# Patient Record
Sex: FEMALE | Race: WHITE | Employment: OTHER | URBAN - METROPOLITAN AREA
[De-identification: names, ages, dates, MRNs, and addresses within clinical notes are randomized per-mention and may not be internally consistent; named-entity substitution may affect disease eponyms.]

---

## 2017-08-04 PROBLEM — Z86.69 HX OF MIGRAINE HEADACHES: Status: ACTIVE | Noted: 2017-08-04

## 2017-10-03 PROBLEM — Z87.19 H/O SMALL BOWEL OBSTRUCTION: Status: ACTIVE | Noted: 2017-10-03

## 2017-10-03 PROBLEM — F51.01 PRIMARY INSOMNIA: Status: ACTIVE | Noted: 2017-10-03

## 2017-10-03 PROBLEM — R10.84 GENERALIZED ABDOMINAL PAIN: Status: ACTIVE | Noted: 2017-10-03

## 2017-10-03 PROBLEM — K76.0 FATTY LIVER: Status: ACTIVE | Noted: 2017-10-03

## 2018-02-28 PROBLEM — E03.9 HYPOTHYROIDISM: Status: ACTIVE | Noted: 2018-02-28

## 2018-03-13 ENCOUNTER — APPOINTMENT (OUTPATIENT)
Dept: CT IMAGING | Age: 33
End: 2018-03-13
Payer: COMMERCIAL

## 2018-03-13 ENCOUNTER — HOSPITAL ENCOUNTER (EMERGENCY)
Age: 33
Discharge: HOME | End: 2018-03-13
Attending: EMERGENCY MEDICINE
Payer: COMMERCIAL

## 2018-03-13 VITALS
OXYGEN SATURATION: 98 % | BODY MASS INDEX: 19.29 KG/M2 | SYSTOLIC BLOOD PRESSURE: 99 MMHG | DIASTOLIC BLOOD PRESSURE: 61 MMHG | HEART RATE: 79 BPM | HEIGHT: 66 IN | RESPIRATION RATE: 16 BRPM | TEMPERATURE: 98.1 F | WEIGHT: 120 LBS

## 2018-03-13 DIAGNOSIS — R10.30 LOWER ABDOMINAL PAIN: Primary | ICD-10-CM

## 2018-03-13 LAB
ALBUMIN SERPL-MCNC: 4.7 G/DL (ref 3.5–5.2)
ALP BLD-CCNC: 86 U/L (ref 35–104)
ALT SERPL-CCNC: 46 U/L (ref 0–32)
AMYLASE: 38 U/L (ref 20–100)
ANION GAP SERPL CALCULATED.3IONS-SCNC: 17 MMOL/L (ref 7–16)
AST SERPL-CCNC: 133 U/L (ref 0–31)
BASOPHILS ABSOLUTE: 0.04 E9/L (ref 0–0.2)
BASOPHILS RELATIVE PERCENT: 0.9 % (ref 0–2)
BILIRUB SERPL-MCNC: <0.2 MG/DL (ref 0–1.2)
BILIRUBIN URINE: NEGATIVE
BLOOD, URINE: NEGATIVE
BUN BLDV-MCNC: 9 MG/DL (ref 6–20)
CALCIUM SERPL-MCNC: 8.5 MG/DL (ref 8.6–10.2)
CHLORIDE BLD-SCNC: 102 MMOL/L (ref 98–107)
CLARITY: CLEAR
CO2: 23 MMOL/L (ref 22–29)
COLOR: YELLOW
CREAT SERPL-MCNC: 0.6 MG/DL (ref 0.5–1)
EOSINOPHILS ABSOLUTE: 0.2 E9/L (ref 0.05–0.5)
EOSINOPHILS RELATIVE PERCENT: 4.3 % (ref 0–6)
GFR AFRICAN AMERICAN: >60
GFR NON-AFRICAN AMERICAN: >60 ML/MIN/1.73
GLUCOSE BLD-MCNC: 86 MG/DL (ref 74–109)
GLUCOSE URINE: NEGATIVE MG/DL
HCG QUALITATIVE: NEGATIVE
HCT VFR BLD CALC: 39.9 % (ref 34–48)
HEMOGLOBIN: 14.1 G/DL (ref 11.5–15.5)
IMMATURE GRANULOCYTES #: 0.01 E9/L
IMMATURE GRANULOCYTES %: 0.2 % (ref 0–5)
KETONES, URINE: NEGATIVE MG/DL
LEUKOCYTE ESTERASE, URINE: NEGATIVE
LIPASE: 35 U/L (ref 13–60)
LYMPHOCYTES ABSOLUTE: 1.12 E9/L (ref 1.5–4)
LYMPHOCYTES RELATIVE PERCENT: 24.1 % (ref 20–42)
MCH RBC QN AUTO: 35 PG (ref 26–35)
MCHC RBC AUTO-ENTMCNC: 35.3 % (ref 32–34.5)
MCV RBC AUTO: 99 FL (ref 80–99.9)
MONOCYTES ABSOLUTE: 0.67 E9/L (ref 0.1–0.95)
MONOCYTES RELATIVE PERCENT: 14.4 % (ref 2–12)
NEUTROPHILS ABSOLUTE: 2.6 E9/L (ref 1.8–7.3)
NEUTROPHILS RELATIVE PERCENT: 56.1 % (ref 43–80)
NITRITE, URINE: NEGATIVE
PDW BLD-RTO: 13.4 FL (ref 11.5–15)
PH UA: 5.5 (ref 5–9)
PLATELET # BLD: 93 E9/L (ref 130–450)
PLATELET CONFIRMATION: NORMAL
PMV BLD AUTO: 9 FL (ref 7–12)
POTASSIUM SERPL-SCNC: 3.8 MMOL/L (ref 3.5–5)
PROTEIN UA: NEGATIVE MG/DL
RBC # BLD: 4.03 E12/L (ref 3.5–5.5)
SODIUM BLD-SCNC: 142 MMOL/L (ref 132–146)
SPECIFIC GRAVITY UA: 1.01 (ref 1–1.03)
TOTAL PROTEIN: 7.3 G/DL (ref 6.4–8.3)
UROBILINOGEN, URINE: 0.2 E.U./DL
WBC # BLD: 4.6 E9/L (ref 4.5–11.5)

## 2018-03-13 PROCEDURE — 85025 COMPLETE CBC W/AUTO DIFF WBC: CPT

## 2018-03-13 PROCEDURE — 84703 CHORIONIC GONADOTROPIN ASSAY: CPT

## 2018-03-13 PROCEDURE — 99284 EMERGENCY DEPT VISIT MOD MDM: CPT

## 2018-03-13 PROCEDURE — 74177 CT ABD & PELVIS W/CONTRAST: CPT

## 2018-03-13 PROCEDURE — 2580000003 HC RX 258: Performed by: EMERGENCY MEDICINE

## 2018-03-13 PROCEDURE — 6360000002 HC RX W HCPCS: Performed by: EMERGENCY MEDICINE

## 2018-03-13 PROCEDURE — 82150 ASSAY OF AMYLASE: CPT

## 2018-03-13 PROCEDURE — 81003 URINALYSIS AUTO W/O SCOPE: CPT

## 2018-03-13 PROCEDURE — 96374 THER/PROPH/DIAG INJ IV PUSH: CPT

## 2018-03-13 PROCEDURE — 80053 COMPREHEN METABOLIC PANEL: CPT

## 2018-03-13 PROCEDURE — 83690 ASSAY OF LIPASE: CPT

## 2018-03-13 PROCEDURE — 6360000004 HC RX CONTRAST MEDICATION: Performed by: RADIOLOGY

## 2018-03-13 PROCEDURE — 96375 TX/PRO/DX INJ NEW DRUG ADDON: CPT

## 2018-03-13 RX ORDER — DIPHENHYDRAMINE HYDROCHLORIDE 50 MG/ML
12.5 INJECTION INTRAMUSCULAR; INTRAVENOUS ONCE
Status: COMPLETED | OUTPATIENT
Start: 2018-03-13 | End: 2018-03-13

## 2018-03-13 RX ORDER — OYSTER SHELL CALCIUM WITH VITAMIN D 500; 200 MG/1; [IU]/1
2 TABLET, FILM COATED ORAL 2 TIMES DAILY
COMMUNITY
End: 2020-08-30

## 2018-03-13 RX ORDER — MORPHINE SULFATE 4 MG/ML
4 INJECTION, SOLUTION INTRAMUSCULAR; INTRAVENOUS ONCE
Status: COMPLETED | OUTPATIENT
Start: 2018-03-13 | End: 2018-03-13

## 2018-03-13 RX ORDER — DOCUSATE SODIUM 100 MG/1
100 CAPSULE, LIQUID FILLED ORAL 2 TIMES DAILY
Qty: 14 CAPSULE | Refills: 0 | Status: SHIPPED | OUTPATIENT
Start: 2018-03-13 | End: 2018-03-20

## 2018-03-13 RX ORDER — KETOROLAC TROMETHAMINE 30 MG/ML
30 INJECTION, SOLUTION INTRAMUSCULAR; INTRAVENOUS ONCE
Status: COMPLETED | OUTPATIENT
Start: 2018-03-13 | End: 2018-03-13

## 2018-03-13 RX ORDER — TRAMADOL HYDROCHLORIDE 50 MG/1
50 TABLET ORAL EVERY 6 HOURS PRN
Qty: 8 TABLET | Refills: 0 | Status: SHIPPED | OUTPATIENT
Start: 2018-03-13 | End: 2018-03-15

## 2018-03-13 RX ORDER — 0.9 % SODIUM CHLORIDE 0.9 %
1000 INTRAVENOUS SOLUTION INTRAVENOUS ONCE
Status: COMPLETED | OUTPATIENT
Start: 2018-03-13 | End: 2018-03-13

## 2018-03-13 RX ADMIN — IOPAMIDOL 110 ML: 755 INJECTION, SOLUTION INTRAVENOUS at 13:13

## 2018-03-13 RX ADMIN — DIPHENHYDRAMINE HYDROCHLORIDE 12.5 MG: 50 INJECTION, SOLUTION INTRAMUSCULAR; INTRAVENOUS at 15:27

## 2018-03-13 RX ADMIN — KETOROLAC TROMETHAMINE 30 MG: 30 INJECTION, SOLUTION INTRAMUSCULAR; INTRAVENOUS at 10:49

## 2018-03-13 RX ADMIN — SODIUM CHLORIDE 1000 ML: 9 INJECTION, SOLUTION INTRAVENOUS at 10:49

## 2018-03-13 RX ADMIN — MORPHINE SULFATE 4 MG: 4 INJECTION, SOLUTION INTRAMUSCULAR; INTRAVENOUS at 14:40

## 2018-03-13 ASSESSMENT — PAIN DESCRIPTION - DESCRIPTORS: DESCRIPTORS: ACHING

## 2018-03-13 ASSESSMENT — PAIN DESCRIPTION - FREQUENCY: FREQUENCY: CONTINUOUS

## 2018-03-13 ASSESSMENT — PAIN SCALES - GENERAL
PAINLEVEL_OUTOF10: 8
PAINLEVEL_OUTOF10: 10
PAINLEVEL_OUTOF10: 10

## 2018-03-13 ASSESSMENT — PAIN DESCRIPTION - LOCATION: LOCATION: ABDOMEN

## 2018-03-13 ASSESSMENT — PAIN DESCRIPTION - ORIENTATION: ORIENTATION: RIGHT

## 2018-03-13 ASSESSMENT — PAIN DESCRIPTION - PAIN TYPE: TYPE: ACUTE PAIN

## 2018-03-13 NOTE — ED NOTES
Bed: 10  Expected date:   Expected time:   Means of arrival:   Comments:  EMS Antony Paulino LPN  42/26/[birth year] 0011

## 2018-03-13 NOTE — ED TRIAGE NOTES
Valentina Schaefer is a 29 y.o. female who presented to the ED on 03/13/18 complaining of   Chief Complaint   Patient presents with    Abdominal Pain     RLQ pain 7/10, started last week after having flu    Altered Mental Status     x3 last time happened this morning, hit head off tub -bloodthinners +head

## 2018-03-13 NOTE — ED PROVIDER NOTES
HPI:   Annemarie Bolanos is a 29 y.o. female presenting to the ED for abdominal pain and possible head injury, beginning last week. The complaint has been intermittent, moderate in severity, and worsened by nothing. The pt's sx began last week after having the flu. Her abdominal pain is accompanied by SOB that led to the pt using at home O2 for one and a half days, which is not something she usually does. She's also concerned about a possible head injury after hitting her on the bathtub when she lost consciousness this morning; this is the third time she's lost consciousness since the sx began. She also has a cough and has experienced vomiting and diarrhea. Patient denies fever/chills, congestion, chest pain, edema, headache, visual disturbances, focal paresthesias, focal weakness, nausea, constipation, dysuria, hematuria, neck or back pain or other complaints at this time. ROS:   Pertinent positives and negatives are stated within HPI, all other systems reviewed and are negative.    --------------------------------------------- PAST HISTORY ---------------------------------------------  Past Medical History:  has a past medical history of Biliary dyskinesia; Hypocalcemia; Hypothyroidism; Irritable bowel syndrome; Migraines; and PONV (postoperative nausea and vomiting). Past Surgical History:  has a past surgical history that includes Parathyroid gland surgery; Cholecystectomy, laparoscopic (07/06/2016); and Thyroidectomy. Social History:  reports that she has never smoked. She has never used smokeless tobacco. She reports that she does not drink alcohol or use drugs. Family History: family history includes Alzheimer's Disease in an other family member; Asthma in her father; Diabetes in an other family member; Heart Disease in her father; High Blood Pressure in her father and mother; High Cholesterol in her mother; Wake Suzanna in an other family member; Other in her mother.      The patients home medications have been reviewed.     Allergies: Codeine    -------------------------------------------------- RESULTS -------------------------------------------------  All laboratory and radiology results have been personally reviewed by myself   LABS:  Results for orders placed or performed during the hospital encounter of 03/13/18   Lipase   Result Value Ref Range    Lipase 35 13 - 60 U/L   Amylase   Result Value Ref Range    Amylase 38 20 - 100 U/L   Comprehensive Metabolic Panel   Result Value Ref Range    Sodium 142 132 - 146 mmol/L    Potassium 3.8 3.5 - 5.0 mmol/L    Chloride 102 98 - 107 mmol/L    CO2 23 22 - 29 mmol/L    Anion Gap 17 (H) 7 - 16 mmol/L    Glucose 86 74 - 109 mg/dL    BUN 9 6 - 20 mg/dL    CREATININE 0.6 0.5 - 1.0 mg/dL    GFR Non-African American >60 >=60 mL/min/1.73    GFR African American >60     Calcium 8.5 (L) 8.6 - 10.2 mg/dL    Total Protein 7.3 6.4 - 8.3 g/dL    Alb 4.7 3.5 - 5.2 g/dL    Total Bilirubin <0.2 0.0 - 1.2 mg/dL    Alkaline Phosphatase 86 35 - 104 U/L    ALT 46 (H) 0 - 32 U/L     (H) 0 - 31 U/L   CBC Auto Differential   Result Value Ref Range    WBC 4.6 4.5 - 11.5 E9/L    RBC 4.03 3.50 - 5.50 E12/L    Hemoglobin 14.1 11.5 - 15.5 g/dL    Hematocrit 39.9 34.0 - 48.0 %    MCV 99.0 80.0 - 99.9 fL    MCH 35.0 26.0 - 35.0 pg    MCHC 35.3 (H) 32.0 - 34.5 %    RDW 13.4 11.5 - 15.0 fL    Platelets 93 (L) 816 - 450 E9/L    MPV 9.0 7.0 - 12.0 fL    Neutrophils % 56.1 43.0 - 80.0 %    Immature Granulocytes % 0.2 0.0 - 5.0 %    Lymphocytes % 24.1 20.0 - 42.0 %    Monocytes % 14.4 (H) 2.0 - 12.0 %    Eosinophils % 4.3 0.0 - 6.0 %    Basophils % 0.9 0.0 - 2.0 %    Neutrophils # 2.60 1.80 - 7.30 E9/L    Immature Granulocytes # 0.01 E9/L    Lymphocytes # 1.12 (L) 1.50 - 4.00 E9/L    Monocytes # 0.67 0.10 - 0.95 E9/L    Eosinophils # 0.20 0.05 - 0.50 E9/L    Basophils # 0.04 0.00 - 0.20 E9/L   Urinalysis   Result Value Ref Range    Color, UA Yellow Straw/Yellow    Clarity, UA Clear

## 2018-03-19 RX ORDER — ZOLPIDEM TARTRATE 10 MG/1
10 TABLET ORAL NIGHTLY PRN
Qty: 30 TABLET | Refills: 1 | Status: SHIPPED | OUTPATIENT
Start: 2018-03-19 | End: 2018-04-18

## 2018-05-15 RX ORDER — ZOLPIDEM TARTRATE 10 MG/1
10 TABLET ORAL NIGHTLY PRN
Qty: 30 TABLET | Refills: 1 | Status: SHIPPED | OUTPATIENT
Start: 2018-05-15 | End: 2018-07-09 | Stop reason: SDUPTHER

## 2018-06-05 ENCOUNTER — HOSPITAL ENCOUNTER (INPATIENT)
Age: 33
LOS: 3 days | Discharge: HOME | DRG: 248 | End: 2018-06-08
Attending: EMERGENCY MEDICINE | Admitting: SURGERY
Payer: COMMERCIAL

## 2018-06-05 ENCOUNTER — APPOINTMENT (OUTPATIENT)
Dept: CT IMAGING | Age: 33
DRG: 248 | End: 2018-06-05
Payer: COMMERCIAL

## 2018-06-05 DIAGNOSIS — R10.31 RIGHT LOWER QUADRANT ABDOMINAL PAIN: Primary | ICD-10-CM

## 2018-06-05 LAB
ALBUMIN SERPL-MCNC: 4.6 G/DL (ref 3.5–5.2)
ALP BLD-CCNC: 92 U/L (ref 35–104)
ALT SERPL-CCNC: 23 U/L (ref 0–32)
ANION GAP SERPL CALCULATED.3IONS-SCNC: 14 MMOL/L (ref 7–16)
AST SERPL-CCNC: 31 U/L (ref 0–31)
BASOPHILS ABSOLUTE: 0.11 E9/L (ref 0–0.2)
BASOPHILS RELATIVE PERCENT: 1 % (ref 0–2)
BILIRUB SERPL-MCNC: <0.2 MG/DL (ref 0–1.2)
BILIRUBIN URINE: NEGATIVE
BLOOD, URINE: NEGATIVE
BUN BLDV-MCNC: 7 MG/DL (ref 6–20)
CALCIUM SERPL-MCNC: 9.3 MG/DL (ref 8.6–10.2)
CHLORIDE BLD-SCNC: 102 MMOL/L (ref 98–107)
CHP ED QC CHECK: YES
CLARITY: CLEAR
CO2: 25 MMOL/L (ref 22–29)
COLOR: YELLOW
CREAT SERPL-MCNC: 0.6 MG/DL (ref 0.5–1)
EOSINOPHILS ABSOLUTE: 0.28 E9/L (ref 0.05–0.5)
EOSINOPHILS RELATIVE PERCENT: 2.6 % (ref 0–6)
GFR AFRICAN AMERICAN: >60
GFR NON-AFRICAN AMERICAN: >60 ML/MIN/1.73
GLUCOSE BLD-MCNC: 104 MG/DL (ref 74–109)
GLUCOSE URINE: NEGATIVE MG/DL
HCT VFR BLD CALC: 44.1 % (ref 34–48)
HEMOGLOBIN: 15.5 G/DL (ref 11.5–15.5)
IMMATURE GRANULOCYTES #: 0.06 E9/L
IMMATURE GRANULOCYTES %: 0.6 % (ref 0–5)
KETONES, URINE: NEGATIVE MG/DL
LACTIC ACID: 1.4 MMOL/L (ref 0.5–2.2)
LEUKOCYTE ESTERASE, URINE: NEGATIVE
LIPASE: 69 U/L (ref 13–60)
LYMPHOCYTES ABSOLUTE: 3.47 E9/L (ref 1.5–4)
LYMPHOCYTES RELATIVE PERCENT: 32 % (ref 20–42)
MCH RBC QN AUTO: 34.6 PG (ref 26–35)
MCHC RBC AUTO-ENTMCNC: 35.1 % (ref 32–34.5)
MCV RBC AUTO: 98.4 FL (ref 80–99.9)
MONOCYTES ABSOLUTE: 0.88 E9/L (ref 0.1–0.95)
MONOCYTES RELATIVE PERCENT: 8.1 % (ref 2–12)
NEUTROPHILS ABSOLUTE: 6.03 E9/L (ref 1.8–7.3)
NEUTROPHILS RELATIVE PERCENT: 55.7 % (ref 43–80)
NITRITE, URINE: NEGATIVE
PDW BLD-RTO: 11.8 FL (ref 11.5–15)
PH UA: 5.5 (ref 5–9)
PLATELET # BLD: 302 E9/L (ref 130–450)
PMV BLD AUTO: 9.5 FL (ref 7–12)
POTASSIUM SERPL-SCNC: 3.9 MMOL/L (ref 3.5–5)
PREGNANCY TEST URINE, POC: NEGATIVE
PROTEIN UA: NEGATIVE MG/DL
RBC # BLD: 4.48 E12/L (ref 3.5–5.5)
SODIUM BLD-SCNC: 141 MMOL/L (ref 132–146)
SPECIFIC GRAVITY UA: 1.01 (ref 1–1.03)
TOTAL PROTEIN: 7.6 G/DL (ref 6.4–8.3)
UROBILINOGEN, URINE: 0.2 E.U./DL
WBC # BLD: 10.8 E9/L (ref 4.5–11.5)

## 2018-06-05 PROCEDURE — 96374 THER/PROPH/DIAG INJ IV PUSH: CPT

## 2018-06-05 PROCEDURE — 83605 ASSAY OF LACTIC ACID: CPT

## 2018-06-05 PROCEDURE — 6360000004 HC RX CONTRAST MEDICATION: Performed by: RADIOLOGY

## 2018-06-05 PROCEDURE — 81003 URINALYSIS AUTO W/O SCOPE: CPT

## 2018-06-05 PROCEDURE — 96375 TX/PRO/DX INJ NEW DRUG ADDON: CPT

## 2018-06-05 PROCEDURE — S0028 INJECTION, FAMOTIDINE, 20 MG: HCPCS | Performed by: EMERGENCY MEDICINE

## 2018-06-05 PROCEDURE — 85025 COMPLETE CBC W/AUTO DIFF WBC: CPT

## 2018-06-05 PROCEDURE — 6360000002 HC RX W HCPCS: Performed by: EMERGENCY MEDICINE

## 2018-06-05 PROCEDURE — 74177 CT ABD & PELVIS W/CONTRAST: CPT

## 2018-06-05 PROCEDURE — C9113 INJ PANTOPRAZOLE SODIUM, VIA: HCPCS | Performed by: EMERGENCY MEDICINE

## 2018-06-05 PROCEDURE — 83690 ASSAY OF LIPASE: CPT

## 2018-06-05 PROCEDURE — 1200000000 HC SEMI PRIVATE

## 2018-06-05 PROCEDURE — 80053 COMPREHEN METABOLIC PANEL: CPT

## 2018-06-05 PROCEDURE — 99285 EMERGENCY DEPT VISIT HI MDM: CPT

## 2018-06-05 PROCEDURE — 2500000003 HC RX 250 WO HCPCS: Performed by: EMERGENCY MEDICINE

## 2018-06-05 RX ORDER — ONDANSETRON 2 MG/ML
4 INJECTION INTRAMUSCULAR; INTRAVENOUS ONCE
Status: COMPLETED | OUTPATIENT
Start: 2018-06-05 | End: 2018-06-05

## 2018-06-05 RX ORDER — SUCRALFATE 1 G/1
1 TABLET ORAL 4 TIMES DAILY
COMMUNITY
End: 2018-06-19 | Stop reason: ALTCHOICE

## 2018-06-05 RX ORDER — PANTOPRAZOLE SODIUM 40 MG/10ML
40 INJECTION, POWDER, LYOPHILIZED, FOR SOLUTION INTRAVENOUS ONCE
Status: COMPLETED | OUTPATIENT
Start: 2018-06-05 | End: 2018-06-05

## 2018-06-05 RX ORDER — FAMOTIDINE 10 MG
10 TABLET ORAL 2 TIMES DAILY
Status: ON HOLD | COMMUNITY
End: 2020-09-02 | Stop reason: HOSPADM

## 2018-06-05 RX ORDER — KETOROLAC TROMETHAMINE 30 MG/ML
15 INJECTION, SOLUTION INTRAMUSCULAR; INTRAVENOUS ONCE
Status: COMPLETED | OUTPATIENT
Start: 2018-06-05 | End: 2018-06-06

## 2018-06-05 RX ADMIN — ONDANSETRON 4 MG: 2 INJECTION INTRAMUSCULAR; INTRAVENOUS at 21:31

## 2018-06-05 RX ADMIN — PANTOPRAZOLE SODIUM 40 MG: 40 INJECTION, POWDER, FOR SOLUTION INTRAVENOUS at 21:31

## 2018-06-05 RX ADMIN — IOPAMIDOL 110 ML: 755 INJECTION, SOLUTION INTRAVENOUS at 21:42

## 2018-06-05 RX ADMIN — FAMOTIDINE 20 MG: 10 INJECTION, SOLUTION INTRAVENOUS at 21:32

## 2018-06-05 ASSESSMENT — PAIN DESCRIPTION - LOCATION: LOCATION: ABDOMEN

## 2018-06-05 ASSESSMENT — PAIN SCALES - GENERAL: PAINLEVEL_OUTOF10: 8

## 2018-06-05 ASSESSMENT — PAIN DESCRIPTION - PAIN TYPE: TYPE: ACUTE PAIN

## 2018-06-06 ENCOUNTER — APPOINTMENT (OUTPATIENT)
Dept: ULTRASOUND IMAGING | Age: 33
DRG: 248 | End: 2018-06-06
Payer: COMMERCIAL

## 2018-06-06 PROCEDURE — 2580000003 HC RX 258: Performed by: SURGERY

## 2018-06-06 PROCEDURE — 6370000000 HC RX 637 (ALT 250 FOR IP): Performed by: EMERGENCY MEDICINE

## 2018-06-06 PROCEDURE — 2580000003 HC RX 258: Performed by: EMERGENCY MEDICINE

## 2018-06-06 PROCEDURE — 36415 COLL VENOUS BLD VENIPUNCTURE: CPT

## 2018-06-06 PROCEDURE — 99223 1ST HOSP IP/OBS HIGH 75: CPT | Performed by: SURGERY

## 2018-06-06 PROCEDURE — 87040 BLOOD CULTURE FOR BACTERIA: CPT

## 2018-06-06 PROCEDURE — 76856 US EXAM PELVIC COMPLETE: CPT

## 2018-06-06 PROCEDURE — 6360000002 HC RX W HCPCS: Performed by: SURGERY

## 2018-06-06 PROCEDURE — 2500000003 HC RX 250 WO HCPCS: Performed by: SURGERY

## 2018-06-06 PROCEDURE — 1200000000 HC SEMI PRIVATE

## 2018-06-06 PROCEDURE — 6360000002 HC RX W HCPCS: Performed by: EMERGENCY MEDICINE

## 2018-06-06 PROCEDURE — 2580000003 HC RX 258: Performed by: STUDENT IN AN ORGANIZED HEALTH CARE EDUCATION/TRAINING PROGRAM

## 2018-06-06 PROCEDURE — C9113 INJ PANTOPRAZOLE SODIUM, VIA: HCPCS | Performed by: STUDENT IN AN ORGANIZED HEALTH CARE EDUCATION/TRAINING PROGRAM

## 2018-06-06 PROCEDURE — 6360000002 HC RX W HCPCS: Performed by: STUDENT IN AN ORGANIZED HEALTH CARE EDUCATION/TRAINING PROGRAM

## 2018-06-06 PROCEDURE — 6370000000 HC RX 637 (ALT 250 FOR IP): Performed by: STUDENT IN AN ORGANIZED HEALTH CARE EDUCATION/TRAINING PROGRAM

## 2018-06-06 PROCEDURE — 76830 TRANSVAGINAL US NON-OB: CPT

## 2018-06-06 RX ORDER — CIPROFLOXACIN 2 MG/ML
400 INJECTION, SOLUTION INTRAVENOUS EVERY 12 HOURS
Status: DISCONTINUED | OUTPATIENT
Start: 2018-06-06 | End: 2018-06-08

## 2018-06-06 RX ORDER — SODIUM CHLORIDE, SODIUM LACTATE, POTASSIUM CHLORIDE, CALCIUM CHLORIDE 600; 310; 30; 20 MG/100ML; MG/100ML; MG/100ML; MG/100ML
INJECTION, SOLUTION INTRAVENOUS CONTINUOUS
Status: DISCONTINUED | OUTPATIENT
Start: 2018-06-06 | End: 2018-06-08 | Stop reason: HOSPADM

## 2018-06-06 RX ORDER — ACETAMINOPHEN 500 MG
1000 TABLET ORAL ONCE
Status: COMPLETED | OUTPATIENT
Start: 2018-06-06 | End: 2018-06-06

## 2018-06-06 RX ORDER — PANTOPRAZOLE SODIUM 40 MG/10ML
40 INJECTION, POWDER, LYOPHILIZED, FOR SOLUTION INTRAVENOUS DAILY
Status: DISCONTINUED | OUTPATIENT
Start: 2018-06-06 | End: 2018-06-08 | Stop reason: HOSPADM

## 2018-06-06 RX ORDER — MORPHINE SULFATE 2 MG/ML
4 INJECTION, SOLUTION INTRAMUSCULAR; INTRAVENOUS
Status: DISCONTINUED | OUTPATIENT
Start: 2018-06-06 | End: 2018-06-08

## 2018-06-06 RX ORDER — MORPHINE SULFATE 2 MG/ML
2 INJECTION, SOLUTION INTRAMUSCULAR; INTRAVENOUS
Status: DISCONTINUED | OUTPATIENT
Start: 2018-06-06 | End: 2018-06-06 | Stop reason: SDUPTHER

## 2018-06-06 RX ORDER — 0.9 % SODIUM CHLORIDE 0.9 %
10 VIAL (ML) INJECTION DAILY
Status: DISCONTINUED | OUTPATIENT
Start: 2018-06-06 | End: 2018-06-08 | Stop reason: HOSPADM

## 2018-06-06 RX ORDER — LEVOTHYROXINE SODIUM 0.1 MG/1
100 TABLET ORAL DAILY
Status: DISCONTINUED | OUTPATIENT
Start: 2018-06-06 | End: 2018-06-08 | Stop reason: HOSPADM

## 2018-06-06 RX ORDER — ACETAMINOPHEN 325 MG/1
650 TABLET ORAL EVERY 4 HOURS PRN
Status: DISCONTINUED | OUTPATIENT
Start: 2018-06-06 | End: 2018-06-08 | Stop reason: HOSPADM

## 2018-06-06 RX ORDER — ONDANSETRON 2 MG/ML
4 INJECTION INTRAMUSCULAR; INTRAVENOUS EVERY 6 HOURS PRN
Status: DISCONTINUED | OUTPATIENT
Start: 2018-06-06 | End: 2018-06-08 | Stop reason: HOSPADM

## 2018-06-06 RX ORDER — MORPHINE SULFATE 2 MG/ML
4 INJECTION, SOLUTION INTRAMUSCULAR; INTRAVENOUS
Status: DISCONTINUED | OUTPATIENT
Start: 2018-06-06 | End: 2018-06-06 | Stop reason: SDUPTHER

## 2018-06-06 RX ORDER — PROMETHAZINE HYDROCHLORIDE 25 MG/ML
12.5 INJECTION, SOLUTION INTRAMUSCULAR; INTRAVENOUS EVERY 6 HOURS PRN
Status: DISCONTINUED | OUTPATIENT
Start: 2018-06-06 | End: 2018-06-08 | Stop reason: HOSPADM

## 2018-06-06 RX ORDER — CEFTRIAXONE 2 G/1
INJECTION, POWDER, FOR SOLUTION INTRAMUSCULAR; INTRAVENOUS
Status: DISPENSED
Start: 2018-06-06 | End: 2018-06-06

## 2018-06-06 RX ORDER — SODIUM CHLORIDE 0.9 % (FLUSH) 0.9 %
10 SYRINGE (ML) INJECTION PRN
Status: DISCONTINUED | OUTPATIENT
Start: 2018-06-06 | End: 2018-06-08 | Stop reason: HOSPADM

## 2018-06-06 RX ORDER — SODIUM CHLORIDE 0.9 % (FLUSH) 0.9 %
10 SYRINGE (ML) INJECTION EVERY 12 HOURS SCHEDULED
Status: DISCONTINUED | OUTPATIENT
Start: 2018-06-06 | End: 2018-06-08 | Stop reason: HOSPADM

## 2018-06-06 RX ORDER — MORPHINE SULFATE 2 MG/ML
2 INJECTION, SOLUTION INTRAMUSCULAR; INTRAVENOUS
Status: DISCONTINUED | OUTPATIENT
Start: 2018-06-06 | End: 2018-06-08

## 2018-06-06 RX ADMIN — SODIUM CHLORIDE, POTASSIUM CHLORIDE, SODIUM LACTATE AND CALCIUM CHLORIDE: 600; 310; 30; 20 INJECTION, SOLUTION INTRAVENOUS at 18:33

## 2018-06-06 RX ADMIN — Medication 10 ML: at 10:53

## 2018-06-06 RX ADMIN — METRONIDAZOLE 500 MG: 500 INJECTION, SOLUTION INTRAVENOUS at 18:58

## 2018-06-06 RX ADMIN — ACETAMINOPHEN 1000 MG: 500 TABLET ORAL at 00:50

## 2018-06-06 RX ADMIN — LEVOTHYROXINE SODIUM 100 MCG: 100 TABLET ORAL at 14:34

## 2018-06-06 RX ADMIN — METRONIDAZOLE 500 MG: 500 INJECTION, SOLUTION INTRAVENOUS at 10:52

## 2018-06-06 RX ADMIN — KETOROLAC TROMETHAMINE 15 MG: 30 INJECTION, SOLUTION INTRAMUSCULAR at 01:32

## 2018-06-06 RX ADMIN — CIPROFLOXACIN 400 MG: 2 INJECTION, SOLUTION INTRAVENOUS at 17:05

## 2018-06-06 RX ADMIN — Medication 10 ML: at 08:15

## 2018-06-06 RX ADMIN — Medication 10 ML: at 14:27

## 2018-06-06 RX ADMIN — MORPHINE SULFATE 4 MG: 2 INJECTION, SOLUTION INTRAMUSCULAR; INTRAVENOUS at 14:27

## 2018-06-06 RX ADMIN — PROMETHAZINE HYDROCHLORIDE 12.5 MG: 25 INJECTION INTRAMUSCULAR; INTRAVENOUS at 18:34

## 2018-06-06 RX ADMIN — METRONIDAZOLE 500 MG: 500 INJECTION, SOLUTION INTRAVENOUS at 02:43

## 2018-06-06 RX ADMIN — PANTOPRAZOLE SODIUM 40 MG: 40 INJECTION, POWDER, FOR SOLUTION INTRAVENOUS at 10:52

## 2018-06-06 RX ADMIN — SODIUM CHLORIDE, POTASSIUM CHLORIDE, SODIUM LACTATE AND CALCIUM CHLORIDE: 600; 310; 30; 20 INJECTION, SOLUTION INTRAVENOUS at 02:41

## 2018-06-06 RX ADMIN — ENOXAPARIN SODIUM 40 MG: 40 INJECTION, SOLUTION INTRAVENOUS; SUBCUTANEOUS at 08:19

## 2018-06-06 RX ADMIN — CEFTRIAXONE SODIUM 2 G: 2 INJECTION, POWDER, FOR SOLUTION INTRAMUSCULAR; INTRAVENOUS at 01:41

## 2018-06-06 RX ADMIN — MORPHINE SULFATE 2 MG: 2 INJECTION, SOLUTION INTRAMUSCULAR; INTRAVENOUS at 04:40

## 2018-06-06 RX ADMIN — MORPHINE SULFATE 2 MG: 2 INJECTION, SOLUTION INTRAMUSCULAR; INTRAVENOUS at 03:16

## 2018-06-06 RX ADMIN — CIPROFLOXACIN 400 MG: 2 INJECTION, SOLUTION INTRAVENOUS at 04:42

## 2018-06-06 RX ADMIN — ONDANSETRON 4 MG: 2 INJECTION INTRAMUSCULAR; INTRAVENOUS at 20:28

## 2018-06-06 RX ADMIN — SODIUM CHLORIDE, POTASSIUM CHLORIDE, SODIUM LACTATE AND CALCIUM CHLORIDE: 600; 310; 30; 20 INJECTION, SOLUTION INTRAVENOUS at 01:32

## 2018-06-06 RX ADMIN — MORPHINE SULFATE 4 MG: 2 INJECTION, SOLUTION INTRAMUSCULAR; INTRAVENOUS at 18:34

## 2018-06-06 RX ADMIN — MORPHINE SULFATE 4 MG: 2 INJECTION, SOLUTION INTRAMUSCULAR; INTRAVENOUS at 10:52

## 2018-06-06 RX ADMIN — MORPHINE SULFATE 4 MG: 2 INJECTION, SOLUTION INTRAMUSCULAR; INTRAVENOUS at 22:13

## 2018-06-06 RX ADMIN — MORPHINE SULFATE 4 MG: 2 INJECTION, SOLUTION INTRAMUSCULAR; INTRAVENOUS at 08:15

## 2018-06-06 RX ADMIN — SODIUM CHLORIDE, POTASSIUM CHLORIDE, SODIUM LACTATE AND CALCIUM CHLORIDE: 600; 310; 30; 20 INJECTION, SOLUTION INTRAVENOUS at 10:22

## 2018-06-06 RX ADMIN — Medication 10 ML: at 20:22

## 2018-06-06 RX ADMIN — ONDANSETRON 4 MG: 2 INJECTION INTRAMUSCULAR; INTRAVENOUS at 14:26

## 2018-06-06 ASSESSMENT — PAIN DESCRIPTION - PAIN TYPE
TYPE: ACUTE PAIN

## 2018-06-06 ASSESSMENT — PAIN DESCRIPTION - LOCATION
LOCATION: ABDOMEN

## 2018-06-06 ASSESSMENT — PAIN SCALES - GENERAL
PAINLEVEL_OUTOF10: 8
PAINLEVEL_OUTOF10: 9
PAINLEVEL_OUTOF10: 6
PAINLEVEL_OUTOF10: 8
PAINLEVEL_OUTOF10: 9
PAINLEVEL_OUTOF10: 8
PAINLEVEL_OUTOF10: 7
PAINLEVEL_OUTOF10: 8
PAINLEVEL_OUTOF10: 8
PAINLEVEL_OUTOF10: 10
PAINLEVEL_OUTOF10: 0
PAINLEVEL_OUTOF10: 8
PAINLEVEL_OUTOF10: 10
PAINLEVEL_OUTOF10: 8
PAINLEVEL_OUTOF10: 6
PAINLEVEL_OUTOF10: 9

## 2018-06-06 ASSESSMENT — PAIN DESCRIPTION - DESCRIPTORS
DESCRIPTORS: ACHING
DESCRIPTORS: BURNING;SHARP
DESCRIPTORS: ACHING
DESCRIPTORS: BURNING;SHARP
DESCRIPTORS: SORE;SHARP;STABBING

## 2018-06-06 ASSESSMENT — PAIN DESCRIPTION - PROGRESSION
CLINICAL_PROGRESSION: NOT CHANGED

## 2018-06-06 ASSESSMENT — PAIN DESCRIPTION - ORIENTATION
ORIENTATION: MID;LOWER
ORIENTATION: MID;RIGHT;LEFT;LOWER
ORIENTATION: MID;LOWER

## 2018-06-06 ASSESSMENT — PAIN DESCRIPTION - FREQUENCY
FREQUENCY: CONTINUOUS
FREQUENCY: INTERMITTENT
FREQUENCY: CONTINUOUS
FREQUENCY: INTERMITTENT
FREQUENCY: CONTINUOUS

## 2018-06-06 ASSESSMENT — PAIN DESCRIPTION - ONSET
ONSET: ON-GOING

## 2018-06-06 ASSESSMENT — PAIN DESCRIPTION - DIRECTION: RADIATING_TOWARDS: BACK

## 2018-06-07 ENCOUNTER — APPOINTMENT (OUTPATIENT)
Dept: CT IMAGING | Age: 33
DRG: 248 | End: 2018-06-07
Payer: COMMERCIAL

## 2018-06-07 LAB
ALBUMIN SERPL-MCNC: 3.3 G/DL (ref 3.5–5.2)
ALP BLD-CCNC: 107 U/L (ref 35–104)
ALT SERPL-CCNC: 109 U/L (ref 0–32)
ANION GAP SERPL CALCULATED.3IONS-SCNC: 11 MMOL/L (ref 7–16)
AST SERPL-CCNC: 154 U/L (ref 0–31)
BASOPHILS ABSOLUTE: 0.07 E9/L (ref 0–0.2)
BASOPHILS RELATIVE PERCENT: 1 % (ref 0–2)
BILIRUB SERPL-MCNC: 0.5 MG/DL (ref 0–1.2)
BUN BLDV-MCNC: 4 MG/DL (ref 6–20)
CALCIUM SERPL-MCNC: 7.9 MG/DL (ref 8.6–10.2)
CHLORIDE BLD-SCNC: 103 MMOL/L (ref 98–107)
CO2: 24 MMOL/L (ref 22–29)
CREAT SERPL-MCNC: 0.7 MG/DL (ref 0.5–1)
EOSINOPHILS ABSOLUTE: 0.17 E9/L (ref 0.05–0.5)
EOSINOPHILS RELATIVE PERCENT: 2.3 % (ref 0–6)
GFR AFRICAN AMERICAN: >60
GFR NON-AFRICAN AMERICAN: >60 ML/MIN/1.73
GLUCOSE BLD-MCNC: 86 MG/DL (ref 74–109)
HCT VFR BLD CALC: 37.7 % (ref 34–48)
HEMOGLOBIN: 12.8 G/DL (ref 11.5–15.5)
IMMATURE GRANULOCYTES #: 0.02 E9/L
IMMATURE GRANULOCYTES %: 0.3 % (ref 0–5)
LYMPHOCYTES ABSOLUTE: 1.92 E9/L (ref 1.5–4)
LYMPHOCYTES RELATIVE PERCENT: 26.3 % (ref 20–42)
MCH RBC QN AUTO: 34.4 PG (ref 26–35)
MCHC RBC AUTO-ENTMCNC: 34 % (ref 32–34.5)
MCV RBC AUTO: 101.3 FL (ref 80–99.9)
MONOCYTES ABSOLUTE: 0.63 E9/L (ref 0.1–0.95)
MONOCYTES RELATIVE PERCENT: 8.6 % (ref 2–12)
NEUTROPHILS ABSOLUTE: 4.5 E9/L (ref 1.8–7.3)
NEUTROPHILS RELATIVE PERCENT: 61.5 % (ref 43–80)
PDW BLD-RTO: 11.8 FL (ref 11.5–15)
PLATELET # BLD: 182 E9/L (ref 130–450)
PMV BLD AUTO: 10 FL (ref 7–12)
POTASSIUM REFLEX MAGNESIUM: 3.9 MMOL/L (ref 3.5–5)
RBC # BLD: 3.72 E12/L (ref 3.5–5.5)
SODIUM BLD-SCNC: 138 MMOL/L (ref 132–146)
TOTAL PROTEIN: 5.8 G/DL (ref 6.4–8.3)
WBC # BLD: 7.3 E9/L (ref 4.5–11.5)

## 2018-06-07 PROCEDURE — 6360000004 HC RX CONTRAST MEDICATION: Performed by: RADIOLOGY

## 2018-06-07 PROCEDURE — 2580000003 HC RX 258: Performed by: SURGERY

## 2018-06-07 PROCEDURE — 74177 CT ABD & PELVIS W/CONTRAST: CPT

## 2018-06-07 PROCEDURE — 36415 COLL VENOUS BLD VENIPUNCTURE: CPT

## 2018-06-07 PROCEDURE — 6370000000 HC RX 637 (ALT 250 FOR IP): Performed by: STUDENT IN AN ORGANIZED HEALTH CARE EDUCATION/TRAINING PROGRAM

## 2018-06-07 PROCEDURE — 2500000003 HC RX 250 WO HCPCS: Performed by: SURGERY

## 2018-06-07 PROCEDURE — 6360000002 HC RX W HCPCS: Performed by: SURGERY

## 2018-06-07 PROCEDURE — 85025 COMPLETE CBC W/AUTO DIFF WBC: CPT

## 2018-06-07 PROCEDURE — 99232 SBSQ HOSP IP/OBS MODERATE 35: CPT | Performed by: SURGERY

## 2018-06-07 PROCEDURE — 1200000000 HC SEMI PRIVATE

## 2018-06-07 PROCEDURE — 80053 COMPREHEN METABOLIC PANEL: CPT

## 2018-06-07 PROCEDURE — 6360000002 HC RX W HCPCS: Performed by: STUDENT IN AN ORGANIZED HEALTH CARE EDUCATION/TRAINING PROGRAM

## 2018-06-07 PROCEDURE — 2580000003 HC RX 258: Performed by: STUDENT IN AN ORGANIZED HEALTH CARE EDUCATION/TRAINING PROGRAM

## 2018-06-07 PROCEDURE — C9113 INJ PANTOPRAZOLE SODIUM, VIA: HCPCS | Performed by: STUDENT IN AN ORGANIZED HEALTH CARE EDUCATION/TRAINING PROGRAM

## 2018-06-07 RX ADMIN — METRONIDAZOLE 500 MG: 500 INJECTION, SOLUTION INTRAVENOUS at 18:50

## 2018-06-07 RX ADMIN — MORPHINE SULFATE 4 MG: 2 INJECTION, SOLUTION INTRAMUSCULAR; INTRAVENOUS at 12:17

## 2018-06-07 RX ADMIN — LEVOTHYROXINE SODIUM 100 MCG: 100 TABLET ORAL at 06:05

## 2018-06-07 RX ADMIN — Medication 10 ML: at 21:16

## 2018-06-07 RX ADMIN — IOHEXOL 50 ML: 240 INJECTION, SOLUTION INTRATHECAL; INTRAVASCULAR; INTRAVENOUS; ORAL at 16:35

## 2018-06-07 RX ADMIN — METRONIDAZOLE 500 MG: 500 INJECTION, SOLUTION INTRAVENOUS at 11:05

## 2018-06-07 RX ADMIN — MORPHINE SULFATE 4 MG: 2 INJECTION, SOLUTION INTRAMUSCULAR; INTRAVENOUS at 21:15

## 2018-06-07 RX ADMIN — SODIUM CHLORIDE, POTASSIUM CHLORIDE, SODIUM LACTATE AND CALCIUM CHLORIDE: 600; 310; 30; 20 INJECTION, SOLUTION INTRAVENOUS at 06:07

## 2018-06-07 RX ADMIN — Medication 10 ML: at 11:05

## 2018-06-07 RX ADMIN — MORPHINE SULFATE 4 MG: 2 INJECTION, SOLUTION INTRAMUSCULAR; INTRAVENOUS at 08:04

## 2018-06-07 RX ADMIN — SODIUM CHLORIDE, POTASSIUM CHLORIDE, SODIUM LACTATE AND CALCIUM CHLORIDE: 600; 310; 30; 20 INJECTION, SOLUTION INTRAVENOUS at 17:23

## 2018-06-07 RX ADMIN — Medication 10 ML: at 12:18

## 2018-06-07 RX ADMIN — METRONIDAZOLE 500 MG: 500 INJECTION, SOLUTION INTRAVENOUS at 02:54

## 2018-06-07 RX ADMIN — Medication 10 ML: at 08:04

## 2018-06-07 RX ADMIN — CIPROFLOXACIN 400 MG: 2 INJECTION, SOLUTION INTRAVENOUS at 04:08

## 2018-06-07 RX ADMIN — MORPHINE SULFATE 4 MG: 2 INJECTION, SOLUTION INTRAMUSCULAR; INTRAVENOUS at 15:26

## 2018-06-07 RX ADMIN — Medication 10 ML: at 15:26

## 2018-06-07 RX ADMIN — PROMETHAZINE HYDROCHLORIDE 12.5 MG: 25 INJECTION INTRAMUSCULAR; INTRAVENOUS at 21:15

## 2018-06-07 RX ADMIN — PROMETHAZINE HYDROCHLORIDE 12.5 MG: 25 INJECTION INTRAMUSCULAR; INTRAVENOUS at 03:06

## 2018-06-07 RX ADMIN — Medication 10 ML: at 03:06

## 2018-06-07 RX ADMIN — IOPAMIDOL 110 ML: 755 INJECTION, SOLUTION INTRAVENOUS at 16:35

## 2018-06-07 RX ADMIN — ENOXAPARIN SODIUM 40 MG: 40 INJECTION, SOLUTION INTRAVENOUS; SUBCUTANEOUS at 08:04

## 2018-06-07 RX ADMIN — Medication 10 ML: at 08:05

## 2018-06-07 RX ADMIN — PANTOPRAZOLE SODIUM 40 MG: 40 INJECTION, POWDER, FOR SOLUTION INTRAVENOUS at 08:04

## 2018-06-07 RX ADMIN — CIPROFLOXACIN 400 MG: 2 INJECTION, SOLUTION INTRAVENOUS at 15:15

## 2018-06-07 RX ADMIN — Medication 10 ML: at 15:15

## 2018-06-07 RX ADMIN — MORPHINE SULFATE 4 MG: 2 INJECTION, SOLUTION INTRAMUSCULAR; INTRAVENOUS at 03:05

## 2018-06-07 ASSESSMENT — PAIN DESCRIPTION - PAIN TYPE
TYPE: ACUTE PAIN

## 2018-06-07 ASSESSMENT — PAIN DESCRIPTION - FREQUENCY
FREQUENCY: INTERMITTENT

## 2018-06-07 ASSESSMENT — PAIN DESCRIPTION - ORIENTATION
ORIENTATION: MID;RIGHT
ORIENTATION: MID;LOWER
ORIENTATION: MID;LOWER

## 2018-06-07 ASSESSMENT — PAIN DESCRIPTION - ONSET
ONSET: ON-GOING

## 2018-06-07 ASSESSMENT — PAIN SCALES - GENERAL
PAINLEVEL_OUTOF10: 8
PAINLEVEL_OUTOF10: 7
PAINLEVEL_OUTOF10: 10
PAINLEVEL_OUTOF10: 5
PAINLEVEL_OUTOF10: 8
PAINLEVEL_OUTOF10: 6
PAINLEVEL_OUTOF10: 8
PAINLEVEL_OUTOF10: 8

## 2018-06-07 ASSESSMENT — PAIN DESCRIPTION - PROGRESSION
CLINICAL_PROGRESSION: NOT CHANGED

## 2018-06-07 ASSESSMENT — PAIN DESCRIPTION - DESCRIPTORS
DESCRIPTORS: SHARP;BURNING
DESCRIPTORS: SHARP;BURNING
DESCRIPTORS: SHARP;STABBING

## 2018-06-07 ASSESSMENT — PAIN DESCRIPTION - LOCATION
LOCATION: ABDOMEN

## 2018-06-07 ASSESSMENT — PAIN DESCRIPTION - DIRECTION: RADIATING_TOWARDS: BACK

## 2018-06-08 VITALS
HEIGHT: 60 IN | TEMPERATURE: 98.6 F | HEART RATE: 62 BPM | WEIGHT: 121.3 LBS | RESPIRATION RATE: 18 BRPM | DIASTOLIC BLOOD PRESSURE: 55 MMHG | BODY MASS INDEX: 23.81 KG/M2 | SYSTOLIC BLOOD PRESSURE: 96 MMHG | OXYGEN SATURATION: 98 %

## 2018-06-08 LAB
BASOPHILS ABSOLUTE: 0.08 E9/L (ref 0–0.2)
BASOPHILS RELATIVE PERCENT: 1.3 % (ref 0–2)
EOSINOPHILS ABSOLUTE: 0.25 E9/L (ref 0.05–0.5)
EOSINOPHILS RELATIVE PERCENT: 4.2 % (ref 0–6)
HCT VFR BLD CALC: 35 % (ref 34–48)
HEMOGLOBIN: 11.9 G/DL (ref 11.5–15.5)
IMMATURE GRANULOCYTES #: 0.02 E9/L
IMMATURE GRANULOCYTES %: 0.3 % (ref 0–5)
LYMPHOCYTES ABSOLUTE: 2.04 E9/L (ref 1.5–4)
LYMPHOCYTES RELATIVE PERCENT: 33.9 % (ref 20–42)
MCH RBC QN AUTO: 34.5 PG (ref 26–35)
MCHC RBC AUTO-ENTMCNC: 34 % (ref 32–34.5)
MCV RBC AUTO: 101.4 FL (ref 80–99.9)
MONOCYTES ABSOLUTE: 0.57 E9/L (ref 0.1–0.95)
MONOCYTES RELATIVE PERCENT: 9.5 % (ref 2–12)
NEUTROPHILS ABSOLUTE: 3.06 E9/L (ref 1.8–7.3)
NEUTROPHILS RELATIVE PERCENT: 50.8 % (ref 43–80)
PDW BLD-RTO: 11.9 FL (ref 11.5–15)
PLATELET # BLD: 206 E9/L (ref 130–450)
PMV BLD AUTO: 10.4 FL (ref 7–12)
RBC # BLD: 3.45 E12/L (ref 3.5–5.5)
WBC # BLD: 6 E9/L (ref 4.5–11.5)

## 2018-06-08 PROCEDURE — 2580000003 HC RX 258: Performed by: STUDENT IN AN ORGANIZED HEALTH CARE EDUCATION/TRAINING PROGRAM

## 2018-06-08 PROCEDURE — 2580000003 HC RX 258: Performed by: SURGERY

## 2018-06-08 PROCEDURE — 6360000002 HC RX W HCPCS: Performed by: STUDENT IN AN ORGANIZED HEALTH CARE EDUCATION/TRAINING PROGRAM

## 2018-06-08 PROCEDURE — 6360000002 HC RX W HCPCS: Performed by: SURGERY

## 2018-06-08 PROCEDURE — 6370000000 HC RX 637 (ALT 250 FOR IP): Performed by: STUDENT IN AN ORGANIZED HEALTH CARE EDUCATION/TRAINING PROGRAM

## 2018-06-08 PROCEDURE — 2500000003 HC RX 250 WO HCPCS: Performed by: SURGERY

## 2018-06-08 PROCEDURE — 36415 COLL VENOUS BLD VENIPUNCTURE: CPT

## 2018-06-08 PROCEDURE — 85025 COMPLETE CBC W/AUTO DIFF WBC: CPT

## 2018-06-08 PROCEDURE — C9113 INJ PANTOPRAZOLE SODIUM, VIA: HCPCS | Performed by: STUDENT IN AN ORGANIZED HEALTH CARE EDUCATION/TRAINING PROGRAM

## 2018-06-08 PROCEDURE — 99232 SBSQ HOSP IP/OBS MODERATE 35: CPT | Performed by: SURGERY

## 2018-06-08 RX ORDER — DICYCLOMINE HYDROCHLORIDE 10 MG/1
20 CAPSULE ORAL
Status: DISCONTINUED | OUTPATIENT
Start: 2018-06-08 | End: 2018-06-08 | Stop reason: HOSPADM

## 2018-06-08 RX ORDER — OXYCODONE HYDROCHLORIDE AND ACETAMINOPHEN 5; 325 MG/1; MG/1
1 TABLET ORAL EVERY 4 HOURS PRN
Status: DISCONTINUED | OUTPATIENT
Start: 2018-06-08 | End: 2018-06-08 | Stop reason: HOSPADM

## 2018-06-08 RX ORDER — OXYCODONE HYDROCHLORIDE AND ACETAMINOPHEN 5; 325 MG/1; MG/1
2 TABLET ORAL EVERY 4 HOURS PRN
Status: DISCONTINUED | OUTPATIENT
Start: 2018-06-08 | End: 2018-06-08 | Stop reason: HOSPADM

## 2018-06-08 RX ORDER — AMOXICILLIN AND CLAVULANATE POTASSIUM 875; 125 MG/1; MG/1
1 TABLET, FILM COATED ORAL EVERY 12 HOURS SCHEDULED
Status: DISCONTINUED | OUTPATIENT
Start: 2018-06-08 | End: 2018-06-08 | Stop reason: HOSPADM

## 2018-06-08 RX ORDER — AMOXICILLIN AND CLAVULANATE POTASSIUM 875; 125 MG/1; MG/1
1 TABLET, FILM COATED ORAL EVERY 12 HOURS SCHEDULED
Qty: 24 TABLET | Refills: 0 | Status: SHIPPED | OUTPATIENT
Start: 2018-06-08 | End: 2018-06-20

## 2018-06-08 RX ADMIN — OXYCODONE HYDROCHLORIDE AND ACETAMINOPHEN 2 TABLET: 5; 325 TABLET ORAL at 17:03

## 2018-06-08 RX ADMIN — MORPHINE SULFATE 4 MG: 2 INJECTION, SOLUTION INTRAMUSCULAR; INTRAVENOUS at 08:27

## 2018-06-08 RX ADMIN — ONDANSETRON 4 MG: 2 INJECTION INTRAMUSCULAR; INTRAVENOUS at 03:10

## 2018-06-08 RX ADMIN — Medication 10 ML: at 08:27

## 2018-06-08 RX ADMIN — OXYCODONE HYDROCHLORIDE AND ACETAMINOPHEN 2 TABLET: 5; 325 TABLET ORAL at 12:13

## 2018-06-08 RX ADMIN — LEVOTHYROXINE SODIUM 100 MCG: 100 TABLET ORAL at 05:53

## 2018-06-08 RX ADMIN — METRONIDAZOLE 500 MG: 500 INJECTION, SOLUTION INTRAVENOUS at 03:09

## 2018-06-08 RX ADMIN — AMOXICILLIN AND CLAVULANATE POTASSIUM 1 TABLET: 875; 125 TABLET, FILM COATED ORAL at 09:48

## 2018-06-08 RX ADMIN — Medication 10 ML: at 08:28

## 2018-06-08 RX ADMIN — DICYCLOMINE HYDROCHLORIDE 20 MG: 10 CAPSULE ORAL at 12:11

## 2018-06-08 RX ADMIN — MORPHINE SULFATE 4 MG: 2 INJECTION, SOLUTION INTRAMUSCULAR; INTRAVENOUS at 05:53

## 2018-06-08 RX ADMIN — ENOXAPARIN SODIUM 40 MG: 40 INJECTION, SOLUTION INTRAVENOUS; SUBCUTANEOUS at 08:27

## 2018-06-08 RX ADMIN — PANTOPRAZOLE SODIUM 40 MG: 40 INJECTION, POWDER, FOR SOLUTION INTRAVENOUS at 08:27

## 2018-06-08 RX ADMIN — MORPHINE SULFATE 4 MG: 2 INJECTION, SOLUTION INTRAMUSCULAR; INTRAVENOUS at 03:10

## 2018-06-08 RX ADMIN — CIPROFLOXACIN 400 MG: 2 INJECTION, SOLUTION INTRAVENOUS at 04:36

## 2018-06-08 ASSESSMENT — PAIN DESCRIPTION - ONSET
ONSET: ON-GOING
ONSET: ON-GOING

## 2018-06-08 ASSESSMENT — PAIN DESCRIPTION - DESCRIPTORS
DESCRIPTORS: BURNING;SHARP
DESCRIPTORS: ACHING;BURNING;SHARP

## 2018-06-08 ASSESSMENT — PAIN DESCRIPTION - LOCATION
LOCATION: ABDOMEN
LOCATION: ABDOMEN

## 2018-06-08 ASSESSMENT — PAIN DESCRIPTION - PROGRESSION
CLINICAL_PROGRESSION: NOT CHANGED
CLINICAL_PROGRESSION: NOT CHANGED

## 2018-06-08 ASSESSMENT — PAIN DESCRIPTION - PAIN TYPE
TYPE: ACUTE PAIN
TYPE: ACUTE PAIN

## 2018-06-08 ASSESSMENT — PAIN SCALES - GENERAL
PAINLEVEL_OUTOF10: 7
PAINLEVEL_OUTOF10: 8
PAINLEVEL_OUTOF10: 7
PAINLEVEL_OUTOF10: 3
PAINLEVEL_OUTOF10: 8
PAINLEVEL_OUTOF10: 7
PAINLEVEL_OUTOF10: 8

## 2018-06-08 ASSESSMENT — PAIN DESCRIPTION - DIRECTION: RADIATING_TOWARDS: BACK

## 2018-06-08 ASSESSMENT — PAIN DESCRIPTION - ORIENTATION
ORIENTATION: MID;RIGHT
ORIENTATION: RIGHT

## 2018-06-08 ASSESSMENT — PAIN DESCRIPTION - FREQUENCY
FREQUENCY: INTERMITTENT
FREQUENCY: INTERMITTENT

## 2018-06-09 ENCOUNTER — CARE COORDINATION (OUTPATIENT)
Dept: CASE MANAGEMENT | Age: 33
End: 2018-06-09

## 2018-06-11 DIAGNOSIS — Z86.69 HX OF MIGRAINE HEADACHES: ICD-10-CM

## 2018-06-11 LAB
BLOOD CULTURE, ROUTINE: NORMAL
CULTURE, BLOOD 2: NORMAL

## 2018-06-12 RX ORDER — BUTALBITAL, ACETAMINOPHEN AND CAFFEINE 50; 325; 40 MG/1; MG/1; MG/1
TABLET ORAL
Qty: 30 TABLET | Refills: 3 | Status: SHIPPED | OUTPATIENT
Start: 2018-06-12 | End: 2018-08-03 | Stop reason: SDUPTHER

## 2018-06-14 ENCOUNTER — PREP FOR PROCEDURE (OUTPATIENT)
Dept: SURGERY | Age: 33
End: 2018-06-14

## 2018-06-14 ENCOUNTER — OFFICE VISIT (OUTPATIENT)
Dept: SURGERY | Age: 33
End: 2018-06-14
Payer: COMMERCIAL

## 2018-06-14 VITALS
HEIGHT: 60 IN | BODY MASS INDEX: 23.56 KG/M2 | WEIGHT: 120 LBS | TEMPERATURE: 98 F | HEART RATE: 98 BPM | OXYGEN SATURATION: 98 % | SYSTOLIC BLOOD PRESSURE: 110 MMHG | DIASTOLIC BLOOD PRESSURE: 74 MMHG | RESPIRATION RATE: 16 BRPM

## 2018-06-14 DIAGNOSIS — R10.30 LOWER ABDOMINAL PAIN: Primary | ICD-10-CM

## 2018-06-14 PROCEDURE — G8428 CUR MEDS NOT DOCUMENT: HCPCS | Performed by: SURGERY

## 2018-06-14 PROCEDURE — 99213 OFFICE O/P EST LOW 20 MIN: CPT | Performed by: SURGERY

## 2018-06-14 PROCEDURE — 4004F PT TOBACCO SCREEN RCVD TLK: CPT | Performed by: SURGERY

## 2018-06-14 PROCEDURE — G8420 CALC BMI NORM PARAMETERS: HCPCS | Performed by: SURGERY

## 2018-06-14 PROCEDURE — 1111F DSCHRG MED/CURRENT MED MERGE: CPT | Performed by: SURGERY

## 2018-06-14 RX ORDER — 0.9 % SODIUM CHLORIDE 0.9 %
10 VIAL (ML) INJECTION PRN
Status: CANCELLED | OUTPATIENT
Start: 2018-06-14

## 2018-06-14 RX ORDER — 0.9 % SODIUM CHLORIDE 0.9 %
10 VIAL (ML) INJECTION EVERY 12 HOURS SCHEDULED
Status: CANCELLED | OUTPATIENT
Start: 2018-06-14

## 2018-06-14 RX ORDER — PROMETHAZINE HYDROCHLORIDE 25 MG/1
25 TABLET ORAL EVERY 6 HOURS PRN
Qty: 30 TABLET | Refills: 1 | Status: SHIPPED | OUTPATIENT
Start: 2018-06-14 | End: 2018-06-24

## 2018-06-22 ENCOUNTER — HOSPITAL ENCOUNTER (OUTPATIENT)
Age: 33
Setting detail: OUTPATIENT SURGERY
Discharge: HOME | End: 2018-06-22
Attending: SURGERY | Admitting: SURGERY
Payer: COMMERCIAL

## 2018-06-22 ENCOUNTER — ANESTHESIA (OUTPATIENT)
Dept: ENDOSCOPY | Age: 33
End: 2018-06-22
Payer: COMMERCIAL

## 2018-06-22 ENCOUNTER — ANESTHESIA EVENT (OUTPATIENT)
Dept: ENDOSCOPY | Age: 33
End: 2018-06-22
Payer: COMMERCIAL

## 2018-06-22 VITALS
SYSTOLIC BLOOD PRESSURE: 114 MMHG | HEIGHT: 60 IN | RESPIRATION RATE: 18 BRPM | DIASTOLIC BLOOD PRESSURE: 77 MMHG | TEMPERATURE: 97.7 F | BODY MASS INDEX: 22.78 KG/M2 | OXYGEN SATURATION: 99 % | HEART RATE: 73 BPM | WEIGHT: 116 LBS

## 2018-06-22 VITALS
RESPIRATION RATE: 15 BRPM | SYSTOLIC BLOOD PRESSURE: 89 MMHG | DIASTOLIC BLOOD PRESSURE: 56 MMHG | OXYGEN SATURATION: 97 %

## 2018-06-22 LAB — HCG(URINE) PREGNANCY TEST: NEGATIVE

## 2018-06-22 PROCEDURE — 6360000002 HC RX W HCPCS: Performed by: NURSE ANESTHETIST, CERTIFIED REGISTERED

## 2018-06-22 PROCEDURE — 3700000000 HC ANESTHESIA ATTENDED CARE: Performed by: SURGERY

## 2018-06-22 PROCEDURE — 7100000011 HC PHASE II RECOVERY - ADDTL 15 MIN: Performed by: SURGERY

## 2018-06-22 PROCEDURE — 7100000010 HC PHASE II RECOVERY - FIRST 15 MIN: Performed by: SURGERY

## 2018-06-22 PROCEDURE — 88305 TISSUE EXAM BY PATHOLOGIST: CPT

## 2018-06-22 PROCEDURE — 45380 COLONOSCOPY AND BIOPSY: CPT | Performed by: SURGERY

## 2018-06-22 PROCEDURE — 3609010300 HC COLONOSCOPY W/BIOPSY SINGLE/MULTIPLE: Performed by: SURGERY

## 2018-06-22 PROCEDURE — 81025 URINE PREGNANCY TEST: CPT

## 2018-06-22 PROCEDURE — 3700000001 HC ADD 15 MINUTES (ANESTHESIA): Performed by: SURGERY

## 2018-06-22 PROCEDURE — C1773 RET DEV, INSERTABLE: HCPCS | Performed by: SURGERY

## 2018-06-22 PROCEDURE — 2580000003 HC RX 258: Performed by: NURSE ANESTHETIST, CERTIFIED REGISTERED

## 2018-06-22 RX ORDER — 0.9 % SODIUM CHLORIDE 0.9 %
10 VIAL (ML) INJECTION EVERY 12 HOURS SCHEDULED
Status: DISCONTINUED | OUTPATIENT
Start: 2018-06-22 | End: 2018-06-22 | Stop reason: HOSPADM

## 2018-06-22 RX ORDER — PROPOFOL 10 MG/ML
INJECTION, EMULSION INTRAVENOUS PRN
Status: DISCONTINUED | OUTPATIENT
Start: 2018-06-22 | End: 2018-06-22 | Stop reason: SDUPTHER

## 2018-06-22 RX ORDER — FENTANYL CITRATE 50 UG/ML
INJECTION, SOLUTION INTRAMUSCULAR; INTRAVENOUS PRN
Status: DISCONTINUED | OUTPATIENT
Start: 2018-06-22 | End: 2018-06-22 | Stop reason: SDUPTHER

## 2018-06-22 RX ORDER — MIDAZOLAM HYDROCHLORIDE 1 MG/ML
INJECTION INTRAMUSCULAR; INTRAVENOUS PRN
Status: DISCONTINUED | OUTPATIENT
Start: 2018-06-22 | End: 2018-06-22 | Stop reason: SDUPTHER

## 2018-06-22 RX ORDER — SODIUM CHLORIDE 9 MG/ML
INJECTION, SOLUTION INTRAVENOUS CONTINUOUS PRN
Status: DISCONTINUED | OUTPATIENT
Start: 2018-06-22 | End: 2018-06-22 | Stop reason: SDUPTHER

## 2018-06-22 RX ORDER — 0.9 % SODIUM CHLORIDE 0.9 %
10 VIAL (ML) INJECTION PRN
Status: DISCONTINUED | OUTPATIENT
Start: 2018-06-22 | End: 2018-06-22 | Stop reason: HOSPADM

## 2018-06-22 RX ADMIN — MIDAZOLAM HYDROCHLORIDE 2 MG: 1 INJECTION, SOLUTION INTRAMUSCULAR; INTRAVENOUS at 11:58

## 2018-06-22 RX ADMIN — PROPOFOL 100 MG: 10 INJECTION, EMULSION INTRAVENOUS at 12:05

## 2018-06-22 RX ADMIN — FENTANYL CITRATE 50 MCG: 50 INJECTION, SOLUTION INTRAMUSCULAR; INTRAVENOUS at 12:05

## 2018-06-22 RX ADMIN — FENTANYL CITRATE 50 MCG: 50 INJECTION, SOLUTION INTRAMUSCULAR; INTRAVENOUS at 11:58

## 2018-06-22 RX ADMIN — SODIUM CHLORIDE: 9 INJECTION, SOLUTION INTRAVENOUS at 11:55

## 2018-06-22 RX ADMIN — PROPOFOL 100 MG: 10 INJECTION, EMULSION INTRAVENOUS at 11:58

## 2018-06-22 ASSESSMENT — LIFESTYLE VARIABLES: SMOKING_STATUS: 1

## 2018-06-22 ASSESSMENT — PAIN SCALES - GENERAL
PAINLEVEL_OUTOF10: 0
PAINLEVEL_OUTOF10: 0

## 2018-06-26 ENCOUNTER — TELEPHONE (OUTPATIENT)
Dept: SURGERY | Age: 33
End: 2018-06-26

## 2018-06-28 ENCOUNTER — OFFICE VISIT (OUTPATIENT)
Dept: SURGERY | Age: 33
End: 2018-06-28
Payer: COMMERCIAL

## 2018-06-28 VITALS
RESPIRATION RATE: 16 BRPM | WEIGHT: 123 LBS | DIASTOLIC BLOOD PRESSURE: 81 MMHG | HEIGHT: 60 IN | TEMPERATURE: 98.5 F | HEART RATE: 96 BPM | BODY MASS INDEX: 24.15 KG/M2 | OXYGEN SATURATION: 99 % | SYSTOLIC BLOOD PRESSURE: 107 MMHG

## 2018-06-28 DIAGNOSIS — K58.0 IRRITABLE BOWEL SYNDROME WITH DIARRHEA: Primary | ICD-10-CM

## 2018-06-28 DIAGNOSIS — R10.30 LOWER ABDOMINAL PAIN: ICD-10-CM

## 2018-06-28 PROCEDURE — 1111F DSCHRG MED/CURRENT MED MERGE: CPT | Performed by: SURGERY

## 2018-06-28 PROCEDURE — 4004F PT TOBACCO SCREEN RCVD TLK: CPT | Performed by: SURGERY

## 2018-06-28 PROCEDURE — 99214 OFFICE O/P EST MOD 30 MIN: CPT | Performed by: SURGERY

## 2018-06-28 PROCEDURE — G8420 CALC BMI NORM PARAMETERS: HCPCS | Performed by: SURGERY

## 2018-06-28 PROCEDURE — G8427 DOCREV CUR MEDS BY ELIG CLIN: HCPCS | Performed by: SURGERY

## 2018-06-28 RX ORDER — CHLORDIAZEPOXIDE HYDROCHLORIDE AND CLIDINIUM BROMIDE 5; 2.5 MG/1; MG/1
1 CAPSULE ORAL
Qty: 120 CAPSULE | Refills: 3 | Status: SHIPPED | OUTPATIENT
Start: 2018-06-28 | End: 2018-11-15 | Stop reason: SDUPTHER

## 2018-07-09 DIAGNOSIS — F51.01 PRIMARY INSOMNIA: Primary | ICD-10-CM

## 2018-07-09 RX ORDER — ZOLPIDEM TARTRATE 10 MG/1
10 TABLET ORAL NIGHTLY PRN
Qty: 30 TABLET | Refills: 1 | Status: SHIPPED | OUTPATIENT
Start: 2018-07-09 | End: 2018-08-08

## 2018-07-18 ENCOUNTER — TELEPHONE (OUTPATIENT)
Dept: SURGERY | Age: 33
End: 2018-07-18

## 2018-07-18 NOTE — TELEPHONE ENCOUNTER
I left 2 messages regarding rescheduling UGI/small bowel. that was indigo. On 7-5-18.   Electronically signed by Abbi Crowley MA on 7/18/2018 at 8:42 AM

## 2018-08-03 DIAGNOSIS — Z86.69 HX OF MIGRAINE HEADACHES: ICD-10-CM

## 2018-08-03 RX ORDER — OYSTER SHELL CALCIUM WITH VITAMIN D 500; 200 MG/1; [IU]/1
2 TABLET, FILM COATED ORAL 2 TIMES DAILY
Qty: 30 TABLET | Status: CANCELLED | OUTPATIENT
Start: 2018-08-03

## 2018-08-03 RX ORDER — BUTALBITAL, ACETAMINOPHEN AND CAFFEINE 50; 325; 40 MG/1; MG/1; MG/1
TABLET ORAL
Qty: 30 TABLET | Refills: 3 | Status: SHIPPED | OUTPATIENT
Start: 2018-08-03 | End: 2018-09-07 | Stop reason: SDUPTHER

## 2018-08-14 ENCOUNTER — OFFICE VISIT (OUTPATIENT)
Dept: FAMILY MEDICINE CLINIC | Age: 33
End: 2018-08-14
Payer: COMMERCIAL

## 2018-08-14 VITALS
HEIGHT: 60 IN | BODY MASS INDEX: 24.15 KG/M2 | OXYGEN SATURATION: 97 % | DIASTOLIC BLOOD PRESSURE: 83 MMHG | RESPIRATION RATE: 18 BRPM | WEIGHT: 123 LBS | SYSTOLIC BLOOD PRESSURE: 110 MMHG | HEART RATE: 90 BPM

## 2018-08-14 DIAGNOSIS — F32.A DEPRESSION, UNSPECIFIED DEPRESSION TYPE: ICD-10-CM

## 2018-08-14 DIAGNOSIS — F43.10 PTSD (POST-TRAUMATIC STRESS DISORDER): Primary | ICD-10-CM

## 2018-08-14 DIAGNOSIS — J30.2 SEASONAL ALLERGIC RHINITIS, UNSPECIFIED TRIGGER: ICD-10-CM

## 2018-08-14 PROCEDURE — 4004F PT TOBACCO SCREEN RCVD TLK: CPT | Performed by: FAMILY MEDICINE

## 2018-08-14 PROCEDURE — 99213 OFFICE O/P EST LOW 20 MIN: CPT | Performed by: FAMILY MEDICINE

## 2018-08-14 PROCEDURE — G8427 DOCREV CUR MEDS BY ELIG CLIN: HCPCS | Performed by: FAMILY MEDICINE

## 2018-08-14 PROCEDURE — G8420 CALC BMI NORM PARAMETERS: HCPCS | Performed by: FAMILY MEDICINE

## 2018-08-14 RX ORDER — FLUTICASONE PROPIONATE 50 MCG
1 SPRAY, SUSPENSION (ML) NASAL DAILY
Qty: 1 BOTTLE | Refills: 3 | Status: SHIPPED
Start: 2018-08-14 | End: 2020-08-30

## 2018-08-14 ASSESSMENT — ENCOUNTER SYMPTOMS
ALLERGIC/IMMUNOLOGIC NEGATIVE: 1
VOMITING: 0
COLOR CHANGE: 0
CHEST TIGHTNESS: 0
BACK PAIN: 0
FACIAL SWELLING: 0
ABDOMINAL PAIN: 0
SHORTNESS OF BREATH: 0
PHOTOPHOBIA: 0
WHEEZING: 0
SORE THROAT: 0
APNEA: 0
DIARRHEA: 0
BLOOD IN STOOL: 0
COUGH: 0
SINUS PRESSURE: 0
SINUS COMPLAINT: 1
RHINORRHEA: 1
NAUSEA: 0

## 2018-08-14 ASSESSMENT — PATIENT HEALTH QUESTIONNAIRE - PHQ9
10. IF YOU CHECKED OFF ANY PROBLEMS, HOW DIFFICULT HAVE THESE PROBLEMS MADE IT FOR YOU TO DO YOUR WORK, TAKE CARE OF THINGS AT HOME, OR GET ALONG WITH OTHER PEOPLE: 0
4. FEELING TIRED OR HAVING LITTLE ENERGY: 2
7. TROUBLE CONCENTRATING ON THINGS, SUCH AS READING THE NEWSPAPER OR WATCHING TELEVISION: 1
5. POOR APPETITE OR OVEREATING: 1
2. FEELING DOWN, DEPRESSED OR HOPELESS: 2
8. MOVING OR SPEAKING SO SLOWLY THAT OTHER PEOPLE COULD HAVE NOTICED. OR THE OPPOSITE, BEING SO FIGETY OR RESTLESS THAT YOU HAVE BEEN MOVING AROUND A LOT MORE THAN USUAL: 0
3. TROUBLE FALLING OR STAYING ASLEEP: 2
6. FEELING BAD ABOUT YOURSELF - OR THAT YOU ARE A FAILURE OR HAVE LET YOURSELF OR YOUR FAMILY DOWN: 1
SUM OF ALL RESPONSES TO PHQ9 QUESTIONS 1 & 2: 3
9. THOUGHTS THAT YOU WOULD BE BETTER OFF DEAD, OR OF HURTING YOURSELF: 0
SUM OF ALL RESPONSES TO PHQ QUESTIONS 1-9: 10
SUM OF ALL RESPONSES TO PHQ QUESTIONS 1-9: 10
1. LITTLE INTEREST OR PLEASURE IN DOING THINGS: 1

## 2018-08-14 NOTE — PROGRESS NOTES
ENDOSCOPY    PARATHYROID GLAND SURGERY      THYROIDECTOMY         Current Outpatient Prescriptions   Medication Sig Dispense Refill    sertraline (ZOLOFT) 50 MG tablet Take 1 tablet by mouth daily 30 tablet 3    fluticasone (FLONASE) 50 MCG/ACT nasal spray 1 spray by Nasal route daily 1 Bottle 3    butalbital-acetaminophen-caffeine (FIORICET, ESGIC) -40 MG per tablet take 1 tablet by mouth once daily if needed for migraines 30 tablet 3    chlordiazePOXIDE-clidinium (LIBRAX) 5-2.5 MG per capsule Take 1 capsule by mouth 4 times daily (before meals and nightly). . 120 capsule 3    famotidine (PEPCID) 10 MG tablet Take 10 mg by mouth 2 times daily Instructed to take with sip water am of procedure      calcium-vitamin D (OSCAL-500) 500-200 MG-UNIT per tablet Take 2 tablets by mouth 2 times daily      levothyroxine (SYNTHROID) 100 MCG tablet Take 1 tablet by mouth Daily 30 tablet 5    hyoscyamine (LEVSIN/SL) 125 MCG sublingual tablet Place 125 mcg under the tongue every 6 hours as needed for Cramping   0    pantoprazole (PROTONIX) 40 MG tablet Take 40 mg by mouth daily as needed Instructed to take with sip water am of procedure, if needed. 1    dicyclomine (BENTYL) 10 MG capsule Take 2 capsules by mouth 4 times daily (before meals and nightly) for 20 doses 40 capsule 0    ondansetron (ZOFRAN) 4 MG tablet Take 1 tablet by mouth every 8 hours as needed for Nausea or Vomiting 20 tablet 0    topiramate (TOPAMAX) 25 MG tablet Take 25 mg by mouth every morning Uses for migraines       No current facility-administered medications for this visit.         Allergies   Allergen Reactions    Codeine Nausea And Vomiting       Social History     Social History    Marital status: Single     Spouse name: N/A    Number of children: N/A    Years of education: N/A     Occupational History    cleaning [employer]     Social History Main Topics    Smoking status: Current Every Day Smoker     Packs/day: 0.50   

## 2018-08-29 ENCOUNTER — TELEPHONE (OUTPATIENT)
Dept: FAMILY MEDICINE CLINIC | Age: 33
End: 2018-08-29

## 2018-09-07 DIAGNOSIS — Z86.69 HX OF MIGRAINE HEADACHES: ICD-10-CM

## 2018-09-07 DIAGNOSIS — F51.01 PRIMARY INSOMNIA: Primary | ICD-10-CM

## 2018-09-07 RX ORDER — BUTALBITAL, ACETAMINOPHEN AND CAFFEINE 50; 325; 40 MG/1; MG/1; MG/1
TABLET ORAL
Qty: 30 TABLET | Refills: 3 | Status: SHIPPED | OUTPATIENT
Start: 2018-09-07 | End: 2019-04-15 | Stop reason: SDUPTHER

## 2018-09-07 RX ORDER — ZOLPIDEM TARTRATE 10 MG/1
10 TABLET ORAL NIGHTLY PRN
Qty: 30 TABLET | Refills: 0 | Status: SHIPPED | OUTPATIENT
Start: 2018-09-07 | End: 2018-10-03 | Stop reason: SDUPTHER

## 2018-10-03 DIAGNOSIS — F51.01 PRIMARY INSOMNIA: ICD-10-CM

## 2018-10-03 RX ORDER — ZOLPIDEM TARTRATE 10 MG/1
10 TABLET ORAL NIGHTLY PRN
Qty: 30 TABLET | Refills: 2 | Status: SHIPPED | OUTPATIENT
Start: 2018-10-03 | End: 2018-12-27 | Stop reason: SDUPTHER

## 2018-11-15 DIAGNOSIS — K58.0 IRRITABLE BOWEL SYNDROME WITH DIARRHEA: ICD-10-CM

## 2018-11-15 RX ORDER — CHLORDIAZEPOXIDE HYDROCHLORIDE AND CLIDINIUM BROMIDE 5; 2.5 MG/1; MG/1
1 CAPSULE ORAL
Qty: 120 CAPSULE | Refills: 3 | OUTPATIENT
Start: 2018-11-15 | End: 2019-09-10 | Stop reason: SDUPTHER

## 2018-12-07 DIAGNOSIS — F43.10 PTSD (POST-TRAUMATIC STRESS DISORDER): ICD-10-CM

## 2018-12-07 DIAGNOSIS — F32.A DEPRESSION, UNSPECIFIED DEPRESSION TYPE: ICD-10-CM

## 2019-04-04 DIAGNOSIS — F32.A DEPRESSION, UNSPECIFIED DEPRESSION TYPE: ICD-10-CM

## 2019-04-04 DIAGNOSIS — F43.10 PTSD (POST-TRAUMATIC STRESS DISORDER): ICD-10-CM

## 2019-04-15 DIAGNOSIS — Z86.69 HX OF MIGRAINE HEADACHES: ICD-10-CM

## 2019-04-15 RX ORDER — BUTALBITAL, ACETAMINOPHEN AND CAFFEINE 50; 325; 40 MG/1; MG/1; MG/1
TABLET ORAL
Qty: 30 TABLET | Refills: 3 | Status: SHIPPED | OUTPATIENT
Start: 2019-04-15 | End: 2019-07-26 | Stop reason: SDUPTHER

## 2019-06-13 DIAGNOSIS — F51.01 PRIMARY INSOMNIA: ICD-10-CM

## 2019-07-19 ENCOUNTER — APPOINTMENT (OUTPATIENT)
Dept: GENERAL RADIOLOGY | Age: 34
End: 2019-07-19
Payer: COMMERCIAL

## 2019-07-19 ENCOUNTER — HOSPITAL ENCOUNTER (EMERGENCY)
Age: 34
Discharge: HOME | End: 2019-07-20
Payer: COMMERCIAL

## 2019-07-19 VITALS
WEIGHT: 140 LBS | RESPIRATION RATE: 18 BRPM | SYSTOLIC BLOOD PRESSURE: 119 MMHG | HEIGHT: 60 IN | HEART RATE: 85 BPM | BODY MASS INDEX: 27.48 KG/M2 | DIASTOLIC BLOOD PRESSURE: 72 MMHG | OXYGEN SATURATION: 99 % | TEMPERATURE: 98.7 F

## 2019-07-19 DIAGNOSIS — S63.501A RIGHT WRIST SPRAIN, INITIAL ENCOUNTER: ICD-10-CM

## 2019-07-19 DIAGNOSIS — S63.501A FOREARM SPRAIN, RIGHT, INITIAL ENCOUNTER: Primary | ICD-10-CM

## 2019-07-19 DIAGNOSIS — S60.221A CONTUSION OF RIGHT HAND, INITIAL ENCOUNTER: ICD-10-CM

## 2019-07-19 PROCEDURE — 99283 EMERGENCY DEPT VISIT LOW MDM: CPT

## 2019-07-19 PROCEDURE — 73130 X-RAY EXAM OF HAND: CPT

## 2019-07-19 PROCEDURE — 73110 X-RAY EXAM OF WRIST: CPT

## 2019-07-19 PROCEDURE — 73080 X-RAY EXAM OF ELBOW: CPT

## 2019-07-19 PROCEDURE — 6370000000 HC RX 637 (ALT 250 FOR IP): Performed by: PHYSICIAN ASSISTANT

## 2019-07-19 PROCEDURE — 73090 X-RAY EXAM OF FOREARM: CPT

## 2019-07-19 RX ORDER — IBUPROFEN 800 MG/1
800 TABLET ORAL EVERY 6 HOURS PRN
Qty: 20 TABLET | Refills: 0 | Status: SHIPPED | OUTPATIENT
Start: 2019-07-19 | End: 2020-05-12

## 2019-07-19 RX ORDER — HYDROCODONE BITARTRATE AND ACETAMINOPHEN 5; 325 MG/1; MG/1
1 TABLET ORAL ONCE
Status: COMPLETED | OUTPATIENT
Start: 2019-07-19 | End: 2019-07-19

## 2019-07-19 RX ADMIN — HYDROCODONE BITARTRATE AND ACETAMINOPHEN 1 TABLET: 5; 325 TABLET ORAL at 22:17

## 2019-07-19 ASSESSMENT — PAIN DESCRIPTION - FREQUENCY: FREQUENCY: CONTINUOUS

## 2019-07-19 ASSESSMENT — PAIN SCALES - GENERAL
PAINLEVEL_OUTOF10: 10
PAINLEVEL_OUTOF10: 10

## 2019-07-19 ASSESSMENT — PAIN DESCRIPTION - PAIN TYPE: TYPE: ACUTE PAIN

## 2019-07-19 ASSESSMENT — PAIN DESCRIPTION - ORIENTATION: ORIENTATION: RIGHT

## 2019-07-19 ASSESSMENT — PAIN DESCRIPTION - DESCRIPTORS: DESCRIPTORS: ACHING

## 2019-07-19 ASSESSMENT — PAIN DESCRIPTION - LOCATION: LOCATION: WRIST;ARM

## 2019-07-20 NOTE — ED PROVIDER NOTES
[facility]         Department of Emergency Medicine   ED  Provider Note  Admit Date/RoomTime: 7/19/2019  9:36 PM  ED Room: [facility]  Chief Complaint:   Wrist Injury    History of Present Illness   Source of history provided by:  patient. History/Exam Limitations: none. Vivienne Schwab is a 36 y.o. old female with a past medical history of:   Past Medical History:   Diagnosis Date    Abdominal pain     Acute Crohn's disease ([address]     Hypocalcemia     Hypothyroidism     Irritable bowel syndrome     Migraines     Status post alcohol detoxification     5/22/2018-5/29/2018    presents to the emergency department by private vehicle, for Right hand, wrist, forearm and elbow pain which occured 1 hour(s) prior to arrival.  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 Previous injury: no.  She is right handed. The patients tetanus status is more than 5 years ago. Since onset the symptoms have been moderate in degree. Her pain is aggraveated by movement and use and relieved by rest.     ROS   Pertinent positives and negatives are stated within HPI, all other systems reviewed and are negative. Past Surgical History:   Procedure Laterality Date    CHOLECYSTECTOMY, LAPAROSCOPIC  07/06/2016    COLONOSCOPY N/A 6/22/2018    COLONOSCOPY WITH BIOPSY performed by Duke Hanson MD at [address] [place] [address]     Social History:  reports that she has been smoking. She has been smoking about 0.50 packs per day. She has never used smokeless tobacco. She reports that she does not drink alcohol or use drugs.   Family History: family history includes Alzheimer's Disease in an other family member; Asthma in her father; Diabetes in an other family member; Heart Disease in her father; High Blood Pressure in her father and mother; High Cholesterol in her mother; Ivania Fee in an other family member; Other in her mother. Allergies: Codeine    Physical Exam           ED Triage Vitals [07/19/19 2140]   BP Temp Temp Source Pulse Resp SpO2 Height Weight   119/72 98.7 °F (37.1 °C) Oral 85 18 99 % 5' (1.524 m) 140 lb (63.5 kg)      Oxygen Saturation Interpretation: Normal.    · Constitutional:  Alert, development consistent with age. · HEENT:  NC/NT. Airway patent. · Neck:  Normal ROM. Supple. · Extremity(s):  Right: hand, wrist, forearm and elbow: Bruising over the dorsum of the right hand, full range of motion of the fingers. Tenderness over the dorsum of the entire wrist radial and ulnar with positive snuffbox tenderness, radial and ulnar tenderness along the length of the shaft with no crepitus, seems mild to palpation, no tenderness to the medial or lateral epicondyles or olecranon process of elbow. Patient exhibits tenderness most with supination and pronation, and flexion and extension of the wrist.  Slight tenderness with flexion of the elbow. Neurovascular: Motor deficit: none. Sensory deficit: none. Pulse deficit: none. Capillary refill: normal.  · Lymphatics: No lymphangitis or adenopathy noted. · Neurological:  Oriented. Motor functions intact. Lab / Imaging Results   (All laboratory and radiology results have been personally reviewed by myself)  Labs:  No results found for this visit on 07/19/19. Imaging: All Radiology results interpreted by Radiologist unless otherwise noted. XR HAND RIGHT (MIN 3 VIEWS)   Final Result      No fracture or dislocation. XR WRIST RIGHT (MIN 3 VIEWS)   Final Result   No acute osseous findings. XR ELBOW RIGHT (MIN 3 VIEWS)   Final Result   No acute osseous findings. XR RADIUS ULNA RIGHT (2 VIEWS)   Final Result   No acute osseous findings.         ED Course / Medical Decision Making     Medications

## 2019-07-26 DIAGNOSIS — Z86.69 HX OF MIGRAINE HEADACHES: ICD-10-CM

## 2019-07-26 RX ORDER — BUTALBITAL, ACETAMINOPHEN AND CAFFEINE 50; 325; 40 MG/1; MG/1; MG/1
TABLET ORAL
Qty: 30 TABLET | Refills: 3 | Status: SHIPPED | OUTPATIENT
Start: 2019-07-26 | End: 2019-07-29 | Stop reason: SDUPTHER

## 2019-07-29 DIAGNOSIS — Z86.69 HX OF MIGRAINE HEADACHES: ICD-10-CM

## 2019-07-29 RX ORDER — BUTALBITAL, ACETAMINOPHEN AND CAFFEINE 50; 325; 40 MG/1; MG/1; MG/1
1 TABLET ORAL DAILY PRN
Qty: 30 TABLET | Refills: 3 | Status: SHIPPED | OUTPATIENT
Start: 2019-07-29 | End: 2019-11-01 | Stop reason: SDUPTHER

## 2019-09-09 RX ORDER — ZOLPIDEM TARTRATE 10 MG/1
10 TABLET ORAL
Refills: 0 | COMMUNITY
Start: 2019-08-10 | End: 2019-09-10 | Stop reason: SDUPTHER

## 2019-09-09 RX ORDER — ZOLPIDEM TARTRATE 10 MG/1
10 TABLET ORAL NIGHTLY PRN
Qty: 30 TABLET | Refills: 2 | OUTPATIENT
Start: 2019-09-09 | End: 2019-12-08

## 2019-09-10 ENCOUNTER — HOSPITAL ENCOUNTER (OUTPATIENT)
Age: 34
Discharge: HOME | End: 2019-09-12
Payer: COMMERCIAL

## 2019-09-10 ENCOUNTER — OFFICE VISIT (OUTPATIENT)
Dept: PRIMARY CARE CLINIC | Age: 34
End: 2019-09-10
Payer: COMMERCIAL

## 2019-09-10 VITALS
WEIGHT: 146 LBS | HEART RATE: 92 BPM | BODY MASS INDEX: 28.66 KG/M2 | OXYGEN SATURATION: 98 % | TEMPERATURE: 98.2 F | DIASTOLIC BLOOD PRESSURE: 80 MMHG | HEIGHT: 60 IN | RESPIRATION RATE: 18 BRPM | SYSTOLIC BLOOD PRESSURE: 100 MMHG

## 2019-09-10 DIAGNOSIS — K58.0 IRRITABLE BOWEL SYNDROME WITH DIARRHEA: ICD-10-CM

## 2019-09-10 DIAGNOSIS — F51.01 PRIMARY INSOMNIA: Primary | ICD-10-CM

## 2019-09-10 DIAGNOSIS — Z86.69 HX OF MIGRAINE HEADACHES: ICD-10-CM

## 2019-09-10 DIAGNOSIS — K50.919 CROHN'S DISEASE WITH COMPLICATION, UNSPECIFIED GASTROINTESTINAL TRACT LOCATION (HCC): ICD-10-CM

## 2019-09-10 DIAGNOSIS — Z23 NEED FOR PROPHYLACTIC VACCINATION AND INOCULATION AGAINST INFLUENZA: ICD-10-CM

## 2019-09-10 DIAGNOSIS — E03.9 HYPOTHYROIDISM, UNSPECIFIED TYPE: ICD-10-CM

## 2019-09-10 LAB
ALBUMIN SERPL-MCNC: 4.2 G/DL (ref 3.5–5.2)
ALP BLD-CCNC: 103 U/L (ref 35–104)
ALT SERPL-CCNC: 10 U/L (ref 0–32)
ANION GAP SERPL CALCULATED.3IONS-SCNC: 14 MMOL/L (ref 7–16)
AST SERPL-CCNC: 14 U/L (ref 0–31)
BASOPHILS ABSOLUTE: 0.09 E9/L (ref 0–0.2)
BASOPHILS RELATIVE PERCENT: 0.9 % (ref 0–2)
BILIRUB SERPL-MCNC: 0.3 MG/DL (ref 0–1.2)
BUN BLDV-MCNC: 12 MG/DL (ref 6–20)
CALCIUM SERPL-MCNC: 8.6 MG/DL (ref 8.6–10.2)
CHLORIDE BLD-SCNC: 99 MMOL/L (ref 98–107)
CO2: 23 MMOL/L (ref 22–29)
CREAT SERPL-MCNC: 0.9 MG/DL (ref 0.5–1)
EOSINOPHILS ABSOLUTE: 0.42 E9/L (ref 0.05–0.5)
EOSINOPHILS RELATIVE PERCENT: 4.3 % (ref 0–6)
GFR AFRICAN AMERICAN: >60
GFR NON-AFRICAN AMERICAN: >60 ML/MIN/1.73
GLUCOSE BLD-MCNC: 95 MG/DL (ref 74–99)
HCT VFR BLD CALC: 43.6 % (ref 34–48)
HEMOGLOBIN: 14.4 G/DL (ref 11.5–15.5)
IMMATURE GRANULOCYTES #: 0.04 E9/L
IMMATURE GRANULOCYTES %: 0.4 % (ref 0–5)
LYMPHOCYTES ABSOLUTE: 3.08 E9/L (ref 1.5–4)
LYMPHOCYTES RELATIVE PERCENT: 31.5 % (ref 20–42)
MCH RBC QN AUTO: 31.1 PG (ref 26–35)
MCHC RBC AUTO-ENTMCNC: 33 % (ref 32–34.5)
MCV RBC AUTO: 94.2 FL (ref 80–99.9)
MONOCYTES ABSOLUTE: 0.83 E9/L (ref 0.1–0.95)
MONOCYTES RELATIVE PERCENT: 8.5 % (ref 2–12)
NEUTROPHILS ABSOLUTE: 5.32 E9/L (ref 1.8–7.3)
NEUTROPHILS RELATIVE PERCENT: 54.4 % (ref 43–80)
PDW BLD-RTO: 12.4 FL (ref 11.5–15)
PLATELET # BLD: 272 E9/L (ref 130–450)
PMV BLD AUTO: 10.1 FL (ref 7–12)
POTASSIUM SERPL-SCNC: 4 MMOL/L (ref 3.5–5)
RBC # BLD: 4.63 E12/L (ref 3.5–5.5)
SODIUM BLD-SCNC: 136 MMOL/L (ref 132–146)
TOTAL PROTEIN: 7.1 G/DL (ref 6.4–8.3)
TSH SERPL DL<=0.05 MIU/L-ACNC: 6.55 UIU/ML (ref 0.27–4.2)
WBC # BLD: 9.8 E9/L (ref 4.5–11.5)

## 2019-09-10 PROCEDURE — 84443 ASSAY THYROID STIM HORMONE: CPT

## 2019-09-10 PROCEDURE — 99214 OFFICE O/P EST MOD 30 MIN: CPT | Performed by: FAMILY MEDICINE

## 2019-09-10 PROCEDURE — G8427 DOCREV CUR MEDS BY ELIG CLIN: HCPCS | Performed by: FAMILY MEDICINE

## 2019-09-10 PROCEDURE — 90471 IMMUNIZATION ADMIN: CPT | Performed by: FAMILY MEDICINE

## 2019-09-10 PROCEDURE — 80053 COMPREHEN METABOLIC PANEL: CPT

## 2019-09-10 PROCEDURE — 4004F PT TOBACCO SCREEN RCVD TLK: CPT | Performed by: FAMILY MEDICINE

## 2019-09-10 PROCEDURE — 85025 COMPLETE CBC W/AUTO DIFF WBC: CPT

## 2019-09-10 PROCEDURE — 90686 IIV4 VACC NO PRSV 0.5 ML IM: CPT | Performed by: FAMILY MEDICINE

## 2019-09-10 PROCEDURE — G8419 CALC BMI OUT NRM PARAM NOF/U: HCPCS | Performed by: FAMILY MEDICINE

## 2019-09-10 PROCEDURE — 36415 COLL VENOUS BLD VENIPUNCTURE: CPT

## 2019-09-10 RX ORDER — CHLORDIAZEPOXIDE HYDROCHLORIDE AND CLIDINIUM BROMIDE 5; 2.5 MG/1; MG/1
1 CAPSULE ORAL
Qty: 120 CAPSULE | Refills: 3 | Status: SHIPPED
Start: 2019-09-10 | End: 2020-05-12

## 2019-09-10 RX ORDER — ZOLPIDEM TARTRATE 10 MG/1
10 TABLET ORAL NIGHTLY PRN
Qty: 30 TABLET | Refills: 2 | Status: SHIPPED | OUTPATIENT
Start: 2019-09-10 | End: 2019-11-22 | Stop reason: SDUPTHER

## 2019-09-10 ASSESSMENT — ENCOUNTER SYMPTOMS
COLOR CHANGE: 0
COUGH: 0
VOMITING: 0
CHEST TIGHTNESS: 0
DIARRHEA: 1
SORE THROAT: 0
APNEA: 0
ABDOMINAL PAIN: 0
BLOOD IN STOOL: 0
FACIAL SWELLING: 0
BACK PAIN: 0
ALLERGIC/IMMUNOLOGIC NEGATIVE: 1
NAUSEA: 0
WHEEZING: 0
PHOTOPHOBIA: 0
SINUS PRESSURE: 0
SHORTNESS OF BREATH: 0

## 2019-09-10 ASSESSMENT — PATIENT HEALTH QUESTIONNAIRE - PHQ9
SUM OF ALL RESPONSES TO PHQ QUESTIONS 1-9: 0
1. LITTLE INTEREST OR PLEASURE IN DOING THINGS: 0
2. FEELING DOWN, DEPRESSED OR HOPELESS: 0
SUM OF ALL RESPONSES TO PHQ QUESTIONS 1-9: 0
SUM OF ALL RESPONSES TO PHQ9 QUESTIONS 1 & 2: 0

## 2019-09-10 NOTE — PROGRESS NOTES
Vaccine Information Sheet, \"Influenza - Inactivated\"  given to [facility], or parent/legal guardian of  [facility] and verbalized understanding. Patient responses:    Have you ever had a reaction to a flu vaccine? No  Are you able to eat eggs without adverse effects? Yes  Do you have any current illness? No  Have you ever had [place] Syndrome? No    Flu vaccine given per order. Please see immunization tab.

## 2019-09-10 NOTE — PATIENT INSTRUCTIONS
season. But even when the vaccine doesn't exactly match these viruses, it may still provide some protection. Flu vaccine cannot prevent:  · Flu that is caused by a virus not covered by the vaccine. · Illnesses that look like flu but are not. Some people should not get this vaccine  Tell the person who is giving you the vaccine:  · If you have any severe (life-threatening) allergies. If you ever had a life-threatening allergic reaction after a dose of flu vaccine, or have a severe allergy to any part of this vaccine, you may be advised not to get vaccinated. Most, but not all, types of flu vaccine contain a small amount of egg protein. · If you ever had Guillain-Barré syndrome (also called GBS) Some people with a history of GBS should not get this vaccine. This should be discussed with your doctor. · If you are not feeling well. It is usually okay to get flu vaccine when you have a mild illness, but you might be asked to come back when you feel better. Risks of a vaccine reaction  With any medicine, including vaccines, there is a chance of reactions. These are usually mild and go away on their own, but serious reactions are also possible. Most people who get a flu shot do not have any problems with it. Minor problems following a flu shot include:  · Soreness, redness, or swelling where the shot was given  · Hoarseness  · Sore, red or itchy eyes  · Cough  · Fever  · Aches  · Headache  · Itching  · Fatigue  If these problems occur, they usually begin soon after the shot and last 1 or 2 days. More serious problems following a flu shot can include the following:  · There may be a small increased risk of Guillain-Barré Syndrome (GBS) after inactivated flu vaccine. This risk has been estimated at 1 or 2 additional cases per million people vaccinated. This is much lower than the risk of severe complications from flu, which can be prevented by flu vaccine.   · [facility] children who get the flu shot along with [phone]. [facility] does not give medical advice. The National Vaccine Injury Compensation Program  The National Vaccine Injury Compensation Program (VICP) is a federal program that was created to compensate people who may have been injured by certain vaccines. Persons who believe they may have been injured by a vaccine can learn about the program and about filing a claim by calling 0-552.516.1170 or visiting the [employer] website at www.[facility].gov/vaccinecompensation. There is a time limit to file a claim for compensation. How can I learn more? · Ask your healthcare provider. He or she can give you the vaccine package insert or suggest other sources of information. · Call your local or state health department. · Contact the Centers for Disease Control and Prevention (CDC):  ? Call 7-337.636.2435 (1-800-CDC-INFO) or  ? Visit CDC's website at www.cdc.gov/flu  Vaccine Information Statement  Inactivated Influenza Vaccine  8/7/2015)  42 ABBY [address]y 004OH-67  Department of Health and Human Services  Centers for Disease Control and Prevention  Many Vaccine Information Statements are available in [place] and other languages. See www.immunize.org/vis. Muchas hojas de información sobre vacunas están disponibles en español y en otros idiomas. Visite www.immunize.org/vis. Care instructions adapted under license by [facility] ([facility]). If you have questions about a medical condition or this instruction, always ask your healthcare professional. [place] [address] any warranty or liability for your use of this information.

## 2019-09-10 NOTE — PROGRESS NOTES
Chief Complaint:   Chief Complaint   Patient presents with   [address] Problem     med refills    Allergies     has been taking flonase but does not seem to help       Mental Health Problem   Primary symptoms comment: insomnia. The degree of incapacity that she is experiencing as a consequence of her illness is mild. Additional symptoms of the illness do not include agitation, headaches or abdominal pain. She does not contemplate harming herself. She has not already injured self. GI Problem   The primary symptoms include diarrhea. Primary symptoms do not include abdominal pain, nausea, vomiting, myalgias, arthralgias or rash. Primary symptoms comment: insomnia. The illness began more than 7 days ago. The problem has not changed since onset. The illness does not include back pain. Patient Active Problem List   Diagnosis    Hx of migraine headaches    Generalized abdominal pain    Primary insomnia    Fatty liver    H/O small bowel obstruction    Hypothyroidism    RLQ abdominal pain    Depression    PTSD (post-traumatic stress disorder)       Past Medical History:   Diagnosis Date    Abdominal pain     Acute Cro[address]'s disease ([address]     Hypocalcemia     Hypothyroidism     Irritable bowel syndrome     Migraines     Status post alcohol detoxification     5/22/2018-5/29/2018       Past Surgical History:   Procedure Laterality Date    CHOLECYSTECTOMY, LAPAROSCOPIC  07/06/2016    COLONOSCOPY N/A 6/22/2018    COLONOSCOPY WITH BIOPSY performed by Sahil Chavez MD at [address] [place] [address]         Current Outpatient Medications   Medication Sig Dispense Refill    zolpidem (AMBIEN) 10 MG tablet Take 1 tablet by mouth nightly as needed for Sleep for up to 30 days. 30 tablet 2    chlordiazePOXIDE-clidinium (LIBRAX) 5-2.5 MG per capsule Take 1 capsule by mouth 4 times daily (before meals and nightly).  120 capsule 3    levothyroxine (SYNTHROID) 100 MCG tablet take 1 tablet by mouth once daily 30 tablet 5    butalbital-acetaminophen-caffeine (FIORICET, ESGIC) -40 MG per tablet Take 1 tablet by mouth daily as needed for Headaches 30 tablet 3    fluticasone (FLONASE) 50 MCG/ACT nasal spray 1 spray by Nasal route daily 1 Bottle 3    famotidine (PEPCID) 10 MG tablet Take 10 mg by mouth 2 times daily Instructed to take with sip water am of procedure      calcium-vitamin D (OSCAL-500) 500-200 MG-UNIT per tablet Take 2 tablets by mouth 2 times daily      hyoscyamine (LEVSIN/SL) 125 MCG sublingual tablet Place 125 mcg under the tongue every 6 hours as needed for Cramping   0    pantoprazole (PROTONIX) 40 MG tablet Take 40 mg by mouth daily as needed Instructed to take with sip water am of procedure, if needed. 1    ondansetron (ZOFRAN) 4 MG tablet Take 1 tablet by mouth every 8 hours as needed for Nausea or Vomiting 20 tablet 0    topiramate (TOPAMAX) 25 MG tablet Take 25 mg by mouth every morning Uses for migraines      ibuprofen (ADVIL;MOTRIN) 800 MG tablet Take 1 tablet by mouth every 6 hours as needed for Pain 20 tablet 0    dicyclomine (BENTYL) 10 MG capsule Take 2 capsules by mouth 4 times daily (before meals and nightly) for 20 doses 40 capsule 0     No current facility-administered medications for this visit.         Allergies   Allergen Reactions    Codeine Nausea And Vomiting       Social History     Socioeconomic History    Marital status: Single     Spouse name: None    Number of children: None    Years of education: None    Highest education level: None   Occupational History    Occupation: cleaning     Employer: [employer] Financial resource strain: None    Food insecurity:     Worry: None     Inability: None    Transportation needs:     Medical: None     Non-medical: None   Tobacco Use    Smoking status: Current Every Day Smoker     Packs/day: 0.50    Smokeless tobacco: Never Used   Substance and

## 2019-09-11 RX ORDER — DICYCLOMINE HCL 20 MG
20 TABLET ORAL 4 TIMES DAILY
Qty: 90 TABLET | Refills: 1 | Status: SHIPPED | OUTPATIENT
Start: 2019-09-11 | End: 2019-10-19 | Stop reason: SDUPTHER

## 2019-09-11 RX ORDER — LEVOTHYROXINE SODIUM 112 UG/1
112 TABLET ORAL DAILY
Qty: 90 TABLET | Refills: 1 | Status: SHIPPED
Start: 2019-09-11 | End: 2020-02-18

## 2019-09-11 NOTE — TELEPHONE ENCOUNTER
Pt called and Librax is no longer covered by her insurance. Wondering if theres another medication you can call in similar to it.

## 2019-10-21 RX ORDER — DICYCLOMINE HCL 20 MG
TABLET ORAL
Qty: 90 TABLET | Refills: 1 | Status: SHIPPED | OUTPATIENT
Start: 2019-10-21 | End: 2019-11-30 | Stop reason: SDUPTHER

## 2019-11-01 DIAGNOSIS — Z86.69 HX OF MIGRAINE HEADACHES: ICD-10-CM

## 2019-11-01 RX ORDER — BUTALBITAL, ACETAMINOPHEN AND CAFFEINE 50; 325; 40 MG/1; MG/1; MG/1
1 TABLET ORAL DAILY PRN
Qty: 30 TABLET | Refills: 3 | Status: SHIPPED
Start: 2019-11-01 | End: 2020-04-10

## 2019-11-22 DIAGNOSIS — F51.01 PRIMARY INSOMNIA: ICD-10-CM

## 2019-11-22 RX ORDER — ZOLPIDEM TARTRATE 10 MG/1
TABLET ORAL
Qty: 30 TABLET | Refills: 2 | Status: SHIPPED
Start: 2019-11-22 | End: 2020-02-19

## 2019-12-02 RX ORDER — DICYCLOMINE HCL 20 MG
TABLET ORAL
Qty: 90 TABLET | Refills: 1 | Status: SHIPPED | OUTPATIENT
Start: 2019-12-02 | End: 2020-01-10

## 2020-01-06 ENCOUNTER — TELEPHONE (OUTPATIENT)
Dept: PRIMARY CARE CLINIC | Age: 35
End: 2020-01-06

## 2020-01-06 NOTE — TELEPHONE ENCOUNTER
Patient calling needing a prior auth on Fioricet due to the first of the year.  No changes with insurance

## 2020-01-10 RX ORDER — DICYCLOMINE HCL 20 MG
TABLET ORAL
Qty: 90 TABLET | Refills: 1 | Status: SHIPPED
Start: 2020-01-10 | End: 2020-03-09

## 2020-02-18 RX ORDER — LEVOTHYROXINE SODIUM 112 UG/1
TABLET ORAL
Qty: 90 TABLET | Refills: 1 | Status: SHIPPED
Start: 2020-02-18 | End: 2020-08-07

## 2020-02-19 RX ORDER — ZOLPIDEM TARTRATE 10 MG/1
TABLET ORAL
Qty: 30 TABLET | Refills: 2 | Status: SHIPPED
Start: 2020-02-19 | End: 2020-05-04

## 2020-03-09 RX ORDER — DICYCLOMINE HCL 20 MG
TABLET ORAL
Qty: 90 TABLET | Refills: 1 | Status: SHIPPED
Start: 2020-03-09 | End: 2020-04-23

## 2020-03-10 ENCOUNTER — OFFICE VISIT (OUTPATIENT)
Dept: PRIMARY CARE CLINIC | Age: 35
End: 2020-03-10
Payer: COMMERCIAL

## 2020-03-10 VITALS
WEIGHT: 156.4 LBS | RESPIRATION RATE: 16 BRPM | HEART RATE: 87 BPM | DIASTOLIC BLOOD PRESSURE: 64 MMHG | BODY MASS INDEX: 30.7 KG/M2 | SYSTOLIC BLOOD PRESSURE: 120 MMHG | OXYGEN SATURATION: 98 % | HEIGHT: 60 IN

## 2020-03-10 PROCEDURE — 4004F PT TOBACCO SCREEN RCVD TLK: CPT | Performed by: FAMILY MEDICINE

## 2020-03-10 PROCEDURE — 99214 OFFICE O/P EST MOD 30 MIN: CPT | Performed by: FAMILY MEDICINE

## 2020-03-10 PROCEDURE — G8427 DOCREV CUR MEDS BY ELIG CLIN: HCPCS | Performed by: FAMILY MEDICINE

## 2020-03-10 PROCEDURE — G8482 FLU IMMUNIZE ORDER/ADMIN: HCPCS | Performed by: FAMILY MEDICINE

## 2020-03-10 PROCEDURE — G8417 CALC BMI ABV UP PARAM F/U: HCPCS | Performed by: FAMILY MEDICINE

## 2020-03-10 RX ORDER — PREDNISONE 20 MG/1
TABLET ORAL
Qty: 15 TABLET | Refills: 0 | Status: SHIPPED
Start: 2020-03-10 | End: 2020-08-30

## 2020-03-10 RX ORDER — PANTOPRAZOLE SODIUM 40 MG/1
40 TABLET, DELAYED RELEASE ORAL
Qty: 30 TABLET | Refills: 5 | Status: SHIPPED
Start: 2020-03-10 | End: 2020-08-24

## 2020-03-10 ASSESSMENT — ENCOUNTER SYMPTOMS
NAUSEA: 0
COLOR CHANGE: 0
BLOOD IN STOOL: 0
VOMITING: 0
PHOTOPHOBIA: 0
SINUS PRESSURE: 0
WHEEZING: 0
ALLERGIC/IMMUNOLOGIC NEGATIVE: 1
FACIAL SWELLING: 0
CHEST TIGHTNESS: 0
ABDOMINAL PAIN: 0
BACK PAIN: 0
APNEA: 0
SORE THROAT: 0
COUGH: 0
SHORTNESS OF BREATH: 0
DIARRHEA: 1

## 2020-03-10 NOTE — PROGRESS NOTES
Chief Complaint:   Chief Complaint   Patient presents with    Crohn's Disease     flare up       GI Problem   The primary symptoms include diarrhea. Primary symptoms do not include abdominal pain, nausea, vomiting, myalgias, arthralgias or rash. The onset was sudden. The illness does not include back pain. Significant associated medical issues include inflammatory bowel disease.        Patient Active Problem List   Diagnosis    Hx of migraine headaches    Generalized abdominal pain    Primary insomnia    Fatty liver    H/O small bowel obstruction    Hypothyroidism    RLQ abdominal pain    Depression    PTSD (post-traumatic stress disorder)       Past Medical History:   Diagnosis Date    Abdominal pain     [facility]'s disease ([facility] [address]     Hypocalcemia     Hypothyroidism     Irritable bowel syndrome     Migraines     Status post alcohol detoxification     5/22/2018-5/29/2018       Past Surgical History:   Procedure Laterality Date    CHOLECYSTECTOMY, LAPAROSCOPIC  07/06/2016    COLONOSCOPY N/A 6/22/2018    COLONOSCOPY WITH BIOPSY performed by Francisco Herman MD at [facility] 62      THYROIDECTOMY         Current Outpatient Medications   Medication Sig Dispense Refill    predniSONE (DELTASONE) 20 MG tablet 2 tabs qd x 5 days  1 tab qd x 5 days 0.5 tab qd x 5 days 15 tablet 0    pantoprazole (PROTONIX) 40 MG tablet Take 1 tablet by mouth every morning (before breakfast) 30 tablet 5    dicyclomine (BENTYL) 20 MG tablet take 1 tablet by mouth four times a day 90 tablet 1    zolpidem (AMBIEN) 10 MG tablet take 1 tablet by mouth at bedtime if needed for sleep 30 tablet 2    levothyroxine (SYNTHROID) 112 MCG tablet take 1 tablet by mouth once daily 90 tablet 1    butalbital-acetaminophen-caffeine (FIORICET, ESGIC) -40 MG per tablet Take 1 tablet by mouth daily as needed for Headaches 30 tablet 3    chlordiazePOXIDE-clidinium (LIBRAX) 5-2.5 MG per capsule Sexual activity: Yes     Partners: Male     Birth control/protection: I.U.D. Lifestyle    Physical activity     Days per week: None     Minutes per session: None    Stress: None   Relationships    Social connections     Talks on phone: None     Gets together: None     Attends Gnosticist service: None     Active member of club or organization: None     Attends meetings of clubs or organizations: None     Relationship status: None    Intimate partner violence     Fear of current or ex partner: None     Emotionally abused: None     Physically abused: None     Forced sexual activity: None   Other Topics Concern    None   Social History Narrative    None       Family History   Problem Relation Age of Onset    High Blood Pressure Mother     High Cholesterol Mother     Other Mother         migraine headaches    Heart Disease Father     Asthma Father     High Blood Pressure Father     Diabetes Other         GRANDMOTHER   Clairesimone Mariees Other         GRANDFATHER    Alzheimer's Disease Other         GRANDMOTHER         Review of Systems   Constitutional: Negative. HENT: Negative for congestion, facial swelling, hearing loss, nosebleeds, sinus pressure and sore throat. Eyes: Negative for photophobia and visual disturbance. Respiratory: Negative for apnea, cough, chest tightness, shortness of breath and wheezing. Cardiovascular: Negative for chest pain, palpitations and leg swelling. Gastrointestinal: Positive for diarrhea. Negative for abdominal pain, blood in stool, nausea and vomiting. Genitourinary: Negative for difficulty urinating, frequency and urgency. Musculoskeletal: Negative for arthralgias, back pain, joint swelling and myalgias. Skin: Negative for color change and rash. Allergic/Immunologic: Negative. Neurological: Negative for syncope, weakness, light-headedness and headaches. Hematological: Negative.     Psychiatric/Behavioral: Negative for agitation, behavioral problems, confusion and self-injury. The patient is not nervous/anxious. All other systems reviewed and are negative. Physical Exam  Vitals signs and nursing note reviewed. Constitutional:       General: She is not in acute distress. Appearance: She is well-developed. HENT:      Head: Normocephalic and atraumatic. Nose: Nose normal.   Eyes:      Conjunctiva/sclera: Conjunctivae normal.      Pupils: Pupils are equal, round, and reactive to light. Neck:      Musculoskeletal: Normal range of motion and neck supple. Thyroid: No thyromegaly. Vascular: No JVD. Cardiovascular:      Rate and Rhythm: Normal rate and regular rhythm. Heart sounds: Normal heart sounds. No murmur. No friction rub. No gallop. Pulmonary:      Effort: Pulmonary effort is normal. No respiratory distress. Breath sounds: Normal breath sounds. No wheezing. Abdominal:      General: Bowel sounds are normal. There is no distension. Palpations: Abdomen is soft. Tenderness: There is abdominal tenderness in the epigastric area. There is no guarding or rebound. Musculoskeletal: Normal range of motion. Lymphadenopathy:      Cervical: No cervical adenopathy. Skin:     General: Skin is warm and dry. Findings: No erythema or rash. Neurological:      Mental Status: She is alert and oriented to person, place, and time. Cranial Nerves: No cranial nerve deficit. Motor: No abnormal muscle tone. Coordination: Coordination normal.      Deep Tendon Reflexes: Reflexes are normal and symmetric. Psychiatric:         Behavior: Behavior normal.         Judgment: Judgment normal.                               ASSESSMENT/PLAN:    Elena Ye was seen today for crohn's disease.     Diagnoses and all orders for this visit:    Crohn's disease of colon without complication (HCC)  -     predniSONE (DELTASONE) 20 MG tablet; 2 tabs qd x 5 days  1 tab qd x 5 days 0.5 tab qd x 5 days  -     pantoprazole

## 2020-03-11 ENCOUNTER — TELEPHONE (OUTPATIENT)
Dept: PRIMARY CARE CLINIC | Age: 35
End: 2020-03-11

## 2020-03-11 NOTE — TELEPHONE ENCOUNTER
Pt burned her upper left arm at work last night on the oven. There is 2 burns and one is blistering, she is wondering if you could send over a script over for silvadene.  The pharmacy says you can only get it by prescription

## 2020-04-10 RX ORDER — BUTALBITAL, ACETAMINOPHEN AND CAFFEINE 50; 325; 40 MG/1; MG/1; MG/1
TABLET ORAL
Qty: 30 TABLET | Refills: 3 | Status: SHIPPED
Start: 2020-04-10 | End: 2020-07-20

## 2020-04-23 RX ORDER — DICYCLOMINE HCL 20 MG
TABLET ORAL
Qty: 90 TABLET | Refills: 1 | Status: SHIPPED
Start: 2020-04-23 | End: 2020-07-02

## 2020-05-04 RX ORDER — ZOLPIDEM TARTRATE 10 MG/1
TABLET ORAL
Qty: 30 TABLET | Refills: 2 | Status: SHIPPED | OUTPATIENT
Start: 2020-05-04 | End: 2020-06-03

## 2020-07-02 RX ORDER — DICYCLOMINE HCL 20 MG
TABLET ORAL
Qty: 90 TABLET | Refills: 1 | Status: ON HOLD
Start: 2020-07-02 | End: 2020-09-03 | Stop reason: HOSPADM

## 2020-07-20 RX ORDER — BUTALBITAL, ACETAMINOPHEN AND CAFFEINE 50; 325; 40 MG/1; MG/1; MG/1
TABLET ORAL
Qty: 30 TABLET | Refills: 3 | Status: SHIPPED
Start: 2020-07-20 | End: 2020-08-30

## 2020-08-07 RX ORDER — LEVOTHYROXINE SODIUM 112 UG/1
TABLET ORAL
Qty: 90 TABLET | Refills: 1 | Status: SHIPPED
Start: 2020-08-07 | End: 2020-08-30

## 2020-08-20 ENCOUNTER — HOSPITAL ENCOUNTER (OUTPATIENT)
Age: 35
Discharge: HOME | End: 2020-08-22
Payer: COMMERCIAL

## 2020-08-20 ENCOUNTER — OFFICE VISIT (OUTPATIENT)
Dept: PRIMARY CARE CLINIC | Age: 35
End: 2020-08-20
Payer: COMMERCIAL

## 2020-08-20 VITALS
OXYGEN SATURATION: 97 % | WEIGHT: 158.4 LBS | SYSTOLIC BLOOD PRESSURE: 124 MMHG | DIASTOLIC BLOOD PRESSURE: 80 MMHG | RESPIRATION RATE: 16 BRPM | BODY MASS INDEX: 31.1 KG/M2 | TEMPERATURE: 98.4 F | HEART RATE: 102 BPM | HEIGHT: 60 IN

## 2020-08-20 LAB
ALBUMIN SERPL-MCNC: 4.2 G/DL (ref 3.5–5.2)
ALP BLD-CCNC: 90 U/L (ref 35–104)
ALT SERPL-CCNC: 7 U/L (ref 0–32)
ANION GAP SERPL CALCULATED.3IONS-SCNC: 19 MMOL/L (ref 7–16)
AST SERPL-CCNC: 16 U/L (ref 0–31)
BASOPHILS ABSOLUTE: 0.08 E9/L (ref 0–0.2)
BASOPHILS RELATIVE PERCENT: 0.7 % (ref 0–2)
BILIRUB SERPL-MCNC: <0.2 MG/DL (ref 0–1.2)
BILIRUBIN, POC: NORMAL
BLOOD URINE, POC: NORMAL
BUN BLDV-MCNC: 7 MG/DL (ref 6–20)
CALCIUM SERPL-MCNC: 8.6 MG/DL (ref 8.6–10.2)
CHLORIDE BLD-SCNC: 102 MMOL/L (ref 98–107)
CLARITY, POC: CLEAR
CO2: 22 MMOL/L (ref 22–29)
COLOR, POC: YELLOW
CONTROL: NORMAL
CREAT SERPL-MCNC: 0.7 MG/DL (ref 0.5–1)
EOSINOPHILS ABSOLUTE: 0.25 E9/L (ref 0.05–0.5)
EOSINOPHILS RELATIVE PERCENT: 2.3 % (ref 0–6)
GFR AFRICAN AMERICAN: >60
GFR NON-AFRICAN AMERICAN: >60 ML/MIN/1.73
GLUCOSE BLD-MCNC: 80 MG/DL (ref 74–99)
GLUCOSE URINE, POC: NORMAL
HCT VFR BLD CALC: 44.6 % (ref 34–48)
HEMOGLOBIN: 15.2 G/DL (ref 11.5–15.5)
IMMATURE GRANULOCYTES #: 0.08 E9/L
IMMATURE GRANULOCYTES %: 0.7 % (ref 0–5)
KETONES, POC: NORMAL
LEUKOCYTE EST, POC: NORMAL
LYMPHOCYTES ABSOLUTE: 2.94 E9/L (ref 1.5–4)
LYMPHOCYTES RELATIVE PERCENT: 26.8 % (ref 20–42)
MCH RBC QN AUTO: 32.6 PG (ref 26–35)
MCHC RBC AUTO-ENTMCNC: 34.1 % (ref 32–34.5)
MCV RBC AUTO: 95.7 FL (ref 80–99.9)
MONOCYTES ABSOLUTE: 1.03 E9/L (ref 0.1–0.95)
MONOCYTES RELATIVE PERCENT: 9.4 % (ref 2–12)
NEUTROPHILS ABSOLUTE: 6.61 E9/L (ref 1.8–7.3)
NEUTROPHILS RELATIVE PERCENT: 60.1 % (ref 43–80)
NITRITE, POC: NORMAL
PDW BLD-RTO: 13.3 FL (ref 11.5–15)
PH, POC: 6.5
PLATELET # BLD: 278 E9/L (ref 130–450)
PMV BLD AUTO: 9.7 FL (ref 7–12)
POTASSIUM SERPL-SCNC: 3.9 MMOL/L (ref 3.5–5)
PREGNANCY TEST URINE, POC: NEGATIVE
PROTEIN, POC: NORMAL
RBC # BLD: 4.66 E12/L (ref 3.5–5.5)
SODIUM BLD-SCNC: 143 MMOL/L (ref 132–146)
SPECIFIC GRAVITY, POC: 1.02
TOTAL PROTEIN: 7.1 G/DL (ref 6.4–8.3)
UROBILINOGEN, POC: 0.2
WBC # BLD: 11 E9/L (ref 4.5–11.5)

## 2020-08-20 PROCEDURE — 80053 COMPREHEN METABOLIC PANEL: CPT

## 2020-08-20 PROCEDURE — 99214 OFFICE O/P EST MOD 30 MIN: CPT | Performed by: FAMILY MEDICINE

## 2020-08-20 PROCEDURE — 81002 URINALYSIS NONAUTO W/O SCOPE: CPT | Performed by: FAMILY MEDICINE

## 2020-08-20 PROCEDURE — G8427 DOCREV CUR MEDS BY ELIG CLIN: HCPCS | Performed by: FAMILY MEDICINE

## 2020-08-20 PROCEDURE — 85025 COMPLETE CBC W/AUTO DIFF WBC: CPT

## 2020-08-20 PROCEDURE — 4004F PT TOBACCO SCREEN RCVD TLK: CPT | Performed by: FAMILY MEDICINE

## 2020-08-20 PROCEDURE — 36415 COLL VENOUS BLD VENIPUNCTURE: CPT

## 2020-08-20 PROCEDURE — 81025 URINE PREGNANCY TEST: CPT | Performed by: FAMILY MEDICINE

## 2020-08-20 PROCEDURE — G8417 CALC BMI ABV UP PARAM F/U: HCPCS | Performed by: FAMILY MEDICINE

## 2020-08-20 RX ORDER — SUCRALFATE ORAL 1 G/10ML
1 SUSPENSION ORAL 4 TIMES DAILY
Qty: 1200 ML | Refills: 3 | Status: ON HOLD
Start: 2020-08-20 | End: 2021-01-29 | Stop reason: HOSPADM

## 2020-08-20 RX ORDER — SUCRALFATE 1 G/1
1 TABLET ORAL 4 TIMES DAILY
Qty: 120 TABLET | Refills: 3 | Status: SHIPPED
Start: 2020-08-20 | End: 2020-08-20

## 2020-08-20 ASSESSMENT — CROHNS DISEASE ACTIVITY INDEX (CDAI): CDAI SCORE: 1

## 2020-08-20 ASSESSMENT — ENCOUNTER SYMPTOMS
BLOOD IN STOOL: 0
VOMITING: 0
SHORTNESS OF BREATH: 0
FACIAL SWELLING: 0
ALLERGIC/IMMUNOLOGIC NEGATIVE: 1
CHEST TIGHTNESS: 0
WHEEZING: 0
DIARRHEA: 0
BACK PAIN: 0
PHOTOPHOBIA: 0
SORE THROAT: 0
ABDOMINAL PAIN: 1
NAUSEA: 0
SINUS PRESSURE: 0
APNEA: 0
COLOR CHANGE: 0
COUGH: 0

## 2020-08-20 NOTE — LETTER
[address]  [place] [address]  Phone: [phone]  Fax: [phone]    Amauri Romeo DO        August 20, 2020     Patient: Meghan Florentino   YOB: 1985   Date of Visit: 8/20/2020       To Whom It May Concern: It is my medical opinion that Meghan Florentino may return to work on 8-. Please excuse from work today. .    If you have any questions or concerns, please don't hesitate to call.     Sincerely,        Amauri Romeo DO

## 2020-08-20 NOTE — PROGRESS NOTES
Chief Complaint:   Chief Complaint   Patient presents with    Crohn's Disease    Abdominal Pain     woke this morning with constant belly/ back pain with bstart pains that come n go        Abdominal Pain   This is a new problem. The current episode started in the past 7 days. The onset quality is sudden. The problem occurs daily. The most recent episode lasted 2 days. The pain is located in the generalized abdominal region. The pain is at a severity of 4/10. The pain is moderate. The quality of the pain is burning and cramping. Pertinent negatives include no arthralgias, diarrhea, frequency, headaches, myalgias, nausea or vomiting. She has tried nothing for the symptoms. Her past medical history is significant for Crohn's disease.        Patient Active Problem List   Diagnosis    Hx of migraine headaches    Generalized abdominal pain    Primary insomnia    Fatty liver    H/O small bowel obstruction    Hypothyroidism    RLQ abdominal pain    Depression    PTSD (post-traumatic stress disorder)       Past Medical History:   Diagnosis Date    Abdominal pain     Acute Crohn's disease ([address]     Hypocalcemia     Hypothyroidism     Irritable bowel syndrome     Migraines     Status post alcohol detoxification     5/22/2018-5/29/2018       Past Surgical History:   Procedure Laterality Date    CHOLECYSTECTOMY, LAPAROSCOPIC  07/06/2016    COLONOSCOPY N/A 6/22/2018    COLON[facility]COPY WITH BIOPSY performed by [facility] at [facility] 62      THYROIDECTOMY         Current Outpatient Medications   Medication Sig Dispense Refill    sucralfate (CARAFATE) 1 GM/10ML suspension Take 10 mLs by mouth 4 times daily 1200 mL 3    levothyroxine (SYNTHROID) 112 MCG tablet take 1 tablet by mouth once daily 90 tablet 1    zolpidem (AMBIEN) 10 MG tablet take 1 tablet by mouth every evening at bedtime if needed for sleep 30 tablet 1    butalbital-acetaminophen-caffeine (FIORICET, ESGIC) -40 MG per tablet take 1 tablet by mouth once daily if needed for HEADACHE 30 tablet 3    dicyclomine (BENTYL) 20 MG tablet take 1 tablet by mouth four times a day 90 tablet 1    silver sulfADIAZINE (SSD) 1 % cream apply to affected area once daily 50 g 1    predniSONE (DELTASONE) 20 MG tablet 2 tabs qd x 5 days  1 tab qd x 5 days 0.5 tab qd x 5 days 15 tablet 0    pantoprazole (PROTONIX) 40 MG tablet Take 1 tablet by mouth every morning (before breakfast) 30 tablet 5    fluticasone (FLONASE) 50 MCG/ACT nasal spray 1 spray by Nasal route daily 1 Bottle 3    famotidine (PEPCID) 10 MG tablet Take 10 mg by mouth 2 times daily Instructed to take with sip water am of procedure      calcium-vitamin D (OSCAL-500) 500-200 MG-UNIT per tablet Take 2 tablets by mouth 2 times daily      ondansetron (ZOFRAN) 4 MG tablet Take 1 tablet by mouth every 8 hours as needed for Nausea or Vomiting 20 tablet 0    topiramate (TOPAMAX) 25 MG tablet Take 25 mg by mouth every morning Uses for migraines      dicyclomine (BENTYL) 10 MG capsule Take 2 capsules by mouth 4 times daily (before meals and nightly) for 20 doses 40 capsule 0     No current facility-administered medications for this visit.         Allergies   Allergen Reactions    Codeine Nausea And Vomiting       Social History     Socioeconomic History    Marital status: Single     Spouse name: None    Number of children: None    Years of education: None    Highest education level: None   Occupational History    Occupation: cleaning     Employer: the [address] [place] [address] [employer] resource strain: None    Food insecurity     Worry: None     Inability: None    Transportation needs     Medical: None     Non-medical: None   Tobacco Use    Smoking status: Current Every Day Smoker     Packs/day: 0.50    Smokeless tobacco: Never Used   Substance and Sexual Activity    Alcohol use: No     Alcohol/week: 0.0 standard drinks     Comment: 4 weeks sober post detox    Drug use: No    Sexual activity: Yes     Partners: Male     Birth control/protection: I.U.D. Lifestyle    Physical activity     Days per week: None     Minutes per session: None    Stress: None   Relationships    Social connections     Talks on phone: None     Gets together: None     Attends Episcopal service: None     Active member of club or organization: None     Attends meetings of clubs or organizations: None     Relationship status: None    Intimate partner violence     Fear of current or ex partner: None     Emotionally abused: None     Physically abused: None     Forced sexual activity: None   Other Topics Concern    None   Social History Narrative    None       Family History   Problem Relation Age of Onset    High Blood Pressure Mother     High Cholesterol Mother     Other Mother         migraine headaches    Heart Disease Father     Asthma Father     High Blood Pressure Father     Diabetes Other         GRANDMOTHER   Latham Justice Other         GRANDFATHER    Alzheimer's Disease Other         GRANDMOTHER         Review of Systems   Constitutional: Negative. HENT: Negative for congestion, facial swelling, hearing loss, nosebleeds, sinus pressure and sore throat. Eyes: Negative for photophobia and visual disturbance. Respiratory: Negative for apnea, cough, chest tightness, shortness of breath and wheezing. Cardiovascular: Negative for chest pain, palpitations and leg swelling. Gastrointestinal: Positive for abdominal pain. Negative for blood in stool, diarrhea, nausea and vomiting. Genitourinary: Negative for difficulty urinating, frequency and urgency. Musculoskeletal: Negative for arthralgias, back pain, joint swelling and myalgias. Skin: Negative for color change and rash. Allergic/Immunologic: Negative. Neurological: Negative for syncope, weakness, light-headedness and headaches. Hematological: Negative.     Psychiatric/Behavioral: Negative for agitation, behavioral problems, confusion and self-injury. The patient is not nervous/anxious. All other systems reviewed and are negative. Physical Exam  Vitals signs and nursing note reviewed. Constitutional:       General: She is not in acute distress. Appearance: She is well-developed. HENT:      Head: Normocephalic and atraumatic. Nose: Nose normal.   Eyes:      Conjunctiva/sclera: Conjunctivae normal.      Pupils: Pupils are equal, round, and reactive to light. Neck:      Musculoskeletal: Normal range of motion and neck supple. Thyroid: No thyromegaly. Vascular: No JVD. Cardiovascular:      Rate and Rhythm: Normal rate and regular rhythm. Heart sounds: Normal heart sounds. No murmur. No friction rub. No gallop. Pulmonary:      Effort: Pulmonary effort is normal. No respiratory distress. Breath sounds: Normal breath sounds. No wheezing. Abdominal:      General: Bowel sounds are normal. There is no distension. Palpations: Abdomen is soft. Tenderness: There is abdominal tenderness in the epigastric area. There is no guarding or rebound. Musculoskeletal: Normal range of motion. Lymphadenopathy:      Cervical: No cervical adenopathy. Skin:     General: Skin is warm and dry. Findings: No erythema or rash. Neurological:      Mental Status: She is alert and oriented to person, place, and time. Cranial Nerves: No cranial nerve deficit. Motor: No abnormal muscle tone. Coordination: Coordination normal.      Deep Tendon Reflexes: Reflexes are normal and symmetric. Psychiatric:         Behavior: Behavior normal.         Judgment: Judgment normal.                               ASSESSMENT/PLAN:    Janet Estes was seen today for crohn's disease and abdominal pain.     Diagnoses and all orders for this visit:    Epigastric pain  -     POCT Urinalysis no Micro  -     POCT urine pregnancy  -     Discontinue: sucralfate (CARAFATE) 1 GM tablet; Take 1 tablet by mouth 4 times daily  -     CT ABDOMEN PELVIS WO CONTRAST; Future  -     CBC Auto Differential; Future  -     Comprehensive Metabolic Panel; Future  -     Lucero Pérez MD, Zuleyka Baltazar (ANGIE)    Crohn's disease of colon without complication ([facility]  -     Discontinue: sucralfate (CARAFATE) 1 GM tablet; Take 1 tablet by mouth 4 times daily  -     CT ABDOMEN PELVIS WO CONTRAST; Future  -     CBC Auto Differential; Future  -     Comprehensive Metabolic Panel; Future  -     Lucero Pérez MD, Zuleyka Baltazar (ANGIE)    Other orders  -     sucralfate (CARAFATE) 1 GM/10ML suspension;  Take 10 mLs by mouth 4 times daily            Hipolito Burgos,     8/20/2020  11:44 AM

## 2020-08-24 RX ORDER — PANTOPRAZOLE SODIUM 40 MG/1
TABLET, DELAYED RELEASE ORAL
Qty: 30 TABLET | Refills: 5 | Status: ON HOLD
Start: 2020-08-24 | End: 2020-09-02 | Stop reason: SDUPTHER

## 2020-08-28 ENCOUNTER — HOSPITAL ENCOUNTER (OUTPATIENT)
Dept: CT IMAGING | Age: 35
Discharge: HOME | End: 2020-08-30
Payer: COMMERCIAL

## 2020-08-28 PROCEDURE — 74176 CT ABD & PELVIS W/O CONTRAST: CPT

## 2020-08-30 ENCOUNTER — HOSPITAL ENCOUNTER (INPATIENT)
Age: 35
LOS: 2 days | Discharge: HOME | DRG: 245 | End: 2020-09-03
Attending: EMERGENCY MEDICINE | Admitting: INTERNAL MEDICINE
Payer: COMMERCIAL

## 2020-08-30 ENCOUNTER — APPOINTMENT (OUTPATIENT)
Dept: CT IMAGING | Age: 35
DRG: 245 | End: 2020-08-30
Payer: COMMERCIAL

## 2020-08-30 PROBLEM — K63.89 MESENTERIC MASS: Status: ACTIVE | Noted: 2020-08-30

## 2020-08-30 PROBLEM — R19.01 ABDOMINAL MASS, RUQ (RIGHT UPPER QUADRANT): Status: ACTIVE | Noted: 2020-08-30

## 2020-08-30 PROBLEM — F51.01 PRIMARY INSOMNIA: Chronic | Status: ACTIVE | Noted: 2017-10-03

## 2020-08-30 PROBLEM — F32.A DEPRESSION: Chronic | Status: ACTIVE | Noted: 2018-08-14

## 2020-08-30 PROBLEM — F43.10 PTSD (POST-TRAUMATIC STRESS DISORDER): Chronic | Status: ACTIVE | Noted: 2018-08-14

## 2020-08-30 PROBLEM — E03.9 HYPOTHYROIDISM: Chronic | Status: ACTIVE | Noted: 2018-02-28

## 2020-08-30 LAB
ALBUMIN SERPL-MCNC: 4.3 G/DL (ref 3.5–5.2)
ALP BLD-CCNC: 113 U/L (ref 35–104)
ALT SERPL-CCNC: 10 U/L (ref 0–32)
ANION GAP SERPL CALCULATED.3IONS-SCNC: 10 MMOL/L (ref 7–16)
AST SERPL-CCNC: 17 U/L (ref 0–31)
BASOPHILS ABSOLUTE: 0.09 E9/L (ref 0–0.2)
BASOPHILS RELATIVE PERCENT: 0.9 % (ref 0–2)
BILIRUB SERPL-MCNC: 0.4 MG/DL (ref 0–1.2)
BILIRUBIN URINE: NEGATIVE
BLOOD, URINE: NEGATIVE
BUN BLDV-MCNC: 5 MG/DL (ref 6–20)
CALCIUM SERPL-MCNC: 8.2 MG/DL (ref 8.6–10.2)
CHLORIDE BLD-SCNC: 101 MMOL/L (ref 98–107)
CLARITY: CLEAR
CO2: 26 MMOL/L (ref 22–29)
COLOR: YELLOW
CREAT SERPL-MCNC: 0.7 MG/DL (ref 0.5–1)
EOSINOPHILS ABSOLUTE: 0.39 E9/L (ref 0.05–0.5)
EOSINOPHILS RELATIVE PERCENT: 4 % (ref 0–6)
GFR AFRICAN AMERICAN: >60
GFR NON-AFRICAN AMERICAN: >60 ML/MIN/1.73
GLUCOSE BLD-MCNC: 103 MG/DL (ref 74–99)
GLUCOSE URINE: NEGATIVE MG/DL
HCG, URINE, POC: NEGATIVE
HCT VFR BLD CALC: 44.9 % (ref 34–48)
HEMOGLOBIN: 15.6 G/DL (ref 11.5–15.5)
IMMATURE GRANULOCYTES #: 0.03 E9/L
IMMATURE GRANULOCYTES %: 0.3 % (ref 0–5)
KETONES, URINE: NEGATIVE MG/DL
LACTIC ACID: 1.6 MMOL/L (ref 0.5–2.2)
LEUKOCYTE ESTERASE, URINE: NEGATIVE
LIPASE: 22 U/L (ref 13–60)
LYMPHOCYTES ABSOLUTE: 3.19 E9/L (ref 1.5–4)
LYMPHOCYTES RELATIVE PERCENT: 33.1 % (ref 20–42)
Lab: NORMAL
MCH RBC QN AUTO: 32.3 PG (ref 26–35)
MCHC RBC AUTO-ENTMCNC: 34.7 % (ref 32–34.5)
MCV RBC AUTO: 93 FL (ref 80–99.9)
MONOCYTES ABSOLUTE: 0.8 E9/L (ref 0.1–0.95)
MONOCYTES RELATIVE PERCENT: 8.3 % (ref 2–12)
NEGATIVE QC PASS/FAIL: NORMAL
NEUTROPHILS ABSOLUTE: 5.15 E9/L (ref 1.8–7.3)
NEUTROPHILS RELATIVE PERCENT: 53.4 % (ref 43–80)
NITRITE, URINE: NEGATIVE
PDW BLD-RTO: 13.3 FL (ref 11.5–15)
PH UA: 6.5 (ref 5–9)
PLATELET # BLD: 295 E9/L (ref 130–450)
PMV BLD AUTO: 9 FL (ref 7–12)
POSITIVE QC PASS/FAIL: NORMAL
POTASSIUM SERPL-SCNC: 3.4 MMOL/L (ref 3.5–5)
PROTEIN UA: NEGATIVE MG/DL
RBC # BLD: 4.83 E12/L (ref 3.5–5.5)
SEDIMENTATION RATE, ERYTHROCYTE: 4 MM/HR (ref 0–20)
SODIUM BLD-SCNC: 137 MMOL/L (ref 132–146)
SPECIFIC GRAVITY UA: <=1.005 (ref 1–1.03)
TOTAL PROTEIN: 7.4 G/DL (ref 6.4–8.3)
UROBILINOGEN, URINE: 0.2 E.U./DL
WBC # BLD: 9.7 E9/L (ref 4.5–11.5)

## 2020-08-30 PROCEDURE — 2580000003 HC RX 258: Performed by: INTERNAL MEDICINE

## 2020-08-30 PROCEDURE — 85025 COMPLETE CBC W/AUTO DIFF WBC: CPT

## 2020-08-30 PROCEDURE — 84145 PROCALCITONIN (PCT): CPT

## 2020-08-30 PROCEDURE — 6370000000 HC RX 637 (ALT 250 FOR IP): Performed by: INTERNAL MEDICINE

## 2020-08-30 PROCEDURE — 2580000003 HC RX 258: Performed by: EMERGENCY MEDICINE

## 2020-08-30 PROCEDURE — 6360000004 HC RX CONTRAST MEDICATION: Performed by: RADIOLOGY

## 2020-08-30 PROCEDURE — 86140 C-REACTIVE PROTEIN: CPT

## 2020-08-30 PROCEDURE — 80053 COMPREHEN METABOLIC PANEL: CPT

## 2020-08-30 PROCEDURE — 99223 1ST HOSP IP/OBS HIGH 75: CPT | Performed by: INTERNAL MEDICINE

## 2020-08-30 PROCEDURE — 83690 ASSAY OF LIPASE: CPT

## 2020-08-30 PROCEDURE — 96372 THER/PROPH/DIAG INJ SC/IM: CPT

## 2020-08-30 PROCEDURE — 96374 THER/PROPH/DIAG INJ IV PUSH: CPT

## 2020-08-30 PROCEDURE — G0378 HOSPITAL OBSERVATION PER HR: HCPCS

## 2020-08-30 PROCEDURE — 85651 RBC SED RATE NONAUTOMATED: CPT

## 2020-08-30 PROCEDURE — 6360000002 HC RX W HCPCS: Performed by: INTERNAL MEDICINE

## 2020-08-30 PROCEDURE — 99283 EMERGENCY DEPT VISIT LOW MDM: CPT

## 2020-08-30 PROCEDURE — 83605 ASSAY OF LACTIC ACID: CPT

## 2020-08-30 PROCEDURE — 81003 URINALYSIS AUTO W/O SCOPE: CPT

## 2020-08-30 PROCEDURE — 36415 COLL VENOUS BLD VENIPUNCTURE: CPT

## 2020-08-30 PROCEDURE — 96375 TX/PRO/DX INJ NEW DRUG ADDON: CPT

## 2020-08-30 PROCEDURE — 6360000002 HC RX W HCPCS: Performed by: EMERGENCY MEDICINE

## 2020-08-30 PROCEDURE — 74177 CT ABD & PELVIS W/CONTRAST: CPT

## 2020-08-30 PROCEDURE — 96365 THER/PROPH/DIAG IV INF INIT: CPT

## 2020-08-30 PROCEDURE — 2500000003 HC RX 250 WO HCPCS: Performed by: INTERNAL MEDICINE

## 2020-08-30 PROCEDURE — 99284 EMERGENCY DEPT VISIT MOD MDM: CPT

## 2020-08-30 PROCEDURE — 96376 TX/PRO/DX INJ SAME DRUG ADON: CPT

## 2020-08-30 RX ORDER — BUTALBITAL, ACETAMINOPHEN AND CAFFEINE 300; 40; 50 MG/1; MG/1; MG/1
1 CAPSULE ORAL DAILY PRN
Status: DISCONTINUED | OUTPATIENT
Start: 2020-08-30 | End: 2020-09-03 | Stop reason: HOSPADM

## 2020-08-30 RX ORDER — POTASSIUM CHLORIDE 20 MEQ/1
40 TABLET, EXTENDED RELEASE ORAL PRN
Status: DISCONTINUED | OUTPATIENT
Start: 2020-08-30 | End: 2020-08-31

## 2020-08-30 RX ORDER — FLUTICASONE PROPIONATE 50 MCG
1 SPRAY, SUSPENSION (ML) NASAL DAILY
Status: ON HOLD | COMMUNITY
End: 2020-08-30

## 2020-08-30 RX ORDER — KETOROLAC TROMETHAMINE 30 MG/ML
30 INJECTION, SOLUTION INTRAMUSCULAR; INTRAVENOUS ONCE
Status: COMPLETED | OUTPATIENT
Start: 2020-08-30 | End: 2020-08-30

## 2020-08-30 RX ORDER — SUCRALFATE 1 G/1
1 TABLET ORAL EVERY 6 HOURS SCHEDULED
Status: DISCONTINUED | OUTPATIENT
Start: 2020-08-30 | End: 2020-09-03 | Stop reason: HOSPADM

## 2020-08-30 RX ORDER — BUTALBITAL, ACETAMINOPHEN AND CAFFEINE 50; 325; 40 MG/1; MG/1; MG/1
1 TABLET ORAL DAILY PRN
COMMUNITY
End: 2020-11-09

## 2020-08-30 RX ORDER — TOPIRAMATE 25 MG/1
25 CAPSULE, COATED PELLETS ORAL DAILY
Status: DISCONTINUED | OUTPATIENT
Start: 2020-08-30 | End: 2020-09-03 | Stop reason: HOSPADM

## 2020-08-30 RX ORDER — ZOLPIDEM TARTRATE 5 MG/1
5 TABLET ORAL NIGHTLY PRN
Status: DISCONTINUED | OUTPATIENT
Start: 2020-08-30 | End: 2020-09-03 | Stop reason: HOSPADM

## 2020-08-30 RX ORDER — ACETAMINOPHEN 325 MG/1
650 TABLET ORAL EVERY 6 HOURS PRN
Status: DISCONTINUED | OUTPATIENT
Start: 2020-08-30 | End: 2020-09-03 | Stop reason: HOSPADM

## 2020-08-30 RX ORDER — LEVOTHYROXINE SODIUM 112 UG/1
112 TABLET ORAL DAILY
Status: ON HOLD | COMMUNITY
End: 2021-01-26

## 2020-08-30 RX ORDER — SODIUM CHLORIDE 0.9 % (FLUSH) 0.9 %
10 SYRINGE (ML) INJECTION PRN
Status: DISCONTINUED | OUTPATIENT
Start: 2020-08-30 | End: 2020-09-03 | Stop reason: HOSPADM

## 2020-08-30 RX ORDER — DICYCLOMINE HYDROCHLORIDE 10 MG/1
20 CAPSULE ORAL
Status: DISCONTINUED | OUTPATIENT
Start: 2020-08-30 | End: 2020-09-03 | Stop reason: HOSPADM

## 2020-08-30 RX ORDER — MAGNESIUM SULFATE 1 G/100ML
1 INJECTION INTRAVENOUS PRN
Status: DISCONTINUED | OUTPATIENT
Start: 2020-08-30 | End: 2020-08-31

## 2020-08-30 RX ORDER — POTASSIUM CHLORIDE 7.45 MG/ML
10 INJECTION INTRAVENOUS PRN
Status: DISCONTINUED | OUTPATIENT
Start: 2020-08-30 | End: 2020-08-31

## 2020-08-30 RX ORDER — ACETAMINOPHEN 650 MG/1
650 SUPPOSITORY RECTAL EVERY 6 HOURS PRN
Status: DISCONTINUED | OUTPATIENT
Start: 2020-08-30 | End: 2020-09-03 | Stop reason: HOSPADM

## 2020-08-30 RX ORDER — TOPIRAMATE 25 MG/1
25 CAPSULE, COATED PELLETS ORAL DAILY
COMMUNITY
End: 2021-02-01 | Stop reason: SDUPTHER

## 2020-08-30 RX ORDER — MORPHINE SULFATE 2 MG/ML
2 INJECTION, SOLUTION INTRAMUSCULAR; INTRAVENOUS EVERY 4 HOURS PRN
Status: COMPLETED | OUTPATIENT
Start: 2020-08-30 | End: 2020-08-31

## 2020-08-30 RX ORDER — METHYLPREDNISOLONE SODIUM SUCCINATE 40 MG/ML
40 INJECTION, POWDER, LYOPHILIZED, FOR SOLUTION INTRAMUSCULAR; INTRAVENOUS DAILY
Status: DISCONTINUED | OUTPATIENT
Start: 2020-08-30 | End: 2020-09-03 | Stop reason: HOSPADM

## 2020-08-30 RX ORDER — LEVOTHYROXINE SODIUM 112 UG/1
112 TABLET ORAL DAILY
Status: DISCONTINUED | OUTPATIENT
Start: 2020-08-31 | End: 2020-09-03 | Stop reason: HOSPADM

## 2020-08-30 RX ORDER — PANTOPRAZOLE SODIUM 40 MG/1
40 TABLET, DELAYED RELEASE ORAL
Status: DISCONTINUED | OUTPATIENT
Start: 2020-08-31 | End: 2020-09-03 | Stop reason: HOSPADM

## 2020-08-30 RX ORDER — ONDANSETRON 4 MG/1
4 TABLET, FILM COATED ORAL EVERY 8 HOURS PRN
COMMUNITY
End: 2021-01-07

## 2020-08-30 RX ORDER — SODIUM CHLORIDE 0.9 % (FLUSH) 0.9 %
10 SYRINGE (ML) INJECTION EVERY 12 HOURS SCHEDULED
Status: DISCONTINUED | OUTPATIENT
Start: 2020-08-30 | End: 2020-09-03 | Stop reason: HOSPADM

## 2020-08-30 RX ORDER — MAGNESIUM HYDROXIDE/ALUMINUM HYDROXICE/SIMETHICONE 120; 1200; 1200 MG/30ML; MG/30ML; MG/30ML
5 SUSPENSION ORAL EVERY 6 HOURS PRN
Status: DISCONTINUED | OUTPATIENT
Start: 2020-08-30 | End: 2020-09-03 | Stop reason: HOSPADM

## 2020-08-30 RX ORDER — PROMETHAZINE HYDROCHLORIDE 25 MG/1
12.5 TABLET ORAL EVERY 6 HOURS PRN
Status: DISCONTINUED | OUTPATIENT
Start: 2020-08-30 | End: 2020-09-03 | Stop reason: HOSPADM

## 2020-08-30 RX ORDER — ONDANSETRON 2 MG/ML
4 INJECTION INTRAMUSCULAR; INTRAVENOUS EVERY 6 HOURS PRN
Status: DISCONTINUED | OUTPATIENT
Start: 2020-08-30 | End: 2020-09-03 | Stop reason: HOSPADM

## 2020-08-30 RX ORDER — SODIUM CHLORIDE 0.9 % (FLUSH) 0.9 %
10 SYRINGE (ML) INJECTION PRN
Status: DISCONTINUED | OUTPATIENT
Start: 2020-08-30 | End: 2020-08-30 | Stop reason: SDUPTHER

## 2020-08-30 RX ORDER — DEXTROSE, SODIUM CHLORIDE, AND POTASSIUM CHLORIDE 5; .45; .15 G/100ML; G/100ML; G/100ML
INJECTION INTRAVENOUS CONTINUOUS
Status: DISPENSED | OUTPATIENT
Start: 2020-08-30 | End: 2020-08-31

## 2020-08-30 RX ORDER — ONDANSETRON 2 MG/ML
4 INJECTION INTRAMUSCULAR; INTRAVENOUS ONCE
Status: COMPLETED | OUTPATIENT
Start: 2020-08-30 | End: 2020-08-30

## 2020-08-30 RX ORDER — MAG HYDROX/ALUMINUM HYD/SIMETH 200-200-20
5 SUSPENSION, ORAL (FINAL DOSE FORM) ORAL EVERY 6 HOURS PRN
Status: ON HOLD | COMMUNITY
End: 2021-01-29 | Stop reason: HOSPADM

## 2020-08-30 RX ORDER — MORPHINE SULFATE 4 MG/ML
4 INJECTION, SOLUTION INTRAMUSCULAR; INTRAVENOUS ONCE
Status: COMPLETED | OUTPATIENT
Start: 2020-08-30 | End: 2020-08-30

## 2020-08-30 RX ORDER — ZOLPIDEM TARTRATE 10 MG/1
TABLET ORAL NIGHTLY PRN
COMMUNITY
End: 2020-10-01

## 2020-08-30 RX ORDER — 0.9 % SODIUM CHLORIDE 0.9 %
1000 INTRAVENOUS SOLUTION INTRAVENOUS ONCE
Status: COMPLETED | OUTPATIENT
Start: 2020-08-30 | End: 2020-08-30

## 2020-08-30 RX ADMIN — POTASSIUM CHLORIDE, DEXTROSE MONOHYDRATE AND SODIUM CHLORIDE: 150; 5; 450 INJECTION, SOLUTION INTRAVENOUS at 20:31

## 2020-08-30 RX ADMIN — ZOLPIDEM TARTRATE 5 MG: 5 TABLET ORAL at 22:15

## 2020-08-30 RX ADMIN — SODIUM CHLORIDE, PRESERVATIVE FREE 10 ML: 5 INJECTION INTRAVENOUS at 20:29

## 2020-08-30 RX ADMIN — MORPHINE SULFATE 2 MG: 2 INJECTION, SOLUTION INTRAMUSCULAR; INTRAVENOUS at 20:28

## 2020-08-30 RX ADMIN — CEFTRIAXONE 1 G: 1 INJECTION, POWDER, FOR SOLUTION INTRAMUSCULAR; INTRAVENOUS at 20:28

## 2020-08-30 RX ADMIN — POTASSIUM CHLORIDE 40 MEQ: 20 TABLET, EXTENDED RELEASE ORAL at 20:29

## 2020-08-30 RX ADMIN — METHYLPREDNISOLONE SODIUM SUCCINATE 40 MG: 40 INJECTION, POWDER, FOR SOLUTION INTRAMUSCULAR; INTRAVENOUS at 20:28

## 2020-08-30 RX ADMIN — ENOXAPARIN SODIUM 40 MG: 40 INJECTION SUBCUTANEOUS at 20:30

## 2020-08-30 RX ADMIN — SUCRALFATE 1 G: 1 TABLET ORAL at 20:30

## 2020-08-30 RX ADMIN — PROMETHAZINE HYDROCHLORIDE 12.5 MG: 25 TABLET ORAL at 21:10

## 2020-08-30 RX ADMIN — METRONIDAZOLE 500 MG: 500 INJECTION, SOLUTION INTRAVENOUS at 20:28

## 2020-08-30 RX ADMIN — ONDANSETRON 4 MG: 2 INJECTION INTRAMUSCULAR; INTRAVENOUS at 16:38

## 2020-08-30 RX ADMIN — SODIUM CHLORIDE 1000 ML: 9 INJECTION, SOLUTION INTRAVENOUS at 16:37

## 2020-08-30 RX ADMIN — IOPAMIDOL 110 ML: 755 INJECTION, SOLUTION INTRAVENOUS at 17:17

## 2020-08-30 RX ADMIN — DICYCLOMINE HYDROCHLORIDE 20 MG: 10 CAPSULE ORAL at 20:29

## 2020-08-30 RX ADMIN — KETOROLAC TROMETHAMINE 30 MG: 30 INJECTION, SOLUTION INTRAMUSCULAR at 18:56

## 2020-08-30 RX ADMIN — SODIUM CHLORIDE, PRESERVATIVE FREE 10 ML: 5 INJECTION INTRAVENOUS at 18:57

## 2020-08-30 RX ADMIN — MORPHINE SULFATE 4 MG: 4 INJECTION, SOLUTION INTRAMUSCULAR; INTRAVENOUS at 16:39

## 2020-08-30 ASSESSMENT — ENCOUNTER SYMPTOMS
SORE THROAT: 0
BELCHING: 0
NAUSEA: 0
ABDOMINAL DISTENTION: 0
ABDOMINAL PAIN: 1
DIARRHEA: 0
COUGH: 0
EYE PAIN: 0
SHORTNESS OF BREATH: 0
SINUS PRESSURE: 0
WHEEZING: 0
CONSTIPATION: 0
VOMITING: 0
BACK PAIN: 0
EYE DISCHARGE: 0
EYE REDNESS: 0

## 2020-08-30 ASSESSMENT — PAIN DESCRIPTION - FREQUENCY
FREQUENCY: CONTINUOUS

## 2020-08-30 ASSESSMENT — PAIN DESCRIPTION - ORIENTATION
ORIENTATION: RIGHT
ORIENTATION: LEFT;LOWER
ORIENTATION: RIGHT

## 2020-08-30 ASSESSMENT — PAIN DESCRIPTION - DESCRIPTORS
DESCRIPTORS: CONSTANT;RADIATING
DESCRIPTORS: CONSTANT;RADIATING
DESCRIPTORS: SHARP;STABBING

## 2020-08-30 ASSESSMENT — PAIN DESCRIPTION - ONSET
ONSET: GRADUAL
ONSET: ON-GOING

## 2020-08-30 ASSESSMENT — PAIN SCALES - GENERAL
PAINLEVEL_OUTOF10: 10
PAINLEVEL_OUTOF10: 9
PAINLEVEL_OUTOF10: 3
PAINLEVEL_OUTOF10: 10
PAINLEVEL_OUTOF10: 10
PAINLEVEL_OUTOF10: 9

## 2020-08-30 ASSESSMENT — PAIN DESCRIPTION - DIRECTION
RADIATING_TOWARDS: BACK
RADIATING_TOWARDS: BACK

## 2020-08-30 ASSESSMENT — PAIN DESCRIPTION - LOCATION
LOCATION: ABDOMEN;BACK
LOCATION: ABDOMEN
LOCATION: ABDOMEN;BACK

## 2020-08-30 ASSESSMENT — PAIN DESCRIPTION - PROGRESSION
CLINICAL_PROGRESSION: GRADUALLY WORSENING
CLINICAL_PROGRESSION: NOT CHANGED

## 2020-08-30 ASSESSMENT — PAIN DESCRIPTION - PAIN TYPE
TYPE: ACUTE PAIN
TYPE: ACUTE PAIN

## 2020-08-30 ASSESSMENT — PAIN - FUNCTIONAL ASSESSMENT: PAIN_FUNCTIONAL_ASSESSMENT: PREVENTS OR INTERFERES SOME ACTIVE ACTIVITIES AND ADLS

## 2020-08-30 NOTE — ED PROVIDER NOTES
30-year-old female with a history of Crohn's disease and a history of bowel blockages presents to the emergency department with right lower quadrant abdominal pain. She had a CT did scan done couple of days ago without IV contrast that showed questionable findings patient continued have abdominal pain today and prompted her to come in for further evaluation. She states no fevers no chills no nausea no vomiting no diarrhea or constipation. She states no other complaints including fevers. The history is provided by the patient. Abdominal Pain   Pain location:  RLQ  Pain quality: aching    Pain radiates to:  Does not radiate  Pain severity:  Moderate  Onset quality:  Gradual  Duration:  2 days  Timing:  Intermittent  Progression:  Waxing and waning  Chronicity:  New  Relieved by:  Nothing  Worsened by:  Nothing  Ineffective treatments:  None tried  Associated symptoms: no anorexia, no belching, no chest pain, no chills, no constipation, no cough, no diarrhea, no dysuria, no fever, no nausea, no shortness of breath, no sore throat and no vomiting         Review of Systems   Constitutional: Negative for chills and fever. HENT: Negative for ear pain, sinus pressure and sore throat. Eyes: Negative for pain, discharge and redness. Respiratory: Negative for cough, shortness of breath and wheezing. Cardiovascular: Negative for chest pain. Gastrointestinal: Positive for abdominal pain. Negative for abdominal distention, anorexia, constipation, diarrhea, nausea and vomiting. Genitourinary: Negative for dysuria and frequency. Musculoskeletal: Negative for arthralgias and back pain. Skin: Negative for rash and wound. Neurological: Negative for weakness and headaches. Hematological: Negative for adenopathy. All other systems reviewed and are negative. Physical Exam  Constitutional:       Appearance: She is well-developed. HENT:      Head: Normocephalic and atraumatic.    Eyes: Extraocular Movements: Extraocular movements intact. Cardiovascular:      Rate and Rhythm: Normal rate and regular rhythm. Pulmonary:      Effort: Pulmonary effort is normal.      Breath sounds: Normal breath sounds. Abdominal:      General: Bowel sounds are increased. Palpations: Abdomen is soft. Tenderness: There is abdominal tenderness in the right lower quadrant. There is no right CVA tenderness or left CVA tenderness. Genitourinary:     Uterus: Normal.       Adnexa: Right adnexa normal.      Rectum: Normal.   Skin:     General: Skin is warm. Capillary Refill: Capillary refill takes less than 2 seconds. Neurological:      General: No focal deficit present. Mental Status: She is alert and oriented to person, place, and time. Procedures     MDM  Number of Diagnoses or Management Options  Abdominal pain, right lower quadrant:   Hx of Crohn's disease:   Liver masses:   Diagnosis management comments: Patient seen and examined. Labs and imaging were ordered. Imaging reveals signs concerning for chronic Crohn's disease but also show evidence of possible carcinoid tumor with metastasis to the liver. I did explain the results to the patient and recommended admission Case was discussed with Dr. Mart Dale who will admit the patient.             --------------------------------------------- PAST HISTORY ---------------------------------------------  Past Medical History:  has a past medical history of Abdominal pain, Acute Crohn's disease ([facility] [address] Hypocalcemia, Hypothyroidism, Irritable bowel syndrome, Migraines, and Status post alcohol detoxification. Past Surgical History:  has a past surgical history that includes Parathyroid gland surgery; Cholecystectomy, laparoscopic (07/06/2016); Thyroidectomy; and Colonoscopy (N/A, 6/22/2018). Social History:  reports that she has been smoking. She has been smoking about 0.50 packs per day.  She has never used smokeless tobacco. She reports that she does not drink alcohol or use drugs. Family History: family history includes Alzheimer's Disease in an other family member; Asthma in her father; Diabetes in an other family member; Heart Disease in her father; High Blood Pressure in her father and mother; High Cholesterol in her mother; Jovana Cresencio in an other family member; Other in her mother. The patients home medications have been reviewed.     Allergies: Codeine    -------------------------------------------------- RESULTS -------------------------------------------------  Labs:  Results for orders placed or performed during the hospital encounter of 08/30/20   CBC Auto Differential   Result Value Ref Range    WBC 9.7 4.5 - 11.5 E9/L    RBC 4.83 3.50 - 5.50 E12/L    Hemoglobin 15.6 (H) 11.5 - 15.5 g/dL    Hematocrit 44.9 34.0 - 48.0 %    MCV 93.0 80.0 - 99.9 fL    MCH 32.3 26.0 - 35.0 pg    MCHC 34.7 (H) 32.0 - 34.5 %    RDW 13.3 11.5 - 15.0 fL    Platelets 776 034 - 995 E9/L    MPV 9.0 7.0 - 12.0 fL    Neutrophils % 53.4 43.0 - 80.0 %    Immature Granulocytes % 0.3 0.0 - 5.0 %    Lymphocytes % 33.1 20.0 - 42.0 %    Monocytes % 8.3 2.0 - 12.0 %    Eosinophils % 4.0 0.0 - 6.0 %    Basophils % 0.9 0.0 - 2.0 %    Neutrophils Absolute 5.15 1.80 - 7.30 E9/L    Immature Granulocytes # 0.03 E9/L    Lymphocytes Absolute 3.19 1.50 - 4.00 E9/L    Monocytes Absolute 0.80 0.10 - 0.95 E9/L    Eosinophils Absolute 0.39 0.05 - 0.50 E9/L    Basophils Absolute 0.09 0.00 - 0.20 E9/L   Comprehensive Metabolic Panel   Result Value Ref Range    Sodium 137 132 - 146 mmol/L    Potassium 3.4 (L) 3.5 - 5.0 mmol/L    Chloride 101 98 - 107 mmol/L    CO2 26 22 - 29 mmol/L    Anion Gap 10 7 - 16 mmol/L    Glucose 103 (H) 74 - 99 mg/dL    BUN 5 (L) 6 - 20 mg/dL    CREATININE 0.7 0.5 - 1.0 mg/dL    GFR Non-African American >60 >=60 mL/min/1.73    GFR African American >60     Calcium 8.2 (L) 8.6 - 10.2 mg/dL    Total Protein 7.4 6.4 - 8.3 g/dL    Alb 4.3 3.5 - 5.2 g/dL Total Bilirubin 0.4 0.0 - 1.2 mg/dL    Alkaline Phosphatase 113 (H) 35 - 104 U/L    ALT 10 0 - 32 U/L    AST 17 0 - 31 U/L   Lactic Acid, Plasma   Result Value Ref Range    Lactic Acid 1.6 0.5 - 2.2 mmol/L   Lipase   Result Value Ref Range    Lipase 22 13 - 60 U/L   Urinalysis   Result Value Ref Range    Color, UA Yellow Straw/Yellow    Clarity, UA Clear Clear    Glucose, Ur Negative Negative mg/dL    Bilirubin Urine Negative Negative    Ketones, Urine Negative Negative mg/dL    Specific Gravity, UA <=1.005 1.005 - 1.030    Blood, Urine Negative Negative    pH, UA 6.5 5.0 - 9.0    Protein, UA Negative Negative mg/dL    Urobilinogen, Urine 0.2 <2.0 E.U./dL    Nitrite, Urine Negative Negative    Leukocyte Esterase, Urine Negative Negative   POC Pregnancy Urine   Result Value Ref Range    HCG, Urine, POC Negative Negative    Lot Number [id number]     Positive QC Pass/Fail Pass     Negative QC Pass/Fail Pass        Radiology:  CT ABDOMEN PELVIS W IV CONTRAST Additional Contrast? None   Final Result   1. Amorphous 2.4 x 1.7 cm scirrhous enhancing spiculated mesenteric   mass causing tethering of mid small bowel segments with thickening of   the corresponding mesenterium, thickening of the bowel wall form a   pattern of closed loop like syndrome. 2. The differential diagnosis of the mesenteric changes includes:   Inflammatory changes, lymphatic engorgement of the mesenteric folds,   or bowel ischemia. 3. The differential diagnosis of the scirrhous mesenteric mass   includes mesenteric inflammatory mass in presence of referred clinical   history of Crohn's disease versus a mesenteric carcinoid neoplasm. 4. Multifocal lesions in the liver in absence of clinical signs of   systemic infection are more suspicious for metastasis, particularly in   presence of a scirrhous mesenteric mass. Preliminary report given to Dr. Vinod Walden, [address]. ALERT:  ABNORMAL REPORT. ------------------------- NURSING NOTES AND VITALS REVIEWED ---------------------------  Date / Time Roomed:  8/30/2020  4:01 PM  ED Bed Assignment:  [id number]    The nursing notes within the ED encounter and vital signs as below have been reviewed. /79   Pulse 85   Temp 98.1 °F (36.7 °C) (Oral)   Resp 16   Ht 5' (1.524 m)   Wt 150 lb (68 kg)   SpO2 97%   BMI 29.29 kg/m²   Oxygen Saturation Interpretation: Normal      ------------------------------------------ PROGRESS NOTES ------------------------------------------  I have spoken with the patient and discussed todays results, in addition to providing specific details for the plan of care and counseling regarding the diagnosis and prognosis. Their questions are answered at this time and they are agreeable with the plan. I discussed at length with them reasons for immediate return here for re evaluation. They will followup with their primary care physician by calling their office tomorrow. --------------------------------- ADDITIONAL PROVIDER NOTES ---------------------------------  At this time the patient is without objective evidence of an acute process requiring hospitalization or inpatient management. They have remained hemodynamically stable throughout their entire ED visit and are stable for discharge with outpatient follow-up. The plan has been discussed in detail and they are aware of the specific conditions for emergent return, as well as the importance of follow-up. Current Discharge Medication List          Diagnosis:  1. Abdominal pain, right lower quadrant    2. Hx of Crohn's disease    3. Liver masses        Disposition:  Patient's disposition: admit to med/surg  Patient's condition is stable.          Mai Carbajal DO  08/30/20 1939

## 2020-08-30 NOTE — H&P
[address]: had concerns including Abdominal Pain (right side, radiates into back, had a CT on friday, hx of chrons an bowel blockage). History of Present Illness:    Informant(s) for H&P:Pt and chart   Julianna Marte is a 30 y.o. female with PMH of (Chron's D) presented to the ER in [facility] with 1 week history of intermittent abdominal pain, in the epigastric area, radiates to the right ribs and back, associated with nausea and vomiting, not related to food. She thought that she jerel be having Chron's flare, her PCP ordered her outpatient CT abdomen w/o contrast    Impression    1. 2 cm masslike density in the mid abdominal small bowel mesentery    with a spiculated appearance, which may be due to edema or    desmoplastic reaction. Given patient's history of Crohn's disease,    findings may be infectious/inflammatory. Differential diagnosis    includes but is not limited to carcinoid tumor. 2. Multiple liver masses, which may represent abscesses or metastatic    disease. Further evaluation with contrast enhanced CT is recommended.  Denied any fever, blood in stool or black stool.  Denied any chest pain or SOB, no recent lightheadedness or syncope     CT abdomen with IV contrast  Impression:      1. Amorphous 2.4 x 1.7 cm scirrhous enhancing spiculated mesenteric mass causing tethering of mid small bowel segments with thickening of the corresponding mesenterium, thickening of the bowel wall form a pattern of closed loop like syndrome.       2. The differential diagnosis of the mesenteric changes includes:   Inflammatory changes, lymphatic engorgement of the mesenteric folds, or bowel ischemia. 3. The differential diagnosis of the scirrhous mesenteric mass includes mesenteric inflammatory mass in presence of referred clinical history of Crohn's disease versus a mesenteric carcinoid neoplasm.      4. Multifocal lesions in the liver in absence of clinical signs of systemic infection are more suspicious for metastasis, particularly in presence of a scirrhous mesenteric mass. In ER:    Vitals /88   Pulse 81   Temp 98.3 °F (36.8 °C) (Oral)   Resp 16   Ht 5' (1.524 m)   Wt 150 lb (68 kg)   SpO2 95%   BMI 29.29 kg/m²   WBC, neutrophil %, neutrophil absolute count   Lab Results   Component Value Date    WBC 9.7 08/30/2020    NEUTOPHILPCT 53.4 08/30/2020    NEUTROABS 5.15 08/30/2020     Lactic acid No results found for: LACTSEPSIS  Cr   Lab Results   Component Value Date    CREATININE 0.7 08/30/2020        REVIEW OF SYSTEMS:  A comprehensive review of systems was negative except for: what is in the HPI    PMH:  Past Medical History:   Diagnosis Date    Abdominal pain     Acute Crohn's disease ([facility] [address]     Hypocalcemia     Hypothyroidism     Irritable bowel syndrome     Migraines     Status post alcohol detoxification     5/22/2018-5/29/2018       Surgical History:  Past Surgical History:   Procedure Laterality Date    CHOLECYSTECTOMY, LAPAROSCOPIC  07/06/2016    COLONOSCOPY N/A 6/22/2018    COLONOSCOPY WITH BIOPSY performed by Regina Tejada MD at [address] [place] [address]         Medications Prior to Admission:    Prior to Admission medications    Medication Sig Start Date End Date Taking? Authorizing Provider   butalbital-acetaminophen-caffeine (FIORICET, ESGIC) -40 MG per tablet Take 1 tablet by mouth daily as needed for Headaches   Yes Historical Provider, MD   topiramate (TOPAMAX SPRINKLE) 25 MG capsule Take 25 mg by mouth daily   Yes Historical Provider, MD   ondansetron (ZOFRAN) 4 MG tablet Take 4 mg by mouth every 8 hours as needed for Nausea or Vomiting   Yes Historical Provider, MD   zolpidem (AMBIEN) 10 MG tablet Take by mouth nightly as needed for Sleep.    Yes Historical Provider, MD   Calcium Carb-Cholecalciferol (CALCIUM-VITAMIN D) 500-200 MG-UNIT per tablet Take 1 tablet by mouth 2 times daily (with meals)   Yes Historical Provider, MD   levothyroxine (SYNTHROID) 112 MCG tablet Take 112 mcg by mouth Daily   Yes Historical Provider, MD   aluminum & magnesium hydroxide-simethicone (MYLANTA) 200-200-20 MG/5ML SUSP suspension Take 5 mLs by mouth every 6 hours as needed for Indigestion   Yes Historical Provider, MD   Geronimo Carb-Mag Hydrox-Simeth 800-270-80 MG/10ML SUSP Take 20 mLs by mouth every 8 hours   Yes Historical Provider, MD   pantoprazole (PROTONIX) 40 MG tablet take 1 tablet by mouth every morning BEFORE BREAKFAST 8/24/20  Yes María Medrano DO   sucralfate (CARAFATE) 1 GM/10ML suspension Take 10 mLs by mouth 4 times daily 8/20/20  Yes María Medrano DO   dicyclomine (BENTYL) 20 MG tablet take 1 tablet by mouth four times a day 7/2/20  Yes María Medrano DO   famotidine (PEPCID) 10 MG tablet Take 10 mg by mouth 2 times daily Instructed to take with sip water am of procedure   Yes Historical Provider, MD   dicyclomine (BENTYL) 10 MG capsule Take 2 capsules by mouth 4 times daily (before meals and nightly) for 20 doses 10/4/17 8/14/18  Dorrine Backbone, DO       Allergies:    Codeine    Social History:    reports that she has been smoking. She has been smoking about 0.50 packs per day. She has never used smokeless tobacco. She reports that she does not drink alcohol or use drugs. Family History:   family history includes Alzheimer's Disease in an other family member; Asthma in her father; Diabetes in an other family member; Heart Disease in her father; High Blood Pressure in her father and mother; High Cholesterol in her mother; Memory [place] in an other family member; Other in her mother.        PHYSICAL EXAM:  Vitals:  /88   Pulse 81   Temp 98.3 °F (36.8 °C) (Oral)   Resp 16   Ht 5' (1.524 m)   Wt 150 lb (68 kg)   SpO2 95%   BMI 29.29 kg/m²   General Appearance: AOx3 and NAD  Skin: Warm and dry  Head: Normocephalic and atraumatic, mucous membranes well hydrated   Eyes: Pupils equal, round, and reactive to light  Neck: Supple and non tender without mass   Pulmonary/Chest: Clear to auscultation bilaterally- no wheezes, no crackle, not in respiratory distress  Cardiovascular: Normal rate, normal S1 and S2 and no carotid bruits  Abdomen: Soft, mid abdominal tenderness, non-distended, no rebound, no rigidity, + bowel sounds  Extremities: No cyanosis, no clubbing and no edema  Neurologic: No neurologic focal deficits, speech is normal, coherent     LABS:  CBC  Recent Labs     08/30/20  1623   WBC 9.7   HGB 15.6*   HCT 44.9   MCV 93.0        BMP  Recent Labs     08/30/20  1623      K 3.4*      CO2 26   BUN 5*   CREATININE 0.7   LABGLOM >60   GLUCOSE 103*   CALCIUM 8.2*     Lab Results   Component Value Date    MG 1.7 02/09/2015     LFT  Recent Labs     08/30/20  1623   ALT 10   AST 17   ALKPHOS 113*   BILITOT 0.4     Albumin  Lab Results   Component Value Date    LABALBU 4.3 08/30/2020    LABALBU 4.2 08/20/2020    LABALBU 4.2 09/10/2019     Lactic acid   No results for input(s): LACTSEPSIS in the last 72 hours. Cardiac  Troponin No results found for: TROPONINI  Pro-BNP No results found for: PROBNP  Iron studies  No results found for: FERRITIN, IRON, TIBC    ASSESSMENT and PLAN:      Problem   Mesenteric Mass   Migraines   Depression   Ptsd (Post-Traumatic Stress Disorder)   Hypothyroidism   Primary Insomnia     Mesenteric mass  Chron's D flare, possible? · Might be related to chron's d, or infectious? · Check CRP, ESR, procalcitonin   · Cover with ceftriaxone and metronidazole  · Methylprednisolone IV  · Pain control  · IV hydration   · Consult general surgery     # Hypothyroidism: synthroid  # GERD: carafate and PPI  # Migraine: topiramate     Code Status: Full   DVT prophylaxis: Lovenox   Diet: NPO      PLEASE NOTE:    This report was transcribed using typing and voice recognition software.  Every effort was made to ensure accuracy; however, inadvertent computerized transcription errors may be present.  More than 50 minutes have been spent in evaluating the patient, reviewing records and results, discussing with staff / family and other treating physicians.     Gonzalez Sotelo MD, MSc   [facility]

## 2020-08-31 LAB
ALBUMIN SERPL-MCNC: 4 G/DL (ref 3.5–5.2)
ALP BLD-CCNC: 155 U/L (ref 35–104)
ALT SERPL-CCNC: 21 U/L (ref 0–32)
ANION GAP SERPL CALCULATED.3IONS-SCNC: 10 MMOL/L (ref 7–16)
ANION GAP SERPL CALCULATED.3IONS-SCNC: 11 MMOL/L (ref 7–16)
AST SERPL-CCNC: 49 U/L (ref 0–31)
BASOPHILS ABSOLUTE: 0.03 E9/L (ref 0–0.2)
BASOPHILS RELATIVE PERCENT: 0.3 % (ref 0–2)
BILIRUB SERPL-MCNC: 0.3 MG/DL (ref 0–1.2)
BILIRUBIN DIRECT: <0.2 MG/DL (ref 0–0.3)
BILIRUBIN, INDIRECT: ABNORMAL MG/DL (ref 0–1)
BUN BLDV-MCNC: 4 MG/DL (ref 6–20)
BUN BLDV-MCNC: 5 MG/DL (ref 6–20)
C-REACTIVE PROTEIN: 0.1 MG/DL (ref 0–0.4)
CALCIUM IONIZED: 1.03 MMOL/L (ref 1.15–1.33)
CALCIUM SERPL-MCNC: 7.6 MG/DL (ref 8.6–10.2)
CALCIUM SERPL-MCNC: 7.7 MG/DL (ref 8.6–10.2)
CHLORIDE BLD-SCNC: 101 MMOL/L (ref 98–107)
CHLORIDE BLD-SCNC: 104 MMOL/L (ref 98–107)
CO2: 20 MMOL/L (ref 22–29)
CO2: 21 MMOL/L (ref 22–29)
CREAT SERPL-MCNC: 0.6 MG/DL (ref 0.5–1)
CREAT SERPL-MCNC: 0.7 MG/DL (ref 0.5–1)
EOSINOPHILS ABSOLUTE: 0 E9/L (ref 0.05–0.5)
EOSINOPHILS RELATIVE PERCENT: 0 % (ref 0–6)
GFR AFRICAN AMERICAN: >60
GFR AFRICAN AMERICAN: >60
GFR NON-AFRICAN AMERICAN: >60 ML/MIN/1.73
GFR NON-AFRICAN AMERICAN: >60 ML/MIN/1.73
GLUCOSE BLD-MCNC: 141 MG/DL (ref 74–99)
GLUCOSE BLD-MCNC: 152 MG/DL (ref 74–99)
HCT VFR BLD CALC: 44.2 % (ref 34–48)
HEMOGLOBIN: 15.5 G/DL (ref 11.5–15.5)
IMMATURE GRANULOCYTES #: 0.05 E9/L
IMMATURE GRANULOCYTES %: 0.5 % (ref 0–5)
LYMPHOCYTES ABSOLUTE: 0.82 E9/L (ref 1.5–4)
LYMPHOCYTES RELATIVE PERCENT: 8 % (ref 20–42)
MAGNESIUM: 1.8 MG/DL (ref 1.6–2.6)
MCH RBC QN AUTO: 33.2 PG (ref 26–35)
MCHC RBC AUTO-ENTMCNC: 35.1 % (ref 32–34.5)
MCV RBC AUTO: 94.6 FL (ref 80–99.9)
MONOCYTES ABSOLUTE: 0.08 E9/L (ref 0.1–0.95)
MONOCYTES RELATIVE PERCENT: 0.8 % (ref 2–12)
NEUTROPHILS ABSOLUTE: 9.21 E9/L (ref 1.8–7.3)
NEUTROPHILS RELATIVE PERCENT: 90.4 % (ref 43–80)
PDW BLD-RTO: 13.3 FL (ref 11.5–15)
PHOSPHORUS: 1.9 MG/DL (ref 2.5–4.5)
PLATELET # BLD: 257 E9/L (ref 130–450)
PMV BLD AUTO: 9.3 FL (ref 7–12)
POTASSIUM REFLEX MAGNESIUM: 4.4 MMOL/L (ref 3.5–5)
POTASSIUM SERPL-SCNC: 4.2 MMOL/L (ref 3.5–5)
PROCALCITONIN: 0.04 NG/ML (ref 0–0.08)
RBC # BLD: 4.67 E12/L (ref 3.5–5.5)
SODIUM BLD-SCNC: 132 MMOL/L (ref 132–146)
SODIUM BLD-SCNC: 135 MMOL/L (ref 132–146)
TOTAL PROTEIN: 7.1 G/DL (ref 6.4–8.3)
WBC # BLD: 10.2 E9/L (ref 4.5–11.5)

## 2020-08-31 PROCEDURE — 2500000003 HC RX 250 WO HCPCS: Performed by: NURSE PRACTITIONER

## 2020-08-31 PROCEDURE — 99244 OFF/OP CNSLTJ NEW/EST MOD 40: CPT | Performed by: SURGERY

## 2020-08-31 PROCEDURE — 36415 COLL VENOUS BLD VENIPUNCTURE: CPT

## 2020-08-31 PROCEDURE — 83735 ASSAY OF MAGNESIUM: CPT

## 2020-08-31 PROCEDURE — 80076 HEPATIC FUNCTION PANEL: CPT

## 2020-08-31 PROCEDURE — 80048 BASIC METABOLIC PNL TOTAL CA: CPT

## 2020-08-31 PROCEDURE — 96367 TX/PROPH/DG ADDL SEQ IV INF: CPT

## 2020-08-31 PROCEDURE — G0378 HOSPITAL OBSERVATION PER HR: HCPCS

## 2020-08-31 PROCEDURE — 6360000002 HC RX W HCPCS: Performed by: INTERNAL MEDICINE

## 2020-08-31 PROCEDURE — 96366 THER/PROPH/DIAG IV INF ADDON: CPT

## 2020-08-31 PROCEDURE — 6360000002 HC RX W HCPCS: Performed by: STUDENT IN AN ORGANIZED HEALTH CARE EDUCATION/TRAINING PROGRAM

## 2020-08-31 PROCEDURE — 85025 COMPLETE CBC W/AUTO DIFF WBC: CPT

## 2020-08-31 PROCEDURE — 99232 SBSQ HOSP IP/OBS MODERATE 35: CPT | Performed by: STUDENT IN AN ORGANIZED HEALTH CARE EDUCATION/TRAINING PROGRAM

## 2020-08-31 PROCEDURE — 2500000003 HC RX 250 WO HCPCS: Performed by: INTERNAL MEDICINE

## 2020-08-31 PROCEDURE — 2580000003 HC RX 258: Performed by: NURSE PRACTITIONER

## 2020-08-31 PROCEDURE — 2580000003 HC RX 258: Performed by: INTERNAL MEDICINE

## 2020-08-31 PROCEDURE — 96372 THER/PROPH/DIAG INJ SC/IM: CPT

## 2020-08-31 PROCEDURE — 96376 TX/PRO/DX INJ SAME DRUG ADON: CPT

## 2020-08-31 PROCEDURE — 82330 ASSAY OF CALCIUM: CPT

## 2020-08-31 PROCEDURE — 6370000000 HC RX 637 (ALT 250 FOR IP): Performed by: INTERNAL MEDICINE

## 2020-08-31 PROCEDURE — 84100 ASSAY OF PHOSPHORUS: CPT

## 2020-08-31 PROCEDURE — APPSS30 APP SPLIT SHARED TIME 16-30 MINUTES: Performed by: NURSE PRACTITIONER

## 2020-08-31 PROCEDURE — 2580000003 HC RX 258: Performed by: STUDENT IN AN ORGANIZED HEALTH CARE EDUCATION/TRAINING PROGRAM

## 2020-08-31 RX ORDER — MORPHINE SULFATE 2 MG/ML
2 INJECTION, SOLUTION INTRAMUSCULAR; INTRAVENOUS EVERY 4 HOURS PRN
Status: DISCONTINUED | OUTPATIENT
Start: 2020-08-31 | End: 2020-08-31

## 2020-08-31 RX ORDER — MORPHINE SULFATE 2 MG/ML
2 INJECTION, SOLUTION INTRAMUSCULAR; INTRAVENOUS EVERY 4 HOURS PRN
Status: DISCONTINUED | OUTPATIENT
Start: 2020-08-31 | End: 2020-09-03 | Stop reason: HOSPADM

## 2020-08-31 RX ORDER — MORPHINE SULFATE 2 MG/ML
2 INJECTION, SOLUTION INTRAMUSCULAR; INTRAVENOUS ONCE
Status: COMPLETED | OUTPATIENT
Start: 2020-08-31 | End: 2020-08-31

## 2020-08-31 RX ADMIN — ZOLPIDEM TARTRATE 5 MG: 5 TABLET ORAL at 20:43

## 2020-08-31 RX ADMIN — DICYCLOMINE HYDROCHLORIDE 20 MG: 10 CAPSULE ORAL at 05:59

## 2020-08-31 RX ADMIN — METRONIDAZOLE 500 MG: 500 INJECTION, SOLUTION INTRAVENOUS at 20:42

## 2020-08-31 RX ADMIN — METRONIDAZOLE 500 MG: 500 INJECTION, SOLUTION INTRAVENOUS at 12:20

## 2020-08-31 RX ADMIN — MORPHINE SULFATE 2 MG: 2 INJECTION, SOLUTION INTRAMUSCULAR; INTRAVENOUS at 08:10

## 2020-08-31 RX ADMIN — PANTOPRAZOLE SODIUM 40 MG: 40 TABLET, DELAYED RELEASE ORAL at 05:59

## 2020-08-31 RX ADMIN — DICYCLOMINE HYDROCHLORIDE 20 MG: 10 CAPSULE ORAL at 11:36

## 2020-08-31 RX ADMIN — DICYCLOMINE HYDROCHLORIDE 20 MG: 10 CAPSULE ORAL at 16:38

## 2020-08-31 RX ADMIN — POTASSIUM CHLORIDE, DEXTROSE MONOHYDRATE AND SODIUM CHLORIDE: 150; 5; 450 INJECTION, SOLUTION INTRAVENOUS at 10:49

## 2020-08-31 RX ADMIN — CEFTRIAXONE 1 G: 1 INJECTION, POWDER, FOR SOLUTION INTRAMUSCULAR; INTRAVENOUS at 20:42

## 2020-08-31 RX ADMIN — SODIUM PHOSPHATE, MONOBASIC, MONOHYDRATE 10 MMOL: 276; 142 INJECTION, SOLUTION INTRAVENOUS at 15:16

## 2020-08-31 RX ADMIN — METHYLPREDNISOLONE SODIUM SUCCINATE 40 MG: 40 INJECTION, POWDER, FOR SOLUTION INTRAMUSCULAR; INTRAVENOUS at 08:10

## 2020-08-31 RX ADMIN — SUCRALFATE 1 G: 1 TABLET ORAL at 11:36

## 2020-08-31 RX ADMIN — DICYCLOMINE HYDROCHLORIDE 20 MG: 10 CAPSULE ORAL at 20:43

## 2020-08-31 RX ADMIN — LEVOTHYROXINE SODIUM 112 MCG: 112 TABLET ORAL at 05:59

## 2020-08-31 RX ADMIN — BUTALBITAL, ACETAMINOPHEN AND CAFFEINE 1 CAPSULE: 300; 40; 50 CAPSULE ORAL at 06:05

## 2020-08-31 RX ADMIN — SODIUM CHLORIDE, PRESERVATIVE FREE 10 ML: 5 INJECTION INTRAVENOUS at 20:43

## 2020-08-31 RX ADMIN — CALCIUM GLUCONATE 1 G: 98 INJECTION, SOLUTION INTRAVENOUS at 19:50

## 2020-08-31 RX ADMIN — METRONIDAZOLE 500 MG: 500 INJECTION, SOLUTION INTRAVENOUS at 05:58

## 2020-08-31 RX ADMIN — MORPHINE SULFATE 2 MG: 2 INJECTION, SOLUTION INTRAMUSCULAR; INTRAVENOUS at 16:38

## 2020-08-31 RX ADMIN — MORPHINE SULFATE 2 MG: 2 INJECTION, SOLUTION INTRAMUSCULAR; INTRAVENOUS at 12:13

## 2020-08-31 RX ADMIN — SUCRALFATE 1 G: 1 TABLET ORAL at 16:38

## 2020-08-31 RX ADMIN — TOPIRAMATE 25 MG: 25 CAPSULE, COATED PELLETS ORAL at 08:10

## 2020-08-31 RX ADMIN — MORPHINE SULFATE 2 MG: 2 INJECTION, SOLUTION INTRAMUSCULAR; INTRAVENOUS at 20:43

## 2020-08-31 RX ADMIN — MORPHINE SULFATE 2 MG: 2 INJECTION, SOLUTION INTRAMUSCULAR; INTRAVENOUS at 00:29

## 2020-08-31 RX ADMIN — MORPHINE SULFATE 2 MG: 2 INJECTION, SOLUTION INTRAMUSCULAR; INTRAVENOUS at 03:38

## 2020-08-31 RX ADMIN — SUCRALFATE 1 G: 1 TABLET ORAL at 05:59

## 2020-08-31 ASSESSMENT — PAIN DESCRIPTION - PROGRESSION
CLINICAL_PROGRESSION: NOT CHANGED
CLINICAL_PROGRESSION: NOT CHANGED

## 2020-08-31 ASSESSMENT — PAIN SCALES - GENERAL
PAINLEVEL_OUTOF10: 9
PAINLEVEL_OUTOF10: 2
PAINLEVEL_OUTOF10: 8
PAINLEVEL_OUTOF10: 9
PAINLEVEL_OUTOF10: 3
PAINLEVEL_OUTOF10: 8
PAINLEVEL_OUTOF10: 10
PAINLEVEL_OUTOF10: 0

## 2020-08-31 ASSESSMENT — PAIN DESCRIPTION - PAIN TYPE
TYPE: ACUTE PAIN

## 2020-08-31 ASSESSMENT — PAIN DESCRIPTION - FREQUENCY
FREQUENCY: CONTINUOUS
FREQUENCY: CONTINUOUS

## 2020-08-31 ASSESSMENT — PAIN DESCRIPTION - ONSET
ONSET: ON-GOING
ONSET: ON-GOING

## 2020-08-31 ASSESSMENT — PAIN DESCRIPTION - LOCATION
LOCATION: ABDOMEN;BACK
LOCATION: ABDOMEN;BACK
LOCATION: ABDOMEN

## 2020-08-31 ASSESSMENT — PAIN DESCRIPTION - DESCRIPTORS
DESCRIPTORS: CONSTANT;RADIATING
DESCRIPTORS: SHARP;DISCOMFORT

## 2020-08-31 ASSESSMENT — PAIN DESCRIPTION - DIRECTION
RADIATING_TOWARDS: BACK
RADIATING_TOWARDS: BACK

## 2020-08-31 ASSESSMENT — PAIN - FUNCTIONAL ASSESSMENT: PAIN_FUNCTIONAL_ASSESSMENT: PREVENTS OR INTERFERES WITH MANY ACTIVE NOT PASSIVE ACTIVITIES

## 2020-08-31 ASSESSMENT — PAIN DESCRIPTION - ORIENTATION
ORIENTATION: RIGHT
ORIENTATION: RIGHT

## 2020-08-31 NOTE — PROGRESS NOTES
[facility] Quality Flow/Interdisciplinary Rounds Progress Note        Quality Flow Rounds held on August 31, 2020    Disciplines Attending:  Bedside Nurse, ,  and Nursing Unit Jamarcus Arnold was admitted on 8/30/2020  4:01 PM    Anticipated Discharge Date:       Disposition:    Prem Score:  Prem Scale Score: 23    Readmission Risk              Risk of Unplanned Readmission:        0           Discussed patient goal for the day, patient clinical progression, and barriers to discharge. The following Goal(s) of the Day/Commitment(s) have been identified:  await surgery plan, IV antibiotics.        Joby Ramsay  August 31, 2020

## 2020-08-31 NOTE — CONSULTS
GENERAL SURGERY  CONSULT NOTE  8/31/2020    Physician Consulted: Dr. Karen Hough  CC: Abdominal pain  Referring Physician: Dr. Taisha ANDRADE  Na Bullock is a 30 y.o. female who presents for evaluation of abdominal pain. The abdominal pain is diffuse and radiates to the back. She states it began a week and a half ago. Family doctor sent home with carafate and CT scan without contrast. Still having pain. Reports nausea and vomiting a few times, bilious, non-bloody. Reports diarrhea, last flatus and BM was yesterday morning, dark brown, no melena. She states she has a hx of Crohn's disease diagnosed at [facility] - she does not remember who it was no records or pathology reports available. She describes past Crohn's flares as crampy pain similar to this feeling she has currently. Has never had bloody or tarry stools. Has been on steroids once recently through her PCP but does not recall ever being on biologics or steroids besides that. Has been on bentyl in the past, Librax helped more but her insurance does not cover it. Past surgeries include cholecystectomy and thyroidectomy. Last EGD (2018, Dr. Gil Wray) showed mild gastritis. Last colonoscopy (2018, Dr. Fransisco Poole). Family history of lung and breast cancer in grandparents, mother has irritable bowel syndrome. Most    Past Medical History:   Diagnosis Date    Abdominal pain     [facility]'s disease ([facility] [address]     Hypocalcemia     Hypothyroidism     Irritable bowel syndrome     Migraines     Status post alcohol detoxification     5/22/2018-5/29/2018       Past Surgical History:   Procedure Laterality Date    CHOLECYSTECTOMY, LAPAROSCOPIC  07/06/2016    COLONOSCOPY N/A 6/22/2018    COLONOSCOPY WITH BIOPSY performed by Zay Liu MD at [address] [place] [address]         Medications Prior to Admission:    Prior to Admission medications    Medication Sig Start Date End Date Taking?  Authorizing Provider butalbital-acetaminophen-caffeine (FIORICET, ESGIC) -40 MG per tablet Take 1 tablet by mouth daily as needed for Headaches   Yes Historical Provider, MD   topiramate (TOPAMAX SPRINKLE) 25 MG capsule Take 25 mg by mouth daily   Yes Historical Provider, MD   ondansetron (ZOFRAN) 4 MG tablet Take 4 mg by mouth every 8 hours as needed for Nausea or Vomiting   Yes Historical Provider, MD   zolpidem (AMBIEN) 10 MG tablet Take by mouth nightly as needed for Sleep.    Yes Historical Provider, MD   Calcium Carb-Cholecalciferol (CALCIUM-VITAMIN D) 500-200 MG-UNIT per tablet Take 1 tablet by mouth 2 times daily (with meals)   Yes Historical Provider, MD   levothyroxine (SYNTHROID) 112 MCG tablet Take 112 mcg by mouth Daily   Yes Historical Provider, MD   aluminum & magnesium hydroxide-simethicone (MYLANTA) 200-200-20 MG/5ML SUSP suspension Take 5 mLs by mouth every 6 hours as needed for Indigestion   Yes Historical Provider, MD   Geronimo Carb-Mag Hydrox-Simeth 800-270-80 MG/10ML SUSP Take 20 mLs by mouth every 8 hours   Yes Historical Provider, MD   pantoprazole (PROTONIX) 40 MG tablet take 1 tablet by mouth every morning BEFORE BREAKFAST 8/24/20  Yes Nicole Green DO   sucralfate (CARAFATE) 1 GM/10ML suspension Take 10 mLs by mouth 4 times daily 8/20/20  Yes Nicole Green DO   dicyclomine (BENTYL) 20 MG tablet take 1 tablet by mouth four times a day 7/2/20  Yes Nicole Green DO   famotidine (PEPCID) 10 MG tablet Take 10 mg by mouth 2 times daily Instructed to take with sip water am of procedure   Yes Historical Provider, MD   dicyclomine (BENTYL) 10 MG capsule Take 2 capsules by mouth 4 times daily (before meals and nightly) for 20 doses 10/4/17 8/14/18  Maisha Vieira, DO       Allergies   Allergen Reactions    Codeine Nausea And Vomiting       Family History   Problem Relation Age of Onset    High Blood Pressure Mother     High Cholesterol Mother     Other Mother         migraine headaches    Heart Disease Father  Asthma Father     High Blood Pressure Father     Diabetes Other         GRANDMOTHER    Lung Cancer Other         GRANDFATHER    Alzheimer's Disease Other         GRANDMOTHER       Social History     Tobacco Use    Smoking status: Current Every Day Smoker     Packs/day: 0.50    Smokeless tobacco: Never Used   Substance Use Topics    Alcohol use: No     Alcohol/week: 0.0 standard drinks     Comment: 4 weeks sober post detox    Drug use: No         Review of Systems   General ROS: negative  Hematological and Lymphatic ROS: negative  Respiratory ROS: negative  Cardiovascular ROS: negative  Gastrointestinal ROS: As above  Genito-Urinary ROS: negative  Musculoskeletal ROS: negative      PHYSICAL EXAM:    Vitals:    20 0330   BP: 133/84   Pulse: 85   Resp: 16   Temp: 98.6 °F (37 °C)   SpO2: 96%       General Appearance:  awake, alert, oriented, in no acute distress  Skin:  Skin color, texture, turgor normal. No rashes or lesions. Head/face:  NCAT  Eyes:  No gross abnormalities. Lungs:  Normal expansion. Clear to auscultation. No rales, rhonchi, or wheezing. Heart:  RR  Abdomen:  Soft, TTP epigastric and RUQ, mildly distended. Extremities: Extremities warm to touch, pink, with no edema. LABS:    CBC  Recent Labs     20  0257   WBC 10.2   HGB 15.5   HCT 44.2        BMP  Recent Labs     20  0257      K 4.4      CO2 21*   BUN 5*   CREATININE 0.7   CALCIUM 7.6*     Liver Function  Recent Labs     20  1623 20  0257   LIPASE 22  --    BILITOT 0.4 0.3   BILIDIR  --  <0.2   AST 17 49*   ALT 10 21   ALKPHOS 113* 155*   PROT 7.4 7.1   LABALBU 4.3 4.0     No results for input(s): LACTATE in the last 72 hours. No results for input(s): INR, PTT in the last 72 hours.     Invalid input(s): PT    RADIOLOGY    Ct Abdomen Pelvis W Iv Contrast Additional Contrast? None    Result Date: 2020  Patient MRN:  [id number] : 1985 Age: 30 years Gender: Female Order Date: There is no obstruction. The ureters and bladder are of unremarkable appearance. There are unremarkable appearance for the uterus and ovaries. There is a follicular size cyst in the left ovary measuring 1.5 cm. There is no free fluid in the cul-de-sac. There is an IUD device centrally located within the uterus. The appendix in this study appear unremarkable. There is no specific pericolonic inflammatory process. There is no free intraperitoneal air. There is no ascites. Lower lung bases appear unremarkable. Bone structures are of unremarkable appearance. The SI joints have unremarkable appearance. 1. Amorphous 2.4 x 1.7 cm scirrhous enhancing spiculated mesenteric mass causing tethering of mid small bowel segments with thickening of the corresponding mesenterium, thickening of the bowel wall form a pattern of closed loop like syndrome. 2. The differential diagnosis of the mesenteric changes includes: Inflammatory changes, lymphatic engorgement of the mesenteric folds, or bowel ischemia. 3. The differential diagnosis of the scirrhous mesenteric mass includes mesenteric inflammatory mass in presence of referred clinical history of Crohn's disease versus a mesenteric carcinoid neoplasm. 4. Multifocal lesions in the liver in absence of clinical signs of systemic infection are more suspicious for metastasis, particularly in presence of a scirrhous mesenteric mass. Preliminary report given to Dr. Gómez Morin, [facility]. ALERT:  ABNORMAL REPORT. ASSESSMENT:  30 y.o. female with acute on chronic abdominal pain.     PLAN:  - NPO  - IVFs  - CT revealed mesenteric mass and segmental enteritis, multiple liver lesions  - Pain control PRN  - Antiemetic PRN  - Obtain records from [address] [place] [address] discuss further plans with Dr. Dc Xavier    Electronically signed by Gwen Jones MD on 8/31/20 at 5:32 AM EDT and Electronically signed by Viral Urrutia MD on 8/31/2020 at 11:02 AM       I saw and examined the patient. I reviewed the above resident's note. I agree with the assessment and plan as outlined. REVIEW OF SYSTEMS:    Constitutional: negative  Eyes: negative  Ears, nose, mouth, throat, and face: negative  Respiratory: negative  Cardiovascular: negative  Gastrointestinal: as in hpi  Genitourinary:negative  Integument/breast: negative  Hematologic/lymphatic: negative  Musculoskeletal:negative  Neurological: negative  Allergic/Immunologic: negative    PHYSICAL EXAM   /73   Pulse 74   Temp 98.6 °F (37 °C) (Oral)   Resp 16   Ht 5' (1.524 m)   Wt 161 lb 9.6 oz (73.3 kg)   SpO2 91%   BMI 31.56 kg/m²     General appearance: alert, cooperative and in no acute distress. Eyes: grossly normal  Lungs: clear to auscultation bilaterally  Heart: regular rate and rhythm  Abdomen: mild mid abdominal tenderness, soft, non distended, no masses,  no organomegaly  Skin: No skin abnormalities  Neurologic: Alert and oriented x 3. Grossly normal  Musculoskeletal: No clubbing cyanosis or edema.     A/P Crohn's treated by Dr. Juanna Alpers, intraabdominal mass with liver lesions    Consult GI  IR for liver biopsy    Joshua Bay MD  General Surgery

## 2020-08-31 NOTE — CONSULTS
Diamond Crowe M.D. [facility]  Dr. Ron Archer M.D.,  Dr. Jackie Crow M.D.,  Dr. Lorraine Lerma D.O.,  Dr. Koby D.O. ,  Dr. Silvia Kc M.D.,  Dr. Josey Sage, [address]  30 y.o.  female      Re: Crohn's disease  Requesting physician: Dr. Roslyn Maloney  Date:3:32 PM 8/31/2020          HPI:  Mrs. [address] is a 67-year-old female patient seen in the hospital for above-described issue of Crohn's disease. Patient reports that she has been diagnosed with Crohn's disease for the past 3 to 4 years and has seen previously Dr. Nai Carl and Dr. Daniel Green in our officeg in the past but none since last year. Patient reports that she was considered for endoscopy however this was not done secondary to COVID-19 pandemic's. Patient reports that she takes Bentyl but denies taking steroids or biologic for her Crohn's disease. On this admission patient presents to the emergency department yesterday evening because of abdominal pain. Patient reports pain to be moderately severe and localized around the umbilicus and in the right lower quadrant. Patient reports that pain is been present for several days and somewhat intermittent. Patient reports pain to be initially associated with nonbloody diarrhea with questionable mucus. Patient reports nausea vomiting but no emesis of coffee-ground material or hematemesis. Patient denies fever or chills. Patient denies recent travel, dietary indiscretions or sick contacts. Patient admits to 2 or 3 days of amoxicillin given to her by her dentist prior to developing diarrhea. Upon presentation patient was noted to have unremarkable CBC without anemia with hemoglobin of 15.5 and hematocrit of 44.2. Also no significant leukocytosis with white blood cell count of 10.2 today. Patient chemistry panel is also fairly unremarkable with preserved renal function with BUN of 14 creatinine of 0.6.   Liver profile reveals mildly elevated AST and MD Marcus   12.5 mg at 08/30/20 2110    Or    ondansetron (ZOFRAN) injection 4 mg  4 mg Intravenous Q6H PRN Iliana Benavides MD        enoxaparin (LOVENOX) injection 40 mg  40 mg Subcutaneous Daily Iliana Benavides MD   40 mg at 08/30/20 2030    dextrose 5 % and 0.45 % NaCl with KCl 20 mEq infusion   Intravenous Continuous Marcus Velazqeuz MD 75 mL/hr at 08/31/20 1049      cefTRIAXone (ROCEPHIN) 1 g in sterile water 10 mL IV syringe  1 g Intravenous Q24H Marcus Velazquez MD   1 g at 08/30/20 2028    metronidazole (FLAGYL) 500 mg in NaCl 100 mL IVPB premix  500 mg Intravenous Cheyenne Hays MD   Stopped at 08/31/20 1313    methylPREDNISolone sodium (SOLU-MEDROL) injection 40 mg  40 mg Intravenous Daily Iliana Benavides MD   40 mg at 08/31/20 0810    aluminum & magnesium hydroxide-simethicone (MAALOX) 200-200-20 MG/5ML suspension 5 mL  5 mL Oral Q6H PRN Iliana Benavides MD        butalbital-APAP-caffeine -40 MG per capsule 1 capsule  1 capsule Oral Daily PRN Iliana Benavides MD   1 capsule at 08/31/20 0605    dicyclomine (BENTYL) capsule 20 mg  20 mg Oral 4x Daily AC & HS Marcus Velazquez MD   20 mg at 08/31/20 1136    levothyroxine (SYNTHROID) tablet 112 mcg  112 mcg Oral Daily Marcus Velazquez MD   112 mcg at 08/31/20 0559    pantoprazole (PROTONIX) tablet 40 mg  40 mg Oral QAM AC Marcus Velazquez MD   40 mg at 08/31/20 0559    sucralfate (CARAFATE) tablet 1 g  1 g Oral 4 times per day Iliana Benavides MD   1 g at 08/31/20 1136    topiramate (TOPAMAX SPRINKLE) capsule 25 mg  25 mg Oral Daily Iliana Benavides MD   25 mg at 08/31/20 0810    zolpidem (AMBIEN) tablet 5 mg  5 mg Oral Nightly PRN Iliana Benavides MD   5 mg at 08/30/20 2215       SocHx:  Social History     Socioeconomic History    Marital status: Single     Spouse name: Not on file    Number of children: Not on file    Years of education: Not on file    Highest education level: Not on file   Occupational History    Occupation: cleaning     Employer: the [address] [place] [address]  Financial resource strain: Not on file   Phuc-Freddy insecurity     Worry: Not on file     Inability: Not on file   [employer] needs     Medical: Not on file     Non-medical: Not on file   Tobacco Use    Smoking status: Current Every Day Smoker     Packs/day: 0.50    Smokeless tobacco: Never Used   Substance and Sexual Activity    Alcohol use: No     Alcohol/week: 0.0 standard drinks     Comment: 4 weeks sober post detox    Drug use: No    Sexual activity: Yes     Partners: Male     Birth control/protection: I.U.D. Lifestyle    Physical activity     Days per week: Not on file     Minutes per session: Not on file    Stress: Not on file   Relationships    Social connections     Talks on phone: Not on file     Gets together: Not on file     Attends Sikhism service: Not on file     Active member of club or organization: Not on file     Attends meetings of clubs or organizations: Not on file     Relationship status: Not on file    Intimate partner violence     Fear of current or ex partner: Not on file     Emotionally abused: Not on file     Physically abused: Not on file     Forced sexual activity: Not on file   Other Topics Concern    Not on file   Social History Narrative    Not on file       FamHx:  Family History   Problem Relation Age of Onset    High Blood Pressure Mother     High Cholesterol Mother     Other Mother         migraine headaches    Heart Disease Father     Asthma Father     High Blood Pressure Father     Diabetes Other         GRANDMOTHER    Lung Cancer Other         GRANDFATHER    Alzheimer's Disease Other         GRANDMOTHER       Allergy:  Allergies   Allergen Reactions    Codeine Nausea And Vomiting         ROS: As described in the HPI and in addition is negative upon detailed review of systems or unobtainable unless otherwise stated in this dictation.     PE:  /73   Pulse 74   Temp 98.6 °F (37 °C) (Oral)   Resp 16   Ht 5' (1.524 m)   Wt 161 lb 9.6 oz (73.3 kg)   SpO2 91%   BMI 31.56 kg/m²     Gen.:NAD/ female  Head: Atraumatic/normocephalic  Eyes: EOMI/anicteric sclera  ENT: Moist oral mucosa/no discharge nose or ears  Neck: Supple/trachea midline  Chest: CTA B/symmetrical excursions  Cor: Regular  Abd.: Soft and obese. Mildly tender around umbilicus/RLQ. No guarding  Extr.:   No peripheral edema  Muscles: Tone and bulk, consistent with age and condition  Skin: Warm and dry  Other: Breast exam not performed      DATA:     Lab Results   Component Value Date    WBC 10.2 08/31/2020    RBC 4.67 08/31/2020    HGB 15.5 08/31/2020    HCT 44.2 08/31/2020    MCV 94.6 08/31/2020    MCH 33.2 08/31/2020    MCHC 35.1 08/31/2020    RDW 13.3 08/31/2020     08/31/2020    MPV 9.3 08/31/2020     Lab Results   Component Value Date     08/31/2020    K 4.2 08/31/2020    K 4.4 08/31/2020     08/31/2020    CO2 20 08/31/2020    BUN 4 08/31/2020    CREATININE 0.6 08/31/2020    CALCIUM 7.7 08/31/2020    PROT 7.1 08/31/2020    LABALBU 4.0 08/31/2020    BILITOT 0.3 08/31/2020    ALKPHOS 155 08/31/2020    AST 49 08/31/2020    ALT 21 08/31/2020     Lab Results   Component Value Date    LIPASE 22 08/30/2020     Lab Results   Component Value Date    AMYLASE 38 03/13/2018         ASSESSMENT/PLAN:  1. Crohn's disease  -Possibility of flare  -Agree with empiric antibiotics  -Obtain stool studies to rule out infectious etiology especially given recent antibiotic  -Plan for nonemergent colonoscopy likely outpatient    2. Mesenteric mass/liver masses  -Possible inflammatory mass however no signs of systemic infection  -Possibility of metastatic disease cannot be ruled out  -Possible biopsy laparoscopic versus IR  -Should consider possible neoplastic origin outside GI tract  -Antibiotics as above    Above discussed with the patient/ and all questions answered to their satisfaction. They are agreeable with the plan as delineated.     Thank you for the opportunity to see this patient in consultation        NOTE:  This report was transcribed using voice recognition software. Every effort was made to ensure accuracy; however, inadvertent computerized transcription errors may be present.     Harding Closs, MD  8/31/2020  3:32 PM

## 2020-08-31 NOTE — PROGRESS NOTES
Notified Dr. Nan Saeed of patient's pain increasing; new order taken to give morphine now instead of 0430 when due.

## 2020-08-31 NOTE — CARE COORDINATION
Met w/ patient. Explained role of , OBS LOC, plan of care. Lives w/ parents. Independent PTA. PCP is Dr. David Raya and pharmacy is [facility]. Hx crohns. Surgery is awaiting records from [facility].On iv abxs, iv steroid. Per pt, plan is to return home on discharge-anticipating no needs.  Will follow Nolan Goodell

## 2020-08-31 NOTE — PROGRESS NOTES
[facility] Progress Note    Admitting Date and Time: 8/30/2020  4:01 PM  Admit Dx: Abdominal mass, RUQ (right upper quadrant) [R19.01]  Abdominal mass, RUQ (right upper quadrant) [R19.01]    Subjective:  Patient is being followed for Abdominal mass, RUQ (right upper quadrant) [R19.01]  Abdominal mass, RUQ (right upper quadrant) [R19.01]     Patient awake, alert, sitting up in bed in no acute distress  Reporting ongoing mid abdominal pain/ radiating up and around to right back  Intermittent nausea  Ongoing diarrhea  Reporting tingling in hands/ face  Boyfriend at bedside          ROS: denies fever, chills, cp, sob, n/v, HA unless stated above.       sodium chloride flush  10 mL Intravenous 2 times per day    enoxaparin  40 mg Subcutaneous Daily    cefTRIAXone (ROCEPHIN) IV  1 g Intravenous Q24H    metroNIDAZOLE  500 mg Intravenous Q8H    methylPREDNISolone  40 mg Intravenous Daily    dicyclomine  20 mg Oral 4x Daily AC & HS    levothyroxine  112 mcg Oral Daily    pantoprazole  40 mg Oral QAM AC    sucralfate  1 g Oral 4 times per day    topiramate  25 mg Oral Daily     morphine, 2 mg, Q4H PRN  sodium chloride flush, 10 mL, PRN  acetaminophen, 650 mg, Q6H PRN    Or  acetaminophen, 650 mg, Q6H PRN  magnesium hydroxide, 30 mL, Daily PRN  promethazine, 12.5 mg, Q6H PRN    Or  ondansetron, 4 mg, Q6H PRN  aluminum & magnesium hydroxide-simethicone, 5 mL, Q6H PRN  butalbital-APAP-caffeine, 1 capsule, Daily PRN  zolpidem, 5 mg, Nightly PRN         Objective:    /79   Pulse 71   Temp 98 °F (36.7 °C) (Oral)   Resp 16   Ht 5' (1.524 m)   Wt 161 lb 9.6 oz (73.3 kg)   SpO2 91%   BMI 31.56 kg/m²   General Appearance: alert and oriented to person, place and time and in no acute distress  Skin: warm and dry  Head: normocephalic and atraumatic  Eyes: pupils equal, round, and reactive to light, extraocular eye movements intact, conjunctivae normal  Neck: neck supple and non tender without mass   Pulmonary/Chest: clear to auscultation bilaterally  Cardiovascular: normal rate, normal S1 and S2 and no carotid bruits  Abdomen: soft, tender mid abdomen- epigastric, slightly distended, normal bowel sounds  Extremities: no cyanosis, no clubbing and no edema  Neurologic: speech normal         Recent Labs     08/30/20  1623 08/31/20  0257 08/31/20  1115    132 135   K 3.4* 4.4 4.2    101 104   CO2 26 21* 20*   BUN 5* 5* 4*   CREATININE 0.7 0.7 0.6   GLUCOSE 103* 152* 141*   CALCIUM 8.2* 7.6* 7.7*       Recent Labs     08/30/20  1623 08/31/20  0257   WBC 9.7 10.2   RBC 4.83 4.67   HGB 15.6* 15.5   HCT 44.9 44.2   MCV 93.0 94.6   MCH 32.3 33.2   MCHC 34.7* 35.1*   RDW 13.3 13.3    257   MPV 9.0 9.3         Assessment:    Principal Problem:    Mesenteric mass  Active Problems:    Primary insomnia    Hypothyroidism    Depression    PTSD (post-traumatic stress disorder)    Migraines  Resolved Problems:    * No resolved hospital problems. *      Plan:  1. Abdominal pain with mesenteric mass: pt reporting abdominal pain for 1.5 weeks. Reporting mid abdomen radiating upward and around her back. Associated nausea and diarrhea. She was started on carafate outpt and diarrhea slowed. Ongoing pain. CT abdomen revealing mesenteric mass and segmental enteritis/ multifocal liver lesions. Pt reporting h/o crohns. EGD at [place] 3 years ago. Currently NPO. General surgery consulted- await input. Continue IV steroids/ antibiotics. Monitor labs/ vitals. 2. Possible crohn's flare: Check inflammatory markers. Continue IV steroids    3. Hypothyroid: synthroid    4. GERD; ppi    5. Tingling in hands/ face: check BMP/mg/phos now    6. Acidosis monitor    NOTE: This report was transcribed using voice recognition software. Every effort was made to ensure accuracy; however, inadvertent computerized transcription errors may be present.   Electronically signed by NA Mendez on 8/31/2020 at 1:59 PM

## 2020-08-31 NOTE — PROGRESS NOTES
Spoke with Dr Remedios Goss re morphine order being completed.  New orders placed to continue the morphine at this time

## 2020-09-01 ENCOUNTER — APPOINTMENT (OUTPATIENT)
Dept: CT IMAGING | Age: 35
DRG: 245 | End: 2020-09-01
Payer: COMMERCIAL

## 2020-09-01 PROBLEM — R16.0 LIVER MASSES: Status: ACTIVE | Noted: 2020-08-30

## 2020-09-01 PROBLEM — R19.01 ABDOMINAL MASS, RIGHT UPPER QUADRANT: Status: ACTIVE | Noted: 2020-09-01

## 2020-09-01 LAB
ALBUMIN SERPL-MCNC: 3.6 G/DL (ref 3.5–5.2)
ALP BLD-CCNC: 109 U/L (ref 35–104)
ALT SERPL-CCNC: 13 U/L (ref 0–32)
ANION GAP SERPL CALCULATED.3IONS-SCNC: 9 MMOL/L (ref 7–16)
AST SERPL-CCNC: 21 U/L (ref 0–31)
BASOPHILS ABSOLUTE: 0.06 E9/L (ref 0–0.2)
BASOPHILS RELATIVE PERCENT: 0.3 % (ref 0–2)
BILIRUB SERPL-MCNC: 0.3 MG/DL (ref 0–1.2)
BILIRUBIN DIRECT: <0.2 MG/DL (ref 0–0.3)
BILIRUBIN, INDIRECT: ABNORMAL MG/DL (ref 0–1)
BUN BLDV-MCNC: 6 MG/DL (ref 6–20)
CALCIUM IONIZED: 0.96 MMOL/L (ref 1.15–1.33)
CALCIUM SERPL-MCNC: 7.2 MG/DL (ref 8.6–10.2)
CHLORIDE BLD-SCNC: 105 MMOL/L (ref 98–107)
CO2: 23 MMOL/L (ref 22–29)
CREAT SERPL-MCNC: 0.7 MG/DL (ref 0.5–1)
EOSINOPHILS ABSOLUTE: 0.01 E9/L (ref 0.05–0.5)
EOSINOPHILS RELATIVE PERCENT: 0.1 % (ref 0–6)
GFR AFRICAN AMERICAN: >60
GFR NON-AFRICAN AMERICAN: >60 ML/MIN/1.73
GLUCOSE BLD-MCNC: 125 MG/DL (ref 74–99)
HCT VFR BLD CALC: 38.2 % (ref 34–48)
HEMOGLOBIN: 13.3 G/DL (ref 11.5–15.5)
IMMATURE GRANULOCYTES #: 0.1 E9/L
IMMATURE GRANULOCYTES %: 0.6 % (ref 0–5)
INR BLD: 1
LYMPHOCYTES ABSOLUTE: 2.63 E9/L (ref 1.5–4)
LYMPHOCYTES RELATIVE PERCENT: 14.9 % (ref 20–42)
MAGNESIUM: 2 MG/DL (ref 1.6–2.6)
MCH RBC QN AUTO: 33.3 PG (ref 26–35)
MCHC RBC AUTO-ENTMCNC: 34.8 % (ref 32–34.5)
MCV RBC AUTO: 95.5 FL (ref 80–99.9)
MONOCYTES ABSOLUTE: 1.07 E9/L (ref 0.1–0.95)
MONOCYTES RELATIVE PERCENT: 6 % (ref 2–12)
NEUTROPHILS ABSOLUTE: 13.82 E9/L (ref 1.8–7.3)
NEUTROPHILS RELATIVE PERCENT: 78.1 % (ref 43–80)
PDW BLD-RTO: 13.4 FL (ref 11.5–15)
PHOSPHORUS: 1.8 MG/DL (ref 2.5–4.5)
PLATELET # BLD: 237 E9/L (ref 130–450)
PMV BLD AUTO: 9.5 FL (ref 7–12)
POTASSIUM REFLEX MAGNESIUM: 4 MMOL/L (ref 3.5–5)
PROTHROMBIN TIME: 11.4 SEC (ref 9.3–12.4)
RBC # BLD: 4 E12/L (ref 3.5–5.5)
REASON FOR REJECTION: NORMAL
REJECTED TEST: NORMAL
SODIUM BLD-SCNC: 137 MMOL/L (ref 132–146)
TOTAL PROTEIN: 6.1 G/DL (ref 6.4–8.3)
WBC # BLD: 17.7 E9/L (ref 4.5–11.5)

## 2020-09-01 PROCEDURE — 88360 TUMOR IMMUNOHISTOCHEM/MANUAL: CPT

## 2020-09-01 PROCEDURE — 80076 HEPATIC FUNCTION PANEL: CPT

## 2020-09-01 PROCEDURE — 6360000002 HC RX W HCPCS: Performed by: INTERNAL MEDICINE

## 2020-09-01 PROCEDURE — 83735 ASSAY OF MAGNESIUM: CPT

## 2020-09-01 PROCEDURE — 2500000003 HC RX 250 WO HCPCS: Performed by: INTERNAL MEDICINE

## 2020-09-01 PROCEDURE — 6370000000 HC RX 637 (ALT 250 FOR IP): Performed by: RADIOLOGY

## 2020-09-01 PROCEDURE — 1200000000 HC SEMI PRIVATE

## 2020-09-01 PROCEDURE — 99232 SBSQ HOSP IP/OBS MODERATE 35: CPT | Performed by: STUDENT IN AN ORGANIZED HEALTH CARE EDUCATION/TRAINING PROGRAM

## 2020-09-01 PROCEDURE — 6360000002 HC RX W HCPCS: Performed by: STUDENT IN AN ORGANIZED HEALTH CARE EDUCATION/TRAINING PROGRAM

## 2020-09-01 PROCEDURE — 6370000000 HC RX 637 (ALT 250 FOR IP): Performed by: NURSE PRACTITIONER

## 2020-09-01 PROCEDURE — 0FB13ZX EXCISION OF RIGHT LOBE LIVER, PERCUTANEOUS APPROACH, DIAGNOSTIC: ICD-10-PCS | Performed by: RADIOLOGY

## 2020-09-01 PROCEDURE — 99225 PR SBSQ OBSERVATION CARE/DAY 25 MINUTES: CPT | Performed by: SURGERY

## 2020-09-01 PROCEDURE — 80048 BASIC METABOLIC PNL TOTAL CA: CPT

## 2020-09-01 PROCEDURE — 84100 ASSAY OF PHOSPHORUS: CPT

## 2020-09-01 PROCEDURE — 96376 TX/PRO/DX INJ SAME DRUG ADON: CPT

## 2020-09-01 PROCEDURE — 96366 THER/PROPH/DIAG IV INF ADDON: CPT

## 2020-09-01 PROCEDURE — 2709999900 CT NEEDLE BIOPSY LIVER PERCUTANEOUS

## 2020-09-01 PROCEDURE — 82330 ASSAY OF CALCIUM: CPT

## 2020-09-01 PROCEDURE — APPSS30 APP SPLIT SHARED TIME 16-30 MINUTES: Performed by: NURSE PRACTITIONER

## 2020-09-01 PROCEDURE — 2580000003 HC RX 258: Performed by: INTERNAL MEDICINE

## 2020-09-01 PROCEDURE — 85025 COMPLETE CBC W/AUTO DIFF WBC: CPT

## 2020-09-01 PROCEDURE — 6370000000 HC RX 637 (ALT 250 FOR IP): Performed by: INTERNAL MEDICINE

## 2020-09-01 PROCEDURE — 36415 COLL VENOUS BLD VENIPUNCTURE: CPT

## 2020-09-01 PROCEDURE — 88342 IMHCHEM/IMCYTCHM 1ST ANTB: CPT

## 2020-09-01 PROCEDURE — 6360000002 HC RX W HCPCS: Performed by: RADIOLOGY

## 2020-09-01 PROCEDURE — 85610 PROTHROMBIN TIME: CPT

## 2020-09-01 PROCEDURE — 77012 CT SCAN FOR NEEDLE BIOPSY: CPT

## 2020-09-01 PROCEDURE — 88341 IMHCHEM/IMCYTCHM EA ADD ANTB: CPT

## 2020-09-01 PROCEDURE — 2500000003 HC RX 250 WO HCPCS: Performed by: RADIOLOGY

## 2020-09-01 PROCEDURE — 88307 TISSUE EXAM BY PATHOLOGIST: CPT

## 2020-09-01 RX ORDER — FENTANYL CITRATE 50 UG/ML
50 INJECTION, SOLUTION INTRAMUSCULAR; INTRAVENOUS ONCE
Status: COMPLETED | OUTPATIENT
Start: 2020-09-01 | End: 2020-09-01

## 2020-09-01 RX ORDER — MIDAZOLAM HYDROCHLORIDE 1 MG/ML
1 INJECTION INTRAMUSCULAR; INTRAVENOUS ONCE
Status: COMPLETED | OUTPATIENT
Start: 2020-09-01 | End: 2020-09-01

## 2020-09-01 RX ORDER — DEXTROSE, SODIUM CHLORIDE, AND POTASSIUM CHLORIDE 5; .45; .15 G/100ML; G/100ML; G/100ML
INJECTION INTRAVENOUS CONTINUOUS
Status: DISPENSED | OUTPATIENT
Start: 2020-09-01 | End: 2020-09-01

## 2020-09-01 RX ORDER — LIDOCAINE HYDROCHLORIDE 20 MG/ML
10 INJECTION, SOLUTION INFILTRATION; PERINEURAL ONCE
Status: COMPLETED | OUTPATIENT
Start: 2020-09-01 | End: 2020-09-01

## 2020-09-01 RX ADMIN — MORPHINE SULFATE 2 MG: 2 INJECTION, SOLUTION INTRAMUSCULAR; INTRAVENOUS at 17:34

## 2020-09-01 RX ADMIN — FENTANYL CITRATE 50 MCG: 50 INJECTION, SOLUTION INTRAMUSCULAR; INTRAVENOUS at 12:37

## 2020-09-01 RX ADMIN — LIDOCAINE HYDROCHLORIDE 10 ML: 20 INJECTION, SOLUTION INFILTRATION; PERINEURAL at 12:30

## 2020-09-01 RX ADMIN — SUCRALFATE 1 G: 1 TABLET ORAL at 23:42

## 2020-09-01 RX ADMIN — CEFTRIAXONE 1 G: 1 INJECTION, POWDER, FOR SOLUTION INTRAMUSCULAR; INTRAVENOUS at 20:51

## 2020-09-01 RX ADMIN — SODIUM CHLORIDE, PRESERVATIVE FREE 10 ML: 5 INJECTION INTRAVENOUS at 22:32

## 2020-09-01 RX ADMIN — MIDAZOLAM HYDROCHLORIDE 1 MG: 1 INJECTION, SOLUTION INTRAMUSCULAR; INTRAVENOUS at 12:11

## 2020-09-01 RX ADMIN — SUCRALFATE 1 G: 1 TABLET ORAL at 06:34

## 2020-09-01 RX ADMIN — ZOLPIDEM TARTRATE 5 MG: 5 TABLET ORAL at 23:42

## 2020-09-01 RX ADMIN — SUCRALFATE 1 G: 1 TABLET ORAL at 17:28

## 2020-09-01 RX ADMIN — OYSTER SHELL CALCIUM WITH VITAMIN D 1 TABLET: 500; 200 TABLET, FILM COATED ORAL at 17:28

## 2020-09-01 RX ADMIN — POTASSIUM CHLORIDE, DEXTROSE MONOHYDRATE AND SODIUM CHLORIDE: 150; 5; 450 INJECTION, SOLUTION INTRAVENOUS at 01:03

## 2020-09-01 RX ADMIN — DICYCLOMINE HYDROCHLORIDE 20 MG: 10 CAPSULE ORAL at 17:27

## 2020-09-01 RX ADMIN — MIDAZOLAM HYDROCHLORIDE 1 MG: 1 INJECTION, SOLUTION INTRAMUSCULAR; INTRAVENOUS at 12:36

## 2020-09-01 RX ADMIN — METHYLPREDNISOLONE SODIUM SUCCINATE 40 MG: 40 INJECTION, POWDER, FOR SOLUTION INTRAMUSCULAR; INTRAVENOUS at 08:59

## 2020-09-01 RX ADMIN — METRONIDAZOLE 500 MG: 500 INJECTION, SOLUTION INTRAVENOUS at 13:54

## 2020-09-01 RX ADMIN — DICYCLOMINE HYDROCHLORIDE 20 MG: 10 CAPSULE ORAL at 06:34

## 2020-09-01 RX ADMIN — DICYCLOMINE HYDROCHLORIDE 20 MG: 10 CAPSULE ORAL at 20:51

## 2020-09-01 RX ADMIN — Medication: at 12:30

## 2020-09-01 RX ADMIN — TOPIRAMATE 25 MG: 25 CAPSULE, COATED PELLETS ORAL at 08:58

## 2020-09-01 RX ADMIN — LEVOTHYROXINE SODIUM 112 MCG: 112 TABLET ORAL at 06:34

## 2020-09-01 RX ADMIN — SODIUM CHLORIDE, PRESERVATIVE FREE 10 ML: 5 INJECTION INTRAVENOUS at 08:58

## 2020-09-01 RX ADMIN — MORPHINE SULFATE 2 MG: 2 INJECTION, SOLUTION INTRAMUSCULAR; INTRAVENOUS at 22:31

## 2020-09-01 RX ADMIN — MORPHINE SULFATE 2 MG: 2 INJECTION, SOLUTION INTRAMUSCULAR; INTRAVENOUS at 00:46

## 2020-09-01 RX ADMIN — PANTOPRAZOLE SODIUM 40 MG: 40 TABLET, DELAYED RELEASE ORAL at 06:34

## 2020-09-01 RX ADMIN — METRONIDAZOLE 500 MG: 500 INJECTION, SOLUTION INTRAVENOUS at 20:52

## 2020-09-01 RX ADMIN — MORPHINE SULFATE 2 MG: 2 INJECTION, SOLUTION INTRAMUSCULAR; INTRAVENOUS at 04:54

## 2020-09-01 RX ADMIN — SODIUM CHLORIDE, PRESERVATIVE FREE 10 ML: 5 INJECTION INTRAVENOUS at 20:52

## 2020-09-01 RX ADMIN — MORPHINE SULFATE 2 MG: 2 INJECTION, SOLUTION INTRAMUSCULAR; INTRAVENOUS at 08:58

## 2020-09-01 RX ADMIN — POTASSIUM CHLORIDE, DEXTROSE MONOHYDRATE AND SODIUM CHLORIDE: 150; 5; 450 INJECTION, SOLUTION INTRAVENOUS at 17:27

## 2020-09-01 RX ADMIN — METRONIDAZOLE 500 MG: 500 INJECTION, SOLUTION INTRAVENOUS at 04:54

## 2020-09-01 RX ADMIN — BUTALBITAL, ACETAMINOPHEN AND CAFFEINE 1 CAPSULE: 300; 40; 50 CAPSULE ORAL at 08:58

## 2020-09-01 RX ADMIN — ENOXAPARIN SODIUM 40 MG: 40 INJECTION SUBCUTANEOUS at 20:51

## 2020-09-01 RX ADMIN — FENTANYL CITRATE 50 MCG: 50 INJECTION, SOLUTION INTRAMUSCULAR; INTRAVENOUS at 12:11

## 2020-09-01 RX ADMIN — FENTANYL CITRATE 50 MCG: 50 INJECTION, SOLUTION INTRAMUSCULAR; INTRAVENOUS at 12:51

## 2020-09-01 ASSESSMENT — PAIN DESCRIPTION - PAIN TYPE
TYPE: ACUTE PAIN
TYPE: ACUTE PAIN

## 2020-09-01 ASSESSMENT — PAIN DESCRIPTION - ONSET: ONSET: ON-GOING

## 2020-09-01 ASSESSMENT — PAIN SCALES - GENERAL
PAINLEVEL_OUTOF10: 5
PAINLEVEL_OUTOF10: 0
PAINLEVEL_OUTOF10: 8
PAINLEVEL_OUTOF10: 7
PAINLEVEL_OUTOF10: 8
PAINLEVEL_OUTOF10: 8
PAINLEVEL_OUTOF10: 9
PAINLEVEL_OUTOF10: 2
PAINLEVEL_OUTOF10: 8
PAINLEVEL_OUTOF10: 0
PAINLEVEL_OUTOF10: 10
PAINLEVEL_OUTOF10: 2

## 2020-09-01 ASSESSMENT — PAIN DESCRIPTION - DESCRIPTORS: DESCRIPTORS: SHARP;DISCOMFORT

## 2020-09-01 ASSESSMENT — PAIN DESCRIPTION - LOCATION
LOCATION: ABDOMEN
LOCATION: ABDOMEN

## 2020-09-01 ASSESSMENT — PAIN DESCRIPTION - PROGRESSION: CLINICAL_PROGRESSION: GRADUALLY IMPROVING

## 2020-09-01 ASSESSMENT — PAIN DESCRIPTION - FREQUENCY: FREQUENCY: INTERMITTENT

## 2020-09-01 ASSESSMENT — PAIN - FUNCTIONAL ASSESSMENT: PAIN_FUNCTIONAL_ASSESSMENT: PREVENTS OR INTERFERES SOME ACTIVE ACTIVITIES AND ADLS

## 2020-09-01 ASSESSMENT — PAIN DESCRIPTION - DIRECTION: RADIATING_TOWARDS: BACK

## 2020-09-01 ASSESSMENT — PAIN DESCRIPTION - ORIENTATION: ORIENTATION: RIGHT;LEFT;MID

## 2020-09-01 NOTE — PROGRESS NOTES
IRFLOWSHEET    Date: 9/1/2020    Time: 11:46 AM     Exam: Ct Guided Liver Biopsy    Radiologist Performing Procedure: Merlin    Nurse Reporting/Phone:      Nurse Receiving: Jose Rodríguez    Permit signed:      Yes    ID Band Checklist: Yes    Allergies: Codeine     TIME OUT: 9052    PROCEDURE START TIME: 1231    Puncture Site: RUQ Abdomen    Puncture Time: 7942    Catheters: 18G x 15cm Corvacet    LABS:   Lab Results   Component Value Date    INR 1.0 09/01/2020    PROTIME 11.4 09/01/2020           Lab Results   Component Value Date    CREATININE 0.7 09/01/2020    BUN 6 09/01/2020          Lab Results   Component Value Date    HGB 13.3 09/01/2020    HCT 38.2 09/01/2020     09/01/2020         PROCEDURE END TIME: 4701    Complications:  None    Comments: Patient brought into CT room and procedure explained by Dr. Carol Roberson. Procedural consent obtained. Patient placed supine head first on CT table with O2 and patient placed on monitor. Patient medicated with 50mcg Fentanyl and 1mg Versed prior to start of procedure. During procedure patient medicated with another 50mcg Fentanyl and 1ml Versed. Using CT, needle inserted in to RUQ abdomen and biopsy obtained from liver by Dr. Carol Roberson. Patient continued to have pain during procedure and another 50mcg was administered per Dr. Carol Roberson request. Patient re-scanned and imaging reviewed by Dr. Carol Roberson. Biopsy site cleaned and dressing applied.  Patient report called to [facility]

## 2020-09-01 NOTE — INTERVAL H&P NOTE
Update History & Physical    H and P reviewed. Updates as follows:    Vitals:    09/01/20 1200 09/01/20 1205 09/01/20 1210 09/01/20 1215   BP: 121/75 121/75 121/75 96/61   Pulse: 79 68 75 72   Resp: 22 22 24 16   Temp:       TempSrc:       SpO2: 98% 100% 98% 97%   Weight:       Height:         Coags:   Lab Results   Component Value Date    INR 1.0 09/01/2020    PROTIME 11.4 09/01/2020     CBC:   Lab Results   Component Value Date    WBC 17.7 09/01/2020    RBC 4.00 09/01/2020    HGB 13.3 09/01/2020    HCT 38.2 09/01/2020     09/01/2020    MCV 95.5 09/01/2020    MCH 33.3 09/01/2020    MCHC 34.8 09/01/2020    RDW 13.4 09/01/2020    LYMPHOPCT 14.9 09/01/2020    MONOPCT 6.0 09/01/2020    BASOPCT 0.3 09/01/2020    MONOSABS 1.07 09/01/2020    LYMPHSABS 2.63 09/01/2020    EOSABS 0.01 09/01/2020    BASOSABS 0.06 09/01/2020     BMP:   Lab Results   Component Value Date     09/01/2020    K 4.0 09/01/2020     09/01/2020    CO2 23 09/01/2020    BUN 6 09/01/2020    CREATININE 0.7 09/01/2020    GLUCOSE 125 09/01/2020    CALCIUM 7.2 09/01/2020      A/P:  Liver mass biopsy.     Electronically signed by Jonny Huynh MD on 9/1/2020 at 12:23 PM

## 2020-09-01 NOTE — PROGRESS NOTES
PROGRESS NOTE  By Chrissy Euceda M.D. [facility]  Dr. Kolby Lynn M.D.,  Dr. Davey Finch M.D.,   Dr. Isela Hanley D.O.,  Dr. Santosh Costa M.D.,  JUANITO GallegoO.,  Fernando Laker, [address]  30 y.o.  female    SUBJECTIVE:  Improved abdominal pain. Denies diarrhea. Complains of pain at the site of liver biopsy    OBJECTIVE:    /74   Pulse 64   Temp 98.4 °F (36.9 °C) (Oral)   Resp 18   Ht 5' (1.524 m)   Wt 163 lb 9.6 oz (74.2 kg)   SpO2 96%   BMI 31.95 kg/m²     General: NAD/ female  HEENT: Anicteric sclera/moist oral mucosa  Neck: Supple/trachea midline  Chest: Symmetrical excursion/nonlabored respirations  Cor: Regular  Abd.: Soft and obese. Dressing over her liver biopsy site. No guarding  Extr.:  No peripheral edema  Skin: Warm and dry/anicteric        DATA:     Lab Results   Component Value Date    WBC 17.7 09/01/2020    RBC 4.00 09/01/2020    HGB 13.3 09/01/2020    HCT 38.2 09/01/2020    MCV 95.5 09/01/2020    MCH 33.3 09/01/2020    MCHC 34.8 09/01/2020    RDW 13.4 09/01/2020     09/01/2020    MPV 9.5 09/01/2020     Lab Results   Component Value Date     09/01/2020    K 4.0 09/01/2020     09/01/2020    CO2 23 09/01/2020    BUN 6 09/01/2020    CREATININE 0.7 09/01/2020    CALCIUM 7.2 09/01/2020    PROT 6.1 09/01/2020    LABALBU 3.6 09/01/2020    BILITOT 0.3 09/01/2020    ALKPHOS 109 09/01/2020    AST 21 09/01/2020    ALT 13 09/01/2020     Lab Results   Component Value Date    LIPASE 22 08/30/2020     Lab Results   Component Value Date    AMYLASE 38 03/13/2018         ASSESSMENT/PLAN:  1. Crohn's disease  -Possibility of flare  -Agree with empiric antibiotics  -Obtain stool studies to rule out infectious etiology especially given recent antibiotic  -Plan for nonemergent colonoscopy likely outpatient  -Okay to advance diet from GI POV -okay for soft diet     2.   Mesenteric mass/liver masses  -Possible inflammatory mass -leukocytosis however possibly secondary to steroids  -Possibility of metastatic disease cannot be ruled out  -S/p IR liver biopsy  -Should consider possible neoplastic origin outside GI tract -no previous mammogram; Pap smears up-to-date  -Antibiotics as above       NOTE:  This report was transcribed using voice recognition software. Every effort was made to ensure accuracy; however, inadvertent computerized transcription errors may be present.     Harding Closs, MD  9/1/2020  3:15 PM

## 2020-09-01 NOTE — CARE COORDINATION
Awaiting liver biopsy per IR. Continues on iv abxs and iv steroid. Plan remains to return home on discharge w/ parents. Independent PTA.   Will follow for any needs Ken kilgore

## 2020-09-01 NOTE — PROGRESS NOTES
Pulmonary/Chest: clear to auscultation bilaterally  Cardiovascular: normal rate, normal S1 and S2 and no carotid bruits  Abdomen: soft, tender mid abdomen- epigastric, slightly distended, normal bowel sounds  Extremities: no cyanosis, no clubbing and no edema  Neurologic: speech normal          Recent Labs     08/31/20  0257 08/31/20  1115 09/01/20  0630    135 137   K 4.4 4.2 4.0    104 105   CO2 21* 20* 23   BUN 5* 4* 6   CREATININE 0.7 0.6 0.7   GLUCOSE 152* 141* 125*   CALCIUM 7.6* 7.7* 7.2*       Recent Labs     08/30/20  1623 08/31/20  0257 09/01/20  0630   WBC 9.7 10.2 17.7*   RBC 4.83 4.67 4.00   HGB 15.6* 15.5 13.3   HCT 44.9 44.2 38.2   MCV 93.0 94.6 95.5   MCH 32.3 33.2 33.3   MCHC 34.7* 35.1* 34.8*   RDW 13.3 13.3 13.4    257 237   MPV 9.0 9.3 9.5       Assessment:    Principal Problem:    Mesenteric mass  Active Problems:    Primary insomnia    Hypothyroidism    Abdominal pain, right lower quadrant    Depression    PTSD (post-traumatic stress disorder)    Migraines    Abdominal mass, right upper quadrant  Resolved Problems:    * No resolved hospital problems. *      Plan:  1. Abdominal pain with mesenteric mass/ liver lesions: pt reporting abdominal pain for 1.5 weeks. Reporting mid abdomen radiating upward and around her back. Associated nausea and diarrhea. She was started on carafate outpt and diarrhea slowed. Ongoing pain. CT abdomen revealing mesenteric mass and segmental enteritis/ multifocal liver lesions. Pt reporting h/o crohns. EGD at [place] 3 years ago. General surgery consulted- await input. Continue IV steroids/ antibiotics. Monitor labs/ vitals. s/p liver biopsy today. Per general surgery she may need SBFT - will reach out to see what surgerys plans are.     2. Possible crohn's flare: Check inflammatory markers. Continue IV steroids. GI consulted- appreciate input.      3. Hypothyroid: synthroid     4. GERD; ppi     5. Tingling in hands/ face:  Noted to be hypocalcemic. On calcium at home for h/o parathyroid surgery. Ionized calcium low. She received calcium gluconate last night. Repeat ionized calcium     6. Acidosis monitor    7. Hypocalcemia/ hypophosphatemia: supplement. Pt does have h/o parathyroid gland surgery. She is on calcium outpt resumed. NOTE: This report was transcribed using voice recognition software. Every effort was made to ensure accuracy; however, inadvertent computerized transcription errors may be present.   Electronically signed by NA Wilkinson on 9/1/2020 at 1:28 PM

## 2020-09-01 NOTE — PROGRESS NOTES
Spoke with Dr. Geo Rice regarding continuous IV fluids and need for possible reorder as current order has . Per Dr. Geo Rice ok to reorder IV fluids.

## 2020-09-01 NOTE — PRE SEDATION
Sedation Pre-Procedure Note    Patient Name: Clive Beyer   YOB: 1985  Room/Bed: 8684/2602-J  Medical Record Number: [id number]  Date: 9/1/2020   Time: 12:24 PM       Indication:  Liver biopsy    Consent: I have discussed with the patient and/or the patient representative the indication, alternatives, and the possible risks and/or complications of the planned procedure and the anesthesia methods. The patient and/or patient representative appear to understand and agree to proceed. Vital Signs:   Vitals:    09/01/20 1215   BP: 96/61   Pulse: 72   Resp: 16   Temp:    SpO2: 97%       Past Medical History:   has a past medical history of Abdominal pain, Acute Crohn's disease ([facility] [address] Hypocalcemia, Hypothyroidism, Irritable bowel syndrome, Migraines, and Status post alcohol detoxification. Past Surgical History:   has a past surgical history that includes Parathyroid gland surgery; Cholecystectomy, laparoscopic (07/06/2016); Thyroidectomy; and Colonoscopy (N/A, 6/22/2018). Medications:   Scheduled Meds:    sodium chloride flush  10 mL Intravenous 2 times per day    enoxaparin  40 mg Subcutaneous Daily    cefTRIAXone (ROCEPHIN) IV  1 g Intravenous Q24H    metroNIDAZOLE  500 mg Intravenous Q8H    methylPREDNISolone  40 mg Intravenous Daily    dicyclomine  20 mg Oral 4x Daily AC & HS    levothyroxine  112 mcg Oral Daily    pantoprazole  40 mg Oral QAM AC    sucralfate  1 g Oral 4 times per day    topiramate  25 mg Oral Daily     Continuous Infusions:    dextrose 5% and 0.45% NaCl with KCl 20 mEq Stopped (09/01/20 1152)     PRN Meds: morphine, sodium chloride flush, acetaminophen **OR** acetaminophen, magnesium hydroxide, promethazine **OR** ondansetron, aluminum & magnesium hydroxide-simethicone, butalbital-APAP-caffeine, zolpidem  Home Meds:   Prior to Admission medications    Medication Sig Start Date End Date Taking?  Authorizing Provider   butalbital-acetaminophen-caffeine ([employer]) -40 MG per tablet Take 1 tablet by mouth daily as needed for Headaches   Yes Historical Provider, MD   topiramate (TOPAMAX SPRINKLE) 25 MG capsule Take 25 mg by mouth daily   Yes Historical Provider, MD   ondansetron (ZOFRAN) 4 MG tablet Take 4 mg by mouth every 8 hours as needed for Nausea or Vomiting   Yes Historical Provider, MD   zolpidem (AMBIEN) 10 MG tablet Take by mouth nightly as needed for Sleep. Yes Historical Provider, MD   Calcium Carb-Cholecalciferol (CALCIUM-VITAMIN D) 500-200 MG-UNIT per tablet Take 1 tablet by mouth 2 times daily (with meals)   Yes Historical Provider, MD   levothyroxine (SYNTHROID) 112 MCG tablet Take 112 mcg by mouth Daily   Yes Historical Provider, MD   aluminum & magnesium hydroxide-simethicone (MYLANTA) 200-200-20 MG/5ML SUSP suspension Take 5 mLs by mouth every 6 hours as needed for Indigestion   Yes Historical Provider, MD   Geronimo Carb-Mag Hydrox-Simeth 800-270-80 MG/10ML SUSP Take 20 mLs by mouth every 8 hours   Yes Historical Provider, MD   pantoprazole (PROTONIX) 40 MG tablet take 1 tablet by mouth every morning BEFORE BREAKFAST 8/24/20  Yes Neyda Xavier DO   sucralfate (CARAFATE) 1 GM/10ML suspension Take 10 mLs by mouth 4 times daily 8/20/20  Yes Neyda Xavier DO   dicyclomine (BENTYL) 20 MG tablet take 1 tablet by mouth four times a day 7/2/20  Yes Neyda Xavier DO   famotidine (PEPCID) 10 MG tablet Take 10 mg by mouth 2 times daily Instructed to take with sip water am of procedure   Yes Historical Provider, MD   dicyclomine (BENTYL) 10 MG capsule Take 2 capsules by mouth 4 times daily (before meals and nightly) for 20 doses 10/4/17 8/14/18  Semaj Chadwick, DO     Coumadin Use Last 7 Days:  no  Antiplatelet drug therapy use last 7 days: no  Other anticoagulant use last 7 days: yes - Lovenox SQ 8/30/2020  Additional Medication Information:  no      Pre-Sedation Documentation and Exam:   Vital signs have been reviewed (see flow sheet for vitals).   I have reviewed the patient's history and review of systems.     Mallampati Airway Assessment:  normal neck range of motion    Prior History of Anesthesia Complications:   none    ASA Classification:  Class 2 - A normal healthy patient with mild systemic disease    Sedation/ Anesthesia Plan:   intravenous sedation    Medications Planned:   midazolam and fentanyl IV    Patient is an appropriate candidate for plan of sedation: yes    Electronically signed by Jonny Huynh MD on 9/1/2020 at 12:24 PM Statement Selected

## 2020-09-01 NOTE — PROGRESS NOTES
[facility] Quality Flow/Interdisciplinary Rounds Progress Note        Quality Flow Rounds held on September 1, 2020    Disciplines Attending:  Bedside Nurse, ,  and Nursing Unit Jamarcus Arnold was admitted on 8/30/2020  4:01 PM    Anticipated Discharge Date:  Expected Discharge Date: 09/02/20    Disposition:    Prem Score:  Prem Scale Score: 23    Readmission Risk              Risk of Unplanned Readmission:        0           Discussed patient goal for the day, patient clinical progression, and barriers to discharge. The following Goal(s) of the Day/Commitment(s) have been identified:  liver bx, IV antibiotics, IV steroids.        Carmen Mckeon  September 1, 2020

## 2020-09-02 ENCOUNTER — APPOINTMENT (OUTPATIENT)
Dept: GENERAL RADIOLOGY | Age: 35
DRG: 245 | End: 2020-09-02
Payer: COMMERCIAL

## 2020-09-02 VITALS
WEIGHT: 165.6 LBS | OXYGEN SATURATION: 97 % | TEMPERATURE: 97.7 F | RESPIRATION RATE: 14 BRPM | DIASTOLIC BLOOD PRESSURE: 58 MMHG | HEART RATE: 71 BPM | SYSTOLIC BLOOD PRESSURE: 117 MMHG | BODY MASS INDEX: 32.51 KG/M2 | HEIGHT: 60 IN

## 2020-09-02 PROBLEM — K63.89 MESENTERIC MASS: Status: ACTIVE | Noted: 2020-09-01

## 2020-09-02 LAB
ALBUMIN SERPL-MCNC: 3.6 G/DL (ref 3.5–5.2)
ALP BLD-CCNC: 116 U/L (ref 35–104)
ALT SERPL-CCNC: 22 U/L (ref 0–32)
ANION GAP SERPL CALCULATED.3IONS-SCNC: 9 MMOL/L (ref 7–16)
AST SERPL-CCNC: 37 U/L (ref 0–31)
BASOPHILS ABSOLUTE: 0.03 E9/L (ref 0–0.2)
BASOPHILS RELATIVE PERCENT: 0.2 % (ref 0–2)
BILIRUB SERPL-MCNC: 0.4 MG/DL (ref 0–1.2)
BILIRUBIN DIRECT: <0.2 MG/DL (ref 0–0.3)
BILIRUBIN, INDIRECT: ABNORMAL MG/DL (ref 0–1)
BUN BLDV-MCNC: 5 MG/DL (ref 6–20)
CALCIUM SERPL-MCNC: 7.7 MG/DL (ref 8.6–10.2)
CHLORIDE BLD-SCNC: 109 MMOL/L (ref 98–107)
CO2: 17 MMOL/L (ref 22–29)
CREAT SERPL-MCNC: 0.7 MG/DL (ref 0.5–1)
EOSINOPHILS ABSOLUTE: 0.01 E9/L (ref 0.05–0.5)
EOSINOPHILS RELATIVE PERCENT: 0.1 % (ref 0–6)
GFR AFRICAN AMERICAN: >60
GFR NON-AFRICAN AMERICAN: >60 ML/MIN/1.73
GLUCOSE BLD-MCNC: 115 MG/DL (ref 74–99)
HCT VFR BLD CALC: 39.9 % (ref 34–48)
HEMOGLOBIN: 13.5 G/DL (ref 11.5–15.5)
IMMATURE GRANULOCYTES #: 0.09 E9/L
IMMATURE GRANULOCYTES %: 0.7 % (ref 0–5)
LYMPHOCYTES ABSOLUTE: 3.12 E9/L (ref 1.5–4)
LYMPHOCYTES RELATIVE PERCENT: 22.9 % (ref 20–42)
MAGNESIUM: 2.3 MG/DL (ref 1.6–2.6)
MCH RBC QN AUTO: 32.8 PG (ref 26–35)
MCHC RBC AUTO-ENTMCNC: 33.8 % (ref 32–34.5)
MCV RBC AUTO: 96.8 FL (ref 80–99.9)
MONOCYTES ABSOLUTE: 0.88 E9/L (ref 0.1–0.95)
MONOCYTES RELATIVE PERCENT: 6.5 % (ref 2–12)
NEUTROPHILS ABSOLUTE: 9.47 E9/L (ref 1.8–7.3)
NEUTROPHILS RELATIVE PERCENT: 69.6 % (ref 43–80)
PDW BLD-RTO: 13.4 FL (ref 11.5–15)
PHOSPHORUS: 2.1 MG/DL (ref 2.5–4.5)
PLATELET # BLD: 217 E9/L (ref 130–450)
PMV BLD AUTO: 9.9 FL (ref 7–12)
POTASSIUM REFLEX MAGNESIUM: 3.8 MMOL/L (ref 3.5–5)
RBC # BLD: 4.12 E12/L (ref 3.5–5.5)
SODIUM BLD-SCNC: 135 MMOL/L (ref 132–146)
TOTAL PROTEIN: 6.5 G/DL (ref 6.4–8.3)
WBC # BLD: 13.6 E9/L (ref 4.5–11.5)

## 2020-09-02 PROCEDURE — 6360000002 HC RX W HCPCS: Performed by: STUDENT IN AN ORGANIZED HEALTH CARE EDUCATION/TRAINING PROGRAM

## 2020-09-02 PROCEDURE — 6370000000 HC RX 637 (ALT 250 FOR IP): Performed by: INTERNAL MEDICINE

## 2020-09-02 PROCEDURE — 2580000003 HC RX 258: Performed by: INTERNAL MEDICINE

## 2020-09-02 PROCEDURE — 83993 ASSAY FOR CALPROTECTIN FECAL: CPT

## 2020-09-02 PROCEDURE — 87324 CLOSTRIDIUM AG IA: CPT

## 2020-09-02 PROCEDURE — 80048 BASIC METABOLIC PNL TOTAL CA: CPT

## 2020-09-02 PROCEDURE — 85025 COMPLETE CBC W/AUTO DIFF WBC: CPT

## 2020-09-02 PROCEDURE — 74250 X-RAY XM SM INT 1CNTRST STD: CPT

## 2020-09-02 PROCEDURE — 82272 OCCULT BLD FECES 1-3 TESTS: CPT

## 2020-09-02 PROCEDURE — 89055 LEUKOCYTE ASSESSMENT FECAL: CPT

## 2020-09-02 PROCEDURE — 84100 ASSAY OF PHOSPHORUS: CPT

## 2020-09-02 PROCEDURE — 87328 CRYPTOSPORIDIUM AG IA: CPT

## 2020-09-02 PROCEDURE — 6360000002 HC RX W HCPCS: Performed by: INTERNAL MEDICINE

## 2020-09-02 PROCEDURE — 2500000003 HC RX 250 WO HCPCS: Performed by: INTERNAL MEDICINE

## 2020-09-02 PROCEDURE — 87449 NOS EACH ORGANISM AG IA: CPT

## 2020-09-02 PROCEDURE — 87045 FECES CULTURE AEROBIC BACT: CPT

## 2020-09-02 PROCEDURE — 1200000000 HC SEMI PRIVATE

## 2020-09-02 PROCEDURE — 6370000000 HC RX 637 (ALT 250 FOR IP): Performed by: NURSE PRACTITIONER

## 2020-09-02 PROCEDURE — 99232 SBSQ HOSP IP/OBS MODERATE 35: CPT | Performed by: STUDENT IN AN ORGANIZED HEALTH CARE EDUCATION/TRAINING PROGRAM

## 2020-09-02 PROCEDURE — 6370000000 HC RX 637 (ALT 250 FOR IP): Performed by: STUDENT IN AN ORGANIZED HEALTH CARE EDUCATION/TRAINING PROGRAM

## 2020-09-02 PROCEDURE — 87329 GIARDIA AG IA: CPT

## 2020-09-02 PROCEDURE — 80076 HEPATIC FUNCTION PANEL: CPT

## 2020-09-02 PROCEDURE — 83735 ASSAY OF MAGNESIUM: CPT

## 2020-09-02 PROCEDURE — 2500000003 HC RX 250 WO HCPCS: Performed by: RADIOLOGY

## 2020-09-02 PROCEDURE — 87425 ROTAVIRUS AG IA: CPT

## 2020-09-02 PROCEDURE — 99225 PR SBSQ OBSERVATION CARE/DAY 25 MINUTES: CPT | Performed by: SURGERY

## 2020-09-02 RX ORDER — PANTOPRAZOLE SODIUM 40 MG/1
40 TABLET, DELAYED RELEASE ORAL
Qty: 30 TABLET | Refills: 2 | Status: SHIPPED | OUTPATIENT
Start: 2020-09-02 | End: 2020-10-08

## 2020-09-02 RX ORDER — PREDNISONE 10 MG/1
TABLET ORAL
Qty: 50 TABLET | Refills: 0 | Status: SHIPPED | OUTPATIENT
Start: 2020-09-02 | End: 2020-09-03 | Stop reason: SDUPTHER

## 2020-09-02 RX ORDER — AMOXICILLIN AND CLAVULANATE POTASSIUM 875; 125 MG/1; MG/1
1 TABLET, FILM COATED ORAL 2 TIMES DAILY
Qty: 20 TABLET | Refills: 0 | Status: SHIPPED | OUTPATIENT
Start: 2020-09-02 | End: 2020-09-03 | Stop reason: SDUPTHER

## 2020-09-02 RX ADMIN — PANTOPRAZOLE SODIUM 40 MG: 40 TABLET, DELAYED RELEASE ORAL at 06:14

## 2020-09-02 RX ADMIN — MORPHINE SULFATE 2 MG: 2 INJECTION, SOLUTION INTRAMUSCULAR; INTRAVENOUS at 12:55

## 2020-09-02 RX ADMIN — OYSTER SHELL CALCIUM WITH VITAMIN D 1 TABLET: 500; 200 TABLET, FILM COATED ORAL at 17:19

## 2020-09-02 RX ADMIN — ZOLPIDEM TARTRATE 5 MG: 5 TABLET ORAL at 20:40

## 2020-09-02 RX ADMIN — MORPHINE SULFATE 2 MG: 2 INJECTION, SOLUTION INTRAMUSCULAR; INTRAVENOUS at 08:35

## 2020-09-02 RX ADMIN — DICYCLOMINE HYDROCHLORIDE 20 MG: 10 CAPSULE ORAL at 20:40

## 2020-09-02 RX ADMIN — SUCRALFATE 1 G: 1 TABLET ORAL at 06:14

## 2020-09-02 RX ADMIN — METRONIDAZOLE 500 MG: 500 INJECTION, SOLUTION INTRAVENOUS at 12:54

## 2020-09-02 RX ADMIN — ENOXAPARIN SODIUM 40 MG: 40 INJECTION SUBCUTANEOUS at 20:42

## 2020-09-02 RX ADMIN — METRONIDAZOLE 500 MG: 500 INJECTION, SOLUTION INTRAVENOUS at 20:42

## 2020-09-02 RX ADMIN — POTASSIUM & SODIUM PHOSPHATES POWDER PACK 280-160-250 MG 500 MG: 280-160-250 PACK at 17:16

## 2020-09-02 RX ADMIN — METHYLPREDNISOLONE SODIUM SUCCINATE 40 MG: 40 INJECTION, POWDER, FOR SOLUTION INTRAMUSCULAR; INTRAVENOUS at 08:35

## 2020-09-02 RX ADMIN — MORPHINE SULFATE 2 MG: 2 INJECTION, SOLUTION INTRAMUSCULAR; INTRAVENOUS at 20:40

## 2020-09-02 RX ADMIN — LEVOTHYROXINE SODIUM 112 MCG: 112 TABLET ORAL at 06:14

## 2020-09-02 RX ADMIN — SUCRALFATE 1 G: 1 TABLET ORAL at 12:44

## 2020-09-02 RX ADMIN — SUCRALFATE 1 G: 1 TABLET ORAL at 17:15

## 2020-09-02 RX ADMIN — PROMETHAZINE HYDROCHLORIDE 12.5 MG: 25 TABLET ORAL at 09:11

## 2020-09-02 RX ADMIN — TOPIRAMATE 25 MG: 25 CAPSULE, COATED PELLETS ORAL at 08:35

## 2020-09-02 RX ADMIN — METRONIDAZOLE 500 MG: 500 INJECTION, SOLUTION INTRAVENOUS at 05:10

## 2020-09-02 RX ADMIN — SODIUM CHLORIDE, PRESERVATIVE FREE 10 ML: 5 INJECTION INTRAVENOUS at 20:45

## 2020-09-02 RX ADMIN — DICYCLOMINE HYDROCHLORIDE 20 MG: 10 CAPSULE ORAL at 17:15

## 2020-09-02 RX ADMIN — CEFTRIAXONE 1 G: 1 INJECTION, POWDER, FOR SOLUTION INTRAMUSCULAR; INTRAVENOUS at 20:41

## 2020-09-02 RX ADMIN — DICYCLOMINE HYDROCHLORIDE 20 MG: 10 CAPSULE ORAL at 12:44

## 2020-09-02 RX ADMIN — POTASSIUM & SODIUM PHOSPHATES POWDER PACK 280-160-250 MG 500 MG: 280-160-250 PACK at 12:44

## 2020-09-02 RX ADMIN — DICYCLOMINE HYDROCHLORIDE 20 MG: 10 CAPSULE ORAL at 06:14

## 2020-09-02 RX ADMIN — BARIUM SULFATE 240 ML: 960 POWDER, FOR SUSPENSION ORAL at 12:01

## 2020-09-02 RX ADMIN — MORPHINE SULFATE 2 MG: 2 INJECTION, SOLUTION INTRAMUSCULAR; INTRAVENOUS at 02:38

## 2020-09-02 RX ADMIN — SODIUM CHLORIDE, PRESERVATIVE FREE 10 ML: 5 INJECTION INTRAVENOUS at 08:36

## 2020-09-02 ASSESSMENT — PAIN SCALES - GENERAL
PAINLEVEL_OUTOF10: 8
PAINLEVEL_OUTOF10: 9
PAINLEVEL_OUTOF10: 8
PAINLEVEL_OUTOF10: 0
PAINLEVEL_OUTOF10: 9

## 2020-09-02 ASSESSMENT — PAIN - FUNCTIONAL ASSESSMENT: PAIN_FUNCTIONAL_ASSESSMENT: PREVENTS OR INTERFERES SOME ACTIVE ACTIVITIES AND ADLS

## 2020-09-02 ASSESSMENT — PAIN DESCRIPTION - LOCATION: LOCATION: ABDOMEN

## 2020-09-02 ASSESSMENT — PAIN DESCRIPTION - DESCRIPTORS: DESCRIPTORS: CRAMPING;DISCOMFORT;SHARP

## 2020-09-02 ASSESSMENT — PAIN DESCRIPTION - PAIN TYPE: TYPE: ACUTE PAIN

## 2020-09-02 ASSESSMENT — PAIN DESCRIPTION - ORIENTATION: ORIENTATION: RIGHT;LEFT;MID

## 2020-09-02 NOTE — PROGRESS NOTES
Stool samples sent per orders. Spoke with dr Mimi Vera re if cdiff should be sent d/t pt eating food that is not compliant with her chrons diet and drinking barium earlier for her small bowel follow through. Per dr Mimi Vera instructed to continue to send the sample at this time for Cdiff. Lab notifed and cdiff paper sent to lab.

## 2020-09-02 NOTE — PROGRESS NOTES
PROGRESS NOTE  By Jerrod Blair M.D. [facility]  Dr. Can Villasenor M.D.,  Dr. Leisa Asher M.D.,   Dr. Gordon Carlson D.O.,  Dr. John Hale M.D.,  Dr. Dru Goodpasture, DCaorlaO.,  Tammie Hu, [address]  30 y.o.  female    SUBJECTIVE:  Complains of periumbilical burning pain. Denies nausea vomiting. Denies any further diarrhea    OBJECTIVE:    /73   Pulse 65   Temp 98.2 °F (36.8 °C) (Oral)   Resp 16   Ht 5' (1.524 m)   Wt 165 lb 9.6 oz (75.1 kg)   SpO2 97%   BMI 32.34 kg/m²     General: NAD/ female  HEENT: Anicteric sclera/moist oral mucosa  Neck: Supple  Chest: Symmetrical excursion/nonlabored respirations  Cor: Regular  Abd.: Soft and obese. Mildly tender in the mid abdomen.   No rebound or guarding  Extr.:  No peripheral edema  Skin: Warm and dry      DATA:     Lab Results   Component Value Date    WBC 13.6 09/02/2020    RBC 4.12 09/02/2020    HGB 13.5 09/02/2020    HCT 39.9 09/02/2020    MCV 96.8 09/02/2020    MCH 32.8 09/02/2020    MCHC 33.8 09/02/2020    RDW 13.4 09/02/2020     09/02/2020    MPV 9.9 09/02/2020     Lab Results   Component Value Date     09/02/2020    K 3.8 09/02/2020     09/02/2020    CO2 17 09/02/2020    BUN 5 09/02/2020    CREATININE 0.7 09/02/2020    CALCIUM 7.7 09/02/2020    PROT 6.5 09/02/2020    LABALBU 3.6 09/02/2020    BILITOT 0.4 09/02/2020    ALKPHOS 116 09/02/2020    AST 37 09/02/2020    ALT 22 09/02/2020     Lab Results   Component Value Date    LIPASE 22 08/30/2020     Lab Results   Component Value Date    AMYLASE 38 03/13/2018         ASSESSMENT/PLAN:  1.  Crohn's disease  -Possibility of flare cannot be excluded  -Continue empiric antibiotics  -No stool studies as diarrhea resolved  -Plan for nonemergent colonoscopy likely outpatient     2.  Mesenteric mass/liver masses  -Possible inflammatory mass -leukocytosis however possibly secondary to steroids  -Possibility of metastatic disease cannot be

## 2020-09-02 NOTE — PROGRESS NOTES
[facility] Progress Note    Admitting Date and Time: 8/30/2020  4:01 PM  Admit Dx: Abdominal mass, RUQ (right upper quadrant) [R19.01]  Abdominal mass, RUQ (right upper quadrant) [R19.01]  Abdominal mass, right upper quadrant [R19.01]    Subjective:  Patient is being followed for Abdominal mass, RUQ (right upper quadrant) [R19.01]  Abdominal mass, RUQ (right upper quadrant) [R19.01]  Abdominal mass, right upper quadrant [R19.01]   Pt feels improvement in abd pain, though has some discomfort at her liver biopsy site. Per RN: no major concerns voiced    ROS: denies fever, chills, cp, sob, n/v, HA unless stated above.       calcium-vitamin D  1 tablet Oral BID WC    sodium chloride flush  10 mL Intravenous 2 times per day    enoxaparin  40 mg Subcutaneous Daily    cefTRIAXone (ROCEPHIN) IV  1 g Intravenous Q24H    metroNIDAZOLE  500 mg Intravenous Q8H    methylPREDNISolone  40 mg Intravenous Daily    dicyclomine  20 mg Oral 4x Daily AC & HS    levothyroxine  112 mcg Oral Daily    pantoprazole  40 mg Oral QAM AC    sucralfate  1 g Oral 4 times per day    topiramate  25 mg Oral Daily     morphine, 2 mg, Q4H PRN  sodium chloride flush, 10 mL, PRN  acetaminophen, 650 mg, Q6H PRN    Or  acetaminophen, 650 mg, Q6H PRN  magnesium hydroxide, 30 mL, Daily PRN  promethazine, 12.5 mg, Q6H PRN    Or  ondansetron, 4 mg, Q6H PRN  aluminum & magnesium hydroxide-simethicone, 5 mL, Q6H PRN  butalbital-APAP-caffeine, 1 capsule, Daily PRN  zolpidem, 5 mg, Nightly PRN         Objective:    /62   Pulse 66   Temp 97.6 °F (36.4 °C) (Oral)   Resp 18   Ht 5' (1.524 m)   Wt 165 lb 9.6 oz (75.1 kg)   SpO2 98%   BMI 32.34 kg/m²     General Appearance: alert and oriented to person, place and time and in no acute distress  Skin: warm and dry  Head: normocephalic and atraumatic  Eyes: pupils equal, round, and reactive to light, extraocular eye movements intact, conjunctivae normal  Neck: neck supple and non tender without mass   Pulmonary/Chest: clear to auscultation bilaterally- no wheezes, rales or rhonchi, normal air movement, no respiratory distress  Cardiovascular: normal rate, normal S1 and S2 and no carotid bruits  Abdomen: soft, non-tender, non-distended, normal bowel sounds, no masses or organomegaly, C/D/I dressing on her liver biopsy site. Extremities: no cyanosis, no clubbing and no edema  Neurologic: no cranial nerve deficit and speech normal        Recent Labs     20  1115 20  0630 20  0530    137 135   K 4.2 4.0 3.8    105 109*   CO2 20* 23 17*   BUN 4* 6 5*   CREATININE 0.6 0.7 0.7   GLUCOSE 141* 125* 115*   CALCIUM 7.7* 7.2* 7.7*       Recent Labs     20  0257 20  0630 20  0530   WBC 10.2 17.7* 13.6*   RBC 4.67 4.00 4.12   HGB 15.5 13.3 13.5   HCT 44.2 38.2 39.9   MCV 94.6 95.5 96.8   MCH 33.2 33.3 32.8   MCHC 35.1* 34.8* 33.8   RDW 13.3 13.4 13.4    237 217   MPV 9.3 9.5 9.9       Micro:  No components found for: [facility])    Radiology:   Ct Guided Needle Placement    Result Date: 2020  Patient MRN:  [id number] : 1985 Age: 30 years Gender: Female Order date:  2020 12:50 PM Exam: CT NEEDLE BIOPSY LIVER PERCUTANEOUS, CT GUIDED NEEDLE PLACEMENT. Procedures: 1. CT-guided needle placement in liver mass 2. CT-guided core biopsy of liver mass Number of images:  114 Indication:  liver biopsy liver biopsy Comparison: None Anesthesia: Moderate sedation. Local anesthesia. Intraprocedural time: 60 minutes Medications: Midazolam 2 mg intravenous, fentanyl 150 mcg intravenous. Lidocaine (2%) subcutaneous. Contrast: None. PROCEDURE DETAILS AND FINDINGS: Informed consent for the procedure was obtained from the patient. The procedure, as well as its risks, benefits, and alternatives, were explained in detail.  These risks include, but are not limited to, bleeding (potentially requiring endovascular or surgical intervention and/or blood transfusion), bowel perforation, and infection. The patient indicated understanding of what was discussed, was given the opportunity to ask questions, and gave permission to proceed. The procedure was performed under moderate sedation. The patient's vital signs were visually displayed, and the patient was continuously monitored throughout the procedure. The patient was extremely sensitive, and additional time was allowed for the administered midazolam and fentanyl to take effect. Additional doses of medication, and additional time, were necessary for the patient's comfort. With the patient positioned supine, axial CT images through the liver were obtained. A hepatic mass was selected for biopsy, and a trajectory was planned based on the CT images. The site was marked on the skin, and the area was sterilely prepped and draped. Following a preprocedure timeout, the selected percutaneous entry site was locally anesthetized with subcutaneously administered lidocaine, and a tiny skin incision was made. Under CT guidance, a 17-gauge coaxial needle guide was advanced into the right hepatic mass. Core biopsy of the mass was performed through the coaxial needle guide with a [employer] 18-gauge core biopsy needle. Core specimens were immediately placed in formalin for submission to the pathology lab. To ensure hemostasis, Gelfoam mixed with sterile normal saline was injected through the coaxial needle guide as the needle was removed. A sterile dressing was applied. A postprocedure CT scan of the abdomen was performed, demonstrating post-biopsy changes. CT imaging-guided core biopsy of a liver mass without complication. Ct Needle Biopsy Liver Percutaneous    Result Date: 2020  Patient MRN:  [id number] : 1985 Age: 30 years Gender: Female Order date:  2020 12:50 PM Exam: CT NEEDLE BIOPSY LIVER PERCUTANEOUS, CT GUIDED NEEDLE PLACEMENT. Procedures: 1. CT-guided needle placement in liver mass 2.  CT-guided core biopsy of liver

## 2020-09-02 NOTE — PROGRESS NOTES
P Quality Flow/Interdisciplinary Rounds Progress Note        Quality Flow Rounds held on September 2, 2020    Disciplines Attending:  Bedside Nurse,  and     Yadira Garrido was admitted on 8/30/2020  4:01 PM    Anticipated Discharge Date:  Expected Discharge Date: 09/02/20    Disposition:    Prem Score:  Prem Scale Score: 20    Readmission Risk              Risk of Unplanned Readmission:        11           Discussed patient goal for the day, patient clinical progression, and barriers to discharge. The following Goal(s) of the Day/Commitment(s) have been identified:  SBFT today.       Eloisa Schmitt  September 2, 2020

## 2020-09-03 LAB
ANION GAP SERPL CALCULATED.3IONS-SCNC: 13 MMOL/L (ref 7–16)
BUN BLDV-MCNC: 10 MG/DL (ref 6–20)
C DIFF TOXIN/ANTIGEN: NORMAL
CALCIUM IONIZED: 1.16 MMOL/L (ref 1.15–1.33)
CALCIUM SERPL-MCNC: 8.3 MG/DL (ref 8.6–10.2)
CHLORIDE BLD-SCNC: 104 MMOL/L (ref 98–107)
CO2: 19 MMOL/L (ref 22–29)
CREAT SERPL-MCNC: 0.9 MG/DL (ref 0.5–1)
CRYPTOSPORIDIUM ANTIGEN STOOL: NORMAL
GFR AFRICAN AMERICAN: >60
GFR NON-AFRICAN AMERICAN: >60 ML/MIN/1.73
GIARDIA ANTIGEN STOOL: NORMAL
GLUCOSE BLD-MCNC: 121 MG/DL (ref 74–99)
HCT VFR BLD CALC: 41.5 % (ref 34–48)
HEMOGLOBIN: 14.5 G/DL (ref 11.5–15.5)
MAGNESIUM: 2.1 MG/DL (ref 1.6–2.6)
MCH RBC QN AUTO: 33.6 PG (ref 26–35)
MCHC RBC AUTO-ENTMCNC: 34.9 % (ref 32–34.5)
MCV RBC AUTO: 96.3 FL (ref 80–99.9)
OCCULT BLOOD DIAGNOSTIC: NORMAL
PDW BLD-RTO: 13.5 FL (ref 11.5–15)
PHOSPHORUS: 2.6 MG/DL (ref 2.5–4.5)
PLATELET # BLD: 262 E9/L (ref 130–450)
PMV BLD AUTO: 9.6 FL (ref 7–12)
POTASSIUM SERPL-SCNC: 3.3 MMOL/L (ref 3.5–5)
RBC # BLD: 4.31 E12/L (ref 3.5–5.5)
ROTAVIRUS ANTIGEN: NORMAL
SODIUM BLD-SCNC: 136 MMOL/L (ref 132–146)
WBC # BLD: 16.2 E9/L (ref 4.5–11.5)
WHITE BLOOD CELLS (WBC), STOOL: NORMAL

## 2020-09-03 PROCEDURE — 36415 COLL VENOUS BLD VENIPUNCTURE: CPT

## 2020-09-03 PROCEDURE — 83735 ASSAY OF MAGNESIUM: CPT

## 2020-09-03 PROCEDURE — 6370000000 HC RX 637 (ALT 250 FOR IP): Performed by: NURSE PRACTITIONER

## 2020-09-03 PROCEDURE — 2500000003 HC RX 250 WO HCPCS: Performed by: INTERNAL MEDICINE

## 2020-09-03 PROCEDURE — 84100 ASSAY OF PHOSPHORUS: CPT

## 2020-09-03 PROCEDURE — 2580000003 HC RX 258: Performed by: INTERNAL MEDICINE

## 2020-09-03 PROCEDURE — 6360000002 HC RX W HCPCS: Performed by: INTERNAL MEDICINE

## 2020-09-03 PROCEDURE — 6370000000 HC RX 637 (ALT 250 FOR IP): Performed by: INTERNAL MEDICINE

## 2020-09-03 PROCEDURE — 80048 BASIC METABOLIC PNL TOTAL CA: CPT

## 2020-09-03 PROCEDURE — 99239 HOSP IP/OBS DSCHRG MGMT >30: CPT | Performed by: STUDENT IN AN ORGANIZED HEALTH CARE EDUCATION/TRAINING PROGRAM

## 2020-09-03 PROCEDURE — 85027 COMPLETE CBC AUTOMATED: CPT

## 2020-09-03 PROCEDURE — 6360000002 HC RX W HCPCS: Performed by: STUDENT IN AN ORGANIZED HEALTH CARE EDUCATION/TRAINING PROGRAM

## 2020-09-03 PROCEDURE — 99232 SBSQ HOSP IP/OBS MODERATE 35: CPT | Performed by: SURGERY

## 2020-09-03 PROCEDURE — 6370000000 HC RX 637 (ALT 250 FOR IP): Performed by: STUDENT IN AN ORGANIZED HEALTH CARE EDUCATION/TRAINING PROGRAM

## 2020-09-03 PROCEDURE — 82330 ASSAY OF CALCIUM: CPT

## 2020-09-03 RX ORDER — POTASSIUM CHLORIDE 20 MEQ/1
40 TABLET, EXTENDED RELEASE ORAL ONCE
Status: COMPLETED | OUTPATIENT
Start: 2020-09-03 | End: 2020-09-03

## 2020-09-03 RX ORDER — AMOXICILLIN AND CLAVULANATE POTASSIUM 875; 125 MG/1; MG/1
1 TABLET, FILM COATED ORAL 2 TIMES DAILY
Qty: 20 TABLET | Refills: 0 | Status: SHIPPED | OUTPATIENT
Start: 2020-09-03 | End: 2020-09-11

## 2020-09-03 RX ORDER — PREDNISONE 10 MG/1
TABLET ORAL
Qty: 50 TABLET | Refills: 0 | Status: SHIPPED | OUTPATIENT
Start: 2020-09-03 | End: 2020-09-11

## 2020-09-03 RX ADMIN — METRONIDAZOLE 500 MG: 500 INJECTION, SOLUTION INTRAVENOUS at 05:10

## 2020-09-03 RX ADMIN — PANTOPRAZOLE SODIUM 40 MG: 40 TABLET, DELAYED RELEASE ORAL at 05:08

## 2020-09-03 RX ADMIN — DICYCLOMINE HYDROCHLORIDE 20 MG: 10 CAPSULE ORAL at 05:08

## 2020-09-03 RX ADMIN — TOPIRAMATE 25 MG: 25 CAPSULE, COATED PELLETS ORAL at 09:19

## 2020-09-03 RX ADMIN — SODIUM CHLORIDE, PRESERVATIVE FREE 10 ML: 5 INJECTION INTRAVENOUS at 09:19

## 2020-09-03 RX ADMIN — POTASSIUM & SODIUM PHOSPHATES POWDER PACK 280-160-250 MG 250 MG: 280-160-250 PACK at 15:02

## 2020-09-03 RX ADMIN — METRONIDAZOLE 500 MG: 500 INJECTION, SOLUTION INTRAVENOUS at 13:01

## 2020-09-03 RX ADMIN — SUCRALFATE 1 G: 1 TABLET ORAL at 05:08

## 2020-09-03 RX ADMIN — MORPHINE SULFATE 2 MG: 2 INJECTION, SOLUTION INTRAMUSCULAR; INTRAVENOUS at 09:36

## 2020-09-03 RX ADMIN — MORPHINE SULFATE 2 MG: 2 INJECTION, SOLUTION INTRAMUSCULAR; INTRAVENOUS at 05:17

## 2020-09-03 RX ADMIN — SUCRALFATE 1 G: 1 TABLET ORAL at 12:51

## 2020-09-03 RX ADMIN — SODIUM CHLORIDE, PRESERVATIVE FREE 10 ML: 5 INJECTION INTRAVENOUS at 05:10

## 2020-09-03 RX ADMIN — POTASSIUM CHLORIDE 40 MEQ: 20 TABLET, EXTENDED RELEASE ORAL at 15:02

## 2020-09-03 RX ADMIN — METHYLPREDNISOLONE SODIUM SUCCINATE 40 MG: 40 INJECTION, POWDER, FOR SOLUTION INTRAMUSCULAR; INTRAVENOUS at 09:18

## 2020-09-03 RX ADMIN — DICYCLOMINE HYDROCHLORIDE 20 MG: 10 CAPSULE ORAL at 12:51

## 2020-09-03 RX ADMIN — OYSTER SHELL CALCIUM WITH VITAMIN D 1 TABLET: 500; 200 TABLET, FILM COATED ORAL at 09:18

## 2020-09-03 RX ADMIN — LEVOTHYROXINE SODIUM 112 MCG: 112 TABLET ORAL at 05:08

## 2020-09-03 ASSESSMENT — PAIN DESCRIPTION - ORIENTATION
ORIENTATION: RIGHT;LEFT;MID
ORIENTATION: RIGHT;LEFT;MID

## 2020-09-03 ASSESSMENT — PAIN DESCRIPTION - PAIN TYPE
TYPE: ACUTE PAIN
TYPE: ACUTE PAIN

## 2020-09-03 ASSESSMENT — PAIN - FUNCTIONAL ASSESSMENT: PAIN_FUNCTIONAL_ASSESSMENT: ACTIVITIES ARE NOT PREVENTED

## 2020-09-03 ASSESSMENT — PAIN SCALES - GENERAL
PAINLEVEL_OUTOF10: 7
PAINLEVEL_OUTOF10: 7

## 2020-09-03 ASSESSMENT — PAIN DESCRIPTION - DESCRIPTORS
DESCRIPTORS: DISCOMFORT;SHARP
DESCRIPTORS: SHARP;DISCOMFORT

## 2020-09-03 ASSESSMENT — PAIN DESCRIPTION - LOCATION
LOCATION: ABDOMEN
LOCATION: ABDOMEN

## 2020-09-03 NOTE — PROGRESS NOTES
P Quality Flow/Interdisciplinary Rounds Progress Note        Quality Flow Rounds held on September 3, 2020    Disciplines Attending:  Bedside Nurse,  and     Dread Peck was admitted on 8/30/2020  4:01 PM    Anticipated Discharge Date:  Expected Discharge Date: 09/02/20    Disposition:    Prem Score:  Prem Scale Score: 20    Readmission Risk              Risk of Unplanned Readmission:        12           Discussed patient goal for the day, patient clinical progression, and barriers to discharge. The following Goal(s) of the Day/Commitment(s) have been identified:  Check attending's plan.       Rob Diaz  September 3, 2020

## 2020-09-03 NOTE — CARE COORDINATION
CM NOTE: Per QFR--- abdominal pain with hx Crohn's disease. Continues on IV flagyl, IV rocephin & IV steroids. Plan is home at discharge. No needs at this time.

## 2020-09-04 ENCOUNTER — TELEPHONE (OUTPATIENT)
Dept: PRIMARY CARE CLINIC | Age: 35
End: 2020-09-04

## 2020-09-04 LAB — CULTURE, STOOL: NORMAL

## 2020-09-04 NOTE — TELEPHONE ENCOUNTER
Patient calling asking for a letter to be off work from now until 17th. She sees you for appt on the 11th. She is hopeful to trun to work on the 14th. She had biopsy Wednesday waiting diagnosis.

## 2020-09-06 LAB — CALPROTECTIN, FECAL: 10 UG/G

## 2020-09-10 ENCOUNTER — OFFICE VISIT (OUTPATIENT)
Dept: SURGERY | Age: 35
End: 2020-09-10
Payer: COMMERCIAL

## 2020-09-10 VITALS
OXYGEN SATURATION: 100 % | BODY MASS INDEX: 29.88 KG/M2 | HEART RATE: 86 BPM | DIASTOLIC BLOOD PRESSURE: 89 MMHG | SYSTOLIC BLOOD PRESSURE: 119 MMHG | HEIGHT: 60 IN | WEIGHT: 152.2 LBS | TEMPERATURE: 97.3 F | RESPIRATION RATE: 16 BRPM

## 2020-09-10 PROCEDURE — 1111F DSCHRG MED/CURRENT MED MERGE: CPT | Performed by: SURGERY

## 2020-09-10 PROCEDURE — G8417 CALC BMI ABV UP PARAM F/U: HCPCS | Performed by: SURGERY

## 2020-09-10 PROCEDURE — G8427 DOCREV CUR MEDS BY ELIG CLIN: HCPCS | Performed by: SURGERY

## 2020-09-10 PROCEDURE — 99214 OFFICE O/P EST MOD 30 MIN: CPT | Performed by: SURGERY

## 2020-09-10 PROCEDURE — 4004F PT TOBACCO SCREEN RCVD TLK: CPT | Performed by: SURGERY

## 2020-09-10 NOTE — PROGRESS NOTES
Progress Note - Follow up    Patient's Name/Date of Birth: Karuna Stokes / 1985    Date: 9/10/2020    PCP: Roger Lopes DO    Referring Physician:   Ja Zamora, [address] [facility]    Chief Complaint   Patient presents with    Other     Pt is here to go over results. STates she has no concerns or complaints currently. HPI:    The patient continues to have the same abdominal pain. She said it is daily but not terrible. She is having BM and tolerating a diet. Patient's medications, allergies, past medical, surgical, social and family histories were reviewed and updated as appropriate. Review of Systems  Constitutional: negative  Respiratory: negative  Cardiovascular: negative  Gastrointestinal: as in hpi  Genitourinary:negative  Integument/breast: negative    Physical Exam:  /89 (Site: Left Upper Arm, Position: Sitting, Cuff Size: Medium Adult)   Pulse 86   Temp 97.3 °F (36.3 °C) (Oral)   Resp 16   Ht 5' (1.524 m)   Wt 152 lb 3.2 oz (69 kg)   SpO2 100%   BMI 29.72 kg/m²   General appearance: alert, cooperative and in no acute distress. Lungs: clear to auscultation bilaterally  Heart: regular rate and rhythm  Abdomen: mid abdominal tenderness soft, ND  Musculoskeletal: symmetrical without clubbing cyanosis or edema. 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      Comment: Sections of the liver tissue cores show the hepatic parenchyma   to be partially replaced by a proliferation of epithelioid cells with   relatively uniform round nuclei and eosinophilic granular cytoplasm.  The   epithelioid cells form anastomosing solid cords/nests that are surrounded   by delicate fibrovascular stroma.  There are no mitotic figures or tumor   cell necrosis present.  The histologic changes seen are suggestive of   well-differentiated neuroendocrine

## 2020-09-10 NOTE — PATIENT INSTRUCTIONS
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 Comment: Sections of the liver tissue cores show the hepatic parenchyma   to be partially replaced by a proliferation of epithelioid cells with relatively uniform round nuclei and eosinophilic granular cytoplasm.  The epithelioid cells form anastomosing solid cords/nests that are surrounded   by delicate fibrovascular stroma.  There are no mitotic figures or tumor   cell necrosis present.  The histologic changes seen are suggestive of   well-differentiated neuroendocrine (carcinoid) tumor. Immunostaining for pankeratin, chromogranin, synaptophysin, TTF-1 and   Ki-67 was performed on sections of one tissue block (A1) and the   neoplastic epithelioid cells show diffuse and strong positivity for   neuroendocrine markers (chromogranin and synaptophysin) and moderate   positivity for pankeratin.  There is no staining reactivity for TTF-1. The Ki-67 proliferation labeling index is essentially negative, (very   low, <1%).  This staining pattern confirms the histologic impression of a   well-differentiated neuroendocrine (carcinoid) tumor.  Intradepartmental   consultation is obtained.

## 2020-09-11 ENCOUNTER — OFFICE VISIT (OUTPATIENT)
Dept: PRIMARY CARE CLINIC | Age: 35
End: 2020-09-11
Payer: COMMERCIAL

## 2020-09-11 VITALS
HEIGHT: 60 IN | BODY MASS INDEX: 29.84 KG/M2 | SYSTOLIC BLOOD PRESSURE: 110 MMHG | DIASTOLIC BLOOD PRESSURE: 80 MMHG | WEIGHT: 152 LBS | TEMPERATURE: 97.1 F | RESPIRATION RATE: 18 BRPM | OXYGEN SATURATION: 98 % | HEART RATE: 80 BPM

## 2020-09-11 PROCEDURE — 99214 OFFICE O/P EST MOD 30 MIN: CPT | Performed by: FAMILY MEDICINE

## 2020-09-11 PROCEDURE — G8417 CALC BMI ABV UP PARAM F/U: HCPCS | Performed by: FAMILY MEDICINE

## 2020-09-11 PROCEDURE — G8427 DOCREV CUR MEDS BY ELIG CLIN: HCPCS | Performed by: FAMILY MEDICINE

## 2020-09-11 PROCEDURE — 1111F DSCHRG MED/CURRENT MED MERGE: CPT | Performed by: FAMILY MEDICINE

## 2020-09-11 PROCEDURE — 4004F PT TOBACCO SCREEN RCVD TLK: CPT | Performed by: FAMILY MEDICINE

## 2020-09-11 RX ORDER — TRAMADOL HYDROCHLORIDE 50 MG/1
50 TABLET ORAL EVERY 6 HOURS PRN
Qty: 28 TABLET | Refills: 0 | Status: SHIPPED | OUTPATIENT
Start: 2020-09-11 | End: 2020-09-18

## 2020-09-11 ASSESSMENT — ENCOUNTER SYMPTOMS
CHEST TIGHTNESS: 0
DIARRHEA: 1
FACIAL SWELLING: 0
SINUS PRESSURE: 0
COUGH: 0
BACK PAIN: 0
ABDOMINAL PAIN: 1
ALLERGIC/IMMUNOLOGIC NEGATIVE: 1
SHORTNESS OF BREATH: 0
VOMITING: 0
SORE THROAT: 0
NAUSEA: 0
WHEEZING: 0
PHOTOPHOBIA: 0
BLOOD IN STOOL: 0
COLOR CHANGE: 0
APNEA: 0

## 2020-09-11 NOTE — PROGRESS NOTES
Chief Complaint:   Chief Complaint   Patient presents with   Hannah Ochoa     was dx with cancer yesterday, tumors on liver and small intestine       GI Problem   The primary symptoms include abdominal pain and diarrhea. Primary symptoms do not include nausea, vomiting, myalgias, arthralgias or rash. The illness began 6 to 7 days ago. The onset was gradual.   The illness does not include back pain. Abdominal Pain   This is a new problem. The current episode started 1 to 4 weeks ago. The problem occurs daily. The pain is located in the generalized abdominal region. The pain is at a severity of 7/10. Associated symptoms include diarrhea. Pertinent negatives include no arthralgias, frequency, headaches, myalgias, nausea or vomiting. Prior diagnostic workup includes CT scan.    dx with neuro endocrine tumor    Patient Active Problem List   Diagnosis    Hx of migraine headaches    Generalized abdominal pain    Primary insomnia    Fatty liver    H/O small bowel obstruction    Hypothyroidism    Abdominal pain, right lower quadrant    Depression    PTSD (post-traumatic stress disorder)    Liver masses    Migraines    Mesenteric mass       Past Medical History:   Diagnosis Date    Abdominal pain     Acute Cro[facility]'s disease ([facility] [address]     Hypocalcemia     Hypothyroidism     Irritable bowel syndrome     Migraines     Status post alcohol detoxification     5/22/2018-5/29/2018       Past Surgical History:   Procedure Laterality Date    CHOLECYSTECTOMY, LAPAROSCOPIC  07/06/2016    COLONOSCOPY N/A 6/22/2018    COLONOSCOPY WITH BIOPSY performed by Oliver Begum MD at [id number] [facility] CT NEEDLE BIOPSY LIVER PERCUTANEOUS  9/1/2020    CT NEEDLE BIOPSY LIVER PERCUTANEOUS 9/1/2020 [facility] CT    PARATHYROID GLAND SURGERY      THYROIDECTOMY         Current Outpatient Medications   Medication Sig Dispense Refill    traMADol (ULTRAM) 50 MG tablet Take 1 tablet by mouth every 6 hours as needed for Pain for up to 7 days. Intended supply: 7 days. Take lowest dose possible to manage pain 28 tablet 0    pantoprazole (PROTONIX) 40 MG tablet Take 1 tablet by mouth every morning (before breakfast) 30 tablet 2    butalbital-acetaminophen-caffeine (FIORICET, ESGIC) -40 MG per tablet Take 1 tablet by mouth daily as needed for Headaches      topiramate (TOPAMAX SPRINKLE) 25 MG capsule Take 25 mg by mouth daily      ondansetron (ZOFRAN) 4 MG tablet Take 4 mg by mouth every 8 hours as needed for Nausea or Vomiting      zolpidem (AMBIEN) 10 MG tablet Take by mouth nightly as needed for Sleep.  Calcium Carb-Cholecalciferol (CALCIUM-VITAMIN D) 500-200 MG-UNIT per tablet Take 1 tablet by mouth 2 times daily (with meals)      levothyroxine (SYNTHROID) 112 MCG tablet Take 112 mcg by mouth Daily      aluminum & magnesium hydroxide-simethicone (MYLANTA) 200-200-20 MG/5ML SUSP suspension Take 5 mLs by mouth every 6 hours as needed for Indigestion      Geronimo Carb-Mag Hydrox-Simeth 800-270-80 MG/10ML SUSP Take 20 mLs by mouth every 8 hours      sucralfate (CARAFATE) 1 GM/10ML suspension Take 10 mLs by mouth 4 times daily 1200 mL 3    dicyclomine (BENTYL) 10 MG capsule Take 2 capsules by mouth 4 times daily (before meals and nightly) for 20 doses 40 capsule 0     No current facility-administered medications for this visit.         Allergies   Allergen Reactions    Codeine Nausea And Vomiting       Social History     Socioeconomic History    Marital status: Single     Spouse name: None    Number of children: None    Years of education: None    Highest education level: None   Occupational History    Occupation: cleaning     Employer: [employer] Financial resource strain: None    Food insecurity     Worry: None     Inability: None    Transportation needs     Medical: None     Non-medical: None   Tobacco Use    Smoking status: Current Every Day Smoker     Packs/day: 0.50    Smokeless tobacco: Never Used Substance and Sexual Activity    Alcohol use: No     Alcohol/week: 0.0 standard drinks     Comment: 4 weeks sober post detox    Drug use: No    Sexual activity: Yes     Partners: Male     Birth control/protection: I.U.D. Lifestyle    Physical activity     Days per week: None     Minutes per session: None    Stress: None   Relationships    Social connections     Talks on phone: None     Gets together: None     Attends [employer] service: None     Active member of club or organization: None     Attends meetings of clubs or organizations: None     Relationship status: None    Intimate partner violence     Fear of current or ex partner: None     Emotionally abused: None     Physically abused: None     Forced sexual activity: None   Other Topics Concern    None   Social History Narrative    None       Family History   Problem Relation Age of Onset    High Blood Pressure Mother     High Cholesterol Mother     Other Mother         migraine headaches    Heart Disease Father     Asthma Father     High Blood Pressure Father     Diabetes Other         GRANDMOTHER   Redia Labella Other         GRANDFATHER    Alzheimer's Disease Other         GRANDMOTHER         Review of Systems   Constitutional: Negative. HENT: Negative for congestion, facial swelling, hearing loss, nosebleeds, sinus pressure and sore throat. Eyes: Negative for photophobia and visual disturbance. Respiratory: Negative for apnea, cough, chest tightness, shortness of breath and wheezing. Cardiovascular: Negative for chest pain, palpitations and leg swelling. Gastrointestinal: Positive for abdominal pain and diarrhea. Negative for blood in stool, nausea and vomiting. Genitourinary: Negative for difficulty urinating, frequency and urgency. Musculoskeletal: Negative for arthralgias, back pain, joint swelling and myalgias. Skin: Negative for color change and rash. Allergic/Immunologic: Negative.     Neurological: Negative for syncope, weakness, light-headedness and headaches. Hematological: Negative. Psychiatric/Behavioral: Negative for agitation, behavioral problems, confusion and self-injury. The patient is not nervous/anxious. All other systems reviewed and are negative. Physical Exam  Vitals signs and nursing note reviewed. Constitutional:       General: She is not in acute distress. Appearance: She is well-developed. HENT:      Head: Normocephalic and atraumatic. Nose: Nose normal.   Eyes:      Conjunctiva/sclera: Conjunctivae normal.      Pupils: Pupils are equal, round, and reactive to light. Neck:      Musculoskeletal: Normal range of motion and neck supple. Thyroid: No thyromegaly. Vascular: No JVD. Cardiovascular:      Rate and Rhythm: Normal rate and regular rhythm. Heart sounds: Normal heart sounds. No murmur. No friction rub. No gallop. Pulmonary:      Effort: Pulmonary effort is normal. No respiratory distress. Breath sounds: Normal breath sounds. No wheezing. Abdominal:      General: Bowel sounds are normal. There is no distension. Palpations: Abdomen is soft. Tenderness: There is generalized abdominal tenderness. There is no guarding or rebound. Musculoskeletal: Normal range of motion. Lymphadenopathy:      Cervical: No cervical adenopathy. Skin:     General: Skin is warm and dry. Findings: No erythema or rash. Neurological:      Mental Status: She is alert and oriented to person, place, and time. Cranial Nerves: No cranial nerve deficit. Motor: No abnormal muscle tone. Coordination: Coordination normal.      Deep Tendon Reflexes: Reflexes are normal and symmetric. Psychiatric:         Behavior: Behavior normal.         Judgment: Judgment normal.                               ASSESSMENT/PLAN:    Sami Atkins was seen today for cancer.     Diagnoses and all orders for this visit:    Malignant carcinoid tumor of small intestine, unspecified location (HCC)  -     traMADol (ULTRAM) 50 MG tablet; Take 1 tablet by mouth every 6 hours as needed for Pain for up to 7 days. Intended supply: 7 days. Take lowest dose possible to manage pain    Generalized abdominal pain  -     traMADol (ULTRAM) 50 MG tablet; Take 1 tablet by mouth every 6 hours as needed for Pain for up to 7 days. Intended supply: 7 days.  Take lowest dose possible to manage pain    f/u GI doctor        Ambreen Foley DO    9/11/2020  11:16 AM

## 2020-09-22 ENCOUNTER — APPOINTMENT (OUTPATIENT)
Dept: CT IMAGING | Age: 35
End: 2020-09-22
Payer: COMMERCIAL

## 2020-09-22 ENCOUNTER — HOSPITAL ENCOUNTER (EMERGENCY)
Age: 35
Discharge: HOME | End: 2020-09-22
Attending: EMERGENCY MEDICINE
Payer: COMMERCIAL

## 2020-09-22 VITALS
HEIGHT: 60 IN | TEMPERATURE: 98.6 F | RESPIRATION RATE: 14 BRPM | SYSTOLIC BLOOD PRESSURE: 127 MMHG | BODY MASS INDEX: 28.47 KG/M2 | WEIGHT: 145 LBS | HEART RATE: 96 BPM | DIASTOLIC BLOOD PRESSURE: 85 MMHG | OXYGEN SATURATION: 97 %

## 2020-09-22 LAB
ALBUMIN SERPL-MCNC: 4.3 G/DL (ref 3.5–5.2)
ALP BLD-CCNC: 167 U/L (ref 35–104)
ALT SERPL-CCNC: 26 U/L (ref 0–32)
ANION GAP SERPL CALCULATED.3IONS-SCNC: 14 MMOL/L (ref 7–16)
AST SERPL-CCNC: 16 U/L (ref 0–31)
BASOPHILS ABSOLUTE: 0.07 E9/L (ref 0–0.2)
BASOPHILS RELATIVE PERCENT: 0.6 % (ref 0–2)
BILIRUB SERPL-MCNC: <0.2 MG/DL (ref 0–1.2)
BILIRUBIN DIRECT: <0.2 MG/DL (ref 0–0.3)
BILIRUBIN URINE: NEGATIVE
BILIRUBIN, INDIRECT: ABNORMAL MG/DL (ref 0–1)
BLOOD, URINE: NEGATIVE
BUN BLDV-MCNC: 4 MG/DL (ref 6–20)
CALCIUM SERPL-MCNC: 9 MG/DL (ref 8.6–10.2)
CHLORIDE BLD-SCNC: 101 MMOL/L (ref 98–107)
CLARITY: CLEAR
CO2: 24 MMOL/L (ref 22–29)
COLOR: YELLOW
CREAT SERPL-MCNC: 0.6 MG/DL (ref 0.5–1)
EOSINOPHILS ABSOLUTE: 0.2 E9/L (ref 0.05–0.5)
EOSINOPHILS RELATIVE PERCENT: 1.7 % (ref 0–6)
GFR AFRICAN AMERICAN: >60
GFR NON-AFRICAN AMERICAN: >60 ML/MIN/1.73
GLUCOSE BLD-MCNC: 109 MG/DL (ref 74–99)
GLUCOSE URINE: NEGATIVE MG/DL
HCG, URINE, POC: NEGATIVE
HCT VFR BLD CALC: 44.8 % (ref 34–48)
HEMOGLOBIN: 15.4 G/DL (ref 11.5–15.5)
IMMATURE GRANULOCYTES #: 0.05 E9/L
IMMATURE GRANULOCYTES %: 0.4 % (ref 0–5)
KETONES, URINE: NEGATIVE MG/DL
LACTIC ACID: 2.6 MMOL/L (ref 0.5–2.2)
LEUKOCYTE ESTERASE, URINE: NEGATIVE
LIPASE: 27 U/L (ref 13–60)
LYMPHOCYTES ABSOLUTE: 2.64 E9/L (ref 1.5–4)
LYMPHOCYTES RELATIVE PERCENT: 22.7 % (ref 20–42)
Lab: NORMAL
MCH RBC QN AUTO: 32.5 PG (ref 26–35)
MCHC RBC AUTO-ENTMCNC: 34.4 % (ref 32–34.5)
MCV RBC AUTO: 94.5 FL (ref 80–99.9)
MONOCYTES ABSOLUTE: 1.05 E9/L (ref 0.1–0.95)
MONOCYTES RELATIVE PERCENT: 9 % (ref 2–12)
NEGATIVE QC PASS/FAIL: NORMAL
NEUTROPHILS ABSOLUTE: 7.63 E9/L (ref 1.8–7.3)
NEUTROPHILS RELATIVE PERCENT: 65.6 % (ref 43–80)
NITRITE, URINE: NEGATIVE
PDW BLD-RTO: 13.1 FL (ref 11.5–15)
PH UA: 7 (ref 5–9)
PLATELET # BLD: 302 E9/L (ref 130–450)
PMV BLD AUTO: 9.7 FL (ref 7–12)
POSITIVE QC PASS/FAIL: NORMAL
POTASSIUM REFLEX MAGNESIUM: 3.6 MMOL/L (ref 3.5–5)
PROTEIN UA: NEGATIVE MG/DL
RBC # BLD: 4.74 E12/L (ref 3.5–5.5)
SODIUM BLD-SCNC: 139 MMOL/L (ref 132–146)
SPECIFIC GRAVITY UA: 1.01 (ref 1–1.03)
TOTAL PROTEIN: 7.8 G/DL (ref 6.4–8.3)
UROBILINOGEN, URINE: 0.2 E.U./DL
WBC # BLD: 11.6 E9/L (ref 4.5–11.5)

## 2020-09-22 PROCEDURE — 6360000004 HC RX CONTRAST MEDICATION: Performed by: RADIOLOGY

## 2020-09-22 PROCEDURE — 99285 EMERGENCY DEPT VISIT HI MDM: CPT

## 2020-09-22 PROCEDURE — 83605 ASSAY OF LACTIC ACID: CPT

## 2020-09-22 PROCEDURE — 80076 HEPATIC FUNCTION PANEL: CPT

## 2020-09-22 PROCEDURE — 96374 THER/PROPH/DIAG INJ IV PUSH: CPT

## 2020-09-22 PROCEDURE — 85025 COMPLETE CBC W/AUTO DIFF WBC: CPT

## 2020-09-22 PROCEDURE — 96375 TX/PRO/DX INJ NEW DRUG ADDON: CPT

## 2020-09-22 PROCEDURE — 96376 TX/PRO/DX INJ SAME DRUG ADON: CPT

## 2020-09-22 PROCEDURE — 83690 ASSAY OF LIPASE: CPT

## 2020-09-22 PROCEDURE — 74177 CT ABD & PELVIS W/CONTRAST: CPT

## 2020-09-22 PROCEDURE — 6360000002 HC RX W HCPCS: Performed by: STUDENT IN AN ORGANIZED HEALTH CARE EDUCATION/TRAINING PROGRAM

## 2020-09-22 PROCEDURE — 99284 EMERGENCY DEPT VISIT MOD MDM: CPT

## 2020-09-22 PROCEDURE — 2580000003 HC RX 258: Performed by: STUDENT IN AN ORGANIZED HEALTH CARE EDUCATION/TRAINING PROGRAM

## 2020-09-22 PROCEDURE — 81003 URINALYSIS AUTO W/O SCOPE: CPT

## 2020-09-22 PROCEDURE — 80048 BASIC METABOLIC PNL TOTAL CA: CPT

## 2020-09-22 RX ORDER — MORPHINE SULFATE 4 MG/ML
4 INJECTION, SOLUTION INTRAMUSCULAR; INTRAVENOUS ONCE
Status: COMPLETED | OUTPATIENT
Start: 2020-09-22 | End: 2020-09-22

## 2020-09-22 RX ORDER — ONDANSETRON 2 MG/ML
4 INJECTION INTRAMUSCULAR; INTRAVENOUS ONCE
Status: COMPLETED | OUTPATIENT
Start: 2020-09-22 | End: 2020-09-22

## 2020-09-22 RX ORDER — 0.9 % SODIUM CHLORIDE 0.9 %
1000 INTRAVENOUS SOLUTION INTRAVENOUS ONCE
Status: COMPLETED | OUTPATIENT
Start: 2020-09-22 | End: 2020-09-22

## 2020-09-22 RX ORDER — HYDROCODONE BITARTRATE AND ACETAMINOPHEN 5; 325 MG/1; MG/1
1 TABLET ORAL EVERY 6 HOURS PRN
Qty: 8 TABLET | Refills: 0 | Status: SHIPPED | OUTPATIENT
Start: 2020-09-22 | End: 2020-09-24

## 2020-09-22 RX ORDER — ONDANSETRON 4 MG/1
4 TABLET, ORALLY DISINTEGRATING ORAL 3 TIMES DAILY PRN
Qty: 21 TABLET | Refills: 0 | Status: SHIPPED | OUTPATIENT
Start: 2020-09-22 | End: 2021-02-07

## 2020-09-22 RX ADMIN — ONDANSETRON 4 MG: 2 INJECTION INTRAMUSCULAR; INTRAVENOUS at 17:43

## 2020-09-22 RX ADMIN — ONDANSETRON 4 MG: 2 INJECTION INTRAMUSCULAR; INTRAVENOUS at 20:05

## 2020-09-22 RX ADMIN — MORPHINE SULFATE 4 MG: 4 INJECTION, SOLUTION INTRAMUSCULAR; INTRAVENOUS at 17:43

## 2020-09-22 RX ADMIN — IOPAMIDOL 110 ML: 755 INJECTION, SOLUTION INTRAVENOUS at 18:30

## 2020-09-22 RX ADMIN — SODIUM CHLORIDE 1000 ML: 9 INJECTION, SOLUTION INTRAVENOUS at 17:43

## 2020-09-22 ASSESSMENT — PAIN SCALES - GENERAL
PAINLEVEL_OUTOF10: 10
PAINLEVEL_OUTOF10: 9
PAINLEVEL_OUTOF10: 10

## 2020-09-22 ASSESSMENT — ENCOUNTER SYMPTOMS
BACK PAIN: 0
COUGH: 0
VOMITING: 1
EYE PAIN: 0
NAUSEA: 1
ABDOMINAL DISTENTION: 0
SINUS PRESSURE: 0
EYE REDNESS: 0
ABDOMINAL PAIN: 1
DIARRHEA: 1
EYE DISCHARGE: 0
WHEEZING: 0
SHORTNESS OF BREATH: 0
SORE THROAT: 0

## 2020-09-22 ASSESSMENT — PAIN DESCRIPTION - LOCATION: LOCATION: ABDOMEN

## 2020-09-22 ASSESSMENT — PAIN DESCRIPTION - PAIN TYPE: TYPE: ACUTE PAIN

## 2020-09-22 NOTE — ED PROVIDER NOTES
Chief Complaint   Patient presents with    Abdominal Pain     recently diagnosed with liver CA 1 1/2 wks ago, appt with oncology this week       Patient is a 67-year-old female with a recent diagnosis of liver cancer who presents today for worsening abdominal pain. Patient states she was having symptoms similar to this in the past, had a CAT scan earlier this month which showed concern of a mass in her liver. Patient states she did follow-up with a specialist had a CT-guided needle biopsy of the liver and small bowel. Patient states she was most to follow-up with her oncologist tomorrow, however states pain has been more severe and she was not able to wait till her appointment tomorrow. Patient states he does have a longstanding history of chronic alcohol abuse, however states she did quit after finding out this information. She describes as a cramping pain throughout her abdomen that is not made better or worse by anything specific, and is not worse in one specific location. She endorses nausea, vomiting and diarrhea. The history is provided by the patient. No  was used. Review of Systems   Constitutional: Negative for chills and fever. HENT: Negative for ear pain, sinus pressure and sore throat. Eyes: Negative for pain, discharge and redness. Respiratory: Negative for cough, shortness of breath and wheezing. Cardiovascular: Negative for chest pain. Gastrointestinal: Positive for abdominal pain, diarrhea, nausea and vomiting. Negative for abdominal distention. Genitourinary: Negative for dysuria and frequency. Musculoskeletal: Negative for arthralgias and back pain. Skin: Negative for rash and wound. Neurological: Negative for dizziness, weakness and headaches. Hematological: Negative for adenopathy. Psychiatric/Behavioral: Negative for behavioral problems and confusion. All other systems reviewed and are negative.        Physical Exam  Vitals signs and nursing note reviewed. Constitutional:       General: She is not in acute distress. Appearance: She is well-developed. She is not ill-appearing. HENT:      Head: Normocephalic and atraumatic. Eyes:      Pupils: Pupils are equal, round, and reactive to light. Neck:      Musculoskeletal: Normal range of motion and neck supple. Cardiovascular:      Rate and Rhythm: Normal rate and regular rhythm. Pulmonary:      Effort: Pulmonary effort is normal. No respiratory distress. Breath sounds: Normal breath sounds. No wheezing or rales. Abdominal:      General: Bowel sounds are normal.      Palpations: Abdomen is soft. Tenderness: There is abdominal tenderness. There is no guarding or rebound. Comments: Generalized tenderness to the abdomen  Abdomen is soft  No guarding or rebound   Musculoskeletal:      Right lower leg: No edema. Left lower leg: No edema. Skin:     General: Skin is warm and dry. Capillary Refill: Capillary refill takes less than 2 seconds. Neurological:      General: No focal deficit present. Mental Status: She is alert and oriented to person, place, and time. Cranial Nerves: No cranial nerve deficit.       Coordination: Coordination normal.   Psychiatric:         Mood and Affect: Mood normal.         Behavior: Behavior normal.          Procedures     Labs Reviewed   CBC WITH AUTO DIFFERENTIAL - Abnormal; Notable for the following components:       Result Value    WBC 11.6 (*)     Neutrophils Absolute 7.63 (*)     Monocytes Absolute 1.05 (*)     All other components within normal limits   BASIC METABOLIC PANEL W/ REFLEX TO MG FOR LOW K - Abnormal; Notable for the following components:    Glucose 109 (*)     BUN 4 (*)     All other components within normal limits   HEPATIC FUNCTION PANEL - Abnormal; Notable for the following components:    Alkaline Phosphatase 167 (*)     All other components within normal limits   LACTIC ACID, PLASMA - Abnormal; Notable for the following components:    Lactic Acid 2.6 (*)     All other components within normal limits   LIPASE   URINALYSIS   POC PREGNANCY UR-QUAL     CT ABDOMEN PELVIS W IV CONTRAST Additional Contrast? None   Final Result   Persistent nearly 2.1 x 1.7 cm spiculated mesenteric mass with the   tethering to the small bowel loops with adjacent edema/enteritis in   the small bowel loops. Inflammatory process like fibrosing   mesenteritis or malignancy like carcinoid tumor or lymphoma are   considered. Persistent slightly progressive multiple hepatic lesions concerning   for hepatic metastasis. MDM  Number of Diagnoses or Management Options  Carcinoid tumor of other sites, unspecified whether malignant:   Generalized abdominal pain:   Diagnosis management comments: Patient is a 81-year-old female presents today for abdominal pain, nausea vomiting. On presentation patient has generalized abdominal pain, however abdomen is soft, there is no guarding or rebound. Patient does have history of new diagnosis carcinoid tumor of the liver with concern for metastasis to the small bowel. Lab work and imaging was obtained. Lab work is fairly unremarkable, hepatic panel shows mildly elevated alkaline phosphatase, however remainder of lab work is fairly normal.  CT does not show any acute interval changes from her previous scan, they do note a 2.1 x 1.7 cm mesenteric mass with tethering to the small bowel with adjacent edema enteritis of the small bowel, the state this is likely carcinoid tumor for which patient is already had biopsy of. With no acute changes in her blood work or CT, and her pain being controlled in department, and patient already having a follow-up with her oncologist scheduled for tomorrow, patient will be discharged home. Patient was given medication for pain as well as nausea. Strict return precautions were given. Patient understands and agrees with this plan.        Amount and/or Complexity of Data Reviewed  Clinical lab tests: reviewed  Tests in the radiology section of CPT®: reviewed           ED Course as of Sep 22 1941   Tue Sep 22, 2020   1923 Patient is resting comfortably bed, and no acute distress. Pain has improved. []      ED Course User Index  [] Frankie Clements DO       --------------------------------------------- PAST HISTORY ---------------------------------------------  Past Medical History:  has a past medical history of Abdominal pain, Acute Crohn's disease ([facility] [address] Hypocalcemia, Hypothyroidism, Irritable bowel syndrome, Migraines, and Status post alcohol detoxification. Past Surgical History:  has a past surgical history that includes Parathyroid gland surgery; Cholecystectomy, laparoscopic (07/06/2016); Thyroidectomy; Colonoscopy (N/A, 6/22/2018); and CT NEEDLE BIOPSY LIVER PERCUTANEOUS (9/1/2020). Social History:  reports that she has been smoking. She has been smoking about 0.50 packs per day. She has never used smokeless tobacco. She reports that she does not drink alcohol or use drugs. Family History: family history includes Alzheimer's Disease in an other family member; Asthma in her father; Diabetes in an other family member; Heart Disease in her father; High Blood Pressure in her father and mother; High Cholesterol in her mother; Julián Nims in an other family member; Other in her mother. The patients home medications have been reviewed.     Allergies: Codeine    -------------------------------------------------- RESULTS -------------------------------------------------  Labs:  Results for orders placed or performed during the hospital encounter of 09/22/20   CBC Auto Differential   Result Value Ref Range    WBC 11.6 (H) 4.5 - 11.5 E9/L    RBC 4.74 3.50 - 5.50 E12/L    Hemoglobin 15.4 11.5 - 15.5 g/dL    Hematocrit 44.8 34.0 - 48.0 %    MCV 94.5 80.0 - 99.9 fL    MCH 32.5 26.0 - 35.0 pg    MCHC 34.4 32.0 - 34.5 %    RDW 13.1 11.5 - 15.0 fL Platelets 046 792 - 190 E9/L    MPV 9.7 7.0 - 12.0 fL    Neutrophils % 65.6 43.0 - 80.0 %    Immature Granulocytes % 0.4 0.0 - 5.0 %    Lymphocytes % 22.7 20.0 - 42.0 %    Monocytes % 9.0 2.0 - 12.0 %    Eosinophils % 1.7 0.0 - 6.0 %    Basophils % 0.6 0.0 - 2.0 %    Neutrophils Absolute 7.63 (H) 1.80 - 7.30 E9/L    Immature Granulocytes # 0.05 E9/L    Lymphocytes Absolute 2.64 1.50 - 4.00 E9/L    Monocytes Absolute 1.05 (H) 0.10 - 0.95 E9/L    Eosinophils Absolute 0.20 0.05 - 0.50 E9/L    Basophils Absolute 0.07 0.00 - 0.20 D0/C   Basic Metabolic Panel w/ Reflex to MG   Result Value Ref Range    Sodium 139 132 - 146 mmol/L    Potassium reflex Magnesium 3.6 3.5 - 5.0 mmol/L    Chloride 101 98 - 107 mmol/L    CO2 24 22 - 29 mmol/L    Anion Gap 14 7 - 16 mmol/L    Glucose 109 (H) 74 - 99 mg/dL    BUN 4 (L) 6 - 20 mg/dL    CREATININE 0.6 0.5 - 1.0 mg/dL    GFR Non-African American >60 >=60 mL/min/1.73    GFR African American >60     Calcium 9.0 8.6 - 10.2 mg/dL   Hepatic Function Panel   Result Value Ref Range    Total Protein 7.8 6.4 - 8.3 g/dL    Alb 4.3 3.5 - 5.2 g/dL    Alkaline Phosphatase 167 (H) 35 - 104 U/L    ALT 26 0 - 32 U/L    AST 16 0 - 31 U/L    Total Bilirubin <0.2 0.0 - 1.2 mg/dL    Bilirubin, Direct <0.2 0.0 - 0.3 mg/dL    Bilirubin, Indirect see below 0.0 - 1.0 mg/dL   Lactic Acid, Plasma   Result Value Ref Range    Lactic Acid 2.6 (H) 0.5 - 2.2 mmol/L   Lipase   Result Value Ref Range    Lipase 27 13 - 60 U/L   Urinalysis, reflex to microscopic   Result Value Ref Range    Color, UA Yellow Straw/Yellow    Clarity, UA Clear Clear    Glucose, Ur Negative Negative mg/dL    Bilirubin Urine Negative Negative    Ketones, Urine Negative Negative mg/dL    Specific Gravity, UA 1.010 1.005 - 1.030    Blood, Urine Negative Negative    pH, UA 7.0 5.0 - 9.0    Protein, UA Negative Negative mg/dL    Urobilinogen, Urine 0.2 <2.0 E.U./dL    Nitrite, Urine Negative Negative    Leukocyte Esterase, Urine Negative Negative   POC Pregnancy Urine   Result Value Ref Range    HCG, Urine, POC Negative Negative    Lot Number [id number]     Positive QC Pass/Fail Pass     Negative QC Pass/Fail Pass        Radiology:  CT ABDOMEN PELVIS W IV CONTRAST Additional Contrast? None   Final Result   Persistent nearly 2.1 x 1.7 cm spiculated mesenteric mass with the   tethering to the small bowel loops with adjacent edema/enteritis in   the small bowel loops. Inflammatory process like fibrosing   mesenteritis or malignancy like carcinoid tumor or lymphoma are   considered. Persistent slightly progressive multiple hepatic lesions concerning   for hepatic metastasis.                ------------------------- NURSING NOTES AND VITALS REVIEWED ---------------------------  Date / Time Roomed:  9/22/2020  4:25 PM  ED Bed Assignment:  14/14    The nursing notes within the ED encounter and vital signs as below have been reviewed. /70   Pulse 98   Temp 98.6 °F (37 °C) (Oral)   Resp 14   Ht 5' (1.524 m)   Wt 145 lb (65.8 kg)   SpO2 94%   BMI 28.32 kg/m²   Oxygen Saturation Interpretation: Normal      ------------------------------------------ PROGRESS NOTES ------------------------------------------  I have spoken with the patient and discussed todays results, in addition to providing specific details for the plan of care and counseling regarding the diagnosis and prognosis. Their questions are answered at this time and they are agreeable with the plan. I discussed at length with them reasons for immediate return here for re evaluation. They will followup with primary care by calling their office tomorrow. --------------------------------- ADDITIONAL PROVIDER NOTES ---------------------------------  At this time the patient is without objective evidence of an acute process requiring hospitalization or inpatient management.   They have remained hemodynamically stable throughout their entire ED visit and are stable for discharge with outpatient follow-up. The plan has been discussed in detail and they are aware of the specific conditions for emergent return, as well as the importance of follow-up. New Prescriptions    HYDROCODONE-ACETAMINOPHEN (NORCO) 5-325 MG PER TABLET    Take 1 tablet by mouth every 6 hours as needed for Pain for up to 2 days. Intended supply: 3 days. Take lowest dose possible to manage pain    ONDANSETRON (ZOFRAN-ODT) 4 MG DISINTEGRATING TABLET    Take 1 tablet by mouth 3 times daily as needed for Nausea or Vomiting       Diagnosis:  1. Generalized abdominal pain    2. Carcinoid tumor of other sites, unspecified whether malignant        Disposition:  Patient's disposition: Discharge to home  Patient's condition is stable.             Mitchel Garcia,   Resident  09/22/20 [id number]

## 2020-09-23 ENCOUNTER — OFFICE VISIT (OUTPATIENT)
Dept: ONCOLOGY | Age: 35
End: 2020-09-23
Payer: COMMERCIAL

## 2020-09-23 ENCOUNTER — HOSPITAL ENCOUNTER (OUTPATIENT)
Dept: INFUSION THERAPY | Age: 35
Discharge: HOME | End: 2020-09-23
Payer: COMMERCIAL

## 2020-09-23 VITALS
BODY MASS INDEX: 28.32 KG/M2 | OXYGEN SATURATION: 98 % | TEMPERATURE: 97.9 F | HEART RATE: 86 BPM | SYSTOLIC BLOOD PRESSURE: 142 MMHG | HEIGHT: 60 IN | DIASTOLIC BLOOD PRESSURE: 82 MMHG

## 2020-09-23 DIAGNOSIS — C7A.8 NEUROENDOCRINE CARCINOMA METASTATIC TO LIVER (HCC): ICD-10-CM

## 2020-09-23 DIAGNOSIS — C7B.8 NEUROENDOCRINE CARCINOMA METASTATIC TO LIVER (HCC): ICD-10-CM

## 2020-09-23 PROCEDURE — 83497 ASSAY OF 5-HIAA: CPT

## 2020-09-23 PROCEDURE — G8427 DOCREV CUR MEDS BY ELIG CLIN: HCPCS | Performed by: INTERNAL MEDICINE

## 2020-09-23 PROCEDURE — G8417 CALC BMI ABV UP PARAM F/U: HCPCS | Performed by: INTERNAL MEDICINE

## 2020-09-23 PROCEDURE — 99214 OFFICE O/P EST MOD 30 MIN: CPT | Performed by: INTERNAL MEDICINE

## 2020-09-23 PROCEDURE — 99245 OFF/OP CONSLTJ NEW/EST HI 55: CPT | Performed by: INTERNAL MEDICINE

## 2020-09-23 PROCEDURE — 36415 COLL VENOUS BLD VENIPUNCTURE: CPT

## 2020-09-23 PROCEDURE — 86316 IMMUNOASSAY TUMOR OTHER: CPT

## 2020-09-23 RX ORDER — LANREOTIDE ACETATE 120 MG/.5ML
120 INJECTION SUBCUTANEOUS ONCE
Status: CANCELLED | OUTPATIENT
Start: 2020-11-05

## 2020-09-23 RX ORDER — M-VIT,TX,IRON,MINS/CALC/FOLIC 27MG-0.4MG
1 TABLET ORAL DAILY
Status: ON HOLD | COMMUNITY
End: 2021-01-29 | Stop reason: HOSPADM

## 2020-09-23 NOTE — PROGRESS NOTES
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 CT abdomen/pelvis 08/28/2020:  2 cm masslike density in the mid abdominal small bowel mesentery with a spiculated appearance. Multiple liver masses suspicious for metastatic disease. 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 Comment: Sections of the liver tissue cores show the hepatic parenchyma to be partially replaced by a proliferation of epithelioid cells with relatively uniform round nuclei and eosinophilic granular cytoplasm.  The   epithelioid cells form anastomosing solid cords/nests that are surrounded by delicate fibrovascular stroma.  There are no mitotic figures or tumor cell necrosis present.  The histologic changes seen are suggestive of well-differentiated neuroendocrine (carcinoid) tumor. Immunostaining for pankeratin, chromogranin, synaptophysin, TTF-1 and Ki-67 was performed on sections of one tissue block (A1) and the neoplastic epithelioid cells show diffuse and strong positivity for neuroendocrine markers (chromogranin and synaptophysin) and moderate positivity for pankeratin.  There is no staining reactivity for TTF-1. The Ki-67 proliferation labeling index is essentially negative, (very low, <1%). This staining pattern confirms the histologic impression of a well-differentiated neuroendocrine (carcinoid) tumor. FL Small bowel 09/02/2020:  Rapid small bowel transit. Referred to our clinic for further evaluation and treatment.     Review of Systems;  CONSTITUTIONAL: No fever. Flushing/sweating. Fair appetite and energy level. ENMT: Eyes: No diplopia; Nose: No epistaxis. Mouth: No sore throat. RESPIRATORY: No hemoptysis, shortness of breath, cough. CARDIOVASCULAR: No chest pain, palpitations. GASTROINTESTINAL: + abdominal pain and diarrhea. GENITOURINARY: No dysuria, urinary frequency, hematuria. NEURO: No syncope, presyncope, headache. Remainder:  ROS NEGATIVE    Past Medical History:      Diagnosis Date    Abdominal pain     Acute Crohn's disease ([address]     Hypocalcemia     Hypothyroidism     Irritable bowel syndrome     Migraines     Status post alcohol detoxification     5/22/2018-5/29/2018     Past Surgical History:      Procedure Laterality Date    CHOLECYSTECTOMY, LAPAROSCOPIC  07/06/2016    COLONOSCOPY N/A 6/22/2018    COLONOSCOPY WITH BIOPSY performed by Toshia Dutta MD at [address] [facility] CT NEEDLE BIOPSY LIVER PERCUTANEOUS  9/1/2020    CT NEEDLE BIOPSY LIVER PERCUTANEOUS 9/1/2020 [facility] CT    PARATHYROID GLAND SURGERY      THYROIDECTOMY       Family History:  Family History   Problem Relation Age of Onset    High Blood Pressure Mother     High Cholesterol Mother     Other Mother         migraine headaches    Heart Disease Father     Asthma Father     High Blood Pressure Father     Diabetes Other         GRANDMOTHER    Lung Cancer Other         GRANDFATHER    Alzheimer's Disease Other         GRANDMOTHER     Medications:  Reviewed and reconciled.     Social History:  Social History     Socioeconomic History    Marital status: Single     Spouse name: Not on file    Number of children: Not on file    Years of education: Not on file    Highest education level: Not on file   Occupational History    Occupation: cleaning     Employer: the [address] [place] [address] Financial resource strain: Not on file    Food insecurity     Worry: Not on file     Inability: Not on file   [employer] needs Medical: Not on file     Non-medical: Not on file   Tobacco Use    Smoking status: Current Every Day Smoker     Packs/day: 0.50    Smokeless tobacco: Never Used   Substance and Sexual Activity    Alcohol use: No     Alcohol/week: 0.0 standard drinks     Comment: 4 weeks sober post detox    Drug use: No    Sexual activity: Yes     Partners: Male     Birth control/protection: I.U.D. Lifestyle    Physical activity     Days per week: Not on file     Minutes per session: Not on file    Stress: Not on file   Relationships    Social connections     Talks on phone: Not on file     Gets together: Not on file     Attends Voodoo service: Not on file     Active member of club or organization: Not on file     Attends meetings of clubs or organizations: Not on file     Relationship status: Not on file    Intimate partner violence     Fear of current or ex partner: Not on file     Emotionally abused: Not on file     Physically abused: Not on file     Forced sexual activity: Not on file   Other Topics Concern    Not on file   Social History Narrative    Not on file     Allergies: Allergies   Allergen Reactions    Codeine Nausea And Vomiting     Physical Exam:  BP (!) 142/82   Pulse 86   Temp 97.9 °F (36.6 °C)   Ht 5' (1.524 m)   SpO2 98%   BMI 28.32 kg/m²   GENERAL: Alert, oriented x 3, not in acute distress. HEENT: PERRLA; EOMI. Oropharynx clear. NECK: Supple. Without lymphadenopathy. LUNGS: Good air entry bilaterally. No wheezing, crackles or ronchi. CARDIOVASCULAR: Regular rate. No murmurs, rubs or gallops. ABDOMEN: Soft. Non-tender, non-distended. Positive bowel sounds. EXTREMITIES: Without clubbing, cyanosis, or edema. NEUROLOGIC: No focal deficits.    ECOG PS 1    Impression/Plan:  31 y/o female with metastatic well differentiated neuroendocrine carcinoma to liver    CT abdomen/pelvis 08/28/2020:  2 cm masslike density in the mid abdominal small bowel mesentery with a spiculated

## 2020-09-23 NOTE — PROGRESS NOTES
Carolinakenneth Carpenter  1985 30 y.o. Referring Physician: Dr Constantin Davidson    PCP: [employer],     Vitals:    20 1502   BP: (!) 142/82   Pulse: 86   Temp: 97.9 °F (36.6 °C)   SpO2: 98%        Wt Readings from Last 3 Encounters:   20 145 lb (65.8 kg)   20 152 lb (68.9 kg)   09/10/20 152 lb 3.2 oz (69 kg)        Body mass index is 28.32 kg/m². Chief Complaint: No chief complaint on file. Cancer Staging  No matching staging information was found for the patient. Prior Radiation Therapy? NO    Concurrent Chemo/radiation? NO    Prior Chemotherapy? NO    Prior Hormonal Therapy? NO    Head and Neck Cancer? No, patient does NOT have HN cancer. LMP: 2 weeks ago-mirena    Age at first Menses: 16    : 2    Para: 2          Current Outpatient Medications:     Multiple Vitamins-Minerals (THERAPEUTIC MULTIVITAMIN-MINERALS) tablet, Take 1 tablet by mouth daily, Disp: , Rfl:     HYDROcodone-acetaminophen (NORCO) 5-325 MG per tablet, Take 1 tablet by mouth every 6 hours as needed for Pain for up to 2 days. Intended supply: 3 days.  Take lowest dose possible to manage pain, Disp: 8 tablet, Rfl: 0    pantoprazole (PROTONIX) 40 MG tablet, Take 1 tablet by mouth every morning (before breakfast), Disp: 30 tablet, Rfl: 2    butalbital-acetaminophen-caffeine (FIORICET, ESGIC) -40 MG per tablet, Take 1 tablet by mouth daily as needed for Headaches, Disp: , Rfl:     topiramate (TOPAMAX SPRINKLE) 25 MG capsule, Take 25 mg by mouth daily, Disp: , Rfl:     ondansetron (ZOFRAN) 4 MG tablet, Take 4 mg by mouth every 8 hours as needed for Nausea or Vomiting, Disp: , Rfl:     zolpidem (AMBIEN) 10 MG tablet, Take by mouth nightly as needed for Sleep., Disp: , Rfl:     Calcium Carb-Cholecalciferol (CALCIUM-VITAMIN D) 500-200 MG-UNIT per tablet, Take 1 tablet by mouth 2 times daily (with meals), Disp: , Rfl:     levothyroxine (SYNTHROID) 112 MCG tablet, Take 112 mcg by mouth Daily, Disp: , Rfl:     aluminum & magnesium hydroxide-simethicone (MYLANTA) 200-200-20 MG/5ML SUSP suspension, Take 5 mLs by mouth every 6 hours as needed for Indigestion, Disp: , Rfl:     Geronimo Carb-Mag Hydrox-Simeth 800-270-80 MG/10ML SUSP, Take 20 mLs by mouth every 8 hours, Disp: , Rfl:     sucralfate (CARAFATE) 1 GM/10ML suspension, Take 10 mLs by mouth 4 times daily, Disp: 1200 mL, Rfl: 3    ondansetron (ZOFRAN-ODT) 4 MG disintegrating tablet, Take 1 tablet by mouth 3 times daily as needed for Nausea or Vomiting, Disp: 21 tablet, Rfl: 0    dicyclomine (BENTYL) 10 MG capsule, Take 2 capsules by mouth 4 times daily (before meals and nightly) for 20 doses, Disp: 40 capsule, Rfl: 0       Past Medical History:   Diagnosis Date    Abdominal pain     Acute [address]'s [facility] ([facility] [address]     Cancer ([facility]     Hypocalcemia     Hypothyroidism     Irritable bowel syndrome     Migraines     Status post alcohol detoxification     5/22/2018-5/29/2018       Past Surgical History:   Procedure Laterality Date    CHOLECYSTECTOMY, LAPAROSCOPIC  07/06/2016    COLONOSCOPY N/A 6/22/2018    COLONOSCOPY WITH BIOPSY performed by Lenard Cahcon MD at [id number] [facility] CT NEEDLE BIOPSY LIVER PERCUTANEOUS  9/1/2020    CT NEEDLE BIOPSY LIVER PERCUTANEOUS 9/1/2020 [facility] CT    PARATHYROID GLAND SURGERY      THYROIDECTOMY         Family History   Problem Relation Age of Onset    High Blood Pressure Mother     High Cholesterol Mother     Other Mother         migraine headaches    Heart Disease Father     Asthma Father     High Blood Pressure Father     Cancer Father         Sarcoidosis    Diabetes Other         GRANDMOTHER    Lung Cancer Other         GRANDFATHER    Alzheimer's Disease Other         GRANDMOTHER    Breast Cancer Maternal Grandmother     Cancer Maternal Grandmother         skin    Cancer Paternal Grandfather         lung       Social History     Socioeconomic History    Marital status: Single     Spouse name: Not on file    Number of children: Not on file    Years of education: Not on file    Highest education level: Not on file   Occupational History    Occupation: cleaning     Employer: the [employer] Financial resource strain: Not on file    Food insecurity     Worry: Not on file     Inability: Not on file   [employer] needs     Medical: Not on file     Non-medical: Not on file   Tobacco Use    Smoking status: Current Every Day Smoker     Packs/day: 0.50    Smokeless tobacco: Never Used   Substance and Sexual Activity    Alcohol use: No     Alcohol/week: 0.0 standard drinks     Comment: 4 weeks sober post detox    Drug use: No    Sexual activity: Yes     Partners: Male     Birth control/protection: I.U.D. Lifestyle    Physical activity     Days per week: Not on file     Minutes per session: Not on file    Stress: Not on file   Relationships    Social connections     Talks on phone: Not on file     Gets together: Not on file     Attends Rastafarian service: Not on file     Active member of club or organization: Not on file     Attends meetings of clubs or organizations: Not on file     Relationship status: Not on file    Intimate partner violence     Fear of current or ex partner: Not on file     Emotionally abused: Not on file     Physically abused: Not on file     Forced sexual activity: Not on file   Other Topics Concern    Not on file   Social History Narrative    Not on file           Occupation: [employer]  Retired:  NO          REVIEW OF SYSTEMS: <<For Level 5, 10 or more systems>>     Pacemaker/Defibulator/ICD:  No    Mediport: No           FALLS RISK SCREENING ASSESSMENT    Instructions:  Assess the patient and [place] the appropriate indicators that are present for fall risk identification. Total the numbers circled and assign a fall risk score from Table 2.  Reassess patient at a minimum every 12 weeks or with status change. Assessment   Date  9/23/2020     1. when performing patient care activities  7. Educate patient/family/caregiver on falls prevention  8. Falls risk precaution (Yellow sticker Level III) placed on patient chart           MALNUTRITION RISK SCREENING ASSESSMENT    Instructions:  Assess the patient and enter the appropriate indicators that are present for nutrition risk identification. Total the numbers entered and assign a risk score. Follow the appropriate action for total score listed below. Assessment   Date  9/23/2020     1. Have you lost weight without trying? 0- No     2. Have you been eating poorly because of a decreased appetite? 1- Yes   3. Do you have a diagnosis of head and neck cancer? 0- No                                                                                    TOTAL 1        Score of 0-1: No action  Score 2 or greater:  · For Non-Diabetic Patient: Recommend adding Ensure Enlive 2 x daily and provide patient with Ensure wellness bag with coupons  · For Diabetic Patient: Recommend adding Glucerna Shake 2 x daily and provide patient with Glucerna Wellness bag with coupons  · Route to the dietitian via [employer]    · Are you having  difficulty performing daily routine tasks  due to fatigue or weakness (ie: bathing/showering, dressing, housework, meal prep, work, child Nanine Lav):  Yes     · Do you have any arm flexibility/ROM restrictions, swelling or pain that limit activity: No     · Any changes in memory, attention/focus that impact daily activities: No     · Do you avoid participation in leisure/social activity due weakness, fatigue or pain: No     ARE ANY OF THE ABOVE ARE ANSWERED YES: Yes - but NO OT referral request sent due to patient refusal.          PT ASSESSMENT FOR REFERRAL    · Have you had any recent falls in past 2 months: No     · Do you have difficulty  going up/down stairs: No     · Are you having difficulty walking: No     · Do you often hold onto furniture/environmental supports or feel off balance when you are walking: No     · Do you need to take rest breaks when you are walking: No     · Any pain on scale of 1-10 that limits your mobility: Yes 7/10-abdominal pain    ARE ANY OF THE ABOVE ARE ANSWERED YES: Yes - but NO PT referral request sent due to patient refusal.               Joseph Jerry    - Is patient planned to receive Cisplatin? No. This patient is not planned to start Cisplatin. - Is patient complaining of new onset hearing loss? No. Patient is not complaining of new onset hearing loss.         Abilio Acuña

## 2020-09-24 ENCOUNTER — TELEPHONE (OUTPATIENT)
Dept: CASE MANAGEMENT | Age: 35
End: 2020-09-24

## 2020-09-24 NOTE — TELEPHONE ENCOUNTER
Met with patient during her initial consultation with Dr. Kalyn Rodriguez for her recent Metastatic Neuroendocrine cancer diagnosis. Introduced myself and explained my role with patients receiving treatment at our center. Patient was friendly and receptive. She states she has a lot of pain issues in her abdomen. She was in the ER on Tuesday due to the pain. Instructed on next steps including referral to gastroenterology, scans, referrals to Dr Torie Benton and Palliative Medicine and Lanreotide injections treatment monthly per Dr. Koby Bean recommendations and follow up care. Provided with Chemo Care handout on Lanreotide. Reviewed resources available including Social Work, Dietician and Financial Navigator. Provided with my contact information and instructed patient to call me with questions or concerns. Verbalizes understanding. Patient appreciative of visit. Will continue to follow.

## 2020-09-25 ENCOUNTER — TELEPHONE (OUTPATIENT)
Dept: INFUSION THERAPY | Age: 35
End: 2020-09-25

## 2020-09-25 NOTE — TELEPHONE ENCOUNTER
Pt called and requesting something different for pain, she is currently taking tramadol and its not helping and keeps throwing it back up. Please advise.

## 2020-09-25 NOTE — TELEPHONE ENCOUNTER
Patient phoned in with c/o pain in \"right lower side and (her) whole back\". Rates 9/10. She states she's been taking tramadol but it hasn't helped and it makes her nauseated with vomiting. She asked if Dr. Randall Diehl could give her something different for pain. Dr. Randall Diehl notified and recommends that patient go to ED. Called patient back and informed her of Doctor's recommendation to go to ED. Patient verbalized understanding.

## 2020-09-25 NOTE — TELEPHONE ENCOUNTER
Continued Stay Note  [facility]     Patient Name: Baldemar Wellington  MRN: [id number]  Today's Date: 10/3/2019    Admit Date: 9/24/2019    Discharge Plan     Row Name 10/03/19 1501       Plan    Plan Comments  [facility] consult pending to eval for scattered bed. CCP to follow. Meghan Knight LCSW        Discharge Codes    No documentation.             Margaret Knight LCSW     Updated Dr. Yazmin Scanlon, that patient called in stating \"The Ultram was ineffective for pain and had caused nausea/vomiting. That her pain is in the right, lower side and whole back, 9 out of 10 and she is allergic to Codeine and is requesting something called into [facility]. \" Per Dr. Porter Mount is to go to the ER. \" Updated [facility], who voiced understanding.

## 2020-09-27 LAB
5 HIAA URINE (PER GM CREAT): 21 MG/GCR (ref 0–14)
5-HIAA 24 HOUR URINE: ABNORMAL MG/D (ref 0–15)
5-HIAA INTERPRETATION: ABNORMAL
5-HIAA URINE: 85.2 MG/L
CREATININE 24 HOUR URINE: ABNORMAL MG/D (ref 700–1600)
CREATININE URINE: 414 MG/DL
HOURS COLLECTED: ABNORMAL
URINE TOTAL VOLUME: ABNORMAL

## 2020-09-28 LAB — CHROMOGRANIN A: 160 NG/ML (ref 0–103)

## 2020-09-28 RX ORDER — HYDROCODONE BITARTRATE AND ACETAMINOPHEN 5; 325 MG/1; MG/1
1 TABLET ORAL EVERY 8 HOURS PRN
Qty: 42 TABLET | Refills: 0 | Status: SHIPPED
Start: 2020-09-28 | End: 2020-10-13 | Stop reason: SDUPTHER

## 2020-09-29 ENCOUNTER — TELEPHONE (OUTPATIENT)
Dept: PRIMARY CARE CLINIC | Age: 35
End: 2020-09-29

## 2020-09-29 NOTE — TELEPHONE ENCOUNTER
The pt received a jury duty summons for October 26, she is calling to see if she can get a letter excusing her from it because she was just diagnosed with cancer and will be starting treatment

## 2020-09-30 ENCOUNTER — TELEPHONE (OUTPATIENT)
Dept: PALLATIVE CARE | Age: 35
End: 2020-09-30

## 2020-09-30 NOTE — TELEPHONE ENCOUNTER
Called and spoke with Morgan Manzano. Explained Palliative Medicine. Offered an appointment tomorrow but patient does not want to come to [facility] [place]. Offered Monday 10/5 but patient has a procedure scheduled. Appointment set Tuesday 10/13.

## 2020-10-01 RX ORDER — ZOLPIDEM TARTRATE 10 MG/1
TABLET ORAL
Qty: 30 TABLET | Refills: 1 | Status: SHIPPED
Start: 2020-10-01 | End: 2020-12-01

## 2020-10-02 ENCOUNTER — OFFICE VISIT (OUTPATIENT)
Dept: HEMATOLOGY | Age: 35
End: 2020-10-02
Payer: COMMERCIAL

## 2020-10-02 VITALS
OXYGEN SATURATION: 97 % | BODY MASS INDEX: 29.8 KG/M2 | TEMPERATURE: 97.8 F | SYSTOLIC BLOOD PRESSURE: 121 MMHG | HEART RATE: 80 BPM | WEIGHT: 151.8 LBS | DIASTOLIC BLOOD PRESSURE: 70 MMHG | HEIGHT: 60 IN | RESPIRATION RATE: 16 BRPM

## 2020-10-02 PROCEDURE — 99204 OFFICE O/P NEW MOD 45 MIN: CPT | Performed by: TRANSPLANT SURGERY

## 2020-10-02 PROCEDURE — G8427 DOCREV CUR MEDS BY ELIG CLIN: HCPCS | Performed by: TRANSPLANT SURGERY

## 2020-10-02 PROCEDURE — G8484 FLU IMMUNIZE NO ADMIN: HCPCS | Performed by: TRANSPLANT SURGERY

## 2020-10-02 PROCEDURE — 99202 OFFICE O/P NEW SF 15 MIN: CPT | Performed by: TRANSPLANT SURGERY

## 2020-10-02 PROCEDURE — 1111F DSCHRG MED/CURRENT MED MERGE: CPT | Performed by: TRANSPLANT SURGERY

## 2020-10-02 PROCEDURE — 4004F PT TOBACCO SCREEN RCVD TLK: CPT | Performed by: TRANSPLANT SURGERY

## 2020-10-02 PROCEDURE — G8417 CALC BMI ABV UP PARAM F/U: HCPCS | Performed by: TRANSPLANT SURGERY

## 2020-10-02 ASSESSMENT — ENCOUNTER SYMPTOMS
VOMITING: 0
NAUSEA: 0
BLOOD IN STOOL: 0
ABDOMINAL PAIN: 1
SHORTNESS OF BREATH: 0
CONSTIPATION: 0
DIARRHEA: 1
PHOTOPHOBIA: 0
EYE PAIN: 0
BACK PAIN: 0
EYE DISCHARGE: 0

## 2020-10-02 NOTE — PROGRESS NOTES
Hepatobiliary and Pancreatic Surgery Attending History and Physical    Patient's Name/Date of Birth: Demetrius Wang /1985 (30 y.o.)    Date: October 2, 2020     CC: Metastatic neuroendocrine tumor    HPI:  Patient is a very pleasant and unfortunate 30year old female whom has been having gastro intestinal issues for years. She has been having bouts of diarrhea about 3-4times a day that is watery. She has a previous history of alcoholism but has stopped drinking. She has had a cholecystectomy in the past along with a parathyroidectomy. She had recent imaging which showed a neuroendocrine tumor in the small bowel mesentery along with metastatic lesions to her liver. The liver pathology showed a well differentiated neuroendocrine tumor of the liver. Past Medical History:   Diagnosis Date    Abdominal pain     Acute Crohn's disease ([address]     Cancer ([facility] [address] [facility]     Hypocalcemia     Hypothyroidism     Irritable bowel syndrome     Migraines     Status post alcohol detoxification     5/22/2018-5/29/2018       Past Surgical History:   Procedure Laterality Date    CHOLECYSTECTOMY, LAPAROSCOPIC  07/06/2016    COLONOSCOPY N/A 6/22/2018    COLONOSCOPY WITH BIOPSY performed by Xiomy Webster MD at [address] NEEDLE BIOPSY LIVER PERCUTANEOUS  9/1/2020    CT NEEDLE BIOPSY LIVER PERCUTANEOUS 9/1/2020 [facility] CT    PARATHYROID GLAND SURGERY      THYROIDECTOMY         Current Outpatient Medications   Medication Sig Dispense Refill    zolpidem (AMBIEN) 10 MG tablet take 1 tablet by mouth at bedtime if needed for sleep 30 tablet 1    HYDROcodone-acetaminophen (NORCO) 5-325 MG per tablet Take 1 tablet by mouth every 8 hours as needed for Pain for up to 14 days.  Intended supply: 30 days 42 tablet 0    Multiple Vitamins-Minerals (THERAPEUTIC MULTIVITAMIN-MINERALS) tablet Take 1 tablet by mouth daily      ondansetron (ZOFRAN-ODT) 4 MG disintegrating tablet Take 1 tablet by mouth 3 times daily as needed for Nausea or Vomiting 21 tablet 0    pantoprazole (PROTONIX) 40 MG tablet Take 1 tablet by mouth every morning (before breakfast) 30 tablet 2    butalbital-acetaminophen-caffeine (FIORICET, ESGIC) -40 MG per tablet Take 1 tablet by mouth daily as needed for Headaches      topiramate (TOPAMAX SPRINKLE) 25 MG capsule Take 25 mg by mouth daily      ondansetron (ZOFRAN) 4 MG tablet Take 4 mg by mouth every 8 hours as needed for Nausea or Vomiting      Calcium Carb-Cholecalciferol (CALCIUM-VITAMIN D) 500-200 MG-UNIT per tablet Take 1 tablet by mouth 2 times daily (with meals)      levothyroxine (SYNTHROID) 112 MCG tablet Take 112 mcg by mouth Daily      aluminum & magnesium hydroxide-simethicone (MYLANTA) 200-200-20 MG/5ML SUSP suspension Take 5 mLs by mouth every 6 hours as needed for Indigestion      Geronimo Carb-Mag Hydrox-Simeth 800-270-80 MG/10ML SUSP Take 20 mLs by mouth every 8 hours      sucralfate (CARAFATE) 1 GM/10ML suspension Take 10 mLs by mouth 4 times daily 1200 mL 3    dicyclomine (BENTYL) 10 MG capsule Take 2 capsules by mouth 4 times daily (before meals and nightly) for 20 doses 40 capsule 0     No current facility-administered medications for this visit.         Allergies   Allergen Reactions    Codeine Nausea And Vomiting       Family History   Problem Relation Age of Onset    High Blood Pressure Mother     High Cholesterol Mother     Other Mother         migraine headaches    Heart Disease Father     Asthma Father     High Blood Pressure Father     Cancer Father         Sarcoidosis    Diabetes Other         GRANDMOTHER    Lung Cancer Other         GRANDFATHER    Alzheimer's Disease Other         GRANDMOTHER    Breast Cancer Maternal Grandmother     Cancer Maternal Grandmother         skin    Cancer Paternal Grandfather         lung       Social History     Socioeconomic History    Marital status: Single     Spouse name: Not on file    Number of children: Not on file  Years of education: Not on file    Highest education level: Not on file   Occupational History    Occupation: cleaning     Employer: [employer] Financial resource strain: Not on file    Food insecurity     Worry: Not on file     Inability: Not on file   [employer] needs     Medical: Not on file     Non-medical: Not on file   Tobacco Use    Smoking status: Current Every Day Smoker     Packs/day: 0.50    Smokeless tobacco: Never Used   Substance and Sexual Activity    Alcohol use: No     Alcohol/week: 0.0 standard drinks     Comment: 4 weeks sober post detox    Drug use: No    Sexual activity: Yes     Partners: Male     Birth control/protection: I.U.D. Lifestyle    Physical activity     Days per week: Not on file     Minutes per session: Not on file    Stress: Not on file   Relationships    Social connections     Talks on phone: Not on file     Gets together: Not on file     Attends Restoration service: Not on file     Active member of club or organization: Not on file     Attends meetings of clubs or organizations: Not on file     Relationship status: Not on file    Intimate partner violence     Fear of current or ex partner: Not on file     Emotionally abused: Not on file     Physically abused: Not on file     Forced sexual activity: Not on file   Other Topics Concern    Not on file   Social History Narrative    Not on file       ROS:   Review of Systems   Constitutional: Negative for chills, diaphoresis and fever. HENT: Negative for congestion, ear discharge, ear pain, hearing loss, nosebleeds and tinnitus. Eyes: Negative for photophobia, pain and discharge. Respiratory: Negative for shortness of breath. Cardiovascular: Negative for palpitations and leg swelling. Gastrointestinal: Positive for abdominal pain and diarrhea. Negative for blood in stool, constipation, nausea and vomiting. Endocrine: Negative for polydipsia.    Genitourinary: Negative for frequency, hematuria and urgency. Musculoskeletal: Negative for back pain and neck pain. Skin: Negative for rash. Allergic/Immunologic: Negative for environmental allergies. Neurological: Negative for tremors and seizures. Psychiatric/Behavioral: Negative for hallucinations and suicidal ideas. The patient is not nervous/anxious. Physical Exam:  /70 (Site: Right Upper Arm, Position: Sitting, Cuff Size: Medium Adult)   Pulse 80   Temp 97.8 °F (36.6 °C) (Temporal)   Resp 16   Ht 5' (1.524 m)   Wt 151 lb 12.8 oz (68.9 kg)   SpO2 97%   BMI 29.65 kg/m²     PSYCH: mood and affect normal, alert and oriented x 3  CONSTITUTIONAL: No apparent distress, comfortable  EYES: Sclera white, pupils equal round and reactive to light  ENMT:  Hearing normal, trachea midline, ears externally intact  LYMPH: no lympadenopathy in neck. Nolympadenopathy in groins  RESP: Breath sounds were clear and equal with no rales, wheezes, or rhonchi. Respiratory effort was normal with no retractions or use of accessory muscles. CV: Heart soundswere normal with a regular rate and rhythm. 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 39 Minutes of which greater than 50% was spent counseling or coordinating her care.     Thank you for the consultation allowing me to take part in Ms. Olmstead's care.      Myra Claude M.D.  10/2/2020  8:16 AM

## 2020-10-08 ENCOUNTER — OFFICE VISIT (OUTPATIENT)
Dept: PAIN MANAGEMENT | Age: 35
End: 2020-10-08
Payer: COMMERCIAL

## 2020-10-08 VITALS
OXYGEN SATURATION: 100 % | BODY MASS INDEX: 27.88 KG/M2 | HEART RATE: 102 BPM | HEIGHT: 60 IN | WEIGHT: 142 LBS | RESPIRATION RATE: 16 BRPM | TEMPERATURE: 96.4 F | SYSTOLIC BLOOD PRESSURE: 118 MMHG | DIASTOLIC BLOOD PRESSURE: 74 MMHG

## 2020-10-08 PROCEDURE — 99204 OFFICE O/P NEW MOD 45 MIN: CPT | Performed by: ANESTHESIOLOGY

## 2020-10-08 PROCEDURE — 99203 OFFICE O/P NEW LOW 30 MIN: CPT | Performed by: ANESTHESIOLOGY

## 2020-10-08 PROCEDURE — G8484 FLU IMMUNIZE NO ADMIN: HCPCS | Performed by: ANESTHESIOLOGY

## 2020-10-08 PROCEDURE — 4004F PT TOBACCO SCREEN RCVD TLK: CPT | Performed by: ANESTHESIOLOGY

## 2020-10-08 PROCEDURE — G8427 DOCREV CUR MEDS BY ELIG CLIN: HCPCS | Performed by: ANESTHESIOLOGY

## 2020-10-08 PROCEDURE — G8417 CALC BMI ABV UP PARAM F/U: HCPCS | Performed by: ANESTHESIOLOGY

## 2020-10-08 RX ORDER — DICYCLOMINE HCL 20 MG
TABLET ORAL
Status: ON HOLD | COMMUNITY
Start: 2020-10-05 | End: 2021-01-29 | Stop reason: HOSPADM

## 2020-10-08 RX ORDER — GABAPENTIN 300 MG/1
CAPSULE ORAL
Qty: 90 CAPSULE | Refills: 2 | Status: SHIPPED
Start: 2020-10-08 | End: 2021-01-11 | Stop reason: SDUPTHER

## 2020-10-08 RX ORDER — FAMOTIDINE 40 MG/1
TABLET, FILM COATED ORAL
COMMUNITY
Start: 2020-10-05 | End: 2021-01-21

## 2020-10-08 NOTE — PROGRESS NOTES
Patient:  Catrachito Reyes,  1985  Date of Service:  10/8/20      Do you currently have any of the following:    Fever: No  Headache:  No  Cough: No  Shortness of breath: No  Exposed to anyone with these symptoms: No       Patient presents with complaints of belly button ,lower back/shoulders  pain that started 3 years ago and has been getting worse. She states the pain began following neuroendocrine cancer     Pain is constant and is described as aching, throbbing, shooting, stabbing and burning. She rates the pain as a 10/10 on her worst day , 4/10 on her best day, and a 6/10 on average on the VAS scale. Pain does radiate to upper/lower  back  She  does not have numbness, tingling, weakness of the     Alleviating factors include: heat. Aggravating factors include:  movement, lifting. She states that the pain does keep her from sleeping at night. She took her last dose of Norco     She is not on NSAIDS and  is not on anticoagulation medications to include none and is managed by     Previous treatments:     Personal Expectations from this treatment: decrease pain     /74   Pulse 102   Temp 96.4 °F (35.8 °C) (Oral)   Resp 16   Ht 5' (1.524 m)   Wt 142 lb (64.4 kg)   SpO2 100%   BMI 27.73 kg/m²     No LMP recorded. Patient has had an implant.

## 2020-10-08 NOTE — PROGRESS NOTES
[facility] Pain Management        [address] [facility], [address]  Dept: [phone]          Consult Note      Patient:  Jose Puente DOB 1985    Date of Service:  10/08/20     Requesting Physician:  Belen Buerger, DO    Reason for Consult:      Patient presents with complaints of chronic abdominal pain     HISTORY OF PRESENT ILLNESS:      Ms. Jose Puente is a 30 y.o. female presented today to [address] for evaluation of abdominal pain and low back pain. She has bene diagnosed with neuroendocrine tumor and is scheduled for small bowel resection in few weeks. Has been followed by oncology and Gen surgery. Has chronic abdominal issues - abdominal pain/ diarrhea/ nausea. H/o Alcoholism in the past- went through rehab- sober for over 4 yrs. Abdominal pain mainly periumbilical and radiates to the right side to flank and back. No LE weakness or numbness of the LE. Pain is constant and is described as aching and throbbing. Alleviating factors include: rest.  Aggravating factors include: movement, bending. Pain causes functional limitations/ limits Adl's : Yes    Nursing notes and details of the pain history reviewed. Please see intake notes for details. Previous treatments:    Physical Therapy : no     Medications: - NSAID's : yes             - Membrane stabilizers : no            - Opioids : yes, short course of hydrocodone            - Adjuvants or Others : yes    TENS Unit: no    Surgeries: no spine surgeries    Interventional Pain procedures/ nerve blocks: no      She has not been on anticoagulation medications       She has not been on herbal supplements. She is not diabetic. H/O Smoking: yes  H/O alcohol abuse : yes: went through rehab and currently sober  H/O Illicit drug use : denies    Imaging:     PET CT: 10/14/2020 ( late entry):   Impression    1.  Numerous lesions throughout the liver compatible with    neuroendocrine tumor 2.  To small regions of tracer uptake along the wall and/or mesentery    of the small bowel at the level of the jejunum    3.  No other convincing evidence for extra-abdominal metastatic    disease        CT abdomen: 9/22/2020: Impression    Persistent nearly 2.1 x 1.7 cm spiculated mesenteric mass with the    tethering to the small bowel loops with adjacent edema/enteritis in    the small bowel loops. Inflammatory process like fibrosing    mesenteritis or malignancy like carcinoid tumor or lymphoma are    considered.         Persistent slightly progressive multiple hepatic lesions concerning    for hepatic metastasis. Past Medical History:   Diagnosis Date    Abdominal pain     Acute [facility]'s disease ([facility]     Cancer ([facility]     Hypocalcemia     Hypothyroidism     Irritable bowel syndrome     Migraines     Status post alcohol detoxification     5/22/2018-5/29/2018       Past Surgical History:   Procedure Laterality Date    CHOLECYSTECTOMY, LAPAROSCOPIC  07/06/2016    COLONOSCOPY N/A 6/22/2018    COLONOSCOPY WITH BIOPSY performed by Rebeca Pires MD at [address] NEEDLE BIOPSY LIVER PERCUTANEOUS  9/1/2020    CT NEEDLE BIOPSY LIVER PERCUTANEOUS 9/1/2020 [facility] CT    PARATHYROID GLAND SURGERY      THYROIDECTOMY         Prior to Admission medications    Medication Sig Start Date End Date Taking? Authorizing Provider   dicyclomine (BENTYL) 20 MG tablet take 1 tablet by mouth twice a day 10/5/20  Yes Historical Provider, MD   famotidine (PEPCID) 40 MG tablet take 1 tablet by mouth once daily 10/5/20  Yes Historical Provider, MD   zolpidem (AMBIEN) 10 MG tablet take 1 tablet by mouth at bedtime if needed for sleep 10/1/20 10/31/20 Yes Shirley Richardson, DO   HYDROcodone-acetaminophen (NORCO) 5-325 MG per tablet Take 1 tablet by mouth every 8 hours as needed for Pain for up to 14 days.  Intended supply: 30 days 9/28/20 10/12/20 Yes Shirley Richardson, DO   Multiple Vitamins-Minerals (THERAPEUTIC MULTIVITAMIN-MINERALS) tablet Take 1 tablet by mouth daily   Yes Historical Provider, MD   ondansetron (ZOFRAN-ODT) 4 MG disintegrating tablet Take 1 tablet by mouth 3 times daily as needed for Nausea or Vomiting 9/22/20  Yes Michele Gonzalez,    butalbital-acetaminophen-caffeine (FIORICET, ESGIC) -40 MG per tablet Take 1 tablet by mouth daily as needed for Headaches   Yes Historical Provider, MD   topiramate (TOPAMAX SPRINKLE) 25 MG capsule Take 25 mg by mouth daily   Yes Historical Provider, MD   ondansetron (ZOFRAN) 4 MG tablet Take 4 mg by mouth every 8 hours as needed for Nausea or Vomiting   Yes Historical Provider, MD   Calcium Carb-Cholecalciferol (CALCIUM-VITAMIN D) 500-200 MG-UNIT per tablet Take 1 tablet by mouth 2 times daily (with meals)   Yes Historical Provider, MD   levothyroxine (SYNTHROID) 112 MCG tablet Take 112 mcg by mouth Daily   Yes Historical Provider, MD   aluminum & magnesium hydroxide-simethicone (MYLANTA) 200-200-20 MG/5ML SUSP suspension Take 5 mLs by mouth every 6 hours as needed for Indigestion   Yes Historical Provider, MD   Geronimo Carb-Mag Hydrox-Simeth 800-270-80 MG/10ML SUSP Take 20 mLs by mouth every 8 hours   Yes Historical Provider, MD   sucralfate (CARAFATE) 1 GM/10ML suspension Take 10 mLs by mouth 4 times daily 8/20/20  Yes Jaime Belle, DO       Allergies   Allergen Reactions    Codeine Nausea And Vomiting       Social History     Socioeconomic History    Marital status: Single     Spouse name: Not on file    Number of children: Not on file    Years of education: Not on file    Highest education level: Not on file   Occupational History    Occupation: cleaning     Employer: [employer] [address] [place] [address] Dr Needs    Financial resource strain: Not on file    Food insecurity     Worry: Not on file     Inability: Not on file    Transportation needs     Medical: Not on file     Non-medical: Not on file   Tobacco Use    Smoking status: Current Every Day Smoker     Packs/day: 0.50    Smokeless tobacco: Never Used   Substance and Sexual Activity    Alcohol use: No     Alcohol/week: 0.0 standard drinks     Comment: 4 weeks sober post detox    Drug use: No    Sexual activity: Yes     Partners: Male     Birth control/protection: I.U.D. Lifestyle    Physical activity     Days per week: Not on file     Minutes per session: Not on file    Stress: Not on file   Relationships    Social connections     Talks on phone: Not on file     Gets together: Not on file     Attends Taoism service: Not on file     Active member of club or organization: Not on file     Attends meetings of clubs or organizations: Not on file     Relationship status: Not on file    Intimate partner violence     Fear of current or ex partner: Not on file     Emotionally abused: Not on file     Physically abused: Not on file     Forced sexual activity: Not on file   Other Topics Concern    Not on file   Social History Narrative    Not on file       Family History   Problem Relation Age of Onset    High Blood Pressure Mother     High Cholesterol Mother     Other Mother         migraine headaches    Heart Disease Father     Asthma Father     High Blood Pressure Father     Cancer Father         Sarcoidosis    Diabetes Other         GRANDMOTHER    Lung Cancer Other         GRANDFATHER    Alzheimer's Disease Other         GRANDMOTHER    Breast Cancer Maternal Grandmother     Cancer Maternal Grandmother         skin    Cancer Paternal Grandfather         lung       REVIEW OF SYSTEMS:     Patient specifically denies fever/chills, chest pain, shortness of breath, new bowel or bladder complaints. All other review of systems was negative. Review of Systems - documented din the intake notes reviewed.     PHYSICAL EXAMINATION:      /74   Pulse 102   Temp 96.4 °F (35.8 °C) (Oral)   Resp 16   Ht 5' (1.524 m)   Wt 142 lb (64.4 kg)   SpO2 100%   BMI 27.73 kg/m²     General:      General appearance:  Pleasant and well-hydrated, in no distress and A & O x 3  Build:Normal Weight  Function: Rises from seated position easily and Moves about room without difficulty    HEENT:    Head:normocephalic, atraumatic  Pupils:regular, round, equal  Sclera: icterus absent    Lungs:    Breathing:normal breathing pattern     CVS:     RRR    Abdomen:    Shape:non-distended and normal  Tenderness:+ diffusely (right side > left)  Guarding:none  Rigidity- none. Cervical spine:    Inspection:normal  Palpation:tenderness paravertebral muscles, tenderness trapezium, left, right - negative. Range of motion:Normal flexion, extension, rotation bilaterally and is not painful. Spurling's: negative bilaterally    Thoracic spine:     Spine inspection:normal   Palpation:No tenderness over the midline and paraspinal area, bilaterally  Range of motion:normal in flexion, extension rotation bilateral and is not painful. Lumbar spine:    Spine inspection: Normal   Palpation: Tenderness paravertebral muscles Yes right  Range of motion: Decreased, flexion Decreased, Lateral bending, extension and rotation bilaterally reduced is not painful.   Sacroiliac joint tenderness No bilaterally  KIAN test: negative bilaterally  Gaenslen's test:negative bilaterally   Piriformis tenderness: negative bilaterally  SLR : negative bilaterally  Trochanteric bursa tenderness: negative bilaterally  CVA tenderness:No     Musculoskeletal:    Trigger points in trapezius: No bilaterally  Trigger points in rhomboids: No bilaterally  Trigger points in Paravertebral: No bilaterally    Extremities:    Tremors:None bilaterally upper and lower  Edema:none x all 4 extremities    Knee:    ROM : no pain   Joint line tenderness : no    Neurological:    Sensory: Normal to light touch     Motor:   Right  5/5              Left  5/5               Right Bicep 5/5           Left Bicep 5/5              Right Triceps 5/5       Left Triceps 5/5          Right

## 2020-10-09 ENCOUNTER — TELEPHONE (OUTPATIENT)
Dept: HEMATOLOGY | Age: 35
End: 2020-10-09

## 2020-10-09 NOTE — TELEPHONE ENCOUNTER
Called [facility] and carmel obtained for cpt code 25654 with Den Galan with approval dates of 10/7/20-12/4/20. After coordinating with radiology department for the gallium PET, Levi Arana was able to get this patient scheduled for her PET on 10/13/20 at 7:00am at [address] [facility]. Kerri Luz spoke to the patient and made her aware of this appt. We did coordinate patients follow up with Dr. Tamara Saenz office for 10/15/20 at 3:00pm at [facility] at [address]. Did call patient and made her aware of the follow up appt.       Electronically signed by Ivan Huizar RN on 10/9/2020 at 11:12 AM

## 2020-10-10 ENCOUNTER — HOSPITAL ENCOUNTER (OUTPATIENT)
Dept: NON INVASIVE DIAGNOSTICS | Age: 35
Discharge: HOME | End: 2020-10-10
Payer: COMMERCIAL

## 2020-10-10 PROCEDURE — 93308 TTE F-UP OR LMTD: CPT

## 2020-10-12 ENCOUNTER — HOSPITAL ENCOUNTER (OUTPATIENT)
Dept: INFUSION THERAPY | Age: 35
Discharge: HOME | End: 2020-10-12

## 2020-10-13 ENCOUNTER — HOSPITAL ENCOUNTER (OUTPATIENT)
Age: 35
Discharge: HOME | End: 2020-10-15
Payer: COMMERCIAL

## 2020-10-13 ENCOUNTER — HOSPITAL ENCOUNTER (OUTPATIENT)
Dept: NUCLEAR MEDICINE | Age: 35
Discharge: HOME | End: 2020-10-15
Payer: COMMERCIAL

## 2020-10-13 ENCOUNTER — OFFICE VISIT (OUTPATIENT)
Dept: PALLATIVE CARE | Age: 35
End: 2020-10-13
Payer: COMMERCIAL

## 2020-10-13 ENCOUNTER — TELEPHONE (OUTPATIENT)
Dept: HEMATOLOGY | Age: 35
End: 2020-10-13

## 2020-10-13 VITALS
OXYGEN SATURATION: 97 % | BODY MASS INDEX: 29.1 KG/M2 | HEART RATE: 95 BPM | DIASTOLIC BLOOD PRESSURE: 91 MMHG | SYSTOLIC BLOOD PRESSURE: 125 MMHG | WEIGHT: 149 LBS

## 2020-10-13 LAB
AMPHETAMINE SCREEN, URINE: NOT DETECTED
BARBITURATE SCREEN URINE: POSITIVE
BENZODIAZEPINE SCREEN, URINE: NOT DETECTED
CANNABINOID SCREEN URINE: NOT DETECTED
COCAINE METABOLITE SCREEN URINE: NOT DETECTED
FENTANYL SCREEN, URINE: NOT DETECTED
Lab: ABNORMAL
METHADONE SCREEN, URINE: NOT DETECTED
OPIATE SCREEN URINE: NOT DETECTED
OXYCODONE URINE: NOT DETECTED
PHENCYCLIDINE SCREEN URINE: NOT DETECTED

## 2020-10-13 PROCEDURE — G0480 DRUG TEST DEF 1-7 CLASSES: HCPCS

## 2020-10-13 PROCEDURE — 78815 PET IMAGE W/CT SKULL-THIGH: CPT

## 2020-10-13 PROCEDURE — 99204 OFFICE O/P NEW MOD 45 MIN: CPT

## 2020-10-13 PROCEDURE — 3430000000 HC RX DIAGNOSTIC RADIOPHARMACEUTICAL: Performed by: RADIOLOGY

## 2020-10-13 PROCEDURE — A9587 GALLIUM GA-68: HCPCS | Performed by: RADIOLOGY

## 2020-10-13 PROCEDURE — 78815 PET IMAGE W/CT SKULL-THIGH: CPT | Performed by: RADIOLOGY

## 2020-10-13 PROCEDURE — 80307 DRUG TEST PRSMV CHEM ANLYZR: CPT

## 2020-10-13 PROCEDURE — 99203 OFFICE O/P NEW LOW 30 MIN: CPT | Performed by: STUDENT IN AN ORGANIZED HEALTH CARE EDUCATION/TRAINING PROGRAM

## 2020-10-13 RX ORDER — 68GA-DOTATATE 40 MCG
4.61 KIT INTRAVENOUS
Status: DISCONTINUED | OUTPATIENT
Start: 2020-10-13 | End: 2020-10-16 | Stop reason: HOSPADM

## 2020-10-13 RX ORDER — HYDROCODONE BITARTRATE AND ACETAMINOPHEN 5; 325 MG/1; MG/1
1 TABLET ORAL EVERY 12 HOURS PRN
Qty: 40 TABLET | Refills: 0 | Status: ON HOLD
Start: 2020-10-13 | End: 2020-10-25 | Stop reason: HOSPADM

## 2020-10-13 RX ADMIN — 68GA-DOTATATE 5 MILLICURIE: KIT INTRAVENOUS at 08:13

## 2020-10-13 NOTE — PROGRESS NOTES
Department of Palliative Medicine  Ambulatory Note  Provider: Madhu Beaulieu MD        Chief Complaint: Hua Muhammad is a 30 y.o. female with chief complaint of pain    HPI  31 y/o female with hx of hypothyroidism, IBS,migraine who was complaining of abdominal pain associated with diarrhea and flushing/sweating. 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 Elvira Olivarez   - Patient for Bowel resection on 10/21/20    Neoplasm related pain  Pain seems to be more Neuropathic in nature   - Cont titration of Gabapentin 300mg BID to TID   - Will have Norco as a back up medication, when pain is  Severe   - Appetite has been affected by pain, it fluctuates through out day    Nausea   - Continue Zofran and Phenergan PRN      - Goals of care: cure, live longer and improve or maintain function/quality of life    - Code Status: full code    Follow Up:  4 weeks. Encouraged to call with any questions, concerns, needs, or changes in symptoms. Subjective:       Hua Muhammad is a 30 y.o. female with significant medical history of neuroendocrine tumor who was referred to [address] [place] [address] by Dr. Javi Banks. She is a patient who was recently diagnosed with neuroendocrine tumor. She complains about having pain on her bellybutton, moving to her right flank upper back. She describes it as shooting electricity type pain, burning sensation. The pain lasts for 20 minutes, it is rated 10 out of 10 in intensity. The things that help it, are if she takes deep breaths and walks around. She has been following with Dr. Javi Banks, and Dr. Prasanna Santana, he will be performing her surgery on October 21, 2020. She has been prescribed gabapentin, by her pain management physician. This has definitely been helping her with her pain. I also added Norco as a last resort option for her.   She complains of the pain affecting her appetite, it often fluctuates throughout the day. She has nausea, she uses Zofran and Phenergan as needed. There is no complaints of constipation, rather she has diarrhea due to her cancer. Pain Assessment   Ratin  Description: burning, sharp and shooting  Duration: minutes  Frequency: daily  Location: Abdomen   Alleviating Factors: relaxation  Exacerbating Factors: unable to associate with any factor  Effect: change in function and sleep    Past Medical History:   Diagnosis Date    Abdominal pain     [facility]     Cancer ([facility] [address] [facility]     Hypocalcemia     Hypothyroidism     Irritable bowel syndrome     Migraines     Status post alcohol detoxification     2018-2018       Past Surgical History:   Procedure Laterality Date    CHOLECYSTECTOMY, LAPAROSCOPIC  2016    COLONOSCOPY N/A 2018    COLONOSCOPY WITH BIOPSY performed by Xiomy Webster MD at [address] [address] [facility] CT NEEDLE BIOPSY LIVER PERCUTANEOUS  2020    CT NEEDLE BIOPSY LIVER PERCUTANEOUS 2020 [facility] CT    PARATHYROID GLAND SURGERY      THYROIDECTOMY         Family History   Problem Relation Age of Onset    High Blood Pressure Mother     High Cholesterol Mother     Other Mother         migraine headaches    Heart Disease Father     Asthma Father     High Blood Pressure Father     Cancer Father         Sarcoidosis    Diabetes Other         GRANDMOTHER    Lung Cancer Other         GRANDFATHER    Alzheimer's Disease Other         GRANDMOTHER    Breast Cancer Maternal Grandmother     Cancer Maternal Grandmother         skin    Cancer Paternal Grandfather         lung       ROS: UNLESS STATED ABOVE PATIENT DENIES:  CONSTITUTIONAL:  fever, chill, rigors, nausea, vomiting, fatigue. HEENT: blurry vision, double vision, hearing problem, tinnitus, hoarseness, dysphagia, odynophagia  RESPIRATORY: cough, shortness of breath, sputum expectoration.   CARDIOVASCULAR:  Chest pain/pressure, palpitation, syncope, irregular beats  GASTROINTESTINAL:  abdominal or rectal pain, diarrhea, constipation, . GENITOURINARY:  Burning, frequency, urgency, incontinence, discharge  INTEGUMENTARY: rash, wound, pruritis  HEMATOLOGIC/LYMPHATIC:  Swelling, sores, gum bleeding, easy bruising, pica. MUSCULOSKELETAL:  pain, edema, joint swelling or redness  NEUROLOGICAL:  light headed, dizziness, loss of consciousness, weakness, change in memory, seizures, tremors    Objective:     Physical Exam  Wt Readings from Last 3 Encounters:   10/08/20 142 lb (64.4 kg)   10/02/20 151 lb 12.8 oz (68.9 kg)   09/22/20 145 lb (65.8 kg)     There were no vitals taken for this visit. Gen:  Alert, appears stated age, well nourished, in no acute distress  HEENT:  Normocephalic, conjunctiva pink, no drainage, mucosa moist  Neck:  Supple  Lungs:  CTA bilaterally, no audible rhonchi or wheezes noted  Heart[de-identified]  RRR, no murmur, rub, or gallop noted during exam  Abd:  Soft, non tender, non distended, BS+  M/S/Ext:  Moving all extremities, no edema, pulses present  Skin:  Warm and dry  Neuro:  PERRL, Alert, oriented x 3; following commands      [place] Symptom Assessment Score   [place] Score 10/13/2020   Pain Score 8   Tiredness Score 5   Nausea Score Not nauseated   Depression Score 2   Anxiety Score 8   Drowsiness Score 2   Appetite Score 5   Wellbeing Score 7   Dyspnea Score No shortness of breath   Other Problem Score Best possible response   Total Assessment Score(calculated) 37     Assessed by: patient. Current Medications:  Medications reviewed: yes    Controlled Substances Monitoring: [place]ARRS reviewed 10/13/20. RX Monitoring 10/1/2020   Attestation -   Periodic Controlled Substance Monitoring Possible medication side effects, risk of tolerance/dependence & alternative treatments discussed. ;No signs of potential drug abuse or diversion identified.          Garrett Navarrete MD  Palliative Care Department Time/Communication  Greater than 50% of time spent, total 25 minutes in face-to-face counseling and coordination of care regarding symptom management. Note: This report was completed using [employer] voiced recognition software. Every effort has been made to ensure accuracy; however, inadvertent computerized transcription errors may be present.

## 2020-10-13 NOTE — PROGRESS NOTES
Patient having covid testing at [facility] on 10/16/20. Patient asked to bring ID and to self quarantine until day of procedure.

## 2020-10-13 NOTE — TELEPHONE ENCOUNTER
Authorization obtained from [facility] for cpt code 50192 with auth #[id number] with approval dates of 10/21/20-10/26/20. I scheduled patient for surgery on 10/21/20 at [facility] at 11:30am.  I did speak to patient and made her aware of surgery date and time and she already has a PAT appt scheduled and I did make her aware of the covid testing on 10/16/20. She verbalized understanding and confirmed this surgery date.     Electronically signed by Ayanna Schmitt RN on 10/13/2020 at 3:22 PM

## 2020-10-15 ENCOUNTER — OFFICE VISIT (OUTPATIENT)
Dept: ONCOLOGY | Age: 35
End: 2020-10-15
Payer: COMMERCIAL

## 2020-10-15 ENCOUNTER — TELEPHONE (OUTPATIENT)
Dept: ONCOLOGY | Age: 35
End: 2020-10-15

## 2020-10-15 ENCOUNTER — HOSPITAL ENCOUNTER (OUTPATIENT)
Dept: INFUSION THERAPY | Age: 35
Discharge: HOME | End: 2020-10-15
Payer: COMMERCIAL

## 2020-10-15 ENCOUNTER — OFFICE VISIT (OUTPATIENT)
Dept: HEMATOLOGY | Age: 35
End: 2020-10-15
Payer: COMMERCIAL

## 2020-10-15 VITALS
SYSTOLIC BLOOD PRESSURE: 119 MMHG | DIASTOLIC BLOOD PRESSURE: 75 MMHG | TEMPERATURE: 97.6 F | HEART RATE: 80 BPM | OXYGEN SATURATION: 99 % | BODY MASS INDEX: 29.1 KG/M2 | HEIGHT: 60 IN

## 2020-10-15 PROCEDURE — G8484 FLU IMMUNIZE NO ADMIN: HCPCS | Performed by: INTERNAL MEDICINE

## 2020-10-15 PROCEDURE — G8417 CALC BMI ABV UP PARAM F/U: HCPCS | Performed by: TRANSPLANT SURGERY

## 2020-10-15 PROCEDURE — 4004F PT TOBACCO SCREEN RCVD TLK: CPT | Performed by: TRANSPLANT SURGERY

## 2020-10-15 PROCEDURE — 4004F PT TOBACCO SCREEN RCVD TLK: CPT | Performed by: INTERNAL MEDICINE

## 2020-10-15 PROCEDURE — 99212 OFFICE O/P EST SF 10 MIN: CPT

## 2020-10-15 PROCEDURE — 99215 OFFICE O/P EST HI 40 MIN: CPT | Performed by: INTERNAL MEDICINE

## 2020-10-15 PROCEDURE — G8484 FLU IMMUNIZE NO ADMIN: HCPCS | Performed by: TRANSPLANT SURGERY

## 2020-10-15 PROCEDURE — G8427 DOCREV CUR MEDS BY ELIG CLIN: HCPCS | Performed by: TRANSPLANT SURGERY

## 2020-10-15 PROCEDURE — 99212 OFFICE O/P EST SF 10 MIN: CPT | Performed by: TRANSPLANT SURGERY

## 2020-10-15 PROCEDURE — G8427 DOCREV CUR MEDS BY ELIG CLIN: HCPCS | Performed by: INTERNAL MEDICINE

## 2020-10-15 PROCEDURE — G8417 CALC BMI ABV UP PARAM F/U: HCPCS | Performed by: INTERNAL MEDICINE

## 2020-10-15 ASSESSMENT — ENCOUNTER SYMPTOMS
SHORTNESS OF BREATH: 0
BACK PAIN: 0
EYE DISCHARGE: 0
CONSTIPATION: 0
DIARRHEA: 1
PHOTOPHOBIA: 0
NAUSEA: 0
BLOOD IN STOOL: 0
VOMITING: 0
EYE PAIN: 0
ABDOMINAL PAIN: 1

## 2020-10-15 NOTE — TELEPHONE ENCOUNTER
Spoke with Dr. Samuel Mayfield office and they wanted to know if patient wants to be seen in their [facility] office with Dr. Liz Martinez, or in [facility] with Dr. Ryann Baxter. Patient in our office today, 10/15/20, will ask her preference.

## 2020-10-15 NOTE — PROGRESS NOTES
function    Dotatate PET/CT scan 10/14/2020 increased tracer uptake seen throughout the liver compatible with neoplasm. This involves both the left and the right lobe of liver. In addition there are 2 foci of increased tracer uptake along the mesentery of the small bowel. This may involve the wall the small bowel. No other convincing evidence for extra-abdominal metastatic disease. EGD/Colonoscopy 10/05/2020 by Dr. Tova Galindo; records requested  Hepatobiliary team (Dr. Fior Garcia) consult appreciated. Small bowel resection scheduled on 10/21/2020. We recommended Lanreotide once monthly for metastatic well differentiated neuroendocrine cancer to liver. Side effects reviewed. She agreed to proceed. Dose # 1 Lanreotide is today 10/15/2020. Review of Systems;  CONSTITUTIONAL: No fever. Flushing/sweating. Fair appetite and energy level. ENMT: Eyes: No diplopia; Nose: No epistaxis. Mouth: No sore throat. RESPIRATORY: No hemoptysis, shortness of breath, cough. CARDIOVASCULAR: No chest pain, palpitations. GASTROINTESTINAL: + abdominal pain and diarrhea. GENITOURINARY: No dysuria, urinary frequency, hematuria. NEURO: No syncope, presyncope, headache. Remainder:  ROS NEGATIVE    Past Medical History:      Diagnosis Date    Abdominal pain     Acute [facility]'s [facility] ([facility] [address]     Cancer ([facility] [address]     Hypocalcemia     Hypothyroidism     Irritable bowel syndrome     Migraines     Status post alcohol detoxification     5/22/2018-5/29/2018     Medications:  Reviewed and reconciled. Allergies: Allergies   Allergen Reactions    Codeine Nausea And Vomiting     Physical Exam:  /75   Pulse 80   Temp 97.6 °F (36.4 °C)   Ht 5' (1.524 m)   SpO2 99%   BMI 29.10 kg/m²   GENERAL: Alert, oriented x 3, not in acute distress. HEENT: PERRLA; EOMI. Oropharynx clear. NECK: Supple. Without lymphadenopathy. LUNGS: Good air entry bilaterally. No wheezing, crackles or ronchi. CARDIOVASCULAR: Regular rate.  No murmurs, rubs or gallops. ABDOMEN: Soft. Non-tender, non-distended. Positive bowel sounds. EXTREMITIES: Without clubbing, cyanosis, or edema. NEUROLOGIC: No focal deficits. ECOG PS 1    Impression/Plan:  29 y/o female with 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 Multiple liver masses suspicious for metastatic disease. 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 Comment: Sections of the liver tissue cores show the hepatic parenchyma to be partially replaced by a proliferation of epithelioid cells with relatively uniform round nuclei and eosinophilic granular cytoplasm.  The   epithelioid cells form anastomosing solid cords/nests that are surrounded by delicate fibrovascular stroma.  There are no mitotic figures or tumor cell necrosis present.  The histologic changes seen are suggestive of well-differentiated neuroendocrine (carcinoid) tumor. Immunostaining for pankeratin, chromogranin, synaptophysin, TTF-1 and Ki-67 was performed on sections of one tissue block (A1) and the neoplastic epithelioid cells show diffuse and strong positivity for neuroendocrine markers (chromogranin and synaptophysin) and moderate positivity for pankeratin.    There is no staining reactivity for TTF-1. The Ki-67 proliferation labeling index is essentially negative, (very low, <1%). This staining pattern confirms the histologic impression of a well-differentiated neuroendocrine (carcinoid) tumor. FL Small bowel 09/02/2020:  Rapid small bowel transit. Serum Chromogranin A 160 on 09/23/2020.   Urine 5-HIAA 21 (0-14)  2d-Echo 10/10/2020: EF 60-65%   Normal right ventricular size and function    Dotatate PET/CT scan 10/14/2020 increased tracer uptake seen throughout the liver compatible with neoplasm. This involves both the left and the right lobe of liver. In addition there are 2 foci of increased tracer uptake along the mesentery of the small bowel. This may involve the wall the small bowel. No other convincing evidence for extra-abdominal metastatic disease. EGD/Colonoscopy 10/05/2020 by Dr. Ede Peralta; records requested  Hepatobiliary team (Dr. Vidya Renee) consult appreciated. Small bowel resection scheduled on 10/21/2020. We recommended Lanreotide once monthly for metastatic well differentiated neuroendocrine cancer to liver. Side effects reviewed. She agreed to proceed. Dose # 1 Lanreotide is today 10/15/2020.     RTC 4 weeks to review pathology and Dose # 2 Lanreotide    Wayne Reyes MD   50/16/7880  Board Certified Medical Oncologist

## 2020-10-15 NOTE — PROGRESS NOTES
Hepatobiliary and Pancreatic Surgery Attending History and Physical    Patient's Name/Date of Birth: Anisa Fowler /1985 (89 y.o.)    Date: October 15, 2020     CC: Metastatic neuroendocrine tumor    HPI:  Patient is a very pleasant and unfortunate 30year old female whom has been having gastro intestinal issues for years. She has been having bouts of diarrhea about 3-4times a day that is watery. She has a previous history of alcoholism but has stopped drinking. She has had a cholecystectomy in the past along with a parathyroidectomy. She had recent imaging which showed a neuroendocrine tumor in the small bowel mesentery along with metastatic lesions to her liver. The liver pathology showed a well differentiated neuroendocrine tumor of the liver. She had a dotetate scan which showed the small bowel lesion and metastastic lymph node followed by multiple hepatic metastasis. She I still having diarrhea. Past Medical History:   Diagnosis Date    Abdominal pain     Acute Crohn's disease ([address]     Cancer ([address]     Hypocalcemia     Hypothyroidism     Irritable bowel syndrome     Migraines     Status post alcohol detoxification     5/22/2018-5/29/2018       Past Surgical History:   Procedure Laterality Date    CHOLECYSTECTOMY, LAPAROSCOPIC  07/06/2016    COLONOSCOPY N/A 6/22/2018    COLONOSCOPY WITH BIOPSY performed by Nadir Sheehan MD at [address] NEEDLE BIOPSY LIVER PERCUTANEOUS  9/1/2020    CT NEEDLE BIOPSY LIVER PERCUTANEOUS 9/1/2020 [facility]    PARATHYROID GLAND SURGERY      THYROIDECTOMY         Current Outpatient Medications   Medication Sig Dispense Refill    HYDROcodone-acetaminophen (NORCO) 5-325 MG per tablet Take 1 tablet by mouth every 12 hours as needed for Pain for up to 30 days.  Intended supply: 30 days 40 tablet 0    dicyclomine (BENTYL) 20 MG tablet take 1 tablet by mouth twice a day      famotidine (PEPCID) 40 MG tablet take 1 tablet by mouth once daily      gabapentin (NEURONTIN) 300 MG capsule qHS x 3 days then BID x 3 days then TID thereafter (increase as tolerated) 90 capsule 2    zolpidem (AMBIEN) 10 MG tablet take 1 tablet by mouth at bedtime if needed for sleep 30 tablet 1    Multiple Vitamins-Minerals (THERAPEUTIC MULTIVITAMIN-MINERALS) tablet Take 1 tablet by mouth daily      ondansetron (ZOFRAN-ODT) 4 MG disintegrating tablet Take 1 tablet by mouth 3 times daily as needed for Nausea or Vomiting (Patient not taking: Reported on 10/13/2020) 21 tablet 0    butalbital-acetaminophen-caffeine (FIORICET, ESGIC) -40 MG per tablet Take 1 tablet by mouth daily as needed for Headaches      topiramate (TOPAMAX SPRINKLE) 25 MG capsule Take 25 mg by mouth daily      ondansetron (ZOFRAN) 4 MG tablet Take 4 mg by mouth every 8 hours as needed for Nausea or Vomiting      Calcium Carb-Cholecalciferol (CALCIUM-VITAMIN D) 500-200 MG-UNIT per tablet Take 1 tablet by mouth 2 times daily (with meals)      levothyroxine (SYNTHROID) 112 MCG tablet Take 112 mcg by mouth Daily      aluminum & magnesium hydroxide-simethicone (MYLANTA) 200-200-20 MG/5ML SUSP suspension Take 5 mLs by mouth every 6 hours as needed for Indigestion      [place] Carb-Mag Hydrox-Simeth 800-270-80 MG/10ML SUSP Take 20 mLs by mouth every 8 hours      sucralfate (CARAFATE) 1 GM/10ML suspension Take 10 mLs by mouth 4 times daily 1200 mL 3     No current facility-administered medications for this visit.       Facility-Administered Medications Ordered in Other Visits   Medication Dose Route Frequency Provider Last Rate Last Dose    Netspot KIT 5 millicurie  5 millicurie Intravenous As Directed RT PRN Daniel Burns II, MD   5 millicurie at 67/09/69 6302       Allergies   Allergen Reactions    Codeine Nausea And Vomiting       Family History   Problem Relation Age of Onset    High Blood Pressure Mother     High Cholesterol Mother     Other Mother         migraine headaches    Heart Disease Father     Asthma Father     High Blood Pressure Father     Cancer Father         Sarcoidosis    Diabetes Other         GRANDMOTHER    Lung Cancer Other         GRANDFATHER    Alzheimer's Disease Other         GRANDMOTHER    Breast Cancer Maternal Grandmother     Cancer Maternal Grandmother         skin    Cancer Paternal Grandfather         lung       Social History     Socioeconomic History    Marital status: Single     Spouse name: Not on file    Number of children: Not on file    Years of education: Not on file    Highest education level: Not on file   Occupational History    Occupation: cleaning     Employer: the [employer] Financial resource strain: Not on file    Food insecurity     Worry: Not on file     Inability: Not on file   [employer] needs     Medical: Not on file     Non-medical: Not on file   Tobacco Use    Smoking status: Current Every Day Smoker     Packs/day: 0.50    Smokeless tobacco: Never Used   Substance and Sexual Activity    Alcohol use: No     Alcohol/week: 0.0 standard drinks     Comment: 4 weeks sober post detox    Drug use: No    Sexual activity: Yes     Partners: Male     Birth control/protection: I.U.D.    Lifestyle    Physical activity     Days per week: Not on file     Minutes per session: Not on file    Stress: Not on file   Relationships    Social connections     Talks on phone: Not on file     Gets together: Not on file     Attends [employer] service: Not on file     Active member of club or organization: Not on file     Attends meetings of clubs or organizations: Not on file     Relationship status: Not on file    Intimate partner violence     Fear of current or ex partner: Not on file     Emotionally abused: Not on file     Physically abused: Not on file     Forced sexual activity: Not on file   Other Topics Concern    Not on file   Social History Narrative    Not on file       ROS:   Review of Systems   Constitutional: Negative for chills, diaphoresis and fever. HENT: Negative for congestion, ear discharge, ear pain, hearing loss, nosebleeds and tinnitus. Eyes: Negative for photophobia, pain and discharge. Respiratory: Negative for shortness of breath. Cardiovascular: Negative for palpitations and leg swelling. Gastrointestinal: Positive for abdominal pain and diarrhea. Negative for blood in stool, constipation, nausea and vomiting. Endocrine: Negative for polydipsia. Genitourinary: Negative for frequency, hematuria and urgency. Musculoskeletal: Negative for back pain and neck pain. Skin: Negative for rash. Allergic/Immunologic: Negative for environmental allergies. Neurological: Negative for tremors and seizures. Psychiatric/Behavioral: Negative for hallucinations and suicidal ideas. The patient is not nervous/anxious. Physical Exam:  There were no vitals taken for this visit. PSYCH: mood and affect normal, alert and oriented x 3  CONSTITUTIONAL: No apparent distress, comfortable  EYES: Sclera white, pupils equal round and reactive to light  ENMT:  Hearing normal, trachea midline, ears externally intact  LYMPH: no lympadenopathy in neck. Nolympadenopathy in groins  RESP: Breath sounds were clear and equal with no rales, wheezes, or rhonchi. Respiratory effort was normal with no retractions or use of accessory muscles. CV: Heart soundswere normal with a regular rate and rhythm.    No pedal edema  GI/ Abdomen: Soft, nondistended, nontender, no guarding, no peritoneal signs  MSK: no clubbing/ no cyanosis/ gaitnormal       Assessment/Plan:  Metastatic malignant neuroendocrine tumor of the small bowel to the liver (bi-lobar)  - will need small bowel primary resection   - will need LAR therapy post op  - discussed that this is a life long disease process  - Patient and family were made aware of the risks, benefits, alternatives, and complications of a laparoscopic robotic small bowel resection with planned transition to open and wish to proceed with surgery. - will need ocreotide on standby during the surgery for infusion. 10 Minutes of which greater than 50% was spent counseling or coordinating her care. Thank you for the consultation allowing me to take part in Ms. Olmstead's care.      Nicole Peña M.D.  10/15/2020  10:30 AM

## 2020-10-15 NOTE — TELEPHONE ENCOUNTER
Spoke with patient today, 10/15/20. Patient stated she is established patient with Dr. Low Deras and had her appointment with him 10/05/20. Patient stated she had her upper and lower scopes.

## 2020-10-16 ENCOUNTER — HOSPITAL ENCOUNTER (OUTPATIENT)
Age: 35
Discharge: HOME | End: 2020-10-18
Payer: COMMERCIAL

## 2020-10-16 ENCOUNTER — HOSPITAL ENCOUNTER (OUTPATIENT)
Dept: CT IMAGING | Age: 35
Discharge: HOME | End: 2020-10-18
Payer: COMMERCIAL

## 2020-10-16 PROCEDURE — 71260 CT THORAX DX C+: CPT

## 2020-10-16 PROCEDURE — 6360000004 HC RX CONTRAST MEDICATION: Performed by: RADIOLOGY

## 2020-10-16 PROCEDURE — U0003 INFECTIOUS AGENT DETECTION BY NUCLEIC ACID (DNA OR RNA); SEVERE ACUTE RESPIRATORY SYNDROME CORONAVIRUS 2 (SARS-COV-2) (CORONAVIRUS DISEASE [COVID-19]), AMPLIFIED PROBE TECHNIQUE, MAKING USE OF HIGH THROUGHPUT TECHNOLOGIES AS DESCRIBED BY CMS-2020-01-R: HCPCS

## 2020-10-16 RX ADMIN — IOPAMIDOL 75 ML: 755 INJECTION, SOLUTION INTRAVENOUS at 14:43

## 2020-10-16 NOTE — PROGRESS NOTES
[facility] PRE-ADMISSION TESTING GENERAL INSTRUCTIONS- [facility]-phone number:[phone]    GENERAL INSTRUCTIONS  [x] Antibacterial Soap shower Night before  Surgery  [x] Axel wipe instruction sheet and wipes given. [x] Nothing by mouth after midnight, including gum, candy, mints, or water. [x] You may brush your teeth, gargle, but do NOT swallow water. .   [x]No smoking, chewing tobacco, illegal drugs, or alcohol within 24 hours of your surgery. [x] Jewelry, valuables or body piercing's should not be brought to the hospital. All body and/or tongue piercing's must be removed prior to arriving to hospital.  ALL hair pins must be removed. [x] Do not wear makeup, lotions, powders, deodorant. Nail polish as directed by the nurse. [x] Arrange transportation with a responsible adult  to and from the hospital.   [x] Transfusion Bracelet: Please bring with you day of surgery. PARKING INSTRUCTIONS:   [x] Arrival Time:__0930 for surgery with dr Jonette Baumgarten 10-21 Wednesday  [address]           [x] To reach the [facility] from [facility], upon entering the hospital, take elevator B to the 3rd floor. EDUCATION INSTRUCTIONS:    .   [x] Pre-admission Testing educational folder given  [x] Incentive Spirometry,coughing & deep breathing exercises reviewed. [x]Fluoroscopy-Xray used in surgery reviewed with patient. Educational pamphlet placed in chart. [x]Pain: Post-op pain is normal and to be expected. You will be asked to rate your pain from 0-10(a zero is not acceptable-education is needed). Your post-op pain goal is:  [x] Ask your nurse for your pain medication. .  [] Other:___________________________    MEDICATION INSTRUCTIONS:   [x]Bring a complete list of your medications, please write the last time you took the medicine, give this list to the nurse. see med sheet   [x] Take the following medications the morning of surgery with 1-2 ounces of water:   [x] Stop herbal supplements and vitamins 5 days before your surgery. [x] Follow physician instructions regarding any blood thinners you may be taking. WHAT TO EXPECT:  [x] The day of surgery you will be greeted and checked in by the [employer].  In addition, you will be registered in the [facility] by a Patient Access Representative. Please bring your photo ID and insurance card. A nurse will greet you in accordance to the time you are needed in the pre-op area to prepare you for surgery. Please do not be discouraged if you are not greeted in the order you arrive as there are many variables that are involved in patient preparation. Your patience is greatly appreciated as you wait for your nurse. Please bring in items such as: books, magazines, newspapers, electronics, or any other items  to occupy your time in the waiting area. [x]  Delays may occur with surgery and staff will make a sincere effort to keep you informed of delays. If any delays occur with your procedure, we apologize ahead of time for your inconvenience as we recognize the value of your time.

## 2020-10-18 LAB
AMOBARBITAL URINE QUANT: <50 NG/ML
BUTALBITAL URINE QUANT: 101 NG/ML
PENTOBARBITAL URINE QUANT: <50 NG/ML
PHENOBARBITAL URINE QUANT: <50 NG/ML
SARS-COV-2: NOT DETECTED
SECOBARBITAL URINE QUANT: <50 NG/ML
SOURCE: NORMAL

## 2020-10-19 ENCOUNTER — TELEPHONE (OUTPATIENT)
Dept: HEMATOLOGY | Age: 35
End: 2020-10-19

## 2020-10-19 ENCOUNTER — HOSPITAL ENCOUNTER (OUTPATIENT)
Dept: PREADMISSION TESTING | Age: 35
Discharge: HOME | DRG: 680 | End: 2020-10-19
Payer: COMMERCIAL

## 2020-10-19 VITALS
DIASTOLIC BLOOD PRESSURE: 70 MMHG | HEART RATE: 72 BPM | BODY MASS INDEX: 29.25 KG/M2 | WEIGHT: 149 LBS | TEMPERATURE: 98.2 F | SYSTOLIC BLOOD PRESSURE: 116 MMHG | RESPIRATION RATE: 16 BRPM | OXYGEN SATURATION: 99 % | HEIGHT: 60 IN

## 2020-10-19 LAB
6AM URINE: <10 NG/ML
ABO/RH: NORMAL
ALBUMIN SERPL-MCNC: 4.5 G/DL (ref 3.5–5.2)
ALP BLD-CCNC: 116 U/L (ref 35–104)
ALT SERPL-CCNC: 26 U/L (ref 0–32)
ANION GAP SERPL CALCULATED.3IONS-SCNC: 12 MMOL/L (ref 7–16)
ANTIBODY SCREEN: NORMAL
AST SERPL-CCNC: 16 U/L (ref 0–31)
BILIRUB SERPL-MCNC: 0.2 MG/DL (ref 0–1.2)
BUN BLDV-MCNC: 8 MG/DL (ref 6–20)
CALCIUM SERPL-MCNC: 9 MG/DL (ref 8.6–10.2)
CHLORIDE BLD-SCNC: 98 MMOL/L (ref 98–107)
CO2: 24 MMOL/L (ref 22–29)
CODEINE, URINE: <20 NG/ML
CREAT SERPL-MCNC: 0.7 MG/DL (ref 0.5–1)
GFR AFRICAN AMERICAN: >60
GFR NON-AFRICAN AMERICAN: >60 ML/MIN/1.73
GLUCOSE BLD-MCNC: 92 MG/DL (ref 74–99)
HCG(URINE) PREGNANCY TEST: NEGATIVE
HCT VFR BLD CALC: 45.7 % (ref 34–48)
HEMOGLOBIN: 15.4 G/DL (ref 11.5–15.5)
HYDROCODONE, URINE: <20 NG/ML
HYDROMORPHONE, URINE: <20 NG/ML
MCH RBC QN AUTO: 31.8 PG (ref 26–35)
MCHC RBC AUTO-ENTMCNC: 33.7 % (ref 32–34.5)
MCV RBC AUTO: 94.4 FL (ref 80–99.9)
MORPHINE URINE: <20 NG/ML
NORHYDROCODONE, URINE: <20 NG/ML
NOROXYCODONE, URINE: <20 NG/ML
NOROXYMORPHONE, URINE: <20 NG/ML
OXYCODONE, URINE CONFIRMATION: <20 NG/ML
OXYMORPHONE, URINE: <20 NG/ML
PDW BLD-RTO: 12.8 FL (ref 11.5–15)
PLATELET # BLD: 282 E9/L (ref 130–450)
PMV BLD AUTO: 10 FL (ref 7–12)
POTASSIUM SERPL-SCNC: 3.9 MMOL/L (ref 3.5–5)
RBC # BLD: 4.84 E12/L (ref 3.5–5.5)
SODIUM BLD-SCNC: 134 MMOL/L (ref 132–146)
TOTAL PROTEIN: 7.3 G/DL (ref 6.4–8.3)
WBC # BLD: 9 E9/L (ref 4.5–11.5)

## 2020-10-19 PROCEDURE — 86900 BLOOD TYPING SEROLOGIC ABO: CPT

## 2020-10-19 PROCEDURE — 87081 CULTURE SCREEN ONLY: CPT

## 2020-10-19 PROCEDURE — 85027 COMPLETE CBC AUTOMATED: CPT

## 2020-10-19 PROCEDURE — 36415 COLL VENOUS BLD VENIPUNCTURE: CPT

## 2020-10-19 PROCEDURE — 86850 RBC ANTIBODY SCREEN: CPT

## 2020-10-19 PROCEDURE — 80053 COMPREHEN METABOLIC PANEL: CPT

## 2020-10-19 PROCEDURE — 86901 BLOOD TYPING SEROLOGIC RH(D): CPT

## 2020-10-19 PROCEDURE — 81025 URINE PREGNANCY TEST: CPT

## 2020-10-20 LAB — MRSA CULTURE ONLY: NORMAL

## 2020-10-21 ENCOUNTER — HOSPITAL ENCOUNTER (INPATIENT)
Age: 35
LOS: 4 days | Discharge: HOME | DRG: 680 | End: 2020-10-25
Attending: TRANSPLANT SURGERY | Admitting: TRANSPLANT SURGERY
Payer: COMMERCIAL

## 2020-10-21 ENCOUNTER — ANESTHESIA (OUTPATIENT)
Dept: OPERATING ROOM | Age: 35
DRG: 680 | End: 2020-10-21
Payer: COMMERCIAL

## 2020-10-21 ENCOUNTER — ANESTHESIA EVENT (OUTPATIENT)
Dept: OPERATING ROOM | Age: 35
DRG: 680 | End: 2020-10-21
Payer: COMMERCIAL

## 2020-10-21 VITALS — DIASTOLIC BLOOD PRESSURE: 100 MMHG | OXYGEN SATURATION: 90 % | SYSTOLIC BLOOD PRESSURE: 147 MMHG

## 2020-10-21 PROBLEM — C7A.8 NEUROENDOCRINE CARCINOMA OF SMALL BOWEL (HCC): Status: ACTIVE | Noted: 2020-10-21

## 2020-10-21 PROCEDURE — 2500000003 HC RX 250 WO HCPCS: Performed by: TRANSPLANT SURGERY

## 2020-10-21 PROCEDURE — 2580000003 HC RX 258: Performed by: TRANSPLANT SURGERY

## 2020-10-21 PROCEDURE — 6360000002 HC RX W HCPCS: Performed by: STUDENT IN AN ORGANIZED HEALTH CARE EDUCATION/TRAINING PROGRAM

## 2020-10-21 PROCEDURE — 2060000000 HC ICU INTERMEDIATE R&B

## 2020-10-21 PROCEDURE — 2720000010 HC SURG SUPPLY STERILE: Performed by: TRANSPLANT SURGERY

## 2020-10-21 PROCEDURE — 2500000003 HC RX 250 WO HCPCS

## 2020-10-21 PROCEDURE — 2580000003 HC RX 258: Performed by: STUDENT IN AN ORGANIZED HEALTH CARE EDUCATION/TRAINING PROGRAM

## 2020-10-21 PROCEDURE — 88342 IMHCHEM/IMCYTCHM 1ST ANTB: CPT

## 2020-10-21 PROCEDURE — 6360000002 HC RX W HCPCS

## 2020-10-21 PROCEDURE — 88307 TISSUE EXAM BY PATHOLOGIST: CPT

## 2020-10-21 PROCEDURE — 7100000000 HC PACU RECOVERY - FIRST 15 MIN: Performed by: TRANSPLANT SURGERY

## 2020-10-21 PROCEDURE — 6370000000 HC RX 637 (ALT 250 FOR IP): Performed by: SURGERY

## 2020-10-21 PROCEDURE — 2709999900 HC NON-CHARGEABLE SUPPLY: Performed by: TRANSPLANT SURGERY

## 2020-10-21 PROCEDURE — 88305 TISSUE EXAM BY PATHOLOGIST: CPT

## 2020-10-21 PROCEDURE — 88341 IMHCHEM/IMCYTCHM EA ADD ANTB: CPT

## 2020-10-21 PROCEDURE — 44202 LAP ENTERECTOMY: CPT | Performed by: TRANSPLANT SURGERY

## 2020-10-21 PROCEDURE — 3600000019 HC SURGERY ROBOT ADDTL 15MIN: Performed by: TRANSPLANT SURGERY

## 2020-10-21 PROCEDURE — 7100000001 HC PACU RECOVERY - ADDTL 15 MIN: Performed by: TRANSPLANT SURGERY

## 2020-10-21 PROCEDURE — 3600000009 HC SURGERY ROBOT BASE: Performed by: TRANSPLANT SURGERY

## 2020-10-21 PROCEDURE — 6360000002 HC RX W HCPCS: Performed by: TRANSPLANT SURGERY

## 2020-10-21 PROCEDURE — 3700000001 HC ADD 15 MINUTES (ANESTHESIA): Performed by: TRANSPLANT SURGERY

## 2020-10-21 PROCEDURE — S2900 ROBOTIC SURGICAL SYSTEM: HCPCS | Performed by: TRANSPLANT SURGERY

## 2020-10-21 PROCEDURE — 88360 TUMOR IMMUNOHISTOCHEM/MANUAL: CPT

## 2020-10-21 PROCEDURE — 0DT84ZZ RESECTION OF SMALL INTESTINE, PERCUTANEOUS ENDOSCOPIC APPROACH: ICD-10-PCS | Performed by: TRANSPLANT SURGERY

## 2020-10-21 PROCEDURE — 3700000000 HC ANESTHESIA ATTENDED CARE: Performed by: TRANSPLANT SURGERY

## 2020-10-21 PROCEDURE — 8E0W4CZ ROBOTIC ASSISTED PROCEDURE OF TRUNK REGION, PERCUTANEOUS ENDOSCOPIC APPROACH: ICD-10-PCS | Performed by: TRANSPLANT SURGERY

## 2020-10-21 PROCEDURE — 2580000003 HC RX 258

## 2020-10-21 PROCEDURE — 07TB4ZZ RESECTION OF MESENTERIC LYMPHATIC, PERCUTANEOUS ENDOSCOPIC APPROACH: ICD-10-PCS | Performed by: TRANSPLANT SURGERY

## 2020-10-21 RX ORDER — DEXAMETHASONE SODIUM PHOSPHATE 10 MG/ML
INJECTION, SOLUTION INTRAMUSCULAR; INTRAVENOUS PRN
Status: DISCONTINUED | OUTPATIENT
Start: 2020-10-21 | End: 2020-10-21 | Stop reason: SDUPTHER

## 2020-10-21 RX ORDER — SODIUM CHLORIDE 0.9 % (FLUSH) 0.9 %
10 SYRINGE (ML) INJECTION EVERY 12 HOURS SCHEDULED
Status: DISCONTINUED | OUTPATIENT
Start: 2020-10-21 | End: 2020-10-25 | Stop reason: HOSPADM

## 2020-10-21 RX ORDER — MIDAZOLAM HYDROCHLORIDE 1 MG/ML
INJECTION INTRAMUSCULAR; INTRAVENOUS PRN
Status: DISCONTINUED | OUTPATIENT
Start: 2020-10-21 | End: 2020-10-21 | Stop reason: SDUPTHER

## 2020-10-21 RX ORDER — SODIUM CHLORIDE 0.9 % (FLUSH) 0.9 %
10 SYRINGE (ML) INJECTION EVERY 12 HOURS SCHEDULED
Status: DISCONTINUED | OUTPATIENT
Start: 2020-10-21 | End: 2020-10-21 | Stop reason: HOSPADM

## 2020-10-21 RX ORDER — LIDOCAINE HYDROCHLORIDE 20 MG/ML
INJECTION, SOLUTION INTRAVENOUS PRN
Status: DISCONTINUED | OUTPATIENT
Start: 2020-10-21 | End: 2020-10-21 | Stop reason: SDUPTHER

## 2020-10-21 RX ORDER — GLYCOPYRROLATE 1 MG/5 ML
SYRINGE (ML) INTRAVENOUS PRN
Status: DISCONTINUED | OUTPATIENT
Start: 2020-10-21 | End: 2020-10-21 | Stop reason: SDUPTHER

## 2020-10-21 RX ORDER — SODIUM CHLORIDE 9 MG/ML
INJECTION, SOLUTION INTRAVENOUS CONTINUOUS PRN
Status: DISCONTINUED | OUTPATIENT
Start: 2020-10-21 | End: 2020-10-21 | Stop reason: SDUPTHER

## 2020-10-21 RX ORDER — ONDANSETRON 2 MG/ML
INJECTION INTRAMUSCULAR; INTRAVENOUS PRN
Status: DISCONTINUED | OUTPATIENT
Start: 2020-10-21 | End: 2020-10-21 | Stop reason: SDUPTHER

## 2020-10-21 RX ORDER — SODIUM CHLORIDE 0.9 % (FLUSH) 0.9 %
10 SYRINGE (ML) INJECTION PRN
Status: DISCONTINUED | OUTPATIENT
Start: 2020-10-21 | End: 2020-10-21 | Stop reason: HOSPADM

## 2020-10-21 RX ORDER — ROCURONIUM BROMIDE 10 MG/ML
INJECTION, SOLUTION INTRAVENOUS PRN
Status: DISCONTINUED | OUTPATIENT
Start: 2020-10-21 | End: 2020-10-21 | Stop reason: SDUPTHER

## 2020-10-21 RX ORDER — HYDROMORPHONE HCL 110MG/55ML
PATIENT CONTROLLED ANALGESIA SYRINGE INTRAVENOUS PRN
Status: DISCONTINUED | OUTPATIENT
Start: 2020-10-21 | End: 2020-10-21 | Stop reason: SDUPTHER

## 2020-10-21 RX ORDER — FENTANYL CITRATE 50 UG/ML
INJECTION, SOLUTION INTRAMUSCULAR; INTRAVENOUS PRN
Status: DISCONTINUED | OUTPATIENT
Start: 2020-10-21 | End: 2020-10-21 | Stop reason: SDUPTHER

## 2020-10-21 RX ORDER — KETOROLAC TROMETHAMINE 30 MG/ML
30 INJECTION, SOLUTION INTRAMUSCULAR; INTRAVENOUS EVERY 6 HOURS
Status: DISCONTINUED | OUTPATIENT
Start: 2020-10-21 | End: 2020-10-25 | Stop reason: HOSPADM

## 2020-10-21 RX ORDER — ONDANSETRON 2 MG/ML
4 INJECTION INTRAMUSCULAR; INTRAVENOUS EVERY 6 HOURS PRN
Status: DISCONTINUED | OUTPATIENT
Start: 2020-10-21 | End: 2020-10-25 | Stop reason: HOSPADM

## 2020-10-21 RX ORDER — SODIUM CHLORIDE 0.9 % (FLUSH) 0.9 %
10 SYRINGE (ML) INJECTION PRN
Status: DISCONTINUED | OUTPATIENT
Start: 2020-10-21 | End: 2020-10-25 | Stop reason: HOSPADM

## 2020-10-21 RX ORDER — NALOXONE HYDROCHLORIDE 0.4 MG/ML
0.4 INJECTION, SOLUTION INTRAMUSCULAR; INTRAVENOUS; SUBCUTANEOUS PRN
Status: DISCONTINUED | OUTPATIENT
Start: 2020-10-21 | End: 2020-10-24

## 2020-10-21 RX ORDER — BUPIVACAINE HYDROCHLORIDE 5 MG/ML
INJECTION, SOLUTION EPIDURAL; INTRACAUDAL PRN
Status: DISCONTINUED | OUTPATIENT
Start: 2020-10-21 | End: 2020-10-21 | Stop reason: HOSPADM

## 2020-10-21 RX ORDER — LANOLIN ALCOHOL/MO/W.PET/CERES
3 CREAM (GRAM) TOPICAL NIGHTLY PRN
Status: DISCONTINUED | OUTPATIENT
Start: 2020-10-21 | End: 2020-10-25 | Stop reason: HOSPADM

## 2020-10-21 RX ORDER — ONDANSETRON 4 MG/1
4 TABLET, ORALLY DISINTEGRATING ORAL EVERY 8 HOURS PRN
Status: DISCONTINUED | OUTPATIENT
Start: 2020-10-21 | End: 2020-10-25 | Stop reason: HOSPADM

## 2020-10-21 RX ORDER — NEOSTIGMINE METHYLSULFATE 1 MG/ML
INJECTION, SOLUTION INTRAVENOUS PRN
Status: DISCONTINUED | OUTPATIENT
Start: 2020-10-21 | End: 2020-10-21 | Stop reason: SDUPTHER

## 2020-10-21 RX ORDER — PROPOFOL 10 MG/ML
INJECTION, EMULSION INTRAVENOUS PRN
Status: DISCONTINUED | OUTPATIENT
Start: 2020-10-21 | End: 2020-10-21 | Stop reason: SDUPTHER

## 2020-10-21 RX ORDER — ESMOLOL HYDROCHLORIDE 10 MG/ML
INJECTION INTRAVENOUS PRN
Status: DISCONTINUED | OUTPATIENT
Start: 2020-10-21 | End: 2020-10-21 | Stop reason: SDUPTHER

## 2020-10-21 RX ORDER — SODIUM CHLORIDE, SODIUM LACTATE, POTASSIUM CHLORIDE, CALCIUM CHLORIDE 600; 310; 30; 20 MG/100ML; MG/100ML; MG/100ML; MG/100ML
INJECTION, SOLUTION INTRAVENOUS CONTINUOUS
Status: DISCONTINUED | OUTPATIENT
Start: 2020-10-21 | End: 2020-10-23

## 2020-10-21 RX ADMIN — HYDROMORPHONE HYDROCHLORIDE 1 MG: 2 INJECTION, SOLUTION INTRAMUSCULAR; INTRAVENOUS; SUBCUTANEOUS at 14:22

## 2020-10-21 RX ADMIN — FENTANYL CITRATE 50 MCG: 50 INJECTION, SOLUTION INTRAMUSCULAR; INTRAVENOUS at 13:18

## 2020-10-21 RX ADMIN — Medication 3 MG: at 22:55

## 2020-10-21 RX ADMIN — DEXAMETHASONE SODIUM PHOSPHATE 10 MG: 10 INJECTION, SOLUTION INTRAMUSCULAR; INTRAVENOUS at 13:13

## 2020-10-21 RX ADMIN — Medication 3 MG: at 14:06

## 2020-10-21 RX ADMIN — PROPOFOL 160 MG: 10 INJECTION, EMULSION INTRAVENOUS at 11:50

## 2020-10-21 RX ADMIN — ONDANSETRON HYDROCHLORIDE 4 MG: 2 INJECTION, SOLUTION INTRAMUSCULAR; INTRAVENOUS at 13:31

## 2020-10-21 RX ADMIN — Medication 0.2 MG: at 15:11

## 2020-10-21 RX ADMIN — WATER 2 G: 1 INJECTION INTRAMUSCULAR; INTRAVENOUS; SUBCUTANEOUS at 11:58

## 2020-10-21 RX ADMIN — FENTANYL CITRATE 100 MCG: 50 INJECTION, SOLUTION INTRAMUSCULAR; INTRAVENOUS at 11:50

## 2020-10-21 RX ADMIN — SODIUM CHLORIDE: 9 INJECTION, SOLUTION INTRAVENOUS at 11:43

## 2020-10-21 RX ADMIN — Medication: at 20:01

## 2020-10-21 RX ADMIN — KETOROLAC TROMETHAMINE 30 MG: 30 INJECTION, SOLUTION INTRAMUSCULAR; INTRAVENOUS at 20:02

## 2020-10-21 RX ADMIN — MIDAZOLAM 2 MG: 1 INJECTION INTRAMUSCULAR; INTRAVENOUS at 11:33

## 2020-10-21 RX ADMIN — MIDAZOLAM 2 MG: 1 INJECTION INTRAMUSCULAR; INTRAVENOUS at 11:48

## 2020-10-21 RX ADMIN — HYDROMORPHONE HYDROCHLORIDE 1 MG: 2 INJECTION, SOLUTION INTRAMUSCULAR; INTRAVENOUS; SUBCUTANEOUS at 14:19

## 2020-10-21 RX ADMIN — ROCURONIUM BROMIDE 20 MG: 10 INJECTION, SOLUTION INTRAVENOUS at 13:08

## 2020-10-21 RX ADMIN — SODIUM CHLORIDE: 9 INJECTION, SOLUTION INTRAVENOUS at 13:16

## 2020-10-21 RX ADMIN — KETOROLAC TROMETHAMINE 30 MG: 30 INJECTION, SOLUTION INTRAMUSCULAR; INTRAVENOUS at 15:21

## 2020-10-21 RX ADMIN — Medication 0.4 MG: at 14:06

## 2020-10-21 RX ADMIN — FENTANYL CITRATE 150 MCG: 50 INJECTION, SOLUTION INTRAMUSCULAR; INTRAVENOUS at 12:14

## 2020-10-21 RX ADMIN — ROCURONIUM BROMIDE 30 MG: 10 INJECTION, SOLUTION INTRAVENOUS at 13:43

## 2020-10-21 RX ADMIN — ROCURONIUM BROMIDE 30 MG: 10 INJECTION, SOLUTION INTRAVENOUS at 12:15

## 2020-10-21 RX ADMIN — LIDOCAINE HYDROCHLORIDE 100 MG: 20 INJECTION, SOLUTION INTRAVENOUS at 11:50

## 2020-10-21 RX ADMIN — OCTREOTIDE ACETATE 100 MCG/HR: 500 INJECTION, SOLUTION INTRAVENOUS; SUBCUTANEOUS at 11:05

## 2020-10-21 RX ADMIN — ESMOLOL HYDROCHLORIDE 20 MG: 10 INJECTION, SOLUTION INTRAVENOUS at 12:55

## 2020-10-21 RX ADMIN — SODIUM CHLORIDE, POTASSIUM CHLORIDE, SODIUM LACTATE AND CALCIUM CHLORIDE: 600; 310; 30; 20 INJECTION, SOLUTION INTRAVENOUS at 18:26

## 2020-10-21 RX ADMIN — FENTANYL CITRATE 50 MCG: 50 INJECTION, SOLUTION INTRAMUSCULAR; INTRAVENOUS at 13:43

## 2020-10-21 RX ADMIN — ROCURONIUM BROMIDE 50 MG: 10 INJECTION, SOLUTION INTRAVENOUS at 11:52

## 2020-10-21 ASSESSMENT — PAIN DESCRIPTION - ONSET
ONSET: ON-GOING
ONSET: ON-GOING
ONSET: SUDDEN
ONSET: ON-GOING

## 2020-10-21 ASSESSMENT — PULMONARY FUNCTION TESTS
PIF_VALUE: 31
PIF_VALUE: 26
PIF_VALUE: 24
PIF_VALUE: 16
PIF_VALUE: 30
PIF_VALUE: 25
PIF_VALUE: 24
PIF_VALUE: 31
PIF_VALUE: 31
PIF_VALUE: 0
PIF_VALUE: 25
PIF_VALUE: 24
PIF_VALUE: 24
PIF_VALUE: 31
PIF_VALUE: 31
PIF_VALUE: 23
PIF_VALUE: 26
PIF_VALUE: 24
PIF_VALUE: 24
PIF_VALUE: 0
PIF_VALUE: 24
PIF_VALUE: 25
PIF_VALUE: 4
PIF_VALUE: 25
PIF_VALUE: 24
PIF_VALUE: 31
PIF_VALUE: 31
PIF_VALUE: 25
PIF_VALUE: 30
PIF_VALUE: 28
PIF_VALUE: 27
PIF_VALUE: 31
PIF_VALUE: 31
PIF_VALUE: 23
PIF_VALUE: 25
PIF_VALUE: 24
PIF_VALUE: 25
PIF_VALUE: 31
PIF_VALUE: 31
PIF_VALUE: 25
PIF_VALUE: 23
PIF_VALUE: 35
PIF_VALUE: 11
PIF_VALUE: 25
PIF_VALUE: 9
PIF_VALUE: 31
PIF_VALUE: 24
PIF_VALUE: 20
PIF_VALUE: 31
PIF_VALUE: 26
PIF_VALUE: 31
PIF_VALUE: 23
PIF_VALUE: 31
PIF_VALUE: 24
PIF_VALUE: 24
PIF_VALUE: 26
PIF_VALUE: 1
PIF_VALUE: 3
PIF_VALUE: 25
PIF_VALUE: 25
PIF_VALUE: 31
PIF_VALUE: 2
PIF_VALUE: 25
PIF_VALUE: 31
PIF_VALUE: 31
PIF_VALUE: 5
PIF_VALUE: 24
PIF_VALUE: 24
PIF_VALUE: 31
PIF_VALUE: 31
PIF_VALUE: 2
PIF_VALUE: 31
PIF_VALUE: 30
PIF_VALUE: 25
PIF_VALUE: 18
PIF_VALUE: 0
PIF_VALUE: 31
PIF_VALUE: 24
PIF_VALUE: 9
PIF_VALUE: 6
PIF_VALUE: 26
PIF_VALUE: 0
PIF_VALUE: 3
PIF_VALUE: 30
PIF_VALUE: 31
PIF_VALUE: 24
PIF_VALUE: 30
PIF_VALUE: 25
PIF_VALUE: 30
PIF_VALUE: 31
PIF_VALUE: 27
PIF_VALUE: 33
PIF_VALUE: 2
PIF_VALUE: 31
PIF_VALUE: 21
PIF_VALUE: 0
PIF_VALUE: 31
PIF_VALUE: 31
PIF_VALUE: 24
PIF_VALUE: 2
PIF_VALUE: 31
PIF_VALUE: 31
PIF_VALUE: 23
PIF_VALUE: 31
PIF_VALUE: 27
PIF_VALUE: 0
PIF_VALUE: 20
PIF_VALUE: 31
PIF_VALUE: 24
PIF_VALUE: 31
PIF_VALUE: 2
PIF_VALUE: 24
PIF_VALUE: 31
PIF_VALUE: 25
PIF_VALUE: 26
PIF_VALUE: 1
PIF_VALUE: 15
PIF_VALUE: 25
PIF_VALUE: 24
PIF_VALUE: 31
PIF_VALUE: 25
PIF_VALUE: 31
PIF_VALUE: 32
PIF_VALUE: 8
PIF_VALUE: 25
PIF_VALUE: 31
PIF_VALUE: 1
PIF_VALUE: 25
PIF_VALUE: 31
PIF_VALUE: 31
PIF_VALUE: 24
PIF_VALUE: 30
PIF_VALUE: 6
PIF_VALUE: 24
PIF_VALUE: 26
PIF_VALUE: 20
PIF_VALUE: 25
PIF_VALUE: 31
PIF_VALUE: 20
PIF_VALUE: 31
PIF_VALUE: 24
PIF_VALUE: 26
PIF_VALUE: 24
PIF_VALUE: 31
PIF_VALUE: 29
PIF_VALUE: 31
PIF_VALUE: 25
PIF_VALUE: 23
PIF_VALUE: 0

## 2020-10-21 ASSESSMENT — PAIN DESCRIPTION - DESCRIPTORS
DESCRIPTORS: ACHING;CONSTANT;DISCOMFORT
DESCRIPTORS: BURNING;DULL
DESCRIPTORS: BURNING;CONSTANT
DESCRIPTORS: BURNING;CONSTANT

## 2020-10-21 ASSESSMENT — PAIN SCALES - GENERAL
PAINLEVEL_OUTOF10: 0
PAINLEVEL_OUTOF10: 7
PAINLEVEL_OUTOF10: 10
PAINLEVEL_OUTOF10: 8
PAINLEVEL_OUTOF10: 0
PAINLEVEL_OUTOF10: 0
PAINLEVEL_OUTOF10: 5
PAINLEVEL_OUTOF10: 0
PAINLEVEL_OUTOF10: 3
PAINLEVEL_OUTOF10: 10
PAINLEVEL_OUTOF10: 0

## 2020-10-21 ASSESSMENT — PAIN - FUNCTIONAL ASSESSMENT: PAIN_FUNCTIONAL_ASSESSMENT: 0-10

## 2020-10-21 ASSESSMENT — PAIN DESCRIPTION - LOCATION
LOCATION: ABDOMEN

## 2020-10-21 ASSESSMENT — PAIN DESCRIPTION - PAIN TYPE
TYPE: SURGICAL PAIN

## 2020-10-21 ASSESSMENT — PAIN DESCRIPTION - FREQUENCY
FREQUENCY: CONTINUOUS

## 2020-10-21 ASSESSMENT — LIFESTYLE VARIABLES: SMOKING_STATUS: 1

## 2020-10-21 NOTE — H&P
Hepatobiliary and Pancreatic Surgery Attending History and Physical     Patient's Name/Date of Birth: Bibiana Rainey /1985 (63 y.o.)     Date: October 15, 2020      CC: Metastatic neuroendocrine tumor     HPI:  Patient is a very pleasant and unfortunate 30year old female whom has been having gastro intestinal issues for years. She has been having bouts of diarrhea about 3-4times a day that is watery. She has a previous history of alcoholism but has stopped drinking. She has had a cholecystectomy in the past along with a parathyroidectomy. She had recent imaging which showed a neuroendocrine tumor in the small bowel mesentery along with metastatic lesions to her liver. The liver pathology showed a well differentiated neuroendocrine tumor of the liver. She had a dotetate scan which showed the small bowel lesion and metastastic lymph node followed by multiple hepatic metastasis. She I still having diarrhea.       Past Medical History        Past Medical History:   Diagnosis Date    Abdominal pain      Acute Crohn's disease ([address]      Cancer ([address]      Hypocalcemia      Hypothyroidism      Irritable bowel syndrome      Migraines      Status post alcohol detoxification       5/22/2018-5/29/2018           Past Surgical History         Past Surgical History:   Procedure Laterality Date    CHOLECYSTECTOMY, LAPAROSCOPIC   07/06/2016    COLONOSCOPY N/A 6/22/2018     COLONOSCOPY WITH BIOPSY performed by Silvia Crane MD at [address] [facility] CT NEEDLE BIOPSY LIVER PERCUTANEOUS   9/1/2020     CT NEEDLE BIOPSY LIVER PERCUTANEOUS 9/1/2020 [facility] CT    PARATHYROID GLAND SURGERY        THYROIDECTOMY               Current Facility-Administered Medications          Current Outpatient Medications   Medication Sig Dispense Refill    HYDROcodone-acetaminophen (NORCO) 5-325 MG per tablet Take 1 tablet by mouth every 12 hours as needed for Pain for up to 30 days.  Intended supply: 30 days 40 tablet 0    dicyclomine (BENTYL) 20 MG tablet take 1 tablet by mouth twice a day        famotidine (PEPCID) 40 MG tablet take 1 tablet by mouth once daily        gabapentin (NEURONTIN) 300 MG capsule qHS x 3 days then BID x 3 days then TID thereafter (increase as tolerated) 90 capsule 2    zolpidem (AMBIEN) 10 MG tablet take 1 tablet by mouth at bedtime if needed for sleep 30 tablet 1    Multiple Vitamins-Minerals (THERAPEUTIC MULTIVITAMIN-MINERALS) tablet Take 1 tablet by mouth daily        ondansetron (ZOFRAN-ODT) 4 MG disintegrating tablet Take 1 tablet by mouth 3 times daily as needed for Nausea or Vomiting (Patient not taking: Reported on 10/13/2020) 21 tablet 0    butalbital-acetaminophen-caffeine (FIORICET, ESGIC) -40 MG per tablet Take 1 tablet by mouth daily as needed for Headaches        topiramate (TOPAMAX SPRINKLE) 25 MG capsule Take 25 mg by mouth daily        ondansetron (ZOFRAN) 4 MG tablet Take 4 mg by mouth every 8 hours as needed for Nausea or Vomiting        Calcium Carb-Cholecalciferol (CALCIUM-VITAMIN D) 500-200 MG-UNIT per tablet Take 1 tablet by mouth 2 times daily (with meals)        levothyroxine (SYNTHROID) 112 MCG tablet Take 112 mcg by mouth Daily        aluminum & magnesium hydroxide-simethicone (MYLANTA) 200-200-20 MG/5ML SUSP suspension Take 5 mLs by mouth every 6 hours as needed for Indigestion        Geronimo Carb-Mag Hydrox-Simeth 800-270-80 MG/10ML SUSP Take 20 mLs by mouth every 8 hours        sucralfate (CARAFATE) 1 GM/10ML suspension Take 10 mLs by mouth 4 times daily 1200 mL 3      No current facility-administered medications for this visit.                 Facility-Administered Medications Ordered in Other Visits   Medication Dose Route Frequency Provider Last Rate Last Dose    Netspot KIT 5 millicurie  5 millicurie Intravenous As Directed RT PRN Luz Benítez II, MD   5 millicurie at 72/64/24 3192                Allergies   Allergen Reactions    Codeine Nausea And current or ex partner: Not on file       Emotionally abused: Not on file       Physically abused: Not on file       Forced sexual activity: Not on file   Other Topics Concern    Not on file   Social History Narrative    Not on file           ROS:   Review of Systems   Constitutional: Negative for chills, diaphoresis and fever. HENT: Negative for congestion, ear discharge, ear pain, hearing loss, nosebleeds and tinnitus. Eyes: Negative for photophobia, pain and discharge. Respiratory: Negative for shortness of breath. Cardiovascular: Negative for palpitations and leg swelling. Gastrointestinal: Positive for abdominal pain and diarrhea. Negative for blood in stool, constipation, nausea and vomiting. Endocrine: Negative for polydipsia. Genitourinary: Negative for frequency, hematuria and urgency. Musculoskeletal: Negative for back pain and neck pain. Skin: Negative for rash. Allergic/Immunologic: Negative for environmental allergies. Neurological: Negative for tremors and seizures. Psychiatric/Behavioral: Negative for hallucinations and suicidal ideas. The patient is not nervous/anxious.          Physical Exam:  There were no vitals taken for this visit.     PSYCH: mood and affect normal, alert and oriented x 3  CONSTITUTIONAL: No apparent distress, comfortable  EYES: Sclera white, pupils equal round and reactive to light  ENMT:  Hearing normal, trachea midline, ears externally intact  LYMPH: no lympadenopathy in neck. Nolympadenopathy in groins  RESP: Breath sounds were clear and equal with no rales, wheezes, or rhonchi. Respiratory effort was normal with no retractions or use of accessory muscles. CV: Heart soundswere normal with a regular rate and rhythm.    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 - will need ocreotide on standby during the surgery for infusion.     10 Minutes of which greater than 50% was spent counseling or coordinating her care.     Thank you for the consultation allowing me to take part in Ms. Olmstead's care.      DORIAN Santizo M.D.  10/15/2020  10:30 AM

## 2020-10-21 NOTE — ANESTHESIA POSTPROCEDURE EVALUATION
Department of Anesthesiology  Postprocedure Note    Patient: Millicent Galeana  MRN: [id number]  YOB: 1985  Date of evaluation: 10/21/2020  Time:  5:49 PM     Procedure Summary     Date:  10/21/20 Room / Location:  [address] [place] [address] / [facility]    Anesthesia Start:  1143 Anesthesia Stop:  1433    Procedure:  LAPAROSCOPIC ROBOTIC SMALL BOWEL RESECTION WITH PLANNED TRANSITION TO OPEN (N/A Abdomen) Diagnosis:  (NEUROENDROCRINE TUMOR OF SMALL BOWEL)    Surgeon:  Can Rodarte MD Responsible Provider:  Roselyn Major MD    Anesthesia Type:  general ASA Status:  3          Anesthesia Type: general    Pili Phase I: Pili Score: 8    Pili Phase II:      Last vitals: Reviewed and per EMR flowsheets.        Anesthesia Post Evaluation    Patient location during evaluation: PACU  Patient participation: complete - patient participated  Level of consciousness: awake and alert  Airway patency: patent  Nausea & Vomiting: no nausea and no vomiting  Complications: no  Cardiovascular status: hemodynamically stable  Respiratory status: acceptable  Hydration status: euvolemic

## 2020-10-21 NOTE — OP NOTE
matted lymph nodes were taken off the SMV removing the lymph nodes with the mesentery. Once it was taken off the vascular supply and with the mesentery disconnected from the blood supply a 6cm incision was made infraumbilically. A wound protector was placed. The small bowel and tumor and mesentery were delivered to the operative field to perform an extracorporal anastomosis. A GI 75 blue load was used to transect the proximal end of ileum and the remaining mesentery was taken down with a harmonic scalpel. The distal end was transected 20cm proximal from the ileocecal valve. The remaining mesentery was taken down with the harmonic scalpel. About 80cm of ileum was removed en bloc. The root of the mesentery was palpated and there was no remaining bulky lymphadenopathy. An enterotomy was made in the proximal and distal ends otilio JONNY 75 blue load stapler was used to create a side to side jeju-jejunostomy. The common channel was closed with 4.0 PDS suture in a running fashion followed by 3.0 silk sutures in a Lembert fashion. The mesenteric defect was closed with interrupted 3.0 silk sutures. The small bowel was placed back into the abdominal cavity and the fascia was closed with #1 PDS suture in a running fashion followed by 3.0 vicryl suture and a running 4.0 monocryl subcuticular stitch. The skin wound port sites were closed with 4-0 monocryl dermal stitches, Dermabond glue was placed on the incisions, no dressing. The needle and sponge count were correct. The patient tolerated the procedure well and went to recovery in stable condition.      Electronically signed by Candy Johnson MD on 10/21/2020 at 2:21 PM

## 2020-10-21 NOTE — ANESTHESIA PRE PROCEDURE
Department of Anesthesiology  Preprocedure Note       Name:  Ning Gautam   Age:  30 y.o.  :  1985                                          MRN:  [id number]         Date:  10/21/2020      Surgeon: Mayra Caceres):  Melony Kline MD    Procedure: Procedure(s):  LAPAROSCOPIC ROBOTIC SMALL BOWEL RESECTION WITH PLANNED TRANSITION TO OPEN    Medications prior to admission:   Prior to Admission medications    Medication Sig Start Date End Date Taking?  Authorizing Provider   dicyclomine (BENTYL) 20 MG tablet take 1 tablet by mouth twice a day 10/5/20  Yes Historical Provider, MD   famotidine (PEPCID) 40 MG tablet take 1 tablet by mouth once daily 10/5/20  Yes Historical Provider, MD   gabapentin (NEURONTIN) 300 MG capsule qHS x 3 days then BID x 3 days then TID thereafter (increase as tolerated) 10/8/20 11/7/20 Yes Juanito Villalba MD   zolpidem (AMBIEN) 10 MG tablet take 1 tablet by mouth at bedtime if needed for sleep 10/1/20 10/31/20 Yes Georgie Sandy, DO   ondansetron (ZOFRAN-ODT) 4 MG disintegrating tablet Take 1 tablet by mouth 3 times daily as needed for Nausea or Vomiting 20  Yes Lloyd Pate, DO   butalbital-acetaminophen-caffeine (FIORICET, ESGIC) -40 MG per tablet Take 1 tablet by mouth daily as needed for Headaches   Yes Historical Provider, MD   topiramate (TOPAMAX SPRINKLE) 25 MG capsule Take 25 mg by mouth daily   Yes Historical Provider, MD   Calcium Carb-Cholecalciferol (CALCIUM-VITAMIN D) 500-200 MG-UNIT per tablet Take 1 tablet by mouth 2 times daily (with meals)   Yes Historical Provider, MD   levothyroxine (SYNTHROID) 112 MCG tablet Take 112 mcg by mouth Daily   Yes Historical Provider, MD   aluminum & magnesium hydroxide-simethicone (MYLANTA) 200-200-20 MG/5ML SUSP suspension Take 5 mLs by mouth every 6 hours as needed for Indigestion    Yes Historical Provider, MD   Geronimo Carb-Mag Hydrox-Simeth 800-270-80 MG/10ML SUSP Take 20 mLs by mouth every 8 hours   Yes Historical Provider, MD   HYDROcodone-acetaminophen (NORCO) 5-325 MG per tablet Take 1 tablet by mouth every 12 hours as needed for Pain for up to 30 days.  Intended supply: 30 days 10/13/20 11/12/20  Benjamin Chapman MD   Multiple Vitamins-Minerals (THERAPEUTIC MULTIVITAMIN-MINERALS) tablet Take 1 tablet by mouth daily    Historical Provider, MD   ondansetron (ZOFRAN) 4 MG tablet Take 4 mg by mouth every 8 hours as needed for Nausea or Vomiting    Historical Provider, MD   sucralfate (CARAFATE) 1 GM/10ML suspension Take 10 mLs by mouth 4 times daily 8/20/20   Janneth Montes DO       Current medications:    Current Facility-Administered Medications   Medication Dose Route Frequency Provider Last Rate Last Dose    sodium chloride flush 0.9 % injection 10 mL  10 mL Intravenous 2 times per day [facility] MD VALERIO        sodium chloride flush 0.9 % injection 10 mL  10 mL Intravenous PRN [employer] MD VALERIO        cefTRIAXone (ROCEPHIN) 2 g in sterile water 20 mL IV syringe  2 g Intravenous On Call to [place] [id number] MD VALERIO        octreotide (SANDOSTATIN) 500 mcg in sodium chloride 0.9 % 100 mL infusion  100 mcg/hr Intravenous Continuous [employer] MD VALERIO 20 mL/hr at 10/21/20 1105 100 mcg/hr at 10/21/20 1105       Allergies:  No Known Allergies    Problem List:    Patient Active Problem List   Diagnosis Code    Hx of migraine headaches Z86.69    Generalized abdominal pain R10.84    Primary insomnia F51.01    Fatty liver K76.0    H/O small bowel obstruction Z87.19    Hypothyroidism E03.9    Abdominal pain, right lower quadrant R10.31    Depression F32.9    PTSD (post-traumatic stress disorder) F43.10    Liver masses R16.0    Migraines G43.909    Mesenteric mass K63.89    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 ([facility] C7B.8       Past Medical History:        Diagnosis Date    Abdominal pain     Acute Crohn's disease ([facility]     Cancer ([facility] 10/19/2020    CO2 24 10/19/2020    BUN 8 10/19/2020    CREATININE 0.7 10/19/2020    GFRAA >60 10/19/2020    LABGLOM >60 10/19/2020    GLUCOSE 92 10/19/2020    PROT 7.3 10/19/2020    CALCIUM 9.0 10/19/2020    BILITOT 0.2 10/19/2020    ALKPHOS 116 10/19/2020    AST 16 10/19/2020    ALT 26 10/19/2020       POC Tests: No results for input(s): POCGLU, POCNA, POCK, POCCL, POCBUN, POCHEMO, POCHCT in the last 72 hours. Coags:   Lab Results   Component Value Date    PROTIME 11.4 09/01/2020    INR 1.0 09/01/2020       HCG (If Applicable):   Lab Results   Component Value Date    PREGTESTUR NEGATIVE 10/19/2020        ABGs: No results found for: PHART, PO2ART, NJN3OTW, SSS1EKR, BEART, J6QMWXHV     Type & Screen (If Applicable):  No results found for: LABABO, LABRH    Drug/Infectious Status (If Applicable):  No results found for: HIV, HEPCAB    COVID-19 Screening (If Applicable):   Lab Results   Component Value Date    COVID19 Not Detected 10/16/2020         Anesthesia Evaluation  Patient summary reviewed and Nursing notes reviewed no history of anesthetic complications:   Airway: Mallampati: II  TM distance: >3 FB   Neck ROM: full  Mouth opening: > = 3 FB Dental:      Comment: Pt has multiple missing, chipped and 1 very loose tooth on the left upper. Pt is aware there is a chance with intubation it would come out.  Pt is still agreeable to GA & states\" its okay if my tooth comes out, I want it out anyway's     Pulmonary: breath sounds clear to auscultation  (+) current smoker                           Cardiovascular:Negative CV ROS            Rhythm: regular  Rate: normal           Beta Blocker:  Not on Beta Blocker         Neuro/Psych:   (+) headaches:, psychiatric history:             ROS comment: Insomnia    GI/Hepatic/Renal:   (+) liver disease:,          ROS comment: Crohn's disease  Irritable bowel syndrome   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   H/o of small bowel obstruction      Hx of alcohol abuse  Fatty liver    NEGATIVE PREGNANCY ON 10/19/2020. .   Endo/Other:    (+) hypothyroidism::., malignancy/cancer (neuroendocrine tumor). Pt had no PAT visit       Abdominal:           Vascular: negative vascular ROS. Anesthesia Plan      general     ASA 3     (IV# )  Induction: intravenous. BIS and arterial line  MIPS: Postoperative opioids intended and Prophylactic antiemetics administered. Anesthetic plan and risks discussed with patient. Use of blood products discussed with patient whom consented to blood products. Plan discussed with CRNA and attending.                 Radha Alvarenga RN, [place]    10/21/2020

## 2020-10-21 NOTE — INTERVAL H&P NOTE
Update History & Physical    The patient's History and Physical of October 15, 2020 was reviewed with the patient and I examined the patient. There was no change. The surgical site was confirmed by the patient and me. Plan: The risks, benefits, expected outcome, and alternative to the recommended procedure have been discussed with the patient. Patient understands and wants to proceed with the procedure.      Electronically signed by Thierry Prieto MD on 10/21/2020 at 11:31 AM

## 2020-10-21 NOTE — PROGRESS NOTES
Covid Test done: 10/17/20    Results:Negative    Self-quarantine guidelines followed since tested? Yes    Any unusual S/S or concerns expressed or observed? No.    Admitted to Same Day Surgery Unit. Preop instructions given to patient & family.

## 2020-10-21 NOTE — PROGRESS NOTES
Patient admitted to PACU and placed on appropriate monitors. Patient on 40% face mask. Airway patent at this time. Report obtained from CRNA. Warm blankets applied. Patient stated she was having pain and anesthesia gave her dilaudid 2 mg IV on admission.

## 2020-10-22 LAB
ALBUMIN SERPL-MCNC: 3.4 G/DL (ref 3.5–5.2)
ALP BLD-CCNC: 83 U/L (ref 35–104)
ALT SERPL-CCNC: 12 U/L (ref 0–32)
ANION GAP SERPL CALCULATED.3IONS-SCNC: 13 MMOL/L (ref 7–16)
AST SERPL-CCNC: 14 U/L (ref 0–31)
BILIRUB SERPL-MCNC: 0.4 MG/DL (ref 0–1.2)
BUN BLDV-MCNC: 8 MG/DL (ref 6–20)
CALCIUM SERPL-MCNC: 7.8 MG/DL (ref 8.6–10.2)
CHLORIDE BLD-SCNC: 103 MMOL/L (ref 98–107)
CO2: 20 MMOL/L (ref 22–29)
CREAT SERPL-MCNC: 0.5 MG/DL (ref 0.5–1)
GFR AFRICAN AMERICAN: >60
GFR NON-AFRICAN AMERICAN: >60 ML/MIN/1.73
GLUCOSE BLD-MCNC: 112 MG/DL (ref 74–99)
HCT VFR BLD CALC: 38.4 % (ref 34–48)
HEMOGLOBIN: 12.3 G/DL (ref 11.5–15.5)
MCH RBC QN AUTO: 31.9 PG (ref 26–35)
MCHC RBC AUTO-ENTMCNC: 32 % (ref 32–34.5)
MCV RBC AUTO: 99.5 FL (ref 80–99.9)
PDW BLD-RTO: 13.2 FL (ref 11.5–15)
PLATELET # BLD: 254 E9/L (ref 130–450)
PMV BLD AUTO: 10.5 FL (ref 7–12)
POTASSIUM REFLEX MAGNESIUM: 4.5 MMOL/L (ref 3.5–5)
RBC # BLD: 3.86 E12/L (ref 3.5–5.5)
SODIUM BLD-SCNC: 136 MMOL/L (ref 132–146)
TOTAL PROTEIN: 5.3 G/DL (ref 6.4–8.3)
WBC # BLD: 14.3 E9/L (ref 4.5–11.5)

## 2020-10-22 PROCEDURE — 6360000002 HC RX W HCPCS: Performed by: STUDENT IN AN ORGANIZED HEALTH CARE EDUCATION/TRAINING PROGRAM

## 2020-10-22 PROCEDURE — 6370000000 HC RX 637 (ALT 250 FOR IP): Performed by: SURGERY

## 2020-10-22 PROCEDURE — 2060000000 HC ICU INTERMEDIATE R&B

## 2020-10-22 PROCEDURE — 80053 COMPREHEN METABOLIC PANEL: CPT

## 2020-10-22 PROCEDURE — 94770 HC ETCO2 MONITOR DAILY: CPT

## 2020-10-22 PROCEDURE — 85027 COMPLETE CBC AUTOMATED: CPT

## 2020-10-22 PROCEDURE — 36415 COLL VENOUS BLD VENIPUNCTURE: CPT

## 2020-10-22 PROCEDURE — 2580000003 HC RX 258: Performed by: STUDENT IN AN ORGANIZED HEALTH CARE EDUCATION/TRAINING PROGRAM

## 2020-10-22 RX ADMIN — Medication 0.2 MG: at 21:36

## 2020-10-22 RX ADMIN — KETOROLAC TROMETHAMINE 30 MG: 30 INJECTION, SOLUTION INTRAMUSCULAR; INTRAVENOUS at 03:49

## 2020-10-22 RX ADMIN — SODIUM CHLORIDE, POTASSIUM CHLORIDE, SODIUM LACTATE AND CALCIUM CHLORIDE: 600; 310; 30; 20 INJECTION, SOLUTION INTRAVENOUS at 03:57

## 2020-10-22 RX ADMIN — ONDANSETRON 4 MG: 2 INJECTION INTRAMUSCULAR; INTRAVENOUS at 02:55

## 2020-10-22 RX ADMIN — Medication: at 05:54

## 2020-10-22 RX ADMIN — ENOXAPARIN SODIUM 40 MG: 40 INJECTION SUBCUTANEOUS at 09:22

## 2020-10-22 RX ADMIN — KETOROLAC TROMETHAMINE 30 MG: 30 INJECTION, SOLUTION INTRAMUSCULAR; INTRAVENOUS at 09:27

## 2020-10-22 RX ADMIN — ONDANSETRON 4 MG: 2 INJECTION INTRAMUSCULAR; INTRAVENOUS at 09:27

## 2020-10-22 RX ADMIN — Medication 3 MG: at 20:38

## 2020-10-22 RX ADMIN — Medication: at 01:07

## 2020-10-22 RX ADMIN — Medication: at 13:48

## 2020-10-22 ASSESSMENT — PAIN DESCRIPTION - LOCATION
LOCATION: ABDOMEN

## 2020-10-22 ASSESSMENT — PAIN DESCRIPTION - DESCRIPTORS
DESCRIPTORS: ACHING;CONSTANT;DISCOMFORT;TENDER
DESCRIPTORS: CONSTANT
DESCRIPTORS: CONSTANT

## 2020-10-22 ASSESSMENT — PAIN - FUNCTIONAL ASSESSMENT: PAIN_FUNCTIONAL_ASSESSMENT: PREVENTS OR INTERFERES SOME ACTIVE ACTIVITIES AND ADLS

## 2020-10-22 ASSESSMENT — PAIN SCALES - GENERAL
PAINLEVEL_OUTOF10: 8
PAINLEVEL_OUTOF10: 7
PAINLEVEL_OUTOF10: 9
PAINLEVEL_OUTOF10: 0
PAINLEVEL_OUTOF10: 8
PAINLEVEL_OUTOF10: 9
PAINLEVEL_OUTOF10: 8

## 2020-10-22 ASSESSMENT — PAIN DESCRIPTION - ONSET
ONSET: ON-GOING

## 2020-10-22 ASSESSMENT — PAIN DESCRIPTION - FREQUENCY
FREQUENCY: CONTINUOUS

## 2020-10-22 ASSESSMENT — PAIN DESCRIPTION - PAIN TYPE
TYPE: SURGICAL PAIN

## 2020-10-22 ASSESSMENT — PAIN DESCRIPTION - PROGRESSION: CLINICAL_PROGRESSION: NOT CHANGED

## 2020-10-22 NOTE — PROGRESS NOTES
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 Reports some nausea and hiccupping, but no vomiting. She denies any flatus or BMs. Objective:  /60   Pulse 81   Temp 97.1 °F (36.2 °C) (Temporal)   Resp 18   Ht 5' (1.524 m)   Wt 149 lb (67.6 kg)   SpO2 98%   BMI 29.10 kg/m²     GENERAL:  Laying in bed, awake, alert, cooperative, no apparent distress  HEAD: Normocephalic, atraumatic  EYES: No sclera icterus  LUNGS:  On 3L NC  CARDIOVASCULAR:  RR and normotensive  ABDOMEN:  Soft, non-tender, non-distended.  Incision c/d/i  EXTREMITIES: No edema or swelling  SKIN: Warm and dry    Assessment/Plan:  30 y.o. 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

## 2020-10-22 NOTE — CARE COORDINATION
Met with patient at bedside to discuss transition of care. She lives independently with her parents. No assistive devices. PCP Mitul Wakefield. Pharmacy [facility]. She is planning to return home at discharge. She does not anticipate any needs. POD #1. Dilaudid PCA continues. Nursing to ambulate pt. CM will continue to follow for any needs that arise  Toy Day, RN  [facility] Case Management  [phone]--Medical records release form signed and faxed to obtain medical records for patients disability paperwork.

## 2020-10-22 NOTE — CARE COORDINATION
Met with patient at bedside to discuss transition of care. She lives independently with her parents. No assistive devices. PCP Angelito Durand. Pharmacy [facility] [place]. She is planning to return home at discharge. She does not anticipate any needs. POD #1. Dilaudid PCA continues. Nursing to ambulate pt.  CM will continue to follow for any needs that arise  Albino Moreno RN  [facility] Case Management  [phone]

## 2020-10-23 LAB
ALBUMIN SERPL-MCNC: 3.5 G/DL (ref 3.5–5.2)
ALP BLD-CCNC: 77 U/L (ref 35–104)
ALT SERPL-CCNC: 11 U/L (ref 0–32)
ANION GAP SERPL CALCULATED.3IONS-SCNC: 11 MMOL/L (ref 7–16)
AST SERPL-CCNC: 15 U/L (ref 0–31)
BILIRUB SERPL-MCNC: 0.3 MG/DL (ref 0–1.2)
BUN BLDV-MCNC: 9 MG/DL (ref 6–20)
CALCIUM SERPL-MCNC: 7.8 MG/DL (ref 8.6–10.2)
CHLORIDE BLD-SCNC: 99 MMOL/L (ref 98–107)
CO2: 26 MMOL/L (ref 22–29)
CREAT SERPL-MCNC: 0.7 MG/DL (ref 0.5–1)
GFR AFRICAN AMERICAN: >60
GFR NON-AFRICAN AMERICAN: >60 ML/MIN/1.73
GLUCOSE BLD-MCNC: 78 MG/DL (ref 74–99)
HCT VFR BLD CALC: 35.3 % (ref 34–48)
HEMOGLOBIN: 11.3 G/DL (ref 11.5–15.5)
MCH RBC QN AUTO: 31.7 PG (ref 26–35)
MCHC RBC AUTO-ENTMCNC: 32 % (ref 32–34.5)
MCV RBC AUTO: 98.9 FL (ref 80–99.9)
PDW BLD-RTO: 13 FL (ref 11.5–15)
PLATELET # BLD: 223 E9/L (ref 130–450)
PMV BLD AUTO: 10.4 FL (ref 7–12)
POTASSIUM REFLEX MAGNESIUM: 3.8 MMOL/L (ref 3.5–5)
RBC # BLD: 3.57 E12/L (ref 3.5–5.5)
SODIUM BLD-SCNC: 136 MMOL/L (ref 132–146)
TOTAL PROTEIN: 5.5 G/DL (ref 6.4–8.3)
WBC # BLD: 9.9 E9/L (ref 4.5–11.5)

## 2020-10-23 PROCEDURE — 6370000000 HC RX 637 (ALT 250 FOR IP): Performed by: SURGERY

## 2020-10-23 PROCEDURE — 6370000000 HC RX 637 (ALT 250 FOR IP): Performed by: STUDENT IN AN ORGANIZED HEALTH CARE EDUCATION/TRAINING PROGRAM

## 2020-10-23 PROCEDURE — 85027 COMPLETE CBC AUTOMATED: CPT

## 2020-10-23 PROCEDURE — 2060000000 HC ICU INTERMEDIATE R&B

## 2020-10-23 PROCEDURE — 94667 MNPJ CHEST WALL 1ST: CPT

## 2020-10-23 PROCEDURE — 94668 MNPJ CHEST WALL SBSQ: CPT

## 2020-10-23 PROCEDURE — 36415 COLL VENOUS BLD VENIPUNCTURE: CPT

## 2020-10-23 PROCEDURE — 2500000003 HC RX 250 WO HCPCS: Performed by: STUDENT IN AN ORGANIZED HEALTH CARE EDUCATION/TRAINING PROGRAM

## 2020-10-23 PROCEDURE — 6360000002 HC RX W HCPCS: Performed by: STUDENT IN AN ORGANIZED HEALTH CARE EDUCATION/TRAINING PROGRAM

## 2020-10-23 PROCEDURE — 80053 COMPREHEN METABOLIC PANEL: CPT

## 2020-10-23 PROCEDURE — 94770 HC ETCO2 MONITOR DAILY: CPT

## 2020-10-23 PROCEDURE — 2580000003 HC RX 258: Performed by: STUDENT IN AN ORGANIZED HEALTH CARE EDUCATION/TRAINING PROGRAM

## 2020-10-23 RX ORDER — DOCUSATE SODIUM 100 MG/1
100 CAPSULE, LIQUID FILLED ORAL 2 TIMES DAILY
Status: DISCONTINUED | OUTPATIENT
Start: 2020-10-23 | End: 2020-10-25 | Stop reason: HOSPADM

## 2020-10-23 RX ORDER — DEXTROSE, SODIUM CHLORIDE, AND POTASSIUM CHLORIDE 5; .45; .15 G/100ML; G/100ML; G/100ML
INJECTION INTRAVENOUS CONTINUOUS
Status: DISCONTINUED | OUTPATIENT
Start: 2020-10-23 | End: 2020-10-25

## 2020-10-23 RX ORDER — SENNA PLUS 8.6 MG/1
1 TABLET ORAL 2 TIMES DAILY
Status: DISCONTINUED | OUTPATIENT
Start: 2020-10-23 | End: 2020-10-25 | Stop reason: HOSPADM

## 2020-10-23 RX ORDER — BISACODYL 10 MG
10 SUPPOSITORY, RECTAL RECTAL DAILY PRN
Status: DISCONTINUED | OUTPATIENT
Start: 2020-10-23 | End: 2020-10-25 | Stop reason: HOSPADM

## 2020-10-23 RX ADMIN — Medication 3 MG: at 21:36

## 2020-10-23 RX ADMIN — Medication: at 05:20

## 2020-10-23 RX ADMIN — SENNOSIDES 8.6 MG: 8.6 TABLET, FILM COATED ORAL at 09:14

## 2020-10-23 RX ADMIN — SENNOSIDES 8.6 MG: 8.6 TABLET, FILM COATED ORAL at 21:36

## 2020-10-23 RX ADMIN — Medication: at 12:01

## 2020-10-23 RX ADMIN — POTASSIUM CHLORIDE, DEXTROSE MONOHYDRATE AND SODIUM CHLORIDE: 150; 5; 450 INJECTION, SOLUTION INTRAVENOUS at 07:38

## 2020-10-23 RX ADMIN — ENOXAPARIN SODIUM 40 MG: 40 INJECTION SUBCUTANEOUS at 09:14

## 2020-10-23 RX ADMIN — Medication: at 19:57

## 2020-10-23 RX ADMIN — DOCUSATE SODIUM 100 MG: 100 CAPSULE, LIQUID FILLED ORAL at 09:14

## 2020-10-23 RX ADMIN — DOCUSATE SODIUM 100 MG: 100 CAPSULE, LIQUID FILLED ORAL at 21:36

## 2020-10-23 ASSESSMENT — PAIN SCALES - GENERAL
PAINLEVEL_OUTOF10: 8
PAINLEVEL_OUTOF10: 10
PAINLEVEL_OUTOF10: 9

## 2020-10-23 ASSESSMENT — PAIN DESCRIPTION - PROGRESSION
CLINICAL_PROGRESSION: NOT CHANGED

## 2020-10-23 ASSESSMENT — PAIN DESCRIPTION - PAIN TYPE
TYPE: ACUTE PAIN;SURGICAL PAIN
TYPE: ACUTE PAIN;SURGICAL PAIN

## 2020-10-23 ASSESSMENT — PAIN DESCRIPTION - LOCATION
LOCATION: ABDOMEN
LOCATION: ABDOMEN

## 2020-10-23 ASSESSMENT — PAIN DESCRIPTION - DESCRIPTORS
DESCRIPTORS: ACHING;CONSTANT;DISCOMFORT
DESCRIPTORS: ACHING;CONSTANT;DISCOMFORT

## 2020-10-23 ASSESSMENT — PAIN - FUNCTIONAL ASSESSMENT
PAIN_FUNCTIONAL_ASSESSMENT: PREVENTS OR INTERFERES SOME ACTIVE ACTIVITIES AND ADLS
PAIN_FUNCTIONAL_ASSESSMENT: PREVENTS OR INTERFERES SOME ACTIVE ACTIVITIES AND ADLS

## 2020-10-23 ASSESSMENT — PAIN DESCRIPTION - FREQUENCY
FREQUENCY: CONTINUOUS
FREQUENCY: CONTINUOUS

## 2020-10-23 ASSESSMENT — PAIN DESCRIPTION - ONSET
ONSET: ON-GOING
ONSET: ON-GOING

## 2020-10-23 NOTE — CARE COORDINATION
POD #2  PCA, NPO  and  IV fluid continued. Discharge plan remains home. CM/SW will continue to follow for any needs for transition of care.

## 2020-10-23 NOTE — PROGRESS NOTES
Comprehensive Nutrition Assessment    Type and Reason for Visit:  Initial, Positive Nutrition Screen    Nutrition Recommendations/Plan: Continue NPO. ADAT. Will add nutritional interventions as medically appropriate. If patient unable to have diet advanced would recommend consideration of PN. Nutrition Assessment:  Pt w/ nutritional risk d.t admit w/ neuroendocrine tumor of small bowel w/ mets to liver. Now s/p lap SBR w/ enteroenterostomy. ADAT. WIll add nutritional interventions as able    Malnutrition Assessment:  Malnutrition Status: At risk for malnutrition (Comment)    Context:  Acute Illness     Findings of the 6 clinical characteristics of malnutrition:  Energy Intake:  1 - 75% or less of estimated energy requirements for 7 or more days  Weight Loss:  (2% wt loss x 3 weeks)     Body Fat Loss:  Unable to assess     Muscle Mass Loss:  Unable to assess    Fluid Accumulation:  No significant fluid accumulation     Strength:  Not Performed    Estimated Daily Nutrient Needs:  Energy (kcal):  25-30= 7694-9456; Weight Used for Energy Requirements:  Current     Protein (g):  1.5-1.8= 75-85; Weight Used for Protein Requirements:  Ideal        Fluid (ml/day):  1700ml; Weight Used for Fluid Requirements:  Current      Nutrition Related Findings:  +I/os, hypoactive bs, no edema noted, lap sites, diarrhea PTA      Wounds:  (lap sites)       Current Nutrition Therapies:    Diet NPO Effective Now Exceptions are: Sips with Meds, Ice Chips    Anthropometric Measures:  · Height: 5' (152.4 cm)  · Current Body Weight: 149 lb (67.6 kg)(10/21)   · Usual Body Weight: 151 lb (68.5 kg)(10/2020; 156# 3/2020)     · Ideal Body Weight: 100 lbs; % Ideal Body Weight 149 %   · BMI: 29.1  · BMI Categories: Overweight (BMI 25.0-29. 9)       Nutrition Diagnosis:   · Inadequate oral intake related to altered GI structure as evidenced by NPO or clear liquid status due to medical condition      Nutrition Interventions:   Food and/or Nutrient Delivery:  Continue NPO  Nutrition Education/Counseling:  No recommendation at this time   Coordination of Nutrition Care:  Continued Inpatient Monitoring    Goals:  ADAT       Nutrition Monitoring and Evaluation:   Food/Nutrient Intake Outcomes:  Diet Advancement/Tolerance  Physical Signs/Symptoms Outcomes:  Biochemical Data, Diarrhea, GI Status, Fluid Status or Edema, Hemodynamic Status, Nutrition Focused Physical Findings, Skin, Weight     Discharge Planning:     Too soon to determine     Electronically signed by Dyan Robertson RD, LD on 10/23/20 at 11:11 AM EDT    Contact: x [phone]

## 2020-10-23 NOTE — PROGRESS NOTES
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 Reports having some gas pain, but denies passing any flatus or BMs. She says that she has the hiccups, but no nausea/vomiting. Objective:  BP (!) 110/53   Pulse 71   Temp 96.9 °F (36.1 °C) (Oral)   Resp 16   Ht 5' (1.524 m)   Wt 149 lb (67.6 kg)   SpO2 98%   BMI 29.10 kg/m²     GENERAL:  Laying in bed, awake, alert, cooperative, no apparent distress  HEAD: Normocephalic, atraumatic  EYES: No sclera icterus  LUNGS:  On 3L NC  CARDIOVASCULAR:  RR and normotensive  ABDOMEN:  Soft, appropriate TTP around incision site, non-distended.  Incision c/d/i  EXTREMITIES: No edema or swelling  SKIN: Warm and dry    Assessment/Plan:  30 y.o. 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 and mesenteric lymphadenopathy s/p laparoscopic robotic SBR with side-to-side enteroenterostomy 10/21    - CBC, CMP  - Continue PCA  - D5 1/2NS + 20mEq KCl  - Await return of bowel function  - Lovenox for DVT PPx  - Wean O2 as tolerated  - Sips/ice chips <250mL Q4H, possibly advance in the afternoon if appropriate progression  - OOB as tolerated, must ambulate more  - IS 10x per hour and IPPB  - Q4H vitals and POCG    Electronically signed by Tiffany Boykin MD on 10/23/2020 at 6:38 AM     Doing very well  Ambulate  Continue sips and ice chips    Electronically signed by Za Nielson MD on 10/23/2020 at 10:43 AM

## 2020-10-24 LAB
ALBUMIN SERPL-MCNC: 3.4 G/DL (ref 3.5–5.2)
ALP BLD-CCNC: 68 U/L (ref 35–104)
ALT SERPL-CCNC: 9 U/L (ref 0–32)
ANION GAP SERPL CALCULATED.3IONS-SCNC: 12 MMOL/L (ref 7–16)
AST SERPL-CCNC: 11 U/L (ref 0–31)
BILIRUB SERPL-MCNC: 0.3 MG/DL (ref 0–1.2)
BUN BLDV-MCNC: 3 MG/DL (ref 6–20)
CALCIUM SERPL-MCNC: 7.7 MG/DL (ref 8.6–10.2)
CHLORIDE BLD-SCNC: 98 MMOL/L (ref 98–107)
CO2: 28 MMOL/L (ref 22–29)
CREAT SERPL-MCNC: 0.6 MG/DL (ref 0.5–1)
GFR AFRICAN AMERICAN: >60
GFR NON-AFRICAN AMERICAN: >60 ML/MIN/1.73
GLUCOSE BLD-MCNC: 96 MG/DL (ref 74–99)
HCT VFR BLD CALC: 35.5 % (ref 34–48)
HEMOGLOBIN: 11.6 G/DL (ref 11.5–15.5)
MCH RBC QN AUTO: 32 PG (ref 26–35)
MCHC RBC AUTO-ENTMCNC: 32.7 % (ref 32–34.5)
MCV RBC AUTO: 97.8 FL (ref 80–99.9)
PDW BLD-RTO: 12.8 FL (ref 11.5–15)
PLATELET # BLD: 230 E9/L (ref 130–450)
PMV BLD AUTO: 10.4 FL (ref 7–12)
POTASSIUM REFLEX MAGNESIUM: 3.6 MMOL/L (ref 3.5–5)
RBC # BLD: 3.63 E12/L (ref 3.5–5.5)
SODIUM BLD-SCNC: 138 MMOL/L (ref 132–146)
TOTAL PROTEIN: 5.5 G/DL (ref 6.4–8.3)
WBC # BLD: 8.3 E9/L (ref 4.5–11.5)

## 2020-10-24 PROCEDURE — 85027 COMPLETE CBC AUTOMATED: CPT

## 2020-10-24 PROCEDURE — 6370000000 HC RX 637 (ALT 250 FOR IP): Performed by: SURGERY

## 2020-10-24 PROCEDURE — 2580000003 HC RX 258: Performed by: STUDENT IN AN ORGANIZED HEALTH CARE EDUCATION/TRAINING PROGRAM

## 2020-10-24 PROCEDURE — 36415 COLL VENOUS BLD VENIPUNCTURE: CPT

## 2020-10-24 PROCEDURE — 6370000000 HC RX 637 (ALT 250 FOR IP): Performed by: STUDENT IN AN ORGANIZED HEALTH CARE EDUCATION/TRAINING PROGRAM

## 2020-10-24 PROCEDURE — 2500000003 HC RX 250 WO HCPCS: Performed by: STUDENT IN AN ORGANIZED HEALTH CARE EDUCATION/TRAINING PROGRAM

## 2020-10-24 PROCEDURE — 6360000002 HC RX W HCPCS: Performed by: STUDENT IN AN ORGANIZED HEALTH CARE EDUCATION/TRAINING PROGRAM

## 2020-10-24 PROCEDURE — 80053 COMPREHEN METABOLIC PANEL: CPT

## 2020-10-24 PROCEDURE — 2700000000 HC OXYGEN THERAPY PER DAY

## 2020-10-24 PROCEDURE — 94770 HC ETCO2 MONITOR DAILY: CPT

## 2020-10-24 PROCEDURE — 2060000000 HC ICU INTERMEDIATE R&B

## 2020-10-24 RX ORDER — OXYCODONE HYDROCHLORIDE 5 MG/1
5 TABLET ORAL EVERY 4 HOURS PRN
Status: DISCONTINUED | OUTPATIENT
Start: 2020-10-24 | End: 2020-10-25 | Stop reason: HOSPADM

## 2020-10-24 RX ORDER — ACETAMINOPHEN 325 MG/1
650 TABLET ORAL EVERY 6 HOURS
Status: DISCONTINUED | OUTPATIENT
Start: 2020-10-24 | End: 2020-10-25 | Stop reason: HOSPADM

## 2020-10-24 RX ORDER — OXYCODONE HYDROCHLORIDE 10 MG/1
10 TABLET ORAL EVERY 4 HOURS PRN
Status: DISCONTINUED | OUTPATIENT
Start: 2020-10-24 | End: 2020-10-25 | Stop reason: HOSPADM

## 2020-10-24 RX ADMIN — SENNOSIDES 8.6 MG: 8.6 TABLET, FILM COATED ORAL at 08:45

## 2020-10-24 RX ADMIN — SENNOSIDES 8.6 MG: 8.6 TABLET, FILM COATED ORAL at 20:05

## 2020-10-24 RX ADMIN — DOCUSATE SODIUM 100 MG: 100 CAPSULE, LIQUID FILLED ORAL at 20:05

## 2020-10-24 RX ADMIN — Medication 3 MG: at 20:43

## 2020-10-24 RX ADMIN — HYDROMORPHONE HYDROCHLORIDE 0.5 MG: 1 INJECTION, SOLUTION INTRAMUSCULAR; INTRAVENOUS; SUBCUTANEOUS at 20:06

## 2020-10-24 RX ADMIN — HYDROMORPHONE HYDROCHLORIDE 0.5 MG: 1 INJECTION, SOLUTION INTRAMUSCULAR; INTRAVENOUS; SUBCUTANEOUS at 14:38

## 2020-10-24 RX ADMIN — OXYCODONE HYDROCHLORIDE 10 MG: 10 TABLET ORAL at 22:00

## 2020-10-24 RX ADMIN — Medication: at 07:28

## 2020-10-24 RX ADMIN — HYDROMORPHONE HYDROCHLORIDE 0.5 MG: 1 INJECTION, SOLUTION INTRAMUSCULAR; INTRAVENOUS; SUBCUTANEOUS at 10:15

## 2020-10-24 RX ADMIN — ENOXAPARIN SODIUM 40 MG: 40 INJECTION SUBCUTANEOUS at 08:45

## 2020-10-24 RX ADMIN — DOCUSATE SODIUM 100 MG: 100 CAPSULE, LIQUID FILLED ORAL at 08:45

## 2020-10-24 RX ADMIN — OXYCODONE HYDROCHLORIDE 10 MG: 10 TABLET ORAL at 16:29

## 2020-10-24 RX ADMIN — SODIUM CHLORIDE, PRESERVATIVE FREE 10 ML: 5 INJECTION INTRAVENOUS at 20:06

## 2020-10-24 RX ADMIN — OXYCODONE HYDROCHLORIDE 10 MG: 10 TABLET ORAL at 11:28

## 2020-10-24 RX ADMIN — POTASSIUM CHLORIDE, DEXTROSE MONOHYDRATE AND SODIUM CHLORIDE: 150; 5; 450 INJECTION, SOLUTION INTRAVENOUS at 09:34

## 2020-10-24 ASSESSMENT — PAIN DESCRIPTION - ONSET
ONSET: ON-GOING
ONSET: ON-GOING

## 2020-10-24 ASSESSMENT — PAIN DESCRIPTION - PAIN TYPE
TYPE: ACUTE PAIN
TYPE: ACUTE PAIN

## 2020-10-24 ASSESSMENT — PAIN DESCRIPTION - PROGRESSION
CLINICAL_PROGRESSION: NOT CHANGED

## 2020-10-24 ASSESSMENT — PAIN DESCRIPTION - LOCATION
LOCATION: ABDOMEN
LOCATION: ABDOMEN

## 2020-10-24 ASSESSMENT — PAIN SCALES - GENERAL
PAINLEVEL_OUTOF10: 9
PAINLEVEL_OUTOF10: 10
PAINLEVEL_OUTOF10: 10
PAINLEVEL_OUTOF10: 9
PAINLEVEL_OUTOF10: 9

## 2020-10-24 ASSESSMENT — PAIN DESCRIPTION - DESCRIPTORS
DESCRIPTORS: ACHING;DISCOMFORT
DESCRIPTORS: ACHING;DISCOMFORT

## 2020-10-24 ASSESSMENT — PAIN DESCRIPTION - FREQUENCY
FREQUENCY: CONTINUOUS
FREQUENCY: CONTINUOUS

## 2020-10-24 ASSESSMENT — PAIN DESCRIPTION - ORIENTATION
ORIENTATION: MID
ORIENTATION: MID

## 2020-10-24 NOTE — PLAN OF CARE
Problem: Pain:  Goal: Control of acute pain  Description: Control of acute pain  Outcome: Met This Shift     Problem: Falls - Risk of:  Goal: Will remain free from falls  Description: Will remain free from falls  Outcome: Met This Shift  Goal: Absence of physical injury  Description: Absence of physical injury  Outcome: Met This Shift

## 2020-10-25 VITALS
RESPIRATION RATE: 16 BRPM | SYSTOLIC BLOOD PRESSURE: 120 MMHG | TEMPERATURE: 97.2 F | WEIGHT: 149 LBS | OXYGEN SATURATION: 96 % | HEIGHT: 60 IN | DIASTOLIC BLOOD PRESSURE: 65 MMHG | HEART RATE: 80 BPM | BODY MASS INDEX: 29.25 KG/M2

## 2020-10-25 LAB
ALBUMIN SERPL-MCNC: 3.3 G/DL (ref 3.5–5.2)
ALP BLD-CCNC: 69 U/L (ref 35–104)
ALT SERPL-CCNC: 8 U/L (ref 0–32)
ANION GAP SERPL CALCULATED.3IONS-SCNC: 13 MMOL/L (ref 7–16)
AST SERPL-CCNC: 12 U/L (ref 0–31)
BILIRUB SERPL-MCNC: 0.3 MG/DL (ref 0–1.2)
BUN BLDV-MCNC: 2 MG/DL (ref 6–20)
CALCIUM SERPL-MCNC: 8.1 MG/DL (ref 8.6–10.2)
CHLORIDE BLD-SCNC: 101 MMOL/L (ref 98–107)
CO2: 24 MMOL/L (ref 22–29)
CREAT SERPL-MCNC: 0.6 MG/DL (ref 0.5–1)
GFR AFRICAN AMERICAN: >60
GFR NON-AFRICAN AMERICAN: >60 ML/MIN/1.73
GLUCOSE BLD-MCNC: 105 MG/DL (ref 74–99)
HCT VFR BLD CALC: 34.2 % (ref 34–48)
HEMOGLOBIN: 11.5 G/DL (ref 11.5–15.5)
MCH RBC QN AUTO: 32.1 PG (ref 26–35)
MCHC RBC AUTO-ENTMCNC: 33.6 % (ref 32–34.5)
MCV RBC AUTO: 95.5 FL (ref 80–99.9)
PDW BLD-RTO: 12.6 FL (ref 11.5–15)
PLATELET # BLD: 224 E9/L (ref 130–450)
PMV BLD AUTO: 10.3 FL (ref 7–12)
POTASSIUM REFLEX MAGNESIUM: 3.8 MMOL/L (ref 3.5–5)
RBC # BLD: 3.58 E12/L (ref 3.5–5.5)
SODIUM BLD-SCNC: 138 MMOL/L (ref 132–146)
TOTAL PROTEIN: 5.6 G/DL (ref 6.4–8.3)
WBC # BLD: 9.3 E9/L (ref 4.5–11.5)

## 2020-10-25 PROCEDURE — 2580000003 HC RX 258: Performed by: STUDENT IN AN ORGANIZED HEALTH CARE EDUCATION/TRAINING PROGRAM

## 2020-10-25 PROCEDURE — 85027 COMPLETE CBC AUTOMATED: CPT

## 2020-10-25 PROCEDURE — 80053 COMPREHEN METABOLIC PANEL: CPT

## 2020-10-25 PROCEDURE — 36415 COLL VENOUS BLD VENIPUNCTURE: CPT

## 2020-10-25 PROCEDURE — 6370000000 HC RX 637 (ALT 250 FOR IP): Performed by: STUDENT IN AN ORGANIZED HEALTH CARE EDUCATION/TRAINING PROGRAM

## 2020-10-25 PROCEDURE — 6360000002 HC RX W HCPCS: Performed by: STUDENT IN AN ORGANIZED HEALTH CARE EDUCATION/TRAINING PROGRAM

## 2020-10-25 RX ORDER — OXYCODONE HYDROCHLORIDE 5 MG/1
5 TABLET ORAL EVERY 6 HOURS PRN
Qty: 28 TABLET | Refills: 0 | Status: SHIPPED | OUTPATIENT
Start: 2020-10-25 | End: 2020-11-01

## 2020-10-25 RX ORDER — ACETAMINOPHEN 325 MG/1
650 TABLET ORAL EVERY 6 HOURS PRN
Qty: 120 TABLET | Refills: 1 | Status: SHIPPED | OUTPATIENT
Start: 2020-10-25 | End: 2021-09-10 | Stop reason: SDUPTHER

## 2020-10-25 RX ADMIN — HYDROMORPHONE HYDROCHLORIDE 0.5 MG: 1 INJECTION, SOLUTION INTRAMUSCULAR; INTRAVENOUS; SUBCUTANEOUS at 04:27

## 2020-10-25 RX ADMIN — OXYCODONE HYDROCHLORIDE 10 MG: 10 TABLET ORAL at 02:00

## 2020-10-25 RX ADMIN — HYDROMORPHONE HYDROCHLORIDE 0.5 MG: 1 INJECTION, SOLUTION INTRAMUSCULAR; INTRAVENOUS; SUBCUTANEOUS at 00:20

## 2020-10-25 RX ADMIN — OXYCODONE HYDROCHLORIDE 10 MG: 10 TABLET ORAL at 10:28

## 2020-10-25 RX ADMIN — ENOXAPARIN SODIUM 40 MG: 40 INJECTION SUBCUTANEOUS at 08:18

## 2020-10-25 RX ADMIN — OXYCODONE HYDROCHLORIDE 10 MG: 10 TABLET ORAL at 06:26

## 2020-10-25 RX ADMIN — SODIUM CHLORIDE, PRESERVATIVE FREE 10 ML: 5 INJECTION INTRAVENOUS at 08:19

## 2020-10-25 ASSESSMENT — PAIN DESCRIPTION - ORIENTATION
ORIENTATION: MID
ORIENTATION: MID

## 2020-10-25 ASSESSMENT — PAIN DESCRIPTION - PROGRESSION
CLINICAL_PROGRESSION: NOT CHANGED
CLINICAL_PROGRESSION: NOT CHANGED

## 2020-10-25 ASSESSMENT — PAIN DESCRIPTION - ONSET
ONSET: ON-GOING
ONSET: ON-GOING

## 2020-10-25 ASSESSMENT — PAIN SCALES - GENERAL
PAINLEVEL_OUTOF10: 10
PAINLEVEL_OUTOF10: 9
PAINLEVEL_OUTOF10: 10

## 2020-10-25 ASSESSMENT — PAIN DESCRIPTION - DESCRIPTORS
DESCRIPTORS: ACHING;DISCOMFORT
DESCRIPTORS: ACHING;DISCOMFORT

## 2020-10-25 ASSESSMENT — PAIN DESCRIPTION - FREQUENCY
FREQUENCY: CONTINUOUS
FREQUENCY: CONTINUOUS

## 2020-10-25 ASSESSMENT — PAIN DESCRIPTION - LOCATION
LOCATION: ABDOMEN
LOCATION: ABDOMEN

## 2020-10-25 ASSESSMENT — PAIN DESCRIPTION - PAIN TYPE
TYPE: ACUTE PAIN
TYPE: ACUTE PAIN

## 2020-10-25 NOTE — DISCHARGE SUMMARY
Physician Discharge Summary     Patient ID:  Riley Cobb  [id number]  46 y.o.  1985    Admit date: 10/21/2020    Discharge date and time: 10/25/2020    Admitting Physician: Jerzy Govea MD     Admission Diagnoses: Neuroendocrine carcinoma of small bowel ([facility] [address] [C7A.8]    Discharge Diagnoses: Active Problems:    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 [facility])    Neuroendocrine carcinoma of small bowel ([facility] [address]    COVID-19  Resolved Problems:    * No resolved hospital problems. *      Admission Condition: fair    Discharged Condition: stable    Indication for Admission: [address] [facility] Course/Procedures/Operation/treatments:   10/21: Laparoscopic robotic small bowel resection with side to side enteroenterotomy for distal ileal neuroendocrine tumor with metastatic disease to the liver and mesenteric lymphadenopathy  10/22: Patient utilizing PCA pump, awaiting return of bowel function  10/23: PCA discontinued, starting to pass flatus  10/24: NO bowel movement yet. Discontinued PCA  10/25: Patient return of bowel function. Tolerating pain. In Suitable condition for discharge home            Consults:   None    Significant Diagnostic Studies:   Ct Chest W Contrast    Result Date: 10/16/2020  EXAMINATION: CT OF THE CHEST WITH CONTRAST 10/16/2020 2:43 pm TECHNIQUE: CT of the chest was performed with the administration of intravenous contrast. Multiplanar reformatted images are provided for review. Dose modulation, iterative reconstruction, and/or weight based adjustment of the mA/kV was utilized to reduce the radiation dose to as low as reasonably achievable. COMPARISON: None. HISTORY: ORDERING SYSTEM PROVIDED HISTORY: Neuroendocrine carcinoma metastatic to liver [facility]) TECHNOLOGIST PROVIDED HISTORY: Reason for exam:->metastatic neuroendocrine cancer to liver FINDINGS: Mediastinum: There are no hilar or mediastinal lymph nodes. Heart size is normal. Lungs/pleura:  There are no infiltrates, incision site, non-distended. Incision c/d/i  EXTREMITIES: No edema or swelling  SKIN: Warm and dry    Disposition: home    In process/preliminary results:  Outstanding Order Results     Date and Time Order Name Status Description    10/21/2020 0000 Surgical Pathology In process     9/23/2020 0342 Chromogranin A In process           Patient Instructions:   Current Discharge Medication List           Details   oxyCODONE (ROXICODONE) 5 MG immediate release tablet Take 1 tablet by mouth every 6 hours as needed for Pain for up to 7 days. Intended supply: 7 days.  Take lowest dose possible to manage pain  Qty: 28 tablet, Refills: 0    Comments: Reduce doses taken as pain becomes manageable  Associated Diagnoses: 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)      acetaminophen (AMINOFEN) 325 MG tablet Take 2 tablets by mouth every 6 hours as needed for Pain  Qty: 120 tablet, Refills: 1              Details   dicyclomine (BENTYL) 20 MG tablet take 1 tablet by mouth twice a day      famotidine (PEPCID) 40 MG tablet take 1 tablet by mouth once daily      gabapentin (NEURONTIN) 300 MG capsule qHS x 3 days then BID x 3 days then TID thereafter (increase as tolerated)  Qty: 90 capsule, Refills: 2      zolpidem (AMBIEN) 10 MG tablet take 1 tablet by mouth at bedtime if needed for sleep  Qty: 30 tablet, Refills: 1    Associated Diagnoses: Primary insomnia      ondansetron (ZOFRAN-ODT) 4 MG disintegrating tablet Take 1 tablet by mouth 3 times daily as needed for Nausea or Vomiting  Qty: 21 tablet, Refills: 0      butalbital-acetaminophen-caffeine (FIORICET, ESGIC) -40 MG per tablet Take 1 tablet by mouth daily as needed for Headaches      topiramate (TOPAMAX SPRINKLE) 25 MG capsule Take 25 mg by mouth daily      Calcium Carb-Cholecalciferol (CALCIUM-VITAMIN D) 500-200 MG-UNIT per tablet Take 1 tablet by mouth 2 times daily (with meals)      levothyroxine (SYNTHROID) 112 MCG tablet Take 112 mcg by mouth Daily aluminum & magnesium hydroxide-simethicone (MYLANTA) 200-200-20 MG/5ML SUSP suspension Take 5 mLs by mouth every 6 hours as needed for Indigestion       Geronimo Carb-Mag Hydrox-Simeth 800-270-80 MG/10ML SUSP Take 20 mLs by mouth every 8 hours      Multiple Vitamins-Minerals (THERAPEUTIC MULTIVITAMIN-MINERALS) tablet Take 1 tablet by mouth daily      ondansetron (ZOFRAN) 4 MG tablet Take 4 mg by mouth every 8 hours as needed for Nausea or Vomiting      sucralfate (CARAFATE) 1 GM/10ML suspension Take 10 mLs by mouth 4 times daily  Qty: 1200 mL, Refills: 3               Follow up:   Ej Garcia MD  [place] [id number] [phone]    In 2 weeks  For [place] Follow-Up, For wound re-check       Signed:  Herbie Farah DO  10/25/2020  7:33 AM

## 2020-10-26 ENCOUNTER — CARE COORDINATION (OUTPATIENT)
Dept: CASE MANAGEMENT | Age: 35
End: 2020-10-26

## 2020-10-26 NOTE — CARE COORDINATION
[facility] Transitions Initial Follow Up Call    Call within 2 business days of discharge: Yes    Patient: Deng Olguin Patient : 1985   MRN: [id number]  Reason for Admission: Metastatic malignant neuroendocrine tumor on liver, neuroendocrine carcinoma of small bowel and s/p small bowel resection. Discharge Date: 10/25/20 RARS: Readmission Risk Score: 8      Last Discharge [facility]       Complaint Diagnosis Description Type Department Provider    10/21/20  COVID-19 . .. Admission (Discharged) [facility] 4S PICU Alvaro Ambrosio III, MD         Challenges to be reviewed by the provider   Additional needs identified to be addressed with provider No  none    Discussed COVID-19 related testing which was available at this time. Test results were negative. Patient informed of results, if available? Yes         Method of communication with provider : none    Advance Care Planning:   Does patient have an Advance Directive:  decision maker updated. Was this a readmission? No  Patient stated reason for admission: Diarrhea  Patients top risk factors for readmission: functional physical ability, medical condition and polypharmacy    Care Transition Nurse (CTN) contacted the patient by telephone to perform post hospital discharge assessment. Verified name and  with patient as identifiers. Provided introduction to self, and explanation of the CTN role. CTN reviewed discharge instructions, medical action plan and red flags with patient who verbalized understanding. Patient given an opportunity to ask questions and does not have any further questions or concerns at this time. Were discharge instructions available to patient? Yes. Reviewed appropriate site of care based on symptoms and resources available to patient including: PCP, Specialist, Urgent care clinics and When to call 911. The patient agrees to contact the PCP office for questions related to their healthcare.      Medication reconciliation was performed with patient, who verbalizes understanding of administration of home medications. Advised obtaining a 90-day supply of all daily and as-needed medications. Covid Risk Education    Patient has following risk factors of: carcinoma of small bowel and neuroendocrine tumor on liver. Education provided regarding infection prevention, and signs and symptoms of COVID-19 and when to seek medical attention with patient who verbalized understanding. Discussed exposure protocols and quarantine From CDC: Are you at higher risk for severe illness?   and given an opportunity for questions and concerns. The patient agrees to contact the COVID-19 hotline [phone] or PCP office for questions related to COVID-19. For more information on steps you can take to protect yourself, see CDC's How to Protect Yourself     Patient/family/caregiver given information for GetWell Loop and agrees to enroll no  Patient's preferred e-mail: declines  Patient's preferred phone number: declines    Discussed follow-up appointments. If no appointment was previously scheduled, appointment scheduling offered: Yes. Is follow up appointment scheduled within 7 days of discharge? No, CTN confirmed with patient she will call and schedule PCP and Dr. Rober Rosa (Gen Surg) follow up. Declines CTN assistance at this time. Non-[facility] follow up appointment(s): N/A    Plan for follow-up call in 5-7 days based on severity of symptoms and risk factors. Plan for next call: symptom management-IS post-op pain improving and follow up appointment-Did patient schedule PCP and Dr. Rober Rosa follow up? Non-face-to-face services provided:  Scheduled appointment with PCP-CTN confirmed with patient she will call PCP today to schedule follow up. Declines CTN assistance at this time.    Scheduled appointment with Specialist-CTN confirmed with patient she is scheduled to follow up with Dr. Anastasia James (Hem/Onc) on 11/5/20 and with [facility] on 11/5/20. Pt will call to schedule follow up with Dr. Kala Correia (Gen Surg) as she is awae to follow up with him in two weeks for wound re-check. Obtained and reviewed discharge summary and/or continuity of care documents    Care Transitions 24 Hour Call    Schedule Follow Up Appointment with PCP:  Declined  Do you have any ongoing symptoms?:  Yes  Patient-reported symptoms:  Pain  Do you have a copy of your discharge instructions?:  Yes  Do you have all of your prescriptions and are they filled?:  Yes  Have you been contacted by a [facility] Pharmacist?:  No  Have you scheduled your follow up appointment?:  Yes  How are you going to get to your appointment?:  Car - family or friend to transport  Were you discharged with any Home Care or Post Acute Services:  Yes  Post Acute Services: Outpatient/Community Services (Comment: OP [facility])  Do you feel like you have everything you need to keep you well at home?:  Yes  Care Transitions Interventions       Spoke with patient today 10/26/20 for hospital discharge/COVID-19 risk follow up. Confirmed patient was admitted to [facility] for metastatic malignant neuroendocrine tumor of liver and neuroendocrine carcinoma of small bowel and s/p small bowel resection with lymph node removal.     Patient complains of abdominal/post-op pain. Rates current pain 8-9/10 in severity on pain scale. States getting pain relief from prescribed Oxycodone. States incisions are dry and open to air. States LAST BM was this morning and feels stools are bulking up. Denies any shortness of breath, chest pain, chest discomfort, nausea, vomiting, chills or fever. Noted patient tested negative for COVID-19 on 10/16/20. Provided a complete review of home medications with patient. Confirmed with patient she will be starting Lanreotide injection and is scheduled to follow up with Dr. Estefany Ames (Hem/Onc) on 11/5/20 and with Abram Villagomez on 11/10/20.  States she is not taking Neurontin which was previously prescribed by pain management. States she will follow with Palliative for pain control and symptom relief. 1111F code entered. Patient states she lives with parents and has boyfriend who is part of support sytem and will provide transportation to appointments. Confirmed with patient she will call PCP and Dr. Kriss Barnes (Gen Surg) to schedule f/u appts. Denies any other complaints or concerns at this time. Declines Loop Program. CTN will continue to follow. Follow Up  Future Appointments   Date Time Provider Raoul Meyer   11/5/2020  2:30 PM Abbi Bustamante MD MED ONC [facility]   11/5/2020  3:00 PM [facility] MED ONC FAST TRACK 2 [facility] Med Onc [facility]   11/10/2020  2:30 PM SCHEDULE, SE PALLIATIVE CARE PROVIDER [facility]     CTN provided contact information for future needs.     TRINA Turcios

## 2020-11-02 ENCOUNTER — CARE COORDINATION (OUTPATIENT)
Dept: CASE MANAGEMENT | Age: 35
End: 2020-11-02

## 2020-11-02 NOTE — CARE COORDINATION
You Patient resolved from the Care Transitions episode on 11/2/20  Discussed COVID-19 related testing which was available at this time. Test results were negative. Patient informed of results, if available? Yes    Patient/family has been provided the following resources and education related to COVID-19:                         Signs, symptoms and red flags related to COVID-19            CDC exposure and quarantine guidelines            [employer] exposure contact - [phone]            Contact for their local Department of Health                 Patient currently reports that the following symptoms have improved:  diarrhea     Spoke with patient today 11/2/20 for final hospital discharge/COVID-19 risk follow up call. Teressa Blantonch reports diarrhea returned since last CTN call as patient is s/p small bowel resection. States she is drinking fluids to keep hydrated. Denies any shortness of breath, chest pain, chest discomfort, nausea, vomiting, chills or fever. States she didn't call doctor and is not taking OTC anti-diarrhea. States she is scheduled to follow up with Dr. Jaqueline De La Rosa (Gen Surg) and Dr. Meli Cline (Hem/OnC) this week on Thursday. States incisions are dry and healing with no issues. Confirmed with patient she will be getting first Lanreotide injection on Thursday. Denies any other complaints or issues. CTN signing off. No further outreach scheduled with this CTN/ACM. Episode of Care resolved. Patient has this CTN/ACM contact information if future needs arise.

## 2020-11-05 ENCOUNTER — OFFICE VISIT (OUTPATIENT)
Dept: ONCOLOGY | Age: 35
End: 2020-11-05
Payer: COMMERCIAL

## 2020-11-05 ENCOUNTER — HOSPITAL ENCOUNTER (OUTPATIENT)
Dept: INFUSION THERAPY | Age: 35
Discharge: HOME | End: 2020-11-05
Payer: COMMERCIAL

## 2020-11-05 ENCOUNTER — OFFICE VISIT (OUTPATIENT)
Dept: HEMATOLOGY | Age: 35
End: 2020-11-05
Payer: COMMERCIAL

## 2020-11-05 VITALS
OXYGEN SATURATION: 98 % | DIASTOLIC BLOOD PRESSURE: 90 MMHG | HEIGHT: 60 IN | TEMPERATURE: 98.2 F | HEART RATE: 98 BPM | BODY MASS INDEX: 27.78 KG/M2 | SYSTOLIC BLOOD PRESSURE: 127 MMHG | WEIGHT: 141.5 LBS

## 2020-11-05 VITALS
HEART RATE: 106 BPM | WEIGHT: 142.6 LBS | DIASTOLIC BLOOD PRESSURE: 87 MMHG | SYSTOLIC BLOOD PRESSURE: 135 MMHG | HEIGHT: 60 IN | BODY MASS INDEX: 28 KG/M2 | OXYGEN SATURATION: 96 % | TEMPERATURE: 98.1 F

## 2020-11-05 DIAGNOSIS — C7B.8 METASTATIC MALIGNANT NEUROENDOCRINE TUMOR TO LIVER (HCC): Primary | ICD-10-CM

## 2020-11-05 PROCEDURE — 96372 THER/PROPH/DIAG INJ SC/IM: CPT

## 2020-11-05 PROCEDURE — 4004F PT TOBACCO SCREEN RCVD TLK: CPT | Performed by: INTERNAL MEDICINE

## 2020-11-05 PROCEDURE — G8484 FLU IMMUNIZE NO ADMIN: HCPCS | Performed by: INTERNAL MEDICINE

## 2020-11-05 PROCEDURE — G8417 CALC BMI ABV UP PARAM F/U: HCPCS | Performed by: INTERNAL MEDICINE

## 2020-11-05 PROCEDURE — G8427 DOCREV CUR MEDS BY ELIG CLIN: HCPCS | Performed by: INTERNAL MEDICINE

## 2020-11-05 PROCEDURE — 99214 OFFICE O/P EST MOD 30 MIN: CPT | Performed by: INTERNAL MEDICINE

## 2020-11-05 PROCEDURE — 99024 POSTOP FOLLOW-UP VISIT: CPT | Performed by: TRANSPLANT SURGERY

## 2020-11-05 PROCEDURE — 6360000002 HC RX W HCPCS: Performed by: INTERNAL MEDICINE

## 2020-11-05 PROCEDURE — 1111F DSCHRG MED/CURRENT MED MERGE: CPT | Performed by: INTERNAL MEDICINE

## 2020-11-05 RX ORDER — OXYCODONE HYDROCHLORIDE AND ACETAMINOPHEN 5; 325 MG/1; MG/1
1 TABLET ORAL EVERY 6 HOURS PRN
Qty: 28 TABLET | Refills: 0 | Status: SHIPPED | OUTPATIENT
Start: 2020-11-05 | End: 2020-11-12

## 2020-11-05 RX ORDER — LANREOTIDE ACETATE 120 MG/.5ML
120 INJECTION SUBCUTANEOUS ONCE
Status: COMPLETED | OUTPATIENT
Start: 2020-11-05 | End: 2020-11-05

## 2020-11-05 RX ORDER — LANREOTIDE ACETATE 120 MG/.5ML
120 INJECTION SUBCUTANEOUS ONCE
Status: CANCELLED | OUTPATIENT
Start: 2020-12-03

## 2020-11-05 RX ORDER — CYCLOBENZAPRINE HCL 10 MG
10 TABLET ORAL 3 TIMES DAILY PRN
Qty: 30 TABLET | Refills: 0 | Status: SHIPPED | OUTPATIENT
Start: 2020-11-05 | End: 2020-11-15

## 2020-11-05 RX ADMIN — LANREOTIDE ACETATE 120 MG: 120 INJECTION SUBCUTANEOUS at 14:59

## 2020-11-05 NOTE — PROGRESS NOTES
Hepatobiliary and Pancreatic Surgery Progress Note    CC: follow up from surgery    Subjective: Patient states that she is feeling better but still has diarrhea. She is starting LAR today    OBJECTIVE      Physical    /87 (Site: Right Upper Arm, Position: Sitting, Cuff Size: Medium Adult)   Pulse 106   Temp 98.1 °F (36.7 °C)   Ht 5' (1.524 m)   Wt 142 lb 9.6 oz (64.7 kg)   SpO2 96%   BMI 27.85 kg/m²     General appearance: appears in no acute distress  Lungs:CTABL  Heart: RRR  Abdomen: soft, nondistended, nontympanic, no guarding, no peritoneal signs, normoactive bowel sounds, incision c/d/i  Extremities:no peripheral edema    ASSESSMENT: Metastatic neuroendocrine tumor to the liver s/p small bowel resection 10/20    PLAN:    - discussed her pathology results  - continue LAR Therapy  - continue flexeril and percocet's    Thank you for the consultation and allowing me to take part in Ms. Olmstead's care.       Candy Johnson 11/5/2020 12:07 PM

## 2020-11-05 NOTE — PROGRESS NOTES
function    Dotatate PET/CT scan 10/14/2020 increased tracer uptake seen throughout the liver compatible with neoplasm. This involves both the left and the right lobe of liver. In addition there are 2 foci of increased tracer uptake along the mesentery of the small bowel. This may involve the wall the small bowel. No other convincing evidence for extra-abdominal metastatic disease. EGD/Colonoscopy 10/05/2020 by Dr. Nargis Montgomery; records reviewed. Hepatobiliary team (Dr. Salvador Young) consult appreciated. Small bowel resection on 10/21/2020. Small bowel resection on 10/21/2020. A.  Ileum, resection:   Multifocal (4 foci) neuroendocrine tumor (NET). Extensive perineural and angiolymphatic invasion. 3 of 10 lymph nodes with metastatic neuroendocrine tumor and extracapsular extension of tumor cells (3/10). Bilateral viable small bowel resection margins with no evidence of tumor. Last Schmidt received as \"Meckel's\":   Nodular scar tissue with patchy chronic inflammation. Negative for neuroendocrine tumor. Intestinal mucosal tissue is not present. CASE SUMMARY:   Procedure: Small bowel resection   Tumor site: Ileum   Tumor size: Greatest dimension of 1.5 cm   Tumor focality: Multifocal: 4 separate tumors   Histologic type and grade: G2: Well-differentiated neuroendocrine tumor   Mitotic rate: between 2-20 mitoses/2 mm2   Ki-67 labeling index: between 3-20%   Tumor extension: Tumor invades through the muscularis propria into subserosal tissue without penetration of overlying serosa   Margins:  All margins are uninvolved by tumor    Margins examined: Proximal and distal   Lymphovascular invasion: Present   Perineural invasion: Present   Large mesenteric masses (greater than 2 cm): Present (one 2.5 cm mass)   Regional lymph nodes:    Number of lymph nodes involved: 3    Number of lymph nodes examined: 10   Pathologic stage classification (pTNM, AJCC eighth edition):    pT3    pN2    pM1a -confirmed liver metastasis, [facility]-     Comment:   A. 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 Side effects reviewed. She agreed to proceed. Dose # 1 Lanreotide is today 11/05/2020. Review of Systems;  CONSTITUTIONAL: No fever. Flushing/sweating. Fair appetite and energy level. ENMT: Eyes: No diplopia; Nose: No epistaxis. Mouth: No sore throat. RESPIRATORY: No hemoptysis, shortness of breath, cough. CARDIOVASCULAR: No chest pain, palpitations. GASTROINTESTINAL: + abdominal pain and diarrhea. GENITOURINARY: No dysuria, urinary frequency, hematuria. NEURO: No syncope, presyncope, headache. Remainder:  ROS NEGATIVE    Past Medical History:      Diagnosis Date    Abdominal pain     Acute Crohn's disease ([facility] [address]     Cancer ([facility] [address]     Hypocalcemia     Hypothyroidism     Irritable bowel syndrome     Migraines     Status post alcohol detoxification     5/22/2018-5/29/2018     Medications:  Reviewed and reconciled. Allergies:  No Known Allergies  Physical Exam:  BP (!) 127/90 (Site: Right Upper Arm, Position: Sitting, Cuff Size: Medium Adult)   Pulse 98   Temp 98.2 °F (36.8 °C) (Temporal)   Ht 5' (1.524 m)   Wt 141 lb 8 oz (64.2 kg)   SpO2 98%   BMI 27.63 kg/m²   GENERAL: Alert, oriented x 3, not in acute distress. HEENT: PERRLA; EOMI. Oropharynx clear. NECK: Supple. Without lymphadenopathy. LUNGS: Good air entry bilaterally. No wheezing, crackles or ronchi. CARDIOVASCULAR: Regular rate. No murmurs, rubs or gallops. ABDOMEN: Soft. Non-tender, non-distended. Positive bowel sounds. EXTREMITIES: Without clubbing, cyanosis, or edema. NEUROLOGIC: No focal deficits.    ECOG PS 1    Impression/Plan:  31 y/o female with 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 Multiple liver masses suspicious for metastatic disease. 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 Comment: Sections of the liver tissue cores show the hepatic parenchyma to be partially replaced by a proliferation of epithelioid cells with relatively uniform round nuclei and eosinophilic granular cytoplasm.  The   epithelioid cells form anastomosing solid cords/nests that are surrounded by delicate fibrovascular stroma.  There are no mitotic figures or tumor cell necrosis present.  The histologic changes seen are suggestive of well-differentiated neuroendocrine (carcinoid) tumor. Immunostaining for pankeratin, chromogranin, synaptophysin, TTF-1 and Ki-67 was performed on sections of one tissue block (A1) and the neoplastic epithelioid cells show diffuse and strong positivity for neuroendocrine markers (chromogranin and synaptophysin) and moderate positivity for pankeratin.    There is no staining reactivity for TTF-1. The Ki-67 proliferation labeling index is essentially negative, (very low, <1%). This staining pattern confirms the histologic impression of a well-differentiated neuroendocrine (carcinoid) tumor. FL Small bowel 09/02/2020:  Rapid small bowel transit. Serum Chromogranin A 160 on 09/23/2020. Urine 5-HIAA 21 (0-14)  2d-Echo 10/10/2020: EF 60-65%   Normal right ventricular size and function    Dotatate PET/CT scan 10/14/2020 increased tracer uptake seen throughout the liver compatible with neoplasm. This involves both the left and the right lobe of liver. In addition there are 2 foci of increased tracer uptake along the mesentery of the small bowel. This may involve the wall the small bowel.   No other convincing evidence for extra-abdominal metastatic Lanreotide.  If no improvement in diarrhea, we can consider Darin Blanchard MD   26/5/9618  Board Certified Medical Oncologist

## 2020-11-09 ENCOUNTER — TELEPHONE (OUTPATIENT)
Dept: PALLATIVE CARE | Age: 35
End: 2020-11-09

## 2020-11-09 RX ORDER — BUTALBITAL, ACETAMINOPHEN AND CAFFEINE 50; 325; 40 MG/1; MG/1; MG/1
TABLET ORAL
Qty: 30 TABLET | Refills: 1 | Status: SHIPPED
Start: 2020-11-09 | End: 2021-01-05

## 2020-11-10 ENCOUNTER — VIRTUAL VISIT (OUTPATIENT)
Dept: PALLATIVE CARE | Age: 35
End: 2020-11-10
Payer: COMMERCIAL

## 2020-11-10 PROCEDURE — 99214 OFFICE O/P EST MOD 30 MIN: CPT | Performed by: STUDENT IN AN ORGANIZED HEALTH CARE EDUCATION/TRAINING PROGRAM

## 2020-11-10 RX ORDER — HYDROXYZINE HYDROCHLORIDE 25 MG/1
25 TABLET, FILM COATED ORAL EVERY 8 HOURS PRN
Qty: 30 TABLET | Refills: 0 | Status: SHIPPED | OUTPATIENT
Start: 2020-11-10 | End: 2020-11-20

## 2020-11-10 NOTE — PROGRESS NOTES
Department of Palliative Medicine  Ambulatory Note  Provider: Mecca Sanon MD        Chief Complaint: Mich Gore is a 30 y.o. female with chief complaint of pain    HPI  31 y/o female with hx of hypothyroidism, IBS,migraine who was complaining of abdominal pain associated with diarrhea and flushing/sweating. 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 Debbie Franco Deter   - Patient for Bowel resection on 10/21/20    Neoplasm related pain  Pain seems to be more Neuropathic in nature   - Cont Gabapentin 300mg TID   - Percocet 5/325mg was given after her surgery, she uses about 1 daily    Nausea   - Continue Zofran and Phenergan PRN    Anxiety   - hydroxyzine 25mg q8hr prn, start with dosage at bedtime, increased throughout the day. Follow Up:  4 weeks. Encouraged to call with any questions, concerns, needs, or changes in symptoms. Subjective:       Mich Gore is a 30 y.o. female with significant medical history of neuroendocrine tumor who was referred to [address] [place] [address] by Dr. Alfie Ramos. Spoke to her over the phone, she told me she is feeling very sore at her incision sites. She was prescribed Percocet by her physicians who performed surgery. She has only needed about 1 pill daily. She is no longer complaining of neuropathic pain which she previously was, she is currently on gabapentin 300 mg 3 times a day. She complains of having anxiety throughout the day, I prescribed her Atarax hydroxyzine 25 mg every 8 hours as needed.   No complaints of nausea vomiting or constipation      Pain Assessment   Rating:  3  Description: soreness  Duration: minutes  Frequency: daily  Location: Abdomen   Alleviating Factors: relaxation  Exacerbating Factors: unable to associate with any factor  Effect: change in function and sleep    Objective:     Physical Exam  Wt Readings from Last 3 Encounters:   11/05/20 141 lb 8 oz (64.2 kg)   11/05/20 142 lb 9.6 oz (64.7 kg)   10/21/20 149 lb (67.6 kg)       [place] Symptom Assessment Score   [place] Score 10/13/2020   Pain Score 8   Tiredness Score 5   Nausea Score Not nauseated   Depression Score 2   Anxiety Score 8   Drowsiness Score 2   Appetite Score 5   Wellbeing Score 7   Dyspnea Score No shortness of breath   Other Problem Score Best possible response   Total Assessment Score(calculated) 37     Assessed by: patient. Current Medications:  Medications reviewed: yes    Controlled Substances Monitoring: [place] reviewed 11/10/20. RX Monitoring 10/1/2020   Attestation -   Periodic Controlled Substance Monitoring Possible medication side effects, risk of tolerance/dependence & alternative treatments discussed. ;No signs of potential drug abuse or diversion identified. Conrad Poole MD  Palliative Care Department     Time/Communication  Greater than 50% of time spent, total 25 minutes in face-to-face counseling and coordination of care regarding symptom management. Note: This report was completed using computerNAVITIME JAPAN voiced recognition software. Every effort has been made to ensure accuracy; however, inadvertent computerized transcription errors may be present.

## 2020-11-13 RX ORDER — HYDROXYZINE HYDROCHLORIDE 25 MG/1
TABLET, FILM COATED ORAL
Qty: 30 TABLET | Refills: 0 | OUTPATIENT
Start: 2020-11-13

## 2020-11-13 RX ORDER — LORAZEPAM 0.5 MG/1
0.25 TABLET ORAL EVERY 8 HOURS PRN
Qty: 21 TABLET | Refills: 0 | Status: SHIPPED
Start: 2020-11-13 | End: 2020-12-01

## 2020-11-13 NOTE — TELEPHONE ENCOUNTER
Patient called, started hydroxyzine took 3 doses. Patient feels medication is making her \"jittery\" at first and then tired. Patient asking if she can have something to help with the anxiety during the daytime that will not make her sleepy.       I prescribed her lorazepam 0.25mg q8hr prn  Enrique White MD

## 2020-11-19 ENCOUNTER — OFFICE VISIT (OUTPATIENT)
Dept: HEMATOLOGY | Age: 35
End: 2020-11-19
Payer: COMMERCIAL

## 2020-11-19 VITALS
DIASTOLIC BLOOD PRESSURE: 95 MMHG | HEIGHT: 60 IN | RESPIRATION RATE: 18 BRPM | HEART RATE: 104 BPM | TEMPERATURE: 98.1 F | BODY MASS INDEX: 28.41 KG/M2 | OXYGEN SATURATION: 97 % | SYSTOLIC BLOOD PRESSURE: 132 MMHG | WEIGHT: 144.7 LBS

## 2020-11-19 PROCEDURE — 99024 POSTOP FOLLOW-UP VISIT: CPT | Performed by: TRANSPLANT SURGERY

## 2020-11-19 PROCEDURE — 99212 OFFICE O/P EST SF 10 MIN: CPT | Performed by: TRANSPLANT SURGERY

## 2020-11-19 NOTE — PROGRESS NOTES
Hepatobiliary and Pancreatic Surgery Progress Note    CC: follow up from surgery    Subjective: Patient start LAR therapy. She states 3-4 bowel movements a day vs. 8, but still feels flushed. OBJECTIVE      Physical    BP (!) 132/95 (Site: Right Upper Arm, Position: Sitting, Cuff Size: Medium Adult)   Pulse 104   Temp 98.1 °F (36.7 °C) (Temporal)   Resp 18   Ht 5' (1.524 m)   Wt 144 lb 11.2 oz (65.6 kg)   SpO2 97%   BMI 28.26 kg/m²        General appearance: appears in no acute distress  Lungs:CTABL  Heart: RRR  Abdomen: soft, nondistended, nontympanic, no guarding, no peritoneal signs, normoactive bowel sounds,seroma with drainage  Extremities:no peripheral edema    ASSESSMENT: Metastatic neuroendocrine tumor to the liver s/p small bowel resection 10/20    PLAN:    - continue LAR Therapy  - follow up with me in 2 months    Thank you for the consultation and allowing me to take part in Ms. Olmstead's care.       Umair Heart Matthew 11/19/2020 10:40 AM

## 2020-11-30 PROBLEM — C7A.8 NEUROENDOCRINE CARCINOMA OF SMALL BOWEL (HCC): Status: RESOLVED | Noted: 2020-10-21 | Resolved: 2020-11-30

## 2020-11-30 PROBLEM — C7A.012 MALIGNANT CARCINOID TUMOR OF ILEUM (HCC): Status: ACTIVE | Noted: 2020-11-30

## 2020-12-01 RX ORDER — ZOLPIDEM TARTRATE 10 MG/1
TABLET ORAL
Qty: 30 TABLET | Refills: 1 | Status: SHIPPED
Start: 2020-12-01 | End: 2021-02-01

## 2020-12-01 RX ORDER — LORAZEPAM 0.5 MG/1
0.5 TABLET ORAL EVERY 12 HOURS PRN
Qty: 42 TABLET | Refills: 0 | Status: SHIPPED | OUTPATIENT
Start: 2020-12-01 | End: 2020-12-31

## 2020-12-03 ENCOUNTER — HOSPITAL ENCOUNTER (OUTPATIENT)
Dept: INFUSION THERAPY | Age: 35
Discharge: HOME | End: 2020-12-03
Payer: COMMERCIAL

## 2020-12-03 ENCOUNTER — OFFICE VISIT (OUTPATIENT)
Dept: ONCOLOGY | Age: 35
End: 2020-12-03
Payer: COMMERCIAL

## 2020-12-03 VITALS
SYSTOLIC BLOOD PRESSURE: 140 MMHG | HEART RATE: 96 BPM | OXYGEN SATURATION: 96 % | WEIGHT: 139.5 LBS | BODY MASS INDEX: 27.39 KG/M2 | DIASTOLIC BLOOD PRESSURE: 83 MMHG | HEIGHT: 60 IN | TEMPERATURE: 97.7 F

## 2020-12-03 DIAGNOSIS — C7B.8 NEUROENDOCRINE CARCINOMA METASTATIC TO LIVER (HCC): Primary | ICD-10-CM

## 2020-12-03 DIAGNOSIS — C7B.8 METASTATIC MALIGNANT NEUROENDOCRINE TUMOR TO LIVER (HCC): ICD-10-CM

## 2020-12-03 DIAGNOSIS — C7A.8 NEUROENDOCRINE CARCINOMA METASTATIC TO LIVER (HCC): Primary | ICD-10-CM

## 2020-12-03 LAB
ALBUMIN SERPL-MCNC: 4.5 G/DL (ref 3.5–5.2)
ALP BLD-CCNC: 138 U/L (ref 35–104)
ALT SERPL-CCNC: 14 U/L (ref 0–32)
ANION GAP SERPL CALCULATED.3IONS-SCNC: 15 MMOL/L (ref 7–16)
AST SERPL-CCNC: 25 U/L (ref 0–31)
BASOPHILS ABSOLUTE: 0.12 E9/L (ref 0–0.2)
BASOPHILS RELATIVE PERCENT: 1.9 % (ref 0–2)
BILIRUB SERPL-MCNC: 0.4 MG/DL (ref 0–1.2)
BUN BLDV-MCNC: 7 MG/DL (ref 6–20)
CALCIUM SERPL-MCNC: 8.3 MG/DL (ref 8.6–10.2)
CHLORIDE BLD-SCNC: 98 MMOL/L (ref 98–107)
CO2: 26 MMOL/L (ref 22–29)
CREAT SERPL-MCNC: 0.7 MG/DL (ref 0.5–1)
EOSINOPHILS ABSOLUTE: 0.21 E9/L (ref 0.05–0.5)
EOSINOPHILS RELATIVE PERCENT: 3.3 % (ref 0–6)
GFR AFRICAN AMERICAN: >60
GFR NON-AFRICAN AMERICAN: >60 ML/MIN/1.73
GLUCOSE BLD-MCNC: 123 MG/DL (ref 74–99)
HCT VFR BLD CALC: 45 % (ref 34–48)
HEMOGLOBIN: 15.6 G/DL (ref 11.5–15.5)
IMMATURE GRANULOCYTES #: 0.03 E9/L
IMMATURE GRANULOCYTES %: 0.5 % (ref 0–5)
LYMPHOCYTES ABSOLUTE: 2.78 E9/L (ref 1.5–4)
LYMPHOCYTES RELATIVE PERCENT: 44.3 % (ref 20–42)
MCH RBC QN AUTO: 31.5 PG (ref 26–35)
MCHC RBC AUTO-ENTMCNC: 34.7 % (ref 32–34.5)
MCV RBC AUTO: 90.7 FL (ref 80–99.9)
MONOCYTES ABSOLUTE: 0.65 E9/L (ref 0.1–0.95)
MONOCYTES RELATIVE PERCENT: 10.4 % (ref 2–12)
NEUTROPHILS ABSOLUTE: 2.48 E9/L (ref 1.8–7.3)
NEUTROPHILS RELATIVE PERCENT: 39.6 % (ref 43–80)
PDW BLD-RTO: 15 FL (ref 11.5–15)
PLATELET # BLD: 349 E9/L (ref 130–450)
PMV BLD AUTO: 9.1 FL (ref 7–12)
POTASSIUM SERPL-SCNC: 3.4 MMOL/L (ref 3.5–5)
RBC # BLD: 4.96 E12/L (ref 3.5–5.5)
SODIUM BLD-SCNC: 139 MMOL/L (ref 132–146)
TOTAL PROTEIN: 7.5 G/DL (ref 6.4–8.3)
WBC # BLD: 6.3 E9/L (ref 4.5–11.5)

## 2020-12-03 PROCEDURE — G8484 FLU IMMUNIZE NO ADMIN: HCPCS | Performed by: INTERNAL MEDICINE

## 2020-12-03 PROCEDURE — G8417 CALC BMI ABV UP PARAM F/U: HCPCS | Performed by: INTERNAL MEDICINE

## 2020-12-03 PROCEDURE — 99212 OFFICE O/P EST SF 10 MIN: CPT

## 2020-12-03 PROCEDURE — 80053 COMPREHEN METABOLIC PANEL: CPT

## 2020-12-03 PROCEDURE — G8428 CUR MEDS NOT DOCUMENT: HCPCS | Performed by: INTERNAL MEDICINE

## 2020-12-03 PROCEDURE — 36415 COLL VENOUS BLD VENIPUNCTURE: CPT

## 2020-12-03 PROCEDURE — 99214 OFFICE O/P EST MOD 30 MIN: CPT | Performed by: INTERNAL MEDICINE

## 2020-12-03 PROCEDURE — 96372 THER/PROPH/DIAG INJ SC/IM: CPT

## 2020-12-03 PROCEDURE — 96402 CHEMO HORMON ANTINEOPL SQ/IM: CPT

## 2020-12-03 PROCEDURE — 85025 COMPLETE CBC W/AUTO DIFF WBC: CPT

## 2020-12-03 PROCEDURE — 4004F PT TOBACCO SCREEN RCVD TLK: CPT | Performed by: INTERNAL MEDICINE

## 2020-12-03 PROCEDURE — 6360000002 HC RX W HCPCS: Performed by: INTERNAL MEDICINE

## 2020-12-03 RX ORDER — TELOTRISTAT ETHYL 250 MG/1
250 TABLET ORAL 3 TIMES DAILY
Qty: 90 TABLET | Refills: 2 | Status: SHIPPED
Start: 2020-12-03 | End: 2021-01-08 | Stop reason: SINTOL

## 2020-12-03 RX ORDER — DIPHENOXYLATE HYDROCHLORIDE AND ATROPINE SULFATE 2.5; .025 MG/1; MG/1
1 TABLET ORAL 4 TIMES DAILY PRN
Qty: 84 TABLET | Refills: 0 | OUTPATIENT
Start: 2020-12-03 | End: 2020-12-24

## 2020-12-03 RX ORDER — LANREOTIDE ACETATE 120 MG/.5ML
120 INJECTION SUBCUTANEOUS ONCE
Status: COMPLETED | OUTPATIENT
Start: 2020-12-03 | End: 2020-12-03

## 2020-12-03 RX ORDER — LANREOTIDE ACETATE 120 MG/.5ML
120 INJECTION SUBCUTANEOUS ONCE
Status: CANCELLED | OUTPATIENT
Start: 2020-12-31

## 2020-12-03 RX ADMIN — LANREOTIDE ACETATE 120 MG: 120 INJECTION SUBCUTANEOUS at 15:24

## 2020-12-03 NOTE — PROGRESS NOTES
Department of [facility] Oncology  Attending Clinic Note    Reason for Visit: Follow-up on a patient with metastatic well differentiated Neuroendocrine cancer to liver    PCP:  Rosemary Jenkins DO    History of Present Illness:  29 y/o female with hx of hypothyroidism, IBS,migraine who was complaining of abdominal pain associated with diarrhea and flushing/sweating. CT abdomen/pelvis 08/28/2020:  2 cm masslike density in the mid abdominal small bowel mesentery with a spiculated appearance. Multiple liver masses suspicious for metastatic disease. 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 Comment: Sections of the liver tissue cores show the hepatic parenchyma to be partially replaced by a proliferation of epithelioid cells with relatively uniform round nuclei and eosinophilic granular cytoplasm.  The   epithelioid cells form anastomosing solid cords/nests that are surrounded by delicate fibrovascular stroma.  There are no mitotic figures or tumor cell necrosis present.  The histologic changes seen are suggestive of well-differentiated neuroendocrine (carcinoid) tumor. Immunostaining for pankeratin, chromogranin, synaptophysin, TTF-1 and Ki-67 was performed on sections of one tissue block (A1) and the neoplastic epithelioid cells show diffuse and strong positivity for neuroendocrine markers (chromogranin and synaptophysin) and moderate positivity for pankeratin.  There is no staining reactivity for TTF-1. The Ki-67 proliferation labeling index is essentially negative, (very low, <1%). This staining pattern confirms the histologic impression of a well-differentiated neuroendocrine (carcinoid) tumor. FL Small bowel 09/02/2020:  Rapid small bowel transit. Serum Chromogranin A 160 on 09/23/2020.   Urine 5-HIAA 21 (0-14)  2d-Echo 10/10/2020: EF 60-65%   Normal right ventricular size and function    Dotatate PET/CT scan 10/14/2020 increased tracer uptake seen throughout the liver compatible with neoplasm. This involves both the left and the right lobe of liver. In addition there are 2 foci of increased tracer uptake along the mesentery of the small bowel. This may involve the wall the small bowel. No other convincing evidence for extra-abdominal metastatic disease. EGD/Colonoscopy 10/05/2020 by Dr. Nargis Montgomery; records reviewed. Hepatobiliary team (Dr. Salvador Young) consult appreciated. Small bowel resection on 10/21/2020. Small bowel resection on 10/21/2020. A.  Ileum, resection:   Multifocal (4 foci) neuroendocrine tumor (NET). Extensive perineural and angiolymphatic invasion. 3 of 10 lymph nodes with metastatic neuroendocrine tumor and extracapsular extension of tumor cells (3/10). Bilateral viable small bowel resection margins with no evidence of tumor. Last Schmidt received as \"Meckel's\":   Nodular scar tissue with patchy chronic inflammation. Negative for neuroendocrine tumor. Intestinal mucosal tissue is not present. CASE SUMMARY:   Procedure: Small bowel resection   Tumor site: Ileum   Tumor size: Greatest dimension of 1.5 cm   Tumor focality: Multifocal: 4 separate tumors   Histologic type and grade: G2: Well-differentiated neuroendocrine tumor   Mitotic rate: between 2-20 mitoses/2 mm2   Ki-67 labeling index: between 3-20%   Tumor extension: Tumor invades through the muscularis propria into subserosal tissue without penetration of overlying serosa   Margins:  All margins are uninvolved by tumor    Margins examined: Proximal and distal   Lymphovascular invasion: Present   Perineural invasion: Present   Large mesenteric masses (greater than 2 cm): Present (one 2.5 cm mass)   Regional lymph nodes:    Number of lymph nodes involved: 3    Number of lymph nodes examined: 10   Pathologic stage classification (pTNM, AJCC eighth edition):    pT3    pN2    pM1a -confirmed liver metastasis, YJ-     Comment:   A. 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 Dose # 1 Lanreotide was on 11/05/2020. Dose # 2 Lanreotide is today 12/03/2020. She complains of diarrhea despite taking Imodium. Fair appetite and energy level. No chest pain. Review of Systems;  CONSTITUTIONAL: No fever. Fair appetite and energy level. ENMT: Eyes: No diplopia; Nose: No epistaxis. Mouth: No sore throat. RESPIRATORY: No hemoptysis, shortness of breath, cough. CARDIOVASCULAR: No chest pain, palpitations. GASTROINTESTINAL: + diarrhea. GENITOURINARY: No dysuria, urinary frequency, hematuria. NEURO: No syncope, presyncope, headache. Remainder:  ROS NEGATIVE    Past Medical History:      Diagnosis Date    Abdominal pain     [facility]'s disease ([facility]     Cancer ([facility]     Hypocalcemia     Hypothyroidism     Irritable bowel syndrome     Migraines     Status post alcohol detoxification     5/22/2018-5/29/2018     Medications:  Reviewed and reconciled. Allergies:  No Known Allergies  Physical Exam:  BP (!) 140/83   Pulse 96   Temp 97.7 °F (36.5 °C)   Ht 5' (1.524 m)   Wt 139 lb 8 oz (63.3 kg)   SpO2 96%   BMI 27.24 kg/m²   GENERAL: Alert, oriented x 3, not in acute distress. HEENT: PERRLA; EOMI. Oropharynx clear. NECK: Supple. Without lymphadenopathy. LUNGS: Good air entry bilaterally. No wheezing, crackles or ronchi. CARDIOVASCULAR: Regular rate. No murmurs, rubs or gallops. ABDOMEN: Soft. Non-tender, non-distended. Positive bowel sounds. EXTREMITIES: Without clubbing, cyanosis, or edema. NEUROLOGIC: No focal deficits.    ECOG PS 1    Impression/Plan:  31 y/o female with 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 Multiple liver masses suspicious for metastatic disease. 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 Comment: Sections of the liver tissue cores show the hepatic parenchyma to be partially replaced by a proliferation of epithelioid cells with relatively uniform round nuclei and eosinophilic granular cytoplasm.  The   epithelioid cells form anastomosing solid cords/nests that are surrounded by delicate fibrovascular stroma.  There are no mitotic figures or tumor cell necrosis present.  The histologic changes seen are suggestive of well-differentiated neuroendocrine (carcinoid) tumor. Immunostaining for pankeratin, chromogranin, synaptophysin, TTF-1 and Ki-67 was performed on sections of one tissue block (A1) and the neoplastic epithelioid cells show diffuse and strong positivity for neuroendocrine markers (chromogranin and synaptophysin) and moderate positivity for pankeratin.    There is no staining reactivity for TTF-1. The Ki-67 proliferation labeling index is essentially negative, (very low, <1%). This staining pattern confirms the histologic impression of a well-differentiated neuroendocrine (carcinoid) tumor. FL Small bowel 09/02/2020:  Rapid small bowel transit. Serum Chromogranin A 160 on 09/23/2020. Urine 5-HIAA 21 (0-14)  2d-Echo 10/10/2020: EF 60-65%   Normal right ventricular size and function    Dotatate PET/CT scan 10/14/2020 increased tracer uptake seen throughout the liver compatible with neoplasm. This involves both the left and the right lobe of liver. In addition there are 2 foci of increased tracer uptake along the mesentery of the small bowel. This may involve the wall the small bowel. No other convincing evidence for extra-abdominal metastatic disease.     EGD/Colonoscopy 10/05/2020 in diarrhea with alternating Imodium and Lomotil, will proceed with Xermelo    RTC 4 weeks for Dose # 3 Lanreotide.     Tessa Madsen MD   [id number]  Board Certified Medical Oncologist

## 2020-12-14 ENCOUNTER — VIRTUAL VISIT (OUTPATIENT)
Dept: PALLATIVE CARE | Age: 35
End: 2020-12-14
Payer: COMMERCIAL

## 2020-12-14 PROCEDURE — 99422 OL DIG E/M SVC 11-20 MIN: CPT | Performed by: FAMILY MEDICINE

## 2020-12-14 RX ORDER — BUSPIRONE HYDROCHLORIDE 7.5 MG/1
7.5 TABLET ORAL 2 TIMES DAILY
Qty: 60 TABLET | Refills: 0 | Status: SHIPPED
Start: 2020-12-14 | End: 2021-01-05

## 2020-12-14 NOTE — PROGRESS NOTES
Department of Palliative Medicine  Ambulatory Note  Provider: Wendy Jasso DO    Patient aware that this is a virtual visit and patient may have insurance charges from this. Patient agreeable to virtual visit. Chief Complaint: Yeni Rivera is a 30 y.o. female with chief complaint of pain    HPI  31 y/o female with hx of hypothyroidism, IBS,migraine who was complaining of abdominal pain associated with diarrhea and flushing/sweating. 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 Xiomy Landa   - Patient for Bowel resection on 10/21/20    Neoplasm related pain  Pain seems to be more Neuropathic in nature   - Cont Gabapentin 300mg TID   - Percocet 5/325mg was given after her surgery, she uses about 1 daily    Nausea   - Continue Zofran and Phenergan PRN- has been using this   - vomiting more in the past couple of days    Fatigue  Insomnia   -takes ambien for sleep at night    Anxiety   - hydroxyzine 25mg q8hr prn, start with dosage at bedtime, increased throughout the day. - was started on ativan 0.25 q8 hrs prn- doesn't feel this helps   - will try buspar 7.5mg BID for anxiety    Follow Up:  4 weeks. Encouraged to call with any questions, concerns, needs, or changes in symptoms. Subjective:       Yeni Rivera is a 30 y.o. female with significant medical history of neuroendocrine tumor who was referred to [address] [place] [address] by Dr. Chana Bustos. Spoke to her over the phone, she told me she is feeling very sore at her incision sites. She was prescribed Percocet by her physicians who performed surgery. She has only needed about 1 pill daily. She is no longer complaining of neuropathic pain which she previously was, she is currently on gabapentin 300 mg 3 times a day.   She complains of having anxiety throughout the day, I prescribed her Atarax hydroxyzine 25 mg every 8 hours as needed. No complaints of nausea vomiting or constipation     12/14  Patient states the past few days she has been coughing a lot and feels weak and miserable and is just staying in bed. Dr Talisha Sanchez started patient on new medication for her diarrhea which causes flushing. Pain has been controlled, though coughing causes pain in her back and shoulder blades. Taking gabapentin TID. Doesn't take percocet. Hydroxyzine makes her too sleepy, doesn't feel that ativan made much of a difference. Has never been on zoloft or buspar for her anxiety. Willing to try buspar. Has vomited twice in the past couple of days, taking prn zofran and phenergan, doesn't need refills on that yet. Pain Assessment   Rating:  3  Description: soreness  Duration: minutes  Frequency: daily  Location: Abdomen   Alleviating Factors: relaxation  Exacerbating Factors: unable to associate with any factor  Effect: change in function and sleep    Objective:     Physical Exam  Wt Readings from Last 3 Encounters:   12/03/20 139 lb 8 oz (63.3 kg)   11/19/20 144 lb 11.2 oz (65.6 kg)   11/05/20 141 lb 8 oz (64.2 kg)       [place] Symptom Assessment Score   [place] Score 10/13/2020   Pain Score 8   Tiredness Score 5   Nausea Score Not nauseated   Depression Score 2   Anxiety Score 8   Drowsiness Score 2   Appetite Score 5   Wellbeing Score 7   Dyspnea Score No shortness of breath   Other Problem Score Best possible response   Total Assessment Score(calculated) 37     Assessed by: patient. Current Medications:  Medications reviewed: yes    Controlled Substances Monitoring: [place] reviewed 12/14/20. RX Monitoring 12/1/2020   Attestation -   Periodic Controlled Substance Monitoring Possible medication side effects, risk of tolerance/dependence & alternative treatments discussed. ;No signs of potential drug abuse or diversion identified.          Herrera Cochran MD  Palliative Care Department     Time/Communication  Greater than 50% of time spent, total 15 minutes in telephone counseling and coordination of care regarding symptom management.

## 2020-12-18 ENCOUNTER — TELEPHONE (OUTPATIENT)
Dept: ONCOLOGY | Age: 35
End: 2020-12-18

## 2020-12-18 ENCOUNTER — TELEPHONE (OUTPATIENT)
Dept: PALLATIVE CARE | Age: 35
End: 2020-12-18

## 2020-12-18 ENCOUNTER — TELEPHONE (OUTPATIENT)
Dept: INFUSION THERAPY | Age: 35
End: 2020-12-18

## 2020-12-18 NOTE — TELEPHONE ENCOUNTER
Notified by Patience Crowe with Med Onc. That Collin Hargrove had some side effects from her new medication started this past week. Reached out and spoke with Collin Hargrove. Collin Hargrove shared that one hour after taking Juanita Furl she had seizure like activity witnessed by her boyfriend that lasted about 1 minute and she felt very tired after. She has stopped this medication. Asked how she was doing on the Buspar. She states she has no bad side effects from this and is feeling better today. Support provided through active listening. Collin Hargrove knows she can call with questions or concerns. Encouraged to seek medical attention if another seizure occurs.

## 2020-12-18 NOTE — TELEPHONE ENCOUNTER
Patient had called in and left message re: having bad side effects from a medication. I returned her call and no answer. Left message to call office back. Thu Goetz RN

## 2020-12-18 NOTE — TELEPHONE ENCOUNTER
Pt called in today with complaints of seizure like activity a couple of days ago with symptoms of: slumping over while talking, shaking and no recollection of symptoms happening. Pt had concerns of these symptoms being related to current medications prescribed by Dr. Talisha Sanchez and Dr. Bart Soliz and Kevin Chris. Suggested that pt have a workup and further assessment done at ER or Urgent Care. Pt stated feeling better today and did not feel that she needed to have any further evaluation at this time. Reached out to Maurilio Infante RN with Palliative to inform of above.

## 2021-01-05 ENCOUNTER — TELEPHONE (OUTPATIENT)
Dept: INFUSION THERAPY | Age: 36
End: 2021-01-05

## 2021-01-05 RX ORDER — BUTALBITAL, ACETAMINOPHEN AND CAFFEINE 50; 325; 40 MG/1; MG/1; MG/1
TABLET ORAL
Qty: 30 TABLET | Refills: 1 | Status: SHIPPED
Start: 2021-01-05 | End: 2021-03-05

## 2021-01-05 RX ORDER — BUSPIRONE HYDROCHLORIDE 7.5 MG/1
TABLET ORAL
Qty: 60 TABLET | Refills: 0 | Status: ON HOLD
Start: 2021-01-05 | End: 2021-01-29 | Stop reason: HOSPADM

## 2021-01-05 NOTE — TELEPHONE ENCOUNTER
Attempted to reach patient after receiving note from front office that patient would like to speak with office nurse, unknown date. Unable to leave message. Medication teaching and assessment Medication teaching and assessment/Other, specify...

## 2021-01-07 ENCOUNTER — OFFICE VISIT (OUTPATIENT)
Dept: ONCOLOGY | Age: 36
End: 2021-01-07
Payer: COMMERCIAL

## 2021-01-07 ENCOUNTER — HOSPITAL ENCOUNTER (OUTPATIENT)
Dept: INFUSION THERAPY | Age: 36
Discharge: HOME | End: 2021-01-07
Payer: COMMERCIAL

## 2021-01-07 VITALS
HEART RATE: 83 BPM | SYSTOLIC BLOOD PRESSURE: 123 MMHG | TEMPERATURE: 98 F | BODY MASS INDEX: 27.61 KG/M2 | OXYGEN SATURATION: 98 % | DIASTOLIC BLOOD PRESSURE: 79 MMHG | WEIGHT: 140.6 LBS | HEIGHT: 60 IN

## 2021-01-07 DIAGNOSIS — C7B.8 METASTATIC MALIGNANT NEUROENDOCRINE TUMOR TO LIVER (HCC): Primary | ICD-10-CM

## 2021-01-07 LAB
ALBUMIN SERPL-MCNC: 3.8 G/DL (ref 3.5–5.2)
ALP BLD-CCNC: 93 U/L (ref 35–104)
ALT SERPL-CCNC: 67 U/L (ref 0–32)
ANION GAP SERPL CALCULATED.3IONS-SCNC: 9 MMOL/L (ref 7–16)
AST SERPL-CCNC: 107 U/L (ref 0–31)
BASOPHILS ABSOLUTE: 0.05 E9/L (ref 0–0.2)
BASOPHILS RELATIVE PERCENT: 0.6 % (ref 0–2)
BILIRUB SERPL-MCNC: 0.4 MG/DL (ref 0–1.2)
BUN BLDV-MCNC: 5 MG/DL (ref 6–20)
CALCIUM SERPL-MCNC: 8.2 MG/DL (ref 8.6–10.2)
CHLORIDE BLD-SCNC: 100 MMOL/L (ref 98–107)
CO2: 33 MMOL/L (ref 22–29)
CREAT SERPL-MCNC: 0.6 MG/DL (ref 0.5–1)
EOSINOPHILS ABSOLUTE: 0.2 E9/L (ref 0.05–0.5)
EOSINOPHILS RELATIVE PERCENT: 2.5 % (ref 0–6)
GFR AFRICAN AMERICAN: >60
GFR NON-AFRICAN AMERICAN: >60 ML/MIN/1.73
GLUCOSE BLD-MCNC: 104 MG/DL (ref 74–99)
HCT VFR BLD CALC: 41.4 % (ref 34–48)
HEMOGLOBIN: 13.9 G/DL (ref 11.5–15.5)
IMMATURE GRANULOCYTES #: 0.07 E9/L
IMMATURE GRANULOCYTES %: 0.9 % (ref 0–5)
LYMPHOCYTES ABSOLUTE: 1.85 E9/L (ref 1.5–4)
LYMPHOCYTES RELATIVE PERCENT: 23.3 % (ref 20–42)
MCH RBC QN AUTO: 32 PG (ref 26–35)
MCHC RBC AUTO-ENTMCNC: 33.6 % (ref 32–34.5)
MCV RBC AUTO: 95.2 FL (ref 80–99.9)
MONOCYTES ABSOLUTE: 1.06 E9/L (ref 0.1–0.95)
MONOCYTES RELATIVE PERCENT: 13.3 % (ref 2–12)
NEUTROPHILS ABSOLUTE: 4.72 E9/L (ref 1.8–7.3)
NEUTROPHILS RELATIVE PERCENT: 59.4 % (ref 43–80)
PDW BLD-RTO: 18 FL (ref 11.5–15)
PLATELET # BLD: 162 E9/L (ref 130–450)
PMV BLD AUTO: 10.2 FL (ref 7–12)
POTASSIUM SERPL-SCNC: 2.9 MMOL/L (ref 3.5–5)
RBC # BLD: 4.35 E12/L (ref 3.5–5.5)
SODIUM BLD-SCNC: 142 MMOL/L (ref 132–146)
TOTAL PROTEIN: 6.6 G/DL (ref 6.4–8.3)
WBC # BLD: 8 E9/L (ref 4.5–11.5)

## 2021-01-07 PROCEDURE — 4004F PT TOBACCO SCREEN RCVD TLK: CPT | Performed by: INTERNAL MEDICINE

## 2021-01-07 PROCEDURE — 80053 COMPREHEN METABOLIC PANEL: CPT

## 2021-01-07 PROCEDURE — 96372 THER/PROPH/DIAG INJ SC/IM: CPT

## 2021-01-07 PROCEDURE — G8484 FLU IMMUNIZE NO ADMIN: HCPCS | Performed by: INTERNAL MEDICINE

## 2021-01-07 PROCEDURE — G8417 CALC BMI ABV UP PARAM F/U: HCPCS | Performed by: INTERNAL MEDICINE

## 2021-01-07 PROCEDURE — G8428 CUR MEDS NOT DOCUMENT: HCPCS | Performed by: INTERNAL MEDICINE

## 2021-01-07 PROCEDURE — 99212 OFFICE O/P EST SF 10 MIN: CPT

## 2021-01-07 PROCEDURE — 86316 IMMUNOASSAY TUMOR OTHER: CPT

## 2021-01-07 PROCEDURE — 6360000002 HC RX W HCPCS: Performed by: INTERNAL MEDICINE

## 2021-01-07 PROCEDURE — 99214 OFFICE O/P EST MOD 30 MIN: CPT | Performed by: INTERNAL MEDICINE

## 2021-01-07 PROCEDURE — 85025 COMPLETE CBC W/AUTO DIFF WBC: CPT

## 2021-01-07 RX ORDER — LANREOTIDE ACETATE 120 MG/.5ML
120 INJECTION SUBCUTANEOUS ONCE
Status: CANCELLED | OUTPATIENT
Start: 2021-01-28 | End: 2021-01-28

## 2021-01-07 RX ORDER — LANREOTIDE ACETATE 120 MG/.5ML
120 INJECTION SUBCUTANEOUS ONCE
Status: COMPLETED | OUTPATIENT
Start: 2021-01-07 | End: 2021-01-07

## 2021-01-07 RX ADMIN — LANREOTIDE ACETATE 120 MG: 120 INJECTION SUBCUTANEOUS at 15:31

## 2021-01-07 NOTE — PROGRESS NOTES
Department of 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 CT abdomen/pelvis 08/28/2020:  2 cm masslike density in the mid abdominal small bowel mesentery with a spiculated appearance. Multiple liver masses suspicious for metastatic disease. 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 Comment: Sections of the liver tissue cores show the hepatic parenchyma to be partially replaced by a proliferation of epithelioid cells with relatively uniform round nuclei and eosinophilic granular cytoplasm.  The   epithelioid cells form anastomosing solid cords/nests that are surrounded by delicate fibrovascular stroma.  There are no mitotic figures or tumor cell necrosis present.  The histologic changes seen are suggestive of well-differentiated neuroendocrine (carcinoid) tumor. Immunostaining for pankeratin, chromogranin, synaptophysin, TTF-1 and Ki-67 was performed on sections of one tissue block (A1) and the neoplastic epithelioid cells show diffuse and strong positivity for neuroendocrine markers (chromogranin and synaptophysin) and moderate positivity for pankeratin.  There is no staining reactivity for TTF-1. The Ki-67 proliferation labeling index is essentially negative, (very low, <1%). This staining pattern confirms the histologic impression of a well-differentiated neuroendocrine (carcinoid) tumor. FL Small bowel 09/02/2020:  Rapid small bowel transit. Serum Chromogranin A 160 on 09/23/2020.   Urine 5-HIAA 21 (0-14)  2d-Echo 10/10/2020: EF 60-65%   Normal right ventricular size and function Dotatate PET/CT scan 10/14/2020 increased tracer uptake seen throughout the liver compatible with neoplasm. This involves both the left and the right lobe of liver. In addition there are 2 foci of increased tracer uptake along the mesentery of the small bowel. This may involve the wall the small bowel. No other convincing evidence for extra-abdominal metastatic disease. EGD/Colonoscopy 10/05/2020 by Dr. Alley Escobedo; records reviewed. Hepatobiliary team (Dr. Karina Franks) consult appreciated. Small bowel resection on 10/21/2020. Small bowel resection on 10/21/2020. A.  Ileum, resection:   Multifocal (4 foci) neuroendocrine tumor (NET). Extensive perineural and angiolymphatic invasion. 3 of 10 lymph nodes with metastatic neuroendocrine tumor and extracapsular extension of tumor cells (3/10). Bilateral viable small bowel resection margins with no evidence of tumor. Berna Mort received as \"Meckel's\":   Nodular scar tissue with patchy chronic inflammation. Negative for neuroendocrine tumor. Intestinal mucosal tissue is not present. CASE SUMMARY:   Procedure: Small bowel resection   Tumor site: Ileum   Tumor size: Greatest dimension of 1.5 cm   Tumor focality: Multifocal: 4 separate tumors   Histologic type and grade: G2: Well-differentiated neuroendocrine tumor   Mitotic rate: between 2-20 mitoses/2 mm2   Ki-67 labeling index: between 3-20%   Tumor extension: Tumor invades through the muscularis propria into subserosal tissue without penetration of overlying serosa   Margins:  All margins are uninvolved by tumor    Margins examined: Proximal and distal   Lymphovascular invasion: Present   Perineural invasion: Present   Large mesenteric masses (greater than 2 cm): Present (one 2.5 cm mass)   Regional lymph nodes:    Number of lymph nodes involved: 3    Number of lymph nodes examined: 10   Pathologic stage classification (pTNM, AJCC eighth edition):    pT3    pN2  pM1a -confirmed liver metastasis, HBS-     Comment:   A. 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 Dose # 1 Lanreotide was on 11/05/2020. Dose # 2 Lanreotide was on 12/03/2020. Dose # 3 Lanreotide is today 01/07/2021. Diarrhea fairly manageable with alternating Imodium and Lomotil. ? Seizure one week after starting Rollo Mattes (held Rollo Mattes since). Fair appetite and energy level. No chest pain. Review of Systems;  CONSTITUTIONAL: No fever. Fair appetite and energy level. ENMT: Eyes: No diplopia; Nose: No epistaxis. Mouth: No sore throat. RESPIRATORY: No hemoptysis, shortness of breath, cough. CARDIOVASCULAR: No chest pain, palpitations. GASTROINTESTINAL: Diarrhea fairly manageable with alternating Imodium and Lomotil. ? Seizure one week after starting Xermelo (held Rollo Mattes)   GENITOURINARY: No dysuria, urinary frequency, hematuria. NEURO: No syncope, presyncope, headache. Remainder:  ROS NEGATIVE    Past Medical History:      Diagnosis Date    Abdominal pain     [facility]'s disease ([facility] [address] [facility]     Cancer ([facility] [address]     Hypocalcemia     Hypothyroidism     Irritable bowel syndrome     Migraines     Status post alcohol detoxification     5/22/2018-5/29/2018     Medications:  Reviewed and reconciled. Allergies:  No Known Allergies  Physical Exam:  /79   Pulse 83   Temp 98 °F (36.7 °C)   Ht 5' (1.524 m)   Wt 140 lb 9.6 oz (63.8 kg)   SpO2 98%   BMI 27.46 kg/m²   GENERAL: Alert, oriented x 3, not in acute distress. HEENT: PERRLA; EOMI. Oropharynx clear. NECK: Supple. Without lymphadenopathy. LUNGS: Good air entry bilaterally. No wheezing, crackles or ronchi. CARDIOVASCULAR: Regular rate. No murmurs, rubs or gallops. ABDOMEN: Soft. Non-tender, non-distended. Positive bowel sounds. EXTREMITIES: Without clubbing, cyanosis, or edema. NEUROLOGIC: No focal deficits. ECOG PS 1    Impression/Plan:  29 y/o female with 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 Multiple liver masses suspicious for metastatic disease. 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 Comment: Sections of the liver tissue cores show the hepatic parenchyma to be partially replaced by a proliferation of epithelioid cells with relatively uniform round nuclei and eosinophilic granular cytoplasm.  The   epithelioid cells form anastomosing solid cords/nests that are surrounded by delicate fibrovascular stroma.  There are no mitotic figures or tumor cell necrosis present.  The histologic changes seen are suggestive of well-differentiated neuroendocrine (carcinoid) tumor. Immunostaining for pankeratin, chromogranin, synaptophysin, TTF-1 and Ki-67 was performed on sections of one tissue block (A1) and the neoplastic epithelioid cells show diffuse and strong positivity for neuroendocrine markers (chromogranin and synaptophysin) and moderate positivity for pankeratin.    There is no staining reactivity for TTF-1. The Ki-67 proliferation labeling index is essentially negative, (very low, <1%). This staining pattern confirms the histologic impression of a well-differentiated neuroendocrine (carcinoid) tumor. FL Small bowel 09/02/2020:  Rapid small bowel transit. Serum Chromogranin A 160 on 09/23/2020.   Urine 5-HIAA 21 (0-14)  2d-Echo 10/10/2020: EF 60-65%   Normal right ventricular size and function Dotatate PET/CT scan 10/14/2020 increased tracer uptake seen throughout the liver compatible with neoplasm. This involves both the left and the right lobe of liver. In addition there are 2 foci of increased tracer uptake along the mesentery of the small bowel. This may involve the wall the small bowel. No other convincing evidence for extra-abdominal metastatic disease. EGD/Colonoscopy 10/05/2020 by Dr. Christine Bergeron; records reviewed. Hepatobiliary team (Dr. Zhanna Sanford) consult appreciated. Small bowel resection on 10/21/2020. A.  Ileum, resection:   Multifocal (4 foci) neuroendocrine tumor (NET). Extensive perineural and angiolymphatic invasion. 3 of 10 lymph nodes with metastatic neuroendocrine tumor and extracapsular extension of tumor cells (3/10). Bilateral viable small bowel resection margins with no evidence of tumor. Thersa Lard received as \"Meckel's\":   Nodular scar tissue with patchy chronic inflammation. Negative for neuroendocrine tumor. Intestinal mucosal tissue is not present. CASE SUMMARY:   Procedure: Small bowel resection   Tumor site: Ileum   Tumor size: Greatest dimension of 1.5 cm   Tumor focality: Multifocal: 4 separate tumors   Histologic type and grade: G2: Well-differentiated neuroendocrine tumor   Mitotic rate: between 2-20 mitoses/2 mm2   Ki-67 labeling index: between 3-20%   Tumor extension: Tumor invades through the muscularis propria into subserosal tissue without penetration of overlying serosa   Margins:  All margins are uninvolved by tumor    Margins examined: Proximal and distal   Lymphovascular invasion: Present   Perineural invasion: Present   Large mesenteric masses (greater than 2 cm): Present (one 2.5 cm mass)   Regional lymph nodes:    Number of lymph nodes involved: 3    Number of lymph nodes examined: 10   Pathologic stage classification (pTNM, AJCC eighth edition):    pT3    pN2    pM1a -confirmed liver metastasis, KUA-     Comment: A. 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 Dose # 1 Lanreotide was on 11/05/2020. Dose # 2 Lanreotide was on 12/03/2020. Dose # 3 Lanreotide is today 01/07/2021. Chromogranin A pending. Diarrhea fairly manageable with alternating Imodium and Lomotil. ? Seizure one week after starting  Xermelo (held [employer])    RTC 4 weeks for Dose # 4 Lanreotide. Scans after next visit.     Nikky Braun MD   9/6/7621  Board Certified Medical Oncologist

## 2021-01-08 ENCOUNTER — TELEPHONE (OUTPATIENT)
Dept: PHARMACY | Age: 36
End: 2021-01-08

## 2021-01-08 DIAGNOSIS — C7B.8 METASTATIC MALIGNANT NEUROENDOCRINE TUMOR TO LIVER (HCC): Primary | ICD-10-CM

## 2021-01-08 RX ORDER — POTASSIUM CHLORIDE 20 MEQ/1
20 TABLET, EXTENDED RELEASE ORAL DAILY
Qty: 30 TABLET | Refills: 0 | Status: SHIPPED | OUTPATIENT
Start: 2021-01-08 | End: 2021-02-15 | Stop reason: SDUPTHER

## 2021-01-08 NOTE — TELEPHONE ENCOUNTER
----- Message from Alfred Griffin MD sent at 1/8/2021  8:44 AM EST -----  Can we replace her Kcl?  Thanks  ----- Message -----  From: Catherine Houston Incoming Results From [employer]  Sent: 1/7/2021   3:35 PM EST  To: Alfred Griffin MD

## 2021-01-08 NOTE — TELEPHONE ENCOUNTER
Tried to contact Giovanny Ferrer to discuss potassium from Dr. Attila Taylor yesterday, no answer and unable to leave message due to full mailbox. Patient has mychart and will message her to  potassium 20 meq and take daily for until seen back by Dr. Hawk Dunham. Sent to [address] in [place].     Electronically signed by Indra Recinos, [address] [place] [address] on 1/8/2021 at 10:24 AM

## 2021-01-11 ENCOUNTER — VIRTUAL VISIT (OUTPATIENT)
Dept: PALLATIVE CARE | Age: 36
End: 2021-01-11
Payer: COMMERCIAL

## 2021-01-11 DIAGNOSIS — C7A.8 NEUROENDOCRINE CARCINOMA METASTATIC TO LIVER (HCC): ICD-10-CM

## 2021-01-11 DIAGNOSIS — G89.3 NEOPLASM RELATED PAIN: Primary | ICD-10-CM

## 2021-01-11 DIAGNOSIS — R19.7 DIARRHEA, UNSPECIFIED TYPE: ICD-10-CM

## 2021-01-11 DIAGNOSIS — C7B.8 NEUROENDOCRINE CARCINOMA METASTATIC TO LIVER (HCC): ICD-10-CM

## 2021-01-11 LAB — CHROMOGRANIN A: 95 NG/ML (ref 0–103)

## 2021-01-11 PROCEDURE — 99422 OL DIG E/M SVC 11-20 MIN: CPT | Performed by: FAMILY MEDICINE

## 2021-01-11 RX ORDER — GABAPENTIN 300 MG/1
300 CAPSULE ORAL 3 TIMES DAILY
Qty: 90 CAPSULE | Refills: 2 | Status: ON HOLD
Start: 2021-01-11 | End: 2021-01-29 | Stop reason: HOSPADM

## 2021-01-11 RX ORDER — TRAMADOL HYDROCHLORIDE 50 MG/1
50 TABLET ORAL EVERY 6 HOURS PRN
Qty: 20 TABLET | Refills: 0 | Status: SHIPPED | OUTPATIENT
Start: 2021-01-11 | End: 2021-01-16

## 2021-01-11 NOTE — PROGRESS NOTES
Department of Palliative Medicine  Ambulatory Note  Provider: Gerg Strange DO        Chief Complaint: Jacqueline Prince is a 30 y.o. female with chief complaint of pain evaluated via telephone on 1/11/2021     Consent:  She and/or health care decision maker is aware that that she may receive a bill for this telephone service, depending on her insurance coverage, and has provided verbal consent to proceed: Yes    HPI  31 y/o female with hx of hypothyroidism, IBS, migraine who was complaining of abdominal pain associated with diarrhea and flushing/sweating. 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 [place] [facility]   - Patient for Bowel resection on 10/21/20    Neoplasm related pain  Pain seems to be more Neuropathic in nature   - Cont Gabapentin 300mg TID- rx sent   - Percocet 5/325mg was given after her surgery, she ran out   - will trial tramadol 50mg q6 hrs prn for the pain    Nausea   - Continue Zofran and Phenergan PRN- has been using this   - denies nausea/vomiting    Fatigue  Insomnia   -takes ambien for sleep at night    Anxiety   - hydroxyzine 25mg q8hr prn, start with dosage at bedtime, increased throughout the day. - was started on ativan 0.25 q8 hrs prn- doesn't feel this helps   - will try buspar 7.5mg BID for anxiety    Follow Up:  4 weeks. Encouraged to call with any questions, concerns, needs, or changes in symptoms. Subjective:       Jacqueline Prince is a 30 y.o. female with significant medical history of neuroendocrine tumor who was referred to [address] [place] [address] by Dr. Remberto Ivey. She is no longer complaining of neuropathic pain which she previously was, she is currently on gabapentin 300 mg 3 times a day. Ran out of Percocet. Takes Tylenol when she has pain, but this doesn't work.   Didn't tolerate naproxen in the past.  Willing to try tramadol for her aching pain.    She complains of having anxiety throughout the day, buspar is helping. No complaints of nausea vomiting. States she is starting to feel better. Bloodwork showed low potassium, now with replacement her muscle cramping has improved. Still having diarrhea, quit one of the medications because she thought it was making her feel sick. Pain Assessment   Ratin  Description: soreness, aching  Duration: 1-2 days  Frequency: 3-4 days/week  Location: shoulders, arms, calves/thighs  Alleviating Factors: relaxation  Exacerbating Factors: activity  Effect: change in function and sleep    Objective:     Physical Exam  Wt Readings from Last 3 Encounters:   21 140 lb 9.6 oz (63.8 kg)   20 139 lb 8 oz (63.3 kg)   20 144 lb 11.2 oz (65.6 kg)       Dryden Symptom Assessment Score   Dryden Score 10/13/2020   Pain Score 8   Tiredness Score 5   Nausea Score Not nauseated   Depression Score 2   Anxiety Score 8   Drowsiness Score 2   Appetite Score 5   Wellbeing Score 7   Dyspnea Score No shortness of breath   Other Problem Score Best possible response   Total Assessment Score(calculated) 37     Assessed by: patient. Current Medications:  Medications reviewed: yes    Controlled Substances Monitoring: [place]ARRS reviewed 21. RX Monitoring 2020   Attestation -   Periodic Controlled Substance Monitoring Possible medication side effects, risk of tolerance/dependence & alternative treatments discussed. ;No signs of potential drug abuse or diversion identified.   [address] Department     Time/Communication  Greater than 50% of time spent, total 13 minutes in telephone counseling and coordination of care regarding symptom management.

## 2021-01-21 ENCOUNTER — APPOINTMENT (OUTPATIENT)
Dept: ULTRASOUND IMAGING | Age: 36
End: 2021-01-21
Payer: COMMERCIAL

## 2021-01-21 ENCOUNTER — APPOINTMENT (OUTPATIENT)
Dept: GENERAL RADIOLOGY | Age: 36
End: 2021-01-21
Payer: COMMERCIAL

## 2021-01-21 ENCOUNTER — TELEPHONE (OUTPATIENT)
Dept: HEMATOLOGY | Age: 36
End: 2021-01-21

## 2021-01-21 ENCOUNTER — HOSPITAL ENCOUNTER (EMERGENCY)
Age: 36
Discharge: ANOTHER HOSPITAL | End: 2021-01-22
Attending: EMERGENCY MEDICINE
Payer: COMMERCIAL

## 2021-01-21 ENCOUNTER — APPOINTMENT (OUTPATIENT)
Dept: CT IMAGING | Age: 36
End: 2021-01-21
Payer: COMMERCIAL

## 2021-01-21 DIAGNOSIS — N30.01 ACUTE CYSTITIS WITH HEMATURIA: ICD-10-CM

## 2021-01-21 DIAGNOSIS — E83.42 HYPOMAGNESEMIA: ICD-10-CM

## 2021-01-21 DIAGNOSIS — R56.9 SEIZURE-LIKE ACTIVITY (HCC): Primary | ICD-10-CM

## 2021-01-21 DIAGNOSIS — R74.01 TRANSAMINITIS: ICD-10-CM

## 2021-01-21 DIAGNOSIS — E83.51 HYPOCALCEMIA: ICD-10-CM

## 2021-01-21 DIAGNOSIS — N17.9 AKI (ACUTE KIDNEY INJURY) (HCC): ICD-10-CM

## 2021-01-21 LAB
ACETAMINOPHEN LEVEL: <5 MCG/ML (ref 10–30)
ALBUMIN SERPL-MCNC: 3.7 G/DL (ref 3.5–5.2)
ALP BLD-CCNC: 163 U/L (ref 35–104)
ALT SERPL-CCNC: 3706 U/L (ref 0–32)
AMORPHOUS: ABNORMAL
AMPHETAMINE SCREEN, URINE: NOT DETECTED
ANION GAP SERPL CALCULATED.3IONS-SCNC: 13 MMOL/L (ref 7–16)
APTT: 26.3 SEC (ref 24.5–35.1)
AST SERPL-CCNC: >7000 U/L (ref 0–31)
BACTERIA: ABNORMAL /HPF
BARBITURATE SCREEN URINE: POSITIVE
BASOPHILS ABSOLUTE: 0.06 E9/L (ref 0–0.2)
BASOPHILS RELATIVE PERCENT: 0.5 % (ref 0–2)
BENZODIAZEPINE SCREEN, URINE: NOT DETECTED
BILIRUB SERPL-MCNC: 2.4 MG/DL (ref 0–1.2)
BILIRUBIN DIRECT: 1.6 MG/DL (ref 0–0.3)
BILIRUBIN URINE: ABNORMAL
BILIRUBIN, INDIRECT: 0.8 MG/DL (ref 0–1)
BLOOD, URINE: ABNORMAL
BUN BLDV-MCNC: 14 MG/DL (ref 6–20)
CALCIUM SERPL-MCNC: 7 MG/DL (ref 8.6–10.2)
CANNABINOID SCREEN URINE: NOT DETECTED
CHLORIDE BLD-SCNC: 99 MMOL/L (ref 98–107)
CHP ED QC CHECK: NORMAL
CLARITY: ABNORMAL
CO2: 23 MMOL/L (ref 22–29)
COARSE CASTS, UA: ABNORMAL /LPF (ref 0–2)
COCAINE METABOLITE SCREEN URINE: NOT DETECTED
COLOR: ABNORMAL
CREAT SERPL-MCNC: 1.8 MG/DL (ref 0.5–1)
EOSINOPHILS ABSOLUTE: 0.04 E9/L (ref 0.05–0.5)
EOSINOPHILS RELATIVE PERCENT: 0.4 % (ref 0–6)
EPITHELIAL CELLS, UA: ABNORMAL /HPF
ETHANOL: <10 MG/DL (ref 0–0.08)
FENTANYL SCREEN, URINE: POSITIVE
GFR AFRICAN AMERICAN: 39
GFR NON-AFRICAN AMERICAN: 32 ML/MIN/1.73
GLUCOSE BLD-MCNC: 83 MG/DL (ref 74–99)
GLUCOSE BLD-MCNC: 90 MG/DL
GLUCOSE URINE: 250 MG/DL
HCG, URINE, POC: NEGATIVE
HCT VFR BLD CALC: 42 % (ref 34–48)
HEMOGLOBIN: 14 G/DL (ref 11.5–15.5)
IMMATURE GRANULOCYTES #: 0.04 E9/L
IMMATURE GRANULOCYTES %: 0.4 % (ref 0–5)
INR BLD: 2.3
KETONES, URINE: 15 MG/DL
LACTIC ACID: 2.4 MMOL/L (ref 0.5–2.2)
LEUKOCYTE ESTERASE, URINE: NEGATIVE
LIPASE: 127 U/L (ref 13–60)
LYMPHOCYTES ABSOLUTE: 1.08 E9/L (ref 1.5–4)
LYMPHOCYTES RELATIVE PERCENT: 9.5 % (ref 20–42)
Lab: ABNORMAL
Lab: NORMAL
MAGNESIUM: 1.3 MG/DL (ref 1.6–2.6)
MCH RBC QN AUTO: 32 PG (ref 26–35)
MCHC RBC AUTO-ENTMCNC: 33.3 % (ref 32–34.5)
MCV RBC AUTO: 96.1 FL (ref 80–99.9)
METER GLUCOSE: 53 MG/DL (ref 74–99)
METER GLUCOSE: 90 MG/DL (ref 74–99)
METHADONE SCREEN, URINE: NOT DETECTED
MONOCYTES ABSOLUTE: 0.41 E9/L (ref 0.1–0.95)
MONOCYTES RELATIVE PERCENT: 3.6 % (ref 2–12)
NEGATIVE QC PASS/FAIL: NORMAL
NEUTROPHILS ABSOLUTE: 9.72 E9/L (ref 1.8–7.3)
NEUTROPHILS RELATIVE PERCENT: 85.6 % (ref 43–80)
NITRITE, URINE: POSITIVE
OPIATE SCREEN URINE: NOT DETECTED
OXYCODONE URINE: NOT DETECTED
PDW BLD-RTO: 17.4 FL (ref 11.5–15)
PH UA: 6.5 (ref 5–9)
PHENCYCLIDINE SCREEN URINE: NOT DETECTED
PLATELET # BLD: 170 E9/L (ref 130–450)
PMV BLD AUTO: 9.9 FL (ref 7–12)
POSITIVE QC PASS/FAIL: NORMAL
POTASSIUM REFLEX MAGNESIUM: 3.9 MMOL/L (ref 3.5–5)
PROTEIN UA: >=300 MG/DL
PROTHROMBIN TIME: 27.2 SEC (ref 9.3–12.4)
RBC # BLD: 4.37 E12/L (ref 3.5–5.5)
RBC UA: ABNORMAL /HPF (ref 0–2)
SALICYLATE, SERUM: 1.1 MG/DL (ref 0–30)
SARS-COV-2, NAAT: NOT DETECTED
SODIUM BLD-SCNC: 135 MMOL/L (ref 132–146)
SPECIFIC GRAVITY UA: >=1.03 (ref 1–1.03)
TOTAL CK: 328 U/L (ref 20–180)
TOTAL PROTEIN: 6.3 G/DL (ref 6.4–8.3)
TRICYCLIC ANTIDEPRESSANTS SCREEN SERUM: NEGATIVE NG/ML
TROPONIN: 0.04 NG/ML (ref 0–0.03)
TSH SERPL DL<=0.05 MIU/L-ACNC: 2.19 UIU/ML (ref 0.27–4.2)
UROBILINOGEN, URINE: 1 E.U./DL
WBC # BLD: 11.4 E9/L (ref 4.5–11.5)
WBC UA: ABNORMAL /HPF (ref 0–5)

## 2021-01-21 PROCEDURE — 80048 BASIC METABOLIC PNL TOTAL CA: CPT

## 2021-01-21 PROCEDURE — 83735 ASSAY OF MAGNESIUM: CPT

## 2021-01-21 PROCEDURE — 93005 ELECTROCARDIOGRAM TRACING: CPT | Performed by: STUDENT IN AN ORGANIZED HEALTH CARE EDUCATION/TRAINING PROGRAM

## 2021-01-21 PROCEDURE — 93970 EXTREMITY STUDY: CPT

## 2021-01-21 PROCEDURE — 84484 ASSAY OF TROPONIN QUANT: CPT

## 2021-01-21 PROCEDURE — 96366 THER/PROPH/DIAG IV INF ADDON: CPT

## 2021-01-21 PROCEDURE — U0002 COVID-19 LAB TEST NON-CDC: HCPCS

## 2021-01-21 PROCEDURE — 85730 THROMBOPLASTIN TIME PARTIAL: CPT

## 2021-01-21 PROCEDURE — 71045 X-RAY EXAM CHEST 1 VIEW: CPT

## 2021-01-21 PROCEDURE — 70450 CT HEAD/BRAIN W/O DYE: CPT

## 2021-01-21 PROCEDURE — 36415 COLL VENOUS BLD VENIPUNCTURE: CPT

## 2021-01-21 PROCEDURE — G0480 DRUG TEST DEF 1-7 CLASSES: HCPCS

## 2021-01-21 PROCEDURE — 87088 URINE BACTERIA CULTURE: CPT

## 2021-01-21 PROCEDURE — 85610 PROTHROMBIN TIME: CPT

## 2021-01-21 PROCEDURE — 81001 URINALYSIS AUTO W/SCOPE: CPT

## 2021-01-21 PROCEDURE — 96375 TX/PRO/DX INJ NEW DRUG ADDON: CPT

## 2021-01-21 PROCEDURE — 96365 THER/PROPH/DIAG IV INF INIT: CPT

## 2021-01-21 PROCEDURE — 2500000003 HC RX 250 WO HCPCS: Performed by: STUDENT IN AN ORGANIZED HEALTH CARE EDUCATION/TRAINING PROGRAM

## 2021-01-21 PROCEDURE — 2580000003 HC RX 258: Performed by: STUDENT IN AN ORGANIZED HEALTH CARE EDUCATION/TRAINING PROGRAM

## 2021-01-21 PROCEDURE — 83690 ASSAY OF LIPASE: CPT

## 2021-01-21 PROCEDURE — 99282 EMERGENCY DEPT VISIT SF MDM: CPT

## 2021-01-21 PROCEDURE — 6360000002 HC RX W HCPCS: Performed by: EMERGENCY MEDICINE

## 2021-01-21 PROCEDURE — 99291 CRITICAL CARE FIRST HOUR: CPT

## 2021-01-21 PROCEDURE — 80076 HEPATIC FUNCTION PANEL: CPT

## 2021-01-21 PROCEDURE — 6360000002 HC RX W HCPCS: Performed by: STUDENT IN AN ORGANIZED HEALTH CARE EDUCATION/TRAINING PROGRAM

## 2021-01-21 PROCEDURE — 85025 COMPLETE CBC W/AUTO DIFF WBC: CPT

## 2021-01-21 PROCEDURE — 84146 ASSAY OF PROLACTIN: CPT

## 2021-01-21 PROCEDURE — 82550 ASSAY OF CK (CPK): CPT

## 2021-01-21 PROCEDURE — 99285 EMERGENCY DEPT VISIT HI MDM: CPT

## 2021-01-21 PROCEDURE — 83605 ASSAY OF LACTIC ACID: CPT

## 2021-01-21 PROCEDURE — 82962 GLUCOSE BLOOD TEST: CPT

## 2021-01-21 PROCEDURE — 84443 ASSAY THYROID STIM HORMONE: CPT

## 2021-01-21 PROCEDURE — 87077 CULTURE AEROBIC IDENTIFY: CPT

## 2021-01-21 PROCEDURE — 87186 SC STD MICRODIL/AGAR DIL: CPT

## 2021-01-21 PROCEDURE — 80307 DRUG TEST PRSMV CHEM ANLYZR: CPT

## 2021-01-21 RX ORDER — MAGNESIUM SULFATE IN WATER 40 MG/ML
2000 INJECTION, SOLUTION INTRAVENOUS ONCE
Status: COMPLETED | OUTPATIENT
Start: 2021-01-21 | End: 2021-01-21

## 2021-01-21 RX ORDER — MORPHINE SULFATE 4 MG/ML
4 INJECTION, SOLUTION INTRAMUSCULAR; INTRAVENOUS ONCE
Status: COMPLETED | OUTPATIENT
Start: 2021-01-21 | End: 2021-01-21

## 2021-01-21 RX ADMIN — MAGNESIUM SULFATE HEPTAHYDRATE 2000 MG: 40 INJECTION, SOLUTION INTRAVENOUS at 22:28

## 2021-01-21 RX ADMIN — CALCIUM GLUCONATE 1000 MG: 98 INJECTION, SOLUTION INTRAVENOUS at 22:47

## 2021-01-21 RX ADMIN — FOLIC ACID: 5 INJECTION, SOLUTION INTRAMUSCULAR; INTRAVENOUS; SUBCUTANEOUS at 22:20

## 2021-01-21 RX ADMIN — MORPHINE SULFATE 4 MG: 4 INJECTION, SOLUTION INTRAMUSCULAR; INTRAVENOUS at 21:08

## 2021-01-21 RX ADMIN — LEVETIRACETAM 1000 MG: 500 INJECTION, SOLUTION INTRAVENOUS at 22:22

## 2021-01-21 ASSESSMENT — PAIN DESCRIPTION - PAIN TYPE: TYPE: ACUTE PAIN

## 2021-01-21 ASSESSMENT — ENCOUNTER SYMPTOMS
SHORTNESS OF BREATH: 0
SINUS PRESSURE: 0
ABDOMINAL DISTENTION: 0
EYE PAIN: 0
VOMITING: 1
DIARRHEA: 0
EYE DISCHARGE: 0
BACK PAIN: 0
ABDOMINAL PAIN: 0
NAUSEA: 1
EYE REDNESS: 0
COUGH: 0
WHEEZING: 0
SORE THROAT: 0

## 2021-01-21 ASSESSMENT — PAIN SCALES - GENERAL
PAINLEVEL_OUTOF10: 8
PAINLEVEL_OUTOF10: 8

## 2021-01-21 ASSESSMENT — PAIN DESCRIPTION - FREQUENCY: FREQUENCY: INTERMITTENT

## 2021-01-21 ASSESSMENT — PAIN DESCRIPTION - DESCRIPTORS: DESCRIPTORS: CRAMPING

## 2021-01-21 ASSESSMENT — PAIN DESCRIPTION - ORIENTATION: ORIENTATION: RIGHT

## 2021-01-21 NOTE — ED PROVIDER NOTES
HPI   72-year-old female patient presented to the emergency department with chief complaint of hand and feet spasms as well as seizure today. Patient has a history of carcinoid tumor and ileum removal as well as neuroendocrine tumor in her liver. Patient follows with specialist Nirav Murry. Patient states that she had a seizure a month ago but never followed up with anybody she does have a neurology appointment in place for January 25. Today after patient seizure she says that she felt groggy for a bit but now improving. Currently patient has headache with associated nausea and vomiting. She has a history of migraine headaches and this headache feels similar to that. Aura like images in her visual fields, she also gets these with her migraines. Review of Systems   Constitutional: Negative for chills and fever. HENT: Negative for ear pain, sinus pressure and sore throat. Eyes: Negative for pain, discharge and redness. Respiratory: Negative for cough, shortness of breath and wheezing. Cardiovascular: Negative for chest pain. Gastrointestinal: Positive for nausea and vomiting. Negative for abdominal distention, abdominal pain and diarrhea. Genitourinary: Negative for dysuria and frequency. Musculoskeletal: Negative for arthralgias and back pain. Spasms of hands and feet   Skin: Negative for rash and wound. Neurological: Positive for seizures and headaches. Negative for weakness. Numbness and tingling sensation in hands and feet   Hematological: Negative for adenopathy. All other systems reviewed and are negative. Physical Exam  Vitals signs and nursing note reviewed. Constitutional:       Appearance: Normal appearance. HENT:      Head: Normocephalic and atraumatic. Nose: Nose normal. No congestion. Mouth/Throat:      Mouth: Mucous membranes are moist.      Pharynx: Oropharynx is clear.    Eyes:      Conjunctiva/sclera: Conjunctivae normal.      Pupils: Pupils are equal, round, and reactive to light. Neck:      Musculoskeletal: Normal range of motion and neck supple. Cardiovascular:      Rate and Rhythm: Normal rate and regular rhythm. Pulses: Normal pulses. Heart sounds: Normal heart sounds. Pulmonary:      Effort: Pulmonary effort is normal. No respiratory distress. Breath sounds: Normal breath sounds. Abdominal:      General: Bowel sounds are normal. There is no distension. Comments: Multiple abdominal scars well-healing without tenderness to palpation   Musculoskeletal: Normal range of motion. Right lower leg: No edema. Left lower leg: No edema. Comments: Bilateral calf tenderness to palpation. Skin:     General: Skin is warm and dry. Capillary Refill: Capillary refill takes less than 2 seconds. Neurological:      General: No focal deficit present. Mental Status: She is alert and oriented to person, place, and time. Cranial Nerves: No cranial nerve deficit. Sensory: No sensory deficit. Motor: No weakness. Comments: Positive Trousseau sign   Psychiatric:         Mood and Affect: Mood normal.         Behavior: Behavior normal.          Procedures     MDM   67-year-old female patient with past medical history of neuroendocrine tumor of the liver presenting to emergency department for carpopedal spasms and seizure-like activity. Patient had initial seizure a month ago second seizure few weeks later and third seizure today. Patient has not yet been worked up for this she says that she has an appointment scheduled with neurology on Monday. Here in the emergency department patient has significantly elevated AST ALT consistent with liver tumor. Patient also has hypocalcemia, hypomagnesemia and positive urinalysis.   While here in the emergency department we gave patient a loading dose of Keppra as well as repleted calcium, magnesium gave patient banana bag and ordered Rocephin for urinary tract infection. Patient has remained stable while here in the emergency department without any seizure activity. Also ordered bilateral venous lower extremity ultrasounds which were negative for DVT. Patient's head CT did not show any acute intracranial abnormality. Patient needs to be admitted for further work-up by neurology. We do not have neurology here at [facility] so transfer center was called to have patient transferred downtown for further work-up by neurology. --------------------------------------------- PAST HISTORY ---------------------------------------------  Past Medical History:  has a past medical history of Abdominal pain, Acute Crohn's disease ([facility] [address] [facility] ([facility] Hypocalcemia, Hypothyroidism, Irritable bowel syndrome, Migraines, and Status post alcohol detoxification. Past Surgical History:  has a past surgical history that includes Parathyroid gland surgery; Cholecystectomy, laparoscopic (07/06/2016); Thyroidectomy; Colonoscopy (N/A, 6/22/2018); CT NEEDLE BIOPSY LIVER PERCUTANEOUS (9/1/2020); and Small intestine surgery (N/A, 10/21/2020). Social History:  reports that she has been smoking. She has been smoking about 0.50 packs per day. She has never used smokeless tobacco. She reports that she does not drink alcohol or use drugs. Family History: family history includes Alzheimer's Disease in an other family member; Asthma in her father; Breast Cancer in her maternal grandmother; Cancer in her father, maternal grandmother, and paternal grandfather; Diabetes in an other family member; Heart Disease in her father; High Blood Pressure in her father and mother; High Cholesterol in her mother; Donny Coral in an other family member; Other in her mother. The patients home medications have been reviewed. Allergies: Patient has no known allergies.     -------------------------------------------------- RESULTS Function Panel   Result Value Ref Range    Total Protein 6.3 (L) 6.4 - 8.3 g/dL    Alb 3.7 3.5 - 5.2 g/dL    Alkaline Phosphatase 163 (H) 35 - 104 U/L    ALT 3,706 (H) 0 - 32 U/L    AST >7,000 (H) 0 - 31 U/L    Total Bilirubin 2.4 (H) 0.0 - 1.2 mg/dL    Bilirubin, Direct 1.6 (H) 0.0 - 0.3 mg/dL    Bilirubin, Indirect 0.8 0.0 - 1.0 mg/dL   Lipase   Result Value Ref Range    Lipase 127 (H) 13 - 60 U/L   Magnesium   Result Value Ref Range    Magnesium 1.3 (L) 1.6 - 2.6 mg/dL   Lactic Acid, Plasma   Result Value Ref Range    Lactic Acid 2.4 (H) 0.5 - 2.2 mmol/L   Troponin   Result Value Ref Range    Troponin 0.04 (H) 0.00 - 0.03 ng/mL   Protime-INR   Result Value Ref Range    Protime 27.2 (H) 9.3 - 12.4 sec    INR 2.3    APTT   Result Value Ref Range    aPTT 26.3 24.5 - 35.1 sec   URINE DRUG SCREEN   Result Value Ref Range    Amphetamine Screen, Urine NOT DETECTED Negative <1000 ng/mL    Barbiturate Screen, Ur POSITIVE (A) Negative < 200 ng/mL    Benzodiazepine Screen, Urine NOT DETECTED Negative < 200 ng/mL    Cannabinoid Scrn, Ur NOT DETECTED Negative < 50ng/mL    Cocaine Metabolite Screen, Urine NOT DETECTED Negative < 300 ng/mL    Opiate Scrn, Ur NOT DETECTED Negative < 300ng/mL    PCP Screen, Urine NOT DETECTED Negative < 25 ng/mL    Methadone Screen, Urine NOT DETECTED Negative <300 ng/mL    Oxycodone Urine NOT DETECTED Negative <100 ng/mL    FENTANYL SCREEN, URINE POSITIVE (A) Negative <1 ng/mL    Drug Screen Comment: see below    Serum Drug Screen   Result Value Ref Range    Ethanol Lvl <10 mg/dL    Acetaminophen Level <5.0 (L) 10.0 - 30.6 mcg/mL    Salicylate, Serum 1.1 0.0 - 30.0 mg/dL    TCA Scrn NEGATIVE Cutoff:300 ng/mL   CK   Result Value Ref Range    Total  (H) 20 - 180 U/L   TSH without Reflex   Result Value Ref Range    TSH 2.190 0.270 - 4.200 uIU/mL   COVID-19   Result Value Ref Range    SARS-CoV-2, NAAT Not Detected Not Detected   Microscopic Urinalysis   Result Value Ref Range    Coarse Casts, UA 0-2 0 - 2 /LPF    WBC, UA NONE 0 - 5 /HPF    RBC, UA 2-5 0 - 2 /HPF    Epithelial Cells, UA FEW /HPF    Bacteria, UA MODERATE (A) None Seen /HPF    Amorphous, UA MODERATE    POC Pregnancy Urine   Result Value Ref Range    HCG, Urine, POC Negative Negative    Lot Number [id number]     Positive QC Pass/Fail Pass     Negative QC Pass/Fail Pass    POCT Glucose   Result Value Ref Range    Glucose 90 mg/dL    QC OK? y    POCT Glucose   Result Value Ref Range    Meter Glucose 53 (L) 74 - 99 mg/dL   POCT Glucose   Result Value Ref Range    Meter Glucose 90 74 - 99 mg/dL       Radiology  CT Head WO Contrast   Final Result   No acute intracranial abnormality. XR CHEST PORTABLE   Final Result   No evidence of active cardiopulmonary pathology. US DUP LOWER EXTREMITIES BILATERAL VENOUS   Final Result   No evidence of DVT in either lower extremity. EKG: This EKG is signed and interpreted by me. Rate: 95  Rhythm: Sinus  Interpretation: no acute changes, no ST elevation, low voltage EKG, no QT prolongation. Comparison: no previous EKG available      ------------------------- NURSING NOTES AND VITALS REVIEWED ---------------------------  Date / Time Roomed:  1/21/2021  3:55 PM  ED Bed Assignment:  08/08    The nursing notes within the ED encounter and vital signs as below have been reviewed. Patient Vitals for the past 24 hrs:   BP Temp Temp src Pulse Resp SpO2 Height Weight   01/21/21 2346 (!) 89/46 -- -- 86 16 100 % -- --   01/21/21 2218 (!) 80/49 -- -- 97 16 98 % -- --   01/21/21 1615 96/66 99.3 °F (37.4 °C) Oral 92 16 98 % 5' (1.524 m) 130 lb (59 kg)       Oxygen Saturation Interpretation: Normal      ------------------------------------------ PROGRESS NOTES ------------------------------------------  Re-evaluation(s):  Time: 8pm.  Patients symptoms are improving  Repeat physical examination is not changed    Time: 12AM.  Patient is stating that spasms have been improving.     I have spoken with the patient and discussed todays results, in addition to providing specific details for the plan of care and counseling regarding the diagnosis and prognosis. Their questions are answered at this time and they are agreeable with the plan. I have discussed the risks and benefits of transfer and they wish to proceed with the transfer. --------------------------------- ADDITIONAL PROVIDER NOTES ---------------------------------  Consultations:  Spoke with Celi Craig NP for Dr. Emre Perez (Medicine). Discussed case. They will admit this patient. Reason for transfer: [place] [address] has neurology on staff    This patient's ED course included: a personal history and physicial examination, re-evaluation prior to disposition, multiple bedside re-evaluations, IV medications, cardiac monitoring, continuous pulse oximetry and complex medical decision making and emergency management patient here received calcium repletion, magnesium repletion, banana bag, Rocephin as well as Keppra. This patient has remained hemodynamically stable, improved and been closely monitored during their ED course. Please note that the withdrawal or failure to initiate urgent interventions for this patient would likely result in a life threatening deterioration or permanent disability. Accordingly this patient received 30 minutes of critical care time, excluding separately billable procedures. Clinical Impression  1. Seizure-like activity ([address]    2. Hypomagnesemia    3. Hypocalcemia    4. Transaminitis    5. BEREKET (acute kidney injury) ([address]    6. Acute cystitis with hematuria          Disposition  Patient's disposition: Transfer to [place] [address]  Transferred by: ALS. Patient's condition is stable.          Shabana Still DO  Resident  01/22/21 0010

## 2021-01-21 NOTE — TELEPHONE ENCOUNTER
Patients father called in to reschedule her appt for today stating she is not feeling well today. I rescheduled her for 1/29/21 at 11:00am at  [facility]. He confirmed this appt.     Electronically signed by Sherley Esparza RN on 1/21/2021 at 11:33 AM

## 2021-01-22 ENCOUNTER — APPOINTMENT (OUTPATIENT)
Dept: NEUROLOGY | Age: 36
DRG: 469 | End: 2021-01-22
Attending: INTERNAL MEDICINE
Payer: COMMERCIAL

## 2021-01-22 ENCOUNTER — HOSPITAL ENCOUNTER (INPATIENT)
Age: 36
LOS: 7 days | Discharge: HOME | DRG: 469 | End: 2021-01-29
Attending: INTERNAL MEDICINE | Admitting: INTERNAL MEDICINE
Payer: COMMERCIAL

## 2021-01-22 ENCOUNTER — APPOINTMENT (OUTPATIENT)
Dept: MRI IMAGING | Age: 36
DRG: 469 | End: 2021-01-22
Attending: INTERNAL MEDICINE
Payer: COMMERCIAL

## 2021-01-22 ENCOUNTER — APPOINTMENT (OUTPATIENT)
Dept: ULTRASOUND IMAGING | Age: 36
DRG: 469 | End: 2021-01-22
Attending: INTERNAL MEDICINE
Payer: COMMERCIAL

## 2021-01-22 VITALS
TEMPERATURE: 99.5 F | BODY MASS INDEX: 25.52 KG/M2 | DIASTOLIC BLOOD PRESSURE: 61 MMHG | OXYGEN SATURATION: 98 % | HEART RATE: 84 BPM | WEIGHT: 130 LBS | RESPIRATION RATE: 14 BRPM | HEIGHT: 60 IN | SYSTOLIC BLOOD PRESSURE: 97 MMHG

## 2021-01-22 DIAGNOSIS — N17.9 ACUTE RENAL INJURY (HCC): ICD-10-CM

## 2021-01-22 DIAGNOSIS — N17.9 AKI (ACUTE KIDNEY INJURY) (HCC): Primary | ICD-10-CM

## 2021-01-22 PROBLEM — D3A.8 NEUROENDOCRINE TUMOR: Chronic | Status: ACTIVE | Noted: 2021-01-22

## 2021-01-22 PROBLEM — E83.42 HYPOMAGNESEMIA: Status: ACTIVE | Noted: 2021-01-22

## 2021-01-22 PROBLEM — Z86.69 HX OF MIGRAINE HEADACHES: Chronic | Status: ACTIVE | Noted: 2017-08-04

## 2021-01-22 PROBLEM — E20.9 HYPOPARATHYROIDISM (HCC): Chronic | Status: ACTIVE | Noted: 2021-01-22

## 2021-01-22 PROBLEM — Z72.0 TOBACCO ABUSE: Chronic | Status: ACTIVE | Noted: 2021-01-22

## 2021-01-22 PROBLEM — R10.84 GENERALIZED ABDOMINAL PAIN: Status: RESOLVED | Noted: 2017-10-03 | Resolved: 2021-01-22

## 2021-01-22 PROBLEM — C7B.8 METASTATIC MALIGNANT NEUROENDOCRINE TUMOR TO LIVER (HCC): Chronic | Status: ACTIVE | Noted: 2020-09-23

## 2021-01-22 PROBLEM — E83.51 HYPOCALCEMIA: Status: ACTIVE | Noted: 2021-01-22

## 2021-01-22 PROBLEM — R10.31 ABDOMINAL PAIN, RIGHT LOWER QUADRANT: Status: RESOLVED | Noted: 2018-06-05 | Resolved: 2021-01-22

## 2021-01-22 LAB
ALBUMIN SERPL-MCNC: 3.2 G/DL (ref 3.5–5.2)
ALP BLD-CCNC: 137 U/L (ref 35–104)
ALT SERPL-CCNC: 2537 U/L (ref 0–32)
AMMONIA: 40 UMOL/L (ref 11–51)
ANION GAP SERPL CALCULATED.3IONS-SCNC: 15 MMOL/L (ref 7–16)
AST SERPL-CCNC: >7000 U/L (ref 0–31)
BILIRUB SERPL-MCNC: 2.5 MG/DL (ref 0–1.2)
BUN BLDV-MCNC: 14 MG/DL (ref 6–20)
CALCIUM IONIZED: 0.9 MMOL/L (ref 1.15–1.33)
CALCIUM SERPL-MCNC: 6.4 MG/DL (ref 8.6–10.2)
CHLORIDE BLD-SCNC: 97 MMOL/L (ref 98–107)
CO2: 19 MMOL/L (ref 22–29)
CREAT SERPL-MCNC: 2.6 MG/DL (ref 0.5–1)
GFR AFRICAN AMERICAN: 25
GFR NON-AFRICAN AMERICAN: 21 ML/MIN/1.73
GLUCOSE BLD-MCNC: 120 MG/DL (ref 74–99)
MAGNESIUM: 2 MG/DL (ref 1.6–2.6)
POTASSIUM SERPL-SCNC: 3.6 MMOL/L (ref 3.5–5)
PROLACTIN: 19.21 NG/ML
SODIUM BLD-SCNC: 131 MMOL/L (ref 132–146)
TOTAL PROTEIN: 5.4 G/DL (ref 6.4–8.3)

## 2021-01-22 PROCEDURE — 70551 MRI BRAIN STEM W/O DYE: CPT

## 2021-01-22 PROCEDURE — 83735 ASSAY OF MAGNESIUM: CPT

## 2021-01-22 PROCEDURE — 2580000003 HC RX 258: Performed by: STUDENT IN AN ORGANIZED HEALTH CARE EDUCATION/TRAINING PROGRAM

## 2021-01-22 PROCEDURE — 6370000000 HC RX 637 (ALT 250 FOR IP): Performed by: INTERNAL MEDICINE

## 2021-01-22 PROCEDURE — 2580000003 HC RX 258: Performed by: CLINICAL NURSE SPECIALIST

## 2021-01-22 PROCEDURE — 2580000003 HC RX 258: Performed by: INTERNAL MEDICINE

## 2021-01-22 PROCEDURE — 2060000000 HC ICU INTERMEDIATE R&B

## 2021-01-22 PROCEDURE — 36415 COLL VENOUS BLD VENIPUNCTURE: CPT

## 2021-01-22 PROCEDURE — 76705 ECHO EXAM OF ABDOMEN: CPT

## 2021-01-22 PROCEDURE — 82140 ASSAY OF AMMONIA: CPT

## 2021-01-22 PROCEDURE — 93975 VASCULAR STUDY: CPT

## 2021-01-22 PROCEDURE — 82330 ASSAY OF CALCIUM: CPT

## 2021-01-22 PROCEDURE — 6360000002 HC RX W HCPCS: Performed by: STUDENT IN AN ORGANIZED HEALTH CARE EDUCATION/TRAINING PROGRAM

## 2021-01-22 PROCEDURE — 80053 COMPREHEN METABOLIC PANEL: CPT

## 2021-01-22 PROCEDURE — 99223 1ST HOSP IP/OBS HIGH 75: CPT | Performed by: PSYCHIATRY & NEUROLOGY

## 2021-01-22 PROCEDURE — 6360000002 HC RX W HCPCS: Performed by: CLINICAL NURSE SPECIALIST

## 2021-01-22 PROCEDURE — 6360000002 HC RX W HCPCS: Performed by: INTERNAL MEDICINE

## 2021-01-22 PROCEDURE — 95816 EEG AWAKE AND DROWSY: CPT

## 2021-01-22 RX ORDER — HEPARIN SODIUM (PORCINE) LOCK FLUSH IV SOLN 100 UNIT/ML 100 UNIT/ML
3 SOLUTION INTRAVENOUS PRN
Status: DISCONTINUED | OUTPATIENT
Start: 2021-01-22 | End: 2021-01-29 | Stop reason: HOSPADM

## 2021-01-22 RX ORDER — GABAPENTIN 300 MG/1
300 CAPSULE ORAL 3 TIMES DAILY
Status: DISCONTINUED | OUTPATIENT
Start: 2021-01-22 | End: 2021-01-29 | Stop reason: HOSPADM

## 2021-01-22 RX ORDER — SODIUM CHLORIDE 0.9 % (FLUSH) 0.9 %
10 SYRINGE (ML) INJECTION PRN
Status: DISCONTINUED | OUTPATIENT
Start: 2021-01-22 | End: 2021-01-29 | Stop reason: HOSPADM

## 2021-01-22 RX ORDER — LEVOTHYROXINE SODIUM 112 UG/1
112 TABLET ORAL DAILY
Status: DISCONTINUED | OUTPATIENT
Start: 2021-01-23 | End: 2021-01-29 | Stop reason: HOSPADM

## 2021-01-22 RX ORDER — ONDANSETRON 2 MG/ML
4 INJECTION INTRAMUSCULAR; INTRAVENOUS EVERY 6 HOURS PRN
Status: DISCONTINUED | OUTPATIENT
Start: 2021-01-22 | End: 2021-01-29 | Stop reason: HOSPADM

## 2021-01-22 RX ORDER — POTASSIUM CHLORIDE 20 MEQ/1
20 TABLET, EXTENDED RELEASE ORAL DAILY
Status: DISCONTINUED | OUTPATIENT
Start: 2021-01-23 | End: 2021-01-29 | Stop reason: HOSPADM

## 2021-01-22 RX ORDER — SODIUM CHLORIDE, SODIUM LACTATE, POTASSIUM CHLORIDE, AND CALCIUM CHLORIDE .6; .31; .03; .02 G/100ML; G/100ML; G/100ML; G/100ML
1000 INJECTION, SOLUTION INTRAVENOUS ONCE
Status: COMPLETED | OUTPATIENT
Start: 2021-01-22 | End: 2021-01-22

## 2021-01-22 RX ORDER — MAGNESIUM HYDROXIDE/ALUMINUM HYDROXICE/SIMETHICONE 120; 1200; 1200 MG/30ML; MG/30ML; MG/30ML
5 SUSPENSION ORAL EVERY 6 HOURS PRN
Status: DISCONTINUED | OUTPATIENT
Start: 2021-01-22 | End: 2021-01-29 | Stop reason: HOSPADM

## 2021-01-22 RX ORDER — POLYETHYLENE GLYCOL 3350 17 G/17G
17 POWDER, FOR SOLUTION ORAL DAILY PRN
Status: DISCONTINUED | OUTPATIENT
Start: 2021-01-22 | End: 2021-01-29 | Stop reason: HOSPADM

## 2021-01-22 RX ORDER — KETOROLAC TROMETHAMINE 30 MG/ML
15 INJECTION, SOLUTION INTRAMUSCULAR; INTRAVENOUS ONCE
Status: DISCONTINUED | OUTPATIENT
Start: 2021-01-22 | End: 2021-01-22

## 2021-01-22 RX ORDER — BUTALBITAL, ACETAMINOPHEN AND CAFFEINE 50; 325; 40 MG/1; MG/1; MG/1
1 TABLET ORAL DAILY PRN
Status: DISCONTINUED | OUTPATIENT
Start: 2021-01-22 | End: 2021-01-29 | Stop reason: HOSPADM

## 2021-01-22 RX ORDER — BUTALBITAL, ACETAMINOPHEN AND CAFFEINE 50; 325; 40 MG/1; MG/1; MG/1
1 TABLET ORAL ONCE
Status: DISCONTINUED | OUTPATIENT
Start: 2021-01-22 | End: 2021-01-22 | Stop reason: CLARIF

## 2021-01-22 RX ORDER — PROMETHAZINE HYDROCHLORIDE 25 MG/1
12.5 TABLET ORAL EVERY 6 HOURS PRN
Status: DISCONTINUED | OUTPATIENT
Start: 2021-01-22 | End: 2021-01-29 | Stop reason: HOSPADM

## 2021-01-22 RX ORDER — ACETAMINOPHEN 650 MG/1
650 SUPPOSITORY RECTAL EVERY 6 HOURS PRN
Status: DISCONTINUED | OUTPATIENT
Start: 2021-01-22 | End: 2021-01-22

## 2021-01-22 RX ORDER — TOPIRAMATE 25 MG/1
25 TABLET ORAL DAILY
Status: DISCONTINUED | OUTPATIENT
Start: 2021-01-23 | End: 2021-01-29 | Stop reason: HOSPADM

## 2021-01-22 RX ORDER — ACETAMINOPHEN 325 MG/1
650 TABLET ORAL EVERY 6 HOURS PRN
Status: DISCONTINUED | OUTPATIENT
Start: 2021-01-22 | End: 2021-01-22

## 2021-01-22 RX ORDER — SODIUM CHLORIDE 9 MG/ML
INJECTION, SOLUTION INTRAVENOUS CONTINUOUS
Status: DISCONTINUED | OUTPATIENT
Start: 2021-01-22 | End: 2021-01-22

## 2021-01-22 RX ORDER — SUMATRIPTAN 6 MG/.5ML
6 INJECTION, SOLUTION SUBCUTANEOUS ONCE
Status: DISCONTINUED | OUTPATIENT
Start: 2021-01-22 | End: 2021-01-22 | Stop reason: HOSPADM

## 2021-01-22 RX ORDER — ZOLPIDEM TARTRATE 5 MG/1
5 TABLET ORAL NIGHTLY PRN
Status: DISCONTINUED | OUTPATIENT
Start: 2021-01-22 | End: 2021-01-29 | Stop reason: HOSPADM

## 2021-01-22 RX ORDER — HEPARIN SODIUM (PORCINE) LOCK FLUSH IV SOLN 100 UNIT/ML 100 UNIT/ML
3 SOLUTION INTRAVENOUS EVERY 12 HOURS SCHEDULED
Status: DISCONTINUED | OUTPATIENT
Start: 2021-01-22 | End: 2021-01-29 | Stop reason: HOSPADM

## 2021-01-22 RX ORDER — SODIUM CHLORIDE 0.9 % (FLUSH) 0.9 %
10 SYRINGE (ML) INJECTION EVERY 12 HOURS SCHEDULED
Status: DISCONTINUED | OUTPATIENT
Start: 2021-01-22 | End: 2021-01-29 | Stop reason: HOSPADM

## 2021-01-22 RX ORDER — ZOLPIDEM TARTRATE 5 MG/1
5 TABLET ORAL NIGHTLY PRN
COMMUNITY
Start: 2021-01-22 | End: 2021-02-07

## 2021-01-22 RX ORDER — BUSPIRONE HYDROCHLORIDE 5 MG/1
7.5 TABLET ORAL 2 TIMES DAILY
Status: DISCONTINUED | OUTPATIENT
Start: 2021-01-22 | End: 2021-01-23

## 2021-01-22 RX ORDER — SODIUM CHLORIDE, SODIUM LACTATE, POTASSIUM CHLORIDE, CALCIUM CHLORIDE 600; 310; 30; 20 MG/100ML; MG/100ML; MG/100ML; MG/100ML
INJECTION, SOLUTION INTRAVENOUS CONTINUOUS
Status: DISCONTINUED | OUTPATIENT
Start: 2021-01-22 | End: 2021-01-26

## 2021-01-22 RX ORDER — BUTALBITAL, ACETAMINOPHEN AND CAFFEINE 300; 40; 50 MG/1; MG/1; MG/1
1 CAPSULE ORAL ONCE
Status: DISCONTINUED | OUTPATIENT
Start: 2021-01-22 | End: 2021-01-22

## 2021-01-22 RX ORDER — TIZANIDINE 4 MG/1
4 TABLET ORAL NIGHTLY
Status: DISCONTINUED | OUTPATIENT
Start: 2021-01-22 | End: 2021-01-23

## 2021-01-22 RX ORDER — LIDOCAINE HYDROCHLORIDE 10 MG/ML
5 INJECTION, SOLUTION EPIDURAL; INFILTRATION; INTRACAUDAL; PERINEURAL ONCE
Status: DISCONTINUED | OUTPATIENT
Start: 2021-01-22 | End: 2021-01-29 | Stop reason: HOSPADM

## 2021-01-22 RX ORDER — OYSTER SHELL CALCIUM WITH VITAMIN D 500; 200 MG/1; [IU]/1
1 TABLET, FILM COATED ORAL 2 TIMES DAILY WITH MEALS
Status: DISCONTINUED | OUTPATIENT
Start: 2021-01-22 | End: 2021-01-29 | Stop reason: HOSPADM

## 2021-01-22 RX ADMIN — BUSPIRONE HYDROCHLORIDE 7.5 MG: 5 TABLET ORAL at 10:42

## 2021-01-22 RX ADMIN — ENOXAPARIN SODIUM 40 MG: 40 INJECTION SUBCUTANEOUS at 10:41

## 2021-01-22 RX ADMIN — SODIUM CHLORIDE, POTASSIUM CHLORIDE, SODIUM LACTATE AND CALCIUM CHLORIDE 1000 ML: 600; 310; 30; 20 INJECTION, SOLUTION INTRAVENOUS at 10:43

## 2021-01-22 RX ADMIN — SODIUM CHLORIDE, POTASSIUM CHLORIDE, SODIUM LACTATE AND CALCIUM CHLORIDE: 600; 310; 30; 20 INJECTION, SOLUTION INTRAVENOUS at 14:08

## 2021-01-22 RX ADMIN — Medication 1 TABLET: at 16:49

## 2021-01-22 RX ADMIN — TIZANIDINE 4 MG: 4 TABLET ORAL at 21:04

## 2021-01-22 RX ADMIN — Medication 1 TABLET: at 10:43

## 2021-01-22 RX ADMIN — CEFTRIAXONE 1000 MG: 1 INJECTION, POWDER, FOR SOLUTION INTRAMUSCULAR; INTRAVENOUS at 00:50

## 2021-01-22 RX ADMIN — BUTALBITAL, ACETAMINOPHEN, AND CAFFEINE 1 TABLET: 50; 325; 40 TABLET ORAL at 14:19

## 2021-01-22 RX ADMIN — BUSPIRONE HYDROCHLORIDE 7.5 MG: 5 TABLET ORAL at 21:04

## 2021-01-22 RX ADMIN — GABAPENTIN 300 MG: 300 CAPSULE ORAL at 10:42

## 2021-01-22 RX ADMIN — Medication 10 ML: at 21:05

## 2021-01-22 RX ADMIN — CALCIUM GLUCONATE 2000 MG: 98 INJECTION, SOLUTION INTRAVENOUS at 14:11

## 2021-01-22 ASSESSMENT — PAIN SCALES - GENERAL
PAINLEVEL_OUTOF10: 10
PAINLEVEL_OUTOF10: 0

## 2021-01-22 NOTE — H&P
[address]                  [address] [id number],  [facility]                              HISTORY AND PHYSICAL    PATIENT NAME: Donal Rosas                    :        1985  MED REC NO:   [id number]                            ROOM:       [id number]  ACCOUNT NO:   [de-identified]                           ADMIT DATE: 2021  PROVIDER:     Hector Obrien DO    CHIEF COMPLAINT:  Seizure. HISTORY OF PRESENT ILLNESS:  The patient is a 44-year-old   female who presented to the emergency room after a seizure. She states  she was at home. She was walking in the hallway about 03:00 p.m. on  2021. She passed out and fell to the ground and had a shaking  activity. She states she has had three episodes similar to this in the  past.  She was scheduled to see [address] [place] [address] Neurology as an outpatient in  three days. The patient's tox screen was positive for butalbital which  is in her Fioricet and fentanyl which she states she took a dose of a  few days ago. She does see Palliative Care. The patient states she  also has noticed intermittent contractions of her hands and feet. The  patient is followed by Dr. Genny Bowers. PAST MEDICAL HISTORY:  Neuroendocrine tumor with mets to the liver,  hypothyroid, hypocalcemia, hypoparathyroidism, history of alcohol abuse,  migraine headache. PAST SURGICAL HISTORY:  Small intestine surgery, wisdom teeth  extraction, cholecystectomy, CT-guided needle biopsy of the liver,  parathyroid gland surgery, thyroidectomy. MEDICATIONS PRIOR TO ADMISSION:  Fioricet, Tylenol, Zofran, Mylanta,  Ambien, potassium chloride, BuSpar, multivitamin, Topamax, calcium with  vitamin D, Synthroid, gabapentin. PRIMARY CARE PROVIDER:  Nazanin Lira DO    SOCIAL HISTORY:  Quit alcohol. Positive smoker, half pack of cigarettes  a day. REVIEW OF SYSTEMS:  Remarkable for above-stated chief complaint plus  allergies none known. IMPRESSION:  Seizure-like activity, hypocalcemia, hypomagnesemia,  hypoparathyroidism, hypothyroidism, metastatic malignant neuroendocrine  tumor to the liver, tobacco abuse history of migraine headache, status  post thyroidectomy, parathyroid gland surgery. PLAN:  Admit. Serial calcium, magnesium. Neurology to see. Serial  lab. Renal to see regarding chronic hypocalcemia, hypoparathyroidism. Discharge plan home when stable.         Bethel Mendez DO    D: 01/22/2021 7:30:52       T: 01/22/2021 7:40:21     [email or web address]  Job#: [id number]     Doc#: [id number]    CC:

## 2021-01-22 NOTE — ED NOTES
Went to medicate patient for C/O migraine. Patient asleep at this time. Will reassess complaint when patient wakes up.      Michelle Velasco RN  01/22/21 6909

## 2021-01-22 NOTE — CARE COORDINATION
Transition of Care-Met with patient bedside, she lives with her parents in a two story home, her living area is on second floor, however family is in process of converting the first floor into a living space d/t disability. Patient has no DME, no history of HHC. She confirmed discharge plan is home with her family, mother Yennifer Anaya ([phone]) will transport her home or boyfriend Lavonia Kawasaki ([phone]). She does not think she will need HHC, however will reassess closer to discharge. PCP is Dr. Salma Belal, uses [facility] in [place] as pharmacy. She has a history of carcinoid tumor and ileum removal as well as neuroendocrine tumor in her liver, follows with Dr. Damon Service and Dr. Armando Espana outpatient. CM/SW following.   Marlyn GALVANN, RN  EDIE   [phone]

## 2021-01-22 NOTE — PROCEDURES
EEG Report  Louise Mnea is a 30 y.o. female      Appointment Date 1/22/2021   Appointment Time 3:30pm   Facility Location [facility] EEG Number [id number]   Type of Study Routine Floor 8502-A     Technical Specifications  Technician [facility] of consciousness awake   Sleep deprived? no   Hyperventilation tested? no   Photic stim tested? no   EEG recording Standard 10-20 electrode placement    Duration of recording 23mins   EEG complete? yes       Clinical History  Patient Active Problem List   Diagnosis    Primary insomnia    Fatty liver    H/O small bowel obstruction    Hypothyroidism    Depression    PTSD (post-traumatic stress disorder)    Liver masses    Migraines    Mesenteric mass    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 ([facility]    Malignant carcinoid tumor of ileum ([facility] [address] [facility]    New onset seizure ([facility] [facility]    Neuroendocrine tumor    Hypomagnesemia    Hypocalcemia    Hypoparathyroidism (HCC)    Tobacco abuse    BEREKET (acute kidney injury) ([facility] [address] [facility]    Elevated liver enzymes    Moderate protein-calorie malnutrition (HCC)         Medications    Current Facility-Administered Medications:     aluminum & magnesium hydroxide-simethicone (MAALOX) 200-200-20 MG/5ML suspension 5 mL, 5 mL, Oral, Q6H PRN, Walteralene Sealcharline Zuñigamer, DO    busPIRone (BUSPAR) tablet 7.5 mg, 7.5 mg, Oral, BID, Ermalene Seals Malmer, DO, 7.5 mg at 01/22/21 1042    [Held by provider] butalbital-acetaminophen-caffeine (FIORICET, ESGIC) per tablet 1 tablet, 1 tablet, Oral, Daily PRN, Walteralene Deanna Castellanos, DO, 1 tablet at 01/22/21 1419    calcium-vitamin D (OSCAL-500) 500-200 MG-UNIT per tablet 1 tablet, 1 tablet, Oral, BID WC, Ermalene Deanna Malmer, DO, 1 tablet at 01/22/21 1043    [Held by provider] gabapentin (NEURONTIN) capsule 300 mg, 300 mg, Oral, TID, Ermalene Deanna Castellanos, DO, 300 mg at 01/22/21 1042    [START ON 1/23/2021] levothyroxine (SYNTHROID) tablet 112 mcg, 112 mcg, Oral, Daily, Walteralene Deanna Castellanos, DO   [START ON 1/23/2021] potassium chloride (KLOR-CON M) extended release tablet 20 mEq, 20 mEq, Oral, Daily, Vassie Forts Malmer, DO    [START ON 1/23/2021] topiramate (TOPAMAX) tablet 25 mg, 25 mg, Oral, Daily, Vassie Forts Malmer, DO    zolpidem (AMBIEN) tablet 5 mg, 5 mg, Oral, Nightly PRN, Jennifersie Forts Malmer, DO    sodium chloride flush 0.9 % injection 10 mL, 10 mL, Intravenous, 2 times per day, Nicky Woods,     sodium chloride flush 0.9 % injection 10 mL, 10 mL, Intravenous, PRN, Vassie Forts Malmer, DO    enoxaparin (LOVENOX) injection 40 mg, 40 mg, Subcutaneous, Daily, Vassie Forts Malmer, DO, 40 mg at 01/22/21 1041    promethazine (PHENERGAN) tablet 12.5 mg, 12.5 mg, Oral, Q6H PRN **OR** ondansetron (ZOFRAN) injection 4 mg, 4 mg, Intravenous, Q6H PRN, Vassie Forts Malmer, DO    polyethylene glycol (GLYCOLAX) packet 17 g, 17 g, Oral, Daily PRN, Vassie Forts Malmer, DO    lactated ringers infusion, , Intravenous, Continuous, Tammy Zurita MD, Last Rate: 100 mL/hr at 01/22/21 1408, New Bag at 01/22/21 1408    lidocaine PF 1 % injection 5 mL, 5 mL, Intradermal, Once, Tammy Zurita MD    sodium chloride flush 0.9 % injection 10 mL, 10 mL, Intravenous, PRN, Tammy Zurita MD    heparin flush 100 UNIT/ML injection 300 Units, 3 mL, Intravenous, 2 times per day, Tammy Zurita MD    heparin flush 100 UNIT/ML injection 300 Units, 3 mL, Intracatheter, PRN, Tammy Zurita MD    tiZANidine (ZANAFLEX) tablet 4 mg, 4 mg, Oral, Nightly, Sanju Ulloa MD        Physician Interpretation    General EEG Report  There is a normal 8-9 Hz PDR    Type of EEG >>  Routine, normal              General Impression  This is a normal routine EEG. No epileptiform activity or lateralizing signs are seen.   Laird Peabody, MD Joeann Smack

## 2021-01-22 NOTE — CONSULTS
PLANNED TRANSITION TO OPEN performed by Sigrid Santamaria MD at [facility]          Social History:      Social History     Socioeconomic History    Marital status: Single     Spouse name: Not on file    Number of children: Not on file    Years of education: Not on file    Highest education level: Not on file   Occupational History    Occupation: cleaning     Employer: [employer] resource strain: Not on file    Food insecurity     Worry: Not on file     Inability: Not on file   [employer] needs     Medical: Not on file     Non-medical: Not on file   Tobacco Use    Smoking status: Current Every Day Smoker     Packs/day: 0.50    Smokeless tobacco: Never Used   Substance and Sexual Activity    Alcohol use: No     Alcohol/week: 0.0 standard drinks     Comment: 4 weeks sober post detox    Drug use: No    Sexual activity: Yes     Partners: Male     Birth control/protection: I.U.D.    Lifestyle    Physical activity     Days per week: Not on file     Minutes per session: Not on file    Stress: Not on file   Relationships    Social connections     Talks on phone: Not on file     Gets together: Not on file     Attends Restorationism service: Not on file     Active member of club or organization: Not on file     Attends meetings of clubs or organizations: Not on file     Relationship status: Not on file    Intimate partner violence     Fear of current or ex partner: Not on file     Emotionally abused: Not on file     Physically abused: Not on file     Forced sexual activity: Not on file   Other Topics Concern    Not on file   Social History Narrative    Not on file       Family History:     Family History   Problem Relation Age of Onset    High Blood Pressure Mother     High Cholesterol Mother     Other Mother         migraine headaches    Heart Disease Father     Asthma Father     High Blood Pressure Father     Cancer Father Sarcoidosis    Diabetes Other         GRANDMOTHER    Lung Cancer Other         GRANDFATHER    Alzheimer's Disease Other         GRANDMOTHER    Breast Cancer Maternal Grandmother     Cancer Maternal Grandmother         skin    Cancer Paternal Grandfather         lung       Current Medications:        Current Facility-Administered Medications:     aluminum & magnesium hydroxide-simethicone (MAALOX) 200-200-20 MG/5ML suspension 5 mL, 5 mL, Oral, Q6H PRN, Can Zuñigamer, DO    busPIRone (BUSPAR) tablet 7.5 mg, 7.5 mg, Oral, BID, Nicky Woods DO, 7.5 mg at 01/22/21 1042    butalbital-acetaminophen-caffeine (FIORICET, ESGIC) per tablet 1 tablet, 1 tablet, Oral, Daily PRN, Can Castellanos, DO    calcium-vitamin D (Jerrye Mao) 500-200 MG-UNIT per tablet 1 tablet, 1 tablet, Oral, BID WC, Can Castellanos DO, 1 tablet at 01/22/21 1043    gabapentin (NEURONTIN) capsule 300 mg, 300 mg, Oral, TID, Can Castellanos, DO, 300 mg at 01/22/21 1042    [START ON 1/23/2021] levothyroxine (SYNTHROID) tablet 112 mcg, 112 mcg, Oral, Daily, Can Castellanos, DO    [START ON 1/23/2021] potassium chloride (KLOR-CON M) extended release tablet 20 mEq, 20 mEq, Oral, Daily, Can Castellanos, DO    [START ON 1/23/2021] topiramate (TOPAMAX) tablet 25 mg, 25 mg, Oral, Daily, Can Morton Malmer, DO    zolpidem (AMBIEN) tablet 5 mg, 5 mg, Oral, Nightly PRN, Can Castellanos, DO    sodium chloride flush 0.9 % injection 10 mL, 10 mL, Intravenous, 2 times per day, Nicky Woods DO    sodium chloride flush 0.9 % injection 10 mL, 10 mL, Intravenous, PRN, Can Castellanos, DO    enoxaparin (LOVENOX) injection 40 mg, 40 mg, Subcutaneous, Daily, Can Castellanos, DO, 40 mg at 01/22/21 1041    promethazine (PHENERGAN) tablet 12.5 mg, 12.5 mg, Oral, Q6H PRN **OR** ondansetron (ZOFRAN) injection 4 mg, 4 mg, Intravenous, Q6H PRN, Vassie Forts DO Jasmin    polyethylene glycol (GLYCOLAX) packet 17 g, 17 g, Oral, Daily PRN, Vassie Forts DO Jasmin    lactated ringers bolus, 1,000 mL, Intravenous, Once, Beatriz Altamirano MD, Last Rate: 1,000 mL/hr at 01/22/21 1043, 1,000 mL at 01/22/21 1043    lactated ringers infusion, , Intravenous, Continuous, Beatriz Altamirano MD    lidocaine PF 1 % injection 5 mL, 5 mL, Intradermal, Once, Beatriz Altamirano MD    sodium chloride flush 0.9 % injection 10 mL, 10 mL, Intravenous, PRN, Beatriz Altamirano MD    heparin flush 100 UNIT/ML injection 300 Units, 3 mL, Intravenous, 2 times per day, Beatriz Altamirano MD    heparin flush 100 UNIT/ML injection 300 Units, 3 mL, Intracatheter, PRN, Beatriz Altamirano MD    calcium gluconate 2,000 mg in dextrose 5 % 100 mL IVPB, 2,000 mg, Intravenous, Once, Chares Given, APRN - CNS    Allergies:  Patient has no known allergies. Review of Systems:   Pertinent positives stated above in HPI. All other systems were reviewed and were negative. Physical exam:   No intake/output data recorded.      Constitutional:  /64   Pulse 88   Temp 97.5 °F (36.4 °C) (Temporal)   Resp 16   Ht 5' (1.524 m)   Wt 130 lb (59 kg)   SpO2 96%   BMI 25.39 kg/m²   Gen: alert, awake, nad  Skin: no rash on exposed skin, turgor wnl  Neck: no jvd noted  Cardiovascular:  RRR  Respiratory: Lung sounds clear   Abdomen:  +bs, soft, nondistended, RLQ tenderness  Ext: neg lower extremity edema  Psychiatric: mood and affect appropriate    Data:   CBC with Differential:    Lab Results   Component Value Date    WBC 11.4 01/21/2021    RBC 4.37 01/21/2021    HGB 14.0 01/21/2021    HCT 42.0 01/21/2021     01/21/2021    MCV 96.1 01/21/2021    MCH 32.0 01/21/2021    MCHC 33.3 01/21/2021    RDW 17.4 01/21/2021    LYMPHOPCT 9.5 01/21/2021    MONOPCT 3.6 01/21/2021    BASOPCT 0.5 01/21/2021    MONOSABS 0.41 01/21/2021    LYMPHSABS 1.08 01/21/2021    EOSABS 0.04 01/21/2021    BASOSABS 0.06 01/21/2021     CMP:    Lab Results   Component Value Date     01/22/2021    K 3.6 01/22/2021    K 3.9 01/21/2021    CL 97 01/22/2021    CO2 19 01/22/2021    BUN 14 01/22/2021    CREATININE 2.6 01/22/2021    GFRAA 25 01/22/2021    LABGLOM 21 01/22/2021    GLUCOSE 120 01/22/2021    PROT 5.4 01/22/2021    LABALBU 3.2 01/22/2021    CALCIUM 6.4 01/22/2021    BILITOT 2.5 01/22/2021    ALKPHOS 137 01/22/2021    AST >7,000 01/22/2021    ALT 2,537 01/22/2021     Calcium:    Lab Results   Component Value Date    CALCIUM 6.4 01/22/2021     Ionized Calcium:  No results found for: IONCA  Magnesium:    Lab Results   Component Value Date    MG 2.0 01/22/2021     Phosphorus:    Lab Results   Component Value Date    PHOS 2.6 09/03/2020     U/A:    Lab Results   Component Value Date    NITRITE neg 08/20/2020    COLORU DARK YELLOW 01/21/2021    PROTEINU >=300 01/21/2021    PHUR 6.5 01/21/2021    WBCUA NONE 01/21/2021    RBCUA 2-5 01/21/2021    MUCUS Present 09/26/2017    BACTERIA MODERATE 01/21/2021    CLARITYU SL CLOUDY 01/21/2021    SPECGRAV >=1.030 01/21/2021    LEUKOCYTESUR Negative 01/21/2021    UROBILINOGEN 1.0 01/21/2021    BILIRUBINUR LARGE 01/21/2021    BILIRUBINUR neg 08/20/2020    BLOODU LARGE 01/21/2021    GLUCOSEU 250 01/21/2021    AMORPHOUS MODERATE 01/21/2021     VITAMIN B12: No components found for: B12  FOLATE:  No results found for: FOLATE  IRON:  No results found for: IRON  Iron Saturation:  No components found for: PERCENTFE  TIBC:  No results found for: TIBC  FERRITIN:  No results found for: FERRITIN  AMYLASE:    Lab Results   Component Value Date    AMYLASE 38 03/13/2018     LIPASE:    Lab Results   Component Value Date    LIPASE 127 01/21/2021       Assessment/Plan    1. BEREKET in the setting of nausea, vomiting decrease intake with oliguria  Baseline cr <1.0  Cr 1.8->2.6  U/A shows mod bacteria, large amount of blood  Check Urine C&S, electrolytes, creatinine  For US abdomen today  Continue IVF  Strict I&O  Monitor daily BMP      2.  Hypocalcemia d/t hypoparathyroidism  Hx parathyroidectomy with chronic

## 2021-01-22 NOTE — CONSULTS
Hepatobiliary and pancreatic surgery  CONSULT NOTE  1/22/2021    Physician Consulted: Dr. Corey Griffin  Reason for Consult: Altered mental status, elevated liver enzymes  Referring Physician: Dr. Dariana ANDRADE  Maryuri Plaza is a 30 y.o. female who presents for evaluation of altered mental status. Patient had a generalized tonic-clonic seizure 1/21/2021 and was brought to the hospital by her father. This is her third seizure over the past year. In the week preceding this epileptic event, patient was having nausea vomiting and decreased appetite over the course of the week. Patient also reports some lower abdominal pain for the past week. Patient states that she has not urinated in the past 2 days -it does look like a UA has been obtained -patient states that they were able to get some urine out at [place] while attempting to straight cath but it was a small amount. 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 Patient also noted to have multiple metastases in her liver. Patient has been following Dr. Corey Griffin -she had an appointment yesterday in the office. Patient currently follows with Dr. Halie Alvarado and is receiving injections. Plans for radiation eventually but nothing scheduled. Liver enzymes acutely elevated with an AST over 7000 and an ALT around 3700. Bilirubin also elevated with a total bili of 2.4 and a direct component of 1.6. She also has an BEREKET with a creatinine of 1.8. No white blood cell count.       Past Medical History:   Diagnosis Date    Abdominal pain     [facility]'s disease ([facility] [address]     Cancer ([facility] [address]     Hypocalcemia     Hypothyroidism     Irritable bowel syndrome     Migraines     Status post alcohol detoxification     5/22/2018-5/29/2018       Past Surgical History:   Procedure Laterality Date    CHOLECYSTECTOMY, LAPAROSCOPIC  07/06/2016    COLONOSCOPY N/A 6/22/2018 COLONOSCOPY WITH BIOPSY performed by Shanta Velásquez MD at [address] NEEDLE BIOPSY LIVER PERCUTANEOUS  9/1/2020    CT NEEDLE BIOPSY LIVER PERCUTANEOUS 9/1/2020 [facility] CT    PARATHYROID GLAND SURGERY      SMALL INTESTINE SURGERY N/A 10/21/2020    LAPAROSCOPIC ROBOTIC SMALL BOWEL RESECTION WITH PLANNED TRANSITION TO OPEN performed by Oleg Blackmon MD at [facility]         Medications Prior to Admission:    Prior to Admission medications    Medication Sig Start Date End Date Taking? Authorizing Provider   zolpidem (AMBIEN) 5 MG tablet Take 5 mg by mouth nightly as needed for Sleep (Patient States she takes Ambien but unsure of dosage). 1/22/21  Yes Historical Provider, MD   gabapentin (NEURONTIN) 300 MG capsule Take 1 capsule by mouth 3 times daily for 30 days.  qHS x 3 days then BID x 3 days then TID thereafter (increase as tolerated) 1/11/21 2/10/21 Yes Yunior Nelson,    potassium chloride (KLOR-CON M) 20 MEQ extended release tablet Take 1 tablet by mouth daily 1/8/21  Yes Moraima Cruz MD   busPIRone (BUSPAR) 7.5 MG tablet take 1 tablet by mouth twice a day 1/5/21  Yes Yunior Nelson,    dicyclomine (BENTYL) 20 MG tablet take 1 tablet by mouth twice a day 10/5/20  Yes Historical Provider, MD   Multiple Vitamins-Minerals (THERAPEUTIC MULTIVITAMIN-MINERALS) tablet Take 1 tablet by mouth daily   Yes Historical Provider, MD   topiramate (TOPAMAX SPRINKLE) 25 MG capsule Take 25 mg by mouth daily   Yes Historical Provider, MD   Calcium Carb-Cholecalciferol (CALCIUM-VITAMIN D) 500-200 MG-UNIT per tablet Take 1 tablet by mouth 2 times daily (with meals)   Yes Historical Provider, MD   levothyroxine (SYNTHROID) 112 MCG tablet Take 112 mcg by mouth Daily   Yes Historical Provider, MD   butalbital-acetaminophen-caffeine (FIORICET, ESGIC) -40 MG per tablet take 1 tablet by mouth once daily if needed for headache 1/5/21   Vida Ibrahim DO acetaminophen (AMINOFEN) 325 MG tablet Take 2 tablets by mouth every 6 hours as needed for Pain 10/25/20   Demarco Collins, DO   ondansetron (ZOFRAN-ODT) 4 MG disintegrating tablet Take 1 tablet by mouth 3 times daily as needed for Nausea or Vomiting 9/22/20   Julien Yanez, DO   aluminum & magnesium hydroxide-simethicone (MYLANTA) 200-200-20 MG/5ML SUSP suspension Take 5 mLs by mouth every 6 hours as needed for Indigestion     Historical Provider, MD Mel[place] Carb-Mag Hydrox-Simeth 800-270-80 MG/10ML SUSP Take 20 mLs by mouth every 8 hours    Historical Provider, MD   sucralfate (CARAFATE) 1 GM/10ML suspension Take 10 mLs by mouth 4 times daily 8/20/20   Bambi Shearer, DO       No Known Allergies    Family History   Problem Relation Age of Onset    High Blood Pressure Mother     High Cholesterol Mother     Other Mother         migraine headaches    Heart Disease Father     Asthma Father     High Blood Pressure Father     Cancer Father         Sarcoidosis    Diabetes Other         GRANDMOTHER    Lung Cancer Other         GRANDFATHER    Alzheimer's Disease Other         GRANDMOTHER    Breast Cancer Maternal Grandmother     Cancer Maternal Grandmother         skin    Cancer Paternal Grandfather         lung       Social History     Tobacco Use    Smoking status: Current Every Day Smoker     Packs/day: 0.50    Smokeless tobacco: Never Used   Substance Use Topics    Alcohol use: No     Alcohol/week: 0.0 standard drinks     Comment: 4 weeks sober post detox    Drug use: No         Review of Systems   General ROS: positive for  - malaise  Hematological and Lymphatic ROS: negative  Respiratory ROS: no cough, shortness of breath, or wheezing  Cardiovascular ROS: no chest pain or dyspnea on exertion  Gastrointestinal ROS: Abdominal pain, nausea, vomiting  Neuro: Generalized seizures  Genito-Urinary ROS: Urinary retention  Musculoskeletal ROS: Muscle soreness and contraction      PHYSICAL EXAM:    Vitals: 01/22/21 0430   BP: 116/71   Pulse: 96   Resp: 16   Temp: 97.4 °F (36.3 °C)   SpO2: 98%       General Appearance:  awake, alert, oriented, in no acute distress  Skin:  Skin color, texture, turgor normal. No rashes or lesions. Head/face:  NCAT  Eyes:  No gross abnormalities. Lungs:  Breathing Pattern: regular, no distress  Heart:  Heart regular rate and rhythm  Abdomen: Soft, mild tenderness to palpation in the lower right quadrant, mild distention, no rigidity rebound or guarding, incisions clean   Extremities: Soreness in her muscles throughout her body most likely from seizures, hand contractures    LABS:    CBC  Recent Labs     01/21/21  2105   WBC 11.4   HGB 14.0   HCT 42.0        BMP  Recent Labs     01/21/21  2105      K 3.9   CL 99   CO2 23   BUN 14   CREATININE 1.8*   CALCIUM 7.0*     Liver Function  Recent Labs     01/21/21  2105   LIPASE 127*   BILITOT 2.4*   BILIDIR 1.6*   AST >7,000*   ALT 3,706*   ALKPHOS 163*   PROT 6.3*   LABALBU 3.7     No results for input(s): LACTATE in the last 72 hours. Recent Labs     01/21/21  1830   INR 2.3       RADIOLOGY    No results found. ASSESSMENT:  30 y.o. female with elevated liver enzymes in the setting of altered mental status and seizures. 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     PLAN:    Stat duplex ultrasound of the liver  IV fluids  GI consult to Dr. Carolyn Cano to monitor hepatic function panel  Checking an ammonia level this morning  Patient reports trouble urinating -insert Ma catheter  Dr. Janette Bertrand of nephrology consulted for acute kidney injury    Electronically signed by Raisa Sosa MD on 1/22/21 at 8:28 AM EST

## 2021-01-22 NOTE — CONSULTS
I have seen and examined the patient with the  Resident Physician on 1/22/2021 and the clinical findings and decision making for this patient were discussed in detail. I have reviewed the documentation included and I agree with plan of care including the workup, evaluation, management, and diagnosis as detailed in the Consultation note. Abisai Vivas is a 30 y.o. 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 Patient with family h/o seizures and presented with seizure activity at home with report of generalized tonic-clonic seizure. Patient reporting 3 seizures in the last year with similar pattern of activity. No report of any specific provocative event or factors. Urine tox treatment presentation was positive for fentanyl and barbiturates with patient reporting taking fentanyl for headaches. No further seizure activity since admission. Her work-up so far has been notable for elevated liver enzymes with AST greater than 1000 and ALT greater than 3700. First seizure was in December with 2nd seizure a few hours later. Now with 3rd seizure that prior to admission. Currently on low dose Topamax at 25mg qhs only for migraines. Now with new spasms in hands that started on day of admission wit continued spasms today making it difficult to use her hands.         Impression:  1. Seizure Disorder  2. Metastatic Neuroendocrine tumor with liver mets. 3.  Diffuse Myalgia  4. Migraine headaches: no headaches currently.         Recommendations:                                            1. Keppra for seizure control with loading dose given in the ED and maintenance dosing of .500 mg BID. 2. MRI w and w/o contrast in patient with h/o metastatic neuroendocrine tumor. 3. EEG for evaluation of seizure foci. 4. Topamax titration with increase by dosing by 25 mg per dose/wk for target of 100 mg BID as tolerated.    5. Zanaflex 4 mg qhs for spasms in hands.

## 2021-01-22 NOTE — CONSULTS
Brian Pederson is a 30 y.o. right handed female    Neurology was consulted for seizure    History of Present Illness:     Ms. 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 She stated that she was having migraine headache for the past two days and yesterday morning she noticed contraction of her thumb bilaterally along with pain. Later that day she felt nauseous, through up multiple times and while she was going down the stairs, she had seizure, and she fell down the stairs. The episode was witnessed by her mother. She was confused on waking up. She does not remember how long the episode lasted. She endorses feeling nauseous in the post ictal state. She had first episode of seizure on 12/16/2020. She had tonic clonic seizure, witnessed by her mother and boyfriend and she had two episodes in the same night. She did not seek any medical advice at that time due to covid and her being immunocompromised secondary to metastatic neuroendocrine tumor. She denies any light headedness, urinary/fecal incontinence. She reports biting her lips along with soreness and pain all over her body, mostly in the hands and bilateral feet and ankle, low back. In the ED, CT head was unremarkable. She had total of three episodes of seizure in the last one month. Urine toxicology was positive for barbiturates and fentanyl. She reports taking fentanyl few days ago. She has family history of absence seizure of her father and late son. Neurology was consulted for further evaluation of seizure.     Past Medical History:     Past Medical History:   Diagnosis Date    Abdominal pain     [facility] ([facility] [address]     Cancer ([facility] [address]     Hypocalcemia     Hypothyroidism     Irritable bowel syndrome     Migraines     Status post alcohol detoxification     5/22/2018-5/29/2018       Past Surgical History:     Past Surgical History:   Procedure Laterality Date    CHOLECYSTECTOMY, LAPAROSCOPIC  07/06/2016    COLONOSCOPY N/A 6/22/2018    COLONOSCOPY WITH BIOPSY performed by Олег Oliveira MD at [facility] [address] NEEDLE BIOPSY LIVER PERCUTANEOUS  9/1/2020    CT NEEDLE BIOPSY LIVER PERCUTANEOUS 9/1/2020 [facility] CT    PARATHYROID GLAND SURGERY      SMALL INTESTINE SURGERY N/A 10/21/2020    LAPAROSCOPIC ROBOTIC SMALL BOWEL RESECTION WITH PLANNED TRANSITION TO OPEN performed by Mihir Monroy MD at [facility]         Allergies:     Patient has no known allergies. Medications:     Prior to Admission medications    Medication Sig Start Date End Date Taking? Authorizing Provider   zolpidem (AMBIEN) 5 MG tablet Take 5 mg by mouth nightly as needed for Sleep (Patient States she takes Ambien but unsure of dosage). 1/22/21  Yes Historical Provider, MD   gabapentin (NEURONTIN) 300 MG capsule Take 1 capsule by mouth 3 times daily for 30 days.  qHS x 3 days then BID x 3 days then TID thereafter (increase as tolerated) 1/11/21 2/10/21 Yes David Mask, DO   potassium chloride (KLOR-CON M) 20 MEQ extended release tablet Take 1 tablet by mouth daily 1/8/21  Yes Alfie Valentin MD   busPIRone (BUSPAR) 7.5 MG tablet take 1 tablet by mouth twice a day 1/5/21  Yes David Mask, DO   dicyclomine (BENTYL) 20 MG tablet take 1 tablet by mouth twice a day 10/5/20  Yes Historical Provider, MD   Multiple Vitamins-Minerals (THERAPEUTIC MULTIVITAMIN-MINERALS) tablet Take 1 tablet by mouth daily   Yes Historical Provider, MD   topiramate (TOPAMAX SPRINKLE) 25 MG capsule Take 25 mg by mouth daily   Yes Historical Provider, MD   Calcium Carb-Cholecalciferol (CALCIUM-VITAMIN D) 500-200 MG-UNIT per tablet Take 1 tablet by mouth 2 times daily (with meals)   Yes Historical Provider, MD 16   Ht 5' (1.524 m)   Wt 130 lb (59 kg)   SpO2 98%   BMI 25.39 kg/m²     General appearance: alert, appears stated age, cooperative and no distress  Head: normocephalic, without obvious abnormality, atraumatic  Eyes: conjunctivae/corneas clear.  .  Neck: no adenopathy, no carotid bruit, supple, symmetrical, trachea midline and thyroid not enlarged, symmetric, no tenderness/mass/nodules  Lungs: clear to auscultation bilaterally  Heart: regular rate and rhythm, S1, S2 normal, no murmur, click, rub or gallop  Extremities: normal, atraumatic, no cyanosis or edema  Pulses: 2+ and symmetric  Skin: color, texture, turgor normal---no rashes or lesions    Mental Status: alert, oriented, thought content appropriate    Appropriate attention/concentration  Intact fundus of knowledge  Repetition intact  Memories intact    Speech: no dysarthria  Language: no aphasias    Cranial Nerves:  I: smell    II: visual acuity     II: visual fields Full    II: pupils VITOR   III,VII: ptosis None   III,IV,VI: extraocular muscles  EOMI without nystagmus    V: mastication Normal   V: facial light touch sensation  Normal   V,VII: corneal reflex  Present   VII: facial muscle function - upper  Normal   VII: facial muscle function - lower Normal   VIII: hearing Normal   IX: soft palate elevation  Normal   IX,X: gag reflex Present   XI: trapezius strength  5/5   XI: sternocleidomastoid strength 5/5   XI: neck extension strength  5/5   XII: tongue strength  Normal     Motor:  Right   5/5              Left   5/5               Right Bicep  5/5           Left Bicep  5/5              Right Triceps   5/5       Left Triceps  5/5          Right Deltoid  5/5     Left Deltoid  5/5         Right IPS  5/5            Left IPS  5/5               Right Quadriceps  5/5          Left Quadriceps    5/5           Right Gastrocnemius    5/5    Left Gastrocnemius   5/5  Right Ant Tibialis   5/5  Left Ant Tibialis   5/5   5/5 throughout  Normal bulk and tone No drift  No abnormal movements    Sensory:  LT and PP normal  Vibration normal     Coordination:   FN, FFM and ERNST normal  HS normal    Gait:  normal    DTR:   Right Brachioradialis reflex 2+  Left Brachioradialis reflex 2+  Right Biceps reflex 2+  Left Biceps reflex 2+  Right Triceps reflex 2+  Left Triceps reflex 2+  Right Quadriceps reflex 2+  Left Quadriceps reflex 2+  Right Achilles reflex 2+  Left Achilles reflex 2+    No Babinskis  No Ferguson's     No other pathological reflexes    Laboratory/Radiology:     CBC:   Lab Results   Component Value Date    WBC 11.4 01/21/2021    RBC 4.37 01/21/2021    HGB 14.0 01/21/2021    HCT 42.0 01/21/2021    MCV 96.1 01/21/2021    MCH 32.0 01/21/2021    MCHC 33.3 01/21/2021    RDW 17.4 01/21/2021     01/21/2021    MPV 9.9 01/21/2021     CMP:    Lab Results   Component Value Date     01/22/2021    K 3.6 01/22/2021    K 3.9 01/21/2021    CL 97 01/22/2021    CO2 19 01/22/2021    BUN 14 01/22/2021    CREATININE 2.6 01/22/2021    GFRAA 25 01/22/2021    LABGLOM 21 01/22/2021    GLUCOSE 120 01/22/2021    PROT 5.4 01/22/2021    LABALBU 3.2 01/22/2021    CALCIUM 6.4 01/22/2021    BILITOT 2.5 01/22/2021    ALKPHOS 137 01/22/2021    AST >7,000 01/22/2021    ALT 2,537 01/22/2021       CT head:  No acute intracranial abnormality. I independently reviewed the labs and imaging studies today    Assessment:     1. Tonic clonic seizure likely due to electrolyte imbalance (hypomagnesemia, hypocalcemia) v new onset seizure/migraine headache/ brain metastasis in the setting of metastatic neuroendocrine tumor/ acute hepatic failure; has family history of absent seizure  2.  Migraine headache; on topiramate      Plan:     · Will check EEG  · Will check MRI brain to rule out brain metastasis  · Continue keppra 500mg twice daily for seizure prophylaxis  · Zanaflex 4 mg nightly for muscle spasm  · Continue topiramate 25 mg daily for migraine headache; target increase of 25 mg per week upto 100mg twice daily as tolerated   · Replace electrolytes as needed  · Fall/aspiration precautions  · Seizure precautions    Nicky Carvajal MD  8:54 AM  1/22/2021

## 2021-01-22 NOTE — PROGRESS NOTES
Perfect Serve call sent to Dr. Toby Beth [facility] [place] service requesting admission orders

## 2021-01-23 PROBLEM — R74.8 ELEVATED LIVER ENZYMES: Status: ACTIVE | Noted: 2021-01-23

## 2021-01-23 PROBLEM — N17.9 AKI (ACUTE KIDNEY INJURY) (HCC): Status: ACTIVE | Noted: 2021-01-23

## 2021-01-23 LAB
ALBUMIN SERPL-MCNC: 2.9 G/DL (ref 3.5–5.2)
ALP BLD-CCNC: 128 U/L (ref 35–104)
ALT SERPL-CCNC: 1498 U/L (ref 0–32)
ANION GAP SERPL CALCULATED.3IONS-SCNC: 17 MMOL/L (ref 7–16)
AST SERPL-CCNC: 3661 U/L (ref 0–31)
BILIRUB SERPL-MCNC: 3.2 MG/DL (ref 0–1.2)
BILIRUBIN DIRECT: 2.5 MG/DL (ref 0–0.3)
BILIRUBIN, INDIRECT: 0.7 MG/DL (ref 0–1)
BUN BLDV-MCNC: 24 MG/DL (ref 6–20)
CALCIUM IONIZED: 1.06 MMOL/L (ref 1.15–1.33)
CALCIUM SERPL-MCNC: 7.3 MG/DL (ref 8.6–10.2)
CHLORIDE BLD-SCNC: 96 MMOL/L (ref 98–107)
CHLORIDE URINE RANDOM: 44 MMOL/L
CO2: 19 MMOL/L (ref 22–29)
CREAT SERPL-MCNC: 4.3 MG/DL (ref 0.5–1)
CREATININE URINE: 20 MG/DL (ref 29–226)
EKG ATRIAL RATE: 95 BPM
EKG P AXIS: 51 DEGREES
EKG P-R INTERVAL: 124 MS
EKG Q-T INTERVAL: 386 MS
EKG QRS DURATION: 64 MS
EKG QTC CALCULATION (BAZETT): 485 MS
EKG R AXIS: 54 DEGREES
EKG T AXIS: 58 DEGREES
EKG VENTRICULAR RATE: 95 BPM
GFR AFRICAN AMERICAN: 14
GFR NON-AFRICAN AMERICAN: 12 ML/MIN/1.73
GLUCOSE BLD-MCNC: 80 MG/DL (ref 74–99)
INR BLD: 1.8
PARATHYROID HORMONE INTACT: 37 PG/ML (ref 15–65)
POTASSIUM SERPL-SCNC: 3.4 MMOL/L (ref 3.5–5)
POTASSIUM, UR: 7.6 MMOL/L
PROTHROMBIN TIME: 20 SEC (ref 9.3–12.4)
SODIUM BLD-SCNC: 132 MMOL/L (ref 132–146)
SODIUM URINE: 53 MMOL/L
SODIUM URINE: 54 MMOL/L
TOTAL CK: 196 U/L (ref 20–180)
TOTAL PROTEIN: 4.6 G/DL (ref 6.4–8.3)
VITAMIN D 25-HYDROXY: 8 NG/ML (ref 30–100)

## 2021-01-23 PROCEDURE — 82550 ASSAY OF CK (CPK): CPT

## 2021-01-23 PROCEDURE — 80074 ACUTE HEPATITIS PANEL: CPT

## 2021-01-23 PROCEDURE — 6360000002 HC RX W HCPCS: Performed by: INTERNAL MEDICINE

## 2021-01-23 PROCEDURE — 2580000003 HC RX 258: Performed by: STUDENT IN AN ORGANIZED HEALTH CARE EDUCATION/TRAINING PROGRAM

## 2021-01-23 PROCEDURE — 6370000000 HC RX 637 (ALT 250 FOR IP): Performed by: INTERNAL MEDICINE

## 2021-01-23 PROCEDURE — 99232 SBSQ HOSP IP/OBS MODERATE 35: CPT | Performed by: SURGERY

## 2021-01-23 PROCEDURE — 2060000000 HC ICU INTERMEDIATE R&B

## 2021-01-23 PROCEDURE — 82570 ASSAY OF URINE CREATININE: CPT

## 2021-01-23 PROCEDURE — 6370000000 HC RX 637 (ALT 250 FOR IP): Performed by: NURSE PRACTITIONER

## 2021-01-23 PROCEDURE — 99233 SBSQ HOSP IP/OBS HIGH 50: CPT | Performed by: NURSE PRACTITIONER

## 2021-01-23 PROCEDURE — 2580000003 HC RX 258: Performed by: NURSE PRACTITIONER

## 2021-01-23 PROCEDURE — 80048 BASIC METABOLIC PNL TOTAL CA: CPT

## 2021-01-23 PROCEDURE — 82306 VITAMIN D 25 HYDROXY: CPT

## 2021-01-23 PROCEDURE — 82330 ASSAY OF CALCIUM: CPT

## 2021-01-23 PROCEDURE — 36415 COLL VENOUS BLD VENIPUNCTURE: CPT

## 2021-01-23 PROCEDURE — 6370000000 HC RX 637 (ALT 250 FOR IP): Performed by: STUDENT IN AN ORGANIZED HEALTH CARE EDUCATION/TRAINING PROGRAM

## 2021-01-23 PROCEDURE — 2580000003 HC RX 258: Performed by: INTERNAL MEDICINE

## 2021-01-23 PROCEDURE — 6360000002 HC RX W HCPCS: Performed by: NURSE PRACTITIONER

## 2021-01-23 PROCEDURE — 84133 ASSAY OF URINE POTASSIUM: CPT

## 2021-01-23 PROCEDURE — 80076 HEPATIC FUNCTION PANEL: CPT

## 2021-01-23 PROCEDURE — 93010 ELECTROCARDIOGRAM REPORT: CPT | Performed by: INTERNAL MEDICINE

## 2021-01-23 PROCEDURE — 85610 PROTHROMBIN TIME: CPT

## 2021-01-23 PROCEDURE — 84300 ASSAY OF URINE SODIUM: CPT

## 2021-01-23 PROCEDURE — 82436 ASSAY OF URINE CHLORIDE: CPT

## 2021-01-23 PROCEDURE — 83970 ASSAY OF PARATHORMONE: CPT

## 2021-01-23 RX ORDER — METHOCARBAMOL 500 MG/1
1000 TABLET, FILM COATED ORAL 4 TIMES DAILY
Status: DISCONTINUED | OUTPATIENT
Start: 2021-01-23 | End: 2021-01-29 | Stop reason: HOSPADM

## 2021-01-23 RX ORDER — POTASSIUM CHLORIDE 20 MEQ/1
20 TABLET, EXTENDED RELEASE ORAL ONCE
Status: COMPLETED | OUTPATIENT
Start: 2021-01-23 | End: 2021-01-23

## 2021-01-23 RX ORDER — ERGOCALCIFEROL 1.25 MG/1
50000 CAPSULE ORAL WEEKLY
Status: DISCONTINUED | OUTPATIENT
Start: 2021-01-23 | End: 2021-01-29 | Stop reason: HOSPADM

## 2021-01-23 RX ADMIN — ZOLPIDEM TARTRATE 5 MG: 5 TABLET ORAL at 05:27

## 2021-01-23 RX ADMIN — SODIUM CHLORIDE, POTASSIUM CHLORIDE, SODIUM LACTATE AND CALCIUM CHLORIDE: 600; 310; 30; 20 INJECTION, SOLUTION INTRAVENOUS at 11:59

## 2021-01-23 RX ADMIN — ZOLPIDEM TARTRATE 5 MG: 5 TABLET ORAL at 21:36

## 2021-01-23 RX ADMIN — ERGOCALCIFEROL 50000 UNITS: 1.25 CAPSULE, LIQUID FILLED ORAL at 17:54

## 2021-01-23 RX ADMIN — LEVOTHYROXINE SODIUM 112 MCG: 0.11 TABLET ORAL at 10:08

## 2021-01-23 RX ADMIN — CALCIUM GLUCONATE 2000 MG: 98 INJECTION, SOLUTION INTRAVENOUS at 17:55

## 2021-01-23 RX ADMIN — BUSPIRONE HYDROCHLORIDE 7.5 MG: 5 TABLET ORAL at 10:07

## 2021-01-23 RX ADMIN — POTASSIUM CHLORIDE 20 MEQ: 1500 TABLET, EXTENDED RELEASE ORAL at 17:55

## 2021-01-23 RX ADMIN — POTASSIUM CHLORIDE 20 MEQ: 1500 TABLET, EXTENDED RELEASE ORAL at 10:08

## 2021-01-23 RX ADMIN — METHOCARBAMOL TABLETS 1000 MG: 500 TABLET, COATED ORAL at 22:57

## 2021-01-23 RX ADMIN — Medication 1 TABLET: at 17:54

## 2021-01-23 RX ADMIN — Medication 10 ML: at 21:37

## 2021-01-23 RX ADMIN — ENOXAPARIN SODIUM 30 MG: 30 INJECTION SUBCUTANEOUS at 10:07

## 2021-01-23 RX ADMIN — Medication 1 TABLET: at 10:08

## 2021-01-23 RX ADMIN — TOPIRAMATE 25 MG: 25 TABLET, FILM COATED ORAL at 10:07

## 2021-01-23 ASSESSMENT — PAIN SCALES - GENERAL
PAINLEVEL_OUTOF10: 0
PAINLEVEL_OUTOF10: 6

## 2021-01-23 ASSESSMENT — PAIN DESCRIPTION - PAIN TYPE: TYPE: ACUTE PAIN

## 2021-01-23 NOTE — PROGRESS NOTES
Delilah Restrepo is a 30 y.o. right handed female     Neurology is following for seizures    PMH: Metastatic neuroendocrine tumor with liver mets, migraines, Crohn's disease, history of alcohol abuse in remission, current smoking    She presented on 1/22 with BEREKET and hypocalcemia and seizures. New-onset seizures a week prior. UDS positive for fentanyl and barbiturates (apparently took fentanyl for headache). LFTs were markedly elevated. Was on low-dose topiramate 25 mg at night only for migraines. Seizures described as generalized tonic-clonic with loss of consciousness and postictal confusion. She did put her tooth through her lip with one seizure. EEG done and pending interpretation. MRI of the brain showed a few areas of T2 flair hyperintensity but no acute events. Unable to have contrast at this time due to BEREKET. Nephrology is following and creatinine 4.3. She has been afebrile and sedating medications have been held. She takes Fioricet at home for her breakthrough migraines. She tells me her father does have a history of absence seizures.     No chest pain or palpitations  No SOB  No vertigo, lightheadedness or loss of consciousness  No falls, tripping or stumbling  No incontinence of bowels or bladder  No itching or bruising appreciated  No numbness, tingling or focal arm/leg weakness  No speech or swallowing troubles    ROS otherwise negative     Current Facility-Administered Medications   Medication Dose Route Frequency Provider Last Rate Last Admin    enoxaparin (LOVENOX) injection 30 mg  30 mg Subcutaneous Daily Mega Castellanos DO   30 mg at 01/23/21 1007    potassium chloride (KLOR-CON M) extended release tablet 20 mEq  20 mEq Oral Once Kandy Holm DO        aluminum & magnesium hydroxide-simethicone (MAALOX) [phone] MG/5ML suspension 5 mL  5 mL Oral Q6H PRN Kandy Holm DO  [Held by provider] butalbital-acetaminophen-caffeine (FIORICET, ESGIC) per tablet 1 tablet  1 tablet Oral Daily PRN Adam Castellanos, DO   1 tablet at 01/22/21 1419    calcium-vitamin D (OSCAL-500) 500-200 MG-UNIT per tablet 1 tablet  1 tablet Oral BID WC Adam Castellanos, DO   1 tablet at 01/23/21 1008    [Held by provider] gabapentin (NEURONTIN) capsule 300 mg  300 mg Oral TID Adam Castellanos, DO   300 mg at 01/22/21 1042    levothyroxine (SYNTHROID) tablet 112 mcg  112 mcg Oral Daily Adam Castellanos, DO   112 mcg at 01/23/21 1008    potassium chloride (KLOR-CON M) extended release tablet 20 mEq  20 mEq Oral Daily Margmaicolrirandell Castellanos, DO   20 mEq at 01/23/21 1008    topiramate (TOPAMAX) tablet 25 mg  25 mg Oral Daily Adam Castellanos, DO   25 mg at 01/23/21 1007    zolpidem (AMBIEN) tablet 5 mg  5 mg Oral Nightly PRN Adam Castellanos, DO   5 mg at 01/23/21 7115    sodium chloride flush 0.9 % injection 10 mL  10 mL Intravenous 2 times per day Adam Castellanos, DO   10 mL at 01/22/21 2105    sodium chloride flush 0.9 % injection 10 mL  10 mL Intravenous PRN Adam Castellanos, DO        promethazine (PHENERGAN) tablet 12.5 mg  12.5 mg Oral Q6H PRN Adam Castellanos DO        Or    [facility] injection 4 mg  4 mg Intravenous Q6H PRN Adam Castellanos, DO        polyethylene glycol (GLYCOLAX) packet 17 g  17 g Oral Daily PRN Adam Castellanos,         lactated ringers infusion   Intravenous Continuous Vicente Colmenares  mL/hr at 01/23/21 1159 New Bag at 01/23/21 1159    lidocaine PF 1 % injection 5 mL  5 mL Intradermal Once Vicente Colmenares MD        sodium chloride flush 0.9 % injection 10 mL  10 mL Intravenous PRN Vicente Colmenares MD        heparin flush 100 UNIT/ML injection 300 Units  3 mL Intravenous 2 times per day Vicente Colmenares MD        heparin flush 100 UNIT/ML injection 300 Units  3 mL Intracatheter PRN Vicente Colmenares MD         Objective: /64   Pulse 72   Temp 98.1 °F (36.7 °C) (Temporal)   Resp 16   Ht 5' (1.524 m)   Wt 130 lb (59 kg)   SpO2 96%   BMI 25.39 kg/m²     General appearance: Drowsy, appears stated age, cooperative and no distress  Head: normocephalic, without obvious abnormality, atraumatic  Eyes: conjunctivae/corneas clear.  .  Neck: no carotid bruit,  Lungs: clear to auscultation bilaterally  Heart: regular rate and rhythm  Extremities: normal, atraumatic, no cyanosis or edema  Pulses: 2+ and symmetric  Skin: color, texture, turgor normal---no rashes or lesions      Mental Status: Drowsy, arouses to noxious stim and is alert and oriented x4    Appropriate attention/concentration  Intact fundus of knowledge  Intact memories    Speech: no dysarthria  Language: no aphasias    Cranial Nerves:  I: smell NA   II: visual acuity  NA   II: visual fields Full to confrontation   II: pupils VITOR   III,VII: ptosis None   III,IV,VI: extraocular muscles  Full ROM   V: mastication Normal   V: facial light touch sensation  Normal   V,VII: corneal reflex     VII: facial muscle function - upper  Normal   VII: facial muscle function - lower Normal   VIII: hearing Normal   IX: soft palate elevation  Normal   IX,X: gag reflex    XI: trapezius strength  5/5   XI: sternocleidomastoid strength 5/5   XI: neck extension strength  5/5   XII: tongue strength  Normal     Motor:  5/5 throughout  Normal bulk and tone  No drift or abnormal movements    Sensory:  LT symmetric in all limbs    Coordination:   FN, FFM normal    DTR:   1+ throughout    No Babinskis  No Ferguson's    No pathological reflexes    Laboratory/Radiology:     CBC with Differential:    Lab Results   Component Value Date    WBC 11.4 01/21/2021    RBC 4.37 01/21/2021    HGB 14.0 01/21/2021    HCT 42.0 01/21/2021     01/21/2021    MCV 96.1 01/21/2021    MCH 32.0 01/21/2021    MCHC 33.3 01/21/2021    RDW 17.4 01/21/2021    LYMPHOPCT 9.5 01/21/2021    MONOPCT 3.6 01/21/2021 BASOPCT 0.5 01/21/2021    MONOSABS 0.41 01/21/2021    LYMPHSABS 1.08 01/21/2021    EOSABS 0.04 01/21/2021    BASOSABS 0.06 01/21/2021     CMP:    Lab Results   Component Value Date     01/23/2021    K 3.4 01/23/2021    K 3.9 01/21/2021    CL 96 01/23/2021    CO2 19 01/23/2021    BUN 24 01/23/2021    CREATININE 4.3 01/23/2021    GFRAA 14 01/23/2021    LABGLOM 12 01/23/2021    GLUCOSE 80 01/23/2021    PROT 4.6 01/23/2021    LABALBU 2.9 01/23/2021    CALCIUM 7.3 01/23/2021    BILITOT 3.2 01/23/2021    ALKPHOS 128 01/23/2021    AST 3,661 01/23/2021    ALT 1,498 01/23/2021     Hepatic Function Panel:    Lab Results   Component Value Date    ALKPHOS 128 01/23/2021    ALT 1,498 01/23/2021    AST 3,661 01/23/2021    PROT 4.6 01/23/2021    BILITOT 3.2 01/23/2021    BILIDIR 2.5 01/23/2021    IBILI 0.7 01/23/2021    LABALBU 2.9 01/23/2021     MRI brain: No acute events; a few small T2 flair hyperintensities    EEG pending    All labs and images personally reviewed today    Assessment:     New onset GTC seizures: in a pt with metastatic neuroendocrine tumorin the setting of BEREKET, hypocalcemia, and transaminitis. EEG pending, and the few small T2 flair hyperintensities on noncontrasted MRI could be secondary to her underlying migraines, however needs contrasted study to rule out brain mets. Acute metabolic encephalopathy: Secondary to metabolic disarray. She is drowsy but oriented, with no focal deficits. Acute renal failure     Transaminitis    Metastatic malignant neuroendocrine tumor     Chronic migraines:  On topiramate    Plan:     Needs contrasted MRI--spoke with TRINA Guthrie with renal and they will review case and determine if this can be coordinated somehow    F/U EEG    Avoid sedating agents    Continue topiramate at current dose for now    Sz precautions    Discussed with Dr. Isatu Zamorano and primary    Will follow closely    Ethan Denny, TRINA-CNP  12:09 PM  1/23/2021

## 2021-01-23 NOTE — PROGRESS NOTES
Tired. Some complaints of dyspnea. Ma catheter. Urine dark but not oliguric  Diarrhea still, in part related to resection  LFTs and INR down  Bilirubin up slightly  Cr up substantially  Raul ordered, not submitted  MRI brain noted, doubt mets    The etiology of this is not clear . Picture most fits with hypoxic liver injury (shock) but no shock or reason for same. No drugs and pattern not viral.Liver injury resolving and will no doubt continue. I would have expected ATN as well, Haroon Tay may help. Will ask to collect. Ultimately Porfirio may help with the diarrhea.

## 2021-01-23 NOTE — PROGRESS NOTES
[facility] SURGERY  DAILY PROGRESS NOTE  1/23/2021    Subjective:  Pt seen and examined this morning. Feeling better, just with some diarrhea overnight. Objective:  /77   Pulse 73   Temp 98.8 °F (37.1 °C) (Temporal)   Resp 16   Ht 5' (1.524 m)   Wt 130 lb (59 kg)   SpO2 92%   BMI 25.39 kg/m²     GENERAL:  Laying in bed, awake, alert, cooperative, no apparent distress  HEAD: Normocephalic, atraumatic  EYES: No sclera icterus today, pupils equal  LUNGS:  No increased work of breathing  CARDIOVASCULAR:  R  ABDOMEN:  Soft, non-tender, non-distended  EXTREMITIES: No edema or swelling  SKIN: Warm and dry    Assessment/Plan:  30 y.o. female with elevated liver enzymes in the setting of altered mental status and seizures. 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 - continue to trend hepatic function panel  - Duplex US revealed patent vessels  - continue diet as tolerated    Electronically signed by Maddison Matthew MD on 1/23/2021 at 6:52 AM     I saw and examined the patient. I reviewed the above resident's note. I agree with the assessment and plan as outlined.     Shelli Hodgkin, MD  General Surgery

## 2021-01-23 NOTE — PROGRESS NOTES
Hospital Medicine    Subjective:  Pt alert conversive      Current Facility-Administered Medications:     enoxaparin (LOVENOX) injection 30 mg, 30 mg, Subcutaneous, Daily, Ignacio Castellanos DO, 30 mg at 01/23/21 1007    potassium chloride (KLOR-CON M) extended release tablet 20 mEq, 20 mEq, Oral, Once, Ena Bhatti DO    aluminum & magnesium hydroxide-simethicone (MAALOX) 200-200-20 MG/5ML suspension 5 mL, 5 mL, Oral, Q6H PRN, Ignacio Castellanos DO    busPIRone (BUSPAR) tablet 7.5 mg, 7.5 mg, Oral, BID, Ena Bhatti DO, 7.5 mg at 01/23/21 1007    [Held by provider] butalbital-acetaminophen-caffeine (FIORICET, ESGIC) per tablet 1 tablet, 1 tablet, Oral, Daily PRN, Ignacio Castellanos DO, 1 tablet at 01/22/21 1419    calcium-vitamin D (Pam Fang) 500-200 MG-UNIT per tablet 1 tablet, 1 tablet, Oral, BID WC, Ignacio Castellanos DO, 1 tablet at 01/23/21 1008    [Held by provider] gabapentin (NEURONTIN) capsule 300 mg, 300 mg, Oral, TID, Ignacio Castellanos DO, 300 mg at 01/22/21 1042    levothyroxine (SYNTHROID) tablet 112 mcg, 112 mcg, Oral, Daily, Ignacio Castellanos DO, 112 mcg at 01/23/21 1008    potassium chloride (KLOR-CON M) extended release tablet 20 mEq, 20 mEq, Oral, Daily, Ignacio Castellanos DO, 20 mEq at 01/23/21 1008    topiramate (TOPAMAX) tablet 25 mg, 25 mg, Oral, Daily, Ignacio Castellanos DO, 25 mg at 01/23/21 1007    zolpidem (AMBIEN) tablet 5 mg, 5 mg, Oral, Nightly PRN, Ignacio Castellanos DO, 5 mg at 01/23/21 4912    sodium chloride flush 0.9 % injection 10 mL, 10 mL, Intravenous, 2 times per day, Ena Bhatti DO, 10 mL at 01/22/21 2105    sodium chloride flush 0.9 % injection 10 mL, 10 mL, Intravenous, PRN, Ignacio Castellanos,     promethazine (PHENERGAN) tablet 12.5 mg, 12.5 mg, Oral, Q6H PRN **OR** ondansetron (ZOFRAN) injection 4 mg, 4 mg, Intravenous, Q6H PRN, Ignacio Castellanos DO    polyethylene glycol (GLYCOLAX) packet 17 g, 17 g, Oral, Daily PRN, Ena Bhatti DO   lactated ringers infusion, , Intravenous, Continuous, Jose Belcher MD, Last Rate: 100 mL/hr at 01/23/21 1159, New Bag at 01/23/21 1159    lidocaine PF 1 % injection 5 mL, 5 mL, Intradermal, Once, Jose Belcher MD    sodium chloride flush 0.9 % injection 10 mL, 10 mL, Intravenous, PRN, Jose Belcher MD    heparin flush 100 UNIT/ML injection 300 Units, 3 mL, Intravenous, 2 times per day, Jose Belcher MD    heparin flush 100 UNIT/ML injection 300 Units, 3 mL, Intracatheter, PRN, Jose Belcher MD    tiZANidine (ZANAFLEX) tablet 4 mg, 4 mg, Oral, Nightly, Han Lagos MD, 4 mg at 01/22/21 2104    Objective:    /64   Pulse 72   Temp 98.1 °F (36.7 °C) (Temporal)   Resp 16   Ht 5' (1.524 m)   Wt 130 lb (59 kg)   SpO2 96%   BMI 25.39 kg/m²     Heart:  reg  Lungs:  ctab  Abd: + bs soft nontender  Extrem:  W/o edema    CBC with Differential:    Lab Results   Component Value Date    WBC 11.4 01/21/2021    RBC 4.37 01/21/2021    HGB 14.0 01/21/2021    HCT 42.0 01/21/2021     01/21/2021    MCV 96.1 01/21/2021    MCH 32.0 01/21/2021    MCHC 33.3 01/21/2021    RDW 17.4 01/21/2021    LYMPHOPCT 9.5 01/21/2021    MONOPCT 3.6 01/21/2021    BASOPCT 0.5 01/21/2021    MONOSABS 0.41 01/21/2021    LYMPHSABS 1.08 01/21/2021    EOSABS 0.04 01/21/2021    BASOSABS 0.06 01/21/2021     CMP:    Lab Results   Component Value Date     01/23/2021    K 3.4 01/23/2021    K 3.9 01/21/2021    CL 96 01/23/2021    CO2 19 01/23/2021    BUN 24 01/23/2021    CREATININE 4.3 01/23/2021    GFRAA 14 01/23/2021    LABGLOM 12 01/23/2021    GLUCOSE 80 01/23/2021    PROT 4.6 01/23/2021    LABALBU 2.9 01/23/2021    CALCIUM 7.3 01/23/2021    BILITOT 3.2 01/23/2021    ALKPHOS 128 01/23/2021    AST 3,661 01/23/2021    ALT 1,498 01/23/2021     Warfarin PT/INR:    Lab Results   Component Value Date    INR 1.8 01/23/2021    INR 2.3 01/21/2021    INR 1.0 09/01/2020    PROTIME 20.0 (H) 01/23/2021

## 2021-01-23 NOTE — PROGRESS NOTES
Nephrology Progress Note    Reason for Consult:  BEREKET, hypocalcemia    History of Present Ilness: The patient is a 30year old female with a PMH significant for  Thyroidectomy, parathyroidectomy, Stage 4 neuroendocrine cancer with liver mets. 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 She presented to the ED yesterday after a generalized tonic-clonic seizure. The patient reports that this is her third seizure in the past year. She reports nausea, vomiting and loss of appetite for 1 week. She noticed that her urine output stopped 2 days ago. She also had numbness and tingling in hands and feet. She takes a calcium for chronic hypocalcemia since parathyroidectomy, however, she was not able to keep and medication down over the last week. Her calcium was  yesterday and down to 6.4 today. The patient's liver enzymes elevated with AST over 7000 and ALT of 3700. Mg 1.3 , received repletion and is at 2.0 today. Her cr was elevated at 1.8 which increased to 2.6 this am. She denies taking any NSAIDs. Today she is feeling better, denies sob, continues to have numbness and tingling in hands and feet, hand grasps are decreased. Interval History:  1/23/21: pt states she feels alright, no new complaints. No seizure activity. Ma draining tea colored urine. Appetite is poor. Neurology would like to order MRI of brain with contrast to r/o mets, but due to worsening kidney function, would likely need dialysis, which may not only be temporary depending on whether or not the kidneys recover. Discussed at length with the patient and she is going to think it over.       Past Medical History:      Past Medical History:   Diagnosis Date    Abdominal pain     [facility] ([facility] [address]     Cancer ([facility] [address]     Hypocalcemia     Hypothyroidism     Irritable bowel syndrome     Migraines     Status post alcohol detoxification     5/22/2018-5/29/2018       Past Surgical History: Past Surgical History:   Procedure Laterality Date    CHOLECYSTECTOMY, LAPAROSCOPIC  07/06/2016    COLONOSCOPY N/A 6/22/2018    COLONOSCOPY WITH BIOPSY performed by Tasneem Bahena MD at [id number] [facility] CT NEEDLE BIOPSY LIVER PERCUTANEOUS  9/1/2020    CT NEEDLE BIOPSY LIVER PERCUTANEOUS 9/1/2020 [facility] CT    PARATHYROID GLAND SURGERY      SMALL INTESTINE SURGERY N/A 10/21/2020    LAPAROSCOPIC ROBOTIC SMALL BOWEL RESECTION WITH PLANNED TRANSITION TO OPEN performed by Adelina Moser MD at [facility]          Social History:      Social History     Socioeconomic History    Marital status: Single     Spouse name: Not on file    Number of children: Not on file    Years of education: Not on file    Highest education level: Not on file   Occupational History    Occupation: cleaning     Employer: the [employer] Financial resource strain: Not on file    Food insecurity     Worry: Not on file     Inability: Not on file   [employer] needs     Medical: Not on file     Non-medical: Not on file   Tobacco Use    Smoking status: Current Every Day Smoker     Packs/day: 0.50    Smokeless tobacco: Never Used   Substance and Sexual Activity    Alcohol use: No     Alcohol/week: 0.0 standard drinks     Comment: 4 weeks sober post detox    Drug use: No    Sexual activity: Yes     Partners: Male     Birth control/protection: I.U.D.    Lifestyle    Physical activity     Days per week: Not on file     Minutes per session: Not on file    Stress: Not on file   Relationships    Social connections     Talks on phone: Not on file     Gets together: Not on file     Attends Nondenominational service: Not on file     Active member of club or organization: Not on file     Attends meetings of clubs or organizations: Not on file     Relationship status: Not on file    Intimate partner violence     Fear of current or ex partner: Not on file     Emotionally abused: Not on file Physically abused: Not on file     Forced sexual activity: Not on file   Other Topics Concern    Not on file   Social History Narrative    Not on file       Family History:     Family History   Problem Relation Age of Onset    High Blood Pressure Mother     High Cholesterol Mother     Other Mother         migraine headaches    Heart Disease Father     Asthma Father     High Blood Pressure Father     Cancer Father         Sarcoidosis    Diabetes Other         GRANDMOTHER    Lung Cancer Other         GRANDFATHER    Alzheimer's Disease Other         GRANDMOTHER    Breast Cancer Maternal Grandmother     Cancer Maternal Grandmother         skin    Cancer Paternal Grandfather         lung       Current Medications:        Current Facility-Administered Medications:     enoxaparin (LOVENOX) injection 30 mg, 30 mg, Subcutaneous, Daily, [place] Small Malmer, DO, 30 mg at 01/23/21 1007    potassium chloride (KLOR-CON M) extended release tablet 20 mEq, 20 mEq, Oral, Once, Kandy Holm DO    aluminum & magnesium hydroxide-simethicone (MAALOX) 200-200-20 MG/5ML suspension 5 mL, 5 mL, Oral, Q6H PRN, Mega Small Malmer, DO    [Held by provider] butalbital-acetaminophen-caffeine (FIORICET, ESGIC) per tablet 1 tablet, 1 tablet, Oral, Daily PRN, [place] Small Malmer, DO, 1 tablet at 01/22/21 1419    calcium-vitamin D (OSCAL-500) 500-200 MG-UNIT per tablet 1 tablet, 1 tablet, Oral, BID WC, [place] Small Malmer, DO, 1 tablet at 01/23/21 1008    [Held by provider] gabapentin (NEURONTIN) capsule 300 mg, 300 mg, Oral, TID, [place] Small Malmer, DO, 300 mg at 01/22/21 1042    levothyroxine (SYNTHROID) tablet 112 mcg, 112 mcg, Oral, Daily, Mega Small Malmer, DO, 112 mcg at 01/23/21 1008    potassium chloride (KLOR-CON M) extended release tablet 20 mEq, 20 mEq, Oral, Daily, Mega Small Malmer, DO, 20 mEq at 01/23/21 1008    topiramate (TOPAMAX) tablet 25 mg, 25 mg, Oral, Daily, [place] Small Malmer, DO, 25 mg at 01/23/21 1007   zolpidem (AMBIEN) tablet 5 mg, 5 mg, Oral, Nightly PRN, Arthur Castlelanos DO, 5 mg at 01/23/21 4068    sodium chloride flush 0.9 % injection 10 mL, 10 mL, Intravenous, 2 times per day, Vermoraima Al, DO, 10 mL at 01/22/21 2105    sodium chloride flush 0.9 % injection 10 mL, 10 mL, Intravenous, PRN, Arthur Castellanos DO    promethazine (PHENERGAN) tablet 12.5 mg, 12.5 mg, Oral, Q6H PRN **OR** ondansetron (ZOFRAN) injection 4 mg, 4 mg, Intravenous, Q6H PRN, Arthur Castellanos DO    polyethylene glycol (GLYCOLAX) packet 17 g, 17 g, Oral, Daily PRN, Arthur Castellanos DO    lactated ringers infusion, , Intravenous, Continuous, Gabby Delgado MD, Last Rate: 100 mL/hr at 01/23/21 1159, New Bag at 01/23/21 1159    lidocaine PF 1 % injection 5 mL, 5 mL, Intradermal, Once, Gabby Delgado MD    sodium chloride flush 0.9 % injection 10 mL, 10 mL, Intravenous, PRN, Gabby Delgado MD    heparin flush 100 UNIT/ML injection 300 Units, 3 mL, Intravenous, 2 times per day, Gabby Delgado MD    heparin flush 100 UNIT/ML injection 300 Units, 3 mL, Intracatheter, PRN, Gabby Delgado MD    Allergies:  Patient has no known allergies. Review of Systems:   Pertinent positives stated above in HPI. All other systems were reviewed and were negative.     Physical exam:   In: 240 [P.O.:240]  Out: -      Constitutional:  /64   Pulse 72   Temp 98.1 °F (36.7 °C) (Temporal)   Resp 16   Ht 5' (1.524 m)   Wt 130 lb (59 kg)   SpO2 96%   BMI 25.39 kg/m²   Gen: alert, awake, nad  Skin: no rash on exposed skin, turgor wnl  Neck: no jvd noted  Cardiovascular:  RRR  Respiratory: Lung sounds clear   Abdomen:  +bs, soft, nondistended, RLQ tenderness  Ext: neg lower extremity edema  Psychiatric: mood and affect appropriate    Data:   CBC with Differential:    Lab Results   Component Value Date    WBC 11.4 01/21/2021    RBC 4.37 01/21/2021    HGB 14.0 01/21/2021    HCT 42.0 01/21/2021     01/21/2021    MCV 96.1 01/21/2021    MCH 32.0 01/21/2021    MCHC 33.3 01/21/2021    RDW 17.4 01/21/2021    LYMPHOPCT 9.5 01/21/2021    MONOPCT 3.6 01/21/2021    BASOPCT 0.5 01/21/2021    MONOSABS 0.41 01/21/2021    LYMPHSABS 1.08 01/21/2021    EOSABS 0.04 01/21/2021    BASOSABS 0.06 01/21/2021     CMP:    Lab Results   Component Value Date     01/23/2021    K 3.4 01/23/2021    K 3.9 01/21/2021    CL 96 01/23/2021    CO2 19 01/23/2021    BUN 24 01/23/2021    CREATININE 4.3 01/23/2021    GFRAA 14 01/23/2021    LABGLOM 12 01/23/2021    GLUCOSE 80 01/23/2021    PROT 4.6 01/23/2021    LABALBU 2.9 01/23/2021    CALCIUM 7.3 01/23/2021    BILITOT 3.2 01/23/2021    ALKPHOS 128 01/23/2021    AST 3,661 01/23/2021    ALT 1,498 01/23/2021     Calcium:    Lab Results   Component Value Date    CALCIUM 7.3 01/23/2021     Ionized Calcium:  No results found for: IONCA  Magnesium:    Lab Results   Component Value Date    MG 2.0 01/22/2021     Phosphorus:    Lab Results   Component Value Date    PHOS 2.6 09/03/2020     U/A:    Lab Results   Component Value Date    NITRITE neg 08/20/2020    COLORU DARK YELLOW 01/21/2021    PROTEINU >=300 01/21/2021    PHUR 6.5 01/21/2021    WBCUA NONE 01/21/2021    RBCUA 2-5 01/21/2021    MUCUS Present 09/26/2017    BACTERIA MODERATE 01/21/2021    CLARITYU SL CLOUDY 01/21/2021    SPECGRAV >=1.030 01/21/2021    LEUKOCYTESUR Negative 01/21/2021    UROBILINOGEN 1.0 01/21/2021    BILIRUBINUR LARGE 01/21/2021    BILIRUBINUR neg 08/20/2020    BLOODU LARGE 01/21/2021    GLUCOSEU 250 01/21/2021    AMORPHOUS MODERATE 01/21/2021     VITAMIN B12: No components found for: B12  FOLATE:  No results found for: FOLATE  IRON:  No results found for: IRON  Iron Saturation:  No components found for: PERCENTFE  TIBC:  No results found for: TIBC  FERRITIN:  No results found for: FERRITIN  AMYLASE:    Lab Results   Component Value Date    AMYLASE 38 03/13/2018     LIPASE:    Lab Results   Component Value Date    LIPASE 127 01/21/2021 Assessment/Plan    1. BEREKET in the setting of nausea, vomiting decrease intake with oliguria  Baseline cr <1.0  Cr 1.8->2.6-->4.3  U/A + nitrites, Spec grav >1.030, lrg blood, > 300 protein  Check Urine C&S, electrolytes, creatinine  US abdomen: neg hydro, multiple isoechoic/hypoechoic hepatic lesions consistent with metastatic disease. Continue IVF  Strict I&O  Monitor daily BMP      2. Hypocalcemia due to parathyroidectomy; exacerbated by inadequate oral supplement: check PTH,  Vit D  , and magnesium levels  Hx parathyroidectomy with chronic hypocalcemia   Not taking calcium supplement d/t nausea and vomiting  Repleted with Calcium gluconate 2 gm IV yesterday and today  Continue po calcium supplement  Ca+ 7.0-->6.4-->7. 3   Ionized calcium low at 0.9-->1.06  PTH 37, vitamin D 8: will start Vit D 50,000 units weekly today. Monitor daily BMP and ionized calcium    3. Seizure in the setting of hypocalcemia, hypomagnesemia    4. Neuroendocrine tumor with liver mets. 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 Elevated liver enzymes  Oncology following    Thank you for the opportunity to participate in the care of [facility]. TRINA Dooley - CNP   1/23/2021      Patient seen and examined all key components of the physical performed independently , case discussed with NP, all pertinent labs and radiologic tests personally reviewed agree with above.     -Worsening serum creatinine , concerned for possible hepatorenal syndrome; no signs of hemolysis; rhabdo was a consideration given seizures, but CK not elevated; will increase IVF for now; may need a kidney biopsy; will send GN serologies  -Need for MRI with della  per Neurology request will mandate dialysis  At current level of renal function explained to patient     Sampson Verdugo MD

## 2021-01-24 ENCOUNTER — APPOINTMENT (OUTPATIENT)
Dept: GENERAL RADIOLOGY | Age: 36
DRG: 469 | End: 2021-01-24
Attending: INTERNAL MEDICINE
Payer: COMMERCIAL

## 2021-01-24 PROBLEM — E44.0 MODERATE PROTEIN-CALORIE MALNUTRITION (HCC): Chronic | Status: ACTIVE | Noted: 2021-01-24

## 2021-01-24 PROBLEM — Z86.69 HX OF MIGRAINE HEADACHES: Chronic | Status: RESOLVED | Noted: 2017-08-04 | Resolved: 2021-01-24

## 2021-01-24 LAB
ALBUMIN SERPL-MCNC: 2.7 G/DL (ref 3.5–5.2)
ALP BLD-CCNC: 141 U/L (ref 35–104)
ALT SERPL-CCNC: 915 U/L (ref 0–32)
ANION GAP SERPL CALCULATED.3IONS-SCNC: 16 MMOL/L (ref 7–16)
AST SERPL-CCNC: 666 U/L (ref 0–31)
BACTERIA: ABNORMAL /HPF
BILIRUB SERPL-MCNC: 2 MG/DL (ref 0–1.2)
BILIRUBIN DIRECT: 1.5 MG/DL (ref 0–0.3)
BILIRUBIN URINE: NEGATIVE
BILIRUBIN, INDIRECT: 0.5 MG/DL (ref 0–1)
BLOOD, URINE: ABNORMAL
BUN BLDV-MCNC: 31 MG/DL (ref 6–20)
C3 COMPLEMENT: 52 MG/DL (ref 90–180)
C4 COMPLEMENT: 5 MG/DL (ref 10–40)
CALCIUM IONIZED: 1.13 MMOL/L (ref 1.15–1.33)
CALCIUM SERPL-MCNC: 7.7 MG/DL (ref 8.6–10.2)
CHLORIDE BLD-SCNC: 94 MMOL/L (ref 98–107)
CLARITY: CLEAR
CO2: 18 MMOL/L (ref 22–29)
COLOR: YELLOW
CREAT SERPL-MCNC: 5.3 MG/DL (ref 0.5–1)
GFR AFRICAN AMERICAN: 11
GFR NON-AFRICAN AMERICAN: 9 ML/MIN/1.73
GLUCOSE BLD-MCNC: 88 MG/DL (ref 74–99)
GLUCOSE URINE: NEGATIVE MG/DL
INR BLD: 1.3
KETONES, URINE: NEGATIVE MG/DL
LEUKOCYTE ESTERASE, URINE: ABNORMAL
NITRITE, URINE: NEGATIVE
PH UA: 7 (ref 5–9)
POTASSIUM SERPL-SCNC: 3.5 MMOL/L (ref 3.5–5)
PROTEIN UA: NEGATIVE MG/DL
PROTHROMBIN TIME: 14.3 SEC (ref 9.3–12.4)
RBC UA: ABNORMAL /HPF (ref 0–2)
SODIUM BLD-SCNC: 128 MMOL/L (ref 132–146)
SPECIFIC GRAVITY UA: 1.01 (ref 1–1.03)
TOTAL PROTEIN: 4.9 G/DL (ref 6.4–8.3)
UROBILINOGEN, URINE: 0.2 E.U./DL
WBC UA: ABNORMAL /HPF (ref 0–5)

## 2021-01-24 PROCEDURE — 99233 SBSQ HOSP IP/OBS HIGH 50: CPT | Performed by: NURSE PRACTITIONER

## 2021-01-24 PROCEDURE — 85610 PROTHROMBIN TIME: CPT

## 2021-01-24 PROCEDURE — 86038 ANTINUCLEAR ANTIBODIES: CPT

## 2021-01-24 PROCEDURE — 2580000003 HC RX 258: Performed by: INTERNAL MEDICINE

## 2021-01-24 PROCEDURE — 99232 SBSQ HOSP IP/OBS MODERATE 35: CPT | Performed by: SURGERY

## 2021-01-24 PROCEDURE — 86255 FLUORESCENT ANTIBODY SCREEN: CPT

## 2021-01-24 PROCEDURE — 2060000000 HC ICU INTERMEDIATE R&B

## 2021-01-24 PROCEDURE — 6370000000 HC RX 637 (ALT 250 FOR IP): Performed by: STUDENT IN AN ORGANIZED HEALTH CARE EDUCATION/TRAINING PROGRAM

## 2021-01-24 PROCEDURE — 71045 X-RAY EXAM CHEST 1 VIEW: CPT

## 2021-01-24 PROCEDURE — 80048 BASIC METABOLIC PNL TOTAL CA: CPT

## 2021-01-24 PROCEDURE — 81001 URINALYSIS AUTO W/SCOPE: CPT

## 2021-01-24 PROCEDURE — 99222 1ST HOSP IP/OBS MODERATE 55: CPT | Performed by: SURGERY

## 2021-01-24 PROCEDURE — 6360000002 HC RX W HCPCS: Performed by: INTERNAL MEDICINE

## 2021-01-24 PROCEDURE — 80076 HEPATIC FUNCTION PANEL: CPT

## 2021-01-24 PROCEDURE — 36415 COLL VENOUS BLD VENIPUNCTURE: CPT

## 2021-01-24 PROCEDURE — 82330 ASSAY OF CALCIUM: CPT

## 2021-01-24 PROCEDURE — 2500000003 HC RX 250 WO HCPCS: Performed by: NURSE PRACTITIONER

## 2021-01-24 PROCEDURE — 02HV33Z INSERTION OF INFUSION DEVICE INTO SUPERIOR VENA CAVA, PERCUTANEOUS APPROACH: ICD-10-PCS | Performed by: STUDENT IN AN ORGANIZED HEALTH CARE EDUCATION/TRAINING PROGRAM

## 2021-01-24 PROCEDURE — 86160 COMPLEMENT ANTIGEN: CPT

## 2021-01-24 PROCEDURE — 6370000000 HC RX 637 (ALT 250 FOR IP): Performed by: INTERNAL MEDICINE

## 2021-01-24 RX ORDER — HYDROCODONE BITARTRATE AND ACETAMINOPHEN 5; 325 MG/1; MG/1
1 TABLET ORAL EVERY 6 HOURS PRN
Status: DISCONTINUED | OUTPATIENT
Start: 2021-01-24 | End: 2021-01-29 | Stop reason: HOSPADM

## 2021-01-24 RX ORDER — BUMETANIDE 0.25 MG/ML
2 INJECTION, SOLUTION INTRAMUSCULAR; INTRAVENOUS ONCE
Status: COMPLETED | OUTPATIENT
Start: 2021-01-24 | End: 2021-01-24

## 2021-01-24 RX ADMIN — Medication 10 ML: at 19:44

## 2021-01-24 RX ADMIN — ENOXAPARIN SODIUM 30 MG: 30 INJECTION SUBCUTANEOUS at 09:45

## 2021-01-24 RX ADMIN — METHOCARBAMOL TABLETS 1000 MG: 500 TABLET, COATED ORAL at 09:44

## 2021-01-24 RX ADMIN — HYDROCODONE BITARTRATE AND ACETAMINOPHEN 1 TABLET: 5; 325 TABLET ORAL at 18:36

## 2021-01-24 RX ADMIN — METHOCARBAMOL TABLETS 1000 MG: 500 TABLET, COATED ORAL at 19:40

## 2021-01-24 RX ADMIN — Medication 1 TABLET: at 09:44

## 2021-01-24 RX ADMIN — LEVOTHYROXINE SODIUM 112 MCG: 0.11 TABLET ORAL at 06:54

## 2021-01-24 RX ADMIN — BUMETANIDE 2 MG: 0.25 INJECTION INTRAMUSCULAR; INTRAVENOUS at 15:49

## 2021-01-24 RX ADMIN — Medication 1 TABLET: at 17:46

## 2021-01-24 RX ADMIN — METHOCARBAMOL TABLETS 1000 MG: 500 TABLET, COATED ORAL at 15:48

## 2021-01-24 RX ADMIN — POTASSIUM CHLORIDE 20 MEQ: 1500 TABLET, EXTENDED RELEASE ORAL at 09:45

## 2021-01-24 RX ADMIN — ZOLPIDEM TARTRATE 5 MG: 5 TABLET ORAL at 22:05

## 2021-01-24 RX ADMIN — TOPIRAMATE 25 MG: 25 TABLET, FILM COATED ORAL at 09:45

## 2021-01-24 ASSESSMENT — PAIN SCALES - GENERAL
PAINLEVEL_OUTOF10: 0
PAINLEVEL_OUTOF10: 8

## 2021-01-24 NOTE — PROGRESS NOTES
Comprehensive Nutrition Assessment    Type and Reason for Visit:  Initial, Positive Nutrition Screen    Nutrition Recommendations/Plan: Recommend and start Ensure Enlive supplement BID and Boost Pudding supplement daily to help meet increased nutritional needs and d/t decreased po intake of meals. Nutrition Assessment:  Patient adm w/ poor po intake/appetite x 1 week PTA 2/2 to nausea ; pt meets criteria for moderate malnutrition AEB poor po intake x 1 month and weight loss ; hx of stage 4 neuroendocrine cancer with liver mets ; noted seizures ; hx of Crohns ; will start ONS    Malnutrition Assessment:  Malnutrition Status: Moderate malnutrition    Context:  Chronic Illness     Findings of the 6 clinical characteristics of malnutrition:  Energy Intake:  7 - 75% or less estimated energy requirements for 1 month or longer  Weight Loss:  7 - 10% over 6 months(18% in 6 months)     Body Fat Loss:  Unable to assess(data not available to assess at this time)     Muscle Mass Loss:  Unable to assess(data not available to assess at this time)    Fluid Accumulation:  No significant fluid accumulation     Strength:  Not Performed    Estimated Daily Nutrient Needs:  Energy (kcal):  9701-1164 (REE 1212  x 1.2 SF); Weight Used for Energy Requirements:  Current     Protein (g):  70-85 (1.5-1.8g/kg IBW); Weight Used for Protein Requirements:  Ideal        Fluid (ml/day):  4093-1561; Method Used for Fluid Requirements:  1 ml/kcal      Nutrition Related Findings:  I&Os WNL, no edema, active BS, rounded/distended abd, nausea PTA, A&O x 4, loose stools PTA, abd pain      Wounds:  None       Current Nutrition Therapies:    DIET GENERAL;     Anthropometric Measures:  · Height: 5' (152.4 cm)  · Current Body Weight: 130 lb (59 kg)(1/22, actual)    · Usual Body Weight: 158 lb (71.7 kg)(8/20/20, actual ; EMR shows weight loss of 28# in the past 6 months (158# to 130#) (18%)) · Ideal Body Weight: 100 lbs; % Ideal Body Weight 130 %   · BMI: 25.4  · BMI Categories: Overweight (BMI 25.0-29. 9)       Nutrition Diagnosis:   · Moderate malnutrition, In context of chronic illness related to catabolic illness(hx of stage 4 neuroendocrine cancer with liver mets) as evidenced by poor intake prior to admission, weight loss greater than or equal to 10% in 6 months      Nutrition Interventions:   Food and/or Nutrient Delivery:  Continue Current Diet, Start Oral Nutrition Supplement  Nutrition Education/Counseling:  Education not indicated   Coordination of Nutrition Care:  Continue to monitor while inpatient    Goals:  Patient will consume ~75% of meals served       Nutrition Monitoring and Evaluation:   Behavioral-Environmental Outcomes:  Beliefs and Attitutes   Food/Nutrient Intake Outcomes:  Food and Nutrient Intake, Supplement Intake  Physical Signs/Symptoms Outcomes:  Biochemical Data, Chewing or Swallowing, GI Status, Diarrhea, Nausea or Vomiting, Fluid Status or Edema, Hemodynamic Status, Meal Time Behavior, Nutrition Focused Physical Findings, Skin, Weight     Discharge Planning:     Too soon to determine     Electronically signed by Eva Hector RD, LD on 1/24/21 at 9:53 AM EST    Contact: 2018

## 2021-01-24 NOTE — PROGRESS NOTES
Nephrology Progress Note    Reason for Consult:  BEREKET, hypocalcemia    History of Present Ilness: The patient is a 30year old female with a PMH significant for  Thyroidectomy, parathyroidectomy, Stage 4 neuroendocrine cancer with liver mets. 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 She presented to the ED yesterday after a generalized tonic-clonic seizure. The patient reports that this is her third seizure in the past year. She reports nausea, vomiting and loss of appetite for 1 week. She noticed that her urine output stopped 2 days ago. She also had numbness and tingling in hands and feet. She takes a calcium for chronic hypocalcemia since parathyroidectomy, however, she was not able to keep and medication down over the last week. Her calcium was  yesterday and down to 6.4 today. The patient's liver enzymes elevated with AST over 7000 and ALT of 3700. Mg 1.3 , received repletion and is at 2.0 today. Her cr was elevated at 1.8 which increased to 2.6 this am. She denies taking any NSAIDs. Today she is feeling better, denies sob, continues to have numbness and tingling in hands and feet, hand grasps are decreased. Interval History:  1/23/21: pt states she feels alright, no new complaints. No seizure activity. Ma draining tea colored urine. Appetite is poor. Neurology would like to order MRI of brain with contrast to r/o mets, but due to worsening kidney function, would likely need dialysis, which may not only be temporary depending on whether or not the kidneys recover. Discussed at length with the patient and she is going to think it over. 1/24/21: pt resting in bed, reports she feels sob with talking today. Reviewed labs and discussed having a temp dialysis cath placed and starting dialysis, which she states she has thought about and is willing to begin dialysis.   She will also need to have a kidney biopsy-she states she already spoke to a resident today about this. Past Medical History:      Past Medical History:   Diagnosis Date    Abdominal pain     Acute [facility]'s disease ([facility] [address]     Cancer ([facility] [address]     Hypocalcemia     Hypothyroidism     Irritable bowel syndrome     Migraines     Status post alcohol detoxification     5/22/2018-5/29/2018       Past Surgical History:     Past Surgical History:   Procedure Laterality Date    CHOLECYSTECTOMY, LAPAROSCOPIC  07/06/2016    COLONOSCOPY N/A 6/22/2018    COLONOSCOPY WITH BIOPSY performed by Sherrie Kruse MD at [address] [facility] CT NEEDLE BIOPSY LIVER PERCUTANEOUS  9/1/2020    CT NEEDLE BIOPSY LIVER PERCUTANEOUS 9/1/2020 [facility] CT    PARATHYROID GLAND SURGERY      SMALL INTESTINE SURGERY N/A 10/21/2020    LAPAROSCOPIC ROBOTIC SMALL BOWEL RESECTION WITH PLANNED TRANSITION TO OPEN performed by Sigrid Santamaria MD at [facility]          Social History:      Social History     Socioeconomic History    Marital status: Single     Spouse name: Not on file    Number of children: Not on file    Years of education: Not on file    Highest education level: Not on file   Occupational History    Occupation: cleaning     Employer: [employer] [address] [place] [address] Financial resource strain: Not on file    Food insecurity     Worry: Not on file     Inability: Not on file   [employer] needs     Medical: Not on file     Non-medical: Not on file   Tobacco Use    Smoking status: Current Every Day Smoker     Packs/day: 0.50    Smokeless tobacco: Never Used   Substance and Sexual Activity    Alcohol use: No     Alcohol/week: 0.0 standard drinks     Comment: 4 weeks sober post detox    Drug use: No    Sexual activity: Yes     Partners: Male     Birth control/protection: I.U.D.    Lifestyle    Physical activity     Days per week: Not on file     Minutes per session: Not on file    Stress: Not on file   Relationships    Social connections     Talks on phone: Not on file     Gets together: Not on file     Attends Orthodoxy service: Not on file     Active member of club or organization: Not on file     Attends meetings of clubs or organizations: Not on file     Relationship status: Not on file    Intimate partner violence     Fear of current or ex partner: Not on file     Emotionally abused: Not on file     Physically abused: Not on file     Forced sexual activity: Not on file   Other Topics Concern    Not on file   Social History Narrative    Not on file       Family History:     Family History   Problem Relation Age of Onset    High Blood Pressure Mother     High Cholesterol Mother     Other Mother         migraine headaches    Heart Disease Father     Asthma Father     High Blood Pressure Father     Cancer Father         Sarcoidosis    Diabetes Other         GRANDMOTHER    Lung Cancer Other         GRANDFATHER    Alzheimer's Disease Other         GRANDMOTHER    Breast Cancer Maternal Grandmother     Cancer Maternal Grandmother         skin    Cancer Paternal Grandfather         lung       Current Medications:        Current Facility-Administered Medications:     enoxaparin (LOVENOX) injection 30 mg, 30 mg, Subcutaneous, Daily, Scooter Castellanos DO, 30 mg at 01/24/21 0945    vitamin D (ERGOCALCIFEROL) capsule 50,000 Units, 50,000 Units, Oral, Weekly, TRINA Llamas - CNP, 50,000 Units at 01/23/21 1754    methocarbamol (ROBAXIN) tablet 1,000 mg, 1,000 mg, Oral, 4x Daily, August Person MD, 1,000 mg at 01/24/21 0944    aluminum & magnesium hydroxide-simethicone (MAALOX) 200-200-20 MG/5ML suspension 5 mL, 5 mL, Oral, Q6H PRN, Scooter Castellanos DO    [Held by provider] butalbital-acetaminophen-caffeine (FIORICET, ESGIC) per tablet 1 tablet, 1 tablet, Oral, Daily PRN, Scooter Castellanos DO, 1 tablet at 01/22/21 1419    calcium-vitamin D (OSCAL-500) 500-200 MG-UNIT per tablet 1 tablet, 1 tablet, Oral, BID WC, Scooter Castellanos DO, 1 tablet at 01/24/21 0944    [Held by provider] gabapentin (NEURONTIN) capsule 300 mg, 300 mg, Oral, TID, Nicky Castellanos DO, 300 mg at 01/22/21 1042    levothyroxine (SYNTHROID) tablet 112 mcg, 112 mcg, Oral, Daily, Nicky Castellanos DO, 112 mcg at 01/24/21 0654    potassium chloride (KLOR-CON M) extended release tablet 20 mEq, 20 mEq, Oral, Daily, Nicky Castellanos DO, 20 mEq at 01/24/21 0945    topiramate (TOPAMAX) tablet 25 mg, 25 mg, Oral, Daily, Nicky Castellanos DO, 25 mg at 01/24/21 0945    zolpidem (AMBIEN) tablet 5 mg, 5 mg, Oral, Nightly PRN, Nicky Castellanos DO, 5 mg at 01/23/21 2136    sodium chloride flush 0.9 % injection 10 mL, 10 mL, Intravenous, 2 times per day, Cynthia Patel DO, 10 mL at 01/23/21 2137    sodium chloride flush 0.9 % injection 10 mL, 10 mL, Intravenous, PRN, Nicky Castellanos DO    promethazine (PHENERGAN) tablet 12.5 mg, 12.5 mg, Oral, Q6H PRN **OR** ondansetron (ZOFRAN) injection 4 mg, 4 mg, Intravenous, Q6H PRN, Nicky Castellanos DO    polyethylene glycol (GLYCOLAX) packet 17 g, 17 g, Oral, Daily PRN, Nicky Castellanos DO    lactated ringers infusion, , Intravenous, Continuous, Antonio Bonds MD, Last Rate: 125 mL/hr at 01/23/21 2150, Rate Change at 01/23/21 2150    lidocaine PF 1 % injection 5 mL, 5 mL, Intradermal, Once, Alicia Aguillon MD    sodium chloride flush 0.9 % injection 10 mL, 10 mL, Intravenous, PRN, Alicia Aguillon MD    heparin flush 100 UNIT/ML injection 300 Units, 3 mL, Intravenous, 2 times per day, Alicia Aguillon MD    heparin flush 100 UNIT/ML injection 300 Units, 3 mL, Intracatheter, PRN, Alicia Aguillon MD    Allergies:  Patient has no known allergies. Review of Systems:   Pertinent positives stated above in HPI. All other systems were reviewed and were negative.     Physical exam:   In: 240 [P.O.:240]  Out: 850 [Urine:850]     Constitutional:  /75   Pulse 66   Temp 98.6 °F (37 °C) (Temporal)   Resp 18   Ht 5' (1.524 m)   Wt 130 lb (59 kg)   SpO2 99%   BMI 25.39 kg/m²   Gen: alert, awake, nad  Skin: no rash on exposed skin, turgor wnl  Neck: no jvd noted  Cardiovascular:  RRR  Respiratory: Lung sounds diminished in bases R>L   Abdomen:  +bs, soft, nondistended, RLQ tenderness  Ext: +1 mela  lower extremity edema  Psychiatric: mood and affect appropriate    Data:   CBC with Differential:    Lab Results   Component Value Date    WBC 11.4 01/21/2021    RBC 4.37 01/21/2021    HGB 14.0 01/21/2021    HCT 42.0 01/21/2021     01/21/2021    MCV 96.1 01/21/2021    MCH 32.0 01/21/2021    MCHC 33.3 01/21/2021    RDW 17.4 01/21/2021    LYMPHOPCT 9.5 01/21/2021    MONOPCT 3.6 01/21/2021    BASOPCT 0.5 01/21/2021    MONOSABS 0.41 01/21/2021    LYMPHSABS 1.08 01/21/2021    EOSABS 0.04 01/21/2021    BASOSABS 0.06 01/21/2021     CMP:    Lab Results   Component Value Date     01/24/2021    K 3.5 01/24/2021    K 3.9 01/21/2021    CL 94 01/24/2021    CO2 18 01/24/2021    BUN 31 01/24/2021    CREATININE 5.3 01/24/2021    GFRAA 11 01/24/2021    LABGLOM 9 01/24/2021    GLUCOSE 88 01/24/2021    PROT 4.9 01/24/2021    LABALBU 2.7 01/24/2021    CALCIUM 7.7 01/24/2021    BILITOT 2.0 01/24/2021    ALKPHOS 141 01/24/2021     01/24/2021     01/24/2021     Calcium:    Lab Results   Component Value Date    CALCIUM 7.7 01/24/2021     Ionized Calcium:  No results found for: IONCA  Magnesium:    Lab Results   Component Value Date    MG 2.0 01/22/2021     Phosphorus:    Lab Results   Component Value Date    PHOS 2.6 09/03/2020     U/A:    Lab Results   Component Value Date    NITRITE neg 08/20/2020    COLORU DARK YELLOW 01/21/2021    PROTEINU >=300 01/21/2021    PHUR 6.5 01/21/2021    WBCUA NONE 01/21/2021    RBCUA 2-5 01/21/2021    MUCUS Present 09/26/2017    BACTERIA MODERATE 01/21/2021    CLARITYU SL CLOUDY 01/21/2021    SPECGRAV >=1.030 01/21/2021    LEUKOCYTESUR Negative 01/21/2021    UROBILINOGEN 1.0 01/21/2021    BILIRUBINUR LARGE 01/21/2021    BILIRUBINUR neg 08/20/2020    BLOODU LARGE 01/21/2021    GLUCOSEU 250 01/21/2021    AMORPHOUS MODERATE 01/21/2021     VITAMIN B12: No components found for: B12  FOLATE:  No results found for: FOLATE  IRON:  No results found for: IRON  Iron Saturation:  No components found for: PERCENTFE  TIBC:  No results found for: TIBC  FERRITIN:  No results found for: FERRITIN  AMYLASE:    Lab Results   Component Value Date    AMYLASE 38 03/13/2018     LIPASE:    Lab Results   Component Value Date    LIPASE 127 01/21/2021       Assessment/Plan    1. BEREKET in the setting of nausea, vomiting decrease intake with oliguria  Baseline cr <1.0  Cr 1.8->2.6-->4.3-->5.3  U/A + nitrites, Spec grav >1.030, lrg blood, > 300 protein  Worsening serum creatinine , concerned for possible hepatorenal syndrome; no signs of hemolysis; rhabdo was a consideration given seizures, but CK not elevated; will increase IVF for now; may need a kidney biopsy; will send GN serologie  US abdomen: neg hydro, multiple isoechoic/hypoechoic hepatic lesions consistent with metastatic disease. 1/24-pt sob with min exertion-decrease rate of IVF LR @ 50 cc/r  Will need a kidney biopsy. Vascular consulted for temp dialysis cath. Monitor daily BMP      2. Hypocalcemia due to parathyroidectomy; exacerbated by inadequate oral supplement: check PTH,  Vit D  , and magnesium levels  Hx parathyroidectomy with chronic hypocalcemia   Not taking calcium supplement d/t nausea and vomiting  Repleted with Calcium gluconate 2 gm IV daily x 2 doses. Continue po calcium supplement  Ca+ 7.0-->6.4-->7.3-->7.7   Ionized calcium low at 0.9-->1.06-->1.13  PTH 37, vitamin D 8: will start Vit D 50,000 units weekly today. Monitor daily BMP and ionized calcium    3. Seizure in the setting of hypocalcemia, hypomagnesemia  Need for MRI with della  per Neurology request will mandate dialysis  At current level of renal function explained to patient     4.  Neuroendocrine tumor with liver mets. 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 Elevated liver enzymes  Oncology following    Thank you for the opportunity to participate in the care of [facility]. TRINA Crum - CNP   1/24/2021  1:35 PM    Patient seen and examined all key components of the physical performed independently , case discussed with NP, all pertinent labs and radiologic tests personally reviewed agree with above.     -BEREKET  Stage 3 with progressive worsening of renal function; plan renal biopsy and dialysis tomorrow; IVF not made difference; rate decreased  -serologies ordered, results pending; no microscopic hematuria on repeat UA,; does not completely r/o GN, makes it less likely      Updated patients father by phone    Dariana Rodriguez MD

## 2021-01-24 NOTE — PROGRESS NOTES
William Valderrama is a 30 y.o. right handed female     Neurology is following for seizures    PMH: Metastatic neuroendocrine tumor with liver mets, migraines, Crohn's disease, history of alcohol abuse in remission, current smoking    She presented on 1/22 with BEREKET and hypocalcemia and GTC seizures. New-onset seizures a week prior. UDS positive for fentanyl and barbiturates (apparently took fentanyl for headache). LFTs were markedly elevated. Was on low-dose topiramate 25 mg at night only for migraines. She is ambulating about the room and denies any complaints. No further seizures. Renal function is worsening today-- creatinine 5.3--LFTs are trending down. Nephrology is following--plan to possibly coordinate hemodialysis to obtain contrasted MRI to look for mets. She may also need kidney biopsy. On migraine dosing topiramate only. EEG done and pending interpretation. Vitals are stable.     No chest pain or palpitations  No SOB  No vertigo, lightheadedness or loss of consciousness  No falls, tripping or stumbling  No incontinence of bowels or bladder  No itching or bruising appreciated  No numbness, tingling or focal arm/leg weakness  Mildly slurred speech; no dysphagia    ROS otherwise negative     Current Facility-Administered Medications   Medication Dose Route Frequency Provider Last Rate Last Admin    enoxaparin (LOVENOX) injection 30 mg  30 mg Subcutaneous Daily Denny Castellanos DO   30 mg at 01/24/21 0945    vitamin D (ERGOCALCIFEROL) capsule 50,000 Units  50,000 Units Oral Weekly TRINA Beard - CNP   50,000 Units at 01/23/21 1754    methocarbamol (ROBAXIN) tablet 1,000 mg  1,000 mg Oral 4x Daily Enio Curtis MD   1,000 mg at 01/24/21 0944    aluminum & magnesium hydroxide-simethicone (MAALOX) 710-904-55 MG/5ML suspension 5 mL  5 mL Oral Q6H PRN Denny Castellanos DO  [Held by provider] butalbital-acetaminophen-caffeine (FIORICET, ESGIC) per tablet 1 tablet  1 tablet Oral Daily PRN Arthur Geoffrey Castellanos DO   1 tablet at 01/22/21 1419    calcium-vitamin D (OSCAL-500) 500-200 MG-UNIT per tablet 1 tablet  1 tablet Oral BID WC Arthur Castellanos DO   1 tablet at 01/24/21 0944    [Held by provider] gabapentin (NEURONTIN) capsule 300 mg  300 mg Oral TID Arthur Geoffrey Castellanos DO   300 mg at 01/22/21 1042    levothyroxine (SYNTHROID) tablet 112 mcg  112 mcg Oral Daily Arthur Geoffrey Castellanos DO   112 mcg at 01/24/21 0654    potassium chloride (KLOR-CON M) extended release tablet 20 mEq  20 mEq Oral Daily Arthur Geoffrey Castellanos DO   20 mEq at 01/24/21 0945    topiramate (TOPAMAX) tablet 25 mg  25 mg Oral Daily Arthur Geoffrey Castellanos DO   25 mg at 01/24/21 0945    zolpidem (AMBIEN) tablet 5 mg  5 mg Oral Nightly PRN Arthur Geoffrey Castellanos DO   5 mg at 01/23/21 2136    sodium chloride flush 0.9 % injection 10 mL  10 mL Intravenous 2 times per day Arthur Geoffrey Castellanos DO   10 mL at 01/23/21 2137    sodium chloride flush 0.9 % injection 10 mL  10 mL Intravenous PRN Arthur Geoffrey Castellanos DO        promethazine (PHENERGAN) tablet 12.5 mg  12.5 mg Oral Q6H PRN Arthur Geoffrey Castellanos DO        Or    ondansetron [facility]) injection 4 mg  4 mg Intravenous Q6H PRN Arthur Geoffrey Castellanos DO        polyethylene glycol (GLYCOLAX) packet 17 g  17 g Oral Daily PRN Arthur Geoffrey Castellanos DO        lactated ringers infusion   Intravenous Continuous Rodrigo Hudson  mL/hr at 01/23/21 2150 Rate Change at 01/23/21 2150    lidocaine PF 1 % injection 5 mL  5 mL Intradermal Once Gabby Delgado MD        sodium chloride flush 0.9 % injection 10 mL  10 mL Intravenous PRN Gabby Delgado MD        heparin flush 100 UNIT/ML injection 300 Units  3 mL Intravenous 2 times per day Gabby Delgado MD        heparin flush 100 UNIT/ML injection 300 Units  3 mL Intracatheter PRN Gabby Delgado MD         Objective: /75   Pulse 66   Temp 98.6 °F (37 °C) (Temporal)   Resp 18   Ht 5' (1.524 m)   Wt 130 lb (59 kg)   SpO2 99%   BMI 25.39 kg/m²     General appearance: Alert appears stated age, cooperative and no distress--walking around in room  Head: normocephalic, without obvious abnormality, atraumatic  Eyes: conjunctivae/corneas clear.  .  Neck: no carotid bruit,  Lungs: clear to auscultation bilaterally  Heart: regular rate and rhythm  Extremities: normal, atraumatic, no cyanosis or edema  Pulses: 2+ and symmetric  Skin: color, texture, turgor normal---no rashes or lesions      Mental Status: alert and oriented x4    Appropriate attention/concentration  Intact fundus of knowledge  Intact memories    Speech: Mild dysarthria  Language: no aphasias    Cranial Nerves:  I: smell NA   II: visual acuity  NA   II: visual fields Full to confrontation   II: pupils VITOR   III,VII: ptosis None   III,IV,VI: extraocular muscles  Full ROM   V: mastication Normal   V: facial light touch sensation  Normal   V,VII: corneal reflex     VII: facial muscle function - upper  Normal   VII: facial muscle function - lower Normal   VIII: hearing Normal   IX: soft palate elevation  Normal   IX,X: gag reflex    XI: trapezius strength  5/5   XI: sternocleidomastoid strength 5/5   XI: neck extension strength  5/5   XII: tongue strength  Normal     Motor:  5/5 throughout  Normal bulk and tone  No drift or abnormal movements    Sensory:  LT symmetric in all limbs    Coordination:   FN, FFM normal    Gait:  Normal    DTR:  No Ferguson's    No pathological reflexes    Laboratory/Radiology:     CMP:    Lab Results   Component Value Date     01/24/2021    K 3.5 01/24/2021    K 3.9 01/21/2021    CL 94 01/24/2021    CO2 18 01/24/2021    BUN 31 01/24/2021    CREATININE 5.3 01/24/2021    GFRAA 11 01/24/2021    LABGLOM 9 01/24/2021    GLUCOSE 88 01/24/2021    PROT 4.9 01/24/2021    LABALBU 2.7 01/24/2021    CALCIUM 7.7 01/24/2021 BILITOT 2.0 01/24/2021    ALKPHOS 141 01/24/2021     01/24/2021     01/24/2021     Hepatic Function Panel:    Lab Results   Component Value Date    ALKPHOS 141 01/24/2021     01/24/2021     01/24/2021    PROT 4.9 01/24/2021    BILITOT 2.0 01/24/2021    BILIDIR 1.5 01/24/2021    IBILI 0.5 01/24/2021    LABALBU 2.7 01/24/2021     MRI brain WO: No acute events; a few small T2 flair hyperintensities    EEG pending    All labs and images personally reviewed today    Assessment:     New onset GTC seizures: Must rule out brain mets in light of her metastatic neuroendocrine tumor, but must also consider provoked seizures in the setting of acute renal failure, hypocalcemia, and transaminitis. Her exam is normal today    Acute renal failure: For HD; possible kidney biopsy     Transaminitis    Metastatic malignant neuroendocrine tumor     Chronic migraines:  On topiramate    Plan:     Hopefully can get r/p MRI w contrast--await coordination and ok from renal     F/U EEG    Continue treatment of underlying medical conditions    Sz precautions    Will follow closely    BLAIR David  1:08 PM  1/24/2021

## 2021-01-24 NOTE — PROGRESS NOTES
[facility] Medicine    Subjective:  Pt alert conversive chet diet      Current Facility-Administered Medications:     enoxaparin (LOVENOX) injection 30 mg, 30 mg, Subcutaneous, Daily, Maricel Castellanos DO, 30 mg at 01/23/21 1007    vitamin D (ERGOCALCIFEROL) capsule 50,000 Units, 50,000 Units, Oral, Weekly, TRINA Mcnally - CNP, 50,000 Units at 01/23/21 1754    methocarbamol (ROBAXIN) tablet 1,000 mg, 1,000 mg, Oral, 4x Daily, Ana Nance MD, 1,000 mg at 01/23/21 2257    aluminum & magnesium hydroxide-simethicone (MAALOX) 200-200-20 MG/5ML suspension 5 mL, 5 mL, Oral, Q6H PRN, Mariecl Castellanos DO    [Held by provider] butalbital-acetaminophen-caffeine (FIORICET, ESGIC) per tablet 1 tablet, 1 tablet, Oral, Daily PRN, Maricel Castellanos DO, 1 tablet at 01/22/21 1419    calcium-vitamin D (Sanchez Noah) 500-200 MG-UNIT per tablet 1 tablet, 1 tablet, Oral, BID WC, Maricel Castellanos DO, 1 tablet at 01/23/21 1754    [Held by provider] gabapentin (NEURONTIN) capsule 300 mg, 300 mg, Oral, TID, Maricel Castellanos DO, 300 mg at 01/22/21 1042    levothyroxine (SYNTHROID) tablet 112 mcg, 112 mcg, Oral, Daily, Maricel Castellanos DO, 112 mcg at 01/24/21 1243    potassium chloride (KLOR-CON M) extended release tablet 20 mEq, 20 mEq, Oral, Daily, Maricel Castellanos DO, 20 mEq at 01/23/21 1008    topiramate (TOPAMAX) tablet 25 mg, 25 mg, Oral, Daily, Maricel Castellanos DO, 25 mg at 01/23/21 1007    zolpidem (AMBIEN) tablet 5 mg, 5 mg, Oral, Nightly PRN, Maricel Castellanos DO, 5 mg at 01/23/21 2136    sodium chloride flush 0.9 % injection 10 mL, 10 mL, Intravenous, 2 times per day, Millie Sanders DO, 10 mL at 01/23/21 2137    sodium chloride flush 0.9 % injection 10 mL, 10 mL, Intravenous, PRN, Maricel Castellanos DO    promethazine (PHENERGAN) tablet 12.5 mg, 12.5 mg, Oral, Q6H PRN **OR** ondansetron (ZOFRAN) injection 4 mg, 4 mg, Intravenous, Q6H PRN, Maricel Castellanos DO   polyethylene glycol (GLYCOLAX) packet 17 g, 17 g, Oral, Daily PRN, Luiz Castellanos DO    lactated ringers infusion, , Intravenous, Continuous, Alf Bonds MD, Last Rate: 125 mL/hr at 01/23/21 2150, Rate Change at 01/23/21 2150    lidocaine PF 1 % injection 5 mL, 5 mL, Intradermal, Once, Maria Del Rosario Arias MD    sodium chloride flush 0.9 % injection 10 mL, 10 mL, Intravenous, PRN, Maria Del Rosario Arias MD    heparin flush 100 UNIT/ML injection 300 Units, 3 mL, Intravenous, 2 times per day, Maria Del Rosario Arias MD    heparin flush 100 UNIT/ML injection 300 Units, 3 mL, Intracatheter, PRN, Maria Del Rosario Arias MD    Objective:    /78   Pulse 72   Temp 98 °F (36.7 °C) (Temporal)   Resp 16   Ht 5' (1.524 m)   Wt 130 lb (59 kg)   SpO2 96%   BMI 25.39 kg/m²     Heart:  reg  Lungs:  ctab  Abd: + bs soft nontender  Extrem:  W/o edema    CBC with Differential:    Lab Results   Component Value Date    WBC 11.4 01/21/2021    RBC 4.37 01/21/2021    HGB 14.0 01/21/2021    HCT 42.0 01/21/2021     01/21/2021    MCV 96.1 01/21/2021    MCH 32.0 01/21/2021    MCHC 33.3 01/21/2021    RDW 17.4 01/21/2021    LYMPHOPCT 9.5 01/21/2021    MONOPCT 3.6 01/21/2021    BASOPCT 0.5 01/21/2021    MONOSABS 0.41 01/21/2021    LYMPHSABS 1.08 01/21/2021    EOSABS 0.04 01/21/2021    BASOSABS 0.06 01/21/2021     CMP:    Lab Results   Component Value Date     01/23/2021    K 3.4 01/23/2021    K 3.9 01/21/2021    CL 96 01/23/2021    CO2 19 01/23/2021    BUN 24 01/23/2021    CREATININE 4.3 01/23/2021    GFRAA 14 01/23/2021    LABGLOM 12 01/23/2021    GLUCOSE 80 01/23/2021    PROT 4.6 01/23/2021    LABALBU 2.9 01/23/2021    CALCIUM 7.3 01/23/2021    BILITOT 3.2 01/23/2021    ALKPHOS 128 01/23/2021    AST 3,661 01/23/2021    ALT 1,498 01/23/2021     Warfarin PT/INR:    Lab Results   Component Value Date    INR 1.3 01/24/2021    INR 1.8 01/23/2021    INR 2.3 01/21/2021    PROTIME 14.3 (H) 01/24/2021 PROTIME 20.0 (H) 01/23/2021    PROTIME 27.2 (H) 01/21/2021       Assessment:    Active Problems:    Hx of migraine headaches    Hypothyroidism    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)    Seizure-like activity (HCC)    Hypomagnesemia    Hypocalcemia    Hypoparathyroidism (HCC)    Tobacco abuse    BEREKET (acute kidney injury) ([facility] [address]    Elevated liver enzymes  Resolved Problems:    * No resolved hospital problems.  *      Plan:  Await am lab discussed case with dr abad schaeffer bx kidney        Bertie Lundborg  8:55 AM  1/24/2021

## 2021-01-24 NOTE — CONSULTS
Vascular Surgery Consultation Note    Reason for Consult: Temporary hemodialysis catheter    HPI :    Brian Pederson is a 30 y.o. female who has acute renal failure from hepatorenal syndrome. Vascular surgery consulted for temporary hemodialysis line placement. No history of vascular procedures to the groin. No history of catheters. No dialysis history. No blood thinners.       ROS : Negative if blank [], Positive if [x]  General Vascular   [] Fevers [] Claudication (Blocks)   [] Chills [] Rest Pain   [] Weight Loss [] Tissue Loss   [] Chest Pain [] Clotting Disorder    [] SOB at rest [] Leg Swelling   [] SOB with exertion [] DVT/PE      [] Nausea    [] Vomitting [] Stroke/TIA   [] Abdominal Pain [] Focal weakness   [] Melena [] Slurred Speech   [] Hematochezia [] Vision Changes   [] Hematuria    [] Dysuria [] Hx of Central Catheters   [] Wears Glasses/Contacts  [x] Dialysis and If so date initiated   [] Blindness     []  Hand Dominant   [] Difficulty swallowing        Past Medical History:   Diagnosis Date    Abdominal pain     [facility]     Cancer ([facility] [address] [facility]     Hypocalcemia     Hypothyroidism     Irritable bowel syndrome     Migraines     Status post alcohol detoxification     5/22/2018-5/29/2018        Past Surgical History:   Procedure Laterality Date    CHOLECYSTECTOMY, LAPAROSCOPIC  07/06/2016    COLONOSCOPY N/A 6/22/2018    COLONOSCOPY WITH BIOPSY performed by Kelly Dupont MD at [address] NEEDLE BIOPSY LIVER PERCUTANEOUS  9/1/2020    CT NEEDLE BIOPSY LIVER PERCUTANEOUS 9/1/2020 [facility] CT    PARATHYROID GLAND SURGERY      SMALL INTESTINE SURGERY N/A 10/21/2020    LAPAROSCOPIC ROBOTIC SMALL BOWEL RESECTION WITH PLANNED TRANSITION TO OPEN performed by Amos Mcmahon MD at [facility]       Current Medications:    lactated ringers 50 mL/hr at 01/24/21 1344      aluminum & magnesium hydroxide-simethicone, [Held by provider] butalbital-acetaminophen-caffeine, zolpidem, sodium chloride flush, promethazine **OR** ondansetron, polyethylene glycol, sodium chloride flush, heparin flush    [Held by provider] enoxaparin  30 mg Subcutaneous Daily    vitamin D  50,000 Units Oral Weekly    methocarbamol  1,000 mg Oral 4x Daily    calcium-vitamin D  1 tablet Oral BID WC    [Held by provider] gabapentin  300 mg Oral TID    levothyroxine  112 mcg Oral Daily    potassium chloride  20 mEq Oral Daily    topiramate  25 mg Oral Daily    sodium chloride flush  10 mL Intravenous 2 times per day    lidocaine PF  5 mL Intradermal Once    heparin flush  3 mL Intravenous 2 times per day        Allergies:  Patient has no known allergies. Social History     Socioeconomic History    Marital status: Single     Spouse name: Not on file    Number of children: Not on file    Years of education: Not on file    Highest education level: Not on file   Occupational History    Occupation: cleaning     Employer: the [address] [place] [address] [employer] resource strain: Not on file    Food insecurity     Worry: Not on file     Inability: Not on file   [employer] needs     Medical: Not on file     Non-medical: Not on file   Tobacco Use    Smoking status: Current Every Day Smoker     Packs/day: 0.50    Smokeless tobacco: Never Used   Substance and Sexual Activity    Alcohol use: No     Alcohol/week: 0.0 standard drinks     Comment: 4 weeks sober post detox    Drug use: No    Sexual activity: Yes     Partners: Male     Birth control/protection: I.U.D.    Lifestyle    Physical activity     Days per week: Not on file     Minutes per session: Not on file    Stress: Not on file   Relationships    Social connections     Talks on phone: Not on file     Gets together: Not on file     Attends Hoahaoism service: Not on file     Active member of club or organization: Not on file     Attends meetings of clubs or organizations: Not on file Relationship status: Not on file    Intimate partner violence     Fear of current or ex partner: Not on file     Emotionally abused: Not on file     Physically abused: Not on file     Forced sexual activity: Not on file   Other Topics Concern    Not on file   Social History Narrative    Not on file        Family History   Problem Relation Age of Onset    High Blood Pressure Mother     High Cholesterol Mother     Other Mother         migraine headaches    Heart Disease Father     Asthma Father     High Blood Pressure Father     Cancer Father         Sarcoidosis    Diabetes Other         GRANDMOTHER    Lung Cancer Other         GRANDFATHER    Alzheimer's Disease Other         GRANDMOTHER    Breast Cancer Maternal Grandmother     Cancer Maternal Grandmother         skin    Cancer Paternal Grandfather         lung       PHYSICAL EXAM:    BP (!) 117/55   Pulse 79   Temp 97.7 °F (36.5 °C) (Temporal)   Resp 16   Ht 5' (1.524 m)   Wt 130 lb (59 kg)   SpO2 98%   BMI 25.39 kg/m²   CONSTITUTIONAL:  awake, alert, cooperative, no apparent distress, and appears stated age  Normal  EYES:  lids and lashes normal, sclera clear and conjunctiva normal  ENT:  normocepalic, without obvious abnormality, external ears without lesions  NECK:  supple, symmetrical, trachea midline,no jugular venous distension, no carotid bruits  HEMATOLOGIC/LYMPHATICS:  no cervical lymphadenopathy  LUNGS:  no increased work of breathing, good air exchange  CARDIOVASCULAR:  regular rate and rhythm no murmur noted  ABDOMEN:  soft, non-distended, non-tender, Aorta is not palpable  SKIN:  no bruising or bleeding  EXTREMITIES: No swelling, no ecchymosis, no rashes, no scars                R femoral 2+ L femoral 2+                       LABS:    Lab Results   Component Value Date    WBC 11.4 01/21/2021    HGB 14.0 01/21/2021    HCT 42.0 01/21/2021     01/21/2021    PROTIME 14.3 (H) 01/24/2021    INR 1.3 01/24/2021    K 3.5 01/24/2021 BUN 31 (H) 01/24/2021    CREATININE 5.3 (H) 01/24/2021       RADIOLOGY:    Assesment/Plan    Right femoral vein temporary hemodialysis catheter placed at bedside  Okay for dialysis through catheter      Kana Oneil   Pt seen and examined  Temp hd cath working well  Feeling better    She has had previous thyroid and parathyroid surgery - neck incision in the past    I did discuss with her if her renal function does not improve that she may need a tunneled line    I reviewed the procedure with the patient and family as available. I discussed the procedure, risks, benefits, complications, and alternatives of the procedure. They understand and consent. All questions were answered    Pt states she needs a mediport. She is receiving lanreotide once a month per Dr. Delores Elaine    Will discuss with other services to determine need    Jesús Siddiqi      I spoke with Dr. Aroldo Nickerson who doesn't feel pt needs a mediport. The lanreotide is a once a month depot injection not an iv infusion.     Jesús Siddiqi

## 2021-01-24 NOTE — PROGRESS NOTES
LFTs continue rapid downward trend, Cr continues up  Clinically the same  MRI with contrast on hold  Dialysis contemplated as well as renal biopsy    Scenario not explained but this is not hepatorenal syndrome

## 2021-01-24 NOTE — PROCEDURES
Nettie Quintanilla is a 30 y.o. female patient. No diagnosis found. Past Medical History:   Diagnosis Date    Abdominal pain     Acute [facility]'s [facility] ([facility] [address]     Cancer ([facility] [address]     Hypocalcemia     Hypothyroidism     Irritable bowel syndrome     Migraines     Status post alcohol detoxification     5/22/2018-5/29/2018     Blood pressure (!) 117/55, pulse 79, temperature 97.7 °F (36.5 °C), temperature source Temporal, resp. rate 16, height 5' (1.524 m), weight 130 lb (59 kg), SpO2 98 %, not currently breastfeeding. Central Line    Date/Time: 1/24/2021 5:48 PM  Performed by: Tammy Zurita MD  Authorized by: Tammy Zurita MD   Consent: Verbal consent obtained. Written consent obtained. Risks and benefits: risks, benefits and alternatives were discussed  Consent given by: patient  Patient understanding: patient states understanding of the procedure being performed  Required items: required blood products, implants, devices, and special equipment available  Patient identity confirmed: verbally with patient, arm band and provided demographic data  Time out: Immediately prior to procedure a \"time out\" was called to verify the correct patient, procedure, equipment, support staff and site/side marked as required.   Indications: vascular access  Anesthesia: local infiltration    Anesthesia:  Local Anesthetic: lidocaine 1% with epinephrine  Anesthetic total: 5 mL    Sedation:  Patient sedated: no    Preparation: skin prepped with 2% chlorhexidine  Skin prep agent dried: skin prep agent completely dried prior to procedure  Sterile barriers: all five maximum sterile barriers used - cap, mask, sterile gown, sterile gloves, and large sterile sheet  Hand hygiene: hand hygiene performed prior to central venous catheter insertion  Location details: right femoral  Patient position: flat  Catheter type: double lumen  Catheter size: 14 Fr  Pre-procedure: landmarks identified  Ultrasound guidance: yes Sterile ultrasound techniques: sterile gel and sterile probe covers were used  Number of attempts: 1  Successful placement: yes  Post-procedure: line sutured and dressing applied  Assessment: blood return through all ports and free fluid flow  Patient tolerance: patient tolerated the procedure well with no immediate complications          Dana Montes MD  1/24/2021    Darian Sanchez

## 2021-01-24 NOTE — PROGRESS NOTES
[facility] SURGERY  DAILY PROGRESS NOTE  1/24/2021    Subjective:  Still improving daily, UOP has improved, awaiting AM labs,  Still having diarrhea    Objective:  /78   Pulse 72   Temp 98 °F (36.7 °C) (Temporal)   Resp 16   Ht 5' (1.524 m)   Wt 130 lb (59 kg)   SpO2 96%   BMI 25.39 kg/m²     GENERAL:  Laying in bed, awake, alert, cooperative, no apparent distress  HEAD: Normocephalic, atraumatic  EYES: No sclera icterus today, pupils equal  LUNGS:  No increased work of breathing  CARDIOVASCULAR:  R  ABDOMEN:  Soft, non-tender, non-distended  EXTREMITIES: No edema or swelling  SKIN: Warm and dry    Assessment/Plan:  30 y.o. female with elevated liver enzymes in the setting of altered mental status and seizures. 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 - continue to monitor LFTs  - UOP has improved- awaiting AM labs- appreciate npehro recs  - GI- believes hypoxic liver injury- continues tor esolve  - continue general diet  - supportive care    Electronically signed by Scott Quinones DO on 1/24/2021 at 6:09 AM     I saw and examined the patient. I reviewed the above resident's note. I agree with the assessment and plan as outlined.     Simona Wong MD  General Surgery

## 2021-01-25 ENCOUNTER — APPOINTMENT (OUTPATIENT)
Dept: CT IMAGING | Age: 36
DRG: 469 | End: 2021-01-25
Attending: INTERNAL MEDICINE
Payer: COMMERCIAL

## 2021-01-25 LAB
ALBUMIN SERPL-MCNC: 2.7 G/DL (ref 3.5–5.2)
ALP BLD-CCNC: 186 U/L (ref 35–104)
ALT SERPL-CCNC: 597 U/L (ref 0–32)
ANION GAP SERPL CALCULATED.3IONS-SCNC: 18 MMOL/L (ref 7–16)
ANTI-NUCLEAR ANTIBODY (ANA): NEGATIVE
AST SERPL-CCNC: 213 U/L (ref 0–31)
BILIRUB SERPL-MCNC: 0.8 MG/DL (ref 0–1.2)
BILIRUBIN DIRECT: 0.5 MG/DL (ref 0–0.3)
BILIRUBIN, INDIRECT: 0.3 MG/DL (ref 0–1)
BUN BLDV-MCNC: 32 MG/DL (ref 6–20)
CALCIUM IONIZED: 1.11 MMOL/L (ref 1.15–1.33)
CALCIUM SERPL-MCNC: 7.7 MG/DL (ref 8.6–10.2)
CHLORIDE BLD-SCNC: 94 MMOL/L (ref 98–107)
CO2: 17 MMOL/L (ref 22–29)
CREAT SERPL-MCNC: 6.2 MG/DL (ref 0.5–1)
CREATININE URINE: 23 MG/DL (ref 29–226)
GFR AFRICAN AMERICAN: 9
GFR NON-AFRICAN AMERICAN: 8 ML/MIN/1.73
GLUCOSE BLD-MCNC: 69 MG/DL (ref 74–99)
HAV IGM SER IA-ACNC: NORMAL
HEPATITIS B CORE IGM ANTIBODY: NORMAL
HEPATITIS B SURFACE ANTIGEN INTERPRETATION: NORMAL
HEPATITIS C ANTIBODY INTERPRETATION: NORMAL
INR BLD: 1.1
POTASSIUM SERPL-SCNC: 3.5 MMOL/L (ref 3.5–5)
PROTEIN PROTEIN: 14 MG/DL (ref 0–12)
PROTHROMBIN TIME: 12.2 SEC (ref 9.3–12.4)
SEDIMENTATION RATE, ERYTHROCYTE: 22 MM/HR (ref 0–20)
SODIUM BLD-SCNC: 129 MMOL/L (ref 132–146)
TOTAL PROTEIN: 4.9 G/DL (ref 6.4–8.3)

## 2021-01-25 PROCEDURE — 77012 CT SCAN FOR NEEDLE BIOPSY: CPT | Performed by: RADIOLOGY

## 2021-01-25 PROCEDURE — 6360000002 HC RX W HCPCS: Performed by: RADIOLOGY

## 2021-01-25 PROCEDURE — 2500000003 HC RX 250 WO HCPCS: Performed by: RADIOLOGY

## 2021-01-25 PROCEDURE — 77002 NEEDLE LOCALIZATION BY XRAY: CPT | Performed by: RADIOLOGY

## 2021-01-25 PROCEDURE — 87205 SMEAR GRAM STAIN: CPT

## 2021-01-25 PROCEDURE — 86706 HEP B SURFACE ANTIBODY: CPT

## 2021-01-25 PROCEDURE — 5A1D70Z PERFORMANCE OF URINARY FILTRATION, INTERMITTENT, LESS THAN 6 HOURS PER DAY: ICD-10-PCS | Performed by: INTERNAL MEDICINE

## 2021-01-25 PROCEDURE — 80076 HEPATIC FUNCTION PANEL: CPT

## 2021-01-25 PROCEDURE — 85651 RBC SED RATE NONAUTOMATED: CPT

## 2021-01-25 PROCEDURE — 6370000000 HC RX 637 (ALT 250 FOR IP): Performed by: INTERNAL MEDICINE

## 2021-01-25 PROCEDURE — 6370000000 HC RX 637 (ALT 250 FOR IP): Performed by: STUDENT IN AN ORGANIZED HEALTH CARE EDUCATION/TRAINING PROGRAM

## 2021-01-25 PROCEDURE — 85610 PROTHROMBIN TIME: CPT

## 2021-01-25 PROCEDURE — 6360000004 HC RX CONTRAST MEDICATION: Performed by: RADIOLOGY

## 2021-01-25 PROCEDURE — 36415 COLL VENOUS BLD VENIPUNCTURE: CPT

## 2021-01-25 PROCEDURE — 2060000000 HC ICU INTERMEDIATE R&B

## 2021-01-25 PROCEDURE — 77012 CT SCAN FOR NEEDLE BIOPSY: CPT

## 2021-01-25 PROCEDURE — 50200 RENAL BIOPSY PERQ: CPT | Performed by: RADIOLOGY

## 2021-01-25 PROCEDURE — 2709999900 CT BIOPSY RENAL

## 2021-01-25 PROCEDURE — 80048 BASIC METABOLIC PNL TOTAL CA: CPT

## 2021-01-25 PROCEDURE — 82570 ASSAY OF URINE CREATININE: CPT

## 2021-01-25 PROCEDURE — 82330 ASSAY OF CALCIUM: CPT

## 2021-01-25 PROCEDURE — 90935 HEMODIALYSIS ONE EVALUATION: CPT | Performed by: INTERNAL MEDICINE

## 2021-01-25 PROCEDURE — 84156 ASSAY OF PROTEIN URINE: CPT

## 2021-01-25 PROCEDURE — 0TB13ZX EXCISION OF LEFT KIDNEY, PERCUTANEOUS APPROACH, DIAGNOSTIC: ICD-10-PCS | Performed by: RADIOLOGY

## 2021-01-25 PROCEDURE — 2580000003 HC RX 258: Performed by: NURSE PRACTITIONER

## 2021-01-25 RX ORDER — FENTANYL CITRATE 50 UG/ML
INJECTION, SOLUTION INTRAMUSCULAR; INTRAVENOUS
Status: COMPLETED | OUTPATIENT
Start: 2021-01-25 | End: 2021-01-25

## 2021-01-25 RX ORDER — MIDAZOLAM HYDROCHLORIDE 1 MG/ML
INJECTION INTRAMUSCULAR; INTRAVENOUS
Status: COMPLETED | OUTPATIENT
Start: 2021-01-25 | End: 2021-01-25

## 2021-01-25 RX ADMIN — HYDROCODONE BITARTRATE AND ACETAMINOPHEN 1 TABLET: 5; 325 TABLET ORAL at 01:07

## 2021-01-25 RX ADMIN — HYDROCODONE BITARTRATE AND ACETAMINOPHEN 1 TABLET: 5; 325 TABLET ORAL at 22:15

## 2021-01-25 RX ADMIN — ZOLPIDEM TARTRATE 5 MG: 5 TABLET ORAL at 22:00

## 2021-01-25 RX ADMIN — Medication 1 TABLET: at 16:47

## 2021-01-25 RX ADMIN — METHOCARBAMOL TABLETS 1000 MG: 500 TABLET, COATED ORAL at 16:47

## 2021-01-25 RX ADMIN — IOPAMIDOL 1 ML: 612 INJECTION, SOLUTION INTRAVENOUS at 10:18

## 2021-01-25 RX ADMIN — Medication 1 KIT: at 10:26

## 2021-01-25 RX ADMIN — HYDROCODONE BITARTRATE AND ACETAMINOPHEN 1 TABLET: 5; 325 TABLET ORAL at 15:39

## 2021-01-25 RX ADMIN — LEVOTHYROXINE SODIUM 112 MCG: 0.11 TABLET ORAL at 06:52

## 2021-01-25 RX ADMIN — METHOCARBAMOL TABLETS 1000 MG: 500 TABLET, COATED ORAL at 20:24

## 2021-01-25 RX ADMIN — SODIUM CHLORIDE, POTASSIUM CHLORIDE, SODIUM LACTATE AND CALCIUM CHLORIDE: 600; 310; 30; 20 INJECTION, SOLUTION INTRAVENOUS at 13:56

## 2021-01-25 RX ADMIN — MIDAZOLAM 1 MG: 1 INJECTION INTRAMUSCULAR; INTRAVENOUS at 10:18

## 2021-01-25 RX ADMIN — HYDROCODONE BITARTRATE AND ACETAMINOPHEN 1 TABLET: 5; 325 TABLET ORAL at 06:51

## 2021-01-25 RX ADMIN — FENTANYL CITRATE 50 MCG: 50 INJECTION, SOLUTION INTRAMUSCULAR; INTRAVENOUS at 10:20

## 2021-01-25 ASSESSMENT — PAIN SCALES - GENERAL
PAINLEVEL_OUTOF10: 6
PAINLEVEL_OUTOF10: 8
PAINLEVEL_OUTOF10: 0
PAINLEVEL_OUTOF10: 0
PAINLEVEL_OUTOF10: 8
PAINLEVEL_OUTOF10: 8

## 2021-01-25 ASSESSMENT — PAIN DESCRIPTION - LOCATION
LOCATION: GENERALIZED
LOCATION: GENERALIZED

## 2021-01-25 ASSESSMENT — PAIN DESCRIPTION - PAIN TYPE: TYPE: ACUTE PAIN

## 2021-01-25 ASSESSMENT — PAIN DESCRIPTION - DESCRIPTORS: DESCRIPTORS: ACHING;CONSTANT

## 2021-01-25 NOTE — PRE SEDATION
King Saldana II, MD  1/25/2021  10:02 AM        PRE-SEDATION PHYSICIAN ASSESSMENT:      1. HISTORY & PHYSICAL EXAMINATION:  Comments: none    Vitals:    01/24/21 1930   BP: 122/87   Pulse: 62   Resp: 16   Temp: 98 °F (36.7 °C)   SpO2: 98%       Allergies: Patient has no known allergies. 2. Heart and Lungs immediately prior to procedure demonstrate no contraindications to proceed      Chief Complaint: BEREKET (acute kidney injury) ([facility] [address]    Drug: unknown  Tobacco: unknown    3. PAST ANESTHESIA EXPERIENCE:  unknown. 4. AIRWAY/TEETH/HEAD & NECK(Mallampati Classification):  II (soft palate, uvula, fauces visible)    5: NORMAL RANGE OF MOTION OF NECK: No    6. PATIENT WILL LIKELY TOLERATE PLAN OF MODERATE SEDATION    7. ASA 2.     Digna Bran MD

## 2021-01-25 NOTE — PROGRESS NOTES
Patient arrived to Radiology recovery room for observation until sent back to floor. Vital signs, dressing and pain assessment will be done every 15 minutes or as needed. Presently patient is A&Ox4. Pain is 5/10 from R groin where dialysis catheter was placed yesterday. 11:08 a.m. bandage clean & dry.

## 2021-01-25 NOTE — PROGRESS NOTES
  promethazine (PHENERGAN) tablet 12.5 mg, 12.5 mg, Oral, Q6H PRN **OR** ondansetron (ZOFRAN) injection 4 mg, 4 mg, Intravenous, Q6H PRN, Scooter Castellanos,     polyethylene glycol (GLYCOLAX) packet 17 g, 17 g, Oral, Daily PRN, Scooter Castellanos DO    lactated ringers infusion, , Intravenous, Continuous, Catrachito Hand, APRN - CNP, Last Rate: 50 mL/hr at 01/24/21 1344, Rate Change at 01/24/21 1344    lidocaine PF 1 % injection 5 mL, 5 mL, Intradermal, Once, Lana Bradshaw MD    sodium chloride flush 0.9 % injection 10 mL, 10 mL, Intravenous, PRN, Lana Bradshaw MD    heparin flush 100 UNIT/ML injection 300 Units, 3 mL, Intravenous, 2 times per day, Lana Bradshaw MD    heparin flush 100 UNIT/ML injection 300 Units, 3 mL, Intracatheter, PRN, Lana Bradshaw MD    Objective:    /87   Pulse 62   Temp 98 °F (36.7 °C) (Temporal)   Resp 16   Ht 5' (1.524 m)   Wt 130 lb (59 kg)   SpO2 98%   BMI 25.39 kg/m²     Heart:  reg  Lungs:  ctab  Abd: + bs nondistended  Extrem:  Min edema feet    CBC with Differential:    Lab Results   Component Value Date    WBC 11.4 01/21/2021    RBC 4.37 01/21/2021    HGB 14.0 01/21/2021    HCT 42.0 01/21/2021     01/21/2021    MCV 96.1 01/21/2021    MCH 32.0 01/21/2021    MCHC 33.3 01/21/2021    RDW 17.4 01/21/2021    LYMPHOPCT 9.5 01/21/2021    MONOPCT 3.6 01/21/2021    BASOPCT 0.5 01/21/2021    MONOSABS 0.41 01/21/2021    LYMPHSABS 1.08 01/21/2021    EOSABS 0.04 01/21/2021    BASOSABS 0.06 01/21/2021     CMP:    Lab Results   Component Value Date     01/24/2021    K 3.5 01/24/2021    K 3.9 01/21/2021    CL 94 01/24/2021    CO2 18 01/24/2021    BUN 31 01/24/2021    CREATININE 5.3 01/24/2021    GFRAA 11 01/24/2021    LABGLOM 9 01/24/2021    GLUCOSE 88 01/24/2021    PROT 4.9 01/24/2021    LABALBU 2.7 01/24/2021    CALCIUM 7.7 01/24/2021    BILITOT 2.0 01/24/2021    ALKPHOS 141 01/24/2021     01/24/2021     01/24/2021 Warfarin PT/INR:    Lab Results   Component Value Date    INR 1.3 01/24/2021    INR 1.8 01/23/2021    INR 2.3 01/21/2021    PROTIME 14.3 (H) 01/24/2021    PROTIME 20.0 (H) 01/23/2021    PROTIME 27.2 (H) 01/21/2021       Assessment:    Principal Problem:    BEREKET (acute kidney injury) ([facility] [address]  Active Problems:    Hypothyroidism    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 [facility])    New onset seizure (HCC)    Hypomagnesemia    Hypocalcemia    Hypoparathyroidism (HCC)    Tobacco abuse    Elevated liver enzymes    Moderate protein-calorie malnutrition (HCC)  Resolved Problems:    Hx of migraine headaches      Plan:   For renal bx and dialysis today        Sim Class  7:51 AM  1/25/2021

## 2021-01-25 NOTE — BRIEF OP NOTE
Brief Postoperative Note    Kaykay rTan  YOB: 1985  [id number]    Pre-operative Diagnosis and Procedure: renal bx    Post-operative Diagnosis: Same    Anesthesia: Local    Estimated Blood Loss: < 10 cc    Surgeon: Lawrence SOLOMON     Complications: none    Specimen obtained: yes    Findings: none     Stephanie Tejada II   1/25/2021 10:14 AM

## 2021-01-25 NOTE — PLAN OF CARE
Attempted to see patient but she is having testing done at this time. Will try back later if time permits.

## 2021-01-25 NOTE — PROGRESS NOTES
[facility]  Nephrology Attending Progress Note    SUBJECTIVE: Patient is being followed for BEREKET    1/25: Temp HD cath placed yesterday. Having renal biopsy this AM and then first HD treatment.      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 ([address]    Malignant carcinoid tumor of ileum ([address]    New onset seizure ([address]    Neuroendocrine tumor    Hypomagnesemia    Hypocalcemia    Hypoparathyroidism (HCC)    Tobacco abuse    BEREKET (acute kidney injury) ([address]    Elevated liver enzymes    Moderate protein-calorie malnutrition (HCC)        PAST MEDICAL HISTORY:    Past Medical History:   Diagnosis Date    Abdominal pain     Acute Crohn's disease ([address]     Cancer ([address]     Hypocalcemia     Hypothyroidism     Irritable bowel syndrome     Migraines     Status post alcohol detoxification     5/22/2018-5/29/2018       DIET:    Diet NPO Effective Now     PHYSICAL EXAM:     Patient Vitals for the past 24 hrs:   BP Temp Temp src Pulse Resp SpO2   01/24/21 1930 122/87 98 °F (36.7 °C) Temporal 62 16 98 %   01/24/21 1545 (!) 117/55 97.7 °F (36.5 °C) Temporal 79 16 98 %   @      Intake/Output Summary (Last 24 hours) at 1/25/2021 0947  Last data filed at 1/25/2021 0802  Gross per 24 hour   Intake 240 ml   Output 2100 ml   Net -1860 ml         Wt Readings from Last 3 Encounters:   01/22/21 130 lb (59 kg)   01/21/21 130 lb (59 kg)   01/07/21 140 lb 9.6 oz (63.8 kg)       Constitutional:  Pt is in no acute distress  Head: normocephalic, atraumatic  Neck: no JVD  Cardiovascular: regular rate and rhythm, no murmurs, gallops, or rubs  Respiratory:  No rales, rhochi, or wheezes, diminished R>L  Gastrointestinal:  Soft, RLQ tender, nondistended, bowel sounds x 4 components found for: URIC    Lab Results   Component Value Date    COLORU Yellow 01/24/2021    NITRU Negative 01/24/2021    GLUCOSEU Negative 01/24/2021    KETUA Negative 01/24/2021    UROBILINOGEN 0.2 01/24/2021    BILIRUBINUR Negative 01/24/2021    BILIRUBINUR neg 08/20/2020       No results found for: Rahel Noah      IMPRESSION/RECOMMENDATIONS:      1. BEREKET   in the setting of nausea, vomiting decrease intake with oliguria  Baseline cr <1.0  Cr 1.8->2.6-->4.3-->5.3-->6.2  U/A + nitrites, Spec grav >1.030, lrg blood, > 300 protein  Worsening serum creatinine  possible hepatorenal syndrome  no signs of hemolysis  rhabdo was a consideration given seizures, but CK not elevated  GN serologies pending  US abdomen: neg hydro, multiple isoechoic/hypoechoic hepatic lesions consistent with metastatic disease. 1/24-pt sob with min exertion-decrease rate of IVF LR @ 50 cc/r  For kidney biopsy today 1/25  Temp dialysis cath placed 1/24  For first HD today     2. Hypocalcemia  due to parathyroidectomy; exacerbated by inadequate oral supplement  Not taking calcium supplement d/t nausea and vomiting  Repleted with Calcium gluconate 2 gm IV daily x 2 doses. Continue po calcium supplement  Ca+ 7.0-->6.4-->7.3-->7.7   Ionized calcium low at 0.9-->1.06-->1.13-->1.11  PTH 37, vitamin D 8-->Continue Vit D 50,000 units weekly   Correct Ca with HD and supplements  Monitor daily BMP and ionized calcium     3. Seizure  in the setting of hypocalcemia, hypomagnesemia  Need for MRI with della  per Neurology request will mandate dialysis  At current level of renal function  explained to patient      4. Neuroendocrine tumor with liver mets. 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 Elevated liver enzymes  Oncology following    5. Metabolic acidosis  with the BEREKET  Follow with initiation of HD    6.  Hyponatremia  Correct with HD     Thank you for the opportunity to participate in the care of [facility] Camelia Berger NP  9:50 AM  01/25/21     Pt seen and examined agree with above  For first hd today  Sp kidney bx today  Await full serologies  Urine eos  Urine pro/cr ratio  c3 and c4 low  Cullen Davenport MD

## 2021-01-25 NOTE — INTERVAL H&P NOTE
H&P Update    Patient's History and Physical  was reviewed. The patient appears likely to able to tolerate the procedure. Risk and benefits discussed including ultimate complications, possibly death and consent obtained.     Letha Cade, II

## 2021-01-25 NOTE — FLOWSHEET NOTE
01/25/21 1506   Vital Signs   BP (!) 151/88   Temp 97.5 °F (36.4 °C)   Pulse 78   Resp 18   Weight 163 lb 2.3 oz (74 kg)   Weight Method Bed scale   Percent Weight Change -1.46   Pain Assessment   Pain Assessment 0-10   Pain Level 0   Post-Hemodialysis Assessment   Duration of Treatment (minutes) 2.5 minutes   Hemodialysis Intake (ml) 300 ml   Hemodialysis Output (ml) 1300 ml   NET Removed (ml) 1000 ml   Tolerated Treatment Good   Patient Response to Treatment tolerated well, profile B, refill noted, 1L fluid removal   Bilateral Breath Sounds Diminished   Edema Right lower extremity; Left lower extremity   Physician Notified?  No

## 2021-01-25 NOTE — PROGRESS NOTES
VASCULAR SURGERY NOTE  S/P Temporary Hemodialysis Line Placement. Sites soft, without ecchymosis or hematoma, incision c/d/i  May replace groin dressing with gauze tomorrow and change daily  OK to use line for dialysis    Dressing Change Order:  Remove groin dressing, replace with sterile gauze. Change daily.     Nate Dietrich MD  Resident, PGY-1  1/25/2021  5:47 AM

## 2021-01-25 NOTE — POST SEDATION
POST SEDATION NOTE:  Time: 10:02 AM    Cardiopulmonary: Vitals Signs Stable: yes    Level of Consciousness: alert    Reversal Agent Used: No    Complications: none    Follow-up/Observations: none    Pain Score: 1    Oxana Matias MD

## 2021-01-26 PROBLEM — N18.6 ENCOUNTER REGARDING VASCULAR ACCESS FOR DIALYSIS FOR ESRD (HCC): Status: ACTIVE | Noted: 2021-01-26

## 2021-01-26 PROBLEM — Z99.2 ENCOUNTER REGARDING VASCULAR ACCESS FOR DIALYSIS FOR ESRD (HCC): Status: ACTIVE | Noted: 2021-01-26

## 2021-01-26 LAB
ALBUMIN SERPL-MCNC: 3 G/DL (ref 3.5–5.2)
ALP BLD-CCNC: 170 U/L (ref 35–104)
ALT SERPL-CCNC: 407 U/L (ref 0–32)
ANION GAP SERPL CALCULATED.3IONS-SCNC: 12 MMOL/L (ref 7–16)
AST SERPL-CCNC: 99 U/L (ref 0–31)
BILIRUB SERPL-MCNC: 0.7 MG/DL (ref 0–1.2)
BILIRUBIN DIRECT: 0.4 MG/DL (ref 0–0.3)
BILIRUBIN, INDIRECT: 0.3 MG/DL (ref 0–1)
BUN BLDV-MCNC: 19 MG/DL (ref 6–20)
CALCIUM IONIZED: 1.15 MMOL/L (ref 1.15–1.33)
CALCIUM SERPL-MCNC: 7.9 MG/DL (ref 8.6–10.2)
CHLORIDE BLD-SCNC: 97 MMOL/L (ref 98–107)
CO2: 23 MMOL/L (ref 22–29)
CREAT SERPL-MCNC: 5.3 MG/DL (ref 0.5–1)
EOSINOPHIL, URINE: 0 % (ref 0–1)
GFR AFRICAN AMERICAN: 11
GFR NON-AFRICAN AMERICAN: 9 ML/MIN/1.73
GLUCOSE BLD-MCNC: 151 MG/DL (ref 74–99)
HBV SURFACE AB TITR SER: REACTIVE {TITER}
INR BLD: 1.1
POTASSIUM SERPL-SCNC: 3.6 MMOL/L (ref 3.5–5)
PROTHROMBIN TIME: 11.9 SEC (ref 9.3–12.4)
SODIUM BLD-SCNC: 132 MMOL/L (ref 132–146)
TOTAL PROTEIN: 5.2 G/DL (ref 6.4–8.3)
URIC ACID, SERUM: 7.4 MG/DL (ref 2.4–5.7)
URINE CULTURE, ROUTINE: NORMAL

## 2021-01-26 PROCEDURE — 84166 PROTEIN E-PHORESIS/URINE/CSF: CPT

## 2021-01-26 PROCEDURE — 6370000000 HC RX 637 (ALT 250 FOR IP): Performed by: STUDENT IN AN ORGANIZED HEALTH CARE EDUCATION/TRAINING PROGRAM

## 2021-01-26 PROCEDURE — 85610 PROTHROMBIN TIME: CPT

## 2021-01-26 PROCEDURE — 86215 DEOXYRIBONUCLEASE ANTIBODY: CPT

## 2021-01-26 PROCEDURE — 82330 ASSAY OF CALCIUM: CPT

## 2021-01-26 PROCEDURE — 6370000000 HC RX 637 (ALT 250 FOR IP): Performed by: INTERNAL MEDICINE

## 2021-01-26 PROCEDURE — 99232 SBSQ HOSP IP/OBS MODERATE 35: CPT | Performed by: NURSE PRACTITIONER

## 2021-01-26 PROCEDURE — 84165 PROTEIN E-PHORESIS SERUM: CPT

## 2021-01-26 PROCEDURE — 2580000003 HC RX 258: Performed by: INTERNAL MEDICINE

## 2021-01-26 PROCEDURE — 80048 BASIC METABOLIC PNL TOTAL CA: CPT

## 2021-01-26 PROCEDURE — 84550 ASSAY OF BLOOD/URIC ACID: CPT

## 2021-01-26 PROCEDURE — 83516 IMMUNOASSAY NONANTIBODY: CPT

## 2021-01-26 PROCEDURE — 80076 HEPATIC FUNCTION PANEL: CPT

## 2021-01-26 PROCEDURE — 2060000000 HC ICU INTERMEDIATE R&B

## 2021-01-26 PROCEDURE — 90935 HEMODIALYSIS ONE EVALUATION: CPT

## 2021-01-26 PROCEDURE — 36415 COLL VENOUS BLD VENIPUNCTURE: CPT

## 2021-01-26 RX ORDER — LEVOTHYROXINE SODIUM 112 UG/1
TABLET ORAL
Qty: 90 TABLET | Refills: 1 | Status: SHIPPED
Start: 2021-01-26 | End: 2021-07-01

## 2021-01-26 RX ADMIN — HYDROCODONE BITARTRATE AND ACETAMINOPHEN 1 TABLET: 5; 325 TABLET ORAL at 11:56

## 2021-01-26 RX ADMIN — Medication 1 TABLET: at 17:22

## 2021-01-26 RX ADMIN — HYDROCODONE BITARTRATE AND ACETAMINOPHEN 1 TABLET: 5; 325 TABLET ORAL at 18:29

## 2021-01-26 RX ADMIN — ZOLPIDEM TARTRATE 5 MG: 5 TABLET ORAL at 22:17

## 2021-01-26 RX ADMIN — HYDROCODONE BITARTRATE AND ACETAMINOPHEN 1 TABLET: 5; 325 TABLET ORAL at 05:51

## 2021-01-26 RX ADMIN — METHOCARBAMOL TABLETS 1000 MG: 500 TABLET, COATED ORAL at 13:16

## 2021-01-26 RX ADMIN — LEVOTHYROXINE SODIUM 112 MCG: 0.11 TABLET ORAL at 05:51

## 2021-01-26 RX ADMIN — TOPIRAMATE 25 MG: 25 TABLET, FILM COATED ORAL at 14:11

## 2021-01-26 RX ADMIN — METHOCARBAMOL TABLETS 1000 MG: 500 TABLET, COATED ORAL at 20:27

## 2021-01-26 RX ADMIN — Medication 10 ML: at 20:27

## 2021-01-26 RX ADMIN — METHOCARBAMOL TABLETS 1000 MG: 500 TABLET, COATED ORAL at 17:22

## 2021-01-26 ASSESSMENT — PAIN DESCRIPTION - ORIENTATION: ORIENTATION: RIGHT

## 2021-01-26 ASSESSMENT — PAIN SCALES - GENERAL
PAINLEVEL_OUTOF10: 8
PAINLEVEL_OUTOF10: 8

## 2021-01-26 ASSESSMENT — PAIN DESCRIPTION - PAIN TYPE: TYPE: ACUTE PAIN

## 2021-01-26 ASSESSMENT — PAIN DESCRIPTION - LOCATION: LOCATION: GROIN

## 2021-01-26 NOTE — PROGRESS NOTES
[facility]  Nephrology Attending Progress Note    SUBJECTIVE: Patient is being followed for BEREKET    1/25: Temp HD cath placed yesterday. Having renal biopsy this AM and then first HD treatment. 1/26: Seen on HD this AM, UF goal 1.5L. States she is feeling OK, some SOB with exertion.      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 ([address]    Malignant carcinoid tumor of ileum ([address]    New onset seizure ([address]    Neuroendocrine tumor    Hypomagnesemia    Hypocalcemia    Hypoparathyroidism (HCC)    Tobacco abuse    BEREKET (acute kidney injury) ([address]    Elevated liver enzymes    Moderate protein-calorie malnutrition (HCC)        PAST MEDICAL HISTORY:    Past Medical History:   Diagnosis Date    Abdominal pain     Acute Crohn's disease ([address]     Cancer ([address]     Hypocalcemia     Hypothyroidism     Irritable bowel syndrome     Migraines     Status post alcohol detoxification     5/22/2018-5/29/2018       DIET:    DIET GENERAL;     PHYSICAL EXAM:     Patient Vitals for the past 24 hrs:   BP Temp Temp src Pulse Resp SpO2 Weight   01/26/21 0934 128/79   80      01/26/21 0904 136/84   75      01/26/21 0837 130/88   73      01/26/21 0804 (!) 106/58   79      01/26/21 0800 118/66 97.1 °F (36.2 °C)  73 16  163 lb 5.8 oz (74.1 kg)   01/26/21 0740 119/73 97.4 °F (36.3 °C)  79 16 97 % 160 lb 3.2 oz (72.7 kg)   01/25/21 2015 (!) 130/90 98 °F (36.7 °C) Temporal 79 18 98 %    01/25/21 1530 120/81 98 °F (36.7 °C) Temporal 78 18 98 %    01/25/21 1506 (!) 151/88 97.5 °F (36.4 °C)  78 18  163 lb 2.3 oz (74 kg)   01/25/21 1432 132/85   71      01/25/21 1402 138/86   73      01/25/21 1333 133/62   73      01/25/21 6799 120/75   81      01/25/21 1235 136/88   70      01/25/21 1230 133/72 97.5 °F (36.4 °C)  76 18  165 lb 9.1 oz (75.1 kg)   01/25/21 1107 (!) 146/86   83 18 99 %    01/25/21 1053 137/84   69 18 98 %    01/25/21 1043 130/75 97.4 °F (36.3 °C) Temporal 72 18 98 %    01/25/21 1027 (!) 117/92   78 19 94 %    01/25/21 1026 (!) 117/92   77 12 91 %    01/25/21 1022 (!) 137/91   70 14 95 %    01/25/21 1015 (!) 136/93   72 18 98 %    01/25/21 1010 (!) 131/94   69 18 96 %    01/25/21 1007 131/81   68 19 96 %    @      Intake/Output Summary (Last 24 hours) at 1/26/2021 0950  Last data filed at 1/26/2021 0800  Gross per 24 hour   Intake 660 ml   Output 1900 ml   Net -1240 ml         Wt Readings from Last 3 Encounters:   01/26/21 163 lb 5.8 oz (74.1 kg)   01/21/21 130 lb (59 kg)   01/07/21 140 lb 9.6 oz (63.8 kg)       Constitutional:  Pt is in no acute distress  Head: normocephalic, atraumatic  Neck: no JVD  Cardiovascular: regular rate and rhythm, no murmurs, gallops, or rubs  Respiratory:  No rales, rhochi, or wheezes, diminished R>L  Gastrointestinal:  Soft, RLQ tender, nondistended, bowel sounds x 4  Ext: +1 BLE edema  Skin: dry, no rash  Neuro: Alert and oriented    MEDS (scheduled):    [Held by provider] enoxaparin  30 mg Subcutaneous Daily    vitamin D  50,000 Units Oral Weekly    methocarbamol  1,000 mg Oral 4x Daily    calcium-vitamin D  1 tablet Oral BID WC    [Held by provider] gabapentin  300 mg Oral TID    levothyroxine  112 mcg Oral Daily    potassium chloride  20 mEq Oral Daily    topiramate  25 mg Oral Daily    sodium chloride flush  10 mL Intravenous 2 times per day    lidocaine PF  5 mL Intradermal Once    heparin flush  3 mL Intravenous 2 times per day       MEDS (infusions):      MEDS (prn):   HYDROcodone 5 mg - acetaminophen, aluminum & magnesium hydroxide-simethicone, [Held by provider] butalbital-acetaminophen-caffeine, zolpidem, sodium chloride flush, promethazine **OR** ondansetron, polyethylene glycol, sodium chloride flush, heparin flush    DATA:    No results for input(s): WBC, HGB, HCT, MCV, PLT in the last 72 hours. Recent Labs     01/24/21  0643 01/25/21  0650 01/26/21  0800   * 129* 132   K 3.5 3.5 3.6   CL 94* 94* 97*   CO2 18* 17* 23   BUN 31* 32* 19   CREATININE 5.3* 6.2* 5.3*   LABGLOM 9 8 9   GLUCOSE 88 69* 151*   CALCIUM 7.7* 7.7* 7.9*   * 597* 407*   * 213* 99*   BILIDIR 1.5* 0.5* 0.4*   BILITOT 2.0* 0.8 0.7   ALKPHOS 141* 186* 170*       Lab Results   Component Value Date    LABALBU 3.0 (L) 01/26/2021    LABALBU 2.7 (L) 01/25/2021    LABALBU 2.7 (L) 01/24/2021     Lab Results   Component Value Date    TSH 2.190 01/21/2021       Iron Studies  No results found for: IRON, TIBC, FERRITIN  No results found for: OVLPVPCT07  No results found for: FOLATE    Vit D, 25-Hydroxy   Date Value Ref Range Status   01/23/2021 8 (L) 30 - 100 ng/mL Final     Comment:     <20 ng/mL. ........... Darletta Fabian Deficient  20-30 ng/mL. ......... Darletta Fabian Insufficient   ng/mL. ........ Darletta Fabian Sufficient  >100 ng/mL. .......... Darletta Fabian Toxic       PTH   Date Value Ref Range Status   01/23/2021 37 15 - 65 pg/mL Final       No components found for: URIC    Lab Results   Component Value Date    COLORU Yellow 01/24/2021    NITRU Negative 01/24/2021    GLUCOSEU Negative 01/24/2021    KETUA Negative 01/24/2021    UROBILINOGEN 0.2 01/24/2021    BILIRUBINUR Negative 01/24/2021    BILIRUBINUR neg 08/20/2020       No results found for: Conrado Alyson      IMPRESSION/RECOMMENDATIONS:      1.  BEREKET   in the setting of nausea, vomiting decrease intake with oliguria  Baseline cr <1.0  Cr 1.8->2.6->4.3->5.3->6.2->5.3  U/A + nitrites, Spec grav >1.030, lrg blood, > 300 protein  possible hepatorenal syndrome  no signs of hemolysis  rhabdo was a consideration given seizures, but CK not elevated  GN serologies pending  US abdomen: neg hydro, multiple isoechoic/hypoechoic hepatic lesions consistent with metastatic disease. UO 1350mL in last 24 hours   consulted for OP HD arrangements  S/P kidney biopsy 1/25  Temp dialysis cath placed 1/24, First HD 1/25  Second HD today     2. Hypocalcemia  due to parathyroidectomy; exacerbated by inadequate oral supplement  Not taking calcium supplement d/t nausea and vomiting  Repleted with Calcium gluconate 2 gm IV daily x 2 doses. Continue po calcium supplement  Ca+ 7.0-->6.4-->7.3-->7.7->7.9  Ionized calcium low at 0.9-->1.06-->1.13-->1.11->1.15  PTH 37, vitamin D 8-->Continue Vit D 50,000 units weekly   Correct Ca with HD and supplements  Monitor daily BMP and ionized calcium     3. Seizure  in the setting of hypocalcemia, hypomagnesemia  Need for MRI with della  per Neurology request will mandate dialysis  At current level of renal function explained to patient      4. Neuroendocrine tumor with liver mets. 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 Elevated liver enzymes  Oncology following    5. Metabolic acidosis  with the BEREKET  Follow with initiation of HD    6.  Hyponatremia  Corrected with HD     Thank you for the opportunity to participate in the care of [address], APRN - NP  9:50 AM  01/26/21     Pt seen and examined agree with above  For second hd today  Await kidney bx result  Radha Huston

## 2021-01-26 NOTE — PROGRESS NOTES
[facility] SURGERY  DAILY PROGRESS NOTE  1/26/2021    Chief complaint: Altered mental status    Subjective:  Hemodialysis went well yesterday. Kidney biopsy site no issues. Pain well controlled. Hemodialysis again today. Tolerating diet. Continues to make urine. Pain well controlled. No nausea no vomiting. Objective:  BP (!) 130/90   Pulse 79   Temp 98 °F (36.7 °C) (Temporal)   Resp 18   Ht 5' (1.524 m)   Wt 163 lb 2.3 oz (74 kg)   SpO2 98%   BMI 31.86 kg/m²     GENERAL:  Laying in bed, awake, alert, cooperative, no apparent distress  HEAD: Normocephalic, atraumatic  EYES: No sclera icterus today, pupils equal  LUNGS:  No increased work of breathing  CARDIOVASCULAR:  R  ABDOMEN:  Soft, non-tender, non-distended  EXTREMITIES: No edema or swelling  SKIN: Warm and dry    Assessment/Plan:  30 y.o. female with elevated liver enzymes in the setting of altered mental status and seizures. 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     - continue to monitor LFTs  - Cr still rising - defer to nephro  - GI- believes hypoxic liver injury- continues tor resolve  - continue general diet  - supportive care  - Has metastatic disease to her liver from neuroendocrine tumor but this is not the cause of her hepatic dysfunction    Electronically signed by Edy Hair MD on 1/26/2021 at 6:45 AM     Agree with above  Patient does not need mediport with the cancer she has - she gets a depot injection in her buttock once a month    Electronically signed by Nader Alonzo MD on 1/26/2021 at 10:56 AM

## 2021-01-26 NOTE — PROGRESS NOTES
Yuridia Slade is a 30 y.o. right handed female     Neurology is following for seizures    PMH: Metastatic neuroendocrine tumor with liver mets, migraines, Crohn's disease, history of alcohol abuse in remission, current smoking    She presented on 1/22 with BEREKET and hypocalcemia and GTC seizures. New-onset seizures a week prior. UDS positive for fentanyl and barbiturates (apparently took fentanyl for headache). LFTs were markedly elevated. Was on low-dose topiramate 25 mg at night only for migraines. Patient lying in bed just returned from dialysis. She feels better and notes no seizures. She had kidney bx yesterday. She is awaiting those results. She also notes that she will have a port for dialysis as well as a Mediport for her chemo. Awaiting MRI brain with contrast once cleared with Nephrology to rule out mets. On migraine dosing topiramate only.   EEG was normal.      No chest pain or palpitations  No SOB  No vertigo, lightheadedness or loss of consciousness  No falls, tripping or stumbling  No incontinence of bowels or bladder  No itching or bruising appreciated  No numbness, tingling or focal arm/leg weakness  Mildly slurred speech; no dysphagia    ROS otherwise negative     Current Facility-Administered Medications   Medication Dose Route Frequency Provider Last Rate Last Admin    HYDROcodone-acetaminophen (NORCO) 5-325 MG per tablet 1 tablet  1 tablet Oral Q6H PRN Jaden Castellanos DO   1 tablet at 01/26/21 1156    [Held by provider] enoxaparin (LOVENOX) injection 30 mg  30 mg Subcutaneous Daily Jaden Castellanos DO   30 mg at 01/24/21 0939    vitamin D (ERGOCALCIFEROL) capsule 50,000 Units  50,000 Units Oral Weekly TRINA Moreno - CNP   50,000 Units at 01/23/21 1755    methocarbamol (ROBAXIN) tablet 1,000 mg  1,000 mg Oral 4x Daily Josiah Manzanares MD   1,000 mg at 01/26/21 1316 /85   Pulse 80   Temp 97.5 °F (36.4 °C)   Resp 16   Ht 5' (1.524 m)   Wt 160 lb 15 oz (73 kg)   SpO2 97%   BMI 31.43 kg/m²     General appearance: Alert appears stated age, cooperative and no distress  Head: normocephalic, without obvious abnormality, atraumatic  Eyes: conjunctivae/corneas clear.  .  Neck: no carotid bruit,  Lungs: respirations non labored  Heart: NSR on monitor  Extremities: normal, atraumatic, no cyanosis or edema  Skin: color, texture, turgor normal---no rashes or lesions      Mental Status: alert and oriented x4    Appropriate attention/concentration  Intact fundus of knowledge  Intact memories    Speech: no dysarthria  Language: no aphasias    Cranial Nerves:  I: smell NA   II: visual acuity  NA   II: visual fields Full to confrontation   II: pupils VITOR   III,VII: ptosis None   III,IV,VI: extraocular muscles  Full ROM   V: mastication Normal   V: facial light touch sensation  Normal   V,VII: corneal reflex     VII: facial muscle function - upper  Normal   VII: facial muscle function - lower Normal   VIII: hearing Normal   IX: soft palate elevation  Normal   IX,X: gag reflex    XI: trapezius strength  5/5   XI: sternocleidomastoid strength 5/5   XI: neck extension strength  5/5   XII: tongue strength  Normal     Motor:  5/5 throughout  Normal bulk and tone  No drift or abnormal movements    Sensory:  LT symmetric in all limbs    Coordination:   FN, FFM normal    DTR:  No Ferguson's    No pathological reflexes    Laboratory/Radiology:     CMP:    Lab Results   Component Value Date     01/26/2021    K 3.6 01/26/2021    K 3.9 01/21/2021    CL 97 01/26/2021    CO2 23 01/26/2021    BUN 19 01/26/2021    CREATININE 5.3 01/26/2021    GFRAA 11 01/26/2021    LABGLOM 9 01/26/2021    GLUCOSE 151 01/26/2021    PROT 5.2 01/26/2021    LABALBU 3.0 01/26/2021    CALCIUM 7.9 01/26/2021    BILITOT 0.7 01/26/2021    ALKPHOS 170 01/26/2021    AST 99 01/26/2021     01/26/2021 Hepatic Function Panel:    Lab Results   Component Value Date    ALKPHOS 170 01/26/2021     01/26/2021    AST 99 01/26/2021    PROT 5.2 01/26/2021    BILITOT 0.7 01/26/2021    BILIDIR 0.4 01/26/2021    IBILI 0.3 01/26/2021    LABALBU 3.0 01/26/2021     MRI brain WO: No acute events; a few small T2 flair hyperintensities    EEG normal    All labs and images personally reviewed today    Assessment:     New onset GTC seizures: Must rule out brain mets in light of her metastatic neuroendocrine tumor, but must also consider provoked seizures in the setting of acute renal failure, hypocalcemia, and transaminitis. Her exam is normal today    Acute renal failure: For HD; possible kidney biopsy     Transaminitis    Metastatic malignant neuroendocrine tumor     Chronic migraines:  On topiramate    Plan:     Hopefully can get r/p MRI w contrast--await coordination and ok from renal     Continue treatment of underlying medical conditions    Sz precautions    Will follow closely      BLAIR Us  1:32 PM  1/26/2021

## 2021-01-26 NOTE — CARE COORDINATION
Transition of Care-Attempted to meet patient for reassessment, off unit for dialysis. Met with patient 1/22 lives at home with family, plans on returning home at discharge, her mother Antoinette([phone]) to transport home. Patient is day two of dialysis, temp access catheter placed in groin (1/25), Kidney biopsy 1/25, awaiting Nephrology's recommendation and if need for dialysis at discharge or resolution to assist with discharge plan. Cm/SW following.   Melony PARKER, RN  SE   [phone]

## 2021-01-26 NOTE — CARE COORDINATION
REID spoke with Griselda Hazard at [facility] regarding possible need for Outpatient dialysis on discharge. He is going to see where availability is either [address] [place] [address] or [facility]. He will let REID know.

## 2021-01-26 NOTE — FLOWSHEET NOTE
01/26/21 1104   Vital Signs   /85   Temp 97.5 °F (36.4 °C)   Pulse 80   Resp 16   Weight 160 lb 15 oz (73 kg)   Weight Method Bed scale   Percent Weight Change -1.48   Post-Hemodialysis Assessment   Post-Treatment Procedures Blood returned;Catheter capped, clamped and heparinized x 2 ports   Machine Disinfection Process Exterior Machine Disinfection   Rinseback Volume (ml) 300 ml   Total Liters Processed (l/min) 42.8 l/min   Dialyzer Clearance Lightly streaked   Duration of Treatment (minutes) 180 minutes   Heparin amount administered during treatment (units) 0 units   Hemodialysis Intake (ml) 300 ml   Hemodialysis Output (ml) 1500 ml   NET Removed (ml) 1200 ml   Tolerated Treatment Good   Patient Response to Treatment tolerated well, blood returned, cath care per policy/procedure, lines flushed, heparin instilled, ports capped

## 2021-01-26 NOTE — PROGRESS NOTES
Hospital Medicine    Subjective:  Pt alert conversive tolerated dialysis yesterday s/p renal bx yesterday      Current Facility-Administered Medications:     HYDROcodone-acetaminophen (NORCO) 5-325 MG per tablet 1 tablet, 1 tablet, Oral, Q6H PRN, Asberry Homans Malmer, DO, 1 tablet at 01/26/21 0551    [Held by provider] enoxaparin (LOVENOX) injection 30 mg, 30 mg, Subcutaneous, Daily, Nenaerry Homans Malmer, DO, 30 mg at 01/24/21 0945    vitamin D (ERGOCALCIFEROL) capsule 50,000 Units, 50,000 Units, Oral, Weekly, TRINA Castro - CNP, 50,000 Units at 01/23/21 1754    methocarbamol (ROBAXIN) tablet 1,000 mg, 1,000 mg, Oral, 4x Daily, Neda Pederson MD, 1,000 mg at 01/25/21 2024    aluminum & magnesium hydroxide-simethicone (MAALOX) 200-200-20 MG/5ML suspension 5 mL, 5 mL, Oral, Q6H PRN, Asberry Homans Malmer, DO    [Held by provider] butalbital-acetaminophen-caffeine (FIORICET, ESGIC) per tablet 1 tablet, 1 tablet, Oral, Daily PRN, Asberry Homans Malmer, DO, 1 tablet at 01/22/21 1419    calcium-vitamin D (OSCAL-500) 500-200 MG-UNIT per tablet 1 tablet, 1 tablet, Oral, BID WC, Asberry Homans Malmer, DO, 1 tablet at 01/25/21 1647    [Held by provider] gabapentin (NEURONTIN) capsule 300 mg, 300 mg, Oral, TID, Nena Homans Malmer, DO, 300 mg at 01/22/21 1042    levothyroxine (SYNTHROID) tablet 112 mcg, 112 mcg, Oral, Daily, Asberry Homans Malmer, DO, 112 mcg at 01/26/21 0551    potassium chloride (KLOR-CON M) extended release tablet 20 mEq, 20 mEq, Oral, Daily, Asberry Homans Malmer, DO, 20 mEq at 01/24/21 0945    topiramate (TOPAMAX) tablet 25 mg, 25 mg, Oral, Daily, Nena Homans Malmer, DO, 25 mg at 01/24/21 0945    zolpidem (AMBIEN) tablet 5 mg, 5 mg, Oral, Nightly PRN, Asberry Homans Malmer, DO, 5 mg at 01/25/21 2200    sodium chloride flush 0.9 % injection 10 mL, 10 mL, Intravenous, 2 times per day, Asberry Homans Malmer, DO, 10 mL at 01/24/21 1944    sodium chloride flush 0.9 % injection 10 mL, 10 mL, Intravenous, PRN, Salvador Arroyo, DO   promethazine (PHENERGAN) tablet 12.5 mg, 12.5 mg, Oral, Q6H PRN **OR** ondansetron (ZOFRAN) injection 4 mg, 4 mg, Intravenous, Q6H PRN, Can Castellanos, DO    polyethylene glycol (GLYCOLAX) packet 17 g, 17 g, Oral, Daily PRN, Can Castellanos, DO    lactated ringers infusion, , Intravenous, Continuous, TRINA James - CNP, Last Rate: 50 mL/hr at 01/25/21 1356, New Bag at 01/25/21 1356    lidocaine PF 1 % injection 5 mL, 5 mL, Intradermal, Once, Tammy Zurita MD    sodium chloride flush 0.9 % injection 10 mL, 10 mL, Intravenous, PRN, Tammy Zurita MD    heparin flush 100 UNIT/ML injection 300 Units, 3 mL, Intravenous, 2 times per day, Tammy Zurita MD    heparin flush 100 UNIT/ML injection 300 Units, 3 mL, Intracatheter, PRN, Tammy Zurita MD    Objective:    BP (!) 130/90   Pulse 79   Temp 98 °F (36.7 °C) (Temporal)   Resp 18   Ht 5' (1.524 m)   Wt 163 lb 2.3 oz (74 kg)   SpO2 98%   BMI 31.86 kg/m²     Heart:  reg  Lungs:  ctab  Abd: + bs nondistended  Extrem:  Edema legs    CBC with Differential:    Lab Results   Component Value Date    WBC 11.4 01/21/2021    RBC 4.37 01/21/2021    HGB 14.0 01/21/2021    HCT 42.0 01/21/2021     01/21/2021    MCV 96.1 01/21/2021    MCH 32.0 01/21/2021    MCHC 33.3 01/21/2021    RDW 17.4 01/21/2021    LYMPHOPCT 9.5 01/21/2021    MONOPCT 3.6 01/21/2021    BASOPCT 0.5 01/21/2021    MONOSABS 0.41 01/21/2021    LYMPHSABS 1.08 01/21/2021    EOSABS 0.04 01/21/2021    BASOSABS 0.06 01/21/2021     CMP:    Lab Results   Component Value Date     01/25/2021    K 3.5 01/25/2021    K 3.9 01/21/2021    CL 94 01/25/2021    CO2 17 01/25/2021    BUN 32 01/25/2021    CREATININE 6.2 01/25/2021    GFRAA 9 01/25/2021    LABGLOM 8 01/25/2021    GLUCOSE 69 01/25/2021    PROT 4.9 01/25/2021    LABALBU 2.7 01/25/2021    CALCIUM 7.7 01/25/2021    BILITOT 0.8 01/25/2021    ALKPHOS 186 01/25/2021     01/25/2021     01/25/2021     Warfarin PT/INR:    Lab Results   Component Value Date    INR 1.1 01/25/2021    INR 1.3 01/24/2021    INR 1.8 01/23/2021    PROTIME 12.2 01/25/2021    PROTIME 14.3 (H) 01/24/2021    PROTIME 20.0 (H) 01/23/2021       Assessment:    Principal Problem:    BEREKET (acute kidney injury) ([facility]  Active Problems:    Hypothyroidism    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

## 2021-01-27 LAB
ABO/RH: NORMAL
ALBUMIN SERPL-MCNC: 2.5 G/DL (ref 3.5–4.7)
ALBUMIN SERPL-MCNC: 3.2 G/DL (ref 3.5–5.2)
ALP BLD-CCNC: 154 U/L (ref 35–104)
ALPHA-1-GLOBULIN: 0.3 G/DL (ref 0.2–0.4)
ALPHA-2-GLOBULIN: 0.6 G/FL (ref 0.5–1)
ALT SERPL-CCNC: 318 U/L (ref 0–32)
AMMONIA: 18 UMOL/L (ref 11–51)
ANCA IFA: NORMAL
ANION GAP SERPL CALCULATED.3IONS-SCNC: 12 MMOL/L (ref 7–16)
ANTIBODY SCREEN: NORMAL
AST SERPL-CCNC: 63 U/L (ref 0–31)
BETA GLOBULIN: 0.8 G/DL (ref 0.8–1.3)
BILIRUB SERPL-MCNC: 0.7 MG/DL (ref 0–1.2)
BILIRUBIN DIRECT: 0.4 MG/DL (ref 0–0.3)
BILIRUBIN, INDIRECT: 0.3 MG/DL (ref 0–1)
BUN BLDV-MCNC: 10 MG/DL (ref 6–20)
CALCIUM IONIZED: 1.1 MMOL/L (ref 1.15–1.33)
CALCIUM SERPL-MCNC: 7.7 MG/DL (ref 8.6–10.2)
CHLORIDE BLD-SCNC: 96 MMOL/L (ref 98–107)
CO2: 28 MMOL/L (ref 22–29)
CREAT SERPL-MCNC: 4 MG/DL (ref 0.5–1)
ELECTROPHORESIS: ABNORMAL
GAMMA GLOBULIN: 0.8 G/DL (ref 0.7–1.6)
GFR AFRICAN AMERICAN: 15
GFR NON-AFRICAN AMERICAN: 13 ML/MIN/1.73
GLUCOSE BLD-MCNC: 107 MG/DL (ref 74–99)
INR BLD: 1
POTASSIUM SERPL-SCNC: 4 MMOL/L (ref 3.5–5)
PROTHROMBIN TIME: 11.3 SEC (ref 9.3–12.4)
SODIUM BLD-SCNC: 136 MMOL/L (ref 132–146)
TOTAL PROTEIN: 5.7 G/DL (ref 6.4–8.3)

## 2021-01-27 PROCEDURE — 82140 ASSAY OF AMMONIA: CPT

## 2021-01-27 PROCEDURE — 90935 HEMODIALYSIS ONE EVALUATION: CPT | Performed by: INTERNAL MEDICINE

## 2021-01-27 PROCEDURE — 6360000002 HC RX W HCPCS: Performed by: INTERNAL MEDICINE

## 2021-01-27 PROCEDURE — 2580000003 HC RX 258: Performed by: INTERNAL MEDICINE

## 2021-01-27 PROCEDURE — 80048 BASIC METABOLIC PNL TOTAL CA: CPT

## 2021-01-27 PROCEDURE — 86850 RBC ANTIBODY SCREEN: CPT

## 2021-01-27 PROCEDURE — 86900 BLOOD TYPING SEROLOGIC ABO: CPT

## 2021-01-27 PROCEDURE — 2060000000 HC ICU INTERMEDIATE R&B

## 2021-01-27 PROCEDURE — 82330 ASSAY OF CALCIUM: CPT

## 2021-01-27 PROCEDURE — 80076 HEPATIC FUNCTION PANEL: CPT

## 2021-01-27 PROCEDURE — 86901 BLOOD TYPING SEROLOGIC RH(D): CPT

## 2021-01-27 PROCEDURE — 6370000000 HC RX 637 (ALT 250 FOR IP): Performed by: STUDENT IN AN ORGANIZED HEALTH CARE EDUCATION/TRAINING PROGRAM

## 2021-01-27 PROCEDURE — 36415 COLL VENOUS BLD VENIPUNCTURE: CPT

## 2021-01-27 PROCEDURE — 85610 PROTHROMBIN TIME: CPT

## 2021-01-27 PROCEDURE — 99231 SBSQ HOSP IP/OBS SF/LOW 25: CPT | Performed by: SURGERY

## 2021-01-27 PROCEDURE — 6370000000 HC RX 637 (ALT 250 FOR IP): Performed by: INTERNAL MEDICINE

## 2021-01-27 RX ORDER — HEPARIN SODIUM 10000 [USP'U]/ML
5000 INJECTION, SOLUTION INTRAVENOUS; SUBCUTANEOUS EVERY 8 HOURS
Status: DISCONTINUED | OUTPATIENT
Start: 2021-01-27 | End: 2021-01-29 | Stop reason: HOSPADM

## 2021-01-27 RX ADMIN — HYDROCODONE BITARTRATE AND ACETAMINOPHEN 1 TABLET: 5; 325 TABLET ORAL at 01:40

## 2021-01-27 RX ADMIN — Medication 10 ML: at 20:45

## 2021-01-27 RX ADMIN — Medication 1 TABLET: at 08:13

## 2021-01-27 RX ADMIN — ONDANSETRON HYDROCHLORIDE 4 MG: 2 INJECTION, SOLUTION INTRAMUSCULAR; INTRAVENOUS at 08:32

## 2021-01-27 RX ADMIN — HEPARIN SODIUM 5000 UNITS: 10000 INJECTION INTRAVENOUS; SUBCUTANEOUS at 17:09

## 2021-01-27 RX ADMIN — HYDROCODONE BITARTRATE AND ACETAMINOPHEN 1 TABLET: 5; 325 TABLET ORAL at 14:15

## 2021-01-27 RX ADMIN — HEPARIN SODIUM 5000 UNITS: 10000 INJECTION INTRAVENOUS; SUBCUTANEOUS at 22:20

## 2021-01-27 RX ADMIN — METHOCARBAMOL TABLETS 1000 MG: 500 TABLET, COATED ORAL at 17:09

## 2021-01-27 RX ADMIN — HYDROCODONE BITARTRATE AND ACETAMINOPHEN 1 TABLET: 5; 325 TABLET ORAL at 08:12

## 2021-01-27 RX ADMIN — HEPARIN SODIUM 5000 UNITS: 10000 INJECTION INTRAVENOUS; SUBCUTANEOUS at 08:14

## 2021-01-27 RX ADMIN — Medication 1 TABLET: at 17:09

## 2021-01-27 RX ADMIN — TOPIRAMATE 25 MG: 25 TABLET, FILM COATED ORAL at 09:01

## 2021-01-27 RX ADMIN — METHOCARBAMOL TABLETS 1000 MG: 500 TABLET, COATED ORAL at 14:15

## 2021-01-27 RX ADMIN — ZOLPIDEM TARTRATE 5 MG: 5 TABLET ORAL at 22:20

## 2021-01-27 RX ADMIN — Medication 10 ML: at 08:32

## 2021-01-27 RX ADMIN — METHOCARBAMOL TABLETS 1000 MG: 500 TABLET, COATED ORAL at 20:44

## 2021-01-27 RX ADMIN — LEVOTHYROXINE SODIUM 112 MCG: 0.11 TABLET ORAL at 05:51

## 2021-01-27 RX ADMIN — POTASSIUM CHLORIDE 20 MEQ: 1500 TABLET, EXTENDED RELEASE ORAL at 08:12

## 2021-01-27 RX ADMIN — ONDANSETRON HYDROCHLORIDE 4 MG: 2 INJECTION, SOLUTION INTRAMUSCULAR; INTRAVENOUS at 18:25

## 2021-01-27 RX ADMIN — HYDROCODONE BITARTRATE AND ACETAMINOPHEN 1 TABLET: 5; 325 TABLET ORAL at 20:44

## 2021-01-27 RX ADMIN — METHOCARBAMOL TABLETS 1000 MG: 500 TABLET, COATED ORAL at 08:13

## 2021-01-27 ASSESSMENT — PAIN SCALES - GENERAL
PAINLEVEL_OUTOF10: 0
PAINLEVEL_OUTOF10: 8
PAINLEVEL_OUTOF10: 3
PAINLEVEL_OUTOF10: 8
PAINLEVEL_OUTOF10: 6
PAINLEVEL_OUTOF10: 7
PAINLEVEL_OUTOF10: 0
PAINLEVEL_OUTOF10: 6
PAINLEVEL_OUTOF10: 8

## 2021-01-27 ASSESSMENT — PAIN DESCRIPTION - DESCRIPTORS
DESCRIPTORS: ACHING;DISCOMFORT;DULL
DESCRIPTORS: ACHING;DISCOMFORT;DULL

## 2021-01-27 ASSESSMENT — PAIN DESCRIPTION - LOCATION
LOCATION: GROIN
LOCATION: GROIN

## 2021-01-27 ASSESSMENT — PAIN DESCRIPTION - PROGRESSION: CLINICAL_PROGRESSION: GRADUALLY WORSENING

## 2021-01-27 ASSESSMENT — PAIN - FUNCTIONAL ASSESSMENT
PAIN_FUNCTIONAL_ASSESSMENT: ACTIVITIES ARE NOT PREVENTED
PAIN_FUNCTIONAL_ASSESSMENT: ACTIVITIES ARE NOT PREVENTED

## 2021-01-27 ASSESSMENT — PAIN DESCRIPTION - ORIENTATION
ORIENTATION: RIGHT
ORIENTATION: RIGHT

## 2021-01-27 ASSESSMENT — PAIN DESCRIPTION - FREQUENCY: FREQUENCY: CONTINUOUS

## 2021-01-27 ASSESSMENT — PAIN DESCRIPTION - ONSET
ONSET: ON-GOING
ONSET: ON-GOING

## 2021-01-27 NOTE — CARE COORDINATION
How Severe Is This Condition?: moderate Received Outpatient dialysis schedule from [facility] M-W-F 5:45am chair time with a current start date of January 29, 2021. Attempted to give patient schedule and she is currently out of room. Placed schedule in soft chart. Will go over with her when she returns to room.

## 2021-01-27 NOTE — PLAN OF CARE
Problem: Falls - Risk of:  Goal: Will remain free from falls  Description: Will remain free from falls  1/27/2021 0126 by Tamia Lopez RN  Outcome: Met This Shift  1/26/2021 1637 by Celena Rapp RN  Outcome: Met This Shift  Goal: Absence of physical injury  Description: Absence of physical injury  1/27/2021 0126 by Tamia Lopez RN  Outcome: Met This Shift  1/26/2021 1637 by Celena Rapp RN  Outcome: Met This Shift     Problem: Pain:  Goal: Pain level will decrease  Description: Pain level will decrease  1/27/2021 0126 by Tamia Lopez RN  Outcome: Ongoing  1/26/2021 1637 by Celena Rapp RN  Outcome: Met This Shift  Goal: Control of acute pain  Description: Control of acute pain  1/27/2021 0126 by Tamia Lopez RN  Outcome: Ongoing  1/26/2021 1637 by Celena Rapp RN  Outcome: Met This Shift  Goal: Control of chronic pain  Description: Control of chronic pain  1/27/2021 0126 by Tamia Lopez RN  Outcome: Ongoing  1/26/2021 1637 by Celena Rapp RN  Outcome: Met This Shift

## 2021-01-27 NOTE — PROGRESS NOTES
Patient vomited this morning after taking calcium carbonate, she states this happens often as it is large and gets stuck. Zofran given, patient resting comfortably.

## 2021-01-27 NOTE — PROGRESS NOTES
HD catheter  · I did discuss with patient she does not need a port per discussion with Dr. Yuliya Ruvalcaba

## 2021-01-27 NOTE — PROGRESS NOTES
[facility]  Nephrology Attending Progress Note    SUBJECTIVE: Patient is being followed for BEREKET    1/25: Temp HD cath placed yesterday. Having renal biopsy this AM and then first HD treatment. 1/26: Seen on HD this AM, UF goal 1.5L. States she is feeling OK, some SOB with exertion. 1/27: For 3rd HD today. Pt states she feels alright, had some nausea and vomiting this am.  Her father is at bedside. Pt reports some bleeding from temp site in right groin last night, pressure dressing was applied with resolution. Labs to be drawn in dialysis.      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 ([address]    Malignant carcinoid tumor of ileum ([address]    [facility]    Neuroendocrine tumor    Hypomagnesemia    Hypocalcemia    Hypoparathyroidism (HCC)    Tobacco abuse    BEREKET (acute kidney injury) ([address]    Elevated liver enzymes    Moderate protein-calorie malnutrition (HCC)    Encounter regarding vascular access for dialysis for ESRD [facility])        PAST MEDICAL HISTORY:    Past Medical History:   Diagnosis Date    Abdominal pain     Acute Crohn's disease ([address]     Cancer ([address]     Encounter regarding vascular access for dialysis for ESRD ([facility] 1/26/2021    Hypocalcemia     Hypothyroidism     Irritable bowel syndrome     Migraines     Status post alcohol detoxification     5/22/2018-5/29/2018       DIET:    DIET GENERAL;     PHYSICAL EXAM:     Patient Vitals for the past 24 hrs:   BP Temp Temp src Pulse Resp SpO2 Weight   01/27/21 0800 (!) 139/90 96.6 °F (35.9 °C) Temporal 77 18 98 %    01/27/21 0045 117/84 98 °F (36.7 °C) Temporal 78 16 96 %    01/26/21 1930 124/80 98 °F (36.7 °C) Temporal 85 16 95 %    01/26/21 1550 117/76 98.1 °F (36.7 °C) Temporal 77 16 94 %    01/26/21 1104 135/85 97.5 °F (36.4 °C)  80 16  160 lb 15 oz (73 kg)   01/26/21 1032 134/88   58      01/26/21 1002 135/82   80      01/26/21 0934 128/79   80      @      Intake/Output Summary (Last 24 hours) at 1/27/2021 2296  Last data filed at 1/27/2021 5902  Gross per 24 hour   Intake 420 ml   Output 2750 ml   Net -2330 ml         Wt Readings from Last 3 Encounters:   01/26/21 160 lb 15 oz (73 kg)   01/21/21 130 lb (59 kg)   01/07/21 140 lb 9.6 oz (63.8 kg)       Constitutional:  Pt is in no acute distress  Head: normocephalic, atraumatic  Neck: no JVD  Cardiovascular: regular rate and rhythm, no murmurs, gallops, or rubs  Respiratory:  No rales, rhonchi, or wheezes, diminished in bases  Gastrointestinal:  Soft, RLQ tender, nondistended, bowel sounds x 4  Ext: +1 BLE edema  Skin: dry, no rash  Neuro: Alert and oriented    MEDS (scheduled):    heparin (porcine)  5,000 Units Subcutaneous Q8H    vitamin D  50,000 Units Oral Weekly    methocarbamol  1,000 mg Oral 4x Daily    calcium-vitamin D  1 tablet Oral BID WC    [Held by provider] gabapentin  300 mg Oral TID    levothyroxine  112 mcg Oral Daily    potassium chloride  20 mEq Oral Daily    topiramate  25 mg Oral Daily    sodium chloride flush  10 mL Intravenous 2 times per day    lidocaine PF  5 mL Intradermal Once    heparin flush  3 mL Intravenous 2 times per day       MEDS (infusions):      MEDS (prn): HYDROcodone 5 mg - acetaminophen, aluminum & magnesium hydroxide-simethicone, [Held by provider] butalbital-acetaminophen-caffeine, zolpidem, sodium chloride flush, promethazine **OR** ondansetron, polyethylene glycol, sodium chloride flush, heparin flush    DATA:    No results for input(s): WBC, HGB, HCT, MCV, PLT in the last 72 hours.   Recent Labs     01/25/21  0650 01/26/21  0800   * 132   K 3.5 3.6   CL 94* 97*   CO2 17* 23   BUN 32* 19   CREATININE 6.2* 5.3*   LABGLOM 8 9   GLUCOSE 69* Calcium gluconate 2 gm IV daily x 2 doses. Continue po calcium supplement  Ca+ 7.0-->6.4-->7.3-->7.7->7.9  Ionized calcium low at 0.9-->1.06-->1.13-->1.11->1.15  PTH 37, vitamin D 8-->Continue Vit D 50,000 units weekly   Correct Ca with HD and supplements  Monitor daily BMP and ionized calcium     3. Seizure  in the setting of hypocalcemia, hypomagnesemia  Need for MRI with della  per Neurology request will mandate dialysis  At current level of renal function explained to patient      4. Neuroendocrine tumor with liver mets. 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 Elevated liver enzymes  Oncology following    5. Metabolic acidosis  with the BEREKET  Follow with initiation of HD    6.  Hyponatremia  Corrected with HD     Thank you for the opportunity to participate in the care of Gracieettadeyanira TRINA Us CNP  9:18 AM  01/27/21   Pt seen and examined agree with above  For 3rd hd today  Set up outpt hd  Since no renal recovery get tdc  Nick Ruiz MD

## 2021-01-27 NOTE — PROGRESS NOTES
[facility] Medicine    Subjective:  Pt alert conversive chet dialysis      Current Facility-Administered Medications:     heparin (porcine) injection 5,000 Units, 5,000 Units, Subcutaneous, Q8H, Chon Castellanos, DO    HYDROcodone-acetaminophen (NORCO) 5-325 MG per tablet 1 tablet, 1 tablet, Oral, Q6H PRN, Chon Castellanos, DO, 1 tablet at 01/27/21 0140    vitamin D (ERGOCALCIFEROL) capsule 50,000 Units, 50,000 Units, Oral, Weekly, Remington Saliva, APRN - CNP, 50,000 Units at 01/23/21 1754    methocarbamol (ROBAXIN) tablet 1,000 mg, 1,000 mg, Oral, 4x Daily, Frankey Kirsch, MD, 1,000 mg at 01/26/21 2027    aluminum & magnesium hydroxide-simethicone (MAALOX) 200-200-20 MG/5ML suspension 5 mL, 5 mL, Oral, Q6H PRN, Chon Castellanos DO    [Held by provider] butalbital-acetaminophen-caffeine (FIORICET, ESGIC) per tablet 1 tablet, 1 tablet, Oral, Daily PRN, Chon Castellanos, DO, 1 tablet at 01/22/21 1419    calcium-vitamin D (Joslyn Helms) 500-200 MG-UNIT per tablet 1 tablet, 1 tablet, Oral, BID WC, Chon Castellanos, DO, 1 tablet at 01/26/21 1722    [Held by provider] gabapentin (NEURONTIN) capsule 300 mg, 300 mg, Oral, TID, Areatha Severo, DO, Stopped at 01/26/21 2100    levothyroxine (SYNTHROID) tablet 112 mcg, 112 mcg, Oral, Daily, Chon Zuñigamer, DO, 112 mcg at 01/27/21 0551    potassium chloride (KLOR-CON M) extended release tablet 20 mEq, 20 mEq, Oral, Daily, Chon Castellanos, DO, 20 mEq at 01/24/21 0945    topiramate (TOPAMAX) tablet 25 mg, 25 mg, Oral, Daily, Chon Castellanos, DO, 25 mg at 01/26/21 1411    zolpidem (AMBIEN) tablet 5 mg, 5 mg, Oral, Nightly PRN, Chon Castellanos, , 5 mg at 01/26/21 2217    sodium chloride flush 0.9 % injection 10 mL, 10 mL, Intravenous, 2 times per day, Chon Castellanos DO, 10 mL at 01/26/21 2027    sodium chloride flush 0.9 % injection 10 mL, 10 mL, Intravenous, PRN, Chon Castellanos,     promethazine (PHENERGAN) tablet 12.5 mg, 12.5 mg, Oral, Q6H PRN **OR** ondansetron (ZOFRAN) injection 4 mg, 4 mg, Intravenous, Q6H PRN, Jessica Castellanos DO    polyethylene glycol (GLYCOLAX) packet 17 g, 17 g, Oral, Daily PRN, Jessica Castellanos DO    lidocaine PF 1 % injection 5 mL, 5 mL, Intradermal, Once, Aiden Lee MD    sodium chloride flush 0.9 % injection 10 mL, 10 mL, Intravenous, PRN, Aiden Lee MD    heparin flush 100 UNIT/ML injection 300 Units, 3 mL, Intravenous, 2 times per day, Aiden Lee MD    heparin flush 100 UNIT/ML injection 300 Units, 3 mL, Intracatheter, PRN, Aiden Lee MD    Objective:    /84   Pulse 78   Temp 98 °F (36.7 °C) (Temporal)   Resp 16   Ht 5' (1.524 m)   Wt 160 lb 15 oz (73 kg)   SpO2 96%   BMI 31.43 kg/m²     Heart:  reg  Lungs:  ctab  Abd: + bs soft nontender  Extrem:  Edema legs    CBC with Differential:    Lab Results   Component Value Date    WBC 11.4 01/21/2021    RBC 4.37 01/21/2021    HGB 14.0 01/21/2021    HCT 42.0 01/21/2021     01/21/2021    MCV 96.1 01/21/2021    MCH 32.0 01/21/2021    MCHC 33.3 01/21/2021    RDW 17.4 01/21/2021    LYMPHOPCT 9.5 01/21/2021    MONOPCT 3.6 01/21/2021    BASOPCT 0.5 01/21/2021    MONOSABS 0.41 01/21/2021    LYMPHSABS 1.08 01/21/2021    EOSABS 0.04 01/21/2021    BASOSABS 0.06 01/21/2021     CMP:    Lab Results   Component Value Date     01/26/2021    K 3.6 01/26/2021    K 3.9 01/21/2021    CL 97 01/26/2021    CO2 23 01/26/2021    BUN 19 01/26/2021    CREATININE 5.3 01/26/2021    GFRAA 11 01/26/2021    LABGLOM 9 01/26/2021    GLUCOSE 151 01/26/2021    PROT 5.2 01/26/2021    LABALBU 3.0 01/26/2021    CALCIUM 7.9 01/26/2021    BILITOT 0.7 01/26/2021    ALKPHOS 170 01/26/2021    AST 99 01/26/2021     01/26/2021     Warfarin PT/INR:    Lab Results   Component Value Date    INR 1.1 01/26/2021    INR 1.1 01/25/2021    INR 1.3 01/24/2021    PROTIME 11.9 01/26/2021    PROTIME 12.2 01/25/2021    PROTIME 14.3 (H) 01/24/2021       Assessment:    Principal

## 2021-01-27 NOTE — PROGRESS NOTES
Patient called nurse into room because R femoral dialysis access dressing was saturated in blood. Patient cleaned and new dressing to the site was applied. There was no active bleeding when dressing was removed. Will continue to monitor and assess patient and R femoral dialysis access site.

## 2021-01-28 ENCOUNTER — APPOINTMENT (OUTPATIENT)
Dept: GENERAL RADIOLOGY | Age: 36
DRG: 469 | End: 2021-01-28
Attending: INTERNAL MEDICINE
Payer: COMMERCIAL

## 2021-01-28 ENCOUNTER — ANESTHESIA EVENT (OUTPATIENT)
Dept: OPERATING ROOM | Age: 36
DRG: 469 | End: 2021-01-28
Payer: COMMERCIAL

## 2021-01-28 ENCOUNTER — ANESTHESIA (OUTPATIENT)
Dept: OPERATING ROOM | Age: 36
DRG: 469 | End: 2021-01-28
Payer: COMMERCIAL

## 2021-01-28 VITALS — SYSTOLIC BLOOD PRESSURE: 131 MMHG | OXYGEN SATURATION: 91 % | DIASTOLIC BLOOD PRESSURE: 73 MMHG

## 2021-01-28 LAB
ALBUMIN SERPL-MCNC: 3 G/DL (ref 3.5–5.2)
ALP BLD-CCNC: 138 U/L (ref 35–104)
ALT SERPL-CCNC: 253 U/L (ref 0–32)
ANION GAP SERPL CALCULATED.3IONS-SCNC: 12 MMOL/L (ref 7–16)
AST SERPL-CCNC: 48 U/L (ref 0–31)
BILIRUB SERPL-MCNC: 0.6 MG/DL (ref 0–1.2)
BILIRUBIN DIRECT: 0.3 MG/DL (ref 0–0.3)
BILIRUBIN, INDIRECT: 0.3 MG/DL (ref 0–1)
BUN BLDV-MCNC: 8 MG/DL (ref 6–20)
CALCIUM IONIZED: 1.11 MMOL/L (ref 1.15–1.33)
CALCIUM SERPL-MCNC: 7.8 MG/DL (ref 8.6–10.2)
CHLORIDE BLD-SCNC: 99 MMOL/L (ref 98–107)
CO2: 28 MMOL/L (ref 22–29)
CREAT SERPL-MCNC: 3.1 MG/DL (ref 0.5–1)
DNASE B ANTIBODY: <86 U/ML (ref 0–260)
GFR AFRICAN AMERICAN: 21
GFR NON-AFRICAN AMERICAN: 17 ML/MIN/1.73
GLUCOSE BLD-MCNC: 85 MG/DL (ref 74–99)
INR BLD: 1.1
POTASSIUM SERPL-SCNC: 3.7 MMOL/L (ref 3.5–5)
PROTHROMBIN TIME: 11.9 SEC (ref 9.3–12.4)
SODIUM BLD-SCNC: 139 MMOL/L (ref 132–146)
TOTAL PROTEIN: 5.3 G/DL (ref 6.4–8.3)

## 2021-01-28 PROCEDURE — 3600000013 HC SURGERY LEVEL 3 ADDTL 15MIN: Performed by: SURGERY

## 2021-01-28 PROCEDURE — 6360000002 HC RX W HCPCS: Performed by: NURSE ANESTHETIST, CERTIFIED REGISTERED

## 2021-01-28 PROCEDURE — 6370000000 HC RX 637 (ALT 250 FOR IP): Performed by: SURGERY

## 2021-01-28 PROCEDURE — 99232 SBSQ HOSP IP/OBS MODERATE 35: CPT | Performed by: NURSE PRACTITIONER

## 2021-01-28 PROCEDURE — 6360000002 HC RX W HCPCS: Performed by: ANESTHESIOLOGY

## 2021-01-28 PROCEDURE — 6370000000 HC RX 637 (ALT 250 FOR IP): Performed by: INTERNAL MEDICINE

## 2021-01-28 PROCEDURE — 76937 US GUIDE VASCULAR ACCESS: CPT | Performed by: SURGERY

## 2021-01-28 PROCEDURE — 6370000000 HC RX 637 (ALT 250 FOR IP): Performed by: STUDENT IN AN ORGANIZED HEALTH CARE EDUCATION/TRAINING PROGRAM

## 2021-01-28 PROCEDURE — 6360000002 HC RX W HCPCS

## 2021-01-28 PROCEDURE — 2500000003 HC RX 250 WO HCPCS: Performed by: SURGERY

## 2021-01-28 PROCEDURE — 02PY33Z REMOVAL OF INFUSION DEVICE FROM GREAT VESSEL, PERCUTANEOUS APPROACH: ICD-10-PCS | Performed by: SURGERY

## 2021-01-28 PROCEDURE — 2060000000 HC ICU INTERMEDIATE R&B

## 2021-01-28 PROCEDURE — 7100000001 HC PACU RECOVERY - ADDTL 15 MIN: Performed by: SURGERY

## 2021-01-28 PROCEDURE — 85610 PROTHROMBIN TIME: CPT

## 2021-01-28 PROCEDURE — 80076 HEPATIC FUNCTION PANEL: CPT

## 2021-01-28 PROCEDURE — 7100000000 HC PACU RECOVERY - FIRST 15 MIN: Performed by: SURGERY

## 2021-01-28 PROCEDURE — 2580000003 HC RX 258: Performed by: INTERNAL MEDICINE

## 2021-01-28 PROCEDURE — 82330 ASSAY OF CALCIUM: CPT

## 2021-01-28 PROCEDURE — B5181ZA FLUOROSCOPY OF SUPERIOR VENA CAVA USING LOW OSMOLAR CONTRAST, GUIDANCE: ICD-10-PCS | Performed by: SURGERY

## 2021-01-28 PROCEDURE — 2580000003 HC RX 258: Performed by: NURSE ANESTHETIST, CERTIFIED REGISTERED

## 2021-01-28 PROCEDURE — 36415 COLL VENOUS BLD VENIPUNCTURE: CPT

## 2021-01-28 PROCEDURE — 36558 INSERT TUNNELED CV CATH: CPT | Performed by: SURGERY

## 2021-01-28 PROCEDURE — 6360000002 HC RX W HCPCS: Performed by: INTERNAL MEDICINE

## 2021-01-28 PROCEDURE — 6360000002 HC RX W HCPCS: Performed by: SURGERY

## 2021-01-28 PROCEDURE — 2580000003 HC RX 258: Performed by: SURGERY

## 2021-01-28 PROCEDURE — 3700000001 HC ADD 15 MINUTES (ANESTHESIA): Performed by: SURGERY

## 2021-01-28 PROCEDURE — 3209999900 FLUORO FOR SURGICAL PROCEDURES

## 2021-01-28 PROCEDURE — 77001 FLUOROGUIDE FOR VEIN DEVICE: CPT | Performed by: SURGERY

## 2021-01-28 PROCEDURE — 2709999900 HC NON-CHARGEABLE SUPPLY: Performed by: SURGERY

## 2021-01-28 PROCEDURE — 71045 X-RAY EXAM CHEST 1 VIEW: CPT

## 2021-01-28 PROCEDURE — 3700000000 HC ANESTHESIA ATTENDED CARE: Performed by: SURGERY

## 2021-01-28 PROCEDURE — 80048 BASIC METABOLIC PNL TOTAL CA: CPT

## 2021-01-28 PROCEDURE — 3600000003 HC SURGERY LEVEL 3 BASE: Performed by: SURGERY

## 2021-01-28 PROCEDURE — 0JH63XZ INSERTION OF TUNNELED VASCULAR ACCESS DEVICE INTO CHEST SUBCUTANEOUS TISSUE AND FASCIA, PERCUTANEOUS APPROACH: ICD-10-PCS | Performed by: SURGERY

## 2021-01-28 PROCEDURE — 02H633Z INSERTION OF INFUSION DEVICE INTO RIGHT ATRIUM, PERCUTANEOUS APPROACH: ICD-10-PCS | Performed by: SURGERY

## 2021-01-28 RX ORDER — MIDAZOLAM HYDROCHLORIDE 1 MG/ML
INJECTION INTRAMUSCULAR; INTRAVENOUS PRN
Status: DISCONTINUED | OUTPATIENT
Start: 2021-01-28 | End: 2021-01-28 | Stop reason: SDUPTHER

## 2021-01-28 RX ORDER — PROMETHAZINE HYDROCHLORIDE 25 MG/ML
6.25 INJECTION, SOLUTION INTRAMUSCULAR; INTRAVENOUS
Status: DISCONTINUED | OUTPATIENT
Start: 2021-01-28 | End: 2021-01-28 | Stop reason: HOSPADM

## 2021-01-28 RX ORDER — PROPOFOL 10 MG/ML
INJECTION, EMULSION INTRAVENOUS CONTINUOUS PRN
Status: DISCONTINUED | OUTPATIENT
Start: 2021-01-28 | End: 2021-01-28 | Stop reason: SDUPTHER

## 2021-01-28 RX ORDER — HEPARIN SODIUM (PORCINE) LOCK FLUSH IV SOLN 100 UNIT/ML 100 UNIT/ML
SOLUTION INTRAVENOUS PRN
Status: DISCONTINUED | OUTPATIENT
Start: 2021-01-28 | End: 2021-01-28 | Stop reason: ALTCHOICE

## 2021-01-28 RX ORDER — SODIUM CHLORIDE 9 MG/ML
INJECTION, SOLUTION INTRAVENOUS CONTINUOUS PRN
Status: DISCONTINUED | OUTPATIENT
Start: 2021-01-28 | End: 2021-01-28 | Stop reason: SDUPTHER

## 2021-01-28 RX ORDER — FENTANYL CITRATE 50 UG/ML
INJECTION, SOLUTION INTRAMUSCULAR; INTRAVENOUS PRN
Status: DISCONTINUED | OUTPATIENT
Start: 2021-01-28 | End: 2021-01-28 | Stop reason: SDUPTHER

## 2021-01-28 RX ORDER — ONDANSETRON 2 MG/ML
4 INJECTION INTRAMUSCULAR; INTRAVENOUS
Status: DISCONTINUED | OUTPATIENT
Start: 2021-01-28 | End: 2021-01-28 | Stop reason: HOSPADM

## 2021-01-28 RX ORDER — LIDOCAINE HYDROCHLORIDE 20 MG/ML
INJECTION, SOLUTION INTRAVENOUS PRN
Status: DISCONTINUED | OUTPATIENT
Start: 2021-01-28 | End: 2021-01-28 | Stop reason: SDUPTHER

## 2021-01-28 RX ADMIN — TOPIRAMATE 25 MG: 25 TABLET, FILM COATED ORAL at 09:58

## 2021-01-28 RX ADMIN — ONDANSETRON HYDROCHLORIDE 4 MG: 2 INJECTION, SOLUTION INTRAMUSCULAR; INTRAVENOUS at 00:23

## 2021-01-28 RX ADMIN — LEVOTHYROXINE SODIUM 112 MCG: 0.11 TABLET ORAL at 06:38

## 2021-01-28 RX ADMIN — Medication 10 ML: at 09:59

## 2021-01-28 RX ADMIN — POTASSIUM CHLORIDE 20 MEQ: 1500 TABLET, EXTENDED RELEASE ORAL at 09:58

## 2021-01-28 RX ADMIN — METHOCARBAMOL TABLETS 1000 MG: 500 TABLET, COATED ORAL at 09:58

## 2021-01-28 RX ADMIN — FENTANYL CITRATE 50 MCG: 50 INJECTION, SOLUTION INTRAMUSCULAR; INTRAVENOUS at 13:32

## 2021-01-28 RX ADMIN — MIDAZOLAM 2 MG: 1 INJECTION INTRAMUSCULAR; INTRAVENOUS at 13:17

## 2021-01-28 RX ADMIN — SODIUM CHLORIDE: 9 INJECTION, SOLUTION INTRAVENOUS at 13:24

## 2021-01-28 RX ADMIN — CEFAZOLIN 1000 MG: 1 INJECTION, POWDER, FOR SOLUTION INTRAMUSCULAR; INTRAVENOUS at 13:31

## 2021-01-28 RX ADMIN — HYDROMORPHONE HYDROCHLORIDE 0.25 MG: 1 INJECTION, SOLUTION INTRAMUSCULAR; INTRAVENOUS; SUBCUTANEOUS at 15:04

## 2021-01-28 RX ADMIN — LIDOCAINE HYDROCHLORIDE 20 MG: 20 INJECTION, SOLUTION INTRAVENOUS at 13:27

## 2021-01-28 RX ADMIN — PROPOFOL 100 MCG/KG/MIN: 10 INJECTION, EMULSION INTRAVENOUS at 13:27

## 2021-01-28 RX ADMIN — HEPARIN SODIUM 5000 UNITS: 10000 INJECTION INTRAVENOUS; SUBCUTANEOUS at 23:20

## 2021-01-28 RX ADMIN — ONDANSETRON HYDROCHLORIDE 4 MG: 2 INJECTION, SOLUTION INTRAMUSCULAR; INTRAVENOUS at 13:42

## 2021-01-28 RX ADMIN — Medication 1 TABLET: at 09:58

## 2021-01-28 RX ADMIN — ZOLPIDEM TARTRATE 5 MG: 5 TABLET ORAL at 21:02

## 2021-01-28 RX ADMIN — ONDANSETRON HYDROCHLORIDE 4 MG: 2 INJECTION, SOLUTION INTRAMUSCULAR; INTRAVENOUS at 23:16

## 2021-01-28 RX ADMIN — METHOCARBAMOL TABLETS 1000 MG: 500 TABLET, COATED ORAL at 21:02

## 2021-01-28 RX ADMIN — HYDROCODONE BITARTRATE AND ACETAMINOPHEN 1 TABLET: 5; 325 TABLET ORAL at 04:45

## 2021-01-28 RX ADMIN — HYDROMORPHONE HYDROCHLORIDE 0.25 MG: 1 INJECTION, SOLUTION INTRAMUSCULAR; INTRAVENOUS; SUBCUTANEOUS at 15:09

## 2021-01-28 RX ADMIN — HYDROCODONE BITARTRATE AND ACETAMINOPHEN 1 TABLET: 5; 325 TABLET ORAL at 23:16

## 2021-01-28 RX ADMIN — FENTANYL CITRATE 50 MCG: 50 INJECTION, SOLUTION INTRAMUSCULAR; INTRAVENOUS at 13:27

## 2021-01-28 RX ADMIN — HYDROCODONE BITARTRATE AND ACETAMINOPHEN 1 TABLET: 5; 325 TABLET ORAL at 16:54

## 2021-01-28 RX ADMIN — HYDROCODONE BITARTRATE AND ACETAMINOPHEN 1 TABLET: 5; 325 TABLET ORAL at 11:11

## 2021-01-28 RX ADMIN — Medication 10 ML: at 21:03

## 2021-01-28 ASSESSMENT — PAIN SCALES - GENERAL
PAINLEVEL_OUTOF10: 4
PAINLEVEL_OUTOF10: 3
PAINLEVEL_OUTOF10: 7
PAINLEVEL_OUTOF10: 4
PAINLEVEL_OUTOF10: 0
PAINLEVEL_OUTOF10: 5
PAINLEVEL_OUTOF10: 0
PAINLEVEL_OUTOF10: 8

## 2021-01-28 ASSESSMENT — PAIN DESCRIPTION - LOCATION: LOCATION: CHEST;GROIN

## 2021-01-28 ASSESSMENT — PAIN DESCRIPTION - ORIENTATION
ORIENTATION: RIGHT

## 2021-01-28 ASSESSMENT — PULMONARY FUNCTION TESTS
PIF_VALUE: 0

## 2021-01-28 ASSESSMENT — PAIN DESCRIPTION - FREQUENCY
FREQUENCY: INTERMITTENT
FREQUENCY: INTERMITTENT

## 2021-01-28 ASSESSMENT — PAIN DESCRIPTION - PAIN TYPE
TYPE: SURGICAL PAIN
TYPE: SURGICAL PAIN

## 2021-01-28 ASSESSMENT — PAIN DESCRIPTION - DESCRIPTORS: DESCRIPTORS: DULL

## 2021-01-28 NOTE — PROGRESS NOTES
Vascular Surgery Progress Note    Pt is being seen in f/u today regarding dialysis access    Subjective  Pt s/e. States she is doing well. Reports some pain around her temporary HD line. Ready for the procedure today.     Current Medications:      HYDROcodone 5 mg - acetaminophen, aluminum & magnesium hydroxide-simethicone, [Held by provider] butalbital-acetaminophen-caffeine, zolpidem, sodium chloride flush, promethazine **OR** ondansetron, polyethylene glycol, sodium chloride flush, heparin flush    heparin (porcine)  5,000 Units Subcutaneous Q8H    ceFAZolin  1,000 mg Intravenous See Admin Instructions    vitamin D  50,000 Units Oral Weekly    methocarbamol  1,000 mg Oral 4x Daily    calcium-vitamin D  1 tablet Oral BID WC    [Held by provider] gabapentin  300 mg Oral TID    levothyroxine  112 mcg Oral Daily    potassium chloride  20 mEq Oral Daily    topiramate  25 mg Oral Daily    sodium chloride flush  10 mL Intravenous 2 times per day    lidocaine PF  5 mL Intradermal Once    heparin flush  3 mL Intravenous 2 times per day      PHYSICAL EXAM:    /82   Pulse 70   Temp 97 °F (36.1 °C) (Temporal)   Resp 18   Ht 5' (1.524 m)   Wt 163 lb 5.8 oz (74.1 kg)   SpO2 98%   BMI 31.90 kg/m²     Intake/Output Summary (Last 24 hours) at 1/28/2021 0606  Last data filed at 1/28/2021 0556  Gross per 24 hour   Intake 300 ml   Output 3800 ml   Net -3500 ml        Gen awake and alert  CVS RR and normotensive  Resp No increased work of breathing on room air  Abd soft,nontender, nondistended  Temp line in place without hematoma    LABS:    Lab Results   Component Value Date    WBC 11.4 01/21/2021    HGB 14.0 01/21/2021    HCT 42.0 01/21/2021     01/21/2021    PROTIME 11.3 01/27/2021    INR 1.0 01/27/2021    APTT 26.3 01/21/2021    K 4.0 01/27/2021    BUN 10 01/27/2021    CREATININE 4.0 (H) 01/27/2021     A/P ESRD, Dialysis Access    · NPO for procedure today  · Plan for Tesio placement and temp line

## 2021-01-28 NOTE — ANESTHESIA POSTPROCEDURE EVALUATION
Department of Anesthesiology  Postprocedure Note    Patient: William Valderrama  MRN: [id number]  YOB: 1985  Date of evaluation: 1/28/2021  Time:  5:43 PM     Procedure Summary     Date: 01/28/21 Room / Location: [facility] OR 04 / [facility]    Anesthesia Start: 1319 Anesthesia Stop: 1446    Procedure: TESIO CATHETER INSERTION AND REMOVAL OF TEMPORARY (N/A Chest) Diagnosis: (.)    Surgeons: Cat Ramires MD Responsible Provider: Tamia Lopez MD    Anesthesia Type: MAC ASA Status: 4          Anesthesia Type: MAC    Pili Phase I: Pili Score: 10    Pili Phase II:      Last vitals: Reviewed and per EMR flowsheets.        Anesthesia Post Evaluation    Patient location during evaluation: PACU  Patient participation: complete - patient participated  Level of consciousness: awake  Pain score: 0  Airway patency: patent  Nausea & Vomiting: no nausea  Complications: no  Cardiovascular status: hemodynamically stable  Respiratory status: acceptable  Hydration status: stable

## 2021-01-28 NOTE — OP NOTE
Sterile dressings were applied to the incisions in the operating room. Needle, sponge, and instrument counts were reported as correct times two. The patient tolerated the procedure and was transferred back to the floor in satisfactory condition. CC : DO Dr. Nadine Camejo    I called her dad per her request and update him.     [facility]

## 2021-01-28 NOTE — PROGRESS NOTES
Jose Luis Moreau is a 30 y.o. right handed female     Neurology is following for seizures    PMH: Metastatic neuroendocrine tumor with liver mets, migraines, Crohn's disease, history of alcohol abuse in remission, current smoking    She presented on 1/22 with BEREKET and hypocalcemia and GTC seizures. New-onset seizures a week prior. UDS positive for fentanyl and barbiturates (apparently took fentanyl for headache). LFTs were markedly elevated. Was on low-dose topiramate 25 mg at night only for migraines. Patient lying in bed resting quietly without complaints. She feels better and notes no seizures. She had kidney bx and we are awaiting results. She also notes that she had a port placed for dialysis as well as a Mediport for her chemo. Awaiting MRI brain with contrast once cleared with Nephrology to rule out mets.       EEG was normal.      No chest pain or palpitations  No SOB  No vertigo, lightheadedness or loss of consciousness  No falls, tripping or stumbling  No incontinence of bowels or bladder  No itching or bruising appreciated  No numbness, tingling or focal arm/leg weakness  Mildly slurred speech; no dysphagia    ROS otherwise negative    Vital signs are stable at present    Current Facility-Administered Medications   Medication Dose Route Frequency Provider Last Rate Last Admin    heparin (porcine) injection 5,000 Units  5,000 Units Subcutaneous Q8H Nicky Castellanos DO   5,000 Units at 01/27/21 2220    ceFAZolin (ANCEF) 1,000 mg in sterile water 10 mL IV syringe  1,000 mg Intravenous See Admin Instructions Wing Latrice MD        HYDROcodone-acetaminophen [facility]) 5-325 MG per tablet 1 tablet  1 tablet Oral Q6H PRN Nicky Castellanos DO   1 tablet at 01/28/21 0445    vitamin D (ERGOCALCIFEROL) capsule 50,000 Units  50,000 Units Oral Weekly Cloretta TRINA Santos - CNP   50,000 Units at 01/23/21 1754    methocarbamol (ROBAXIN) tablet 1,000 mg  1,000 mg Oral 4x Daily Ayan Chatterjee MD   1,000 mg at 01/27/21 2044    aluminum & magnesium hydroxide-simethicone (MAALOX) 200-200-20 MG/5ML suspension 5 mL  5 mL Oral Q6H PRN Adam Castellanos, DO        [Held by provider] butalbital-acetaminophen-caffeine (FIORICET, ESGIC) per tablet 1 tablet  1 tablet Oral Daily PRN Adam Castellanos, DO   1 tablet at 01/22/21 1419    calcium-vitamin D (OSCAL-500) 500-200 MG-UNIT per tablet 1 tablet  1 tablet Oral BID WC Adam Castellanos, DO   1 tablet at 01/27/21 1709    [Held by provider] gabapentin (NEURONTIN) capsule 300 mg  300 mg Oral TID Tobin Gamboa DO   Stopped at 01/26/21 2100    levothyroxine (SYNTHROID) tablet 112 mcg  112 mcg Oral Daily Adam Castellanos, DO   112 mcg at 01/28/21 1694    potassium chloride (KLOR-CON M) extended release tablet 20 mEq  20 mEq Oral Daily Adam Castellanos, DO   20 mEq at 01/27/21 9832    topiramate (TOPAMAX) tablet 25 mg  25 mg Oral Daily Adam Castellanos, DO   25 mg at 01/27/21 0901    zolpidem (AMBIEN) tablet 5 mg  5 mg Oral Nightly PRN Adam Castellanos, DO   5 mg at 01/27/21 2220    sodium chloride flush 0.9 % injection 10 mL  10 mL Intravenous 2 times per day Adam Castellanos, DO   10 mL at 01/27/21 2045    sodium chloride flush 0.9 % injection 10 mL  10 mL Intravenous PRN Adam Castellanos, DO        promethazine (PHENERGAN) tablet 12.5 mg  12.5 mg Oral Q6H PRN Adam Castellanos, DO        Or    ondansetron [facility]) injection 4 mg  4 mg Intravenous Q6H PRN Adam Castellanos, DO   4 mg at 01/28/21 0023    polyethylene glycol (GLYCOLAX) packet 17 g  17 g Oral Daily PRN Adam Castellanos, DO        lidocaine PF 1 % injection 5 mL  5 mL Intradermal Once Vicente Colmenares MD        sodium chloride flush 0.9 % injection 10 mL  10 mL Intravenous PRN Vicente Colmenares MD        heparin flush 100 UNIT/ML injection 300 Units  3 mL Intravenous 2 times per day Vicente Colmenares MD        heparin flush 100 UNIT/ML injection 300 Units  3 mL Intracatheter PRN Irma Kilgore Briana Haney MD         Objective:     BP (!) 131/94   Pulse 69   Temp 96.3 °F (35.7 °C) (Temporal)   Resp 17   Ht 5' (1.524 m)   Wt 163 lb 5.8 oz (74.1 kg)   SpO2 98%   BMI 31.90 kg/m²     General appearance: Alert, appears stated age, cooperative and no distress  Head: normocephalic, without obvious abnormality, atraumatic  Eyes: conjunctivae/corneas clear.  .  Neck: Supple and symmetric  Lungs: respirations non labored  Heart: NSR on monitor  Extremities: normal, atraumatic, no cyanosis or edema  Skin: color, texture, turgor normal---no rashes or lesions      Mental Status: alert and oriented x4, follows commands well, pleasant    Appropriate attention/concentration  Intact fundus of knowledge  Intact memories    Speech: no dysarthria  Language: no aphasias    Cranial Nerves:  I: smell NA   II: visual acuity  NA   II: visual fields Full to confrontation   II: pupils PERRL   III,VII: ptosis None   III,IV,VI: extraocular muscles  EOMI without nystagmus   V: mastication Normal   V: facial light touch sensation  Normal   V,VII: corneal reflex     VII: facial muscle function - upper  Normal   VII: facial muscle function - lower Normal   VIII: hearing Normal   IX: soft palate elevation  Normal   IX,X: gag reflex    XI: trapezius strength  5/5   XI: sternocleidomastoid strength 5/5   XI: neck extension strength  5/5   XII: tongue strength  Normal     Motor:  5/5 throughout  Normal bulk and tone  No drift or abnormal movements    Sensory:  LT throughout    Coordination:   FN, ERNST and FFM normal  HSK intact     No Ferguson's    No pathological reflexes    Laboratory/Radiology:     CMP:    Lab Results   Component Value Date     01/28/2021    K 3.7 01/28/2021    K 3.9 01/21/2021    CL 99 01/28/2021    CO2 28 01/28/2021    BUN 8 01/28/2021    CREATININE 3.1 01/28/2021    GFRAA 21 01/28/2021    LABGLOM 17 01/28/2021    GLUCOSE 85 01/28/2021    PROT 5.3 01/28/2021    LABALBU 3.0 01/28/2021    CALCIUM 7.8 01/28/2021 BILITOT 0.6 01/28/2021    ALKPHOS 138 01/28/2021    AST 48 01/28/2021     01/28/2021     Hepatic Function Panel:    Lab Results   Component Value Date    ALKPHOS 138 01/28/2021     01/28/2021    AST 48 01/28/2021    PROT 5.3 01/28/2021    BILITOT 0.6 01/28/2021    BILIDIR 0.3 01/28/2021    IBILI 0.3 01/28/2021    LABALBU 3.0 01/28/2021     MRI brain WO: No acute events; a few small T2 flair hyperintensities    EEG normal    All labs and images personally reviewed today    Assessment:     New onset GTC seizures: Must rule out brain mets in light of her metastatic neuroendocrine tumor, but must also consider provoked seizures in the setting of acute renal failure, hypocalcemia, and transaminitis. Her exam is normal again today    Acute renal failure: Continue HD per nephrology; await kidney biopsy results     Transaminitis    Metastatic malignant neuroendocrine tumor     Chronic migraines:  On topiramate    Plan:     Continue supportive care  Hopefully can get r/p MRI w contrast--await coordination and ok from renal   Continue treatment of underlying medical conditions  Await biopsy results  Seizure precautions          TRINA CobbC  9:58 AM  1/28/2021

## 2021-01-28 NOTE — CARE COORDINATION
Transition of Care-Met with patient bedside, gave her dialysis schedule, no questions, first treatment is 1/29-however will be receiving dialysis treatment here so will update [facility] when she will start her first treatment. Outpatient dialysis schedule from [facility] for [facility] M-W-F 5:45am . Patient is scheduled for tunneled catheter today with removal of temp dialysis cath. SW/CM following.     Melony GALVANN, RN  [facility] [place]   [phone]

## 2021-01-28 NOTE — PROGRESS NOTES
[facility]  Nephrology Attending Progress Note    SUBJECTIVE: Patient is being followed for BEREKET    1/25: Temp HD cath placed yesterday. Having renal biopsy this AM and then first HD treatment. 1/26: Seen on HD this AM, UF goal 1.5L. States she is feeling OK, some SOB with exertion. 1/27: For 3rd HD today. Pt states she feels alright, had some nausea and vomiting this am.  Her father is at bedside. Pt reports some bleeding from temp site in right groin last night, pressure dressing was applied with resolution. Labs to be drawn in dialysis. 1/28: for tunneled dialysis cath placement this afternoon.      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 ([address]    Malignant carcinoid tumor of ileum ([address]    New onset seizure ([address]    Neuroendocrine tumor    Hypomagnesemia    Hypocalcemia    Hypoparathyroidism (HCC)    Tobacco abuse    BEREKET (acute kidney injury) ([address]    Elevated liver enzymes    Moderate protein-calorie malnutrition ([address]    Encounter regarding vascular access for dialysis for ESRD [facility])        PAST MEDICAL HISTORY:    Past Medical History:   Diagnosis Date    Abdominal pain     Acute Crohn's disease ([address]     Cancer ([address]     neuroendocrime tumor    Encounter regarding vascular access for dialysis for ESRD ([address] 01/26/2021    Hemodialysis patient ([address] 01/25/2021    Dr. Gely Donald Hypocalcemia     Hypothyroidism     Irritable bowel syndrome     Migraines     Status post alcohol detoxification     5/22/2018-5/29/2018       DIET:    Diet NPO, After Midnight Exceptions are: Ice Chips, Sips with Meds     PHYSICAL EXAM:     Patient Vitals for the past 24 hrs:   BP Temp Temp src Pulse Resp SpO2 Weight   01/28/21 0948 (!) 131/94 96.3 °F (35.7 °C) Temporal 69 17 98 %    01/27/21 2355 139/82 97 °F (36.1 °C) Temporal 70 18 98 %    01/27/21 1545 110/72 96.3 °F (35.7 °C) Temporal 76 17     01/27/21 1524 (!) 140/95 97.3 °F (36.3 °C)  65 18  163 lb 5.8 oz (74.1 kg)   01/27/21 1500 132/77   68      01/27/21 1430 (!) 156/71   68      01/27/21 1400 137/66   65      01/27/21 1330 (!) 140/64   68      01/27/21 1300 (!) 162/62   80      01/27/21 1230 (!) 127/57   81      01/27/21 1223 120/66   72      01/27/21 1218 138/67 97.1 °F (36.2 °C)  83 18  166 lb 10.7 oz (75.6 kg)   @      Intake/Output Summary (Last 24 hours) at 1/28/2021 1007  Last data filed at 1/28/2021 0556  Gross per 24 hour   Intake 300 ml   Output 3650 ml   Net -3350 ml         Wt Readings from Last 3 Encounters:   01/27/21 163 lb 5.8 oz (74.1 kg)   01/21/21 130 lb (59 kg)   01/07/21 140 lb 9.6 oz (63.8 kg)       Constitutional:  Pt is in no acute distress  Head: normocephalic, atraumatic  Neck: no JVD  Cardiovascular: regular rate and rhythm, no murmurs, gallops, or rubs  Respiratory:  No rales, rhonchi, or wheezes, diminished in bases  Gastrointestinal:  Soft, RLQ tender, nondistended, bowel sounds x 4  Ext: +1 BLE edema, Right groin with temp dialysis access  Skin: dry, no rash  Neuro: Alert and oriented    MEDS (scheduled):    heparin (porcine)  5,000 Units Subcutaneous Q8H    ceFAZolin  1,000 mg Intravenous See Admin Instructions    vitamin D  50,000 Units Oral Weekly    methocarbamol  1,000 mg Oral 4x Daily    calcium-vitamin D  1 tablet Oral BID WC    [Held by provider] gabapentin  300 mg Oral TID    levothyroxine  112 mcg Oral Daily    potassium chloride  20 mEq Oral Daily    topiramate  25 mg Oral Daily    sodium chloride flush  10 mL Intravenous 2 times per day    lidocaine PF  5 mL Intradermal Once    heparin flush  3 mL Intravenous 2 times per day       MEDS (infusions):      MEDS (prn):   HYDROcodone 5 mg - acetaminophen, aluminum & magnesium hydroxide-simethicone, [Held by provider] butalbital-acetaminophen-caffeine, zolpidem, sodium chloride flush, promethazine **OR** ondansetron, polyethylene glycol, sodium chloride flush, heparin flush    DATA:    No results for input(s): WBC, HGB, HCT, MCV, PLT in the last 72 hours. Recent Labs     01/26/21  0800 01/27/21  1220 01/28/21  0545    136 139   K 3.6 4.0 3.7   CL 97* 96* 99   CO2 23 28 28   BUN 19 10 8   CREATININE 5.3* 4.0* 3.1*   LABGLOM 9 13 17   GLUCOSE 151* 107* 85   CALCIUM 7.9* 7.7* 7.8*   * 318* 253*   AST 99* 63* 48*   BILIDIR 0.4* 0.4* 0.3   BILITOT 0.7 0.7 0.6   ALKPHOS 170* 154* 138*       Lab Results   Component Value Date    LABALBU 3.0 (L) 01/28/2021    LABALBU 3.2 (L) 01/27/2021    LABALBU 3.0 (L) 01/26/2021    LABALBU 2.5 (L) 01/26/2021     Lab Results   Component Value Date    TSH 2.190 01/21/2021       Iron Studies  No results found for: IRON, TIBC, FERRITIN  No results found for: [id number]  No results found for: FOLATE    Vit D, 25-Hydroxy   Date Value Ref Range Status   01/23/2021 8 (L) 30 - 100 ng/mL Final     Comment:     <20 ng/mL. ........... [place] Fallow Deficient  20-30 ng/mL. ......... [place] Fallow Insufficient   ng/mL. ........ [place] Fallow Sufficient  >100 ng/mL. .......... [place] Fallow Toxic       PTH   Date Value Ref Range Status   01/23/2021 37 15 - 65 pg/mL Final       No components found for: URIC    Lab Results   Component Value Date    COLORU Yellow 01/24/2021    NITRU Negative 01/24/2021    GLUCOSEU Negative 01/24/2021    KETUA Negative 01/24/2021    UROBILINOGEN 0.2 01/24/2021    BILIRUBINUR Negative 01/24/2021    BILIRUBINUR neg 08/20/2020       No results found for: Divina Azul      IMPRESSION/RECOMMENDATIONS:      1.  BEREKET   in the setting of nausea, vomiting decrease intake with oliguria  Baseline cr <1.0  Cr 1.8->2.6->4.3->5.3->6.2->5.3->4.0->3.1  U/A + nitrites, Spec grav >1.030, lrg blood, > 300 protein  possible hepatorenal syndrome  no signs of hemolysis  rhabdo was a consideration given seizures, but CK not elevated  GN serologies pending  US abdomen: neg hydro, multiple isoechoic/hypoechoic hepatic lesions consistent with metastatic disease. UO 1850mL in last 24 hours   consulted for OP HD arrangements  Await kidney bx result (1/25)  Temp dialysis cath placed 1/24, First HD 1/25, Second 1/26, third 1/27  For tunneled dialysis cath today. For hd in am     2. Hypocalcemia  due to parathyroidectomy; exacerbated by inadequate oral supplement  Wasn't taking calcium supplement d/t nausea and vomiting  Repleted with Calcium gluconate 2 gm IV daily x 2 doses. Continue po calcium supplement  Ca+ 7.0-->6.4-->7.3-->7.7->7.9->7.7->7. 8  Ionized calcium low at 0.9-->1.06-->1.13-->1.11->1.15->1.11  PTH 37, vitamin D 8-->Continue Vit D 50,000 units weekly   Correct Ca with HD and supplements  Monitor daily BMP and ionized calcium     3. Seizure  in the setting of hypocalcemia, hypomagnesemia  Need for MRI with della  per Neurology request will mandate dialysis  At current level of renal function explained to patient      4. Neuroendocrine tumor with liver mets. 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 Elevated liver enzymes  Oncology following    5. Metabolic acidosis  with the BEREKET  Follow with initiation of HD    6.  Hyponatremia  Corrected with HD     Thank you for the opportunity to participate in the care of Latonia Rizo, APRN - CNP  10:07 AM  1/29/2021     Pt seen and examined agree with above  For tdc today  Await biopsy results  outpt hd set up  Eusebio Cornell MD

## 2021-01-28 NOTE — ANESTHESIA PRE PROCEDURE
Department of Anesthesiology  Preprocedure Note       Name:  Nettie Quintanilla   Age:  30 y.o.  :  1985                                          MRN:  [id number]         Date:  2021      Surgeon: Birdie Nelson):  Alfred Lu MD    Procedure: Procedure(s):  TESIO CATHETER INSERTION    Medications prior to admission:   Prior to Admission medications    Medication Sig Start Date End Date Taking? Authorizing Provider   zolpidem (AMBIEN) 5 MG tablet Take 5 mg by mouth nightly as needed for Sleep (Patient States she takes Ambien but unsure of dosage). 21  Yes Historical Provider, MD   gabapentin (NEURONTIN) 300 MG capsule Take 1 capsule by mouth 3 times daily for 30 days.  qHS x 3 days then BID x 3 days then TID thereafter (increase as tolerated) 1/11/21 2/10/21 Yes Sandi Dan DO   potassium chloride (KLOR-CON M) 20 MEQ extended release tablet Take 1 tablet by mouth daily 21  Yes Junaid Rainey MD   busPIRone (BUSPAR) 7.5 MG tablet take 1 tablet by mouth twice a day 21  Yes Sandi Dan DO   dicyclomine (BENTYL) 20 MG tablet take 1 tablet by mouth twice a day 10/5/20  Yes Historical Provider, MD   Multiple Vitamins-Minerals (THERAPEUTIC MULTIVITAMIN-MINERALS) tablet Take 1 tablet by mouth daily   Yes Historical Provider, MD   topiramate (TOPAMAX SPRINKLE) 25 MG capsule Take 25 mg by mouth daily   Yes Historical Provider, MD   Calcium Carb-Cholecalciferol (CALCIUM-VITAMIN D) 500-200 MG-UNIT per tablet Take 1 tablet by mouth 2 times daily (with meals)   Yes Historical Provider, MD   levothyroxine (SYNTHROID) 112 MCG tablet take 1 tablet by mouth once daily 21   Amber Seaman DO   butalbital-acetaminophen-caffeine (FIORICET, ESGIC) -52 MG per tablet take 1 tablet by mouth once daily if needed for headache 21   Amber Seaman DO   acetaminophen (AMINOFEN) 325 MG tablet Take 2 tablets by mouth every 6 hours as needed for Pain 10/25/20   Demarco Collins DO ondansetron (ZOFRAN-ODT) 4 MG disintegrating tablet Take 1 tablet by mouth 3 times daily as needed for Nausea or Vomiting 9/22/20   Henrik Torrez DO   aluminum & magnesium hydroxide-simethicone (MYLANTA) 652-713-50 MG/5ML SUSP suspension Take 5 mLs by mouth every 6 hours as needed for Indigestion     Historical Provider, MD [place] Carb-Mag Hydrox-Simeth 800-270-80 MG/10ML SUSP Take 20 mLs by mouth every 8 hours    Historical Provider, MD   sucralfate (CARAFATE) 1 GM/10ML suspension Take 10 mLs by mouth 4 times daily 8/20/20   Reed Montejo DO       Current medications:    Current Facility-Administered Medications   Medication Dose Route Frequency Provider Last Rate Last Admin    heparin (porcine) injection 5,000 Units  5,000 Units Subcutaneous Q8H Jessica Castellanos DO   5,000 Units at 01/27/21 2220    ceFAZolin (ANCEF) 1,000 mg in sterile water 10 mL IV syringe  1,000 mg Intravenous See Admin Instructions Michelle Lyn MD        HYDROcodone-acetaminophen [facility]) 5-325 MG per tablet 1 tablet  1 tablet Oral Q6H PRN Jessica Castellanos DO   1 tablet at 01/28/21 1111    vitamin D (ERGOCALCIFEROL) capsule 50,000 Units  50,000 Units Oral Weekly TRINA Tolentino - CNP   50,000 Units at 01/23/21 1754    methocarbamol (ROBAXIN) tablet 1,000 mg  1,000 mg Oral 4x Daily Esequiel Stewart MD   1,000 mg at 01/28/21 0958    aluminum & magnesium hydroxide-simethicone (MAALOX) 200-200-20 MG/5ML suspension 5 mL  5 mL Oral Q6H PRN Jessica Castellanos DO        [Held by provider] butalbital-acetaminophen-caffeine (FIORICET, ESGIC) per tablet 1 tablet  1 tablet Oral Daily PRN Jessica Castellanos DO   1 tablet at 01/22/21 1419    calcium-vitamin D (OSCAL-500) 500-200 MG-UNIT per tablet 1 tablet  1 tablet Oral BID WC Jessica Castellanos DO   1 tablet at 01/28/21 9716    [Held by provider] gabapentin (NEURONTIN) capsule 300 mg  300 mg Oral TID Jessica Castellanos DO   Stopped at 01/26/21 2100    levothyroxine (SYNTHROID) tablet 112 mcg  112 mcg Oral Daily Jelly Quirk Malmer, DO   112 mcg at 01/28/21 2625    potassium chloride (KLOR-CON M) extended release tablet 20 mEq  20 mEq Oral Daily Stuart Quirk Malmer, DO   20 mEq at 01/28/21 3562    topiramate (TOPAMAX) tablet 25 mg  25 mg Oral Daily Jelly Quirk Malmer, DO   25 mg at 01/28/21 2906    zolpidem (AMBIEN) tablet 5 mg  5 mg Oral Nightly PRN Stuart Quirk Malmer, DO   5 mg at 01/27/21 2220    sodium chloride flush 0.9 % injection 10 mL  10 mL Intravenous 2 times per day Stuart Quirk Malmer, DO   10 mL at 01/28/21 6534    sodium chloride flush 0.9 % injection 10 mL  10 mL Intravenous PRN Stuart Quirk Malmer, DO        promethazine (PHENERGAN) tablet 12.5 mg  12.5 mg Oral Q6H PRN Jelly Quirk Malmer, DO        Or    ondansetron [facility] injection 4 mg  4 mg Intravenous Q6H PRN Stuart Quirk Malmer, DO   4 mg at 01/28/21 0023    polyethylene glycol (GLYCOLAX) packet 17 g  17 g Oral Daily PRN Stuart Quirk Malmer, DO        lidocaine PF 1 % injection 5 mL  5 mL Intradermal Once Xuan Bolanos MD        sodium chloride flush 0.9 % injection 10 mL  10 mL Intravenous PRN [place] MD Luis Miguel        heparin flush 100 UNIT/ML injection 300 Units  3 mL Intravenous 2 times per day [place] MD Luis Miguel        heparin flush 100 UNIT/ML injection 300 Units  3 mL Intracatheter PRN [place] MD Luis Miguel           Allergies:  No Known Allergies    Problem List:    Patient Active Problem List   Diagnosis Code    Primary insomnia F51.01    Fatty liver K76.0    H/O small bowel obstruction Z87.19    Hypothyroidism E03.9    Depression F32.9    PTSD (post-traumatic stress disorder) F43.10    Liver masses R16.0    Migraines G43.909    Mesenteric mass K63.89    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 ([facility] C7B.8    Malignant carcinoid tumor of ileum ([facility] C7A.012    New onset seizure ([facility] R56.9    Neuroendocrine tumor D3A.8    Hypomagnesemia E83.42    Hypocalcemia E83.51    Hypoparathyroidism ([facility] E20.9    Tobacco abuse Z72.0    BEREKET (acute kidney injury) ([facility] N17.9    Elevated liver enzymes R74.8    Moderate protein-calorie malnutrition ([facility] E44.0    Encounter regarding vascular access for dialysis for ESRD ([facility] N18.6, Z99.2       Past Medical History:        Diagnosis Date    Abdominal pain     Acute Crohn's disease ([facility]     Cancer ([facility]     neuroendocrime tumor    Encounter regarding vascular access for dialysis for ESRD ([facility] 01/26/2021    Hemodialysis patient ([facility] 01/25/2021    Dr. Norma Styles Hypocalcemia     Hypothyroidism     Irritable bowel syndrome     Migraines     Status post alcohol detoxification     5/22/2018-5/29/2018       Past Surgical History:        Procedure Laterality Date    CHOLECYSTECTOMY, LAPAROSCOPIC  07/06/2016    COLONOSCOPY N/A 6/22/2018    COLONOSCOPY WITH BIOPSY performed by Shelli Hodgkin, MD at [address] [place] [address] CT BIOPSY RENAL  1/25/2021    CT BIOPSY RENAL 1/25/2021 Roxy Chen II, MD [facility] CT    CT NEEDLE BIOPSY LIVER PERCUTANEOUS  9/1/2020    CT NEEDLE BIOPSY LIVER PERCUTANEOUS 9/1/2020 [facility] CT    PARATHYROID GLAND SURGERY      SMALL INTESTINE SURGERY N/A 10/21/2020    LAPAROSCOPIC ROBOTIC SMALL BOWEL RESECTION WITH PLANNED TRANSITION TO OPEN performed by Oletta Frankel, MD at [facility] History:    Social History     Tobacco Use    Smoking status: Current Every Day Smoker     Packs/day: 0.50    Smokeless tobacco: Never Used   Substance Use Topics    Alcohol use: No     Alcohol/week: 0.0 standard drinks     Comment: 4 weeks sober post detox                                Ready to quit: Not Answered  Counseling given: Not Answered      Vital Signs (Current):   Vitals:    01/27/21 1545 01/27/21 2355 01/28/21 0948 01/28/21 1000   BP: 110/72 139/82 (!) 131/94    Pulse: 76 70 69 72   Resp: 17 18 17    Temp: 96.3 °F (35.7 °C) 97 °F (36.1 °C) 96.3 °F (35.7 °C)    TempSrc: Temporal Temporal Temporal SpO2:  98% 98%    Weight:       Height:                                                  BP Readings from Last 3 Encounters:   01/28/21 (!) 131/94   01/22/21 97/61   01/07/21 123/79       NPO Status:                                                                                 BMI:   Wt Readings from Last 3 Encounters:   01/27/21 163 lb 5.8 oz (74.1 kg)   01/21/21 130 lb (59 kg)   01/07/21 140 lb 9.6 oz (63.8 kg)     Body mass index is 31.9 kg/m². CBC:   Lab Results   Component Value Date    WBC 11.4 01/21/2021    RBC 4.37 01/21/2021    HGB 14.0 01/21/2021    HCT 42.0 01/21/2021    MCV 96.1 01/21/2021    RDW 17.4 01/21/2021     01/21/2021       CMP:   Lab Results   Component Value Date     01/28/2021    K 3.7 01/28/2021    K 3.9 01/21/2021    CL 99 01/28/2021    CO2 28 01/28/2021    BUN 8 01/28/2021    CREATININE 3.1 01/28/2021    GFRAA 21 01/28/2021    LABGLOM 17 01/28/2021    GLUCOSE 85 01/28/2021    PROT 5.3 01/28/2021    CALCIUM 7.8 01/28/2021    BILITOT 0.6 01/28/2021    ALKPHOS 138 01/28/2021    AST 48 01/28/2021     01/28/2021       POC Tests: No results for input(s): POCGLU, POCNA, POCK, POCCL, POCBUN, POCHEMO, POCHCT in the last 72 hours.     Coags:   Lab Results   Component Value Date    PROTIME 11.9 01/28/2021    INR 1.1 01/28/2021    APTT 26.3 01/21/2021       HCG (If Applicable):   Lab Results   Component Value Date    PREGTESTUR NEGATIVE 10/19/2020        ABGs: No results found for: PHART, PO2ART, PEH7DJT, OKG4BDX, BEART, K4TYAXDF     Type & Screen (If Applicable):  No results found for: LABABO, LABRH    Drug/Infectious Status (If Applicable):  No results found for: HIV, HEPCAB    COVID-19 Screening (If Applicable):   Lab Results   Component Value Date    COVID19 Not Detected 01/21/2021    COVID19 Not Detected 10/16/2020         Anesthesia Evaluation    Airway: Mallampati: II  TM distance: >3 FB   Neck ROM: full  Mouth opening: > = 3 FB Dental:          Pulmonary: Cardiovascular:                      Neuro/Psych:   (+) headaches:, psychiatric history:            GI/Hepatic/Renal:   (+) liver disease:, renal disease: dialysis and ESRD,          ROS comment: Crohn's disease, Liver CA. Endo/Other:                     Abdominal:           Vascular:                                        Anesthesia Plan      MAC     ASA 4       Induction: intravenous.                           Vinicius Meraz MD   1/28/2021

## 2021-01-28 NOTE — PROGRESS NOTES
Patient came to surgical suite with telemonitor on and rubber bracelet. Placed in bag with patient sticker on it and put in patient's chart. Will return to floor with patient.

## 2021-01-28 NOTE — PROGRESS NOTES
Hepatobiliary and pancreatic surgery   DAILY PROGRESS NOTE  1/28/2021    Chief complaint: Altered mental status    Subjective:  No acute events overnight. Hemodialysis again today. Still waiting on the results of kidney biopsy. Objective:  /82   Pulse 70   Temp 97 °F (36.1 °C) (Temporal)   Resp 18   Ht 5' (1.524 m)   Wt 163 lb 5.8 oz (74.1 kg)   SpO2 98%   BMI 31.90 kg/m²     GENERAL:  Laying in bed, awake, alert, cooperative, no apparent distress  HEAD: Normocephalic, atraumatic  EYES: No sclera icterus today, pupils equal  LUNGS:  No increased work of breathing  CARDIOVASCULAR:  R  ABDOMEN:  Soft, non-tender, non-distended  EXTREMITIES: No edema or swelling  SKIN: Warm and dry    Assessment/Plan:  30 y.o. female with elevated liver enzymes in the setting of altered mental status and seizures. 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 - continue to monitor LFTs  -Nephrology consulted appreciate recommendations  - GI- believes hypoxic liver injury- continues tor resolve  - continue general diet  - supportive care  - Has metastatic disease to her liver from neuroendocrine tumor but this is not the cause of her hepatic dysfunction  -No plans for Mediport placement. Patient received Depot injection monthly  - surgery will be signing off this patient. Please call with any questions or concerns.     Electronically signed by Bren Thomas MD on 1/28/2021 at 6:49 AM

## 2021-01-28 NOTE — PLAN OF CARE
Problem: Falls - Risk of:  Goal: Will remain free from falls  Description: Will remain free from falls  1/28/2021 0009 by Melany Goodson RN  Outcome: Met This Shift  1/27/2021 1454 by Asif Galindo RN  Outcome: Met This Shift  Goal: Absence of physical injury  Description: Absence of physical injury  1/28/2021 0009 by Melany Goodson RN  Outcome: Met This Shift  1/27/2021 1454 by Asif Galindo RN  Outcome: Met This Shift     Problem: Pain:  Goal: Pain level will decrease  Description: Pain level will decrease  1/28/2021 0009 by Melany Goodson RN  Outcome: Met This Shift  1/27/2021 1454 by Asif Galindo RN  Outcome: Met This Shift  Goal: Control of acute pain  Description: Control of acute pain  Outcome: Met This Shift  Goal: Control of chronic pain  Description: Control of chronic pain  Outcome: Met This Shift

## 2021-01-28 NOTE — PROGRESS NOTES
[facility] Medicine    Subjective:  Pt alert conversive sched for dialysis cath placement today      Current Facility-Administered Medications:     heparin (porcine) injection 5,000 Units, 5,000 Units, Subcutaneous, Q8H, Heather Castellanos DO, 5,000 Units at 01/27/21 2220    ceFAZolin (ANCEF) 1,000 mg in sterile water 10 mL IV syringe, 1,000 mg, Intravenous, See Admin Instructions, Khadra Orosco MD    HYDROcodone-acetaminophen (NORCO) 5-325 MG per tablet 1 tablet, 1 tablet, Oral, Q6H PRN, Heather Castellanos DO, 1 tablet at 01/28/21 0445    vitamin D (ERGOCALCIFEROL) capsule 50,000 Units, 50,000 Units, Oral, Weekly, TRINA Henry - CNP, 50,000 Units at 01/23/21 1754    methocarbamol (ROBAXIN) tablet 1,000 mg, 1,000 mg, Oral, 4x Daily, Khadijah Matthews MD, 1,000 mg at 01/27/21 2044    aluminum & magnesium hydroxide-simethicone (MAALOX) 200-200-20 MG/5ML suspension 5 mL, 5 mL, Oral, Q6H PRN, Heather Castellanos DO    [Held by provider] butalbital-acetaminophen-caffeine (FIORICET, ESGIC) per tablet 1 tablet, 1 tablet, Oral, Daily PRN, Heather Castellanos DO, 1 tablet at 01/22/21 1419    calcium-vitamin D (OSCAL-500) 500-200 MG-UNIT per tablet 1 tablet, 1 tablet, Oral, BID WC, Heather Castellanos DO, 1 tablet at 01/27/21 1709    [Held by provider] gabapentin (NEURONTIN) capsule 300 mg, 300 mg, Oral, TID, Heather Castellanos DO, Stopped at 01/26/21 2100    levothyroxine (SYNTHROID) tablet 112 mcg, 112 mcg, Oral, Daily, Heather Castellanos DO, 112 mcg at 01/28/21 2173    potassium chloride (KLOR-CON M) extended release tablet 20 mEq, 20 mEq, Oral, Daily, Heather Castellanos DO, 20 mEq at 01/27/21 3567    topiramate (TOPAMAX) tablet 25 mg, 25 mg, Oral, Daily, Heather Castellanos DO, 25 mg at 01/27/21 0901    zolpidem (AMBIEN) tablet 5 mg, 5 mg, Oral, Nightly PRN, Heather Castellanos DO, 5 mg at 01/27/21 2220    sodium chloride flush 0.9 % injection 10 mL, 10 mL, Intravenous, 2 times per day, Lilia De León, DO, 10 mL at 01/27/21 2045    sodium chloride flush 0.9 % injection 10 mL, 10 mL, Intravenous, PRN, Danae Castellanos, DO    promethazine (PHENERGAN) tablet 12.5 mg, 12.5 mg, Oral, Q6H PRN **OR** ondansetron (ZOFRAN) injection 4 mg, 4 mg, Intravenous, Q6H PRN, Danae Castellanos, , 4 mg at 01/28/21 0023    polyethylene glycol (GLYCOLAX) packet 17 g, 17 g, Oral, Daily PRN, Danae Castellanos DO    lidocaine PF 1 % injection 5 mL, 5 mL, Intradermal, Once, Nigel Carney MD    sodium chloride flush 0.9 % injection 10 mL, 10 mL, Intravenous, PRN, Nigel Carney MD    heparin flush 100 UNIT/ML injection 300 Units, 3 mL, Intravenous, 2 times per day, Nigel Carney MD    heparin flush 100 UNIT/ML injection 300 Units, 3 mL, Intracatheter, PRN, Nigel Carney MD    Objective:    /82   Pulse 70   Temp 97 °F (36.1 °C) (Temporal)   Resp 18   Ht 5' (1.524 m)   Wt 163 lb 5.8 oz (74.1 kg)   SpO2 98%   BMI 31.90 kg/m²     Heart:  reg  Lungs:  ctab  Abd: + bs soft nontender  Extrem:  Edema legs    CBC with Differential:    Lab Results   Component Value Date    WBC 11.4 01/21/2021    RBC 4.37 01/21/2021    HGB 14.0 01/21/2021    HCT 42.0 01/21/2021     01/21/2021    MCV 96.1 01/21/2021    MCH 32.0 01/21/2021    MCHC 33.3 01/21/2021    RDW 17.4 01/21/2021    LYMPHOPCT 9.5 01/21/2021    MONOPCT 3.6 01/21/2021    BASOPCT 0.5 01/21/2021    MONOSABS 0.41 01/21/2021    LYMPHSABS 1.08 01/21/2021    EOSABS 0.04 01/21/2021    BASOSABS 0.06 01/21/2021     CMP:    Lab Results   Component Value Date     01/28/2021    K 3.7 01/28/2021    K 3.9 01/21/2021    CL 99 01/28/2021    CO2 28 01/28/2021    BUN 8 01/28/2021    CREATININE 3.1 01/28/2021    GFRAA 21 01/28/2021    LABGLOM 17 01/28/2021    GLUCOSE 85 01/28/2021    PROT 5.3 01/28/2021    LABALBU 3.0 01/28/2021    CALCIUM 7.8 01/28/2021    BILITOT 0.6 01/28/2021    ALKPHOS 138 01/28/2021    AST 48 01/28/2021     01/28/2021     Warfarin PT/INR: Lab Results   Component Value Date    INR 1.1 01/28/2021    INR 1.0 01/27/2021    INR 1.1 01/26/2021    PROTIME 11.9 01/28/2021    PROTIME 11.3 01/27/2021    PROTIME 11.9 01/26/2021       Assessment:    Principal Problem:    BEREKET (acute kidney injury) ([facility]  Active Problems:    Hypothyroidism    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 [facility])    New onset seizure ([facility]    Hypomagnesemia    Hypocalcemia    Hypoparathyroidism (HCC)    Tobacco abuse    Elevated liver enzymes    Moderate protein-calorie malnutrition ([facility]    Encounter regarding vascular access for dialysis for ESRD ([facility]  Resolved Problems:    Hx of migraine headaches      Plan:   For dialysis cath placement today cont hd per renal dc planning        Isabelle Olsen  7:56 AM  1/28/2021

## 2021-01-29 VITALS
WEIGHT: 163.36 LBS | TEMPERATURE: 97 F | BODY MASS INDEX: 32.07 KG/M2 | HEART RATE: 68 BPM | HEIGHT: 60 IN | SYSTOLIC BLOOD PRESSURE: 130 MMHG | DIASTOLIC BLOOD PRESSURE: 88 MMHG | RESPIRATION RATE: 12 BRPM | OXYGEN SATURATION: 96 %

## 2021-01-29 DIAGNOSIS — F51.01 PRIMARY INSOMNIA: ICD-10-CM

## 2021-01-29 LAB
ADDENDUM ELECTROPHORESIS URINE RANDOM: NORMAL
ALBUMIN SERPL-MCNC: 3.3 G/DL (ref 3.5–5.2)
ALP BLD-CCNC: 138 U/L (ref 35–104)
ALT SERPL-CCNC: 175 U/L (ref 0–32)
ANION GAP SERPL CALCULATED.3IONS-SCNC: 13 MMOL/L (ref 7–16)
AST SERPL-CCNC: 40 U/L (ref 0–31)
BETA GLOBULIN: 0 AU/ML (ref 0–19)
BILIRUB SERPL-MCNC: 0.5 MG/DL (ref 0–1.2)
BILIRUBIN DIRECT: 0.3 MG/DL (ref 0–0.3)
BILIRUBIN, INDIRECT: 0.2 MG/DL (ref 0–1)
BUN BLDV-MCNC: 13 MG/DL (ref 6–20)
CALCIUM IONIZED: 1.06 MMOL/L (ref 1.15–1.33)
CALCIUM SERPL-MCNC: 7.5 MG/DL (ref 8.6–10.2)
CHLORIDE BLD-SCNC: 99 MMOL/L (ref 98–107)
CO2: 27 MMOL/L (ref 22–29)
CREAT SERPL-MCNC: 3.3 MG/DL (ref 0.5–1)
GFR AFRICAN AMERICAN: 19
GFR NON-AFRICAN AMERICAN: 16 ML/MIN/1.73
GLUCOSE BLD-MCNC: 105 MG/DL (ref 74–99)
INR BLD: 1.1
POTASSIUM SERPL-SCNC: 3.7 MMOL/L (ref 3.5–5)
PROTHROMBIN TIME: 12 SEC (ref 9.3–12.4)
SODIUM BLD-SCNC: 139 MMOL/L (ref 132–146)
TOTAL PROTEIN: 5.8 G/DL (ref 6.4–8.3)

## 2021-01-29 PROCEDURE — 99231 SBSQ HOSP IP/OBS SF/LOW 25: CPT | Performed by: NURSE PRACTITIONER

## 2021-01-29 PROCEDURE — 2580000003 HC RX 258: Performed by: SURGERY

## 2021-01-29 PROCEDURE — 80076 HEPATIC FUNCTION PANEL: CPT

## 2021-01-29 PROCEDURE — 6360000002 HC RX W HCPCS: Performed by: SURGERY

## 2021-01-29 PROCEDURE — 6370000000 HC RX 637 (ALT 250 FOR IP): Performed by: SURGERY

## 2021-01-29 PROCEDURE — 82330 ASSAY OF CALCIUM: CPT

## 2021-01-29 PROCEDURE — 85610 PROTHROMBIN TIME: CPT

## 2021-01-29 PROCEDURE — 80048 BASIC METABOLIC PNL TOTAL CA: CPT

## 2021-01-29 PROCEDURE — 36415 COLL VENOUS BLD VENIPUNCTURE: CPT

## 2021-01-29 RX ORDER — ERGOCALCIFEROL 1.25 MG/1
50000 CAPSULE ORAL WEEKLY
Qty: 4 CAPSULE | Refills: 0 | Status: SHIPPED | OUTPATIENT
Start: 2021-01-30 | End: 2021-03-11

## 2021-01-29 RX ADMIN — METHOCARBAMOL TABLETS 1000 MG: 500 TABLET, COATED ORAL at 08:45

## 2021-01-29 RX ADMIN — HEPARIN SODIUM 5000 UNITS: 10000 INJECTION INTRAVENOUS; SUBCUTANEOUS at 08:47

## 2021-01-29 RX ADMIN — HYDROCODONE BITARTRATE AND ACETAMINOPHEN 1 TABLET: 5; 325 TABLET ORAL at 05:38

## 2021-01-29 RX ADMIN — METHOCARBAMOL TABLETS 1000 MG: 500 TABLET, COATED ORAL at 12:50

## 2021-01-29 RX ADMIN — TOPIRAMATE 25 MG: 25 TABLET, FILM COATED ORAL at 08:46

## 2021-01-29 RX ADMIN — LEVOTHYROXINE SODIUM 112 MCG: 0.11 TABLET ORAL at 05:38

## 2021-01-29 RX ADMIN — Medication 1 TABLET: at 08:46

## 2021-01-29 RX ADMIN — POTASSIUM CHLORIDE 20 MEQ: 1500 TABLET, EXTENDED RELEASE ORAL at 08:46

## 2021-01-29 RX ADMIN — Medication 10 ML: at 08:45

## 2021-01-29 RX ADMIN — HYDROCODONE BITARTRATE AND ACETAMINOPHEN 1 TABLET: 5; 325 TABLET ORAL at 11:32

## 2021-01-29 ASSESSMENT — PAIN DESCRIPTION - DESCRIPTORS: DESCRIPTORS: ACHING

## 2021-01-29 ASSESSMENT — PAIN SCALES - GENERAL
PAINLEVEL_OUTOF10: 9
PAINLEVEL_OUTOF10: 8

## 2021-01-29 ASSESSMENT — PAIN DESCRIPTION - ONSET: ONSET: ON-GOING

## 2021-01-29 ASSESSMENT — PAIN DESCRIPTION - ORIENTATION: ORIENTATION: RIGHT;ANTERIOR

## 2021-01-29 ASSESSMENT — PAIN DESCRIPTION - LOCATION: LOCATION: NECK

## 2021-01-29 ASSESSMENT — PAIN DESCRIPTION - FREQUENCY: FREQUENCY: CONTINUOUS

## 2021-01-29 NOTE — PROGRESS NOTES
William Valderrama is a 30 y.o. right handed female     Neurology is following for seizures    PMH: Metastatic neuroendocrine tumor with liver mets, migraines, Crohn's disease, history of alcohol abuse in remission, current smoking    She presented on 1/22 with BEREKET and hypocalcemia and GTC seizures. New-onset seizures a week prior. UDS positive for fentanyl and barbiturates (apparently took fentanyl for headache). LFTs were markedly elevated. Was on low-dose topiramate 25 mg at night only for migraines. Patient lying in bed resting quietly without complaints. She feels better and notes no seizures. She had kidney bx and we are awaiting results. She also notes that she had a port placed for dialysis as well as a Mediport for her chemo. Awaiting MRI brain with contrast once cleared with Nephrology to rule out mets. EEG was normal.      Patient is stable and having no complaints today. Awaiting hemodialysis later today. She reports feeling optimistic about being discharged.     No chest pain or palpitations  No SOB  No vertigo, lightheadedness or loss of consciousness  No falls, tripping or stumbling  No incontinence of bowels or bladder  No itching or bruising appreciated  No numbness, tingling or focal arm/leg weakness  Mildly slurred speech; no dysphagia    ROS otherwise negative    Vital signs are stable at present    Current Facility-Administered Medications   Medication Dose Route Frequency Provider Last Rate Last Admin    heparin (porcine) injection 5,000 Units  5,000 Units Subcutaneous Q8H Cat Ramires MD   5,000 Units at 01/29/21 0847    HYDROcodone-acetaminophen (NORCO) 5-325 MG per tablet 1 tablet  1 tablet Oral Q6H PRN Cat Ramires MD   1 tablet at 01/29/21 0538    vitamin D (ERGOCALCIFEROL) capsule 50,000 Units  50,000 Units Oral Weekly Cat Ramires MD   50,000 Units at 01/23/21 1754    methocarbamol (ROBAXIN) tablet 1,000 mg  1,000 mg Oral 4x Daily Kiran Goldberg MD   1,000 mg at 01/29/21 0845    aluminum & magnesium hydroxide-simethicone (MAALOX) 200-200-20 MG/5ML suspension 5 mL  5 mL Oral Q6H PRN Kiran Goldberg MD        [Held by provider] butalbital-acetaminophen-caffeine (FIORICET, ESGIC) per tablet 1 tablet  1 tablet Oral Daily PRN Yomi Flatness Jasmin DO   1 tablet at 01/22/21 1419    calcium-vitamin D (OSCAL-500) 500-200 MG-UNIT per tablet 1 tablet  1 tablet Oral BID WC Kiran Goldberg MD   1 tablet at 01/29/21 0846    [Held by provider] gabapentin (NEURONTIN) capsule 300 mg  300 mg Oral TID Dilip Burkitt, DO   Stopped at 01/26/21 2100    levothyroxine (SYNTHROID) tablet 112 mcg  112 mcg Oral Daily Kiran Goldberg MD   112 mcg at 01/29/21 0538    potassium chloride (KLOR-CON M) extended release tablet 20 mEq  20 mEq Oral Daily Kiran Goldberg MD   20 mEq at 01/29/21 0846    topiramate (TOPAMAX) tablet 25 mg  25 mg Oral Daily Kiran Goldberg MD   25 mg at 01/29/21 0846    zolpidem (AMBIEN) tablet 5 mg  5 mg Oral Nightly PRN Kiran Goldberg MD   5 mg at 01/28/21 2102    sodium chloride flush 0.9 % injection 10 mL  10 mL Intravenous 2 times per day Kiran Goldberg MD   10 mL at 01/29/21 0845    sodium chloride flush 0.9 % injection 10 mL  10 mL Intravenous PRN Kiran Goldberg MD        promethazine (PHENERGAN) tablet 12.5 mg  12.5 mg Oral Q6H PRN Kiran Goldberg MD        Or    ondansetron [facility]) injection 4 mg  4 mg Intravenous Q6H PRN Kiran Goldberg MD   4 mg at 01/28/21 2316    polyethylene glycol (GLYCOLAX) packet 17 g  17 g Oral Daily PRN Kiran Goldberg MD        lidocaine PF 1 % injection 5 mL  5 mL Intradermal Once Kiran Goldberg MD        sodium chloride flush 0.9 % injection 10 mL  10 mL Intravenous PRN Kiran Goldberg MD        heparin flush 100 UNIT/ML injection 300 Units  3 mL Intravenous 2 times per day Khadra Orosco MD        heparin flush 100 UNIT/ML injection 300 Units  3 mL Intracatheter PRN Khadra Orosco MD         Objective:     /88   Pulse 68   Temp 97 °F (36.1 °C) (Temporal)   Resp 12   Ht 5' (1.524 m)   Wt 163 lb 5.8 oz (74.1 kg)   SpO2 96%   BMI 31.90 kg/m²     General appearance: Alert, appears stated age, cooperative and no distress  Head: normocephalic, without obvious abnormality, atraumatic  Eyes: conjunctivae/corneas clear.  .  Neck: Supple and symmetric  Lungs: respirations non labored  Heart: NSR on monitor  Extremities: normal, atraumatic, no cyanosis or edema  Skin: color, texture, turgor normal---no rashes or lesions      Mental Status: alert and oriented x4, follows commands well, pleasant    Appropriate attention/concentration  Intact fundus of knowledge  Intact memories    Speech: no dysarthria  Language: no aphasias    Cranial Nerves:  I: smell NA   II: visual acuity  NA   II: visual fields Full to confrontation   II: pupils PERRL   III,VII: ptosis None   III,IV,VI: extraocular muscles  EOMI without nystagmus   V: mastication Normal   V: facial light touch sensation  Normal   V,VII: corneal reflex     VII: facial muscle function - upper  Normal   VII: facial muscle function - lower Normal   VIII: hearing Normal   IX: soft palate elevation  Normal   IX,X: gag reflex    XI: trapezius strength  5/5   XI: sternocleidomastoid strength 5/5   XI: neck extension strength  5/5   XII: tongue strength  Normal     Motor:  5/5 throughout  Normal bulk and tone  No drift or abnormal movements    Sensory:  LT throughout    Coordination:   FN, ERNST and FFM normal  HSK intact     No Ferguson's    No pathological reflexes    Laboratory/Radiology:     CMP:    Lab Results   Component Value Date     01/28/2021    K 3.7 01/28/2021    K 3.9 01/21/2021    CL 99 01/28/2021    CO2 28 01/28/2021    BUN 8 01/28/2021    CREATININE 3.1 01/28/2021    GFRAA 21 01/28/2021    LABGLOM 17 01/28/2021    GLUCOSE 85 01/28/2021    PROT 5.3 01/28/2021    LABALBU 3.0 01/28/2021    CALCIUM 7.8 01/28/2021    BILITOT 0.6 01/28/2021    ALKPHOS 138 01/28/2021    AST 48 01/28/2021     01/28/2021     Hepatic Function Panel:    Lab Results   Component Value Date    ALKPHOS 138 01/28/2021     01/28/2021    AST 48 01/28/2021    PROT 5.3 01/28/2021    BILITOT 0.6 01/28/2021    BILIDIR 0.3 01/28/2021    IBILI 0.3 01/28/2021    LABALBU 3.0 01/28/2021     MRI brain WO: No acute events; a few small T2 flair hyperintensities    EEG normal    All labs and images personally reviewed today    Assessment:     New onset GTC seizures: Must rule out brain mets in light of her metastatic neuroendocrine tumor, but must also consider provoked seizures in the setting of acute renal failure, hypocalcemia, and transaminitis. Her exam is normal again today    Acute renal failure: Continue HD per nephrology; await kidney biopsy results     Transaminitis    Metastatic malignant neuroendocrine tumor     Chronic migraines: On topiramate    Plan:     Continue supportive care  Hopefully can get repeat MRI with contrast outpatient once okay with renal  Continue treatment of underlying medical conditions  Await biopsy results  Seizure precautions    Okay to discharge from neuro standpoint once hemodialysis is completed and patient is medically cleared. Patient can follow-up with Savana MENA NP-C outpatient for continued management. Neuro will sign off for pending discharge.       TRINA StoneC  10:13 AM  1/29/2021

## 2021-01-29 NOTE — PROGRESS NOTES
[facility]  Nephrology Attending Progress Note    SUBJECTIVE: Patient is being followed for BEREKET    1/25: Temp HD cath placed yesterday. Having renal biopsy this AM and then first HD treatment. 1/26: Seen on HD this AM, UF goal 1.5L. States she is feeling OK, some SOB with exertion. 1/27: For 3rd HD today. Pt states she feels alright, had some nausea and vomiting this am.  Her father is at bedside. Pt reports some bleeding from temp site in right groin last night, pressure dressing was applied with resolution. Labs to be drawn in dialysis. 1/28: for tunneled dialysis cath placement this afternoon. 1/29: She is schedule for IHD on MWF at [place].      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 ([address]    Malignant carcinoid tumor of ileum ([address]    New onset seizure ([address]    Neuroendocrine tumor    Hypomagnesemia    Hypocalcemia    Hypoparathyroidism (HCC)    Tobacco abuse    BEREKET (acute kidney injury) ([address]    Elevated liver enzymes    Moderate protein-calorie malnutrition ([address]    Encounter regarding vascular access for dialysis for ESRD [facility])        PAST MEDICAL HISTORY:    Past Medical History:   Diagnosis Date    Abdominal pain     Acute Crohn's disease ([address]     Cancer ([address]     neuroendocrime tumor    Encounter regarding vascular access for dialysis for ESRD ([address] 01/26/2021    Hemodialysis patient ([address] 01/25/2021    Dr. Gely Donald Hypocalcemia     Hypothyroidism     Irritable bowel syndrome     Migraines     Status post alcohol detoxification     5/22/2018-5/29/2018       DIET:    DIET RENAL;     PHYSICAL EXAM:     Patient Vitals for the past 24 hrs:   BP Temp Temp src Pulse Resp SpO2   01/29/21 0740 130/88 97 °F (36.1 °C) Temporal 68 12 96 %   01/28/21 2345 130/74 97.5 °F (36.4 °C) Temporal 64 20 97 %   01/28/21 1600 (!) 145/85 98 °F (36.7 °C) Temporal 60 15 97 %   01/28/21 1525    58 13 95 %   01/28/21 1515 (!) 155/101   60 15 96 %   01/28/21 1500 (!) 152/103   58 12 98 %   01/28/21 1445 (!) 165/108 97.8 °F (36.6 °C) Temporal 62 16 97 %   01/28/21 1442 (!) 148/105 97.4 °F (36.3 °C) Temporal 63 15 97 %   01/28/21 1000    72     01/28/21 0948 (!) 131/94 96.3 °F (35.7 °C) Temporal 69 17 98 %   @      Intake/Output Summary (Last 24 hours) at 1/29/2021 3060  Last data filed at 1/29/2021 0539  Gross per 24 hour   Intake 350 ml   Output 1750 ml   Net -1400 ml         Wt Readings from Last 3 Encounters:   01/27/21 163 lb 5.8 oz (74.1 kg)   01/21/21 130 lb (59 kg)   01/07/21 140 lb 9.6 oz (63.8 kg)       Constitutional:  Pt is in no acute distress  Head: normocephalic, atraumatic  Neck: no JVD  Cardiovascular: regular rate and rhythm, no murmurs, gallops, or rubs  Respiratory:  No rales, rhonchi, or wheezes, diminished in bases  Gastrointestinal:  Soft, RLQ tender, nondistended, bowel sounds x 4  Ext: trace pedal edema-improved  Skin: dry, no rash  Neuro: Alert and oriented x 4    MEDS (scheduled):    heparin (porcine)  5,000 Units Subcutaneous Q8H    vitamin D  50,000 Units Oral Weekly    methocarbamol  1,000 mg Oral 4x Daily    calcium-vitamin D  1 tablet Oral BID WC    [Held by provider] gabapentin  300 mg Oral TID    levothyroxine  112 mcg Oral Daily    potassium chloride  20 mEq Oral Daily    topiramate  25 mg Oral Daily    sodium chloride flush  10 mL Intravenous 2 times per day    lidocaine PF  5 mL Intradermal Once    heparin flush  3 mL Intravenous 2 times per day       MEDS (infusions):      MEDS (prn):   HYDROcodone 5 mg - acetaminophen, aluminum & magnesium hydroxide-simethicone, [Held by provider] butalbital-acetaminophen-caffeine, zolpidem, sodium chloride flush, promethazine **OR** ondansetron, polyethylene glycol, sodium chloride flush, heparin flush    DATA:    No results for input(s): WBC, HGB, HCT, MCV, PLT in the last 72 hours. Recent Labs     01/27/21  1220 01/28/21  0545    139   K 4.0 3.7   CL 96* 99   CO2 28 28   BUN 10 8   CREATININE 4.0* 3.1*   LABGLOM 13 17   GLUCOSE 107* 85   CALCIUM 7.7* 7.8*   * 253*   AST 63* 48*   BILIDIR 0.4* 0.3   BILITOT 0.7 0.6   ALKPHOS 154* 138*       Lab Results   Component Value Date    LABALBU 3.0 (L) 01/28/2021    LABALBU 3.2 (L) 01/27/2021    LABALBU 3.0 (L) 01/26/2021    LABALBU 2.5 (L) 01/26/2021     Lab Results   Component Value Date    TSH 2.190 01/21/2021       Iron Studies  No results found for: IRON, TIBC, FERRITIN  No results found for: [id number]  No results found for: FOLATE    Vit D, 25-Hydroxy   Date Value Ref Range Status   01/23/2021 8 (L) 30 - 100 ng/mL Final     Comment:     <20 ng/mL. ........... Krista Rad Deficient  20-30 ng/mL. ......... Krista Rad Insufficient   ng/mL. ........ Krista Rad Sufficient  >100 ng/mL. .......... Krista Rad Toxic       PTH   Date Value Ref Range Status   01/23/2021 37 15 - 65 pg/mL Final       No components found for: URIC    Lab Results   Component Value Date    COLORU Yellow 01/24/2021    NITRU Negative 01/24/2021    GLUCOSEU Negative 01/24/2021    KETUA Negative 01/24/2021    UROBILINOGEN 0.2 01/24/2021    BILIRUBINUR Negative 01/24/2021    BILIRUBINUR neg 08/20/2020       No results found for: Remi Taylor      IMPRESSION/RECOMMENDATIONS:      1. BEREKET   in the setting of nausea, vomiting decrease intake with oliguria  Baseline cr <1.0  Cr 1.8->2.6->4.3->5.3->6.2->5.3->4.0->3.1  U/A + nitrites, Spec grav >1.030, lrg blood, > 300 protein  possible hepatorenal syndrome  no signs of hemolysis  rhabdo was a consideration given seizures, but CK not elevated  GN serologies pending  US abdomen: neg hydro, multiple isoechoic/hypoechoic hepatic lesions consistent with metastatic disease.    UO 1850mL in last 24 hours   consulted for OP HD arrangements  Temp dialysis cath placed 1/24, First HD 1/25, Second 1/26, third 1/27  Tunneled dialysis cath placed 1/28/21 by PK   Kidney bx result: ATN-will monitor serum Cr for possible recovery  Hold hd today     2. Hypocalcemia  due to parathyroidectomy; exacerbated by inadequate oral supplement  Wasn't taking calcium supplement d/t nausea and vomiting  Repleted with Calcium gluconate 2 gm IV daily x 2 doses. Continue po calcium supplement  Ca+ 7.0-->6.4-->7.3-->7.7->7.9->7.7->7. 8  Ionized calcium low at 0.9-->1.06-->1.13-->1.11->1.15->1.11  PTH 37, vitamin D 8-->Continue Vit D 50,000 units weekly   Correct Ca with HD and supplements  Monitor daily BMP and ionized calcium     3. Seizure  in the setting of hypocalcemia, hypomagnesemia  Need for MRI with della  per Neurology request will mandate dialysis  At current level of renal function explained to patient      4. Neuroendocrine tumor with liver mets. 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 Elevated liver enzymes  Oncology following    5. Metabolic acidosis  with the BEREKET  Follow with initiation of HD    6.  Hyponatremia  Corrected with HD     Thank you for the opportunity to participate in the care of Eve TRINA Abebe CNP  8:52 AM  1/29/2021   Pt seen and examined agree with above  Biopsy showing atn  Good uo  Will hold hd today and see what her cr is tomorrow  Andris Brittle MD

## 2021-01-29 NOTE — PROGRESS NOTES
Intravenous, PRN, Trey Triana MD    promethazine (PHENERGAN) tablet 12.5 mg, 12.5 mg, Oral, Q6H PRN **OR** ondansetron (ZOFRAN) injection 4 mg, 4 mg, Intravenous, Q6H PRN, Trey Triana MD, 4 mg at 01/28/21 3933    polyethylene glycol (GLYCOLAX) packet 17 g, 17 g, Oral, Daily PRN, Trey Triana MD    lidocaine PF 1 % injection 5 mL, 5 mL, Intradermal, Once, Trey Triana MD    sodium chloride flush 0.9 % injection 10 mL, 10 mL, Intravenous, PRN, Trey Triana MD    heparin flush 100 UNIT/ML injection 300 Units, 3 mL, Intravenous, 2 times per day, Trey Triana MD    heparin flush 100 UNIT/ML injection 300 Units, 3 mL, Intracatheter, PRN, Trey Triana MD    Objective:    /74   Pulse 64   Temp 97.5 °F (36.4 °C) (Temporal)   Resp 20   Ht 5' (1.524 m)   Wt 163 lb 5.8 oz (74.1 kg)   SpO2 97%   BMI 31.90 kg/m²     Heart:  reg  Lungs:  ctab  Abd: + bs soft  Extrem:  Edema legs    CBC with Differential:    Lab Results   Component Value Date    WBC 11.4 01/21/2021    RBC 4.37 01/21/2021    HGB 14.0 01/21/2021    HCT 42.0 01/21/2021     01/21/2021    MCV 96.1 01/21/2021    MCH 32.0 01/21/2021    MCHC 33.3 01/21/2021    RDW 17.4 01/21/2021    LYMPHOPCT 9.5 01/21/2021    MONOPCT 3.6 01/21/2021    BASOPCT 0.5 01/21/2021    MONOSABS 0.41 01/21/2021    LYMPHSABS 1.08 01/21/2021    EOSABS 0.04 01/21/2021    BASOSABS 0.06 01/21/2021     CMP:    Lab Results   Component Value Date     01/28/2021    K 3.7 01/28/2021    K 3.9 01/21/2021    CL 99 01/28/2021    CO2 28 01/28/2021    BUN 8 01/28/2021    CREATININE 3.1 01/28/2021    GFRAA 21 01/28/2021    LABGLOM 17 01/28/2021    GLUCOSE 85 01/28/2021    PROT 5.3 01/28/2021    LABALBU 3.0 01/28/2021    CALCIUM 7.8 01/28/2021    BILITOT 0.6 01/28/2021    ALKPHOS 138 01/28/2021    AST 48 01/28/2021     01/28/2021     Warfarin PT/INR:    Lab Results   Component Value Date    INR 1.1 01/28/2021    INR 1.0 01/27/2021    INR 1.1 01/26/2021    PROTIME 11.9 01/28/2021    PROTIME 11.3 01/27/2021    PROTIME 11.9 01/26/2021       Assessment:    Principal Problem:    BEREKET (acute kidney injury) ([facility]  Active Problems:    Hypothyroidism    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 [facility])    New onset seizure ([facility]    Hypomagnesemia    Hypocalcemia    Hypoparathyroidism (HCC)    Tobacco abuse    Elevated liver enzymes    Moderate protein-calorie malnutrition ([facility]    Encounter regarding vascular access for dialysis for ESRD [facility])  Resolved Problems:    Hx of migraine headaches      Plan:  Dc home when ok with renal        Nicky Woods  7:52 AM  1/29/2021

## 2021-01-29 NOTE — PROGRESS NOTES
VASCULAR SURGERY NOTE  S/P Tesio Placement & Removal Temporary Hemodialysis Line  Sites soft, minor ecchymosis around neck incision, no hematoma in groin, incision c/d/i  May replace groin dressing with gauze tomorrow and change daily  OK to use line for dialysis  OK for discharge from Vascular Surgery POV    Dressing Change Order  Remove groin dressing, replace with sterile gauze. Change daily.     Elyse Walton MD  Resident, PGY-1  1/29/2021  6:55 AM

## 2021-01-29 NOTE — CARE COORDINATION
Out patient dialysis is set for [facility], M-W-F at 5:45am.  Patient has schedule and another copy was also placed in soft chart. Have notified Ezra Rubio  At [facility] that her first treatment at [facility] will be Monday, February 1, 2021. Plan remains home at discharge, no other needs noted at this time.

## 2021-02-01 ENCOUNTER — VIRTUAL VISIT (OUTPATIENT)
Dept: PALLATIVE CARE | Age: 36
End: 2021-02-01
Payer: COMMERCIAL

## 2021-02-01 DIAGNOSIS — C7B.8 METASTATIC MALIGNANT NEUROENDOCRINE TUMOR TO LIVER (HCC): Primary | ICD-10-CM

## 2021-02-01 DIAGNOSIS — R11.0 NAUSEA: ICD-10-CM

## 2021-02-01 DIAGNOSIS — G89.3 NEOPLASM RELATED PAIN: Primary | ICD-10-CM

## 2021-02-01 PROCEDURE — 99422 OL DIG E/M SVC 11-20 MIN: CPT | Performed by: FAMILY MEDICINE

## 2021-02-01 RX ORDER — ZOLPIDEM TARTRATE 10 MG/1
TABLET ORAL
Qty: 30 TABLET | Refills: 1 | Status: SHIPPED
Start: 2021-02-01 | End: 2021-03-19

## 2021-02-01 RX ORDER — BUSPIRONE HYDROCHLORIDE 7.5 MG/1
TABLET ORAL
Qty: 60 TABLET | Refills: 0 | Status: SHIPPED
Start: 2021-02-01 | End: 2021-02-08

## 2021-02-01 RX ORDER — TOPIRAMATE 25 MG/1
25 CAPSULE, COATED PELLETS ORAL DAILY
Qty: 30 CAPSULE | Refills: 2 | Status: ON HOLD
Start: 2021-02-01 | End: 2021-02-09 | Stop reason: HOSPADM

## 2021-02-01 RX ORDER — TRAMADOL HYDROCHLORIDE 50 MG/1
50 TABLET ORAL EVERY 6 HOURS PRN
Qty: 56 TABLET | Refills: 0 | Status: SHIPPED
Start: 2021-02-01 | End: 2021-04-15

## 2021-02-01 NOTE — PROGRESS NOTES
Department of Palliative Medicine  Ambulatory Note  Provider: Cheryle Kid DO        Chief Complaint: Benjamin Ward is a 30 y.o. female with chief complaint of pain evaluated via telephone on 1/11/2021     Consent:  She and/or health care decision maker is aware that that she may receive a bill for this telephone service, depending on her insurance coverage, and has provided verbal consent to proceed: Yes    HPI  31 y/o female with hx of hypothyroidism, IBS, migraine who was complaining of abdominal pain associated with diarrhea and flushing/sweating. 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 Kathi Haines   - Patient for Bowel resection on 10/21/20    Neoplasm related pain  Pain seems to be more Neuropathic in nature   - Cont Gabapentin 300mg TID- rx sent   - will trial tramadol 50mg q6 hrs prn for the pain- ran out. She thinks the tramadol helped but made her a little nauseated. Nausea   - Continue Zofran and Phenergan PRN- has been using this   - denies nausea/vomiting    Fatigue  Insomnia   -takes ambien for sleep at night    Anxiety   - hydroxyzine 25mg q8hr prn, start with dosage at bedtime, increased throughout the day. - was started on ativan 0.25 q8 hrs prn- doesn't feel this helps   - will try buspar 7.5mg BID for anxiety    Follow Up: 4 to 6 weeks. Encouraged to call with any questions, concerns, needs, or changes in symptoms. Subjective:       Benjamin Ward is a 30 y.o. female with significant medical history of neuroendocrine tumor who was referred to [address] [place] [address] by Dr. Ethan Sales. Reports dialysis line placement in chest, thinks she accidentally pulled a stitch out. Felt good yesterday, and was doing more activity than usual, then suddenly noticed blood leaking from the line. Has dressing over it placed by nurse when drawing bloodwork.   Doesn't

## 2021-02-04 ENCOUNTER — HOSPITAL ENCOUNTER (OUTPATIENT)
Dept: INFUSION THERAPY | Age: 36
End: 2021-02-04
Payer: COMMERCIAL

## 2021-02-07 ENCOUNTER — APPOINTMENT (OUTPATIENT)
Dept: GENERAL RADIOLOGY | Age: 36
DRG: 053 | End: 2021-02-07
Payer: COMMERCIAL

## 2021-02-07 ENCOUNTER — APPOINTMENT (OUTPATIENT)
Dept: CT IMAGING | Age: 36
DRG: 053 | End: 2021-02-07
Payer: COMMERCIAL

## 2021-02-07 ENCOUNTER — HOSPITAL ENCOUNTER (INPATIENT)
Age: 36
LOS: 3 days | Discharge: HOME | DRG: 053 | End: 2021-02-10
Attending: EMERGENCY MEDICINE | Admitting: INTERNAL MEDICINE
Payer: COMMERCIAL

## 2021-02-07 DIAGNOSIS — R47.9 DIFFICULTY WITH SPEECH: Primary | ICD-10-CM

## 2021-02-07 DIAGNOSIS — R56.9 INCREASING FREQUENCY OF SEIZURE ACTIVITY (HCC): ICD-10-CM

## 2021-02-07 DIAGNOSIS — C79.9 METASTATIC MALIGNANT NEOPLASM, UNSPECIFIED SITE (HCC): ICD-10-CM

## 2021-02-07 PROBLEM — N17.9 AKI (ACUTE KIDNEY INJURY) (HCC): Status: RESOLVED | Noted: 2021-01-23 | Resolved: 2021-02-07

## 2021-02-07 LAB
ADENOVIRUS BY PCR: NOT DETECTED
ALBUMIN SERPL-MCNC: 4.4 G/DL (ref 3.5–5.2)
ALP BLD-CCNC: 135 U/L (ref 35–104)
ALT SERPL-CCNC: 21 U/L (ref 0–32)
AMPHETAMINE SCREEN, URINE: NOT DETECTED
ANION GAP SERPL CALCULATED.3IONS-SCNC: 19 MMOL/L (ref 7–16)
AST SERPL-CCNC: 26 U/L (ref 0–31)
BACTERIA: ABNORMAL /HPF
BARBITURATE SCREEN URINE: NOT DETECTED
BASOPHILS ABSOLUTE: 0.22 E9/L (ref 0–0.2)
BASOPHILS RELATIVE PERCENT: 1.5 % (ref 0–2)
BENZODIAZEPINE SCREEN, URINE: NOT DETECTED
BILIRUB SERPL-MCNC: 0.5 MG/DL (ref 0–1.2)
BILIRUBIN URINE: NEGATIVE
BLOOD, URINE: NEGATIVE
BORDETELLA PARAPERTUSSIS BY PCR: NOT DETECTED
BORDETELLA PERTUSSIS BY PCR: NOT DETECTED
BUN BLDV-MCNC: 4 MG/DL (ref 6–20)
CALCIUM SERPL-MCNC: 7.8 MG/DL (ref 8.6–10.2)
CANNABINOID SCREEN URINE: NOT DETECTED
CHLAMYDOPHILIA PNEUMONIAE BY PCR: NOT DETECTED
CHLORIDE BLD-SCNC: 101 MMOL/L (ref 98–107)
CLARITY: CLEAR
CO2: 23 MMOL/L (ref 22–29)
COCAINE METABOLITE SCREEN URINE: NOT DETECTED
COLOR: YELLOW
CORONAVIRUS 229E BY PCR: NOT DETECTED
CORONAVIRUS HKU1 BY PCR: NOT DETECTED
CORONAVIRUS NL63 BY PCR: NOT DETECTED
CORONAVIRUS OC43 BY PCR: NOT DETECTED
CREAT SERPL-MCNC: 1 MG/DL (ref 0.5–1)
EKG ATRIAL RATE: 99 BPM
EKG P AXIS: 78 DEGREES
EKG P-R INTERVAL: 136 MS
EKG Q-T INTERVAL: 352 MS
EKG QRS DURATION: 68 MS
EKG QTC CALCULATION (BAZETT): 451 MS
EKG R AXIS: 84 DEGREES
EKG T AXIS: -75 DEGREES
EKG VENTRICULAR RATE: 99 BPM
EOSINOPHILS ABSOLUTE: 0.4 E9/L (ref 0.05–0.5)
EOSINOPHILS RELATIVE PERCENT: 2.8 % (ref 0–6)
EPITHELIAL CELLS, UA: ABNORMAL /HPF
FENTANYL SCREEN, URINE: NOT DETECTED
GFR AFRICAN AMERICAN: >60
GFR NON-AFRICAN AMERICAN: >60 ML/MIN/1.73
GLUCOSE BLD-MCNC: 126 MG/DL (ref 74–99)
GLUCOSE URINE: NEGATIVE MG/DL
HCG, URINE, POC: NEGATIVE
HCT VFR BLD CALC: 38.7 % (ref 34–48)
HEMOGLOBIN: 13.2 G/DL (ref 11.5–15.5)
HUMAN METAPNEUMOVIRUS BY PCR: NOT DETECTED
HUMAN RHINOVIRUS/ENTEROVIRUS BY PCR: NOT DETECTED
IMMATURE GRANULOCYTES #: 0.17 E9/L
IMMATURE GRANULOCYTES %: 1.2 % (ref 0–5)
INFLUENZA A BY PCR: NOT DETECTED
INFLUENZA B BY PCR: NOT DETECTED
KETONES, URINE: NEGATIVE MG/DL
LACTIC ACID: 2.6 MMOL/L (ref 0.5–2.2)
LACTIC ACID: 3.8 MMOL/L (ref 0.5–2.2)
LEUKOCYTE ESTERASE, URINE: ABNORMAL
LYMPHOCYTES ABSOLUTE: 3.3 E9/L (ref 1.5–4)
LYMPHOCYTES RELATIVE PERCENT: 22.8 % (ref 20–42)
Lab: NORMAL
Lab: NORMAL
MAGNESIUM: 1.9 MG/DL (ref 1.6–2.6)
MCH RBC QN AUTO: 32.2 PG (ref 26–35)
MCHC RBC AUTO-ENTMCNC: 34.1 % (ref 32–34.5)
MCV RBC AUTO: 94.4 FL (ref 80–99.9)
METHADONE SCREEN, URINE: NOT DETECTED
MONOCYTES ABSOLUTE: 1.07 E9/L (ref 0.1–0.95)
MONOCYTES RELATIVE PERCENT: 7.4 % (ref 2–12)
MYCOPLASMA PNEUMONIAE BY PCR: NOT DETECTED
NEGATIVE QC PASS/FAIL: NORMAL
NEUTROPHILS ABSOLUTE: 9.29 E9/L (ref 1.8–7.3)
NEUTROPHILS RELATIVE PERCENT: 64.3 % (ref 43–80)
NITRITE, URINE: NEGATIVE
OPIATE SCREEN URINE: NOT DETECTED
OXYCODONE URINE: NOT DETECTED
PARAINFLUENZA VIRUS 1 BY PCR: NOT DETECTED
PARAINFLUENZA VIRUS 2 BY PCR: NOT DETECTED
PARAINFLUENZA VIRUS 3 BY PCR: NOT DETECTED
PARAINFLUENZA VIRUS 4 BY PCR: NOT DETECTED
PDW BLD-RTO: 17.5 FL (ref 11.5–15)
PH UA: 6 (ref 5–9)
PHENCYCLIDINE SCREEN URINE: NOT DETECTED
PLATELET # BLD: 254 E9/L (ref 130–450)
PMV BLD AUTO: 10.2 FL (ref 7–12)
POSITIVE QC PASS/FAIL: NORMAL
POTASSIUM REFLEX MAGNESIUM: 3.8 MMOL/L (ref 3.5–5)
PRO-BNP: 118 PG/ML (ref 0–125)
PROTEIN UA: NEGATIVE MG/DL
RBC # BLD: 4.1 E12/L (ref 3.5–5.5)
RBC UA: ABNORMAL /HPF (ref 0–2)
RESPIRATORY SYNCYTIAL VIRUS BY PCR: NOT DETECTED
SARS-COV-2, PCR: NOT DETECTED
SODIUM BLD-SCNC: 143 MMOL/L (ref 132–146)
SPECIFIC GRAVITY UA: <=1.005 (ref 1–1.03)
TOTAL PROTEIN: 7.7 G/DL (ref 6.4–8.3)
TROPONIN: <0.01 NG/ML (ref 0–0.03)
UROBILINOGEN, URINE: 0.2 E.U./DL
WBC # BLD: 14.5 E9/L (ref 4.5–11.5)
WBC UA: ABNORMAL /HPF (ref 0–5)

## 2021-02-07 PROCEDURE — 2580000003 HC RX 258: Performed by: PHYSICIAN ASSISTANT

## 2021-02-07 PROCEDURE — 1200000000 HC SEMI PRIVATE

## 2021-02-07 PROCEDURE — 85025 COMPLETE CBC W/AUTO DIFF WBC: CPT

## 2021-02-07 PROCEDURE — 81001 URINALYSIS AUTO W/SCOPE: CPT

## 2021-02-07 PROCEDURE — 93010 ELECTROCARDIOGRAM REPORT: CPT | Performed by: INTERNAL MEDICINE

## 2021-02-07 PROCEDURE — 84484 ASSAY OF TROPONIN QUANT: CPT

## 2021-02-07 PROCEDURE — 6370000000 HC RX 637 (ALT 250 FOR IP): Performed by: INTERNAL MEDICINE

## 2021-02-07 PROCEDURE — 6360000002 HC RX W HCPCS: Performed by: EMERGENCY MEDICINE

## 2021-02-07 PROCEDURE — 87088 URINE BACTERIA CULTURE: CPT

## 2021-02-07 PROCEDURE — 6360000002 HC RX W HCPCS: Performed by: PHYSICIAN ASSISTANT

## 2021-02-07 PROCEDURE — 80307 DRUG TEST PRSMV CHEM ANLYZR: CPT

## 2021-02-07 PROCEDURE — 80053 COMPREHEN METABOLIC PANEL: CPT

## 2021-02-07 PROCEDURE — 93005 ELECTROCARDIOGRAM TRACING: CPT | Performed by: PHYSICIAN ASSISTANT

## 2021-02-07 PROCEDURE — 83735 ASSAY OF MAGNESIUM: CPT

## 2021-02-07 PROCEDURE — 71045 X-RAY EXAM CHEST 1 VIEW: CPT

## 2021-02-07 PROCEDURE — 83605 ASSAY OF LACTIC ACID: CPT

## 2021-02-07 PROCEDURE — 83880 ASSAY OF NATRIURETIC PEPTIDE: CPT

## 2021-02-07 PROCEDURE — 74177 CT ABD & PELVIS W/CONTRAST: CPT

## 2021-02-07 PROCEDURE — 99285 EMERGENCY DEPT VISIT HI MDM: CPT

## 2021-02-07 PROCEDURE — 6360000004 HC RX CONTRAST MEDICATION: Performed by: RADIOLOGY

## 2021-02-07 PROCEDURE — 0202U NFCT DS 22 TRGT SARS-COV-2: CPT

## 2021-02-07 PROCEDURE — 71260 CT THORAX DX C+: CPT

## 2021-02-07 PROCEDURE — 36415 COLL VENOUS BLD VENIPUNCTURE: CPT

## 2021-02-07 PROCEDURE — 96366 THER/PROPH/DIAG IV INF ADDON: CPT

## 2021-02-07 PROCEDURE — 96365 THER/PROPH/DIAG IV INF INIT: CPT

## 2021-02-07 PROCEDURE — 70450 CT HEAD/BRAIN W/O DYE: CPT

## 2021-02-07 PROCEDURE — 2580000003 HC RX 258: Performed by: RADIOLOGY

## 2021-02-07 PROCEDURE — 96375 TX/PRO/DX INJ NEW DRUG ADDON: CPT

## 2021-02-07 PROCEDURE — 2580000003 HC RX 258: Performed by: INTERNAL MEDICINE

## 2021-02-07 PROCEDURE — 6360000002 HC RX W HCPCS: Performed by: INTERNAL MEDICINE

## 2021-02-07 RX ORDER — PROMETHAZINE HYDROCHLORIDE 25 MG/1
12.5 TABLET ORAL EVERY 6 HOURS PRN
Status: DISCONTINUED | OUTPATIENT
Start: 2021-02-07 | End: 2021-02-10 | Stop reason: HOSPADM

## 2021-02-07 RX ORDER — GABAPENTIN 300 MG/1
300 CAPSULE ORAL PRN
Status: ON HOLD | COMMUNITY
End: 2021-02-09 | Stop reason: HOSPADM

## 2021-02-07 RX ORDER — PANTOPRAZOLE SODIUM 40 MG/1
1 TABLET, DELAYED RELEASE ORAL
COMMUNITY
Start: 2021-01-27 | End: 2021-02-23

## 2021-02-07 RX ORDER — LEVOTHYROXINE SODIUM 112 UG/1
112 TABLET ORAL DAILY
Status: DISCONTINUED | OUTPATIENT
Start: 2021-02-08 | End: 2021-02-10 | Stop reason: HOSPADM

## 2021-02-07 RX ORDER — PANTOPRAZOLE SODIUM 40 MG/1
40 TABLET, DELAYED RELEASE ORAL
Status: DISCONTINUED | OUTPATIENT
Start: 2021-02-08 | End: 2021-02-10 | Stop reason: HOSPADM

## 2021-02-07 RX ORDER — POTASSIUM CHLORIDE 20 MEQ/1
20 TABLET, EXTENDED RELEASE ORAL DAILY
Status: DISCONTINUED | OUTPATIENT
Start: 2021-02-07 | End: 2021-02-10 | Stop reason: HOSPADM

## 2021-02-07 RX ORDER — SODIUM CHLORIDE 0.9 % (FLUSH) 0.9 %
10 SYRINGE (ML) INJECTION PRN
Status: DISCONTINUED | OUTPATIENT
Start: 2021-02-07 | End: 2021-02-10 | Stop reason: HOSPADM

## 2021-02-07 RX ORDER — 0.9 % SODIUM CHLORIDE 0.9 %
1000 INTRAVENOUS SOLUTION INTRAVENOUS ONCE
Status: COMPLETED | OUTPATIENT
Start: 2021-02-07 | End: 2021-02-07

## 2021-02-07 RX ORDER — MORPHINE SULFATE 4 MG/ML
4 INJECTION, SOLUTION INTRAMUSCULAR; INTRAVENOUS ONCE
Status: COMPLETED | OUTPATIENT
Start: 2021-02-07 | End: 2021-02-07

## 2021-02-07 RX ORDER — FAMOTIDINE 40 MG/1
40 TABLET, FILM COATED ORAL DAILY
COMMUNITY
Start: 2021-01-30 | End: 2021-02-08

## 2021-02-07 RX ORDER — SODIUM CHLORIDE 0.9 % (FLUSH) 0.9 %
10 SYRINGE (ML) INJECTION ONCE
Status: COMPLETED | OUTPATIENT
Start: 2021-02-07 | End: 2021-02-07

## 2021-02-07 RX ORDER — DICYCLOMINE HCL 20 MG
20 TABLET ORAL 2 TIMES DAILY
COMMUNITY
End: 2021-02-08

## 2021-02-07 RX ORDER — ONDANSETRON 2 MG/ML
4 INJECTION INTRAMUSCULAR; INTRAVENOUS EVERY 6 HOURS PRN
Status: DISCONTINUED | OUTPATIENT
Start: 2021-02-07 | End: 2021-02-10 | Stop reason: HOSPADM

## 2021-02-07 RX ORDER — ACETAMINOPHEN 650 MG/1
650 SUPPOSITORY RECTAL EVERY 6 HOURS PRN
Status: DISCONTINUED | OUTPATIENT
Start: 2021-02-07 | End: 2021-02-10 | Stop reason: HOSPADM

## 2021-02-07 RX ORDER — DIPHENHYDRAMINE HYDROCHLORIDE 50 MG/ML
25 INJECTION INTRAMUSCULAR; INTRAVENOUS ONCE
Status: COMPLETED | OUTPATIENT
Start: 2021-02-07 | End: 2021-02-07

## 2021-02-07 RX ORDER — SODIUM CHLORIDE 0.9 % (FLUSH) 0.9 %
10 SYRINGE (ML) INJECTION EVERY 12 HOURS SCHEDULED
Status: DISCONTINUED | OUTPATIENT
Start: 2021-02-07 | End: 2021-02-10 | Stop reason: HOSPADM

## 2021-02-07 RX ORDER — PROMETHAZINE HYDROCHLORIDE 25 MG/1
1 TABLET ORAL PRN
Status: ON HOLD | COMMUNITY
Start: 2021-01-11 | End: 2022-06-21 | Stop reason: HOSPADM

## 2021-02-07 RX ORDER — MAGNESIUM OXIDE 400 MG/1
400 TABLET ORAL 2 TIMES DAILY
COMMUNITY

## 2021-02-07 RX ORDER — BUTALBITAL, ACETAMINOPHEN AND CAFFEINE 50; 325; 40 MG/1; MG/1; MG/1
1 TABLET ORAL DAILY PRN
Status: DISCONTINUED | OUTPATIENT
Start: 2021-02-07 | End: 2021-02-10 | Stop reason: HOSPADM

## 2021-02-07 RX ORDER — ACETAMINOPHEN 325 MG/1
650 TABLET ORAL EVERY 6 HOURS PRN
Status: DISCONTINUED | OUTPATIENT
Start: 2021-02-07 | End: 2021-02-10 | Stop reason: HOSPADM

## 2021-02-07 RX ORDER — MAGNESIUM SULFATE IN WATER 40 MG/ML
2000 INJECTION, SOLUTION INTRAVENOUS ONCE
Status: COMPLETED | OUTPATIENT
Start: 2021-02-07 | End: 2021-02-07

## 2021-02-07 RX ORDER — METOCLOPRAMIDE HYDROCHLORIDE 5 MG/ML
10 INJECTION INTRAMUSCULAR; INTRAVENOUS ONCE
Status: COMPLETED | OUTPATIENT
Start: 2021-02-07 | End: 2021-02-07

## 2021-02-07 RX ORDER — GABAPENTIN 300 MG/1
300 CAPSULE ORAL 3 TIMES DAILY
Status: DISCONTINUED | OUTPATIENT
Start: 2021-02-07 | End: 2021-02-10 | Stop reason: HOSPADM

## 2021-02-07 RX ORDER — TRAMADOL HYDROCHLORIDE 50 MG/1
50 TABLET ORAL EVERY 6 HOURS PRN
Status: DISCONTINUED | OUTPATIENT
Start: 2021-02-07 | End: 2021-02-10 | Stop reason: HOSPADM

## 2021-02-07 RX ORDER — POLYETHYLENE GLYCOL 3350 17 G/17G
17 POWDER, FOR SOLUTION ORAL DAILY PRN
Status: DISCONTINUED | OUTPATIENT
Start: 2021-02-07 | End: 2021-02-10 | Stop reason: HOSPADM

## 2021-02-07 RX ORDER — TOPIRAMATE 25 MG/1
25 TABLET ORAL DAILY
Status: DISCONTINUED | OUTPATIENT
Start: 2021-02-07 | End: 2021-02-09 | Stop reason: SDUPTHER

## 2021-02-07 RX ORDER — FENTANYL CITRATE 50 UG/ML
25 INJECTION, SOLUTION INTRAMUSCULAR; INTRAVENOUS ONCE
Status: COMPLETED | OUTPATIENT
Start: 2021-02-07 | End: 2021-02-07

## 2021-02-07 RX ORDER — ONDANSETRON 4 MG/1
1 TABLET, FILM COATED ORAL 3 TIMES DAILY PRN
Status: ON HOLD | COMMUNITY
Start: 2021-01-11 | End: 2021-02-09 | Stop reason: HOSPADM

## 2021-02-07 RX ORDER — SODIUM CHLORIDE 9 MG/ML
INJECTION, SOLUTION INTRAVENOUS CONTINUOUS
Status: DISCONTINUED | OUTPATIENT
Start: 2021-02-07 | End: 2021-02-09

## 2021-02-07 RX ADMIN — SODIUM CHLORIDE 1000 ML: 9 INJECTION, SOLUTION INTRAVENOUS at 15:18

## 2021-02-07 RX ADMIN — SODIUM CHLORIDE 1000 ML: 9 INJECTION, SOLUTION INTRAVENOUS at 18:54

## 2021-02-07 RX ADMIN — TRAMADOL HYDROCHLORIDE 50 MG: 50 TABLET, FILM COATED ORAL at 21:23

## 2021-02-07 RX ADMIN — IOPAMIDOL 90 ML: 755 INJECTION, SOLUTION INTRAVENOUS at 17:43

## 2021-02-07 RX ADMIN — GABAPENTIN 300 MG: 300 CAPSULE ORAL at 21:23

## 2021-02-07 RX ADMIN — DIPHENHYDRAMINE HYDROCHLORIDE 25 MG: 50 INJECTION, SOLUTION INTRAMUSCULAR; INTRAVENOUS at 16:25

## 2021-02-07 RX ADMIN — POTASSIUM CHLORIDE 20 MEQ: 1500 TABLET, EXTENDED RELEASE ORAL at 21:23

## 2021-02-07 RX ADMIN — Medication 10 ML: at 17:43

## 2021-02-07 RX ADMIN — ENOXAPARIN SODIUM 40 MG: 40 INJECTION SUBCUTANEOUS at 21:23

## 2021-02-07 RX ADMIN — MAGNESIUM SULFATE HEPTAHYDRATE 2000 MG: 40 INJECTION, SOLUTION INTRAVENOUS at 16:25

## 2021-02-07 RX ADMIN — SODIUM CHLORIDE: 9 INJECTION, SOLUTION INTRAVENOUS at 20:12

## 2021-02-07 RX ADMIN — FENTANYL CITRATE 25 MCG: 50 INJECTION, SOLUTION INTRAMUSCULAR; INTRAVENOUS at 17:03

## 2021-02-07 RX ADMIN — CEFTRIAXONE SODIUM 1000 MG: 1 INJECTION, POWDER, FOR SOLUTION INTRAMUSCULAR; INTRAVENOUS at 20:13

## 2021-02-07 RX ADMIN — MORPHINE SULFATE 4 MG: 4 INJECTION, SOLUTION INTRAMUSCULAR; INTRAVENOUS at 18:54

## 2021-02-07 RX ADMIN — METOCLOPRAMIDE HYDROCHLORIDE 10 MG: 5 INJECTION INTRAMUSCULAR; INTRAVENOUS at 16:25

## 2021-02-07 ASSESSMENT — ENCOUNTER SYMPTOMS
BACK PAIN: 0
VOMITING: 0
COUGH: 0
SORE THROAT: 0
NAUSEA: 0
DIARRHEA: 0
FACIAL SWELLING: 0
SINUS PAIN: 0
COLOR CHANGE: 0
CONSTIPATION: 0
CHEST TIGHTNESS: 0
TROUBLE SWALLOWING: 0
SINUS PRESSURE: 0
ABDOMINAL PAIN: 1
SHORTNESS OF BREATH: 0

## 2021-02-07 ASSESSMENT — PAIN SCALES - GENERAL
PAINLEVEL_OUTOF10: 8
PAINLEVEL_OUTOF10: 8

## 2021-02-07 ASSESSMENT — PAIN DESCRIPTION - FREQUENCY: FREQUENCY: CONTINUOUS

## 2021-02-07 ASSESSMENT — PAIN DESCRIPTION - PAIN TYPE: TYPE: ACUTE PAIN

## 2021-02-07 NOTE — ED NOTES
Radiology Procedure Waiver   Name: Maurilio Armstrong  : 1985  MRN: [id number]    Date:  21    Time: 3:04 PM EST    Benefits of immediately proceeding with Radiology exam(s) without pre-testing outweigh the risks or are not indicated as specified below and therefore the following is/are being waived:    [x] Pregnancy test   [x] Patients LMP on-time and regular.   [] Patient had Tubal Ligation or has other Contraception Device. [] Patient  is Menopausal or Premenarcheal.    [] Patient had Full or Partial Hysterectomy. [] Protocol for Iodine allergy    [] MRI Questionnaire     [] BUN/Creatinine   [] Patient age w/no hx of renal dysfunction. [] Patient on Dialysis. [] Recent Normal Labs.   Electronically signed by Zoe Paris PA-C on 21 at 3:04 PM EST               Zoe Paris PA-C  [phone]

## 2021-02-07 NOTE — ED PROVIDER NOTES
ED Attending  CC: Michaelle        Department of Emergency Medicine   ED  Provider Note  Admit Date/RoomTime: 2/7/2021  2:03 PM  ED Room: 09/09  HPI:  2/7/21, Time: 4:54 PM EST      The patient is a 71-year-old female with a history of metastatic neuroendocrine cancer, renal failure, and Crohn's disease presenting to the emergency department by EMS due to speech difficulty and increased seizure activity. According to the patient's family members who I spoke with on the phone, the patient was home alone while they were at [employer] and text the family for help. When they got home they found her unconscious on the ground. They were able to arouse her by shaking her but she was having a difficult time speaking to them and forming her words. She was not able to tell them what happened. They also report that she had 2 or 3 absence seizures before EMS came which she has had in the past.  They are concerned that her speech and seizures are becoming worse. They describe these episodes as her staring off into space and not responding to them. Patient was admitted about 2 weeks ago for similar symptoms and seen by neurology but has not followed up with them as an outpatient. She has been having severe migraines which she has a history of atypical migraines. Patient also had her dialysis port removed yesterday as she no longer needs dialysis and has been having some shortness of breath since. Patient also complains of abdominal pain which she reports has been worse over the last several days. She states she just had her catheter removed about a week ago and has felt like she has had urinary frequency since. She denies any fevers or chills, headache, vision changes, weakness, dizziness, chest pain, nausea/vomiting, constipation/diarrhea. The history is provided by the patient and a parent. No  was used.            REVIEW OF SYSTEMS:  Review of Systems   Constitutional: Negative for activity change, includes Alzheimer's Disease in an other family member; Asthma in her father; Breast Cancer in her maternal grandmother; Cancer in her father, maternal grandmother, and paternal grandfather; Diabetes in an other family member; Heart Disease in her father; High Blood Pressure in her father and mother; High Cholesterol in her mother; Teola Hush in an other family member; Other in her mother. The patients home medications have been reviewed. Allergies: Patient has no known allergies.     -------------------------------------------------- RESULTS -------------------------------------------------  All laboratory and radiology results have been personally reviewed by myself   LABS:  Results for orders placed or performed during the hospital encounter of 02/07/21   CBC Auto Differential   Result Value Ref Range    WBC 14.5 (H) 4.5 - 11.5 E9/L    RBC 4.10 3.50 - 5.50 E12/L    Hemoglobin 13.2 11.5 - 15.5 g/dL    Hematocrit 38.7 34.0 - 48.0 %    MCV 94.4 80.0 - 99.9 fL    MCH 32.2 26.0 - 35.0 pg    MCHC 34.1 32.0 - 34.5 %    RDW 17.5 (H) 11.5 - 15.0 fL    Platelets 446 953 - 098 E9/L    MPV 10.2 7.0 - 12.0 fL    Neutrophils % 64.3 43.0 - 80.0 %    Immature Granulocytes % 1.2 0.0 - 5.0 %    Lymphocytes % 22.8 20.0 - 42.0 %    Monocytes % 7.4 2.0 - 12.0 %    Eosinophils % 2.8 0.0 - 6.0 %    Basophils % 1.5 0.0 - 2.0 %    Neutrophils Absolute 9.29 (H) 1.80 - 7.30 E9/L    Immature Granulocytes # 0.17 E9/L    Lymphocytes Absolute 3.30 1.50 - 4.00 E9/L    Monocytes Absolute 1.07 (H) 0.10 - 0.95 E9/L    Eosinophils Absolute 0.40 0.05 - 0.50 E9/L    Basophils Absolute 0.22 (H) 0.00 - 0.20 E9/L   Comprehensive Metabolic Panel w/ Reflex to MG   Result Value Ref Range    Sodium 143 132 - 146 mmol/L    Potassium reflex Magnesium 3.8 3.5 - 5.0 mmol/L    Chloride 101 98 - 107 mmol/L    CO2 23 22 - 29 mmol/L    Anion Gap 19 (H) 7 - 16 mmol/L    Glucose 126 (H) 74 - 99 mg/dL    BUN 4 (L) 6 - 20 mg/dL    CREATININE 1.0 0.5 - 1.0 mg/dL GFR Non-African American >60 >=60 mL/min/1.73    GFR African American >60     Calcium 7.8 (L) 8.6 - 10.2 mg/dL    Total Protein 7.7 6.4 - 8.3 g/dL    Albumin 4.4 3.5 - 5.2 g/dL    Total Bilirubin 0.5 0.0 - 1.2 mg/dL    Alkaline Phosphatase 135 (H) 35 - 104 U/L    ALT 21 0 - 32 U/L    AST 26 0 - 31 U/L   Troponin   Result Value Ref Range    Troponin <0.01 0.00 - 0.03 ng/mL   Brain Natriuretic Peptide   Result Value Ref Range    Pro- 0 - 125 pg/mL   Magnesium   Result Value Ref Range    Magnesium 1.9 1.6 - 2.6 mg/dL   Lactic Acid, Plasma   Result Value Ref Range    Lactic Acid 3.8 (H) 0.5 - 2.2 mmol/L   Urinalysis with Microscopic   Result Value Ref Range    Color, UA Yellow Straw/Yellow    Clarity, UA Clear Clear    Glucose, Ur Negative Negative mg/dL    Bilirubin Urine Negative Negative    Ketones, Urine Negative Negative mg/dL    Specific Gravity, UA <=1.005 1.005 - 1.030    Blood, Urine Negative Negative    pH, UA 6.0 5.0 - 9.0    Protein, UA Negative Negative mg/dL    Urobilinogen, Urine 0.2 <2.0 E.U./dL    Nitrite, Urine Negative Negative    Leukocyte Esterase, Urine TRACE (A) Negative    WBC, UA 2-5 0 - 5 /HPF    RBC, UA 1-3 0 - 2 /HPF    Epithelial Cells, UA MANY /HPF    Bacteria, UA MANY (A) None Seen /HPF   POC Pregnancy Urine   Result Value Ref Range    HCG, Urine, POC Negative Negative    Lot Number [id number]     Positive QC Pass/Fail Pass     Negative QC Pass/Fail Pass    EKG 12 Lead   Result Value Ref Range    Ventricular Rate 99 BPM    Atrial Rate 99 BPM    P-R Interval 136 ms    QRS Duration 68 ms    Q-T Interval 352 ms    QTc Calculation (Bazett) 451 ms    P Axis 78 degrees    R Axis 84 degrees    T Axis -75 degrees       RADIOLOGY:  Interpreted by Radiologist.  CT ABDOMEN PELVIS W IV CONTRAST   Final Result   Normal chest with no worrisome findings to suggest metastatic malignancy.    Persistent bilobar hepatic lesions which are grossly stable consistent with   stable widespread hepatic metastasis. Dilated fluid-filled small bowel loops likely ileus/enteritis. There is no   indication for bowel obstruction. Hydronephrosis bilaterally without obstructing uropathy. There is   significantly distended urinary bladder. CT CHEST W CONTRAST   Final Result   Normal chest with no worrisome findings to suggest metastatic malignancy. Persistent bilobar hepatic lesions which are grossly stable consistent with   stable widespread hepatic metastasis. Dilated fluid-filled small bowel loops likely ileus/enteritis. There is no   indication for bowel obstruction. Hydronephrosis bilaterally without obstructing uropathy. There is   significantly distended urinary bladder. CT HEAD WO CONTRAST   Final Result   No acute intracranial abnormality. XR CHEST PORTABLE   Final Result   No acute process. ------------------------- NURSING NOTES AND VITALS REVIEWED ---------------------------   The nursing notes within the ED encounter and vital signs as below have been reviewed. BP (!) 146/104   Pulse 86   Temp 96.9 °F (36.1 °C)   Resp 17   Ht 5' (1.524 m)   Wt 160 lb (72.6 kg)   SpO2 99%   BMI 31.25 kg/m²   Oxygen Saturation Interpretation: Normal      ---------------------------------------------------PHYSICAL EXAM--------------------------------------    Physical Exam  Vitals signs and nursing note reviewed. Constitutional:       General: She is not in acute distress. Appearance: Normal appearance. She is well-developed. HENT:      Head: Normocephalic and atraumatic. Mouth/Throat:      Mouth: Mucous membranes are moist.      Pharynx: Oropharynx is clear. Eyes:      Extraocular Movements: Extraocular movements intact. Conjunctiva/sclera: Conjunctivae normal.      Pupils: Pupils are equal, round, and reactive to light. Comments: Pupils dilated but equally reactive. Neck:      Musculoskeletal: Normal range of motion and neck supple. Vascular: No JVD. Trachea: No tracheal deviation. Cardiovascular:      Rate and Rhythm: Normal rate and regular rhythm. Heart sounds: Normal heart sounds. No murmur. Pulmonary:      Effort: Pulmonary effort is normal. No respiratory distress. Breath sounds: Normal breath sounds. Abdominal:      General: Bowel sounds are normal. There is no distension. Palpations: Abdomen is soft. Comments: Diffuse lower abdominal ttp. Musculoskeletal: Normal range of motion. General: No deformity. Comments: +5/5 BLE and BLE strength. Skin:     General: Skin is warm and dry. Capillary Refill: Capillary refill takes less than 2 seconds. Coloration: Skin is not pale. Findings: No erythema. Neurological:      Mental Status: She is alert and oriented to person, place, and time. Mental status is at baseline. Comments: Spasming of hands/clenching of fists. CN III-XII intact. Difficulty completing sentences and expressing thoughts but no slurred speech. Psychiatric:         Mood and Affect: Mood normal.         Behavior: Behavior normal.         Thought Content:  Thought content normal.            ------------------------------ ED COURSE/MEDICAL DECISION MAKING----------------------  Medications   0.9 % sodium chloride bolus (1,000 mLs Intravenous New Bag 2/7/21 1854)   0.9 % sodium chloride infusion (has no administration in time range)   cefTRIAXone (ROCEPHIN) 1,000 mg in sterile water 10 mL IV syringe (has no administration in time range)   0.9 % sodium chloride bolus (0 mLs Intravenous Stopped 2/7/21 1522)   metoclopramide (REGLAN) injection 10 mg (10 mg Intravenous Given 2/7/21 1625)   diphenhydrAMINE (BENADRYL) injection 25 mg (25 mg Intravenous Given 2/7/21 1625)   magnesium sulfate 2000 mg in 50 mL IVPB premix (0 mg Intravenous Stopped 2/7/21 1833)   fentaNYL (SUBLIMAZE) injection 25 mcg (25 mcg Intravenous Given 2/7/21 1703)   sodium chloride flush 0.9 % injection 10 mL (10 mLs Intravenous Given 2/7/21 1743)   iopamidol (ISOVUE-370) 76 % injection 90 mL (90 mLs Intravenous Given 2/7/21 1743)   morphine sulfate (PF) injection 4 mg (4 mg Intravenous Given 2/7/21 1854)         ED COURSE:  ED Course as of Feb 07 1912   Sun Feb 07, 2021   1620 Calcium(!): 7.8 [BE]   1909 Total Protein: 7.7 [BE]      ED Course User Index  [BE] Ira Estes PA-C       Procedures:  Procedures     Medical Decision Making:   MDM   71-year-old female presenting to the emergency department with increased seizure activity and speech difficulty. She has known metastatic neuroendocrine cancer with hyperintense foci noted in the brain on MRI 2 weeks ago. Patient was admitted about 2 weeks ago for similar neurological symptoms and kidney failure. She was seen by neurology but has not followed up as an outpatient. Patient is afebrile and hemodynamically stable here. She does have intermittent episodes where she has difficulty speaking and forming sentences and expressing her thoughts but her speech is clear and nonslurred. She will then have bouts of completely normal speech. She has no other neurological deficits and CT of the head shows no acute intracranial abnormalities to suggest stroke. Per the family, this has been going on for the last several weeks. Her last known well was at 8 AM and patient did not present to the emergency department until 2 PM.  Labs show a leukocytosis of 14.5 and a lactic acid of 3.8. Her alk phos is also elevated. Creatinine is normal at 1.0 which is improved for the patient. Imaging of the chest and abdomen show mild enteritis and distention of the bladder with bilateral hydro-nephrosis. Patient does report frequency and lower abdominal pain. She does have bacteria and trace leukocytes in her urine but this is in the presence of contamination. Urine culture sent and she will be treated with Rocephin. She is given a migraine cocktail, morphine, rocephin and fluids. Given the patient's history, work-up and her worsening neurological symptoms, she will be admitted for neurology consult and further treatment and management. Patient admitted under the care of Dr. Carmita Anaya. Counseling: The emergency provider has spoken with the patient and discussed todays results, in addition to providing specific details for the plan of care and counseling regarding the diagnosis and prognosis. Questions are answered at this time and they are agreeable with the plan.      --------------------------------- IMPRESSION AND DISPOSITION ---------------------------------    IMPRESSION  1. Difficulty with speech    2. Increasing frequency of seizure activity ([address]    3. Metastatic malignant neoplasm, unspecified site [facility])        DISPOSITION  Disposition: Admit to telemetry  Patient condition is fair      Electronically signed by Merritt Christian PA-C   DD: 2/7/21  **This report was transcribed using voice recognition software. Every effort was made to ensure accuracy; however, inadvertent computerized transcription errors may be present.   END OF ED PROVIDER NOTE         Merritt Christian PA-C  02/07/21 4100

## 2021-02-08 ENCOUNTER — APPOINTMENT (OUTPATIENT)
Dept: MRI IMAGING | Age: 36
DRG: 053 | End: 2021-02-08
Payer: COMMERCIAL

## 2021-02-08 LAB
ALBUMIN SERPL-MCNC: 3.9 G/DL (ref 3.5–5.2)
ALP BLD-CCNC: 141 U/L (ref 35–104)
ALT SERPL-CCNC: 25 U/L (ref 0–32)
ANION GAP SERPL CALCULATED.3IONS-SCNC: 12 MMOL/L (ref 7–16)
AST SERPL-CCNC: 51 U/L (ref 0–31)
BILIRUB SERPL-MCNC: 0.5 MG/DL (ref 0–1.2)
BUN BLDV-MCNC: 3 MG/DL (ref 6–20)
CALCIUM SERPL-MCNC: 6.9 MG/DL (ref 8.6–10.2)
CHLORIDE BLD-SCNC: 103 MMOL/L (ref 98–107)
CO2: 24 MMOL/L (ref 22–29)
CREAT SERPL-MCNC: 0.9 MG/DL (ref 0.5–1)
GFR AFRICAN AMERICAN: >60
GFR NON-AFRICAN AMERICAN: >60 ML/MIN/1.73
GLUCOSE BLD-MCNC: 99 MG/DL (ref 74–99)
HCT VFR BLD CALC: 33.8 % (ref 34–48)
HEMOGLOBIN: 11.1 G/DL (ref 11.5–15.5)
LACTIC ACID: 0.7 MMOL/L (ref 0.5–2.2)
MAGNESIUM: 1.6 MG/DL (ref 1.6–2.6)
MCH RBC QN AUTO: 32.1 PG (ref 26–35)
MCHC RBC AUTO-ENTMCNC: 32.8 % (ref 32–34.5)
MCV RBC AUTO: 97.7 FL (ref 80–99.9)
PDW BLD-RTO: 17.9 FL (ref 11.5–15)
PLATELET # BLD: 197 E9/L (ref 130–450)
PMV BLD AUTO: 9.9 FL (ref 7–12)
POTASSIUM SERPL-SCNC: 3.7 MMOL/L (ref 3.5–5)
RBC # BLD: 3.46 E12/L (ref 3.5–5.5)
SODIUM BLD-SCNC: 139 MMOL/L (ref 132–146)
TOTAL PROTEIN: 6.9 G/DL (ref 6.4–8.3)
WBC # BLD: 13.5 E9/L (ref 4.5–11.5)

## 2021-02-08 PROCEDURE — 83735 ASSAY OF MAGNESIUM: CPT

## 2021-02-08 PROCEDURE — 83605 ASSAY OF LACTIC ACID: CPT

## 2021-02-08 PROCEDURE — 6370000000 HC RX 637 (ALT 250 FOR IP): Performed by: INTERNAL MEDICINE

## 2021-02-08 PROCEDURE — 6360000002 HC RX W HCPCS: Performed by: INTERNAL MEDICINE

## 2021-02-08 PROCEDURE — A9577 INJ MULTIHANCE: HCPCS | Performed by: RADIOLOGY

## 2021-02-08 PROCEDURE — 2580000003 HC RX 258: Performed by: INTERNAL MEDICINE

## 2021-02-08 PROCEDURE — 6370000000 HC RX 637 (ALT 250 FOR IP): Performed by: STUDENT IN AN ORGANIZED HEALTH CARE EDUCATION/TRAINING PROGRAM

## 2021-02-08 PROCEDURE — 70553 MRI BRAIN STEM W/O & W/DYE: CPT

## 2021-02-08 PROCEDURE — 85027 COMPLETE CBC AUTOMATED: CPT

## 2021-02-08 PROCEDURE — 1200000000 HC SEMI PRIVATE

## 2021-02-08 PROCEDURE — 80053 COMPREHEN METABOLIC PANEL: CPT

## 2021-02-08 PROCEDURE — 36415 COLL VENOUS BLD VENIPUNCTURE: CPT

## 2021-02-08 PROCEDURE — 87040 BLOOD CULTURE FOR BACTERIA: CPT

## 2021-02-08 PROCEDURE — 6360000004 HC RX CONTRAST MEDICATION: Performed by: RADIOLOGY

## 2021-02-08 PROCEDURE — 99232 SBSQ HOSP IP/OBS MODERATE 35: CPT | Performed by: PSYCHIATRY & NEUROLOGY

## 2021-02-08 RX ORDER — LEVETIRACETAM 500 MG/1
500 TABLET ORAL 2 TIMES DAILY
Status: DISCONTINUED | OUTPATIENT
Start: 2021-02-08 | End: 2021-02-10 | Stop reason: HOSPADM

## 2021-02-08 RX ADMIN — TOPIRAMATE 25 MG: 25 TABLET, FILM COATED ORAL at 17:32

## 2021-02-08 RX ADMIN — SODIUM CHLORIDE: 9 INJECTION, SOLUTION INTRAVENOUS at 15:20

## 2021-02-08 RX ADMIN — GADOBENATE DIMEGLUMINE 15 ML: 529 INJECTION, SOLUTION INTRAVENOUS at 17:15

## 2021-02-08 RX ADMIN — ENOXAPARIN SODIUM 40 MG: 40 INJECTION SUBCUTANEOUS at 08:03

## 2021-02-08 RX ADMIN — LEVETIRACETAM 500 MG: 500 TABLET ORAL at 20:54

## 2021-02-08 RX ADMIN — GABAPENTIN 300 MG: 300 CAPSULE ORAL at 20:54

## 2021-02-08 RX ADMIN — SODIUM CHLORIDE, PRESERVATIVE FREE 10 ML: 5 INJECTION INTRAVENOUS at 15:21

## 2021-02-08 RX ADMIN — TRAMADOL HYDROCHLORIDE 50 MG: 50 TABLET, FILM COATED ORAL at 15:18

## 2021-02-08 RX ADMIN — CALCIUM GLUCONATE 2000 MG: 98 INJECTION, SOLUTION INTRAVENOUS at 21:11

## 2021-02-08 RX ADMIN — CEFTRIAXONE SODIUM 1000 MG: 1 INJECTION, POWDER, FOR SOLUTION INTRAMUSCULAR; INTRAVENOUS at 18:28

## 2021-02-08 RX ADMIN — PANTOPRAZOLE SODIUM 40 MG: 40 TABLET, DELAYED RELEASE ORAL at 08:02

## 2021-02-08 RX ADMIN — TRAMADOL HYDROCHLORIDE 50 MG: 50 TABLET, FILM COATED ORAL at 08:02

## 2021-02-08 RX ADMIN — GABAPENTIN 300 MG: 300 CAPSULE ORAL at 08:01

## 2021-02-08 RX ADMIN — MAGNESIUM GLUCONATE 500 MG ORAL TABLET 400 MG: 500 TABLET ORAL at 08:22

## 2021-02-08 RX ADMIN — LEVOTHYROXINE SODIUM 112 MCG: 0.11 TABLET ORAL at 08:22

## 2021-02-08 RX ADMIN — MAGNESIUM GLUCONATE 500 MG ORAL TABLET 400 MG: 500 TABLET ORAL at 23:37

## 2021-02-08 RX ADMIN — POTASSIUM CHLORIDE 20 MEQ: 1500 TABLET, EXTENDED RELEASE ORAL at 08:02

## 2021-02-08 ASSESSMENT — PAIN SCALES - GENERAL: PAINLEVEL_OUTOF10: 3

## 2021-02-08 NOTE — CONSULTS
2/8/2021 8:30 AM  Service: Urology  Group: [facility] (Sergey/Marilee/Breana)    Byron  [id number]     Chief Complaint:    Urinary Retention, Hydronephrosis     History of Present Illness: The patient is a 30 y.o. female patient who presented to the hospital one day ago for evaluation of seizures. She has a history of metastatic neuroendocrine cancer. She does have history of ESRD on HD, but this has been stopped recently. She had a CT abdomen pelvis performed in the ED that showed bilateral hydronephrosis with bladder distension. Attempts to place a brock catheter in the ED were unsuccessful. She states at the time of the CT she was extremely uncomfortable with the urge to void. After the CT she was able to use the restroom and states that she is currently voiding comfortably without a brock catheter. She would like to avoid a brock unless necessary. Her creatinine is currently stable. She denies abdominal pain or flank pain.  She has never seen a Urologist in the past.    Past Medical History:   Diagnosis Date    Abdominal pain     Acute Crohn's disease ([address]     Cancer ([address]     neuroendocrime tumor    Encounter regarding vascular access for dialysis for ESRD ([address] 01/26/2021    Hemodialysis patient ([address] 01/25/2021    Dr. Block Peaks Hypocalcemia     Hypothyroidism     Irritable bowel syndrome     Migraines     Status post alcohol detoxification     5/22/2018-5/29/2018         Past Surgical History:   Procedure Laterality Date    CHOLECYSTECTOMY, LAPAROSCOPIC  07/06/2016    COLONOSCOPY N/A 6/22/2018    COLONOSCOPY WITH BIOPSY performed by Lea Lawson MD at [address] [facility] CT BIOPSY RENAL  1/25/2021    CT BIOPSY RENAL 1/25/2021 Teryl Denver, MD [facility] CT    CT NEEDLE BIOPSY LIVER PERCUTANEOUS  9/1/2020    CT NEEDLE BIOPSY LIVER PERCUTANEOUS 9/1/2020 [facility] CT    HC DIALYSIS CATHETER N/A 1/28/2021    TESIO CATHETER INSERTION AND REMOVAL OF TEMPORARY performed by Marj Greene medical record in detail. I agree with the plan as outlined by TRINA Montez-JESS.     Olesya Gunderson MD  5:58 PM  2/9/2021

## 2021-02-08 NOTE — ED NOTES
Patient ambulates without difficulty to the bathroom.  Gait steady      Lorene Cancer, RN  02/08/21 [id number]

## 2021-02-08 NOTE — ED NOTES
Waiting for daily medications from pharmacy. Not yet arrived in ED.      Nilda Lunsford RN  02/07/21 7217

## 2021-02-08 NOTE — ED NOTES
Gait steady to restroom. Denying any pains or fullness sensation following use of restroom.       Casey Damian RN  02/08/21 8548

## 2021-02-08 NOTE — CARE COORDINATION
Social Work Discharge/Planning:    SW met with patient to explain role and discuss transition of care. Patient reports being independent and able to drive prior to admission. Patient reports she has not been driving as much lately. Patient's boyfriend and parents have provided patient with transportation lately. Patient is from home. Patient lives with parents in a 2 story home. Patient resides on the 2nd floor. Patient is independent with all ADL's. Patient has no DME. Patient does not wear oxygen at home. Patient denies JELLY/SNF/HHC hx. Patient has hx with [facility]. Patient reports she no longer receives dialysis and her port was recently removed. Patient's PCP is Dr. Harshal Santiago. Patient's Surgeon is Dr. Philomena Manzanares. Patient's Oncologist is Dr. Katherin Riggins. Patient's Pharmacy is [facility] located in [place]. Patient reports plan is to return home with no anticipated needs once medically clear for discharge. Patient reports family or boyfriend is able to transport patient home upon discharge. REID/ANNIKA to follow for any needs that may arise upon discharge.     JONH Feng  [phone]

## 2021-02-08 NOTE — ED NOTES
Pt informed of room number.  Gait steady to and from restroom   Total amount of trips to restroom from 7am to 1425 was [address] DAIGLE RN  02/08/21 [facility]  02/08/21 1423

## 2021-02-08 NOTE — CONSULTS
Brett Cisse is a 30 y.o. female with a history of neuroendocrine tumor with hepatic metastasis, renal failure now off dialysis, atypical migraines on Topamax 25 mg daily and Fioricet as needed, Crohn's Disease, hypothyroidism, and family history of absence seizures who was admitted for seizures and speech difficulty. Neurology was consulted for seizures. The patient was just recently admitted 01/22-1/29 for seizure like activity. She had a CT scan and EEG 01/22, which was negative. She also had an MRI on 1/22 which did not show acute infarction or hemorrhage or mass lesion; however, there were mild nonspecific foci of periventricular and subcortical cerebral white matter T2/FLAIR hyperintensity which may be migraine related angiopathy and trauma related to white matter change vs metastatic disease. The patient's Topamax was increased and she was started on Keppra. She had not yet followed up with neurology as an outpatient. She was seen by Dr. Melyssa Tate during her admission. The patient presented to the ED on 02/07/20 for seizure like activity, hand spasms, and difficulty with speech intermittently. Apparently, the patient states that she went \"unresponsive\" yesterday. She was staring off into space and didn't know what was going on. This lasted for several minutes and then resolved on its own before EMS arrived. She did have another similar episode in the ED that also resolved on its own. Prior to yesterday, the patient states that she did have one episode of grand mal seizure in December 2020. She was admitted in January for possible absence seizures, renal failure, and hypomagnesemia and hypocalcemia. She was started on Keppra during that recent admission and her Topamax was increased.  However, prior to discharge the patient's medications were not adjusted and she has not followed up with neuro since her recent stay in the hospital. She did call her PCP regarding the Topamax but didn't get the new dose filled yet. She was diagnosed with neuroendocrine tumor in September and started chemotherapy injections, which she receives monthly. She states that the only family history of seizures was in her father who also developed absence seizures on chemotherapy for sarcoidosis. Today, the patient states she is feeling much better. The day before this happened she did states she had a headache and felt tired. She was also having an aura in her right eye of blurry vision. She also felt like her hands were \"cramping up. \" She gets spasms in her hands occasionally due to her magnesium and calcium abnormalities she states. She currently denies any headache, lightheadedness, dizziness, syncope, numbness, tingling, blurry vision, double vision, or other acute symptoms or concerns.        Past Medical History:     Past Medical History:   Diagnosis Date    Abdominal pain     Acute Cro[facility]'s disease ([facility]     Cancer ([facility]     neuroendocrime tumor    Encounter regarding vascular access for dialysis for ESRD ([facility] 01/26/2021    Hemodialysis patient ([facility] 01/25/2021    Dr. Cheyenne Walters Hypocalcemia     Hypothyroidism     Irritable bowel syndrome     Migraines     Status post alcohol detoxification     5/22/2018-5/29/2018       Past Surgical History:     Past Surgical History:   Procedure Laterality Date    CHOLECYSTECTOMY, LAPAROSCOPIC  07/06/2016    COLONOSCOPY N/A 6/22/2018    COLONOSCOPY WITH BIOPSY performed by Tasneem Bahena MD at [address] [facility] CT BIOPSY RENAL  1/25/2021    CT BIOPSY RENAL 1/25/2021 Mary Louise MD [facility]    CT NEEDLE BIOPSY LIVER PERCUTANEOUS  9/1/2020    CT NEEDLE BIOPSY LIVER PERCUTANEOUS 9/1/2020 [facility] CT    HC DIALYSIS CATHETER N/A 1/28/2021    TESIO CATHETER INSERTION AND REMOVAL OF TEMPORARY performed by Bebeto Bynum MD at [address] SMALL INTESTINE SURGERY N/A 10/21/2020    LAPAROSCOPIC ROBOTIC SMALL BOWEL RESECTION WITH PLANNED TRANSITION TO OPEN performed by Sigrid Santamaria MD at [facility]         Allergies:     Patient has no known allergies. Medications:     Prior to Admission medications    Medication Sig Start Date End Date Taking? Authorizing Provider   ondansetron (ZOFRAN) 4 MG tablet Take 1 tablet by mouth 3 times daily as needed for Nausea 1/11/21  Yes Historical Provider, MD   pantoprazole (PROTONIX) 40 MG tablet Take 1 tablet by mouth every morning (before breakfast) 1/27/21  Yes Historical Provider, MD   promethazine (PHENERGAN) 25 MG tablet Take 1 tablet by mouth as needed for Nausea or Vomiting 1/11/21  Yes Historical Provider, MD   magnesium oxide (MAG-OX) 400 MG tablet Take 400 mg by mouth 2 times daily   Yes Historical Provider, MD   gabapentin (NEURONTIN) 300 MG capsule Take 300 mg by mouth as needed. Yes Historical Provider, MD   zolpidem (AMBIEN) 10 MG tablet take 1 tablet by mouth at bedtime if needed for sleep 2/1/21 3/3/21 Yes Shamika Driver, DO   traMADol (ULTRAM) 50 MG tablet Take 1 tablet by mouth every 6 hours as needed for Pain for up to 14 days. Intended supply: 5 days.  Take lowest dose possible to manage pain 2/1/21 2/15/21 Yes Isabel Darling, DO   vitamin D (ERGOCALCIFEROL) 1.25 MG (45986 UT) CAPS capsule Take 1 capsule by mouth once a week 1/30/21  Yes Millie Sanders, DO   levothyroxine (SYNTHROID) 112 MCG tablet take 1 tablet by mouth once daily 1/26/21  Yes Shamika Driver, DO   butalbital-acetaminophen-caffeine (FIORICET, ESGIC) -62 MG per tablet take 1 tablet by mouth once daily if needed for headache  Patient taking differently: Take 1 tablet by mouth daily as needed for Headaches  1/5/21  Yes Shamika Driver, DO   acetaminophen (AMINOFEN) 325 MG tablet Take 2 tablets by mouth every 6 hours as needed for Pain 10/25/20  Yes Demarco Collins, DO   topiramate (TOPAMAX SPRINKLE) 25 MG capsule Take 1 capsule by mouth daily 2/1/21   Shamika Driver, DO potassium chloride (KLOR-CON M) 20 MEQ extended release tablet Take 1 tablet by mouth daily 1/8/21   Robbi Phillips MD   Calcium Carb-Cholecalciferol (CALCIUM-VITAMIN D) 500-200 MG-UNIT per tablet Take 1 tablet by mouth 2 times daily (with meals)    Historical Provider, MD       Social History:     Denies ETOH, tobacco, or illicit drugs    Review of Systems:     No chest pain or palpitations  No SOB  No vertigo, lightheadedness or loss of consciousness  No falls, tripping or stumbling  No incontinence of bowels or bladder  No itching or bruising appreciated  No numbness, tingling or focal arm/leg weakness    ROS is otherwise negative     Family History:     Family History   Problem Relation Age of Onset    High Blood Pressure Mother     High Cholesterol Mother     Other Mother         migraine headaches    Heart Disease Father     Asthma Father     High Blood Pressure Father     Cancer Father         Sarcoidosis    Diabetes Other         GRANDMOTHER    Lung Cancer Other         GRANDFATHER    Alzheimer's Disease Other         GRANDMOTHER    Breast Cancer Maternal Grandmother     Cancer Maternal Grandmother         skin    Cancer Paternal Grandfather         lung          Objective:     BP (!) 142/95   Pulse 85   Temp 97.3 °F (36.3 °C) (Temporal)   Resp 17   Ht 5' (1.524 m)   Wt 160 lb (72.6 kg)   SpO2 98%   BMI 31.25 kg/m²     General appearance: alert, appears stated age, cooperative and no distress  Head: normocephalic, without obvious abnormality, atraumatic  Eyes: conjunctivae/corneas clear.  .  Neck: no adenopathy, no carotid bruit, supple, symmetrical, trachea midline and thyroid not enlarged, symmetric, no tenderness/mass/nodules  Lungs: clear to auscultation bilaterally  Heart: regular rate and rhythm, S1, S2 normal, no murmur, click, rub or gallop  Extremities: normal, atraumatic, no cyanosis or edema  Pulses: 2+ and symmetric  Skin: color, texture, turgor normal---no rashes or

## 2021-02-08 NOTE — ED NOTES
Multiple attempts via multiple RNs to obtain indwelling urinary cathter access. Unable to obtain access. Patient produces approx 100-200mL per on bedpan.  Does not desire further attempt     Cali Lorenzo RN  02/07/21 2051

## 2021-02-09 ENCOUNTER — APPOINTMENT (OUTPATIENT)
Dept: NEUROLOGY | Age: 36
DRG: 053 | End: 2021-02-09
Payer: COMMERCIAL

## 2021-02-09 LAB
ANION GAP SERPL CALCULATED.3IONS-SCNC: 10 MMOL/L (ref 7–16)
ANION GAP SERPL CALCULATED.3IONS-SCNC: 11 MMOL/L (ref 7–16)
BUN BLDV-MCNC: 3 MG/DL (ref 6–20)
BUN BLDV-MCNC: 6 MG/DL (ref 6–20)
CALCIUM IONIZED: 1.01 MMOL/L (ref 1.15–1.33)
CALCIUM SERPL-MCNC: 6.8 MG/DL (ref 8.6–10.2)
CALCIUM SERPL-MCNC: 8 MG/DL (ref 8.6–10.2)
CHLORIDE BLD-SCNC: 105 MMOL/L (ref 98–107)
CHLORIDE BLD-SCNC: 108 MMOL/L (ref 98–107)
CO2: 22 MMOL/L (ref 22–29)
CO2: 23 MMOL/L (ref 22–29)
CREAT SERPL-MCNC: 0.8 MG/DL (ref 0.5–1)
CREAT SERPL-MCNC: 0.8 MG/DL (ref 0.5–1)
GFR AFRICAN AMERICAN: >60
GFR AFRICAN AMERICAN: >60
GFR NON-AFRICAN AMERICAN: >60 ML/MIN/1.73
GFR NON-AFRICAN AMERICAN: >60 ML/MIN/1.73
GLUCOSE BLD-MCNC: 106 MG/DL (ref 74–99)
GLUCOSE BLD-MCNC: 113 MG/DL (ref 74–99)
POTASSIUM SERPL-SCNC: 3.8 MMOL/L (ref 3.5–5)
POTASSIUM SERPL-SCNC: 3.9 MMOL/L (ref 3.5–5)
SODIUM BLD-SCNC: 137 MMOL/L (ref 132–146)
SODIUM BLD-SCNC: 142 MMOL/L (ref 132–146)

## 2021-02-09 PROCEDURE — 82330 ASSAY OF CALCIUM: CPT

## 2021-02-09 PROCEDURE — 95816 EEG AWAKE AND DROWSY: CPT | Performed by: PSYCHIATRY & NEUROLOGY

## 2021-02-09 PROCEDURE — 80048 BASIC METABOLIC PNL TOTAL CA: CPT

## 2021-02-09 PROCEDURE — 1200000000 HC SEMI PRIVATE

## 2021-02-09 PROCEDURE — 95816 EEG AWAKE AND DROWSY: CPT

## 2021-02-09 PROCEDURE — 6360000002 HC RX W HCPCS: Performed by: NURSE PRACTITIONER

## 2021-02-09 PROCEDURE — 6370000000 HC RX 637 (ALT 250 FOR IP): Performed by: STUDENT IN AN ORGANIZED HEALTH CARE EDUCATION/TRAINING PROGRAM

## 2021-02-09 PROCEDURE — 2580000003 HC RX 258: Performed by: INTERNAL MEDICINE

## 2021-02-09 PROCEDURE — 36415 COLL VENOUS BLD VENIPUNCTURE: CPT

## 2021-02-09 PROCEDURE — 99233 SBSQ HOSP IP/OBS HIGH 50: CPT | Performed by: NURSE PRACTITIONER

## 2021-02-09 PROCEDURE — 6360000002 HC RX W HCPCS: Performed by: INTERNAL MEDICINE

## 2021-02-09 PROCEDURE — 2580000003 HC RX 258: Performed by: NURSE PRACTITIONER

## 2021-02-09 PROCEDURE — 6370000000 HC RX 637 (ALT 250 FOR IP): Performed by: INTERNAL MEDICINE

## 2021-02-09 RX ORDER — ERGOCALCIFEROL 1.25 MG/1
50000 CAPSULE ORAL WEEKLY
Qty: 5 CAPSULE | Refills: 0 | Status: SHIPPED | OUTPATIENT
Start: 2021-02-09 | End: 2022-02-15

## 2021-02-09 RX ORDER — LEVETIRACETAM 500 MG/1
500 TABLET ORAL 2 TIMES DAILY
Qty: 60 TABLET | Refills: 0 | Status: SHIPPED | OUTPATIENT
Start: 2021-02-09 | End: 2021-02-09

## 2021-02-09 RX ORDER — TOPIRAMATE 50 MG/1
TABLET, FILM COATED ORAL
Qty: 180 TABLET | Refills: 1 | Status: SHIPPED | OUTPATIENT
Start: 2021-02-09 | End: 2021-03-02

## 2021-02-09 RX ORDER — ERGOCALCIFEROL 1.25 MG/1
50000 CAPSULE ORAL WEEKLY
Status: DISCONTINUED | OUTPATIENT
Start: 2021-02-09 | End: 2021-02-10 | Stop reason: HOSPADM

## 2021-02-09 RX ORDER — GABAPENTIN 300 MG/1
300 CAPSULE ORAL 3 TIMES DAILY
Qty: 90 CAPSULE | Refills: 0 | Status: SHIPPED | OUTPATIENT
Start: 2021-02-09 | End: 2021-04-16 | Stop reason: SDUPTHER

## 2021-02-09 RX ORDER — TOPIRAMATE 25 MG/1
25 TABLET ORAL DAILY
Status: DISCONTINUED | OUTPATIENT
Start: 2021-02-09 | End: 2021-02-10 | Stop reason: HOSPADM

## 2021-02-09 RX ORDER — OYSTER SHELL CALCIUM WITH VITAMIN D 500; 200 MG/1; [IU]/1
2 TABLET, FILM COATED ORAL 2 TIMES DAILY
Qty: 30 TABLET | Refills: 3 | Status: SHIPPED | OUTPATIENT
Start: 2021-02-09 | End: 2021-04-08

## 2021-02-09 RX ORDER — OYSTER SHELL CALCIUM WITH VITAMIN D 500; 200 MG/1; [IU]/1
1 TABLET, FILM COATED ORAL 2 TIMES DAILY
Status: DISCONTINUED | OUTPATIENT
Start: 2021-02-09 | End: 2021-02-10 | Stop reason: HOSPADM

## 2021-02-09 RX ORDER — LEVETIRACETAM 500 MG/1
500 TABLET ORAL 2 TIMES DAILY
Qty: 60 TABLET | Refills: 3 | Status: SHIPPED | OUTPATIENT
Start: 2021-02-09 | End: 2021-03-02 | Stop reason: SDUPTHER

## 2021-02-09 RX ORDER — MAGNESIUM SULFATE 1 G/100ML
1000 INJECTION INTRAVENOUS ONCE
Status: COMPLETED | OUTPATIENT
Start: 2021-02-09 | End: 2021-02-09

## 2021-02-09 RX ADMIN — TOPIRAMATE 25 MG: 25 TABLET, FILM COATED ORAL at 12:40

## 2021-02-09 RX ADMIN — MAGNESIUM GLUCONATE 500 MG ORAL TABLET 400 MG: 500 TABLET ORAL at 08:27

## 2021-02-09 RX ADMIN — CALCIUM CARBONATE-VITAMIN D TAB 500 MG-200 UNIT 1 TABLET: 500-200 TAB at 21:49

## 2021-02-09 RX ADMIN — SODIUM CHLORIDE: 9 INJECTION, SOLUTION INTRAVENOUS at 02:24

## 2021-02-09 RX ADMIN — CEFTRIAXONE SODIUM 1000 MG: 1 INJECTION, POWDER, FOR SOLUTION INTRAMUSCULAR; INTRAVENOUS at 18:42

## 2021-02-09 RX ADMIN — TRAMADOL HYDROCHLORIDE 50 MG: 50 TABLET, FILM COATED ORAL at 22:03

## 2021-02-09 RX ADMIN — TRAMADOL HYDROCHLORIDE 50 MG: 50 TABLET, FILM COATED ORAL at 08:32

## 2021-02-09 RX ADMIN — LEVETIRACETAM 500 MG: 500 TABLET ORAL at 21:49

## 2021-02-09 RX ADMIN — CALCIUM CARBONATE-VITAMIN D TAB 500 MG-200 UNIT 1 TABLET: 500-200 TAB at 11:59

## 2021-02-09 RX ADMIN — CALCIUM GLUCONATE 2000 MG: 98 INJECTION, SOLUTION INTRAVENOUS at 11:16

## 2021-02-09 RX ADMIN — POTASSIUM CHLORIDE 20 MEQ: 1500 TABLET, EXTENDED RELEASE ORAL at 08:27

## 2021-02-09 RX ADMIN — SODIUM CHLORIDE, PRESERVATIVE FREE 10 ML: 5 INJECTION INTRAVENOUS at 21:52

## 2021-02-09 RX ADMIN — ENOXAPARIN SODIUM 40 MG: 40 INJECTION SUBCUTANEOUS at 08:27

## 2021-02-09 RX ADMIN — GABAPENTIN 300 MG: 300 CAPSULE ORAL at 08:27

## 2021-02-09 RX ADMIN — SODIUM CHLORIDE, PRESERVATIVE FREE 10 ML: 5 INJECTION INTRAVENOUS at 08:28

## 2021-02-09 RX ADMIN — LEVETIRACETAM 500 MG: 500 TABLET ORAL at 08:27

## 2021-02-09 RX ADMIN — GABAPENTIN 300 MG: 300 CAPSULE ORAL at 21:49

## 2021-02-09 RX ADMIN — MAGNESIUM GLUCONATE 500 MG ORAL TABLET 400 MG: 500 TABLET ORAL at 21:49

## 2021-02-09 RX ADMIN — PANTOPRAZOLE SODIUM 40 MG: 40 TABLET, DELAYED RELEASE ORAL at 06:01

## 2021-02-09 RX ADMIN — MAGNESIUM SULFATE HEPTAHYDRATE 1000 MG: 1 INJECTION, SOLUTION INTRAVENOUS at 12:41

## 2021-02-09 RX ADMIN — GABAPENTIN 300 MG: 300 CAPSULE ORAL at 14:03

## 2021-02-09 ASSESSMENT — PAIN SCALES - GENERAL: PAINLEVEL_OUTOF10: 6

## 2021-02-09 ASSESSMENT — PAIN DESCRIPTION - PROGRESSION: CLINICAL_PROGRESSION: NOT CHANGED

## 2021-02-09 NOTE — PROCEDURES
EEG Report  Brian Pederson is a 30 y.o. female      Appointment Date 2/9/21 Appointment Time 0900   Facility Location [facility] EEG Number [id number]   Type of Study Routine Floor 8414-A     Technical Specifications  Technician BESSY/Josafat Connors of consciousness Awake, Alert, Coop. Sleep deprived? No   Hyperventilation tested? No   Photic stim tested? Yes   EEG recording Standard 10-20 electrode placement    Duration of recording 25:07   EEG complete?  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[facility]    New onset seizure ([facility]    Neuroendocrine tumor    Hypomagnesemia    Hypocalcemia    Hypoparathyroidism (HCC)    Tobacco abuse    Elevated liver enzymes    Moderate protein-calorie malnutrition ([facility]    Encounter regarding vascular access for dialysis for ESRD ([facility]    Difficulty with speech         Medications    Current Facility-Administered Medications:     magnesium sulfate 1000 mg in dextrose 5% 100 mL IVPB, 1,000 mg, Intravenous, Once, Jean DO Tashi    calcium-vitamin D (OSCAL-500) 500-200 MG-UNIT per tablet 1 tablet, 1 tablet, Oral, BID, Ana Castellanos DO    calcium gluconate 2,000 mg in dextrose 5 % 100 mL IVPB, 2,000 mg, Intravenous, Daily, TRINA Valentino - CNP    levETIRAcetam (KEPPRA) tablet 500 mg, 500 mg, Oral, BID, Linda Paulino DO, 500 mg at 02/09/21 0827    cefTRIAXone (ROCEPHIN) 1,000 mg in sterile water 10 mL IV syringe, 1,000 mg, Intravenous, Q24H, Ana Castellanos DO, Last Rate: 0 mL/hr at 02/07/21 2029, 1,000 mg at 02/08/21 1828    butalbital-acetaminophen-caffeine (FIORICET, ESGIC) per tablet 1 tablet, 1 tablet, Oral, Daily PRN, Ana Vance Yogeshmer, DO    gabapentin (NEURONTIN) capsule 300 mg, 300 mg, Oral, TID, Jennifersie Forts Malmer, DO, 300 mg at 02/09/21 0827    levothyroxine (SYNTHROID) tablet 112 mcg, 112 mcg, Oral, Daily, Jennifersie Forts Malmer, DO, 112 mcg at 02/08/21 8751    magnesium oxide (MAG-OX) tablet 400 mg, 400 mg, Oral, BID, Jonye Haydees Malmer, DO, 400 mg at 02/09/21 0827    pantoprazole (PROTONIX) tablet 40 mg, 40 mg, Oral, QAM AC, Jennifersie Haydees Malmer, DO, 40 mg at 02/09/21 0601    potassium chloride (KLOR-CON M) extended release tablet 20 mEq, 20 mEq, Oral, Daily, Jonye Forts Malmer, DO, 20 mEq at 02/09/21 0827    topiramate (TOPAMAX) tablet 25 mg, 25 mg, Oral, Daily, aCn Morton Malmer, DO, 25 mg at 02/08/21 1732    traMADol (ULTRAM) tablet 50 mg, 50 mg, Oral, Q6H PRN, Can Morton Malmer, DO, 50 mg at 02/09/21 7195    sodium chloride flush 0.9 % injection 10 mL, 10 mL, Intravenous, 2 times per day, Nicky Moors, DO, 10 mL at 02/09/21 6522    sodium chloride flush 0.9 % injection 10 mL, 10 mL, Intravenous, PRN, Can Morton Malmer, DO    enoxaparin (LOVENOX) injection 40 mg, 40 mg, Subcutaneous, Daily, Jonye Selene Malmer, DO, 40 mg at 02/09/21 0827    promethazine (PHENERGAN) tablet 12.5 mg, 12.5 mg, Oral, Q6H PRN **OR** ondansetron (ZOFRAN) injection 4 mg, 4 mg, Intravenous, Q6H PRN, Jonye Forts Malmer, DO    polyethylene glycol (GLYCOLAX) packet 17 g, 17 g, Oral, Daily PRN, Can Hubbards Malmer, DO    acetaminophen (TYLENOL) tablet 650 mg, 650 mg, Oral, Q6H PRN **OR** acetaminophen (TYLENOL) suppository 650 mg, 650 mg, Rectal, Q6H PRN, Can Castellanos DO        Physician Interpretation    General EEG Report  There is a normal 8-9 Hz PDR with normal reactivity. Type of EEG >>  Routine, normal            General Impression  This is a normal routine EEG. No epileptiform activity or lateralizing signs are seen.   Laird Peabody, MD Feliciano Mako

## 2021-02-09 NOTE — CONSULTS
Nephrology Consult Note  Patient's Name: Delilah Restrepo  8:33 AM  2/9/2021    Nephrologist: Dr. Brandyn Marquez     Reason for Consult:  Hypocalcemia and history of renal failure  Requesting Physician:  Veronique Diamond DO    Chief Complaint:  SOB    History Obtained From:  patient and past medical records    History of Present Ilness:    Delilah Restrepo is a 30 y.o. female with a PMH significant for  Thyroidectomy, parathyroidectomy, Stage 4 neuroendocrine cancer with liver mets. 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 She had a recent admission on 1/22-1-29/2021 after a generalized tonic-clonic seizure. She had been having nausea and vomiting for the week prior to that admission. She also reported that her urin output had stopped 2 days prior. She had serum calcium of 6.4, and had not been taking her daily calcium for her chronic hypocalcemia since her parathyroidectomy. Liver enzymes were elevated as well. During this admission, she required an MRI with contrast to rule out brain mets, which negatively affected her renal function. Her serum Creatinine climbed as high as 6.2 from 1.8 on admission and she was started on IHD on 1/25 following placement of temp dialysis cath. A kidney biopsy demonstrated ATN and she required dialysis on 1/26, 1/27, & 1/28 after having tunneled dialysis cath placed by PK on 1/28. Since then, her serum Cr has been 2.1 on 2/1, 1.4 on 2/3, 1.0 on 2/7 and 0.9 on 2/8 and has not required any further dialysis. Josefina Guy is known to our practice from this previous admission so a formal consult is deferred. The patient presented to the ED on 02/07/20 for seizure like activity, hand spasms, and difficulty with speech intermittently. Apparently, the patient states that she found by her parents \"unresponsive\" on 2/7. She was staring off into space and didn't know what was going on.  This lasted for several minutes and then resolved on its own before EMS arrived. She did have another similar episode in the ED that also resolved on its own. Pertinent labs upon admission include Cr 1.0, BUN 4, eGFR >60, Ca+ 7.8, alb 4.4, gluc 126, alk phos 135, ALT 21 AST 26, and wbc 14.5, Hgb 13.2  . Upon assessment, pt is awake, alert and oriented with no neuro deficits noted. She reports that prior to her \"unresponsive episode, she had experienced some numbness and tingling in her cheeks and then states following the \"unresponsive episode\" she noticed she knew what she wanted to say, but the words wouldn't come out. Prior to this, she states she has been eating well, following a renal diet and reading all the labels. She said once she was discharged from here, she continued having blood work done and did not require dialysis again, so her dialysis access was removed last week.        Past Medical History:   Diagnosis Date    Abdominal pain     [facility]     Cancer ([facility]     neuroendocrime tumor    Encounter regarding vascular access for dialysis for ESRD ([facility] 01/26/2021    Hemodialysis patient ([facility] 01/25/2021    Dr. Gloria Richter Hypocalcemia     Hypothyroidism     Irritable bowel syndrome     Migraines     Status post alcohol detoxification     5/22/2018-5/29/2018       Past Surgical History:   Procedure Laterality Date    CHOLECYSTECTOMY, LAPAROSCOPIC  07/06/2016    COLONOSCOPY N/A 6/22/2018    COLONOSCOPY WITH BIOPSY performed by Maine Borja MD at [address] BIOPSY RENAL  1/25/2021    CT BIOPSY RENAL 1/25/2021 Rachana Leal MD [facility]    CT NEEDLE BIOPSY LIVER PERCUTANEOUS  9/1/2020    CT NEEDLE BIOPSY LIVER PERCUTANEOUS 9/1/2020 [facility]    HC DIALYSIS CATHETER N/A 1/28/2021    TESIO CATHETER INSERTION AND REMOVAL OF TEMPORARY performed by Ezra Pennington MD at [address] INTESTINE SURGERY N/A 10/21/2020    LAPAROSCOPIC ROBOTIC SMALL BOWEL RESECTION WITH PLANNED TRANSITION TO OPEN performed by Ruthann Doss MD at [address] THYROIDECTOMY         Family History   Problem Relation Age of Onset    High Blood Pressure Mother     High Cholesterol Mother     Other Mother         migraine headaches    Heart Disease Father     Asthma Father     High Blood Pressure Father     Cancer Father         Sarcoidosis    Diabetes Other         GRANDMOTHER    Lung Cancer Other         GRANDFATHER    Alzheimer's Disease Other         GRANDMOTHER    Breast Cancer Maternal Grandmother     Cancer Maternal Grandmother         skin    Cancer Paternal Grandfather         lung        reports that she has been smoking. She has been smoking about 0.50 packs per day. She has never used smokeless tobacco. She reports that she does not drink alcohol or use drugs. Allergies:  Patient has no known allergies.     Current Medications:        levETIRAcetam (KEPPRA) tablet 500 mg, BID      0.9 % sodium chloride infusion, Continuous      cefTRIAXone (ROCEPHIN) 1,000 mg in sterile water 10 mL IV syringe, Q24H      butalbital-acetaminophen-caffeine (FIORICET, ESGIC) per tablet 1 tablet, Daily PRN      gabapentin (NEURONTIN) capsule 300 mg, TID      levothyroxine (SYNTHROID) tablet 112 mcg, Daily      magnesium oxide (MAG-OX) tablet 400 mg, BID      pantoprazole (PROTONIX) tablet 40 mg, QAM AC      potassium chloride (KLOR-CON M) extended release tablet 20 mEq, Daily      topiramate (TOPAMAX) tablet 25 mg, Daily      traMADol (ULTRAM) tablet 50 mg, Q6H PRN      sodium chloride flush 0.9 % injection 10 mL, 2 times per day      sodium chloride flush 0.9 % injection 10 mL, PRN      enoxaparin (LOVENOX) injection 40 mg, Daily      promethazine (PHENERGAN) tablet 12.5 mg, Q6H PRN    Or      ondansetron (ZOFRAN) injection 4 mg, Q6H PRN      polyethylene glycol (GLYCOLAX) packet 17 g, Daily PRN      acetaminophen (TYLENOL) tablet 650 mg, Q6H PRN    Or      acetaminophen (TYLENOL) suppository 650 mg, Q6H PRN        Review of Systems:   Pertinent items are noted in HPI.     Physical exam:   Constitutional:    Vitals: VITALS:  /89   Pulse 77   Temp 98 °F (36.7 °C) (Temporal)   Resp 16   Ht 5' (1.524 m)   Wt 160 lb (72.6 kg)   SpO2 97%   BMI 31.25 kg/m²   24HR INTAKE/OUTPUT:  No intake or output data in the 24 hours ending 02/09/21 0839  URINARY CATHETER OUTPUT (Ma):     Skin: no rash, turgor wnl  Heent:  eomi, mmm  Neck: no bruits or jvd noted  Cardiovascular:  S1, S2 without m/r/g  Respiratory: CTA B without w/r/r  Abdomen:  +bs, soft, nt, nd  Ext: neg lower extremity edema  Psychiatric: mood and affect appropriate  Musculoskeletal:  Rom, muscular strength intact    Data:   Labs:  CBC:   Lab Results   Component Value Date    WBC 13.5 02/08/2021    RBC 3.46 02/08/2021    HGB 11.1 02/08/2021    HCT 33.8 02/08/2021    MCV 97.7 02/08/2021    MCH 32.1 02/08/2021    MCHC 32.8 02/08/2021    RDW 17.9 02/08/2021     02/08/2021    MPV 9.9 02/08/2021     CBC with Differential:    Lab Results   Component Value Date    WBC 13.5 02/08/2021    RBC 3.46 02/08/2021    HGB 11.1 02/08/2021    HCT 33.8 02/08/2021     02/08/2021    MCV 97.7 02/08/2021    MCH 32.1 02/08/2021    MCHC 32.8 02/08/2021    RDW 17.9 02/08/2021    LYMPHOPCT 22.8 02/07/2021    MONOPCT 7.4 02/07/2021    BASOPCT 1.5 02/07/2021    MONOSABS 1.07 02/07/2021    LYMPHSABS 3.30 02/07/2021    EOSABS 0.40 02/07/2021    BASOSABS 0.22 02/07/2021     Ionized Calcium:  No results found for: IONCA  Magnesium:    Lab Results   Component Value Date    MG 1.6 02/08/2021     Phosphorus:    Lab Results   Component Value Date    PHOS 2.6 09/03/2020     LDH:  No results found for: LDH  Uric Acid:    Lab Results   Component Value Date    LABURIC 7.4 01/26/2021     PT/INR:    Lab Results   Component Value Date    PROTIME 12.0 01/29/2021    INR 1.1 01/29/2021     Warfarin PT/INR:  No components found for: PTPATWAR, PTINRWAR  PTT: Lab Results   Component Value Date    APTT 26.3 01/21/2021   [APTT}  Troponin:    Lab Results   Component Value Date    TROPONINI <0.01 02/07/2021     Last 3 Troponin:    Lab Results   Component Value Date    TROPONINI <0.01 02/07/2021    TROPONINI 0.04 01/21/2021     U/A:    Lab Results   Component Value Date    NITRITE neg 08/20/2020    COLORU Yellow 02/07/2021    PROTEINU Negative 02/07/2021    PHUR 6.0 02/07/2021    WBCUA 2-5 02/07/2021    RBCUA 1-3 02/07/2021    MUCUS Present 09/26/2017    BACTERIA MANY 02/07/2021    CLARITYU Clear 02/07/2021    SPECGRAV <=1.005 02/07/2021    LEUKOCYTESUR TRACE 02/07/2021    UROBILINOGEN 0.2 02/07/2021    BILIRUBINUR Negative 02/07/2021    BILIRUBINUR neg 08/20/2020    BLOODU Negative 02/07/2021    GLUCOSEU Negative 02/07/2021    AMORPHOUS MODERATE 01/21/2021     ABG:  No results found for: PH, PCO2, PO2, HCO3, BE, THGB, TCO2, O2SAT  HgBA1c:  No results found for: LABA1C  Microalbumen/Creatinine ratio:  No components found for: RUCREAT  FLP:  No results found for: TRIG, HDL, LDLCALC, LDLDIRECT, LABVLDL  TSH:    Lab Results   Component Value Date    TSH 2.190 01/21/2021     VITAMIN B12: No components found for: B12  FOLATE:  No results found for: FOLATE  IRON:  No results found for: IRON  Iron Saturation:  No components found for: PERCENTFE  TIBC:  No results found for: TIBC  FERRITIN:  No results found for: FERRITIN  AMYLASE:    Lab Results   Component Value Date    AMYLASE 38 03/13/2018     LIPASE:    Lab Results   Component Value Date    LIPASE 127 01/21/2021     Fibrinogen Level:  No components found for: FIB  Urine Toxicology:  No components found for: IAMMENTA, IBARBIT, IBENZO, ICOCAINE, IMARTHC, IOPIATES, IPHENCYC     Imaging:    EXAMINATION:   ONE XRAY VIEW OF THE CHEST   2/7/2021 2:56 pm   COMPARISON:   None.    HISTORY:   ORDERING SYSTEM PROVIDED HISTORY: SOB   TECHNOLOGIST PROVIDED HISTORY:   Reason for exam:->SOB   What reading provider will be dictating this exam?->CRC   FINDINGS:   The lungs are without acute focal process.  There is no effusion or   pneumothorax. The cardiomediastinal silhouette is without acute process. The   osseous structures are without acute process. There are post cholecystectomy surgical clips below the diaphragm.       Impression   No acute process. EXAMINATION:   CT OF THE HEAD WITHOUT CONTRAST  2/7/2021 3:10 pm   TECHNIQUE:   CT of the head was performed without the administration of intravenous   contrast. Dose modulation, iterative reconstruction, and/or weight based   adjustment of the mA/kV was utilized to reduce the radiation dose to as low   as reasonably achievable. COMPARISON:   None. HISTORY:   ORDERING SYSTEM PROVIDED HISTORY: speech issues, possible absent seizures   TECHNOLOGIST PROVIDED HISTORY:   Has a \"code stroke\" or \"stroke alert\" been called? ->No   Reason for exam:->speech issues, possible absent seizures   What reading provider will be dictating this exam?->CRC   FINDINGS:   BRAIN/VENTRICLES: There is no acute intracranial hemorrhage, mass effect or   midline shift.  No abnormal extra-axial fluid collection.  The gray-white   differentiation is maintained without evidence of an acute infarct.  There is   no evidence of hydrocephalus. ORBITS: The visualized portion of the orbits demonstrate no acute abnormality. SINUSES: The visualized paranasal sinuses and mastoid air cells demonstrate   no acute abnormality. SOFT TISSUES/SKULL:  No acute abnormality of the visualized skull or soft   tissues.       Impression   No acute intracranial abnormality. CT OF THE CHEST WITH CONTRAST; CT OF THE ABDOMEN AND PELVIS WITH CONTRAST   2/7/2021 5:43 pm   TECHNIQUE:   CT of the chest was performed with the administration of intravenous   contrast. Multiplanar reformatted images are provided for review.  Dose   modulation, iterative reconstruction, and/or weight based adjustment of the   mA/kV was utilized to reduce the radiation dose to as low as reasonably   achievable.; CT of the abdomen and pelvis was performed with the   administration of intravenous contrast. Multiplanar reformatted images are   provided for review. Dose modulation, iterative reconstruction, and/or weight   based adjustment of the mA/kV was utilized to reduce the radiation dose to as   low as reasonably achievable. COMPARISON:   October 16, 2020. HISTORY:   ORDERING SYSTEM PROVIDED HISTORY: CP , METASTATIC CA   TECHNOLOGIST PROVIDED HISTORY:   Reason for exam:->CP , METASTATIC CA   Decision Support Exception->Emergency Medical Condition (MA)   What reading provider will be dictating this exam?->CRC   FINDINGS:   CT chest.   The heart and the great vessels are normal.  Trachea and major bronchi are   patent.  Nonenlarged mediastinal and moderate axillary lymph nodes are noted. These are not changed.  Lungs are free of acute infiltrate or pleural   effusion. CT abdomen pelvis. Comparison August 30, 2020. Findings   Persistent multiple hypodense hepatic lesions are identified in the left and   right hepatic lobes ranging from 1.2 cm with the largest 1 in the posterior   right hepatic lobe measuring up to 2.3 x 2.8 cm.  These are grossly unchanged   from the previous examination.  The gallbladder is absent.  Spleen, pancreas,   and adrenals are normal.  There is bilateral hydronephrosis.  Dilated   fluid-filled small bowel loops are identified measuring 1.4 cm. Pelvis.  Bladder is significantly distended.  Colon is collapsed. Postsurgical changes are identified in the right lower quadrant with   visualization of the previously noted spiculated mass in the right lower   quadrant.  Bilateral ovarian follicles and cysts are identified, the largest   1 in the right ovary measuring 1.3 x 2.2 cm.   IUD is noted.  Soft tissue   nodules are identified in the gluteus region likely injection granulomas.       Impression   Normal chest with no worrisome findings to suggest metastatic malignancy. Persistent bilobar hepatic lesions which are grossly stable consistent with   stable widespread hepatic metastasis. Dilated fluid-filled small bowel loops likely ileus/enteritis. Herchel Paul is no   indication for bowel obstruction. Hydronephrosis bilaterally without obstructing uropathy.  There is   significantly distended urinary bladder. EXAMINATION:   CT OF THE CHEST WITH CONTRAST; CT OF THE ABDOMEN AND PELVIS WITH CONTRAST   2/7/2021 5:43 pm   TECHNIQUE:   CT of the chest was performed with the administration of intravenous   contrast. Multiplanar reformatted images are provided for review. Dose   modulation, iterative reconstruction, and/or weight based adjustment of the   mA/kV was utilized to reduce the radiation dose to as low as reasonably   achievable.; CT of the abdomen and pelvis was performed with the   administration of intravenous contrast. Multiplanar reformatted images are   provided for review. Dose modulation, iterative reconstruction, and/or weight   based adjustment of the mA/kV was utilized to reduce the radiation dose to as   low as reasonably achievable. COMPARISON:   October 16, 2020. HISTORY:   ORDERING SYSTEM PROVIDED HISTORY: CP , METASTATIC CA   TECHNOLOGIST PROVIDED HISTORY:   Reason for exam:->CP , METASTATIC CA   Decision Support Exception->Emergency Medical Condition (MA)   What reading provider will be dictating this exam?->CRC   FINDINGS:   CT chest.   The heart and the great vessels are normal.  Trachea and major bronchi are   patent.  Nonenlarged mediastinal and moderate axillary lymph nodes are noted. These are not changed.  Lungs are free of acute infiltrate or pleural   effusion. CT abdomen pelvis. Comparison August 30, 2020.    Findings   Persistent multiple hypodense hepatic lesions are identified in the left and   right hepatic lobes ranging from 1.2 cm with the largest 1 in the posterior   right hepatic lobe measuring up to 2.3 x 2.8 cm.  These are grossly unchanged   from the previous examination.  The gallbladder is absent.  Spleen, pancreas,   and adrenals are normal.  There is bilateral hydronephrosis.  Dilated   fluid-filled small bowel loops are identified measuring 1.4 cm. Pelvis.  Bladder is significantly distended.  Colon is collapsed. Postsurgical changes are identified in the right lower quadrant with   visualization of the previously noted spiculated mass in the right lower   quadrant.  Bilateral ovarian follicles and cysts are identified, the largest   1 in the right ovary measuring 1.3 x 2.2 cm. Paticia Demian is noted.  Soft tissue   nodules are identified in the gluteus region likely injection granulomas.       Impression   Normal chest with no worrisome findings to suggest metastatic malignancy. Persistent bilobar hepatic lesions which are grossly stable consistent with   stable widespread hepatic metastasis. Dilated fluid-filled small bowel loops likely ileus/enteritis. Paul [place] is no   indication for bowel obstruction. Hydronephrosis bilaterally without obstructing uropathy.  There is   significantly distended urinary bladder.      MRI OF THE BRAIN WITHOUT AND WITH CONTRAST  2/8/2021 4:42 pm   TECHNIQUE:   Multiplanar multisequence MRI of the head/brain was performed without and   with the administration of intravenous contrast.   COMPARISON:   Comparison made with prior MRI from January 22, 2021   HISTORY:   [address] [facility] [address]: hyperdensities on prior mri secondary to   migraines vs metastasis   TECHNOLOGIST PROVIDED HISTORY:   Reason for exam:->hyperdensities on prior mri secondary to migraines vs   metastasis   FINDINGS:   No abnormal areas of brain parenchymal enhancement or leptomeningeal   enhancement.  There are no areas of restricted diffusion to suggest acute   intracranial ischemia.  Normal appearance of the sulci, cisterns, and   ventricles.  No abnormal extra-axial fluid collections.  Redemonstration of   mild burden of nonspecific subcentimeter foci of increased T2 and FLAIR   signal located in periventricular and subcortical white matter of both   hemispheres.  Paranasal sinuses and mastoid air cells are clear.       Impression   1.  No intracranial mass, acute ischemia, or edema. 2.  Stable nonspecific subcentimeter foci of abnormal signal in   periventricular and subcortical white matter yet likely related to areas of   remote/old trauma or migraine. Assessment & Plan:   1. Hypocalcemia, chronic  Due to parathyroidectomy; exacerbated by inadequate oral supplement. Repleted with calcium gluconate 2 gm IV on 2/8. Ca+ 7.8 on 2/7-->6.8 today. Will repeat calcium gluconate 2 mg IV daily X 2 more days. Monitor daily ionized calcium levels. 2. Hx of renal failure that required hemodialysis with prev admission in the setting of nausea, vomiting, decreased oral intake with oliguria, complicated by contrast medium with MRI. Serum Cr 1.0 OA-->0.9-->0.8  Continue to monitor daily bmp as she had CT chest with contrast on 2/7/2021.    3. Neuroendocrine tumor with liver mets. 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 Elevated liver enzymes  Oncology following      Thank you Dr. Veronika Delatorre MD for allowing us to participate in care of [address] [place] [address]  8:33 AM  2/9/2021     Patient seen and examined all key components of the physical performed independently , case discussed with NP, all pertinent labs and radiologic tests personally reviewed agree with above.     Symptomatic hypocalcemia, chronic with acute worsening due to low vitamin D and inadequate oral Ca supplement; will increase supplement; she just filled script for Vit D  Possible discharge tomorrow    Edmund Crowe MD

## 2021-02-09 NOTE — PROGRESS NOTES
Aminta Beaver is a 30 y.o. Neurology is following for seizures    PMH: Neuroendocrine tumor with hepatic mets, renal failure, atypical migraines, Crohn's disease, hypothyroidism    Seen by our service in January with seizure-like activity in the setting of acute renal failure requiring dialysis. Topiramate was increased and she was started on Keppra but had not yet followed up with neurology. Presented back on 2/7 with seizure-like activity, hand spasms, and speech difficulties. Spell of unresponsiveness, staring into space, and confusion. EEG was normal and no significant findings on MRI. Currently on Keppra 500 mg twice daily and topiramate 25 mg daily with plan to titrate up as outpatient. Calcium was 6.8-6.9 and she felt cramping on her right side and in both legs which has since resolved. No further seizure-like activity. No other neuro complaints.   Her father is at the bedside renal function is normal and she is otherwise medically stable    No chest pain or palpitations  No SOB  No vertigo, lightheadedness or loss of consciousness  No falls, tripping or stumbling  No incontinence of bowels or bladder  No itching or bruising appreciated  No numbness, tingling or focal arm/leg weakness  No speech or swallowing troubles    ROS otherwise negative     Current Facility-Administered Medications   Medication Dose Route Frequency Provider Last Rate Last Admin    magnesium sulfate 1000 mg in dextrose 5% 100 mL IVPB  1,000 mg Intravenous Once Gamal Colmenares DO        calcium-vitamin D (OSCAL-500) 500-200 MG-UNIT per tablet 1 tablet  1 tablet Oral BID Joe Castellanos DO        calcium gluconate 2,000 mg in dextrose 5 % 100 mL IVPB  2,000 mg Intravenous Daily Dennis Zheng, APRN - CNP        levETIRAcetam (KEPPRA) tablet 500 mg  500 mg Oral BID Reema James DO   500 mg at 02/09/21 0827    cefTRIAXone (ROCEPHIN) 1,000 mg in sterile water 10 mL IV syringe  1,000 mg Intravenous Q24H Esaw Cradle, DO 0 mL/hr at 02/07/21 2029 1,000 mg at 02/08/21 1828    butalbital-acetaminophen-caffeine (FIORICET, ESGIC) per tablet 1 tablet  1 tablet Oral Daily PRN Phan Castellanos,         gabapentin (NEURONTIN) capsule 300 mg  300 mg Oral TID Phan Castellanos, DO   300 mg at 02/09/21 0827    levothyroxine (SYNTHROID) tablet 112 mcg  112 mcg Oral Daily Phan Castellanos, DO   112 mcg at 02/08/21 5452    magnesium oxide (MAG-OX) tablet 400 mg  400 mg Oral BID Phan Castellanos DO   400 mg at 02/09/21 0827    pantoprazole (PROTONIX) tablet 40 mg  40 mg Oral QAM AC Phan Castellanos, DO   40 mg at 02/09/21 0601    potassium chloride (KLOR-CON M) extended release tablet 20 mEq  20 mEq Oral Daily Phan Castellanos, DO   20 mEq at 02/09/21 0827    topiramate (TOPAMAX) tablet 25 mg  25 mg Oral Daily Phan Castellanos, DO   25 mg at 02/08/21 1732    traMADol (ULTRAM) tablet 50 mg  50 mg Oral Q6H PRN Phan Castellanos DO   50 mg at 02/09/21 3302    sodium chloride flush 0.9 % injection 10 mL  10 mL Intravenous 2 times per day Phan Castellanos DO   10 mL at 02/09/21 5065    sodium chloride flush 0.9 % injection 10 mL  10 mL Intravenous PRN Phan Castellanos DO        enoxaparin (LOVENOX) injection 40 mg  40 mg Subcutaneous Daily Phan Castellanos, DO   40 mg at 02/09/21 0827    promethazine (PHENERGAN) tablet 12.5 mg  12.5 mg Oral Q6H PRN Phan Castellanos DO        Or    ondansetron [facility]) injection 4 mg  4 mg Intravenous Q6H PRN Phan Castellanos,         polyethylene glycol (GLYCOLAX) packet 17 g  17 g Oral Daily PRN Phan Castellanos DO        acetaminophen (TYLENOL) tablet 650 mg  650 mg Oral Q6H PRN Phan Castellanos DO        Or    acetaminophen (TYLENOL) suppository 650 mg  650 mg Rectal Q6H PRN Phan Castellanos DO           Objective:     /89   Pulse 77   Temp 98 °F (36.7 °C) (Temporal)   Resp 16   Ht 5' (1.524 m)   Wt 160 lb (72.6 kg)   SpO2 97%   BMI 31.25 kg/m²     General appearance: alert, appears stated age, cooperative and no distress  Head: normocephalic, without obvious abnormality, atraumatic  Eyes: conjunctivae/corneas clear. .  Neck: no carotid bruit,   Lungs: clear to auscultation bilaterally  Heart: regular rate and rhythm  Extremities: normal, atraumatic, no cyanosis or edema  Pulses: 2+ and symmetric  Skin: color, texture, turgor normal---no rashes or lesions    Mental Status: alert, oriented, thought content appropriate    Appropriate attention/concentration  Intact fundus of knowledge  Repetition intact  Memories intact    Speech: no dysarthria  Language: no aphasias    Cranial Nerves:  I: smell    II: visual acuity     II: visual fields Full    II: pupils VITOR   III,VII: ptosis None   III,IV,VI: extraocular muscles  EOMI without nystagmus    V: mastication Normal   V: facial light touch sensation  Normal   V,VII: corneal reflex     VII: facial muscle function - upper  Normal   VII: facial muscle function - lower Normal   VIII: hearing Normal   IX: soft palate elevation  Normal   IX,X: gag reflex    XI: trapezius strength  5/5   XI: sternocleidomastoid strength 5/5   XI: neck extension strength  5/5   XII: tongue strength  Normal     Motor:  5/5 throughout  Normal bulk and tone   No drift  No abnormal movements    Sensory:  LT normal    Coordination:   FN, FFM normal    Laboratory/Radiology:     BMP:    Lab Results   Component Value Date     02/09/2021    K 3.8 02/09/2021    K 3.8 02/07/2021     02/09/2021    CO2 23 02/09/2021    BUN 3 02/09/2021    LABALBU 3.9 02/08/2021    CREATININE 0.8 02/09/2021    CALCIUM 6.8 02/09/2021    GFRAA >60 02/09/2021    LABGLOM >60 02/09/2021    GLUCOSE 113 02/09/2021     EEG normal    MRI brain: No acute events    I independently reviewed the labs and imaging studies today    Assessment:     Seizure disorder vs nonepileptic events: history of neuroendocrine tumor with hepatic mets. EEG and MRI were unremarkable.

## 2021-02-09 NOTE — PROGRESS NOTES
2/9/2021 2:38 PM  Service: Urology  Group: [facility] ([facility]gey/[facility])    Diane Worrell  [id number]    Subjective:    Voiding comfortably  Denies any pain or discomfort  PVRs have not been performed  Is hoping to go home soon    Review of Systems  Constitutional: No fever or chills   Respiratory: negative for cough and hemoptysis  Cardiovascular: negative for chest pain and dyspnea  Gastrointestinal: negative for abdominal pain, diarrhea, nausea and vomiting   : See above  Derm: negative for rash and skin lesion(s)  Neurological: negative for seizures and tremors  Musculoskeletal: Negative    Psychiatric: Negative   All other reviews are negative      Scheduled Meds:   calcium-vitamin D  1 tablet Oral BID    calcium gluconate IVPB  2,000 mg Intravenous Daily    topiramate  25 mg Oral Daily    levETIRAcetam  500 mg Oral BID    cefTRIAXone (ROCEPHIN) IV  1,000 mg Intravenous Q24H    gabapentin  300 mg Oral TID    levothyroxine  112 mcg Oral Daily    magnesium oxide  400 mg Oral BID    pantoprazole  40 mg Oral QAM AC    potassium chloride  20 mEq Oral Daily    sodium chloride flush  10 mL Intravenous 2 times per day    enoxaparin  40 mg Subcutaneous Daily       Objective:  Vitals:    02/09/21 0800   BP: 134/89   Pulse: 77   Resp: 16   Temp: 98 °F (36.7 °C)   SpO2: 97%         Allergies: Patient has no known allergies.     General Appearance: alert and oriented to person, place and time and in no acute distress  Skin: no rash or erythema  Head: normocephalic and atraumatic  Pulmonary/Chest: normal air movement, no respiratory distress  Abdomen: soft, non-tender, non-distended  Genitourinary: No Ma  Extremities: no cyanosis, clubbing or edema         Labs:     Recent Labs     02/09/21  0718      K 3.8   *   CO2 23   BUN 3*   CREATININE 0.8   GLUCOSE 113*   CALCIUM 6.8*       Lab Results   Component Value Date    HGB 11.1 02/08/2021    HCT 33.8 02/08/2021 Assessment/Plan:  Bilateral hydronephrosis  Urinary retention  Metastatic neuroendocrine carcinoma      Creatinine remained stable  Urine culture no growth  She continues to void comfortably  Bladder PVRs have again been ordered  As long as her PVRs are low she is okay for discharge from urology standpoint  We will follow    Roxy Hamilton APRN - [address]  Urology

## 2021-02-09 NOTE — CARE COORDINATION
Social Work Discharge/Planning:    SW attempted to meet with patient, patient was off the floor for EEG. Per prior neurology note, okay to discharge once okay with others. Patient is to follow up with neuro in the office in two weeks. Patient's plan is to return home with no anticipated needs once medically clear for discharge. Patient's family or boyfriend are able to transport her home upon discharge. REID/CM to follow for any needs that may arise upon discharge.     Deric Tobar, [facility]  [phone]

## 2021-02-10 ENCOUNTER — TELEPHONE (OUTPATIENT)
Dept: ADMINISTRATIVE | Age: 36
End: 2021-02-10

## 2021-02-10 VITALS
TEMPERATURE: 97.7 F | BODY MASS INDEX: 31.41 KG/M2 | HEIGHT: 60 IN | OXYGEN SATURATION: 96 % | WEIGHT: 160 LBS | HEART RATE: 73 BPM | SYSTOLIC BLOOD PRESSURE: 134 MMHG | RESPIRATION RATE: 16 BRPM | DIASTOLIC BLOOD PRESSURE: 88 MMHG

## 2021-02-10 LAB
ANION GAP SERPL CALCULATED.3IONS-SCNC: 11 MMOL/L (ref 7–16)
BUN BLDV-MCNC: 5 MG/DL (ref 6–20)
CALCIUM IONIZED: 1.14 MMOL/L (ref 1.15–1.33)
CALCIUM SERPL-MCNC: 8.3 MG/DL (ref 8.6–10.2)
CHLORIDE BLD-SCNC: 107 MMOL/L (ref 98–107)
CO2: 23 MMOL/L (ref 22–29)
CREAT SERPL-MCNC: 0.8 MG/DL (ref 0.5–1)
GFR AFRICAN AMERICAN: >60
GFR NON-AFRICAN AMERICAN: >60 ML/MIN/1.73
GLUCOSE BLD-MCNC: 107 MG/DL (ref 74–99)
POTASSIUM SERPL-SCNC: 3.9 MMOL/L (ref 3.5–5)
SODIUM BLD-SCNC: 141 MMOL/L (ref 132–146)
URINE CULTURE, ROUTINE: NORMAL

## 2021-02-10 PROCEDURE — 82330 ASSAY OF CALCIUM: CPT

## 2021-02-10 PROCEDURE — 6370000000 HC RX 637 (ALT 250 FOR IP): Performed by: INTERNAL MEDICINE

## 2021-02-10 PROCEDURE — 36415 COLL VENOUS BLD VENIPUNCTURE: CPT

## 2021-02-10 PROCEDURE — 2580000003 HC RX 258: Performed by: INTERNAL MEDICINE

## 2021-02-10 PROCEDURE — 2580000003 HC RX 258: Performed by: NURSE PRACTITIONER

## 2021-02-10 PROCEDURE — 80048 BASIC METABOLIC PNL TOTAL CA: CPT

## 2021-02-10 PROCEDURE — 6360000002 HC RX W HCPCS: Performed by: NURSE PRACTITIONER

## 2021-02-10 PROCEDURE — 51798 US URINE CAPACITY MEASURE: CPT

## 2021-02-10 PROCEDURE — 6370000000 HC RX 637 (ALT 250 FOR IP): Performed by: STUDENT IN AN ORGANIZED HEALTH CARE EDUCATION/TRAINING PROGRAM

## 2021-02-10 RX ADMIN — TRAMADOL HYDROCHLORIDE 50 MG: 50 TABLET, FILM COATED ORAL at 09:56

## 2021-02-10 RX ADMIN — MAGNESIUM GLUCONATE 500 MG ORAL TABLET 400 MG: 500 TABLET ORAL at 09:28

## 2021-02-10 RX ADMIN — TOPIRAMATE 25 MG: 25 TABLET, FILM COATED ORAL at 09:28

## 2021-02-10 RX ADMIN — LEVOTHYROXINE SODIUM 112 MCG: 0.11 TABLET ORAL at 06:21

## 2021-02-10 RX ADMIN — CALCIUM CARBONATE-VITAMIN D TAB 500 MG-200 UNIT 1 TABLET: 500-200 TAB at 09:28

## 2021-02-10 RX ADMIN — GABAPENTIN 300 MG: 300 CAPSULE ORAL at 09:28

## 2021-02-10 RX ADMIN — CALCIUM GLUCONATE 2000 MG: 98 INJECTION, SOLUTION INTRAVENOUS at 09:29

## 2021-02-10 RX ADMIN — PANTOPRAZOLE SODIUM 40 MG: 40 TABLET, DELAYED RELEASE ORAL at 06:19

## 2021-02-10 RX ADMIN — POTASSIUM CHLORIDE 20 MEQ: 1500 TABLET, EXTENDED RELEASE ORAL at 09:29

## 2021-02-10 RX ADMIN — SODIUM CHLORIDE, PRESERVATIVE FREE 10 ML: 5 INJECTION INTRAVENOUS at 09:29

## 2021-02-10 RX ADMIN — LEVETIRACETAM 500 MG: 500 TABLET ORAL at 09:28

## 2021-02-10 ASSESSMENT — PAIN SCALES - GENERAL
PAINLEVEL_OUTOF10: 6
PAINLEVEL_OUTOF10: 2
PAINLEVEL_OUTOF10: 0

## 2021-02-10 NOTE — CARE COORDINATION
Social Work Discharge/Planning:    Discharge order noted. SW met with patient and patient's boyfriend at the bedside to discuss follow up transition of care. Patient reports plan remains to return home with no needs at this time. Boyfriend is able to transport patient home today.     JONH Velasquez  [phone]

## 2021-02-10 NOTE — PROGRESS NOTES
[facility] Medicine    Subjective:  Pt alert conversive chet diet      Current Facility-Administered Medications:     calcium-vitamin D (OSCAL-500) 500-200 MG-UNIT per tablet 1 tablet, 1 tablet, Oral, BID, Austin Greer, DO, 1 tablet at 02/09/21 2149    calcium gluconate 2,000 mg in dextrose 5 % 100 mL IVPB, 2,000 mg, Intravenous, Daily, James Yoon, APRN - CNP, Stopped at 02/09/21 1308    topiramate (TOPAMAX) tablet 25 mg, 25 mg, Oral, Daily, Phan Castellanos, DO, 25 mg at 02/09/21 1240    vitamin D (ERGOCALCIFEROL) capsule 50,000 Units, 50,000 Units, Oral, Weekly, Sami Bassett MD    levETIRAcetam (KEPPRA) tablet 500 mg, 500 mg, Oral, BID, Ayesha Shine, DO, 500 mg at 02/09/21 2149    butalbital-acetaminophen-caffeine (FIORICET, ESGIC) per tablet 1 tablet, 1 tablet, Oral, Daily PRN, Phan Castellanos, DO    gabapentin (NEURONTIN) capsule 300 mg, 300 mg, Oral, TID, Phan Castellanos, DO, 300 mg at 02/09/21 2149    levothyroxine (SYNTHROID) tablet 112 mcg, 112 mcg, Oral, Daily, Phan Castellanos, DO, 112 mcg at 02/10/21 5771    magnesium oxide (MAG-OX) tablet 400 mg, 400 mg, Oral, BID, Phan Castellanos, DO, 400 mg at 02/09/21 2149    pantoprazole (PROTONIX) tablet 40 mg, 40 mg, Oral, QAM AC, Phan Castellanos, DO, 40 mg at 02/10/21 8429    potassium chloride (KLOR-CON M) extended release tablet 20 mEq, 20 mEq, Oral, Daily, Phan Castellanos, DO, 20 mEq at 02/09/21 0827    traMADol (ULTRAM) tablet 50 mg, 50 mg, Oral, Q6H PRN, Phan Castellanos, DO, 50 mg at 02/09/21 2203    sodium chloride flush 0.9 % injection 10 mL, 10 mL, Intravenous, 2 times per day, Austin Greer DO, 10 mL at 02/09/21 2152    sodium chloride flush 0.9 % injection 10 mL, 10 mL, Intravenous, PRN, Phan Castellanos DO    enoxaparin (LOVENOX) injection 40 mg, 40 mg, Subcutaneous, Daily, Phan Castellanos DO, 40 mg at 02/09/21 0803    promethazine (PHENERGAN) tablet 12.5 mg, 12.5 mg, Oral, Q6H PRN **OR** ondansetron [facility]) injection 4 mg, 4 mg, Intravenous, Q6H PRN, Marlyn Lizbeth Malmer, DO    polyethylene glycol (GLYCOLAX) packet 17 g, 17 g, Oral, Daily PRN, Marlyn Lizbeth Malmer, DO    acetaminophen (TYLENOL) tablet 650 mg, 650 mg, Oral, Q6H PRN **OR** acetaminophen (TYLENOL) suppository 650 mg, 650 mg, Rectal, Q6H PRN, Marlyn Lizbeth Malmer, DO    Objective:    /88   Pulse 86   Temp 98.1 °F (36.7 °C) (Temporal)   Resp 14   Ht 5' (1.524 m)   Wt 160 lb (72.6 kg)   SpO2 98%   BMI 31.25 kg/m²     Heart:  reg  Lungs:  ctab  Abd: + bs soft nontender  Extrem:  W/o edema    CBC with Differential:    Lab Results   Component Value Date    WBC 13.5 02/08/2021    RBC 3.46 02/08/2021    HGB 11.1 02/08/2021    HCT 33.8 02/08/2021     02/08/2021    MCV 97.7 02/08/2021    MCH 32.1 02/08/2021    MCHC 32.8 02/08/2021    RDW 17.9 02/08/2021    LYMPHOPCT 22.8 02/07/2021    MONOPCT 7.4 02/07/2021    BASOPCT 1.5 02/07/2021    MONOSABS 1.07 02/07/2021    LYMPHSABS 3.30 02/07/2021    EOSABS 0.40 02/07/2021    BASOSABS 0.22 02/07/2021     CMP:    Lab Results   Component Value Date     02/09/2021    K 3.9 02/09/2021    K 3.8 02/07/2021     02/09/2021    CO2 22 02/09/2021    BUN 6 02/09/2021    CREATININE 0.8 02/09/2021    GFRAA >60 02/09/2021    LABGLOM >60 02/09/2021    GLUCOSE 106 02/09/2021    PROT 6.9 02/08/2021    LABALBU 3.9 02/08/2021    CALCIUM 8.0 02/09/2021    BILITOT 0.5 02/08/2021    ALKPHOS 141 02/08/2021    AST 51 02/08/2021    ALT 25 02/08/2021     Warfarin PT/INR:    Lab Results   Component Value Date    INR 1.1 01/29/2021    INR 1.1 01/28/2021    INR 1.0 01/27/2021    PROTIME 12.0 01/29/2021    PROTIME 11.9 01/28/2021    PROTIME 11.3 01/27/2021       Assessment:    Active Problems:    Difficulty with speech  Resolved Problems:    * No resolved hospital problems.  *  hypocalcemia    Plan:  Dc home outpt f/u        Logan Ogden  7:51 AM  2/10/2021

## 2021-02-10 NOTE — PROGRESS NOTES
2/10/2021 11:32 AM  Service: Urology  Group: [facility] ([facility])    Tyra Krishna  [id number]    Subjective:    Awake and alert  No new complaints  Discharging home today  Voiding comfortably  PVR 47ml    Review of Systems  Constitutional: No fever or chills   Respiratory: negative for cough and hemoptysis  Cardiovascular: negative for chest pain and dyspnea  Gastrointestinal: negative for abdominal pain, diarrhea, nausea and vomiting   : See above  Derm: negative for rash and skin lesion(s)  Neurological: negative for seizures and tremors  Musculoskeletal: Negative    Psychiatric: Negative   All other reviews are negative      Scheduled Meds:   calcium-vitamin D  1 tablet Oral BID    topiramate  25 mg Oral Daily    vitamin D  50,000 Units Oral Weekly    levETIRAcetam  500 mg Oral BID    gabapentin  300 mg Oral TID    levothyroxine  112 mcg Oral Daily    magnesium oxide  400 mg Oral BID    pantoprazole  40 mg Oral QAM AC    potassium chloride  20 mEq Oral Daily    sodium chloride flush  10 mL Intravenous 2 times per day    enoxaparin  40 mg Subcutaneous Daily       Objective:  Vitals:    02/10/21 0915   BP: 134/88   Pulse: 73   Resp: 16   Temp: 97.7 °F (36.5 °C)   SpO2: 96%         Allergies: Patient has no known allergies.     General Appearance: alert and oriented to person, place and time and in no acute distress  Skin: no rash or erythema  Head: normocephalic and atraumatic  Pulmonary/Chest: normal air movement, no respiratory distress  Abdomen: soft, non-tender, non-distended  Genitourinary: No Ma  Extremities: no cyanosis, clubbing or edema         Labs:     Recent Labs     02/10/21  0733      K 3.9      CO2 23   BUN 5*   CREATININE 0.8   GLUCOSE 107*   CALCIUM 8.3*       Lab Results   Component Value Date    HGB 11.1 02/08/2021    HCT 33.8 02/08/2021         Assessment/Plan:  Bilateral hydronephrosis  Urinary retention  Metastatic neuroendocrine carcinoma      Creatinine remained stable  Urine culture no growth  She continues to void comfortably  Bladder PVRs low  No further  interventions planned at this time  Please call with further questions or concerns       Franny Palmer, APRN - CNP   [facility]

## 2021-02-10 NOTE — PROGRESS NOTES
Nephrology Progress Note    We are following Terell Saeed for hypocalcemia and history of renal failure that required dialysis. History of Present Ilness:    Jacqueline Prince is a 30 y.o. female with a PMH significant for  Thyroidectomy, parathyroidectomy, Stage 4 neuroendocrine cancer with liver mets. 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 She had a recent admission on 1/22-1-29/2021 after a generalized tonic-clonic seizure. She had been having nausea and vomiting for the week prior to that admission. She also reported that her urin output had stopped 2 days prior. She had serum calcium of 6.4, and had not been taking her daily calcium for her chronic hypocalcemia since her parathyroidectomy. Liver enzymes were elevated as well. During this admission, she required an MRI with contrast to rule out brain mets, which negatively affected her renal function. Her serum Creatinine climbed as high as 6.2 from 1.8 on admission and she was started on IHD on 1/25 following placement of temp dialysis cath. A kidney biopsy demonstrated ATN and she required dialysis on 1/26, 1/27, & 1/28 after having tunneled dialysis cath placed by PK on 1/28. Since then, her serum Cr has been 2.1 on 2/1, 1.4 on 2/3, 1.0 on 2/7 and 0.9 on 2/8 and has not required any further dialysis. Terell Saeed is known to our practice from this previous admission so a formal consult is deferred. The patient presented to the ED on 02/07/20 for seizure like activity, hand spasms, and difficulty with speech intermittently. Apparently, the patient states that she found by her parents \"unresponsive\" on 2/7. She was staring off into space and didn't know what was going on. This lasted for several minutes and then resolved on its own before EMS arrived. She did have another similar episode in the ED that also resolved on its own.  Pertinent labs upon admission include Cr 1.0, BUN 4, eGFR >60, Ca+ 7.8, alb 4.4, gluc 126, alk phos 135, ALT 21 AST 26, and wbc 14.5, Hgb 13.2  . Upon assessment, pt is awake, alert and oriented with no neuro deficits noted. She reports that prior to her \"unresponsive episode, she had experienced some numbness and tingling in her cheeks and then states following the \"unresponsive episode\" she noticed she knew what she wanted to say, but the words wouldn't come out. Prior to this, she states she has been eating well, following a renal diet and reading all the labels. She said once she was discharged from here, she continued having blood work done and did not require dialysis again, so her dialysis access was removed last week. Interval History:  2/10/21:  Pt doing well today. Ionized Ca+ today is 1.14. OK to discharge.        Past Medical History:   Diagnosis Date    Abdominal pain     [facility] Cro[facility]'s disease [facility]     Cancer ([facility]     neuroendocrime tumor    Encounter regarding vascular access for dialysis for ESRD ([facility] 01/26/2021    Hemodialysis patient ([facility] 01/25/2021    Dr. Kenny Ramos Hypocalcemia     Hypothyroidism     Irritable bowel syndrome     Migraines     Status post alcohol detoxification     5/22/2018-5/29/2018       Past Surgical History:   Procedure Laterality Date    CHOLECYSTECTOMY, LAPAROSCOPIC  07/06/2016    COLONOSCOPY N/A 6/22/2018    COLONOSCOPY WITH BIOPSY performed by Tasha Lucas MD at [address] BIOPSY RENAL  1/25/2021    CT BIOPSY RENAL 1/25/2021 Ian Page MD [facility]    CT NEEDLE BIOPSY LIVER PERCUTANEOUS  9/1/2020    CT NEEDLE BIOPSY LIVER PERCUTANEOUS 9/1/2020 [facility]    HC DIALYSIS CATHETER N/A 1/28/2021    TESIO CATHETER INSERTION AND REMOVAL OF TEMPORARY performed by Ghada Sprague MD at [address] SMALL INTESTINE SURGERY N/A 10/21/2020    LAPAROSCOPIC ROBOTIC SMALL BOWEL RESECTION WITH PLANNED TRANSITION TO OPEN performed by Cadence Boland MD at [facility] THYROIDECTOMY         Family History   Problem Relation Age of Onset    High Blood Pressure Mother     High Cholesterol Mother     Other Mother         migraine headaches    Heart Disease Father     Asthma Father     High Blood Pressure Father     Cancer Father         Sarcoidosis    Diabetes Other         GRANDMOTHER    Lung Cancer Other         GRANDFATHER    Alzheimer's Disease Other         GRANDMOTHER    Breast Cancer Maternal Grandmother     Cancer Maternal Grandmother         skin    Cancer Paternal Grandfather         lung        reports that she has been smoking. She has been smoking about 0.50 packs per day. She has never used smokeless tobacco. She reports that she does not drink alcohol or use drugs. Allergies:  Patient has no known allergies.     Current Medications:        calcium-vitamin D (OSCAL-500) 500-200 MG-UNIT per tablet 1 tablet, BID      calcium gluconate 2,000 mg in dextrose 5 % 100 mL IVPB, Daily      topiramate (TOPAMAX) tablet 25 mg, Daily      vitamin D (ERGOCALCIFEROL) capsule 50,000 Units, Weekly      levETIRAcetam (KEPPRA) tablet 500 mg, BID      butalbital-acetaminophen-caffeine (FIORICET, ESGIC) per tablet 1 tablet, Daily PRN      gabapentin (NEURONTIN) capsule 300 mg, TID      levothyroxine (SYNTHROID) tablet 112 mcg, Daily      magnesium oxide (MAG-OX) tablet 400 mg, BID      pantoprazole (PROTONIX) tablet 40 mg, QAM AC      potassium chloride (KLOR-CON M) extended release tablet 20 mEq, Daily      traMADol (ULTRAM) tablet 50 mg, Q6H PRN      sodium chloride flush 0.9 % injection 10 mL, 2 times per day      sodium chloride flush 0.9 % injection 10 mL, PRN      enoxaparin (LOVENOX) injection 40 mg, Daily      promethazine (PHENERGAN) tablet 12.5 mg, Q6H PRN    Or      ondansetron (ZOFRAN) injection 4 mg, Q6H PRN      polyethylene glycol (GLYCOLAX) packet 17 g, Daily PRN      acetaminophen (TYLENOL) tablet 650 mg, Q6H PRN    Or      acetaminophen (TYLENOL) suppository 650 mg, Q6H PRN        Review of Systems:   Pertinent items are noted in HPI.     Physical exam:   Constitutional:    Vitals: VITALS:  /88   Pulse 73   Temp 97.7 °F (36.5 °C) (Oral)   Resp 16   Ht 5' (1.524 m)   Wt 160 lb (72.6 kg)   SpO2 96%   BMI 31.25 kg/m²   24HR INTAKE/OUTPUT:      Intake/Output Summary (Last 24 hours) at 2/10/2021 1019  Last data filed at 2/10/2021 9686  Gross per 24 hour   Intake 480 ml   Output --   Net 480 ml     URINARY CATHETER OUTPUT (Ma):     Skin: no rash, turgor wnl  Heent:  eomi, mmm  Neck: no bruits or jvd noted  Cardiovascular:  S1, S2 without m/r/g  Respiratory: CTA B without w/r/r  Abdomen:  +bs, soft, nt, nd  Ext: neg lower extremity edema  Psychiatric: mood and affect appropriate  Musculoskeletal:  Rom, muscular strength intact    Data:   Labs:  CBC:   Lab Results   Component Value Date    WBC 13.5 02/08/2021    RBC 3.46 02/08/2021    HGB 11.1 02/08/2021    HCT 33.8 02/08/2021    MCV 97.7 02/08/2021    MCH 32.1 02/08/2021    MCHC 32.8 02/08/2021    RDW 17.9 02/08/2021     02/08/2021    MPV 9.9 02/08/2021     CBC with Differential:    Lab Results   Component Value Date    WBC 13.5 02/08/2021    RBC 3.46 02/08/2021    HGB 11.1 02/08/2021    HCT 33.8 02/08/2021     02/08/2021    MCV 97.7 02/08/2021    MCH 32.1 02/08/2021    MCHC 32.8 02/08/2021    RDW 17.9 02/08/2021    LYMPHOPCT 22.8 02/07/2021    MONOPCT 7.4 02/07/2021    BASOPCT 1.5 02/07/2021    MONOSABS 1.07 02/07/2021    LYMPHSABS 3.30 02/07/2021    EOSABS 0.40 02/07/2021    BASOSABS 0.22 02/07/2021     Ionized Calcium:  No results found for: IONCA  Magnesium:    Lab Results   Component Value Date    MG 1.6 02/08/2021     Phosphorus:    Lab Results   Component Value Date    PHOS 2.6 09/03/2020     LDH:  No results found for: LDH  Uric Acid:    Lab Results   Component Value Date    LABURIC 7.4 01/26/2021     PT/INR:    Lab Results   Component Value Date    PROTIME 12.0 01/29/2021    INR 1.1 01/29/2021     Warfarin PT/INR:  No components found for: Jina Marks  PTT:    Lab Results   Component Value Date    APTT 26.3 01/21/2021   [APTT}  Troponin:    Lab Results   Component Value Date    TROPONINI <0.01 02/07/2021     Last 3 Troponin:    Lab Results   Component Value Date    TROPONINI <0.01 02/07/2021    TROPONINI 0.04 01/21/2021     U/A:    Lab Results   Component Value Date    NITRITE neg 08/20/2020    COLORU Yellow 02/07/2021    PROTEINU Negative 02/07/2021    PHUR 6.0 02/07/2021    WBCUA 2-5 02/07/2021    RBCUA 1-3 02/07/2021    MUCUS Present 09/26/2017    BACTERIA MANY 02/07/2021    CLARITYU Clear 02/07/2021    SPECGRAV <=1.005 02/07/2021    LEUKOCYTESUR TRACE 02/07/2021    UROBILINOGEN 0.2 02/07/2021    BILIRUBINUR Negative 02/07/2021    BILIRUBINUR neg 08/20/2020    BLOODU Negative 02/07/2021    GLUCOSEU Negative 02/07/2021    AMORPHOUS MODERATE 01/21/2021     ABG:  No results found for: PH, PCO2, PO2, HCO3, BE, THGB, TCO2, O2SAT  HgBA1c:  No results found for: LABA1C  Microalbumen/Creatinine ratio:  No components found for: RUCREAT  FLP:  No results found for: TRIG, HDL, LDLCALC, LDLDIRECT, LABVLDL  TSH:    Lab Results   Component Value Date    TSH 2.190 01/21/2021     VITAMIN B12: No components found for: B12  FOLATE:  No results found for: FOLATE  IRON:  No results found for: IRON  Iron Saturation:  No components found for: PERCENTFE  TIBC:  No results found for: TIBC  FERRITIN:  No results found for: FERRITIN  AMYLASE:    Lab Results   Component Value Date    AMYLASE 38 03/13/2018     LIPASE:    Lab Results   Component Value Date    LIPASE 127 01/21/2021     Fibrinogen Level:  No components found for: FIB  Urine Toxicology:  No components found for: IAMMENTA, IBARBIT, IBENZO, ICOCAINE, IMARTHC, IOPIATES, IPHENCYC     Imaging:    EXAMINATION:   ONE XRAY VIEW OF THE CHEST   2/7/2021 2:56 pm   COMPARISON:   None.    HISTORY:   ORDERING SYSTEM PROVIDED HISTORY: SOB modulation, iterative reconstruction, and/or weight based adjustment of the   mA/kV was utilized to reduce the radiation dose to as low as reasonably   achievable.; CT of the abdomen and pelvis was performed with the   administration of intravenous contrast. Multiplanar reformatted images are   provided for review. Dose modulation, iterative reconstruction, and/or weight   based adjustment of the mA/kV was utilized to reduce the radiation dose to as   low as reasonably achievable. COMPARISON:   October 16, 2020. HISTORY:   ORDERING SYSTEM PROVIDED HISTORY: CP , METASTATIC CA   TECHNOLOGIST PROVIDED HISTORY:   Reason for exam:->CP , METASTATIC CA   Decision Support Exception->Emergency Medical Condition (MA)   What reading provider will be dictating this exam?->CRC   FINDINGS:   CT chest.   The heart and the great vessels are normal.  Trachea and major bronchi are   patent.  Nonenlarged mediastinal and moderate axillary lymph nodes are noted. These are not changed.  Lungs are free of acute infiltrate or pleural   effusion. CT abdomen pelvis. Comparison August 30, 2020. Findings   Persistent multiple hypodense hepatic lesions are identified in the left and   right hepatic lobes ranging from 1.2 cm with the largest 1 in the posterior   right hepatic lobe measuring up to 2.3 x 2.8 cm.  These are grossly unchanged   from the previous examination.  The gallbladder is absent.  Spleen, pancreas,   and adrenals are normal.  There is bilateral hydronephrosis.  Dilated   fluid-filled small bowel loops are identified measuring 1.4 cm. Pelvis.  Bladder is significantly distended.  Colon is collapsed. Postsurgical changes are identified in the right lower quadrant with   visualization of the previously noted spiculated mass in the right lower   quadrant.  Bilateral ovarian follicles and cysts are identified, the largest   1 in the right ovary measuring 1.3 x 2.2 cm.   IUD is noted.  Soft tissue   nodules are identified in the gluteus region likely injection granulomas.       Impression   Normal chest with no worrisome findings to suggest metastatic malignancy. Persistent bilobar hepatic lesions which are grossly stable consistent with   stable widespread hepatic metastasis. Dilated fluid-filled small bowel loops likely ileus/enteritis. Beatrice Abed is no   indication for bowel obstruction. Hydronephrosis bilaterally without obstructing uropathy.  There is   significantly distended urinary bladder. EXAMINATION:   CT OF THE CHEST WITH CONTRAST; CT OF THE ABDOMEN AND PELVIS WITH CONTRAST   2/7/2021 5:43 pm   TECHNIQUE:   CT of the chest was performed with the administration of intravenous   contrast. Multiplanar reformatted images are provided for review. Dose   modulation, iterative reconstruction, and/or weight based adjustment of the   mA/kV was utilized to reduce the radiation dose to as low as reasonably   achievable.; CT of the abdomen and pelvis was performed with the   administration of intravenous contrast. Multiplanar reformatted images are   provided for review. Dose modulation, iterative reconstruction, and/or weight   based adjustment of the mA/kV was utilized to reduce the radiation dose to as   low as reasonably achievable. COMPARISON:   October 16, 2020. HISTORY:   ORDERING SYSTEM PROVIDED HISTORY: CP , METASTATIC CA   TECHNOLOGIST PROVIDED HISTORY:   Reason for exam:->CP , METASTATIC CA   Decision Support Exception->Emergency Medical Condition (MA)   What reading provider will be dictating this exam?->CRC   FINDINGS:   CT chest.   The heart and the great vessels are normal.  Trachea and major bronchi are   patent.  Nonenlarged mediastinal and moderate axillary lymph nodes are noted. These are not changed.  Lungs are free of acute infiltrate or pleural   effusion. CT abdomen pelvis. Comparison August 30, 2020.    Findings   Persistent multiple hypodense hepatic lesions are identified in the left and   right hepatic lobes ranging from 1.2 cm with the largest 1 in the posterior   right hepatic lobe measuring up to 2.3 x 2.8 cm.  These are grossly unchanged   from the previous examination.  The gallbladder is absent.  Spleen, pancreas,   and adrenals are normal.  There is bilateral hydronephrosis.  Dilated   fluid-filled small bowel loops are identified measuring 1.4 cm. Pelvis.  Bladder is significantly distended.  Colon is collapsed. Postsurgical changes are identified in the right lower quadrant with   visualization of the previously noted spiculated mass in the right lower   quadrant.  Bilateral ovarian follicles and cysts are identified, the largest   1 in the right ovary measuring 1.3 x 2.2 cm. Dorrine Oskar is noted.  Soft tissue   nodules are identified in the gluteus region likely injection granulomas.       Impression   Normal chest with no worrisome findings to suggest metastatic malignancy. Persistent bilobar hepatic lesions which are grossly stable consistent with   stable widespread hepatic metastasis. Dilated fluid-filled small bowel loops likely ileus/enteritis. Neyda Pompa is no   indication for bowel obstruction. Hydronephrosis bilaterally without obstructing uropathy.  There is   significantly distended urinary bladder.      MRI OF THE BRAIN WITHOUT AND WITH CONTRAST  2/8/2021 4:42 pm   TECHNIQUE:   Multiplanar multisequence MRI of the head/brain was performed without and   with the administration of intravenous contrast.   COMPARISON:   Comparison made with prior MRI from January 22, 2021   HISTORY:   [facility]: hyperdensities on prior mri secondary to   migraines vs metastasis   TECHNOLOGIST PROVIDED HISTORY:   Reason for exam:->hyperdensities on prior mri secondary to migraines vs   metastasis   FINDINGS:   No abnormal areas of brain parenchymal enhancement or leptomeningeal   enhancement.  There are no areas of restricted diffusion to suggest acute   intracranial ischemia.  Normal appearance of the sulci, cisterns, and   ventricles.  No abnormal extra-axial fluid collections.  Redemonstration of   mild burden of nonspecific subcentimeter foci of increased T2 and FLAIR   signal located in periventricular and subcortical white matter of both   hemispheres.  Paranasal sinuses and mastoid air cells are clear.       Impression   1.  No intracranial mass, acute ischemia, or edema. 2.  Stable nonspecific subcentimeter foci of abnormal signal in   periventricular and subcortical white matter yet likely related to areas of   remote/old trauma or migraine. Assessment & Plan:   1. Hypocalcemia, chronic  Due to parathyroidectomy; exacerbated by inadequate oral supplement. Repleted with calcium gluconate 2 gm IV on 2/8. Ca+ 7.8 on 2/7-->6.8->8.0->8.3. Ionized Ca+ 1.14 today     2. Hx of renal failure that required hemodialysis with prev admission in the setting of nausea, vomiting, decreased oral intake with oliguria, complicated by contrast medium with MRI. Serum Cr 1.0 OA-->0.9-->0.8  Continue to monitor daily bmp as she had CT chest with contrast on 2/7/2021.    3. Neuroendocrine tumor with liver mets. 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 Elevated liver enzymes  Oncology following      Thank you Dr. Alverto Awan MD for allowing us to participate in care of Jose Luis Moreau. Kim Dickens is OK to discharge from renal point of view. She will require close monitoring of her calcium levels, so we will obtain a BMP and ionized calcium Q Monday X 3 weeks with a follow up appt via TeleHealth visit to be scheduled for 3 weeks. TRINA Sterling-CNP  10:19 AM  2/10/2021     Patient seen and examined all key components of the physical performed independently , case discussed with NP, all pertinent labs and radiologic tests personally reviewed agree with above.       Robert Conway MD

## 2021-02-10 NOTE — PROGRESS NOTES
CLINICAL PHARMACY NOTE: MEDS TO [address] Select Patient?: Yes  Total # of Prescriptions Filled: 3   The following medications were delivered to the patient:  · Oyster Calcium D  · Topamax 50  · Levetiracetam 500  Total # of Interventions Completed: 2  Time Spent (min): 60    Additional Documentation:

## 2021-02-11 ENCOUNTER — HOSPITAL ENCOUNTER (OUTPATIENT)
Dept: INFUSION THERAPY | Age: 36
Discharge: HOME | End: 2021-02-11
Payer: COMMERCIAL

## 2021-02-11 ENCOUNTER — OFFICE VISIT (OUTPATIENT)
Dept: ONCOLOGY | Age: 36
End: 2021-02-11
Payer: COMMERCIAL

## 2021-02-11 VITALS
BODY MASS INDEX: 26.27 KG/M2 | HEART RATE: 94 BPM | HEIGHT: 60 IN | OXYGEN SATURATION: 100 % | WEIGHT: 133.8 LBS | SYSTOLIC BLOOD PRESSURE: 122 MMHG | TEMPERATURE: 98.1 F | DIASTOLIC BLOOD PRESSURE: 86 MMHG

## 2021-02-11 DIAGNOSIS — C7B.8 METASTATIC MALIGNANT NEUROENDOCRINE TUMOR TO LIVER (HCC): Primary | ICD-10-CM

## 2021-02-11 LAB
ALBUMIN SERPL-MCNC: 4.3 G/DL (ref 3.5–5.2)
ALP BLD-CCNC: 107 U/L (ref 35–104)
ALT SERPL-CCNC: 16 U/L (ref 0–32)
ANION GAP SERPL CALCULATED.3IONS-SCNC: 13 MMOL/L (ref 7–16)
AST SERPL-CCNC: 21 U/L (ref 0–31)
BASOPHILS ABSOLUTE: 0.11 E9/L (ref 0–0.2)
BASOPHILS RELATIVE PERCENT: 1.1 % (ref 0–2)
BILIRUB SERPL-MCNC: 0.5 MG/DL (ref 0–1.2)
BUN BLDV-MCNC: 9 MG/DL (ref 6–20)
CALCIUM SERPL-MCNC: 8.9 MG/DL (ref 8.6–10.2)
CHLORIDE BLD-SCNC: 104 MMOL/L (ref 98–107)
CO2: 22 MMOL/L (ref 22–29)
CREAT SERPL-MCNC: 0.9 MG/DL (ref 0.5–1)
EOSINOPHILS ABSOLUTE: 0.39 E9/L (ref 0.05–0.5)
EOSINOPHILS RELATIVE PERCENT: 3.8 % (ref 0–6)
GFR AFRICAN AMERICAN: >60
GFR NON-AFRICAN AMERICAN: >60 ML/MIN/1.73
GLUCOSE BLD-MCNC: 114 MG/DL (ref 74–99)
HCT VFR BLD CALC: 37.5 % (ref 34–48)
HEMOGLOBIN: 12.2 G/DL (ref 11.5–15.5)
IMMATURE GRANULOCYTES #: 0.06 E9/L
IMMATURE GRANULOCYTES %: 0.6 % (ref 0–5)
LYMPHOCYTES ABSOLUTE: 2.22 E9/L (ref 1.5–4)
LYMPHOCYTES RELATIVE PERCENT: 21.6 % (ref 20–42)
MCH RBC QN AUTO: 31.7 PG (ref 26–35)
MCHC RBC AUTO-ENTMCNC: 32.5 % (ref 32–34.5)
MCV RBC AUTO: 97.4 FL (ref 80–99.9)
MONOCYTES ABSOLUTE: 0.85 E9/L (ref 0.1–0.95)
MONOCYTES RELATIVE PERCENT: 8.3 % (ref 2–12)
NEUTROPHILS ABSOLUTE: 6.63 E9/L (ref 1.8–7.3)
NEUTROPHILS RELATIVE PERCENT: 64.6 % (ref 43–80)
PDW BLD-RTO: 16.3 FL (ref 11.5–15)
PLATELET # BLD: 170 E9/L (ref 130–450)
PMV BLD AUTO: 10.5 FL (ref 7–12)
POTASSIUM SERPL-SCNC: 4.1 MMOL/L (ref 3.5–5)
RBC # BLD: 3.85 E12/L (ref 3.5–5.5)
SODIUM BLD-SCNC: 139 MMOL/L (ref 132–146)
TOTAL PROTEIN: 7.2 G/DL (ref 6.4–8.3)
WBC # BLD: 10.3 E9/L (ref 4.5–11.5)

## 2021-02-11 PROCEDURE — 36415 COLL VENOUS BLD VENIPUNCTURE: CPT

## 2021-02-11 PROCEDURE — 96372 THER/PROPH/DIAG INJ SC/IM: CPT

## 2021-02-11 PROCEDURE — G8417 CALC BMI ABV UP PARAM F/U: HCPCS | Performed by: INTERNAL MEDICINE

## 2021-02-11 PROCEDURE — G8427 DOCREV CUR MEDS BY ELIG CLIN: HCPCS | Performed by: INTERNAL MEDICINE

## 2021-02-11 PROCEDURE — 99213 OFFICE O/P EST LOW 20 MIN: CPT

## 2021-02-11 PROCEDURE — G8484 FLU IMMUNIZE NO ADMIN: HCPCS | Performed by: INTERNAL MEDICINE

## 2021-02-11 PROCEDURE — 85025 COMPLETE CBC W/AUTO DIFF WBC: CPT

## 2021-02-11 PROCEDURE — 99214 OFFICE O/P EST MOD 30 MIN: CPT | Performed by: INTERNAL MEDICINE

## 2021-02-11 PROCEDURE — 80053 COMPREHEN METABOLIC PANEL: CPT

## 2021-02-11 PROCEDURE — 1111F DSCHRG MED/CURRENT MED MERGE: CPT | Performed by: INTERNAL MEDICINE

## 2021-02-11 PROCEDURE — 6360000002 HC RX W HCPCS: Performed by: NURSE PRACTITIONER

## 2021-02-11 PROCEDURE — 4004F PT TOBACCO SCREEN RCVD TLK: CPT | Performed by: INTERNAL MEDICINE

## 2021-02-11 RX ORDER — LANREOTIDE ACETATE 120 MG/.5ML
120 INJECTION SUBCUTANEOUS ONCE
Status: CANCELLED | OUTPATIENT
Start: 2021-03-04 | End: 2021-03-04

## 2021-02-11 RX ORDER — LANREOTIDE ACETATE 120 MG/.5ML
120 INJECTION SUBCUTANEOUS ONCE
Status: COMPLETED | OUTPATIENT
Start: 2021-02-11 | End: 2021-02-11

## 2021-02-11 RX ORDER — LANREOTIDE ACETATE 120 MG/.5ML
120 INJECTION SUBCUTANEOUS ONCE
Status: DISCONTINUED | OUTPATIENT
Start: 2021-02-11 | End: 2021-02-11 | Stop reason: HOSPADM

## 2021-02-11 RX ORDER — LANREOTIDE ACETATE 120 MG/.5ML
120 INJECTION SUBCUTANEOUS ONCE
Status: CANCELLED | OUTPATIENT
Start: 2021-03-11 | End: 2021-03-11

## 2021-02-11 RX ADMIN — LANREOTIDE ACETATE 120 MG: 120 INJECTION SUBCUTANEOUS at 15:27

## 2021-02-11 NOTE — PROGRESS NOTES
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 CT abdomen/pelvis 08/28/2020:  2 cm masslike density in the mid abdominal small bowel mesentery with a spiculated appearance. Multiple liver masses suspicious for metastatic disease. 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 Comment: Sections of the liver tissue cores show the hepatic parenchyma to be partially replaced by a proliferation of epithelioid cells with relatively uniform round nuclei and eosinophilic granular cytoplasm.  The   epithelioid cells form anastomosing solid cords/nests that are surrounded by delicate fibrovascular stroma.  There are no mitotic figures or tumor cell necrosis present.  The histologic changes seen are suggestive of well-differentiated neuroendocrine (carcinoid) tumor. Immunostaining for pankeratin, chromogranin, synaptophysin, TTF-1 and Ki-67 was performed on sections of one tissue block (A1) and the neoplastic epithelioid cells show diffuse and strong positivity for neuroendocrine markers (chromogranin and synaptophysin) and moderate positivity for pankeratin.  There is no staining reactivity for TTF-1. The Ki-67 proliferation labeling index is essentially negative, (very low, <1%). This staining pattern confirms the histologic impression of a well-differentiated neuroendocrine (carcinoid) tumor. FL Small bowel 09/02/2020:  Rapid small bowel transit. Serum Chromogranin A 160 on 09/23/2020.   Urine 5-HIAA 21 (0-14)  2d-Echo 10/10/2020: EF 60-65%   Normal right ventricular size and function Dotatate PET/CT scan 10/14/2020 increased tracer uptake seen throughout the liver compatible with neoplasm. This involves both the left and the right lobe of liver. In addition there are 2 foci of increased tracer uptake along the mesentery of the small bowel. This may involve the wall the small bowel. No other convincing evidence for extra-abdominal metastatic disease. EGD/Colonoscopy 10/05/2020 by Dr. Hoa Chen; records reviewed. Hepatobiliary team (Dr. Jade Bartlett) consult appreciated. Small bowel resection on 10/21/2020. Small bowel resection on 10/21/2020. A.  Ileum, resection:   Multifocal (4 foci) neuroendocrine tumor (NET). Extensive perineural and angiolymphatic invasion. 3 of 10 lymph nodes with metastatic neuroendocrine tumor and extracapsular extension of tumor cells (3/10). Bilateral viable small bowel resection margins with no evidence of tumor. Conrad Sincereleif received as \"Meckel's\":   Nodular scar tissue with patchy chronic inflammation. Negative for neuroendocrine tumor. Intestinal mucosal tissue is not present. CASE SUMMARY:   Procedure: Small bowel resection   Tumor site: Ileum   Tumor size: Greatest dimension of 1.5 cm   Tumor focality: Multifocal: 4 separate tumors   Histologic type and grade: G2: Well-differentiated neuroendocrine tumor   Mitotic rate: between 2-20 mitoses/2 mm2   Ki-67 labeling index: between 3-20%   Tumor extension: Tumor invades through the muscularis propria into subserosal tissue without penetration of overlying serosa   Margins:  All margins are uninvolved by tumor    Margins examined: Proximal and distal   Lymphovascular invasion: Present   Perineural invasion: Present   Large mesenteric masses (greater than 2 cm): Present (one 2.5 cm mass)   Regional lymph nodes:    Number of lymph nodes involved: 3    Number of lymph nodes examined: 10   Pathologic stage classification (pTNM, AJCC eighth edition):    pT3    pN2    pM1a -confirmed liver metastasis, Counts include 234 beds at the [facility]-     Comment:   A. 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 Dose # 1 Lanreotide was on 11/05/2020. Dose # 2 Lanreotide was on 12/03/2020. Dose # 3 Lanreotide was on 01/07/2021. Serum Chromogranin A 95 on 01/07/2021. CT chest 02/07/2021 negative for metastatic disease. CT abdomen/pelvis 02/07/2021 Persistent bilobar hepatic lesions which are grossly stable consistent with stable widespread hepatic metastasis. Today 02/11/2021: No fever. Fair appetite and energy level. No chest pain or SOB. No nausea/vomiting. + Diarrhea. She is on Ca replacement per nephrology. No more seizures. Review of Systems;  CONSTITUTIONAL: No fever. Fair appetite and energy level. ENMT: Eyes: No diplopia; Nose: No epistaxis. Mouth: No sore throat. RESPIRATORY: No hemoptysis, shortness of breath, cough. CARDIOVASCULAR: No chest pain, palpitations. GASTROINTESTINAL: + Diarrhea  GENITOURINARY: No dysuria, urinary frequency, hematuria. NEURO: No syncope, presyncope, headache. Remainder:  ROS NEGATIVE    Past Medical History:      Diagnosis Date    Abdominal pain     [facility]     Cancer ([facility] [facility]     neuroendocrime tumor    Encounter regarding vascular access for dialysis for ESRD ([facility] [address] 01/26/2021    Hemodialysis patient ([facility] [address] 01/25/2021    Dr. Kay Arguello Hypocalcemia     Hypothyroidism     Irritable bowel syndrome     Migraines     Status post alcohol detoxification     5/22/2018-5/29/2018     Medications:  Reviewed and reconciled.     Allergies:  No Known Allergies    Physical Exam:  /86 (Site: Right Upper Arm, Position: Sitting, Cuff Size: Medium Adult)   Pulse 94   Temp 98.1 °F (36.7 °C)   Ht 5' (1.524 m)   Wt 133 lb 12.8 oz (60.7 kg)   SpO2 100%   BMI 26.13 kg/m²   GENERAL: Alert, oriented x 3, not in acute distress. HEENT: PERRLA; EOMI. Oropharynx clear. NECK: Supple. Without lymphadenopathy. LUNGS: Good air entry bilaterally. No wheezing, crackles or ronchi. CARDIOVASCULAR: Regular rate. No murmurs, rubs or gallops. ABDOMEN: Soft. Non-tender, non-distended. Positive bowel sounds. EXTREMITIES: Without clubbing, cyanosis, or edema. NEUROLOGIC: No focal deficits. ECOG PS 1    Impression/Plan:  31 y/o female with 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 Multiple liver masses suspicious for metastatic disease. 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 Comment: Sections of the liver tissue cores show the hepatic parenchyma to be partially replaced by a proliferation of epithelioid cells with relatively uniform round nuclei and eosinophilic granular cytoplasm.  The   epithelioid cells form anastomosing solid cords/nests that are surrounded by delicate fibrovascular stroma.  There are no mitotic figures or tumor cell necrosis present.  The histologic changes seen are suggestive of well-differentiated neuroendocrine (carcinoid) tumor. Immunostaining for pankeratin, chromogranin, synaptophysin, TTF-1 and Ki-67 was performed on sections of one tissue block (A1) and the neoplastic epithelioid cells show diffuse and strong positivity for neuroendocrine markers (chromogranin and synaptophysin) and moderate positivity for pankeratin.    There is no staining reactivity for TTF-1. The Ki-67 proliferation labeling index is essentially negative, (very low, <1%). This staining pattern confirms the histologic impression of a well-differentiated neuroendocrine (carcinoid) tumor.     FL Small bowel 09/02/2020:  Rapid small bowel transit. Serum Chromogranin A 160 on 09/23/2020. Urine 5-HIAA 21 (0-14)  2d-Echo 10/10/2020: EF 60-65%   Normal right ventricular size and function    Dotatate PET/CT scan 10/14/2020 increased tracer uptake seen throughout the liver compatible with neoplasm. This involves both the left and the right lobe of liver. In addition there are 2 foci of increased tracer uptake along the mesentery of the small bowel. This may involve the wall the small bowel. No other convincing evidence for extra-abdominal metastatic disease. EGD/Colonoscopy 10/05/2020 by Dr. Rylee Galvan; records reviewed. Hepatobiliary team (Dr. Matt Iverson) consult appreciated. Small bowel resection on 10/21/2020. A.  Ileum, resection:   Multifocal (4 foci) neuroendocrine tumor (NET). Extensive perineural and angiolymphatic invasion. 3 of 10 lymph nodes with metastatic neuroendocrine tumor and extracapsular extension of tumor cells (3/10). Bilateral viable small bowel resection margins with no evidence of tumor. Sanju Goodspring received as \"Meckel's\":   Nodular scar tissue with patchy chronic inflammation. Negative for neuroendocrine tumor. Intestinal mucosal tissue is not present. CASE SUMMARY:   Procedure: Small bowel resection   Tumor site: Ileum   Tumor size: Greatest dimension of 1.5 cm   Tumor focality: Multifocal: 4 separate tumors   Histologic type and grade: G2: Well-differentiated neuroendocrine tumor   Mitotic rate: between 2-20 mitoses/2 mm2   Ki-67 labeling index: between 3-20%   Tumor extension: Tumor invades through the muscularis propria into subserosal tissue without penetration of overlying serosa   Margins:  All margins are uninvolved by tumor    Margins examined: Proximal and distal   Lymphovascular invasion: Present   Perineural invasion: Present   Large mesenteric masses (greater than 2 cm): Present (one 2.5 cm mass)   Regional lymph nodes:    Number of lymph nodes involved: 3    Number of lymph nodes examined: 10   Pathologic stage classification (pTNM, AJCC eighth edition):    pT3    pN2    pM1a -confirmed liver metastasis, HBS-     Comment:   A. 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 Dose # 1 Lanreotide was on 11/05/2020. Dose # 2 Lanreotide was on 12/03/2020. Dose # 3 Lanreotide was on 01/07/2021. Serum Chromogranin A 95 on 01/07/2021. CT chest 02/07/2021 negative for metastatic disease. CT abdomen/pelvis 02/07/2021 Persistent bilobar hepatic lesions which are grossly stable consistent with stable widespread hepatic metastasis. Imaging reviewed. Continue Lanreotide and repeat scans in 3 months. Dose # 4 Lanreotide is today 02/11/2021. Labs reviewed, ok to proceed. RTC 4 weeks for Dose # 5 Lanreotide. PET BA47 ordered in the interim.     Junaid Rainey MD   5/96/4014  Board Certified Medical Oncologist

## 2021-02-13 LAB
BLOOD CULTURE, ROUTINE: NORMAL
CULTURE, BLOOD 2: NORMAL

## 2021-02-15 ENCOUNTER — HOSPITAL ENCOUNTER (OUTPATIENT)
Age: 36
Discharge: HOME | End: 2021-02-15
Payer: COMMERCIAL

## 2021-02-15 ENCOUNTER — OFFICE VISIT (OUTPATIENT)
Dept: PRIMARY CARE CLINIC | Age: 36
End: 2021-02-15
Payer: COMMERCIAL

## 2021-02-15 VITALS
HEIGHT: 60 IN | SYSTOLIC BLOOD PRESSURE: 118 MMHG | OXYGEN SATURATION: 98 % | BODY MASS INDEX: 26.11 KG/M2 | DIASTOLIC BLOOD PRESSURE: 70 MMHG | RESPIRATION RATE: 16 BRPM | TEMPERATURE: 97.5 F | HEART RATE: 86 BPM | WEIGHT: 133 LBS

## 2021-02-15 DIAGNOSIS — C7A.8 NEUROENDOCRINE CARCINOMA METASTATIC TO LIVER (HCC): Primary | ICD-10-CM

## 2021-02-15 DIAGNOSIS — C7B.8 NEUROENDOCRINE CARCINOMA METASTATIC TO LIVER (HCC): Primary | ICD-10-CM

## 2021-02-15 DIAGNOSIS — E83.51 HYPOCALCEMIA: ICD-10-CM

## 2021-02-15 DIAGNOSIS — C7B.8 METASTATIC MALIGNANT NEUROENDOCRINE TUMOR TO LIVER (HCC): ICD-10-CM

## 2021-02-15 DIAGNOSIS — R56.9 SEIZURE (HCC): ICD-10-CM

## 2021-02-15 LAB
ANION GAP SERPL CALCULATED.3IONS-SCNC: 13 MMOL/L (ref 7–16)
BUN BLDV-MCNC: 8 MG/DL (ref 6–20)
CALCIUM IONIZED: 1.24 MMOL/L (ref 1.15–1.33)
CALCIUM SERPL-MCNC: 9.1 MG/DL (ref 8.6–10.2)
CHLORIDE BLD-SCNC: 104 MMOL/L (ref 98–107)
CO2: 20 MMOL/L (ref 22–29)
CREAT SERPL-MCNC: 0.7 MG/DL (ref 0.5–1)
GFR AFRICAN AMERICAN: >60
GFR NON-AFRICAN AMERICAN: >60 ML/MIN/1.73
GLUCOSE BLD-MCNC: 103 MG/DL (ref 74–99)
POTASSIUM SERPL-SCNC: 3.5 MMOL/L (ref 3.5–5)
SODIUM BLD-SCNC: 137 MMOL/L (ref 132–146)

## 2021-02-15 PROCEDURE — 99215 OFFICE O/P EST HI 40 MIN: CPT | Performed by: FAMILY MEDICINE

## 2021-02-15 PROCEDURE — 82330 ASSAY OF CALCIUM: CPT

## 2021-02-15 PROCEDURE — 36415 COLL VENOUS BLD VENIPUNCTURE: CPT

## 2021-02-15 PROCEDURE — 80048 BASIC METABOLIC PNL TOTAL CA: CPT

## 2021-02-15 PROCEDURE — 1111F DSCHRG MED/CURRENT MED MERGE: CPT | Performed by: FAMILY MEDICINE

## 2021-02-15 RX ORDER — POTASSIUM CHLORIDE 20 MEQ/1
20 TABLET, EXTENDED RELEASE ORAL DAILY
Qty: 30 TABLET | Refills: 0 | Status: SHIPPED
Start: 2021-02-15 | End: 2021-03-10

## 2021-02-15 NOTE — DISCHARGE SUMMARY
[address]                  [address] [facility]  [facility]                               DISCHARGE SUMMARY    PATIENT NAME: Mook Su                    :        1985  MED REC NO:   [id number]                            ROOM:       8414  ACCOUNT NO:   [de-identified]                           ADMIT DATE: 2021  PROVIDER:     Jeffry Koyanagi, DO                  DISCHARGE DATE:  02/10/2021    DISCHARGE DIAGNOSES:  1. Acute seizure. 2.  Hypocalcemia. 3.  Hypoparathyroidism. 4.  Neuroendocrine tumor with mets to the liver. 5.  Hypothyroidism. 6.  History of migraine headache. 7.  Hydronephrosis. HOSPITAL COURSE:  The patient is a 23-year-old  female who  presented to the emergency room after she was found at home by her  parents unresponsive. She then presented to the emergency room, had  another episode of decreased level of consciousness. She was admitted  for further evaluation and treatment. She had recent hospitalization  for acute kidney injury requiring hemodialysis. Her kidney function  improved and her dialysis was discontinued prior to this admission. She  has history of neuroendocrine tumor. Urine tox screen was negative. CAT scan of the chest revealed previously noted hepatic lesions. Further imaging revealed persistent hepatic lesions consistent with  widespread hepatic mets, hydronephrosis bilaterally with significantly  distended urinary bladder. The patient was admitted to the hospital.   She was seen by Neurology, Urology, and Nephrology. She was started on  antiseizure medication. Her hypocalcemia was treated. No urinary  catheter was recommended by Urology. She was discharged to home in  stable condition on 02/10/2021. DISCHARGE MEDICATION:  As per discharge med rec which include:  1. Calcium with vitamin D two tablets b.i.d.  2.  Keppra 500 mg b.i.d.  3.  Topamax as directed per Neurology. 4.  Neurontin 300 mg t.i.d.  5.  Vitamin D 50,000 units weekly. 6.  Tylenol p.r.n  7. Fioricet p.r.n.  8.  Levothyroxine 112 mcg daily. 9.  Mag-Ox 400 mg b.i.d. 10.  Protonix 40 mg daily. 11.  Potassium chloride 20 mEq daily. 12.  Phenergan p.r.n.  13.  Tramadol p.r.n. 14.  Zolpidem p.r.n. DISCHARGE INSTRUCTIONS:  The patient was instructed to follow up with  Dr. Misael Montilla, call office for appointment. Follow up with  Neurology, call office for appointment. Follow up with Oncology as  scheduled.         Roxanne Palacios DO    D: 02/14/2021 9:41:45       T: 02/14/2021 9:43:39     [email or web address]  Job#: [id number]     Doc#: [id number]    CC:  Misael Montilla DO

## 2021-02-15 NOTE — PROGRESS NOTES
Post-Discharge Transitional Care Management Services or Hospital Follow Up      Byron   YOB: 1985    Date of Office Visit:  2/15/2021  Date of Hospital Admission: 2/7/21  Date of Hospital Discharge: 2/10/21  Risk of hospital readmission (high >=14%.  Medium >=10%) :Readmission Risk Score: 24      Care management risk score Rising risk (score 2-5) and Complex Care (Scores >=6): 9     Non face to face  following discharge, date last encounter closed (first attempt may have been earlier): *No documented post hospital discharge outreach found in the last 14 days    Call initiated 2 business days of discharge: *No response recorded in the last 14 days    Patient Active Problem List   Diagnosis    Primary insomnia    Fatty liver    H/O small bowel obstruction    Hypothyroidism    Depression    PTSD (post-traumatic stress disorder)    Liver masses    Migraines    Mesenteric mass    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 ([address]    Malignant carcinoid tumor of ileum ([address]    New onset seizure ([address]    Neuroendocrine tumor    Hypomagnesemia    Hypocalcemia    Hypoparathyroidism (HCC)    Tobacco abuse    Elevated liver enzymes    Moderate protein-calorie malnutrition ([address]    Encounter regarding vascular access for dialysis for ESRD ([address]    Difficulty with speech       No Known Allergies    Medications listed as ordered at the time of discharge from [address], 2020 Sarah Thakkar Medication Instructions LUIS M:    Printed on:02/15/21 0269   Medication Information                      acetaminophen (AMINOFEN) 325 MG tablet  Take 2 tablets by mouth every 6 hours as needed for Pain             butalbital-acetaminophen-caffeine (FIORICET, ESGIC) -40 MG per tablet  take 1 tablet by mouth once daily if needed for headache             calcium-vitamin D (OSCAL-500) 500-200 MG-UNIT per tablet  Take 2 tablets by mouth 2 times daily gabapentin (NEURONTIN) 300 MG capsule  Take 1 capsule by mouth 3 times daily for 30 days. levETIRAcetam (KEPPRA) 500 MG tablet  Take 1 tablet by mouth 2 times daily             levothyroxine (SYNTHROID) 112 MCG tablet  take 1 tablet by mouth once daily             magnesium oxide (MAG-OX) 400 MG tablet  Take 400 mg by mouth 2 times daily             pantoprazole (PROTONIX) 40 MG tablet  Take 1 tablet by mouth every morning (before breakfast)             potassium chloride (KLOR-CON M) 20 MEQ extended release tablet  Take 1 tablet by mouth daily             promethazine (PHENERGAN) 25 MG tablet  Take 1 tablet by mouth as needed for Nausea or Vomiting             topiramate (TOPAMAX) 50 MG tablet  Take 0.5 tablets by mouth daily for 7 days, THEN 0.5 tablets 2 times daily for 7 days, THEN 1 tablet 2 times daily for 7 days. traMADol (ULTRAM) 50 MG tablet  Take 1 tablet by mouth every 6 hours as needed for Pain for up to 14 days. Intended supply: 5 days. Take lowest dose possible to manage pain             vitamin D (ERGOCALCIFEROL) 1.25 MG (42529 UT) CAPS capsule  Take 1 capsule by mouth once a week             vitamin D (ERGOCALCIFEROL) 1.25 MG (43392 UT) CAPS capsule  Take 1 capsule by mouth once a week             zolpidem (AMBIEN) 10 MG tablet  take 1 tablet by mouth at bedtime if needed for sleep                   Medications marked \"taking\" at this time  Outpatient Medications Marked as Taking for the 2/15/21 encounter (Office Visit) with Jaime Kumar, DO   Medication Sig Dispense Refill    gabapentin (NEURONTIN) 300 MG capsule Take 1 capsule by mouth 3 times daily for 30 days. 90 capsule 0    topiramate (TOPAMAX) 50 MG tablet Take 0.5 tablets by mouth daily for 7 days, THEN 0.5 tablets 2 times daily for 7 days, THEN 1 tablet 2 times daily for 7 days.  180 tablet 1    levETIRAcetam (KEPPRA) 500 MG tablet Take 1 tablet by mouth 2 times daily 60 tablet 3 Temp: 97.5 °F (36.4 °C)   SpO2: 98%   Weight: 133 lb (60.3 kg)   Height: 5' (1.524 m)     Body mass index is 25.97 kg/m². Wt Readings from Last 3 Encounters:   02/15/21 133 lb (60.3 kg)   02/11/21 133 lb 12.8 oz (60.7 kg)   02/07/21 160 lb (72.6 kg)     BP Readings from Last 3 Encounters:   02/15/21 118/70   02/11/21 122/86   02/10/21 134/88        Physical Exam:  General Appearance: alert and oriented to person, place and time, well developed and well- nourished, in no acute distress  Skin: warm and dry, no rash or erythema  Head: normocephalic and atraumatic  Eyes: pupils equal, round, and reactive to light, extraocular eye movements intact, conjunctivae normal  ENT: tympanic membrane, external ear and ear canal normal bilaterally, nose without deformity, nasal mucosa and turbinates normal without polyps  Neck: supple and non-tender without mass, no thyromegaly or thyroid nodules, no cervical lymphadenopathy  Pulmonary/Chest: clear to auscultation bilaterally- no wheezes, rales or rhonchi, normal air movement, no respiratory distress  Cardiovascular: normal rate, regular rhythm, normal S1 and S2, no murmurs, rubs, clicks, or gallops, distal pulses intact, no carotid bruits  Abdomen: soft, non-tender, non-distended, normal bowel sounds, no masses or organomegaly  Extremities: no cyanosis, clubbing or edema  Musculoskeletal: normal range of motion, no joint swelling, deformity or tenderness  Neurologic: reflexes normal and symmetric, no cranial nerve deficit, gait, coordination and speech normal    Assessment/Plan:  1. Neuroendocrine carcinoma metastatic to liver ([address]    2. Hypocalcemia    3.  Seizure [facility])  F/u neurology  F/u maurisio  F/u nephrology        Medical Decision Making: moderate complexity

## 2021-02-16 ENCOUNTER — TELEPHONE (OUTPATIENT)
Dept: ONCOLOGY | Age: 36
End: 2021-02-16

## 2021-02-16 NOTE — TELEPHONE ENCOUNTER
Patient was ordered a PET Gallium Scan at last follow up appointment with Dr. Young Mcbride. Approval was received from [employer]. Approval was scanned into Media of patient's Epic Chart. Reached out to Autumn Smith, in IR to schedule scan for patient. Was provided available times and dates for patient to have scan performed. Reached out to patient to confirm a date and time to have PET Scan performed, but received patient's voicemail. Awaiting call back at this time for patient to confirm date and time for PET Scan.

## 2021-02-17 ENCOUNTER — TELEPHONE (OUTPATIENT)
Dept: ONCOLOGY | Age: 36
End: 2021-02-17

## 2021-02-17 NOTE — TELEPHONE ENCOUNTER
Received authorization for PET Gallium Scan ordered by Dr. Tala Farrell through patient's insurance company, [address] [place] [address]. Confirmation has been scanned into Media in Patient's Epic Chart. Contacted Josr Yoon., to reserve dose for patient's scan. Patient is scheduled to receive lanreotide on 3/11/21 and this is okay to have scheduled per PET Gallium Guidelines. Patient confirmed with a date for scan to be performed on 3/9/21 at 1500 at [facility]. Patient is aware of above information and informed that department will be in touch with patient closer to scan date. Patient had no further questions or concerns at this time.

## 2021-02-22 ENCOUNTER — HOSPITAL ENCOUNTER (OUTPATIENT)
Age: 36
Discharge: HOME | End: 2021-02-22
Payer: COMMERCIAL

## 2021-02-22 LAB
ANION GAP SERPL CALCULATED.3IONS-SCNC: 12 MMOL/L (ref 7–16)
BUN BLDV-MCNC: 7 MG/DL (ref 6–20)
CALCIUM IONIZED: 1.22 MMOL/L (ref 1.15–1.33)
CALCIUM SERPL-MCNC: 9.1 MG/DL (ref 8.6–10.2)
CHLORIDE BLD-SCNC: 103 MMOL/L (ref 98–107)
CO2: 24 MMOL/L (ref 22–29)
CREAT SERPL-MCNC: 0.9 MG/DL (ref 0.5–1)
GFR AFRICAN AMERICAN: >60
GFR NON-AFRICAN AMERICAN: >60 ML/MIN/1.73
GLUCOSE BLD-MCNC: 115 MG/DL (ref 74–99)
POTASSIUM SERPL-SCNC: 4 MMOL/L (ref 3.5–5)
SODIUM BLD-SCNC: 139 MMOL/L (ref 132–146)

## 2021-02-22 PROCEDURE — 80048 BASIC METABOLIC PNL TOTAL CA: CPT

## 2021-02-22 PROCEDURE — 36415 COLL VENOUS BLD VENIPUNCTURE: CPT

## 2021-02-22 PROCEDURE — 82330 ASSAY OF CALCIUM: CPT

## 2021-02-23 RX ORDER — PANTOPRAZOLE SODIUM 40 MG/1
TABLET, DELAYED RELEASE ORAL
Qty: 30 TABLET | Refills: 3 | Status: SHIPPED
Start: 2021-02-23 | End: 2021-04-19 | Stop reason: ALTCHOICE

## 2021-03-01 ENCOUNTER — HOSPITAL ENCOUNTER (OUTPATIENT)
Age: 36
Discharge: HOME | End: 2021-03-01
Payer: COMMERCIAL

## 2021-03-01 DIAGNOSIS — C7B.8 METASTATIC MALIGNANT NEUROENDOCRINE TUMOR TO LIVER (HCC): ICD-10-CM

## 2021-03-01 LAB
ALBUMIN SERPL-MCNC: 4.3 G/DL (ref 3.5–5.2)
ALP BLD-CCNC: 163 U/L (ref 35–104)
ALT SERPL-CCNC: 18 U/L (ref 0–32)
ANION GAP SERPL CALCULATED.3IONS-SCNC: 10 MMOL/L (ref 7–16)
AST SERPL-CCNC: 21 U/L (ref 0–31)
BASOPHILS ABSOLUTE: 0.04 E9/L (ref 0–0.2)
BASOPHILS RELATIVE PERCENT: 0.5 % (ref 0–2)
BILIRUB SERPL-MCNC: 0.9 MG/DL (ref 0–1.2)
BUN BLDV-MCNC: 9 MG/DL (ref 6–20)
CALCIUM SERPL-MCNC: 9.1 MG/DL (ref 8.6–10.2)
CHLORIDE BLD-SCNC: 98 MMOL/L (ref 98–107)
CO2: 27 MMOL/L (ref 22–29)
CREAT SERPL-MCNC: 0.7 MG/DL (ref 0.5–1)
EOSINOPHILS ABSOLUTE: 0.04 E9/L (ref 0.05–0.5)
EOSINOPHILS RELATIVE PERCENT: 0.5 % (ref 0–6)
GFR AFRICAN AMERICAN: >60
GFR NON-AFRICAN AMERICAN: >60 ML/MIN/1.73
GLUCOSE BLD-MCNC: 116 MG/DL (ref 74–99)
HCT VFR BLD CALC: 38.9 % (ref 34–48)
HEMOGLOBIN: 13.1 G/DL (ref 11.5–15.5)
IMMATURE GRANULOCYTES #: 0.04 E9/L
IMMATURE GRANULOCYTES %: 0.5 % (ref 0–5)
LYMPHOCYTES ABSOLUTE: 1.46 E9/L (ref 1.5–4)
LYMPHOCYTES RELATIVE PERCENT: 16.5 % (ref 20–42)
MCH RBC QN AUTO: 32.7 PG (ref 26–35)
MCHC RBC AUTO-ENTMCNC: 33.7 % (ref 32–34.5)
MCV RBC AUTO: 97 FL (ref 80–99.9)
MONOCYTES ABSOLUTE: 0.58 E9/L (ref 0.1–0.95)
MONOCYTES RELATIVE PERCENT: 6.6 % (ref 2–12)
NEUTROPHILS ABSOLUTE: 6.69 E9/L (ref 1.8–7.3)
NEUTROPHILS RELATIVE PERCENT: 75.4 % (ref 43–80)
PDW BLD-RTO: 13.4 FL (ref 11.5–15)
PLATELET # BLD: 195 E9/L (ref 130–450)
PMV BLD AUTO: 10.9 FL (ref 7–12)
POTASSIUM SERPL-SCNC: 3.7 MMOL/L (ref 3.5–5)
RBC # BLD: 4.01 E12/L (ref 3.5–5.5)
SODIUM BLD-SCNC: 135 MMOL/L (ref 132–146)
TOTAL PROTEIN: 7.4 G/DL (ref 6.4–8.3)
WBC # BLD: 8.9 E9/L (ref 4.5–11.5)

## 2021-03-01 PROCEDURE — 36415 COLL VENOUS BLD VENIPUNCTURE: CPT

## 2021-03-01 PROCEDURE — 85025 COMPLETE CBC W/AUTO DIFF WBC: CPT

## 2021-03-01 PROCEDURE — 80053 COMPREHEN METABOLIC PANEL: CPT

## 2021-03-02 ENCOUNTER — OFFICE VISIT (OUTPATIENT)
Dept: NEUROLOGY | Age: 36
End: 2021-03-02
Payer: COMMERCIAL

## 2021-03-02 VITALS
SYSTOLIC BLOOD PRESSURE: 104 MMHG | TEMPERATURE: 97.7 F | DIASTOLIC BLOOD PRESSURE: 60 MMHG | HEIGHT: 60 IN | BODY MASS INDEX: 25.52 KG/M2 | WEIGHT: 130 LBS

## 2021-03-02 DIAGNOSIS — R56.9 SEIZURE-LIKE ACTIVITY (HCC): Primary | ICD-10-CM

## 2021-03-02 DIAGNOSIS — Z86.69 HX OF MIGRAINE HEADACHES: ICD-10-CM

## 2021-03-02 PROCEDURE — G8417 CALC BMI ABV UP PARAM F/U: HCPCS | Performed by: NURSE PRACTITIONER

## 2021-03-02 PROCEDURE — 99214 OFFICE O/P EST MOD 30 MIN: CPT | Performed by: NURSE PRACTITIONER

## 2021-03-02 PROCEDURE — 1111F DSCHRG MED/CURRENT MED MERGE: CPT | Performed by: NURSE PRACTITIONER

## 2021-03-02 PROCEDURE — 4004F PT TOBACCO SCREEN RCVD TLK: CPT | Performed by: NURSE PRACTITIONER

## 2021-03-02 PROCEDURE — G8484 FLU IMMUNIZE NO ADMIN: HCPCS | Performed by: NURSE PRACTITIONER

## 2021-03-02 PROCEDURE — G8427 DOCREV CUR MEDS BY ELIG CLIN: HCPCS | Performed by: NURSE PRACTITIONER

## 2021-03-02 RX ORDER — TOPIRAMATE 50 MG/1
75 TABLET, FILM COATED ORAL 2 TIMES DAILY
Qty: 90 TABLET | Refills: 11 | Status: SHIPPED
Start: 2021-03-02 | End: 2022-06-16

## 2021-03-02 RX ORDER — LEVETIRACETAM 500 MG/1
500 TABLET ORAL 2 TIMES DAILY
Qty: 60 TABLET | Refills: 11 | Status: SHIPPED
Start: 2021-03-02 | End: 2021-05-11

## 2021-03-02 RX ORDER — TOPIRAMATE 50 MG/1
50 TABLET, FILM COATED ORAL 2 TIMES DAILY
Qty: 60 TABLET | Refills: 11 | Status: SHIPPED
Start: 2021-03-02 | End: 2021-03-02

## 2021-03-02 RX ORDER — LEVETIRACETAM 750 MG/1
750 TABLET ORAL 2 TIMES DAILY
Qty: 60 TABLET | Refills: 11 | Status: SHIPPED
Start: 2021-03-02 | End: 2021-03-02

## 2021-03-02 NOTE — PROGRESS NOTES
[address] [facility]. Jayson Ingram M.D., F.A.C.P. Kobe Avery, DNP, APRN, ACNS-BC  Roxy Mcintyre. Luana Trejo, MSN, APRN-FNP-C  Mario Alberto Oakley, MSN, APRN-FNP-C  DANIELLE Kelley, PAAbC  Dorian Page, MSN, APRN-FNP-C  [address], [address]  [place], [address]  Phone: [phone]  Fax: [phone]       Abisai Vivas is a 29 y.o. right handed female     Patient is a hospital follow up for seizures    Past Medical History:     Past Medical History:   Diagnosis Date    Abdominal pain     Acute Cro[facility]'s disease ([facility]     Cancer [facility])     neuroendocrime tumor    Encounter regarding vascular access for dialysis for ESRD ([facility] 01/26/2021    Hemodialysis patient ([facility] 01/25/2021    Dr. Betzaida Plaza Hypocalcemia     Hypothyroidism     Irritable bowel syndrome     Migraines     Status post alcohol detoxification     5/22/2018-5/29/2018     No history of cardiac, liver, lung or kidney disease. No history of strokes. No history of connective tissue disorders. Past Surgical History:       Past Surgical History:   Procedure Laterality Date    CHOLECYSTECTOMY, LAPAROSCOPIC  07/06/2016    COLONOSCOPY N/A 6/22/2018    COLONOSCOPY WITH BIOPSY performed by Salvador Person MD at [address] [facility] CT BIOPSY RENAL  1/25/2021    CT BIOPSY RENAL 1/25/2021 Tess Sam MD [facility]    CT NEEDLE BIOPSY LIVER PERCUTANEOUS  9/1/2020    CT NEEDLE BIOPSY LIVER PERCUTANEOUS 9/1/2020 [facility] CT    HC DIALYSIS CATHETER N/A 1/28/2021    TESIO CATHETER INSERTION AND REMOVAL OF TEMPORARY performed by Michelle Lyn MD at [address]  SMALL INTESTINE SURGERY N/A 10/21/2020    LAPAROSCOPIC ROBOTIC SMALL BOWEL RESECTION WITH PLANNED TRANSITION TO OPEN performed by Juan C Hale MD at [facility]       Allergies:       Patient has no known allergies.     Medications:     Prior to Admission medications Medication Sig Start Date End Date Taking? Authorizing Provider   pantoprazole (PROTONIX) 40 MG tablet take 1 tablet by mouth every morning before breakfast 2/23/21  Yes Chelly Puente DO   potassium chloride (KLOR-CON M) 20 MEQ extended release tablet Take 1 tablet by mouth daily 2/15/21  Yes Chelly Puente DO   gabapentin (NEURONTIN) 300 MG capsule Take 1 capsule by mouth 3 times daily for 30 days. 2/9/21 3/11/21 Yes Sim Class, DO   topiramate (TOPAMAX) 50 MG tablet Take 0.5 tablets by mouth daily for 7 days, THEN 0.5 tablets 2 times daily for 7 days, THEN 1 tablet 2 times daily for 7 days.  2/9/21 3/2/21 Yes TRINA Ramirez CNP   levETIRAcetam (KEPPRA) 500 MG tablet Take 1 tablet by mouth 2 times daily 2/9/21  Yes TRINA Ramirez CNP   vitamin D (ERGOCALCIFEROL) 1.25 MG (23717 UT) CAPS capsule Take 1 capsule by mouth once a week 2/9/21  Yes Dariana Rodriguez MD   calcium-vitamin D (Bonny Repress) 500-200 MG-UNIT per tablet Take 2 tablets by mouth 2 times daily 2/9/21  Yes Crystal Bonds MD   promethazine (PHENERGAN) 25 MG tablet Take 1 tablet by mouth as needed for Nausea or Vomiting 1/11/21  Yes Historical Provider, MD   magnesium oxide (MAG-OX) 400 MG tablet Take 400 mg by mouth 2 times daily   Yes Historical Provider, MD   zolpidem (AMBIEN) 10 MG tablet take 1 tablet by mouth at bedtime if needed for sleep 2/1/21 3/3/21 Yes Chelyl Puente DO   vitamin D (ERGOCALCIFEROL) 1.25 MG (25086 UT) CAPS capsule Take 1 capsule by mouth once a week 1/30/21  Yes Sim Class, DO   levothyroxine (SYNTHROID) 112 MCG tablet take 1 tablet by mouth once daily 1/26/21  Yes Chelly Puente DO   butalbital-acetaminophen-caffeine (FIORICET, ESGIC) -61 MG per tablet take 1 tablet by mouth once daily if needed for headache 1/5/21  Yes Chelly Puente DO   acetaminophen (AMINOFEN) 325 MG tablet Take 2 tablets by mouth every 6 hours as needed for Pain 10/25/20  Yes Jasmin Collins DO     Social History: She reports that she has been smoking. She has been smoking about 0.50 packs per day. She has never used smokeless tobacco. She reports that she does not drink alcohol or use drugs. Review of Systems:      No issues with chewing or swallowing  No chest pain or palpitations  No SOB  No vertigo, lightheadedness or loss of consciousness  No falls, tripping or stumbling  No incontinence of bowels or bladder  No itching or bruising appreciated  No numbness, tingling or focal arm/leg weakness    ROS is otherwise negative    Family History:     Family History   Problem Relation Age of Onset    High Blood Pressure Mother     High Cholesterol Mother     Other Mother         migraine headaches    Heart Disease Father     Asthma Father     High Blood Pressure Father     Cancer Father         Sarcoidosis    Diabetes Other         GRANDMOTHER    Lung Cancer Other         GRANDFATHER    Alzheimer's Disease Other         GRANDMOTHER    Breast Cancer Maternal Grandmother     Cancer Maternal Grandmother         skin    Cancer Paternal Grandfather         lung      History of Present Illness:     Patient is a hospital follow up for seizures in which she was admitted for last month. She was seen by a our Neurology service in January 22-29, 2021 for seizure like activity in the setting of acute renal failure requiring dialysis. She was also found to be hypocalcemic and hypomagnesia. CTH and EEG were both negative. MRI bran was negative but did show mild nonspecific foci of periventricular and subcortical cerebral white matter T2/FLAIR hyperintensity which may have been migraine related angiopathy and trauma related white matter change vs metastic disease. Her Topamax was increased and she was started on keppra. She had not yet followed up with OP Neurology. She then presented to ED on 2/7/2021 for seizure like activity, hand spasms and difficulty with speech.   She had moments of staring off and being unaware knowledge  Repetition intact  Memories intact    Speech: no dysarthria  Language: no aphasias---reading, writing, repetition, and object identification intact    Cranial Nerves:  I: smell    II: visual acuity     II: visual fields Full    II: pupils VITOR   III,VII: ptosis None   III,IV,VI: extraocular muscles  EOMI without nystagmus   V: mastication Normal   V: facial light touch sensation  Normal   V,VII: corneal reflex     VII: facial muscle function - upper  Normal   VII: facial muscle function - lower Normal   VIII: hearing Normal   IX: soft palate elevation  Normal   IX,X: gag reflex    XI: trapezius strength  5/5   XI: sternocleidomastoid strength 5/5   XI: neck extension strength  5/5   XII: tongue strength  Normal     Motor:  5/5 throughout  Normal bulk and tone  No drift   No abnormal movements    Sensory:  LT normal    Coordination:   FN, FFM and ERNST normal  HS normal    Gait:  Normal  Romberg's negative    DTR:   2 + throughout     No Ferguson's    No other pathological reflexes    Laboratory/Radiology:  ry/Radiology:     CBC with Differential:    Lab Results   Component Value Date    WBC 8.9 03/01/2021    RBC 4.01 03/01/2021    HGB 13.1 03/01/2021    HCT 38.9 03/01/2021     03/01/2021    MCV 97.0 03/01/2021    MCH 32.7 03/01/2021    MCHC 33.7 03/01/2021    RDW 13.4 03/01/2021    LYMPHOPCT 16.5 03/01/2021    MONOPCT 6.6 03/01/2021    BASOPCT 0.5 03/01/2021    MONOSABS 0.58 03/01/2021    LYMPHSABS 1.46 03/01/2021    EOSABS 0.04 03/01/2021    BASOSABS 0.04 03/01/2021     CMP:    Lab Results   Component Value Date     03/01/2021    K 3.7 03/01/2021    K 3.8 02/07/2021    CL 98 03/01/2021    CO2 27 03/01/2021    BUN 9 03/01/2021    CREATININE 0.7 03/01/2021    GFRAA >60 03/01/2021    LABGLOM >60 03/01/2021    GLUCOSE 116 03/01/2021    PROT 7.4 03/01/2021    LABALBU 4.3 03/01/2021    CALCIUM 9.1 03/01/2021    BILITOT 0.9 03/01/2021    ALKPHOS 163 03/01/2021    AST 21 03/01/2021    ALT 18 03/01/2021 Hepatic Function Panel:    Lab Results   Component Value Date    ALKPHOS 163 03/01/2021    ALT 18 03/01/2021    AST 21 03/01/2021    PROT 7.4 03/01/2021    BILITOT 0.9 03/01/2021    BILIDIR 0.3 01/29/2021    IBILI 0.2 01/29/2021    LABALBU 4.3 03/01/2021     EEG: normal    MRI brain: no acute findings    All labs and images were personally reviewed at the time of this visit    Assessment:     Seizure disorder vs nonepileptic events  --- history of neuroendocrine tumor with hepatic mets  --- EEG and MRI were unremarkable  --- Hypocalcemia can cause similar cramping and spasms  --- history of atypical migraines  --- possible recurrence of seizure-like activity while on Keppra and topiramate will increase topiramate   --- neuro exam unremarkable     History of migraines  --- topiramate will also help as a migraine preventative   --- has been Fioricet, phenergan in the past     Plan:     Increased Topamax to 75 mg BID    Continue Keppra 500 mg BID    Consider possible EMU if seizure like episodes continue    No driving until seizure free for 6 months    Follow up in 4 weeks    Call if any seizure like episodes occur or with any questions or concerns      TRINA Ndiaye, YANETH  9:54 AM  3/2/2021    I spent 45 minutes with this patient obtaining the HPI and discussing the exam with greater than 50% of the time providing counseling and education on medications and other treatment plans. All questions were answered prior to leaving my office.

## 2021-03-05 RX ORDER — BUTALBITAL, ACETAMINOPHEN AND CAFFEINE 50; 325; 40 MG/1; MG/1; MG/1
TABLET ORAL
Qty: 30 TABLET | Refills: 1 | Status: SHIPPED
Start: 2021-03-05 | End: 2021-05-10

## 2021-03-09 ENCOUNTER — HOSPITAL ENCOUNTER (OUTPATIENT)
Dept: NUCLEAR MEDICINE | Age: 36
Discharge: HOME | End: 2021-03-11
Payer: COMMERCIAL

## 2021-03-09 ENCOUNTER — TELEPHONE (OUTPATIENT)
Dept: PRIMARY CARE CLINIC | Age: 36
End: 2021-03-09

## 2021-03-09 DIAGNOSIS — C7B.8 METASTATIC MALIGNANT NEUROENDOCRINE TUMOR TO LIVER (HCC): ICD-10-CM

## 2021-03-09 PROCEDURE — A9587 GALLIUM GA-68: HCPCS | Performed by: RADIOLOGY

## 2021-03-09 PROCEDURE — 78815 PET IMAGE W/CT SKULL-THIGH: CPT | Performed by: RADIOLOGY

## 2021-03-09 PROCEDURE — 3430000000 HC RX DIAGNOSTIC RADIOPHARMACEUTICAL: Performed by: RADIOLOGY

## 2021-03-09 PROCEDURE — 78815 PET IMAGE W/CT SKULL-THIGH: CPT

## 2021-03-09 RX ORDER — 68GA-DOTATATE 40 MCG
4.04 KIT INTRAVENOUS ONCE
Status: COMPLETED | OUTPATIENT
Start: 2021-03-09 | End: 2021-03-09

## 2021-03-09 RX ADMIN — 68GA-DOTATATE 4.04 MILLICURIE: KIT INTRAVENOUS at 15:04

## 2021-03-09 NOTE — LETTER
[facility] [address] [place] [address]  Phone: [phone]  Fax: [facility], DO        March 9, 2021     Patient: Haylie Cedillo   YOB: 1985   Date of Visit: 3/9/2021       To Whom It May Concern: It is my medical opinion that Roselyn Bhatt requires a disability parking placard for the following reasons:  She has limited walking ability due to gastrointestinal / liver condition. Duration of need: permanent    If you have any questions or concerns, please don't hesitate to call.     Sincerely,        Sharmila Humphrey, DO

## 2021-03-10 DIAGNOSIS — C7B.8 METASTATIC MALIGNANT NEUROENDOCRINE TUMOR TO LIVER (HCC): ICD-10-CM

## 2021-03-10 RX ORDER — POTASSIUM CHLORIDE 20 MEQ/1
TABLET, EXTENDED RELEASE ORAL
Qty: 30 TABLET | Refills: 0 | Status: SHIPPED
Start: 2021-03-10 | End: 2021-04-06

## 2021-03-11 ENCOUNTER — OFFICE VISIT (OUTPATIENT)
Dept: HEMATOLOGY | Age: 36
End: 2021-03-11
Payer: COMMERCIAL

## 2021-03-11 ENCOUNTER — HOSPITAL ENCOUNTER (OUTPATIENT)
Age: 36
Discharge: HOME | End: 2021-03-11
Payer: COMMERCIAL

## 2021-03-11 ENCOUNTER — HOSPITAL ENCOUNTER (OUTPATIENT)
Dept: INFUSION THERAPY | Age: 36
Discharge: HOME | End: 2021-03-11
Payer: COMMERCIAL

## 2021-03-11 ENCOUNTER — OFFICE VISIT (OUTPATIENT)
Dept: ONCOLOGY | Age: 36
End: 2021-03-11
Payer: COMMERCIAL

## 2021-03-11 VITALS
SYSTOLIC BLOOD PRESSURE: 132 MMHG | WEIGHT: 129.1 LBS | OXYGEN SATURATION: 100 % | HEART RATE: 96 BPM | DIASTOLIC BLOOD PRESSURE: 91 MMHG | BODY MASS INDEX: 25.21 KG/M2 | TEMPERATURE: 97.3 F

## 2021-03-11 VITALS
TEMPERATURE: 97.8 F | HEART RATE: 99 BPM | DIASTOLIC BLOOD PRESSURE: 73 MMHG | WEIGHT: 129.2 LBS | SYSTOLIC BLOOD PRESSURE: 122 MMHG | BODY MASS INDEX: 25.36 KG/M2 | HEIGHT: 60 IN | OXYGEN SATURATION: 99 %

## 2021-03-11 DIAGNOSIS — K72.00 ACUTE LIVER FAILURE WITHOUT HEPATIC COMA: ICD-10-CM

## 2021-03-11 DIAGNOSIS — C7B.8 NEUROENDOCRINE CARCINOMA METASTATIC TO LIVER (HCC): ICD-10-CM

## 2021-03-11 DIAGNOSIS — R19.7 DIARRHEA OF PRESUMED INFECTIOUS ORIGIN: ICD-10-CM

## 2021-03-11 DIAGNOSIS — C7A.8 NEUROENDOCRINE CARCINOMA METASTATIC TO LIVER (HCC): ICD-10-CM

## 2021-03-11 DIAGNOSIS — K72.00 ACUTE LIVER FAILURE WITHOUT HEPATIC COMA: Primary | ICD-10-CM

## 2021-03-11 DIAGNOSIS — K72.01 ACUTE LIVER FAILURE WITH HEPATIC COMA (HCC): Primary | ICD-10-CM

## 2021-03-11 DIAGNOSIS — C7B.8 METASTATIC MALIGNANT NEUROENDOCRINE TUMOR TO LIVER (HCC): Primary | ICD-10-CM

## 2021-03-11 DIAGNOSIS — C7A.8 NEUROENDOCRINE CANCER (HCC): ICD-10-CM

## 2021-03-11 DIAGNOSIS — K52.9 CHRONIC DIARRHEA: Primary | ICD-10-CM

## 2021-03-11 LAB
ALBUMIN SERPL-MCNC: 4.4 G/DL (ref 3.5–5.2)
ALP BLD-CCNC: 213 U/L (ref 35–104)
ALT SERPL-CCNC: 35 U/L (ref 0–32)
ANION GAP SERPL CALCULATED.3IONS-SCNC: 15 MMOL/L (ref 7–16)
AST SERPL-CCNC: 55 U/L (ref 0–31)
BASOPHILS ABSOLUTE: 0.03 E9/L (ref 0–0.2)
BASOPHILS RELATIVE PERCENT: 0.3 % (ref 0–2)
BILIRUB SERPL-MCNC: 0.9 MG/DL (ref 0–1.2)
BUN BLDV-MCNC: 8 MG/DL (ref 6–20)
CALCIUM IONIZED: 1.25 MMOL/L (ref 1.15–1.33)
CALCIUM SERPL-MCNC: 9.4 MG/DL (ref 8.6–10.2)
CHLORIDE BLD-SCNC: 95 MMOL/L (ref 98–107)
CO2: 23 MMOL/L (ref 22–29)
CREAT SERPL-MCNC: 0.7 MG/DL (ref 0.5–1)
EOSINOPHILS ABSOLUTE: 0.06 E9/L (ref 0.05–0.5)
EOSINOPHILS RELATIVE PERCENT: 0.6 % (ref 0–6)
GFR AFRICAN AMERICAN: >60
GFR NON-AFRICAN AMERICAN: >60 ML/MIN/1.73
GLUCOSE BLD-MCNC: 107 MG/DL (ref 74–99)
HBA1C MFR BLD: 5.3 % (ref 4–5.6)
HCT VFR BLD CALC: 39.9 % (ref 34–48)
HEMOGLOBIN: 13.9 G/DL (ref 11.5–15.5)
IMMATURE GRANULOCYTES #: 0.04 E9/L
IMMATURE GRANULOCYTES %: 0.4 % (ref 0–5)
INR BLD: 1.2
LYMPHOCYTES ABSOLUTE: 2.08 E9/L (ref 1.5–4)
LYMPHOCYTES RELATIVE PERCENT: 20.1 % (ref 20–42)
MAGNESIUM: 1.8 MG/DL (ref 1.6–2.6)
MCH RBC QN AUTO: 32.8 PG (ref 26–35)
MCHC RBC AUTO-ENTMCNC: 34.8 % (ref 32–34.5)
MCV RBC AUTO: 94.1 FL (ref 80–99.9)
MONOCYTES ABSOLUTE: 0.45 E9/L (ref 0.1–0.95)
MONOCYTES RELATIVE PERCENT: 4.3 % (ref 2–12)
NEUTROPHILS ABSOLUTE: 7.69 E9/L (ref 1.8–7.3)
NEUTROPHILS RELATIVE PERCENT: 74.3 % (ref 43–80)
PDW BLD-RTO: 13.6 FL (ref 11.5–15)
PHOSPHORUS: 4.4 MG/DL (ref 2.5–4.5)
PLATELET # BLD: 273 E9/L (ref 130–450)
PMV BLD AUTO: 9.5 FL (ref 7–12)
POTASSIUM SERPL-SCNC: 3.4 MMOL/L (ref 3.5–5)
PROTHROMBIN TIME: 13.1 SEC (ref 9.3–12.4)
RBC # BLD: 4.24 E12/L (ref 3.5–5.5)
SODIUM BLD-SCNC: 133 MMOL/L (ref 132–146)
TOTAL PROTEIN: 7.7 G/DL (ref 6.4–8.3)
WBC # BLD: 10.4 E9/L (ref 4.5–11.5)

## 2021-03-11 PROCEDURE — 85025 COMPLETE CBC W/AUTO DIFF WBC: CPT

## 2021-03-11 PROCEDURE — 83735 ASSAY OF MAGNESIUM: CPT

## 2021-03-11 PROCEDURE — 96372 THER/PROPH/DIAG INJ SC/IM: CPT

## 2021-03-11 PROCEDURE — G8417 CALC BMI ABV UP PARAM F/U: HCPCS | Performed by: TRANSPLANT SURGERY

## 2021-03-11 PROCEDURE — 85610 PROTHROMBIN TIME: CPT

## 2021-03-11 PROCEDURE — 1111F DSCHRG MED/CURRENT MED MERGE: CPT | Performed by: INTERNAL MEDICINE

## 2021-03-11 PROCEDURE — 6360000002 HC RX W HCPCS: Performed by: NURSE PRACTITIONER

## 2021-03-11 PROCEDURE — 80053 COMPREHEN METABOLIC PANEL: CPT

## 2021-03-11 PROCEDURE — G8484 FLU IMMUNIZE NO ADMIN: HCPCS | Performed by: INTERNAL MEDICINE

## 2021-03-11 PROCEDURE — 87324 CLOSTRIDIUM AG IA: CPT

## 2021-03-11 PROCEDURE — 82330 ASSAY OF CALCIUM: CPT

## 2021-03-11 PROCEDURE — G8428 CUR MEDS NOT DOCUMENT: HCPCS | Performed by: INTERNAL MEDICINE

## 2021-03-11 PROCEDURE — 84100 ASSAY OF PHOSPHORUS: CPT

## 2021-03-11 PROCEDURE — 83036 HEMOGLOBIN GLYCOSYLATED A1C: CPT

## 2021-03-11 PROCEDURE — 1111F DSCHRG MED/CURRENT MED MERGE: CPT | Performed by: TRANSPLANT SURGERY

## 2021-03-11 PROCEDURE — 4004F PT TOBACCO SCREEN RCVD TLK: CPT | Performed by: TRANSPLANT SURGERY

## 2021-03-11 PROCEDURE — 99214 OFFICE O/P EST MOD 30 MIN: CPT | Performed by: INTERNAL MEDICINE

## 2021-03-11 PROCEDURE — G8484 FLU IMMUNIZE NO ADMIN: HCPCS | Performed by: TRANSPLANT SURGERY

## 2021-03-11 PROCEDURE — 87449 NOS EACH ORGANISM AG IA: CPT

## 2021-03-11 PROCEDURE — 99212 OFFICE O/P EST SF 10 MIN: CPT | Performed by: TRANSPLANT SURGERY

## 2021-03-11 PROCEDURE — G8427 DOCREV CUR MEDS BY ELIG CLIN: HCPCS | Performed by: TRANSPLANT SURGERY

## 2021-03-11 PROCEDURE — 4004F PT TOBACCO SCREEN RCVD TLK: CPT | Performed by: INTERNAL MEDICINE

## 2021-03-11 PROCEDURE — 99213 OFFICE O/P EST LOW 20 MIN: CPT | Performed by: TRANSPLANT SURGERY

## 2021-03-11 PROCEDURE — G8417 CALC BMI ABV UP PARAM F/U: HCPCS | Performed by: INTERNAL MEDICINE

## 2021-03-11 RX ORDER — DIPHENOXYLATE HYDROCHLORIDE AND ATROPINE SULFATE 2.5; .025 MG/1; MG/1
1 TABLET ORAL 4 TIMES DAILY PRN
Qty: 120 TABLET | Refills: 0 | OUTPATIENT
Start: 2021-03-11 | End: 2021-04-10

## 2021-03-11 RX ORDER — HYDROCORTISONE 25 MG/G
CREAM TOPICAL
Qty: 1 TUBE | Refills: 3 | Status: SHIPPED
Start: 2021-03-11 | End: 2021-04-29

## 2021-03-11 RX ORDER — LANREOTIDE ACETATE 120 MG/.5ML
120 INJECTION SUBCUTANEOUS ONCE
Status: CANCELLED | OUTPATIENT
Start: 2021-04-08 | End: 2021-04-08

## 2021-03-11 RX ORDER — TELOTRISTAT ETHYL 250 MG/1
250 TABLET ORAL DAILY
Qty: 90 TABLET | Refills: 0 | Status: SHIPPED | OUTPATIENT
Start: 2021-03-11 | End: 2021-05-07 | Stop reason: SDUPTHER

## 2021-03-11 RX ORDER — LANREOTIDE ACETATE 120 MG/.5ML
120 INJECTION SUBCUTANEOUS ONCE
Status: COMPLETED | OUTPATIENT
Start: 2021-03-11 | End: 2021-03-11

## 2021-03-11 RX ADMIN — LANREOTIDE ACETATE 120 MG: 120 INJECTION SUBCUTANEOUS at 14:53

## 2021-03-11 NOTE — PROGRESS NOTES
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 CT abdomen/pelvis 08/28/2020:  2 cm masslike density in the mid abdominal small bowel mesentery with a spiculated appearance. Multiple liver masses suspicious for metastatic disease. 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 Comment: Sections of the liver tissue cores show the hepatic parenchyma to be partially replaced by a proliferation of epithelioid cells with relatively uniform round nuclei and eosinophilic granular cytoplasm.  The   epithelioid cells form anastomosing solid cords/nests that are surrounded by delicate fibrovascular stroma.  There are no mitotic figures or tumor cell necrosis present.  The histologic changes seen are suggestive of well-differentiated neuroendocrine (carcinoid) tumor. Immunostaining for pankeratin, chromogranin, synaptophysin, TTF-1 and Ki-67 was performed on sections of one tissue block (A1) and the neoplastic epithelioid cells show diffuse and strong positivity for neuroendocrine markers (chromogranin and synaptophysin) and moderate positivity for pankeratin.  There is no staining reactivity for TTF-1. The Ki-67 proliferation labeling index is essentially negative, (very low, <1%). This staining pattern confirms the histologic impression of a well-differentiated neuroendocrine (carcinoid) tumor. FL Small bowel 09/02/2020:  Rapid small bowel transit. Serum Chromogranin A 160 on 09/23/2020.   Urine 5-HIAA 21 (0-14)  2d-Echo 10/10/2020: EF 60-65%   Normal right ventricular size and function Dotatate PET/CT scan 10/14/2020 increased tracer uptake seen throughout the liver compatible with neoplasm. This involves both the left and the right lobe of liver. In addition there are 2 foci of increased tracer uptake along the mesentery of the small bowel. This may involve the wall the small bowel. No other convincing evidence for extra-abdominal metastatic disease. EGD/Colonoscopy 10/05/2020 by Dr. Giovanni Velez; records reviewed. Hepatobiliary team (Dr. Bebeto Espinoza) consult appreciated. Small bowel resection on 10/21/2020. Small bowel resection on 10/21/2020. A.  Ileum, resection:   Multifocal (4 foci) neuroendocrine tumor (NET). Extensive perineural and angiolymphatic invasion. 3 of 10 lymph nodes with metastatic neuroendocrine tumor and extracapsular extension of tumor cells (3/10). Bilateral viable small bowel resection margins with no evidence of tumor. Darell Vegas received as \"Meckel's\":   Nodular scar tissue with patchy chronic inflammation. Negative for neuroendocrine tumor. Intestinal mucosal tissue is not present. CASE SUMMARY:   Procedure: Small bowel resection   Tumor site: Ileum   Tumor size: Greatest dimension of 1.5 cm   Tumor focality: Multifocal: 4 separate tumors   Histologic type and grade: G2: Well-differentiated neuroendocrine tumor   Mitotic rate: between 2-20 mitoses/2 mm2   Ki-67 labeling index: between 3-20%   Tumor extension: Tumor invades through the muscularis propria into subserosal tissue without penetration of overlying serosa   Margins:  All margins are uninvolved by tumor    Margins examined: Proximal and distal   Lymphovascular invasion: Present   Perineural invasion: Present   Large mesenteric masses (greater than 2 cm): Present (one 2.5 cm mass)   Regional lymph nodes:    Number of lymph nodes involved: 3    Number of lymph nodes examined: 10   Pathologic stage classification (pTNM, AJCC eighth edition):    pT3    pN2    pM1a -confirmed liver metastasis, NND-     Comment:   A. 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 Dose # 1 Lanreotide was on 11/05/2020. Dose # 2 Lanreotide was on 12/03/2020. Dose # 3 Lanreotide was on 01/07/2021. Serum Chromogranin A 95 on 01/07/2021. CT chest 02/07/2021 negative for metastatic disease. CT abdomen/pelvis 02/07/2021 Persistent bilobar hepatic lesions which are grossly stable consistent with stable widespread hepatic metastasis. Imaging reviewed. Continue Lanreotide and repeat scans in 3 months. Dose # 4 Lanreotide was on 02/11/2021. Ga 76 Dotatate PET 03/09/2021 Gallium 68 dotatate avid uptake throughout multiple regions of the liver compatible with multiple foci of neuroendocrine tumor in both the right and the left lobe of the liver, when compared to previous not significantly changed in the interval.  Today 03/11/2021: No fever. Fair appetite and energy level. No chest pain or SOB. No nausea/vomiting. + Diarrhea. She is on Ca replacement per nephrology. No more seizures. Review of Systems;  CONSTITUTIONAL: No fever. Fair appetite and energy level. ENMT: Eyes: No diplopia; Nose: No epistaxis. Mouth: No sore throat. RESPIRATORY: No hemoptysis, shortness of breath, cough. CARDIOVASCULAR: No chest pain, palpitations. GASTROINTESTINAL: + Diarrhea  GENITOURINARY: No dysuria, urinary frequency, hematuria. NEURO: No syncope, presyncope, headache.   Remainder:  ROS NEGATIVE    Past Medical History:      Diagnosis Date    Abdominal pain     [facility]'s [facility] ([facility]     Cancer ([facility]     neuroendocrime tumor    Encounter regarding vascular access for dialysis for ESRD ([address] 01/26/2021    Hemodialysis patient ([facility] 01/25/2021    Dr. Kenny Ramos Hypocalcemia     Hypothyroidism     Irritable bowel syndrome     Migraines     Status post alcohol detoxification     5/22/2018-5/29/2018     Medications:  Reviewed and reconciled. Allergies:  No Known Allergies    Physical Exam:  /73   Pulse 99   Temp 97.8 °F (36.6 °C)   Ht 5' (1.524 m)   Wt 129 lb 3.2 oz (58.6 kg)   SpO2 99%   BMI 25.23 kg/m²   GENERAL: Alert, oriented x 3, not in acute distress. HEENT: PERRLA; EOMI. Oropharynx clear. NECK: Supple. Without lymphadenopathy. LUNGS: Good air entry bilaterally. No wheezing, crackles or ronchi. CARDIOVASCULAR: Regular rate. No murmurs, rubs or gallops. ABDOMEN: Soft. Non-tender, non-distended. Positive bowel sounds. EXTREMITIES: Without clubbing, cyanosis, or edema. NEUROLOGIC: No focal deficits. ECOG PS 1    Impression/Plan:  [address] y/o female with 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 Multiple liver masses suspicious for metastatic disease. 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 Comment: Sections of the liver tissue cores show the hepatic parenchyma to be partially replaced by a proliferation of epithelioid cells with relatively uniform round nuclei and eosinophilic granular cytoplasm.  The   epithelioid cells form anastomosing solid cords/nests that are surrounded by delicate fibrovascular stroma.  There are no mitotic figures or tumor cell necrosis present.  The histologic changes seen are suggestive of well-differentiated neuroendocrine (carcinoid) tumor.      Immunostaining for pankeratin, chromogranin, synaptophysin, TTF-1 and Ki-67 was performed on sections of one tissue block (A1) and the neoplastic epithelioid cells show diffuse and strong positivity for neuroendocrine markers (chromogranin and synaptophysin) and moderate positivity for pankeratin.    There is no staining reactivity for TTF-1. The Ki-67 proliferation labeling index is essentially negative, (very low, <1%). This staining pattern confirms the histologic impression of a well-differentiated neuroendocrine (carcinoid) tumor. FL Small bowel 09/02/2020:  Rapid small bowel transit. Serum Chromogranin A 160 on 09/23/2020. Urine 5-HIAA 21 (0-14)  2d-Echo 10/10/2020: EF 60-65%   Normal right ventricular size and function    Dotatate PET/CT scan 10/14/2020 increased tracer uptake seen throughout the liver compatible with neoplasm. This involves both the left and the right lobe of liver. In addition there are 2 foci of increased tracer uptake along the mesentery of the small bowel. This may involve the wall the small bowel. No other convincing evidence for extra-abdominal metastatic disease. EGD/Colonoscopy 10/05/2020 by Dr. Marta Pederson; records reviewed. Hepatobiliary team (Dr. Nicole Lieberman) consult appreciated. Small bowel resection on 10/21/2020. A.  Ileum, resection:   Multifocal (4 foci) neuroendocrine tumor (NET). Extensive perineural and angiolymphatic invasion. 3 of 10 lymph nodes with metastatic neuroendocrine tumor and extracapsular extension of tumor cells (3/10). Bilateral viable small bowel resection margins with no evidence of tumor. Binh Orourke received as \"Meckel's\":   Nodular scar tissue with patchy chronic inflammation. Negative for neuroendocrine tumor. Intestinal mucosal tissue is not present.      CASE SUMMARY:   Procedure: Small bowel resection   Tumor site: Ileum   Tumor size: Greatest dimension of 1.5 cm   Tumor focality: Multifocal: 4 separate tumors   Histologic type and grade: G2: Well-differentiated neuroendocrine tumor   Mitotic rate: between 2-20 mitoses/2 mm2   Ki-67 labeling index: between 3-20%   Tumor extension: Tumor invades through the muscularis propria into subserosal tissue without penetration of overlying serosa   Margins: All margins are uninvolved by tumor    Margins examined: Proximal and distal   Lymphovascular invasion: Present   Perineural invasion: Present   Large mesenteric masses (greater than 2 cm): Present (one 2.5 cm mass)   Regional lymph nodes:    Number of lymph nodes involved: 3    Number of lymph nodes examined: 10   Pathologic stage classification (pTNM, AJCC eighth edition):    pT3    pN2    pM1a -confirmed liver metastasis, HBS-     Comment:   A. 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 Dose # 1 Lanreotide was on 11/05/2020. Dose # 2 Lanreotide was on 12/03/2020. Dose # 3 Lanreotide was on 01/07/2021. Serum Chromogranin A 95 on 01/07/2021. CT chest 02/07/2021 negative for metastatic disease. CT abdomen/pelvis 02/07/2021 Persistent bilobar hepatic lesions which are grossly stable consistent with stable widespread hepatic metastasis. Imaging reviewed. Continue Lanreotide and repeat scans in 3 months. Dose # 4 Lanreotide was on 02/11/2021. Ga 76 Dotatate PET 03/09/2021 Gallium 68 dotatate avid uptake throughout multiple regions of the liver compatible with multiple foci of neuroendocrine tumor in both the right and the left lobe of the liver, when compared to previous not significantly changed in the interval.  Dose # 5 Lanreotide is today 03/11/2021. RTC in 4 weeks for Dose # 6 Lanreotide. Recommended Imodium, Lomotil for diarrhea. Xermelo once daily and then escalate slowly to TID.     Torie Worley MD   9/37/3761  Board Certified Medical Oncologist

## 2021-03-11 NOTE — PROGRESS NOTES
Hepatobiliary and Pancreatic Surgery Progress Note    CC: follow up from hospital    Subjective: Patient start LAR therapy. She states she was having 3-4 bowel movements a day and now 30 a day. She is having nausea and emesis. She recently was admitted with liver failure and acute kidney injury. She required dialysis but has not needed since her hospitalization. She denies any abdominal pain. She has not been on antibiotics recently. She is not eating because of a lack of appetite and broth. She was discharge on 2/10 and then started having the above symptoms with copious diarrhea and nausea 2/26. This is her 5th round of LAR therapy. OBJECTIVE      Physical    BP (!) 132/91 (Site: Right Upper Arm, Position: Sitting, Cuff Size: Large Adult)   Pulse 96   Temp 97.3 °F (36.3 °C)   Wt 129 lb 1.6 oz (58.6 kg)   SpO2 100%   BMI 25.21 kg/m²        General appearance: appears in no acute distress  Lungs:CTABL  Heart: RRR  Abdomen: soft, nondistended, nontympanic, no guarding, no peritoneal signs, normoactive bowel sounds,seroma with drainage  Extremities:no peripheral edema    ASSESSMENT: Metastatic neuroendocrine tumor to the liver s/p small bowel resection 10/20    PLAN:    - continue LAR Therapy  - she is scheduled for see Dr. Marilee Hopper next  - will check labs along with C. Dif along with laboratory studies - magnesium/phosporus and ionized calcium, HbA1c.    - anusol    20 Minutes of which greater than 50% was spent counseling or coordinating her care. Thank you for the consultation and allowing me to take part in Ms. Olmstead's care.       Edson Castro 3/11/2021 1:01 PM

## 2021-03-12 LAB — C DIFF TOXIN/ANTIGEN: NORMAL

## 2021-03-19 DIAGNOSIS — F51.01 PRIMARY INSOMNIA: ICD-10-CM

## 2021-03-19 RX ORDER — ZOLPIDEM TARTRATE 10 MG/1
TABLET ORAL
Qty: 30 TABLET | Refills: 0 | Status: SHIPPED
Start: 2021-03-19 | End: 2021-04-16

## 2021-04-06 DIAGNOSIS — C7B.8 METASTATIC MALIGNANT NEUROENDOCRINE TUMOR TO LIVER (HCC): ICD-10-CM

## 2021-04-06 RX ORDER — POTASSIUM CHLORIDE 20 MEQ/1
TABLET, EXTENDED RELEASE ORAL
Qty: 30 TABLET | Refills: 0 | Status: SHIPPED
Start: 2021-04-06 | End: 2021-04-08

## 2021-04-08 ENCOUNTER — OFFICE VISIT (OUTPATIENT)
Dept: ONCOLOGY | Age: 36
End: 2021-04-08
Payer: COMMERCIAL

## 2021-04-08 ENCOUNTER — HOSPITAL ENCOUNTER (OUTPATIENT)
Dept: INFUSION THERAPY | Age: 36
Discharge: HOME | End: 2021-04-08
Payer: COMMERCIAL

## 2021-04-08 VITALS
OXYGEN SATURATION: 98 % | HEART RATE: 100 BPM | HEIGHT: 60 IN | BODY MASS INDEX: 24.66 KG/M2 | SYSTOLIC BLOOD PRESSURE: 117 MMHG | DIASTOLIC BLOOD PRESSURE: 73 MMHG | TEMPERATURE: 97.2 F | WEIGHT: 125.6 LBS

## 2021-04-08 DIAGNOSIS — C7A.8 NEUROENDOCRINE CANCER (HCC): ICD-10-CM

## 2021-04-08 DIAGNOSIS — C7A.8 NEUROENDOCRINE CANCER (HCC): Primary | ICD-10-CM

## 2021-04-08 DIAGNOSIS — C7B.8 METASTATIC MALIGNANT NEUROENDOCRINE TUMOR TO LIVER (HCC): Primary | ICD-10-CM

## 2021-04-08 DIAGNOSIS — C7B.8 METASTATIC MALIGNANT NEUROENDOCRINE TUMOR TO LIVER (HCC): ICD-10-CM

## 2021-04-08 LAB
ALBUMIN SERPL-MCNC: 4.2 G/DL (ref 3.5–5.2)
ALP BLD-CCNC: 227 U/L (ref 35–104)
ALT SERPL-CCNC: 35 U/L (ref 0–32)
ANION GAP SERPL CALCULATED.3IONS-SCNC: 18 MMOL/L (ref 7–16)
AST SERPL-CCNC: 49 U/L (ref 0–31)
BASOPHILS ABSOLUTE: 0.04 E9/L (ref 0–0.2)
BASOPHILS RELATIVE PERCENT: 0.4 % (ref 0–2)
BILIRUB SERPL-MCNC: 0.8 MG/DL (ref 0–1.2)
BUN BLDV-MCNC: 9 MG/DL (ref 6–20)
CALCIUM SERPL-MCNC: 8.2 MG/DL (ref 8.6–10.2)
CHLORIDE BLD-SCNC: 96 MMOL/L (ref 98–107)
CO2: 23 MMOL/L (ref 22–29)
CREAT SERPL-MCNC: 0.6 MG/DL (ref 0.5–1)
EOSINOPHILS ABSOLUTE: 0.08 E9/L (ref 0.05–0.5)
EOSINOPHILS RELATIVE PERCENT: 0.8 % (ref 0–6)
GFR AFRICAN AMERICAN: >60
GFR NON-AFRICAN AMERICAN: >60 ML/MIN/1.73
GLUCOSE BLD-MCNC: 117 MG/DL (ref 74–99)
HCT VFR BLD CALC: 37.4 % (ref 34–48)
HEMOGLOBIN: 13.1 G/DL (ref 11.5–15.5)
IMMATURE GRANULOCYTES #: 0.04 E9/L
IMMATURE GRANULOCYTES %: 0.4 % (ref 0–5)
LYMPHOCYTES ABSOLUTE: 1.89 E9/L (ref 1.5–4)
LYMPHOCYTES RELATIVE PERCENT: 19 % (ref 20–42)
MCH RBC QN AUTO: 31.8 PG (ref 26–35)
MCHC RBC AUTO-ENTMCNC: 35 % (ref 32–34.5)
MCV RBC AUTO: 90.8 FL (ref 80–99.9)
MONOCYTES ABSOLUTE: 0.38 E9/L (ref 0.1–0.95)
MONOCYTES RELATIVE PERCENT: 3.8 % (ref 2–12)
NEUTROPHILS ABSOLUTE: 7.51 E9/L (ref 1.8–7.3)
NEUTROPHILS RELATIVE PERCENT: 75.6 % (ref 43–80)
PDW BLD-RTO: 13.2 FL (ref 11.5–15)
PLATELET # BLD: 91 E9/L (ref 130–450)
PLATELET CONFIRMATION: NORMAL
PMV BLD AUTO: 10.4 FL (ref 7–12)
POTASSIUM SERPL-SCNC: 2.9 MMOL/L (ref 3.5–5)
RBC # BLD: 4.12 E12/L (ref 3.5–5.5)
SODIUM BLD-SCNC: 137 MMOL/L (ref 132–146)
TOTAL PROTEIN: 7 G/DL (ref 6.4–8.3)
WBC # BLD: 9.9 E9/L (ref 4.5–11.5)

## 2021-04-08 PROCEDURE — G8420 CALC BMI NORM PARAMETERS: HCPCS | Performed by: INTERNAL MEDICINE

## 2021-04-08 PROCEDURE — 4004F PT TOBACCO SCREEN RCVD TLK: CPT | Performed by: INTERNAL MEDICINE

## 2021-04-08 PROCEDURE — 85025 COMPLETE CBC W/AUTO DIFF WBC: CPT

## 2021-04-08 PROCEDURE — 86316 IMMUNOASSAY TUMOR OTHER: CPT

## 2021-04-08 PROCEDURE — 80053 COMPREHEN METABOLIC PANEL: CPT

## 2021-04-08 PROCEDURE — 99214 OFFICE O/P EST MOD 30 MIN: CPT | Performed by: INTERNAL MEDICINE

## 2021-04-08 PROCEDURE — 96402 CHEMO HORMON ANTINEOPL SQ/IM: CPT

## 2021-04-08 PROCEDURE — G8428 CUR MEDS NOT DOCUMENT: HCPCS | Performed by: INTERNAL MEDICINE

## 2021-04-08 PROCEDURE — 6360000002 HC RX W HCPCS: Performed by: NURSE PRACTITIONER

## 2021-04-08 PROCEDURE — 99212 OFFICE O/P EST SF 10 MIN: CPT

## 2021-04-08 RX ORDER — POTASSIUM CHLORIDE 20 MEQ/1
40 TABLET, EXTENDED RELEASE ORAL 2 TIMES DAILY
Qty: 30 TABLET | Refills: 0 | Status: SHIPPED | OUTPATIENT
Start: 2021-04-08 | End: 2021-05-04

## 2021-04-08 RX ORDER — PHENOL 1.4 %
1 AEROSOL, SPRAY (ML) MUCOUS MEMBRANE DAILY
Qty: 30 TABLET | Refills: 3 | Status: SHIPPED | OUTPATIENT
Start: 2021-04-08 | End: 2022-02-07

## 2021-04-08 RX ORDER — LANREOTIDE ACETATE 120 MG/.5ML
120 INJECTION SUBCUTANEOUS ONCE
Status: COMPLETED | OUTPATIENT
Start: 2021-04-08 | End: 2021-04-08

## 2021-04-08 RX ORDER — LANREOTIDE ACETATE 120 MG/.5ML
120 INJECTION SUBCUTANEOUS ONCE
Status: CANCELLED | OUTPATIENT
Start: 2021-05-06 | End: 2021-05-06

## 2021-04-08 RX ADMIN — LANREOTIDE ACETATE 120 MG: 120 INJECTION SUBCUTANEOUS at 15:01

## 2021-04-08 NOTE — PROGRESS NOTES
function    Dotatate PET/CT scan 10/14/2020 increased tracer uptake seen throughout the liver compatible with neoplasm. This involves both the left and the right lobe of liver. In addition there are 2 foci of increased tracer uptake along the mesentery of the small bowel. This may involve the wall the small bowel. No other convincing evidence for extra-abdominal metastatic disease. EGD/Colonoscopy 10/05/2020 by Dr. Zackery Mclean; records reviewed. Hepatobiliary team (Dr. Srinivas Beauchamp) consult appreciated. Small bowel resection on 10/21/2020. Small bowel resection on 10/21/2020. A.  Ileum, resection:   Multifocal (4 foci) neuroendocrine tumor (NET). Extensive perineural and angiolymphatic invasion. 3 of 10 lymph nodes with metastatic neuroendocrine tumor and extracapsular extension of tumor cells (3/10). Bilateral viable small bowel resection margins with no evidence of tumor. Sushila Mosley received as \"Meckel's\":   Nodular scar tissue with patchy chronic inflammation. Negative for neuroendocrine tumor. Intestinal mucosal tissue is not present. CASE SUMMARY:   Procedure: Small bowel resection   Tumor site: Ileum   Tumor size: Greatest dimension of 1.5 cm   Tumor focality: Multifocal: 4 separate tumors   Histologic type and grade: G2: Well-differentiated neuroendocrine tumor   Mitotic rate: between 2-20 mitoses/2 mm2   Ki-67 labeling index: between 3-20%   Tumor extension: Tumor invades through the muscularis propria into subserosal tissue without penetration of overlying serosa   Margins:  All margins are uninvolved by tumor    Margins examined: Proximal and distal   Lymphovascular invasion: Present   Perineural invasion: Present   Large mesenteric masses (greater than 2 cm): Present (one 2.5 cm mass)   Regional lymph nodes:    Number of lymph nodes involved: 3    Number of lymph nodes examined: 10   Pathologic stage classification (pTNM, AJCC eighth edition):    pT3    pN2    pM1a -confirmed liver Hypothyroidism     Irritable bowel syndrome     Migraines     Status post alcohol detoxification     5/22/2018-5/29/2018     Medications:  Reviewed and reconciled. Allergies:  No Known Allergies    Physical Exam:  /73   Pulse 100   SpO2 98%   GENERAL: Alert, oriented x 3, tired  HEENT: PERRLA; EOMI. Oropharynx clear. NECK: Supple. Without lymphadenopathy. LUNGS: Good air entry bilaterally. No wheezing, crackles or ronchi. CARDIOVASCULAR: Regular rate. No murmurs, rubs or gallops. ABDOMEN: Soft. Non-tender, non-distended. Positive bowel sounds. EXTREMITIES: Without clubbing, cyanosis, or edema. NEUROLOGIC: No focal deficits. 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 Multiple liver masses suspicious for metastatic disease. 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 Comment: Sections of the liver tissue cores show the hepatic parenchyma to be partially replaced by a proliferation of epithelioid cells with relatively uniform round nuclei and eosinophilic granular cytoplasm.  The   epithelioid cells form anastomosing solid cords/nests that are surrounded by delicate fibrovascular stroma.  There are no mitotic figures or tumor cell necrosis present.  The histologic changes seen are suggestive of well-differentiated neuroendocrine (carcinoid) tumor.      Immunostaining for pankeratin, chromogranin, synaptophysin, TTF-1 and Ki-67 was performed on sections of one tissue block (A1) and the neoplastic epithelioid cells show diffuse and strong positivity for neuroendocrine markers (chromogranin and synaptophysin) and moderate positivity for pankeratin.    There is no staining reactivity for TTF-1.   The Ki-67 proliferation labeling index is essentially negative, (very low, <1%). This staining pattern confirms the histologic impression of a well-differentiated neuroendocrine (carcinoid) tumor. FL Small bowel 09/02/2020:  Rapid small bowel transit. Serum Chromogranin A 160 on 09/23/2020. Urine 5-HIAA 21 (0-14)  2d-Echo 10/10/2020: EF 60-65%   Normal right ventricular size and function    Dotatate PET/CT scan 10/14/2020 increased tracer uptake seen throughout the liver compatible with neoplasm. This involves both the left and the right lobe of liver. In addition there are 2 foci of increased tracer uptake along the mesentery of the small bowel. This may involve the wall the small bowel. No other convincing evidence for extra-abdominal metastatic disease. EGD/Colonoscopy 10/05/2020 by Dr. Divina Parikh; records reviewed. Hepatobiliary team (Dr. Manda Moya) consult appreciated. Small bowel resection on 10/21/2020. A.  Ileum, resection:   Multifocal (4 foci) neuroendocrine tumor (NET). Extensive perineural and angiolymphatic invasion. 3 of 10 lymph nodes with metastatic neuroendocrine tumor and extracapsular extension of tumor cells (3/10). Bilateral viable small bowel resection margins with no evidence of tumor. Janneth Short received as \"Meckel's\":   Nodular scar tissue with patchy chronic inflammation. Negative for neuroendocrine tumor. Intestinal mucosal tissue is not present. CASE SUMMARY:   Procedure: Small bowel resection   Tumor site: Ileum   Tumor size: Greatest dimension of 1.5 cm   Tumor focality: Multifocal: 4 separate tumors   Histologic type and grade: G2: Well-differentiated neuroendocrine tumor   Mitotic rate: between 2-20 mitoses/2 mm2   Ki-67 labeling index: between 3-20%   Tumor extension: Tumor invades through the muscularis propria into subserosal tissue without penetration of overlying serosa   Margins:  All margins are uninvolved by tumor    Margins

## 2021-04-11 LAB — CHROMOGRANIN A: 115 NG/ML (ref 0–103)

## 2021-04-14 ENCOUNTER — OFFICE VISIT (OUTPATIENT)
Dept: NEUROLOGY | Age: 36
End: 2021-04-14
Payer: COMMERCIAL

## 2021-04-14 VITALS
TEMPERATURE: 97.2 F | SYSTOLIC BLOOD PRESSURE: 104 MMHG | DIASTOLIC BLOOD PRESSURE: 60 MMHG | HEIGHT: 60 IN | BODY MASS INDEX: 23.56 KG/M2 | WEIGHT: 120 LBS

## 2021-04-14 DIAGNOSIS — Z86.69 HX OF MIGRAINE HEADACHES: Primary | ICD-10-CM

## 2021-04-14 DIAGNOSIS — R56.9 SEIZURES (HCC): ICD-10-CM

## 2021-04-14 PROCEDURE — 99213 OFFICE O/P EST LOW 20 MIN: CPT | Performed by: NURSE PRACTITIONER

## 2021-04-14 PROCEDURE — G8427 DOCREV CUR MEDS BY ELIG CLIN: HCPCS | Performed by: NURSE PRACTITIONER

## 2021-04-14 PROCEDURE — G8420 CALC BMI NORM PARAMETERS: HCPCS | Performed by: NURSE PRACTITIONER

## 2021-04-14 PROCEDURE — 4004F PT TOBACCO SCREEN RCVD TLK: CPT | Performed by: NURSE PRACTITIONER

## 2021-04-14 NOTE — PROGRESS NOTES
[facility]. Monae Hughes M.D., F.A.C.P. Tremayne Gresham, DNP, APRN, ACNS-BC  Yanick Vinson. Danie Stark, MSN, APRN-FNP-C  Venkatesh Terry, MSN, APRN-FNP-C  Chip DANIELLE Dubois, PA-C  Rona Daley, MSN, APRN-FNP-C  [address]  [place], [address]  Phone: [phone]  Fax: [phone]       Rudy Kline is a 29 y.o. right handed female     Patient follows for seizures    Was admitted and seen by a our Neurology service in January 22-29, 2021 for seizure like activity in the setting of acute renal failure requiring dialysis. CTH and EEG were both negative. MRI bran was negative but did show mild nonspecific foci of periventricular and subcortical cerebral white matter T2/FLAIR hyperintensity which may have been migraine related angiopathy and trauma related white matter change vs metastic disease. She was started onTopamax and keppra. She had not yet followed up with OP Neurology. She then presented to ED on 2/7/2021 for seizure like activity, hand spasms and difficulty with speech. She had moments of staring off and being unaware of her surroundings. She noted to having a grand mal seizure back in December 2020. She was diagnosed with neuroendocrine tumor with hepatic mets in September and started chemotherapy injections, which she receives monthly. She had a follow PET scan which did not show any further mets. She is currently on Xermelo. 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 She has no complaints and just received her chemo injection last week. She receives these injections once a month and by the third week she will be sick to her stomach and to lie in bed until the next injection.      No issues with chewing or swallowing  No chest pain or palpitations  No falls, tripping or stumbling  No itching or bruising appreciated  No numbness, tingling or focal arm/leg weakness    ROS is otherwise negative    Objective:     /60 (Site: Right Upper Arm)   Temp 97.2 °F (36.2 °C)   Ht 5' (1.524 m)   Wt 120 lb (54.4 kg)   BMI 23.44 kg/m²     General appearance: alert, appears stated age, cooperative and in no distress  Head: normocephalic, without obvious abnormality, atraumatic  Eyes: conjunctivae/corneas clear; no drainage  Neck:  supple, symmetrical, trachea midline, scar where thyroid was removed   Lungs: diminished to auscultation bilaterally  Heart: regular rate and rhythm, S1, S2 normal  Abdomen: soft, non-tender; bowel sounds normal  Extremities: normal, atraumatic, no cyanosis or edema  Skin:  color, texture, turgor normal--no rashes or lesions      Mental Status: alert and oriented x 4    Appropriate attention/concentration  Intact fundus of knowledge  Repetition intact  Memories intact    Speech: no dysarthria  Language: no aphasias---reading, writing, repetition, and object identification intact    Cranial Nerves:  I: smell    II: visual acuity     II: visual fields Full    II: pupils VITOR   III,VII: ptosis None   III,IV,VI: extraocular muscles  EOMI without nystagmus   V: mastication Normal   V: facial light touch sensation  Normal   V,VII: corneal reflex     VII: facial muscle function - upper  Normal   VII: facial muscle function - lower Normal   VIII: hearing Normal   IX: soft palate elevation  Normal   IX,X: gag reflex    XI: trapezius strength  5/5   XI: sternocleidomastoid strength 5/5   XI: neck extension strength  5/5   XII: tongue strength  Normal     Motor:  5/5 throughout  Normal bulk and tone  No drift   No abnormal movements    Sensory:  LT normal    Coordination:   FN, FFM and ERNST normal  HS normal    Gait:  Normal    DTR:   2 + throughout     Laboratory/Radiology:  ry/Radiology:     CBC with Differential:    Lab Results   Component Value Date    WBC 9.9 04/08/2021    RBC 4.12 04/08/2021    HGB 13.1 04/08/2021    HCT 37.4 04/08/2021    PLT 91 04/08/2021    MCV 90.8 04/08/2021 MCH 31.8 04/08/2021    MCHC 35.0 04/08/2021    RDW 13.2 04/08/2021    LYMPHOPCT 19.0 04/08/2021    MONOPCT 3.8 04/08/2021    BASOPCT 0.4 04/08/2021    MONOSABS 0.38 04/08/2021    LYMPHSABS 1.89 04/08/2021    EOSABS 0.08 04/08/2021    BASOSABS 0.04 04/08/2021     CMP:    Lab Results   Component Value Date     04/08/2021    K 2.9 04/08/2021    K 3.8 02/07/2021    CL 96 04/08/2021    CO2 23 04/08/2021    BUN 9 04/08/2021    CREATININE 0.6 04/08/2021    GFRAA >60 04/08/2021    LABGLOM >60 04/08/2021    GLUCOSE 117 04/08/2021    PROT 7.0 04/08/2021    LABALBU 4.2 04/08/2021    CALCIUM 8.2 04/08/2021    BILITOT 0.8 04/08/2021    ALKPHOS 227 04/08/2021    AST 49 04/08/2021    ALT 35 04/08/2021     Hepatic Function Panel:    Lab Results   Component Value Date    ALKPHOS 227 04/08/2021    ALT 35 04/08/2021    AST 49 04/08/2021    PROT 7.0 04/08/2021    BILITOT 0.8 04/08/2021    BILIDIR 0.3 01/29/2021    IBILI 0.2 01/29/2021    LABALBU 4.2 04/08/2021     EEG: normal    MRI brain: no acute findings    All labs and images were personally reviewed at the time of this visit    Assessment:     Seizure disorder vs nonepileptic events  --- history of neuroendocrine tumor with hepatic mets  --- EEG and MRI were unremarkable  --- Hypocalcemia can cause similar cramping and spasms  --- history of atypical migraines  --- no reported seizures   --- neuro exam unremarkable     History of migraines  --- no reports of headaches or severe migraines   --- has been Fioricet, phenergan in the past   --- taking topamax     Plan:     Continue Topamax to 75 mg BID    Continue Keppra 500 mg BI    Follow up in 6 months    Call if any seizure like episodes occur or with any questions or concerns      TRINA Arana, FNP-C  9:03 AM  4/14/2021

## 2021-04-15 DIAGNOSIS — G89.3 NEOPLASM RELATED PAIN: ICD-10-CM

## 2021-04-15 RX ORDER — TRAMADOL HYDROCHLORIDE 50 MG/1
50 TABLET ORAL EVERY 6 HOURS PRN
Qty: 56 TABLET | Refills: 0 | Status: SHIPPED
Start: 2021-04-15 | End: 2021-05-10 | Stop reason: SDUPTHER

## 2021-04-16 DIAGNOSIS — F51.01 PRIMARY INSOMNIA: ICD-10-CM

## 2021-04-16 RX ORDER — ZOLPIDEM TARTRATE 10 MG/1
TABLET ORAL
Qty: 30 TABLET | Refills: 3 | Status: SHIPPED | OUTPATIENT
Start: 2021-04-16 | End: 2021-05-16

## 2021-04-16 RX ORDER — GABAPENTIN 300 MG/1
300 CAPSULE ORAL 3 TIMES DAILY
Qty: 90 CAPSULE | Refills: 0 | Status: SHIPPED
Start: 2021-04-16 | End: 2021-05-10

## 2021-04-16 NOTE — TELEPHONE ENCOUNTER
Call from Amber Ngo , who hasn't seen Palliative care since 10/13/20. There were a few virtual visits she completed and she was seen by Palliative care while in hospital. Amber Ngo rescheduled with Palliative care for 4/26/21. Erikakevan Jeni requests a refill for gabapentin 300 mg that she takes three times a day. Pharmacy is [facility] in [place].

## 2021-04-19 ENCOUNTER — OFFICE VISIT (OUTPATIENT)
Dept: SURGERY | Age: 36
End: 2021-04-19
Payer: COMMERCIAL

## 2021-04-19 VITALS
BODY MASS INDEX: 24.94 KG/M2 | HEIGHT: 60 IN | RESPIRATION RATE: 16 BRPM | WEIGHT: 127 LBS | TEMPERATURE: 97.7 F | DIASTOLIC BLOOD PRESSURE: 59 MMHG | SYSTOLIC BLOOD PRESSURE: 91 MMHG | HEART RATE: 63 BPM

## 2021-04-19 DIAGNOSIS — K90.9 DIARRHEA DUE TO MALABSORPTION: ICD-10-CM

## 2021-04-19 DIAGNOSIS — K60.0 ACUTE POSTERIOR ANAL FISSURE: Primary | ICD-10-CM

## 2021-04-19 DIAGNOSIS — R19.7 DIARRHEA DUE TO MALABSORPTION: ICD-10-CM

## 2021-04-19 PROCEDURE — 99213 OFFICE O/P EST LOW 20 MIN: CPT | Performed by: SURGERY

## 2021-04-19 PROCEDURE — 4004F PT TOBACCO SCREEN RCVD TLK: CPT | Performed by: SURGERY

## 2021-04-19 PROCEDURE — G8427 DOCREV CUR MEDS BY ELIG CLIN: HCPCS | Performed by: SURGERY

## 2021-04-19 PROCEDURE — G8420 CALC BMI NORM PARAMETERS: HCPCS | Performed by: SURGERY

## 2021-04-19 RX ORDER — SODIUM CHLORIDE 9 MG/ML
INJECTION, SOLUTION INTRAVENOUS CONTINUOUS
Status: CANCELLED | OUTPATIENT
Start: 2021-04-19

## 2021-04-19 NOTE — PROGRESS NOTES
Progress Note - Follow up    Patient's Name/Date of Birth: Tessa Cerna / 1985    Date: 4/19/2021    PCP: Mamie Danielson DO    Referring Physician:   Sidney Purcell, [address] [facility]    Chief Complaint   Patient presents with    Rectal Pain     from diarrhea        HPI:    The patient is having a lot of diarrhea. She is on octreotide which she has been on for a while and has noticed some improvement. She is on another medication that Dr. Lucinda Foley has put her on. She said she is having a lot of perianal issues. She said she has tried preparation H and other steroid creams. She has done sitz baths. She has tried Anusol as well. She is using preparation H wipes as well. She has had rare bleeding. She said she mostly gets pain with BM and it is worse when they are formed. Patient's medications, allergies, past medical, surgical, social and family histories were reviewed and updated as appropriate. Review of Systems  Constitutional: negative  Respiratory: negative  Cardiovascular: negative  Gastrointestinal: as in hpi  Genitourinary:negative  Integument/breast: negative     Physical Exam:  BP (!) 91/59   Pulse 63   Temp 97.7 °F (36.5 °C) (Temporal)   Resp 16   Ht 5' (1.524 m)   Wt 127 lb (57.6 kg)   BMI 24.80 kg/m²   General appearance: alert, cooperative and in no acute distress. Lungs: normal work of breathing  Heart: regular rate  Abdomen: soft, nontender, nondistended  Musculoskeletal: symmetrical without edema. Skin: posterior anal fissure    Impression/Plan:  29y.o. year old female with a posterior anal fissure    Sigmoidoscopy and Perianal Botox injection  I explained the risks, benefits, alternatives, and potential complications associated with the above procedure to be performed and transfusions when applicable with the patient/responsible person prior to the procedure. All of the patient's questions were answered. The patient understands and agrees to surgery.          Electronically by Gaurav Herrera MD, General Surgery  on 4/19/2021 at 2:13 PM      Send copy of H&P to PCP, Fabienne Person DO and referring physician, Ej Alvarez DO      4/19/2021

## 2021-04-20 ENCOUNTER — TELEPHONE (OUTPATIENT)
Dept: SURGERY | Age: 36
End: 2021-04-20

## 2021-04-20 NOTE — TELEPHONE ENCOUNTER
Prior Authorization Form:      DEMOGRAPHICS:                     Patient Name:  Chriss Stewart  Patient :  1985            Insurance:  Payor: Marisol Ruiz / Plan: [employer] / Product Type: *No Product type* /   Insurance ID Number:    Payor/Plan Subscr  Sex Relation Sub.  Ins. ID Effective Group Num   1. ANGELA - * DENTON SALAS 1985 Female Self [id number] [address] [facility] [address]         DIAGNOSIS & PROCEDURE:                       Procedure/Operation: SIGMOIDOSCOPY PERIANAL BOTOX INJECTION           CPT Code: 75035/58428    Diagnosis:  POSTERIOR ANAL FISSURE    ICD10 Code: K60.0    [facility]  [facility]     Surgeon:  Rogers Riley     SCHEDULING INFORMATION:                          Date: 21  Time: 11:00              Anesthesia:  MAC/TIVA                                                       Status:  Outpatient        Special Comments:         Electronically signed by Mike Mckeon MA on 2021 at 1:39 PM

## 2021-04-26 ENCOUNTER — OFFICE VISIT (OUTPATIENT)
Dept: PALLATIVE CARE | Age: 36
End: 2021-04-26
Payer: COMMERCIAL

## 2021-04-26 VITALS
HEART RATE: 90 BPM | DIASTOLIC BLOOD PRESSURE: 78 MMHG | SYSTOLIC BLOOD PRESSURE: 111 MMHG | WEIGHT: 125.9 LBS | BODY MASS INDEX: 24.59 KG/M2 | OXYGEN SATURATION: 99 %

## 2021-04-26 DIAGNOSIS — R19.7 DIARRHEA, UNSPECIFIED TYPE: ICD-10-CM

## 2021-04-26 DIAGNOSIS — C7A.8 NEUROENDOCRINE CARCINOMA METASTATIC TO LIVER (HCC): ICD-10-CM

## 2021-04-26 DIAGNOSIS — C7B.8 NEUROENDOCRINE CARCINOMA METASTATIC TO LIVER (HCC): ICD-10-CM

## 2021-04-26 DIAGNOSIS — Z51.5 PALLIATIVE CARE BY SPECIALIST: Primary | ICD-10-CM

## 2021-04-26 PROCEDURE — G8420 CALC BMI NORM PARAMETERS: HCPCS | Performed by: NURSE PRACTITIONER

## 2021-04-26 PROCEDURE — 99213 OFFICE O/P EST LOW 20 MIN: CPT | Performed by: NURSE PRACTITIONER

## 2021-04-26 PROCEDURE — G8428 CUR MEDS NOT DOCUMENT: HCPCS | Performed by: NURSE PRACTITIONER

## 2021-04-26 PROCEDURE — 4004F PT TOBACCO SCREEN RCVD TLK: CPT | Performed by: NURSE PRACTITIONER

## 2021-04-26 RX ORDER — DEXAMETHASONE 4 MG/1
TABLET ORAL
Qty: 18 TABLET | Refills: 0 | Status: SHIPPED
Start: 2021-04-26 | End: 2021-05-10 | Stop reason: ALTCHOICE

## 2021-04-26 NOTE — PROGRESS NOTES
she states she typically has on average 8 episodes of diarrhea per day, watery in consistency, does not appear to be related to oral intake. She has noted some improvement as she is continued with plan Tria tied as well as the addition of Rhona Ohms. She also typically utilizes Imodium once per day. States that this is somewhat improved, however her diarrhea continues to be moderately distressing, and she feels that this worsens about 1 week prior to her next lanreotide injection. Discussed options, will use a trial of dexamethasone to see if this improves her diarrhea at all. We will see her in 2 weeks to reassess.     Pain Assessment   Ratin  Description: burning, sharp and shooting  Duration: minutes  Frequency: daily  Location: Abdomen   Alleviating Factors: relaxation  Exacerbating Factors: unable to associate with any factor  Effect: change in function and sleep    Past Medical History:   Diagnosis Date    Abdominal pain     [facility]'s [facility]     Cancer ([facility] [facility]     neuroendocrime tumor    Encounter regarding vascular access for dialysis for ESRD ([facility] 2021    Hemodialysis patient ([facility] [facility] 2021    Dr. Stacie Cornejo Hypocalcemia     Hypothyroidism     Irritable bowel syndrome     Migraines     Status post alcohol detoxification     2018-2018       Past Surgical History:   Procedure Laterality Date    CHOLECYSTECTOMY, LAPAROSCOPIC  2016    COLONOSCOPY N/A 2018    COLONOSCOPY WITH BIOPSY performed by Pino Kraft MD at [address] [facility] [address] BIOPSY RENAL  2021    CT BIOPSY RENAL 2021 Edy Restrepo MD [facility]    CT NEEDLE BIOPSY LIVER PERCUTANEOUS  2020    CT NEEDLE BIOPSY LIVER PERCUTANEOUS 2020 [facility]    HC DIALYSIS CATHETER N/A 2021    TESIO CATHETER INSERTION AND REMOVAL OF TEMPORARY performed by Sadi Marti MD at [address] INTESTINE SURGERY N/A 10/21/2020    LAPAROSCOPIC ROBOTIC SMALL BOWEL RESECTION WITH PLANNED TRANSITION TO OPEN performed by Darrian Rice MD at [address] THYROIDECTOMY         Family History   Problem Relation Age of Onset    High Blood Pressure Mother     High Cholesterol Mother     Other Mother         migraine headaches    Heart Disease Father     Asthma Father     High Blood Pressure Father     Cancer Father         Sarcoidosis    Diabetes Other         GRANDMOTHER    Lung Cancer Other         GRANDFATHER    Alzheimer's Disease Other         GRANDMOTHER    Breast Cancer Maternal Grandmother     Cancer Maternal Grandmother         skin    Cancer Paternal Grandfather         lung       ROS: UNLESS STATED ABOVE PATIENT DENIES:  CONSTITUTIONAL:  fever, chill, rigors, nausea, vomiting, fatigue. HEENT: blurry vision, double vision, hearing problem, tinnitus, hoarseness, dysphagia, odynophagia  RESPIRATORY: cough, shortness of breath, sputum expectoration. CARDIOVASCULAR:  Chest pain/pressure, palpitation, syncope, irregular beats  GASTROINTESTINAL:  abdominal or rectal pain, diarrhea, constipation, . GENITOURINARY:  Burning, frequency, urgency, incontinence, discharge  INTEGUMENTARY: rash, wound, pruritis  HEMATOLOGIC/LYMPHATIC:  Swelling, sores, gum bleeding, easy bruising, pica.   MUSCULOSKELETAL:  pain, edema, joint swelling or redness  NEUROLOGICAL:  light headed, dizziness, loss of consciousness, weakness, change in memory, seizures, tremors    Objective:     Physical Exam  Wt Readings from Last 3 Encounters:   04/26/21 125 lb 14.4 oz (57.1 kg)   04/19/21 127 lb (57.6 kg)   04/14/21 120 lb (54.4 kg)     /78   Pulse 90   Wt 125 lb 14.4 oz (57.1 kg)   SpO2 99% Comment: RA  BMI 24.59 kg/m²     Gen:  Alert, appears stated age, well nourished, in no acute distress  HEENT:  Normocephalic, conjunctiva pink, no drainage, mucosa moist  Neck:  Supple  Lungs:  CTA bilaterally, no audible rhonchi or wheezes noted  Heart[de-identified]  RRR, no murmur, rub, or gallop noted during exam  Abd:  Soft, non tender, non distended, BS+  M/S/Ext:  Moving all extremities, no edema, pulses present  Skin:  Warm and dry  Neuro:  PERRL, Alert, oriented x 3; following commands      Garland Symptom Assessment Score   Garland Score 4/26/2021 10/13/2020   Pain Score 5 8   Tiredness Score 7 5   Nausea Score Not nauseated Not nauseated   Depression Score Not depressed 2   Anxiety Score Not anxious 8   Drowsiness Score 1 2   Appetite Score 1 5   Wellbeing Score 3 7   Dyspnea Score 5 No shortness of breath   Other Problem Score Best possible response Best possible response   Total Assessment Score(calculated) 22 37     Assessed by: patient. Current Medications:  Medications reviewed: yes    Controlled Substances Monitoring: [place] reviewed 4/26/21. Jerrod PINTO  Palliative Care Department     Time/Communication  Greater than 50% of time spent, total 25 minutes in face-to-face counseling and coordination of care regarding symptom management. Note: This report was completed using computerize voiced recognition software. Every effort has been made to ensure accuracy; however, inadvertent computerized transcription errors may be present.

## 2021-04-29 RX ORDER — HYDROCORTISONE 25 MG/G
CREAM TOPICAL
Qty: 28 G | Refills: 1 | Status: SHIPPED
Start: 2021-04-29 | End: 2021-06-01

## 2021-05-04 DIAGNOSIS — C7B.8 METASTATIC MALIGNANT NEUROENDOCRINE TUMOR TO LIVER (HCC): ICD-10-CM

## 2021-05-04 RX ORDER — POTASSIUM CHLORIDE 20 MEQ/1
TABLET, EXTENDED RELEASE ORAL
Qty: 30 TABLET | Refills: 0 | Status: SHIPPED
Start: 2021-05-04 | End: 2021-06-02

## 2021-05-06 ENCOUNTER — HOSPITAL ENCOUNTER (OUTPATIENT)
Dept: INFUSION THERAPY | Age: 36
Discharge: HOME | End: 2021-05-06
Payer: COMMERCIAL

## 2021-05-06 ENCOUNTER — OFFICE VISIT (OUTPATIENT)
Dept: ONCOLOGY | Age: 36
End: 2021-05-06
Payer: COMMERCIAL

## 2021-05-06 VITALS
HEART RATE: 77 BPM | DIASTOLIC BLOOD PRESSURE: 76 MMHG | WEIGHT: 131 LBS | BODY MASS INDEX: 25.72 KG/M2 | HEIGHT: 60 IN | RESPIRATION RATE: 14 BRPM | TEMPERATURE: 97.4 F | OXYGEN SATURATION: 98 % | SYSTOLIC BLOOD PRESSURE: 111 MMHG

## 2021-05-06 DIAGNOSIS — C7B.8 METASTATIC MALIGNANT NEUROENDOCRINE TUMOR TO LIVER (HCC): Primary | ICD-10-CM

## 2021-05-06 LAB
ALBUMIN SERPL-MCNC: 4.1 G/DL (ref 3.5–5.2)
ALP BLD-CCNC: 100 U/L (ref 35–104)
ALT SERPL-CCNC: 40 U/L (ref 0–32)
ANION GAP SERPL CALCULATED.3IONS-SCNC: 12 MMOL/L (ref 7–16)
ANISOCYTOSIS: ABNORMAL
AST SERPL-CCNC: 38 U/L (ref 0–31)
BASOPHILS ABSOLUTE: 0 E9/L (ref 0–0.2)
BASOPHILS RELATIVE PERCENT: 0.2 % (ref 0–2)
BILIRUB SERPL-MCNC: <0.2 MG/DL (ref 0–1.2)
BUN BLDV-MCNC: 18 MG/DL (ref 6–20)
CALCIUM SERPL-MCNC: 8.6 MG/DL (ref 8.6–10.2)
CHLORIDE BLD-SCNC: 101 MMOL/L (ref 98–107)
CO2: 20 MMOL/L (ref 22–29)
CREAT SERPL-MCNC: 0.4 MG/DL (ref 0.5–1)
EOSINOPHILS ABSOLUTE: 0.15 E9/L (ref 0.05–0.5)
EOSINOPHILS RELATIVE PERCENT: 0.9 % (ref 0–6)
GFR AFRICAN AMERICAN: >60
GFR NON-AFRICAN AMERICAN: >60 ML/MIN/1.73
GLUCOSE BLD-MCNC: 144 MG/DL (ref 74–99)
HCT VFR BLD CALC: 36.4 % (ref 34–48)
HEMOGLOBIN: 12.2 G/DL (ref 11.5–15.5)
LYMPHOCYTES ABSOLUTE: 1.89 E9/L (ref 1.5–4)
LYMPHOCYTES RELATIVE PERCENT: 10.5 % (ref 20–42)
MCH RBC QN AUTO: 32.2 PG (ref 26–35)
MCHC RBC AUTO-ENTMCNC: 33.5 % (ref 32–34.5)
MCV RBC AUTO: 96 FL (ref 80–99.9)
METAMYELOCYTES RELATIVE PERCENT: 1.8 % (ref 0–1)
MONOCYTES ABSOLUTE: 1.03 E9/L (ref 0.1–0.95)
MONOCYTES RELATIVE PERCENT: 6.1 % (ref 2–12)
MYELOCYTE PERCENT: 3.5 % (ref 0–0)
NEUTROPHILS ABSOLUTE: 14.28 E9/L (ref 1.8–7.3)
NEUTROPHILS RELATIVE PERCENT: 77.2 % (ref 43–80)
PDW BLD-RTO: 16.7 FL (ref 11.5–15)
PLATELET # BLD: 273 E9/L (ref 130–450)
PMV BLD AUTO: 8.9 FL (ref 7–12)
POLYCHROMASIA: ABNORMAL
POTASSIUM SERPL-SCNC: 4.3 MMOL/L (ref 3.5–5)
RBC # BLD: 3.79 E12/L (ref 3.5–5.5)
SODIUM BLD-SCNC: 133 MMOL/L (ref 132–146)
TOTAL PROTEIN: 6.8 G/DL (ref 6.4–8.3)
WBC # BLD: 17.2 E9/L (ref 4.5–11.5)

## 2021-05-06 PROCEDURE — 99214 OFFICE O/P EST MOD 30 MIN: CPT | Performed by: INTERNAL MEDICINE

## 2021-05-06 PROCEDURE — G8427 DOCREV CUR MEDS BY ELIG CLIN: HCPCS | Performed by: INTERNAL MEDICINE

## 2021-05-06 PROCEDURE — 80053 COMPREHEN METABOLIC PANEL: CPT

## 2021-05-06 PROCEDURE — G8417 CALC BMI ABV UP PARAM F/U: HCPCS | Performed by: INTERNAL MEDICINE

## 2021-05-06 PROCEDURE — 96372 THER/PROPH/DIAG INJ SC/IM: CPT

## 2021-05-06 PROCEDURE — 99212 OFFICE O/P EST SF 10 MIN: CPT

## 2021-05-06 PROCEDURE — 36415 COLL VENOUS BLD VENIPUNCTURE: CPT

## 2021-05-06 PROCEDURE — 6360000002 HC RX W HCPCS: Performed by: NURSE PRACTITIONER

## 2021-05-06 PROCEDURE — 4004F PT TOBACCO SCREEN RCVD TLK: CPT | Performed by: INTERNAL MEDICINE

## 2021-05-06 PROCEDURE — 85025 COMPLETE CBC W/AUTO DIFF WBC: CPT

## 2021-05-06 PROCEDURE — 86316 IMMUNOASSAY TUMOR OTHER: CPT

## 2021-05-06 RX ORDER — LANOLIN ALCOHOL/MO/W.PET/CERES
CREAM (GRAM) TOPICAL
COMMUNITY
Start: 2021-04-30 | End: 2021-05-11

## 2021-05-06 RX ORDER — LEVETIRACETAM 750 MG/1
TABLET ORAL
COMMUNITY
Start: 2021-04-26 | End: 2022-02-15

## 2021-05-06 RX ORDER — LANREOTIDE ACETATE 120 MG/.5ML
120 INJECTION SUBCUTANEOUS ONCE
Status: COMPLETED | OUTPATIENT
Start: 2021-05-06 | End: 2021-05-06

## 2021-05-06 RX ORDER — LANREOTIDE ACETATE 120 MG/.5ML
120 INJECTION SUBCUTANEOUS ONCE
Status: CANCELLED | OUTPATIENT
Start: 2021-06-03 | End: 2021-06-03

## 2021-05-06 RX ADMIN — LANREOTIDE ACETATE 120 MG: 120 INJECTION SUBCUTANEOUS at 15:28

## 2021-05-06 NOTE — PROGRESS NOTES
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 CT abdomen/pelvis 08/28/2020:  2 cm masslike density in the mid abdominal small bowel mesentery with a spiculated appearance. Multiple liver masses suspicious for metastatic disease. 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 Comment: Sections of the liver tissue cores show the hepatic parenchyma to be partially replaced by a proliferation of epithelioid cells with relatively uniform round nuclei and eosinophilic granular cytoplasm.  The   epithelioid cells form anastomosing solid cords/nests that are surrounded by delicate fibrovascular stroma.  There are no mitotic figures or tumor cell necrosis present.  The histologic changes seen are suggestive of well-differentiated neuroendocrine (carcinoid) tumor. Immunostaining for pankeratin, chromogranin, synaptophysin, TTF-1 and Ki-67 was performed on sections of one tissue block (A1) and the neoplastic epithelioid cells show diffuse and strong positivity for neuroendocrine markers (chromogranin and synaptophysin) and moderate positivity for pankeratin.  There is no staining reactivity for TTF-1. The Ki-67 proliferation labeling index is essentially negative, (very low, <1%). This staining pattern confirms the histologic impression of a well-differentiated neuroendocrine (carcinoid) tumor. FL Small bowel 09/02/2020:  Rapid small bowel transit. Serum Chromogranin A 160 on 09/23/2020.   Urine 5-HIAA 21 (0-14)  2d-Echo 10/10/2020: EF 60-65%   Normal right ventricular size and function Dotatate PET/CT scan 10/14/2020 increased tracer uptake seen throughout the liver compatible with neoplasm. This involves both the left and the right lobe of liver. In addition there are 2 foci of increased tracer uptake along the mesentery of the small bowel. This may involve the wall the small bowel. No other convincing evidence for extra-abdominal metastatic disease. EGD/Colonoscopy 10/05/2020 by Dr. Rajesh Harris; records reviewed. Hepatobiliary team (Dr. Toby Ceja) consult appreciated. Small bowel resection on 10/21/2020. Small bowel resection on 10/21/2020. A.  Ileum, resection:   Multifocal (4 foci) neuroendocrine tumor (NET). Extensive perineural and angiolymphatic invasion. 3 of 10 lymph nodes with metastatic neuroendocrine tumor and extracapsular extension of tumor cells (3/10). Bilateral viable small bowel resection margins with no evidence of tumor. James Miesha received as \"Meckel's\":   Nodular scar tissue with patchy chronic inflammation. Negative for neuroendocrine tumor. Intestinal mucosal tissue is not present. CASE SUMMARY:   Procedure: Small bowel resection   Tumor site: Ileum   Tumor size: Greatest dimension of 1.5 cm   Tumor focality: Multifocal: 4 separate tumors   Histologic type and grade: G2: Well-differentiated neuroendocrine tumor   Mitotic rate: between 2-20 mitoses/2 mm2   Ki-67 labeling index: between 3-20%   Tumor extension: Tumor invades through the muscularis propria into subserosal tissue without penetration of overlying serosa   Margins:  All margins are uninvolved by tumor    Margins examined: Proximal and distal   Lymphovascular invasion: Present   Perineural invasion: Present   Large mesenteric masses (greater than 2 cm): Present (one 2.5 cm mass)   Regional lymph nodes:    Number of lymph nodes involved: 3    Number of lymph nodes examined: 10   Pathologic stage classification (pTNM, AJCC eighth edition):    pT3    pN2    pM1a -confirmed liver metastasis, TYO-     Comment:   A. 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 Dose # 1 Lanreotide was on 11/05/2020. Dose # 2 Lanreotide was on 12/03/2020. Dose # 3 Lanreotide was on 01/07/2021. Serum Chromogranin A 95 on 01/07/2021. CT chest 02/07/2021 negative for metastatic disease. CT abdomen/pelvis 02/07/2021 Persistent bilobar hepatic lesions which are grossly stable consistent with stable widespread hepatic metastasis. Imaging reviewed. Continue Lanreotide and repeat scans in 3 months. Dose # 4 Lanreotide was on 02/11/2021. Ga 76 Dotatate PET 03/09/2021 Gallium 68 dotatate avid uptake throughout multiple regions of the liver compatible with multiple foci of neuroendocrine tumor in both the right and the left lobe of the liver, when compared to previous not significantly changed in the interval.  Dose # 5 Lanreotide was on 03/11/2021. Dose # 6 Lanreotide was on 04/08/2021. Today 05/06/2021: No fever, chills. Diarrhea slowly improving on Imodium, Lomotil and Xermelo. Fair appetite and energy level. Mild hot flashes/flushing. Review of Systems;  CONSTITUTIONAL: No fever. Mild hot flashes/flushing. ENMT: Eyes: No diplopia; Nose: No epistaxis. Mouth: No sore throat. RESPIRATORY: No hemoptysis, shortness of breath, cough. CARDIOVASCULAR: No chest pain, palpitations. GASTROINTESTINAL: Diarrhea slowly improving on Imodium, Lomotil and Xermelo. GENITOURINARY: No dysuria, urinary frequency, hematuria. NEURO: No syncope, presyncope, headache.   Remainder:  ROS NEGATIVE    Past Medical History:      Diagnosis Date    Abdominal pain     [facility] ([facility]     Cancer ([facility]     neuroendocrime tumor    Encounter regarding vascular access for dialysis for ESRD ([facility] 01/26/2021    Hemodialysis patient ([facility] 01/25/2021    Dr. Rehana Caruso Hypocalcemia     Hypothyroidism     Irritable bowel syndrome     Migraines     Status post alcohol detoxification     5/22/2018-5/29/2018     Medications:  Reviewed and reconciled. Allergies:  No Known Allergies    Physical Exam:  /76   Pulse 77   Temp 97.4 °F (36.3 °C)   Resp 14   Ht 5' (1.524 m)   Wt 131 lb (59.4 kg)   SpO2 98%   BMI 25.58 kg/m²   GENERAL: Alert, oriented x 3, not in distress  HEENT: PERRLA; EOMI. Oropharynx clear. NECK: Supple. Without lymphadenopathy. LUNGS: Good air entry bilaterally. No wheezing, crackles or ronchi. CARDIOVASCULAR: Regular rate. No murmurs, rubs or gallops. ABDOMEN: Soft. Non-tender, non-distended. Positive bowel sounds. EXTREMITIES: Without clubbing, cyanosis, or edema. NEUROLOGIC: No focal deficits. 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 Multiple liver masses suspicious for metastatic disease. 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 Comment: Sections of the liver tissue cores show the hepatic parenchyma to be partially replaced by a proliferation of epithelioid cells with relatively uniform round nuclei and eosinophilic granular cytoplasm.  The   epithelioid cells form anastomosing solid cords/nests that are surrounded by delicate fibrovascular stroma.  There are no mitotic figures or tumor cell necrosis present.  The histologic changes seen are suggestive of well-differentiated neuroendocrine (carcinoid) tumor.      Immunostaining for pankeratin, chromogranin, synaptophysin, TTF-1 and Ki-67 was performed on sections of one tissue block (A1) and the neoplastic epithelioid cells show diffuse and strong positivity for neuroendocrine markers (chromogranin and synaptophysin) and moderate positivity for pankeratin.    There is no staining reactivity for TTF-1. The Ki-67 proliferation labeling index is essentially negative, (very low, <1%). This staining pattern confirms the histologic impression of a well-differentiated neuroendocrine (carcinoid) tumor. FL Small bowel 09/02/2020:  Rapid small bowel transit. Serum Chromogranin A 160 on 09/23/2020. Urine 5-HIAA 21 (0-14)  2d-Echo 10/10/2020: EF 60-65%   Normal right ventricular size and function    Dotatate PET/CT scan 10/14/2020 increased tracer uptake seen throughout the liver compatible with neoplasm. This involves both the left and the right lobe of liver. In addition there are 2 foci of increased tracer uptake along the mesentery of the small bowel. This may involve the wall the small bowel. No other convincing evidence for extra-abdominal metastatic disease. EGD/Colonoscopy 10/05/2020 by Dr. Lynette Garcia; records reviewed. Hepatobiliary team (Dr. Titi Pittman) consult appreciated. Small bowel resection on 10/21/2020. A.  Ileum, resection:   Multifocal (4 foci) neuroendocrine tumor (NET). Extensive perineural and angiolymphatic invasion. 3 of 10 lymph nodes with metastatic neuroendocrine tumor and extracapsular extension of tumor cells (3/10). Bilateral viable small bowel resection margins with no evidence of tumor. Yadiel Ford received as \"Meckel's\":   Nodular scar tissue with patchy chronic inflammation. Negative for neuroendocrine tumor. Intestinal mucosal tissue is not present.      CASE SUMMARY:   Procedure: Small bowel resection   Tumor site: Ileum   Tumor size: Greatest dimension of 1.5 cm   Tumor focality: Multifocal: 4 separate tumors   Histologic type and grade: G2: Well-differentiated neuroendocrine tumor   Mitotic rate: between 2-20 mitoses/2 mm2   Ki-67 labeling index: between 3-20%   Tumor extension: Tumor invades through the muscularis propria into subserosal tissue without penetration of overlying serosa   Margins: All margins are uninvolved by tumor    Margins examined: Proximal and distal   Lymphovascular invasion: Present   Perineural invasion: Present   Large mesenteric masses (greater than 2 cm): Present (one 2.5 cm mass)   Regional lymph nodes:    Number of lymph nodes involved: 3    Number of lymph nodes examined: 10   Pathologic stage classification (pTNM, AJCC eighth edition):    pT3    pN2    pM1a -confirmed liver metastasis, HBS-     Comment:   A. 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 Dose # 1 Lanreotide was on 11/05/2020. Dose # 2 Lanreotide was on 12/03/2020. Dose # 3 Lanreotide was on 01/07/2021. Serum Chromogranin A 95 on 01/07/2021. CT chest 02/07/2021 negative for metastatic disease. CT abdomen/pelvis 02/07/2021 Persistent bilobar hepatic lesions which are grossly stable consistent with stable widespread hepatic metastasis. Imaging reviewed. Continue Lanreotide and repeat scans in 3 months. Dose # 4 Lanreotide was on 02/11/2021. Ga 76 Dotatate PET 03/09/2021 Gallium 68 dotatate avid uptake throughout multiple regions of the liver compatible with multiple foci of neuroendocrine tumor in both the right and the left lobe of the liver, when compared to previous not significantly changed in the interval.  Dose # 5 Lanreotide was on 03/11/2021. Dose # 6 Lanreotide was on 04/08/2021. Serum chromogranin A 115 (0-113)  Dose # 7 Lanreotide is today 05/06/2021. Serum chromogranin A pending  Labs reviewed; ok to proceed. Continue Imodium Lomotil and Xermelo for diarrhea. RTC in 4 weeks for Dose # 8 Lanreotide. Scans after next visit.      Veronica Peters MD

## 2021-05-07 DIAGNOSIS — K52.9 CHRONIC DIARRHEA: ICD-10-CM

## 2021-05-07 DIAGNOSIS — C7A.8 NEUROENDOCRINE CARCINOMA METASTATIC TO LIVER (HCC): ICD-10-CM

## 2021-05-07 DIAGNOSIS — C7B.8 NEUROENDOCRINE CARCINOMA METASTATIC TO LIVER (HCC): ICD-10-CM

## 2021-05-07 RX ORDER — FLUTICASONE PROPIONATE 50 MCG
1 SPRAY, SUSPENSION (ML) NASAL DAILY
COMMUNITY
End: 2021-05-07 | Stop reason: SDUPTHER

## 2021-05-07 RX ORDER — FLUTICASONE PROPIONATE 50 MCG
1 SPRAY, SUSPENSION (ML) NASAL DAILY
Qty: 1 BOTTLE | Refills: 5 | Status: SHIPPED
Start: 2021-05-07 | End: 2021-10-28

## 2021-05-07 RX ORDER — TELOTRISTAT ETHYL 250 MG/1
250 TABLET ORAL DAILY
Qty: 90 TABLET | Refills: 0 | Status: SHIPPED
Start: 2021-05-07 | End: 2021-05-18

## 2021-05-09 LAB — CHROMOGRANIN A: 114 NG/ML (ref 0–103)

## 2021-05-10 ENCOUNTER — HOSPITAL ENCOUNTER (OUTPATIENT)
Age: 36
Discharge: HOME | End: 2021-05-12
Payer: COMMERCIAL

## 2021-05-10 ENCOUNTER — OFFICE VISIT (OUTPATIENT)
Dept: PALLATIVE CARE | Age: 36
End: 2021-05-10
Payer: COMMERCIAL

## 2021-05-10 VITALS
OXYGEN SATURATION: 98 % | BODY MASS INDEX: 25.96 KG/M2 | WEIGHT: 132.9 LBS | HEART RATE: 64 BPM | SYSTOLIC BLOOD PRESSURE: 114 MMHG | DIASTOLIC BLOOD PRESSURE: 71 MMHG

## 2021-05-10 DIAGNOSIS — C7B.8 NEUROENDOCRINE CARCINOMA METASTATIC TO LIVER (HCC): ICD-10-CM

## 2021-05-10 DIAGNOSIS — Z51.5 PALLIATIVE CARE BY SPECIALIST: Primary | ICD-10-CM

## 2021-05-10 DIAGNOSIS — R19.7 DIARRHEA, UNSPECIFIED TYPE: ICD-10-CM

## 2021-05-10 DIAGNOSIS — G89.3 NEOPLASM RELATED PAIN: ICD-10-CM

## 2021-05-10 DIAGNOSIS — G89.3 CANCER ASSOCIATED PAIN: ICD-10-CM

## 2021-05-10 DIAGNOSIS — C7A.8 NEUROENDOCRINE CARCINOMA METASTATIC TO LIVER (HCC): ICD-10-CM

## 2021-05-10 DIAGNOSIS — Z01.818 PREOP TESTING: ICD-10-CM

## 2021-05-10 PROCEDURE — 4004F PT TOBACCO SCREEN RCVD TLK: CPT | Performed by: NURSE PRACTITIONER

## 2021-05-10 PROCEDURE — G8428 CUR MEDS NOT DOCUMENT: HCPCS | Performed by: NURSE PRACTITIONER

## 2021-05-10 PROCEDURE — U0003 INFECTIOUS AGENT DETECTION BY NUCLEIC ACID (DNA OR RNA); SEVERE ACUTE RESPIRATORY SYNDROME CORONAVIRUS 2 (SARS-COV-2) (CORONAVIRUS DISEASE [COVID-19]), AMPLIFIED PROBE TECHNIQUE, MAKING USE OF HIGH THROUGHPUT TECHNOLOGIES AS DESCRIBED BY CMS-2020-01-R: HCPCS

## 2021-05-10 PROCEDURE — 99212 OFFICE O/P EST SF 10 MIN: CPT | Performed by: NURSE PRACTITIONER

## 2021-05-10 PROCEDURE — U0005 INFEC AGEN DETEC AMPLI PROBE: HCPCS

## 2021-05-10 PROCEDURE — G8417 CALC BMI ABV UP PARAM F/U: HCPCS | Performed by: NURSE PRACTITIONER

## 2021-05-10 RX ORDER — TRAMADOL HYDROCHLORIDE 50 MG/1
50 TABLET ORAL EVERY 6 HOURS PRN
Qty: 56 TABLET | Refills: 0 | Status: SHIPPED
Start: 2021-05-10 | End: 2021-06-09

## 2021-05-10 RX ORDER — BUTALBITAL, ACETAMINOPHEN AND CAFFEINE 50; 325; 40 MG/1; MG/1; MG/1
TABLET ORAL
Qty: 30 TABLET | Refills: 1 | Status: SHIPPED
Start: 2021-05-10 | End: 2021-06-22

## 2021-05-10 RX ORDER — DEXAMETHASONE 1 MG
1 TABLET ORAL
Qty: 10 TABLET | Refills: 0 | Status: SHIPPED | OUTPATIENT
Start: 2021-05-10 | End: 2021-05-20

## 2021-05-10 RX ORDER — GABAPENTIN 300 MG/1
CAPSULE ORAL
Qty: 90 CAPSULE | Refills: 0 | Status: SHIPPED
Start: 2021-05-10 | End: 2021-06-09

## 2021-05-10 NOTE — PROGRESS NOTES
Department of Palliative Medicine  Ambulatory Note  Provider: Christine MENA-CNP    Chief Complaint: Mireya Soni is a 29 y.o. female with chief complaint of pain    HPI  29 y/o female with hx of hypothyroidism, IBS,migraine who was complaining of abdominal pain associated with diarrhea and flushing/sweating. 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 Jean Gutierrez   - s.p Bowel resection on 10/21/20   - On Lanreotide     Neoplasm related pain              - Gabapentin 300mg TID              - Tramadol 50mg q 6 hrs prn for the pain, using 1 day      Nausea   - Continue Zofran and Phenergan PRN    Diarrhea:    - Currently on Lanreotide and Xermelo   -  Imodium she uses this daily   -  Continue Dexamethasone taper    - Goals of care: cure, live longer and improve or maintain function/quality of life    - Code Status: full code    Follow Up:  2 weeks. Encouraged to call with any questions, concerns, needs, or changes in symptoms. Subjective:   HPI:  Mireya Soni is a 29 y.o. female with significant medical history of hypothyroidism, IBS, migraine who was complaining of abdominal pain associated with diarrhea and flushing/sweating. She was diagnosed with metastatic well differentiated Neuroendocrine cancer to liver who was referred to [address] [place] [address] by Dr. Dorita Eisenmenger. 5/10/21:  Aissatou presents today, unaccompanied, overall appears to be doing very well. Over the past 2 weeks she states that her symptoms are overall unchanged. There are no significant changes in how she is taking her medications. Continues to take some tramadol time time finds is helpful for abdominal pain, as well as gabapentin 300 mg 3 times daily.   She states that she has had some significant improvement in her diarrhea as well, stating that she believes that the steroid was helpful, she was much less sick prior to her most recent injection as she had experienced in the past.  She continues to take Xarelto, as well as Imodium and Lomotil on rare occasions for her diarrhea, but overall feels her diarrhea is significantly improved. She does states she requires a refill of her tramadol today which will be provided, and will continue to taper her dexamethasone. We otherwise would not make any further changes to her plan of care, will see her again approximate 4 weeks to reassess her needs.     Pain Assessment   Ratin  Description: burning, sharp and shooting  Duration: minutes  Frequency: daily  Location: Abdomen   Alleviating Factors: relaxation  Exacerbating Factors: unable to associate with any factor  Effect: change in function and sleep    Past Medical History:   Diagnosis Date    Abdominal pain     [facility]'s disease ([facility]     Cancer ([facility]     neuroendocrime tumor    Encounter regarding vascular access [facility] for ESRD ([facility] 2021    Hemodialysis patient ([facility] 2021    Dr. Monroe Pastrana Hypocalcemia     Hypothyroidism     Irritable bowel syndrome     Migraines     Status post alcohol detoxification     2018-2018       Past Surgical History:   Procedure Laterality Date    CHOLECYSTECTOMY, LAPAROSCOPIC  2016    COLONOSCOPY N/A 2018    COLONOSCOPY WITH BIOPSY performed by Maryellen Love MD at [address] [facility] CT BIOPSY RENAL  2021    CT BIOPSY RENAL 2021 Jag Weems MD [facility] CT    CT NEEDLE BIOPSY LIVER PERCUTANEOUS  2020    CT NEEDLE BIOPSY LIVER PERCUTANEOUS 2020 [facility] CT    HC DIALYSIS CATHETER N/A 2021    TESIO CATHETER INSERTION AND REMOVAL OF TEMPORARY performed by Jag Hector MD at [address]  SMALL INTESTINE SURGERY N/A 10/21/2020    LAPAROSCOPIC ROBOTIC SMALL BOWEL RESECTION WITH PLANNED TRANSITION TO OPEN performed by Franci Campa MD at [place] [address] THYROIDECTOMY         Family History   Problem Relation Age of Onset    High Blood Pressure Mother     High Cholesterol Mother     Other Mother         migraine headaches    Heart Disease Father     Asthma Father     High Blood Pressure Father     Cancer Father         Sarcoidosis    Diabetes Other         GRANDMOTHER    Lung Cancer Other         GRANDFATHER    Alzheimer's Disease Other         GRANDMOTHER    Breast Cancer Maternal Grandmother     Cancer Maternal Grandmother         skin    Cancer Paternal Grandfather         lung       ROS: UNLESS STATED ABOVE PATIENT DENIES:  CONSTITUTIONAL:  fever, chill, rigors, nausea, vomiting, fatigue. HEENT: blurry vision, double vision, hearing problem, tinnitus, hoarseness, dysphagia, odynophagia  RESPIRATORY: cough, shortness of breath, sputum expectoration. CARDIOVASCULAR:  Chest pain/pressure, palpitation, syncope, irregular beats  GASTROINTESTINAL:  abdominal or rectal pain, diarrhea, constipation, . GENITOURINARY:  Burning, frequency, urgency, incontinence, discharge  INTEGUMENTARY: rash, wound, pruritis  HEMATOLOGIC/LYMPHATIC:  Swelling, sores, gum bleeding, easy bruising, pica.   MUSCULOSKELETAL:  pain, edema, joint swelling or redness  NEUROLOGICAL:  light headed, dizziness, loss of consciousness, weakness, change in memory, seizures, tremors    Objective:     Physical Exam  Wt Readings from Last 3 Encounters:   05/10/21 132 lb 14.4 oz (60.3 kg)   05/06/21 131 lb (59.4 kg)   04/30/21 130 lb 9.6 oz (59.2 kg)     /71   Pulse 64   Wt 132 lb 14.4 oz (60.3 kg)   SpO2 98% Comment: RA  BMI 25.96 kg/m²     Gen:  Alert, appears stated age, well nourished, in no acute distress  HEENT:  Normocephalic, conjunctiva pink, no drainage, mucosa moist  Neck:  Supple  Lungs:  CTA bilaterally, no audible rhonchi or wheezes noted  Heart[de-identified]  RRR, no murmur, rub, or gallop noted during exam  Abd:  Soft, non tender, non distended, BS+  M/S/Ext:  Moving all extremities, no edema, pulses present  Skin:  Warm and dry  Neuro:  PERRL, Alert, oriented x 3; following commands      [place] Symptom Assessment Score   [place] Score 5/10/2021 4/26/2021 10/13/2020   Pain Score 4 5 8   Tiredness Score 6 7 5   Nausea Score 2 Not nauseated Not nauseated   Depression Score 1 Not depressed 2   Anxiety Score 3 Not anxious 8   Drowsiness Score 2 1 2   Appetite Score 5 1 5   Wellbeing Score 5 3 7   Dyspnea Score 6 5 No shortness of breath   Other Problem Score Best possible response Best possible response Best possible response   Total Assessment Score(calculated) 34 22 37     Assessed by: patient. Current Medications:  Medications reviewed: yes    Controlled Substances Monitoring: [place] reviewed 5/10/21. Nahomi PINTO  Palliative Care Department     Time/Communication  Greater than 50% of time spent, total 15 minutes in face-to-face counseling and coordination of care regarding symptom management. Note: This report was completed using computerize voiced recognition software. Every effort has been made to ensure accuracy; however, inadvertent computerized transcription errors may be present.

## 2021-05-11 LAB
SARS-COV-2: NOT DETECTED
SOURCE: NORMAL

## 2021-05-11 NOTE — PROGRESS NOTES
Patient follows closely with oncology and instructed to take magnesium, calcium, and potassium everyday prior to surgery.

## 2021-05-11 NOTE — PROGRESS NOTES
Have you been tested for COVID  Yes           Have you been told you were positive for COVID No  Have you had any known exposure to someone that is positive for COVID No  Do you have a cough                   No              Do you have shortness of breath No                 Do you have a sore throat     No                Are you having chills                    No                Are you having muscle aches. No                    Please come to the hospital wearing a mask and have your significant other wear a mask as well. Both of you should check your temperature before leaving to come here,  if it is 100 or higher please call [phone] for instruction. Dar PRE-ADMISSION TESTING INSTRUCTIONS      ARRIVAL INSTRUCTIONS:  [x] Parking the day of Surgery is located in the Main Entrance lot. Upon entering the main door make an immediate right to the surgery reception desk. [x] Bring photo ID and insurance card    [x] Please be sure to arrange for responsible adult to provide transportation to and from the hospital    [x] Please arrange for responsible adult to be with you for the 24 hour period post procedure due to having anesthesia      GENERAL INSTRUCTIONS:    [x] Nothing by mouth after midnight, including gum, candy, mints or water    [x] You may brush your teeth, but do not swallow any water    [x] Take medications as instructed with 1-2 oz of water    [x] Stop herbal supplements and vitamins 5 days prior to procedure    [x] Bring urine specimen day of surgery    [x] Follow bowel prep as instructed per surgeon    [x] No tobacco products within 24 hours of surgery     [x] No alcohol or illegal drug use within 24 hours of surgery.     [x] Jewelry, body piercing's, eyeglasses, contact lenses and dentures are not permitted into surgery (bring cases)      [x] Please do not wear any nail polish, make up or hair products on the day of surgery    [x] You can expect a call

## 2021-05-12 ENCOUNTER — TELEPHONE (OUTPATIENT)
Dept: NEUROLOGY | Age: 36
End: 2021-05-12

## 2021-05-12 NOTE — TELEPHONE ENCOUNTER
Patient called in today. She would like to know if she could go to gun range.   Electronically signed by Balbir Gonzalez MA on 5/12/21 at 3:30 PM EDT

## 2021-05-14 ENCOUNTER — ANESTHESIA EVENT (OUTPATIENT)
Dept: ENDOSCOPY | Age: 36
End: 2021-05-14
Payer: COMMERCIAL

## 2021-05-14 ENCOUNTER — HOSPITAL ENCOUNTER (OUTPATIENT)
Age: 36
Setting detail: OUTPATIENT SURGERY
Discharge: HOME | End: 2021-05-14
Attending: SURGERY | Admitting: SURGERY
Payer: COMMERCIAL

## 2021-05-14 ENCOUNTER — ANESTHESIA (OUTPATIENT)
Dept: ENDOSCOPY | Age: 36
End: 2021-05-14
Payer: COMMERCIAL

## 2021-05-14 VITALS
DIASTOLIC BLOOD PRESSURE: 41 MMHG | SYSTOLIC BLOOD PRESSURE: 80 MMHG | OXYGEN SATURATION: 100 % | RESPIRATION RATE: 12 BRPM

## 2021-05-14 VITALS
OXYGEN SATURATION: 100 % | DIASTOLIC BLOOD PRESSURE: 59 MMHG | WEIGHT: 132 LBS | SYSTOLIC BLOOD PRESSURE: 102 MMHG | BODY MASS INDEX: 25.91 KG/M2 | HEART RATE: 82 BPM | TEMPERATURE: 97.4 F | HEIGHT: 60 IN | RESPIRATION RATE: 16 BRPM

## 2021-05-14 DIAGNOSIS — Z01.818 PREOP TESTING: Primary | ICD-10-CM

## 2021-05-14 DIAGNOSIS — G89.18 POSTOPERATIVE PAIN: ICD-10-CM

## 2021-05-14 LAB
HCG, URINE, POC: NEGATIVE
Lab: NORMAL
METER GLUCOSE: 88 MG/DL (ref 74–99)
NEGATIVE QC PASS/FAIL: NORMAL
POSITIVE QC PASS/FAIL: NORMAL

## 2021-05-14 PROCEDURE — 45330 DIAGNOSTIC SIGMOIDOSCOPY: CPT | Performed by: SURGERY

## 2021-05-14 PROCEDURE — 7100000011 HC PHASE II RECOVERY - ADDTL 15 MIN: Performed by: SURGERY

## 2021-05-14 PROCEDURE — 3700000000 HC ANESTHESIA ATTENDED CARE: Performed by: SURGERY

## 2021-05-14 PROCEDURE — 3609009100 HC SIGMOIDOSCOPY SUBMUCOSAL INJECTION: Performed by: SURGERY

## 2021-05-14 PROCEDURE — 82962 GLUCOSE BLOOD TEST: CPT

## 2021-05-14 PROCEDURE — 2580000003 HC RX 258: Performed by: SURGERY

## 2021-05-14 PROCEDURE — 2709999900 HC NON-CHARGEABLE SUPPLY: Performed by: SURGERY

## 2021-05-14 PROCEDURE — 6360000002 HC RX W HCPCS: Performed by: NURSE ANESTHETIST, CERTIFIED REGISTERED

## 2021-05-14 PROCEDURE — 46505 CHEMODENERVATION ANAL MUSC: CPT | Performed by: SURGERY

## 2021-05-14 PROCEDURE — 6360000002 HC RX W HCPCS: Performed by: SURGERY

## 2021-05-14 PROCEDURE — 7100000010 HC PHASE II RECOVERY - FIRST 15 MIN: Performed by: SURGERY

## 2021-05-14 RX ORDER — HYDROCORTISONE ACETATE, PRAMOXINE HCL 2.5; 1 G/100G; G/100G
CREAM TOPICAL 2 TIMES DAILY
Qty: 28.4 G | Refills: 2 | Status: SHIPPED | OUTPATIENT
Start: 2021-05-14 | End: 2021-07-12

## 2021-05-14 RX ORDER — HYDROCODONE BITARTRATE AND ACETAMINOPHEN 5; 325 MG/1; MG/1
1 TABLET ORAL EVERY 4 HOURS PRN
Qty: 18 TABLET | Refills: 0 | Status: SHIPPED | OUTPATIENT
Start: 2021-05-14 | End: 2021-05-17

## 2021-05-14 RX ORDER — SODIUM CHLORIDE 9 MG/ML
INJECTION, SOLUTION INTRAVENOUS CONTINUOUS
Status: DISCONTINUED | OUTPATIENT
Start: 2021-05-14 | End: 2021-05-14 | Stop reason: HOSPADM

## 2021-05-14 RX ORDER — PROPOFOL 10 MG/ML
INJECTION, EMULSION INTRAVENOUS PRN
Status: DISCONTINUED | OUTPATIENT
Start: 2021-05-14 | End: 2021-05-14 | Stop reason: SDUPTHER

## 2021-05-14 RX ADMIN — SODIUM CHLORIDE: 9 INJECTION, SOLUTION INTRAVENOUS at 09:39

## 2021-05-14 RX ADMIN — PROPOFOL 210 MG: 10 INJECTION, EMULSION INTRAVENOUS at 09:52

## 2021-05-14 NOTE — ANESTHESIA PRE PROCEDURE
Department of Anesthesiology  Preprocedure Note       Name:  Kendrick Braden   Age:  29 y.o.  :  1985                                          MRN:  [id number]         Date:  2021      Surgeon: Aaron Heath):  Adeel Goodson MD    Procedure: Procedure(s):  SIGMOIDOSCOPY PERIANAL BOTOX INJECTION   (50 UNITS)    Medications prior to admission:   Prior to Admission medications    Medication Sig Start Date End Date Taking? Authorizing Provider   gabapentin (NEURONTIN) 300 MG capsule take 1 capsule by mouth three times a day 5/10/21 6/9/21  Dora Flowers MD   butalbital-acetaminophen-caffeine (FIORICET, [facility]) -11 MG per tablet take 1 tablet by mouth once daily if needed for headache 5/10/21   Sp Luong DO   traMADol (ULTRAM) 50 MG tablet Take 1 tablet by mouth every 6 hours as needed for Pain for up to 14 days.  5/10/21 5/24/21  TRINA Harris CNP   dexamethasone (DECADRON) 1 MG tablet Take 1 tablet by mouth daily (with breakfast) for 10 days 5/10/21 5/20/21  TRINA Harris CNP   Telotristat Ethyl,as Etiprate, (XERMELO) 250 MG TABS Take 250 mg by mouth daily 21   TRINA Sherwood CNP   fluticasone (FLONASE) 50 MCG/ACT nasal spray 1 spray by Each Nostril route daily 21   Sp Luong DO   levETIRAcetam (KEPPRA) 750 MG tablet take 1 tablet by mouth twice a day 21   Historical Provider, MD   potassium chloride (KLOR-CON M) 20 MEQ extended release tablet take 1 tablet by mouth once daily 21   Sp Luong DO   hydrocortisone (PROCTO-MED HC) 2.5 % CREA rectal cream apply to ANAL AREA [facility] 21   Lambert Arana III, MD   zolpidem (AMBIEN) 10 MG tablet take 1 tablet by mouth at bedtime 21  Sp Luong DO   calcium carbonate (CALCIUM 600) 600 MG TABS tablet Take 1 tablet by mouth daily 21   TRINA Sherwood CNP   topiramate (TOPAMAX) 50 MG tablet Take 1.5 tablets by mouth 2 times daily 3/2/21   Dominga Fung OWEN CoffeyN - CNP   vitamin D (ERGOCALCIFEROL) 1.25 MG (93730 UT) CAPS capsule Take 1 capsule by mouth once a week 2/9/21   Teja Ruiz MD   promethazine (PHENERGAN) 25 MG tablet Take 1 tablet by mouth as needed for Nausea or Vomiting 1/11/21   Historical Provider, MD   magnesium oxide (MAG-OX) 400 MG tablet Take 400 mg by mouth 2 times daily    Historical Provider, MD   levothyroxine (SYNTHROID) 112 MCG tablet take 1 tablet by mouth once daily 1/26/21   Eryn Prince DO   acetaminophen (AMINOFEN) 325 MG tablet Take 2 tablets by mouth every 6 hours as needed for Pain 10/25/20   William Collins DO       Current medications:    No current facility-administered medications for this visit. No current outpatient medications on file.      Facility-Administered Medications Ordered in Other Visits   Medication Dose Route Frequency Provider Last Rate Last Admin    0.9 % sodium chloride infusion   Intravenous Continuous Agatha Osler, MD           Allergies:  No Known Allergies    Problem List:    Patient Active Problem List   Diagnosis Code    Primary insomnia F51.01    Fatty liver K76.0    H/O small bowel obstruction Z87.19    Hypothyroidism E03.9    Depression F32.9    PTSD (post-traumatic stress disorder) F43.10    Liver masses R16.0    Migraines G43.909    Mesenteric mass K63.89    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 ([address] C7B.8    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    New onset seizure ([address] R56.9    Neuroendocrine tumor D3A.8    Hypomagnesemia E83.42    Hypocalcemia E83.51    Hypoparathyroidism ([address] E20.9    Tobacco abuse Z72.0    Elevated liver enzymes R74.8    Moderate protein-calorie malnutrition ([address] E44.0    Encounter regarding vascular access for dialysis for ESRD ([address] N18.6, Z99.2    Difficulty with speech R47.9       Past Medical History:        Diagnosis Date    Abdominal pain     Cancer ([address]     neuroendocrime tumor    Diarrhea  Hypocalcemia     Hypothyroidism     Irritable bowel syndrome     Migraines     Se[facility]     last episode January 2021    Status post alcohol detoxification     5/22/2018-5/29/2018       Past Surgical History:        Procedure Laterality Date    CHOLECYSTECTOMY, LAPAROSCOPIC  07/06/2016    COLONOSCOPY N/A 6/22/2018    COLONOSCOPY WITH BIOPSY performed by Mickey Loo MD at [address] [facility] [address] BIOPSY RENAL  1/25/2021    CT BIOPSY RENAL 1/25/2021 Kelly Madrigal MD [facility] CT    CT NEEDLE BIOPSY LIVER PERCUTANEOUS  9/1/2020    CT NEEDLE BIOPSY LIVER PERCUTANEOUS 9/1/2020 [facility] CT    HC DIALYSIS CATHETER N/A 1/28/2021    TESIO CATHETER INSERTION AND REMOVAL OF TEMPORARY performed by Elmer Vila MD at [address] SMALL INTESTINE SURGERY N/A 10/21/2020    LAPAROSCOPIC ROBOTIC SMALL BOWEL RESECTION WITH PLANNED TRANSITION TO OPEN performed by Cristopher Armijo MD at [place] [address]         Social History:    Social History     Tobacco Use    Smoking status: Current Every Day Smoker     Packs/day: 0.25     Types: Cigarettes    Smokeless tobacco: Never Used   Substance Use Topics    Alcohol use: No     Alcohol/week: 0.0 standard drinks     Comment: 5 years sober                                Ready to quit: Not Answered  Counseling given: Not Answered      Vital Signs (Current): There were no vitals filed for this visit.                                            BP Readings from Last 3 Encounters:   05/10/21 114/71   05/06/21 111/76   04/30/21 122/74       NPO Status:                                                                                 BMI:   Wt Readings from Last 3 Encounters:   05/11/21 132 lb (59.9 kg)   05/10/21 132 lb 14.4 oz (60.3 kg)   05/06/21 131 lb (59.4 kg)     There is no height or weight on file to calculate BMI.    CBC:   Lab Results   Component Value Date    WBC 17.2 05/06/2021    RBC 3.79 05/06/2021 HGB 12.2 05/06/2021    HCT 36.4 05/06/2021    MCV 96.0 05/06/2021    RDW 16.7 05/06/2021     05/06/2021       CMP:   Lab Results   Component Value Date     05/06/2021    K 4.3 05/06/2021    K 3.8 02/07/2021     05/06/2021    CO2 20 05/06/2021    BUN 18 05/06/2021    CREATININE 0.4 05/06/2021    GFRAA >60 05/06/2021    LABGLOM >60 05/06/2021    GLUCOSE 144 05/06/2021    PROT 6.8 05/06/2021    CALCIUM 8.6 05/06/2021    BILITOT <0.2 05/06/2021    ALKPHOS 100 05/06/2021    AST 38 05/06/2021    ALT 40 05/06/2021       POC Tests: No results for input(s): POCGLU, POCNA, POCK, POCCL, POCBUN, POCHEMO, POCHCT in the last 72 hours. Coags:   Lab Results   Component Value Date    PROTIME 13.1 03/11/2021    INR 1.2 03/11/2021    APTT 26.3 01/21/2021       HCG (If Applicable):   Lab Results   Component Value Date    PREGTESTUR NEGATIVE 10/19/2020        ABGs: No results found for: PHART, PO2ART, UQY7VJF, GFE6DWC, BEART, E3XYGNEP     Type & Screen (If Applicable):  No results found for: LABABO, LABRH    Drug/Infectious Status (If Applicable):  No results found for: HIV, HEPCAB    COVID-19 Screening (If Applicable):   Lab Results   Component Value Date    COVID19 Not Detected 05/10/2021    COVID19 Not Detected 02/07/2021         Anesthesia Evaluation  Patient summary reviewed  Airway: Mallampati: II  TM distance: >3 FB   Neck ROM: full  Mouth opening: > = 3 FB Dental:          Pulmonary: breath sounds clear to auscultation                             Cardiovascular:        (-) past MI and CAD    ECG reviewed  Rhythm: regular  Rate: normal  Echocardiogram reviewed                  Neuro/Psych:   (+) headaches:, psychiatric history:            GI/Hepatic/Renal:   (+) liver disease:, renal disease: dialysis and ESRD,          ROS comment: Crohn's disease, Liver CA.    Endo/Other:    (+) hypothyroidism::., .                 Abdominal:   (+) obese,         Vascular: Anesthesia Plan      MAC     ASA 3       Induction: intravenous. MIPS: Prophylactic antiemetics administered. Anesthetic plan and risks discussed with patient and mother. Plan discussed with CRNA.                   Bernarda Melton MD   5/14/2021

## 2021-05-14 NOTE — ANESTHESIA POSTPROCEDURE EVALUATION
Department of Anesthesiology  Postprocedure Note    Patient: Geo Webber  MRN: [id number]  YOB: 1985  Date of evaluation: 5/14/2021  Time:  10:46 AM     Procedure Summary     Date: 05/14/21 Room / Location: [facility] / [facility]    Anesthesia Start: 3903 Anesthesia Stop: 9397    Procedure: SIGMOIDOSCOPY PERIANAL BOTOX INJECTION   (50 UNITS) (N/A ) Diagnosis: (POSTERIOR ANAL FISSURE)    Surgeons: Ed Closs, MD Responsible Provider: Patricia Monson MD    Anesthesia Type: MAC ASA Status: 3          Anesthesia Type: MAC    Pili Phase I: Pili Score: 10    Pili Phase II: Pili Score: 10    Last vitals: Reviewed and per EMR flowsheets.        Anesthesia Post Evaluation    Patient location during evaluation: PACU  Patient participation: complete - patient participated  Level of consciousness: awake and alert  Airway patency: patent  Nausea & Vomiting: no vomiting and no nausea  Complications: no  Cardiovascular status: hemodynamically stable  Respiratory status: acceptable  Hydration status: stable

## 2021-05-14 NOTE — H&P
Patient's office history and physical was reviewed. Patient examined. There has been no change in the patient's history and physical.      Physician Signature: Electronically signed by Dr. Julio Graham    Patient's Name/Date of Birth: Joyce Santos / 1985    Date: 4/19/2021    PCP: Fabienne Person DO    Referring Physician:   No ref. provider found  N/A    No chief complaint on file. HPI:    The patient is having a lot of diarrhea. She is on octreotide which she has been on for a while and has noticed some improvement. She is on another medication that Dr. Chelsea Godinez has put her on. She said she is having a lot of perianal issues. She said she has tried preparation H and other steroid creams. She has done sitz baths. She has tried Anusol as well. She is using preparation H wipes as well. She has had rare bleeding. She said she mostly gets pain with BM and it is worse when they are formed. Patient's medications, allergies, past medical, surgical, social and family histories were reviewed and updated as appropriate. Review of Systems  Constitutional: negative  Respiratory: negative  Cardiovascular: negative  Gastrointestinal: as in hpi  Genitourinary:negative  Integument/breast: negative     Physical Exam:  Ht 5' (1.524 m)   Wt 132 lb (59.9 kg)   BMI 25.78 kg/m²   General appearance: alert, cooperative and in no acute distress. Lungs: normal work of breathing  Heart: regular rate  Abdomen: soft, nontender, nondistended  Musculoskeletal: symmetrical without edema. Skin: posterior anal fissure    Impression/Plan:  29y.o. year old female with a posterior anal fissure    Sigmoidoscopy and Perianal Botox injection  I explained the risks, benefits, alternatives, and potential complications associated with the above procedure to be performed and transfusions when applicable with the patient/responsible person prior to the procedure. All of the patient's questions were answered. The patient understands and agrees to surgery. Electronically by Tien Hubbard MD, General Surgery  on 5/14/2021 at 8:58 AM      Send copy of H&P to PCP, Joanna Marroquin DO and referring physician, No ref.  provider found

## 2021-05-17 DIAGNOSIS — C7A.8 NEUROENDOCRINE CARCINOMA METASTATIC TO LIVER (HCC): ICD-10-CM

## 2021-05-17 DIAGNOSIS — K52.9 CHRONIC DIARRHEA: ICD-10-CM

## 2021-05-17 DIAGNOSIS — C7B.8 NEUROENDOCRINE CARCINOMA METASTATIC TO LIVER (HCC): ICD-10-CM

## 2021-05-18 RX ORDER — TELOTRISTAT ETHYL 250 MG/1
TABLET ORAL
Qty: 90 TABLET | Refills: 3 | Status: SHIPPED | OUTPATIENT
Start: 2021-05-18 | End: 2021-06-17

## 2021-05-18 NOTE — TELEPHONE ENCOUNTER
Last Appointment:  5/6/2021  Future Appointments   Date Time Provider Raoul Meyer   6/3/2021  2:30 PM Barb Mitchell MD MED ONC [facility]   6/3/2021  3:15 PM AMADA MED ONC FAST TRACK 2 [facility] Med Onc [facility]   6/7/2021  1:30 PM DO ELISABETH Dill [facility]   6/7/2021  1:30 PM SCHEDULE, [facility] PALLIATIVE CARE PROVIDER [facility] PALLIAT [facility]   10/19/2021  9:00 AM Tyson Rice, APRN - CNP BD Neuro [facility]

## 2021-05-28 ENCOUNTER — TELEPHONE (OUTPATIENT)
Dept: HEMATOLOGY | Age: 36
End: 2021-05-28

## 2021-06-01 RX ORDER — HYDROCORTISONE 25 MG/G
CREAM TOPICAL
Qty: 28 G | Refills: 1 | Status: SHIPPED | OUTPATIENT
Start: 2021-06-01 | End: 2022-06-16

## 2021-06-02 DIAGNOSIS — C7A.8 NEUROENDOCRINE CARCINOMA METASTATIC TO LIVER (HCC): Primary | ICD-10-CM

## 2021-06-02 DIAGNOSIS — C7B.8 NEUROENDOCRINE CARCINOMA METASTATIC TO LIVER (HCC): Primary | ICD-10-CM

## 2021-06-02 DIAGNOSIS — C7B.8 METASTATIC MALIGNANT NEUROENDOCRINE TUMOR TO LIVER (HCC): ICD-10-CM

## 2021-06-02 RX ORDER — POTASSIUM CHLORIDE 20 MEQ/1
TABLET, EXTENDED RELEASE ORAL
Qty: 30 TABLET | Refills: 0 | Status: SHIPPED
Start: 2021-06-02 | End: 2021-06-03 | Stop reason: SDUPTHER

## 2021-06-03 ENCOUNTER — OFFICE VISIT (OUTPATIENT)
Dept: ONCOLOGY | Age: 36
End: 2021-06-03
Payer: COMMERCIAL

## 2021-06-03 ENCOUNTER — HOSPITAL ENCOUNTER (OUTPATIENT)
Dept: INFUSION THERAPY | Age: 36
Discharge: HOME | End: 2021-06-03
Payer: COMMERCIAL

## 2021-06-03 VITALS
TEMPERATURE: 97.9 F | WEIGHT: 130.6 LBS | DIASTOLIC BLOOD PRESSURE: 78 MMHG | HEART RATE: 99 BPM | SYSTOLIC BLOOD PRESSURE: 120 MMHG | BODY MASS INDEX: 25.64 KG/M2 | OXYGEN SATURATION: 97 % | HEIGHT: 60 IN

## 2021-06-03 DIAGNOSIS — C7B.8 METASTATIC MALIGNANT NEUROENDOCRINE TUMOR TO LIVER (HCC): ICD-10-CM

## 2021-06-03 DIAGNOSIS — C7B.8 METASTATIC MALIGNANT NEUROENDOCRINE TUMOR TO LIVER (HCC): Primary | ICD-10-CM

## 2021-06-03 DIAGNOSIS — C7B.8 NEUROENDOCRINE CARCINOMA METASTATIC TO LIVER (HCC): Primary | ICD-10-CM

## 2021-06-03 DIAGNOSIS — C7A.8 NEUROENDOCRINE CARCINOMA METASTATIC TO LIVER (HCC): Primary | ICD-10-CM

## 2021-06-03 LAB
ALBUMIN SERPL-MCNC: 4.7 G/DL (ref 3.5–5.2)
ALP BLD-CCNC: 100 U/L (ref 35–104)
ALT SERPL-CCNC: 11 U/L (ref 0–32)
ANION GAP SERPL CALCULATED.3IONS-SCNC: 12 MMOL/L (ref 7–16)
AST SERPL-CCNC: 15 U/L (ref 0–31)
BASOPHILS ABSOLUTE: 0.07 E9/L (ref 0–0.2)
BASOPHILS RELATIVE PERCENT: 0.9 % (ref 0–2)
BILIRUB SERPL-MCNC: 0.2 MG/DL (ref 0–1.2)
BUN BLDV-MCNC: 8 MG/DL (ref 6–20)
CALCIUM SERPL-MCNC: 8.7 MG/DL (ref 8.6–10.2)
CHLORIDE BLD-SCNC: 106 MMOL/L (ref 98–107)
CO2: 20 MMOL/L (ref 22–29)
CREAT SERPL-MCNC: 0.5 MG/DL (ref 0.5–1)
EOSINOPHILS ABSOLUTE: 0.1 E9/L (ref 0.05–0.5)
EOSINOPHILS RELATIVE PERCENT: 1.3 % (ref 0–6)
GFR AFRICAN AMERICAN: >60
GFR NON-AFRICAN AMERICAN: >60 ML/MIN/1.73
GLUCOSE BLD-MCNC: 105 MG/DL (ref 74–99)
HCT VFR BLD CALC: 36.6 % (ref 34–48)
HEMOGLOBIN: 11.9 G/DL (ref 11.5–15.5)
IMMATURE GRANULOCYTES #: 0.13 E9/L
IMMATURE GRANULOCYTES %: 1.8 % (ref 0–5)
LYMPHOCYTES ABSOLUTE: 1.66 E9/L (ref 1.5–4)
LYMPHOCYTES RELATIVE PERCENT: 22.4 % (ref 20–42)
MCH RBC QN AUTO: 31.4 PG (ref 26–35)
MCHC RBC AUTO-ENTMCNC: 32.5 % (ref 32–34.5)
MCV RBC AUTO: 96.6 FL (ref 80–99.9)
MONOCYTES ABSOLUTE: 0.79 E9/L (ref 0.1–0.95)
MONOCYTES RELATIVE PERCENT: 10.7 % (ref 2–12)
NEUTROPHILS ABSOLUTE: 4.66 E9/L (ref 1.8–7.3)
NEUTROPHILS RELATIVE PERCENT: 62.9 % (ref 43–80)
PDW BLD-RTO: 17.6 FL (ref 11.5–15)
PLATELET # BLD: 207 E9/L (ref 130–450)
PMV BLD AUTO: 9.1 FL (ref 7–12)
POTASSIUM SERPL-SCNC: 3.7 MMOL/L (ref 3.5–5)
RBC # BLD: 3.79 E12/L (ref 3.5–5.5)
SODIUM BLD-SCNC: 138 MMOL/L (ref 132–146)
TOTAL PROTEIN: 7.6 G/DL (ref 6.4–8.3)
WBC # BLD: 7.4 E9/L (ref 4.5–11.5)

## 2021-06-03 PROCEDURE — 99214 OFFICE O/P EST MOD 30 MIN: CPT | Performed by: INTERNAL MEDICINE

## 2021-06-03 PROCEDURE — 99213 OFFICE O/P EST LOW 20 MIN: CPT

## 2021-06-03 PROCEDURE — 96372 THER/PROPH/DIAG INJ SC/IM: CPT

## 2021-06-03 PROCEDURE — 4004F PT TOBACCO SCREEN RCVD TLK: CPT | Performed by: INTERNAL MEDICINE

## 2021-06-03 PROCEDURE — 6360000002 HC RX W HCPCS: Performed by: INTERNAL MEDICINE

## 2021-06-03 PROCEDURE — 85025 COMPLETE CBC W/AUTO DIFF WBC: CPT

## 2021-06-03 PROCEDURE — G8427 DOCREV CUR MEDS BY ELIG CLIN: HCPCS | Performed by: INTERNAL MEDICINE

## 2021-06-03 PROCEDURE — 80053 COMPREHEN METABOLIC PANEL: CPT

## 2021-06-03 PROCEDURE — G8417 CALC BMI ABV UP PARAM F/U: HCPCS | Performed by: INTERNAL MEDICINE

## 2021-06-03 PROCEDURE — 36415 COLL VENOUS BLD VENIPUNCTURE: CPT

## 2021-06-03 PROCEDURE — 99212 OFFICE O/P EST SF 10 MIN: CPT

## 2021-06-03 PROCEDURE — 86316 IMMUNOASSAY TUMOR OTHER: CPT

## 2021-06-03 RX ORDER — POTASSIUM CHLORIDE 20 MEQ/1
TABLET, EXTENDED RELEASE ORAL
Qty: 30 TABLET | Refills: 0 | Status: SHIPPED
Start: 2021-06-03 | End: 2021-06-08 | Stop reason: SDUPTHER

## 2021-06-03 RX ORDER — LANREOTIDE ACETATE 120 MG/.5ML
120 INJECTION SUBCUTANEOUS ONCE
Status: CANCELLED | OUTPATIENT
Start: 2021-07-01 | End: 2021-07-01

## 2021-06-03 RX ORDER — LANREOTIDE ACETATE 120 MG/.5ML
120 INJECTION SUBCUTANEOUS ONCE
Status: COMPLETED | OUTPATIENT
Start: 2021-06-03 | End: 2021-06-03

## 2021-06-03 RX ADMIN — LANREOTIDE ACETATE 120 MG: 120 INJECTION SUBCUTANEOUS at 14:52

## 2021-06-03 NOTE — PROGRESS NOTES
Department of [facility] Oncology  Attending Clinic Note    Reason for Visit: Follow-up on a patient with metastatic well differentiated Neuroendocrine cancer to liver    PCP:  Sp Luong DO    History of Present Illness:  30 y/o female with hx of hypothyroidism, IBS,migraine who was complaining of abdominal pain associated with diarrhea and flushing/sweating. CT abdomen/pelvis 08/28/2020:  2 cm masslike density in the mid abdominal small bowel mesentery with a spiculated appearance. Multiple liver masses suspicious for metastatic disease. 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 Comment: Sections of the liver tissue cores show the hepatic parenchyma to be partially replaced by a proliferation of epithelioid cells with relatively uniform round nuclei and eosinophilic granular cytoplasm.  The   epithelioid cells form anastomosing solid cords/nests that are surrounded by delicate fibrovascular stroma.  There are no mitotic figures or tumor cell necrosis present.  The histologic changes seen are suggestive of well-differentiated neuroendocrine (carcinoid) tumor. Immunostaining for pankeratin, chromogranin, synaptophysin, TTF-1 and Ki-67 was performed on sections of one tissue block (A1) and the neoplastic epithelioid cells show diffuse and strong positivity for neuroendocrine markers (chromogranin and synaptophysin) and moderate positivity for pankeratin.  There is no staining reactivity for TTF-1. The Ki-67 proliferation labeling index is essentially negative, (very low, <1%). This staining pattern confirms the histologic impression of a well-differentiated neuroendocrine (carcinoid) tumor. FL Small bowel 09/02/2020:  Rapid small bowel transit. Serum Chromogranin A 160 on 09/23/2020.   Urine 5-HIAA 21 (0-14)  2d-Echo 10/10/2020: EF 60-65%   Normal right ventricular size and function    Dotatate PET/CT scan 10/14/2020 increased tracer uptake seen throughout the liver compatible with neoplasm. This involves both the left and the right lobe of liver. In addition there are 2 foci of increased tracer uptake along the mesentery of the small bowel. This may involve the wall the small bowel. No other convincing evidence for extra-abdominal metastatic disease. EGD/Colonoscopy 10/05/2020 by Dr. Aki Weinstein; records reviewed. Hepatobiliary team (Dr. Yasmine Foss) consult appreciated. Small bowel resection on 10/21/2020. Small bowel resection on 10/21/2020. A.  Ileum, resection:   Multifocal (4 foci) neuroendocrine tumor (NET). Extensive perineural and angiolymphatic invasion. 3 of 10 lymph nodes with metastatic neuroendocrine tumor and extracapsular extension of tumor cells (3/10). Bilateral viable small bowel resection margins with no evidence of tumor. Libia Lima received as \"Meckel's\":   Nodular scar tissue with patchy chronic inflammation. Negative for neuroendocrine tumor. Intestinal mucosal tissue is not present. CASE SUMMARY:   Procedure: Small bowel resection   Tumor site: Ileum   Tumor size: Greatest dimension of 1.5 cm   Tumor focality: Multifocal: 4 separate tumors   Histologic type and grade: G2: Well-differentiated neuroendocrine tumor   Mitotic rate: between 2-20 mitoses/2 mm2   Ki-67 labeling index: between 3-20%   Tumor extension: Tumor invades through the muscularis propria into subserosal tissue without penetration of overlying serosa   Margins:  All margins are uninvolved by tumor    Margins examined: Proximal and distal   Lymphovascular invasion: Present   Perineural invasion: Present   Large mesenteric masses (greater than 2 cm): Present (one 2.5 cm mass)   Regional lymph nodes:    Number of lymph nodes involved: 3    Number of lymph nodes examined: 10   Pathologic stage classification (pTNM, AJCC eighth edition):    pT3    pN2    pM1a -confirmed liver metastasis, [id number]     Comment:   A. 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 Dose # 1 Lanreotide was on 11/05/2020. Dose # 2 Lanreotide was on 12/03/2020. Dose # 3 Lanreotide was on 01/07/2021. Serum Chromogranin A 95 on 01/07/2021. CT chest 02/07/2021 negative for metastatic disease. CT abdomen/pelvis 02/07/2021 Persistent bilobar hepatic lesions which are grossly stable consistent with stable widespread hepatic metastasis. Imaging reviewed. Continue Lanreotide and repeat scans in 3 months. Dose # 4 Lanreotide was on 02/11/2021. Ga 76 Dotatate PET 03/09/2021 Gallium 68 dotatate avid uptake throughout multiple regions of the liver compatible with multiple foci of neuroendocrine tumor in both the right and the left lobe of the liver, when compared to previous not significantly changed in the interval.  Dose # 5 Lanreotide was on 03/11/2021. Dose # 6 Lanreotide was on 04/08/2021. Serum chromogranin A 115 (0-103)  Dose # 7 Lanreotide was on 05/06/2021. Serum chromogranin A 114 (0-103)  Dose # 8 Lanreotide is today 06/03/2021. Serum chromogranin A pending  Today 06/03/2021: No fever, chills. Diarrhea slowly improving on Imodium, Lomotil and Xermelo. Fair appetite and energy level. Mild hot flashes/flushing. Had Perianal Botox sphincterotomy on 05/14/2021 by Dr. Rg Jones    Review of Systems;  CONSTITUTIONAL: No fever. Mild hot flashes/flushing. ENMT: Eyes: No diplopia; Nose: No epistaxis. Mouth: No sore throat. RESPIRATORY: No hemoptysis, shortness of breath, cough. CARDIOVASCULAR: No chest pain, palpitations. GASTROINTESTINAL: Diarrhea slowly improving on Imodium, Lomotil and Xermelo. GENITOURINARY: No dysuria, urinary frequency, hematuria.   NEURO: No syncope, presyncope, headache. Remainder:  ROS NEGATIVE    Past Medical History:      Diagnosis Date    Abdominal pain     Cancer ([facility] [address]     neuroendocrime tumor    Diarrhea     Hypocalcemia     Hypothyroidism     Irritable bowel syndrome     Migraines     Seizures ([facility] [address]     last episode January 2021    Status post alcohol detoxification     5/22/2018-5/29/2018     Medications:  Reviewed and reconciled. Allergies:  No Known Allergies    Physical Exam:  /78   Pulse 99   Temp 97.9 °F (36.6 °C)   Ht 5' (1.524 m)   Wt 130 lb 9.6 oz (59.2 kg)   SpO2 97%   BMI 25.51 kg/m²   GENERAL: Alert, oriented x 3, not in distress  HEENT: PERRLA; EOMI. Oropharynx clear. NECK: Supple. Without lymphadenopathy. LUNGS: Good air entry bilaterally. No wheezing, crackles or ronchi. CARDIOVASCULAR: Regular rate. No murmurs, rubs or gallops. ABDOMEN: Soft. Non-tender, non-distended. Positive bowel sounds. EXTREMITIES: Without clubbing, cyanosis, or edema. NEUROLOGIC: No focal deficits. ECOG PS 1    Impression/Plan:  28 y/o female with 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 Multiple liver masses suspicious for metastatic disease. 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 Comment: Sections of the liver tissue cores show the hepatic parenchyma to be partially replaced by a proliferation of epithelioid cells with relatively uniform round nuclei and eosinophilic granular cytoplasm.  The   epithelioid cells form anastomosing solid cords/nests that are surrounded by delicate fibrovascular stroma.  There are no mitotic figures or tumor cell necrosis present.  The histologic changes seen are suggestive of well-differentiated neuroendocrine (carcinoid) tumor. Histologic type and grade: G2: Well-differentiated neuroendocrine tumor   Mitotic rate: between 2-20 mitoses/2 mm2   Ki-67 labeling index: between 3-20%   Tumor extension: Tumor invades through the muscularis propria into subserosal tissue without penetration of overlying serosa   Margins: All margins are uninvolved by tumor    Margins examined: Proximal and distal   Lymphovascular invasion: Present   Perineural invasion: Present   Large mesenteric masses (greater than 2 cm): Present (one 2.5 cm mass)   Regional lymph nodes:    Number of lymph nodes involved: 3    Number of lymph nodes examined: 10   Pathologic stage classification (pTNM, AJCC eighth edition):    pT3    pN2    pM1a -confirmed liver metastasis, [facility]-     Comment:   A. 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 Dose # 1 Lanreotide was on 11/05/2020. Dose # 2 Lanreotide was on 12/03/2020. Dose # 3 Lanreotide was on 01/07/2021. Serum Chromogranin A 95 on 01/07/2021. CT chest 02/07/2021 negative for metastatic disease. CT abdomen/pelvis 02/07/2021 Persistent bilobar hepatic lesions which are grossly stable consistent with stable widespread hepatic metastasis. Imaging reviewed. Continue Lanreotide and repeat scans in 3 months. Dose # 4 Lanreotide was on 02/11/2021. Ga 76 Dotatate PET 03/09/2021 Gallium 68 dotatate avid uptake throughout multiple regions of the liver compatible with multiple foci of neuroendocrine tumor in both the right and the left lobe of the liver, when compared to previous not significantly changed in the interval.  Dose # 5 Lanreotide was on 03/11/2021. Dose # 6 Lanreotide was on 04/08/2021. Serum chromogranin A 115 (0-103)  Dose # 7 Lanreotide was on 05/06/2021.  Serum chromogranin A 114 (0-103)  Dose # 8 Lanreotide is today 06/03/2021. Serum chromogranin A pending  Labs reviewed; ok to proceed. Continue Imodium Lomotil and Xermelo for diarrhea. RTC in 4 weeks for Dose # 9 Lanreotide.  Scans to be done in the interim    Filipe Marin MD   5/8/1952  Board Certified Medical Oncologist

## 2021-06-07 ENCOUNTER — APPOINTMENT (OUTPATIENT)
Dept: GENERAL RADIOLOGY | Age: 36
End: 2021-06-07
Payer: COMMERCIAL

## 2021-06-07 ENCOUNTER — HOSPITAL ENCOUNTER (EMERGENCY)
Age: 36
Discharge: HOME | End: 2021-06-07
Attending: EMERGENCY MEDICINE
Payer: COMMERCIAL

## 2021-06-07 VITALS
OXYGEN SATURATION: 98 % | BODY MASS INDEX: 25.52 KG/M2 | HEART RATE: 82 BPM | WEIGHT: 130 LBS | RESPIRATION RATE: 16 BRPM | DIASTOLIC BLOOD PRESSURE: 68 MMHG | SYSTOLIC BLOOD PRESSURE: 104 MMHG | HEIGHT: 60 IN | TEMPERATURE: 98.6 F

## 2021-06-07 DIAGNOSIS — E87.8 ELECTROLYTE ABNORMALITY: Primary | ICD-10-CM

## 2021-06-07 LAB
ALBUMIN SERPL-MCNC: 4.6 G/DL (ref 3.5–5.2)
ALP BLD-CCNC: 107 U/L (ref 35–104)
ALT SERPL-CCNC: 16 U/L (ref 0–32)
ANION GAP SERPL CALCULATED.3IONS-SCNC: 13 MMOL/L (ref 7–16)
AST SERPL-CCNC: 25 U/L (ref 0–31)
BILIRUB SERPL-MCNC: 0.3 MG/DL (ref 0–1.2)
BUN BLDV-MCNC: 11 MG/DL (ref 6–20)
CALCIUM SERPL-MCNC: 9 MG/DL (ref 8.6–10.2)
CHLORIDE BLD-SCNC: 109 MMOL/L (ref 98–107)
CHROMOGRANIN A: 84 NG/ML (ref 0–103)
CK MB: <1 NG/ML (ref 0–4.3)
CO2: 20 MMOL/L (ref 22–29)
CREAT SERPL-MCNC: 0.6 MG/DL (ref 0.5–1)
GFR AFRICAN AMERICAN: >60
GFR NON-AFRICAN AMERICAN: >60 ML/MIN/1.73
GLUCOSE BLD-MCNC: 141 MG/DL (ref 74–99)
HCT VFR BLD CALC: 36.5 % (ref 34–48)
HEMOGLOBIN: 12.3 G/DL (ref 11.5–15.5)
LACTIC ACID: 1.9 MMOL/L (ref 0.5–2.2)
MAGNESIUM: 2.2 MG/DL (ref 1.6–2.6)
MCH RBC QN AUTO: 31.7 PG (ref 26–35)
MCHC RBC AUTO-ENTMCNC: 33.7 % (ref 32–34.5)
MCV RBC AUTO: 94.1 FL (ref 80–99.9)
PDW BLD-RTO: 17 FL (ref 11.5–15)
PLATELET # BLD: 205 E9/L (ref 130–450)
PMV BLD AUTO: 9.2 FL (ref 7–12)
POTASSIUM SERPL-SCNC: 3.7 MMOL/L (ref 3.5–5)
RBC # BLD: 3.88 E12/L (ref 3.5–5.5)
SODIUM BLD-SCNC: 142 MMOL/L (ref 132–146)
TOTAL PROTEIN: 7.5 G/DL (ref 6.4–8.3)
WBC # BLD: 5.3 E9/L (ref 4.5–11.5)

## 2021-06-07 PROCEDURE — 71045 X-RAY EXAM CHEST 1 VIEW: CPT

## 2021-06-07 PROCEDURE — 83605 ASSAY OF LACTIC ACID: CPT

## 2021-06-07 PROCEDURE — 96361 HYDRATE IV INFUSION ADD-ON: CPT

## 2021-06-07 PROCEDURE — 93005 ELECTROCARDIOGRAM TRACING: CPT | Performed by: EMERGENCY MEDICINE

## 2021-06-07 PROCEDURE — 80053 COMPREHEN METABOLIC PANEL: CPT

## 2021-06-07 PROCEDURE — 83735 ASSAY OF MAGNESIUM: CPT

## 2021-06-07 PROCEDURE — 2580000003 HC RX 258: Performed by: EMERGENCY MEDICINE

## 2021-06-07 PROCEDURE — 85027 COMPLETE CBC AUTOMATED: CPT

## 2021-06-07 PROCEDURE — 96374 THER/PROPH/DIAG INJ IV PUSH: CPT

## 2021-06-07 PROCEDURE — 6360000002 HC RX W HCPCS: Performed by: EMERGENCY MEDICINE

## 2021-06-07 PROCEDURE — 99283 EMERGENCY DEPT VISIT LOW MDM: CPT

## 2021-06-07 PROCEDURE — 96375 TX/PRO/DX INJ NEW DRUG ADDON: CPT

## 2021-06-07 PROCEDURE — 82553 CREATINE MB FRACTION: CPT

## 2021-06-07 RX ORDER — LORAZEPAM 2 MG/ML
1 INJECTION INTRAMUSCULAR ONCE
Status: COMPLETED | OUTPATIENT
Start: 2021-06-07 | End: 2021-06-07

## 2021-06-07 RX ORDER — POTASSIUM CHLORIDE 7.45 MG/ML
10 INJECTION INTRAVENOUS ONCE
Status: COMPLETED | OUTPATIENT
Start: 2021-06-07 | End: 2021-06-07

## 2021-06-07 RX ORDER — 0.9 % SODIUM CHLORIDE 0.9 %
1000 INTRAVENOUS SOLUTION INTRAVENOUS ONCE
Status: COMPLETED | OUTPATIENT
Start: 2021-06-07 | End: 2021-06-07

## 2021-06-07 RX ORDER — MORPHINE SULFATE 2 MG/ML
2 INJECTION, SOLUTION INTRAMUSCULAR; INTRAVENOUS ONCE
Status: COMPLETED | OUTPATIENT
Start: 2021-06-07 | End: 2021-06-07

## 2021-06-07 RX ADMIN — SODIUM CHLORIDE 1000 ML: 9 INJECTION, SOLUTION INTRAVENOUS at 12:30

## 2021-06-07 RX ADMIN — LORAZEPAM 1 MG: 2 INJECTION INTRAMUSCULAR; INTRAVENOUS at 12:27

## 2021-06-07 RX ADMIN — HYDROMORPHONE HYDROCHLORIDE 1 MG: 1 INJECTION, SOLUTION INTRAMUSCULAR; INTRAVENOUS; SUBCUTANEOUS at 14:57

## 2021-06-07 RX ADMIN — POTASSIUM CHLORIDE 10 MEQ: 7.46 INJECTION, SOLUTION INTRAVENOUS at 15:00

## 2021-06-07 RX ADMIN — MORPHINE SULFATE 2 MG: 2 INJECTION, SOLUTION INTRAMUSCULAR; INTRAVENOUS at 12:28

## 2021-06-07 ASSESSMENT — PAIN SCALES - GENERAL
PAINLEVEL_OUTOF10: 8
PAINLEVEL_OUTOF10: 0

## 2021-06-07 NOTE — ED NOTES
Bed: 01  Expected date:   Expected time:   Means of arrival:   [facility] [place]     Gilford Schilling, RN  06/07/21 2782

## 2021-06-07 NOTE — ED PROVIDER NOTES
HPI:  6/7/21,   Time: 11:59 AM EDT         Nabila Rome is a 29 y.o. female presenting to the ED for Muscle cramps Low K ? , beginning several days ago. The complaint has been persistent, moderate in severity, and worsened by nothing. Patient states that she suffers from stage IV neuroendocrine tumor she has been doubling up on her potassium pills and ran out on Thursday. States she has been having muscle cramps some shortness of breath tingling around her lips and face. She did contact her oncologist today advised her to come in to be evaluated for potentially low potassium. She denies any fevers chills or cough. She has no abdominal pain. No diarrhea melena reported. She has no urinary complaints. ROS:   Pertinent positives and negatives are stated within HPI, all other systems reviewed and are negative.  --------------------------------------------- PAST HISTORY ---------------------------------------------  Past Medical History:  has a past medical history of Abdominal pain, Cancer ([facility] [address] Diarrhea, Hypocalcemia, Hypothyroidism, Irritable bowel syndrome, Migraines, Seizures ([facility] [address] and Status post alcohol detoxification. Past Surgical History:  has a past surgical history that includes Parathyroid gland surgery; Cholecystectomy, laparoscopic (07/06/2016); Thyroidectomy; Colonoscopy (N/A, 6/22/2018); CT NEEDLE BIOPSY LIVER PERCUTANEOUS (9/1/2020); Small intestine surgery (N/A, 10/21/2020); CT BIOPSY RENAL (1/25/2021); hc dialysis catheter (N/A, 1/28/2021); and sigmoidoscopy (N/A, 5/14/2021). Social History:  reports that she has been smoking cigarettes. She has been smoking about 0.25 packs per day. She has never used smokeless tobacco. She reports that she does not drink alcohol and does not use drugs.     Family History: family history includes Alzheimer's Disease in an other family member; Asthma in her father; Breast Cancer in her maternal grandmother; Cancer in her father, maternal grandmother, and paternal grandfather; Diabetes in an other family member; Heart Disease in her father; High Blood Pressure in her father and mother; High Cholesterol in her mother; Sathish Matthewights in an other family member; Other in her mother. The patients home medications have been reviewed. Allergies: Patient has no known allergies.     -------------------------------------------------- RESULTS -------------------------------------------------  All laboratory and radiology results have been personally reviewed by myself   LABS:  Results for orders placed or performed during the hospital encounter of 06/07/21   CBC   Result Value Ref Range    WBC 5.3 4.5 - 11.5 E9/L    RBC 3.88 3.50 - 5.50 E12/L    Hemoglobin 12.3 11.5 - 15.5 g/dL    Hematocrit 36.5 34.0 - 48.0 %    MCV 94.1 80.0 - 99.9 fL    MCH 31.7 26.0 - 35.0 pg    MCHC 33.7 32.0 - 34.5 %    RDW 17.0 (H) 11.5 - 15.0 fL    Platelets 787 430 - 900 E9/L    MPV 9.2 7.0 - 12.0 fL   Comprehensive Metabolic Panel   Result Value Ref Range    Sodium 142 132 - 146 mmol/L    Potassium 3.7 3.5 - 5.0 mmol/L    Chloride 109 (H) 98 - 107 mmol/L    CO2 20 (L) 22 - 29 mmol/L    Anion Gap 13 7 - 16 mmol/L    Glucose 141 (H) 74 - 99 mg/dL    BUN 11 6 - 20 mg/dL    CREATININE 0.6 0.5 - 1.0 mg/dL    GFR Non-African American >60 >=60 mL/min/1.73    GFR African American >60     Calcium 9.0 8.6 - 10.2 mg/dL    Total Protein 7.5 6.4 - 8.3 g/dL    Albumin 4.6 3.5 - 5.2 g/dL    Total Bilirubin 0.3 0.0 - 1.2 mg/dL    Alkaline Phosphatase 107 (H) 35 - 104 U/L    ALT 16 0 - 32 U/L    AST 25 0 - 31 U/L   Lactic Acid, Plasma   Result Value Ref Range    Lactic Acid 1.9 0.5 - 2.2 mmol/L   Magnesium   Result Value Ref Range    Magnesium 2.2 1.6 - 2.6 mg/dL   CK MB   Result Value Ref Range    CK-MB <1.0 0.0 - 4.3 ng/mL   EKG 12 Lead   Result Value Ref Range    Ventricular Rate 87 BPM    Atrial Rate 87 BPM    P-R Interval 160 ms    QRS Duration 72 ms    Q-T Interval 396 ms    QTc Calculation (Konstantin) 476 ms    P Axis 61 degrees    R Axis 55 degrees    T Axis 55 degrees       RADIOLOGY:  Interpreted by Radiologist.  XR CHEST PORTABLE   Final Result   No acute cardiopulmonary process. ------------------------- NURSING NOTES AND VITALS REVIEWED ---------------------------   The nursing notes within the ED encounter and vital signs as below have been reviewed. /68   Pulse 82   Temp 98.6 °F (37 °C) (Oral)   Resp 16   Ht 5' (1.524 m)   Wt 130 lb (59 kg)   SpO2 98%   BMI 25.39 kg/m²   Oxygen Saturation Interpretation: Normal      ---------------------------------------------------PHYSICAL EXAM--------------------------------------      Constitutional/General: Alert and oriented x3, well appearing, non toxic in NAD  Head: NC/AT  Eyes: PERRL, EOMI  Mouth: Oropharynx clear, handling secretions, no trismus  Neck: Supple, full ROM, no meningeal signs  Pulmonary: Lungs clear to auscultation bilaterally, no wheezes, rales, or rhonchi. Not in respiratory distress  Cardiovascular:  Regular rate and rhythm, no murmurs, gallops, or rubs. 2+ distal pulses  Abdomen: Soft, non tender, non distended,   Extremities: Moves all extremities x 4. Warm and well perfused  Skin: warm and dry without rash  Neurologic: GCS 15,  Psych: Normal Affect    EKG #1:  Interpreted by emergency department physician unless otherwise noted. Time:  1151    Rate: 87 bpm  Rhythm: Sinus rhythm. Interpretation: Normal EKG, normal sinus rhythm. Comparison: There are no significant changes when compared to prior tracings.   ------------------------------ ED COURSE/MEDICAL DECISION MAKING----------------------  Medications   0.9 % sodium chloride bolus (0 mLs Intravenous Stopped 6/7/21 1353)   LORazepam (ATIVAN) injection 1 mg (1 mg Intravenous Given 6/7/21 1227)   morphine (PF) injection 2 mg (2 mg Intravenous Given 6/7/21 1228)   potassium chloride 10 mEq/100 mL IVPB (Peripheral Line) (10 mEq Intravenous New Bag 6/7/21 1500)   HYDROmorphone (DILAUDID) injection 1 mg (1 mg Intravenous Given 6/7/21 1612)         Medical Decision Making:    Patient placed on cardiac monitor EKG. electrolytes for evaluation. IV fluids. Counseling: The emergency provider has spoken with the patient and discussed todays results, in addition to providing specific details for the plan of care and counseling regarding the diagnosis and prognosis. Questions are answered at this time and they are agreeable with the plan.      --------------------------------- IMPRESSION AND DISPOSITION ---------------------------------    IMPRESSION  1.  Electrolyte abnormality        DISPOSITION  Disposition: Discharge to home  Patient condition is stable                Pako Singh DO  06/07/21 8699

## 2021-06-07 NOTE — ED NOTES
Pt complaining of eye pain and swelling. Pt reports she does not feel SOB or itchy. Dr. Dennis Kinsey aware of the eye swelling. No orders given at this time.      Cherelle Crowe RN  06/07/21 1955

## 2021-06-08 ENCOUNTER — CARE COORDINATION (OUTPATIENT)
Dept: CARE COORDINATION | Age: 36
End: 2021-06-08

## 2021-06-08 DIAGNOSIS — G89.3 NEOPLASM RELATED PAIN: ICD-10-CM

## 2021-06-08 DIAGNOSIS — C7B.8 METASTATIC MALIGNANT NEUROENDOCRINE TUMOR TO LIVER (HCC): ICD-10-CM

## 2021-06-08 LAB
EKG ATRIAL RATE: 87 BPM
EKG P AXIS: 61 DEGREES
EKG P-R INTERVAL: 160 MS
EKG Q-T INTERVAL: 396 MS
EKG QRS DURATION: 72 MS
EKG QTC CALCULATION (BAZETT): 476 MS
EKG R AXIS: 55 DEGREES
EKG T AXIS: 55 DEGREES
EKG VENTRICULAR RATE: 87 BPM

## 2021-06-08 PROCEDURE — 93010 ELECTROCARDIOGRAM REPORT: CPT | Performed by: INTERNAL MEDICINE

## 2021-06-08 RX ORDER — POTASSIUM CHLORIDE 20 MEQ/1
TABLET, EXTENDED RELEASE ORAL
Qty: 30 TABLET | Refills: 0 | Status: SHIPPED
Start: 2021-06-08 | End: 2021-08-31

## 2021-06-09 RX ORDER — GABAPENTIN 300 MG/1
CAPSULE ORAL
Qty: 90 CAPSULE | Refills: 0 | Status: SHIPPED
Start: 2021-06-09 | End: 2021-07-12

## 2021-06-09 RX ORDER — TRAMADOL HYDROCHLORIDE 50 MG/1
TABLET ORAL
Qty: 56 TABLET | Refills: 0 | Status: ON HOLD
Start: 2021-06-09 | End: 2021-06-24 | Stop reason: HOSPADM

## 2021-06-15 ENCOUNTER — OFFICE VISIT (OUTPATIENT)
Dept: PALLATIVE CARE | Age: 36
End: 2021-06-15
Payer: COMMERCIAL

## 2021-06-15 VITALS
SYSTOLIC BLOOD PRESSURE: 102 MMHG | BODY MASS INDEX: 25.62 KG/M2 | OXYGEN SATURATION: 97 % | HEART RATE: 67 BPM | DIASTOLIC BLOOD PRESSURE: 51 MMHG | WEIGHT: 131.2 LBS

## 2021-06-15 DIAGNOSIS — Z51.5 PALLIATIVE CARE BY SPECIALIST: ICD-10-CM

## 2021-06-15 PROCEDURE — 4004F PT TOBACCO SCREEN RCVD TLK: CPT | Performed by: STUDENT IN AN ORGANIZED HEALTH CARE EDUCATION/TRAINING PROGRAM

## 2021-06-15 PROCEDURE — 99213 OFFICE O/P EST LOW 20 MIN: CPT | Performed by: STUDENT IN AN ORGANIZED HEALTH CARE EDUCATION/TRAINING PROGRAM

## 2021-06-15 PROCEDURE — G8428 CUR MEDS NOT DOCUMENT: HCPCS | Performed by: STUDENT IN AN ORGANIZED HEALTH CARE EDUCATION/TRAINING PROGRAM

## 2021-06-15 PROCEDURE — G8417 CALC BMI ABV UP PARAM F/U: HCPCS | Performed by: STUDENT IN AN ORGANIZED HEALTH CARE EDUCATION/TRAINING PROGRAM

## 2021-06-15 PROCEDURE — 99212 OFFICE O/P EST SF 10 MIN: CPT | Performed by: STUDENT IN AN ORGANIZED HEALTH CARE EDUCATION/TRAINING PROGRAM

## 2021-06-15 RX ORDER — CHOLESTYRAMINE 4 G/9G
1 POWDER, FOR SUSPENSION ORAL 3 TIMES DAILY
Qty: 60 PACKET | Refills: 0 | Status: SHIPPED
Start: 2021-06-15 | End: 2021-10-25 | Stop reason: ALTCHOICE

## 2021-06-15 NOTE — PROGRESS NOTES
06/07/21 130 lb (59 kg)   06/03/21 130 lb 9.6 oz (59.2 kg)   05/14/21 132 lb (59.9 kg)     There were no vitals taken for this visit. Gen:  Alert, appears stated age, well nourished, in no acute distress  HEENT:  Normocephalic, conjunctiva pink, no drainage, mucosa moist  Neck:  Supple  Lungs:  CTA bilaterally, no audible rhonchi or wheezes noted  Heart[de-identified]  RRR, no murmur, rub, or gallop noted during exam  Abd:  Soft, non tender, non distended, BS+  M/S/Ext:  Moving all extremities, no edema, pulses present  Skin:  Warm and dry  Neuro:  PERRL, Alert, oriented x 3; following commands      Garfield Symptom Assessment Score   Garfield Score 6/15/2021 5/10/2021 4/26/2021 10/13/2020   Pain Score 4 4 5 8   Tiredness Score 4 6 7 5   Nausea Score Not nauseated 2 Not nauseated Not nauseated   Depression Score Not depressed 1 Not depressed 2   Anxiety Score 1 3 Not anxious 8   Drowsiness Score 1 2 1 2   Appetite Score 5 5 1 5   Wellbeing Score 5 5 3 7   Dyspnea Score 5 6 5 No shortness of breath   Other Problem Score Best possible response Best possible response Best possible response Best possible response   Total Assessment Score(calculated) 25 34 22 37     Assessed by: patient. Current Medications:  Medications reviewed: yes    Controlled Substances Monitoring: [place]AR[place] reviewed 6/15/21. Kwadwo Garcia MD   Palliative Care Department     Time/Communication  Greater than 50% of time spent, total 15 minutes in face-to-face counseling and coordination of care regarding symptom management. Note: This report was completed using computerTinyTap voiced recognition software. Every effort has been made to ensure accuracy; however, inadvertent computerized transcription errors may be present.

## 2021-06-18 RX ORDER — PANTOPRAZOLE SODIUM 40 MG/1
TABLET, DELAYED RELEASE ORAL
Qty: 30 TABLET | Refills: 3 | Status: SHIPPED
Start: 2021-06-18 | End: 2021-10-19

## 2021-06-21 ENCOUNTER — CARE COORDINATION (OUTPATIENT)
Dept: CARE COORDINATION | Age: 36
End: 2021-06-21

## 2021-06-22 ENCOUNTER — APPOINTMENT (OUTPATIENT)
Dept: GENERAL RADIOLOGY | Age: 36
DRG: 053 | End: 2021-06-22
Payer: COMMERCIAL

## 2021-06-22 ENCOUNTER — APPOINTMENT (OUTPATIENT)
Dept: CT IMAGING | Age: 36
DRG: 053 | End: 2021-06-22
Payer: COMMERCIAL

## 2021-06-22 ENCOUNTER — HOSPITAL ENCOUNTER (INPATIENT)
Age: 36
LOS: 1 days | Discharge: HOME | DRG: 053 | End: 2021-06-24
Attending: EMERGENCY MEDICINE | Admitting: FAMILY MEDICINE
Payer: COMMERCIAL

## 2021-06-22 DIAGNOSIS — G40.919 BREAKTHROUGH SEIZURE (HCC): Primary | ICD-10-CM

## 2021-06-22 DIAGNOSIS — E72.20 HYPERAMMONEMIA (HCC): ICD-10-CM

## 2021-06-22 LAB
ALBUMIN SERPL-MCNC: 4.4 G/DL (ref 3.5–5.2)
ALP BLD-CCNC: 160 U/L (ref 35–104)
ALT SERPL-CCNC: 43 U/L (ref 0–32)
AMMONIA: 110 UMOL/L (ref 11–51)
ANION GAP SERPL CALCULATED.3IONS-SCNC: 12 MMOL/L (ref 7–16)
AST SERPL-CCNC: 23 U/L (ref 0–31)
BASOPHILS ABSOLUTE: 0.1 E9/L (ref 0–0.2)
BASOPHILS RELATIVE PERCENT: 1.4 % (ref 0–2)
BILIRUB SERPL-MCNC: <0.2 MG/DL (ref 0–1.2)
BILIRUBIN DIRECT: <0.2 MG/DL (ref 0–0.3)
BILIRUBIN URINE: NEGATIVE
BILIRUBIN, INDIRECT: ABNORMAL MG/DL (ref 0–1)
BLOOD, URINE: NEGATIVE
BUN BLDV-MCNC: 11 MG/DL (ref 6–20)
CALCIUM SERPL-MCNC: 8.8 MG/DL (ref 8.6–10.2)
CHLORIDE BLD-SCNC: 107 MMOL/L (ref 98–107)
CLARITY: CLEAR
CO2: 22 MMOL/L (ref 22–29)
COLOR: YELLOW
CREAT SERPL-MCNC: 0.6 MG/DL (ref 0.5–1)
EOSINOPHILS ABSOLUTE: 0.1 E9/L (ref 0.05–0.5)
EOSINOPHILS RELATIVE PERCENT: 1.4 % (ref 0–6)
GFR AFRICAN AMERICAN: >60
GFR NON-AFRICAN AMERICAN: >60 ML/MIN/1.73
GLUCOSE BLD-MCNC: 96 MG/DL (ref 74–99)
GLUCOSE URINE: NEGATIVE MG/DL
HCG, URINE, POC: NEGATIVE
HCT VFR BLD CALC: 38 % (ref 34–48)
HEMOGLOBIN: 12.1 G/DL (ref 11.5–15.5)
IMMATURE GRANULOCYTES #: 0.03 E9/L
IMMATURE GRANULOCYTES %: 0.4 % (ref 0–5)
KETONES, URINE: NEGATIVE MG/DL
LACTIC ACID: 1.9 MMOL/L (ref 0.5–2.2)
LEUKOCYTE ESTERASE, URINE: NEGATIVE
LYMPHOCYTES ABSOLUTE: 3.28 E9/L (ref 1.5–4)
LYMPHOCYTES RELATIVE PERCENT: 45.6 % (ref 20–42)
Lab: NORMAL
MAGNESIUM: 2.1 MG/DL (ref 1.6–2.6)
MCH RBC QN AUTO: 30.2 PG (ref 26–35)
MCHC RBC AUTO-ENTMCNC: 31.8 % (ref 32–34.5)
MCV RBC AUTO: 94.8 FL (ref 80–99.9)
MONOCYTES ABSOLUTE: 0.74 E9/L (ref 0.1–0.95)
MONOCYTES RELATIVE PERCENT: 10.3 % (ref 2–12)
NEGATIVE QC PASS/FAIL: NORMAL
NEUTROPHILS ABSOLUTE: 2.94 E9/L (ref 1.8–7.3)
NEUTROPHILS RELATIVE PERCENT: 40.9 % (ref 43–80)
NITRITE, URINE: NEGATIVE
PDW BLD-RTO: 17.1 FL (ref 11.5–15)
PH UA: 6.5 (ref 5–9)
PHOSPHORUS: 3.7 MG/DL (ref 2.5–4.5)
PLATELET # BLD: 306 E9/L (ref 130–450)
PMV BLD AUTO: 9.8 FL (ref 7–12)
POSITIVE QC PASS/FAIL: NORMAL
POTASSIUM REFLEX MAGNESIUM: 3.6 MMOL/L (ref 3.5–5)
PROTEIN UA: NEGATIVE MG/DL
RBC # BLD: 4.01 E12/L (ref 3.5–5.5)
SODIUM BLD-SCNC: 141 MMOL/L (ref 132–146)
SPECIFIC GRAVITY UA: 1.01 (ref 1–1.03)
TOTAL CK: 44 U/L (ref 20–180)
TOTAL PROTEIN: 7 G/DL (ref 6.4–8.3)
TSH SERPL DL<=0.05 MIU/L-ACNC: 0.07 UIU/ML (ref 0.27–4.2)
UROBILINOGEN, URINE: 0.2 E.U./DL
WBC # BLD: 7.2 E9/L (ref 4.5–11.5)

## 2021-06-22 PROCEDURE — 83605 ASSAY OF LACTIC ACID: CPT

## 2021-06-22 PROCEDURE — 99284 EMERGENCY DEPT VISIT MOD MDM: CPT

## 2021-06-22 PROCEDURE — 2580000003 HC RX 258: Performed by: EMERGENCY MEDICINE

## 2021-06-22 PROCEDURE — 84100 ASSAY OF PHOSPHORUS: CPT

## 2021-06-22 PROCEDURE — 93005 ELECTROCARDIOGRAM TRACING: CPT | Performed by: EMERGENCY MEDICINE

## 2021-06-22 PROCEDURE — 71045 X-RAY EXAM CHEST 1 VIEW: CPT

## 2021-06-22 PROCEDURE — 80076 HEPATIC FUNCTION PANEL: CPT

## 2021-06-22 PROCEDURE — 96365 THER/PROPH/DIAG IV INF INIT: CPT

## 2021-06-22 PROCEDURE — 6360000002 HC RX W HCPCS: Performed by: EMERGENCY MEDICINE

## 2021-06-22 PROCEDURE — 84443 ASSAY THYROID STIM HORMONE: CPT

## 2021-06-22 PROCEDURE — 80048 BASIC METABOLIC PNL TOTAL CA: CPT

## 2021-06-22 PROCEDURE — 96372 THER/PROPH/DIAG INJ SC/IM: CPT

## 2021-06-22 PROCEDURE — 82550 ASSAY OF CK (CPK): CPT

## 2021-06-22 PROCEDURE — 85025 COMPLETE CBC W/AUTO DIFF WBC: CPT

## 2021-06-22 PROCEDURE — 81003 URINALYSIS AUTO W/O SCOPE: CPT

## 2021-06-22 PROCEDURE — 82140 ASSAY OF AMMONIA: CPT

## 2021-06-22 PROCEDURE — 70450 CT HEAD/BRAIN W/O DYE: CPT

## 2021-06-22 PROCEDURE — 83735 ASSAY OF MAGNESIUM: CPT

## 2021-06-22 RX ORDER — ORPHENADRINE CITRATE 30 MG/ML
60 INJECTION INTRAMUSCULAR; INTRAVENOUS ONCE
Status: COMPLETED | OUTPATIENT
Start: 2021-06-22 | End: 2021-06-22

## 2021-06-22 RX ORDER — LACTULOSE 10 G/15ML
20 SOLUTION ORAL ONCE
Status: COMPLETED | OUTPATIENT
Start: 2021-06-22 | End: 2021-06-23

## 2021-06-22 RX ORDER — BUTALBITAL, ACETAMINOPHEN AND CAFFEINE 50; 325; 40 MG/1; MG/1; MG/1
TABLET ORAL
Qty: 30 TABLET | Refills: 1 | Status: SHIPPED
Start: 2021-06-22 | End: 2021-10-04

## 2021-06-22 RX ORDER — FENTANYL CITRATE 50 UG/ML
25 INJECTION, SOLUTION INTRAMUSCULAR; INTRAVENOUS ONCE
Status: DISCONTINUED | OUTPATIENT
Start: 2021-06-22 | End: 2021-06-22

## 2021-06-22 RX ORDER — 0.9 % SODIUM CHLORIDE 0.9 %
1000 INTRAVENOUS SOLUTION INTRAVENOUS ONCE
Status: COMPLETED | OUTPATIENT
Start: 2021-06-22 | End: 2021-06-22

## 2021-06-22 RX ORDER — LORAZEPAM 2 MG/ML
INJECTION INTRAMUSCULAR
Status: DISCONTINUED
Start: 2021-06-22 | End: 2021-06-22 | Stop reason: WASHOUT

## 2021-06-22 RX ADMIN — ORPHENADRINE CITRATE 60 MG: 60 INJECTION INTRAMUSCULAR; INTRAVENOUS at 23:59

## 2021-06-22 RX ADMIN — LEVETIRACETAM 2000 MG: 500 INJECTION, SOLUTION, CONCENTRATE INTRAVENOUS at 22:36

## 2021-06-22 RX ADMIN — SODIUM CHLORIDE 1000 ML: 9 INJECTION, SOLUTION INTRAVENOUS at 22:37

## 2021-06-23 ENCOUNTER — APPOINTMENT (OUTPATIENT)
Dept: ULTRASOUND IMAGING | Age: 36
DRG: 053 | End: 2021-06-23
Payer: COMMERCIAL

## 2021-06-23 PROBLEM — G40.919 BREAKTHROUGH SEIZURE (HCC): Status: ACTIVE | Noted: 2021-06-23

## 2021-06-23 LAB
ALBUMIN SERPL-MCNC: 4.2 G/DL (ref 3.5–5.2)
ALP BLD-CCNC: 155 U/L (ref 35–104)
ALT SERPL-CCNC: 36 U/L (ref 0–32)
AMMONIA: 41.5 UMOL/L (ref 11–51)
AMPHETAMINE SCREEN, URINE: NOT DETECTED
ANION GAP SERPL CALCULATED.3IONS-SCNC: 10 MMOL/L (ref 7–16)
AST SERPL-CCNC: 23 U/L (ref 0–31)
BARBITURATE SCREEN URINE: NOT DETECTED
BASOPHILS ABSOLUTE: 0.06 E9/L (ref 0–0.2)
BASOPHILS RELATIVE PERCENT: 0.7 % (ref 0–2)
BENZODIAZEPINE SCREEN, URINE: NOT DETECTED
BILIRUB SERPL-MCNC: 0.4 MG/DL (ref 0–1.2)
BUN BLDV-MCNC: 12 MG/DL (ref 6–20)
CALCIUM SERPL-MCNC: 8.2 MG/DL (ref 8.6–10.2)
CANNABINOID SCREEN URINE: NOT DETECTED
CHLORIDE BLD-SCNC: 105 MMOL/L (ref 98–107)
CO2: 22 MMOL/L (ref 22–29)
COCAINE METABOLITE SCREEN URINE: NOT DETECTED
CREAT SERPL-MCNC: 0.6 MG/DL (ref 0.5–1)
EKG ATRIAL RATE: 82 BPM
EKG P AXIS: 58 DEGREES
EKG P-R INTERVAL: 156 MS
EKG Q-T INTERVAL: 402 MS
EKG QRS DURATION: 66 MS
EKG QTC CALCULATION (BAZETT): 469 MS
EKG R AXIS: 65 DEGREES
EKG T AXIS: 46 DEGREES
EKG VENTRICULAR RATE: 82 BPM
EOSINOPHILS ABSOLUTE: 0.07 E9/L (ref 0.05–0.5)
EOSINOPHILS RELATIVE PERCENT: 0.8 % (ref 0–6)
FENTANYL SCREEN, URINE: NOT DETECTED
GFR AFRICAN AMERICAN: >60
GFR NON-AFRICAN AMERICAN: >60 ML/MIN/1.73
GLUCOSE BLD-MCNC: 110 MG/DL (ref 74–99)
HCT VFR BLD CALC: 37.1 % (ref 34–48)
HEMOGLOBIN: 11.8 G/DL (ref 11.5–15.5)
IMMATURE GRANULOCYTES #: 0.04 E9/L
IMMATURE GRANULOCYTES %: 0.5 % (ref 0–5)
LYMPHOCYTES ABSOLUTE: 1.83 E9/L (ref 1.5–4)
LYMPHOCYTES RELATIVE PERCENT: 21.8 % (ref 20–42)
Lab: ABNORMAL
MCH RBC QN AUTO: 30.3 PG (ref 26–35)
MCHC RBC AUTO-ENTMCNC: 31.8 % (ref 32–34.5)
MCV RBC AUTO: 95.4 FL (ref 80–99.9)
METHADONE SCREEN, URINE: NOT DETECTED
MONOCYTES ABSOLUTE: 0.61 E9/L (ref 0.1–0.95)
MONOCYTES RELATIVE PERCENT: 7.3 % (ref 2–12)
NEUTROPHILS ABSOLUTE: 5.79 E9/L (ref 1.8–7.3)
NEUTROPHILS RELATIVE PERCENT: 68.9 % (ref 43–80)
OPIATE SCREEN URINE: POSITIVE
OXYCODONE URINE: NOT DETECTED
PDW BLD-RTO: 16.9 FL (ref 11.5–15)
PHENCYCLIDINE SCREEN URINE: NOT DETECTED
PLATELET # BLD: 279 E9/L (ref 130–450)
PMV BLD AUTO: 9.7 FL (ref 7–12)
POTASSIUM REFLEX MAGNESIUM: 4.1 MMOL/L (ref 3.5–5)
RBC # BLD: 3.89 E12/L (ref 3.5–5.5)
SARS-COV-2, NAAT: NOT DETECTED
SODIUM BLD-SCNC: 137 MMOL/L (ref 132–146)
T4 TOTAL: 7.1 MCG/DL (ref 4.5–11.7)
TOTAL PROTEIN: 6.7 G/DL (ref 6.4–8.3)
WBC # BLD: 8.4 E9/L (ref 4.5–11.5)

## 2021-06-23 PROCEDURE — 85025 COMPLETE CBC W/AUTO DIFF WBC: CPT

## 2021-06-23 PROCEDURE — 80201 ASSAY OF TOPIRAMATE: CPT

## 2021-06-23 PROCEDURE — 80177 DRUG SCRN QUAN LEVETIRACETAM: CPT

## 2021-06-23 PROCEDURE — 76705 ECHO EXAM OF ABDOMEN: CPT

## 2021-06-23 PROCEDURE — 99222 1ST HOSP IP/OBS MODERATE 55: CPT | Performed by: PSYCHIATRY & NEUROLOGY

## 2021-06-23 PROCEDURE — 80074 ACUTE HEPATITIS PANEL: CPT

## 2021-06-23 PROCEDURE — 6370000000 HC RX 637 (ALT 250 FOR IP): Performed by: EMERGENCY MEDICINE

## 2021-06-23 PROCEDURE — 2060000000 HC ICU INTERMEDIATE R&B

## 2021-06-23 PROCEDURE — 80307 DRUG TEST PRSMV CHEM ANLYZR: CPT

## 2021-06-23 PROCEDURE — 82140 ASSAY OF AMMONIA: CPT

## 2021-06-23 PROCEDURE — 6360000002 HC RX W HCPCS: Performed by: FAMILY MEDICINE

## 2021-06-23 PROCEDURE — 6370000000 HC RX 637 (ALT 250 FOR IP): Performed by: FAMILY MEDICINE

## 2021-06-23 PROCEDURE — 2580000003 HC RX 258: Performed by: FAMILY MEDICINE

## 2021-06-23 PROCEDURE — 36415 COLL VENOUS BLD VENIPUNCTURE: CPT

## 2021-06-23 PROCEDURE — 99222 1ST HOSP IP/OBS MODERATE 55: CPT | Performed by: FAMILY MEDICINE

## 2021-06-23 PROCEDURE — 84436 ASSAY OF TOTAL THYROXINE: CPT

## 2021-06-23 PROCEDURE — 99255 IP/OBS CONSLTJ NEW/EST HI 80: CPT | Performed by: INTERNAL MEDICINE

## 2021-06-23 PROCEDURE — 87635 SARS-COV-2 COVID-19 AMP PRB: CPT

## 2021-06-23 PROCEDURE — 80053 COMPREHEN METABOLIC PANEL: CPT

## 2021-06-23 PROCEDURE — 93010 ELECTROCARDIOGRAM REPORT: CPT | Performed by: INTERNAL MEDICINE

## 2021-06-23 RX ORDER — HYDROCODONE BITARTRATE AND ACETAMINOPHEN 7.5; 325 MG/1; MG/1
1 TABLET ORAL EVERY 6 HOURS PRN
Status: DISCONTINUED | OUTPATIENT
Start: 2021-06-23 | End: 2021-06-24 | Stop reason: HOSPADM

## 2021-06-23 RX ORDER — SODIUM CHLORIDE 0.9 % (FLUSH) 0.9 %
5-40 SYRINGE (ML) INJECTION EVERY 12 HOURS SCHEDULED
Status: DISCONTINUED | OUTPATIENT
Start: 2021-06-23 | End: 2021-06-24 | Stop reason: HOSPADM

## 2021-06-23 RX ORDER — POLYETHYLENE GLYCOL 3350 17 G/17G
17 POWDER, FOR SOLUTION ORAL DAILY PRN
Status: DISCONTINUED | OUTPATIENT
Start: 2021-06-23 | End: 2021-06-24 | Stop reason: HOSPADM

## 2021-06-23 RX ORDER — HYDROCORTISONE 25 MG/G
CREAM TOPICAL 2 TIMES DAILY PRN
Status: DISCONTINUED | OUTPATIENT
Start: 2021-06-23 | End: 2021-06-24 | Stop reason: HOSPADM

## 2021-06-23 RX ORDER — POTASSIUM CHLORIDE 20 MEQ/1
40 TABLET, EXTENDED RELEASE ORAL PRN
Status: DISCONTINUED | OUTPATIENT
Start: 2021-06-23 | End: 2021-06-24 | Stop reason: HOSPADM

## 2021-06-23 RX ORDER — ACETAMINOPHEN 325 MG/1
650 TABLET ORAL EVERY 6 HOURS PRN
Status: DISCONTINUED | OUTPATIENT
Start: 2021-06-23 | End: 2021-06-24 | Stop reason: HOSPADM

## 2021-06-23 RX ORDER — PANTOPRAZOLE SODIUM 40 MG/1
40 TABLET, DELAYED RELEASE ORAL
Status: DISCONTINUED | OUTPATIENT
Start: 2021-06-23 | End: 2021-06-24 | Stop reason: HOSPADM

## 2021-06-23 RX ORDER — SODIUM CHLORIDE 9 MG/ML
25 INJECTION, SOLUTION INTRAVENOUS PRN
Status: DISCONTINUED | OUTPATIENT
Start: 2021-06-23 | End: 2021-06-24 | Stop reason: HOSPADM

## 2021-06-23 RX ORDER — CHOLESTYRAMINE 4 G/9G
1 POWDER, FOR SUSPENSION ORAL 3 TIMES DAILY
Status: DISCONTINUED | OUTPATIENT
Start: 2021-06-23 | End: 2021-06-24 | Stop reason: HOSPADM

## 2021-06-23 RX ORDER — LEVOTHYROXINE SODIUM 112 UG/1
112 TABLET ORAL DAILY
Status: DISCONTINUED | OUTPATIENT
Start: 2021-06-23 | End: 2021-06-24 | Stop reason: HOSPADM

## 2021-06-23 RX ORDER — HYDROCORTISONE ACETATE, PRAMOXINE HCL 2.5; 1 G/100G; G/100G
CREAM TOPICAL 2 TIMES DAILY
Status: DISCONTINUED | OUTPATIENT
Start: 2021-06-23 | End: 2021-06-24 | Stop reason: HOSPADM

## 2021-06-23 RX ORDER — TOPIRAMATE 25 MG/1
75 TABLET ORAL 2 TIMES DAILY
Status: DISCONTINUED | OUTPATIENT
Start: 2021-06-23 | End: 2021-06-24 | Stop reason: HOSPADM

## 2021-06-23 RX ORDER — ONDANSETRON 4 MG/1
4 TABLET, ORALLY DISINTEGRATING ORAL EVERY 8 HOURS PRN
Status: DISCONTINUED | OUTPATIENT
Start: 2021-06-23 | End: 2021-06-24 | Stop reason: HOSPADM

## 2021-06-23 RX ORDER — TIZANIDINE 4 MG/1
4 TABLET ORAL EVERY 6 HOURS PRN
Status: DISCONTINUED | OUTPATIENT
Start: 2021-06-23 | End: 2021-06-24 | Stop reason: HOSPADM

## 2021-06-23 RX ORDER — POTASSIUM CHLORIDE 7.45 MG/ML
10 INJECTION INTRAVENOUS PRN
Status: DISCONTINUED | OUTPATIENT
Start: 2021-06-23 | End: 2021-06-24 | Stop reason: HOSPADM

## 2021-06-23 RX ORDER — POTASSIUM CHLORIDE 20 MEQ/1
20 TABLET, EXTENDED RELEASE ORAL
Status: DISCONTINUED | OUTPATIENT
Start: 2021-06-23 | End: 2021-06-24 | Stop reason: HOSPADM

## 2021-06-23 RX ORDER — GABAPENTIN 300 MG/1
300 CAPSULE ORAL 3 TIMES DAILY
Status: DISCONTINUED | OUTPATIENT
Start: 2021-06-23 | End: 2021-06-24 | Stop reason: HOSPADM

## 2021-06-23 RX ORDER — SODIUM CHLORIDE 0.9 % (FLUSH) 0.9 %
5-40 SYRINGE (ML) INJECTION PRN
Status: DISCONTINUED | OUTPATIENT
Start: 2021-06-23 | End: 2021-06-24 | Stop reason: HOSPADM

## 2021-06-23 RX ORDER — LACTULOSE 10 G/15ML
20 SOLUTION ORAL 3 TIMES DAILY
Status: DISCONTINUED | OUTPATIENT
Start: 2021-06-23 | End: 2021-06-24

## 2021-06-23 RX ORDER — ACETAMINOPHEN 650 MG/1
650 SUPPOSITORY RECTAL EVERY 6 HOURS PRN
Status: DISCONTINUED | OUTPATIENT
Start: 2021-06-23 | End: 2021-06-24 | Stop reason: HOSPADM

## 2021-06-23 RX ORDER — FLUTICASONE PROPIONATE 50 MCG
1 SPRAY, SUSPENSION (ML) NASAL DAILY
Status: DISCONTINUED | OUTPATIENT
Start: 2021-06-23 | End: 2021-06-24 | Stop reason: HOSPADM

## 2021-06-23 RX ORDER — PROMETHAZINE HYDROCHLORIDE 25 MG/1
25 TABLET ORAL PRN
Status: DISCONTINUED | OUTPATIENT
Start: 2021-06-23 | End: 2021-06-24 | Stop reason: HOSPADM

## 2021-06-23 RX ORDER — ONDANSETRON 2 MG/ML
4 INJECTION INTRAMUSCULAR; INTRAVENOUS EVERY 6 HOURS PRN
Status: DISCONTINUED | OUTPATIENT
Start: 2021-06-23 | End: 2021-06-24 | Stop reason: HOSPADM

## 2021-06-23 RX ORDER — BUTALBITAL, ACETAMINOPHEN AND CAFFEINE 50; 325; 40 MG/1; MG/1; MG/1
1 TABLET ORAL EVERY 4 HOURS PRN
Status: DISCONTINUED | OUTPATIENT
Start: 2021-06-23 | End: 2021-06-23 | Stop reason: ALTCHOICE

## 2021-06-23 RX ADMIN — HYDROCODONE BITARTRATE AND ACETAMINOPHEN 1 TABLET: 7.5; 325 TABLET ORAL at 15:43

## 2021-06-23 RX ADMIN — LEVETIRACETAM 750 MG: 500 TABLET ORAL at 20:39

## 2021-06-23 RX ADMIN — SODIUM CHLORIDE, PRESERVATIVE FREE 10 ML: 5 INJECTION INTRAVENOUS at 08:32

## 2021-06-23 RX ADMIN — GABAPENTIN 300 MG: 300 CAPSULE ORAL at 20:40

## 2021-06-23 RX ADMIN — TIZANIDINE 4 MG: 4 TABLET ORAL at 10:46

## 2021-06-23 RX ADMIN — GABAPENTIN 300 MG: 300 CAPSULE ORAL at 08:31

## 2021-06-23 RX ADMIN — POTASSIUM CHLORIDE 20 MEQ: 1500 TABLET, EXTENDED RELEASE ORAL at 08:31

## 2021-06-23 RX ADMIN — HYDROCODONE BITARTRATE AND ACETAMINOPHEN 1 TABLET: 7.5; 325 TABLET ORAL at 02:56

## 2021-06-23 RX ADMIN — Medication 400 MG: at 20:40

## 2021-06-23 RX ADMIN — HYDROCODONE BITARTRATE AND ACETAMINOPHEN 1 TABLET: 7.5; 325 TABLET ORAL at 09:15

## 2021-06-23 RX ADMIN — FLUTICASONE PROPIONATE 1 SPRAY: 50 SPRAY, METERED NASAL at 08:32

## 2021-06-23 RX ADMIN — TOPIRAMATE 75 MG: 25 TABLET, FILM COATED ORAL at 20:39

## 2021-06-23 RX ADMIN — HYDROCODONE BITARTRATE AND ACETAMINOPHEN 1 TABLET: 7.5; 325 TABLET ORAL at 22:13

## 2021-06-23 RX ADMIN — SODIUM CHLORIDE, PRESERVATIVE FREE 10 ML: 5 INJECTION INTRAVENOUS at 20:43

## 2021-06-23 RX ADMIN — LACTULOSE 20 G: 20 SOLUTION ORAL at 14:36

## 2021-06-23 RX ADMIN — LEVOTHYROXINE SODIUM 112 MCG: 112 TABLET ORAL at 07:05

## 2021-06-23 RX ADMIN — PANTOPRAZOLE SODIUM 40 MG: 40 TABLET, DELAYED RELEASE ORAL at 07:05

## 2021-06-23 RX ADMIN — LACTULOSE 20 G: 20 SOLUTION ORAL at 08:31

## 2021-06-23 RX ADMIN — HYDROCORTISONE ACETATE, PRAMOXINE HCL: 2.5; 1 CREAM TOPICAL at 20:42

## 2021-06-23 RX ADMIN — TIZANIDINE 4 MG: 4 TABLET ORAL at 04:21

## 2021-06-23 RX ADMIN — ONDANSETRON 4 MG: 4 TABLET, ORALLY DISINTEGRATING ORAL at 03:04

## 2021-06-23 RX ADMIN — TIZANIDINE 4 MG: 4 TABLET ORAL at 20:40

## 2021-06-23 RX ADMIN — LACTULOSE 20 G: 20 SOLUTION ORAL at 00:00

## 2021-06-23 RX ADMIN — LACTULOSE 20 G: 20 SOLUTION ORAL at 20:39

## 2021-06-23 RX ADMIN — LEVETIRACETAM 750 MG: 500 TABLET ORAL at 08:31

## 2021-06-23 RX ADMIN — TOPIRAMATE 75 MG: 25 TABLET, FILM COATED ORAL at 08:31

## 2021-06-23 RX ADMIN — Medication 400 MG: at 08:31

## 2021-06-23 RX ADMIN — GABAPENTIN 300 MG: 300 CAPSULE ORAL at 14:37

## 2021-06-23 RX ADMIN — ENOXAPARIN SODIUM 40 MG: 40 INJECTION SUBCUTANEOUS at 08:30

## 2021-06-23 ASSESSMENT — PAIN DESCRIPTION - ONSET
ONSET: ON-GOING
ONSET: ON-GOING

## 2021-06-23 ASSESSMENT — ENCOUNTER SYMPTOMS
COUGH: 0
BACK PAIN: 0
SORE THROAT: 0
BLOOD IN STOOL: 0
WHEEZING: 0
ABDOMINAL PAIN: 0
EYE PAIN: 0
VOMITING: 0
NAUSEA: 0
CONSTIPATION: 0
EYE DISCHARGE: 0
EYE REDNESS: 0
RHINORRHEA: 0
SHORTNESS OF BREATH: 0
ABDOMINAL DISTENTION: 0
DIARRHEA: 1

## 2021-06-23 ASSESSMENT — PAIN DESCRIPTION - PROGRESSION: CLINICAL_PROGRESSION: GRADUALLY WORSENING

## 2021-06-23 ASSESSMENT — PAIN SCALES - GENERAL
PAINLEVEL_OUTOF10: 7
PAINLEVEL_OUTOF10: 9
PAINLEVEL_OUTOF10: 5
PAINLEVEL_OUTOF10: 7
PAINLEVEL_OUTOF10: 8
PAINLEVEL_OUTOF10: 7
PAINLEVEL_OUTOF10: 7
PAINLEVEL_OUTOF10: 8

## 2021-06-23 ASSESSMENT — PAIN - FUNCTIONAL ASSESSMENT: PAIN_FUNCTIONAL_ASSESSMENT: PREVENTS OR INTERFERES SOME ACTIVE ACTIVITIES AND ADLS

## 2021-06-23 ASSESSMENT — PAIN DESCRIPTION - DESCRIPTORS
DESCRIPTORS: ACHING;CONSTANT;NUMBNESS
DESCRIPTORS: ACHING;TINGLING

## 2021-06-23 ASSESSMENT — PAIN DESCRIPTION - PAIN TYPE
TYPE: CHRONIC PAIN
TYPE: ACUTE PAIN

## 2021-06-23 ASSESSMENT — PAIN DESCRIPTION - LOCATION
LOCATION: GENERALIZED
LOCATION: GENERALIZED
LOCATION: HAND;FOOT

## 2021-06-23 ASSESSMENT — PAIN DESCRIPTION - ORIENTATION: ORIENTATION: LEFT;RIGHT

## 2021-06-23 ASSESSMENT — PAIN DESCRIPTION - FREQUENCY
FREQUENCY: CONTINUOUS
FREQUENCY: CONTINUOUS

## 2021-06-23 NOTE — CARE COORDINATION
6/23/2021  Social Work Discharge Planning: This worker met with Pt to discuss  role and transition of care/discharge planning. Pt is from home with parents . Pt is on room and independent with no DME. Ultrasound performed today. Pt has a history of stage 4 neuroendocrine tumor. Pharmacy is [facility] in [place] and PCP is Dr. Helder Rebolledo.  Electronically signed by JONH Segura on 6/23/2021 at 10:05 AM

## 2021-06-23 NOTE — PROGRESS NOTES
Patient admitted to 417. Patient CHEN & answering questions appropriately. Patient hooked up to cardiac monitor and vitals complete.

## 2021-06-23 NOTE — ED NOTES
Pt SBAR faxed; Floor contacted to verify receipt of faxed transmission.      Tracy Galvez RN  06/23/21 2225

## 2021-06-23 NOTE — PROGRESS NOTES
Admitted today morning by the night physician, patient came with complaint of muscle cramps, known stage IV neuroendocrine tumor, known diarrhea, patient did have a seizure in ED. Did have high ammonia level of 110. Seen by oncology, metastatic neuroendocrine carcinoma, not significantly changed from the previous imaging studies. Toxin positive for opiates on arrival.  As per pharmacy Fioricet not available, was changed to [address] [facility].  As per nursing, patient's pain not controlled with Norco.  Patient is awake, alert, comfortable, sitting in bed, does communicate well. Neurology input is pending  Patient does understand that she is ordered equivalent of Fioricet and may not to do exactly the same job as done by Moustapha Alvarado. For now we will continue current care. No bleeding then for todays services as patient was seen after midnight.

## 2021-06-23 NOTE — CONSULTS
Susan Beltran M.D. [facility]  Dr. Shireen Villareal M.D.,  Dr. Lieutenant Spurling, M.D.,  Dr. Selina Recio D.O.,  Dr. Ashley Velez D.O. ,  Dr. Lucretia Carson M.D.,  Dr. Taty Pathak, [address] [address] y.o.  female      Re: Hyperammonemia  Requesting physician: Dr. Jacey Sepulveda  Date:3:51 PM 6/23/2021          HPI: 78-year-old female patient presenting to the hospital yesterday because of muscle cramps. 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 She follows with Dr. Lucinda Foley from oncology and with Dr. Quincy Lozano from hepatobiliary surgery. Patient reports previous bowel resection. Patient has chronic nonbloody diarrhea and she receives monthly Sandostatin injections. As above described reports no bleeding. Patient complains of mild abdominal discomfort which appears to be localized mostly in the right abdomen without significant radiation. Patient denies currently nausea vomiting reports tolerating diet. Patient denies fever or chills. Liver profile reveals nonelevated bilirubin and mildly elevated/chronically elevated alkaline phosphatase. Ammonia level on presentation is 110 and 41.5 today.       Information sources:   -Patient  -medical record  -health care team    PMHx:  Past Medical History:   Diagnosis Date    Abdominal pain     Cancer ([facility] [address]     neuroendocrime tumor    Diarrhea     Hypocalcemia     Hypothyroidism     Irritable bowel syndrome     Migraines     Seizures ([facility] [address]     last episode January 2021    Status post alcohol detoxification     5/22/2018-5/29/2018       PSHx:  Past Surgical History:   Procedure Laterality Date    CHOLECYSTECTOMY, LAPAROSCOPIC  07/06/2016    COLONOSCOPY N/A 6/22/2018    COLONOSCOPY WITH BIOPSY performed by Lisa Landrum MD at [address] BIOPSY RENAL  1/25/2021    CT BIOPSY RENAL 1/25/2021 MD AMADA Ledesma CT    CT NEEDLE BIOPSY LIVER PERCUTANEOUS  9/1/2020    CT NEEDLE BIOPSY LIVER PERCUTANEOUS 9/1/2020 [facility] CT    HC DIALYSIS CATHETER N/A 1/28/2021    TESIO CATHETER INSERTION AND REMOVAL OF TEMPORARY performed by Any Mitchell MD at [address] N/A 5/14/2021    SIGMOIDOSCOPY PERIANAL BOTOX INJECTION   (50 UNITS) performed by Miranda Darnell MD at [place] [address] N/A 10/21/2020    LAPAROSCOPIC ROBOTIC SMALL BOWEL RESECTION WITH PLANNED TRANSITION TO OPEN performed by Shadi Franks MD at [address] [facility]:  Current Facility-Administered Medications   Medication Dose Route Frequency Provider Last Rate Last Admin    cholestyramine (QUESTRAN) packet 4 g  1 packet Oral TID Bridgette Erazo MD        fluticasone Gala Bimler) 50 MCG/ACT nasal spray 1 spray  1 spray Each Nostril Daily Bridgette Erazo MD   1 spray at 06/23/21 5183    gabapentin (NEURONTIN) capsule 300 mg  300 mg Oral TID Bridgette Erazo MD   300 mg at 06/23/21 1437    hydrocortisone (ANUSOL-HC) 2.5 % rectal cream   Rectal BID PRN Bridgette Erazo MD        levETIRAcetam (KEPPRA) tablet 750 mg  750 mg Oral BID Bridgette Erazo MD   750 mg at 06/23/21 0831    levothyroxine (SYNTHROID) tablet 112 mcg  112 mcg Oral Daily Bridgette Erazo MD   112 mcg at 06/23/21 0705    magnesium oxide (MAG-OX) tablet 400 mg  400 mg Oral BID Bridgette Erazo MD   400 mg at 06/23/21 0831    pantoprazole (PROTONIX) tablet 40 mg  40 mg Oral QAM AC Bridgette Erazo MD   40 mg at 06/23/21 4862    potassium chloride (KLOR-CON M) extended release tablet 20 mEq  20 mEq Oral Daily with breakfast Bridgette Erazo MD   20 mEq at 06/23/21 0831    hydrocortisone-pramoxine 2.5-1 % cream   Topical BID Bridgette Erazo MD        [facility]) tablet 25 mg  25 mg Oral PRN Bridgette Erazo MD        topiramate (TOPAMAX) tablet 75 mg  75 mg Oral BID Bridgette Erazo MD   75 mg

## 2021-06-23 NOTE — CONSULTS
NEUROLOGY CONSULT NOTE       Requesting Physician: Catherine Montgomery*     Reason for Consult:  Evaluate for breakthrough seizures    History of Present Illness:  Rivera Lr is a 29 y.o. female admitted to [facility] on 6/22/2021. The patient presented due to muscle cramps and fatigue which she has been having but have been worse the last couple of days. She had been in the ER for cramps and dyspnea on June 7. Also had bilateral facial paresthesias at that time. She had had a grand mal seizure in December. In January had seizure-like activity when she was in renal failure and on dialysis. In February she had an episode of hand spasms, trouble speech, staring off. She was last seen by the neurology service in April. Saw the nurse practitioner in Dr. Holly Rainey' office. She has been prescribed Topamax 75 mg twice a day and Keppra 500 mg twice a day. In the emergency department yesterday she was observed to have a \"absence seizure while I was evaluating her\". Loaded with 2 g of Keppra. Feels more tired than usual today and she typically feels a little tired after each she takes her morning Keppra. She is not sure what happened in the emergency department which was considered a seizure but she states she remembers coming out of it. No grand mal seizures since the episode in December.     Past Medical History:        Diagnosis Date    Abdominal pain     Cancer ([address]     neuroendocrime tumor    Diarrhea     Hypocalcemia     Hypothyroidism     Irritable bowel syndrome     Migraines     Seizures ([address]     last episode January 2021    Status post alcohol detoxification     5/22/2018-5/29/2018   Carcinoid tumor metastatic to liver      Procedure Laterality Date    CHOLECYSTECTOMY, LAPAROSCOPIC  07/06/2016    COLONOSCOPY N/A 6/22/2018    COLONOSCOPY WITH BIOPSY performed by Calixto Wallace MD at [address] [facility] CT BIOPSY RENAL  1/25/2021    CT BIOPSY RENAL 1/25/2021 MD Maryam [facility] CT    CT NEEDLE BIOPSY LIVER PERCUTANEOUS  9/1/2020    CT NEEDLE BIOPSY LIVER PERCUTANEOUS 9/1/2020 [facility] CT    HC DIALYSIS CATHETER N/A 1/28/2021    TESIO CATHETER INSERTION AND REMOVAL OF TEMPORARY performed by Winnie Crews MD at [address] [place] [address] N/A 5/14/2021    SIGMOIDOSCOPY PERIANAL BOTOX INJECTION   (48 UNITS) performed by Mamadou Lewis MD at [place] [address] N/A 10/21/2020    LAPAROSCOPIC ROBOTIC SMALL BOWEL RESECTION WITH PLANNED TRANSITION TO OPEN performed by Reyes Underwood MD at [facility]         Social History:  Social History     Tobacco Use   Smoking Status Current Every Day Smoker    Packs/day: 0.25    Types: Cigarettes   Smokeless Tobacco Never Used     Social History     Substance and Sexual Activity   Alcohol Use No    Alcohol/week: 0.0 standard drinks    Comment: 5 years sober     Social History     Substance and Sexual Activity   Drug Use No     Single    Family History:       Problem Relation Age of Onset    High Blood Pressure Mother     High Cholesterol Mother     Other Mother         migraine headaches    Heart Disease Father     Asthma Father     High Blood Pressure Father     Cancer Father         Sarcoidosis    Diabetes Other         GRANDMOTHER    Lung Cancer Other         GRANDFATHER    Alzheimer's Disease Other         GRANDMOTHER    Breast Cancer Maternal Grandmother     Cancer Maternal Grandmother         skin    Cancer Paternal Grandfather         lung       Review of Systems:  CONSTITUTIONAL: Episodes of flushing and sweating  EYE:  No recent visual changes  ENT:  negative for dysphagia  RESPIRATORY:  negative for dyspnea  CARDIOVASCULAR:  negative for chest pain  GASTROINTESTINAL: Chronic diarrhea  HEMATOLOGIC/LYMPHATIC:  negative for unusual bleeding  MUSCULOSKELETAL: Frequent muscle cramps  SKIN: negative for rash  GENITOURINARY: negative for dysuria  NEUROLOGIC: Long history of migraine; no focal weakness or numbness    Allergies:    No Known Allergies     Current Medications:   cholestyramine (QUESTRAN) packet 4 g, TID  fluticasone (FLONASE) 50 MCG/ACT nasal spray 1 spray, Daily  gabapentin (NEURONTIN) capsule 300 mg, TID  hydrocortisone (ANUSOL-HC) 2.5 % rectal cream, BID PRN  levETIRAcetam (KEPPRA) tablet 750 mg, BID  levothyroxine (SYNTHROID) tablet 112 mcg, Daily  magnesium oxide (MAG-OX) tablet 400 mg, BID  pantoprazole (PROTONIX) tablet 40 mg, QAM AC  potassium chloride (KLOR-CON M) extended release tablet 20 mEq, Daily with breakfast  hydrocortisone-pramoxine 2.5-1 % cream, BID  promethazine (PHENERGAN) tablet 25 mg, PRN  topiramate (TOPAMAX) tablet 75 mg, BID  lactulose (CHRONULAC) 10 GM/15ML solution 20 g, TID  sodium chloride flush 0.9 % injection 5-40 mL, 2 times per day  sodium chloride flush 0.9 % injection 5-40 mL, PRN  0.9 % sodium chloride infusion, PRN  potassium chloride (KLOR-CON M) extended release tablet 40 mEq, PRN   Or  potassium bicarb-citric acid (EFFER-K) effervescent tablet 40 mEq, PRN   Or  potassium chloride 10 mEq/100 mL IVPB (Peripheral Line), PRN  enoxaparin (LOVENOX) injection 40 mg, Daily  ondansetron (ZOFRAN-ODT) disintegrating tablet 4 mg, Q8H PRN   Or  ondansetron (ZOFRAN) injection 4 mg, Q6H PRN  polyethylene glycol (GLYCOLAX) packet 17 g, Daily PRN  acetaminophen (TYLENOL) tablet 650 mg, Q6H PRN   Or  acetaminophen (TYLENOL) suppository 650 mg, Q6H PRN  HYDROcodone-acetaminophen (NORCO) 7.5-325 MG per tablet 1 tablet, Q6H PRN  tiZANidine (ZANAFLEX) tablet 4 mg, Q6H PRN       Medications Prior to Admission: butalbital-acetaminophen-caffeine (FIORICET, ESGIC) -40 MG per tablet, take 1 tablet by mouth once daily if needed for headache  pantoprazole (PROTONIX) 40 MG tablet, take 1 tablet by mouth every morning before breakfast  cholestyramine (QUESTRAN) 4 g packet, Take 1 packet extraocular muscles intact; facial strength and sensation are intact; hearing is intact to soft voice; the palate raises midline, and the tongue protrudes midline; shoulder shrug is symmetric  Motor Exam: Normal tone in all extremities. Some giveaway weakness due to achiness but this is symmetric. Sensory: Sensory symmetric to light touch  Coordination: Cerebellar function is intact for the nose-finger-nose maneuver, and for rapid alternating movements  Deep Tendon Reflexes: Not clearly evoked  Plantar Responses:  Downgoing  Abnormal movements: none  Station and gait: Unremarkable    Diagnostics:  CBC:   Lab Results   Component Value Date    WBC 8.4 06/23/2021    RBC 3.89 06/23/2021    HGB 11.8 06/23/2021    HCT 37.1 06/23/2021    MCV 95.4 06/23/2021    MCH 30.3 06/23/2021    MCHC 31.8 06/23/2021    RDW 16.9 06/23/2021     06/23/2021    MPV 9.7 06/23/2021     CMP:    Lab Results   Component Value Date     06/23/2021    K 4.1 06/23/2021     06/23/2021    CO2 22 06/23/2021    BUN 12 06/23/2021    CREATININE 0.6 06/23/2021    GFRAA >60 06/23/2021    LABGLOM >60 06/23/2021    GLUCOSE 110 06/23/2021    PROT 6.7 06/23/2021    LABALBU 4.2 06/23/2021    CALCIUM 8.2 06/23/2021    BILITOT 0.4 06/23/2021    ALKPHOS 155 06/23/2021    AST 23 06/23/2021    ALT 36 06/23/2021     PT/INR:    Lab Results   Component Value Date    PROTIME 13.1 03/11/2021    INR 1.2 03/11/2021     Ammonia level yesterday was 110    CT brain images reviewed: Normal      Impression:  Unfortunately, the ER provider did not write any details about this \"absence seizure\". I am not sure this was even a seizure. The patient did feel somewhat out of it yesterday. She vaguely remembers \"coming out of\" the seizure. This may have, alternatively, been some encephalopathy related to the hyperammonemia. Recommendations :  As she feels a little sedated from her present dosage of Keppra, I am hesitant to increase that dosage.   The Topamax

## 2021-06-23 NOTE — PLAN OF CARE
Problem: Pain:  Goal: Pain level will decrease  Description: Pain level will decrease  6/23/2021 1032 by Melanie Tubbs RN  Outcome: Ongoing  6/23/2021 0432 by Eleazar Bueno RN  Outcome: Met This Shift

## 2021-06-23 NOTE — PROGRESS NOTES
Karolina Saldanaanahi was ordered butalbital-acetaminophen-caffeine [facility] [place]) which is a nonformulary medication. The patient has indicated that the home supply of this medication will be brought in to the hospital for inpatient use. If the medication has not been administered by 1400 on the following day from the time the order was placed, a pharmacist will follow-up with the nurse of the patient to assess the capability of the patient to bring in the medication. If it is determined that the patient cannot supply the medication and it is not available to be dispensed from the pharmacy, a call will be placed to the ordering provider to discuss alternative options.     Gilda Pan [facility]  6/23/2021  1:52 AM

## 2021-06-23 NOTE — H&P
[facility] History and Physical      CHIEF COMPLAINT:  Muscle cramps     History of Present Illness:  29year old female with past medical history of stage IV neuroendocrine tumor . She presents to the ED due to muscle cramps in in upper and lower extremities . She states that this occurs when her electrolytes are low . She  reports diarrhea which is a feature of her neuroendocrine tumor . Patient had an absence in Ed and required Keppra . She reports that she did not have any odd feeling prior to the seizure . Patient is alert and oriented to time place and person in ED . She reports no chest pain  shortness of breath fever or chills  . She reporst nausea no vomiting. She denies abdominal pain   In the ED patient was hemodynamically stable. Laboratory data revealed potassium 3.6 , lactic acid 1.9 magnesium 2.1 calcium 8.8 CK 44 Alk phos 160 ALT 43 Ammonia 110 TSH 0.071 . CT head no acute intracranial abnormality.  Patient received Norflex Keppra and Lactulose in ED          REVIEW OF SYSTEMS:  A comprehensive review of systems was negative except for: what is in the HPI      PMH:  Past Medical History:   Diagnosis Date    Abdominal pain     Cancer ([address]     neuroendocrime tumor    Diarrhea     Hypocalcemia     Hypothyroidism     Irritable bowel syndrome     Migraines     Seizures ([address]     last episode January 2021    Status post alcohol detoxification     5/22/2018-5/29/2018       Surgical History:  Past Surgical History:   Procedure Laterality Date    CHOLECYSTECTOMY, LAPAROSCOPIC  07/06/2016    COLONOSCOPY N/A 6/22/2018    COLONOSCOPY WITH BIOPSY performed by Silva Oakes MD at [address] [facility] CT BIOPSY RENAL  1/25/2021    CT BIOPSY RENAL 1/25/2021 Lindsay Paget, MD [facility]    CT NEEDLE BIOPSY LIVER PERCUTANEOUS  9/1/2020    CT NEEDLE BIOPSY LIVER PERCUTANEOUS 9/1/2020 [facility] CT    HC DIALYSIS CATHETER N/A 1/28/2021    TESIO CATHETER INSERTION AND REMOVAL OF TEMPORARY performed by Melanie Valente MD at [address] N/A 5/14/2021    SIGMOIDOSCOPY PERIANAL BOTOX INJECTION   (50 UNITS) performed by Amee Aldana MD at [place] [address] N/A 10/21/2020    LAPAROSCOPIC ROBOTIC SMALL BOWEL RESECTION WITH PLANNED TRANSITION TO OPEN performed by Mary Ellen Summers MD at [address]         Medications Prior to Admission:    Prior to Admission medications    Medication Sig Start Date End Date Taking?  Authorizing Provider   butalbital-acetaminophen-caffeine (FIORICET, ESGIC) -60 MG per tablet take 1 tablet by mouth once daily if needed for headache 6/22/21   John Mitchell DO   pantoprazole (PROTONIX) 40 MG tablet take 1 tablet by mouth every morning before breakfast 6/18/21   John Mitchell DO   cholestyramine Lucendia Gammons) 4 g packet Take 1 packet by mouth 3 times daily 6/15/21   Yvette Goss MD   gabapentin (NEURONTIN) 300 MG capsule take 1 capsule by mouth three times a day 6/9/21 7/9/21  TRINA Matos CNP   traMADol [place]) 50 MG tablet take 1 tablet by mouth every 6 hours if needed for pain 6/9/21 6/23/21  TRINA Matos CNP   potassium chloride (KLOR-CON M) 20 MEQ extended release tablet take 1 tablet by mouth once daily 6/8/21   TRINA Carrera CNP   hydrocortisone (PROCTO-MED HC) 2.5 % CREA rectal cream apply to ANAL AREA after [address] 6/1/21   Kalli Paul III, MD   Pramoxine-HC (HYDROCORTISONE-PRAMOXINE) 2.5-1 % CREA cream Apply topically 2 times daily 5/14/21   Amee Aldana MD   fluticasone [facility]) 50 MCG/ACT nasal spray 1 spray by Each Nostril route daily 5/7/21   John Mitchell DO   levETIRAcetam (KEPPRA) 750 MG tablet take 1 tablet by mouth twice a day 4/26/21   Historical Provider, MD   calcium carbonate (CALCIUM 600) 600 MG TABS tablet Take 1 tablet by mouth daily 4/8/21   TRINA Carrera - CNP   topiramate (TOPAMAX) 50 MG tablet Take 1.5 tablets by mouth 2 times daily 3/2/21   TRINA Leo - CNP   vitamin D (ERGOCALCIFEROL) 1.25 MG (01762 UT) CAPS capsule Take 1 capsule by mouth once a week 2/9/21   Holger Maharaj MD   promethazine (PHENERGAN) 25 MG tablet Take 1 tablet by mouth as needed for Nausea or Vomiting 1/11/21   Historical Provider, MD   magnesium oxide (MAG-OX) 400 MG tablet Take 400 mg by mouth 2 times daily    Historical Provider, MD   levothyroxine (SYNTHROID) 112 MCG tablet take 1 tablet by mouth once daily 1/26/21   Kaia Melton DO   acetaminophen (AMINOFEN) 325 MG tablet Take 2 tablets by mouth every 6 hours as needed for Pain 10/25/20   Demarco Collins DO       Allergies:    Patient has no known allergies. Social History:    reports that she has been smoking cigarettes. She has been smoking about 0.25 packs per day. She has never used smokeless tobacco. She reports that she does not drink alcohol and does not use drugs. Family History:   family history includes Alzheimer's Disease in an other family member; Asthma in her father; Breast Cancer in her maternal grandmother; Cancer in her father, maternal grandmother, and paternal grandfather; Diabetes in an other family member; Heart Disease in her father; High Blood Pressure in her father and mother; High Cholesterol in her mother; Sharran Gather in an other family member; Other in her mother.        PHYSICAL EXAM:  Vitals:  BP (P) 113/72   Pulse (P) 75   Temp (P) 98 °F (36.7 °C) (Oral)   Resp (P) 18   Ht 5' (1.524 m)   Wt 130 lb (59 kg)   SpO2 (P) 99%   BMI 25.39 kg/m²     General Appearance: alert and oriented to person, place and time and in no acute distress  Skin: warm and dry  Head: normocephalic and atraumatic  Eyes: pupils equal, round, and reactive to light, extraocular eye movements intact, conjunctivae normal  Neck: neck supple and non tender without mass   Pulmonary/Chest: clear to auscultation decreased on admission ordered T4  C/w Synthroid     Chronic Diarrhea ; C/w Questran     Migraines : C/w Fiorcet Topamax               Code Status: Full   DVT prophylaxis: Lovenox       NOTE: This report was transcribed using voice recognition software. Every effort was made to ensure accuracy; however, inadvertent computerized transcription errors may be present.   Electronically signed by Tarah Bach MD on 6/23/2021 at 1:52 AM

## 2021-06-23 NOTE — CARE COORDINATION
CM NOTE: Per QFR-- admitted with breakthrough seizures. Hx seizures, IBS, neuroendocrine CA, liver CA. GI, neuro & hem/onc consulted. CM met with pt to discuss role, anticipated LOS & current plan of care. Reports living with parents in 2 story home. Independent & does not use ADs or O2 at home. Is not diabetic. Did not receive covid vaccine. No hx JELLY or HHC. Discussed dx, indications for consults & current TX plan. SR on monitor, monitor ammonia level. Muscle cramping intermittently. Comfortable at this time. Plan is to return home with her parents. Declines need for HHC. Will continue to follow pt.

## 2021-06-23 NOTE — ED PROVIDER NOTES
RAYMOND Frey is a 29 y.o. female with a PMHx significant for malignant neuroendocrine cancer, hypothyroidism, IBS and seizure disorder who presents with extremity cramping and fatigue. The patient's complaints are constant, moderate in severity and worsened by activity. She states that she has been dealing with the cramping and fatigue for quite a while, but notes that it seems to have worsened over the last 1-2 days. She states that the discomfort in her extremities, which she refers to as contractures, is inhibiting her ability to do her daily activities. She rates her pain as a 7/10. The patient states she takes all of her medications as prescribed and has not missed any doses. She gets her cancer treatments on the first of every month. The patient denies recent trauma, fever, chills, HA, dizziness, vision changes, congestion, rhinorrhea, neck pain, chest pain, palpitations, hx of MI, SOB, cough, wheezing, abdominal pain, N/V/C, hematochezia, melena, dysuria, hematuria and paresthesias. The patient is currently taking no blood thinners. Tobacco Hx:   reports that she has been smoking cigarettes. She has been smoking about 0.25 packs per day. She has never used smokeless tobacco.    Alcohol Hx:   reports no history of alcohol use. Illicit Drug Hx:   reports no hx of illicit drug use. The history is provided by the patient. Last Tetanus (if applicable): n/a    Review of Systems   Constitutional: Positive for activity change (2/2 fatigue and extremity cramping) and fatigue. Negative for chills, diaphoresis and fever. HENT: Negative for congestion, rhinorrhea and sore throat. Eyes: Negative for pain, discharge and redness. Respiratory: Negative for cough, shortness of breath and wheezing. Cardiovascular: Negative for chest pain, palpitations and leg swelling. Gastrointestinal: Positive for diarrhea (chronic).  Negative for abdominal distention, abdominal pain, blood in stool, constipation, nausea and vomiting. Genitourinary: Negative for difficulty urinating, dysuria, flank pain and frequency. Musculoskeletal: Positive for myalgias. Negative for arthralgias, back pain, neck pain and neck stiffness. Skin: Negative for rash and wound. Neurological: Negative for dizziness, syncope, weakness, light-headedness, numbness and headaches. Hematological: Negative for adenopathy. All other systems reviewed and are negative. Physical Exam  Vitals and nursing note reviewed. Constitutional:       General: She is awake. She is not in acute distress. Appearance: She is not diaphoretic. HENT:      Head: Normocephalic and atraumatic. Right Ear: External ear normal.      Left Ear: External ear normal.      Nose: Nose normal.      Mouth/Throat:      Mouth: Mucous membranes are moist.      Pharynx: Oropharynx is clear. Eyes:      General: No scleral icterus. Right eye: No discharge. Left eye: No discharge. Extraocular Movements: Extraocular movements intact. Conjunctiva/sclera: Conjunctivae normal.      Pupils: Pupils are equal, round, and reactive to light. Cardiovascular:      Rate and Rhythm: Normal rate and regular rhythm. Heart sounds: Normal heart sounds. No murmur heard. No friction rub. No gallop. Comments: Upper extremity and lower extremity distal pulses intact bilaterally +2/4  Pulmonary:      Effort: Pulmonary effort is normal. No respiratory distress. Breath sounds: Normal breath sounds. No wheezing, rhonchi or rales. Comments: Good air movement noted throughout. Chest:      Chest wall: No tenderness. Abdominal:      General: Bowel sounds are normal. There is no distension. Palpations: Abdomen is soft. There is no mass. Tenderness: There is no abdominal tenderness. There is no guarding or rebound. Musculoskeletal:         General: No tenderness or deformity. Normal range of motion.       Cervical back: Normal range of motion and neck supple. No rigidity. No muscular tenderness. Right lower leg: No edema. Left lower leg: No edema. Lymphadenopathy:      Cervical: No cervical adenopathy. Skin:     General: Skin is warm and dry. Capillary Refill: Capillary refill takes less than 2 seconds. Findings: No erythema or rash. Neurological:      General: No focal deficit present. Mental Status: She is alert and oriented to person, place, and time. Sensory: No sensory deficit. Motor: No weakness. Coordination: Coordination normal.        --------------------------------------------- PAST HISTORY ---------------------------------------------  Past Medical History:  has a past medical history of Abdominal pain, Cancer ([facility] Diarrhea, Hypocalcemia, Hypothyroidism, Irritable bowel syndrome, Migraines, Seizures ([facility] and Status post alcohol detoxification. Past Surgical History:  has a past surgical history that includes Parathyroid gland surgery; Cholecystectomy, laparoscopic (07/06/2016); Thyroidectomy; Colonoscopy (N/A, 6/22/2018); CT NEEDLE BIOPSY LIVER PERCUTANEOUS (9/1/2020); Small intestine surgery (N/A, 10/21/2020); CT BIOPSY RENAL (1/25/2021); hc dialysis catheter (N/A, 1/28/2021); and sigmoidoscopy (N/A, 5/14/2021). Social History:  reports that she has been smoking cigarettes. She has been smoking about 0.25 packs per day. She has never used smokeless tobacco. She reports that she does not drink alcohol and does not use drugs. Family History: family history includes Alzheimer's Disease in an other family member; Asthma in her father; Breast Cancer in her maternal grandmother; Cancer in her father, maternal grandmother, and paternal grandfather; Diabetes in an other family member; Heart Disease in her father; High Blood Pressure in her father and mother; High Cholesterol in her mother; Creed Bruch in an other family member; Other in her mother.      Home Meds: Medications Prior to Admission: butalbital-acetaminophen-caffeine (FIORICET, ESGIC) -40 MG per tablet, take 1 tablet by mouth once daily if needed for headache  pantoprazole (PROTONIX) 40 MG tablet, take 1 tablet by mouth every morning before breakfast  cholestyramine (QUESTRAN) 4 g packet, Take 1 packet by mouth 3 times daily  gabapentin (NEURONTIN) 300 MG capsule, take 1 capsule by mouth three times a day  traMADol (ULTRAM) 50 MG tablet, take 1 tablet by mouth every 6 hours if needed for pain  potassium chloride (KLOR-CON M) 20 MEQ extended release tablet, take 1 tablet by mouth once daily  hydrocortisone (PROCTO-MED HC) 2.5 % CREA rectal cream, apply to ANAL AREA after EACH BOWEL MOVEMENT  Pramoxine-HC (HYDROCORTISONE-PRAMOXINE) 2.5-1 % CREA cream, Apply topically 2 times daily  fluticasone (FLONASE) 50 MCG/ACT nasal spray, 1 spray by Each Nostril route daily  levETIRAcetam (KEPPRA) 750 MG tablet, take 1 tablet by mouth twice a day  calcium carbonate (CALCIUM 600) 600 MG TABS tablet, Take 1 tablet by mouth daily  topiramate (TOPAMAX) 50 MG tablet, Take 1.5 tablets by mouth 2 times daily  vitamin D (ERGOCALCIFEROL) 1.25 MG (75370 UT) CAPS capsule, Take 1 capsule by mouth once a week  promethazine (PHENERGAN) 25 MG tablet, Take 1 tablet by mouth as needed for Nausea or Vomiting  magnesium oxide (MAG-OX) 400 MG tablet, Take 400 mg by mouth 2 times daily  levothyroxine (SYNTHROID) 112 MCG tablet, take 1 tablet by mouth once daily  acetaminophen (AMINOFEN) 325 MG tablet, Take 2 tablets by mouth every 6 hours as needed for Pain  The patients home medications have been reviewed. Allergies: Patient has no known allergies. ------------------------- NURSING NOTES AND VITALS REVIEWED ---------------------------  Date / Time Roomed:  6/22/2021  7:42 PM  ED Bed Assignment:  [id number]    The nursing notes within the ED encounter and vital signs as below have been reviewed.    /72   Pulse 75   Temp 98 °F range)     Or   potassium chloride 10 mEq/100 mL IVPB (Peripheral Line) (has no administration in time range)   enoxaparin (LOVENOX) injection 40 mg (has no administration in time range)   ondansetron (ZOFRAN-ODT) disintegrating tablet 4 mg (4 mg Oral Given 6/23/21 0304)     Or   ondansetron (ZOFRAN) injection 4 mg ( Intravenous See Alternative 6/23/21 0304)   polyethylene glycol (GLYCOLAX) packet 17 g (has no administration in time range)   acetaminophen (TYLENOL) tablet 650 mg (has no administration in time range)     Or   acetaminophen (TYLENOL) suppository 650 mg (has no administration in time range)   HYDROcodone-acetaminophen (NORCO) 7.5-325 MG per tablet 1 tablet (1 tablet Oral Given 6/23/21 0256)   tiZANidine (ZANAFLEX) tablet 4 mg (4 mg Oral Given 6/23/21 0421)   0.9 % sodium chloride bolus (1,000 mLs Intravenous New Bag 6/22/21 2237)   levETIRAcetam (KEPPRA) 2,000 mg in sodium chloride 0.9 % 250 mL IVPB (0 mg Intravenous Stopped 6/22/21 2300)   lactulose (CHRONULAC) 10 GM/15ML solution 20 g (20 g Oral Given 6/23/21 0000)   orphenadrine (NORFLEX) injection 60 mg (60 mg Intramuscular Given 6/22/21 2359)       Procedures:  No procedures performed. --------------------------------- PROGRESS NOTES / ADDITIONAL PROVIDER NOTES ---------------------------------  Consultations:  As outlined below. ED Course:    ED Course as of Jun 23 0458   Tue Jun 22, 2021   [id number] I discussed the case with Dr. Twila Elliott of internal medicine who agrees to admit the patient for further evaluation and management. [ML]      ED Course User Index  [ML] Sami PerrinDO cassie       0011: All results were discussed with the patient and I have provided specific details regarding the plan to admit the patient. The patient was stable at the time of admission and was without objective evidence of hemodynamic instability.  The patient was seen in the emergency department by myself and the assigned attending physician, Dr. Shlomo Weslye, who agreed with the assessment, plan and decision to admit as laid out herein. The patient verbalized an understanding and agreement with the plan for admission. All questions were answered and the patient was deemed to be in stable condition at the time of transport. MDM:  Patient presented from home complaining of worsening extremity cramping and fatigue. On arrival, all VS were wnl. EKG and physical exam were as documented above. Of note, the patient had an absence seizure while I was evaluating her. Once she returned to baseline she states she had not had a breakthrough seizure in several years. She reported taking all of her meds including her BID Keppra as directed. She was given two grams of Keppra following the seizure. A work up was initiated to further evaluate her presenting complaints. Labs were remarkable for a significantly elevated ammonia and low TSH. CT head did not reveal any acute intracranial pathology. CXR was unremarkable. Given the patient's history, breakthrough seizure and findings on work up, the decision was made to admit her to the hospital for further evaluation and management. The case was discussed with IM who agreed to admit. The patient was stable at the time of her disposition. This patient's ED course included: a personal history and physicial examination, re-evaluation prior to disposition, multiple bedside re-evaluations, IV medications, cardiac monitoring and continuous pulse oximetry    Diagnosis:  1. Breakthrough seizure ([address]    2. Hyperammonemia (HCC)        Disposition:  Patient's disposition: Admit to telemetry  Patient's condition is stable. This patient was seen, examined and treated with Dr. Mary Mi. All pertinent aspects of the patient's care were discussed with the attending physician.        Billye Kanner, DO  Resident  06/23/21 2310

## 2021-06-23 NOTE — PROGRESS NOTES
Dr Marissa Hernandez notified of consult via [employer] ([facility]). (s) Alysha Farrell and Diana notified of consult via secure text.  Surekha oliveira

## 2021-06-23 NOTE — CONSULTS
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 Multiple liver masses suspicious for metastatic disease.     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 Comment: Sections of the liver tissue cores show the hepatic parenchyma to be partially replaced by a proliferation of epithelioid cells with relatively uniform round nuclei and eosinophilic granular cytoplasm.  The   epithelioid cells form anastomosing solid cords/nests that are surrounded by delicate fibrovascular stroma.  There are no mitotic figures or tumor cell necrosis present.  The histologic changes seen are suggestive of well-differentiated neuroendocrine (carcinoid) tumor. Immunostaining for pankeratin, chromogranin, synaptophysin, TTF-1 and Ki-67 was performed on sections of one tissue block (A1) and the neoplastic epithelioid cells show diffuse and strong positivity for neuroendocrine markers (chromogranin and synaptophysin) and moderate positivity for pankeratin.    There is no staining reactivity for TTF-1. The Ki-67 proliferation labeling index is essentially negative, (very low, <1%). This staining pattern confirms the histologic impression of a well-differentiated neuroendocrine (carcinoid) tumor.     FL Small bowel 09/02/2020:  Rapid small bowel transit.     Serum Chromogranin A 160 on 09/23/2020.   Urine 5-HIAA 21 (0-14)  2d-Echo 10/10/2020: EF 60-65%   Normal right ventricular size and function     Dotatate PET/CT scan 10/14/2020 increased tracer uptake seen throughout the liver compatible with neoplasm. This involves both the left and the right lobe of liver. In addition there are 2 foci of increased tracer uptake along the mesentery of the small bowel. This may involve the wall the small bowel. No other convincing evidence for extra-abdominal metastatic disease.     EGD/Colonoscopy 10/05/2020 by Dr. Maya Rivera; records reviewed.     Hepatobiliary team (Dr. Bijan Javier) consult appreciated.      Small bowel resection on 10/21/2020. A.  Ileum, resection:   Multifocal (4 foci) neuroendocrine tumor (NET). Extensive perineural and angiolymphatic invasion. 3 of 10 lymph nodes with metastatic neuroendocrine tumor and extracapsular extension of tumor cells (3/10). Bilateral viable small bowel resection margins with no evidence of tumor. Munoz Axon received as \"Meckel's\":   Nodular scar tissue with patchy chronic inflammation. Negative for neuroendocrine tumor. Intestinal mucosal tissue is not present. CASE SUMMARY:   Procedure: Small bowel resection   Tumor site: Ileum   Tumor size: Greatest dimension of 1.5 cm   Tumor focality: Multifocal: 4 separate tumors   Histologic type and grade: G2: Well-differentiated neuroendocrine tumor   Mitotic rate: between 2-20 mitoses/2 mm2   Ki-67 labeling index: between 3-20%   Tumor extension: Tumor invades through the muscularis propria into subserosal tissue without penetration of overlying serosa   Margins: All margins are uninvolved by tumor    Margins examined: Proximal and distal   Lymphovascular invasion: Present   Perineural invasion: Present   Large mesenteric masses (greater than 2 cm): Present (one 2.5 cm mass)   Regional lymph nodes:    Number of lymph nodes involved: 3    Number of lymph nodes examined: 10   Pathologic stage classification (pTNM, AJCC eighth edition):    pT3    pN2    pM1a -confirmed liver metastasis, [facility]-     Comment:   A.  Immunostains stain the tumor cells as follows in block A6: Synaptophysin and chromogranin: Positive   Ki-67: Positive in 5% of tumor cells      We recommended Lanreotide once monthly for metastatic well differentiated neuroendocrine cancer to liver. Dose # 1 Lanreotide was on 11/05/2020. Dose # 2 Lanreotide was on 12/03/2020. Dose # 3 Lanreotide was on 01/07/2021. Serum Chromogranin A 95 on 01/07/2021. CT chest 02/07/2021 negative for metastatic disease. CT abdomen/pelvis 02/07/2021 Persistent bilobar hepatic lesions which are grossly stable consistent with stable widespread hepatic metastasis. Imaging reviewed. Continue Lanreotide and repeat scans in 3 months. Dose # 4 Lanreotide was on 02/11/2021. Ga 76 Dotatate PET 03/09/2021 Gallium 68 dotatate avid uptake throughout multiple regions of the liver compatible with multiple foci of neuroendocrine tumor in both the right and the left lobe of the liver, when compared to previous not significantly changed in the interval.  Dose # 5 Lanreotide was on 03/11/2021. Dose # 6 Lanreotide was on 04/08/2021. Serum chromogranin A 115 (0-103)  Dose # 7 Lanreotide was on 05/06/2021. Serum chromogranin A 114 (0-103)  Dose # 8 Lanreotide was on 06/03/2021. Serum chromogranin A 84   (0-103)    Admitted for acute on chronic seizures; CT head revealed no intracranial abnormally. Electrolytes were WNL. Ammonia was 110 on admission which could likely be a causal factor Patient takes Keppra and Topamax. Neurology consulted and Keppra and Topamax levels   Hyperammonemia : 110 on admission , likely due to history of neuroendocrine tumor with liver mets . C/w lactulose. GI consulted   Hypothyroidism : TSH was decreased on admission ordered T4  C/w Synthroid   Chronic Diarrhea ; C/w Questran     Review of Systems;  CONSTITUTIONAL: No fever, chills. Fair appetite and energy level. ENMT: Eyes: No diplopia; Nose: No epistaxis. Mouth: No sore throat. RESPIRATORY: No hemoptysis, shortness of breath, cough.    CARDIOVASCULAR: No chest pain, palpitations. GASTROINTESTINAL: +diarrhea  GENITOURINARY: No dysuria, urinary frequency, hematuria.   NEURO: see HPI  Remainder:  ROS NEGATIVE    Past Medical History:      Diagnosis Date    Abdominal pain     Cancer ([facility] [address]     neuroendocrime tumor    Diarrhea     Hypocalcemia     Hypothyroidism     Irritable bowel syndrome     Migraines     Seizures ([facility] [address]     last episode January 2021    Status post alcohol detoxification     5/22/2018-5/29/2018     Past Surgical History:      Procedure Laterality Date    CHOLECYSTECTOMY, LAPAROSCOPIC  07/06/2016    COLONOSCOPY N/A 6/22/2018    COLONOSCOPY WITH BIOPSY performed by Gianna Aguilar MD at [address] BIOPSY RENAL  1/25/2021    CT BIOPSY RENAL 1/25/2021 Klarissa Gallegos MD [facility] CT    CT NEEDLE BIOPSY LIVER PERCUTANEOUS  9/1/2020    CT NEEDLE BIOPSY LIVER PERCUTANEOUS 9/1/2020 [facility] CT    HC DIALYSIS CATHETER N/A 1/28/2021    TESIO CATHETER INSERTION AND REMOVAL OF TEMPORARY performed by Umesh García MD at [facility] N/A 5/14/2021    SIGMOIDOSCOPY PERIANAL BOTOX INJECTION   (48 UNITS) performed by Gianna Aguilar MD at [facility] N/A 10/21/2020    LAPAROSCOPIC ROBOTIC SMALL BOWEL RESECTION WITH PLANNED TRANSITION TO OPEN performed by Trang Gore MD at [address] THYROIDECTOMY       Family History:  Family History   Problem Relation Age of Onset    High Blood Pressure Mother     High Cholesterol Mother     Other Mother         migraine headaches    Heart Disease Father     Asthma Father     High Blood Pressure Father     Cancer Father         Sarcoidosis    Diabetes Other         GRANDMOTHER    Lung Cancer Other         GRANDFATHER    Alzheimer's Disease Other         GRANDMOTHER    Breast Cancer Maternal Grandmother     Cancer Maternal Grandmother         skin    Cancer Paternal Grandfather         lung Medications:  Reviewed and reconciled. Social History:  Social History     Socioeconomic History    Marital status: Single     Spouse name: Not on file    Number of children: Not on file    Years of education: Not on file    Highest education level: Not on file   Occupational History    Occupation: cleaning     Employer: the [address] [place] [address] Drive Use    Smoking status: Current Every Day Smoker     Packs/day: 0.25     Types: Cigarettes    Smokeless tobacco: Never Used   Vaping Use    Vaping Use: Never used   Substance and Sexual Activity    Alcohol use: No     Alcohol/week: 0.0 standard drinks     Comment: 5 years sober    Drug use: No    Sexual activity: Not on file   Other Topics Concern    Not on file   Social History Narrative    Not on file     Social Determinants of Health     Financial Resource Strain:     Difficulty of Paying Living Expenses:    Food Insecurity:     Worried About [employer] [employer] in the Last Year:     Ran Out of Food in the Last Year:    Transportation Needs:     Lack of Transportation (Medical):  Lack of Transportation (Non-Medical):    Physical Activity:     Days of Exercise per Week:     Minutes of Exercise per Session:    Stress:     Feeling of Stress :    Social Connections:     Frequency of Communication with Friends and Family:     Frequency of Social Gatherings with Friends and Family:     Attends Church Services:     Active Member of Clubs or Organizations:     Attends Club or Organization Meetings:     Marital Status:    Intimate Partner Violence:     Fear of Current or Ex-Partner:     Emotionally Abused:     Physically Abused:     Sexually Abused: Allergies:  No Known Allergies    Physical Exam:  /72   Pulse 75   Temp 98 °F (36.7 °C) (Oral)   Resp 18   Ht 5' (1.524 m)   Wt 132 lb 9.6 oz (60.1 kg)   SpO2 99%   BMI 25.90 kg/m²   GENERAL: Alert, oriented x 3, not in acute distress. HEENT: PERRLA; EOMI. Oropharynx clear. NECK: Supple. Without lymphadenopathy. LUNGS: Good air entry bilaterally. No wheezing, crackles or ronchi. CARDIOVASCULAR: Regular rate. No murmurs, rubs or gallops. ABDOMEN: Soft. Non-tender, non-distended. Positive bowel sounds. EXTREMITIES: Without clubbing, cyanosis, or edema. NEUROLOGIC: No focal deficits. ECOG PS 1    Lab Results   Component Value Date    WBC 7.2 06/22/2021    HGB 12.1 06/22/2021    HCT 38.0 06/22/2021    MCV 94.8 06/22/2021     06/22/2021     Lab Results   Component Value Date     06/22/2021    K 3.6 06/22/2021     06/22/2021    CO2 22 06/22/2021    BUN 11 06/22/2021    CREATININE 0.6 06/22/2021    GLUCOSE 96 06/22/2021    CALCIUM 8.8 06/22/2021    PROT 7.0 06/22/2021    LABALBU 4.4 06/22/2021    BILITOT <0.2 06/22/2021    ALKPHOS 160 (H) 06/22/2021    AST 23 06/22/2021    ALT 43 (H) 06/22/2021    LABGLOM >60 06/22/2021    GFRAA >60 06/22/2021     Impression/Plan:  28 y/o female with 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  Dose # 8 Lanreotide was on 06/03/2021. Serum chromogranin A 84   (0-103)    Admitted for acute on chronic seizures; CT head revealed no intracranial abnormally. Electrolytes were WNL. Ammonia was 110 which could likely be a causal factor   Patient takes Keppra and Topamax. Neurology consulted and Keppra and Topamax levels   Hyperammonemia : 110 on admission , likely due to history of neuroendocrine tumor with liver mets . C/w lactulose. GI consulted for further evaluation   Hypothyroidism :TSH was decreased on admission ordered T4  C/w Synthroid   Chronic diarrhea; Imodium Lomotil Xermelo.  GI team consulted  Abdominal U/S as inpatient  Ga 68 Dotatate PET as outpatient  Will continue to follow    Thank you for allowing us to participate in the care of Mrs. Pavan Abarca MD   6/23/2021

## 2021-06-24 VITALS
WEIGHT: 132.5 LBS | RESPIRATION RATE: 18 BRPM | BODY MASS INDEX: 26.01 KG/M2 | HEART RATE: 56 BPM | TEMPERATURE: 98 F | OXYGEN SATURATION: 100 % | SYSTOLIC BLOOD PRESSURE: 100 MMHG | HEIGHT: 60 IN | DIASTOLIC BLOOD PRESSURE: 50 MMHG

## 2021-06-24 LAB
ALBUMIN SERPL-MCNC: 3.7 G/DL (ref 3.5–5.2)
ALP BLD-CCNC: 134 U/L (ref 35–104)
ALT SERPL-CCNC: 27 U/L (ref 0–32)
ANION GAP SERPL CALCULATED.3IONS-SCNC: 8 MMOL/L (ref 7–16)
AST SERPL-CCNC: 21 U/L (ref 0–31)
BASOPHILS ABSOLUTE: 0.05 E9/L (ref 0–0.2)
BASOPHILS RELATIVE PERCENT: 0.6 % (ref 0–2)
BILIRUB SERPL-MCNC: 0.7 MG/DL (ref 0–1.2)
BUN BLDV-MCNC: 9 MG/DL (ref 6–20)
CALCIUM SERPL-MCNC: 7.9 MG/DL (ref 8.6–10.2)
CHLORIDE BLD-SCNC: 107 MMOL/L (ref 98–107)
CO2: 20 MMOL/L (ref 22–29)
CREAT SERPL-MCNC: 0.6 MG/DL (ref 0.5–1)
EOSINOPHILS ABSOLUTE: 0.07 E9/L (ref 0.05–0.5)
EOSINOPHILS RELATIVE PERCENT: 0.8 % (ref 0–6)
GFR AFRICAN AMERICAN: >60
GFR NON-AFRICAN AMERICAN: >60 ML/MIN/1.73
GLUCOSE BLD-MCNC: 122 MG/DL (ref 74–99)
HAV IGM SER IA-ACNC: NORMAL
HCT VFR BLD CALC: 31.9 % (ref 34–48)
HEMOGLOBIN: 10.4 G/DL (ref 11.5–15.5)
HEPATITIS B CORE IGM ANTIBODY: NORMAL
HEPATITIS B SURFACE ANTIGEN INTERPRETATION: NORMAL
HEPATITIS C ANTIBODY INTERPRETATION: NORMAL
IMMATURE GRANULOCYTES #: 0.04 E9/L
IMMATURE GRANULOCYTES %: 0.5 % (ref 0–5)
LYMPHOCYTES ABSOLUTE: 1.71 E9/L (ref 1.5–4)
LYMPHOCYTES RELATIVE PERCENT: 19.4 % (ref 20–42)
MCH RBC QN AUTO: 31.2 PG (ref 26–35)
MCHC RBC AUTO-ENTMCNC: 32.6 % (ref 32–34.5)
MCV RBC AUTO: 95.8 FL (ref 80–99.9)
MONOCYTES ABSOLUTE: 0.67 E9/L (ref 0.1–0.95)
MONOCYTES RELATIVE PERCENT: 7.6 % (ref 2–12)
NEUTROPHILS ABSOLUTE: 6.28 E9/L (ref 1.8–7.3)
NEUTROPHILS RELATIVE PERCENT: 71.1 % (ref 43–80)
PDW BLD-RTO: 16.6 FL (ref 11.5–15)
PLATELET # BLD: 228 E9/L (ref 130–450)
PMV BLD AUTO: 10.1 FL (ref 7–12)
POTASSIUM REFLEX MAGNESIUM: 4 MMOL/L (ref 3.5–5)
RBC # BLD: 3.33 E12/L (ref 3.5–5.5)
SODIUM BLD-SCNC: 135 MMOL/L (ref 132–146)
TOTAL PROTEIN: 5.8 G/DL (ref 6.4–8.3)
WBC # BLD: 8.8 E9/L (ref 4.5–11.5)

## 2021-06-24 PROCEDURE — 2580000003 HC RX 258: Performed by: INTERNAL MEDICINE

## 2021-06-24 PROCEDURE — 80053 COMPREHEN METABOLIC PANEL: CPT

## 2021-06-24 PROCEDURE — 2580000003 HC RX 258: Performed by: FAMILY MEDICINE

## 2021-06-24 PROCEDURE — 99239 HOSP IP/OBS DSCHRG MGMT >30: CPT | Performed by: INTERNAL MEDICINE

## 2021-06-24 PROCEDURE — 6360000002 HC RX W HCPCS: Performed by: FAMILY MEDICINE

## 2021-06-24 PROCEDURE — 85025 COMPLETE CBC W/AUTO DIFF WBC: CPT

## 2021-06-24 PROCEDURE — 6370000000 HC RX 637 (ALT 250 FOR IP): Performed by: FAMILY MEDICINE

## 2021-06-24 PROCEDURE — 36415 COLL VENOUS BLD VENIPUNCTURE: CPT

## 2021-06-24 RX ORDER — TIZANIDINE 4 MG/1
4 TABLET ORAL EVERY 8 HOURS PRN
Qty: 30 TABLET | Refills: 0 | Status: SHIPPED | OUTPATIENT
Start: 2021-06-24 | End: 2021-09-28 | Stop reason: SDUPTHER

## 2021-06-24 RX ORDER — 0.9 % SODIUM CHLORIDE 0.9 %
1000 INTRAVENOUS SOLUTION INTRAVENOUS ONCE
Status: COMPLETED | OUTPATIENT
Start: 2021-06-24 | End: 2021-06-24

## 2021-06-24 RX ADMIN — TIZANIDINE 4 MG: 4 TABLET ORAL at 13:02

## 2021-06-24 RX ADMIN — SODIUM CHLORIDE, PRESERVATIVE FREE 10 ML: 5 INJECTION INTRAVENOUS at 09:04

## 2021-06-24 RX ADMIN — PANTOPRAZOLE SODIUM 40 MG: 40 TABLET, DELAYED RELEASE ORAL at 05:51

## 2021-06-24 RX ADMIN — SODIUM CHLORIDE 1000 ML: 9 INJECTION, SOLUTION INTRAVENOUS at 09:02

## 2021-06-24 RX ADMIN — POTASSIUM CHLORIDE 20 MEQ: 1500 TABLET, EXTENDED RELEASE ORAL at 09:05

## 2021-06-24 RX ADMIN — Medication 400 MG: at 09:05

## 2021-06-24 RX ADMIN — ONDANSETRON 4 MG: 2 INJECTION INTRAMUSCULAR; INTRAVENOUS at 04:01

## 2021-06-24 RX ADMIN — TOPIRAMATE 75 MG: 25 TABLET, FILM COATED ORAL at 09:06

## 2021-06-24 RX ADMIN — LEVETIRACETAM 750 MG: 500 TABLET ORAL at 09:06

## 2021-06-24 RX ADMIN — HYDROCODONE BITARTRATE AND ACETAMINOPHEN 1 TABLET: 7.5; 325 TABLET ORAL at 09:05

## 2021-06-24 RX ADMIN — TIZANIDINE 4 MG: 4 TABLET ORAL at 02:50

## 2021-06-24 RX ADMIN — GABAPENTIN 300 MG: 300 CAPSULE ORAL at 09:05

## 2021-06-24 RX ADMIN — SODIUM CHLORIDE, PRESERVATIVE FREE 10 ML: 5 INJECTION INTRAVENOUS at 08:52

## 2021-06-24 RX ADMIN — LEVOTHYROXINE SODIUM 112 MCG: 112 TABLET ORAL at 05:52

## 2021-06-24 ASSESSMENT — PAIN DESCRIPTION - PAIN TYPE: TYPE: CHRONIC PAIN

## 2021-06-24 ASSESSMENT — PAIN SCALES - GENERAL
PAINLEVEL_OUTOF10: 5
PAINLEVEL_OUTOF10: 7

## 2021-06-24 ASSESSMENT — PAIN DESCRIPTION - LOCATION: LOCATION: GENERALIZED

## 2021-06-24 NOTE — CARE COORDINATION
Plan remains for pt to return home on discharge with her parents. Receives monthly Sandostatin injections at [facility] oncology office.  Declining [facility]- no needs Mick Rivers, RN case manager

## 2021-06-24 NOTE — PROGRESS NOTES
CLINICAL PHARMACY NOTE: MEDS TO BEDS    Total # of Prescriptions Filled: 1   The following medications were delivered to the patient:  · Tizanidine 4 mg    Additional Documentation:

## 2021-06-24 NOTE — DISCHARGE SUMMARY
[facility] Physician Discharge Summary       Ousmane Wasserman MD  [address],[place] [id number] [phone]    Schedule an appointment as soon as possible for a visit  Follow-up with Nurse Practioner       Activity level: As tolerated     Dispo: Home      Condition on discharge: Stable     Patient ID:  Geo Webber  [id number]  29 y.o.  1985    Admit date: 6/22/2021    Discharge date and time:  6/24/2021  5:08 PM    Admission Diagnoses: Active Problems:    Breakthrough seizure ([address]  Resolved Problems:    * No resolved hospital problems. *      Discharge Diagnoses: Active Problems:    Breakthrough seizure ([address]  Resolved Problems:    * No resolved hospital problems. *      Consults:  IP CONSULT TO ONCOLOGY  IP CONSULT TO NEUROLOGY  IP CONSULT TO GI  IP CONSULT TO IV TEAM      Hospital Course:   Patient Geo Webber is a 29 y.o. presented with Breakthrough seizure ([address] Raffaele Jimenes  1. Seizure activity, on keppra, appreciate neuro input, patient is to have an EEG as an outpatient then make a decision about changing meds. 2.  Hyperammonemia, with mild transaminasis, probably due to liver mets, improved  3. Neuroendocrine carcinoma with liver mets. , monthly lanreotide. serum chromogranin A at 84, continue, appreciate onc input.    4.  Chronic diarrhea, due to neuroendocrine carcinoma, continue imodium    Discharge Exam:    General Appearance: alert and oriented to person, place and time and in no acute distress  Skin: warm and dry  Head: normocephalic and atraumatic  Eyes: pupils equal, round, and reactive to light, extraocular eye movements intact, conjunctivae normal  Neck: neck supple and non tender without mass   Pulmonary/Chest: clear to auscultation bilaterally- no wheezes, rales or rhonchi, normal air movement, no respiratory distress  Cardiovascular: normal rate, normal S1 and S2 and no carotid bruits  Abdomen: soft, non-tender, non-distended, normal bowel sounds, no masses or organomegaly  Extremities: no cyanosis, no clubbing and no edema  Neurologic: no cranial nerve deficit and speech normal    I/O last 3 completed shifts: In: 360 [P.O.:360]  Out: -   No intake/output data recorded. LABS:  Recent Labs     06/22/21 2206 06/23/21  1130 06/24/21  0900    137 135   K 3.6 4.1 4.0    105 107   CO2 22 22 20*   BUN 11 12 9   CREATININE 0.6 0.6 0.6   GLUCOSE 96 110* 122*   CALCIUM 8.8 8.2* 7.9*       Recent Labs     06/22/21 2206 06/23/21  1130 06/24/21  0900   WBC 7.2 8.4 8.8   RBC 4.01 3.89 3.33*   HGB 12.1 11.8 10.4*   HCT 38.0 37.1 31.9*   MCV 94.8 95.4 95.8   MCH 30.2 30.3 31.2   MCHC 31.8* 31.8* 32.6   RDW 17.1* 16.9* 16.6*    279 228   MPV 9.8 9.7 10.1       No results for input(s): POCGLU in the last 72 hours. Imaging:  CT Head WO Contrast    Result Date: 6/22/2021  EXAMINATION: CT OF THE HEAD WITHOUT CONTRAST  6/22/2021 10:21 pm TECHNIQUE: CT of the head was performed without the administration of intravenous contrast. Dose modulation, iterative reconstruction, and/or weight based adjustment of the mA/kV was utilized to reduce the radiation dose to as low as reasonably achievable. COMPARISON: CT head from February 7, 2021 HISTORY: ORDERING SYSTEM PROVIDED HISTORY: Breakthrough seizure, hx of cancer TECHNOLOGIST PROVIDED HISTORY: Reason for exam:->Breakthrough seizure, hx of cancer Has a \"code stroke\" or \"stroke alert\" been called? ->No Decision Support Exception - unselect if not a suspected or confirmed emergency medical condition->Emergency Medical Condition (MA) FINDINGS: BRAIN/VENTRICLES: There is no acute intracranial hemorrhage, mass effect or midline shift. No abnormal extra-axial fluid collection. The gray-white differentiation is maintained without evidence of an acute infarct. There is no evidence of hydrocephalus. ORBITS: The visualized portion of the orbits demonstrate no acute abnormality.  SINUSES: Mild mucosal thickening in the maxillary sinuses. Remaining paranasal sinuses and mastoid air cell complexes are clear. SOFT TISSUES/SKULL:  No acute abnormality of the visualized skull or soft tissues. No acute intracranial abnormality. Mild paranasal sinus disease. XR CHEST PORTABLE    Result Date: 6/22/2021  EXAMINATION: ONE XRAY VIEW OF THE CHEST 6/22/2021 10:26 pm COMPARISON: Chest series from June 7, 2021 HISTORY: ORDERING SYSTEM PROVIDED HISTORY: Fatigue, hx cancer TECHNOLOGIST PROVIDED HISTORY: Reason for exam:->Fatigue, hx cancer FINDINGS: Adequate and symmetric aeration of the lungs. There appears to be a nipple shadow in the right lower lung. There are no formed consolidations, pleural effusions, or pneumothoraces. Trachea and central mainstem bronchi appear clear. The cardiomediastinal silhouette and pulmonary vascularity appear within normal limits. Osseous and thoracic soft tissue structures demonstrate no acute findings. Cholecystectomy clips in the right upper quadrant     No definite evidence of active cardiopulmonary pathology. Presumed nipple shadow in the right lower lung. RECOMMENDATION: Follow-up chest radiographs in 10-12 weeks suggested with nipple markers. US ABDOMEN LIMITED Specify organ? LIVER, SPLEEN, GALLBLADDER, PANCREAS    Result Date: 6/23/2021  EXAMINATION: RIGHT UPPER QUADRANT ULTRASOUND 6/23/2021 7:37 am COMPARISON: CT abdomen pelvis February 7, 2021 HISTORY: ORDERING SYSTEM PROVIDED HISTORY: elevated LFTS TECHNOLOGIST PROVIDED HISTORY: Reason for exam:->elevated LFTS Specify organ?->LIVER Specify organ?->SPLEEN Specify organ?->GALLBLADDER Specify organ?->PANCREAS What reading provider will be dictating this exam?->CRC FINDINGS: LIVER:  Multiple hepatic lesions with hypoechoic rim, consistent with findings on prior CT examination. The large lesion measures approximately 2.3 cm. The liver demonstrates normal echogenicity.  BILIARY SYSTEM:  Gallbladder is unremarkable without evidence of pericholecystic fluid, wall thickening or stones. Negative sonographic Farley's sign. Common bile duct is within normal limits measuring 3.8 mm. RIGHT KIDNEY: The right kidney is grossly unremarkable without evidence of hydronephrosis. PANCREAS:  Visualized portions of the pancreas are unremarkable. OTHER: No evidence of right upper quadrant ascites. Multiple hepatic lesions concerning for neoplastic disease. Large lesion measures 2.3 cm.        Patient Instructions:      Medication List      START taking these medications    tiZANidine 4 MG tablet  Commonly known as: ZANAFLEX  Take 1 tablet by mouth every 8 hours as needed (spasm)        CONTINUE taking these medications    acetaminophen 325 MG tablet  Commonly known as: Aminofen  Take 2 tablets by mouth every 6 hours as needed for Pain     butalbital-acetaminophen-caffeine -40 MG per tablet  Commonly known as: FIORICET, ESGIC  take 1 tablet by mouth once daily if needed for headache     calcium carbonate 600 MG Tabs tablet  Commonly known as: Calcium 600  Take 1 tablet by mouth daily     cholestyramine 4 g packet  Commonly known as: Questran  Take 1 packet by mouth 3 times daily     fluticasone 50 MCG/ACT nasal spray  Commonly known as: FLONASE  1 spray by Each Nostril route daily     gabapentin 300 MG capsule  Commonly known as: NEURONTIN  take 1 capsule by mouth three times a day     hydrocortisone 2.5 % Crea rectal cream  Commonly known as: Procto-Med HC  apply to ANAL AREA after EACH BOWEL MOVEMENT     hydrocortisone-pramoxine 2.5-1 % Crea cream  Apply topically 2 times daily     levETIRAcetam 750 MG tablet  Commonly known as: KEPPRA     levothyroxine 112 MCG tablet  Commonly known as: SYNTHROID  take 1 tablet by mouth once daily     magnesium oxide 400 MG tablet  Commonly known as: MAG-OX     pantoprazole 40 MG tablet  Commonly known as: PROTONIX  take 1 tablet by mouth every morning before breakfast     potassium chloride 20 MEQ extended release tablet  Commonly known as: KLOR-CON M  take 1 tablet by mouth once daily     promethazine 25 MG tablet  Commonly known as: PHENERGAN     topiramate 50 MG tablet  Commonly known as:  Topamax  Take 1.5 tablets by mouth 2 times daily     vitamin D 1.25 MG (49629 UT) Caps capsule  Commonly known as: ERGOCALCIFEROL  Take 1 capsule by mouth once a week        STOP taking these medications    traMADol 50 MG tablet  Commonly known as: ULTRAM           Where to Get Your Medications      These medications were sent to [address] [address]    Phone: [phone]   · tiZANidine 4 MG tablet           Note that more than 30 minutes was spent in preparing discharge papers, discussing discharge with patient, medication review, etc.    Signed:  Electronically signed by Khadijah Velasco MD on 6/24/2021 at 5:08 PM

## 2021-06-25 ENCOUNTER — TELEPHONE (OUTPATIENT)
Dept: PRIMARY CARE CLINIC | Age: 36
End: 2021-06-25

## 2021-06-25 NOTE — TELEPHONE ENCOUNTER
[facility] Transitions Initial Follow Up Call    Outreach made within 2 business days of discharge: Yes    Patient: Felisa James Patient : 1985   MRN: [id number]  Reason for Admission: There are no discharge diagnoses documented for the most recent discharge. Discharge Date: 21       Left message for patient to return call in regards to following up with Dr. Esperanza Valderrama for a transitional care visit.       Follow Up  Future Appointments   Date Time Provider Raoul Meyer   2021 11:00 AM [facility] PET ROOM [facility] NM [facility]   2021  2:45 PM Christine Lawrence MD MED ONC [facility]   2021  3:30 PM [facility]ADA MED ONC FAST TRACK 2 [facility] Med Onc [facility]   2021  1:00 PM DO ELISABETH Abraham [facility]   2021 11:00 AM SCHEDULE, [facility] PALLIATIVE CARE PROVIDER [facility] PALLIAT [facility]   10/19/2021  9:00 AM TRINA Mayers - CNP BD Neuro [facility]       [place]

## 2021-06-26 LAB
KEPPRA: 38 UG/ML (ref 12–46)
TOPIRAMATE LEVEL: 4.3 UG/ML (ref 5–20)

## 2021-06-27 DIAGNOSIS — R56.9 SEIZURE (HCC): Primary | ICD-10-CM

## 2021-06-28 ENCOUNTER — TELEPHONE (OUTPATIENT)
Dept: PRIMARY CARE CLINIC | Age: 36
End: 2021-06-28

## 2021-06-28 NOTE — TELEPHONE ENCOUNTER
[facility] Transitions Initial Follow Up Call    Outreach made within 2 business days of discharge: Yes    Patient: Tiffanie Gentile Patient : 1985   MRN: [id number]  Reason for Admission: There are no discharge diagnoses documented for the most recent discharge. Discharge Date: 21       Spoke with: left message with Aury Arguello to call for TCM. 2nd attempt.      Discharge department/facility: home         Scheduled appointment with PCP within 7-14 days    Follow Up  Future Appointments   Date Time Provider Raoul Meyer   2021 11:00 AM [facility] PET ROOM [facility]   2021  2:45 PM Hugo Renee MD MED ONC [facility]   2021  3:30 PM [facility] MED ONC FAST TRACK 2 [facility] Med Onc [facility]   2021  1:00 PM DO ELISABETH Wick [facility]   2021 11:00 AM SCHEDULE, [facility] PALLIATIVE CARE PROVIDER [facility] PALLIAT [facility]   10/19/2021  9:00 AM Margaret Raza APRN - CNP BDM Neuro [facility]       Rohan Ramirez

## 2021-06-29 ENCOUNTER — HOSPITAL ENCOUNTER (OUTPATIENT)
Dept: NUCLEAR MEDICINE | Age: 36
Discharge: HOME | End: 2021-07-01
Payer: COMMERCIAL

## 2021-06-29 DIAGNOSIS — C7B.8 NEUROENDOCRINE CARCINOMA METASTATIC TO LIVER (HCC): ICD-10-CM

## 2021-06-29 DIAGNOSIS — C7A.8 NEUROENDOCRINE CARCINOMA METASTATIC TO LIVER (HCC): ICD-10-CM

## 2021-06-29 PROCEDURE — 78815 PET IMAGE W/CT SKULL-THIGH: CPT

## 2021-06-29 PROCEDURE — 78815 PET IMAGE W/CT SKULL-THIGH: CPT | Performed by: RADIOLOGY

## 2021-06-29 PROCEDURE — 3430000000 HC RX DIAGNOSTIC RADIOPHARMACEUTICAL: Performed by: RADIOLOGY

## 2021-06-29 PROCEDURE — A9587 GALLIUM GA-68: HCPCS | Performed by: RADIOLOGY

## 2021-06-29 RX ORDER — 68GA-DOTATATE 40 MCG
3.13 KIT INTRAVENOUS ONCE
Status: COMPLETED | OUTPATIENT
Start: 2021-06-29 | End: 2021-06-29

## 2021-06-29 RX ADMIN — 68GA-DOTATATE 3 MILLICURIE: KIT INTRAVENOUS at 12:31

## 2021-07-01 ENCOUNTER — HOSPITAL ENCOUNTER (OUTPATIENT)
Dept: INFUSION THERAPY | Age: 36
Discharge: HOME | End: 2021-07-01
Payer: COMMERCIAL

## 2021-07-01 ENCOUNTER — OFFICE VISIT (OUTPATIENT)
Dept: ONCOLOGY | Age: 36
End: 2021-07-01
Payer: COMMERCIAL

## 2021-07-01 VITALS
HEIGHT: 60 IN | OXYGEN SATURATION: 98 % | BODY MASS INDEX: 25.13 KG/M2 | HEART RATE: 79 BPM | SYSTOLIC BLOOD PRESSURE: 130 MMHG | DIASTOLIC BLOOD PRESSURE: 68 MMHG | TEMPERATURE: 97.8 F | WEIGHT: 128 LBS

## 2021-07-01 DIAGNOSIS — C7A.8 NEUROENDOCRINE CARCINOMA METASTATIC TO LIVER (HCC): Primary | ICD-10-CM

## 2021-07-01 DIAGNOSIS — C7B.8 NEUROENDOCRINE CARCINOMA METASTATIC TO LIVER (HCC): Primary | ICD-10-CM

## 2021-07-01 DIAGNOSIS — C7B.8 METASTATIC MALIGNANT NEUROENDOCRINE TUMOR TO LIVER (HCC): Primary | ICD-10-CM

## 2021-07-01 LAB
ALBUMIN SERPL-MCNC: 4.4 G/DL (ref 3.5–5.2)
ALP BLD-CCNC: 113 U/L (ref 35–104)
ALT SERPL-CCNC: 17 U/L (ref 0–32)
ANION GAP SERPL CALCULATED.3IONS-SCNC: 15 MMOL/L (ref 7–16)
AST SERPL-CCNC: 20 U/L (ref 0–31)
BASOPHILS ABSOLUTE: 0.1 E9/L (ref 0–0.2)
BASOPHILS RELATIVE PERCENT: 1.2 % (ref 0–2)
BILIRUB SERPL-MCNC: 0.3 MG/DL (ref 0–1.2)
BUN BLDV-MCNC: 10 MG/DL (ref 6–20)
CALCIUM SERPL-MCNC: 8.9 MG/DL (ref 8.6–10.2)
CHLORIDE BLD-SCNC: 105 MMOL/L (ref 98–107)
CO2: 18 MMOL/L (ref 22–29)
CREAT SERPL-MCNC: 0.5 MG/DL (ref 0.5–1)
EOSINOPHILS ABSOLUTE: 0.12 E9/L (ref 0.05–0.5)
EOSINOPHILS RELATIVE PERCENT: 1.4 % (ref 0–6)
GFR AFRICAN AMERICAN: >60
GFR NON-AFRICAN AMERICAN: >60 ML/MIN/1.73
GLUCOSE BLD-MCNC: 104 MG/DL (ref 74–99)
HCT VFR BLD CALC: 37.6 % (ref 34–48)
HEMOGLOBIN: 11.8 G/DL (ref 11.5–15.5)
IMMATURE GRANULOCYTES #: 0.09 E9/L
IMMATURE GRANULOCYTES %: 1.1 % (ref 0–5)
LYMPHOCYTES ABSOLUTE: 1.93 E9/L (ref 1.5–4)
LYMPHOCYTES RELATIVE PERCENT: 22.5 % (ref 20–42)
MCH RBC QN AUTO: 29.7 PG (ref 26–35)
MCHC RBC AUTO-ENTMCNC: 31.4 % (ref 32–34.5)
MCV RBC AUTO: 94.7 FL (ref 80–99.9)
MONOCYTES ABSOLUTE: 1.18 E9/L (ref 0.1–0.95)
MONOCYTES RELATIVE PERCENT: 13.8 % (ref 2–12)
NEUTROPHILS ABSOLUTE: 5.14 E9/L (ref 1.8–7.3)
NEUTROPHILS RELATIVE PERCENT: 60 % (ref 43–80)
PDW BLD-RTO: 16.7 FL (ref 11.5–15)
PLATELET # BLD: 296 E9/L (ref 130–450)
PMV BLD AUTO: 9.2 FL (ref 7–12)
POTASSIUM SERPL-SCNC: 3.8 MMOL/L (ref 3.5–5)
RBC # BLD: 3.97 E12/L (ref 3.5–5.5)
SODIUM BLD-SCNC: 138 MMOL/L (ref 132–146)
TOTAL PROTEIN: 7.2 G/DL (ref 6.4–8.3)
WBC # BLD: 8.6 E9/L (ref 4.5–11.5)

## 2021-07-01 PROCEDURE — 80053 COMPREHEN METABOLIC PANEL: CPT

## 2021-07-01 PROCEDURE — 85025 COMPLETE CBC W/AUTO DIFF WBC: CPT

## 2021-07-01 PROCEDURE — 1111F DSCHRG MED/CURRENT MED MERGE: CPT | Performed by: INTERNAL MEDICINE

## 2021-07-01 PROCEDURE — 99214 OFFICE O/P EST MOD 30 MIN: CPT | Performed by: INTERNAL MEDICINE

## 2021-07-01 PROCEDURE — G8417 CALC BMI ABV UP PARAM F/U: HCPCS | Performed by: INTERNAL MEDICINE

## 2021-07-01 PROCEDURE — 99212 OFFICE O/P EST SF 10 MIN: CPT

## 2021-07-01 PROCEDURE — G8427 DOCREV CUR MEDS BY ELIG CLIN: HCPCS | Performed by: INTERNAL MEDICINE

## 2021-07-01 PROCEDURE — 36415 COLL VENOUS BLD VENIPUNCTURE: CPT

## 2021-07-01 PROCEDURE — 86316 IMMUNOASSAY TUMOR OTHER: CPT

## 2021-07-01 PROCEDURE — 6360000002 HC RX W HCPCS: Performed by: INTERNAL MEDICINE

## 2021-07-01 PROCEDURE — 4004F PT TOBACCO SCREEN RCVD TLK: CPT | Performed by: INTERNAL MEDICINE

## 2021-07-01 PROCEDURE — 96372 THER/PROPH/DIAG INJ SC/IM: CPT

## 2021-07-01 RX ORDER — TOPIRAMATE 25 MG/1
CAPSULE, COATED PELLETS ORAL
COMMUNITY
Start: 2021-05-14 | End: 2021-07-01

## 2021-07-01 RX ORDER — LANREOTIDE ACETATE 120 MG/.5ML
120 INJECTION SUBCUTANEOUS ONCE
Status: CANCELLED | OUTPATIENT
Start: 2021-07-29 | End: 2021-07-29

## 2021-07-01 RX ORDER — LANREOTIDE ACETATE 120 MG/.5ML
120 INJECTION SUBCUTANEOUS ONCE
Status: COMPLETED | OUTPATIENT
Start: 2021-07-01 | End: 2021-07-01

## 2021-07-01 RX ORDER — DICYCLOMINE HCL 20 MG
TABLET ORAL
COMMUNITY
Start: 2021-06-13 | End: 2022-06-16

## 2021-07-01 RX ORDER — LEVETIRACETAM 500 MG/1
TABLET ORAL
COMMUNITY
Start: 2021-05-30 | End: 2021-07-01

## 2021-07-01 RX ORDER — FAMOTIDINE 40 MG/1
40 TABLET, FILM COATED ORAL DAILY
Status: ON HOLD | COMMUNITY
Start: 2021-06-20 | End: 2022-06-21 | Stop reason: HOSPADM

## 2021-07-01 RX ORDER — ZOLPIDEM TARTRATE 10 MG/1
TABLET ORAL
COMMUNITY
Start: 2021-06-24 | End: 2021-08-17

## 2021-07-01 RX ORDER — LEVOTHYROXINE SODIUM 112 UG/1
TABLET ORAL
Qty: 90 TABLET | Refills: 1 | Status: SHIPPED
Start: 2021-07-01 | End: 2021-12-28

## 2021-07-01 RX ADMIN — LANREOTIDE ACETATE 120 MG: 120 INJECTION SUBCUTANEOUS at 15:03

## 2021-07-01 NOTE — PROGRESS NOTES
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 CT abdomen/pelvis 08/28/2020:  2 cm masslike density in the mid abdominal small bowel mesentery with a spiculated appearance. Multiple liver masses suspicious for metastatic disease. 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 Comment: Sections of the liver tissue cores show the hepatic parenchyma to be partially replaced by a proliferation of epithelioid cells with relatively uniform round nuclei and eosinophilic granular cytoplasm.  The   epithelioid cells form anastomosing solid cords/nests that are surrounded by delicate fibrovascular stroma.  There are no mitotic figures or tumor cell necrosis present.  The histologic changes seen are suggestive of well-differentiated neuroendocrine (carcinoid) tumor. Immunostaining for pankeratin, chromogranin, synaptophysin, TTF-1 and Ki-67 was performed on sections of one tissue block (A1) and the neoplastic epithelioid cells show diffuse and strong positivity for neuroendocrine markers (chromogranin and synaptophysin) and moderate positivity for pankeratin.  There is no staining reactivity for TTF-1. The Ki-67 proliferation labeling index is essentially negative, (very low, <1%). This staining pattern confirms the histologic impression of a well-differentiated neuroendocrine (carcinoid) tumor. FL Small bowel 09/02/2020:  Rapid small bowel transit. Serum Chromogranin A 160 on 09/23/2020.   Urine 5-HIAA 21 (0-14)  2d-Echo 10/10/2020: EF 60-65%   Normal right ventricular size and function    Dotatate PET/CT scan 10/14/2020 increased tracer uptake seen throughout the liver compatible with neoplasm. This involves both the left and the right lobe of liver. In addition there are 2 foci of increased tracer uptake along the mesentery of the small bowel. This may involve the wall the small bowel. No other convincing evidence for extra-abdominal metastatic disease. EGD/Colonoscopy 10/05/2020 by Dr. Edmund Baeza; records reviewed. Hepatobiliary team (Dr. Regina Stringer) consult appreciated. Small bowel resection on 10/21/2020. Small bowel resection on 10/21/2020. A.  Ileum, resection:   Multifocal (4 foci) neuroendocrine tumor (NET). Extensive perineural and angiolymphatic invasion. 3 of 10 lymph nodes with metastatic neuroendocrine tumor and extracapsular extension of tumor cells (3/10). Bilateral viable small bowel resection margins with no evidence of tumor. Beto Paulino received as \"Meckel's\":   Nodular scar tissue with patchy chronic inflammation. Negative for neuroendocrine tumor. Intestinal mucosal tissue is not present. CASE SUMMARY:   Procedure: Small bowel resection   Tumor site: Ileum   Tumor size: Greatest dimension of 1.5 cm   Tumor focality: Multifocal: 4 separate tumors   Histologic type and grade: G2: Well-differentiated neuroendocrine tumor   Mitotic rate: between 2-20 mitoses/2 mm2   Ki-67 labeling index: between 3-20%   Tumor extension: Tumor invades through the muscularis propria into subserosal tissue without penetration of overlying serosa   Margins:  All margins are uninvolved by tumor    Margins examined: Proximal and distal   Lymphovascular invasion: Present   Perineural invasion: Present   Large mesenteric masses (greater than 2 cm): Present (one 2.5 cm mass)   Regional lymph nodes:    Number of lymph nodes involved: 3    Number of lymph nodes examined: 10   Pathologic stage classification (pTNM, AJCC eighth edition):    pT3    pN2    pM1a -confirmed liver metastasis[place] [facility] [id number]     Comment:   A. 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 Dose # 1 Lanreotide was on 11/05/2020. Dose # 2 Lanreotide was on 12/03/2020. Dose # 3 Lanreotide was on 01/07/2021. Serum Chromogranin A 95 on 01/07/2021. CT chest 02/07/2021 negative for metastatic disease. CT abdomen/pelvis 02/07/2021 Persistent bilobar hepatic lesions which are grossly stable consistent with stable widespread hepatic metastasis. Imaging reviewed. Continue Lanreotide and repeat scans in 3 months. Dose # 4 Lanreotide was on 02/11/2021. Ga 76 Dotatate PET 03/09/2021 Gallium 68 dotatate avid uptake throughout multiple regions of the liver compatible with multiple foci of neuroendocrine tumor in both the right and the left lobe of the liver, when compared to previous not significantly changed in the interval.  Dose # 5 Lanreotide was on 03/11/2021. Dose # 6 Lanreotide was on 04/08/2021. Serum chromogranin A 115 (0-103)  Dose # 7 Lanreotide was on 05/06/2021. Serum chromogranin A 114 (0-103)  Dose # 8 Lanreotide was on 06/03/2021. Serum chromogranin A  84  (0-103)    Today 07/01/2021: No fever, chills. Diarrhea controlled on Imodium, Lomotil and Xermelo. Fair appetite and energy level. Mild hot flashes/flushing    Review of Systems;  CONSTITUTIONAL: No fever. Mild hot flashes/flushing. ENMT: Eyes: No diplopia; Nose: No epistaxis. Mouth: No sore throat. RESPIRATORY: No hemoptysis, shortness of breath, cough. CARDIOVASCULAR: No chest pain, palpitations. GASTROINTESTINAL: Diarrhea controlled on Imodium, Lomotil and Xermelo. GENITOURINARY: No dysuria, urinary frequency, hematuria. NEURO: No syncope, presyncope, headache.   Remainder:  ROS NEGATIVE    Past Medical History:      Diagnosis Date    Abdominal pain     Cancer (HCC)     neuroendocrime tumor    Diarrhea     Hypocalcemia     Hypothyroidism     Irritable bowel syndrome     Migraines     Seizures ([facility] [address]     last episode January 2021    Status post alcohol detoxification     5/22/2018-5/29/2018     Medications:  Reviewed and reconciled. Allergies:  No Known Allergies    Physical Exam:  /68   Pulse 79   Temp 97.8 °F (36.6 °C) (Temporal)   Ht 5' (1.524 m)   Wt 128 lb (58.1 kg)   SpO2 98%   BMI 25.00 kg/m²   GENERAL: Alert, oriented x 3, not in distress  HEENT: PERRLA; EOMI. Oropharynx clear. NECK: Supple. Without lymphadenopathy. LUNGS: Good air entry bilaterally. No wheezing, crackles or ronchi. CARDIOVASCULAR: Regular rate. No murmurs, rubs or gallops. ABDOMEN: Soft. Non-tender, non-distended. Positive bowel sounds. EXTREMITIES: Without clubbing, cyanosis, or edema. NEUROLOGIC: No focal deficits. ECOG PS 1    Lab Results   Component Value Date    WBC 8.6 07/01/2021    HGB 11.8 07/01/2021    HCT 37.6 07/01/2021    MCV 94.7 07/01/2021     07/01/2021     Lab Results   Component Value Date     07/01/2021    K 3.8 07/01/2021     07/01/2021    CO2 18 (L) 07/01/2021    BUN 10 07/01/2021    CREATININE 0.5 07/01/2021    GLUCOSE 104 (H) 07/01/2021    CALCIUM 8.9 07/01/2021    PROT 7.2 07/01/2021    LABALBU 4.4 07/01/2021    BILITOT 0.3 07/01/2021    ALKPHOS 113 (H) 07/01/2021    AST 20 07/01/2021    ALT 17 07/01/2021    LABGLOM >60 07/01/2021    GFRAA >60 07/01/2021     Impression/Plan:  28 y/o female with 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 Multiple liver masses suspicious for metastatic disease. 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 Comment: Sections of the liver tissue cores show the hepatic parenchyma to be partially replaced by a proliferation of epithelioid cells with relatively uniform round nuclei and eosinophilic granular cytoplasm.  The   epithelioid cells form anastomosing solid cords/nests that are surrounded by delicate fibrovascular stroma.  There are no mitotic figures or tumor cell necrosis present.  The histologic changes seen are suggestive of well-differentiated neuroendocrine (carcinoid) tumor. Immunostaining for pankeratin, chromogranin, synaptophysin, TTF-1 and Ki-67 was performed on sections of one tissue block (A1) and the neoplastic epithelioid cells show diffuse and strong positivity for neuroendocrine markers (chromogranin and synaptophysin) and moderate positivity for pankeratin.    There is no staining reactivity for TTF-1. The Ki-67 proliferation labeling index is essentially negative, (very low, <1%). This staining pattern confirms the histologic impression of a well-differentiated neuroendocrine (carcinoid) tumor. FL Small bowel 09/02/2020:  Rapid small bowel transit. Serum Chromogranin A 160 on 09/23/2020. Urine 5-HIAA 21 (0-14)  2d-Echo 10/10/2020: EF 60-65%   Normal right ventricular size and function    Dotatate PET/CT scan 10/14/2020 increased tracer uptake seen throughout the liver compatible with neoplasm. This involves both the left and the right lobe of liver. In addition there are 2 foci of increased tracer uptake along the mesentery of the small bowel. This may involve the wall the small bowel. No other convincing evidence for extra-abdominal metastatic disease. EGD/Colonoscopy 10/05/2020 by Dr. Britni Colon; records reviewed. Hepatobiliary team (Dr. Cheli Clifford) consult appreciated. Small bowel resection on 10/21/2020. A.  Ileum, resection:   Multifocal (4 foci) neuroendocrine tumor (NET). Extensive perineural and angiolymphatic invasion.    3 of 10 lymph nodes with metastatic neuroendocrine tumor and extracapsular extension of tumor cells (3/10). Bilateral viable small bowel resection margins with no evidence of tumor. Eva Nelson received as \"Meckel's\":   Nodular scar tissue with patchy chronic inflammation. Negative for neuroendocrine tumor. Intestinal mucosal tissue is not present. CASE SUMMARY:   Procedure: Small bowel resection   Tumor site: Ileum   Tumor size: Greatest dimension of 1.5 cm   Tumor focality: Multifocal: 4 separate tumors   Histologic type and grade: G2: Well-differentiated neuroendocrine tumor   Mitotic rate: between 2-20 mitoses/2 mm2   Ki-67 labeling index: between 3-20%   Tumor extension: Tumor invades through the muscularis propria into subserosal tissue without penetration of overlying serosa   Margins: All margins are uninvolved by tumor    Margins examined: Proximal and distal   Lymphovascular invasion: Present   Perineural invasion: Present   Large mesenteric masses (greater than 2 cm): Present (one 2.5 cm mass)   Regional lymph nodes:    Number of lymph nodes involved: 3    Number of lymph nodes examined: 10   Pathologic stage classification (pTNM, AJCC eighth edition):    pT3    pN2    pM1a -confirmed liver metastasis, [facility]-     Comment:   A. 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 Dose # 1 Lanreotide was on 11/05/2020. Dose # 2 Lanreotide was on 12/03/2020. Dose # 3 Lanreotide was on 01/07/2021. Serum Chromogranin A 95 on 01/07/2021. CT chest 02/07/2021 negative for metastatic disease. CT abdomen/pelvis 02/07/2021 Persistent bilobar hepatic lesions which are grossly stable consistent with stable widespread hepatic metastasis. Imaging reviewed. Continue Lanreotide and repeat scans in 3 months.   Dose # 4 Lanreotide was on

## 2021-07-04 LAB — CHROMOGRANIN A: 97 NG/ML (ref 0–103)

## 2021-07-10 DIAGNOSIS — G89.3 NEOPLASM RELATED PAIN: ICD-10-CM

## 2021-07-12 DIAGNOSIS — G89.3 NEOPLASM RELATED PAIN: ICD-10-CM

## 2021-07-12 RX ORDER — HYDROCORTISONE ACETATE, PRAMOXINE HCL 2.5; 1 G/100G; G/100G
CREAM TOPICAL
Qty: 28.4 G | Refills: 2 | Status: SHIPPED
Start: 2021-07-12 | End: 2021-09-02

## 2021-07-12 RX ORDER — TRAMADOL HYDROCHLORIDE 50 MG/1
TABLET ORAL
Qty: 56 TABLET | Refills: 0 | Status: SHIPPED
Start: 2021-07-12 | End: 2021-07-12

## 2021-07-12 RX ORDER — TRAMADOL HYDROCHLORIDE 50 MG/1
50 TABLET ORAL EVERY 6 HOURS PRN
Qty: 56 TABLET | Refills: 0 | Status: SHIPPED
Start: 2021-07-12 | End: 2021-08-02 | Stop reason: SDUPTHER

## 2021-07-12 RX ORDER — GABAPENTIN 300 MG/1
CAPSULE ORAL
Qty: 90 CAPSULE | Refills: 0 | Status: SHIPPED
Start: 2021-07-12 | End: 2021-08-02 | Stop reason: SDUPTHER

## 2021-07-29 ENCOUNTER — OFFICE VISIT (OUTPATIENT)
Dept: ONCOLOGY | Age: 36
End: 2021-07-29
Payer: COMMERCIAL

## 2021-07-29 ENCOUNTER — HOSPITAL ENCOUNTER (OUTPATIENT)
Dept: INFUSION THERAPY | Age: 36
Discharge: HOME | End: 2021-07-29
Payer: COMMERCIAL

## 2021-07-29 VITALS
TEMPERATURE: 97.7 F | HEART RATE: 72 BPM | DIASTOLIC BLOOD PRESSURE: 71 MMHG | WEIGHT: 121 LBS | BODY MASS INDEX: 23.75 KG/M2 | SYSTOLIC BLOOD PRESSURE: 109 MMHG | OXYGEN SATURATION: 100 % | HEIGHT: 60 IN

## 2021-07-29 DIAGNOSIS — C7B.8 NEUROENDOCRINE CARCINOMA METASTATIC TO LIVER (HCC): Primary | ICD-10-CM

## 2021-07-29 DIAGNOSIS — C7B.8 METASTATIC MALIGNANT NEUROENDOCRINE TUMOR TO LIVER (HCC): Primary | ICD-10-CM

## 2021-07-29 DIAGNOSIS — C7A.8 NEUROENDOCRINE CARCINOMA METASTATIC TO LIVER (HCC): Primary | ICD-10-CM

## 2021-07-29 LAB
ALBUMIN SERPL-MCNC: 4.5 G/DL (ref 3.5–5.2)
ALP BLD-CCNC: 139 U/L (ref 35–104)
ALT SERPL-CCNC: 10 U/L (ref 0–32)
ANION GAP SERPL CALCULATED.3IONS-SCNC: 11 MMOL/L (ref 7–16)
AST SERPL-CCNC: 14 U/L (ref 0–31)
BASOPHILS ABSOLUTE: 0.08 E9/L (ref 0–0.2)
BASOPHILS RELATIVE PERCENT: 0.7 % (ref 0–2)
BILIRUB SERPL-MCNC: 0.4 MG/DL (ref 0–1.2)
BUN BLDV-MCNC: 13 MG/DL (ref 6–20)
CALCIUM SERPL-MCNC: 8.8 MG/DL (ref 8.6–10.2)
CHLORIDE BLD-SCNC: 104 MMOL/L (ref 98–107)
CO2: 20 MMOL/L (ref 22–29)
CREAT SERPL-MCNC: 0.6 MG/DL (ref 0.5–1)
EOSINOPHILS ABSOLUTE: 0.11 E9/L (ref 0.05–0.5)
EOSINOPHILS RELATIVE PERCENT: 0.9 % (ref 0–6)
GFR AFRICAN AMERICAN: >60
GFR NON-AFRICAN AMERICAN: >60 ML/MIN/1.73
GLUCOSE BLD-MCNC: 106 MG/DL (ref 74–99)
HCT VFR BLD CALC: 38.7 % (ref 34–48)
HEMOGLOBIN: 12.4 G/DL (ref 11.5–15.5)
IMMATURE GRANULOCYTES #: 0.09 E9/L
IMMATURE GRANULOCYTES %: 0.8 % (ref 0–5)
LYMPHOCYTES ABSOLUTE: 2.07 E9/L (ref 1.5–4)
LYMPHOCYTES RELATIVE PERCENT: 17.4 % (ref 20–42)
MCH RBC QN AUTO: 28.9 PG (ref 26–35)
MCHC RBC AUTO-ENTMCNC: 32 % (ref 32–34.5)
MCV RBC AUTO: 90.2 FL (ref 80–99.9)
MONOCYTES ABSOLUTE: 0.63 E9/L (ref 0.1–0.95)
MONOCYTES RELATIVE PERCENT: 5.3 % (ref 2–12)
NEUTROPHILS ABSOLUTE: 8.9 E9/L (ref 1.8–7.3)
NEUTROPHILS RELATIVE PERCENT: 74.9 % (ref 43–80)
PDW BLD-RTO: 17 FL (ref 11.5–15)
PLATELET # BLD: 284 E9/L (ref 130–450)
PMV BLD AUTO: 9.6 FL (ref 7–12)
POTASSIUM SERPL-SCNC: 4.1 MMOL/L (ref 3.5–5)
RBC # BLD: 4.29 E12/L (ref 3.5–5.5)
SODIUM BLD-SCNC: 135 MMOL/L (ref 132–146)
TOTAL PROTEIN: 7.6 G/DL (ref 6.4–8.3)
WBC # BLD: 11.9 E9/L (ref 4.5–11.5)

## 2021-07-29 PROCEDURE — 85025 COMPLETE CBC W/AUTO DIFF WBC: CPT

## 2021-07-29 PROCEDURE — 99214 OFFICE O/P EST MOD 30 MIN: CPT | Performed by: INTERNAL MEDICINE

## 2021-07-29 PROCEDURE — 4004F PT TOBACCO SCREEN RCVD TLK: CPT | Performed by: INTERNAL MEDICINE

## 2021-07-29 PROCEDURE — G8420 CALC BMI NORM PARAMETERS: HCPCS | Performed by: INTERNAL MEDICINE

## 2021-07-29 PROCEDURE — 99212 OFFICE O/P EST SF 10 MIN: CPT

## 2021-07-29 PROCEDURE — 80053 COMPREHEN METABOLIC PANEL: CPT

## 2021-07-29 PROCEDURE — 6360000002 HC RX W HCPCS: Performed by: INTERNAL MEDICINE

## 2021-07-29 PROCEDURE — 96372 THER/PROPH/DIAG INJ SC/IM: CPT

## 2021-07-29 PROCEDURE — G8427 DOCREV CUR MEDS BY ELIG CLIN: HCPCS | Performed by: INTERNAL MEDICINE

## 2021-07-29 PROCEDURE — 86316 IMMUNOASSAY TUMOR OTHER: CPT

## 2021-07-29 RX ORDER — LANREOTIDE ACETATE 120 MG/.5ML
120 INJECTION SUBCUTANEOUS ONCE
Status: CANCELLED | OUTPATIENT
Start: 2021-08-26 | End: 2021-08-26

## 2021-07-29 RX ORDER — LANREOTIDE ACETATE 120 MG/.5ML
120 INJECTION SUBCUTANEOUS ONCE
Status: COMPLETED | OUTPATIENT
Start: 2021-07-29 | End: 2021-07-29

## 2021-07-29 RX ORDER — LEVETIRACETAM 500 MG/1
TABLET ORAL
COMMUNITY
Start: 2021-07-01 | End: 2021-07-29

## 2021-07-29 RX ORDER — TELOTRISTAT ETHYL 250 MG/1
TABLET ORAL
COMMUNITY
Start: 2021-05-25 | End: 2022-06-16

## 2021-07-29 RX ORDER — TOPIRAMATE 25 MG/1
CAPSULE, COATED PELLETS ORAL
COMMUNITY
Start: 2021-07-12 | End: 2021-07-29

## 2021-07-29 RX ORDER — CALCIUM CARBONATE/VITAMIN D3 600 MG-10
TABLET ORAL
COMMUNITY
Start: 2021-07-07 | End: 2021-08-02

## 2021-07-29 RX ADMIN — LANREOTIDE ACETATE 120 MG: 120 INJECTION SUBCUTANEOUS at 15:10

## 2021-07-29 NOTE — PROGRESS NOTES
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 CT abdomen/pelvis 08/28/2020:  2 cm masslike density in the mid abdominal small bowel mesentery with a spiculated appearance. Multiple liver masses suspicious for metastatic disease. 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 Comment: Sections of the liver tissue cores show the hepatic parenchyma to be partially replaced by a proliferation of epithelioid cells with relatively uniform round nuclei and eosinophilic granular cytoplasm.  The   epithelioid cells form anastomosing solid cords/nests that are surrounded by delicate fibrovascular stroma.  There are no mitotic figures or tumor cell necrosis present.  The histologic changes seen are suggestive of well-differentiated neuroendocrine (carcinoid) tumor. Immunostaining for pankeratin, chromogranin, synaptophysin, TTF-1 and Ki-67 was performed on sections of one tissue block (A1) and the neoplastic epithelioid cells show diffuse and strong positivity for neuroendocrine markers (chromogranin and synaptophysin) and moderate positivity for pankeratin.  There is no staining reactivity for TTF-1. The Ki-67 proliferation labeling index is essentially negative, (very low, <1%). This staining pattern confirms the histologic impression of a well-differentiated neuroendocrine (carcinoid) tumor. FL Small bowel 09/02/2020:  Rapid small bowel transit. Serum Chromogranin A 160 on 09/23/2020.   Urine 5-HIAA 21 (0-14)  2d-Echo 10/10/2020: EF 60-65%   Normal right ventricular size and function    Dotatate PET/CT scan 10/14/2020 increased tracer uptake seen throughout the liver compatible with neoplasm. This involves both the left and the right lobe of liver. In addition there are 2 foci of increased tracer uptake along the mesentery of the small bowel. This may involve the wall the small bowel. No other convincing evidence for extra-abdominal metastatic disease. EGD/Colonoscopy 10/05/2020 by Dr. Сергей Madsen; records reviewed. Hepatobiliary team (Dr. Karrie Villasenor) consult appreciated. Small bowel resection on 10/21/2020. Small bowel resection on 10/21/2020. A.  Ileum, resection:   Multifocal (4 foci) neuroendocrine tumor (NET). Extensive perineural and angiolymphatic invasion. 3 of 10 lymph nodes with metastatic neuroendocrine tumor and extracapsular extension of tumor cells (3/10). Bilateral viable small bowel resection margins with no evidence of tumor. Siena Gr received as \"Meckel's\":   Nodular scar tissue with patchy chronic inflammation. Negative for neuroendocrine tumor. Intestinal mucosal tissue is not present. CASE SUMMARY:   Procedure: Small bowel resection   Tumor site: Ileum   Tumor size: Greatest dimension of 1.5 cm   Tumor focality: Multifocal: 4 separate tumors   Histologic type and grade: G2: Well-differentiated neuroendocrine tumor   Mitotic rate: between 2-20 mitoses/2 mm2   Ki-67 labeling index: between 3-20%   Tumor extension: Tumor invades through the muscularis propria into subserosal tissue without penetration of overlying serosa   Margins:  All margins are uninvolved by tumor    Margins examined: Proximal and distal   Lymphovascular invasion: Present   Perineural invasion: Present   Large mesenteric masses (greater than 2 cm): Present (one 2.5 cm mass)   Regional lymph nodes:    Number of lymph nodes involved: 3    Number of lymph nodes examined: 10   Pathologic stage classification (pTNM, AJCC eighth edition):    pT3    pN2    pM1a -confirmed liver metastasis, [facility]-     Comment:   A. 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 Dose # 1 Lanreotide was on 11/05/2020. Dose # 2 Lanreotide was on 12/03/2020. Dose # 3 Lanreotide was on 01/07/2021. Serum Chromogranin A 95 on 01/07/2021. CT chest 02/07/2021 negative for metastatic disease. CT abdomen/pelvis 02/07/2021 Persistent bilobar hepatic lesions which are grossly stable consistent with stable widespread hepatic metastasis. Imaging reviewed. Continue Lanreotide and repeat scans in 3 months. Dose # 4 Lanreotide was on 02/11/2021. Ga 76 Dotatate PET 03/09/2021 Gallium 68 dotatate avid uptake throughout multiple regions of the liver compatible with multiple foci of neuroendocrine tumor in both the right and the left lobe of the liver, when compared to previous not significantly changed in the interval.  Dose # 5 Lanreotide was on 03/11/2021. Dose # 6 Lanreotide was on 04/08/2021. Serum chromogranin A 115 (0-103)  Dose # 7 Lanreotide was on 05/06/2021. Serum chromogranin A 114 (0-103)  Dose # 8 Lanreotide was on 06/03/2021. Serum chromogranin A  84  (0-103)    Ga 76 Dotatate PET 06/29/2021 Numerous foci of increased Gallium 68 dotatate avid uptake throughout both lobes of the liver, not significantly changed from previous. No significant progression of disease. No definite metastatic disease identified  Dose # 9 Lanreotide was on 07/01/2021. Serum Chromogranin A 97 (0-103)  Dose # 10 Lanreotide is today 07/29/2021. Serum Chromogranin A pending. Today 07/29/2021: No fever, chills. Diarrhea controlled on Imodium, Lomotil and Xermelo. Fair appetite and energy level. Mild hot flashes/flushing    Review of Systems;  CONSTITUTIONAL: No fever. Mild hot flashes/flushing.   ENMT: Eyes: No diplopia; Nose: No epistaxis. Mouth: No sore throat. RESPIRATORY: No hemoptysis, shortness of breath, cough. CARDIOVASCULAR: No chest pain, palpitations. GASTROINTESTINAL: Diarrhea controlled on Imodium, Lomotil and Xermelo. GENITOURINARY: No dysuria, urinary frequency, hematuria. NEURO: No syncope, presyncope, headache. Remainder:  ROS NEGATIVE    Past Medical History:      Diagnosis Date    Abdominal pain     Cancer ([facility] [address]     neuroendocrime tumor    Diarrhea     Hypocalcemia     Hypothyroidism     Irritable bowel syndrome     Migraines     Seizures ([facility] [address]     last episode January 2021    Status post alcohol detoxification     5/22/2018-5/29/2018     Medications:  Reviewed and reconciled. Allergies:  No Known Allergies    Physical Exam:  /71   Pulse 72   Temp 97.7 °F (36.5 °C) (Temporal)   Ht 5' (1.524 m)   Wt 121 lb (54.9 kg)   SpO2 100%   BMI 23.63 kg/m²   GENERAL: Alert, oriented x 3, not in distress  HEENT: PERRLA; EOMI. Oropharynx clear. NECK: Supple. Without lymphadenopathy. LUNGS: Good air entry bilaterally. No wheezing, crackles or ronchi. CARDIOVASCULAR: Regular rate. No murmurs, rubs or gallops. ABDOMEN: Soft. Non-tender, non-distended. Positive bowel sounds. EXTREMITIES: Without clubbing, cyanosis, or edema. NEUROLOGIC: No focal deficits.    ECOG PS 1    Lab Results   Component Value Date    WBC 11.9 (H) 07/29/2021    HGB 12.4 07/29/2021    HCT 38.7 07/29/2021    MCV 90.2 07/29/2021     07/29/2021     Lab Results   Component Value Date     07/29/2021    K 4.1 07/29/2021     07/29/2021    CO2 20 (L) 07/29/2021    BUN 13 07/29/2021    CREATININE 0.6 07/29/2021    GLUCOSE 106 (H) 07/29/2021    CALCIUM 8.8 07/29/2021    PROT 7.6 07/29/2021    LABALBU 4.5 07/29/2021    BILITOT 0.4 07/29/2021    ALKPHOS 139 (H) 07/29/2021    AST 14 07/29/2021    ALT 10 07/29/2021    LABGLOM >60 07/29/2021    GFRAA >60 07/29/2021     Impression/Plan:  28 y/o female with 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 Multiple liver masses suspicious for metastatic disease. 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 Comment: Sections of the liver tissue cores show the hepatic parenchyma to be partially replaced by a proliferation of epithelioid cells with relatively uniform round nuclei and eosinophilic granular cytoplasm.  The   epithelioid cells form anastomosing solid cords/nests that are surrounded by delicate fibrovascular stroma.  There are no mitotic figures or tumor cell necrosis present.  The histologic changes seen are suggestive of well-differentiated neuroendocrine (carcinoid) tumor. Immunostaining for pankeratin, chromogranin, synaptophysin, TTF-1 and Ki-67 was performed on sections of one tissue block (A1) and the neoplastic epithelioid cells show diffuse and strong positivity for neuroendocrine markers (chromogranin and synaptophysin) and moderate positivity for pankeratin.    There is no staining reactivity for TTF-1. The Ki-67 proliferation labeling index is essentially negative, (very low, <1%). This staining pattern confirms the histologic impression of a well-differentiated neuroendocrine (carcinoid) tumor. FL Small bowel 09/02/2020:  Rapid small bowel transit. Serum Chromogranin A 160 on 09/23/2020. Urine 5-HIAA 21 (0-14)  2d-Echo 10/10/2020: EF 60-65%   Normal right ventricular size and function    Dotatate PET/CT scan 10/14/2020 increased tracer uptake seen throughout the liver compatible with neoplasm. This involves both the left and the right lobe of liver. In addition there are 2 foci of increased tracer uptake along the mesentery of the small bowel.  This may involve the wall the small bowel.  No other convincing evidence for extra-abdominal metastatic disease. EGD/Colonoscopy 10/05/2020 by Dr. Felicia Valentine; records reviewed. Hepatobiliary team (Dr. Anai Valdez) consult appreciated. Small bowel resection on 10/21/2020. A.  Ileum, resection:   Multifocal (4 foci) neuroendocrine tumor (NET). Extensive perineural and angiolymphatic invasion. 3 of 10 lymph nodes with metastatic neuroendocrine tumor and extracapsular extension of tumor cells (3/10). Bilateral viable small bowel resection margins with no evidence of tumor. Shannan Peters received as \"Meckel's\":   Nodular scar tissue with patchy chronic inflammation. Negative for neuroendocrine tumor. Intestinal mucosal tissue is not present. CASE SUMMARY:   Procedure: Small bowel resection   Tumor site: Ileum   Tumor size: Greatest dimension of 1.5 cm   Tumor focality: Multifocal: 4 separate tumors   Histologic type and grade: G2: Well-differentiated neuroendocrine tumor   Mitotic rate: between 2-20 mitoses/2 mm2   Ki-67 labeling index: between 3-20%   Tumor extension: Tumor invades through the muscularis propria into subserosal tissue without penetration of overlying serosa   Margins: All margins are uninvolved by tumor    Margins examined: Proximal and distal   Lymphovascular invasion: Present   Perineural invasion: Present   Large mesenteric masses (greater than 2 cm): Present (one 2.5 cm mass)   Regional lymph nodes:    Number of lymph nodes involved: 3    Number of lymph nodes examined: 10   Pathologic stage classification (pTNM, AJCC eighth edition):    pT3    pN2    pM1a -confirmed liver metastasis, [facility]-     Comment:   A. 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 Dose # 1 Lanreotide was on 11/05/2020.    Dose # 2

## 2021-08-02 ENCOUNTER — OFFICE VISIT (OUTPATIENT)
Dept: PALLATIVE CARE | Age: 36
End: 2021-08-02
Payer: COMMERCIAL

## 2021-08-02 VITALS
WEIGHT: 126 LBS | DIASTOLIC BLOOD PRESSURE: 79 MMHG | SYSTOLIC BLOOD PRESSURE: 114 MMHG | OXYGEN SATURATION: 100 % | BODY MASS INDEX: 24.61 KG/M2 | HEART RATE: 78 BPM

## 2021-08-02 DIAGNOSIS — G89.3 NEOPLASM RELATED PAIN: ICD-10-CM

## 2021-08-02 LAB — CHROMOGRANIN A: 89 NG/ML (ref 0–103)

## 2021-08-02 PROCEDURE — G8428 CUR MEDS NOT DOCUMENT: HCPCS | Performed by: NURSE PRACTITIONER

## 2021-08-02 PROCEDURE — 4004F PT TOBACCO SCREEN RCVD TLK: CPT | Performed by: NURSE PRACTITIONER

## 2021-08-02 PROCEDURE — 99212 OFFICE O/P EST SF 10 MIN: CPT | Performed by: NURSE PRACTITIONER

## 2021-08-02 PROCEDURE — G8420 CALC BMI NORM PARAMETERS: HCPCS | Performed by: NURSE PRACTITIONER

## 2021-08-02 PROCEDURE — 99213 OFFICE O/P EST LOW 20 MIN: CPT | Performed by: NURSE PRACTITIONER

## 2021-08-02 RX ORDER — GABAPENTIN 300 MG/1
300 CAPSULE ORAL 3 TIMES DAILY
Qty: 270 CAPSULE | Refills: 0 | Status: SHIPPED
Start: 2021-08-02 | End: 2021-10-25 | Stop reason: SDUPTHER

## 2021-08-02 RX ORDER — TRAMADOL HYDROCHLORIDE 50 MG/1
50 TABLET ORAL EVERY 6 HOURS PRN
Qty: 56 TABLET | Refills: 0 | Status: SHIPPED | OUTPATIENT
Start: 2021-08-02 | End: 2021-09-10 | Stop reason: SDUPTHER

## 2021-08-02 RX ORDER — CALCIUM CARBONATE/VITAMIN D3 600 MG-10
TABLET ORAL
Qty: 31 TABLET | Refills: 2 | Status: SHIPPED
Start: 2021-08-02 | End: 2021-11-08

## 2021-08-02 NOTE — TELEPHONE ENCOUNTER
Last Appointment:  Visit date not found  Future Appointments   Date Time Provider Raoul Meyer   8/26/2021  2:45 PM Hilary Gray MD MED ONC [facility]   8/26/2021  3:15 PM [facility] MED ONC FAST TRACK 1 [facility] Med Onc  [place]   8/30/2021  1:00 PM SCHEDULE, [facility] PALLIATIVE CARE PROVIDER [facility] PALLIAT [facility]   10/19/2021  9:00 AM TRINA Obregon CNP BDJENARO Neuro [facility]

## 2021-08-02 NOTE — PROGRESS NOTES
trying 1-3 jumbo marshmallows daily. She also has a history of following with Dr. Yuridia Dickinson for GI, and we will ReachOut to his practice to discuss whether they would like to see her or if she might be an appropriate candidate for medication like Viberzi. Otherwise would not make any further changes to her plan of care, provided refills as appropriate, and have her return in 4 weeks.      Pain Assessment   Ratin  Description: burning, sharp and shooting  Duration: minutes  Frequency: daily  Location: Abdomen   Alleviating Factors: relaxation  Exacerbating Factors: unable to associate with any factor  Effect: change in function and sleep    Objective:     Physical Exam  Wt Readings from Last 3 Encounters:   21 126 lb (57.2 kg)   21 121 lb (54.9 kg)   21 128 lb (58.1 kg)     /79   Pulse 78   Wt 126 lb (57.2 kg)   SpO2 100%   BMI 24.61 kg/m²     Gen:  Alert, appears stated age, well nourished, in no acute distress  HEENT:  Normocephalic, conjunctiva pink, no drainage, mucosa moist  Neck:  Supple  Lungs:  CTA bilaterally, no audible rhonchi or wheezes noted  Heart[de-identified]  RRR, no murmur, rub, or gallop noted during exam  Abd:  Soft, non tender, non distended, BS+  M/S/Ext:  Moving all extremities, no edema, pulses present  Skin:  Warm and dry  Neuro:  PERRL, Alert, oriented x 3; following commands      [place] Symptom Assessment Score   [place] Score 2021 6/15/2021 5/10/2021 2021 10/13/2020   Pain Score 4 4 4 5 8   Tiredness Score 5 4 6 7 5   Nausea Score 3 Not nauseated 2 Not nauseated Not nauseated   Depression Score Not depressed Not depressed 1 Not depressed 2   Anxiety Score 2 1 3 Not anxious 8   Drowsiness Score 3 1 2 1 2   Appetite Score 5 5 5 1 5   Wellbeing Score 5 5 5 3 7   Dyspnea Score 5 5 6 5 No shortness of breath   Other Problem Score Best possible response Best possible response Best possible response Best possible response Best possible response   Total Assessment Score(calculated) 32 25 34 22 37     Assessed by: patient. Current Medications:  Medications reviewed: yes    Controlled Substances Monitoring: [place] reviewed 8/2/21. TRINA Melendrez CNP   Palliative Care Department     Time/Communication  Greater than 50% of time spent, total 25 minutes in face-to-face counseling and coordination of care regarding symptom management. Note: This report was completed using computerize voiced recognition software. Every effort has been made to ensure accuracy; however, inadvertent computerized transcription errors may be present.

## 2021-08-09 RX ORDER — TOPIRAMATE 25 MG/1
CAPSULE, COATED PELLETS ORAL
Qty: 30 CAPSULE | Refills: 2 | Status: SHIPPED
Start: 2021-08-09 | End: 2021-10-19

## 2021-08-17 ENCOUNTER — APPOINTMENT (OUTPATIENT)
Dept: CT IMAGING | Age: 36
End: 2021-08-17
Payer: COMMERCIAL

## 2021-08-17 ENCOUNTER — HOSPITAL ENCOUNTER (EMERGENCY)
Age: 36
Discharge: HOME | End: 2021-08-17
Attending: EMERGENCY MEDICINE
Payer: COMMERCIAL

## 2021-08-17 ENCOUNTER — APPOINTMENT (OUTPATIENT)
Dept: GENERAL RADIOLOGY | Age: 36
End: 2021-08-17
Payer: COMMERCIAL

## 2021-08-17 VITALS
SYSTOLIC BLOOD PRESSURE: 114 MMHG | WEIGHT: 126 LBS | DIASTOLIC BLOOD PRESSURE: 70 MMHG | HEIGHT: 60 IN | OXYGEN SATURATION: 98 % | HEART RATE: 79 BPM | RESPIRATION RATE: 16 BRPM | BODY MASS INDEX: 24.74 KG/M2 | TEMPERATURE: 98 F

## 2021-08-17 DIAGNOSIS — F51.01 PRIMARY INSOMNIA: Primary | ICD-10-CM

## 2021-08-17 DIAGNOSIS — R56.9 SEIZURE-LIKE ACTIVITY (HCC): Primary | ICD-10-CM

## 2021-08-17 DIAGNOSIS — E72.20 HYPERAMMONEMIA (HCC): ICD-10-CM

## 2021-08-17 LAB
ALBUMIN SERPL-MCNC: 4.4 G/DL (ref 3.5–5.2)
ALP BLD-CCNC: 166 U/L (ref 35–104)
ALT SERPL-CCNC: 19 U/L (ref 0–32)
AMMONIA: 146 UMOL/L (ref 11–51)
ANION GAP SERPL CALCULATED.3IONS-SCNC: 13 MMOL/L (ref 7–16)
AST SERPL-CCNC: 23 U/L (ref 0–31)
BASOPHILS ABSOLUTE: 0.09 E9/L (ref 0–0.2)
BASOPHILS RELATIVE PERCENT: 1.5 % (ref 0–2)
BILIRUB SERPL-MCNC: <0.2 MG/DL (ref 0–1.2)
BILIRUBIN URINE: NEGATIVE
BLOOD, URINE: NEGATIVE
BUN BLDV-MCNC: 8 MG/DL (ref 6–20)
CALCIUM SERPL-MCNC: 8.6 MG/DL (ref 8.6–10.2)
CHLORIDE BLD-SCNC: 107 MMOL/L (ref 98–107)
CLARITY: CLEAR
CO2: 23 MMOL/L (ref 22–29)
COLOR: NORMAL
CREAT SERPL-MCNC: 0.6 MG/DL (ref 0.5–1)
EOSINOPHILS ABSOLUTE: 0.11 E9/L (ref 0.05–0.5)
EOSINOPHILS RELATIVE PERCENT: 1.9 % (ref 0–6)
GFR AFRICAN AMERICAN: >60
GFR NON-AFRICAN AMERICAN: >60 ML/MIN/1.73
GLUCOSE BLD-MCNC: 115 MG/DL (ref 74–99)
GLUCOSE URINE: NEGATIVE MG/DL
HCG, URINE, POC: NEGATIVE
HCT VFR BLD CALC: 39.9 % (ref 34–48)
HEMOGLOBIN: 12.6 G/DL (ref 11.5–15.5)
IMMATURE GRANULOCYTES #: 0.02 E9/L
IMMATURE GRANULOCYTES %: 0.3 % (ref 0–5)
KETONES, URINE: NEGATIVE MG/DL
LEUKOCYTE ESTERASE, URINE: NEGATIVE
LIPASE: 86 U/L (ref 13–60)
LYMPHOCYTES ABSOLUTE: 2.6 E9/L (ref 1.5–4)
LYMPHOCYTES RELATIVE PERCENT: 44.7 % (ref 20–42)
Lab: NORMAL
MAGNESIUM: 2 MG/DL (ref 1.6–2.6)
MCH RBC QN AUTO: 28.6 PG (ref 26–35)
MCHC RBC AUTO-ENTMCNC: 31.6 % (ref 32–34.5)
MCV RBC AUTO: 90.5 FL (ref 80–99.9)
MONOCYTES ABSOLUTE: 0.77 E9/L (ref 0.1–0.95)
MONOCYTES RELATIVE PERCENT: 13.2 % (ref 2–12)
NEGATIVE QC PASS/FAIL: NORMAL
NEUTROPHILS ABSOLUTE: 2.23 E9/L (ref 1.8–7.3)
NEUTROPHILS RELATIVE PERCENT: 38.4 % (ref 43–80)
NITRITE, URINE: NEGATIVE
PDW BLD-RTO: 17.2 FL (ref 11.5–15)
PH UA: 7 (ref 5–9)
PLATELET # BLD: 286 E9/L (ref 130–450)
PMV BLD AUTO: 9.5 FL (ref 7–12)
POSITIVE QC PASS/FAIL: NORMAL
POTASSIUM SERPL-SCNC: 4 MMOL/L (ref 3.5–5)
PROTEIN UA: NEGATIVE MG/DL
RBC # BLD: 4.41 E12/L (ref 3.5–5.5)
SODIUM BLD-SCNC: 143 MMOL/L (ref 132–146)
SPECIFIC GRAVITY UA: 1.01 (ref 1–1.03)
TOTAL PROTEIN: 7.4 G/DL (ref 6.4–8.3)
UROBILINOGEN, URINE: 0.2 E.U./DL
WBC # BLD: 5.8 E9/L (ref 4.5–11.5)

## 2021-08-17 PROCEDURE — 93005 ELECTROCARDIOGRAM TRACING: CPT | Performed by: STUDENT IN AN ORGANIZED HEALTH CARE EDUCATION/TRAINING PROGRAM

## 2021-08-17 PROCEDURE — 70450 CT HEAD/BRAIN W/O DYE: CPT

## 2021-08-17 PROCEDURE — 6360000002 HC RX W HCPCS: Performed by: STUDENT IN AN ORGANIZED HEALTH CARE EDUCATION/TRAINING PROGRAM

## 2021-08-17 PROCEDURE — 83690 ASSAY OF LIPASE: CPT

## 2021-08-17 PROCEDURE — 6370000000 HC RX 637 (ALT 250 FOR IP): Performed by: STUDENT IN AN ORGANIZED HEALTH CARE EDUCATION/TRAINING PROGRAM

## 2021-08-17 PROCEDURE — 96374 THER/PROPH/DIAG INJ IV PUSH: CPT

## 2021-08-17 PROCEDURE — 99284 EMERGENCY DEPT VISIT MOD MDM: CPT

## 2021-08-17 PROCEDURE — 82140 ASSAY OF AMMONIA: CPT

## 2021-08-17 PROCEDURE — 83735 ASSAY OF MAGNESIUM: CPT

## 2021-08-17 PROCEDURE — 71045 X-RAY EXAM CHEST 1 VIEW: CPT

## 2021-08-17 PROCEDURE — 81003 URINALYSIS AUTO W/O SCOPE: CPT

## 2021-08-17 PROCEDURE — 80053 COMPREHEN METABOLIC PANEL: CPT

## 2021-08-17 PROCEDURE — 2580000003 HC RX 258: Performed by: STUDENT IN AN ORGANIZED HEALTH CARE EDUCATION/TRAINING PROGRAM

## 2021-08-17 PROCEDURE — 85025 COMPLETE CBC W/AUTO DIFF WBC: CPT

## 2021-08-17 RX ORDER — HYDROCODONE BITARTRATE AND ACETAMINOPHEN 5; 325 MG/1; MG/1
1 TABLET ORAL ONCE
Status: COMPLETED | OUTPATIENT
Start: 2021-08-17 | End: 2021-08-17

## 2021-08-17 RX ORDER — 0.9 % SODIUM CHLORIDE 0.9 %
1000 INTRAVENOUS SOLUTION INTRAVENOUS ONCE
Status: COMPLETED | OUTPATIENT
Start: 2021-08-17 | End: 2021-08-17

## 2021-08-17 RX ORDER — ZOLPIDEM TARTRATE 10 MG/1
TABLET ORAL
Qty: 30 TABLET | Refills: 2 | Status: SHIPPED
Start: 2021-08-17 | End: 2021-11-05

## 2021-08-17 RX ORDER — FENTANYL CITRATE 50 UG/ML
50 INJECTION, SOLUTION INTRAMUSCULAR; INTRAVENOUS ONCE
Status: COMPLETED | OUTPATIENT
Start: 2021-08-17 | End: 2021-08-17

## 2021-08-17 RX ADMIN — HYDROCODONE BITARTRATE AND ACETAMINOPHEN 1 TABLET: 5; 325 TABLET ORAL at 19:08

## 2021-08-17 RX ADMIN — SODIUM CHLORIDE 1000 ML: 9 INJECTION, SOLUTION INTRAVENOUS at 17:39

## 2021-08-17 RX ADMIN — FENTANYL CITRATE 50 MCG: 50 INJECTION, SOLUTION INTRAMUSCULAR; INTRAVENOUS at 17:39

## 2021-08-17 ASSESSMENT — PAIN SCALES - GENERAL
PAINLEVEL_OUTOF10: 8
PAINLEVEL_OUTOF10: 8

## 2021-08-17 ASSESSMENT — ENCOUNTER SYMPTOMS
COUGH: 0
BACK PAIN: 0
VOMITING: 0
ABDOMINAL PAIN: 0
NAUSEA: 0
SORE THROAT: 0
SHORTNESS OF BREATH: 0

## 2021-08-17 ASSESSMENT — PAIN DESCRIPTION - PAIN TYPE: TYPE: CHRONIC PAIN

## 2021-08-17 NOTE — ED NOTES
Bed: 16  Expected date:   Expected time:   Means of arrival:   Comments:  701 Bentley Lundberg RN  08/17/21 6784

## 2021-08-17 NOTE — ED PROVIDER NOTES
This is a 61-year-old female with complex past medical history including thyroid cancer, neuroendocrine cancer with extensive metastasis, stage IV. Patient has had this cancer for about the past year, had a liver and kidney failure from this, has recovered from these. She was on temporary dialysis and is now off, follows with Dr. Parveen Doan for nephrology. Patient is presenting to the emergency department for concerns of absence seizures. Patient states she had an episode while in her car at her boyfriend's house where she is staring off into space for an unknown amount of time. She had another episode about 2 hours ago. She had hospital admission on 6/22/2021 for the same issue, was seen by neurology Dr. Nikky Gamble at that time, she was not convinced that patient had absence seizure, ordered outpatient EEG, patient has not had this done yet. Patient is on Keppra, Topamax, took her normal morning doses of both, took an additional dose of 750 mg Keppra after possible absence seizure. She has not had any head trauma, she does have a headache and feels very fatigued. She has not had any fevers or chills or dental pain or diarrhea or cough. The history is provided by the patient and medical records. Review of Systems   Constitutional: Positive for fatigue. Negative for chills and fever. HENT: Negative for congestion and sore throat. Eyes: Negative for visual disturbance. Respiratory: Negative for cough and shortness of breath. Cardiovascular: Negative for chest pain. Gastrointestinal: Negative for abdominal pain, nausea and vomiting. Genitourinary: Negative for dysuria and flank pain. Musculoskeletal: Negative for back pain and neck pain. Skin: Negative for rash and wound. Neurological: Positive for seizures and headaches. Negative for syncope. Physical Exam  Vitals and nursing note reviewed. Constitutional:       Appearance: She is not ill-appearing or diaphoretic.    HENT: Head: Normocephalic and atraumatic. Right Ear: External ear normal.      Left Ear: External ear normal.      Nose: Nose normal.   Eyes:      Extraocular Movements: Extraocular movements intact. Conjunctiva/sclera: Conjunctivae normal.   Cardiovascular:      Rate and Rhythm: Normal rate and regular rhythm. Pulses: Normal pulses. Heart sounds: Normal heart sounds. Pulmonary:      Effort: Pulmonary effort is normal.      Breath sounds: Normal breath sounds. Abdominal:      General: Abdomen is flat. There is no distension. Palpations: Abdomen is soft. Tenderness: There is no abdominal tenderness. There is no right CVA tenderness, left CVA tenderness or guarding. Musculoskeletal:         General: Normal range of motion. Cervical back: Normal range of motion and neck supple. Right lower leg: No edema. Skin:     General: Skin is warm and dry. Neurological:      General: No focal deficit present. Mental Status: She is alert and oriented to person, place, and time. Sensory: No sensory deficit. Motor: No weakness. Psychiatric:         Mood and Affect: Mood normal.         Behavior: Behavior normal.         Thought Content: Thought content normal.          Procedures     MDM  Number of Diagnoses or Management Options  Hyperammonemia ([address]  Seizure-like activity ([facility] [address]  Diagnosis management comments: This is a 28 yo female with stage IV metastatic cancer, history of previous liver and kidney failure that is since resolved, history of seizure disorder, presented to emergency department for 2 episodes of staring spells/possible absence seizures. Patient with no head trauma, no loss of bowel or bladder. She has had no recent illnesses, no fevers, no other symptoms. She has been occasionally using tramadol as prescribed. Palliative care doctor. Given concern for lowering seizure threshold, patient was advised to avoid tramadol use.   Patient was recently admitted for possible absence seizure 2 months ago, was evaluated by neurology, they did not wish to increase her Topamax or Keppra due to patient's sedation with current doses, possibility that she did not actually have any seizure activity. Neurology had ordered outpatient EEG to further clarify, this is yet to be done. Patient with negative head CT, normal electrolytes. Patient with slightly elevated bili level, no confusion or disorientation or tremor. Patient vies to follow-up with her physicians, especially her neurologist for outpatient EEG. Patient and mother comfortable with plan for discharge home and outpatient follow-up and all questions were answered. Patient was advised not to operate a motor vehicle pending further work-up and to her possible seizure disorder. ED Course as of Aug 18 0009   Tue Aug 17, 2021   1644 Taking 750 mg Keppra BID, took both doses today already. Takes 75 mg topamax BID, has just had morning dose. [RH]   1646 On Ostreotide injections. Followed by Dr. Esthela Brooke, oncologist at [facility]. She has not had outpatient EEG yet. [RH]   7906 She sees Den Nickerson NP for neurology, Dr. Mando Weeks office. [RH]   3456 Patient still taking tramadol for pain. [RH]   0501 Discussion was had with patient, mother about results of labs, imaging. Patient comfortable with plan for discharge, was given strict return precautions. Patient was advised to not use tramadol, to follow-up with palliative care for possible alternatives. Patient also advised to follow-up with her neurologist for EEG.    [RH]   1852 EKG: This EKG is signed and interpreted by me. Rate: 71  Rhythm: Sinus  Interpretation: no acute changes  Comparison: stable as compared to patient's most recent EKG      [RH]      ED Course User Index  [RH] [address], DO       ED Course as of Aug 18 0009   Tue Aug 17, 2021   1644 Taking 750 mg Keppra BID, took both doses today already.  Takes 75 mg topamax BID, has just had morning dose. [RH]   1646 On Ostreotide injections. Followed by Dr. Mike Dacosta, oncologist at [facility]. She has not had outpatient EEG yet. [RH]   1023 She sees Al Jimenez NP for neurology, Dr. Sarahi Miranda office. [RH]   8899 Patient still taking tramadol for pain. [RH]   5879 Discussion was had with patient, mother about results of labs, imaging. Patient comfortable with plan for discharge, was given strict return precautions. Patient was advised to not use tramadol, to follow-up with palliative care for possible alternatives. Patient also advised to follow-up with her neurologist for EEG.    [RH]   1632 EKG: This EKG is signed and interpreted by me. Rate: 71  Rhythm: Sinus  Interpretation: no acute changes  Comparison: stable as compared to patient's most recent EKG      [RH]      ED Course User Index  [RH] [address], DO       --------------------------------------------- PAST HISTORY ---------------------------------------------  Past Medical History:  has a past medical history of Abdominal pain, Cancer ([facility] [address] Diarrhea, Hypocalcemia, Hypothyroidism, Irritable bowel syndrome, Migraines, Seizures ([facility] [address] and Status post alcohol detoxification. Past Surgical History:  has a past surgical history that includes Parathyroid gland surgery; Cholecystectomy, laparoscopic (07/06/2016); Thyroidectomy; Colonoscopy (N/A, 6/22/2018); CT NEEDLE BIOPSY LIVER PERCUTANEOUS (9/1/2020); Small intestine surgery (N/A, 10/21/2020); CT BIOPSY RENAL (1/25/2021); hc dialysis catheter (N/A, 1/28/2021); and sigmoidoscopy (N/A, 5/14/2021). Social History:  reports that she has been smoking cigarettes. She has been smoking about 0.25 packs per day. She has never used smokeless tobacco. She reports that she does not drink alcohol and does not use drugs.     Family History: family history includes Alzheimer's Disease in an other family member; Asthma in her father; Breast Cancer in her maternal grandmother; Cancer in her father, maternal grandmother, and paternal grandfather; Diabetes in an other family member; Heart Disease in her father; High Blood Pressure in her father and mother; High Cholesterol in her mother; Annel Khan in an other family member; Other in her mother. The patients home medications have been reviewed. Allergies: Patient has no known allergies.     -------------------------------------------------- RESULTS -------------------------------------------------  Labs:  Results for orders placed or performed during the hospital encounter of 08/17/21   CBC Auto Differential   Result Value Ref Range    WBC 5.8 4.5 - 11.5 E9/L    RBC 4.41 3.50 - 5.50 E12/L    Hemoglobin 12.6 11.5 - 15.5 g/dL    Hematocrit 39.9 34.0 - 48.0 %    MCV 90.5 80.0 - 99.9 fL    MCH 28.6 26.0 - 35.0 pg    MCHC 31.6 (L) 32.0 - 34.5 %    RDW 17.2 (H) 11.5 - 15.0 fL    Platelets 259 064 - 253 E9/L    MPV 9.5 7.0 - 12.0 fL    Neutrophils % 38.4 (L) 43.0 - 80.0 %    Immature Granulocytes % 0.3 0.0 - 5.0 %    Lymphocytes % 44.7 (H) 20.0 - 42.0 %    Monocytes % 13.2 (H) 2.0 - 12.0 %    Eosinophils % 1.9 0.0 - 6.0 %    Basophils % 1.5 0.0 - 2.0 %    Neutrophils Absolute 2.23 1.80 - 7.30 E9/L    Immature Granulocytes # 0.02 E9/L    Lymphocytes Absolute 2.60 1.50 - 4.00 E9/L    Monocytes Absolute 0.77 0.10 - 0.95 E9/L    Eosinophils Absolute 0.11 0.05 - 0.50 E9/L    Basophils Absolute 0.09 0.00 - 0.20 E9/L   Lipase   Result Value Ref Range    Lipase 86 (H) 13 - 60 U/L   Ammonia   Result Value Ref Range    Ammonia 146.0 (H) 11.0 - 51.0 umol/L   Comprehensive metabolic panel   Result Value Ref Range    Sodium 143 132 - 146 mmol/L    Potassium 4.0 3.5 - 5.0 mmol/L    Chloride 107 98 - 107 mmol/L    CO2 23 22 - 29 mmol/L    Anion Gap 13 7 - 16 mmol/L    Glucose 115 (H) 74 - 99 mg/dL    BUN 8 6 - 20 mg/dL    CREATININE 0.6 0.5 - 1.0 mg/dL    GFR Non-African American >60 >=60 mL/min/1.73    GFR African American >60 Calcium 8.6 8.6 - 10.2 mg/dL    Total Protein 7.4 6.4 - 8.3 g/dL    Albumin 4.4 3.5 - 5.2 g/dL    Total Bilirubin <0.2 0.0 - 1.2 mg/dL    Alkaline Phosphatase 166 (H) 35 - 104 U/L    ALT 19 0 - 32 U/L    AST 23 0 - 31 U/L   Magnesium   Result Value Ref Range    Magnesium 2.0 1.6 - 2.6 mg/dL   Urinalysis, reflex to microscopic   Result Value Ref Range    Color, UA Straw Straw/Yellow    Clarity, UA Clear Clear    Glucose, Ur Negative Negative mg/dL    Bilirubin Urine Negative Negative    Ketones, Urine Negative Negative mg/dL    Specific Gravity, UA 1.010 1.005 - 1.030    Blood, Urine Negative Negative    pH, UA 7.0 5.0 - 9.0    Protein, UA Negative Negative mg/dL    Urobilinogen, Urine 0.2 <2.0 E.U./dL    Nitrite, Urine Negative Negative    Leukocyte Esterase, Urine Negative Negative   POC Pregnancy Urine Qual   Result Value Ref Range    HCG, Urine, POC Negative Negative    Lot Number [id number]     Positive QC Pass/Fail Pass     Negative QC Pass/Fail Pass    EKG 12 Lead   Result Value Ref Range    Ventricular Rate 71 BPM    Atrial Rate 71 BPM    P-R Interval 190 ms    QRS Duration 66 ms    Q-T Interval 414 ms    QTc Calculation (Bazett) 449 ms    R Axis 69 degrees    T Axis 48 degrees       Radiology:  CT Head WO Contrast   Final Result   Unremarkable CT of the head. XR CHEST PORTABLE   Final Result   No evidence of active cardiopulmonary pathology. ------------------------- NURSING NOTES AND VITALS REVIEWED ---------------------------  Date / Time Roomed:  8/17/2021  4:31 PM  ED Bed Assignment:  16/16    The nursing notes within the ED encounter and vital signs as below have been reviewed.    /70   Pulse 79   Temp 98 °F (36.7 °C) (Oral)   Resp 16   Ht 5' (1.524 m)   Wt 126 lb (57.2 kg)   SpO2 98%   BMI 24.61 kg/m²   Oxygen Saturation Interpretation: Normal      ------------------------------------------ PROGRESS NOTES ------------------------------------------  12:02 AM LYNNE rico spoken with the patient and mother and discussed todays results, in addition to providing specific details for the plan of care and counseling regarding the diagnosis and prognosis. Their questions are answered at this time and they are agreeable with the plan. I discussed at length with them reasons for immediate return here for re evaluation. They will followup with their neurologist, palliative care and primary care physician by calling their office tomorrow. --------------------------------- ADDITIONAL PROVIDER NOTES ---------------------------------  At this time the patient is without objective evidence of an acute process requiring hospitalization or inpatient management. They have remained hemodynamically stable throughout their entire ED visit and are stable for discharge with outpatient follow-up. The plan has been discussed in detail and they are aware of the specific conditions for emergent return, as well as the importance of follow-up. Discharge Medication List as of 8/17/2021  7:03 PM          Diagnosis:  1. Seizure-like activity (HCC) Inadequately Controlled   2. Hyperammonemia (HCC) Stable but not controlled       Disposition:  Patient's disposition: Discharge to home  Patient's condition is stable.        [address] DO  Resident  08/18/21 0901

## 2021-08-18 LAB
EKG ATRIAL RATE: 71 BPM
EKG P-R INTERVAL: 190 MS
EKG Q-T INTERVAL: 414 MS
EKG QRS DURATION: 66 MS
EKG QTC CALCULATION (BAZETT): 449 MS
EKG R AXIS: 69 DEGREES
EKG T AXIS: 48 DEGREES
EKG VENTRICULAR RATE: 71 BPM

## 2021-08-18 PROCEDURE — 93010 ELECTROCARDIOGRAM REPORT: CPT | Performed by: INTERNAL MEDICINE

## 2021-08-26 ENCOUNTER — HOSPITAL ENCOUNTER (EMERGENCY)
Age: 36
Discharge: LEFT BEFORE DISCHARGE | End: 2021-08-26
Payer: COMMERCIAL

## 2021-08-26 ENCOUNTER — HOSPITAL ENCOUNTER (OUTPATIENT)
Dept: INFUSION THERAPY | Age: 36
Discharge: HOME | End: 2021-08-26
Payer: COMMERCIAL

## 2021-08-26 VITALS
WEIGHT: 111 LBS | SYSTOLIC BLOOD PRESSURE: 110 MMHG | RESPIRATION RATE: 16 BRPM | DIASTOLIC BLOOD PRESSURE: 86 MMHG | OXYGEN SATURATION: 96 % | HEART RATE: 115 BPM | BODY MASS INDEX: 21.68 KG/M2 | TEMPERATURE: 97.8 F

## 2021-08-26 DIAGNOSIS — C7B.8 METASTATIC MALIGNANT NEUROENDOCRINE TUMOR TO LIVER (HCC): ICD-10-CM

## 2021-08-26 LAB
ALBUMIN SERPL-MCNC: 4.2 G/DL (ref 3.5–5.2)
ALBUMIN SERPL-MCNC: 4.3 G/DL (ref 3.5–5.2)
ALP BLD-CCNC: 151 U/L (ref 35–104)
ALP BLD-CCNC: 156 U/L (ref 35–104)
ALT SERPL-CCNC: 70 U/L (ref 0–32)
ALT SERPL-CCNC: 72 U/L (ref 0–32)
AMMONIA: 121 UMOL/L (ref 11–51)
ANION GAP SERPL CALCULATED.3IONS-SCNC: 14 MMOL/L (ref 7–16)
ANION GAP SERPL CALCULATED.3IONS-SCNC: 16 MMOL/L (ref 7–16)
ANISOCYTOSIS: ABNORMAL
ANISOCYTOSIS: ABNORMAL
AST SERPL-CCNC: 101 U/L (ref 0–31)
AST SERPL-CCNC: 113 U/L (ref 0–31)
BACTERIA: ABNORMAL /HPF
BASOPHILS ABSOLUTE: 0 E9/L (ref 0–0.2)
BASOPHILS ABSOLUTE: 0.07 E9/L (ref 0–0.2)
BASOPHILS RELATIVE PERCENT: 0.9 % (ref 0–2)
BASOPHILS RELATIVE PERCENT: 1.1 % (ref 0–2)
BILIRUB SERPL-MCNC: 0.4 MG/DL (ref 0–1.2)
BILIRUB SERPL-MCNC: 0.4 MG/DL (ref 0–1.2)
BILIRUBIN DIRECT: <0.2 MG/DL (ref 0–0.3)
BILIRUBIN URINE: NEGATIVE
BILIRUBIN, INDIRECT: ABNORMAL MG/DL (ref 0–1)
BLOOD, URINE: ABNORMAL
BUN BLDV-MCNC: 11 MG/DL (ref 6–20)
BUN BLDV-MCNC: 12 MG/DL (ref 6–20)
CALCIUM SERPL-MCNC: 8.2 MG/DL (ref 8.6–10.2)
CALCIUM SERPL-MCNC: 8.3 MG/DL (ref 8.6–10.2)
CHLORIDE BLD-SCNC: 102 MMOL/L (ref 98–107)
CHLORIDE BLD-SCNC: 102 MMOL/L (ref 98–107)
CLARITY: CLEAR
CO2: 18 MMOL/L (ref 22–29)
CO2: 20 MMOL/L (ref 22–29)
COLOR: YELLOW
CREAT SERPL-MCNC: 0.5 MG/DL (ref 0.5–1)
CREAT SERPL-MCNC: 0.5 MG/DL (ref 0.5–1)
EOSINOPHILS ABSOLUTE: 0.06 E9/L (ref 0.05–0.5)
EOSINOPHILS ABSOLUTE: 0.13 E9/L (ref 0.05–0.5)
EOSINOPHILS RELATIVE PERCENT: 0.9 % (ref 0–6)
EOSINOPHILS RELATIVE PERCENT: 1.7 % (ref 0–6)
EPITHELIAL CELLS, UA: ABNORMAL /HPF
GFR AFRICAN AMERICAN: >60
GFR AFRICAN AMERICAN: >60
GFR NON-AFRICAN AMERICAN: >60 ML/MIN/1.73
GFR NON-AFRICAN AMERICAN: >60 ML/MIN/1.73
GLUCOSE BLD-MCNC: 119 MG/DL (ref 74–99)
GLUCOSE BLD-MCNC: 128 MG/DL (ref 74–99)
GLUCOSE URINE: NEGATIVE MG/DL
HCG(URINE) PREGNANCY TEST: NEGATIVE
HCT VFR BLD CALC: 36.1 % (ref 34–48)
HCT VFR BLD CALC: 36.4 % (ref 34–48)
HEMOGLOBIN: 12.2 G/DL (ref 11.5–15.5)
HEMOGLOBIN: 12.2 G/DL (ref 11.5–15.5)
KETONES, URINE: NEGATIVE MG/DL
LACTIC ACID: 2.7 MMOL/L (ref 0.5–2.2)
LEUKOCYTE ESTERASE, URINE: NEGATIVE
LIPASE: 56 U/L (ref 13–60)
LYMPHOCYTES ABSOLUTE: 1.66 E9/L (ref 1.5–4)
LYMPHOCYTES ABSOLUTE: 1.75 E9/L (ref 1.5–4)
LYMPHOCYTES RELATIVE PERCENT: 22.6 % (ref 20–42)
LYMPHOCYTES RELATIVE PERCENT: 22.6 % (ref 20–42)
MAGNESIUM: 2 MG/DL (ref 1.6–2.6)
MCH RBC QN AUTO: 28.4 PG (ref 26–35)
MCH RBC QN AUTO: 28.4 PG (ref 26–35)
MCHC RBC AUTO-ENTMCNC: 33.5 % (ref 32–34.5)
MCHC RBC AUTO-ENTMCNC: 33.8 % (ref 32–34.5)
MCV RBC AUTO: 84 FL (ref 80–99.9)
MCV RBC AUTO: 84.7 FL (ref 80–99.9)
MONOCYTES ABSOLUTE: 0 E9/L (ref 0.1–0.95)
MONOCYTES ABSOLUTE: 0.08 E9/L (ref 0.1–0.95)
MONOCYTES RELATIVE PERCENT: 0.9 % (ref 2–12)
MONOCYTES RELATIVE PERCENT: 2.1 % (ref 2–12)
NEUTROPHILS ABSOLUTE: 5.54 E9/L (ref 1.8–7.3)
NEUTROPHILS ABSOLUTE: 5.62 E9/L (ref 1.8–7.3)
NEUTROPHILS RELATIVE PERCENT: 73.9 % (ref 43–80)
NEUTROPHILS RELATIVE PERCENT: 76.5 % (ref 43–80)
NITRITE, URINE: NEGATIVE
PDW BLD-RTO: 16.4 FL (ref 11.5–15)
PDW BLD-RTO: 16.4 FL (ref 11.5–15)
PH UA: 7 (ref 5–9)
PLATELET # BLD: 177 E9/L (ref 130–450)
PLATELET # BLD: 182 E9/L (ref 130–450)
PMV BLD AUTO: 9.1 FL (ref 7–12)
PMV BLD AUTO: 9.3 FL (ref 7–12)
POLYCHROMASIA: ABNORMAL
POTASSIUM REFLEX MAGNESIUM: 3.4 MMOL/L (ref 3.5–5)
POTASSIUM SERPL-SCNC: 3.3 MMOL/L (ref 3.5–5)
PRO-BNP: 21 PG/ML (ref 0–125)
PROTEIN UA: NEGATIVE MG/DL
RBC # BLD: 4.3 E12/L (ref 3.5–5.5)
RBC # BLD: 4.3 E12/L (ref 3.5–5.5)
RBC UA: ABNORMAL /HPF (ref 0–2)
SARS-COV-2, NAAT: NOT DETECTED
SODIUM BLD-SCNC: 136 MMOL/L (ref 132–146)
SODIUM BLD-SCNC: 136 MMOL/L (ref 132–146)
SPECIFIC GRAVITY UA: 1.01 (ref 1–1.03)
TEAR DROP CELLS: ABNORMAL
TOTAL PROTEIN: 6.9 G/DL (ref 6.4–8.3)
TOTAL PROTEIN: 7 G/DL (ref 6.4–8.3)
TROPONIN, HIGH SENSITIVITY: <6 NG/L (ref 0–9)
UROBILINOGEN, URINE: 0.2 E.U./DL
WBC # BLD: 7.2 E9/L (ref 4.5–11.5)
WBC # BLD: 7.6 E9/L (ref 4.5–11.5)
WBC UA: ABNORMAL /HPF (ref 0–5)

## 2021-08-26 PROCEDURE — 36415 COLL VENOUS BLD VENIPUNCTURE: CPT

## 2021-08-26 PROCEDURE — 87635 SARS-COV-2 COVID-19 AMP PRB: CPT

## 2021-08-26 PROCEDURE — 80048 BASIC METABOLIC PNL TOTAL CA: CPT

## 2021-08-26 PROCEDURE — 80053 COMPREHEN METABOLIC PANEL: CPT

## 2021-08-26 PROCEDURE — 84484 ASSAY OF TROPONIN QUANT: CPT

## 2021-08-26 PROCEDURE — 81001 URINALYSIS AUTO W/SCOPE: CPT

## 2021-08-26 PROCEDURE — 83690 ASSAY OF LIPASE: CPT

## 2021-08-26 PROCEDURE — 85025 COMPLETE CBC W/AUTO DIFF WBC: CPT

## 2021-08-26 PROCEDURE — 83880 ASSAY OF NATRIURETIC PEPTIDE: CPT

## 2021-08-26 PROCEDURE — 80076 HEPATIC FUNCTION PANEL: CPT

## 2021-08-26 PROCEDURE — 82140 ASSAY OF AMMONIA: CPT

## 2021-08-26 PROCEDURE — 93005 ELECTROCARDIOGRAM TRACING: CPT | Performed by: PHYSICIAN ASSISTANT

## 2021-08-26 PROCEDURE — 81025 URINE PREGNANCY TEST: CPT

## 2021-08-26 PROCEDURE — 86316 IMMUNOASSAY TUMOR OTHER: CPT

## 2021-08-26 PROCEDURE — 83735 ASSAY OF MAGNESIUM: CPT

## 2021-08-26 PROCEDURE — 83605 ASSAY OF LACTIC ACID: CPT

## 2021-08-26 ASSESSMENT — PAIN DESCRIPTION - LOCATION: LOCATION: HEAD

## 2021-08-26 ASSESSMENT — PAIN SCALES - GENERAL: PAINLEVEL_OUTOF10: 10

## 2021-08-27 ENCOUNTER — APPOINTMENT (OUTPATIENT)
Dept: CT IMAGING | Age: 36
End: 2021-08-27
Payer: COMMERCIAL

## 2021-08-27 ENCOUNTER — HOSPITAL ENCOUNTER (EMERGENCY)
Age: 36
Discharge: HOME | End: 2021-08-27
Attending: STUDENT IN AN ORGANIZED HEALTH CARE EDUCATION/TRAINING PROGRAM
Payer: COMMERCIAL

## 2021-08-27 ENCOUNTER — APPOINTMENT (OUTPATIENT)
Dept: MRI IMAGING | Age: 36
End: 2021-08-27
Payer: COMMERCIAL

## 2021-08-27 VITALS
BODY MASS INDEX: 24.54 KG/M2 | DIASTOLIC BLOOD PRESSURE: 69 MMHG | TEMPERATURE: 98.7 F | SYSTOLIC BLOOD PRESSURE: 129 MMHG | OXYGEN SATURATION: 99 % | HEART RATE: 80 BPM | WEIGHT: 125 LBS | HEIGHT: 60 IN | RESPIRATION RATE: 16 BRPM

## 2021-08-27 DIAGNOSIS — M54.50 ACUTE BILATERAL LOW BACK PAIN WITHOUT SCIATICA: Primary | ICD-10-CM

## 2021-08-27 LAB
ALBUMIN SERPL-MCNC: 4.3 G/DL (ref 3.5–5.2)
ALP BLD-CCNC: 159 U/L (ref 35–104)
ALT SERPL-CCNC: 41 U/L (ref 0–32)
ANION GAP SERPL CALCULATED.3IONS-SCNC: 15 MMOL/L (ref 7–16)
ANISOCYTOSIS: ABNORMAL
AST SERPL-CCNC: 31 U/L (ref 0–31)
BACTERIA: ABNORMAL /HPF
BASOPHILS ABSOLUTE: 0 E9/L (ref 0–0.2)
BASOPHILS RELATIVE PERCENT: 0 % (ref 0–2)
BILIRUB SERPL-MCNC: 0.4 MG/DL (ref 0–1.2)
BILIRUBIN URINE: NEGATIVE
BLOOD, URINE: ABNORMAL
BUN BLDV-MCNC: 8 MG/DL (ref 6–20)
CALCIUM SERPL-MCNC: 8.3 MG/DL (ref 8.6–10.2)
CHLORIDE BLD-SCNC: 105 MMOL/L (ref 98–107)
CHP ED QC CHECK: NORMAL
CLARITY: ABNORMAL
CO2: 20 MMOL/L (ref 22–29)
COLOR: YELLOW
CREAT SERPL-MCNC: 0.5 MG/DL (ref 0.5–1)
EKG ATRIAL RATE: 93 BPM
EKG P AXIS: 55 DEGREES
EKG P-R INTERVAL: 146 MS
EKG Q-T INTERVAL: 366 MS
EKG QRS DURATION: 70 MS
EKG QTC CALCULATION (BAZETT): 455 MS
EKG R AXIS: 66 DEGREES
EKG T AXIS: 62 DEGREES
EKG VENTRICULAR RATE: 93 BPM
EOSINOPHILS ABSOLUTE: 0.15 E9/L (ref 0.05–0.5)
EOSINOPHILS RELATIVE PERCENT: 1.8 % (ref 0–6)
EPITHELIAL CELLS, UA: ABNORMAL /HPF
GFR AFRICAN AMERICAN: >60
GFR NON-AFRICAN AMERICAN: >60 ML/MIN/1.73
GLUCOSE BLD-MCNC: 107 MG/DL (ref 74–99)
GLUCOSE BLD-MCNC: 115 MG/DL
GLUCOSE URINE: NEGATIVE MG/DL
HCG QUALITATIVE: NEGATIVE
HCG, URINE, POC: NEGATIVE
HCT VFR BLD CALC: 36.2 % (ref 34–48)
HEMOGLOBIN: 12.2 G/DL (ref 11.5–15.5)
KETONES, URINE: NEGATIVE MG/DL
LEUKOCYTE ESTERASE, URINE: NEGATIVE
LYMPHOCYTES ABSOLUTE: 2.27 E9/L (ref 1.5–4)
LYMPHOCYTES RELATIVE PERCENT: 27.8 % (ref 20–42)
Lab: NORMAL
MAGNESIUM: 2.2 MG/DL (ref 1.6–2.6)
MCH RBC QN AUTO: 28.7 PG (ref 26–35)
MCHC RBC AUTO-ENTMCNC: 33.7 % (ref 32–34.5)
MCV RBC AUTO: 85.2 FL (ref 80–99.9)
METER GLUCOSE: 115 MG/DL (ref 74–99)
MONOCYTES ABSOLUTE: 0 E9/L (ref 0.1–0.95)
MONOCYTES RELATIVE PERCENT: 0 % (ref 2–12)
NEGATIVE QC PASS/FAIL: NORMAL
NEUTROPHILS ABSOLUTE: 5.67 E9/L (ref 1.8–7.3)
NEUTROPHILS RELATIVE PERCENT: 70.4 % (ref 43–80)
NITRITE, URINE: NEGATIVE
NUCLEATED RED BLOOD CELLS: 0 /100 WBC
OVALOCYTES: ABNORMAL
PDW BLD-RTO: 16.3 FL (ref 11.5–15)
PH UA: 7.5 (ref 5–9)
PLATELET # BLD: 143 E9/L (ref 130–450)
PMV BLD AUTO: 9.3 FL (ref 7–12)
POIKILOCYTES: ABNORMAL
POSITIVE QC PASS/FAIL: NORMAL
POTASSIUM REFLEX MAGNESIUM: 3.3 MMOL/L (ref 3.5–5)
PROTEIN UA: NEGATIVE MG/DL
RBC # BLD: 4.25 E12/L (ref 3.5–5.5)
RBC UA: ABNORMAL /HPF (ref 0–2)
SODIUM BLD-SCNC: 140 MMOL/L (ref 132–146)
SPECIFIC GRAVITY UA: 1.01 (ref 1–1.03)
TOTAL PROTEIN: 7 G/DL (ref 6.4–8.3)
UROBILINOGEN, URINE: 0.2 E.U./DL
WBC # BLD: 8.1 E9/L (ref 4.5–11.5)
WBC UA: ABNORMAL /HPF (ref 0–5)

## 2021-08-27 PROCEDURE — 81001 URINALYSIS AUTO W/SCOPE: CPT

## 2021-08-27 PROCEDURE — 6360000004 HC RX CONTRAST MEDICATION: Performed by: RADIOLOGY

## 2021-08-27 PROCEDURE — 96374 THER/PROPH/DIAG INJ IV PUSH: CPT

## 2021-08-27 PROCEDURE — 70460 CT HEAD/BRAIN W/DYE: CPT

## 2021-08-27 PROCEDURE — 6370000000 HC RX 637 (ALT 250 FOR IP): Performed by: STUDENT IN AN ORGANIZED HEALTH CARE EDUCATION/TRAINING PROGRAM

## 2021-08-27 PROCEDURE — 93010 ELECTROCARDIOGRAM REPORT: CPT | Performed by: INTERNAL MEDICINE

## 2021-08-27 PROCEDURE — 71260 CT THORAX DX C+: CPT

## 2021-08-27 PROCEDURE — 82962 GLUCOSE BLOOD TEST: CPT

## 2021-08-27 PROCEDURE — 99285 EMERGENCY DEPT VISIT HI MDM: CPT

## 2021-08-27 PROCEDURE — 72146 MRI CHEST SPINE W/O DYE: CPT

## 2021-08-27 PROCEDURE — 96375 TX/PRO/DX INJ NEW DRUG ADDON: CPT

## 2021-08-27 PROCEDURE — 72148 MRI LUMBAR SPINE W/O DYE: CPT

## 2021-08-27 PROCEDURE — 85025 COMPLETE CBC W/AUTO DIFF WBC: CPT

## 2021-08-27 PROCEDURE — 84703 CHORIONIC GONADOTROPIN ASSAY: CPT

## 2021-08-27 PROCEDURE — 6370000000 HC RX 637 (ALT 250 FOR IP): Performed by: EMERGENCY MEDICINE

## 2021-08-27 PROCEDURE — 6360000002 HC RX W HCPCS: Performed by: EMERGENCY MEDICINE

## 2021-08-27 PROCEDURE — 80053 COMPREHEN METABOLIC PANEL: CPT

## 2021-08-27 PROCEDURE — 83735 ASSAY OF MAGNESIUM: CPT

## 2021-08-27 PROCEDURE — 6360000002 HC RX W HCPCS: Performed by: STUDENT IN AN ORGANIZED HEALTH CARE EDUCATION/TRAINING PROGRAM

## 2021-08-27 RX ORDER — DIAZEPAM 5 MG/1
5 TABLET ORAL EVERY 6 HOURS PRN
Qty: 12 TABLET | Refills: 0 | Status: SHIPPED | OUTPATIENT
Start: 2021-08-27 | End: 2021-08-30

## 2021-08-27 RX ORDER — POTASSIUM CHLORIDE 20 MEQ/1
20 TABLET, EXTENDED RELEASE ORAL ONCE
Status: COMPLETED | OUTPATIENT
Start: 2021-08-27 | End: 2021-08-27

## 2021-08-27 RX ORDER — MORPHINE SULFATE 4 MG/ML
4 INJECTION, SOLUTION INTRAMUSCULAR; INTRAVENOUS ONCE
Status: COMPLETED | OUTPATIENT
Start: 2021-08-27 | End: 2021-08-27

## 2021-08-27 RX ORDER — ONDANSETRON 2 MG/ML
4 INJECTION INTRAMUSCULAR; INTRAVENOUS ONCE
Status: COMPLETED | OUTPATIENT
Start: 2021-08-27 | End: 2021-08-27

## 2021-08-27 RX ORDER — DIAZEPAM 5 MG/1
5 TABLET ORAL ONCE
Status: COMPLETED | OUTPATIENT
Start: 2021-08-27 | End: 2021-08-27

## 2021-08-27 RX ADMIN — DIAZEPAM 5 MG: 5 TABLET ORAL at 21:56

## 2021-08-27 RX ADMIN — MORPHINE SULFATE 4 MG: 4 INJECTION, SOLUTION INTRAMUSCULAR; INTRAVENOUS at 21:04

## 2021-08-27 RX ADMIN — IOPAMIDOL 75 ML: 755 INJECTION, SOLUTION INTRAVENOUS at 20:35

## 2021-08-27 RX ADMIN — ONDANSETRON 4 MG: 2 INJECTION INTRAMUSCULAR; INTRAVENOUS at 21:56

## 2021-08-27 RX ADMIN — POTASSIUM CHLORIDE 20 MEQ: 20 TABLET, EXTENDED RELEASE ORAL at 21:04

## 2021-08-27 ASSESSMENT — PAIN SCALES - GENERAL
PAINLEVEL_OUTOF10: 8
PAINLEVEL_OUTOF10: 8

## 2021-08-27 ASSESSMENT — PAIN DESCRIPTION - LOCATION: LOCATION: BACK

## 2021-08-27 NOTE — ED NOTES
Bed: 04  Expected date:   Expected time:   Means of arrival:   Comments:  EMS     Kathrine Zheng RN  08/27/21 2115

## 2021-08-27 NOTE — ED PROVIDER NOTES
Demetrius Wang is a 29year old female who presented to ED with concerns for lower back pain and difficultly urinating. Patient states she has a history of neuroendocrine tumor. Patient states that she is having new lower back pain. That radiates around to both of her ribs. Patient feels diffusely weak in her lower extremities. Patient denies any saddle anesthesia paresthesia. Patient has not had any fever, chills, nausea, vomiting. Patient states that she intermittently feels numbness radiating from her back. Patient also has a headache and some numbness and tingling to her scalp. Patient symptoms of headache and scalp tingling are intermittent and waxing and waning. Patient states that she was having symptoms yesterday when she was at her oncologist office and was brought to emergency department but could not stay due to length of stay at [facility] ([facility]). Patient has not tried anything for symptoms and nothing makes symptoms better or worse. The history is provided by the patient and medical records. Review of Systems   Constitutional: Positive for fatigue. Negative for chills, diaphoresis and fever. Eyes: Negative for photophobia and visual disturbance. Respiratory: Negative for cough, chest tightness and shortness of breath. Cardiovascular: Negative for chest pain, palpitations and leg swelling. Gastrointestinal: Negative for abdominal distention, abdominal pain, diarrhea, nausea and vomiting. Genitourinary: Negative for dysuria. Musculoskeletal: Positive for back pain. Negative for neck pain and neck stiffness. Skin: Negative for pallor and rash. Neurological: Positive for weakness. Negative for headaches. Psychiatric/Behavioral: Negative for confusion. Physical Exam  Vitals and nursing note reviewed. Constitutional:       General: She is in acute distress. Appearance: She is not ill-appearing.       Comments: Patient appears mild distress due to being uncomfortable HENT:      Head: Normocephalic and atraumatic. Eyes:      General: No scleral icterus. Conjunctiva/sclera: Conjunctivae normal.      Pupils: Pupils are equal, round, and reactive to light. Cardiovascular:      Rate and Rhythm: Normal rate and regular rhythm. Pulmonary:      Effort: Pulmonary effort is normal.      Breath sounds: Normal breath sounds. Chest:      Chest wall: Tenderness present. Abdominal:      General: Bowel sounds are normal. There is no distension. Palpations: Abdomen is soft. Tenderness: There is no abdominal tenderness. There is no guarding or rebound. Musculoskeletal:      Cervical back: Normal range of motion and neck supple. No rigidity. No muscular tenderness. Right lower leg: No edema. Left lower leg: No edema. Comments: Midline and paraspinal tenderness t and L spine  Patient has normal plantar flexion and dorsiflexion but 4/5 strength on left and 5/5 on right     Skin:     General: Skin is warm and dry. Capillary Refill: Capillary refill takes less than 2 seconds. Coloration: Skin is not pale. Findings: No erythema or rash. Neurological:      Mental Status: She is alert and oriented to person, place, and time. Psychiatric:         Mood and Affect: Mood normal.          Procedures     MDM  Number of Diagnoses or Management Options  Acute bilateral low back pain without sciatica  Diagnosis management comments: Melchor Denise is a 29year old female who presented to ED with concerns for back pain and chest wall pain. Patient does report a hx of head and neck cancer that patient states is a neuroendocrine tumor. Due to new back pain and difficulty urinating MRI of T and L spine were ordered, head CT unremarkable. Patient initially was felt to have some weakness on there LLE but after medication improved to normal equal strength all extremities.    Patient was signed out to oncoming physician pending MRI and CT chest  If MRI and CT are negative plan will be to discharge patient home. EKG: This EKG is signed and interpreted by me. Rate: 83  Rhythm: Sinus  Interpretation: non-specific EKG  Comparison: was normal           --------------------------------------------- PAST HISTORY ---------------------------------------------  Past Medical History:  has a past medical history of Abdominal pain, [facility] ([facility] Diarrhea, Hypocalcemia, Hypothyroidism, Irritable bowel syndrome, Migraines, Seizures ([facility] and Status post alcohol detoxification. Past Surgical History:  has a past surgical history that includes Parathyroid gland surgery; Cholecystectomy, laparoscopic (07/06/2016); Thyroidectomy; Colonoscopy (N/A, 6/22/2018); CT NEEDLE BIOPSY LIVER PERCUTANEOUS (9/1/2020); Small intestine surgery (N/A, 10/21/2020); CT BIOPSY RENAL (1/25/2021); hc dialysis catheter (N/A, 1/28/2021); and sigmoidoscopy (N/A, 5/14/2021). Social History:  reports that she has been smoking cigarettes. She has been smoking about 0.25 packs per day. She has never used smokeless tobacco. She reports that she does not drink alcohol and does not use drugs. Family History: family history includes Alzheimer's Disease in an other family member; Asthma in her father; Breast Cancer in her maternal grandmother; Cancer in her father, maternal grandmother, and paternal grandfather; Diabetes in an other family member; Heart Disease in her father; High Blood Pressure in her father and mother; High Cholesterol in her mother; [employer] in an other family member; Other in her mother. The patients home medications have been reviewed. Allergies: Patient has no known allergies.     -------------------------------------------------- RESULTS -------------------------------------------------  Labs:  Results for orders placed or performed during the hospital encounter of 08/27/21   Urinalysis with Microscopic   Result Value Ref Range    Color, UA Yellow Straw/Yellow Clarity, UA SL CLOUDY Clear    Glucose, Ur Negative Negative mg/dL    Bilirubin Urine Negative Negative    Ketones, Urine Negative Negative mg/dL    Specific Gravity, UA 1.010 1.005 - 1.030    Blood, Urine MODERATE (A) Negative    pH, UA 7.5 5.0 - 9.0    Protein, UA Negative Negative mg/dL    Urobilinogen, Urine 0.2 <2.0 E.U./dL    Nitrite, Urine Negative Negative    Leukocyte Esterase, Urine Negative Negative    WBC, UA 0-1 0 - 5 /HPF    RBC, UA 1-3 0 - 2 /HPF    Epithelial Cells, UA FEW /HPF    Bacteria, UA NONE SEEN None Seen /HPF   HCG, SERUM, QUALITATIVE   Result Value Ref Range    hCG Qual NEGATIVE NEGATIVE   CBC auto differential   Result Value Ref Range    WBC 8.1 4.5 - 11.5 E9/L    RBC 4.25 3.50 - 5.50 E12/L    Hemoglobin 12.2 11.5 - 15.5 g/dL    Hematocrit 36.2 34.0 - 48.0 %    MCV 85.2 80.0 - 99.9 fL    MCH 28.7 26.0 - 35.0 pg    MCHC 33.7 32.0 - 34.5 %    RDW 16.3 (H) 11.5 - 15.0 fL    Platelets 272 040 - 320 E9/L    MPV 9.3 7.0 - 12.0 fL    Neutrophils % 70.4 43.0 - 80.0 %    Lymphocytes % 27.8 20.0 - 42.0 %    Monocytes % 0.0 (L) 2.0 - 12.0 %    Eosinophils % 1.8 0.0 - 6.0 %    Basophils % 0.0 0.0 - 2.0 %    Neutrophils Absolute 5.67 1.80 - 7.30 E9/L    Lymphocytes Absolute 2.27 1.50 - 4.00 E9/L    Monocytes Absolute 0.00 (L) 0.10 - 0.95 E9/L    Eosinophils Absolute 0.15 0.05 - 0.50 E9/L    Basophils Absolute 0.00 0.00 - 0.20 E9/L    nRBC 0.0 /100 WBC    Anisocytosis 1+     Poikilocytes 1+     Ovalocytes 1+    Comprehensive Metabolic Panel w/ Reflex to MG   Result Value Ref Range    Sodium 140 132 - 146 mmol/L    Potassium reflex Magnesium 3.3 (L) 3.5 - 5.0 mmol/L    Chloride 105 98 - 107 mmol/L    CO2 20 (L) 22 - 29 mmol/L    Anion Gap 15 7 - 16 mmol/L    Glucose 107 (H) 74 - 99 mg/dL    BUN 8 6 - 20 mg/dL    CREATININE 0.5 0.5 - 1.0 mg/dL    GFR Non-African American >60 >=60 mL/min/1.73    GFR African American >60     Calcium 8.3 (L) 8.6 - 10.2 mg/dL    Total Protein 7.0 6.4 - 8.3 g/dL    Albumin 4.3 3.5 - 5.2 g/dL    Total Bilirubin 0.4 0.0 - 1.2 mg/dL    Alkaline Phosphatase 159 (H) 35 - 104 U/L    ALT 41 (H) 0 - 32 U/L    AST 31 0 - 31 U/L   Magnesium   Result Value Ref Range    Magnesium 2.2 1.6 - 2.6 mg/dL   POCT Glucose   Result Value Ref Range    Meter Glucose 115 (H) 74 - 99 mg/dL   POCT glucose   Result Value Ref Range    Glucose 115 mg/dL    QC OK? ok    POC Pregnancy Urine Qual   Result Value Ref Range    HCG, Urine, POC Negative Negative    Lot Number [id number]     Positive QC Pass/Fail Pass     Negative QC Pass/Fail Pass        Radiology:  MRI THORACIC SPINE WO CONTRAST   Final Result   Unremarkable unenhanced MRI of the thoracic spine. MRI LUMBAR SPINE WO CONTRAST   Final Result   Unremarkable MRI of the lumbar spine. CT CHEST W CONTRAST   Final Result   1. Unremarkable CT of the chest.   2. Stable hypodense lesions in the liver, likely metastases. CT HEAD W CONTRAST   Final Result   No acute intracranial abnormality.             ------------------------- NURSING NOTES AND VITALS REVIEWED ---------------------------  Date / Time Roomed:  8/27/2021  5:35 PM  ED Bed Assignment:  04/04    The nursing notes within the ED encounter and vital signs as below have been reviewed. /69   Pulse 80   Temp 98.7 °F (37.1 °C) (Oral)   Resp 16   Ht 5' (1.524 m)   Wt 125 lb (56.7 kg)   SpO2 99%   BMI 24.41 kg/m²   Oxygen Saturation Interpretation: Normal      ------------------------------------------ PROGRESS NOTES ------------------------------------------  3:37 AM EDT  I have spoken with the patient and discussed todays results, in addition to providing specific details for the plan of care and counseling regarding the diagnosis and prognosis. Their questions are answered at this time and they are agreeable with the plan. I discussed at length with them reasons for immediate return here for re evaluation.  They will followup with their primary care physician by calling their office tomorrow. --------------------------------- ADDITIONAL PROVIDER NOTES ---------------------------------  At this time the patient is without objective evidence of an acute process requiring hospitalization or inpatient management. They have remained hemodynamically stable throughout their entire ED visit and are stable for discharge with outpatient follow-up. The plan has been discussed in detail and they are aware of the specific conditions for emergent return, as well as the importance of follow-up. Discharge Medication List as of 8/27/2021 10:00 PM      START taking these medications    Details   diazePAM (VALIUM) 5 MG tablet Take 1 tablet by mouth every 6 hours as needed (Muscle spasms) for up to 3 days. , Disp-12 tablet, R-[id number]             Diagnosis:  1. Acute bilateral low back pain without sciatica        Disposition:  Patient's disposition: Discharge to home  Patient's condition is stable.           Sreedhar Dominguez MD  Resident  08/30/21 [id number]       Sreedhar Dominguez MD  Resident  08/30/21 [id number]

## 2021-08-28 NOTE — ED NOTES
Discharge instructions, follow up, prescriptions reviewed. Dr Pineda bolton to d/c home.      Jodee Prieto RN  08/27/21 [id number]

## 2021-08-28 NOTE — ED PROVIDER NOTES
9:57 PM EDT  Received patient in signout, patient presented with bilateral low back pain. Imaging unremarkable for significant acute process. Patient was seen and examined, resting comfortably, states back pain is still present but much improved after treatment. On my examination after patient treated for pain muscle strength is +5/5 and equal bilateral lower extremities, sensation intact, no deficits. Patient with bilateral lumbar paraspinal muscular tenderness, suspect muscular in nature. Patient was given dose of muscle relaxer emergency department and discharged home with prescription for muscle relaxers. Recommended follow-up with primary care physician and her neurologist which she follows with as soon as possible without fail for reevaluation and ongoing management. Patient states understanding and agrees to plan. Appropriate recommendations provided and return precautions discussed. Family at bedside.     Talat Yee DO  9:58 PM EDT   8/27/21         Melissa Smith DO  08/27/21 [id number]

## 2021-08-29 LAB — CHROMOGRANIN A: 120 NG/ML (ref 0–103)

## 2021-08-30 ENCOUNTER — OFFICE VISIT (OUTPATIENT)
Dept: ONCOLOGY | Age: 36
End: 2021-08-30
Payer: COMMERCIAL

## 2021-08-30 ENCOUNTER — HOSPITAL ENCOUNTER (OUTPATIENT)
Dept: INFUSION THERAPY | Age: 36
Discharge: HOME | End: 2021-08-30
Payer: COMMERCIAL

## 2021-08-30 ENCOUNTER — OFFICE VISIT (OUTPATIENT)
Dept: PALLATIVE CARE | Age: 36
End: 2021-08-30
Payer: COMMERCIAL

## 2021-08-30 VITALS
TEMPERATURE: 97.7 F | SYSTOLIC BLOOD PRESSURE: 109 MMHG | HEIGHT: 60 IN | BODY MASS INDEX: 24.15 KG/M2 | DIASTOLIC BLOOD PRESSURE: 75 MMHG | HEART RATE: 86 BPM | WEIGHT: 123 LBS | OXYGEN SATURATION: 100 %

## 2021-08-30 VITALS
SYSTOLIC BLOOD PRESSURE: 116 MMHG | DIASTOLIC BLOOD PRESSURE: 83 MMHG | OXYGEN SATURATION: 98 % | HEART RATE: 89 BPM | BODY MASS INDEX: 24 KG/M2 | WEIGHT: 122.9 LBS

## 2021-08-30 DIAGNOSIS — Z51.5 PALLIATIVE CARE BY SPECIALIST: ICD-10-CM

## 2021-08-30 DIAGNOSIS — C7B.8 METASTATIC MALIGNANT NEUROENDOCRINE TUMOR TO LIVER (HCC): Primary | ICD-10-CM

## 2021-08-30 DIAGNOSIS — C7B.8 NEUROENDOCRINE CARCINOMA METASTATIC TO LIVER (HCC): Primary | ICD-10-CM

## 2021-08-30 DIAGNOSIS — C7A.8 NEUROENDOCRINE CARCINOMA METASTATIC TO LIVER (HCC): Primary | ICD-10-CM

## 2021-08-30 PROCEDURE — G8420 CALC BMI NORM PARAMETERS: HCPCS | Performed by: NURSE PRACTITIONER

## 2021-08-30 PROCEDURE — 96402 CHEMO HORMON ANTINEOPL SQ/IM: CPT

## 2021-08-30 PROCEDURE — 96372 THER/PROPH/DIAG INJ SC/IM: CPT

## 2021-08-30 PROCEDURE — 4004F PT TOBACCO SCREEN RCVD TLK: CPT | Performed by: NURSE PRACTITIONER

## 2021-08-30 PROCEDURE — 6360000002 HC RX W HCPCS: Performed by: INTERNAL MEDICINE

## 2021-08-30 PROCEDURE — 99214 OFFICE O/P EST MOD 30 MIN: CPT | Performed by: INTERNAL MEDICINE

## 2021-08-30 PROCEDURE — 99212 OFFICE O/P EST SF 10 MIN: CPT

## 2021-08-30 PROCEDURE — 99213 OFFICE O/P EST LOW 20 MIN: CPT | Performed by: NURSE PRACTITIONER

## 2021-08-30 PROCEDURE — 99212 OFFICE O/P EST SF 10 MIN: CPT | Performed by: NURSE PRACTITIONER

## 2021-08-30 PROCEDURE — G8427 DOCREV CUR MEDS BY ELIG CLIN: HCPCS | Performed by: INTERNAL MEDICINE

## 2021-08-30 PROCEDURE — G8428 CUR MEDS NOT DOCUMENT: HCPCS | Performed by: NURSE PRACTITIONER

## 2021-08-30 PROCEDURE — G8420 CALC BMI NORM PARAMETERS: HCPCS | Performed by: INTERNAL MEDICINE

## 2021-08-30 PROCEDURE — 4004F PT TOBACCO SCREEN RCVD TLK: CPT | Performed by: INTERNAL MEDICINE

## 2021-08-30 RX ORDER — MONTELUKAST SODIUM 4 MG/1
1 TABLET, CHEWABLE ORAL 2 TIMES DAILY
Qty: 60 TABLET | Refills: 2 | Status: SHIPPED
Start: 2021-08-30 | End: 2022-02-07 | Stop reason: ALTCHOICE

## 2021-08-30 RX ORDER — LANREOTIDE ACETATE 120 MG/.5ML
120 INJECTION SUBCUTANEOUS ONCE
Status: COMPLETED | OUTPATIENT
Start: 2021-08-30 | End: 2021-08-30

## 2021-08-30 RX ORDER — LANREOTIDE ACETATE 120 MG/.5ML
120 INJECTION SUBCUTANEOUS ONCE
Status: CANCELLED | OUTPATIENT
Start: 2021-09-23 | End: 2021-09-23

## 2021-08-30 RX ORDER — TIZANIDINE 4 MG/1
4 TABLET ORAL 3 TIMES DAILY
Qty: 45 TABLET | Refills: 0 | Status: SHIPPED | OUTPATIENT
Start: 2021-08-30 | End: 2021-09-14

## 2021-08-30 RX ORDER — LEVETIRACETAM 500 MG/1
TABLET ORAL
COMMUNITY
Start: 2021-08-04 | End: 2021-10-19

## 2021-08-30 RX ADMIN — LANREOTIDE ACETATE 120 MG: 120 INJECTION SUBCUTANEOUS at 14:40

## 2021-08-30 ASSESSMENT — ENCOUNTER SYMPTOMS
PHOTOPHOBIA: 0
COUGH: 0
NAUSEA: 0
VOMITING: 0
SHORTNESS OF BREATH: 0
DIARRHEA: 0
ABDOMINAL DISTENTION: 0
CHEST TIGHTNESS: 0
ABDOMINAL PAIN: 0
BACK PAIN: 1

## 2021-08-30 NOTE — PROGRESS NOTES
Department of Palliative Medicine  Ambulatory Note  Provider: Neal Nieves, TRINA - CNP     Chief Complaint: Milind Villasenor is a 29 y.o. female with chief complaint of pain    HPI:  Milind Villasenor is a 29 y.o. female with significant medical history of hypothyroidism, IBS, migraine who was complaining of abdominal pain associated with diarrhea and flushing/sweating. 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 Tracee Zaman. Assessment/Plan      Neuroendocrine cancer  - Follows with Dr. Jose Guadalupe Arboleda  - s.p Bowel resection on 10/21/20  - On Lanreotide     Neoplasm related pain  - Gabapentin 300mg TID  - Tramadol 50mg q 6 hrs prn for the pain, using 1 day      Nausea  - Continue Zofran and Phenergan PRN    Diarrhea:   -Currently on Lanreotide and Xermelo  -Imodium she uses this daily  -Cholestyramine not tolerated due to nausea vomiting  -Trial colestipol 1 gm BID  -Add 1-3 jumbo marshmallows daily    - Goals of care: cure, live longer and improve or maintain function/quality of life    - Code Status: full code    Follow Up:  4 weeks. Encouraged to call with any questions, concerns, needs, or changes in symptoms. Subjective:     Elisabet Abel presents today, is alert and oriented, and appears to be doing well. She continues to have abdominal pain, rated as a 5 on a scale 10, as well as complains of some back pain which restarted recently, it is crampy in nature, primarily muscular. She was seen in the emergency department for this, with imaging showing no acute issues with her spine or back, and is likely more related to muscle spasm or tightness. Prescribed Valium, which she does not tolerate well, discussed further options, she is agreeable to Zanaflex. She does continue with tramadol which she uses 1 time per day, as well as gabapentin 300 mg 3 times daily.   She continues have diarrhea, but states that Monitoring: [place] reviewed 8/30/21. TRINA Wilson CNP   Palliative Care Department     Time/Communication  Greater than 50% of time spent, total 25 minutes in face-to-face counseling and coordination of care regarding symptom management. Note: This report was completed using computerShopparity voiced recognition software. Every effort has been made to ensure accuracy; however, inadvertent computerized transcription errors may be present.

## 2021-08-30 NOTE — PROGRESS NOTES
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 CT abdomen/pelvis 08/28/2020:  2 cm masslike density in the mid abdominal small bowel mesentery with a spiculated appearance. Multiple liver masses suspicious for metastatic disease. 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 Comment: Sections of the liver tissue cores show the hepatic parenchyma to be partially replaced by a proliferation of epithelioid cells with relatively uniform round nuclei and eosinophilic granular cytoplasm.  The   epithelioid cells form anastomosing solid cords/nests that are surrounded by delicate fibrovascular stroma.  There are no mitotic figures or tumor cell necrosis present.  The histologic changes seen are suggestive of well-differentiated neuroendocrine (carcinoid) tumor. Immunostaining for pankeratin, chromogranin, synaptophysin, TTF-1 and Ki-67 was performed on sections of one tissue block (A1) and the neoplastic epithelioid cells show diffuse and strong positivity for neuroendocrine markers (chromogranin and synaptophysin) and moderate positivity for pankeratin.  There is no staining reactivity for TTF-1. The Ki-67 proliferation labeling index is essentially negative, (very low, <1%). This staining pattern confirms the histologic impression of a well-differentiated neuroendocrine (carcinoid) tumor. FL Small bowel 09/02/2020:  Rapid small bowel transit. Serum Chromogranin A 160 on 09/23/2020.   Urine 5-HIAA 21 (0-14)  2d-Echo 10/10/2020: EF 60-65%   Normal right ventricular size and function    Dotatate PET/CT scan 10/14/2020 increased tracer uptake seen throughout the liver compatible with neoplasm. This involves both the left and the right lobe of liver. In addition there are 2 foci of increased tracer uptake along the mesentery of the small bowel. This may involve the wall the small bowel. No other convincing evidence for extra-abdominal metastatic disease. EGD/Colonoscopy 10/05/2020 by Dr. Peter Hargrove; records reviewed. Hepatobiliary team (Dr. Prasanna Santana) consult appreciated. Small bowel resection on 10/21/2020. Small bowel resection on 10/21/2020. A.  Ileum, resection:   Multifocal (4 foci) neuroendocrine tumor (NET). Extensive perineural and angiolymphatic invasion. 3 of 10 lymph nodes with metastatic neuroendocrine tumor and extracapsular extension of tumor cells (3/10). Bilateral viable small bowel resection margins with no evidence of tumor. Kenny Ny received as \"Meckel's\":   Nodular scar tissue with patchy chronic inflammation. Negative for neuroendocrine tumor. Intestinal mucosal tissue is not present. CASE SUMMARY:   Procedure: Small bowel resection   Tumor site: Ileum   Tumor size: Greatest dimension of 1.5 cm   Tumor focality: Multifocal: 4 separate tumors   Histologic type and grade: G2: Well-differentiated neuroendocrine tumor   Mitotic rate: between 2-20 mitoses/2 mm2   Ki-67 labeling index: between 3-20%   Tumor extension: Tumor invades through the muscularis propria into subserosal tissue without penetration of overlying serosa   Margins:  All margins are uninvolved by tumor    Margins examined: Proximal and distal   Lymphovascular invasion: Present   Perineural invasion: Present   Large mesenteric masses (greater than 2 cm): Present (one 2.5 cm mass)   Regional lymph nodes:    Number of lymph nodes involved: 3    Number of lymph nodes examined: 10   Pathologic stage classification (pTNM, AJCC eighth edition):    pT3    pN2    pM1a -confirmed liver metastasis, [facility] [place]-     Comment:   A. 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 Dose # 1 Lanreotide was on 11/05/2020. Dose # 2 Lanreotide was on 12/03/2020. Dose # 3 Lanreotide was on 01/07/2021. Serum Chromogranin A 95 on 01/07/2021. CT chest 02/07/2021 negative for metastatic disease. CT abdomen/pelvis 02/07/2021 Persistent bilobar hepatic lesions which are grossly stable consistent with stable widespread hepatic metastasis. Imaging reviewed. Continue Lanreotide and repeat scans in 3 months. Dose # 4 Lanreotide was on 02/11/2021. Ga 76 Dotatate PET 03/09/2021 Gallium 68 dotatate avid uptake throughout multiple regions of the liver compatible with multiple foci of neuroendocrine tumor in both the right and the left lobe of the liver, when compared to previous not significantly changed in the interval.  Dose # 5 Lanreotide was on 03/11/2021. Dose # 6 Lanreotide was on 04/08/2021. Serum chromogranin A 115 (0-103)  Dose # 7 Lanreotide was on 05/06/2021. Serum chromogranin A 114 (0-103)  Dose # 8 Lanreotide was on 06/03/2021. Serum chromogranin A  84  (0-103)    Ga 76 Dotatate PET 06/29/2021 Numerous foci of increased Gallium 68 dotatate avid uptake throughout both lobes of the liver, not significantly changed from previous. No significant progression of disease. No definite metastatic disease identified  Dose # 9 Lanreotide was on 07/01/2021. Serum Chromogranin A 97 (0-103)  Dose # 10 Lanreotide was on 07/29/2021. Serum Chromogranin A 89 (0-103)  Dose # 11 Lanreotide is today 08/30/2021. Serum Chromogranin A 120 (0-103)    Today 08/30/2021: No fever, chills. Diarrhea on Imodium, Lomotil and Xermelo. Questran added by palliative care team; Fair appetite and energy level.  Mild hot flashes/flushing    Review of Systems;  CONSTITUTIONAL: No fever. Mild hot flashes/flushing. ENMT: Eyes: No diplopia; Nose: No epistaxis. Mouth: No sore throat. RESPIRATORY: No hemoptysis, shortness of breath, cough. CARDIOVASCULAR: No chest pain, palpitations. GASTROINTESTINAL: Diarrhea on Imodium, Lomotil and Xermelo. Questran added by palliative medicine  GENITOURINARY: No dysuria, urinary frequency, hematuria. NEURO: No syncope, presyncope, headache. Remainder:  ROS NEGATIVE    Past Medical History:      Diagnosis Date    Abdominal pain     Cancer ([facility] [address]     neuroendocrime tumor    Diarrhea     Hypocalcemia     Hypothyroidism     Irritable bowel syndrome     Migraines     Seizures ([facility] [address]     last episode January 2021    Status post alcohol detoxification     5/22/2018-5/29/2018     Medications:  Reviewed and reconciled. Allergies:  No Known Allergies    Physical Exam:  /75   Pulse 86   Temp 97.7 °F (36.5 °C)   Ht 5' (1.524 m)   Wt 123 lb (55.8 kg)   SpO2 100%   BMI 24.02 kg/m²   GENERAL: Alert, oriented x 3, not in distress  HEENT: PERRLA; EOMI. Oropharynx clear. LUNGS: Good air entry bilaterally. No wheezing, crackles or ronchi. CARDIOVASCULAR: Regular rate. No murmurs, rubs or gallops. EXTREMITIES: Without clubbing, cyanosis, or edema.    ECOG PS 1    Lab Results   Component Value Date    WBC 8.1 08/27/2021    HGB 12.2 08/27/2021    HCT 36.2 08/27/2021    MCV 85.2 08/27/2021     08/27/2021     Lab Results   Component Value Date     08/27/2021    K 3.3 (L) 08/27/2021     08/27/2021    CO2 20 (L) 08/27/2021    BUN 8 08/27/2021    CREATININE 0.5 08/27/2021    GLUCOSE 107 (H) 08/27/2021    CALCIUM 8.3 (L) 08/27/2021    PROT 7.0 08/27/2021    LABALBU 4.3 08/27/2021    BILITOT 0.4 08/27/2021    ALKPHOS 159 (H) 08/27/2021    AST 31 08/27/2021    ALT 41 (H) 08/27/2021    LABGLOM >60 08/27/2021    GFRAA >60 08/27/2021     Impression/Plan:  28 y/o female with 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 Multiple liver masses suspicious for metastatic disease. 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 Comment: Sections of the liver tissue cores show the hepatic parenchyma to be partially replaced by a proliferation of epithelioid cells with relatively uniform round nuclei and eosinophilic granular cytoplasm.  The   epithelioid cells form anastomosing solid cords/nests that are surrounded by delicate fibrovascular stroma.  There are no mitotic figures or tumor cell necrosis present.  The histologic changes seen are suggestive of well-differentiated neuroendocrine (carcinoid) tumor. Immunostaining for pankeratin, chromogranin, synaptophysin, TTF-1 and Ki-67 was performed on sections of one tissue block (A1) and the neoplastic epithelioid cells show diffuse and strong positivity for neuroendocrine markers (chromogranin and synaptophysin) and moderate positivity for pankeratin.    There is no staining reactivity for TTF-1. The Ki-67 proliferation labeling index is essentially negative, (very low, <1%). This staining pattern confirms the histologic impression of a well-differentiated neuroendocrine (carcinoid) tumor. FL Small bowel 09/02/2020:  Rapid small bowel transit. Serum Chromogranin A 160 on 09/23/2020. Urine 5-HIAA 21 (0-14)  2d-Echo 10/10/2020: EF 60-65%   Normal right ventricular size and function    Dotatate PET/CT scan 10/14/2020 increased tracer uptake seen throughout the liver compatible with neoplasm. This involves both the left and the right lobe of liver. In addition there are 2 foci of increased tracer uptake along the mesentery of the small bowel.  This may involve the wall the small bowel.  No other convincing evidence for extra-abdominal metastatic disease. EGD/Colonoscopy 10/05/2020 by Dr. Heather Shetty; records reviewed. Hepatobiliary team (Dr. Sujit Bruno) consult appreciated. Small bowel resection on 10/21/2020. A.  Ileum, resection:   Multifocal (4 foci) neuroendocrine tumor (NET). Extensive perineural and angiolymphatic invasion. 3 of 10 lymph nodes with metastatic neuroendocrine tumor and extracapsular extension of tumor cells (3/10). Bilateral viable small bowel resection margins with no evidence of tumor. Lovely Dubin received as \"Meckel's\":   Nodular scar tissue with patchy chronic inflammation. Negative for neuroendocrine tumor. Intestinal mucosal tissue is not present. CASE SUMMARY:   Procedure: Small bowel resection   Tumor site: Ileum   Tumor size: Greatest dimension of 1.5 cm   Tumor focality: Multifocal: 4 separate tumors   Histologic type and grade: G2: Well-differentiated neuroendocrine tumor   Mitotic rate: between 2-20 mitoses/2 mm2   Ki-67 labeling index: between 3-20%   Tumor extension: Tumor invades through the muscularis propria into subserosal tissue without penetration of overlying serosa   Margins: All margins are uninvolved by tumor    Margins examined: Proximal and distal   Lymphovascular invasion: Present   Perineural invasion: Present   Large mesenteric masses (greater than 2 cm): Present (one 2.5 cm mass)   Regional lymph nodes:    Number of lymph nodes involved: 3    Number of lymph nodes examined: 10   Pathologic stage classification (pTNM, AJCC eighth edition):    pT3    pN2    pM1a -confirmed liver metastasis, \[facility]\""-     Comment:   A. 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 Dose # 1 Lanreotide was on 11/05/2020.    Dose # 2

## 2021-08-31 DIAGNOSIS — C7B.8 METASTATIC MALIGNANT NEUROENDOCRINE TUMOR TO LIVER (HCC): ICD-10-CM

## 2021-08-31 RX ORDER — POTASSIUM CHLORIDE 20 MEQ/1
TABLET, EXTENDED RELEASE ORAL
Qty: 30 TABLET | Refills: 0 | Status: SHIPPED
Start: 2021-08-31 | End: 2021-09-27

## 2021-08-31 NOTE — TELEPHONE ENCOUNTER
Last Appointment:  Visit date not found  Future Appointments   Date Time Provider Raoul Meyer   9/30/2021  2:45 PM Herminio Salcedo MD MED ONC [facility]   9/30/2021  3:30 PM AMADA MED ONC FAST TRACK 2 SEYZ Med Onc  [place]   10/19/2021  9:00 AM TRINA Zamarripa - CNP [facility] Neuro [facility]   10/25/2021  1:00 PM SCHEDULE, [facility] PALLIATIVE CARE PROVIDER [address]

## 2021-09-02 RX ORDER — HYDROCORTISONE ACETATE, PRAMOXINE HCL 2.5; 1 G/100G; G/100G
CREAM TOPICAL
Qty: 28.4 G | Refills: 5 | Status: SHIPPED
Start: 2021-09-02 | End: 2022-06-16

## 2021-09-10 DIAGNOSIS — G89.3 NEOPLASM RELATED PAIN: ICD-10-CM

## 2021-09-10 RX ORDER — ACETAMINOPHEN 325 MG/1
650 TABLET ORAL EVERY 6 HOURS PRN
Qty: 120 TABLET | Refills: 1 | Status: SHIPPED
Start: 2021-09-10 | End: 2022-02-25 | Stop reason: SDUPTHER

## 2021-09-10 RX ORDER — TRAMADOL HYDROCHLORIDE 50 MG/1
50 TABLET ORAL EVERY 6 HOURS PRN
Qty: 56 TABLET | Refills: 0 | Status: SHIPPED
Start: 2021-09-10 | End: 2021-10-25 | Stop reason: SDUPTHER

## 2021-09-10 NOTE — TELEPHONE ENCOUNTER
Call from Aissatou requesting refills for Tramadol 50 mg , that she takes sparingly and for Tylenol 325 mg that she can take 2 tablets  instead of tramadol when pain is not so bad. Aissatou shares that her maternal grandmother just passed away this morning and she may travel for  to [place]. Support provided through active listening. Pharmacy is [address] [place] [address] in [facility]. Next george 10/25/21.

## 2021-09-23 DIAGNOSIS — C7B.8 METASTATIC MALIGNANT NEUROENDOCRINE TUMOR TO LIVER (HCC): ICD-10-CM

## 2021-09-27 ENCOUNTER — TELEPHONE (OUTPATIENT)
Dept: PALLATIVE CARE | Age: 36
End: 2021-09-27

## 2021-09-27 RX ORDER — POTASSIUM CHLORIDE 20 MEQ/1
TABLET, EXTENDED RELEASE ORAL
Qty: 30 TABLET | Refills: 0 | Status: SHIPPED
Start: 2021-09-27 | End: 2021-10-28

## 2021-09-27 NOTE — TELEPHONE ENCOUNTER
Last Appointment:  Visit date not found  Future Appointments   Date Time Provider Raoul Meyer   9/30/2021  2:45 PM Travis Interiano MD MED ONC [facility]   9/30/2021  3:30 PM AMADA MED ONC FAST TRACK 2 YZ Med Onc  [place]   10/19/2021  9:00 AM Ibis Fraser APRN - CNP BD Neuro [facility]   10/25/2021  1:00 PM SCHEDULE, [facility] PALLIATIVE CARE PROVIDER [address]

## 2021-09-27 NOTE — TELEPHONE ENCOUNTER
Pt requesting Zanaflex 4 mg last prescribed by Dr Seven Cross on 6/22/21. .. But no mention of continuing this medication during last OV with Palliative. Please advise.

## 2021-09-28 RX ORDER — TIZANIDINE 4 MG/1
4 TABLET ORAL EVERY 8 HOURS PRN
Qty: 30 TABLET | Refills: 0 | Status: SHIPPED
Start: 2021-09-28 | End: 2021-10-25 | Stop reason: ALTCHOICE

## 2021-09-30 ENCOUNTER — OFFICE VISIT (OUTPATIENT)
Dept: ONCOLOGY | Age: 36
End: 2021-09-30
Payer: COMMERCIAL

## 2021-09-30 ENCOUNTER — HOSPITAL ENCOUNTER (OUTPATIENT)
Dept: INFUSION THERAPY | Age: 36
Discharge: HOME | End: 2021-09-30
Payer: COMMERCIAL

## 2021-09-30 VITALS
OXYGEN SATURATION: 100 % | TEMPERATURE: 97.4 F | BODY MASS INDEX: 23.96 KG/M2 | RESPIRATION RATE: 16 BRPM | HEART RATE: 92 BPM | DIASTOLIC BLOOD PRESSURE: 74 MMHG | WEIGHT: 122.7 LBS | SYSTOLIC BLOOD PRESSURE: 116 MMHG

## 2021-09-30 DIAGNOSIS — C7B.8 METASTATIC MALIGNANT NEUROENDOCRINE TUMOR TO LIVER (HCC): Primary | ICD-10-CM

## 2021-09-30 LAB
ALBUMIN SERPL-MCNC: 4.3 G/DL (ref 3.5–5.2)
ALP BLD-CCNC: 198 U/L (ref 35–104)
ALT SERPL-CCNC: 41 U/L (ref 0–32)
ANION GAP SERPL CALCULATED.3IONS-SCNC: 12 MMOL/L (ref 7–16)
AST SERPL-CCNC: 42 U/L (ref 0–31)
BASOPHILS ABSOLUTE: 0.08 E9/L (ref 0–0.2)
BASOPHILS RELATIVE PERCENT: 1.5 % (ref 0–2)
BILIRUB SERPL-MCNC: 0.3 MG/DL (ref 0–1.2)
BUN BLDV-MCNC: 8 MG/DL (ref 6–20)
CALCIUM SERPL-MCNC: 9 MG/DL (ref 8.6–10.2)
CHLORIDE BLD-SCNC: 110 MMOL/L (ref 98–107)
CO2: 19 MMOL/L (ref 22–29)
CREAT SERPL-MCNC: 0.7 MG/DL (ref 0.5–1)
EOSINOPHILS ABSOLUTE: 0.16 E9/L (ref 0.05–0.5)
EOSINOPHILS RELATIVE PERCENT: 3.1 % (ref 0–6)
GFR AFRICAN AMERICAN: >60
GFR NON-AFRICAN AMERICAN: >60 ML/MIN/1.73
GLUCOSE BLD-MCNC: 113 MG/DL (ref 74–99)
HCT VFR BLD CALC: 36 % (ref 34–48)
HEMOGLOBIN: 11.7 G/DL (ref 11.5–15.5)
IMMATURE GRANULOCYTES #: 0.04 E9/L
IMMATURE GRANULOCYTES %: 0.8 % (ref 0–5)
LYMPHOCYTES ABSOLUTE: 1.94 E9/L (ref 1.5–4)
LYMPHOCYTES RELATIVE PERCENT: 37.5 % (ref 20–42)
MCH RBC QN AUTO: 27.9 PG (ref 26–35)
MCHC RBC AUTO-ENTMCNC: 32.5 % (ref 32–34.5)
MCV RBC AUTO: 85.7 FL (ref 80–99.9)
MONOCYTES ABSOLUTE: 0.73 E9/L (ref 0.1–0.95)
MONOCYTES RELATIVE PERCENT: 14.1 % (ref 2–12)
NEUTROPHILS ABSOLUTE: 2.23 E9/L (ref 1.8–7.3)
NEUTROPHILS RELATIVE PERCENT: 43 % (ref 43–80)
PDW BLD-RTO: 19.8 FL (ref 11.5–15)
PLATELET # BLD: 191 E9/L (ref 130–450)
PMV BLD AUTO: 9.3 FL (ref 7–12)
POTASSIUM SERPL-SCNC: 3.9 MMOL/L (ref 3.5–5)
RBC # BLD: 4.2 E12/L (ref 3.5–5.5)
SODIUM BLD-SCNC: 141 MMOL/L (ref 132–146)
TOTAL PROTEIN: 6.5 G/DL (ref 6.4–8.3)
WBC # BLD: 5.2 E9/L (ref 4.5–11.5)

## 2021-09-30 PROCEDURE — 6360000002 HC RX W HCPCS: Performed by: INTERNAL MEDICINE

## 2021-09-30 PROCEDURE — G8420 CALC BMI NORM PARAMETERS: HCPCS | Performed by: INTERNAL MEDICINE

## 2021-09-30 PROCEDURE — 96372 THER/PROPH/DIAG INJ SC/IM: CPT

## 2021-09-30 PROCEDURE — 80053 COMPREHEN METABOLIC PANEL: CPT

## 2021-09-30 PROCEDURE — 99213 OFFICE O/P EST LOW 20 MIN: CPT

## 2021-09-30 PROCEDURE — G8427 DOCREV CUR MEDS BY ELIG CLIN: HCPCS | Performed by: INTERNAL MEDICINE

## 2021-09-30 PROCEDURE — 85025 COMPLETE CBC W/AUTO DIFF WBC: CPT

## 2021-09-30 PROCEDURE — 86316 IMMUNOASSAY TUMOR OTHER: CPT

## 2021-09-30 PROCEDURE — 99214 OFFICE O/P EST MOD 30 MIN: CPT | Performed by: INTERNAL MEDICINE

## 2021-09-30 PROCEDURE — 4004F PT TOBACCO SCREEN RCVD TLK: CPT | Performed by: INTERNAL MEDICINE

## 2021-09-30 RX ORDER — LANREOTIDE ACETATE 120 MG/.5ML
120 INJECTION SUBCUTANEOUS ONCE
Status: COMPLETED | OUTPATIENT
Start: 2021-09-30 | End: 2021-09-30

## 2021-09-30 RX ORDER — LANREOTIDE ACETATE 120 MG/.5ML
120 INJECTION SUBCUTANEOUS ONCE
Status: CANCELLED | OUTPATIENT
Start: 2021-10-28 | End: 2021-10-28

## 2021-09-30 RX ADMIN — LANREOTIDE ACETATE 120 MG: 120 INJECTION SUBCUTANEOUS at 15:26

## 2021-09-30 NOTE — PROGRESS NOTES
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 CT abdomen/pelvis 08/28/2020:  2 cm masslike density in the mid abdominal small bowel mesentery with a spiculated appearance. Multiple liver masses suspicious for metastatic disease. 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 Comment: Sections of the liver tissue cores show the hepatic parenchyma to be partially replaced by a proliferation of epithelioid cells with relatively uniform round nuclei and eosinophilic granular cytoplasm.  The   epithelioid cells form anastomosing solid cords/nests that are surrounded by delicate fibrovascular stroma.  There are no mitotic figures or tumor cell necrosis present.  The histologic changes seen are suggestive of well-differentiated neuroendocrine (carcinoid) tumor. Immunostaining for pankeratin, chromogranin, synaptophysin, TTF-1 and Ki-67 was performed on sections of one tissue block (A1) and the neoplastic epithelioid cells show diffuse and strong positivity for neuroendocrine markers (chromogranin and synaptophysin) and moderate positivity for pankeratin.  There is no staining reactivity for TTF-1. The Ki-67 proliferation labeling index is essentially negative, (very low, <1%). This staining pattern confirms the histologic impression of a well-differentiated neuroendocrine (carcinoid) tumor. FL Small bowel 09/02/2020:  Rapid small bowel transit. Serum Chromogranin A 160 on 09/23/2020.   Urine 5-HIAA 21 (0-14)  2d-Echo 10/10/2020: EF 60-65%   Normal right ventricular size and function    Dotatate PET/CT scan 10/14/2020 increased tracer uptake seen throughout the liver compatible with neoplasm. This involves both the left and the right lobe of liver. In addition there are 2 foci of increased tracer uptake along the mesentery of the small bowel. This may involve the wall the small bowel. No other convincing evidence for extra-abdominal metastatic disease. EGD/Colonoscopy 10/05/2020 by Dr. Germaine Green; records reviewed. Hepatobiliary team (Dr. Lesly Bansal) consult appreciated. Small bowel resection on 10/21/2020. Small bowel resection on 10/21/2020. A.  Ileum, resection:   Multifocal (4 foci) neuroendocrine tumor (NET). Extensive perineural and angiolymphatic invasion. 3 of 10 lymph nodes with metastatic neuroendocrine tumor and extracapsular extension of tumor cells (3/10). Bilateral viable small bowel resection margins with no evidence of tumor. Faviola Knight received as \"Meckel's\":   Nodular scar tissue with patchy chronic inflammation. Negative for neuroendocrine tumor. Intestinal mucosal tissue is not present. CASE SUMMARY:   Procedure: Small bowel resection   Tumor site: Ileum   Tumor size: Greatest dimension of 1.5 cm   Tumor focality: Multifocal: 4 separate tumors   Histologic type and grade: G2: Well-differentiated neuroendocrine tumor   Mitotic rate: between 2-20 mitoses/2 mm2   Ki-67 labeling index: between 3-20%   Tumor extension: Tumor invades through the muscularis propria into subserosal tissue without penetration of overlying serosa   Margins:  All margins are uninvolved by tumor    Margins examined: Proximal and distal   Lymphovascular invasion: Present   Perineural invasion: Present   Large mesenteric masses (greater than 2 cm): Present (one 2.5 cm mass)   Regional lymph nodes:    Number of lymph nodes involved: 3    Number of lymph nodes examined: 10   Pathologic stage classification (pTNM, AJCC eighth edition):    pT3    pN2    pM1a -confirmed liver metastasis, [facility]-     Comment:   A. 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 Dose # 1 Lanreotide was on 11/05/2020. Dose # 2 Lanreotide was on 12/03/2020. Dose # 3 Lanreotide was on 01/07/2021. Serum Chromogranin A 95 on 01/07/2021. CT chest 02/07/2021 negative for metastatic disease. CT abdomen/pelvis 02/07/2021 Persistent bilobar hepatic lesions which are grossly stable consistent with stable widespread hepatic metastasis. Imaging reviewed. Continue Lanreotide and repeat scans in 3 months. Dose # 4 Lanreotide was on 02/11/2021. Ga 76 Dotatate PET 03/09/2021 Gallium 68 dotatate avid uptake throughout multiple regions of the liver compatible with multiple foci of neuroendocrine tumor in both the right and the left lobe of the liver, when compared to previous not significantly changed in the interval.  Dose # 5 Lanreotide was on 03/11/2021. Dose # 6 Lanreotide was on 04/08/2021. Serum chromogranin A 115 (0-103)  Dose # 7 Lanreotide was on 05/06/2021. Serum chromogranin A 114 (0-103)  Dose # 8 Lanreotide was on 06/03/2021. Serum chromogranin A  84  (0-103)    Ga 76 Dotatate PET 06/29/2021 Numerous foci of increased Gallium 68 dotatate avid uptake throughout both lobes of the liver, not significantly changed from previous. No significant progression of disease. No definite metastatic disease identified  Dose # 9 Lanreotide was on 07/01/2021. Serum Chromogranin A 97 (0-103)  Dose # 10 Lanreotide was on 07/29/2021. Serum Chromogranin A 89 (0-103)  Dose # 11 Lanreotide was on 08/30/2021. Serum Chromogranin A 120 (0-103)    Today 09/30/2021: No fever, chills. Diarrhea on Imodium, Lomotil, Xermelo and Questran Fair appetite and energy level.  Mild hot flashes/flushing    Review of appearance. Multiple liver masses suspicious for metastatic disease. 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 Comment: Sections of the liver tissue cores show the hepatic parenchyma to be partially replaced by a proliferation of epithelioid cells with relatively uniform round nuclei and eosinophilic granular cytoplasm.  The   epithelioid cells form anastomosing solid cords/nests that are surrounded by delicate fibrovascular stroma.  There are no mitotic figures or tumor cell necrosis present.  The histologic changes seen are suggestive of well-differentiated neuroendocrine (carcinoid) tumor. Immunostaining for pankeratin, chromogranin, synaptophysin, TTF-1 and Ki-67 was performed on sections of one tissue block (A1) and the neoplastic epithelioid cells show diffuse and strong positivity for neuroendocrine markers (chromogranin and synaptophysin) and moderate positivity for pankeratin.    There is no staining reactivity for TTF-1. The Ki-67 proliferation labeling index is essentially negative, (very low, <1%). This staining pattern confirms the histologic impression of a well-differentiated neuroendocrine (carcinoid) tumor. FL Small bowel 09/02/2020:  Rapid small bowel transit. Serum Chromogranin A 160 on 09/23/2020. Urine 5-HIAA 21 (0-14)  2d-Echo 10/10/2020: EF 60-65%   Normal right ventricular size and function    Dotatate PET/CT scan 10/14/2020 increased tracer uptake seen throughout the liver compatible with neoplasm. This involves both the left and the right lobe of liver. In addition there are 2 foci of increased tracer uptake along the mesentery of the small bowel. This may involve the wall the small bowel. No other convincing evidence for extra-abdominal metastatic disease. EGD/Colonoscopy 10/05/2020 by Dr. Malini Diop; records reviewed.     Hepatobiliary team (Dr. Kala Correia) consult appreciated. Small bowel resection on 10/21/2020. A.  Ileum, resection:   Multifocal (4 foci) neuroendocrine tumor (NET). Extensive perineural and angiolymphatic invasion. 3 of 10 lymph nodes with metastatic neuroendocrine tumor and extracapsular extension of tumor cells (3/10). Bilateral viable small bowel resection margins with no evidence of tumor. Leeroy Schneideret received as \"Meckel's\":   Nodular scar tissue with patchy chronic inflammation. Negative for neuroendocrine tumor. Intestinal mucosal tissue is not present. CASE SUMMARY:   Procedure: Small bowel resection   Tumor site: Ileum   Tumor size: Greatest dimension of 1.5 cm   Tumor focality: Multifocal: 4 separate tumors   Histologic type and grade: G2: Well-differentiated neuroendocrine tumor   Mitotic rate: between 2-20 mitoses/2 mm2   Ki-67 labeling index: between 3-20%   Tumor extension: Tumor invades through the muscularis propria into subserosal tissue without penetration of overlying serosa   Margins: All margins are uninvolved by tumor    Margins examined: Proximal and distal   Lymphovascular invasion: Present   Perineural invasion: Present   Large mesenteric masses (greater than 2 cm): Present (one 2.5 cm mass)   Regional lymph nodes:    Number of lymph nodes involved: 3    Number of lymph nodes examined: 10   Pathologic stage classification (pTNM, AJCC eighth edition):    pT3    pN2    pM1a -confirmed liver metastasis, [facility]-     Comment:   A. 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 Dose # 1 Lanreotide was on 11/05/2020. Dose # 2 Lanreotide was on 12/03/2020. Dose # 3 Lanreotide was on 01/07/2021. Serum Chromogranin A 95 on 01/07/2021. CT chest 02/07/2021 negative for metastatic disease.   CT abdomen/pelvis 02/07/2021 Persistent bilobar hepatic lesions which are grossly stable consistent with stable widespread hepatic metastasis. Imaging reviewed. Continue Lanreotide and repeat scans in 3 months. Dose # 4 Lanreotide was on 02/11/2021. Ga 76 Dotatate PET 03/09/2021 Gallium 68 dotatate avid uptake throughout multiple regions of the liver compatible with multiple foci of neuroendocrine tumor in both the right and the left lobe of the liver, when compared to previous not significantly changed in the interval.  Dose # 5 Lanreotide was on 03/11/2021. Dose # 6 Lanreotide was on 04/08/2021. Serum chromogranin A 115 (0-103)  Dose # 7 Lanreotide was on 05/06/2021. Serum chromogranin A 114 (0-103)  Dose # 8 Lanreotide was on 06/03/2021. Serum chromogranin A  84  (0-103)  Ga 76 Dotatate PET 06/29/2021 Numerous foci of increased Gallium 68 dotatate avid uptake throughout both lobes of the liver, not significantly changed from previous. No significant progression of disease. No definite metastatic disease identified  Dose # 9 Lanreotide was on 07/01/2021. Serum Chromogranin A 97 (0-103)  Dose # 10 Lanreotide was on 07/29/2021. Serum Chromogranin A 89 (0-103)  MRI Thoracic/Lumbar Spine 08/27/2021 unremarkable  CT head 08/27/2021 no acute intracranial abnormality  CT chest 08/27/2021 unremarkable. Stable hypodense lesions in liver grossly stable as of the CT of the chest from 02/07/2021  Dose # 11 Lanreotide was on 08/30/2021. Serum Chromogranin A 120 (0-103)  Dose # 12 Lanreotide is today 09/30/2021. Serum Chromogranin A pending  Continue Imodium Lomotil Maralyn Neigh for diarrhea. RTC in 4 weeks for Dose # 13 Lanreotide.  PET to be done in the interim    Tessa Madsen MD   4/82/8011  Board Certified Medical Oncologist

## 2021-10-03 LAB — CHROMOGRANIN A: 141 NG/ML (ref 0–103)

## 2021-10-04 RX ORDER — BUTALBITAL, ACETAMINOPHEN AND CAFFEINE 50; 325; 40 MG/1; MG/1; MG/1
TABLET ORAL
Qty: 30 TABLET | Refills: 1 | Status: SHIPPED
Start: 2021-10-04 | End: 2021-11-18

## 2021-10-12 PROBLEM — C7A.8 NEUROENDOCRINE CANCER (HCC): Status: ACTIVE | Noted: 2021-10-12

## 2021-10-19 ENCOUNTER — OFFICE VISIT (OUTPATIENT)
Dept: NEUROLOGY | Age: 36
End: 2021-10-19
Payer: COMMERCIAL

## 2021-10-19 VITALS
HEART RATE: 106 BPM | BODY MASS INDEX: 23.56 KG/M2 | SYSTOLIC BLOOD PRESSURE: 118 MMHG | WEIGHT: 120 LBS | HEIGHT: 60 IN | OXYGEN SATURATION: 100 % | TEMPERATURE: 98.2 F | DIASTOLIC BLOOD PRESSURE: 78 MMHG

## 2021-10-19 DIAGNOSIS — R56.9 SEIZURES (HCC): Primary | ICD-10-CM

## 2021-10-19 DIAGNOSIS — Z86.69 HX OF MIGRAINE HEADACHES: ICD-10-CM

## 2021-10-19 PROCEDURE — 4004F PT TOBACCO SCREEN RCVD TLK: CPT | Performed by: NURSE PRACTITIONER

## 2021-10-19 PROCEDURE — G8427 DOCREV CUR MEDS BY ELIG CLIN: HCPCS | Performed by: NURSE PRACTITIONER

## 2021-10-19 PROCEDURE — G8484 FLU IMMUNIZE NO ADMIN: HCPCS | Performed by: NURSE PRACTITIONER

## 2021-10-19 PROCEDURE — G8420 CALC BMI NORM PARAMETERS: HCPCS | Performed by: NURSE PRACTITIONER

## 2021-10-19 PROCEDURE — 99213 OFFICE O/P EST LOW 20 MIN: CPT | Performed by: NURSE PRACTITIONER

## 2021-10-19 RX ORDER — PANTOPRAZOLE SODIUM 40 MG/1
TABLET, DELAYED RELEASE ORAL
Qty: 30 TABLET | Refills: 3 | Status: SHIPPED
Start: 2021-10-19 | End: 2022-02-14

## 2021-10-19 NOTE — PROGRESS NOTES
[address] [facility]. Sharyle Pates., M.D., F.A.C.P. Deedee Moser, DNP, APRN, ACNS-BC  Vita Finch. Angy Up, MSN, APRN-FNP-C  Austin Flores, MSN, APRN-FNP-C  DANIELLE Smith, TAMICA Pleitez, MSN, APRN-FNP-C  [address], [facility] [place]  [place], [address]  Phone: [phone]  Fax: [phone]       Yaquelin Dupree is a 29 y.o. right handed female     Patient follows for seizures    Was admitted and seen by a our Neurology service in January 22-29, 2021 for seizure like activity in the setting of acute renal failure requiring dialysis. CTH and EEG were both negative. MRI bran was negative but did show mild nonspecific foci of periventricular and subcortical cerebral white matter T2/FLAIR hyperintensity which may have been migraine related angiopathy and trauma related white matter change vs metastic disease. She was started onTopamax and keppra. She had not yet followed up with OP Neurology. She then presented to ED on 2/7/2021 for seizure like activity, hand spasms and difficulty with speech. She had moments of staring off and being unaware of her surroundings. She noted to having a grand mal seizure back in December 2020. She was diagnosed with neuroendocrine tumor with hepatic mets in September and started chemotherapy injections, which she receives monthly. She had a follow PET scan which did not show any further mets. She is currently on Xermelo. 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 She receives chemo injections once a month and by the third week she will be sick to her stomach and to lie in bed until the next injection. Today she has complaints of having a seizure like episode last weekend and feeling fatigued and sore the day after. She notes that she may have missed a dose but cannot recall.   She had some of her labs come in as insufficient such as her magnesium, potassium etc.  She is c/o of generalized numbness.        No issues with chewing or swallowing  No chest pain or palpitations  No falls, tripping or stumbling  No itching or bruising appreciated    ROS is otherwise negative    Objective:     /78 (Site: Right Upper Arm)   Pulse 106   Temp 98.2 °F (36.8 °C)   Ht 5' (1.524 m)   Wt 120 lb (54.4 kg)   SpO2 100%   BMI 23.44 kg/m²     General appearance: alert, appears stated age, cooperative and in no distress  Head: normocephalic, without obvious abnormality, atraumatic  Eyes: conjunctivae/corneas clear; no drainage  Neck:  supple, symmetrical, trachea midline, scar where thyroid was removed   Lungs: diminished to auscultation bilaterally  Heart: regular rate and rhythm, S1, S2 normal  Abdomen: soft, non-tender; bowel sounds normal  Extremities: normal, atraumatic, no cyanosis or edema  Skin:  color, texture, turgor normal--no rashes or lesions      Mental Status: alert and oriented x 4    Appropriate attention/concentration  Intact fundus of knowledge  Repetition intact  Memories intact    Speech: no dysarthria  Language: no aphasias---reading, writing, repetition, and object identification intact    Cranial Nerves:  I: smell    II: visual acuity     II: visual fields Full    II: pupils    III,VII: ptosis None   III,IV,VI: extraocular muscles  EOMI without nystagmus   V: mastication Normal   V: facial light touch sensation  Normal   V,VII: corneal reflex     VII: facial muscle function - upper  Normal   VII: facial muscle function - lower Normal   VIII: hearing Normal   IX: soft palate elevation  Normal   IX,X: gag reflex    XI: trapezius strength  5/5   XI: sternocleidomastoid strength 5/5   XI: neck extension strength  5/5   XII: tongue strength  Normal     Motor:  5/5 throughout  Normal bulk and tone  No drift   No abnormal movements    Sensory:  LT normal    Coordination:   FN, FFM and ERNST normal  HS normal    Gait:  Normal    DTR:   2 + throughout Laboratory/Radiology:  ry/Radiology:     CBC with Differential:    Lab Results   Component Value Date    WBC 5.2 09/30/2021    RBC 4.20 09/30/2021    HGB 11.7 09/30/2021    HCT 36.0 09/30/2021     09/30/2021    MCV 85.7 09/30/2021    MCH 27.9 09/30/2021    MCHC 32.5 09/30/2021    RDW 19.8 09/30/2021    NRBC 0.0 08/27/2021    METASPCT 1.8 05/06/2021    LYMPHOPCT 37.5 09/30/2021    MONOPCT 14.1 09/30/2021    MYELOPCT 3.5 05/06/2021    BASOPCT 1.5 09/30/2021    MONOSABS 0.73 09/30/2021    LYMPHSABS 1.94 09/30/2021    EOSABS 0.16 09/30/2021    BASOSABS 0.08 09/30/2021     CMP:    Lab Results   Component Value Date     09/30/2021    K 3.9 09/30/2021    K 3.3 08/27/2021     09/30/2021    CO2 19 09/30/2021    BUN 8 09/30/2021    CREATININE 0.7 09/30/2021    GFRAA >60 09/30/2021    LABGLOM >60 09/30/2021    GLUCOSE 113 09/30/2021    PROT 6.5 09/30/2021    LABALBU 4.3 09/30/2021    CALCIUM 9.0 09/30/2021    BILITOT 0.3 09/30/2021    ALKPHOS 198 09/30/2021    AST 42 09/30/2021    ALT 41 09/30/2021     Hepatic Function Panel:    Lab Results   Component Value Date    ALKPHOS 198 09/30/2021    ALT 41 09/30/2021    AST 42 09/30/2021    PROT 6.5 09/30/2021    BILITOT 0.3 09/30/2021    BILIDIR <0.2 08/26/2021    IBILI see below 08/26/2021    LABALBU 4.3 09/30/2021     All labs were personally reviewed at the time of this visit    Assessment:     Seizure disorder vs nonepileptic events  --- history of neuroendocrine tumor with hepatic mets  --- EEG and MRI were unremarkable  --- Hypocalcemia can cause similar cramping and spasms  --- history of atypical migraines  --- breakthrough seizure due to missed dose   --- neuro exam unremarkable     History of migraines  --- no reports of headaches or severe migraines   --- has been Fioricet, phenergan in the past   --- taking topamax     Plan:     Continue Topamax to 75 mg BID    Continue Keppra 750 mg BID    Follow up in 1 year     Call if any seizure like episodes occur or with any questions or concerns      TRINA Fallon - CNP  9:11 AM  10/19/2021

## 2021-10-25 ENCOUNTER — OFFICE VISIT (OUTPATIENT)
Dept: PALLATIVE CARE | Age: 36
End: 2021-10-25
Payer: COMMERCIAL

## 2021-10-25 VITALS
SYSTOLIC BLOOD PRESSURE: 123 MMHG | WEIGHT: 124 LBS | HEART RATE: 87 BPM | OXYGEN SATURATION: 100 % | DIASTOLIC BLOOD PRESSURE: 80 MMHG | BODY MASS INDEX: 24.22 KG/M2

## 2021-10-25 DIAGNOSIS — G89.3 NEOPLASM RELATED PAIN: ICD-10-CM

## 2021-10-25 DIAGNOSIS — Z51.5 PALLIATIVE CARE BY SPECIALIST: ICD-10-CM

## 2021-10-25 PROCEDURE — G8484 FLU IMMUNIZE NO ADMIN: HCPCS | Performed by: NURSE PRACTITIONER

## 2021-10-25 PROCEDURE — G8420 CALC BMI NORM PARAMETERS: HCPCS | Performed by: NURSE PRACTITIONER

## 2021-10-25 PROCEDURE — G8428 CUR MEDS NOT DOCUMENT: HCPCS | Performed by: NURSE PRACTITIONER

## 2021-10-25 PROCEDURE — 99213 OFFICE O/P EST LOW 20 MIN: CPT | Performed by: NURSE PRACTITIONER

## 2021-10-25 PROCEDURE — 4004F PT TOBACCO SCREEN RCVD TLK: CPT | Performed by: NURSE PRACTITIONER

## 2021-10-25 PROCEDURE — 99212 OFFICE O/P EST SF 10 MIN: CPT | Performed by: NURSE PRACTITIONER

## 2021-10-25 RX ORDER — GABAPENTIN 300 MG/1
300 CAPSULE ORAL 3 TIMES DAILY
Qty: 270 CAPSULE | Refills: 0 | Status: SHIPPED
Start: 2021-10-25 | End: 2022-01-10

## 2021-10-25 RX ORDER — TRAMADOL HYDROCHLORIDE 50 MG/1
50 TABLET ORAL EVERY 6 HOURS PRN
Qty: 56 TABLET | Refills: 1 | Status: SHIPPED
Start: 2021-10-25 | End: 2021-11-16 | Stop reason: SDUPTHER

## 2021-10-25 NOTE — PROGRESS NOTES
Department of Palliative Medicine  Ambulatory Note  Provider: TRINA Crandall - CNP     Chief Complaint: Lenny Scherer is a 29 y.o. female with chief complaint of pain    HPI:  Lenny Scherer is a 29 y.o. female with significant medical history of hypothyroidism, IBS, migraine who was complaining of abdominal pain associated with diarrhea and flushing/sweating. She was diagnosed with metastatic well differentiated Neuroendocrine cancer to liver who was referred to [address] [place] [address] by Dr. Claudius Hammans. Assessment/Plan      Neuroendocrine cancer  - Follows with Dr. Michaelle driscoll Bowel resection on 10/21/20  - On Lanreotide     Neoplasm related pain  - Gabapentin 300mg TID  - Tramadol 50mg q 6 hrs prn for the pain, using 1 day    -  Did not tolerate zanaflex    Nausea  - Continue Zofran and Phenergan PRN    Diarrhea:   -Currently on Lanreotide and Xermelo  -Imodium she uses this daily  -Cholestyramine not tolerated due to nausea vomiting  -Trial colestipol 1 gm BID, not sure if helping will hold for a week to see if any change  -Add 1-3 jumbo marshmallows daily    - Goals of care: cure, live longer and improve or maintain function/quality of life    - Code Status: full code    Follow Up:  4 weeks. Encouraged to call with any questions, concerns, needs, or changes in symptoms. Subjective:     Jeanette Bull presents today, is alert and oriented, and appears to be doing well. She continues to have abdominal pain, rated as a 5-7 on a scale 10, as well as complains of some back pain which she feels like has primarily been aggravated as she has been helping care for family members who have recently been ill. She did try Zanaflex for this, but she found this to be too sedating. She does continue with tramadol which she uses 1-2 time per day, as well as gabapentin 300 mg 3 times daily, this regimen is helpful.   She continues have diarrhea, but states that this has not significantly changed since her last encounter, she is uncertain if the colestipol has provided any benefit. We discussed options, since overall her diarrhea has improved, she instructed that she may hold the colestipol, and if her diarrhea worsens she may restart colestipol, otherwise if there is no change after stopping she may remain off colestipol. Otherwise we will make further changes to her plan of care, will have her return in approximately 2 months to reassess her needs.     Pain Assessment   Ratin  Description: burning, sharp and shooting  Duration: minutes  Frequency: daily  Location: Abdomen   Alleviating Factors: relaxation  Exacerbating Factors: unable to associate with any factor  Effect: change in function and sleep    Objective:     Physical Exam  Wt Readings from Last 3 Encounters:   10/25/21 124 lb (56.2 kg)   10/19/21 120 lb (54.4 kg)   21 122 lb 11.2 oz (55.7 kg)     /80   Pulse 87   Wt 124 lb (56.2 kg)   SpO2 100%   BMI 24.22 kg/m²     Gen:  Alert, appears stated age, well nourished, in no acute distress  HEENT:  Normocephalic, conjunctiva pink, no drainage, mucosa moist  Neck:  Supple  Lungs:  CTA bilaterally, no audible rhonchi or wheezes noted  Heart[de-identified]  RRR, no murmur, rub, or gallop noted during exam  Abd:  Soft, non tender, non distended, BS+  M/S/Ext:  Moving all extremities, no edema, pulses present  Skin:  Warm and dry  Neuro:  PERRL, Alert, oriented x 3; following commands      [place] Symptom Assessment Score   [place] Score 10/25/2021 2021 2021 6/15/2021 5/10/2021   Pain Score 7 5 4 4 4   Tiredness Score 6 6 5 4 6   Nausea Score 2 4 3 Not nauseated 2   Depression Score 5 Not depressed Not depressed Not depressed 1   Anxiety Score 5 2 2 1 3   Drowsiness Score 2 4 3 1 2   Appetite Score 3 4 5 5 5   Wellbeing Score 3 4 5 5 5   Dyspnea Score 3 4 5 5 6   Other Problem Score Best possible response Best possible response Best possible response Best possible response Best possible response   Total Assessment Score(calculated) 36 33 32 25 34     Assessed by: patient. Current Medications:  Medications reviewed: yes    Controlled Substances Monitoring: [place] reviewed 10/25/21. TRINA Flower CNP   Palliative Care Department     Time/Communication  Greater than 50% of time spent, total 25 minutes in face-to-face counseling and coordination of care regarding symptom management. Note: This report was completed using computerize voiced recognition software. Every effort has been made to ensure accuracy; however, inadvertent computerized transcription errors may be present.

## 2021-10-26 DIAGNOSIS — C7B.8 METASTATIC MALIGNANT NEUROENDOCRINE TUMOR TO LIVER (HCC): ICD-10-CM

## 2021-10-27 NOTE — TELEPHONE ENCOUNTER
Last Appointment:  9/30/2021  Future Appointments   Date Time Provider Raoul Meyer   11/2/2021  3:00 PM [facility] PET ROOM [facility]   11/4/2021  2:15 PM Radha Maldonado MD MED ONC [facility]   11/4/2021  3:00 PM AMADA MED ONC CHAIR 21 [facility]   12/20/2021  1:00 PM SCHEDULE, [facility] PALLIATIVE CARE PROVIDER [address]

## 2021-10-28 ENCOUNTER — HOSPITAL ENCOUNTER (OUTPATIENT)
Dept: INFUSION THERAPY | Age: 36
End: 2021-10-28

## 2021-10-28 RX ORDER — FLUTICASONE PROPIONATE 50 MCG
SPRAY, SUSPENSION (ML) NASAL
Qty: 16 G | Refills: 1 | Status: SHIPPED
Start: 2021-10-28 | End: 2021-12-23

## 2021-10-28 RX ORDER — POTASSIUM CHLORIDE 20 MEQ/1
TABLET, EXTENDED RELEASE ORAL
Qty: 30 TABLET | Refills: 0 | Status: SHIPPED
Start: 2021-10-28 | End: 2021-12-08

## 2021-11-04 ENCOUNTER — HOSPITAL ENCOUNTER (OUTPATIENT)
Dept: INFUSION THERAPY | Age: 36
End: 2021-11-04

## 2021-11-04 RX ORDER — TOPIRAMATE 25 MG/1
CAPSULE, COATED PELLETS ORAL
Qty: 30 CAPSULE | Refills: 2 | Status: SHIPPED
Start: 2021-11-04 | End: 2022-02-15

## 2021-11-05 DIAGNOSIS — F51.01 PRIMARY INSOMNIA: ICD-10-CM

## 2021-11-05 RX ORDER — ZOLPIDEM TARTRATE 10 MG/1
TABLET ORAL
Qty: 30 TABLET | Refills: 2 | Status: SHIPPED
Start: 2021-11-05 | End: 2022-02-07

## 2021-11-08 RX ORDER — CALCIUM CARBONATE/VITAMIN D3 600 MG-10
TABLET ORAL
Qty: 31 TABLET | Refills: 2 | Status: SHIPPED
Start: 2021-11-08 | End: 2022-02-07 | Stop reason: SDUPTHER

## 2021-11-08 NOTE — TELEPHONE ENCOUNTER
Last Appointment:  2/15/2021  Future Appointments   Date Time Provider Raoul Meyer   11/9/2021  3:00 PM [facility] PET ROOM SEYZ [facility]   11/11/2021  2:00 PM Judit Middleton MD MED ONC [facility]   11/11/2021  2:45 PM SEYZ MED ONC FAST TRACK 2 SEYZ Med Onc [facility]   12/20/2021  1:00 PM SCHEDULE, [facility] PALLIATIVE CARE PROVIDER [address]

## 2021-11-09 ENCOUNTER — HOSPITAL ENCOUNTER (OUTPATIENT)
Dept: NUCLEAR MEDICINE | Age: 36
Discharge: HOME | End: 2021-11-11
Payer: COMMERCIAL

## 2021-11-09 DIAGNOSIS — C7B.8 METASTATIC MALIGNANT NEUROENDOCRINE TUMOR TO LIVER (HCC): ICD-10-CM

## 2021-11-09 PROCEDURE — 78815 PET IMAGE W/CT SKULL-THIGH: CPT | Performed by: RADIOLOGY

## 2021-11-09 PROCEDURE — 78815 PET IMAGE W/CT SKULL-THIGH: CPT

## 2021-11-09 PROCEDURE — 3430000000 HC RX DIAGNOSTIC RADIOPHARMACEUTICAL: Performed by: RADIOLOGY

## 2021-11-09 PROCEDURE — A9587 GALLIUM GA-68: HCPCS | Performed by: RADIOLOGY

## 2021-11-09 RX ORDER — 68GA-DOTATATE 40 MCG
3.19 KIT INTRAVENOUS ONCE
Status: DISCONTINUED | OUTPATIENT
Start: 2021-11-09 | End: 2021-11-12 | Stop reason: HOSPADM

## 2021-11-09 RX ORDER — 68GA-DOTATATE 40 MCG
3 KIT INTRAVENOUS ONCE
Status: COMPLETED | OUTPATIENT
Start: 2021-11-09 | End: 2021-11-09

## 2021-11-09 RX ADMIN — 68GA-DOTATATE 3 MILLICURIE: KIT INTRAVENOUS at 15:01

## 2021-11-11 ENCOUNTER — HOSPITAL ENCOUNTER (OUTPATIENT)
Dept: INFUSION THERAPY | Age: 36
Discharge: HOME | End: 2021-11-11
Payer: COMMERCIAL

## 2021-11-11 ENCOUNTER — OFFICE VISIT (OUTPATIENT)
Dept: ONCOLOGY | Age: 36
End: 2021-11-11
Payer: COMMERCIAL

## 2021-11-11 VITALS
TEMPERATURE: 97.9 F | DIASTOLIC BLOOD PRESSURE: 64 MMHG | SYSTOLIC BLOOD PRESSURE: 124 MMHG | BODY MASS INDEX: 23.71 KG/M2 | OXYGEN SATURATION: 99 % | WEIGHT: 120.8 LBS | HEIGHT: 60 IN | HEART RATE: 90 BPM

## 2021-11-11 DIAGNOSIS — C7B.8 METASTATIC MALIGNANT NEUROENDOCRINE TUMOR TO LIVER (HCC): Primary | ICD-10-CM

## 2021-11-11 LAB
ALBUMIN SERPL-MCNC: 4.3 G/DL (ref 3.5–5.2)
ALP BLD-CCNC: 303 U/L (ref 35–104)
ALT SERPL-CCNC: 47 U/L (ref 0–32)
ANION GAP SERPL CALCULATED.3IONS-SCNC: 15 MMOL/L (ref 7–16)
AST SERPL-CCNC: 32 U/L (ref 0–31)
BACTERIA: ABNORMAL /HPF
BASOPHILS ABSOLUTE: 0.09 E9/L (ref 0–0.2)
BASOPHILS RELATIVE PERCENT: 0.6 % (ref 0–2)
BILIRUB SERPL-MCNC: 0.3 MG/DL (ref 0–1.2)
BILIRUBIN URINE: NEGATIVE
BLOOD, URINE: ABNORMAL
BUN BLDV-MCNC: 7 MG/DL (ref 6–20)
CALCIUM SERPL-MCNC: 9.2 MG/DL (ref 8.6–10.2)
CHLORIDE BLD-SCNC: 103 MMOL/L (ref 98–107)
CLARITY: CLEAR
CO2: 18 MMOL/L (ref 22–29)
COLOR: YELLOW
CREAT SERPL-MCNC: 0.6 MG/DL (ref 0.5–1)
EOSINOPHILS ABSOLUTE: 0.08 E9/L (ref 0.05–0.5)
EOSINOPHILS RELATIVE PERCENT: 0.5 % (ref 0–6)
GFR AFRICAN AMERICAN: >60
GFR NON-AFRICAN AMERICAN: >60 ML/MIN/1.73
GLUCOSE BLD-MCNC: 151 MG/DL (ref 74–99)
GLUCOSE URINE: NEGATIVE MG/DL
HCT VFR BLD CALC: 40.6 % (ref 34–48)
HEMOGLOBIN: 13 G/DL (ref 11.5–15.5)
IMMATURE GRANULOCYTES #: 0.09 E9/L
IMMATURE GRANULOCYTES %: 0.6 % (ref 0–5)
KETONES, URINE: NEGATIVE MG/DL
LEUKOCYTE ESTERASE, URINE: ABNORMAL
LYMPHOCYTES ABSOLUTE: 1.07 E9/L (ref 1.5–4)
LYMPHOCYTES RELATIVE PERCENT: 6.8 % (ref 20–42)
MCH RBC QN AUTO: 29.3 PG (ref 26–35)
MCHC RBC AUTO-ENTMCNC: 32 % (ref 32–34.5)
MCV RBC AUTO: 91.4 FL (ref 80–99.9)
MONOCYTES ABSOLUTE: 0.97 E9/L (ref 0.1–0.95)
MONOCYTES RELATIVE PERCENT: 6.1 % (ref 2–12)
NEUTROPHILS ABSOLUTE: 13.49 E9/L (ref 1.8–7.3)
NEUTROPHILS RELATIVE PERCENT: 85.4 % (ref 43–80)
NITRITE, URINE: NEGATIVE
PDW BLD-RTO: 20 FL (ref 11.5–15)
PH UA: 7 (ref 5–9)
PLATELET # BLD: 325 E9/L (ref 130–450)
PMV BLD AUTO: 10.7 FL (ref 7–12)
POTASSIUM SERPL-SCNC: 4.2 MMOL/L (ref 3.5–5)
PROTEIN UA: ABNORMAL MG/DL
RBC # BLD: 4.44 E12/L (ref 3.5–5.5)
RBC UA: ABNORMAL /HPF (ref 0–2)
SODIUM BLD-SCNC: 136 MMOL/L (ref 132–146)
SPECIFIC GRAVITY UA: 1.01 (ref 1–1.03)
TOTAL PROTEIN: 7.3 G/DL (ref 6.4–8.3)
UROBILINOGEN, URINE: 0.2 E.U./DL
WBC # BLD: 15.8 E9/L (ref 4.5–11.5)
WBC UA: ABNORMAL /HPF (ref 0–5)

## 2021-11-11 PROCEDURE — G8420 CALC BMI NORM PARAMETERS: HCPCS | Performed by: INTERNAL MEDICINE

## 2021-11-11 PROCEDURE — 6360000002 HC RX W HCPCS: Performed by: INTERNAL MEDICINE

## 2021-11-11 PROCEDURE — 4004F PT TOBACCO SCREEN RCVD TLK: CPT | Performed by: INTERNAL MEDICINE

## 2021-11-11 PROCEDURE — 99212 OFFICE O/P EST SF 10 MIN: CPT

## 2021-11-11 PROCEDURE — 81001 URINALYSIS AUTO W/SCOPE: CPT

## 2021-11-11 PROCEDURE — 96372 THER/PROPH/DIAG INJ SC/IM: CPT

## 2021-11-11 PROCEDURE — 87186 SC STD MICRODIL/AGAR DIL: CPT

## 2021-11-11 PROCEDURE — 85025 COMPLETE CBC W/AUTO DIFF WBC: CPT

## 2021-11-11 PROCEDURE — G8427 DOCREV CUR MEDS BY ELIG CLIN: HCPCS | Performed by: INTERNAL MEDICINE

## 2021-11-11 PROCEDURE — 87088 URINE BACTERIA CULTURE: CPT

## 2021-11-11 PROCEDURE — G8484 FLU IMMUNIZE NO ADMIN: HCPCS | Performed by: INTERNAL MEDICINE

## 2021-11-11 PROCEDURE — 86316 IMMUNOASSAY TUMOR OTHER: CPT

## 2021-11-11 PROCEDURE — 80053 COMPREHEN METABOLIC PANEL: CPT

## 2021-11-11 PROCEDURE — 99214 OFFICE O/P EST MOD 30 MIN: CPT | Performed by: INTERNAL MEDICINE

## 2021-11-11 PROCEDURE — 36415 COLL VENOUS BLD VENIPUNCTURE: CPT

## 2021-11-11 RX ORDER — LANREOTIDE ACETATE 120 MG/.5ML
120 INJECTION SUBCUTANEOUS ONCE
Status: CANCELLED | OUTPATIENT
Start: 2021-11-25 | End: 2021-11-25

## 2021-11-11 RX ORDER — LANREOTIDE ACETATE 120 MG/.5ML
120 INJECTION SUBCUTANEOUS ONCE
Status: COMPLETED | OUTPATIENT
Start: 2021-11-11 | End: 2021-11-11

## 2021-11-11 RX ORDER — CIPROFLOXACIN 250 MG/1
250 TABLET, FILM COATED ORAL 2 TIMES DAILY
Qty: 14 TABLET | Refills: 0 | Status: SHIPPED | OUTPATIENT
Start: 2021-11-11 | End: 2021-11-18

## 2021-11-11 RX ADMIN — LANREOTIDE ACETATE 120 MG: 120 INJECTION SUBCUTANEOUS at 14:46

## 2021-11-11 NOTE — PROGRESS NOTES
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 CT abdomen/pelvis 08/28/2020:  2 cm masslike density in the mid abdominal small bowel mesentery with a spiculated appearance. Multiple liver masses suspicious for metastatic disease. 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 Comment: Sections of the liver tissue cores show the hepatic parenchyma to be partially replaced by a proliferation of epithelioid cells with relatively uniform round nuclei and eosinophilic granular cytoplasm.  The   epithelioid cells form anastomosing solid cords/nests that are surrounded by delicate fibrovascular stroma.  There are no mitotic figures or tumor cell necrosis present.  The histologic changes seen are suggestive of well-differentiated neuroendocrine (carcinoid) tumor. Immunostaining for pankeratin, chromogranin, synaptophysin, TTF-1 and Ki-67 was performed on sections of one tissue block (A1) and the neoplastic epithelioid cells show diffuse and strong positivity for neuroendocrine markers (chromogranin and synaptophysin) and moderate positivity for pankeratin.  There is no staining reactivity for TTF-1. The Ki-67 proliferation labeling index is essentially negative, (very low, <1%). This staining pattern confirms the histologic impression of a well-differentiated neuroendocrine (carcinoid) tumor. FL Small bowel 09/02/2020:  Rapid small bowel transit. Serum Chromogranin A 160 on 09/23/2020.   Urine 5-HIAA 21 (0-14)  2d-Echo 10/10/2020: EF 60-65%   Normal right ventricular size and function    Dotatate PET/CT scan 10/14/2020 increased tracer uptake seen throughout the liver compatible with neoplasm. This involves both the left and the right lobe of liver. In addition there are 2 foci of increased tracer uptake along the mesentery of the small bowel. This may involve the wall the small bowel. No other convincing evidence for extra-abdominal metastatic disease. EGD/Colonoscopy 10/05/2020 by Dr. Miguelito Burr; records reviewed. Hepatobiliary team (Dr. Hal Abarca) consult appreciated. Small bowel resection on 10/21/2020. Small bowel resection on 10/21/2020. A.  Ileum, resection:   Multifocal (4 foci) neuroendocrine tumor (NET). Extensive perineural and angiolymphatic invasion. 3 of 10 lymph nodes with metastatic neuroendocrine tumor and extracapsular extension of tumor cells (3/10). Bilateral viable small bowel resection margins with no evidence of tumor. Alpha Lyle received as \"Meckel's\":   Nodular scar tissue with patchy chronic inflammation. Negative for neuroendocrine tumor. Intestinal mucosal tissue is not present. CASE SUMMARY:   Procedure: Small bowel resection   Tumor site: Ileum   Tumor size: Greatest dimension of 1.5 cm   Tumor focality: Multifocal: 4 separate tumors   Histologic type and grade: G2: Well-differentiated neuroendocrine tumor   Mitotic rate: between 2-20 mitoses/2 mm2   Ki-67 labeling index: between 3-20%   Tumor extension: Tumor invades through the muscularis propria into subserosal tissue without penetration of overlying serosa   Margins:  All margins are uninvolved by tumor    Margins examined: Proximal and distal   Lymphovascular invasion: Present   Perineural invasion: Present   Large mesenteric masses (greater than 2 cm): Present (one 2.5 cm mass)   Regional lymph nodes:    Number of lymph nodes involved: 3    Number of lymph nodes examined: 10   Pathologic stage classification (pTNM, AJCC eighth edition):    pT3    pN2    pM1a -confirmed liver metastasis, [facility]-     Comment:   A. 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 Dose # 1 Lanreotide was on 11/05/2020. Dose # 2 Lanreotide was on 12/03/2020. Dose # 3 Lanreotide was on 01/07/2021. Serum Chromogranin A 95 on 01/07/2021. CT chest 02/07/2021 negative for metastatic disease. CT abdomen/pelvis 02/07/2021 Persistent bilobar hepatic lesions which are grossly stable consistent with stable widespread hepatic metastasis. Imaging reviewed. Continue Lanreotide and repeat scans in 3 months. Dose # 4 Lanreotide was on 02/11/2021. Ga 76 Dotatate PET 03/09/2021 Gallium 68 dotatate avid uptake throughout multiple regions of the liver compatible with multiple foci of neuroendocrine tumor in both the right and the left lobe of the liver, when compared to previous not significantly changed in the interval.  Dose # 5 Lanreotide was on 03/11/2021. Dose # 6 Lanreotide was on 04/08/2021. Serum chromogranin A 115 (0-103)  Dose # 7 Lanreotide was on 05/06/2021. Serum chromogranin A 114 (0-103)  Dose # 8 Lanreotide was on 06/03/2021. Serum chromogranin A  84  (0-103)    Ga 76 Dotatate PET 06/29/2021 Numerous foci of increased Gallium 68 dotatate avid uptake throughout both lobes of the liver, not significantly changed from previous. No significant progression of disease. No definite metastatic disease identified  Dose # 9 Lanreotide was on 07/01/2021. Serum Chromogranin A 97 (0-103)  Dose # 10 Lanreotide was on 07/29/2021. Serum Chromogranin A 89 (0-103)  Dose # 11 Lanreotide was on 08/30/2021. Serum Chromogranin A 120 (0-103)  Dose # 12 Lanreotide was on 09/30/2021.  Serum Chromogranin A 141 (0-103)  PET/CT scan 11/09/2021 There is significant gallium avid tracer uptake in the liver, numerous lesions are seen, tracer uptake overall is diminished. There is no convincing extrahepatic disease identified. Today 11/11/2021: No fever, chills. Diarrhea on Imodium, Lomotil, Xermelo and Questran Mild hot flashes/flushing. She complains of dysuria hematuria    Review of Systems;  CONSTITUTIONAL: No fever. Mild hot flashes/flushing. ENMT: Eyes: No diplopia; Nose: No epistaxis. Mouth: No sore throat. RESPIRATORY: No hemoptysis, shortness of breath, cough. CARDIOVASCULAR: No chest pain, palpitations. GASTROINTESTINAL: Diarrhea on Imodium, Lomotil, Xermelo and Questran  GENITOURINARY: +dysuria hematuria  NEURO: No syncope, presyncope, headache. Remainder:ROS NEGATIVE    Past Medical History:      Diagnosis Date    Abdominal pain     Cancer ([address]     neuroendocrime tumor    Diarrhea     Hypocalcemia     Hypothyroidism     Irritable bowel syndrome     Migraines     Seizures ([address]     last episode January 2021    Status post alcohol detoxification     5/22/2018-5/29/2018     Medications:  Reviewed and reconciled. Allergies:  No Known Allergies    Physical Exam:  /64   Pulse 90   Temp 97.9 °F (36.6 °C)   Ht 5' (1.524 m)   Wt 120 lb 12.8 oz (54.8 kg)   SpO2 99%   BMI 23.59 kg/m²   GENERAL: Alert, oriented x 3, not in distress  LUNGS: CTA Med  CVS: RRR  HEENT: PERRLA; EOMI. Oropharynx clear. EXTREMITIES: Without clubbing, cyanosis, or edema.    ECOG PS 1    Lab Results   Component Value Date    WBC 5.2 09/30/2021    HGB 11.7 09/30/2021    HCT 36.0 09/30/2021    MCV 85.7 09/30/2021     09/30/2021     Lab Results   Component Value Date     09/30/2021    K 3.9 09/30/2021     (H) 09/30/2021    CO2 19 (L) 09/30/2021    BUN 8 09/30/2021    CREATININE 0.7 09/30/2021    GLUCOSE 113 (H) 09/30/2021    CALCIUM 9.0 09/30/2021    PROT 6.5 09/30/2021    LABALBU 4.3 09/30/2021    BILITOT 0.3 09/30/2021    ALKPHOS 198 (H) 09/30/2021    AST 42 (H) 09/30/2021    ALT 41 (H) 09/30/2021    LABGLOM >60 09/30/2021    GFRAA >60 09/30/2021     Impression/Plan:  28 y/o female with 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 Multiple liver masses suspicious for metastatic disease. 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 Comment: Sections of the liver tissue cores show the hepatic parenchyma to be partially replaced by a proliferation of epithelioid cells with relatively uniform round nuclei and eosinophilic granular cytoplasm.  The   epithelioid cells form anastomosing solid cords/nests that are surrounded by delicate fibrovascular stroma.  There are no mitotic figures or tumor cell necrosis present.  The histologic changes seen are suggestive of well-differentiated neuroendocrine (carcinoid) tumor. Immunostaining for pankeratin, chromogranin, synaptophysin, TTF-1 and Ki-67 was performed on sections of one tissue block (A1) and the neoplastic epithelioid cells show diffuse and strong positivity for neuroendocrine markers (chromogranin and synaptophysin) and moderate positivity for pankeratin.    There is no staining reactivity for TTF-1. The Ki-67 proliferation labeling index is essentially negative, (very low, <1%). This staining pattern confirms the histologic impression of a well-differentiated neuroendocrine (carcinoid) tumor. FL Small bowel 09/02/2020:  Rapid small bowel transit. Serum Chromogranin A 160 on 09/23/2020. Urine 5-HIAA 21 (0-14)  2d-Echo 10/10/2020: EF 60-65%   Normal right ventricular size and function    Dotatate PET/CT scan 10/14/2020 increased tracer uptake seen throughout the liver compatible with neoplasm. This involves both the left and the right lobe of liver.    In addition there are 2 foci of increased tracer uptake along the mesentery of the small bowel. This may involve the wall the small bowel. No other convincing evidence for extra-abdominal metastatic disease. EGD/Colonoscopy 10/05/2020 by Dr. Ivone Zavala; records reviewed. Hepatobiliary team (Dr. Kaitlynn Reina) consult appreciated. Small bowel resection on 10/21/2020. A.  Ileum, resection:   Multifocal (4 foci) neuroendocrine tumor (NET). Extensive perineural and angiolymphatic invasion. 3 of 10 lymph nodes with metastatic neuroendocrine tumor and extracapsular extension of tumor cells (3/10). Bilateral viable small bowel resection margins with no evidence of tumor. Michelle De Souza received as \"Meckel's\":   Nodular scar tissue with patchy chronic inflammation. Negative for neuroendocrine tumor. Intestinal mucosal tissue is not present. CASE SUMMARY:   Procedure: Small bowel resection   Tumor site: Ileum   Tumor size: Greatest dimension of 1.5 cm   Tumor focality: Multifocal: 4 separate tumors   Histologic type and grade: G2: Well-differentiated neuroendocrine tumor   Mitotic rate: between 2-20 mitoses/2 mm2   Ki-67 labeling index: between 3-20%   Tumor extension: Tumor invades through the muscularis propria into subserosal tissue without penetration of overlying serosa   Margins: All margins are uninvolved by tumor    Margins examined: Proximal and distal   Lymphovascular invasion: Present   Perineural invasion: Present   Large mesenteric masses (greater than 2 cm): Present (one 2.5 cm mass)   Regional lymph nodes:    Number of lymph nodes involved: 3    Number of lymph nodes examined: 10   Pathologic stage classification (pTNM, AJCC eighth edition):    pT3    pN2    pM1a -confirmed liver metastasis, [facility]-     Comment:   A.  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 Dose # 1 Lanreotide was on 11/05/2020. Dose # 2 Lanreotide was on 12/03/2020. Dose # 3 Lanreotide was on 01/07/2021. Serum Chromogranin A 95 on 01/07/2021. CT chest 02/07/2021 negative for metastatic disease. CT abdomen/pelvis 02/07/2021 Persistent bilobar hepatic lesions which are grossly stable consistent with stable widespread hepatic metastasis. Imaging reviewed. Continue Lanreotide and repeat scans in 3 months. Dose # 4 Lanreotide was on 02/11/2021. Ga 76 Dotatate PET 03/09/2021 Gallium 68 dotatate avid uptake throughout multiple regions of the liver compatible with multiple foci of neuroendocrine tumor in both the right and the left lobe of the liver, when compared to previous not significantly changed in the interval.  Dose # 5 Lanreotide was on 03/11/2021. Dose # 6 Lanreotide was on 04/08/2021. Serum chromogranin A 115 (0-103)  Dose # 7 Lanreotide was on 05/06/2021. Serum chromogranin A 114 (0-103)  Dose # 8 Lanreotide was on 06/03/2021. Serum chromogranin A  84  (0-103)  Ga 76 Dotatate PET 06/29/2021 Numerous foci of increased Gallium 68 dotatate avid uptake throughout both lobes of the liver, not significantly changed from previous. No significant progression of disease. No definite metastatic disease identified  Dose # 9 Lanreotide was on 07/01/2021. Serum Chromogranin A 97 (0-103)  Dose # 10 Lanreotide was on 07/29/2021. Serum Chromogranin A 89 (0-103)  MRI Thoracic/Lumbar Spine 08/27/2021 unremarkable  CT head 08/27/2021 no acute intracranial abnormality  CT chest 08/27/2021 unremarkable. Stable hypodense lesions in liver grossly stable as of the CT of the chest from 02/07/2021  Dose # 11 Lanreotide was on 08/30/2021. Serum Chromogranin A 120 (0-103)  Dose # 12 Lanreotide was on 09/30/2021.  Serum Chromogranin A 141 (0-103)  PET/CT scan 11/09/2021 There is significant gallium avid tracer uptake in the liver, numerous lesions are seen, tracer uptake overall is diminished. There is no convincing extrahepatic disease identified. Imaging reviewed. Continue Lanreotide and repeat scans in 3 months. Dose # 13 Lanreotide is today 11/11/2021. Serum chromogranin A pending. Continue Imodium Lomotil [place] for diarrhea. Dysuria hematuria; U/A and urine cx ordered. RTC in 4 weeks for Dose # 14 Lanreotide.     Kvein Vallejo MD   [id number]  Board Certified Medical Oncologist

## 2021-11-13 LAB
ORGANISM: ABNORMAL
URINE CULTURE, ROUTINE: ABNORMAL

## 2021-11-16 DIAGNOSIS — G89.3 NEOPLASM RELATED PAIN: ICD-10-CM

## 2021-11-16 LAB — CHROMOGRANIN A: 105 NG/ML (ref 0–103)

## 2021-11-16 RX ORDER — TRAMADOL HYDROCHLORIDE 50 MG/1
50 TABLET ORAL EVERY 6 HOURS PRN
Qty: 56 TABLET | Refills: 1 | Status: SHIPPED
Start: 2021-11-16 | End: 2021-12-14 | Stop reason: SDUPTHER

## 2021-11-16 NOTE — TELEPHONE ENCOUNTER
Call from [facility] requesting refill for Tramadol. Pharmacy is [facility] in [facility]. Next george 12/20/21.

## 2021-11-18 RX ORDER — BUTALBITAL, ACETAMINOPHEN AND CAFFEINE 50; 325; 40 MG/1; MG/1; MG/1
TABLET ORAL
Qty: 30 TABLET | Refills: 1 | Status: SHIPPED
Start: 2021-11-18 | End: 2022-01-24

## 2021-12-05 DIAGNOSIS — C7B.8 METASTATIC MALIGNANT NEUROENDOCRINE TUMOR TO LIVER (HCC): ICD-10-CM

## 2021-12-08 RX ORDER — POTASSIUM CHLORIDE 20 MEQ/1
TABLET, EXTENDED RELEASE ORAL
Qty: 30 TABLET | Refills: 0 | Status: SHIPPED
Start: 2021-12-08 | End: 2022-01-03

## 2021-12-08 NOTE — TELEPHONE ENCOUNTER
Patient requested refill for Potassium Order pended and routed to provider for review and authorization.

## 2021-12-09 ENCOUNTER — HOSPITAL ENCOUNTER (OUTPATIENT)
Dept: INFUSION THERAPY | Age: 36
End: 2021-12-09

## 2021-12-14 DIAGNOSIS — G89.3 NEOPLASM RELATED PAIN: ICD-10-CM

## 2021-12-14 RX ORDER — TRAMADOL HYDROCHLORIDE 50 MG/1
50 TABLET ORAL EVERY 6 HOURS PRN
Qty: 56 TABLET | Refills: 1 | Status: SHIPPED
Start: 2021-12-14 | End: 2022-01-11 | Stop reason: SDUPTHER

## 2021-12-14 NOTE — TELEPHONE ENCOUNTER
Call from Al[facility] Conner requesting refill for Tramadol. Pharmacy is [address] [place] [address] in [facility]. Next george 12/28/21.

## 2021-12-23 RX ORDER — FLUTICASONE PROPIONATE 50 MCG
SPRAY, SUSPENSION (ML) NASAL
Qty: 16 G | Refills: 1 | Status: SHIPPED
Start: 2021-12-23 | End: 2022-03-02

## 2021-12-28 RX ORDER — LEVOTHYROXINE SODIUM 112 UG/1
TABLET ORAL
Qty: 90 TABLET | Refills: 1 | Status: SHIPPED
Start: 2021-12-28 | End: 2022-06-16

## 2021-12-30 ENCOUNTER — HOSPITAL ENCOUNTER (OUTPATIENT)
Dept: INFUSION THERAPY | Age: 36
Discharge: HOME | End: 2021-12-30
Payer: COMMERCIAL

## 2021-12-30 ENCOUNTER — OFFICE VISIT (OUTPATIENT)
Dept: ONCOLOGY | Age: 36
End: 2021-12-30
Payer: COMMERCIAL

## 2021-12-30 VITALS
DIASTOLIC BLOOD PRESSURE: 79 MMHG | WEIGHT: 119 LBS | TEMPERATURE: 97.9 F | RESPIRATION RATE: 16 BRPM | BODY MASS INDEX: 22.47 KG/M2 | HEART RATE: 95 BPM | OXYGEN SATURATION: 100 % | SYSTOLIC BLOOD PRESSURE: 134 MMHG | HEIGHT: 61 IN

## 2021-12-30 DIAGNOSIS — C7B.8 METASTATIC MALIGNANT NEUROENDOCRINE TUMOR TO LIVER (HCC): Primary | ICD-10-CM

## 2021-12-30 LAB
ALBUMIN SERPL-MCNC: 4.4 G/DL (ref 3.5–5.2)
ALP BLD-CCNC: 163 U/L (ref 35–104)
ALT SERPL-CCNC: 54 U/L (ref 0–32)
ANION GAP SERPL CALCULATED.3IONS-SCNC: 17 MMOL/L (ref 7–16)
AST SERPL-CCNC: 56 U/L (ref 0–31)
BASOPHILS ABSOLUTE: 0.09 E9/L (ref 0–0.2)
BASOPHILS RELATIVE PERCENT: 1.5 % (ref 0–2)
BILIRUB SERPL-MCNC: 0.5 MG/DL (ref 0–1.2)
BUN BLDV-MCNC: 7 MG/DL (ref 6–20)
CALCIUM SERPL-MCNC: 8.5 MG/DL (ref 8.6–10.2)
CHLORIDE BLD-SCNC: 104 MMOL/L (ref 98–107)
CO2: 17 MMOL/L (ref 22–29)
CREAT SERPL-MCNC: 0.6 MG/DL (ref 0.5–1)
EOSINOPHILS ABSOLUTE: 0.06 E9/L (ref 0.05–0.5)
EOSINOPHILS RELATIVE PERCENT: 1 % (ref 0–6)
GFR AFRICAN AMERICAN: >60
GFR NON-AFRICAN AMERICAN: >60 ML/MIN/1.73
GLUCOSE BLD-MCNC: 114 MG/DL (ref 74–99)
HCT VFR BLD CALC: 38.7 % (ref 34–48)
HEMOGLOBIN: 12.8 G/DL (ref 11.5–15.5)
IMMATURE GRANULOCYTES #: 0.03 E9/L
IMMATURE GRANULOCYTES %: 0.5 % (ref 0–5)
LYMPHOCYTES ABSOLUTE: 1.94 E9/L (ref 1.5–4)
LYMPHOCYTES RELATIVE PERCENT: 31.8 % (ref 20–42)
MCH RBC QN AUTO: 29 PG (ref 26–35)
MCHC RBC AUTO-ENTMCNC: 33.1 % (ref 32–34.5)
MCV RBC AUTO: 87.6 FL (ref 80–99.9)
MONOCYTES ABSOLUTE: 0.41 E9/L (ref 0.1–0.95)
MONOCYTES RELATIVE PERCENT: 6.7 % (ref 2–12)
NEUTROPHILS ABSOLUTE: 3.57 E9/L (ref 1.8–7.3)
NEUTROPHILS RELATIVE PERCENT: 58.5 % (ref 43–80)
PDW BLD-RTO: 17.9 FL (ref 11.5–15)
PLATELET # BLD: 246 E9/L (ref 130–450)
PMV BLD AUTO: 9 FL (ref 7–12)
POTASSIUM SERPL-SCNC: 3.6 MMOL/L (ref 3.5–5)
RBC # BLD: 4.42 E12/L (ref 3.5–5.5)
SODIUM BLD-SCNC: 138 MMOL/L (ref 132–146)
TOTAL PROTEIN: 7.3 G/DL (ref 6.4–8.3)
WBC # BLD: 6.1 E9/L (ref 4.5–11.5)

## 2021-12-30 PROCEDURE — 85025 COMPLETE CBC W/AUTO DIFF WBC: CPT

## 2021-12-30 PROCEDURE — 6360000002 HC RX W HCPCS: Performed by: INTERNAL MEDICINE

## 2021-12-30 PROCEDURE — G8420 CALC BMI NORM PARAMETERS: HCPCS | Performed by: INTERNAL MEDICINE

## 2021-12-30 PROCEDURE — 96402 CHEMO HORMON ANTINEOPL SQ/IM: CPT

## 2021-12-30 PROCEDURE — G8427 DOCREV CUR MEDS BY ELIG CLIN: HCPCS | Performed by: INTERNAL MEDICINE

## 2021-12-30 PROCEDURE — 86316 IMMUNOASSAY TUMOR OTHER: CPT

## 2021-12-30 PROCEDURE — 4004F PT TOBACCO SCREEN RCVD TLK: CPT | Performed by: INTERNAL MEDICINE

## 2021-12-30 PROCEDURE — 36415 COLL VENOUS BLD VENIPUNCTURE: CPT

## 2021-12-30 PROCEDURE — 96372 THER/PROPH/DIAG INJ SC/IM: CPT

## 2021-12-30 PROCEDURE — 80053 COMPREHEN METABOLIC PANEL: CPT

## 2021-12-30 PROCEDURE — 99214 OFFICE O/P EST MOD 30 MIN: CPT | Performed by: INTERNAL MEDICINE

## 2021-12-30 PROCEDURE — G8484 FLU IMMUNIZE NO ADMIN: HCPCS | Performed by: INTERNAL MEDICINE

## 2021-12-30 RX ORDER — LANREOTIDE ACETATE 120 MG/.5ML
120 INJECTION SUBCUTANEOUS ONCE
Status: CANCELLED | OUTPATIENT
Start: 2022-01-06 | End: 2022-01-06

## 2021-12-30 RX ORDER — LANREOTIDE ACETATE 120 MG/.5ML
120 INJECTION SUBCUTANEOUS ONCE
Status: COMPLETED | OUTPATIENT
Start: 2021-12-30 | End: 2021-12-30

## 2021-12-30 RX ADMIN — LANREOTIDE ACETATE 120 MG: 120 INJECTION SUBCUTANEOUS at 14:41

## 2021-12-30 NOTE — PROGRESS NOTES
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 CT abdomen/pelvis 08/28/2020:  2 cm masslike density in the mid abdominal small bowel mesentery with a spiculated appearance. Multiple liver masses suspicious for metastatic disease. 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 Comment: Sections of the liver tissue cores show the hepatic parenchyma to be partially replaced by a proliferation of epithelioid cells with relatively uniform round nuclei and eosinophilic granular cytoplasm.  The   epithelioid cells form anastomosing solid cords/nests that are surrounded by delicate fibrovascular stroma.  There are no mitotic figures or tumor cell necrosis present.  The histologic changes seen are suggestive of well-differentiated neuroendocrine (carcinoid) tumor. Immunostaining for pankeratin, chromogranin, synaptophysin, TTF-1 and Ki-67 was performed on sections of one tissue block (A1) and the neoplastic epithelioid cells show diffuse and strong positivity for neuroendocrine markers (chromogranin and synaptophysin) and moderate positivity for pankeratin.  There is no staining reactivity for TTF-1. The Ki-67 proliferation labeling index is essentially negative, (very low, <1%). This staining pattern confirms the histologic impression of a well-differentiated neuroendocrine (carcinoid) tumor. FL Small bowel 09/02/2020:  Rapid small bowel transit. Serum Chromogranin A 160 on 09/23/2020.   Urine 5-HIAA 21 (0-14)  2d-Echo 10/10/2020: EF 60-65%   Normal right ventricular size and function    Dotatate PET/CT scan 10/14/2020 increased tracer uptake seen throughout the liver compatible with neoplasm. This involves both the left and the right lobe of liver. In addition there are 2 foci of increased tracer uptake along the mesentery of the small bowel. This may involve the wall the small bowel. No other convincing evidence for extra-abdominal metastatic disease. EGD/Colonoscopy 10/05/2020 by Dr. Carlito Rodriguez; records reviewed. Hepatobiliary team (Dr. Janey Chang) consult appreciated. Small bowel resection on 10/21/2020. Small bowel resection on 10/21/2020. A.  Ileum, resection:   Multifocal (4 foci) neuroendocrine tumor (NET). Extensive perineural and angiolymphatic invasion. 3 of 10 lymph nodes with metastatic neuroendocrine tumor and extracapsular extension of tumor cells (3/10). Bilateral viable small bowel resection margins with no evidence of tumor. Rachel Barr received as \"Meckel's\":   Nodular scar tissue with patchy chronic inflammation. Negative for neuroendocrine tumor. Intestinal mucosal tissue is not present. CASE SUMMARY:   Procedure: Small bowel resection   Tumor site: Ileum   Tumor size: Greatest dimension of 1.5 cm   Tumor focality: Multifocal: 4 separate tumors   Histologic type and grade: G2: Well-differentiated neuroendocrine tumor   Mitotic rate: between 2-20 mitoses/2 mm2   Ki-67 labeling index: between 3-20%   Tumor extension: Tumor invades through the muscularis propria into subserosal tissue without penetration of overlying serosa   Margins:  All margins are uninvolved by tumor    Margins examined: Proximal and distal   Lymphovascular invasion: Present   Perineural invasion: Present   Large mesenteric masses (greater than 2 cm): Present (one 2.5 cm mass)   Regional lymph nodes:    Number of lymph nodes involved: 3    Number of lymph nodes examined: 10   Pathologic stage classification (pTNM, AJCC eighth edition):    pT3    pN2    pM1a -confirmed liver metastasis, CXD-     Comment:   A. 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 Dose # 1 Lanreotide was on 11/05/2020. Dose # 2 Lanreotide was on 12/03/2020. Dose # 3 Lanreotide was on 01/07/2021. Serum Chromogranin A 95 on 01/07/2021. CT chest 02/07/2021 negative for metastatic disease. CT abdomen/pelvis 02/07/2021 Persistent bilobar hepatic lesions which are grossly stable consistent with stable widespread hepatic metastasis. Imaging reviewed. Continue Lanreotide and repeat scans in 3 months. Dose # 4 Lanreotide was on 02/11/2021. Ga 76 Dotatate PET 03/09/2021 Gallium 68 dotatate avid uptake throughout multiple regions of the liver compatible with multiple foci of neuroendocrine tumor in both the right and the left lobe of the liver, when compared to previous not significantly changed in the interval.  Dose # 5 Lanreotide was on 03/11/2021. Dose # 6 Lanreotide was on 04/08/2021. Serum chromogranin A 115 (0-103)  Dose # 7 Lanreotide was on 05/06/2021. Serum chromogranin A 114 (0-103)  Dose # 8 Lanreotide was on 06/03/2021. Serum chromogranin A  84  (0-103)    Ga 76 Dotatate PET 06/29/2021 Numerous foci of increased Gallium 68 dotatate avid uptake throughout both lobes of the liver, not significantly changed from previous. No significant progression of disease. No definite metastatic disease identified  Dose # 9 Lanreotide was on 07/01/2021. Serum Chromogranin A 97 (0-103)  Dose # 10 Lanreotide was on 07/29/2021. Serum Chromogranin A 89 (0-103)  Dose # 11 Lanreotide was on 08/30/2021. Serum Chromogranin A 120 (0-103)  Dose # 12 Lanreotide was on 09/30/2021.  Serum Chromogranin A 141 (0-103)  PET/CT scan 11/09/2021 There is significant gallium avid tracer uptake in the liver, numerous lesions are seen, tracer uptake overall is diminished. There is no convincing extrahepatic disease identified. Dose # 13 Lanreotide was on 11/11/2021. Serum chromogranin A 105 on 11/11/2021. Today 12/30/2021: Had COVID-19 and recovering slowly. Diarrhea on Imodium, Lomotil, Xermelo and Questran    Review of Systems;  CONSTITUTIONAL: No fever. Mild hot flashes/flushing. ENMT: Eyes: No diplopia; Nose: No epistaxis. Mouth: No sore throat. RESPIRATORY: had COVID-19 and recovering slowly. CARDIOVASCULAR: No chest pain, palpitations. GASTROINTESTINAL: Diarrhea on Imodium, Lomotil, Xermelo and Questran  GENITOURINARY: No hematuria frequency  NEURO: No syncope, presyncope, headache. Remainder:ROS NEGATIVE    Past Medical History:      Diagnosis Date    Abdominal pain     Cancer ([facility] [address]     neuroendocrime tumor    Diarrhea     Hypocalcemia     Hypothyroidism     Irritable bowel syndrome     Migraines     Seizures ([facility] [address]     last episode January 2021    Status post alcohol detoxification     5/22/2018-5/29/2018     Medications:  Reviewed and reconciled. Allergies:  No Known Allergies    Physical Exam:  /79 (Site: Right Upper Arm, Position: Sitting, Cuff Size: Medium Adult)   Pulse 95   Temp 97.9 °F (36.6 °C) (Infrared)   Resp 16   Ht 5' 1\" (1.549 m)   Wt 119 lb (54 kg)   SpO2 100%   BMI 22.48 kg/m²   GENERAL: Alert, oriented x 3, not in distress  LUNGS: CTA Med  CVS: RRR  HEENT: PERRLA; EOMI. Oropharynx clear. EXTREMITIES: Without clubbing, cyanosis, or edema. ECOG PS 1    Lab Results   Component Value Date    WBC 6.1 12/30/2021    HGB 12.8 12/30/2021    HCT 38.7 12/30/2021    MCV 87.6 12/30/2021     12/30/2021     Impression/Plan:  30 y/o female with 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   Multiple liver masses suspicious for metastatic disease. 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 Comment: Sections of the liver tissue cores show the hepatic parenchyma to be partially replaced by a proliferation of epithelioid cells with relatively uniform round nuclei and eosinophilic granular cytoplasm.  The   epithelioid cells form anastomosing solid cords/nests that are surrounded by delicate fibrovascular stroma.  There are no mitotic figures or tumor cell necrosis present.  The histologic changes seen are suggestive of well-differentiated neuroendocrine (carcinoid) tumor. Immunostaining for pankeratin, chromogranin, synaptophysin, TTF-1 and Ki-67 was performed on sections of one tissue block (A1) and the neoplastic epithelioid cells show diffuse and strong positivity for neuroendocrine markers (chromogranin and synaptophysin) and moderate positivity for pankeratin.    There is no staining reactivity for TTF-1. The Ki-67 proliferation labeling index is essentially negative, (very low, <1%). This staining pattern confirms the histologic impression of a well-differentiated neuroendocrine (carcinoid) tumor. FL Small bowel 09/02/2020:  Rapid small bowel transit. Serum Chromogranin A 160 on 09/23/2020. Urine 5-HIAA 21 (0-14)  2d-Echo 10/10/2020: EF 60-65%   Normal right ventricular size and function    Dotatate PET/CT scan 10/14/2020 increased tracer uptake seen throughout the liver compatible with neoplasm. This involves both the left and the right lobe of liver. In addition there are 2 foci of increased tracer uptake along the mesentery of the small bowel. This may involve the wall the small bowel. No other convincing evidence for extra-abdominal metastatic disease. EGD/Colonoscopy 10/05/2020 by Dr. Nick Mayorga; records reviewed. Hepatobiliary team (Dr. Angy Pathak) consult appreciated. Small bowel resection on 10/21/2020.   OSMIN  Ileum, resection:   Multifocal (4 foci) neuroendocrine tumor (NET). Extensive perineural and angiolymphatic invasion. 3 of 10 lymph nodes with metastatic neuroendocrine tumor and extracapsular extension of tumor cells (3/10). Bilateral viable small bowel resection margins with no evidence of tumor. Osmani Stapleton received as \"Meckel's\":   Nodular scar tissue with patchy chronic inflammation. Negative for neuroendocrine tumor. Intestinal mucosal tissue is not present. CASE SUMMARY:   Procedure: Small bowel resection   Tumor site: Ileum   Tumor size: Greatest dimension of 1.5 cm   Tumor focality: Multifocal: 4 separate tumors   Histologic type and grade: G2: Well-differentiated neuroendocrine tumor   Mitotic rate: between 2-20 mitoses/2 mm2   Ki-67 labeling index: between 3-20%   Tumor extension: Tumor invades through the muscularis propria into subserosal tissue without penetration of overlying serosa   Margins: All margins are uninvolved by tumor    Margins examined: Proximal and distal   Lymphovascular invasion: Present   Perineural invasion: Present   Large mesenteric masses (greater than 2 cm): Present (one 2.5 cm mass)   Regional lymph nodes:    Number of lymph nodes involved: 3    Number of lymph nodes examined: 10   Pathologic stage classification (pTNM, AJCC eighth edition):    pT3    pN2    pM1a -confirmed liver metastasis, HBS-     Comment:   A. 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 Dose # 1 Lanreotide was on 11/05/2020. Dose # 2 Lanreotide was on 12/03/2020. Dose # 3 Lanreotide was on 01/07/2021. Serum Chromogranin A 95 on 01/07/2021. CT chest 02/07/2021 negative for metastatic disease.   CT abdomen/pelvis 02/07/2021 Persistent bilobar hepatic lesions which are grossly stable consistent with stable widespread hepatic metastasis. Imaging reviewed. Continue Lanreotide and repeat scans in 3 months. Dose # 4 Lanreotide was on 02/11/2021. Ga 76 Dotatate PET 03/09/2021 Gallium 68 dotatate avid uptake throughout multiple regions of the liver compatible with multiple foci of neuroendocrine tumor in both the right and the left lobe of the liver, when compared to previous not significantly changed in the interval.  Dose # 5 Lanreotide was on 03/11/2021. Dose # 6 Lanreotide was on 04/08/2021. Serum chromogranin A 115 (0-103)  Dose # 7 Lanreotide was on 05/06/2021. Serum chromogranin A 114 (0-103)  Dose # 8 Lanreotide was on 06/03/2021. Serum chromogranin A  84  (0-103)  Ga 76 Dotatate PET 06/29/2021 Numerous foci of increased Gallium 68 dotatate avid uptake throughout both lobes of the liver, not significantly changed from previous. No significant progression of disease. No definite metastatic disease identified  Dose # 9 Lanreotide was on 07/01/2021. Serum Chromogranin A 97 (0-103)  Dose # 10 Lanreotide was on 07/29/2021. Serum Chromogranin A 89 (0-103)  MRI Thoracic/Lumbar Spine 08/27/2021 unremarkable  CT head 08/27/2021 no acute intracranial abnormality  CT chest 08/27/2021 unremarkable. Stable hypodense lesions in liver grossly stable as of the CT of the chest from 02/07/2021  Dose # 11 Lanreotide was on 08/30/2021. Serum Chromogranin A 120 (0-103)  Dose # 12 Lanreotide was on 09/30/2021. Serum Chromogranin A 141 (0-103)  PET/CT scan 11/09/2021 There is significant gallium avid tracer uptake in the liver, numerous lesions are seen, tracer uptake overall is diminished. There is no convincing extrahepatic disease identified. Imaging reviewed. Continue Lanreotide and repeat scans in 3 months. Dose # 13 Lanreotide was on 11/11/2021. Serum chromogranin A 105 on 11/11/2021. Had COVID-19 and recovering slowly. Dose # 14 Lanreotide is today 12/30/2021.  Serum chromogranin A pending  Continue Imodium Lomotil [place] for diarrhea. RTC in 4 weeks for Dose # 15 Lanreotide.     Jorge Ruano MD   [id number]  Board Certified Medical Oncologist

## 2021-12-31 DIAGNOSIS — C7B.8 METASTATIC MALIGNANT NEUROENDOCRINE TUMOR TO LIVER (HCC): ICD-10-CM

## 2022-01-03 LAB — CHROMOGRANIN A: 184 NG/ML (ref 0–103)

## 2022-01-03 RX ORDER — POTASSIUM CHLORIDE 20 MEQ/1
TABLET, EXTENDED RELEASE ORAL
Qty: 30 TABLET | Refills: 0 | Status: SHIPPED
Start: 2022-01-03 | End: 2022-02-01

## 2022-01-10 RX ORDER — GABAPENTIN 300 MG/1
300 CAPSULE ORAL 3 TIMES DAILY
Qty: 270 CAPSULE | Refills: 0 | Status: SHIPPED
Start: 2022-01-10 | End: 2022-04-04 | Stop reason: SDUPTHER

## 2022-01-10 NOTE — TELEPHONE ENCOUNTER
Pt called to reschedule her appt. Requested refill. Verified pharmacy.      Last appt 10/25/21  Next appt 2/7/22

## 2022-01-11 DIAGNOSIS — G89.3 NEOPLASM RELATED PAIN: ICD-10-CM

## 2022-01-11 RX ORDER — TRAMADOL HYDROCHLORIDE 50 MG/1
50 TABLET ORAL EVERY 6 HOURS PRN
Qty: 120 TABLET | Refills: 2 | Status: SHIPPED
Start: 2022-01-11 | End: 2022-04-04 | Stop reason: SDUPTHER

## 2022-01-11 NOTE — TELEPHONE ENCOUNTER
Call from Linn Garcia requesting refill for Tramadol. Pharmacy is [facility] in [place]. Next george 2/7/22.

## 2022-01-24 RX ORDER — BUTALBITAL, ACETAMINOPHEN AND CAFFEINE 50; 325; 40 MG/1; MG/1; MG/1
TABLET ORAL
Qty: 30 TABLET | Refills: 1 | Status: SHIPPED
Start: 2022-01-24 | End: 2022-03-21

## 2022-01-27 ENCOUNTER — HOSPITAL ENCOUNTER (OUTPATIENT)
Dept: INFUSION THERAPY | Age: 37
Discharge: HOME | End: 2022-01-27
Payer: COMMERCIAL

## 2022-01-27 ENCOUNTER — OFFICE VISIT (OUTPATIENT)
Dept: ONCOLOGY | Age: 37
End: 2022-01-27
Payer: COMMERCIAL

## 2022-01-27 VITALS
WEIGHT: 124.3 LBS | TEMPERATURE: 97.4 F | BODY MASS INDEX: 24.4 KG/M2 | RESPIRATION RATE: 17 BRPM | SYSTOLIC BLOOD PRESSURE: 115 MMHG | DIASTOLIC BLOOD PRESSURE: 56 MMHG | HEART RATE: 74 BPM | HEIGHT: 60 IN | OXYGEN SATURATION: 100 %

## 2022-01-27 DIAGNOSIS — C7B.8 METASTATIC MALIGNANT NEUROENDOCRINE TUMOR TO LIVER (HCC): Primary | ICD-10-CM

## 2022-01-27 DIAGNOSIS — C7B.8 METASTATIC MALIGNANT NEUROENDOCRINE TUMOR TO LIVER (HCC): ICD-10-CM

## 2022-01-27 LAB
ALBUMIN SERPL-MCNC: 4.3 G/DL (ref 3.5–5.2)
ALP BLD-CCNC: 128 U/L (ref 35–104)
ALT SERPL-CCNC: 33 U/L (ref 0–32)
AMPHETAMINE SCREEN, URINE: NOT DETECTED
ANION GAP SERPL CALCULATED.3IONS-SCNC: 14 MMOL/L (ref 7–16)
AST SERPL-CCNC: 37 U/L (ref 0–31)
BARBITURATE SCREEN URINE: POSITIVE
BASOPHILS ABSOLUTE: 0.11 E9/L (ref 0–0.2)
BASOPHILS RELATIVE PERCENT: 1.2 % (ref 0–2)
BENZODIAZEPINE SCREEN, URINE: NOT DETECTED
BILIRUB SERPL-MCNC: 0.4 MG/DL (ref 0–1.2)
BUN BLDV-MCNC: 11 MG/DL (ref 6–20)
CALCIUM SERPL-MCNC: 8.7 MG/DL (ref 8.6–10.2)
CANNABINOID SCREEN URINE: POSITIVE
CHLORIDE BLD-SCNC: 108 MMOL/L (ref 98–107)
CO2: 17 MMOL/L (ref 22–29)
COCAINE METABOLITE SCREEN URINE: NOT DETECTED
CREAT SERPL-MCNC: 0.8 MG/DL (ref 0.5–1)
EOSINOPHILS ABSOLUTE: 0.17 E9/L (ref 0.05–0.5)
EOSINOPHILS RELATIVE PERCENT: 1.9 % (ref 0–6)
FENTANYL SCREEN, URINE: NOT DETECTED
GFR AFRICAN AMERICAN: >60
GFR NON-AFRICAN AMERICAN: >60 ML/MIN/1.73
GLUCOSE BLD-MCNC: 101 MG/DL (ref 74–99)
HCT VFR BLD CALC: 39.3 % (ref 34–48)
HEMOGLOBIN: 12.4 G/DL (ref 11.5–15.5)
IMMATURE GRANULOCYTES #: 0.04 E9/L
IMMATURE GRANULOCYTES %: 0.5 % (ref 0–5)
LYMPHOCYTES ABSOLUTE: 2.75 E9/L (ref 1.5–4)
LYMPHOCYTES RELATIVE PERCENT: 31 % (ref 20–42)
Lab: ABNORMAL
MCH RBC QN AUTO: 28.6 PG (ref 26–35)
MCHC RBC AUTO-ENTMCNC: 31.6 % (ref 32–34.5)
MCV RBC AUTO: 90.6 FL (ref 80–99.9)
METHADONE SCREEN, URINE: NOT DETECTED
MONOCYTES ABSOLUTE: 1.06 E9/L (ref 0.1–0.95)
MONOCYTES RELATIVE PERCENT: 12 % (ref 2–12)
NEUTROPHILS ABSOLUTE: 4.74 E9/L (ref 1.8–7.3)
NEUTROPHILS RELATIVE PERCENT: 53.4 % (ref 43–80)
OPIATE SCREEN URINE: NOT DETECTED
OXYCODONE URINE: NOT DETECTED
PDW BLD-RTO: 19.6 FL (ref 11.5–15)
PHENCYCLIDINE SCREEN URINE: NOT DETECTED
PLATELET # BLD: 317 E9/L (ref 130–450)
PMV BLD AUTO: 10.2 FL (ref 7–12)
POTASSIUM SERPL-SCNC: 3.9 MMOL/L (ref 3.5–5)
RBC # BLD: 4.34 E12/L (ref 3.5–5.5)
SODIUM BLD-SCNC: 139 MMOL/L (ref 132–146)
TOTAL PROTEIN: 7.3 G/DL (ref 6.4–8.3)
WBC # BLD: 8.9 E9/L (ref 4.5–11.5)

## 2022-01-27 PROCEDURE — 4004F PT TOBACCO SCREEN RCVD TLK: CPT | Performed by: INTERNAL MEDICINE

## 2022-01-27 PROCEDURE — G8427 DOCREV CUR MEDS BY ELIG CLIN: HCPCS | Performed by: INTERNAL MEDICINE

## 2022-01-27 PROCEDURE — G8420 CALC BMI NORM PARAMETERS: HCPCS | Performed by: INTERNAL MEDICINE

## 2022-01-27 PROCEDURE — G8484 FLU IMMUNIZE NO ADMIN: HCPCS | Performed by: INTERNAL MEDICINE

## 2022-01-27 PROCEDURE — 96372 THER/PROPH/DIAG INJ SC/IM: CPT

## 2022-01-27 PROCEDURE — 99213 OFFICE O/P EST LOW 20 MIN: CPT

## 2022-01-27 PROCEDURE — 99214 OFFICE O/P EST MOD 30 MIN: CPT | Performed by: INTERNAL MEDICINE

## 2022-01-27 PROCEDURE — 6360000002 HC RX W HCPCS: Performed by: INTERNAL MEDICINE

## 2022-01-27 RX ORDER — LANREOTIDE ACETATE 120 MG/.5ML
120 INJECTION SUBCUTANEOUS ONCE
Status: CANCELLED | OUTPATIENT
Start: 2022-02-24 | End: 2022-02-24

## 2022-01-27 RX ORDER — LANREOTIDE ACETATE 120 MG/.5ML
120 INJECTION SUBCUTANEOUS ONCE
Status: COMPLETED | OUTPATIENT
Start: 2022-01-27 | End: 2022-01-27

## 2022-01-27 RX ADMIN — LANREOTIDE ACETATE 120 MG: 120 INJECTION SUBCUTANEOUS at 16:04

## 2022-01-27 NOTE — PROGRESS NOTES
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 CT abdomen/pelvis 08/28/2020:  2 cm masslike density in the mid abdominal small bowel mesentery with a spiculated appearance. Multiple liver masses suspicious for metastatic disease. 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 Comment: Sections of the liver tissue cores show the hepatic parenchyma to be partially replaced by a proliferation of epithelioid cells with relatively uniform round nuclei and eosinophilic granular cytoplasm.  The   epithelioid cells form anastomosing solid cords/nests that are surrounded by delicate fibrovascular stroma.  There are no mitotic figures or tumor cell necrosis present.  The histologic changes seen are suggestive of well-differentiated neuroendocrine (carcinoid) tumor. Immunostaining for pankeratin, chromogranin, synaptophysin, TTF-1 and Ki-67 was performed on sections of one tissue block (A1) and the neoplastic epithelioid cells show diffuse and strong positivity for neuroendocrine markers (chromogranin and synaptophysin) and moderate positivity for pankeratin.  There is no staining reactivity for TTF-1. The Ki-67 proliferation labeling index is essentially negative, (very low, <1%). This staining pattern confirms the histologic impression of a well-differentiated neuroendocrine (carcinoid) tumor. FL Small bowel 09/02/2020:  Rapid small bowel transit. Serum Chromogranin A 160 on 09/23/2020.   Urine 5-HIAA 21 (0-14)  2d-Echo 10/10/2020: EF 60-65%   Normal right ventricular size and function    Dotatate PET/CT scan 10/14/2020 increased tracer uptake seen throughout the liver compatible with neoplasm. This involves both the left and the right lobe of liver. In addition there are 2 foci of increased tracer uptake along the mesentery of the small bowel. This may involve the wall the small bowel. No other convincing evidence for extra-abdominal metastatic disease. EGD/Colonoscopy 10/05/2020 by Dr. Newt Cranker; records reviewed. Hepatobiliary team (Dr. Rosamaria Castillo) consult appreciated. Small bowel resection on 10/21/2020. Small bowel resection on 10/21/2020. A.  Ileum, resection:   Multifocal (4 foci) neuroendocrine tumor (NET). Extensive perineural and angiolymphatic invasion. 3 of 10 lymph nodes with metastatic neuroendocrine tumor and extracapsular extension of tumor cells (3/10). Bilateral viable small bowel resection margins with no evidence of tumor. Saritha Vieira received as \"Meckel's\":   Nodular scar tissue with patchy chronic inflammation. Negative for neuroendocrine tumor. Intestinal mucosal tissue is not present. CASE SUMMARY:   Procedure: Small bowel resection   Tumor site: Ileum   Tumor size: Greatest dimension of 1.5 cm   Tumor focality: Multifocal: 4 separate tumors   Histologic type and grade: G2: Well-differentiated neuroendocrine tumor   Mitotic rate: between 2-20 mitoses/2 mm2   Ki-67 labeling index: between 3-20%   Tumor extension: Tumor invades through the muscularis propria into subserosal tissue without penetration of overlying serosa   Margins:  All margins are uninvolved by tumor    Margins examined: Proximal and distal   Lymphovascular invasion: Present   Perineural invasion: Present   Large mesenteric masses (greater than 2 cm): Present (one 2.5 cm mass)   Regional lymph nodes:    Number of lymph nodes involved: 3    Number of lymph nodes examined: 10   Pathologic stage classification (pTNM, AJCC eighth edition):    pT3    pN2    pM1a -confirmed liver metastasis, [facility]-     Comment:   A. 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 Dose # 1 Lanreotide was on 11/05/2020. Dose # 2 Lanreotide was on 12/03/2020. Dose # 3 Lanreotide was on 01/07/2021. Serum Chromogranin A 95 on 01/07/2021. CT chest 02/07/2021 negative for metastatic disease. CT abdomen/pelvis 02/07/2021 Persistent bilobar hepatic lesions which are grossly stable consistent with stable widespread hepatic metastasis. Imaging reviewed. Continue Lanreotide and repeat scans in 3 months. Dose # 4 Lanreotide was on 02/11/2021. Ga 76 Dotatate PET 03/09/2021 Gallium 68 dotatate avid uptake throughout multiple regions of the liver compatible with multiple foci of neuroendocrine tumor in both the right and the left lobe of the liver, when compared to previous not significantly changed in the interval.  Dose # 5 Lanreotide was on 03/11/2021. Dose # 6 Lanreotide was on 04/08/2021. Serum chromogranin A 115 (0-103)  Dose # 7 Lanreotide was on 05/06/2021. Serum chromogranin A 114 (0-103)  Dose # 8 Lanreotide was on 06/03/2021. Serum chromogranin A  84  (0-103)    Ga 76 Dotatate PET 06/29/2021 Numerous foci of increased Gallium 68 dotatate avid uptake throughout both lobes of the liver, not significantly changed from previous. No significant progression of disease. No definite metastatic disease identified  Dose # 9 Lanreotide was on 07/01/2021. Serum Chromogranin A 97 (0-103)  Dose # 10 Lanreotide was on 07/29/2021. Serum Chromogranin A 89 (0-103)  Dose # 11 Lanreotide was on 08/30/2021. Serum Chromogranin A 120 (0-103)  Dose # 12 Lanreotide was on 09/30/2021.  Serum Chromogranin A 141 (0-103)  PET/CT scan 11/09/2021 There is significant gallium avid tracer uptake in the liver, numerous lesions are seen, tracer uptake overall is diminished. There is no convincing extrahepatic disease identified. Dose # 13 Lanreotide was on 11/11/2021. Serum chromogranin A 105 on 11/11/2021. Dose # 14 Lanreotide was on 12/30/2021. Serum chromogranin A 184 on 12/30/2021. Today 01/27/2022: Confused slurred speech flight of thoughts  in as well as NP     Review of Systems;  CONSTITUTIONAL: confused slurred speech  ENMT: Eyes: No diplopia; Nose: No epistaxis. Mouth: No sore throat. RESPIRATORY: No hemoptysis  CARDIOVASCULAR: No chest pain, palpitations. GASTROINTESTINAL: Diarrhea on Imodium, Lomotil, Xermelo and Questran  GENITOURINARY: No hematuria frequency  NEURO: No syncope, presyncope, headache. Remainder:ROS NEGATIVE    Past Medical History:      Diagnosis Date    Abdominal pain     Cancer ([facility] [address]     neuroendocrime tumor    Diarrhea     Hypocalcemia     Hypothyroidism     Irritable bowel syndrome     Migraines     Seizures ([facility] [address]     last episode January 2021    Status post alcohol detoxification     5/22/2018-5/29/2018     Medications:  Reviewed and reconciled. Allergies:  No Known Allergies    Physical Exam:  BP (!) 115/56 (Site: Left Upper Arm, Position: Sitting, Cuff Size: Large Adult)   Pulse 74   Temp 97.4 °F (36.3 °C) (Temporal)   Resp 17   Ht 5' (1.524 m)   Wt 124 lb 4.8 oz (56.4 kg)   SpO2 100%   BMI 24.28 kg/m²   GENERAL: confused, not in distress   EXTREMITIES: Without clubbing, cyanosis, or edema.    ECOG PS 1-2    Lab Results   Component Value Date    WBC 8.9 01/27/2022    HGB 12.4 01/27/2022    HCT 39.3 01/27/2022    MCV 90.6 01/27/2022     01/27/2022     Lab Results   Component Value Date     01/27/2022    K 3.9 01/27/2022     (H) 01/27/2022    CO2 17 (L) 01/27/2022    BUN 11 01/27/2022    CREATININE 0.8 01/27/2022    GLUCOSE 101 (H) 01/27/2022    CALCIUM 8.7 01/27/2022    PROT 7.3 01/27/2022    LABALBU 4.3 01/27/2022    BILITOT 0.4 01/27/2022 ALKPHOS 128 (H) 01/27/2022    AST 37 (H) 01/27/2022    ALT 33 (H) 01/27/2022    LABGLOM >60 01/27/2022    GFRAA >60 01/27/2022     Impression/Plan:  30 y/o female with 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 Multiple liver masses suspicious for metastatic disease. 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 Comment: Sections of the liver tissue cores show the hepatic parenchyma to be partially replaced by a proliferation of epithelioid cells with relatively uniform round nuclei and eosinophilic granular cytoplasm.  The   epithelioid cells form anastomosing solid cords/nests that are surrounded by delicate fibrovascular stroma.  There are no mitotic figures or tumor cell necrosis present.  The histologic changes seen are suggestive of well-differentiated neuroendocrine (carcinoid) tumor. Immunostaining for pankeratin, chromogranin, synaptophysin, TTF-1 and Ki-67 was performed on sections of one tissue block (A1) and the neoplastic epithelioid cells show diffuse and strong positivity for neuroendocrine markers (chromogranin and synaptophysin) and moderate positivity for pankeratin.    There is no staining reactivity for TTF-1. The Ki-67 proliferation labeling index is essentially negative, (very low, <1%). This staining pattern confirms the histologic impression of a well-differentiated neuroendocrine (carcinoid) tumor. FL Small bowel 09/02/2020:  Rapid small bowel transit. Serum Chromogranin A 160 on 09/23/2020. Urine 5-HIAA 21 (0-14)  2d-Echo 10/10/2020: EF 60-65%   Normal right ventricular size and function    Dotatate PET/CT scan 10/14/2020 increased tracer uptake seen throughout the liver compatible with neoplasm.  This involves both the left and the right lobe of liver. In addition there are 2 foci of increased tracer uptake along the mesentery of the small bowel. This may involve the wall the small bowel. No other convincing evidence for extra-abdominal metastatic disease. EGD/Colonoscopy 10/05/2020 by Dr. Jerri Maciel; records reviewed. Hepatobiliary team (Dr. Thu Grimes) consult appreciated. Small bowel resection on 10/21/2020. A.  Ileum, resection:   Multifocal (4 foci) neuroendocrine tumor (NET). Extensive perineural and angiolymphatic invasion. 3 of 10 lymph nodes with metastatic neuroendocrine tumor and extracapsular extension of tumor cells (3/10). Bilateral viable small bowel resection margins with no evidence of tumor. Kathie Denisha received as \"Meckel's\":   Nodular scar tissue with patchy chronic inflammation. Negative for neuroendocrine tumor. Intestinal mucosal tissue is not present. CASE SUMMARY:   Procedure: Small bowel resection   Tumor site: Ileum   Tumor size: Greatest dimension of 1.5 cm   Tumor focality: Multifocal: 4 separate tumors   Histologic type and grade: G2: Well-differentiated neuroendocrine tumor   Mitotic rate: between 2-20 mitoses/2 mm2   Ki-67 labeling index: between 3-20%   Tumor extension: Tumor invades through the muscularis propria into subserosal tissue without penetration of overlying serosa   Margins: All margins are uninvolved by tumor    Margins examined: Proximal and distal   Lymphovascular invasion: Present   Perineural invasion: Present   Large mesenteric masses (greater than 2 cm): Present (one 2.5 cm mass)   Regional lymph nodes:    Number of lymph nodes involved: 3    Number of lymph nodes examined: 10   Pathologic stage classification (pTNM, AJCC eighth edition):    pT3    pN2    pM1a -confirmed liver metastasis, [facility]-     Comment:   A.  Immunostains stain the tumor cells as follows in block A6:   Synaptophysin and chromogranin: Positive   Ki-67: Positive in 5% of tumor cells We recommended Lanreotide once monthly for metastatic well differentiated neuroendocrine cancer to liver. Dose # 1 Lanreotide was on 11/05/2020. Dose # 2 Lanreotide was on 12/03/2020. Dose # 3 Lanreotide was on 01/07/2021. Serum Chromogranin A 95 on 01/07/2021. CT chest 02/07/2021 negative for metastatic disease. CT abdomen/pelvis 02/07/2021 Persistent bilobar hepatic lesions which are grossly stable consistent with stable widespread hepatic metastasis. Imaging reviewed. Continue Lanreotide and repeat scans in 3 months. Dose # 4 Lanreotide was on 02/11/2021. Ga 76 Dotatate PET 03/09/2021 Gallium 68 dotatate avid uptake throughout multiple regions of the liver compatible with multiple foci of neuroendocrine tumor in both the right and the left lobe of the liver, when compared to previous not significantly changed in the interval.  Dose # 5 Lanreotide was on 03/11/2021. Dose # 6 Lanreotide was on 04/08/2021. Serum chromogranin A 115 (0-103)  Dose # 7 Lanreotide was on 05/06/2021. Serum chromogranin A 114 (0-103)  Dose # 8 Lanreotide was on 06/03/2021. Serum chromogranin A  84  (0-103)  Ga 76 Dotatate PET 06/29/2021 Numerous foci of increased Gallium 68 dotatate avid uptake throughout both lobes of the liver, not significantly changed from previous. No significant progression of disease. No definite metastatic disease identified  Dose # 9 Lanreotide was on 07/01/2021. Serum Chromogranin A 97 (0-103)  Dose # 10 Lanreotide was on 07/29/2021. Serum Chromogranin A 89 (0-103)  MRI Thoracic/Lumbar Spine 08/27/2021 unremarkable  CT head 08/27/2021 no acute intracranial abnormality  CT chest 08/27/2021 unremarkable. Stable hypodense lesions in liver grossly stable as of the CT of the chest from 02/07/2021  Dose # 11 Lanreotide was on 08/30/2021. Serum Chromogranin A 120 (0-103)  Dose # 12 Lanreotide was on 09/30/2021.  Serum Chromogranin A 141 (0-103)  PET/CT scan 11/09/2021 There is significant gallium avid tracer uptake in the liver, numerous lesions are seen, tracer uptake overall is diminished. There is no convincing extrahepatic disease identified. Imaging reviewed. Continue Lanreotide and repeat scans in 3 months. Dose # 13 Lanreotide was on 11/11/2021. Serum chromogranin A 105 on 11/11/2021. Had COVID-19 and recovered slowly. Dose # 14 Lanreotide was on 12/30/2021. Serum chromogranin A 184 on 12/30/2021. Confused slurred speech flight of thoughts.  in as well as NP  CT head ordered; urine tox screen ordered  PET/CT Gallium 68 ordered for further evaluation  RTC in 4 weeks for Dose # 15 Lanreotide.     Maninder Reddy MD   [id number]  Board Certified Medical Oncologist

## 2022-01-28 ENCOUNTER — HOSPITAL ENCOUNTER (EMERGENCY)
Age: 37
Discharge: ANOTHER HOSPITAL | End: 2022-01-29
Attending: STUDENT IN AN ORGANIZED HEALTH CARE EDUCATION/TRAINING PROGRAM
Payer: COMMERCIAL

## 2022-01-28 ENCOUNTER — APPOINTMENT (OUTPATIENT)
Dept: CT IMAGING | Age: 37
End: 2022-01-28
Payer: COMMERCIAL

## 2022-01-28 DIAGNOSIS — R56.9 SEIZURE (HCC): Primary | ICD-10-CM

## 2022-01-28 DIAGNOSIS — H60.00: ICD-10-CM

## 2022-01-28 LAB
ALBUMIN SERPL-MCNC: 4.1 G/DL (ref 3.5–5.2)
ALP BLD-CCNC: 122 U/L (ref 35–104)
ALT SERPL-CCNC: 39 U/L (ref 0–32)
ANION GAP SERPL CALCULATED.3IONS-SCNC: 12 MMOL/L (ref 7–16)
AST SERPL-CCNC: 46 U/L (ref 0–31)
BASOPHILS ABSOLUTE: 0.09 E9/L (ref 0–0.2)
BASOPHILS RELATIVE PERCENT: 1.1 % (ref 0–2)
BILIRUB SERPL-MCNC: 0.2 MG/DL (ref 0–1.2)
BILIRUBIN URINE: NEGATIVE
BLOOD, URINE: NEGATIVE
BUN BLDV-MCNC: 10 MG/DL (ref 6–20)
CALCIUM SERPL-MCNC: 8.4 MG/DL (ref 8.6–10.2)
CHLORIDE BLD-SCNC: 111 MMOL/L (ref 98–107)
CLARITY: CLEAR
CO2: 18 MMOL/L (ref 22–29)
COLOR: YELLOW
CREAT SERPL-MCNC: 0.8 MG/DL (ref 0.5–1)
EKG ATRIAL RATE: 70 BPM
EKG P AXIS: 60 DEGREES
EKG P-R INTERVAL: 152 MS
EKG Q-T INTERVAL: 412 MS
EKG QRS DURATION: 64 MS
EKG QTC CALCULATION (BAZETT): 444 MS
EKG R AXIS: 56 DEGREES
EKG T AXIS: 58 DEGREES
EKG VENTRICULAR RATE: 70 BPM
EOSINOPHILS ABSOLUTE: 0.24 E9/L (ref 0.05–0.5)
EOSINOPHILS RELATIVE PERCENT: 3 % (ref 0–6)
GFR AFRICAN AMERICAN: >60
GFR NON-AFRICAN AMERICAN: >60 ML/MIN/1.73
GLUCOSE BLD-MCNC: 124 MG/DL (ref 74–99)
GLUCOSE URINE: NEGATIVE MG/DL
HCG, URINE, POC: NEGATIVE
HCT VFR BLD CALC: 39.8 % (ref 34–48)
HEMOGLOBIN: 12.3 G/DL (ref 11.5–15.5)
IMMATURE GRANULOCYTES #: 0.04 E9/L
IMMATURE GRANULOCYTES %: 0.5 % (ref 0–5)
KETONES, URINE: NEGATIVE MG/DL
LACTIC ACID: 1.5 MMOL/L (ref 0.5–2.2)
LEUKOCYTE ESTERASE, URINE: NEGATIVE
LYMPHOCYTES ABSOLUTE: 3.46 E9/L (ref 1.5–4)
LYMPHOCYTES RELATIVE PERCENT: 44 % (ref 20–42)
Lab: NORMAL
MCH RBC QN AUTO: 28.4 PG (ref 26–35)
MCHC RBC AUTO-ENTMCNC: 30.9 % (ref 32–34.5)
MCV RBC AUTO: 91.9 FL (ref 80–99.9)
MONOCYTES ABSOLUTE: 0.7 E9/L (ref 0.1–0.95)
MONOCYTES RELATIVE PERCENT: 8.9 % (ref 2–12)
NEGATIVE QC PASS/FAIL: NORMAL
NEUTROPHILS ABSOLUTE: 3.34 E9/L (ref 1.8–7.3)
NEUTROPHILS RELATIVE PERCENT: 42.5 % (ref 43–80)
NITRITE, URINE: NEGATIVE
PDW BLD-RTO: 19.5 FL (ref 11.5–15)
PH UA: 7 (ref 5–9)
PLATELET # BLD: 303 E9/L (ref 130–450)
PMV BLD AUTO: 9.7 FL (ref 7–12)
POSITIVE QC PASS/FAIL: NORMAL
POTASSIUM REFLEX MAGNESIUM: 3.9 MMOL/L (ref 3.5–5)
PROTEIN UA: NEGATIVE MG/DL
RBC # BLD: 4.33 E12/L (ref 3.5–5.5)
SARS-COV-2, NAAT: NOT DETECTED
SODIUM BLD-SCNC: 141 MMOL/L (ref 132–146)
SPECIFIC GRAVITY UA: 1.01 (ref 1–1.03)
TOTAL PROTEIN: 6.9 G/DL (ref 6.4–8.3)
UROBILINOGEN, URINE: 0.2 E.U./DL
WBC # BLD: 7.9 E9/L (ref 4.5–11.5)

## 2022-01-28 PROCEDURE — 70450 CT HEAD/BRAIN W/O DYE: CPT

## 2022-01-28 PROCEDURE — 72125 CT NECK SPINE W/O DYE: CPT

## 2022-01-28 PROCEDURE — 83605 ASSAY OF LACTIC ACID: CPT

## 2022-01-28 PROCEDURE — 6360000002 HC RX W HCPCS: Performed by: STUDENT IN AN ORGANIZED HEALTH CARE EDUCATION/TRAINING PROGRAM

## 2022-01-28 PROCEDURE — 70491 CT SOFT TISSUE NECK W/DYE: CPT

## 2022-01-28 PROCEDURE — 81003 URINALYSIS AUTO W/O SCOPE: CPT

## 2022-01-28 PROCEDURE — 96376 TX/PRO/DX INJ SAME DRUG ADON: CPT

## 2022-01-28 PROCEDURE — 87186 SC STD MICRODIL/AGAR DIL: CPT

## 2022-01-28 PROCEDURE — 87088 URINE BACTERIA CULTURE: CPT

## 2022-01-28 PROCEDURE — 2580000003 HC RX 258: Performed by: STUDENT IN AN ORGANIZED HEALTH CARE EDUCATION/TRAINING PROGRAM

## 2022-01-28 PROCEDURE — 6360000004 HC RX CONTRAST MEDICATION: Performed by: RADIOLOGY

## 2022-01-28 PROCEDURE — 80053 COMPREHEN METABOLIC PANEL: CPT

## 2022-01-28 PROCEDURE — 2500000003 HC RX 250 WO HCPCS: Performed by: STUDENT IN AN ORGANIZED HEALTH CARE EDUCATION/TRAINING PROGRAM

## 2022-01-28 PROCEDURE — 96365 THER/PROPH/DIAG IV INF INIT: CPT

## 2022-01-28 PROCEDURE — 96367 TX/PROPH/DG ADDL SEQ IV INF: CPT

## 2022-01-28 PROCEDURE — 6370000000 HC RX 637 (ALT 250 FOR IP): Performed by: EMERGENCY MEDICINE

## 2022-01-28 PROCEDURE — 69020 DRG XTRNL AUD CANAL ABSCESS: CPT

## 2022-01-28 PROCEDURE — 87635 SARS-COV-2 COVID-19 AMP PRB: CPT

## 2022-01-28 PROCEDURE — 90714 TD VACC NO PRESV 7 YRS+ IM: CPT | Performed by: STUDENT IN AN ORGANIZED HEALTH CARE EDUCATION/TRAINING PROGRAM

## 2022-01-28 PROCEDURE — 93005 ELECTROCARDIOGRAM TRACING: CPT | Performed by: STUDENT IN AN ORGANIZED HEALTH CARE EDUCATION/TRAINING PROGRAM

## 2022-01-28 PROCEDURE — 10060 I&D ABSCESS SIMPLE/SINGLE: CPT

## 2022-01-28 PROCEDURE — 6360000002 HC RX W HCPCS

## 2022-01-28 PROCEDURE — 90471 IMMUNIZATION ADMIN: CPT | Performed by: STUDENT IN AN ORGANIZED HEALTH CARE EDUCATION/TRAINING PROGRAM

## 2022-01-28 PROCEDURE — 96375 TX/PRO/DX INJ NEW DRUG ADDON: CPT

## 2022-01-28 PROCEDURE — 85025 COMPLETE CBC W/AUTO DIFF WBC: CPT

## 2022-01-28 PROCEDURE — 6360000002 HC RX W HCPCS: Performed by: EMERGENCY MEDICINE

## 2022-01-28 PROCEDURE — 99285 EMERGENCY DEPT VISIT HI MDM: CPT

## 2022-01-28 RX ORDER — CEFTRIAXONE 1 G/1
INJECTION, POWDER, FOR SOLUTION INTRAMUSCULAR; INTRAVENOUS
Status: DISPENSED
Start: 2022-01-28 | End: 2022-01-29

## 2022-01-28 RX ORDER — LORAZEPAM 2 MG/ML
1 INJECTION INTRAMUSCULAR ONCE
Status: COMPLETED | OUTPATIENT
Start: 2022-01-28 | End: 2022-01-28

## 2022-01-28 RX ORDER — LORAZEPAM 2 MG/ML
INJECTION INTRAMUSCULAR
Status: COMPLETED
Start: 2022-01-28 | End: 2022-01-28

## 2022-01-28 RX ORDER — FENTANYL CITRATE 50 UG/ML
25 INJECTION, SOLUTION INTRAMUSCULAR; INTRAVENOUS ONCE
Status: COMPLETED | OUTPATIENT
Start: 2022-01-28 | End: 2022-01-28

## 2022-01-28 RX ORDER — GABAPENTIN 300 MG/1
300 CAPSULE ORAL 3 TIMES DAILY
Status: DISCONTINUED | OUTPATIENT
Start: 2022-01-28 | End: 2022-01-29 | Stop reason: HOSPADM

## 2022-01-28 RX ORDER — LORAZEPAM 2 MG/ML
0.5 INJECTION INTRAMUSCULAR ONCE
Status: DISCONTINUED | OUTPATIENT
Start: 2022-01-28 | End: 2022-01-28

## 2022-01-28 RX ORDER — SERTRALINE HYDROCHLORIDE 25 MG/1
25 TABLET, FILM COATED ORAL DAILY
Qty: 30 TABLET | Refills: 0 | Status: ON HOLD | OUTPATIENT
Start: 2022-01-28 | End: 2022-02-18 | Stop reason: ALTCHOICE

## 2022-01-28 RX ORDER — LEVETIRACETAM 500 MG/1
750 TABLET ORAL 2 TIMES DAILY
Status: DISCONTINUED | OUTPATIENT
Start: 2022-01-28 | End: 2022-01-29 | Stop reason: HOSPADM

## 2022-01-28 RX ORDER — LEVETIRACETAM 10 MG/ML
1000 INJECTION INTRAVASCULAR ONCE
Status: COMPLETED | OUTPATIENT
Start: 2022-01-28 | End: 2022-01-28

## 2022-01-28 RX ORDER — TOPIRAMATE 25 MG/1
75 TABLET ORAL 2 TIMES DAILY
Status: DISCONTINUED | OUTPATIENT
Start: 2022-01-28 | End: 2022-01-29 | Stop reason: HOSPADM

## 2022-01-28 RX ORDER — 0.9 % SODIUM CHLORIDE 0.9 %
1000 INTRAVENOUS SOLUTION INTRAVENOUS ONCE
Status: COMPLETED | OUTPATIENT
Start: 2022-01-28 | End: 2022-01-28

## 2022-01-28 RX ORDER — LEVOTHYROXINE SODIUM 112 UG/1
112 TABLET ORAL ONCE
Status: COMPLETED | OUTPATIENT
Start: 2022-01-28 | End: 2022-01-28

## 2022-01-28 RX ORDER — OXYCODONE HYDROCHLORIDE AND ACETAMINOPHEN 5; 325 MG/1; MG/1
1 TABLET ORAL ONCE
Status: COMPLETED | OUTPATIENT
Start: 2022-01-28 | End: 2022-01-28

## 2022-01-28 RX ADMIN — CEFTRIAXONE 1000 MG: 1 INJECTION, POWDER, FOR SOLUTION INTRAMUSCULAR; INTRAVENOUS at 18:13

## 2022-01-28 RX ADMIN — FENTANYL CITRATE 25 MCG: 0.05 INJECTION, SOLUTION INTRAMUSCULAR; INTRAVENOUS at 20:17

## 2022-01-28 RX ADMIN — LEVETIRACETAM 1000 MG: 10 INJECTION INTRAVENOUS at 03:56

## 2022-01-28 RX ADMIN — SODIUM CHLORIDE 1000 ML: 9 INJECTION, SOLUTION INTRAVENOUS at 03:56

## 2022-01-28 RX ADMIN — FENTANYL CITRATE 25 MCG: 50 INJECTION, SOLUTION INTRAMUSCULAR; INTRAVENOUS at 09:08

## 2022-01-28 RX ADMIN — LEVETIRACETAM 750 MG: 500 TABLET, FILM COATED ORAL at 18:14

## 2022-01-28 RX ADMIN — TOPIRAMATE 75 MG: 25 TABLET, FILM COATED ORAL at 23:09

## 2022-01-28 RX ADMIN — OXYCODONE HYDROCHLORIDE AND ACETAMINOPHEN 1 TABLET: 5; 325 TABLET ORAL at 15:34

## 2022-01-28 RX ADMIN — LORAZEPAM 1 MG: 2 INJECTION INTRAMUSCULAR at 16:10

## 2022-01-28 RX ADMIN — FENTANYL CITRATE 25 MCG: 0.05 INJECTION, SOLUTION INTRAMUSCULAR; INTRAVENOUS at 04:22

## 2022-01-28 RX ADMIN — LEVETIRACETAM 1000 MG: 10 INJECTION INTRAVENOUS at 06:26

## 2022-01-28 RX ADMIN — IOPAMIDOL 75 ML: 755 INJECTION, SOLUTION INTRAVENOUS at 05:01

## 2022-01-28 RX ADMIN — GABAPENTIN 300 MG: 300 CAPSULE ORAL at 22:04

## 2022-01-28 RX ADMIN — DOXYCYCLINE 100 MG: 100 INJECTION, POWDER, LYOPHILIZED, FOR SOLUTION INTRAVENOUS at 20:17

## 2022-01-28 RX ADMIN — CLOSTRIDIUM TETANI TOXOID ANTIGEN (FORMALDEHYDE INACTIVATED) AND CORYNEBACTERIUM DIPHTHERIAE TOXOID ANTIGEN (FORMALDEHYDE INACTIVATED) 0.5 ML: 5; 2 INJECTION, SUSPENSION INTRAMUSCULAR at 20:16

## 2022-01-28 RX ADMIN — LORAZEPAM 1 MG: 2 INJECTION INTRAMUSCULAR at 06:28

## 2022-01-28 RX ADMIN — FENTANYL CITRATE 25 MCG: 50 INJECTION, SOLUTION INTRAMUSCULAR; INTRAVENOUS at 23:09

## 2022-01-28 RX ADMIN — LEVOTHYROXINE SODIUM 112 MCG: 0.11 TABLET ORAL at 09:08

## 2022-01-28 ASSESSMENT — PAIN SCALES - GENERAL
PAINLEVEL_OUTOF10: 9
PAINLEVEL_OUTOF10: 9
PAINLEVEL_OUTOF10: 10
PAINLEVEL_OUTOF10: 9
PAINLEVEL_OUTOF10: 10
PAINLEVEL_OUTOF10: 10

## 2022-01-28 ASSESSMENT — ENCOUNTER SYMPTOMS
ABDOMINAL DISTENTION: 0
NAUSEA: 0
SORE THROAT: 0
COUGH: 0
EYE DISCHARGE: 0
BACK PAIN: 0
WHEEZING: 0
VOMITING: 0
SINUS PRESSURE: 0
EYE REDNESS: 0
DIARRHEA: 0
SHORTNESS OF BREATH: 0
EYE PAIN: 0

## 2022-01-28 NOTE — PROGRESS NOTES
[id number] called access center to initiate transfer to [facility] - waiting on them to contact neurology for consult

## 2022-01-28 NOTE — ED PROVIDER NOTES
Chriss Stewart is a 29 y.o. female with a PMHx significant for hypothyroidism, depressions, metastatic neuroendocrine tumor,seizures who presents for evaluation of seizures, beginning prior to arrival.  The complaint has been persistent, moderate in severity, and worsened by nothing. The patient states that she frequently has absence seizures, at least one a day. This evening she had five prior to arrival. Notes that her body will lock up. She has been taking her medications as prescribed and has not missed any doses. Currently the patient is awake, alert, oriented to person, place, and time. She has no focal deficits. The history is provided by the patient and medical records. Review of Systems   Constitutional: Negative for chills and fever. HENT: Negative for ear pain, sinus pressure and sore throat. Eyes: Negative for pain, discharge and redness. Respiratory: Negative for cough, shortness of breath and wheezing. Cardiovascular: Negative for chest pain. Gastrointestinal: Negative for abdominal distention, diarrhea, nausea and vomiting. Genitourinary: Negative for dysuria and frequency. Musculoskeletal: Negative for arthralgias and back pain. Skin: Negative for rash and wound. Neurological: Positive for seizures. Negative for weakness and headaches. Hematological: Negative for adenopathy. All other systems reviewed and are negative. Physical Exam  Vitals and nursing note reviewed. Constitutional:       General: She is not in acute distress. Appearance: Normal appearance. She is well-developed. She is not ill-appearing. HENT:      Head: Normocephalic and atraumatic. Right Ear: External ear normal.      Left Ear: External ear normal.      Mouth/Throat:      Mouth: No lacerations, oral lesions or angioedema. Dentition: Abnormal dentition. Dental caries present. No gingival swelling.       Comments: No trismus  Eyes:      General:         Right eye: No discharge. Left eye: No discharge. Extraocular Movements: Extraocular movements intact. Conjunctiva/sclera: Conjunctivae normal.      Pupils: Pupils are equal, round, and reactive to light. Cardiovascular:      Rate and Rhythm: Normal rate and regular rhythm. Heart sounds: Normal heart sounds. No murmur heard. Pulmonary:      Effort: Pulmonary effort is normal. No respiratory distress. Breath sounds: Normal breath sounds. No stridor. Abdominal:      General: There is no distension. Palpations: Abdomen is soft. There is no mass. Tenderness: There is no abdominal tenderness. Hernia: No hernia is present. Musculoskeletal:         General: No swelling or tenderness. Cervical back: Normal range of motion and neck supple. Skin:     General: Skin is warm and dry. Coloration: Skin is not jaundiced or pale. Neurological:      Mental Status: She is alert and oriented to person, place, and time. Cranial Nerves: No cranial nerve deficit. Coordination: Coordination normal.          Procedures     [facility]         Maxwell Andrade presents to the ED for evaluation of breakthrough seizures. Patient notes history of seizure disorder as well as stage four endocrine ca. She notes she typically will have one or two seizures a day, but today had four to five prior to arrival.  Workup in the ED revealed labs near normal limits. She did have a breakthrough seizure while in the department for which she was given a second gram of keppra as well as ativan with good improvement. Imaging showing concern for ear abscess. She was given abx in the department. Patient signed out to the oncoming physician pending transfer. I&D was performed (see separate I&D note).  Patient requires continued workup and management of their symptoms and will be admitted to the hospital for further evaluation and treatment.      --------------------------------------------- PAST HISTORY ---------------------------------------------  Past Medical History:  has a past medical history of Abdominal pain, Cancer ([facility] [address] Diarrhea, Hypocalcemia, Hypothyroidism, Irritable bowel syndrome, Migraines, Seizures ([facility] and Status post alcohol detoxification. Past Surgical History:  has a past surgical history that includes Parathyroid gland surgery; Cholecystectomy, laparoscopic (07/06/2016); Thyroidectomy; Colonoscopy (N/A, 6/22/2018); CT NEEDLE BIOPSY LIVER PERCUTANEOUS (9/1/2020); Small intestine surgery (N/A, 10/21/2020); CT BIOPSY RENAL (1/25/2021); hc dialysis catheter (N/A, 1/28/2021); and sigmoidoscopy (N/A, 5/14/2021). Social History:  reports that she has been smoking cigarettes. She has been smoking about 0.25 packs per day. She has never used smokeless tobacco. She reports that she does not drink alcohol and does not use drugs. Family History: family history includes Alzheimer's Disease in an other family member; Asthma in her father; Breast Cancer in her maternal grandmother; Cancer in her father, maternal grandmother, and paternal grandfather; Diabetes in an other family member; Heart Disease in her father; High Blood Pressure in her father and mother; High Cholesterol in her mother; Karrie Livers in an other family member; Other in her mother. The patients home medications have been reviewed. Allergies: Patient has no known allergies.     -------------------------------------------------- RESULTS -------------------------------------------------    LABS:  Results for orders placed or performed during the hospital encounter of 01/28/22   Culture, Urine    Specimen: Urine, clean catch   Result Value Ref Range    Organism Escherichia coli (A)     Urine Culture, Routine 75,000 CFU/ml        Susceptibility    Escherichia coli - BACTERIAL SUSCEPTIBILITY PANEL BY LUISITO     ampicillin ^4 Sensitive mcg/mL     ampicillin-sulbactam <=^2 Sensitive mcg/mL     ceFAZolin <=^4 Sensitive mcg/mL cefepime <=^0.12 Sensitive mcg/mL     cefTRIAXone <=^0.25 Sensitive mcg/mL     Confirmatory Extended Spectrum Beta-Lactamase Neg Negative mcg/mL     ertapenem <=^0.12 Sensitive mcg/mL     gentamicin ^2 Sensitive mcg/mL     levofloxacin <=^0.12 Sensitive mcg/mL     nitrofurantoin <=^16 Sensitive mcg/mL     piperacillin-tazobactam <=^4 Sensitive mcg/mL     trimethoprim-sulfamethoxazole <=^20 Sensitive mcg/mL   COVID-19, Rapid    Specimen: Nasopharyngeal Swab   Result Value Ref Range    SARS-CoV-2, NAAT Not Detected Not Detected   CBC Auto Differential   Result Value Ref Range    WBC 7.9 4.5 - 11.5 E9/L    RBC 4.33 3.50 - 5.50 E12/L    Hemoglobin 12.3 11.5 - 15.5 g/dL    Hematocrit 39.8 34.0 - 48.0 %    MCV 91.9 80.0 - 99.9 fL    MCH 28.4 26.0 - 35.0 pg    MCHC 30.9 (L) 32.0 - 34.5 %    RDW 19.5 (H) 11.5 - 15.0 fL    Platelets 191 138 - 207 E9/L    MPV 9.7 7.0 - 12.0 fL    Neutrophils % 42.5 (L) 43.0 - 80.0 %    Immature Granulocytes % 0.5 0.0 - 5.0 %    Lymphocytes % 44.0 (H) 20.0 - 42.0 %    Monocytes % 8.9 2.0 - 12.0 %    Eosinophils % 3.0 0.0 - 6.0 %    Basophils % 1.1 0.0 - 2.0 %    Neutrophils Absolute 3.34 1.80 - 7.30 E9/L    Immature Granulocytes # 0.04 E9/L    Lymphocytes Absolute 3.46 1.50 - 4.00 E9/L    Monocytes Absolute 0.70 0.10 - 0.95 E9/L    Eosinophils Absolute 0.24 0.05 - 0.50 E9/L    Basophils Absolute 0.09 0.00 - 0.20 E9/L   Comprehensive Metabolic Panel w/ Reflex to MG   Result Value Ref Range    Sodium 141 132 - 146 mmol/L    Potassium reflex Magnesium 3.9 3.5 - 5.0 mmol/L    Chloride 111 (H) 98 - 107 mmol/L    CO2 18 (L) 22 - 29 mmol/L    Anion Gap 12 7 - 16 mmol/L    Glucose 124 (H) 74 - 99 mg/dL    BUN 10 6 - 20 mg/dL    CREATININE 0.8 0.5 - 1.0 mg/dL    GFR Non-African American >60 >=60 mL/min/1.73    GFR African American >60     Calcium 8.4 (L) 8.6 - 10.2 mg/dL    Total Protein 6.9 6.4 - 8.3 g/dL    Albumin 4.1 3.5 - 5.2 g/dL    Total Bilirubin 0.2 0.0 - 1.2 mg/dL    Alkaline Phosphatase 122 (H) 35 - 104 U/L    ALT 39 (H) 0 - 32 U/L    AST 46 (H) 0 - 31 U/L   Urinalysis, reflex to microscopic   Result Value Ref Range    Color, UA Yellow Straw/Yellow    Clarity, UA Clear Clear    Glucose, Ur Negative Negative mg/dL    Bilirubin Urine Negative Negative    Ketones, Urine Negative Negative mg/dL    Specific Gravity, UA 1.015 1.005 - 1.030    Blood, Urine Negative Negative    pH, UA 7.0 5.0 - 9.0    Protein, UA Negative Negative mg/dL    Urobilinogen, Urine 0.2 <2.0 E.U./dL    Nitrite, Urine Negative Negative    Leukocyte Esterase, Urine Negative Negative   Lactic Acid, Plasma   Result Value Ref Range    Lactic Acid 1.5 0.5 - 2.2 mmol/L   POC Pregnancy Urine   Result Value Ref Range    HCG, Urine, POC Negative Negative    Lot Number [id number]     Positive QC Pass/Fail Pass     Negative QC Pass/Fail Pass    EKG 12 Lead   Result Value Ref Range    Ventricular Rate 70 BPM    Atrial Rate 70 BPM    P-R Interval 152 ms    QRS Duration 64 ms    Q-T Interval 412 ms    QTc Calculation (Bazett) 444 ms    P Axis 60 degrees    R Axis 56 degrees    T Axis 58 degrees       RADIOLOGY:  CT Head WO Contrast   Final Result   No acute intracranial abnormality. Right maxillary sinusitis. CT Cervical Spine WO Contrast   Final Result   No acute abnormality of the cervical spine. CT Head WO Contrast   Final Result   No acute intracranial abnormality. CT SOFT TISSUE NECK W CONTRAST   Final Result   1. Abscess along the right ear measuring 10 x 9 mm with adjacent inflammatory   changes. 2. Ectopic thyroid tissue just inferior to the hyoid bone in the midline   3. Dental caries. ------------------------- NURSING NOTES AND VITALS REVIEWED ---------------------------  Date / Time Roomed:  1/28/2022  2:42 AM  ED Bed Assignment:  JAVED/JAVED    The nursing notes within the ED encounter and vital signs as below have been reviewed. No data found.     Oxygen Saturation Interpretation: Normal    ------------------------------------------ PROGRESS NOTES ------------------------------------------  Counseling:  I have spoken with the patient and discussed todays results, in addition to providing specific details for the plan of care and counseling regarding the diagnosis and prognosis. Their questions are answered at this time and they are agreeable with the plan of admission.    --------------------------------- ADDITIONAL PROVIDER NOTES ---------------------------------  Consultations:  Patient signed out to oncoming physician pending transfer. She would like to be transferred to [facility] as she has seen neurology there in the past.      Diagnosis:  1. Seizure ([address]    2. Abscess of ear canal, unspecified laterality        Disposition:  Patient's disposition: Transfer to F  Patient's condition is stable.              Josy Sharp DO  01/30/22 2237

## 2022-01-28 NOTE — ED NOTES
Patient given PO pain meds with student in room. Patient VSS, side rails up. Seizure pads in place. Kimberli Castro.  Terrence Frey RN  01/28/22 [id number]

## 2022-01-28 NOTE — ED NOTES
Pt was found to be on floor in room. When nurse arrived, resident and charge nurse at bedside. Upon arrival to room, patient arm flexed and posturing. When picking up patient to place in bed she started to make moaning noises and was still arms flexed and unable to move BUE. Approximately 30 seconds later staff attempted to place patient in hospital gown and she began to yell out in pain and states that her ear hurts. Patient was completely alert and oriented at this time. No post ictal state observed. Hemalatha Dumont.  Anabelle Caba, RN  01/28/22 3452

## 2022-01-28 NOTE — ED NOTES
Patient wanting to get up in room and walk around. Educated on importance of maintaining safe positioning r/t her seizures. Pedro Sanchez.  Kat Vazquez RN  01/28/22 8837

## 2022-01-28 NOTE — ED NOTES
[facility] ROOM 863 / BED 5  [phone]     [facility] Traci Patterson  01/28/22 3306

## 2022-01-28 NOTE — PROGRESS NOTES
Patient presented to the office 01/27/2021 with worsening anxiety. She seemed to have issues with word finding, train of thought and concentration. Patient states this is due to everything going on in her personal life; Covid, grandma passed away and dad dx with cancer. She feels overwhelmed and anxious. Urine tox was ordered as well as head CT for concern for progression of disease. She is willing to work with a psychiatrist, while waiting for placement she will do counseling  with  Social work. I have Rx her low dose zoloft 25 mg for 1 week and if she tolerates increase to 50 mg while she waits to see palliative on 2/7. Updated Jinger Loss from palliative and [id number] [facility].

## 2022-01-29 VITALS
HEIGHT: 60 IN | OXYGEN SATURATION: 100 % | WEIGHT: 125 LBS | DIASTOLIC BLOOD PRESSURE: 62 MMHG | BODY MASS INDEX: 24.54 KG/M2 | TEMPERATURE: 97.8 F | SYSTOLIC BLOOD PRESSURE: 112 MMHG | RESPIRATION RATE: 16 BRPM | HEART RATE: 76 BPM

## 2022-01-29 PROCEDURE — 6360000002 HC RX W HCPCS: Performed by: STUDENT IN AN ORGANIZED HEALTH CARE EDUCATION/TRAINING PROGRAM

## 2022-01-29 RX ORDER — LORAZEPAM 2 MG/ML
1 INJECTION INTRAMUSCULAR ONCE
Status: COMPLETED | OUTPATIENT
Start: 2022-01-29 | End: 2022-01-29

## 2022-01-29 RX ORDER — ONDANSETRON 2 MG/ML
4 INJECTION INTRAMUSCULAR; INTRAVENOUS ONCE
Status: COMPLETED | OUTPATIENT
Start: 2022-01-29 | End: 2022-01-29

## 2022-01-29 RX ADMIN — ONDANSETRON 4 MG: 2 INJECTION INTRAMUSCULAR; INTRAVENOUS at 08:05

## 2022-01-29 RX ADMIN — LORAZEPAM 1 MG: 2 INJECTION INTRAMUSCULAR at 08:05

## 2022-01-29 ASSESSMENT — PAIN SCALES - GENERAL: PAINLEVEL_OUTOF10: 0

## 2022-01-29 NOTE — ED NOTES
PT UP TO BR AT THIS TIME WITHOUT STAFF MEMBER.  PTS CALL BELL IN REACH      Hany Clarke RN  01/28/22 2969       Hany Clarke RN  01/28/22 8268

## 2022-01-29 NOTE — ED NOTES
PT REQUESTING SNACK AT THIS TIME. PT GIVEN APPLESAUCE AND WATER.  SNACKS GIVEN     Deisi Renner RN  01/29/22 7893

## 2022-01-29 NOTE — ED NOTES
Incision and Drainage Procedure Note    Indication: Abscess    Procedure: The patient was positioned appropriately and the skin over the incision site was prepped with betadine and draped in a sterile fashion. Local anesthesia was obtained by infiltration using 1% Lidocaine without epinephrine. An incision was then made over the greatest area of fluctuance and approximately 1 cc of purulent and bloody material was expressed. Loculations were broken up using a hemostat but no more material was returned. The drainage cavity was then irrigated. The patients tetanus status was updated with a tetanus booster. The patient tolerated the procedure well.     Complications: None      Reggie Pinto MD PGY-2  1/28/2022 8:09 PM       Ina Salguero MD  Resident  01/28/22 2009

## 2022-01-29 NOTE — ED NOTES
PT UP WALKING IN ROOM.  PT EDUCATED ON IMPORTANCE/SAFTEY OF STAYING IN BED      Diana Clark RN  01/28/22 1948

## 2022-01-29 NOTE — ED NOTES
PT REFUSED TO USE BEDPAN AT THIS TIME. PT REQUEST TO WALK TO BR. PT WALKED WITH A STEADY GAIT. PLACED BACK INTO BED. SEIZURE PRECAUTIONS PLACED.  PT PLACED ON CARDIAC MONITOR      Juan David Sharp RN  01/28/22 1927

## 2022-01-29 NOTE — ED NOTES
PT REMOVED SELF FROM CARDIAC MONITOR. [address] AROUND DEPT. PT REPORTS WANTING TO LEAVE.  PHYSICIANS BEING CALLED AT THIS TIME FOR UPDATE ON TRANSPORT TIME     Juan Daniel Moore RN  01/28/22 3908

## 2022-01-29 NOTE — ED NOTES
PT REMOVED SHEETS OFF OF BED AT THIS TIME AND PLACED BOTH SIDE RAILS DOWN.      Sherice Phillip RN  01/28/22 9910

## 2022-01-29 NOTE — ED NOTES
Pt given zofran and ativan prior to departure. PAS loaded pt to cot.       Evie Jaimes RN  01/29/22 9660

## 2022-01-30 DIAGNOSIS — C7B.8 METASTATIC MALIGNANT NEUROENDOCRINE TUMOR TO LIVER (HCC): ICD-10-CM

## 2022-01-30 LAB
ORGANISM: ABNORMAL
URINE CULTURE, ROUTINE: ABNORMAL

## 2022-01-30 NOTE — RESULT ENCOUNTER NOTE
Reviewed by Dr. Heladio Arnold and clinical pharmacist. Pt is asymptomatic, had negative UA, and had <100k CFU/ml. No treatment recommended at this time.

## 2022-01-31 ENCOUNTER — TELEPHONE (OUTPATIENT)
Dept: SURGERY | Age: 37
End: 2022-01-31

## 2022-01-31 LAB — CHROMOGRANIN A: 98 NG/ML (ref 0–103)

## 2022-01-31 NOTE — TELEPHONE ENCOUNTER
MA received return call from patient in regards to medi-port placement. MA relayed surgery date/time and location. PT has not received COVID 19 vaccines. PT verbalized she understood prep instructions, and NPO after midnight. PT verbalized that she understood appointment date/time, as well as to arrive 2 hours prior to procedure. PT advised PAT will call to go over all medication and advise on where to park and enter the hospital at for procedure. PT has been told to make sure they have a ride to and from procedure as they are not allowed to drive after procedure. PT procedure letter was mailed to them with all instructions also on it. MA instructed PT to call the office at [phone] with any questions, comments, or concerns about the procedure. Prior Authorization Form:      DEMOGRAPHICS:                     Patient Name:  Vee Chi  Patient :  1985            Insurance:  Payor: Grayson Boyer / Plan: Eve Plummer / Product Type: *No Product type* /   Insurance ID Number:    Payor/Plan Subscr  Sex Relation Sub.  Ins. ID Effective Group Num   1. ANGELA - * DENTON SALAS 1985 Female Self [id number] [address] [facility] [address]         DIAGNOSIS & PROCEDURE:                       Procedure/Operation: medi-port placement           CPT Code: 63265    Diagnosis:  Neuroendocrine tumor to liver    ICD10 Code: C7B.8    Location:  [facility]    Surgeon:  Kalpana Stack    SCHEDULING INFORMATION:                          Date: 2022    Time: 9:30am              Anesthesia:  MAC/TIVA                                                       Status:  Outpatient        Special Comments:  N/A       Electronically signed by Liliya Cramer MA on 2022 at 9:11 AM

## 2022-01-31 NOTE — TELEPHONE ENCOUNTER
MA attempt to contact patient to discuss medi-port placement information. MA reached patient VM and left detailed message of why was calling and office number for patient to call office back and get all the details. ULI Garcia called and spoke to Lizeth and scheduled PT for medi-port placement on 02/18/2022 @ 9:30am with Dr. Tino Bowers. PT to arrive 7:30am. PT is not taking ASA/blood thinner products. MA will go over all instructions once receives return call from patient.          Electronically signed by Anaya Chavez MA on 1/31/22 at 8:55 AM EST

## 2022-02-01 ENCOUNTER — TELEPHONE (OUTPATIENT)
Dept: ONCOLOGY | Age: 37
End: 2022-02-01

## 2022-02-01 RX ORDER — POTASSIUM CHLORIDE 20 MEQ/1
TABLET, EXTENDED RELEASE ORAL
Qty: 30 TABLET | Refills: 0 | Status: SHIPPED
Start: 2022-02-01 | End: 2022-02-24 | Stop reason: SDUPTHER
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
## 2022-02-02 ENCOUNTER — HOSPITAL ENCOUNTER (OUTPATIENT)
Age: 37
Discharge: HOME | End: 2022-02-02
Payer: COMMERCIAL

## 2022-02-02 LAB
ALBUMIN SERPL-MCNC: 4.4 G/DL (ref 3.5–5.2)
ANION GAP SERPL CALCULATED.3IONS-SCNC: 12 MMOL/L (ref 7–16)
BUN BLDV-MCNC: 14 MG/DL (ref 6–20)
CALCIUM SERPL-MCNC: 9.4 MG/DL (ref 8.6–10.2)
CHLORIDE BLD-SCNC: 103 MMOL/L (ref 98–107)
CO2: 23 MMOL/L (ref 22–29)
CREAT SERPL-MCNC: 0.7 MG/DL (ref 0.5–1)
GFR AFRICAN AMERICAN: >60
GFR NON-AFRICAN AMERICAN: >60 ML/MIN/1.73
GLUCOSE BLD-MCNC: 108 MG/DL (ref 74–99)
MAGNESIUM: 1.9 MG/DL (ref 1.6–2.6)
PHOSPHORUS: 3.1 MG/DL (ref 2.5–4.5)
POTASSIUM SERPL-SCNC: 3.8 MMOL/L (ref 3.5–5)
SODIUM BLD-SCNC: 138 MMOL/L (ref 132–146)

## 2022-02-02 PROCEDURE — 36415 COLL VENOUS BLD VENIPUNCTURE: CPT

## 2022-02-02 PROCEDURE — 80069 RENAL FUNCTION PANEL: CPT

## 2022-02-02 PROCEDURE — 83735 ASSAY OF MAGNESIUM: CPT

## 2022-02-05 DIAGNOSIS — F51.01 PRIMARY INSOMNIA: ICD-10-CM

## 2022-02-07 ENCOUNTER — VIRTUAL VISIT (OUTPATIENT)
Dept: PALLATIVE CARE | Age: 37
End: 2022-02-07
Payer: COMMERCIAL

## 2022-02-07 DIAGNOSIS — G89.3 NEOPLASM RELATED PAIN: ICD-10-CM

## 2022-02-07 DIAGNOSIS — Z51.5 PALLIATIVE CARE BY SPECIALIST: Primary | ICD-10-CM

## 2022-02-07 DIAGNOSIS — R19.7 DIARRHEA, UNSPECIFIED TYPE: ICD-10-CM

## 2022-02-07 PROCEDURE — 99442 PR PHYS/QHP TELEPHONE EVALUATION 11-20 MIN: CPT | Performed by: NURSE PRACTITIONER

## 2022-02-07 RX ORDER — CALCIUM CARBONATE/VITAMIN D3 600 MG-10
TABLET ORAL
Qty: 31 TABLET | Refills: 2 | Status: SHIPPED
Start: 2022-02-07 | End: 2022-02-24

## 2022-02-07 RX ORDER — ZOLPIDEM TARTRATE 10 MG/1
TABLET ORAL
Qty: 30 TABLET | Refills: 1 | Status: SHIPPED
Start: 2022-02-07 | End: 2022-04-04

## 2022-02-07 NOTE — PROGRESS NOTES
Palliative Care Department  Palliative Care Telephone Encounter  Provider: TRINA Smiley - JESS    Kerri Seymour is a 29 y.o. female evaluated via telephone on 2/7/2022. Consent:  She and/or health care decision maker is aware that that she may receive a bill for this telephone service, depending on her insurance coverage, and has provided verbal consent to proceed: Yes      Documentation:  I communicated with the patient and/or health care decision maker regarding pain, diarrhea. Assessment/Plan   Neuroendocrine cancer  - Follows with Dr. Heather Berger s.janna Bowel resection on 10/21/20  - On Lanreotide     Neoplasm related pain  - Gabapentin 300mg TID  - Tramadol 50mg q 6 hrs prn for the pain    - Tylenol OTC PRN    Nausea  - Continue Zofran and Phenergan PRN    Diarrhea:   -Currently on Lanreotide and Xermelo  -Imodium she uses this daily  -Add 1-3 jumbo marshmallows daily      Follow Up:  8 weeks. They were encouraged to call with any questions, concerns, needs, or changes in symptoms. Subjective   HPI:  Kerri Seymour is a 29 y.o. female with significant medical history of hypothyroidism, IBS, migraine who was complaining of abdominal pain associated with diarrhea and flushing/sweating. She was diagnosed with metastatic well differentiated Neuroendocrine cancer to liver who was referred to [address] [place] [address] by Dr. Rebekah Briscoe. Details of this discussion including any medical advice provided:   A telephonic virtual visit was completed via telephone with Kerri Seymour today regarding the issues listed above. She reports that overall today she is doing fairly well. She does report that she has had a difficult last month, dealing with Covid herself, as well as dealing with some other psychosocial medical problems. She was treated clean clinic for an ear abscess is also followed by neurology regarding seizure concern.   She since returned home, and states that she feels she is improving. She does continue to have some pain, utilizing her tramadol as needed, as well as some Tylenol, and this is helpful. She does report she continues to have diarrhea, but this is at her baseline. She is to have Mediport placed soon, and she is looking forward to this. She denies any needs, not make any changes to plan of care, plan to see her again in approximately 2 months to reassess her needs. Controlled Substance Monitoring: [place] reviewed 2/7/22      I affirm this episode is with an Established Patient. Total Time: minutes: 11-20 minutes      Cindy Burkett [facility] Palliative Medicine    Note: not billable if this call serves to triage the patient into an appointment for the relevant concern    Note: This report was completed using computerize voice recognition software. Every effort has been made to ensure accuracy; however, inadvertent computerized transcription errors may be present.

## 2022-02-14 RX ORDER — PANTOPRAZOLE SODIUM 40 MG/1
TABLET, DELAYED RELEASE ORAL
Qty: 30 TABLET | Refills: 3 | Status: SHIPPED
Start: 2022-02-14 | End: 2022-06-27

## 2022-02-15 ENCOUNTER — HOSPITAL ENCOUNTER (OUTPATIENT)
Age: 37
Discharge: HOME | End: 2022-02-17
Payer: COMMERCIAL

## 2022-02-15 DIAGNOSIS — U07.1 COVID: ICD-10-CM

## 2022-02-15 PROCEDURE — U0005 INFEC AGEN DETEC AMPLI PROBE: HCPCS

## 2022-02-15 PROCEDURE — U0003 INFECTIOUS AGENT DETECTION BY NUCLEIC ACID (DNA OR RNA); SEVERE ACUTE RESPIRATORY SYNDROME CORONAVIRUS 2 (SARS-COV-2) (CORONAVIRUS DISEASE [COVID-19]), AMPLIFIED PROBE TECHNIQUE, MAKING USE OF HIGH THROUGHPUT TECHNOLOGIES AS DESCRIBED BY CMS-2020-01-R: HCPCS

## 2022-02-15 RX ORDER — LEVETIRACETAM 750 MG/1
750 TABLET ORAL 2 TIMES DAILY
Qty: 60 TABLET | Refills: 5 | Status: SHIPPED
Start: 2022-02-15 | End: 2022-06-16

## 2022-02-15 NOTE — PROGRESS NOTES
Patient states she is concerned about stopping her Vitamins prior to her procedure due to cramping. Spoke with arnol Ford for patient to take vitamins up until day of surgery. Patient notified, verbalized understanding.

## 2022-02-15 NOTE — PROGRESS NOTES
Ashish 37 PRE-ADMISSION TESTING GENERAL INSTRUCTIONS- [facility]-phone number:[phone]    GENERAL INSTRUCTIONS  [x] Antibacterial Soap shower Night before and/or AM of Surgery  [] Axel wipe instruction sheet and wipes given. [x] Nothing by mouth after midnight, including gum, candy, mints, or water. [x] You may brush your teeth, gargle, but do NOT swallow water. []Hibiclens shower  the night before and the morning of surgery. Do not use             Hibiclens on your face or head. [x]No smoking, chewing tobacco, illegal drugs, or alcohol within 24 hours of your surgery. [x] Jewelry, valuables or body piercing's should not be brought to the hospital. All body and/or tongue piercing's must be removed prior to arriving to hospital.  ALL hair pins must be removed. [x] Do not wear makeup, lotions, powders, deodorant. Nail polish as directed by the nurse. [x] Arrange transportation with a responsible adult  to and from the hospital. If you do not have a responsible adult  to transport you, you will need to make arrangements with a medical transportation company (i.e. [employer]. A Uber/taxi/bus is not appropriate unless you are accompanied by a responsible adult ). Arrange for someone to be with you for the remainder of the day and for 24 hours after your procedure due to having had anesthesia. Who will be your  for transportation?___Sue_____   Who will be staying with you for 24 hrs after your procedure?__________________  [x] Bring insurance card and photo ID.  [] Transfusion Bracelet: Please bring with you to hospital, day of surgery  [] Bring urine specimen day of surgery. Any small container is acceptable. [] Use inhalers the morning of surgery and bring with you to hospital.  [] Bring copy of living will or healthcare power of  papers to be placed in your electronic record.   [] CPAP/BI-PAP: Please bring your machine if you are to spend the night in the hospital.     PARKING INSTRUCTIONS:   [x] Arrival Time:__0630_____  · [x] Parking lot '\"I\"  is located on [facility] (the corner of [facility] and [facility]). To enter, press the button and the gate will lift. A free token will be provided to exit the lot. One car per patient is allowed to park in this lot. All other cars are to park on [address] [place] [address] Street either in the parking garage or the handicap lot. [] To reach the [facility] lobby from [address] [place] [address], upon entering the hospital, take elevator B to the 3rd floor. EDUCATION INSTRUCTIONS:      [] Knee or hip replacement booklet & exercise pamphlets given. [] Vivian Ramsey placed in chart. [] Pre-admission Testing educational folder given  [] Incentive Spirometry,coughing & deep breathing exercises reviewed. []Medication information sheet(s)   []Fluoroscopy-Xray used in surgery reviewed with patient. Educational pamphlet placed in chart. []Pain: Post-op pain is normal and to be expected. You will be asked to rate your pain from 0-10(a zero is not acceptable-education is needed). Your post-op pain goal is:  [] Ask your nurse for your pain medication. [] Joint camp offered. [] Joint replacement booklets given. [] Other:___________________________    MEDICATION INSTRUCTIONS:   [x]Bring a complete list of your medications, please write the last time you took the medicine, give this list to the nurse. [x] Take the following medications the morning of surgery with 1-2 ounces of water:  Ultram if needed, gabapentin, Keppra. Topamax, Sertraline, Pepcid, Protonix  [x] Stop herbal supplements and vitamins 5 days before your surgery. [] DO NOT take any diabetic medicine the morning of surgery. Follow instructions for insulin the day before surgery. [] If you are diabetic and your blood sugar is low or you feel symptomatic, you may drink 1-2 ounces of apple juice or take a glucose tablet.   The morning of your procedure, you may call the pre-op area if you have concerns about your blood sugar [phone]. [] Use your inhalers the morning of surgery. Bring your emergency inhaler with you day of surgery. [x] Follow physician instructions regarding any blood thinners you may be taking. WHAT TO EXPECT:  [x] The day of surgery you will be greeted and checked in by the [facility] [employer] [facility].  In addition, you will be registered in the [facility] by a Patient Access Representative. Please bring your photo ID and insurance card. A nurse will greet you in accordance to the time you are needed in the pre-op area to prepare you for surgery. Please do not be discouraged if you are not greeted in the order you arrive as there are many variables that are involved in patient preparation. Your patience is greatly appreciated as you wait for your nurse. Please bring in items such as: books, magazines, newspapers, electronics, or any other items  to occupy your time in the waiting area. [x]  Delays may occur with surgery and staff will make a sincere effort to keep you informed of delays. If any delays occur with your procedure, we apologize ahead of time for your inconvenience as we recognize the value of your time.

## 2022-02-16 ENCOUNTER — TELEPHONE (OUTPATIENT)
Dept: ONCOLOGY | Age: 37
End: 2022-02-16

## 2022-02-16 LAB — SARS-COV-2, PCR: NOT DETECTED

## 2022-02-16 NOTE — TELEPHONE ENCOUNTER
Leslee Maier contacted office regarding peer to peer for CT Brain please contact [phone] tracking # [id number]

## 2022-02-17 ENCOUNTER — PREP FOR PROCEDURE (OUTPATIENT)
Dept: SURGERY | Age: 37
End: 2022-02-17

## 2022-02-17 ENCOUNTER — ANESTHESIA EVENT (OUTPATIENT)
Dept: OPERATING ROOM | Age: 37
End: 2022-02-17
Payer: COMMERCIAL

## 2022-02-17 RX ORDER — SODIUM CHLORIDE 9 MG/ML
INJECTION, SOLUTION INTRAVENOUS CONTINUOUS
Status: CANCELLED | OUTPATIENT
Start: 2022-02-17

## 2022-02-17 RX ORDER — SODIUM CHLORIDE 0.9 % (FLUSH) 0.9 %
10 SYRINGE (ML) INJECTION PRN
Status: CANCELLED | OUTPATIENT
Start: 2022-02-17

## 2022-02-17 RX ORDER — SODIUM CHLORIDE 0.9 % (FLUSH) 0.9 %
10 SYRINGE (ML) INJECTION EVERY 12 HOURS SCHEDULED
Status: CANCELLED | OUTPATIENT
Start: 2022-02-17

## 2022-02-17 RX ORDER — TOPIRAMATE 25 MG/1
CAPSULE, COATED PELLETS ORAL
Qty: 30 CAPSULE | Refills: 2 | OUTPATIENT
Start: 2022-02-17

## 2022-02-17 RX ORDER — SODIUM CHLORIDE 9 MG/ML
25 INJECTION, SOLUTION INTRAVENOUS PRN
Status: CANCELLED | OUTPATIENT
Start: 2022-02-17

## 2022-02-18 ENCOUNTER — HOSPITAL ENCOUNTER (OUTPATIENT)
Age: 37
Setting detail: OUTPATIENT SURGERY
Discharge: HOME | End: 2022-02-18
Attending: SURGERY | Admitting: SURGERY
Payer: COMMERCIAL

## 2022-02-18 ENCOUNTER — APPOINTMENT (OUTPATIENT)
Dept: GENERAL RADIOLOGY | Age: 37
End: 2022-02-18
Attending: SURGERY
Payer: COMMERCIAL

## 2022-02-18 ENCOUNTER — ANESTHESIA (OUTPATIENT)
Dept: OPERATING ROOM | Age: 37
End: 2022-02-18
Payer: COMMERCIAL

## 2022-02-18 VITALS
RESPIRATION RATE: 16 BRPM | TEMPERATURE: 97.7 F | HEART RATE: 71 BPM | HEIGHT: 60 IN | BODY MASS INDEX: 24.54 KG/M2 | DIASTOLIC BLOOD PRESSURE: 88 MMHG | WEIGHT: 125 LBS | SYSTOLIC BLOOD PRESSURE: 139 MMHG | OXYGEN SATURATION: 97 %

## 2022-02-18 VITALS — DIASTOLIC BLOOD PRESSURE: 63 MMHG | SYSTOLIC BLOOD PRESSURE: 110 MMHG | OXYGEN SATURATION: 100 %

## 2022-02-18 DIAGNOSIS — Z45.2 ENCOUNTER FOR INSERTION OF VENOUS ACCESS PORT: ICD-10-CM

## 2022-02-18 DIAGNOSIS — U07.1 COVID: Primary | ICD-10-CM

## 2022-02-18 DIAGNOSIS — Z01.812 PRE-OPERATIVE LABORATORY EXAMINATION: ICD-10-CM

## 2022-02-18 LAB
HCG, URINE, POC: NEGATIVE
Lab: NORMAL
NEGATIVE QC PASS/FAIL: NORMAL
POSITIVE QC PASS/FAIL: NORMAL

## 2022-02-18 PROCEDURE — 2580000003 HC RX 258: Performed by: SURGERY

## 2022-02-18 PROCEDURE — 36561 INSERT TUNNELED CV CATH: CPT | Performed by: SURGERY

## 2022-02-18 PROCEDURE — 6360000002 HC RX W HCPCS: Performed by: SURGERY

## 2022-02-18 PROCEDURE — 2500000003 HC RX 250 WO HCPCS: Performed by: SURGERY

## 2022-02-18 PROCEDURE — 3700000000 HC ANESTHESIA ATTENDED CARE: Performed by: SURGERY

## 2022-02-18 PROCEDURE — 7100000011 HC PHASE II RECOVERY - ADDTL 15 MIN: Performed by: SURGERY

## 2022-02-18 PROCEDURE — 3600000013 HC SURGERY LEVEL 3 ADDTL 15MIN: Performed by: SURGERY

## 2022-02-18 PROCEDURE — C1788 PORT, INDWELLING, IMP: HCPCS | Performed by: SURGERY

## 2022-02-18 PROCEDURE — 3600000003 HC SURGERY LEVEL 3 BASE: Performed by: SURGERY

## 2022-02-18 PROCEDURE — 3700000001 HC ADD 15 MINUTES (ANESTHESIA): Performed by: SURGERY

## 2022-02-18 PROCEDURE — 6360000002 HC RX W HCPCS

## 2022-02-18 PROCEDURE — 71045 X-RAY EXAM CHEST 1 VIEW: CPT

## 2022-02-18 PROCEDURE — 2709999900 HC NON-CHARGEABLE SUPPLY: Performed by: SURGERY

## 2022-02-18 PROCEDURE — 6370000000 HC RX 637 (ALT 250 FOR IP): Performed by: ANESTHESIOLOGY

## 2022-02-18 PROCEDURE — 3209999900 FLUORO FOR SURGICAL PROCEDURES

## 2022-02-18 PROCEDURE — 7100000010 HC PHASE II RECOVERY - FIRST 15 MIN: Performed by: SURGERY

## 2022-02-18 PROCEDURE — 2580000003 HC RX 258

## 2022-02-18 PROCEDURE — 77001 FLUOROGUIDE FOR VEIN DEVICE: CPT | Performed by: SURGERY

## 2022-02-18 DEVICE — PORT INFUS 8FR PWR INJ CT FOR VASC ACCS CATH: Type: IMPLANTABLE DEVICE | Site: CHEST | Status: FUNCTIONAL

## 2022-02-18 RX ORDER — SODIUM CHLORIDE 9 MG/ML
INJECTION, SOLUTION INTRAVENOUS CONTINUOUS
Status: DISCONTINUED | OUTPATIENT
Start: 2022-02-18 | End: 2022-02-18 | Stop reason: HOSPADM

## 2022-02-18 RX ORDER — FENTANYL CITRATE 50 UG/ML
INJECTION, SOLUTION INTRAMUSCULAR; INTRAVENOUS PRN
Status: DISCONTINUED | OUTPATIENT
Start: 2022-02-18 | End: 2022-02-18 | Stop reason: SDUPTHER

## 2022-02-18 RX ORDER — PROPOFOL 10 MG/ML
INJECTION, EMULSION INTRAVENOUS CONTINUOUS PRN
Status: DISCONTINUED | OUTPATIENT
Start: 2022-02-18 | End: 2022-02-18 | Stop reason: SDUPTHER

## 2022-02-18 RX ORDER — HEPARIN SODIUM 1000 [USP'U]/ML
INJECTION, SOLUTION INTRAVENOUS; SUBCUTANEOUS PRN
Status: DISCONTINUED | OUTPATIENT
Start: 2022-02-18 | End: 2022-02-18 | Stop reason: ALTCHOICE

## 2022-02-18 RX ORDER — ONDANSETRON 2 MG/ML
INJECTION INTRAMUSCULAR; INTRAVENOUS PRN
Status: DISCONTINUED | OUTPATIENT
Start: 2022-02-18 | End: 2022-02-18 | Stop reason: SDUPTHER

## 2022-02-18 RX ORDER — MIDAZOLAM HYDROCHLORIDE 1 MG/ML
INJECTION INTRAMUSCULAR; INTRAVENOUS PRN
Status: DISCONTINUED | OUTPATIENT
Start: 2022-02-18 | End: 2022-02-18 | Stop reason: SDUPTHER

## 2022-02-18 RX ORDER — LIDOCAINE HYDROCHLORIDE 20 MG/ML
INJECTION, SOLUTION INTRAVENOUS PRN
Status: DISCONTINUED | OUTPATIENT
Start: 2022-02-18 | End: 2022-02-18 | Stop reason: SDUPTHER

## 2022-02-18 RX ORDER — HYDROCODONE BITARTRATE AND ACETAMINOPHEN 5; 325 MG/1; MG/1
1 TABLET ORAL EVERY 6 HOURS PRN
Qty: 8 TABLET | Refills: 0 | Status: SHIPPED | OUTPATIENT
Start: 2022-02-18 | End: 2022-02-23

## 2022-02-18 RX ORDER — HEPARIN SODIUM (PORCINE) LOCK FLUSH IV SOLN 100 UNIT/ML 100 UNIT/ML
SOLUTION INTRAVENOUS PRN
Status: DISCONTINUED | OUTPATIENT
Start: 2022-02-18 | End: 2022-02-18 | Stop reason: ALTCHOICE

## 2022-02-18 RX ORDER — HYDROCODONE BITARTRATE AND ACETAMINOPHEN 5; 325 MG/1; MG/1
1 TABLET ORAL
Status: COMPLETED | OUTPATIENT
Start: 2022-02-18 | End: 2022-02-18

## 2022-02-18 RX ORDER — BUPIVACAINE HYDROCHLORIDE AND EPINEPHRINE 2.5; 5 MG/ML; UG/ML
INJECTION, SOLUTION EPIDURAL; INFILTRATION; INTRACAUDAL; PERINEURAL PRN
Status: DISCONTINUED | OUTPATIENT
Start: 2022-02-18 | End: 2022-02-18 | Stop reason: ALTCHOICE

## 2022-02-18 RX ORDER — SODIUM CHLORIDE 0.9 % (FLUSH) 0.9 %
10 SYRINGE (ML) INJECTION EVERY 12 HOURS SCHEDULED
Status: DISCONTINUED | OUTPATIENT
Start: 2022-02-18 | End: 2022-02-18 | Stop reason: HOSPADM

## 2022-02-18 RX ORDER — SODIUM CHLORIDE 9 MG/ML
25 INJECTION, SOLUTION INTRAVENOUS PRN
Status: DISCONTINUED | OUTPATIENT
Start: 2022-02-18 | End: 2022-02-18 | Stop reason: HOSPADM

## 2022-02-18 RX ORDER — SODIUM CHLORIDE 9 MG/ML
INJECTION, SOLUTION INTRAVENOUS CONTINUOUS PRN
Status: DISCONTINUED | OUTPATIENT
Start: 2022-02-18 | End: 2022-02-18 | Stop reason: SDUPTHER

## 2022-02-18 RX ORDER — SODIUM CHLORIDE 0.9 % (FLUSH) 0.9 %
10 SYRINGE (ML) INJECTION PRN
Status: DISCONTINUED | OUTPATIENT
Start: 2022-02-18 | End: 2022-02-18 | Stop reason: HOSPADM

## 2022-02-18 RX ADMIN — HYDROCODONE BITARTRATE AND ACETAMINOPHEN 1 TABLET: 5; 325 TABLET ORAL at 10:15

## 2022-02-18 RX ADMIN — Medication 2000 MG: at 08:48

## 2022-02-18 RX ADMIN — MIDAZOLAM 2 MG: 1 INJECTION INTRAMUSCULAR; INTRAVENOUS at 08:49

## 2022-02-18 RX ADMIN — ONDANSETRON 4 MG: 2 INJECTION INTRAMUSCULAR; INTRAVENOUS at 09:03

## 2022-02-18 RX ADMIN — FENTANYL CITRATE 50 MCG: 50 INJECTION, SOLUTION INTRAMUSCULAR; INTRAVENOUS at 08:57

## 2022-02-18 RX ADMIN — SODIUM CHLORIDE: 9 INJECTION, SOLUTION INTRAVENOUS at 08:40

## 2022-02-18 RX ADMIN — FENTANYL CITRATE 50 MCG: 50 INJECTION, SOLUTION INTRAMUSCULAR; INTRAVENOUS at 09:01

## 2022-02-18 RX ADMIN — MIDAZOLAM 1 MG: 1 INJECTION INTRAMUSCULAR; INTRAVENOUS at 09:18

## 2022-02-18 RX ADMIN — PROPOFOL 100 MCG/KG/MIN: 10 INJECTION, EMULSION INTRAVENOUS at 08:55

## 2022-02-18 RX ADMIN — MIDAZOLAM 1 MG: 1 INJECTION INTRAMUSCULAR; INTRAVENOUS at 08:53

## 2022-02-18 RX ADMIN — LIDOCAINE HYDROCHLORIDE 40 MG: 20 INJECTION, SOLUTION INTRAVENOUS at 08:54

## 2022-02-18 RX ADMIN — SODIUM CHLORIDE: 9 INJECTION, SOLUTION INTRAVENOUS at 07:12

## 2022-02-18 ASSESSMENT — PAIN DESCRIPTION - DESCRIPTORS
DESCRIPTORS: DISCOMFORT
DESCRIPTORS: DISCOMFORT

## 2022-02-18 ASSESSMENT — PAIN DESCRIPTION - PROGRESSION
CLINICAL_PROGRESSION: GRADUALLY IMPROVING
CLINICAL_PROGRESSION: NOT CHANGED
CLINICAL_PROGRESSION: NOT CHANGED

## 2022-02-18 ASSESSMENT — ENCOUNTER SYMPTOMS
EYES NEGATIVE: 1
ABDOMINAL DISTENTION: 0
CONSTIPATION: 0
ABDOMINAL PAIN: 0
BACK PAIN: 1
VOMITING: 1
BACK PAIN: 0
RESPIRATORY NEGATIVE: 1
BLOOD IN STOOL: 0
ALLERGIC/IMMUNOLOGIC NEGATIVE: 1
VOMITING: 0
COUGH: 0
NAUSEA: 0
GASTROINTESTINAL NEGATIVE: 1
DIARRHEA: 0
NAUSEA: 1
ANAL BLEEDING: 0
SHORTNESS OF BREATH: 0
DIARRHEA: 1

## 2022-02-18 ASSESSMENT — PAIN DESCRIPTION - LOCATION
LOCATION: CHEST
LOCATION: CHEST

## 2022-02-18 ASSESSMENT — PAIN DESCRIPTION - FREQUENCY
FREQUENCY: CONTINUOUS
FREQUENCY: CONTINUOUS

## 2022-02-18 ASSESSMENT — PAIN DESCRIPTION - ORIENTATION
ORIENTATION: LEFT
ORIENTATION: LEFT

## 2022-02-18 ASSESSMENT — PAIN DESCRIPTION - PAIN TYPE
TYPE: SURGICAL PAIN
TYPE: SURGICAL PAIN;ACUTE PAIN

## 2022-02-18 ASSESSMENT — PAIN SCALES - GENERAL
PAINLEVEL_OUTOF10: 7
PAINLEVEL_OUTOF10: 10
PAINLEVEL_OUTOF10: 10
PAINLEVEL_OUTOF10: 6

## 2022-02-18 ASSESSMENT — PAIN DESCRIPTION - ONSET
ONSET: GRADUAL
ONSET: GRADUAL

## 2022-02-18 ASSESSMENT — PAIN - FUNCTIONAL ASSESSMENT: PAIN_FUNCTIONAL_ASSESSMENT: 0-10

## 2022-02-18 NOTE — H&P
GENERAL SURGERY  HISTORY AND PHYSICAL  2/18/2022    No chief complaint on file. RAYMOND Joyner is a 29 y.o. female who presents for Mediport placement. 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 She is s/p small bowel resection on 10/21/2020. She follows with Dr. Asha Mcfarlane. She is in need of a port for ongoing blood drawing. There are not current plans for further chemotherapy. She has had a prior Tesio dialysis catheter placed in her right IJ in 2021. She does not take any blood thinning medications. Past Medical History:   Diagnosis Date    Abdominal pain     Cancer ([facility] [address]     neuroendocrime tumor    Diarrhea     Hypocalcemia     Hypothyroidism     Irritable bowel syndrome     Migraines     Seizures ([facility] [address]     last episode January 2021    Status post alcohol detoxification     5/22/2018-5/29/2018       Past Surgical History:   Procedure Laterality Date    CHOLECYSTECTOMY, LAPAROSCOPIC  07/06/2016    COLONOSCOPY N/A 6/22/2018    COLONOSCOPY WITH BIOPSY performed by Shadi Judd MD at [address] [facility] [address] BIOPSY RENAL  1/25/2021    CT BIOPSY RENAL 1/25/2021 Eber Alva MD [facility]    CT NEEDLE BIOPSY LIVER PERCUTANEOUS  9/1/2020    CT NEEDLE BIOPSY LIVER PERCUTANEOUS 9/1/2020 [facility] CT    HC DIALYSIS CATHETER N/A 1/28/2021    TESIO CATHETER INSERTION AND REMOVAL OF TEMPORARY performed by Derian Powell MD at [address] [place] [address] N/A 5/14/2021    SIGMOIDOSCOPY PERIANAL BOTOX INJECTION   (50 UNITS) performed by Shadi Judd MD at [address] [place] [address] 10/21/2020    LAPAROSCOPIC ROBOTIC SMALL BOWEL RESECTION WITH PLANNED TRANSITION TO OPEN performed by Allen Clarke MD at [facility]         Prior to Admission medications    Medication Sig Start Date End Date Taking?  Authorizing Provider butalbital-acetaminophen-caffeine (FIORICET, ESGIC) -40 MG per tablet take 1 tablet by mouth once daily if needed for headache 1/24/22  Yes Verena Mirza DO   traMADol (ULTRAM) 50 MG tablet Take 1 tablet by mouth every 6 hours as needed for Pain for up to 90 days. 1/11/22 4/11/22 Yes Louisa Frankel, APRN - CNP   fluticasone Marleni Durie) 50 MCG/ACT nasal spray instill 1 spray into each nostril once daily 12/23/21  Yes Verena Mirza DO   acetaminophen (AMINOFEN) 325 MG tablet Take 2 tablets by mouth every 6 hours as needed for Pain 9/10/21  Yes Louisa Frankel, APRN - CNP   hydrocortisone (PROCTO-MED HC) 2.5 % CREA rectal cream apply to ANAL AREA after [facility] 6/1/21  Yes Marin Ho III, MD   topiramate (TOPAMAX) 50 MG tablet Take 1.5 tablets by mouth 2 times daily 3/2/21  Yes TRINA Velez CNP   levETIRAcetam (KEPPRA) 750 MG tablet Take 1 tablet by mouth 2 times daily 2/15/22 3/17/22  TRINA Velez CNP   pantoprazole (PROTONIX) 40 MG tablet take 1 tablet by mouth every morning before breakfast 2/14/22   Verena Mirza DO   zolpidem (AMBIEN) 10 MG tablet take 1 tablet by mouth at bedtime 2/7/22 3/9/22  Filiberto Ellis DO   calcium carb-cholecalciferol 600-400 MG-UNIT TABS per tab take 1 tablet by mouth once daily 2/7/22   TRINA Wolfe CNP   potassium chloride (KLOR-CON M) 20 MEQ extended release tablet take 1 tablet by mouth once daily 2/1/22   TRINA Wolfe CNP   gabapentin (NEURONTIN) 300 MG capsule Take 1 capsule by mouth 3 times daily for 90 days.  1/10/22 4/10/22  Louisa Frankel, APRN - CNP   levothyroxine (SYNTHROID) 112 MCG tablet take 1 tablet by mouth once daily 12/28/21   Verena Mirza DO   Pramoxine-HC (HYDROCORTISONE-PRAMOXINE) 2.5-1 % CREA cream apply topically twice a day 9/2/21   Demetrius Baez MD   Telotristat Ethyl,as Etiprate, (XERMELO) 250 MG TABS  5/25/21   Historical Provider, MD   dicyclomine (BENTYL) 20 MG tablet take 1 tablet by mouth twice a day 6/13/21   Historical Provider, MD   famotidine (PEPCID) 40 MG tablet take 1 tablet by mouth daily 6/20/21   Historical Provider, MD   hydrocortisone 2.5 % cream  6/13/21   Historical Provider, MD   promethazine (PHENERGAN) 25 MG tablet Take 1 tablet by mouth as needed for Nausea or Vomiting 1/11/21   Historical Provider, MD   magnesium oxide (MAG-OX) 400 MG tablet Take 400 mg by mouth 2 times daily    Historical Provider, MD       No Known Allergies    Family History   Problem Relation Age of Onset    High Blood Pressure Mother     High Cholesterol Mother     Other Mother         migraine headaches    Heart Disease Father     Asthma Father     High Blood Pressure Father     Cancer Father         Sarcoidosis    Diabetes Other         GRANDMOTHER    Lung Cancer Other         GRANDFATHER    Alzheimer's Disease Other         GRANDMOTHER    Breast Cancer Maternal Grandmother     Cancer Maternal Grandmother         skin    Cancer Paternal Grandfather         lung       Social History     Tobacco Use    Smoking status: Current Every Day Smoker     Packs/day: 0.25     Types: Cigarettes    Smokeless tobacco: Never Used   Vaping Use    Vaping Use: Never used   Substance Use Topics    Alcohol use: No     Alcohol/week: 0.0 standard drinks     Comment: 5 years sober    Drug use: No         Review of Systems   Review of Systems   Constitutional: Positive for activity change, appetite change and unexpected weight change. Negative for chills and fever. Respiratory: Negative for cough and shortness of breath. Cardiovascular: Negative for chest pain and palpitations. Gastrointestinal: Positive for diarrhea, nausea and vomiting. Negative for abdominal distention, abdominal pain and constipation. Genitourinary: Negative for difficulty urinating and dysuria. Musculoskeletal: Positive for back pain and myalgias. Negative for joint swelling.    Neurological: Positive for dizziness and seizures. Psychiatric/Behavioral: Positive for decreased concentration. Negative for behavioral problems and confusion. PHYSICAL EXAM:    Vitals:    02/18/22 0658   BP: (!) 153/77   Pulse: 84   Resp: 20   Temp: 97.6 °F (36.4 °C)   SpO2: 96%       General Appearance:  awake, alert, oriented, in no acute distress  Skin:  Skin color, texture, turgor normal. No rashes or lesions. Head/face:  NCAT  Eyes:  No gross abnormalities. Sclera nonicteric  Lungs/Chest:  Normal expansion. No chest wall tenderness  Heart: Warm throughout. No chest pain  Abdomen:  Soft, non tender, non distended. No palpable organomegaly or masses. Extremities: Extremities warm to touch, pink, with no edema. LABS:  CBC  No results for input(s): WBC, HGB, HCT, PLT in the last 72 hours. BMP  No results for input(s): NA, K, CL, CO2, BUN, CREATININE, GLU, CALCIUM in the last 72 hours. Liver Function  No results for input(s): AMYLASE, LIPASE, BILITOT, BILIDIR, AST, ALT, ALKPHOS, PROT, LABALBU in the last 72 hours. No results for input(s): LACTATE in the last 72 hours. No results for input(s): INR, PTT in the last 72 hours. Invalid input(s): PT    RADIOLOGY    No results found. ASSESSMENT:  29 y.o. 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 Presenting for Mediport placement for ongoing treatment.     PLAN:  -Mediport placement today  -NPO for procedure  -hold anticoagulation    Electronically signed by Nina Grover DO on 2/18/22 at 7:25 AM EST

## 2022-02-18 NOTE — H&P
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 She has had a resection. She has been undergoing chemo and needs a port now to continue.     Past Medical History:   Diagnosis Date    Abdominal pain     Cancer ([facility] [address]     neuroendocrime tumor    Diarrhea     Hypocalcemia     Hypothyroidism     Irritable bowel syndrome     Migraines     Seizures ([facility] [address]     last episode January 2021    Status post alcohol detoxification     5/22/2018-5/29/2018     Past Surgical History:   Procedure Laterality Date    CHOLECYSTECTOMY, LAPAROSCOPIC  07/06/2016    COLONOSCOPY N/A 6/22/2018    COLONOSCOPY WITH BIOPSY performed by Yaya Mendez MD at [address] BIOPSY RENAL  1/25/2021    CT BIOPSY RENAL 1/25/2021 Hector Oconnor MD [facility]    CT NEEDLE BIOPSY LIVER PERCUTANEOUS  9/1/2020    CT NEEDLE BIOPSY LIVER PERCUTANEOUS 9/1/2020 [facility] CT    HC DIALYSIS CATHETER N/A 1/28/2021    TESIO CATHETER INSERTION AND REMOVAL OF TEMPORARY performed by Brigida Maharaj MD at [address] [facility] N/A 5/14/2021    SIGMOIDOSCOPY PERIANAL BOTOX INJECTION   (48 UNITS) performed by Yaya Mendez MD at [place] [address] N/A 10/21/2020    LAPAROSCOPIC ROBOTIC SMALL BOWEL RESECTION WITH PLANNED TRANSITION TO OPEN performed by Tegan Shine MD at [address] THYROIDECTOMY       Family History   Problem Relation Age of Onset    High Blood Pressure Mother     High Cholesterol Mother     Other Mother         migraine headaches    Heart Disease Father     Asthma Father     High Blood Pressure Father     Cancer Father         Sarcoidosis    Diabetes Other         GRANDMOTHER    Lung Cancer Other         GRANDFATHER    Alzheimer's Disease Other         GRANDMOTHER    Breast Cancer Maternal Grandmother     Cancer Maternal Grandmother         skin    Cancer Paternal Grandfather         lung     Social History     Tobacco Use    Smoking status: Current Every Day Smoker     Packs/day: 0.25     Types: Cigarettes    Smokeless tobacco: Never Used   Vaping Use    Vaping Use: Never used   Substance Use Topics    Alcohol use: No     Alcohol/week: 0.0 standard drinks     Comment: 5 years sober    Drug use: No     No Known Allergies  Current Facility-Administered Medications   Medication Dose Route Frequency Provider Last Rate Last Admin    0.9 % sodium chloride infusion   IntraVENous Continuous Hannah Meyer  mL/hr at 02/18/22 0712 New Bag at 02/18/22 0712    0.9 % sodium chloride infusion  25 mL IntraVENous PRN Hannah Meyer MD        ceFAZolin (ANCEF) 2000 mg in sterile water 20 mL IV syringe  2,000 mg IntraVENous On Call to [address], MD        sodium chloride flush 0.9 % injection 10 mL  10 mL IntraVENous 2 times per day Hannah Meyer MD        sodium chloride flush 0.9 % injection 10 mL  10 mL IntraVENous PRN Hannah Meyer MD         Review of Systems   Constitutional: Negative. Negative for activity change, appetite change and unexpected weight change. HENT: Negative. Eyes: Negative. Respiratory: Negative. Negative for cough and shortness of breath. Cardiovascular: Negative. Negative for chest pain and leg swelling. Gastrointestinal: Negative. Negative for abdominal distention, abdominal pain, anal bleeding, blood in stool, constipation, diarrhea, nausea and vomiting. Endocrine: Negative. Genitourinary: Negative. Musculoskeletal: Negative. Negative for arthralgias, back pain, gait problem, joint swelling and myalgias. Skin: Negative. Allergic/Immunologic: Negative. Neurological: Negative. Negative for dizziness, weakness and headaches. Hematological: Negative. Psychiatric/Behavioral: Negative.   Negative for confusion, decreased concentration and sleep disturbance. BP (!) 153/77   Pulse 84   Temp 97.6 °F (36.4 °C) (Temporal)   Resp 20   Ht 5' (1.524 m)   Wt 125 lb (56.7 kg)   LMP 01/12/2022 (Within Weeks)   SpO2 96%   BMI 24.41 kg/m²   Physical Exam  Constitutional:       Appearance: Normal appearance. HENT:      Head: Normocephalic and atraumatic. Nose: Nose normal.      Mouth/Throat:      Mouth: Mucous membranes are moist.      Pharynx: Oropharynx is clear. Eyes:      Extraocular Movements: Extraocular movements intact. Pupils: Pupils are equal, round, and reactive to light. Cardiovascular:      Rate and Rhythm: Normal rate. Pulses: Normal pulses. Pulmonary:      Effort: Pulmonary effort is normal.   Musculoskeletal:         General: No tenderness or signs of injury. Cervical back: Normal range of motion and neck supple. Skin:     General: Skin is warm and dry. Neurological:      General: No focal deficit present. Mental Status: She is alert and oriented to person, place, and time. Psychiatric:         Mood and Affect: Mood normal.         Behavior: Behavior normal.         Thought Content:  Thought content normal.         Judgment: Judgment normal.         ASSESSMENT/PLAN:  Metastatic carcinoid tumor--for mediport placement today  I explained the r/b/a to the procedure and she wishes to proceed    Nickie Rodriguez MD, MSc, FACS  2/18/2022  8:38 AM

## 2022-02-18 NOTE — ANESTHESIA PRE PROCEDURE
Department of Anesthesiology  Preprocedure Note       Name:  Dorothea Pinto   Age:  29 y.o.  :  1985                                          MRN:  [id number]         Date:  2022      Surgeon: Jose Guadalupe Sue):  Lea Cooper MD    Procedure: Procedure(s):  MEDI PORT PLACEMENT    Medications prior to admission:   Prior to Admission medications    Medication Sig Start Date End Date Taking? Authorizing Provider   butalbital-acetaminophen-caffeine (FIORICET, ESGIC) -15 MG per tablet take 1 tablet by mouth once daily if needed for headache 22  Yes Yolanda Adamson DO   traMADol (ULTRAM) 50 MG tablet Take 1 tablet by mouth every 6 hours as needed for Pain for up to 90 days.  22 Yes TRINA Vega CNP   fluticasone [facility]) 50 MCG/ACT nasal spray instill 1 spray into each nostril once daily 21  Yes Yolanda Adamson DO   acetaminophen (AMINOFEN) 325 MG tablet Take 2 tablets by mouth every 6 hours as needed for Pain 9/10/21  Yes TRINA Vega CNP   hydrocortisone (PROCTO-MED HC) 2.5 % CREA rectal cream apply to ANAL AREA after [address] 21  Yes Dimitrios Person III, MD   topiramate (TOPAMAX) 50 MG tablet Take 1.5 tablets by mouth 2 times daily 3/2/21  Yes TRINA Fofana CNP   levETIRAcetam (KEPPRA) 750 MG tablet Take 1 tablet by mouth 2 times daily 2/15/22 3/17/22  TRINA Fofana CNP   pantoprazole (PROTONIX) 40 MG tablet take 1 tablet by mouth every morning before breakfast 22   Yolanda Adamson DO   zolpidem (AMBIEN) 10 MG tablet take 1 tablet by mouth at bedtime 2/7/22 3/9/22  Selvin Sanabria DO   calcium carb-cholecalciferol 600-400 MG-UNIT TABS per tab take 1 tablet by mouth once daily 22   TRINA Francisco CNP   potassium chloride (KLOR-CON M) 20 MEQ extended release tablet take 1 tablet by mouth once daily 22   TRINA Francisco CNP   gabapentin (NEURONTIN) 300 MG capsule Take 1 capsule by mouth 3 times daily for 90 days. 1/10/22 4/10/22  TRINA Cao - CNP   levothyroxine (SYNTHROID) 112 MCG tablet take 1 tablet by mouth once daily 12/28/21   Abeltyler Weinstein,    Pramoxine-HC (HYDROCORTISONE-PRAMOXINE) 2.5-1 % CREA cream apply topically twice a day 9/2/21   Anish Quispe MD   Telotristat Ethyl,as Etiprate, ([place]) 250 MG TABS  5/25/21   Historical Provider, MD   dicyclomine (BENTYL) 20 MG tablet take 1 tablet by mouth twice a day 6/13/21   Historical Provider, MD   famotidine (PEPCID) 40 MG tablet take 1 tablet by mouth daily 6/20/21   Historical Provider, MD   hydrocortisone 2.5 % cream  6/13/21   Historical Provider, MD   promethazine (PHENERGAN) 25 MG tablet Take 1 tablet by mouth as needed for Nausea or Vomiting 1/11/21   Historical Provider, MD   magnesium oxide (MAG-OX) 400 MG tablet Take 400 mg by mouth 2 times daily    Historical Provider, MD       Current medications:    Current Facility-Administered Medications   Medication Dose Route Frequency Provider Last Rate Last Admin    0.9 % sodium chloride infusion   IntraVENous Continuous Anjelica Andrew  mL/hr at 02/18/22 0712 New Bag at 02/18/22 0712    0.9 % sodium chloride infusion  25 mL IntraVENous PRN Anjelica Andrew MD        ceFAZolin (ANCEF) 2000 mg in sterile water 20 mL IV syringe  2,000 mg IntraVENous On Call to [address], MD        sodium chloride flush 0.9 % injection 10 mL  10 mL IntraVENous 2 times per day Anjelica Andrew MD        sodium chloride flush 0.9 % injection 10 mL  10 mL IntraVENous PRN Anjelica Andrew MD           Allergies:  No Known Allergies    Problem List:    Patient Active Problem List   Diagnosis Code    Primary insomnia F51.01    Fatty liver K76.0    H/O small bowel obstruction Z87.19    Hypothyroidism E03.9    Depression F32. 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    New onset seizure ([address] R56.9    Neuroendocrine tumor D3A.8    Hypomagnesemia E83.42    Hypocalcemia E83.51    Hypoparathyroidism (HCC) E20.9    Tobacco abuse Z72.0    Elevated liver enzymes R74.8    Moderate protein-calorie malnutrition (HCC) E44.0    Encounter regarding vascular access for dialysis for ESRD ([address] N18.6, Z99.2    Difficulty with speech R47.9    Breakthrough seizure ([address] G40.919    Neuroendocrine cancer ([address] C7A.8       Past Medical History:        Diagnosis Date    Abdominal pain     Cancer ([address]     neuroendocrime tumor    Diarrhea     Hypocalcemia     Hypothyroidism     Irritable bowel syndrome     Migraines     Seizures ([address]     last episode January 2021    Status post alcohol detoxification     5/22/2018-5/29/2018       Past Surgical History:        Procedure Laterality Date    CHOLECYSTECTOMY, LAPAROSCOPIC  07/06/2016    COLONOSCOPY N/A 6/22/2018    COLONOSCOPY WITH BIOPSY performed by Glendy Warner MD at [address] [facility] CT BIOPSY RENAL  1/25/2021    CT BIOPSY RENAL 1/25/2021 Marielena Maharaj MD [facility] CT    CT NEEDLE BIOPSY LIVER PERCUTANEOUS  9/1/2020    CT NEEDLE BIOPSY LIVER PERCUTANEOUS 9/1/2020 [facility] CT    HC DIALYSIS CATHETER N/A 1/28/2021    TESIO CATHETER INSERTION AND REMOVAL OF TEMPORARY performed by Yogesh Rivera MD at [address] N/A 5/14/2021    SIGMOIDOSCOPY PERIANAL BOTOX INJECTION   (50 UNITS) performed by Glendy Warner MD at [place] [address] N/A 10/21/2020    LAPAROSCOPIC ROBOTIC SMALL BOWEL RESECTION WITH PLANNED TRANSITION TO OPEN performed by Dago Yuan MD at [facility]         Social History:    Social History     Tobacco Use    Smoking status: Current Every Day Smoker     Packs/day: 0.25     Types: Cigarettes  Smokeless tobacco: Never Used   Substance Use Topics    Alcohol use: No     Alcohol/week: 0.0 standard drinks     Comment: 5 years sober                                Ready to quit: Not Answered  Counseling given: Not Answered      Vital Signs (Current):   Vitals:    02/15/22 0951 02/18/22 0652 02/18/22 0658   BP:   (!) 153/77   Pulse:   84   Resp:   20   Temp:   97.6 °F (36.4 °C)   TempSrc:   Temporal   SpO2:   96%   Weight: 125 lb (56.7 kg) 125 lb (56.7 kg)    Height: 5' (1.524 m) 5' (1.524 m)                                               BP Readings from Last 3 Encounters:   02/18/22 (!) 153/77   01/29/22 112/62   01/27/22 (!) 115/56       NPO Status: Time of last liquid consumption: 2355                        Time of last solid consumption: 2300                        Date of last liquid consumption: 02/17/22                        Date of last solid food consumption: 02/17/22    BMI:   Wt Readings from Last 3 Encounters:   02/18/22 125 lb (56.7 kg)   01/28/22 125 lb (56.7 kg)   01/27/22 124 lb 4.8 oz (56.4 kg)     Body mass index is 24.41 kg/m². CBC:   Lab Results   Component Value Date    WBC 7.9 01/28/2022    RBC 4.33 01/28/2022    HGB 12.3 01/28/2022    HCT 39.8 01/28/2022    MCV 91.9 01/28/2022    RDW 19.5 01/28/2022     01/28/2022       CMP:   Lab Results   Component Value Date     02/02/2022    K 3.8 02/02/2022    K 3.9 01/28/2022     02/02/2022    CO2 23 02/02/2022    BUN 14 02/02/2022    CREATININE 0.7 02/02/2022    GFRAA >60 02/02/2022    LABGLOM >60 02/02/2022    GLUCOSE 108 02/02/2022    PROT 6.9 01/28/2022    CALCIUM 9.4 02/02/2022    BILITOT 0.2 01/28/2022    ALKPHOS 122 01/28/2022    AST 46 01/28/2022    ALT 39 01/28/2022       POC Tests: No results for input(s): POCGLU, POCNA, POCK, POCCL, POCBUN, POCHEMO, POCHCT in the last 72 hours.     Coags:   Lab Results   Component Value Date    PROTIME 13.1 03/11/2021    INR 1.2 03/11/2021    APTT 26.3 01/21/2021       HCG (If

## 2022-02-18 NOTE — OP NOTE
[facility]  OPERATIVE NOTE    Preoperative diagnosis:  Metastatic neuroendocrine tumor    Post-operative diagnosis: same    Procedure:  mediport insertion 8F Bard (MRI and CT compatible) via left subclavian, fluoroscopic guidance    Surgeon:  Cruz Davis MD    Assistant:  Ana Lilia Melchor DO, PGY-I    Anesthesia:  LMAC    EBL:  5cc    Specimen:  None    Disposition: To PACU and discharge home after CXR    Indications:  35y/o F with metastatic neuroendocrine tumor presents for mediport insertion. This procedure has been fully reviewed with the patient and written informed consent has been obtained. Operative report:    Patient was brought into the operative suite and after appropriate anesthesia, rolled towel placed under scapulas and then prepped and draped in standard surgical fashion. Ancef 2g IV was given. 0.25% marcaine used for local anesthesia. The left subclavian vein was accessed on the first stick was the 14G needle. Wire was easily passed into the IVC and checked with fluoro. A subcutaneous pocket was made over the left chest wall by first anesthetizing with 0.25% marcaine then making a 4cm skin incision with #15 blade scalpel. Bovie electrocautery was used to incise the subcutaneous tissue to creat a pocket for the port. An #11 blade scalpel was used to enlarge the wire insertion site. The tunneling device was used to tunnel the catheter from the insertion site to the pocket. The dilator and sheath were placed over the wire under fluoro. The dilator was removed and the catheter placed under fluoro. Once in the SVC, the catheter was cut to length and the catheter attached to the locking device and port. The port was flushed with heparin and had good return of blood and then flushed. The pocket irrigated with normal saline. The port was attached to Kendy's fascia with 3-0 Prolene x 2.   The skin was then closed with 3-0 Vicryl in a deep dermal fashion and skin closed with 4-0 monocryl in a running subcuticular fashion. The insertion site was closed with 4-0 monocryl. The skin was cleaned with wet and dry sponge. The wounds were dressed with Skin-afix. The sponge, instrument and needle counts were correct at the end of the case. The patient tolerated the procedure well. She will now go to recovery room and get a chest x-ray and be discharge home later today. Family was updated at the end of the procedure.       Fabienne Farnsworth MD, MSc, FACS  2/18/2022  10:01 AM

## 2022-02-18 NOTE — PROGRESS NOTES
Discharge instructions provided to patient, with mom at bedside, written and verbal, patient verbalized understanding.

## 2022-02-21 NOTE — ANESTHESIA POSTPROCEDURE EVALUATION
Department of Anesthesiology  Postprocedure Note    Patient: Jeffrey Nath  MRN: [id number]  YOB: 1985  Date of evaluation: 2/21/2022  Time:  5:00 AM     Procedure Summary     Date: 02/18/22 Room / Location: [facility] OR 05 / [facility]    Anesthesia Start: 8691 Anesthesia Stop: 5415    Procedure: MEDI PORT PLACEMENT, Left side. (Left ) Diagnosis: (NEURO LIVER TUMOR)    Surgeons: Marija Bella MD Responsible Provider: Marissa Helms DO    Anesthesia Type: MAC ASA Status: 3          Anesthesia Type: MAC    Pili Phase I: Pili Score: 10    Pili Phase II: Pili Score: 10    Last vitals: Reviewed and per EMR flowsheets.        Anesthesia Post Evaluation    Patient location during evaluation: bedside  Patient participation: complete - patient cannot participate  Level of consciousness: awake and alert  Airway patency: patent  Nausea & Vomiting: no nausea and no vomiting  Complications: no  Cardiovascular status: blood pressure returned to baseline  Respiratory status: acceptable  Hydration status: euvolemic

## 2022-02-22 ENCOUNTER — HOSPITAL ENCOUNTER (OUTPATIENT)
Dept: NUCLEAR MEDICINE | Age: 37
Discharge: HOME | End: 2022-02-24
Payer: COMMERCIAL

## 2022-02-22 DIAGNOSIS — C7B.8 METASTATIC MALIGNANT NEUROENDOCRINE TUMOR TO LIVER (HCC): ICD-10-CM

## 2022-02-22 PROCEDURE — 78815 PET IMAGE W/CT SKULL-THIGH: CPT | Performed by: RADIOLOGY

## 2022-02-22 PROCEDURE — 78815 PET IMAGE W/CT SKULL-THIGH: CPT

## 2022-02-22 PROCEDURE — 3430000000 HC RX DIAGNOSTIC RADIOPHARMACEUTICAL: Performed by: RADIOLOGY

## 2022-02-22 PROCEDURE — A9587 GALLIUM GA-68: HCPCS | Performed by: RADIOLOGY

## 2022-02-22 RX ORDER — 68GA-DOTATATE 40 MCG
2.88 KIT INTRAVENOUS
Status: DISCONTINUED | OUTPATIENT
Start: 2022-02-22 | End: 2022-02-25 | Stop reason: HOSPADM

## 2022-02-22 RX ADMIN — 68GA-DOTATATE 3 MILLICURIE: KIT INTRAVENOUS at 12:05

## 2022-02-24 ENCOUNTER — OFFICE VISIT (OUTPATIENT)
Dept: ONCOLOGY | Age: 37
End: 2022-02-24
Payer: COMMERCIAL

## 2022-02-24 ENCOUNTER — TELEPHONE (OUTPATIENT)
Dept: ONCOLOGY | Age: 37
End: 2022-02-24

## 2022-02-24 ENCOUNTER — HOSPITAL ENCOUNTER (OUTPATIENT)
Dept: INFUSION THERAPY | Age: 37
Discharge: HOME | End: 2022-02-24
Payer: COMMERCIAL

## 2022-02-24 VITALS
HEIGHT: 60 IN | BODY MASS INDEX: 23.56 KG/M2 | OXYGEN SATURATION: 100 % | TEMPERATURE: 97.7 F | SYSTOLIC BLOOD PRESSURE: 120 MMHG | RESPIRATION RATE: 16 BRPM | WEIGHT: 120 LBS | DIASTOLIC BLOOD PRESSURE: 71 MMHG | HEART RATE: 71 BPM

## 2022-02-24 DIAGNOSIS — C7B.8 METASTATIC MALIGNANT NEUROENDOCRINE TUMOR TO LIVER (HCC): Primary | ICD-10-CM

## 2022-02-24 DIAGNOSIS — C7B.8 NEUROENDOCRINE CARCINOMA METASTATIC TO LIVER (HCC): Primary | ICD-10-CM

## 2022-02-24 DIAGNOSIS — C7B.8 METASTATIC MALIGNANT NEUROENDOCRINE TUMOR TO LIVER (HCC): ICD-10-CM

## 2022-02-24 DIAGNOSIS — C7A.8 NEUROENDOCRINE CARCINOMA METASTATIC TO LIVER (HCC): Primary | ICD-10-CM

## 2022-02-24 LAB
ALBUMIN SERPL-MCNC: 4.3 G/DL (ref 3.5–5.2)
ALP BLD-CCNC: 131 U/L (ref 35–104)
ALT SERPL-CCNC: 30 U/L (ref 0–32)
ANION GAP SERPL CALCULATED.3IONS-SCNC: 12 MMOL/L (ref 7–16)
ANISOCYTOSIS: ABNORMAL
AST SERPL-CCNC: 65 U/L (ref 0–31)
BASOPHILS ABSOLUTE: 0 E9/L (ref 0–0.2)
BASOPHILS RELATIVE PERCENT: 0.9 % (ref 0–2)
BILIRUB SERPL-MCNC: 0.3 MG/DL (ref 0–1.2)
BUN BLDV-MCNC: 9 MG/DL (ref 6–20)
BURR CELLS: ABNORMAL
CALCIUM SERPL-MCNC: 8.2 MG/DL (ref 8.6–10.2)
CHLORIDE BLD-SCNC: 105 MMOL/L (ref 98–107)
CO2: 21 MMOL/L (ref 22–29)
CREAT SERPL-MCNC: 0.5 MG/DL (ref 0.5–1)
EOSINOPHILS ABSOLUTE: 0.39 E9/L (ref 0.05–0.5)
EOSINOPHILS RELATIVE PERCENT: 4.3 % (ref 0–6)
GFR AFRICAN AMERICAN: >60
GFR NON-AFRICAN AMERICAN: >60 ML/MIN/1.73
GLUCOSE BLD-MCNC: 110 MG/DL (ref 74–99)
HCT VFR BLD CALC: 35.9 % (ref 34–48)
HEMOGLOBIN: 11.6 G/DL (ref 11.5–15.5)
LYMPHOCYTES ABSOLUTE: 1.89 E9/L (ref 1.5–4)
LYMPHOCYTES RELATIVE PERCENT: 20.9 % (ref 20–42)
MCH RBC QN AUTO: 29.3 PG (ref 26–35)
MCHC RBC AUTO-ENTMCNC: 32.3 % (ref 32–34.5)
MCV RBC AUTO: 90.7 FL (ref 80–99.9)
MONOCYTES ABSOLUTE: 0.81 E9/L (ref 0.1–0.95)
MONOCYTES RELATIVE PERCENT: 8.7 % (ref 2–12)
NEUTROPHILS ABSOLUTE: 5.94 E9/L (ref 1.8–7.3)
NEUTROPHILS RELATIVE PERCENT: 66.1 % (ref 43–80)
OVALOCYTES: ABNORMAL
PDW BLD-RTO: 20.7 FL (ref 11.5–15)
PLATELET # BLD: 229 E9/L (ref 130–450)
PMV BLD AUTO: 9.8 FL (ref 7–12)
POIKILOCYTES: ABNORMAL
POLYCHROMASIA: ABNORMAL
POTASSIUM SERPL-SCNC: 3.5 MMOL/L (ref 3.5–5)
RBC # BLD: 3.96 E12/L (ref 3.5–5.5)
SODIUM BLD-SCNC: 138 MMOL/L (ref 132–146)
TOTAL PROTEIN: 6.8 G/DL (ref 6.4–8.3)
WBC # BLD: 9 E9/L (ref 4.5–11.5)

## 2022-02-24 PROCEDURE — 86316 IMMUNOASSAY TUMOR OTHER: CPT

## 2022-02-24 PROCEDURE — 96402 CHEMO HORMON ANTINEOPL SQ/IM: CPT

## 2022-02-24 PROCEDURE — 2580000003 HC RX 258: Performed by: NURSE PRACTITIONER

## 2022-02-24 PROCEDURE — 6360000002 HC RX W HCPCS: Performed by: NURSE PRACTITIONER

## 2022-02-24 PROCEDURE — G8427 DOCREV CUR MEDS BY ELIG CLIN: HCPCS | Performed by: INTERNAL MEDICINE

## 2022-02-24 PROCEDURE — 80053 COMPREHEN METABOLIC PANEL: CPT

## 2022-02-24 PROCEDURE — 85025 COMPLETE CBC W/AUTO DIFF WBC: CPT

## 2022-02-24 PROCEDURE — G8420 CALC BMI NORM PARAMETERS: HCPCS | Performed by: INTERNAL MEDICINE

## 2022-02-24 PROCEDURE — 6360000002 HC RX W HCPCS: Performed by: INTERNAL MEDICINE

## 2022-02-24 PROCEDURE — 99214 OFFICE O/P EST MOD 30 MIN: CPT | Performed by: INTERNAL MEDICINE

## 2022-02-24 PROCEDURE — G8484 FLU IMMUNIZE NO ADMIN: HCPCS | Performed by: INTERNAL MEDICINE

## 2022-02-24 PROCEDURE — 36591 DRAW BLOOD OFF VENOUS DEVICE: CPT

## 2022-02-24 PROCEDURE — 96372 THER/PROPH/DIAG INJ SC/IM: CPT

## 2022-02-24 PROCEDURE — 4004F PT TOBACCO SCREEN RCVD TLK: CPT | Performed by: INTERNAL MEDICINE

## 2022-02-24 RX ORDER — SODIUM CHLORIDE 9 MG/ML
25 INJECTION, SOLUTION INTRAVENOUS PRN
Status: CANCELLED | OUTPATIENT
Start: 2022-02-24

## 2022-02-24 RX ORDER — HEPARIN SODIUM (PORCINE) LOCK FLUSH IV SOLN 100 UNIT/ML 100 UNIT/ML
500 SOLUTION INTRAVENOUS PRN
Status: CANCELLED | OUTPATIENT
Start: 2022-02-24

## 2022-02-24 RX ORDER — LANREOTIDE ACETATE 120 MG/.5ML
120 INJECTION SUBCUTANEOUS ONCE
Status: CANCELLED | OUTPATIENT
Start: 2022-03-24 | End: 2022-03-24

## 2022-02-24 RX ORDER — SODIUM CHLORIDE 0.9 % (FLUSH) 0.9 %
5-40 SYRINGE (ML) INJECTION PRN
Status: CANCELLED | OUTPATIENT
Start: 2022-02-24

## 2022-02-24 RX ORDER — CALCIUM CARBONATE/VITAMIN D3 600 MG-10
1 TABLET ORAL 2 TIMES DAILY
Qty: 31 TABLET | Refills: 2 | Status: SHIPPED
Start: 2022-02-24 | End: 2022-02-24

## 2022-02-24 RX ORDER — HEPARIN SODIUM (PORCINE) LOCK FLUSH IV SOLN 100 UNIT/ML 100 UNIT/ML
500 SOLUTION INTRAVENOUS PRN
Status: DISCONTINUED | OUTPATIENT
Start: 2022-02-24 | End: 2022-02-25 | Stop reason: HOSPADM

## 2022-02-24 RX ORDER — SODIUM CHLORIDE 0.9 % (FLUSH) 0.9 %
5-40 SYRINGE (ML) INJECTION PRN
Status: DISCONTINUED | OUTPATIENT
Start: 2022-02-24 | End: 2022-02-25 | Stop reason: HOSPADM

## 2022-02-24 RX ORDER — POTASSIUM CHLORIDE 20 MEQ/1
TABLET, EXTENDED RELEASE ORAL
Qty: 30 TABLET | Refills: 3 | Status: SHIPPED
Start: 2022-02-24 | End: 2022-04-22

## 2022-02-24 RX ORDER — LANREOTIDE ACETATE 120 MG/.5ML
120 INJECTION SUBCUTANEOUS ONCE
Status: COMPLETED | OUTPATIENT
Start: 2022-02-24 | End: 2022-02-24

## 2022-02-24 RX ADMIN — HEPARIN 500 UNITS: 100 SYRINGE at 13:41

## 2022-02-24 RX ADMIN — SODIUM CHLORIDE, PRESERVATIVE FREE 10 ML: 5 INJECTION INTRAVENOUS at 13:41

## 2022-02-24 RX ADMIN — LANREOTIDE ACETATE 120 MG: 120 INJECTION SUBCUTANEOUS at 14:45

## 2022-02-24 NOTE — TELEPHONE ENCOUNTER
Met with pt and pt's mother in conjunction with medical oncology visit as social work follow up. Pt is 70-year-old female being treated for metastatic neuroendocrine tumor. Pt's mood appeared euthymic with full affect, she appeared A&Ox4, and she was willing/able to participate in session. She appeared appropriately dressed/groomed and was able to ambulate/transfer without assistance. Pt reported that following previous meeting she was evaluated in ER for seizure and was also diagnosed with an infection. Stated that her anxiety has significantly improved since this time. Reviewed issues presented in previous session. Pt indicated that she did have procedure for port and that she is happy she completed this. Noted that she continues to struggle with purchasing supplies. Pt provided with [employer] gift card to assist with cost of supplies and was advised to bring in receipt per department policy. No additional needs identified at this time. Reviewed role of oncology SW and encouraged pt to notify this provider if additional needs arise.     Karli Trevizo, MSW, MARIEW-S  Oncology Social Worker

## 2022-02-24 NOTE — PROGRESS NOTES
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 CT abdomen/pelvis 08/28/2020:  2 cm masslike density in the mid abdominal small bowel mesentery with a spiculated appearance. Multiple liver masses suspicious for metastatic disease. 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 Comment: Sections of the liver tissue cores show the hepatic parenchyma to be partially replaced by a proliferation of epithelioid cells with relatively uniform round nuclei and eosinophilic granular cytoplasm.  The   epithelioid cells form anastomosing solid cords/nests that are surrounded by delicate fibrovascular stroma.  There are no mitotic figures or tumor cell necrosis present.  The histologic changes seen are suggestive of well-differentiated neuroendocrine (carcinoid) tumor. Immunostaining for pankeratin, chromogranin, synaptophysin, TTF-1 and Ki-67 was performed on sections of one tissue block (A1) and the neoplastic epithelioid cells show diffuse and strong positivity for neuroendocrine markers (chromogranin and synaptophysin) and moderate positivity for pankeratin.  There is no staining reactivity for TTF-1. The Ki-67 proliferation labeling index is essentially negative, (very low, <1%). This staining pattern confirms the histologic impression of a well-differentiated neuroendocrine (carcinoid) tumor. FL Small bowel 09/02/2020:  Rapid small bowel transit. Serum Chromogranin A 160 on 09/23/2020.   Urine 5-HIAA 21 (0-14)  2d-Echo 10/10/2020: EF 60-65%   Normal right ventricular size and function    Dotatate PET/CT scan 10/14/2020 increased tracer uptake seen throughout the liver compatible with neoplasm. This involves both the left and the right lobe of liver. In addition there are 2 foci of increased tracer uptake along the mesentery of the small bowel. This may involve the wall the small bowel. No other convincing evidence for extra-abdominal metastatic disease. EGD/Colonoscopy 10/05/2020 by Dr. Chance Narvaez; records reviewed. Hepatobiliary team (Dr. Wanda Wetzel) consult appreciated. Small bowel resection on 10/21/2020. Small bowel resection on 10/21/2020. A.  Ileum, resection:   Multifocal (4 foci) neuroendocrine tumor (NET). Extensive perineural and angiolymphatic invasion. 3 of 10 lymph nodes with metastatic neuroendocrine tumor and extracapsular extension of tumor cells (3/10). Bilateral viable small bowel resection margins with no evidence of tumor. Farzana Hebert received as \"Meckel's\":   Nodular scar tissue with patchy chronic inflammation. Negative for neuroendocrine tumor. Intestinal mucosal tissue is not present. CASE SUMMARY:   Procedure: Small bowel resection   Tumor site: Ileum   Tumor size: Greatest dimension of 1.5 cm   Tumor focality: Multifocal: 4 separate tumors   Histologic type and grade: G2: Well-differentiated neuroendocrine tumor   Mitotic rate: between 2-20 mitoses/2 mm2   Ki-67 labeling index: between 3-20%   Tumor extension: Tumor invades through the muscularis propria into subserosal tissue without penetration of overlying serosa   Margins:  All margins are uninvolved by tumor    Margins examined: Proximal and distal   Lymphovascular invasion: Present   Perineural invasion: Present   Large mesenteric masses (greater than 2 cm): Present (one 2.5 cm mass)   Regional lymph nodes:    Number of lymph nodes involved: 3    Number of lymph nodes examined: 10   Pathologic stage classification (pTNM, AJCC eighth edition):    pT3    pN2    pM1a -confirmed liver metastasis, [facility]-     Comment:   A. 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 Dose # 1 Lanreotide was on 11/05/2020. Dose # 2 Lanreotide was on 12/03/2020. Dose # 3 Lanreotide was on 01/07/2021. Serum Chromogranin A 95 on 01/07/2021. CT chest 02/07/2021 negative for metastatic disease. CT abdomen/pelvis 02/07/2021 Persistent bilobar hepatic lesions which are grossly stable consistent with stable widespread hepatic metastasis. Imaging reviewed. Continue Lanreotide and repeat scans in 3 months. Dose # 4 Lanreotide was on 02/11/2021. Ga 76 Dotatate PET 03/09/2021 Gallium 68 dotatate avid uptake throughout multiple regions of the liver compatible with multiple foci of neuroendocrine tumor in both the right and the left lobe of the liver, when compared to previous not significantly changed in the interval.  Dose # 5 Lanreotide was on 03/11/2021. Dose # 6 Lanreotide was on 04/08/2021. Serum chromogranin A 115 (0-103)  Dose # 7 Lanreotide was on 05/06/2021. Serum chromogranin A 114 (0-103)  Dose # 8 Lanreotide was on 06/03/2021. Serum chromogranin A  84  (0-103)    Ga 76 Dotatate PET 06/29/2021 Numerous foci of increased Gallium 68 dotatate avid uptake throughout both lobes of the liver, not significantly changed from previous. No significant progression of disease. No definite metastatic disease identified  Dose # 9 Lanreotide was on 07/01/2021. Serum Chromogranin A 97 (0-103)  Dose # 10 Lanreotide was on 07/29/2021. Serum Chromogranin A 89 (0-103)  Dose # 11 Lanreotide was on 08/30/2021. Serum Chromogranin A 120 (0-103)  Dose # 12 Lanreotide was on 09/30/2021.  Serum Chromogranin A 141 (0-103)  PET/CT scan 11/09/2021 There is significant gallium avid tracer uptake in the liver, numerous lesions are seen, tracer uptake overall is diminished. There is no convincing extrahepatic disease identified. Dose # 13 Lanreotide was on 11/11/2021. Serum chromogranin A 105 on 11/11/2021. Dose # 14 Lanreotide was on 12/30/2021. Serum chromogranin A 184 on 12/30/2021. Dose # 15 Lanreotide was on 01/27/2022. Serum chromogranin A  98 on 01/27/2022. CT head 01/28/2022 noted no acute abnormality. CT cervical spine 01/28/2022 noted no acute abnormality of cervical spine  PET/CT Gallium 68 02/22/2022 noted Multiple regions of tracer uptake seen within the liver consistent with neuroendocrine tumor. No other evidence to suggest metastatic disease. Reviewed with Dr. Gianna Saeed from Radiology team; overall stable disease. Today 02/24/2022: No fever chills. Fair appetite and energy level. Diarrhea controlled. Review of Systems;  CONSTITUTIONAL: No fever chills. Fair appetite and energy level. ENMT: Eyes: No diplopia; Nose: No epistaxis. Mouth: No sore throat. RESPIRATORY: No hemoptysis SOB  CARDIOVASCULAR: No chest pain, palpitations. GASTROINTESTINAL: Diarrhea controlled on Imodium, Lomotil, Xermelo and Questran  GENITOURINARY: No hematuria frequency  NEURO: No syncope, presyncope, headache. Remainder:ROS NEGATIVE    Past Medical History:      Diagnosis Date    Abdominal pain     Cancer ([facility] [address]     neuroendocrime tumor    Diarrhea     Hypocalcemia     Hypothyroidism     Irritable bowel syndrome     Migraines     Seizures ([facility] [address]     last episode January 2021    Status post alcohol detoxification     5/22/2018-5/29/2018     Medications:  Reviewed and reconciled.     Allergies:  No Known Allergies    Physical Exam:  /71 (Site: Right Upper Arm, Position: Sitting, Cuff Size: Medium Adult)   Pulse 71   Temp 97.7 °F (36.5 °C) (Infrared)   Resp 16   Ht 5' (1.524 m)   Wt 120 lb (54.4 kg)   SpO2 100%   BMI 23.44 kg/m²   GENERAL: Alert oriented x 3, not in acute distress   EXTREMITIES: Without clubbing, cyanosis, or edema. ECOG PS 1    Lab Results   Component Value Date    WBC 9.0 02/24/2022    HGB 11.6 02/24/2022    HCT 35.9 02/24/2022    MCV 90.7 02/24/2022     02/24/2022     Lab Results   Component Value Date     02/24/2022    K 3.5 02/24/2022     02/24/2022    CO2 21 (L) 02/24/2022    BUN 9 02/24/2022    CREATININE 0.5 02/24/2022    GLUCOSE 110 (H) 02/24/2022    CALCIUM 8.2 (L) 02/24/2022    PROT 6.8 02/24/2022    LABALBU 4.3 02/24/2022    BILITOT 0.3 02/24/2022    ALKPHOS 131 (H) 02/24/2022    AST 65 (H) 02/24/2022    ALT 30 02/24/2022    LABGLOM >60 02/24/2022    GFRAA >60 02/24/2022     Impression/Plan:  28 y/o female with 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 Multiple liver masses suspicious for metastatic disease. 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 Comment: Sections of the liver tissue cores show the hepatic parenchyma to be partially replaced by a proliferation of epithelioid cells with relatively uniform round nuclei and eosinophilic granular cytoplasm.  The   epithelioid cells form anastomosing solid cords/nests that are surrounded by delicate fibrovascular stroma.  There are no mitotic figures or tumor cell necrosis present.  The histologic changes seen are suggestive of well-differentiated neuroendocrine (carcinoid) tumor. Immunostaining for pankeratin, chromogranin, synaptophysin, TTF-1 and Ki-67 was performed on sections of one tissue block (A1) and the neoplastic epithelioid cells show diffuse and strong positivity for neuroendocrine markers (chromogranin and synaptophysin) and moderate positivity for pankeratin.    There is no staining reactivity for TTF-1.    The Ki-67 proliferation labeling index is essentially negative, (very low, <1%). This staining pattern confirms the histologic impression of a well-differentiated neuroendocrine (carcinoid) tumor. FL Small bowel 09/02/2020:  Rapid small bowel transit. Serum Chromogranin A 160 on 09/23/2020. Urine 5-HIAA 21 (0-14)  2d-Echo 10/10/2020: EF 60-65%   Normal right ventricular size and function    Dotatate PET/CT scan 10/14/2020 increased tracer uptake seen throughout the liver compatible with neoplasm. This involves both the left and the right lobe of liver. In addition there are 2 foci of increased tracer uptake along the mesentery of the small bowel. This may involve the wall the small bowel. No other convincing evidence for extra-abdominal metastatic disease. EGD/Colonoscopy 10/05/2020 by Dr. Emily Baeza; records reviewed. Hepatobiliary team (Dr. Quincy Lozano) consult appreciated. Small bowel resection on 10/21/2020. A.  Ileum, resection:   Multifocal (4 foci) neuroendocrine tumor (NET). Extensive perineural and angiolymphatic invasion. 3 of 10 lymph nodes with metastatic neuroendocrine tumor and extracapsular extension of tumor cells (3/10). Bilateral viable small bowel resection margins with no evidence of tumor. Shelbyette Flaquita received as \"Meckel's\":   Nodular scar tissue with patchy chronic inflammation. Negative for neuroendocrine tumor. Intestinal mucosal tissue is not present. CASE SUMMARY:   Procedure: Small bowel resection   Tumor site: Ileum   Tumor size: Greatest dimension of 1.5 cm   Tumor focality: Multifocal: 4 separate tumors   Histologic type and grade: G2: Well-differentiated neuroendocrine tumor   Mitotic rate: between 2-20 mitoses/2 mm2   Ki-67 labeling index: between 3-20%   Tumor extension: Tumor invades through the muscularis propria into subserosal tissue without penetration of overlying serosa   Margins:  All margins are uninvolved by tumor    Margins examined: Proximal and distal   Lymphovascular invasion: Present   Perineural invasion: Present   Large mesenteric masses (greater than 2 cm): Present (one 2.5 cm mass)   Regional lymph nodes:    Number of lymph nodes involved: 3    Number of lymph nodes examined: 10   Pathologic stage classification (pTNM, AJCC eighth edition):    pT3    pN2    pM1a -confirmed liver metastasis, [facility]-     Comment:   A. 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 Dose # 1 Lanreotide was on 11/05/2020. Dose # 2 Lanreotide was on 12/03/2020. Dose # 3 Lanreotide was on 01/07/2021. Serum Chromogranin A 95 on 01/07/2021. CT chest 02/07/2021 negative for metastatic disease. CT abdomen/pelvis 02/07/2021 Persistent bilobar hepatic lesions which are grossly stable consistent with stable widespread hepatic metastasis. Imaging reviewed. Continue Lanreotide and repeat scans in 3 months. Dose # 4 Lanreotide was on 02/11/2021. Ga 76 Dotatate PET 03/09/2021 Gallium 68 dotatate avid uptake throughout multiple regions of the liver compatible with multiple foci of neuroendocrine tumor in both the right and the left lobe of the liver, when compared to previous not significantly changed in the interval.  Dose # 5 Lanreotide was on 03/11/2021. Dose # 6 Lanreotide was on 04/08/2021. Serum chromogranin A 115 (0-103)  Dose # 7 Lanreotide was on 05/06/2021. Serum chromogranin A 114 (0-103)  Dose # 8 Lanreotide was on 06/03/2021. Serum chromogranin A  84  (0-103)  Ga 76 Dotatate PET 06/29/2021 Numerous foci of increased Gallium 68 dotatate avid uptake throughout both lobes of the liver, not significantly changed from previous. No significant progression of disease. No definite metastatic disease identified  Dose # 9 Lanreotide was on 07/01/2021.  Serum Chromogranin A 97 (0-103)  Dose # 10 Lanreotide was on 07/29/2021. Serum Chromogranin A 89 (0-103)  MRI Thoracic/Lumbar Spine 08/27/2021 unremarkable  CT head 08/27/2021 no acute intracranial abnormality  CT chest 08/27/2021 unremarkable. Stable hypodense lesions in liver grossly stable as of the CT of the chest from 02/07/2021  Dose # 11 Lanreotide was on 08/30/2021. Serum Chromogranin A 120 (0-103)  Dose # 12 Lanreotide was on 09/30/2021. Serum Chromogranin A 141 (0-103)  PET/CT scan 11/09/2021 There is significant gallium avid tracer uptake in the liver, numerous lesions are seen, tracer uptake overall is diminished. There is no convincing extrahepatic disease identified. Imaging reviewed. Continue Lanreotide and repeat scans in 3 months. Dose # 13 Lanreotide was on 11/11/2021. Serum chromogranin A 105 on 11/11/2021. Had COVID-19 and recovered. Dose # 14 Lanreotide was on 12/30/2021. Serum chromogranin A 184 on 12/30/2021. Dose # 15 Lanreotide was on 01/27/2022. Serum chromogranin A 98 on 01/27/2022. CT head 01/28/2022 noted no acute abnormality. CT cervical spine 01/28/2022 noted no acute abnormality of cervical spine  PET/CT Gallium 68 02/22/2022 noted Multiple regions of tracer uptake seen within the liver consistent with neuroendocrine tumor. No other evidence to suggest metastatic disease. Reviewed with Dr. Yenny Mclean from Radiology team; overall stable disease. Imaging reviewed. Labs reviewed. Continue Lanreotide and repeat scans in 3 months. Dose # 16 Lanreotide is today 02/24/2022. Serum chromogranin A pending     RTC in 4 weeks for Dose # 17 Lanreotide.     Evonne Faith MD   [id number]  Board Certified Medical Oncologist

## 2022-02-25 RX ORDER — ACETAMINOPHEN 325 MG/1
650 TABLET ORAL EVERY 6 HOURS PRN
Qty: 120 TABLET | Refills: 1 | Status: SHIPPED
Start: 2022-02-25 | End: 2022-06-16

## 2022-02-27 LAB — CHROMOGRANIN A: 116 NG/ML (ref 0–103)

## 2022-03-02 RX ORDER — FLUTICASONE PROPIONATE 50 MCG
SPRAY, SUSPENSION (ML) NASAL
Qty: 16 G | Refills: 1 | Status: SHIPPED
Start: 2022-03-02 | End: 2022-06-16

## 2022-03-07 ENCOUNTER — APPOINTMENT (OUTPATIENT)
Dept: GENERAL RADIOLOGY | Age: 37
End: 2022-03-07
Payer: COMMERCIAL

## 2022-03-07 ENCOUNTER — HOSPITAL ENCOUNTER (EMERGENCY)
Age: 37
Discharge: HOME | End: 2022-03-08
Attending: EMERGENCY MEDICINE
Payer: COMMERCIAL

## 2022-03-07 DIAGNOSIS — R19.7 DIARRHEA, UNSPECIFIED TYPE: ICD-10-CM

## 2022-03-07 DIAGNOSIS — E87.20 LACTIC ACIDOSIS: ICD-10-CM

## 2022-03-07 DIAGNOSIS — E86.0 DEHYDRATION: ICD-10-CM

## 2022-03-07 DIAGNOSIS — R07.89 ATYPICAL CHEST PAIN: Primary | ICD-10-CM

## 2022-03-07 LAB
BASOPHILS ABSOLUTE: 0.11 E9/L (ref 0–0.2)
BASOPHILS RELATIVE PERCENT: 1.5 % (ref 0–2)
EOSINOPHILS ABSOLUTE: 0.02 E9/L (ref 0.05–0.5)
EOSINOPHILS RELATIVE PERCENT: 0.3 % (ref 0–6)
HCT VFR BLD CALC: 39.3 % (ref 34–48)
HEMOGLOBIN: 13.1 G/DL (ref 11.5–15.5)
IMMATURE GRANULOCYTES #: 0.02 E9/L
IMMATURE GRANULOCYTES %: 0.3 % (ref 0–5)
LYMPHOCYTES ABSOLUTE: 2.16 E9/L (ref 1.5–4)
LYMPHOCYTES RELATIVE PERCENT: 30 % (ref 20–42)
MCH RBC QN AUTO: 29.4 PG (ref 26–35)
MCHC RBC AUTO-ENTMCNC: 33.3 % (ref 32–34.5)
MCV RBC AUTO: 88.3 FL (ref 80–99.9)
MONOCYTES ABSOLUTE: 0.63 E9/L (ref 0.1–0.95)
MONOCYTES RELATIVE PERCENT: 8.8 % (ref 2–12)
NEUTROPHILS ABSOLUTE: 4.26 E9/L (ref 1.8–7.3)
NEUTROPHILS RELATIVE PERCENT: 59.1 % (ref 43–80)
PDW BLD-RTO: 19.7 FL (ref 11.5–15)
PLATELET # BLD: 174 E9/L (ref 130–450)
PMV BLD AUTO: 9.6 FL (ref 7–12)
RBC # BLD: 4.45 E12/L (ref 3.5–5.5)
WBC # BLD: 7.2 E9/L (ref 4.5–11.5)

## 2022-03-07 PROCEDURE — 6360000002 HC RX W HCPCS: Performed by: EMERGENCY MEDICINE

## 2022-03-07 PROCEDURE — 96361 HYDRATE IV INFUSION ADD-ON: CPT

## 2022-03-07 PROCEDURE — 81001 URINALYSIS AUTO W/SCOPE: CPT

## 2022-03-07 PROCEDURE — 84484 ASSAY OF TROPONIN QUANT: CPT

## 2022-03-07 PROCEDURE — 96372 THER/PROPH/DIAG INJ SC/IM: CPT

## 2022-03-07 PROCEDURE — 96375 TX/PRO/DX INJ NEW DRUG ADDON: CPT

## 2022-03-07 PROCEDURE — 85025 COMPLETE CBC W/AUTO DIFF WBC: CPT

## 2022-03-07 PROCEDURE — 83605 ASSAY OF LACTIC ACID: CPT

## 2022-03-07 PROCEDURE — 99285 EMERGENCY DEPT VISIT HI MDM: CPT

## 2022-03-07 PROCEDURE — 93005 ELECTROCARDIOGRAM TRACING: CPT | Performed by: EMERGENCY MEDICINE

## 2022-03-07 PROCEDURE — 71045 X-RAY EXAM CHEST 1 VIEW: CPT

## 2022-03-07 PROCEDURE — 83735 ASSAY OF MAGNESIUM: CPT

## 2022-03-07 PROCEDURE — 2580000003 HC RX 258: Performed by: EMERGENCY MEDICINE

## 2022-03-07 PROCEDURE — 80053 COMPREHEN METABOLIC PANEL: CPT

## 2022-03-07 PROCEDURE — 96376 TX/PRO/DX INJ SAME DRUG ADON: CPT

## 2022-03-07 PROCEDURE — 96374 THER/PROPH/DIAG INJ IV PUSH: CPT

## 2022-03-07 PROCEDURE — 83690 ASSAY OF LIPASE: CPT

## 2022-03-07 RX ORDER — LEVETIRACETAM 500 MG/1
TABLET ORAL
Qty: 60 TABLET | Refills: 11 | OUTPATIENT
Start: 2022-03-07

## 2022-03-07 RX ORDER — 0.9 % SODIUM CHLORIDE 0.9 %
1000 INTRAVENOUS SOLUTION INTRAVENOUS ONCE
Status: COMPLETED | OUTPATIENT
Start: 2022-03-07 | End: 2022-03-08

## 2022-03-07 RX ORDER — MORPHINE SULFATE 2 MG/ML
4 INJECTION, SOLUTION INTRAMUSCULAR; INTRAVENOUS ONCE
Status: COMPLETED | OUTPATIENT
Start: 2022-03-07 | End: 2022-03-07

## 2022-03-07 RX ORDER — ONDANSETRON 2 MG/ML
4 INJECTION INTRAMUSCULAR; INTRAVENOUS ONCE
Status: COMPLETED | OUTPATIENT
Start: 2022-03-07 | End: 2022-03-07

## 2022-03-07 RX ADMIN — SODIUM CHLORIDE 1000 ML: 9 INJECTION, SOLUTION INTRAVENOUS at 23:18

## 2022-03-07 RX ADMIN — MORPHINE SULFATE 4 MG: 2 INJECTION, SOLUTION INTRAMUSCULAR; INTRAVENOUS at 23:12

## 2022-03-07 RX ADMIN — ONDANSETRON 4 MG: 2 INJECTION INTRAMUSCULAR; INTRAVENOUS at 23:13

## 2022-03-07 ASSESSMENT — PAIN DESCRIPTION - ONSET: ONSET: GRADUAL

## 2022-03-07 ASSESSMENT — PAIN DESCRIPTION - PROGRESSION: CLINICAL_PROGRESSION: GRADUALLY WORSENING

## 2022-03-07 ASSESSMENT — PAIN DESCRIPTION - DESCRIPTORS: DESCRIPTORS: SHARP;SQUEEZING;STABBING

## 2022-03-07 ASSESSMENT — PAIN DESCRIPTION - ORIENTATION: ORIENTATION: LEFT

## 2022-03-07 ASSESSMENT — PAIN - FUNCTIONAL ASSESSMENT
PAIN_FUNCTIONAL_ASSESSMENT: 0-10
PAIN_FUNCTIONAL_ASSESSMENT: ACTIVITIES ARE NOT PREVENTED

## 2022-03-07 ASSESSMENT — PAIN SCALES - GENERAL
PAINLEVEL_OUTOF10: 10
PAINLEVEL_OUTOF10: 10

## 2022-03-07 ASSESSMENT — PAIN DESCRIPTION - FREQUENCY: FREQUENCY: INTERMITTENT

## 2022-03-07 ASSESSMENT — PAIN DESCRIPTION - LOCATION: LOCATION: CHEST

## 2022-03-08 ENCOUNTER — APPOINTMENT (OUTPATIENT)
Dept: CT IMAGING | Age: 37
End: 2022-03-08
Payer: COMMERCIAL

## 2022-03-08 VITALS
BODY MASS INDEX: 23.56 KG/M2 | DIASTOLIC BLOOD PRESSURE: 87 MMHG | WEIGHT: 120 LBS | RESPIRATION RATE: 17 BRPM | OXYGEN SATURATION: 98 % | HEIGHT: 60 IN | SYSTOLIC BLOOD PRESSURE: 118 MMHG | HEART RATE: 84 BPM | TEMPERATURE: 98.5 F

## 2022-03-08 LAB
ALBUMIN SERPL-MCNC: 4.3 G/DL (ref 3.5–5.2)
ALP BLD-CCNC: 188 U/L (ref 35–104)
ALT SERPL-CCNC: 90 U/L (ref 0–32)
ANION GAP SERPL CALCULATED.3IONS-SCNC: 16 MMOL/L (ref 7–16)
AST SERPL-CCNC: 117 U/L (ref 0–31)
BACTERIA: ABNORMAL /HPF
BILIRUB SERPL-MCNC: 0.4 MG/DL (ref 0–1.2)
BILIRUBIN URINE: NEGATIVE
BLOOD, URINE: NEGATIVE
BUN BLDV-MCNC: 5 MG/DL (ref 6–20)
CALCIUM SERPL-MCNC: 8.6 MG/DL (ref 8.6–10.2)
CHLORIDE BLD-SCNC: 98 MMOL/L (ref 98–107)
CLARITY: ABNORMAL
CO2: 27 MMOL/L (ref 22–29)
COLOR: YELLOW
CREAT SERPL-MCNC: 0.5 MG/DL (ref 0.5–1)
EPITHELIAL CELLS, UA: ABNORMAL /HPF
GFR AFRICAN AMERICAN: >60
GFR NON-AFRICAN AMERICAN: >60 ML/MIN/1.73
GLUCOSE BLD-MCNC: 154 MG/DL (ref 74–99)
GLUCOSE URINE: NEGATIVE MG/DL
HCG, URINE, POC: NEGATIVE
KETONES, URINE: NEGATIVE MG/DL
LACTIC ACID: 3.1 MMOL/L (ref 0.5–2.2)
LACTIC ACID: 3.2 MMOL/L (ref 0.5–2.2)
LACTIC ACID: 3.3 MMOL/L (ref 0.5–2.2)
LEUKOCYTE ESTERASE, URINE: NEGATIVE
LIPASE: 25 U/L (ref 13–60)
Lab: NORMAL
MAGNESIUM: 1.7 MG/DL (ref 1.6–2.6)
NEGATIVE QC PASS/FAIL: NORMAL
NITRITE, URINE: NEGATIVE
PH UA: 8 (ref 5–9)
POSITIVE QC PASS/FAIL: NORMAL
POTASSIUM REFLEX MAGNESIUM: 3.4 MMOL/L (ref 3.5–5)
PROTEIN UA: NEGATIVE MG/DL
RBC UA: ABNORMAL /HPF (ref 0–2)
SODIUM BLD-SCNC: 141 MMOL/L (ref 132–146)
SPECIFIC GRAVITY UA: 1.01 (ref 1–1.03)
TOTAL PROTEIN: 7.2 G/DL (ref 6.4–8.3)
TROPONIN, HIGH SENSITIVITY: <6 NG/L (ref 0–9)
TROPONIN, HIGH SENSITIVITY: <6 NG/L (ref 0–9)
UROBILINOGEN, URINE: 0.2 E.U./DL
WBC UA: ABNORMAL /HPF (ref 0–5)

## 2022-03-08 PROCEDURE — 6360000002 HC RX W HCPCS: Performed by: EMERGENCY MEDICINE

## 2022-03-08 PROCEDURE — 2580000003 HC RX 258: Performed by: EMERGENCY MEDICINE

## 2022-03-08 PROCEDURE — 84484 ASSAY OF TROPONIN QUANT: CPT

## 2022-03-08 PROCEDURE — 36415 COLL VENOUS BLD VENIPUNCTURE: CPT

## 2022-03-08 PROCEDURE — 71275 CT ANGIOGRAPHY CHEST: CPT

## 2022-03-08 PROCEDURE — 6360000004 HC RX CONTRAST MEDICATION: Performed by: RADIOLOGY

## 2022-03-08 PROCEDURE — 83605 ASSAY OF LACTIC ACID: CPT

## 2022-03-08 RX ORDER — FENTANYL CITRATE 50 UG/ML
50 INJECTION, SOLUTION INTRAMUSCULAR; INTRAVENOUS ONCE
Status: DISCONTINUED | OUTPATIENT
Start: 2022-03-08 | End: 2022-03-08

## 2022-03-08 RX ORDER — FENTANYL CITRATE 50 UG/ML
100 INJECTION, SOLUTION INTRAMUSCULAR; INTRAVENOUS ONCE
Status: COMPLETED | OUTPATIENT
Start: 2022-03-08 | End: 2022-03-08

## 2022-03-08 RX ORDER — 0.9 % SODIUM CHLORIDE 0.9 %
1000 INTRAVENOUS SOLUTION INTRAVENOUS ONCE
Status: COMPLETED | OUTPATIENT
Start: 2022-03-08 | End: 2022-03-08

## 2022-03-08 RX ORDER — PROMETHAZINE HYDROCHLORIDE 25 MG/ML
25 INJECTION, SOLUTION INTRAMUSCULAR; INTRAVENOUS ONCE
Status: COMPLETED | OUTPATIENT
Start: 2022-03-08 | End: 2022-03-08

## 2022-03-08 RX ORDER — DROPERIDOL 2.5 MG/ML
0.62 INJECTION, SOLUTION INTRAMUSCULAR; INTRAVENOUS ONCE
Status: COMPLETED | OUTPATIENT
Start: 2022-03-08 | End: 2022-03-08

## 2022-03-08 RX ORDER — MORPHINE SULFATE 2 MG/ML
4 INJECTION, SOLUTION INTRAMUSCULAR; INTRAVENOUS ONCE
Status: COMPLETED | OUTPATIENT
Start: 2022-03-08 | End: 2022-03-08

## 2022-03-08 RX ADMIN — SODIUM CHLORIDE 1000 ML: 9 INJECTION, SOLUTION INTRAVENOUS at 06:16

## 2022-03-08 RX ADMIN — DROPERIDOL 0.62 MG: 2.5 INJECTION, SOLUTION INTRAMUSCULAR; INTRAVENOUS at 07:10

## 2022-03-08 RX ADMIN — PROMETHAZINE HYDROCHLORIDE 25 MG: 25 INJECTION INTRAMUSCULAR; INTRAVENOUS at 01:27

## 2022-03-08 RX ADMIN — FENTANYL CITRATE 100 MCG: 50 INJECTION, SOLUTION INTRAMUSCULAR; INTRAVENOUS at 00:58

## 2022-03-08 RX ADMIN — SODIUM CHLORIDE 1000 ML: 9 INJECTION, SOLUTION INTRAVENOUS at 02:40

## 2022-03-08 RX ADMIN — MORPHINE SULFATE 4 MG: 2 INJECTION, SOLUTION INTRAMUSCULAR; INTRAVENOUS at 07:59

## 2022-03-08 RX ADMIN — IOPAMIDOL 75 ML: 755 INJECTION, SOLUTION INTRAVENOUS at 01:58

## 2022-03-08 ASSESSMENT — PAIN SCALES - GENERAL
PAINLEVEL_OUTOF10: 6
PAINLEVEL_OUTOF10: 9
PAINLEVEL_OUTOF10: 9
PAINLEVEL_OUTOF10: 10

## 2022-03-08 ASSESSMENT — PAIN DESCRIPTION - PAIN TYPE: TYPE: ACUTE PAIN

## 2022-03-08 NOTE — ED PROVIDER NOTES
HPI:  3/7/22, Time: 10:24 PM KRYSTIN Nath is a 40 y.o. female presenting to the ED for chest pain of the left chest that radiates to the back, beginning 1 day ago. The complaint has been persistent, moderate in severity, and worsened by nothing. Port on the left chest was placed 2 weeks ago. Patient has carcinoid syndrome and gets Octrotide and labs via the port. Not currently on Chemo or radiation. Admits to chest pain and chronic nausea and diarrhea. Patient denies fever/chills, sore throat, cough, congestion, shortness of breath, edema, headache, visual disturbances, focal paresthesias, focal weakness, abdominal pain, vomiting, constipation, dysuria, hematuria, trauma, neck or back pain, rash or other complaints. ROS:   A complete review of systems was performed and all pertinent positives and negatives are stated within HPI, all other systems reviewed and are negative.            --------------------------------------------- PAST HISTORY ---------------------------------------------  Past Medical History:  has a past medical history of Abdominal pain, Cancer ([facility] [address] Diarrhea, Hypocalcemia, Hypothyroidism, Irritable bowel syndrome, Migraines, Seizures ([facility] [address] and Status post alcohol detoxification. Past Surgical History:  has a past surgical history that includes Parathyroid gland surgery; Cholecystectomy, laparoscopic (07/06/2016); Thyroidectomy; Colonoscopy (N/A, 6/22/2018); CT NEEDLE BIOPSY LIVER PERCUTANEOUS (9/1/2020); Small intestine surgery (N/A, 10/21/2020); CT BIOPSY RENAL (1/25/2021); hc dialysis catheter (N/A, 1/28/2021); sigmoidoscopy (N/A, 5/14/2021); and Port Surgery (Left, 2/18/2022). Social History:  reports that she has been smoking cigarettes. She has been smoking about 0.25 packs per day. She has never used smokeless tobacco. She reports that she does not drink alcohol and does not use drugs.     Family History: family history includes Alzheimer's Disease in an other family member; Asthma in her father; Breast Cancer in her maternal grandmother; Cancer in her father, maternal grandmother, and paternal grandfather; Diabetes in an other family member; Heart Disease in her father; High Blood Pressure in her father and mother; High Cholesterol in her mother; Largabriella Alena in an other family member; Other in her mother. The patients home medications have been reviewed. Allergies: Patient has no known allergies. ----------------------------------------PHYSICAL EXAM--------------------------------------    Constitutional:  Well developed, well nourished, no acute distress, non-toxic appearance   Eyes:  PERRL, conjunctiva normal, EOMI  HENT:  Atraumatic, external ears normal, nose normal, oropharynx moist, no pharyngeal exudates. Neck- normal range of motion, no nuchal rigidity   Respiratory:  No respiratory distress, normal breath sounds, no rales, no wheezing   Cardiovascular:  Tachycardic rate, normal rhythm, no murmurs, no gallops, no rubs. Radial and DP pulses 2+ bilaterally. Left chest port well healing incision. GI:  Soft, nondistended, normal bowel sounds, nontender, no organomegaly, no mass, no rebound, no guarding   :  No costovertebral angle tenderness   Musculoskeletal:  No edema, no tenderness, no deformities. Back- no tenderness  Integument:  Well hydrated, no rash. Adequate perfusion. Lymphatic:  No cervical lymphadenopathy noted   Neurologic:  Alert & oriented x 3, CN 2-12 normal, normal motor function, normal sensory function, no focal deficits noted. Normal gait. Psychiatric:  Speech and behavior appropriate       -------------------------------------------------- RESULTS -------------------------------------------------  I have personally reviewed all laboratory and imaging results for this patient. Results are listed below.      LABS:  Results for orders placed or performed during the hospital encounter of 03/07/22   CBC with Auto Differential   Result Value Ref Range    WBC 7.2 4.5 - 11.5 E9/L    RBC 4.45 3.50 - 5.50 E12/L    Hemoglobin 13.1 11.5 - 15.5 g/dL    Hematocrit 39.3 34.0 - 48.0 %    MCV 88.3 80.0 - 99.9 fL    MCH 29.4 26.0 - 35.0 pg    MCHC 33.3 32.0 - 34.5 %    RDW 19.7 (H) 11.5 - 15.0 fL    Platelets 352 054 - 738 E9/L    MPV 9.6 7.0 - 12.0 fL    Neutrophils % 59.1 43.0 - 80.0 %    Immature Granulocytes % 0.3 0.0 - 5.0 %    Lymphocytes % 30.0 20.0 - 42.0 %    Monocytes % 8.8 2.0 - 12.0 %    Eosinophils % 0.3 0.0 - 6.0 %    Basophils % 1.5 0.0 - 2.0 %    Neutrophils Absolute 4.26 1.80 - 7.30 E9/L    Immature Granulocytes # 0.02 E9/L    Lymphocytes Absolute 2.16 1.50 - 4.00 E9/L    Monocytes Absolute 0.63 0.10 - 0.95 E9/L    Eosinophils Absolute 0.02 (L) 0.05 - 0.50 E9/L    Basophils Absolute 0.11 0.00 - 0.20 E9/L   Comprehensive Metabolic Panel w/ Reflex to MG   Result Value Ref Range    Sodium 141 132 - 146 mmol/L    Potassium reflex Magnesium 3.4 (L) 3.5 - 5.0 mmol/L    Chloride 98 98 - 107 mmol/L    CO2 27 22 - 29 mmol/L    Anion Gap 16 7 - 16 mmol/L    Glucose 154 (H) 74 - 99 mg/dL    BUN 5 (L) 6 - 20 mg/dL    CREATININE 0.5 0.5 - 1.0 mg/dL    GFR Non-African American >60 >=60 mL/min/1.73    GFR African American >60     Calcium 8.6 8.6 - 10.2 mg/dL    Total Protein 7.2 6.4 - 8.3 g/dL    Albumin 4.3 3.5 - 5.2 g/dL    Total Bilirubin 0.4 0.0 - 1.2 mg/dL    Alkaline Phosphatase 188 (H) 35 - 104 U/L    ALT 90 (H) 0 - 32 U/L     (H) 0 - 31 U/L   Lactic Acid   Result Value Ref Range    Lactic Acid 3.3 (H) 0.5 - 2.2 mmol/L   Lipase   Result Value Ref Range    Lipase 25 13 - 60 U/L   Troponin   Result Value Ref Range    Troponin, High Sensitivity <6 0 - 9 ng/L   Urinalysis with Microscopic   Result Value Ref Range    Color, UA Yellow Straw/Yellow    Clarity, UA SL CLOUDY Clear    Glucose, Ur Negative Negative mg/dL    Bilirubin Urine Negative Negative    Ketones, Urine Negative Negative mg/dL    Specific Gravity, UA 1.010 1.005 - 1.030    Blood, Urine Negative Negative    pH, UA 8.0 5.0 - 9.0    Protein, UA Negative Negative mg/dL    Urobilinogen, Urine 0.2 <2.0 E.U./dL    Nitrite, Urine Negative Negative    Leukocyte Esterase, Urine Negative Negative    WBC, UA NONE 0 - 5 /HPF    RBC, UA NONE 0 - 2 /HPF    Epithelial Cells, UA MANY /HPF    Bacteria, UA FEW (A) None Seen /HPF   Magnesium   Result Value Ref Range    Magnesium 1.7 1.6 - 2.6 mg/dL   Lactic Acid   Result Value Ref Range    Lactic Acid 3.2 (H) 0.5 - 2.2 mmol/L   POC Pregnancy Urine   Result Value Ref Range    HCG, Urine, POC Negative Negative    Lot Number [id number]     Positive QC Pass/Fail Pass     Negative QC Pass/Fail Pass    EKG 12 Lead   Result Value Ref Range    Ventricular Rate 95 BPM    Atrial Rate 95 BPM    P-R Interval 142 ms    QRS Duration 68 ms    Q-T Interval 378 ms    QTc Calculation (Bazett) 475 ms    P Axis 54 degrees    R Axis 54 degrees    T Axis 56 degrees       RADIOLOGY:  Interpreted by Radiologist.  CTA CHEST W CONTRAST   Final Result   No evidence of pulmonary embolism or acute pulmonary abnormality. XR CHEST PORTABLE   Final Result   No acute process. Infusion port in place. EKG Interpretation by Me  Time: 5037  Rhythm: normal sinus   Rate: normal  Axis: normal  Conduction: normal  ST Segments: no acute change  T Waves: no acute change  Clinical Impression: no acute changes  Comparison to prior EKG: stable as compared to patient's most recent EKG      ------------------------- NURSING NOTES AND VITALS REVIEWED ---------------------------  The nursing notes within the ED encounter and vital signs as below have been reviewed by myself. /78   Pulse 81   Temp 98.5 °F (36.9 °C) (Oral)   Resp 17   Ht 5' (1.524 m)   Wt 120 lb (54.4 kg)   SpO2 96%   BMI 23.44 kg/m²   Oxygen Saturation Interpretation: Normal      The patients available past medical records and past encounters were reviewed. ------------------------------ ED COURSE/MEDICAL DECISION MAKING----------------------  Medications   0.9 % sodium chloride bolus (1,000 mLs IntraVENous New Bag 3/8/22 0616)   droperidol (INAPSINE) injection 0.625 mg (has no administration in time range)   0.9 % sodium chloride bolus (0 mLs IntraVENous Stopped 3/8/22 0121)   morphine (PF) injection 4 mg (4 mg IntraVENous Given 3/7/22 2312)   ondansetron (ZOFRAN) injection 4 mg (4 mg IntraVENous Given 3/7/22 2313)   fentaNYL (SUBLIMAZE) injection 100 mcg (100 mcg IntraVENous Given 3/8/22 0058)   promethazine (PHENERGAN) injection 25 mg (25 mg IntraMUSCular Given 3/8/22 0127)   iopamidol (ISOVUE-370) 76 % injection 75 mL (75 mLs IntraVENous Given 3/8/22 0158)   0.9 % sodium chloride bolus (0 mLs IntraVENous Stopped 3/8/22 0345)          MEDICATIONS  New Prescriptions    No medications on file           Medical Decision Making:    Patient 78-year-old female present emergency department due to chest pain on the left chest rating the back where the port is. CT of the chest found no evidence of pulmonary embolism port placement was correct. Patient has been taking pain medication for the her stop taking yesterday. Patient does have a history of carcinoid syndrome for which he takes medications for symptom control. Patient found to have elevated lactic acid and has a history of chronic diarrhea due to her carcinoid syndrome. She was given 2 L of normal saline with slight improvement of lactic acid. Following this patient will be given a liter of normal saline to assess for improvement of lactic acid. Patient has been in over to oncoming physician for continued fluid rehydration and disposition.           This patient's ED course included: re-evaluation prior to disposition, multiple bedside re-evaluations, IV medications, cardiac monitoring, continuous pulse oximetry, complex medical decision making and emergency management and a personal history and physicial eaxmination    This patient has remained hemodynamically stable and been closely monitored during their ED course. Re-Evaluations:  Time: 0400   Re-evaluation. Patients symptoms show no change  Repeat physical examination is not changed            Counseling: The emergency provider has spoken with the patient and discussed todays results, in addition to providing specific details for the plan of care and counseling regarding the diagnosis and prognosis. Questions are answered at this time and they are agreeable with the plan.    --------------------------- IMPRESSION AND DISPOSITION ---------------------------------    IMPRESSION  1. Atypical chest pain    2. Dehydration    3. Lactic acidosis    4. Diarrhea, unspecified type        DISPOSITION  Disposition: Discharge to home  Patient condition is stable              Radha Hernandez DO  Resident  03/08/22 1620  ATTENDING PROVIDER ATTESTATION:     I have personally performed and/or participated in the history, exam, medical decision making, and procedures and agree with all pertinent clinical information. I have also reviewed and agree with the past medical, family and social history unless otherwise noted. I have discussed this patient in detail with the resident, and provided the instruction and education regarding chest pain. My findings/Plan: I was the primary provider for patient. Patient presenting here because of left-sided chest pain for the past day. Patient reports is going into the back. Patient reports no productive cough no fever. Patient reporting nausea vomiting and diarrhea. Patient reporting no abdominal pain. Patient reporting no fever chills she reports no productive cough. Patient awake alert oriented she does have a port in place on left side it is tender to touch there is no outward infection or cellulitis. Patient has no abscess. Patient abdomen soft nontender. Patient neurologically intact.   Labs are reviewed as well as EKG patient was given IV fluids here. Patient was medicated for pain troponins negative. Patient did undergo CT of her chest due to her continued pains in her chest and her back. There is no signs of PE. She was given IV fluids her lactic acid was elevated repeat after 2 L dropped slightly. Patient will be given another liter of fluid. Patient abdomen is soft and nontender on recheck again. Patient neurologically intact. Patient vital signs stable. Patient will be discharged as well as lactic acid goes below 3. Patient made aware of plan.        Franklin Tolbert MD  03/08/22 8300

## 2022-03-08 NOTE — ED NOTES
Patient took her home doses of 5am medications: synthroid, topamax, and anuradhara     Jaye Grover RN  03/08/22 9701

## 2022-03-10 LAB
EKG ATRIAL RATE: 95 BPM
EKG P AXIS: 54 DEGREES
EKG P-R INTERVAL: 142 MS
EKG Q-T INTERVAL: 378 MS
EKG QRS DURATION: 68 MS
EKG QTC CALCULATION (BAZETT): 475 MS
EKG R AXIS: 54 DEGREES
EKG T AXIS: 56 DEGREES
EKG VENTRICULAR RATE: 95 BPM

## 2022-03-21 RX ORDER — BUTALBITAL, ACETAMINOPHEN AND CAFFEINE 50; 325; 40 MG/1; MG/1; MG/1
TABLET ORAL
Qty: 30 TABLET | Refills: 1 | Status: SHIPPED
Start: 2022-03-21 | End: 2022-05-16

## 2022-03-21 RX ORDER — GABAPENTIN 300 MG/1
CAPSULE ORAL
Qty: 270 CAPSULE | Refills: 0 | OUTPATIENT
Start: 2022-03-21

## 2022-03-24 ENCOUNTER — TELEPHONE (OUTPATIENT)
Dept: ONCOLOGY | Age: 37
End: 2022-03-24

## 2022-03-24 ENCOUNTER — OFFICE VISIT (OUTPATIENT)
Dept: ONCOLOGY | Age: 37
End: 2022-03-24
Payer: COMMERCIAL

## 2022-03-24 ENCOUNTER — HOSPITAL ENCOUNTER (OUTPATIENT)
Dept: INFUSION THERAPY | Age: 37
Discharge: HOME | End: 2022-03-24
Payer: COMMERCIAL

## 2022-03-24 VITALS
OXYGEN SATURATION: 99 % | WEIGHT: 120 LBS | HEIGHT: 60 IN | SYSTOLIC BLOOD PRESSURE: 119 MMHG | TEMPERATURE: 98 F | BODY MASS INDEX: 23.56 KG/M2 | DIASTOLIC BLOOD PRESSURE: 71 MMHG | HEART RATE: 88 BPM

## 2022-03-24 DIAGNOSIS — C7B.8 METASTATIC MALIGNANT NEUROENDOCRINE TUMOR TO LIVER (HCC): Primary | ICD-10-CM

## 2022-03-24 DIAGNOSIS — G89.3 NEOPLASM RELATED PAIN: ICD-10-CM

## 2022-03-24 LAB
ALBUMIN SERPL-MCNC: 4 G/DL (ref 3.5–5.2)
ALP BLD-CCNC: 148 U/L (ref 35–104)
ALT SERPL-CCNC: 42 U/L (ref 0–32)
ANION GAP SERPL CALCULATED.3IONS-SCNC: 11 MMOL/L (ref 7–16)
AST SERPL-CCNC: 60 U/L (ref 0–31)
BASOPHILS ABSOLUTE: 0.09 E9/L (ref 0–0.2)
BASOPHILS RELATIVE PERCENT: 0.8 % (ref 0–2)
BILIRUB SERPL-MCNC: 0.2 MG/DL (ref 0–1.2)
BUN BLDV-MCNC: 14 MG/DL (ref 6–20)
CALCIUM SERPL-MCNC: 8.1 MG/DL (ref 8.6–10.2)
CHLORIDE BLD-SCNC: 106 MMOL/L (ref 98–107)
CO2: 23 MMOL/L (ref 22–29)
CREAT SERPL-MCNC: 0.6 MG/DL (ref 0.5–1)
EOSINOPHILS ABSOLUTE: 0.1 E9/L (ref 0.05–0.5)
EOSINOPHILS RELATIVE PERCENT: 0.9 % (ref 0–6)
GFR AFRICAN AMERICAN: >60
GFR NON-AFRICAN AMERICAN: >60 ML/MIN/1.73
GLUCOSE BLD-MCNC: 116 MG/DL (ref 74–99)
HCT VFR BLD CALC: 35.7 % (ref 34–48)
HEMOGLOBIN: 11.4 G/DL (ref 11.5–15.5)
IMMATURE GRANULOCYTES #: 0.12 E9/L
IMMATURE GRANULOCYTES %: 1.1 % (ref 0–5)
LYMPHOCYTES ABSOLUTE: 2.17 E9/L (ref 1.5–4)
LYMPHOCYTES RELATIVE PERCENT: 19.5 % (ref 20–42)
MCH RBC QN AUTO: 29.3 PG (ref 26–35)
MCHC RBC AUTO-ENTMCNC: 31.9 % (ref 32–34.5)
MCV RBC AUTO: 91.8 FL (ref 80–99.9)
MONOCYTES ABSOLUTE: 0.83 E9/L (ref 0.1–0.95)
MONOCYTES RELATIVE PERCENT: 7.5 % (ref 2–12)
NEUTROPHILS ABSOLUTE: 7.82 E9/L (ref 1.8–7.3)
NEUTROPHILS RELATIVE PERCENT: 70.2 % (ref 43–80)
PDW BLD-RTO: 19.9 FL (ref 11.5–15)
PLATELET # BLD: 237 E9/L (ref 130–450)
PMV BLD AUTO: 9.3 FL (ref 7–12)
POTASSIUM SERPL-SCNC: 3.8 MMOL/L (ref 3.5–5)
RBC # BLD: 3.89 E12/L (ref 3.5–5.5)
SODIUM BLD-SCNC: 140 MMOL/L (ref 132–146)
TOTAL PROTEIN: 6.5 G/DL (ref 6.4–8.3)
WBC # BLD: 11.1 E9/L (ref 4.5–11.5)

## 2022-03-24 PROCEDURE — 85025 COMPLETE CBC W/AUTO DIFF WBC: CPT

## 2022-03-24 PROCEDURE — 6360000002 HC RX W HCPCS: Performed by: INTERNAL MEDICINE

## 2022-03-24 PROCEDURE — 80053 COMPREHEN METABOLIC PANEL: CPT

## 2022-03-24 PROCEDURE — 86316 IMMUNOASSAY TUMOR OTHER: CPT

## 2022-03-24 PROCEDURE — 4004F PT TOBACCO SCREEN RCVD TLK: CPT | Performed by: INTERNAL MEDICINE

## 2022-03-24 PROCEDURE — G8484 FLU IMMUNIZE NO ADMIN: HCPCS | Performed by: INTERNAL MEDICINE

## 2022-03-24 PROCEDURE — G8427 DOCREV CUR MEDS BY ELIG CLIN: HCPCS | Performed by: INTERNAL MEDICINE

## 2022-03-24 PROCEDURE — 99215 OFFICE O/P EST HI 40 MIN: CPT | Performed by: INTERNAL MEDICINE

## 2022-03-24 PROCEDURE — 6360000002 HC RX W HCPCS: Performed by: NURSE PRACTITIONER

## 2022-03-24 PROCEDURE — 2580000003 HC RX 258: Performed by: NURSE PRACTITIONER

## 2022-03-24 PROCEDURE — G8420 CALC BMI NORM PARAMETERS: HCPCS | Performed by: INTERNAL MEDICINE

## 2022-03-24 PROCEDURE — 96372 THER/PROPH/DIAG INJ SC/IM: CPT

## 2022-03-24 RX ORDER — LANREOTIDE ACETATE 120 MG/.5ML
120 INJECTION SUBCUTANEOUS ONCE
Status: COMPLETED | OUTPATIENT
Start: 2022-03-24 | End: 2022-03-24

## 2022-03-24 RX ORDER — LANREOTIDE ACETATE 120 MG/.5ML
120 INJECTION SUBCUTANEOUS ONCE
Status: CANCELLED | OUTPATIENT
Start: 2022-04-21 | End: 2022-04-21

## 2022-03-24 RX ORDER — HEPARIN SODIUM (PORCINE) LOCK FLUSH IV SOLN 100 UNIT/ML 100 UNIT/ML
500 SOLUTION INTRAVENOUS PRN
Status: DISCONTINUED | OUTPATIENT
Start: 2022-03-24 | End: 2022-03-25 | Stop reason: HOSPADM

## 2022-03-24 RX ORDER — SODIUM CHLORIDE 9 MG/ML
25 INJECTION, SOLUTION INTRAVENOUS PRN
Status: CANCELLED | OUTPATIENT
Start: 2022-03-24

## 2022-03-24 RX ORDER — SODIUM CHLORIDE 0.9 % (FLUSH) 0.9 %
5-40 SYRINGE (ML) INJECTION PRN
Status: CANCELLED | OUTPATIENT
Start: 2022-03-24

## 2022-03-24 RX ORDER — SODIUM CHLORIDE 0.9 % (FLUSH) 0.9 %
5-40 SYRINGE (ML) INJECTION PRN
Status: DISCONTINUED | OUTPATIENT
Start: 2022-03-24 | End: 2022-03-25 | Stop reason: HOSPADM

## 2022-03-24 RX ORDER — HEPARIN SODIUM (PORCINE) LOCK FLUSH IV SOLN 100 UNIT/ML 100 UNIT/ML
500 SOLUTION INTRAVENOUS PRN
Status: CANCELLED | OUTPATIENT
Start: 2022-03-24

## 2022-03-24 RX ADMIN — LANREOTIDE ACETATE 120 MG: 120 INJECTION SUBCUTANEOUS at 14:43

## 2022-03-24 RX ADMIN — HEPARIN 500 UNITS: 100 SYRINGE at 13:28

## 2022-03-24 RX ADMIN — SODIUM CHLORIDE, PRESERVATIVE FREE 10 ML: 5 INJECTION INTRAVENOUS at 13:28

## 2022-03-24 NOTE — TELEPHONE ENCOUNTER
Met with pt and pt's mother re: ongoing needs. Pt provided receipt from previous financial assistance and was provided additional gift card to assist with obtaining supplies. Pt denied any additional needs at this time.     LALIT Mcfadden, BRENT-S  Oncology Social Worker

## 2022-03-24 NOTE — PROGRESS NOTES
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 CT abdomen/pelvis 08/28/2020:  2 cm masslike density in the mid abdominal small bowel mesentery with a spiculated appearance. Multiple liver masses suspicious for metastatic disease. 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 Comment: Sections of the liver tissue cores show the hepatic parenchyma to be partially replaced by a proliferation of epithelioid cells with relatively uniform round nuclei and eosinophilic granular cytoplasm.  The   epithelioid cells form anastomosing solid cords/nests that are surrounded by delicate fibrovascular stroma.  There are no mitotic figures or tumor cell necrosis present.  The histologic changes seen are suggestive of well-differentiated neuroendocrine (carcinoid) tumor. Immunostaining for pankeratin, chromogranin, synaptophysin, TTF-1 and Ki-67 was performed on sections of one tissue block (A1) and the neoplastic epithelioid cells show diffuse and strong positivity for neuroendocrine markers (chromogranin and synaptophysin) and moderate positivity for pankeratin.  There is no staining reactivity for TTF-1. The Ki-67 proliferation labeling index is essentially negative, (very low, <1%). This staining pattern confirms the histologic impression of a well-differentiated neuroendocrine (carcinoid) tumor. FL Small bowel 09/02/2020:  Rapid small bowel transit. Serum Chromogranin A 160 on 09/23/2020.   Urine 5-HIAA 21 (0-14)  2d-Echo 10/10/2020: EF 60-65%   Normal right ventricular size and function    Dotatate PET/CT scan 10/14/2020 increased tracer uptake seen throughout the liver compatible with neoplasm. This involves both the left and the right lobe of liver. In addition there are 2 foci of increased tracer uptake along the mesentery of the small bowel. This may involve the wall the small bowel. No other convincing evidence for extra-abdominal metastatic disease. EGD/Colonoscopy 10/05/2020 by Dr. Jose Lamas; records reviewed. Hepatobiliary team (Dr. Gurdeep Tello) consult appreciated. Small bowel resection on 10/21/2020. Small bowel resection on 10/21/2020. A.  Ileum, resection:   Multifocal (4 foci) neuroendocrine tumor (NET). Extensive perineural and angiolymphatic invasion. 3 of 10 lymph nodes with metastatic neuroendocrine tumor and extracapsular extension of tumor cells (3/10). Bilateral viable small bowel resection margins with no evidence of tumor. Joe Forward received as \"Meckel's\":   Nodular scar tissue with patchy chronic inflammation. Negative for neuroendocrine tumor. Intestinal mucosal tissue is not present. CASE SUMMARY:   Procedure: Small bowel resection   Tumor site: Ileum   Tumor size: Greatest dimension of 1.5 cm   Tumor focality: Multifocal: 4 separate tumors   Histologic type and grade: G2: Well-differentiated neuroendocrine tumor   Mitotic rate: between 2-20 mitoses/2 mm2   Ki-67 labeling index: between 3-20%   Tumor extension: Tumor invades through the muscularis propria into subserosal tissue without penetration of overlying serosa   Margins:  All margins are uninvolved by tumor    Margins examined: Proximal and distal   Lymphovascular invasion: Present   Perineural invasion: Present   Large mesenteric masses (greater than 2 cm): Present (one 2.5 cm mass)   Regional lymph nodes:    Number of lymph nodes involved: 3    Number of lymph nodes examined: 10   Pathologic stage classification (pTNM, AJCC eighth edition):    pT3    pN2    pM1a -confirmed liver metastasis, QQD-     Comment:   A. 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 Dose # 1 Lanreotide was on 11/05/2020. Dose # 2 Lanreotide was on 12/03/2020. Dose # 3 Lanreotide was on 01/07/2021. Serum Chromogranin A 95 on 01/07/2021. CT chest 02/07/2021 negative for metastatic disease. CT abdomen/pelvis 02/07/2021 Persistent bilobar hepatic lesions which are grossly stable consistent with stable widespread hepatic metastasis. Imaging reviewed. Continue Lanreotide and repeat scans in 3 months. Dose # 4 Lanreotide was on 02/11/2021. Ga 76 Dotatate PET 03/09/2021 Gallium 68 dotatate avid uptake throughout multiple regions of the liver compatible with multiple foci of neuroendocrine tumor in both the right and the left lobe of the liver, when compared to previous not significantly changed in the interval.  Dose # 5 Lanreotide was on 03/11/2021. Dose # 6 Lanreotide was on 04/08/2021. Serum chromogranin A 115 (0-103)  Dose # 7 Lanreotide was on 05/06/2021. Serum chromogranin A 114 (0-103)  Dose # 8 Lanreotide was on 06/03/2021. Serum chromogranin A  84  (0-103)    Ga 76 Dotatate PET 06/29/2021 Numerous foci of increased Gallium 68 dotatate avid uptake throughout both lobes of the liver, not significantly changed from previous. No significant progression of disease. No definite metastatic disease identified  Dose # 9 Lanreotide was on 07/01/2021. Serum Chromogranin A 97 (0-103)  Dose # 10 Lanreotide was on 07/29/2021. Serum Chromogranin A 89 (0-103)  Dose # 11 Lanreotide was on 08/30/2021. Serum Chromogranin A 120 (0-103)  Dose # 12 Lanreotide was on 09/30/2021.  Serum Chromogranin A 141 (0-103)  PET/CT scan 11/09/2021 There is significant gallium avid tracer uptake in the liver, numerous lesions are seen, tracer uptake overall is diminished. There is no convincing extrahepatic disease identified. Dose # 13 Lanreotide was on 11/11/2021. Serum chromogranin A 105 on 11/11/2021. Dose # 14 Lanreotide was on 12/30/2021. Serum chromogranin A 184 on 12/30/2021. Dose # 15 Lanreotide was on 01/27/2022. Serum chromogranin A  98 on 01/27/2022. CT head 01/28/2022 noted no acute abnormality. CT cervical spine 01/28/2022 noted no acute abnormality of cervical spine  PET/CT Gallium 68 02/22/2022 noted Multiple regions of tracer uptake seen within the liver consistent with neuroendocrine tumor. No other evidence to suggest metastatic disease. Reviewed with Dr. Moi Green from Radiology team; overall stable disease. Continue Lanreotide and repeat scans in 3 months. Dose # 16 Lanreotide was on 02/24/2022. Serum chromogranin A 116 on 02/24/2022  Today 03/24/2022: No fever chills. Fair appetite and energy level. Diarrhea controlled. Review of Systems;  CONSTITUTIONAL: No fever chills. Fair appetite and energy level. ENMT: Eyes: No diplopia; Nose: No epistaxis. Mouth: No sore throat. RESPIRATORY: No hemoptysis SOB  CARDIOVASCULAR: No chest pain, palpitations. GASTROINTESTINAL: Diarrhea controlled on Imodium, Lomotil, Xermelo and Questran  GENITOURINARY: No hematuria frequency  NEURO: No syncope, presyncope, headache. Remainder:ROS NEGATIVE    Past Medical History:      Diagnosis Date    Abdominal pain     Cancer ([address]     neuroendocrime tumor    Diarrhea     Hypocalcemia     Hypothyroidism     Irritable bowel syndrome     Migraines     Seizures ([address]     last episode January 2021    Status post alcohol detoxification     5/22/2018-5/29/2018     Medications:  Reviewed and reconciled.     Allergies:  No Known Allergies    Physical Exam:  /71   Pulse 88   Temp 98 °F (36.7 °C)   Ht 5' (1.524 m)   Wt 120 lb (54.4 kg)   SpO2 99%   BMI 23.44 kg/m²   GENERAL: Alert oriented x 3, not in acute distress LUNGS: CTA Med  CVS: RRR  EXTREMITIES: Without clubbing, cyanosis, or edema. ECOG PS 1    Lab Results   Component Value Date    WBC 11.1 03/24/2022    HGB 11.4 (L) 03/24/2022    HCT 35.7 03/24/2022    MCV 91.8 03/24/2022     03/24/2022     Lab Results   Component Value Date     03/24/2022    K 3.8 03/24/2022     03/24/2022    CO2 23 03/24/2022    BUN 14 03/24/2022    CREATININE 0.6 03/24/2022    GLUCOSE 116 (H) 03/24/2022    CALCIUM 8.1 (L) 03/24/2022    PROT 6.5 03/24/2022    LABALBU 4.0 03/24/2022    BILITOT 0.2 03/24/2022    ALKPHOS 148 (H) 03/24/2022    AST 60 (H) 03/24/2022    ALT 42 (H) 03/24/2022    LABGLOM >60 03/24/2022    GFRAA >60 03/24/2022     Impression/Plan:  30 y/o female with 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 Multiple liver masses suspicious for metastatic disease. 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 Comment: Sections of the liver tissue cores show the hepatic parenchyma to be partially replaced by a proliferation of epithelioid cells with relatively uniform round nuclei and eosinophilic granular cytoplasm.  The   epithelioid cells form anastomosing solid cords/nests that are surrounded by delicate fibrovascular stroma.  There are no mitotic figures or tumor cell necrosis present.  The histologic changes seen are suggestive of well-differentiated neuroendocrine (carcinoid) tumor.      Immunostaining for pankeratin, chromogranin, synaptophysin, TTF-1 and Ki-67 was performed on sections of one tissue block (A1) and the neoplastic epithelioid cells show diffuse and strong positivity for neuroendocrine markers (chromogranin and synaptophysin) and moderate positivity for pankeratin.    There is no staining reactivity for TTF-1. The Ki-67 proliferation labeling index is essentially negative, (very low, <1%). This staining pattern confirms the histologic impression of a well-differentiated neuroendocrine (carcinoid) tumor. FL Small bowel 09/02/2020:  Rapid small bowel transit. Serum Chromogranin A 160 on 09/23/2020. Urine 5-HIAA 21 (0-14)  2d-Echo 10/10/2020: EF 60-65%   Normal right ventricular size and function    Dotatate PET/CT scan 10/14/2020 increased tracer uptake seen throughout the liver compatible with neoplasm. This involves both the left and the right lobe of liver. In addition there are 2 foci of increased tracer uptake along the mesentery of the small bowel. This may involve the wall the small bowel. No other convincing evidence for extra-abdominal metastatic disease. EGD/Colonoscopy 10/05/2020 by Dr. Que Yanez; records reviewed. Hepatobiliary team (Dr. Brinda Alexandra) consult appreciated. Small bowel resection on 10/21/2020. A.  Ileum, resection:   Multifocal (4 foci) neuroendocrine tumor (NET). Extensive perineural and angiolymphatic invasion. 3 of 10 lymph nodes with metastatic neuroendocrine tumor and extracapsular extension of tumor cells (3/10). Bilateral viable small bowel resection margins with no evidence of tumor. Radha An received as \"Meckel's\":   Nodular scar tissue with patchy chronic inflammation. Negative for neuroendocrine tumor. Intestinal mucosal tissue is not present. CASE SUMMARY:   Procedure: Small bowel resection   Tumor site: Ileum   Tumor size: Greatest dimension of 1.5 cm   Tumor focality: Multifocal: 4 separate tumors   Histologic type and grade: G2: Well-differentiated neuroendocrine tumor   Mitotic rate: between 2-20 mitoses/2 mm2   Ki-67 labeling index: between 3-20%   Tumor extension: Tumor invades through the muscularis propria into subserosal tissue without penetration of overlying serosa   Margins:  All margins are uninvolved by tumor  Margins examined: Proximal and distal   Lymphovascular invasion: Present   Perineural invasion: Present   Large mesenteric masses (greater than 2 cm): Present (one 2.5 cm mass)   Regional lymph nodes:    Number of lymph nodes involved: 3    Number of lymph nodes examined: 10   Pathologic stage classification (pTNM, AJCC eighth edition):    pT3    pN2    pM1a -confirmed liver metastasis, HBS-     Comment:   A. 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 Dose # 1 Lanreotide was on 11/05/2020. Dose # 2 Lanreotide was on 12/03/2020. Dose # 3 Lanreotide was on 01/07/2021. Serum Chromogranin A 95 on 01/07/2021. CT chest 02/07/2021 negative for metastatic disease. CT abdomen/pelvis 02/07/2021 Persistent bilobar hepatic lesions which are grossly stable consistent with stable widespread hepatic metastasis. Imaging reviewed. Continue Lanreotide and repeat scans in 3 months. Dose # 4 Lanreotide was on 02/11/2021. Ga 76 Dotatate PET 03/09/2021 Gallium 68 dotatate avid uptake throughout multiple regions of the liver compatible with multiple foci of neuroendocrine tumor in both the right and the left lobe of the liver, when compared to previous not significantly changed in the interval.  Dose # 5 Lanreotide was on 03/11/2021. Dose # 6 Lanreotide was on 04/08/2021. Serum chromogranin A 115 (0-103)  Dose # 7 Lanreotide was on 05/06/2021. Serum chromogranin A 114 (0-103)  Dose # 8 Lanreotide was on 06/03/2021. Serum chromogranin A  84  (0-103)  Ga 76 Dotatate PET 06/29/2021 Numerous foci of increased Gallium 68 dotatate avid uptake throughout both lobes of the liver, not significantly changed from previous. No significant progression of disease.  No definite metastatic disease identified  Dose # 9 Lanreotide was on 07/01/2021. Serum Chromogranin A 97 (0-103)  Dose # 10 Lanreotide was on 07/29/2021. Serum Chromogranin A 89 (0-103)  MRI Thoracic/Lumbar Spine 08/27/2021 unremarkable  CT head 08/27/2021 no acute intracranial abnormality  CT chest 08/27/2021 unremarkable. Stable hypodense lesions in liver grossly stable as of the CT of the chest from 02/07/2021  Dose # 11 Lanreotide was on 08/30/2021. Serum Chromogranin A 120 (0-103)  Dose # 12 Lanreotide was on 09/30/2021. Serum Chromogranin A 141 (0-103)  PET/CT scan 11/09/2021 There is significant gallium avid tracer uptake in the liver, numerous lesions are seen, tracer uptake overall is diminished. There is no convincing extrahepatic disease identified. Imaging reviewed. Continue Lanreotide and repeat scans in 3 months. Dose # 13 Lanreotide was on 11/11/2021. Serum chromogranin A 105 on 11/11/2021. Had COVID-19 and recovered. Dose # 14 Lanreotide was on 12/30/2021. Serum chromogranin A 184 on 12/30/2021. Dose # 15 Lanreotide was on 01/27/2022. Serum chromogranin A 98 on 01/27/2022. CT head 01/28/2022 noted no acute abnormality. CT cervical spine 01/28/2022 noted no acute abnormality of cervical spine  PET/CT Gallium 68 02/22/2022 noted Multiple regions of tracer uptake seen within the liver consistent with neuroendocrine tumor. No other evidence to suggest metastatic disease. Reviewed with Dr. Yenny Mclean from Radiology team; overall stable disease. Continue Lanreotide and repeat scans in 3 months. Dose # 16 Lanreotide was on 02/24/2022. Serum chromogranin A 116 on 02/24/2022  CTA chest 03/08/2022 noted no PE or acute pulmonary abnormality  Imaging reviewed. Dose # 17 Lanreotide is today 03/24/2022. Labs reviewed ok to proceed. Serum Chromogranin A pending   Take Ca as directed (2 pills on one day, 3 pills the following day; alternate 2 and 3 pills)    RTC in 4 weeks for Dose # 18 Lanreotide.     Evonne Faith MD   1/71/2391  Board Certified Medical Oncologist

## 2022-03-25 RX ORDER — TRAMADOL HYDROCHLORIDE 50 MG/1
TABLET ORAL
Qty: 120 TABLET | OUTPATIENT
Start: 2022-03-25

## 2022-03-28 DIAGNOSIS — G89.3 NEOPLASM RELATED PAIN: ICD-10-CM

## 2022-03-28 LAB — CHROMOGRANIN A: 173 NG/ML (ref 0–103)

## 2022-03-28 RX ORDER — TRAMADOL HYDROCHLORIDE 50 MG/1
TABLET ORAL
Qty: 120 TABLET | OUTPATIENT
Start: 2022-03-28

## 2022-03-28 RX ORDER — GABAPENTIN 300 MG/1
CAPSULE ORAL
Qty: 270 CAPSULE | Refills: 0 | OUTPATIENT
Start: 2022-03-28

## 2022-03-28 NOTE — TELEPHONE ENCOUNTER
Call from Cassidy Millan asking for a refill prescription for Tramadol be sent to [facility]. Cassidy Millan picked up medications earlier on March 12 and should have enough to get to 4/12/22. Spoke with Pharmacy and pharmacist  states it is 11 days too early for refill. Cassidy Millan states she has 6 tablets left, denies taking tramadol more frequently or more tablets at a time. Cassidy Millan does not know what happened to her tramadol, she states maybe [facility] did not give her enough. Discussed with Jerel Friend, JESS and no refill to be sent at this time.

## 2022-04-02 DIAGNOSIS — F51.01 PRIMARY INSOMNIA: ICD-10-CM

## 2022-04-04 ENCOUNTER — OFFICE VISIT (OUTPATIENT)
Dept: PALLATIVE CARE | Age: 37
End: 2022-04-04
Payer: COMMERCIAL

## 2022-04-04 VITALS
HEART RATE: 114 BPM | TEMPERATURE: 98.1 F | SYSTOLIC BLOOD PRESSURE: 122 MMHG | BODY MASS INDEX: 23.05 KG/M2 | WEIGHT: 118 LBS | OXYGEN SATURATION: 95 % | DIASTOLIC BLOOD PRESSURE: 84 MMHG

## 2022-04-04 DIAGNOSIS — G89.3 NEOPLASM RELATED PAIN: ICD-10-CM

## 2022-04-04 PROCEDURE — G8420 CALC BMI NORM PARAMETERS: HCPCS | Performed by: NURSE PRACTITIONER

## 2022-04-04 PROCEDURE — 4004F PT TOBACCO SCREEN RCVD TLK: CPT | Performed by: NURSE PRACTITIONER

## 2022-04-04 PROCEDURE — 99212 OFFICE O/P EST SF 10 MIN: CPT | Performed by: NURSE PRACTITIONER

## 2022-04-04 PROCEDURE — G8428 CUR MEDS NOT DOCUMENT: HCPCS | Performed by: NURSE PRACTITIONER

## 2022-04-04 RX ORDER — TRAMADOL HYDROCHLORIDE 50 MG/1
50 TABLET ORAL EVERY 6 HOURS PRN
Qty: 120 TABLET | Refills: 2 | Status: SHIPPED
Start: 2022-04-04 | End: 2022-06-28 | Stop reason: SDUPTHER

## 2022-04-04 RX ORDER — ZOLPIDEM TARTRATE 10 MG/1
TABLET ORAL
Qty: 30 TABLET | Refills: 2 | Status: SHIPPED | OUTPATIENT
Start: 2022-04-04 | End: 2022-05-04

## 2022-04-04 RX ORDER — GABAPENTIN 300 MG/1
300 CAPSULE ORAL 3 TIMES DAILY
Qty: 270 CAPSULE | Refills: 0 | Status: SHIPPED
Start: 2022-04-04 | End: 2022-08-18

## 2022-04-04 NOTE — PROGRESS NOTES
Department of Palliative Medicine  Ambulatory Note  Provider: TRINA Spain - CNP     Chief Complaint: Maribeth Henriquez is a 40 y.o. female with chief complaint of pain    HPI:  Maribeth Henriquez is a 29 y.o. female with significant medical history of hypothyroidism, IBS, migraine who was complaining of abdominal pain associated with diarrhea and flushing/sweating. She was diagnosed with metastatic well differentiated Neuroendocrine cancer to liver who was referred to [address] [place] [address] by Dr. Alessia Ward. Assessment/Plan      Neuroendocrine cancer  - Follows with Dr. Aria Beckett  - s.p Bowel resection on 10/21/20  - On Lanreotide     Neoplasm related pain  - Gabapentin 300mg TID  - Tramadol 50mg q 6 hrs prn for the pain      Nausea  - Continue Zofran and Phenergan PRN    Diarrhea:   -Currently on Lanreotide and Xermelo  -Imodium she uses this daily  -Eat 1-3 jumbo marshmallows daily PRN    - Goals of care: cure, live longer and improve or maintain function/quality of life    - Code Status: full code    Follow Up:  4 weeks. Encouraged to call with any questions, concerns, needs, or changes in symptoms. Subjective:   Gonzalez Ruiz presents today with her mother present. She is at her baseline for the most part. She continues to have abdominal pain, managed well with tramadol and gabapentin. She still have some diarrhea, but this is also well managed currently. She denies any other significant symptoms.       Pain Assessment   Ratin  Description: burning, sharp and shooting  Duration: minutes  Frequency: daily  Location: Abdomen   Alleviating Factors: relaxation  Exacerbating Factors: unable to associate with any factor  Effect: change in function and sleep    Objective:     Physical Exam  Wt Readings from Last 3 Encounters:   22 118 lb (53.5 kg)   22 120 lb (54.4 kg)   22 120 lb (54.4 kg)     /84   Pulse 114   Temp 98.1 °F (36.7 °C)   Wt 118 lb (53.5 kg) SpO2 95% Comment: RA  BMI 23.05 kg/m²     Gen:  Alert, appears stated age, well nourished, in no acute distress  HEENT:  Normocephalic, conjunctiva pink, no drainage, mucosa moist  Neck:  Supple  Lungs:  CTA bilaterally, no audible rhonchi or wheezes noted  Heart[de-identified]  RRR, no murmur, rub, or gallop noted during exam  Abd:  Soft, non tender, non distended, BS+  M/S/Ext:  Moving all extremities, no edema, pulses present  Skin:  Warm and dry  Neuro:  PERRL, Alert, oriented x 3; following commands    [place] Symptom Assessment Score   [place] Score 4/4/2022 10/25/2021 8/30/2021 8/2/2021 6/15/2021   Pain Score 5 7 5 4 4   Tiredness Score 3 6 6 5 4   Nausea Score 3 2 4 3 Not nauseated   Depression Score 1 5 Not depressed Not depressed Not depressed   Anxiety Score 2 5 2 2 1   Drowsiness Score 2 2 4 3 1   Appetite Score 3 3 4 5 5   Wellbeing Score 1 3 4 5 5   Dyspnea Score 1 3 4 5 5   Other Problem Score Best possible response Best possible response Best possible response Best possible response Best possible response   Total Assessment Score(calculated) 21 36 33 32 25     Assessed by: patient. Current Medications:  Medications reviewed: yes    Controlled Substances Monitoring: [place] reviewed 4/4/22. TRINA Tsang CNP   Palliative Care Department     Time/Communication  Greater than 50% of time spent, total 15 minutes in face-to-face counseling and coordination of care regarding symptom management. Note: This report was completed using computerStreamcore System voiced recognition software. Every effort has been made to ensure accuracy; however, inadvertent computerized transcription errors may be present.

## 2022-04-21 ENCOUNTER — OFFICE VISIT (OUTPATIENT)
Dept: ONCOLOGY | Age: 37
End: 2022-04-21
Payer: COMMERCIAL

## 2022-04-21 ENCOUNTER — HOSPITAL ENCOUNTER (OUTPATIENT)
Dept: INFUSION THERAPY | Age: 37
Discharge: HOME | End: 2022-04-21
Payer: COMMERCIAL

## 2022-04-21 VITALS
HEART RATE: 76 BPM | BODY MASS INDEX: 23.25 KG/M2 | DIASTOLIC BLOOD PRESSURE: 72 MMHG | TEMPERATURE: 98.2 F | HEIGHT: 60 IN | SYSTOLIC BLOOD PRESSURE: 116 MMHG | OXYGEN SATURATION: 99 % | WEIGHT: 118.4 LBS

## 2022-04-21 DIAGNOSIS — C7B.8 NEUROENDOCRINE CARCINOMA METASTATIC TO LIVER (HCC): Primary | ICD-10-CM

## 2022-04-21 DIAGNOSIS — C7B.8 METASTATIC MALIGNANT NEUROENDOCRINE TUMOR TO LIVER (HCC): Primary | ICD-10-CM

## 2022-04-21 DIAGNOSIS — C7A.8 NEUROENDOCRINE CARCINOMA METASTATIC TO LIVER (HCC): Primary | ICD-10-CM

## 2022-04-21 LAB
ALBUMIN SERPL-MCNC: 4.1 G/DL (ref 3.5–5.2)
ALP BLD-CCNC: 144 U/L (ref 35–104)
ALT SERPL-CCNC: 30 U/L (ref 0–32)
ANION GAP SERPL CALCULATED.3IONS-SCNC: 17 MMOL/L (ref 7–16)
AST SERPL-CCNC: 38 U/L (ref 0–31)
BASOPHILS ABSOLUTE: 0.05 E9/L (ref 0–0.2)
BASOPHILS RELATIVE PERCENT: 0.7 % (ref 0–2)
BILIRUB SERPL-MCNC: 1.1 MG/DL (ref 0–1.2)
BUN BLDV-MCNC: 8 MG/DL (ref 6–20)
CALCIUM SERPL-MCNC: 8.5 MG/DL (ref 8.6–10.2)
CHLORIDE BLD-SCNC: 97 MMOL/L (ref 98–107)
CO2: 25 MMOL/L (ref 22–29)
CREAT SERPL-MCNC: 0.6 MG/DL (ref 0.5–1)
EOSINOPHILS ABSOLUTE: 0.02 E9/L (ref 0.05–0.5)
EOSINOPHILS RELATIVE PERCENT: 0.3 % (ref 0–6)
GFR AFRICAN AMERICAN: >60
GFR NON-AFRICAN AMERICAN: >60 ML/MIN/1.73
GLUCOSE BLD-MCNC: 117 MG/DL (ref 74–99)
HCT VFR BLD CALC: 39.1 % (ref 34–48)
HEMOGLOBIN: 12.6 G/DL (ref 11.5–15.5)
IMMATURE GRANULOCYTES #: 0.03 E9/L
IMMATURE GRANULOCYTES %: 0.4 % (ref 0–5)
LYMPHOCYTES ABSOLUTE: 1.28 E9/L (ref 1.5–4)
LYMPHOCYTES RELATIVE PERCENT: 17.5 % (ref 20–42)
MCH RBC QN AUTO: 29.3 PG (ref 26–35)
MCHC RBC AUTO-ENTMCNC: 32.2 % (ref 32–34.5)
MCV RBC AUTO: 90.9 FL (ref 80–99.9)
MONOCYTES ABSOLUTE: 0.74 E9/L (ref 0.1–0.95)
MONOCYTES RELATIVE PERCENT: 10.1 % (ref 2–12)
NEUTROPHILS ABSOLUTE: 5.18 E9/L (ref 1.8–7.3)
NEUTROPHILS RELATIVE PERCENT: 71 % (ref 43–80)
PDW BLD-RTO: 18.7 FL (ref 11.5–15)
PLATELET # BLD: 180 E9/L (ref 130–450)
PMV BLD AUTO: 10.5 FL (ref 7–12)
POTASSIUM SERPL-SCNC: 3.1 MMOL/L (ref 3.5–5)
RBC # BLD: 4.3 E12/L (ref 3.5–5.5)
SODIUM BLD-SCNC: 139 MMOL/L (ref 132–146)
TOTAL PROTEIN: 6.8 G/DL (ref 6.4–8.3)
WBC # BLD: 7.3 E9/L (ref 4.5–11.5)

## 2022-04-21 PROCEDURE — 80053 COMPREHEN METABOLIC PANEL: CPT

## 2022-04-21 PROCEDURE — 96402 CHEMO HORMON ANTINEOPL SQ/IM: CPT

## 2022-04-21 PROCEDURE — 86316 IMMUNOASSAY TUMOR OTHER: CPT

## 2022-04-21 PROCEDURE — G8427 DOCREV CUR MEDS BY ELIG CLIN: HCPCS | Performed by: INTERNAL MEDICINE

## 2022-04-21 PROCEDURE — 6360000002 HC RX W HCPCS: Performed by: NURSE PRACTITIONER

## 2022-04-21 PROCEDURE — G8420 CALC BMI NORM PARAMETERS: HCPCS | Performed by: INTERNAL MEDICINE

## 2022-04-21 PROCEDURE — 4004F PT TOBACCO SCREEN RCVD TLK: CPT | Performed by: INTERNAL MEDICINE

## 2022-04-21 PROCEDURE — 96372 THER/PROPH/DIAG INJ SC/IM: CPT

## 2022-04-21 PROCEDURE — 99212 OFFICE O/P EST SF 10 MIN: CPT

## 2022-04-21 PROCEDURE — 2580000003 HC RX 258: Performed by: NURSE PRACTITIONER

## 2022-04-21 PROCEDURE — 99214 OFFICE O/P EST MOD 30 MIN: CPT | Performed by: INTERNAL MEDICINE

## 2022-04-21 PROCEDURE — 6360000002 HC RX W HCPCS: Performed by: INTERNAL MEDICINE

## 2022-04-21 PROCEDURE — 85025 COMPLETE CBC W/AUTO DIFF WBC: CPT

## 2022-04-21 RX ORDER — LANREOTIDE ACETATE 120 MG/.5ML
120 INJECTION SUBCUTANEOUS ONCE
Status: CANCELLED | OUTPATIENT
Start: 2022-05-19 | End: 2022-05-19

## 2022-04-21 RX ORDER — SODIUM CHLORIDE 0.9 % (FLUSH) 0.9 %
5-40 SYRINGE (ML) INJECTION PRN
Status: DISCONTINUED | OUTPATIENT
Start: 2022-04-21 | End: 2022-04-22 | Stop reason: HOSPADM

## 2022-04-21 RX ORDER — SODIUM CHLORIDE 9 MG/ML
25 INJECTION, SOLUTION INTRAVENOUS PRN
Status: CANCELLED | OUTPATIENT
Start: 2022-04-21

## 2022-04-21 RX ORDER — HEPARIN SODIUM (PORCINE) LOCK FLUSH IV SOLN 100 UNIT/ML 100 UNIT/ML
500 SOLUTION INTRAVENOUS PRN
Status: CANCELLED | OUTPATIENT
Start: 2022-04-21

## 2022-04-21 RX ORDER — SODIUM CHLORIDE 0.9 % (FLUSH) 0.9 %
5-40 SYRINGE (ML) INJECTION PRN
Status: CANCELLED | OUTPATIENT
Start: 2022-04-21

## 2022-04-21 RX ORDER — HEPARIN SODIUM (PORCINE) LOCK FLUSH IV SOLN 100 UNIT/ML 100 UNIT/ML
500 SOLUTION INTRAVENOUS PRN
Status: DISCONTINUED | OUTPATIENT
Start: 2022-04-21 | End: 2022-04-22 | Stop reason: HOSPADM

## 2022-04-21 RX ORDER — LANREOTIDE ACETATE 120 MG/.5ML
120 INJECTION SUBCUTANEOUS ONCE
Status: COMPLETED | OUTPATIENT
Start: 2022-04-21 | End: 2022-04-21

## 2022-04-21 RX ADMIN — LANREOTIDE ACETATE 120 MG: 120 INJECTION SUBCUTANEOUS at 14:20

## 2022-04-21 RX ADMIN — HEPARIN 500 UNITS: 100 SYRINGE at 13:31

## 2022-04-21 RX ADMIN — SODIUM CHLORIDE, PRESERVATIVE FREE 10 ML: 5 INJECTION INTRAVENOUS at 13:31

## 2022-04-21 NOTE — PROGRESS NOTES
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 CT abdomen/pelvis 08/28/2020:  2 cm masslike density in the mid abdominal small bowel mesentery with a spiculated appearance. Multiple liver masses suspicious for metastatic disease. 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 Comment: Sections of the liver tissue cores show the hepatic parenchyma to be partially replaced by a proliferation of epithelioid cells with relatively uniform round nuclei and eosinophilic granular cytoplasm.  The   epithelioid cells form anastomosing solid cords/nests that are surrounded by delicate fibrovascular stroma.  There are no mitotic figures or tumor cell necrosis present.  The histologic changes seen are suggestive of well-differentiated neuroendocrine (carcinoid) tumor. Immunostaining for pankeratin, chromogranin, synaptophysin, TTF-1 and Ki-67 was performed on sections of one tissue block (A1) and the neoplastic epithelioid cells show diffuse and strong positivity for neuroendocrine markers (chromogranin and synaptophysin) and moderate positivity for pankeratin.  There is no staining reactivity for TTF-1. The Ki-67 proliferation labeling index is essentially negative, (very low, <1%). This staining pattern confirms the histologic impression of a well-differentiated neuroendocrine (carcinoid) tumor. FL Small bowel 09/02/2020:  Rapid small bowel transit. Serum Chromogranin A 160 on 09/23/2020.   Urine 5-HIAA 21 (0-14)  2d-Echo 10/10/2020: EF 60-65%   Normal right ventricular size and function    Dotatate PET/CT scan 10/14/2020 increased tracer uptake seen throughout the liver compatible with neoplasm. This involves both the left and the right lobe of liver. In addition there are 2 foci of increased tracer uptake along the mesentery of the small bowel. This may involve the wall the small bowel. No other convincing evidence for extra-abdominal metastatic disease. EGD/Colonoscopy 10/05/2020 by Dr. Сергей Madsen; records reviewed. Hepatobiliary team (Dr. Eduar Mckee) consult appreciated. Small bowel resection on 10/21/2020. Small bowel resection on 10/21/2020. A.  Ileum, resection:   Multifocal (4 foci) neuroendocrine tumor (NET). Extensive perineural and angiolymphatic invasion. 3 of 10 lymph nodes with metastatic neuroendocrine tumor and extracapsular extension of tumor cells (3/10). Bilateral viable small bowel resection margins with no evidence of tumor. [place] Gr received as \"Meckel's\":   Nodular scar tissue with patchy chronic inflammation. Negative for neuroendocrine tumor. Intestinal mucosal tissue is not present. CASE SUMMARY:   Procedure: Small bowel resection   Tumor site: Ileum   Tumor size: Greatest dimension of 1.5 cm   Tumor focality: Multifocal: 4 separate tumors   Histologic type and grade: G2: Well-differentiated neuroendocrine tumor   Mitotic rate: between 2-20 mitoses/2 mm2   Ki-67 labeling index: between 3-20%   Tumor extension: Tumor invades through the muscularis propria into subserosal tissue without penetration of overlying serosa   Margins:  All margins are uninvolved by tumor    Margins examined: Proximal and distal   Lymphovascular invasion: Present   Perineural invasion: Present   Large mesenteric masses (greater than 2 cm): Present (one 2.5 cm mass)   Regional lymph nodes:    Number of lymph nodes involved: 3    Number of lymph nodes examined: 10   Pathologic stage classification (pTNM, AJCC eighth edition):    pT3    pN2    pM1a -confirmed liver metastasis, ASQ-     Comment:   A. 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 Dose # 1 Lanreotide was on 11/05/2020. Dose # 2 Lanreotide was on 12/03/2020. Dose # 3 Lanreotide was on 01/07/2021. Serum Chromogranin A 95 on 01/07/2021. CT chest 02/07/2021 negative for metastatic disease. CT abdomen/pelvis 02/07/2021 Persistent bilobar hepatic lesions which are grossly stable consistent with stable widespread hepatic metastasis. Imaging reviewed. Continue Lanreotide and repeat scans in 3 months. Dose # 4 Lanreotide was on 02/11/2021. Ga 76 Dotatate PET 03/09/2021 Gallium 68 dotatate avid uptake throughout multiple regions of the liver compatible with multiple foci of neuroendocrine tumor in both the right and the left lobe of the liver, when compared to previous not significantly changed in the interval.  Dose # 5 Lanreotide was on 03/11/2021. Dose # 6 Lanreotide was on 04/08/2021. Serum chromogranin A 115 (0-103)  Dose # 7 Lanreotide was on 05/06/2021. Serum chromogranin A 114 (0-103)  Dose # 8 Lanreotide was on 06/03/2021. Serum chromogranin A  84  (0-103)    Ga 76 Dotatate PET 06/29/2021 Numerous foci of increased Gallium 68 dotatate avid uptake throughout both lobes of the liver, not significantly changed from previous. No significant progression of disease. No definite metastatic disease identified  Dose # 9 Lanreotide was on 07/01/2021. Serum Chromogranin A 97 (0-103)  Dose # 10 Lanreotide was on 07/29/2021. Serum Chromogranin A 89 (0-103)  Dose # 11 Lanreotide was on 08/30/2021. Serum Chromogranin A 120 (0-103)  Dose # 12 Lanreotide was on 09/30/2021.  Serum Chromogranin A 141 (0-103)  PET/CT scan 11/09/2021 There is significant gallium avid tracer uptake in the liver, numerous lesions are seen, tracer uptake overall is diminished. There is no convincing extrahepatic disease identified. Dose # 13 Lanreotide was on 11/11/2021. Serum chromogranin A 105 on 11/11/2021. Dose # 14 Lanreotide was on 12/30/2021. Serum chromogranin A 184 on 12/30/2021. Dose # 15 Lanreotide was on 01/27/2022. Serum chromogranin A  98 on 01/27/2022. CT head 01/28/2022 noted no acute abnormality. CT cervical spine 01/28/2022 noted no acute abnormality of cervical spine  PET/CT Gallium 68 02/22/2022 noted Multiple regions of tracer uptake seen within the liver consistent with neuroendocrine tumor. No other evidence to suggest metastatic disease. Reviewed with Dr. Kirby Rosenberg from Radiology team; overall stable disease. Continue Lanreotide and repeat scans in 3 months. Dose # 16 Lanreotide was on 02/24/2022. Serum chromogranin A 116 on 02/24/2022  Dose # 17 Lanreotide was on 03/24/2022. Serum Chromogranin A 173 on 03/24/2022. Today 04/21/2022: No fever chills. Fair appetite and energy level. She had significant diarrhea in the last few days    Review of Systems;  CONSTITUTIONAL: No fever chills. Fair appetite and energy level. ENMT: Eyes: No diplopia; Nose: No epistaxis. Mouth: No sore throat. RESPIRATORY: No hemoptysis SOB  CARDIOVASCULAR: No chest pain, palpitations. GASTROINTESTINAL: Significant diarrhea in the last few days  GENITOURINARY: No hematuria frequency  NEURO: No syncope, presyncope, headache. Remainder:ROS NEGATIVE    Past Medical History:      Diagnosis Date    Abdominal pain     Cancer ([facility] [address]     neuroendocrime tumor    Diarrhea     Hypocalcemia     Hypothyroidism     Irritable bowel syndrome     Migraines     Seizures ([facility] [address]     last episode January 2021    Status post alcohol detoxification     5/22/2018-5/29/2018     Medications:  Reviewed and reconciled.     Allergies:  No Known Allergies  Physical Exam:  /72   Pulse 76   Temp 98.2 °F (36.8 °C)   Ht 5' (1.524 m)   Wt 118 lb 6.4 oz (53.7 kg)   SpO2 99%   BMI 23.12 kg/m²   GENERAL: Alert oriented x 3, not in acute distress   LUNGS: CTA Med  CVS: RRR  EXTREMITIES: Without clubbing, cyanosis, or edema. ECOG PS 1    Lab Results   Component Value Date    WBC 7.3 04/21/2022    HGB 12.6 04/21/2022    HCT 39.1 04/21/2022    MCV 90.9 04/21/2022     04/21/2022       Impression/Plan:  30 y/o female with 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 Multiple liver masses suspicious for metastatic disease. 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 Comment: Sections of the liver tissue cores show the hepatic parenchyma to be partially replaced by a proliferation of epithelioid cells with relatively uniform round nuclei and eosinophilic granular cytoplasm.  The   epithelioid cells form anastomosing solid cords/nests that are surrounded by delicate fibrovascular stroma.  There are no mitotic figures or tumor cell necrosis present.  The histologic changes seen are suggestive of well-differentiated neuroendocrine (carcinoid) tumor. Immunostaining for pankeratin, chromogranin, synaptophysin, TTF-1 and Ki-67 was performed on sections of one tissue block (A1) and the neoplastic epithelioid cells show diffuse and strong positivity for neuroendocrine markers (chromogranin and synaptophysin) and moderate positivity for pankeratin.    There is no staining reactivity for TTF-1. The Ki-67 proliferation labeling index is essentially negative, (very low, <1%). This staining pattern confirms the histologic impression of a well-differentiated neuroendocrine (carcinoid) tumor. FL Small bowel 09/02/2020:  Rapid small bowel transit. Serum Chromogranin A 160 on 09/23/2020.   Urine 5-HIAA 21 (0-14)  2d-Echo 10/10/2020: EF 60-65%   Normal right ventricular size and function    Dotatate PET/CT scan 10/14/2020 increased tracer uptake seen throughout the liver compatible with neoplasm. This involves both the left and the right lobe of liver. In addition there are 2 foci of increased tracer uptake along the mesentery of the small bowel. This may involve the wall the small bowel. No other convincing evidence for extra-abdominal metastatic disease. EGD/Colonoscopy 10/05/2020 by Dr. Tanmay Marcial; records reviewed. Hepatobiliary team (Dr. Mode Shane) consult appreciated. Small bowel resection on 10/21/2020. A.  Ileum, resection:   Multifocal (4 foci) neuroendocrine tumor (NET). Extensive perineural and angiolymphatic invasion. 3 of 10 lymph nodes with metastatic neuroendocrine tumor and extracapsular extension of tumor cells (3/10). Bilateral viable small bowel resection margins with no evidence of tumor. Quintin Johnson received as \"Meckel's\":   Nodular scar tissue with patchy chronic inflammation. Negative for neuroendocrine tumor. Intestinal mucosal tissue is not present. CASE SUMMARY:   Procedure: Small bowel resection   Tumor site: Ileum   Tumor size: Greatest dimension of 1.5 cm   Tumor focality: Multifocal: 4 separate tumors   Histologic type and grade: G2: Well-differentiated neuroendocrine tumor   Mitotic rate: between 2-20 mitoses/2 mm2   Ki-67 labeling index: between 3-20%   Tumor extension: Tumor invades through the muscularis propria into subserosal tissue without penetration of overlying serosa   Margins:  All margins are uninvolved by tumor    Margins examined: Proximal and distal   Lymphovascular invasion: Present   Perineural invasion: Present   Large mesenteric masses (greater than 2 cm): Present (one 2.5 cm mass)   Regional lymph nodes:    Number of lymph nodes involved: 3    Number of lymph nodes examined: 10   Pathologic stage classification (pTNM, AJCC eighth edition):  pT3    pN2    pM1a -confirmed liver metastasis, [employer]-     Comment:   A. 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 Dose # 1 Lanreotide was on 11/05/2020. Dose # 2 Lanreotide was on 12/03/2020. Dose # 3 Lanreotide was on 01/07/2021. Serum Chromogranin A 95 on 01/07/2021. CT chest 02/07/2021 negative for metastatic disease. CT abdomen/pelvis 02/07/2021 Persistent bilobar hepatic lesions which are grossly stable consistent with stable widespread hepatic metastasis. Imaging reviewed. Continue Lanreotide and repeat scans in 3 months. Dose # 4 Lanreotide was on 02/11/2021. Ga 76 Dotatate PET 03/09/2021 Gallium 68 dotatate avid uptake throughout multiple regions of the liver compatible with multiple foci of neuroendocrine tumor in both the right and the left lobe of the liver, when compared to previous not significantly changed in the interval.  Dose # 5 Lanreotide was on 03/11/2021. Dose # 6 Lanreotide was on 04/08/2021. Serum chromogranin A 115 (0-103)  Dose # 7 Lanreotide was on 05/06/2021. Serum chromogranin A 114 (0-103)  Dose # 8 Lanreotide was on 06/03/2021. Serum chromogranin A  84  (0-103)  Ga 76 Dotatate PET 06/29/2021 Numerous foci of increased Gallium 68 dotatate avid uptake throughout both lobes of the liver, not significantly changed from previous. No significant progression of disease. No definite metastatic disease identified  Dose # 9 Lanreotide was on 07/01/2021. Serum Chromogranin A 97 (0-103)  Dose # 10 Lanreotide was on 07/29/2021. Serum Chromogranin A 89 (0-103)  MRI Thoracic/Lumbar Spine 08/27/2021 unremarkable  CT head 08/27/2021 no acute intracranial abnormality  CT chest 08/27/2021 unremarkable.  Stable hypodense lesions in liver grossly stable as of the CT of the chest from 02/07/2021  Dose # 11 Lanreotide was on 08/30/2021. Serum Chromogranin A 120 (0-103)  Dose # 12 Lanreotide was on 09/30/2021. Serum Chromogranin A 141 (0-103)  PET/CT scan 11/09/2021 There is significant gallium avid tracer uptake in the liver, numerous lesions are seen, tracer uptake overall is diminished. There is no convincing extrahepatic disease identified. Imaging reviewed. Continue Lanreotide and repeat scans in 3 months. Dose # 13 Lanreotide was on 11/11/2021. Serum chromogranin A 105 on 11/11/2021. Had COVID-19 and recovered. Dose # 14 Lanreotide was on 12/30/2021. Serum chromogranin A 184 on 12/30/2021. Dose # 15 Lanreotide was on 01/27/2022. Serum chromogranin A 98 on 01/27/2022. CT head 01/28/2022 noted no acute abnormality. CT cervical spine 01/28/2022 noted no acute abnormality of cervical spine  PET/CT Gallium 68 02/22/2022 noted Multiple regions of tracer uptake seen within the liver consistent with neuroendocrine tumor. No other evidence to suggest metastatic disease. Reviewed with Dr. Wilkie Primrose from Radiology team; overall stable disease. Continue Lanreotide and repeat scans in 3 months. Dose # 16 Lanreotide was on 02/24/2022. Serum chromogranin A 116 on 02/24/2022  CTA chest 03/08/2022 noted no PE or acute pulmonary abnormality  Dose # 17 Lanreotide was on 03/24/2022. Serum Chromogranin A 173 on 03/24/2022. Dose # 18 Lanreotide is today 04/21/2022. Serum Chromogranin A pending   Imodium, Lomotil, Xermelo for diarrhea    RTC in 4 weeks for Dose # 19 Lanreotide.  PET/CT Gallium 68 in June 2022  If disease progression down the line can consider Claire Wallis MD   [id number]/[id number]  Board Certified Medical Oncologist

## 2022-04-22 DIAGNOSIS — C7B.8 METASTATIC MALIGNANT NEUROENDOCRINE TUMOR TO LIVER (HCC): ICD-10-CM

## 2022-04-22 RX ORDER — POTASSIUM CHLORIDE 20 MEQ/1
20 TABLET, EXTENDED RELEASE ORAL 2 TIMES DAILY
Qty: 60 TABLET | Refills: 1 | Status: SHIPPED
Start: 2022-04-22 | End: 2022-05-04 | Stop reason: SDUPTHER

## 2022-04-25 LAB — CHROMOGRANIN A: 140 NG/ML (ref 0–103)

## 2022-05-04 ENCOUNTER — APPOINTMENT (OUTPATIENT)
Dept: GENERAL RADIOLOGY | Age: 37
End: 2022-05-04
Payer: COMMERCIAL

## 2022-05-04 ENCOUNTER — APPOINTMENT (OUTPATIENT)
Dept: CT IMAGING | Age: 37
End: 2022-05-04
Payer: COMMERCIAL

## 2022-05-04 ENCOUNTER — HOSPITAL ENCOUNTER (EMERGENCY)
Age: 37
Discharge: HOME | End: 2022-05-04
Attending: EMERGENCY MEDICINE
Payer: COMMERCIAL

## 2022-05-04 VITALS
HEART RATE: 72 BPM | BODY MASS INDEX: 23.16 KG/M2 | WEIGHT: 118 LBS | SYSTOLIC BLOOD PRESSURE: 116 MMHG | HEIGHT: 60 IN | OXYGEN SATURATION: 98 % | TEMPERATURE: 98.4 F | DIASTOLIC BLOOD PRESSURE: 65 MMHG | RESPIRATION RATE: 16 BRPM

## 2022-05-04 DIAGNOSIS — C7B.8 METASTATIC MALIGNANT NEUROENDOCRINE TUMOR TO LIVER (HCC): ICD-10-CM

## 2022-05-04 DIAGNOSIS — G40.919 BREAKTHROUGH SEIZURE (HCC): ICD-10-CM

## 2022-05-04 DIAGNOSIS — E87.6 HYPOKALEMIA: ICD-10-CM

## 2022-05-04 DIAGNOSIS — R52 BODY ACHES: ICD-10-CM

## 2022-05-04 DIAGNOSIS — E83.51 HYPOCALCEMIA: Primary | ICD-10-CM

## 2022-05-04 LAB
ALBUMIN SERPL-MCNC: 4.1 G/DL (ref 3.5–5.2)
ALP BLD-CCNC: 141 U/L (ref 35–104)
ALT SERPL-CCNC: 23 U/L (ref 0–32)
ANION GAP SERPL CALCULATED.3IONS-SCNC: 15 MMOL/L (ref 7–16)
APTT: 29.3 SEC (ref 24.5–35.1)
AST SERPL-CCNC: 27 U/L (ref 0–31)
BACTERIA: ABNORMAL /HPF
BASOPHILS ABSOLUTE: 0.1 E9/L (ref 0–0.2)
BASOPHILS RELATIVE PERCENT: 2 % (ref 0–2)
BILIRUB SERPL-MCNC: 0.4 MG/DL (ref 0–1.2)
BILIRUBIN URINE: NEGATIVE
BLOOD, URINE: NEGATIVE
BUN BLDV-MCNC: 3 MG/DL (ref 6–20)
CALCIUM SERPL-MCNC: 7.7 MG/DL (ref 8.6–10.2)
CHLORIDE BLD-SCNC: 101 MMOL/L (ref 98–107)
CLARITY: CLEAR
CO2: 29 MMOL/L (ref 22–29)
COLOR: YELLOW
CREAT SERPL-MCNC: 0.5 MG/DL (ref 0.5–1)
EOSINOPHILS ABSOLUTE: 0.05 E9/L (ref 0.05–0.5)
EOSINOPHILS RELATIVE PERCENT: 1 % (ref 0–6)
EPITHELIAL CELLS, UA: ABNORMAL /HPF
GFR AFRICAN AMERICAN: >60
GFR NON-AFRICAN AMERICAN: >60 ML/MIN/1.73
GLUCOSE BLD-MCNC: 135 MG/DL (ref 74–99)
GLUCOSE URINE: NEGATIVE MG/DL
HCG(URINE) PREGNANCY TEST: NEGATIVE
HCT VFR BLD CALC: 38.1 % (ref 34–48)
HEMOGLOBIN: 12.1 G/DL (ref 11.5–15.5)
IMMATURE GRANULOCYTES #: 0.02 E9/L
IMMATURE GRANULOCYTES %: 0.4 % (ref 0–5)
INR BLD: 1.3
KETONES, URINE: NEGATIVE MG/DL
LEUKOCYTE ESTERASE, URINE: NEGATIVE
LYMPHOCYTES ABSOLUTE: 2.22 E9/L (ref 1.5–4)
LYMPHOCYTES RELATIVE PERCENT: 44 % (ref 20–42)
MAGNESIUM: 1.8 MG/DL (ref 1.6–2.6)
MCH RBC QN AUTO: 28.8 PG (ref 26–35)
MCHC RBC AUTO-ENTMCNC: 31.8 % (ref 32–34.5)
MCV RBC AUTO: 90.7 FL (ref 80–99.9)
MONOCYTES ABSOLUTE: 0.61 E9/L (ref 0.1–0.95)
MONOCYTES RELATIVE PERCENT: 12.1 % (ref 2–12)
NEUTROPHILS ABSOLUTE: 2.05 E9/L (ref 1.8–7.3)
NEUTROPHILS RELATIVE PERCENT: 40.5 % (ref 43–80)
NITRITE, URINE: NEGATIVE
PDW BLD-RTO: 19.5 FL (ref 11.5–15)
PH UA: 7 (ref 5–9)
PLATELET # BLD: 276 E9/L (ref 130–450)
PMV BLD AUTO: 9.5 FL (ref 7–12)
POTASSIUM REFLEX MAGNESIUM: 3 MMOL/L (ref 3.5–5)
PROTEIN UA: NEGATIVE MG/DL
PROTHROMBIN TIME: 14.1 SEC (ref 9.3–12.4)
RBC # BLD: 4.2 E12/L (ref 3.5–5.5)
RBC UA: ABNORMAL /HPF (ref 0–2)
SODIUM BLD-SCNC: 145 MMOL/L (ref 132–146)
SPECIFIC GRAVITY UA: <=1.005 (ref 1–1.03)
TOTAL PROTEIN: 6.7 G/DL (ref 6.4–8.3)
TROPONIN, HIGH SENSITIVITY: 8 NG/L (ref 0–9)
UROBILINOGEN, URINE: 0.2 E.U./DL
WBC # BLD: 5.1 E9/L (ref 4.5–11.5)
WBC UA: ABNORMAL /HPF (ref 0–5)

## 2022-05-04 PROCEDURE — 99285 EMERGENCY DEPT VISIT HI MDM: CPT

## 2022-05-04 PROCEDURE — 85610 PROTHROMBIN TIME: CPT

## 2022-05-04 PROCEDURE — 81025 URINE PREGNANCY TEST: CPT

## 2022-05-04 PROCEDURE — 93005 ELECTROCARDIOGRAM TRACING: CPT | Performed by: EMERGENCY MEDICINE

## 2022-05-04 PROCEDURE — 71045 X-RAY EXAM CHEST 1 VIEW: CPT

## 2022-05-04 PROCEDURE — 80053 COMPREHEN METABOLIC PANEL: CPT

## 2022-05-04 PROCEDURE — 85730 THROMBOPLASTIN TIME PARTIAL: CPT

## 2022-05-04 PROCEDURE — 96375 TX/PRO/DX INJ NEW DRUG ADDON: CPT

## 2022-05-04 PROCEDURE — 6360000002 HC RX W HCPCS: Performed by: EMERGENCY MEDICINE

## 2022-05-04 PROCEDURE — 6370000000 HC RX 637 (ALT 250 FOR IP): Performed by: EMERGENCY MEDICINE

## 2022-05-04 PROCEDURE — 96365 THER/PROPH/DIAG IV INF INIT: CPT

## 2022-05-04 PROCEDURE — 2500000003 HC RX 250 WO HCPCS: Performed by: EMERGENCY MEDICINE

## 2022-05-04 PROCEDURE — 85025 COMPLETE CBC W/AUTO DIFF WBC: CPT

## 2022-05-04 PROCEDURE — 2580000003 HC RX 258: Performed by: EMERGENCY MEDICINE

## 2022-05-04 PROCEDURE — 96367 TX/PROPH/DG ADDL SEQ IV INF: CPT

## 2022-05-04 PROCEDURE — 84484 ASSAY OF TROPONIN QUANT: CPT

## 2022-05-04 PROCEDURE — 83735 ASSAY OF MAGNESIUM: CPT

## 2022-05-04 PROCEDURE — 70450 CT HEAD/BRAIN W/O DYE: CPT

## 2022-05-04 PROCEDURE — 81001 URINALYSIS AUTO W/SCOPE: CPT

## 2022-05-04 RX ORDER — LEVETIRACETAM 10 MG/ML
1000 INJECTION INTRAVASCULAR ONCE
Status: COMPLETED | OUTPATIENT
Start: 2022-05-04 | End: 2022-05-04

## 2022-05-04 RX ORDER — 0.9 % SODIUM CHLORIDE 0.9 %
1000 INTRAVENOUS SOLUTION INTRAVENOUS ONCE
Status: COMPLETED | OUTPATIENT
Start: 2022-05-04 | End: 2022-05-04

## 2022-05-04 RX ORDER — FENTANYL CITRATE 50 UG/ML
50 INJECTION, SOLUTION INTRAMUSCULAR; INTRAVENOUS ONCE
Status: COMPLETED | OUTPATIENT
Start: 2022-05-04 | End: 2022-05-04

## 2022-05-04 RX ORDER — ONDANSETRON 2 MG/ML
4 INJECTION INTRAMUSCULAR; INTRAVENOUS ONCE
Status: COMPLETED | OUTPATIENT
Start: 2022-05-04 | End: 2022-05-04

## 2022-05-04 RX ORDER — POTASSIUM CHLORIDE 20 MEQ/1
20 TABLET, EXTENDED RELEASE ORAL 2 TIMES DAILY
Qty: 60 TABLET | Refills: 1 | Status: SHIPPED | OUTPATIENT
Start: 2022-05-04 | End: 2022-06-16

## 2022-05-04 RX ORDER — POTASSIUM CHLORIDE 20 MEQ/1
40 TABLET, EXTENDED RELEASE ORAL ONCE
Status: COMPLETED | OUTPATIENT
Start: 2022-05-04 | End: 2022-05-04

## 2022-05-04 RX ORDER — HYDROCODONE BITARTRATE AND ACETAMINOPHEN 5; 325 MG/1; MG/1
1 TABLET ORAL EVERY 6 HOURS PRN
Qty: 12 TABLET | Refills: 0 | Status: SHIPPED | OUTPATIENT
Start: 2022-05-04 | End: 2022-05-07

## 2022-05-04 RX ADMIN — ONDANSETRON 4 MG: 2 INJECTION INTRAMUSCULAR; INTRAVENOUS at 17:52

## 2022-05-04 RX ADMIN — Medication 1000 MG: at 20:36

## 2022-05-04 RX ADMIN — FENTANYL CITRATE 50 MCG: 50 INJECTION INTRAMUSCULAR; INTRAVENOUS at 17:53

## 2022-05-04 RX ADMIN — HYDROMORPHONE HYDROCHLORIDE 1 MG: 1 INJECTION, SOLUTION INTRAMUSCULAR; INTRAVENOUS; SUBCUTANEOUS at 21:47

## 2022-05-04 RX ADMIN — POTASSIUM CHLORIDE 40 MEQ: 20 TABLET, EXTENDED RELEASE ORAL at 20:37

## 2022-05-04 RX ADMIN — LEVETIRACETAM 1000 MG: 10 INJECTION INTRAVASCULAR at 16:47

## 2022-05-04 RX ADMIN — SODIUM CHLORIDE 1000 ML: 9 INJECTION, SOLUTION INTRAVENOUS at 17:28

## 2022-05-04 ASSESSMENT — PAIN SCALES - GENERAL
PAINLEVEL_OUTOF10: 7
PAINLEVEL_OUTOF10: 7

## 2022-05-04 NOTE — ED NOTES
Pt has a mediport and requesting it to be accessed at this time. No bowers needles in department at this time. Central supply called for bowers needle to access pts port.       Krishan Mcpherson RN  05/04/22 [id number]

## 2022-05-04 NOTE — ED PROVIDER NOTES
HPI:  5/4/22,   Time: 3:44 PM EDT       Bailey Sheth is a 40 y.o. female presenting to the ED for paresthesias/ha, beginning days ago. The complaint has been persistent, moderate in severity, and worsened by nothing. Bib ems, pt presents ha/whole body aches and tingling, for 2-3 days. Hx same, thinks electrolytes are low. Had a abscence seizure en route per ems. Pt denies focal weakness or sensory changes. No fever/chills/sweats. Nausea w/o emesis. No diarrhea. No cp/sob. Nothing makes better    Review of Systems:   Pertinent positives and negatives are stated within HPI, all other systems reviewed and are negative.          --------------------------------------------- PAST HISTORY ---------------------------------------------  Past Medical History:  has a past medical history of Abdominal pain, Cancer ([facility] [address] Diarrhea, Hypocalcemia, Hypothyroidism, Irritable bowel syndrome, Migraines, Seizures ([facility] [address] and Status post alcohol detoxification. Past Surgical History:  has a past surgical history that includes Parathyroid gland surgery; Cholecystectomy, laparoscopic (07/06/2016); Thyroidectomy; Colonoscopy (N/A, 6/22/2018); CT NEEDLE BIOPSY LIVER PERCUTANEOUS (9/1/2020); Small intestine surgery (N/A, 10/21/2020); CT BIOPSY RENAL (1/25/2021); hc dialysis catheter (N/A, 1/28/2021); sigmoidoscopy (N/A, 5/14/2021); and Port Surgery (Left, 2/18/2022). Social History:  reports that she has been smoking cigarettes. She has been smoking about 0.25 packs per day. She has never used smokeless tobacco. She reports that she does not drink alcohol and does not use drugs.     Family History: family history includes Alzheimer's Disease in an other family member; Asthma in her father; Breast Cancer in her maternal grandmother; Cancer in her father, maternal grandmother, and paternal grandfather; Diabetes in an other family member; Heart Disease in her father; High Blood Pressure in her father and mother; High Cholesterol in her mother; Lilli Ip in an other family member; Other in her mother. The patients home medications have been reviewed. Allergies: Patient has no known allergies. ---------------------------------------------------PHYSICAL EXAM--------------------------------------    Constitutional/General: Alert and oriented x3, well appearing, non toxic in NAD  Head: Normocephalic and atraumatic  Eyes: PERRL, EOMI, conjunctive normal, sclera non icteric  Mouth: Oropharynx clear, handling secretions,   Neck: Supple, full ROM,   Respiratory: Lungs clear to auscultation bilaterally, no wheezes, rales, or rhonchi. Not in respiratory distress  Cardiovascular:  Regular rate. Regular rhythm. No murmurs, gallops, or rubs. 2+ distal pulses  Chest: No chest wall tenderness  GI:  Abdomen Soft, Non tender, Non distended. .   Musculoskeletal: Moves all extremities x 4. Warm and well perfused, no clubbing, cyanosis, or edema. Capillary refill <3 seconds  Integument: skin warm and dry. No rashes. Lymphatic: no lymphadenopathy noted  Neurologic: GCS 15, no focal deficits, symmetric strength 5/5 in the upper and lower extremities bilaterally, cn2-12 intact, sensory nml  Psychiatric: Normal Affect    -------------------------------------------------- RESULTS -------------------------------------------------  I have personally reviewed all laboratory and imaging results for this patient. Results are listed below.      LABS:  Results for orders placed or performed during the hospital encounter of 05/04/22   CBC with Auto Differential   Result Value Ref Range    WBC 5.1 4.5 - 11.5 E9/L    RBC 4.20 3.50 - 5.50 E12/L    Hemoglobin 12.1 11.5 - 15.5 g/dL    Hematocrit 38.1 34.0 - 48.0 %    MCV 90.7 80.0 - 99.9 fL    MCH 28.8 26.0 - 35.0 pg    MCHC 31.8 (L) 32.0 - 34.5 %    RDW 19.5 (H) 11.5 - 15.0 fL    Platelets 594 861 - 040 E9/L    MPV 9.5 7.0 - 12.0 fL    Neutrophils % 40.5 (L) 43.0 - 80.0 %    Immature Granulocytes % 0.4 0.0 - 5.0 %    Lymphocytes % 44.0 (H) 20.0 - 42.0 %    Monocytes % 12.1 (H) 2.0 - 12.0 %    Eosinophils % 1.0 0.0 - 6.0 %    Basophils % 2.0 0.0 - 2.0 %    Neutrophils Absolute 2.05 1.80 - 7.30 E9/L    Immature Granulocytes # 0.02 E9/L    Lymphocytes Absolute 2.22 1.50 - 4.00 E9/L    Monocytes Absolute 0.61 0.10 - 0.95 E9/L    Eosinophils Absolute 0.05 0.05 - 0.50 E9/L    Basophils Absolute 0.10 0.00 - 0.20 E9/L   Comprehensive Metabolic Panel w/ Reflex to MG   Result Value Ref Range    Sodium 145 132 - 146 mmol/L    Potassium reflex Magnesium 3.0 (L) 3.5 - 5.0 mmol/L    Chloride 101 98 - 107 mmol/L    CO2 29 22 - 29 mmol/L    Anion Gap 15 7 - 16 mmol/L    Glucose 135 (H) 74 - 99 mg/dL    BUN 3 (L) 6 - 20 mg/dL    CREATININE 0.5 0.5 - 1.0 mg/dL    GFR Non-African American >60 >=60 mL/min/1.73    GFR African American >60     Calcium 7.7 (L) 8.6 - 10.2 mg/dL    Total Protein 6.7 6.4 - 8.3 g/dL    Albumin 4.1 3.5 - 5.2 g/dL    Total Bilirubin 0.4 0.0 - 1.2 mg/dL    Alkaline Phosphatase 141 (H) 35 - 104 U/L    ALT 23 0 - 32 U/L    AST 27 0 - 31 U/L   Troponin   Result Value Ref Range    Troponin, High Sensitivity 8 0 - 9 ng/L   Protime-INR   Result Value Ref Range    Protime 14.1 (H) 9.3 - 12.4 sec    INR 1.3    APTT   Result Value Ref Range    aPTT 29.3 24.5 - 35.1 sec   Urinalysis with Microscopic   Result Value Ref Range    Color, UA Yellow Straw/Yellow    Clarity, UA Clear Clear    Glucose, Ur Negative Negative mg/dL    Bilirubin Urine Negative Negative    Ketones, Urine Negative Negative mg/dL    Specific Gravity, UA <=1.005 1.005 - 1.030    Blood, Urine Negative Negative    pH, UA 7.0 5.0 - 9.0    Protein, UA Negative Negative mg/dL    Urobilinogen, Urine 0.2 <2.0 E.U./dL    Nitrite, Urine Negative Negative    Leukocyte Esterase, Urine Negative Negative    WBC, UA 0-1 0 - 5 /HPF    RBC, UA NONE 0 - 2 /HPF    Epithelial Cells, UA MODERATE /HPF    Bacteria, UA FEW (A) None Seen /HPF   Pregnancy, Urine   Result Value Ref Range    HCG(Urine) Pregnancy Test NEGATIVE NEGATIVE   Magnesium   Result Value Ref Range    Magnesium 1.8 1.6 - 2.6 mg/dL       RADIOLOGY:  Interpreted by Radiologist.  CT Head WO Contrast   Final Result   No acute intracranial abnormality. Specifically, there is no acute   intracranial hemorrhage         XR CHEST PORTABLE   Final Result   No acute process. EKG:  This EKG is signed and interpreted by the EP. Time: 1527  Rate: 90  Rhythm: Sinus  Interpretation: non-specific EKG  Comparison: None      ------------------------- NURSING NOTES AND VITALS REVIEWED ---------------------------   The nursing notes within the ED encounter and vital signs as below have been reviewed by myself. /65   Pulse 72   Temp 98.4 °F (36.9 °C) (Oral)   Resp 16   Ht 5' (1.524 m)   Wt 118 lb (53.5 kg)   SpO2 98%   BMI 23.05 kg/m²   Oxygen Saturation Interpretation: Normal    The patients available past medical records and past encounters were reviewed. ------------------------------ ED COURSE/MEDICAL DECISION MAKING----------------------  Medications   calcium gluconate 1000 mg in dextrose 5% 100 mL IVPB (1,000 mg IntraVENous New Bag 5/4/22 2036)   0.9 % sodium chloride bolus (0 mLs IntraVENous Stopped 5/4/22 2015)   levETIRAcetam (KEPPRA) 1000 mg/100 mL IVPB (0 mg IntraVENous Stopped 5/4/22 1722)   fentaNYL (SUBLIMAZE) injection 50 mcg (50 mcg IntraVENous Given 5/4/22 1753)   ondansetron (ZOFRAN) injection 4 mg (4 mg IntraVENous Given 5/4/22 1752)   potassium chloride (KLOR-CON M) extended release tablet 40 mEq (40 mEq Oral Given 5/4/22 2037)         ED COURSE:       Medical Decision Making:    Pt re eval, vss, non toxic, w/u noted, iv and po replacement ordered. Pt seizure with known hx, at neuro baseline, feel can dc with close pcp fu.   Pt agreeeable with poc      This patient's ED course included: a personal history and physicial examination    This patient has remained hemodynamically stable during their ED course. Re-Evaluations:             Re-evaluation. Patients symptoms are improving    Re-examination  5/4/22   850 PM EDT          Vital Signs:   Vitals:    05/04/22 1516 05/04/22 2056   BP: (!) 155/66 116/65   Pulse: 85 72   Resp: 16 16   Temp: 98.4 °F (36.9 °C)    TempSrc: Oral    SpO2: 92% 98%   Weight: 118 lb (53.5 kg)    Height: 5' (1.524 m)      Neuro intact  vss        Counseling: The emergency provider has spoken with the patient and discussed todays results, in addition to providing specific details for the plan of care and counseling regarding the diagnosis and prognosis. Questions are answered at this time and they are agreeable with the plan.       --------------------------------- IMPRESSION AND DISPOSITION ---------------------------------    IMPRESSION  1. Hypocalcemia    2. Hypokalemia    3. Body aches    4. 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 ([address]    5. Breakthrough seizure ([facility] [address]        DISPOSITION  Disposition: Discharge to home  Patient condition is stable    NOTE: This report was transcribed using voice recognition software.  Every effort was made to ensure accuracy; however, inadvertent computerized transcription errors may be present        Rudy Swenson MD  05/04/22 2057

## 2022-05-05 LAB
EKG ATRIAL RATE: 90 BPM
EKG P AXIS: 56 DEGREES
EKG P-R INTERVAL: 146 MS
EKG Q-T INTERVAL: 390 MS
EKG QRS DURATION: 72 MS
EKG QTC CALCULATION (BAZETT): 477 MS
EKG R AXIS: 70 DEGREES
EKG T AXIS: 60 DEGREES
EKG VENTRICULAR RATE: 90 BPM

## 2022-05-05 PROCEDURE — 93010 ELECTROCARDIOGRAM REPORT: CPT | Performed by: INTERNAL MEDICINE

## 2022-05-05 NOTE — ED NOTES
Called pharmacy to tube medication calcium gluconate as there is non available in the ED.       Pranay Marks, RN  05/04/22 2019

## 2022-05-08 ENCOUNTER — HOSPITAL ENCOUNTER (EMERGENCY)
Age: 37
Discharge: HOME | End: 2022-05-08
Attending: EMERGENCY MEDICINE
Payer: COMMERCIAL

## 2022-05-08 ENCOUNTER — APPOINTMENT (OUTPATIENT)
Dept: CT IMAGING | Age: 37
End: 2022-05-08
Payer: COMMERCIAL

## 2022-05-08 VITALS
OXYGEN SATURATION: 99 % | TEMPERATURE: 97.8 F | SYSTOLIC BLOOD PRESSURE: 129 MMHG | RESPIRATION RATE: 16 BRPM | DIASTOLIC BLOOD PRESSURE: 81 MMHG | HEART RATE: 71 BPM

## 2022-05-08 DIAGNOSIS — R56.9 SEIZURE (HCC): Primary | ICD-10-CM

## 2022-05-08 LAB
ALBUMIN SERPL-MCNC: 4.3 G/DL (ref 3.5–5.2)
ALP BLD-CCNC: 148 U/L (ref 35–104)
ALT SERPL-CCNC: 23 U/L (ref 0–32)
ANION GAP SERPL CALCULATED.3IONS-SCNC: 13 MMOL/L (ref 7–16)
AST SERPL-CCNC: 29 U/L (ref 0–31)
BASOPHILS ABSOLUTE: 0.07 E9/L (ref 0–0.2)
BASOPHILS RELATIVE PERCENT: 0.5 % (ref 0–2)
BILIRUB SERPL-MCNC: 0.4 MG/DL (ref 0–1.2)
BUN BLDV-MCNC: 6 MG/DL (ref 6–20)
CALCIUM SERPL-MCNC: 8.7 MG/DL (ref 8.6–10.2)
CHLORIDE BLD-SCNC: 95 MMOL/L (ref 98–107)
CO2: 25 MMOL/L (ref 22–29)
CREAT SERPL-MCNC: 0.5 MG/DL (ref 0.5–1)
EOSINOPHILS ABSOLUTE: 0.09 E9/L (ref 0.05–0.5)
EOSINOPHILS RELATIVE PERCENT: 0.7 % (ref 0–6)
GFR AFRICAN AMERICAN: >60
GFR NON-AFRICAN AMERICAN: >60 ML/MIN/1.73
GLUCOSE BLD-MCNC: 100 MG/DL (ref 74–99)
HCG, URINE, POC: NEGATIVE
HCT VFR BLD CALC: 36.7 % (ref 34–48)
HEMOGLOBIN: 11.9 G/DL (ref 11.5–15.5)
IMMATURE GRANULOCYTES #: 0.16 E9/L
IMMATURE GRANULOCYTES %: 1.3 % (ref 0–5)
LYMPHOCYTES ABSOLUTE: 0.93 E9/L (ref 1.5–4)
LYMPHOCYTES RELATIVE PERCENT: 7.3 % (ref 20–42)
Lab: NORMAL
MCH RBC QN AUTO: 29.3 PG (ref 26–35)
MCHC RBC AUTO-ENTMCNC: 32.4 % (ref 32–34.5)
MCV RBC AUTO: 90.4 FL (ref 80–99.9)
MONOCYTES ABSOLUTE: 0.67 E9/L (ref 0.1–0.95)
MONOCYTES RELATIVE PERCENT: 5.2 % (ref 2–12)
NEGATIVE QC PASS/FAIL: NORMAL
NEUTROPHILS ABSOLUTE: 10.85 E9/L (ref 1.8–7.3)
NEUTROPHILS RELATIVE PERCENT: 85 % (ref 43–80)
PDW BLD-RTO: 19 FL (ref 11.5–15)
PLATELET # BLD: 232 E9/L (ref 130–450)
PMV BLD AUTO: 9.9 FL (ref 7–12)
POSITIVE QC PASS/FAIL: NORMAL
POTASSIUM REFLEX MAGNESIUM: 3.8 MMOL/L (ref 3.5–5)
RBC # BLD: 4.06 E12/L (ref 3.5–5.5)
SODIUM BLD-SCNC: 133 MMOL/L (ref 132–146)
T4 TOTAL: 5.5 MCG/DL (ref 4.5–11.7)
TOTAL PROTEIN: 6.8 G/DL (ref 6.4–8.3)
TSH SERPL DL<=0.05 MIU/L-ACNC: 3.02 UIU/ML (ref 0.27–4.2)
WBC # BLD: 12.8 E9/L (ref 4.5–11.5)

## 2022-05-08 PROCEDURE — 84443 ASSAY THYROID STIM HORMONE: CPT

## 2022-05-08 PROCEDURE — 80053 COMPREHEN METABOLIC PANEL: CPT

## 2022-05-08 PROCEDURE — 93005 ELECTROCARDIOGRAM TRACING: CPT | Performed by: EMERGENCY MEDICINE

## 2022-05-08 PROCEDURE — 2580000003 HC RX 258: Performed by: EMERGENCY MEDICINE

## 2022-05-08 PROCEDURE — 99284 EMERGENCY DEPT VISIT MOD MDM: CPT

## 2022-05-08 PROCEDURE — 6360000002 HC RX W HCPCS: Performed by: EMERGENCY MEDICINE

## 2022-05-08 PROCEDURE — 36415 COLL VENOUS BLD VENIPUNCTURE: CPT

## 2022-05-08 PROCEDURE — 72125 CT NECK SPINE W/O DYE: CPT

## 2022-05-08 PROCEDURE — 85025 COMPLETE CBC W/AUTO DIFF WBC: CPT

## 2022-05-08 PROCEDURE — 96375 TX/PRO/DX INJ NEW DRUG ADDON: CPT

## 2022-05-08 PROCEDURE — 70486 CT MAXILLOFACIAL W/O DYE: CPT

## 2022-05-08 PROCEDURE — 96365 THER/PROPH/DIAG IV INF INIT: CPT

## 2022-05-08 PROCEDURE — 84436 ASSAY OF TOTAL THYROXINE: CPT

## 2022-05-08 PROCEDURE — 70450 CT HEAD/BRAIN W/O DYE: CPT

## 2022-05-08 PROCEDURE — 6360000002 HC RX W HCPCS

## 2022-05-08 RX ORDER — FENTANYL CITRATE 50 UG/ML
50 INJECTION, SOLUTION INTRAMUSCULAR; INTRAVENOUS ONCE
Status: COMPLETED | OUTPATIENT
Start: 2022-05-08 | End: 2022-05-08

## 2022-05-08 RX ORDER — DIPHENHYDRAMINE HYDROCHLORIDE 50 MG/ML
50 INJECTION INTRAMUSCULAR; INTRAVENOUS ONCE
Status: COMPLETED | OUTPATIENT
Start: 2022-05-08 | End: 2022-05-08

## 2022-05-08 RX ORDER — HEPARIN SODIUM (PORCINE) LOCK FLUSH IV SOLN 100 UNIT/ML 100 UNIT/ML
SOLUTION INTRAVENOUS
Status: COMPLETED
Start: 2022-05-08 | End: 2022-05-08

## 2022-05-08 RX ORDER — KETOROLAC TROMETHAMINE 30 MG/ML
30 INJECTION, SOLUTION INTRAMUSCULAR; INTRAVENOUS ONCE
Status: COMPLETED | OUTPATIENT
Start: 2022-05-08 | End: 2022-05-08

## 2022-05-08 RX ORDER — HEPARIN SODIUM (PORCINE) LOCK FLUSH IV SOLN 100 UNIT/ML 100 UNIT/ML
100 SOLUTION INTRAVENOUS ONCE
Status: COMPLETED | OUTPATIENT
Start: 2022-05-08 | End: 2022-05-08

## 2022-05-08 RX ORDER — 0.9 % SODIUM CHLORIDE 0.9 %
1000 INTRAVENOUS SOLUTION INTRAVENOUS ONCE
Status: COMPLETED | OUTPATIENT
Start: 2022-05-08 | End: 2022-05-08

## 2022-05-08 RX ORDER — LEVETIRACETAM 10 MG/ML
1000 INJECTION INTRAVASCULAR ONCE
Status: COMPLETED | OUTPATIENT
Start: 2022-05-08 | End: 2022-05-08

## 2022-05-08 RX ORDER — METOCLOPRAMIDE HYDROCHLORIDE 5 MG/ML
10 INJECTION INTRAMUSCULAR; INTRAVENOUS ONCE
Status: COMPLETED | OUTPATIENT
Start: 2022-05-08 | End: 2022-05-08

## 2022-05-08 RX ADMIN — KETOROLAC TROMETHAMINE 30 MG: 30 INJECTION, SOLUTION INTRAMUSCULAR at 21:53

## 2022-05-08 RX ADMIN — LEVETIRACETAM 1000 MG: 10 INJECTION INTRAVASCULAR at 18:28

## 2022-05-08 RX ADMIN — SODIUM CHLORIDE 1000 ML: 9 INJECTION, SOLUTION INTRAVENOUS at 18:29

## 2022-05-08 RX ADMIN — FENTANYL CITRATE 50 MCG: 50 INJECTION, SOLUTION INTRAMUSCULAR; INTRAVENOUS at 20:16

## 2022-05-08 RX ADMIN — DIPHENHYDRAMINE HYDROCHLORIDE 50 MG: 50 INJECTION, SOLUTION INTRAMUSCULAR; INTRAVENOUS at 18:28

## 2022-05-08 RX ADMIN — METOCLOPRAMIDE HYDROCHLORIDE 10 MG: 5 INJECTION INTRAMUSCULAR; INTRAVENOUS at 18:28

## 2022-05-08 RX ADMIN — SODIUM CHLORIDE, PRESERVATIVE FREE 100 UNITS: 5 INJECTION INTRAVENOUS at 21:55

## 2022-05-08 RX ADMIN — HEPARIN SODIUM (PORCINE) LOCK FLUSH IV SOLN 100 UNIT/ML 100 UNITS: 100 SOLUTION at 21:55

## 2022-05-08 ASSESSMENT — PAIN - FUNCTIONAL ASSESSMENT: PAIN_FUNCTIONAL_ASSESSMENT: 0-10

## 2022-05-08 ASSESSMENT — ENCOUNTER SYMPTOMS
NAUSEA: 0
VOMITING: 0
SHORTNESS OF BREATH: 0
ABDOMINAL PAIN: 0
EYE REDNESS: 0

## 2022-05-08 ASSESSMENT — PAIN DESCRIPTION - LOCATION: LOCATION: HEAD

## 2022-05-08 ASSESSMENT — PAIN SCALES - GENERAL: PAINLEVEL_OUTOF10: 9

## 2022-05-08 NOTE — ED PROVIDER NOTES
Chief complaint: Seizure      HPI:  5/8/22, Time: 4:43 PM EDT    HPI               Maribeth Henriquez is a 40 y.o. female presenting to the ED for seizure. The history is obtained from the patient as well as the patient's medical record. The patient is presenting to the emergency department with a chief complaint of seizure. The patient does have stage IV neuroendocrine tumor. The patient did have approximately 8-minute seizure. She does have a history of seizures. These are usually absence seizure's. She did fall and was seizing on the porch today. Her parents found her and rolled her on her side. Did have postictal state. This moderate in severity. Nothing made it better. Nothing makes it worse. She states she is currently on Keppra as well as Topamax for seizures. She does follow with neurology. She has been compliant with her medications. She did strike her head does have a frontal headache which described as throbbing, nonradiating. Moderate in severity. Worse with striking her head. No neck pain. She denies any numbness, weakness or paresthesias of the extremities. She not on any blood thinners. ROS:   Review of Systems   Constitutional: Negative for chills and fatigue. HENT: Negative for congestion. Eyes: Negative for redness. Respiratory: Negative for shortness of breath. Cardiovascular: Negative for chest pain. Gastrointestinal: Negative for abdominal pain, nausea and vomiting. Genitourinary: Negative for dysuria. Musculoskeletal: Negative for arthralgias. Skin: Negative for rash. Neurological: Positive for seizures and headaches. Negative for light-headedness. Psychiatric/Behavioral: Negative for confusion.    All other systems reviewed and are negative.      --------------------------------------------- PAST HISTORY ---------------------------------------------  Past Medical History:  has a past medical history of Abdominal pain, Cancer ([facility] [address] Diarrhea, Hypocalcemia, Hypothyroidism, Irritable bowel syndrome, Migraines, Seizures ([address] and Status post alcohol detoxification. Past Surgical History:  has a past surgical history that includes Parathyroid gland surgery; Cholecystectomy, laparoscopic (07/06/2016); Thyroidectomy; Colonoscopy (N/A, 6/22/2018); CT NEEDLE BIOPSY LIVER PERCUTANEOUS (9/1/2020); Small intestine surgery (N/A, 10/21/2020); CT BIOPSY RENAL (1/25/2021); hc dialysis catheter (N/A, 1/28/2021); sigmoidoscopy (N/A, 5/14/2021); and Port Surgery (Left, 2/18/2022). Social History:  reports that she has been smoking cigarettes. She has been smoking about 0.25 packs per day. She has never used smokeless tobacco. She reports that she does not drink alcohol and does not use drugs. Family History: family history includes Alzheimer's Disease in an other family member; Asthma in her father; Breast Cancer in her maternal grandmother; Cancer in her father, maternal grandmother, and paternal grandfather; Diabetes in an other family member; Heart Disease in her father; High Blood Pressure in her father and mother; High Cholesterol in her mother; Jerre Brown in an other family member; Other in her mother. The patients home medications have been reviewed. Allergies: Patient has no known allergies. ---------------------------------------------------PHYSICAL EXAM--------------------------------------      Constitutional/General: Alert and oriented x3, well appearing, non toxic in NAD  Head: Normocephalic and small frontal soft tissue hematoma  Mouth: Oropharynx clear, handling secretions, no trismus  Neck: Supple, full ROM, no C-spine tenderness, C-spine cleared by Nexus criteria  Pulmonary: Lungs clear to auscultation bilaterally, no wheezes, rales, or rhonchi. Not in respiratory distress  Cardiovascular:  Regular rate. Regular rhythm. No murmurs  Chest: no chest wall tenderness  Abdomen: Soft. Non tender. Non distended.    No rebound, guarding, or rigidity. No pulsatile masses appreciated. Musculoskeletal: Moves all extremities x 4. Warm and well perfused, no clubbing, cyanosis, or edema. Capillary refill <3 seconds  Skin: warm and dry. No rashes. Neurologic: GCS 15, CN 2-12 grossly intact, no focal deficits, symmetric strength 5/5 in the upper and lower extremities bilaterally. Speech normal Normal finger to nose. No drift. Psych: Normal Affect    -------------------------------------------------- RESULTS -------------------------------------------------  I have personally reviewed all laboratory and imaging results for this patient. Results are listed below.      LABS:  Results for orders placed or performed during the hospital encounter of 05/08/22   CBC with Auto Differential   Result Value Ref Range    WBC 12.8 (H) 4.5 - 11.5 E9/L    RBC 4.06 3.50 - 5.50 E12/L    Hemoglobin 11.9 11.5 - 15.5 g/dL    Hematocrit 36.7 34.0 - 48.0 %    MCV 90.4 80.0 - 99.9 fL    MCH 29.3 26.0 - 35.0 pg    MCHC 32.4 32.0 - 34.5 %    RDW 19.0 (H) 11.5 - 15.0 fL    Platelets 416 180 - 061 E9/L    MPV 9.9 7.0 - 12.0 fL    Neutrophils % 85.0 (H) 43.0 - 80.0 %    Immature Granulocytes % 1.3 0.0 - 5.0 %    Lymphocytes % 7.3 (L) 20.0 - 42.0 %    Monocytes % 5.2 2.0 - 12.0 %    Eosinophils % 0.7 0.0 - 6.0 %    Basophils % 0.5 0.0 - 2.0 %    Neutrophils Absolute 10.85 (H) 1.80 - 7.30 E9/L    Immature Granulocytes # 0.16 E9/L    Lymphocytes Absolute 0.93 (L) 1.50 - 4.00 E9/L    Monocytes Absolute 0.67 0.10 - 0.95 E9/L    Eosinophils Absolute 0.09 0.05 - 0.50 E9/L    Basophils Absolute 0.07 0.00 - 0.20 E9/L   Comprehensive Metabolic Panel w/ Reflex to MG   Result Value Ref Range    Sodium 133 132 - 146 mmol/L    Potassium reflex Magnesium 3.8 3.5 - 5.0 mmol/L    Chloride 95 (L) 98 - 107 mmol/L    CO2 25 22 - 29 mmol/L    Anion Gap 13 7 - 16 mmol/L    Glucose 100 (H) 74 - 99 mg/dL    BUN 6 6 - 20 mg/dL    CREATININE 0.5 0.5 - 1.0 mg/dL    GFR Non-African American >60 >=60 mL/min/1.73    GFR African American >60     Calcium 8.7 8.6 - 10.2 mg/dL    Total Protein 6.8 6.4 - 8.3 g/dL    Albumin 4.3 3.5 - 5.2 g/dL    Total Bilirubin 0.4 0.0 - 1.2 mg/dL    Alkaline Phosphatase 148 (H) 35 - 104 U/L    ALT 23 0 - 32 U/L    AST 29 0 - 31 U/L   TSH   Result Value Ref Range    TSH 3.020 0.270 - 4.200 uIU/mL   T4   Result Value Ref Range    T4, Total 5.5 4.5 - 11.7 mcg/dL   POC Pregnancy Urine   Result Value Ref Range    HCG, Urine, POC Negative Negative    Lot Number [id number]     Positive QC Pass/Fail Pass     Negative QC Pass/Fail Pass        RADIOLOGY:  Interpreted by Radiologist.  CT HEAD WO CONTRAST   Final Result   HEAD:      No acute intracranial hemorrhage or mass effect. Frontal scalp swelling. CERVICAL SPINE:      No acute fracture or traumatic malalignment. FACE:      No acute facial bone fracture. Dental disease. CT FACIAL BONES WO CONTRAST   Final Result   HEAD:      No acute intracranial hemorrhage or mass effect. Frontal scalp swelling. CERVICAL SPINE:      No acute fracture or traumatic malalignment. FACE:      No acute facial bone fracture. Dental disease. CT CERVICAL SPINE WO CONTRAST   Final Result   HEAD:      No acute intracranial hemorrhage or mass effect. Frontal scalp swelling. CERVICAL SPINE:      No acute fracture or traumatic malalignment. FACE:      No acute facial bone fracture. Dental disease.                   ------------------------- NURSING NOTES AND VITALS REVIEWED ---------------------------   The nursing notes within the ED encounter and vital signs as below have been reviewed by myself. /79   Pulse 73   Temp 97.8 °F (36.6 °C)   Resp 14   SpO2 96%   Oxygen Saturation Interpretation: Normal    The patients available past medical records and past encounters were reviewed.         ------------------------------ ED COURSE/MEDICAL DECISION MAKING----------------------  Medications   ketorolac (TORADOL) injection 30 mg (has no administration in time range)   levETIRAcetam (KEPPRA) 1000 mg/100 mL IVPB (0 mg IntraVENous Stopped 5/8/22 1933)   metoclopramide (REGLAN) injection 10 mg (10 mg IntraVENous Given 5/8/22 1828)   diphenhydrAMINE (BENADRYL) injection 50 mg (50 mg IntraVENous Given 5/8/22 1828)   0.9 % sodium chloride bolus (0 mLs IntraVENous Stopped 5/8/22 1929)   fentaNYL (SUBLIMAZE) injection 50 mcg (50 mcg IntraVENous Given 5/8/22 2016)             Medical Decision Making:   I, Dr. Maria L Hernandez am the primary physician of record. Milind Villasenor is a 40 y.o. female who presents to the ED for seizure. The patient did have labs and imaging which were reviewed. CBC, CMP unremarkable, CT head with no acute process, CT facial bones and CT cervical spine no acute process. Patient no seizure-like activity in the emergency department. She is treated symptomatically. She will be discharged and follow-up outpatient. Re-Evaluations/Consultations:             ED Course as of 05/08/22 2114   Mendy Mccoy May 08, 2022   2100 Patient is in the bed in no acute distress. Results of today discussed. The patient will be discharged and follow-up outpatient. [MT]      ED Course User Index  [MT] Sanjuanita Mello,                This patient's ED course included: History, physical examination,  reevaluation prior to disposition, labs, imaging, telemetry monitoring      This patient has remained hemodynamically stable during their ED course. Counseling: The emergency provider has spoken with the patient and discussed todays results, in addition to providing specific details for the plan of care and counseling regarding the diagnosis and prognosis. Questions are answered at this time and they are agreeable with the plan.       --------------------------------- IMPRESSION AND DISPOSITION ---------------------------------    IMPRESSION  1.  Seizure ([address] DISPOSITION  Disposition: Discharge to home  Patient condition is stable        NOTE: This report was transcribed using voice recognition software.  Every effort was made to ensure accuracy; however, inadvertent computerized transcription errors may be present         Helen Brandt DO  05/08/22 9775

## 2022-05-09 LAB
EKG ATRIAL RATE: 65 BPM
EKG P AXIS: 52 DEGREES
EKG P-R INTERVAL: 168 MS
EKG Q-T INTERVAL: 452 MS
EKG QRS DURATION: 74 MS
EKG QTC CALCULATION (BAZETT): 470 MS
EKG R AXIS: 56 DEGREES
EKG T AXIS: 61 DEGREES
EKG VENTRICULAR RATE: 65 BPM

## 2022-05-09 PROCEDURE — 93010 ELECTROCARDIOGRAM REPORT: CPT | Performed by: INTERNAL MEDICINE

## 2022-05-16 RX ORDER — BUTALBITAL, ACETAMINOPHEN AND CAFFEINE 50; 325; 40 MG/1; MG/1; MG/1
TABLET ORAL
Qty: 30 TABLET | Refills: 1 | Status: SHIPPED
Start: 2022-05-16 | End: 2022-06-16

## 2022-05-19 ENCOUNTER — OFFICE VISIT (OUTPATIENT)
Dept: ONCOLOGY | Age: 37
End: 2022-05-19
Payer: COMMERCIAL

## 2022-05-19 ENCOUNTER — HOSPITAL ENCOUNTER (OUTPATIENT)
Dept: INFUSION THERAPY | Age: 37
Discharge: HOME | End: 2022-05-19
Payer: COMMERCIAL

## 2022-05-19 VITALS
HEIGHT: 60 IN | HEART RATE: 84 BPM | BODY MASS INDEX: 22.93 KG/M2 | WEIGHT: 116.8 LBS | SYSTOLIC BLOOD PRESSURE: 128 MMHG | DIASTOLIC BLOOD PRESSURE: 73 MMHG | TEMPERATURE: 98.1 F | OXYGEN SATURATION: 100 %

## 2022-05-19 DIAGNOSIS — C7A.8 NEUROENDOCRINE CANCER (HCC): Primary | ICD-10-CM

## 2022-05-19 DIAGNOSIS — C7B.8 METASTATIC MALIGNANT NEUROENDOCRINE TUMOR TO LIVER (HCC): Primary | ICD-10-CM

## 2022-05-19 LAB
ALBUMIN SERPL-MCNC: 4.5 G/DL (ref 3.5–5.2)
ALP BLD-CCNC: 120 U/L (ref 35–104)
ALT SERPL-CCNC: 20 U/L (ref 0–32)
ANION GAP SERPL CALCULATED.3IONS-SCNC: 10 MMOL/L (ref 7–16)
AST SERPL-CCNC: 33 U/L (ref 0–31)
BASOPHILS ABSOLUTE: 0.09 E9/L (ref 0–0.2)
BASOPHILS RELATIVE PERCENT: 0.9 % (ref 0–2)
BILIRUB SERPL-MCNC: 0.4 MG/DL (ref 0–1.2)
BUN BLDV-MCNC: 9 MG/DL (ref 6–20)
CALCIUM SERPL-MCNC: 8.4 MG/DL (ref 8.6–10.2)
CHLORIDE BLD-SCNC: 103 MMOL/L (ref 98–107)
CO2: 26 MMOL/L (ref 22–29)
CREAT SERPL-MCNC: 0.6 MG/DL (ref 0.5–1)
EOSINOPHILS ABSOLUTE: 0.09 E9/L (ref 0.05–0.5)
EOSINOPHILS RELATIVE PERCENT: 0.9 % (ref 0–6)
GFR AFRICAN AMERICAN: >60
GFR NON-AFRICAN AMERICAN: >60 ML/MIN/1.73
GLUCOSE BLD-MCNC: 107 MG/DL (ref 74–99)
HCT VFR BLD CALC: 36.1 % (ref 34–48)
HEMOGLOBIN: 11.4 G/DL (ref 11.5–15.5)
IMMATURE GRANULOCYTES #: 0.05 E9/L
IMMATURE GRANULOCYTES %: 0.5 % (ref 0–5)
LYMPHOCYTES ABSOLUTE: 2.24 E9/L (ref 1.5–4)
LYMPHOCYTES RELATIVE PERCENT: 23.3 % (ref 20–42)
MCH RBC QN AUTO: 28.6 PG (ref 26–35)
MCHC RBC AUTO-ENTMCNC: 31.6 % (ref 32–34.5)
MCV RBC AUTO: 90.5 FL (ref 80–99.9)
MONOCYTES ABSOLUTE: 0.78 E9/L (ref 0.1–0.95)
MONOCYTES RELATIVE PERCENT: 8.1 % (ref 2–12)
NEUTROPHILS ABSOLUTE: 6.37 E9/L (ref 1.8–7.3)
NEUTROPHILS RELATIVE PERCENT: 66.3 % (ref 43–80)
PDW BLD-RTO: 19.2 FL (ref 11.5–15)
PLATELET # BLD: 185 E9/L (ref 130–450)
PMV BLD AUTO: 9.7 FL (ref 7–12)
POTASSIUM SERPL-SCNC: 3.5 MMOL/L (ref 3.5–5)
RBC # BLD: 3.99 E12/L (ref 3.5–5.5)
SODIUM BLD-SCNC: 139 MMOL/L (ref 132–146)
TOTAL PROTEIN: 6.9 G/DL (ref 6.4–8.3)
WBC # BLD: 9.6 E9/L (ref 4.5–11.5)

## 2022-05-19 PROCEDURE — 4004F PT TOBACCO SCREEN RCVD TLK: CPT | Performed by: INTERNAL MEDICINE

## 2022-05-19 PROCEDURE — 80053 COMPREHEN METABOLIC PANEL: CPT

## 2022-05-19 PROCEDURE — 86316 IMMUNOASSAY TUMOR OTHER: CPT

## 2022-05-19 PROCEDURE — G8420 CALC BMI NORM PARAMETERS: HCPCS | Performed by: INTERNAL MEDICINE

## 2022-05-19 PROCEDURE — 99214 OFFICE O/P EST MOD 30 MIN: CPT

## 2022-05-19 PROCEDURE — 36591 DRAW BLOOD OFF VENOUS DEVICE: CPT

## 2022-05-19 PROCEDURE — G8427 DOCREV CUR MEDS BY ELIG CLIN: HCPCS | Performed by: INTERNAL MEDICINE

## 2022-05-19 PROCEDURE — 96372 THER/PROPH/DIAG INJ SC/IM: CPT

## 2022-05-19 PROCEDURE — 99215 OFFICE O/P EST HI 40 MIN: CPT | Performed by: INTERNAL MEDICINE

## 2022-05-19 PROCEDURE — 85025 COMPLETE CBC W/AUTO DIFF WBC: CPT

## 2022-05-19 PROCEDURE — 6360000002 HC RX W HCPCS: Performed by: INTERNAL MEDICINE

## 2022-05-19 RX ORDER — LANREOTIDE ACETATE 120 MG/.5ML
120 INJECTION SUBCUTANEOUS ONCE
Status: CANCELLED | OUTPATIENT
Start: 2022-06-16 | End: 2022-06-16

## 2022-05-19 RX ORDER — LANREOTIDE ACETATE 120 MG/.5ML
120 INJECTION SUBCUTANEOUS ONCE
Status: COMPLETED | OUTPATIENT
Start: 2022-05-19 | End: 2022-05-19

## 2022-05-19 RX ADMIN — LANREOTIDE ACETATE 120 MG: 120 INJECTION SUBCUTANEOUS at 14:53

## 2022-05-22 LAB — CHROMOGRANIN A: 114 NG/ML (ref 0–103)

## 2022-05-31 ENCOUNTER — OFFICE VISIT (OUTPATIENT)
Dept: PALLATIVE CARE | Age: 37
End: 2022-05-31
Payer: MEDICAID

## 2022-05-31 VITALS
TEMPERATURE: 97.9 F | BODY MASS INDEX: 22.46 KG/M2 | OXYGEN SATURATION: 96 % | WEIGHT: 115 LBS | SYSTOLIC BLOOD PRESSURE: 120 MMHG | DIASTOLIC BLOOD PRESSURE: 81 MMHG | HEART RATE: 108 BPM

## 2022-05-31 DIAGNOSIS — Z51.5 PALLIATIVE CARE BY SPECIALIST: ICD-10-CM

## 2022-05-31 DIAGNOSIS — R19.7 DIARRHEA, UNSPECIFIED TYPE: Primary | ICD-10-CM

## 2022-05-31 DIAGNOSIS — K58.0 IRRITABLE BOWEL SYNDROME WITH DIARRHEA: ICD-10-CM

## 2022-05-31 DIAGNOSIS — G89.3 NEOPLASM RELATED PAIN: ICD-10-CM

## 2022-05-31 PROCEDURE — 99213 OFFICE O/P EST LOW 20 MIN: CPT | Performed by: NURSE PRACTITIONER

## 2022-05-31 PROCEDURE — G8428 CUR MEDS NOT DOCUMENT: HCPCS | Performed by: NURSE PRACTITIONER

## 2022-05-31 PROCEDURE — 4004F PT TOBACCO SCREEN RCVD TLK: CPT | Performed by: NURSE PRACTITIONER

## 2022-05-31 PROCEDURE — 99212 OFFICE O/P EST SF 10 MIN: CPT | Performed by: NURSE PRACTITIONER

## 2022-05-31 PROCEDURE — G8420 CALC BMI NORM PARAMETERS: HCPCS | Performed by: NURSE PRACTITIONER

## 2022-05-31 PROCEDURE — 99212 OFFICE O/P EST SF 10 MIN: CPT

## 2022-05-31 NOTE — PROGRESS NOTES
Department of Palliative Medicine  Ambulatory Note  Provider: TRINA Shi - CNP     Chief Complaint: Nayely Singleton is a 40 y.o. female with chief complaint of pain    HPI:  Nayely Singleton is a 29 y.o. female with significant medical history of hypothyroidism, IBS, migraine who was complaining of abdominal pain associated with diarrhea and flushing/sweating. She was diagnosed with metastatic well differentiated Neuroendocrine cancer to liver who was referred to [address] [place] [address] by Dr. Raad Gaviria. Assessment/Plan      Neuroendocrine cancer  - Follows with Dr. Jessie Sher  - americo Bowel resection on 10/21/20  - On Lanreotide     Neoplasm related pain  - Gabapentin 300mg TID  - Tramadol 50mg q 6 hrs prn for the pain      Nausea  - Continue Zofran and Phenergan PRN    Diarrhea:   -Currently on Lanreotide and Xermelo  -Imodium she uses this daily  -Eat 1-3 jumbo marshmallows daily PRN  -Trial Viberzi 75 mg BID    - Goals of care: cure, live longer and improve or maintain function/quality of life    - Code Status: full code    Follow Up:  4 weeks. Encouraged to call with any questions, concerns, needs, or changes in symptoms. Subjective:   Yaneth Horne presents today with her mother present. She is at her baseline for the most part. She continues to have abdominal pain, as well as some reports of some joint pain, which is managed well with tramadol and gabapentin. Her worst complaint continues to be diarrhea, stating that she has anywhere from 4-6 episodes of mostly watery diarrhea a day. She not able to associate this with meals, other than if she eats anything that is especially greasy, fried, or has milk products. She has had some mild improvement utilizing antidiarrheals as well as trying marshmallows, diarrhea continues to be disruptive to her quality of life.   Thus far she has not really had much improvement in her diarrhea and spite of continued use of Imodium, Lomotil, trials of colestipol, cholestyramine, and she continues not to respond well or have decreased diarrhea with her continued lanreotide and Xermelo treatments. Discussed options, including consideration of a trial of Creon to see if there is any pancreatic insufficiency playing a role, as well as the option of trying Viberzi. After discussing both she would like to try a trial of Viberzi, we will prescribe a trial at this point time. We otherwise will communicate with her in approximately 2 weeks to see how she is doing with this medication.        Pain Assessment   Ratin  Description: burning, sharp and shooting  Duration: minutes  Frequency: daily  Location: Abdomen   Alleviating Factors: relaxation  Exacerbating Factors: unable to associate with any factor  Effect: change in function and sleep    Objective:     Physical Exam  Wt Readings from Last 3 Encounters:   22 115 lb (52.2 kg)   22 116 lb 12.8 oz (53 kg)   22 118 lb (53.5 kg)     /81   Pulse (!) 108   Temp 97.9 °F (36.6 °C)   Wt 115 lb (52.2 kg)   SpO2 96%   BMI 22.46 kg/m²     Gen:  Alert, appears stated age, well nourished, in no acute distress  HEENT:  Normocephalic, conjunctiva pink, no drainage, mucosa moist  Neck:  Supple  Lungs:  CTA bilaterally, no audible rhonchi or wheezes noted  Heart[de-identified]  RRR, no murmur, rub, or gallop noted during exam  Abd:  Soft, non tender, non distended, BS+  M/S/Ext:  Moving all extremities, no edema, pulses present  Skin:  Warm and dry  Neuro:  PERRL, Alert, oriented x 3; following commands    [place] Symptom Assessment Score   [place] Score 2022 2022 10/25/2021 2021 2021   Pain Score 4 5 7 5 4   Tiredness Score 4 3 6 6 5   Nausea Score 1 3 2 4 3   Depression Score Not depressed 1 5 Not depressed Not depressed   Anxiety Score 2 2 5 2 2   Drowsiness Score 2 2 2 4 3   Appetite Score 2 3 3 4 5   Wellbeing Score 3 1 3 4 5   Dyspnea Score 7 1 3 4 5   Other Problem Score Best possible response Best possible response Best possible response Best possible response Best possible response   Total Assessment Score(calculated) 25 21 36 33 32     Assessed by: patient. Current Medications:  Medications reviewed: yes    Controlled Substances Monitoring: OARRS reviewed 5/31/22. RX Monitoring 5/31/2022   Attestation -   Periodic Controlled Substance Monitoring Possible medication side effects, risk of tolerance/dependence & alternative treatments discussed. ;No signs of potential drug abuse or diversion identified. ;Assessed functional status. ;Obtaining appropriate analgesic effect of treatment. TRINA Sanz - CNP   Palliative Care Department     Time/Communication  Greater than 50% of time spent, total 25 minutes in face-to-face counseling and coordination of care regarding symptom management. Note: This report was completed using computerize voiced recognition software. Every effort has been made to ensure accuracy; however, inadvertent computerized transcription errors may be present.

## 2022-06-02 ENCOUNTER — TELEPHONE (OUTPATIENT)
Dept: PALLATIVE CARE | Age: 37
End: 2022-06-02

## 2022-06-15 ENCOUNTER — HOSPITAL ENCOUNTER (INPATIENT)
Age: 37
LOS: 5 days | Discharge: HOME | DRG: 885 | End: 2022-06-21
Attending: EMERGENCY MEDICINE | Admitting: PSYCHIATRY & NEUROLOGY
Payer: MEDICARE

## 2022-06-15 ENCOUNTER — APPOINTMENT (OUTPATIENT)
Dept: CT IMAGING | Age: 37
DRG: 885 | End: 2022-06-15
Payer: MEDICARE

## 2022-06-15 DIAGNOSIS — S00.83XA FACIAL CONTUSION, INITIAL ENCOUNTER: Primary | ICD-10-CM

## 2022-06-15 DIAGNOSIS — F10.929 ACUTE ALCOHOLIC INTOXICATION WITH COMPLICATION (HCC): ICD-10-CM

## 2022-06-15 LAB
ACETAMINOPHEN LEVEL: <5 MCG/ML (ref 10–30)
ALBUMIN SERPL-MCNC: 4.6 G/DL (ref 3.5–5.2)
ALP BLD-CCNC: 142 U/L (ref 35–104)
ALT SERPL-CCNC: 22 U/L (ref 0–32)
AMPHETAMINE SCREEN, URINE: NOT DETECTED
ANION GAP SERPL CALCULATED.3IONS-SCNC: 29 MMOL/L (ref 7–16)
ANION GAP SERPL CALCULATED.3IONS-SCNC: 33 MMOL/L (ref 7–16)
ANISOCYTOSIS: ABNORMAL
AST SERPL-CCNC: 52 U/L (ref 0–31)
BACTERIA: NORMAL /HPF
BARBITURATE SCREEN URINE: NOT DETECTED
BASOPHILS ABSOLUTE: 0.07 E9/L (ref 0–0.2)
BASOPHILS RELATIVE PERCENT: 0.9 % (ref 0–2)
BENZODIAZEPINE SCREEN, URINE: NOT DETECTED
BILIRUB SERPL-MCNC: 0.5 MG/DL (ref 0–1.2)
BILIRUBIN URINE: NEGATIVE
BLOOD, URINE: ABNORMAL
BUN BLDV-MCNC: 4 MG/DL (ref 6–20)
BUN BLDV-MCNC: 6 MG/DL (ref 6–20)
CALCIUM SERPL-MCNC: 8 MG/DL (ref 8.6–10.2)
CALCIUM SERPL-MCNC: 8.2 MG/DL (ref 8.6–10.2)
CANNABINOID SCREEN URINE: NOT DETECTED
CHLORIDE BLD-SCNC: 93 MMOL/L (ref 98–107)
CHLORIDE BLD-SCNC: 93 MMOL/L (ref 98–107)
CLARITY: CLEAR
CO2: 15 MMOL/L (ref 22–29)
CO2: 17 MMOL/L (ref 22–29)
COCAINE METABOLITE SCREEN URINE: NOT DETECTED
COLOR: YELLOW
CREAT SERPL-MCNC: 0.6 MG/DL (ref 0.5–1)
CREAT SERPL-MCNC: 0.6 MG/DL (ref 0.5–1)
EOSINOPHILS ABSOLUTE: 0 E9/L (ref 0.05–0.5)
EOSINOPHILS RELATIVE PERCENT: 0.4 % (ref 0–6)
EPITHELIAL CELLS, UA: NORMAL /HPF
ETHANOL: 133 MG/DL (ref 0–0.08)
ETHANOL: 246 MG/DL (ref 0–0.08)
FENTANYL SCREEN, URINE: NOT DETECTED
GFR AFRICAN AMERICAN: >60
GFR AFRICAN AMERICAN: >60
GFR NON-AFRICAN AMERICAN: >60 ML/MIN/1.73
GFR NON-AFRICAN AMERICAN: >60 ML/MIN/1.73
GLUCOSE BLD-MCNC: 135 MG/DL (ref 74–99)
GLUCOSE BLD-MCNC: 90 MG/DL (ref 74–99)
GLUCOSE URINE: NEGATIVE MG/DL
HCG, URINE, POC: NEGATIVE
HCT VFR BLD CALC: 37.6 % (ref 34–48)
HEMOGLOBIN: 12.2 G/DL (ref 11.5–15.5)
HYPOCHROMIA: ABNORMAL
KETONES, URINE: 15 MG/DL
LEUKOCYTE ESTERASE, URINE: NEGATIVE
LYMPHOCYTES ABSOLUTE: 1.79 E9/L (ref 1.5–4)
LYMPHOCYTES RELATIVE PERCENT: 22.6 % (ref 20–42)
Lab: NORMAL
Lab: NORMAL
MCH RBC QN AUTO: 29 PG (ref 26–35)
MCHC RBC AUTO-ENTMCNC: 32.4 % (ref 32–34.5)
MCV RBC AUTO: 89.5 FL (ref 80–99.9)
METHADONE SCREEN, URINE: NOT DETECTED
MONOCYTES ABSOLUTE: 0.62 E9/L (ref 0.1–0.95)
MONOCYTES RELATIVE PERCENT: 7.8 % (ref 2–12)
NEGATIVE QC PASS/FAIL: NORMAL
NEUTROPHILS ABSOLUTE: 5.38 E9/L (ref 1.8–7.3)
NEUTROPHILS RELATIVE PERCENT: 68.7 % (ref 43–80)
NITRITE, URINE: NEGATIVE
OPIATE SCREEN URINE: NOT DETECTED
OVALOCYTES: ABNORMAL
OXYCODONE URINE: NOT DETECTED
PDW BLD-RTO: 21.2 FL (ref 11.5–15)
PH UA: 6 (ref 5–9)
PHENCYCLIDINE SCREEN URINE: NOT DETECTED
PLATELET # BLD: 181 E9/L (ref 130–450)
PMV BLD AUTO: 9.2 FL (ref 7–12)
POIKILOCYTES: ABNORMAL
POLYCHROMASIA: ABNORMAL
POSITIVE QC PASS/FAIL: NORMAL
POTASSIUM SERPL-SCNC: 3.7 MMOL/L (ref 3.5–5)
POTASSIUM SERPL-SCNC: 4.1 MMOL/L (ref 3.5–5)
PROTEIN UA: NEGATIVE MG/DL
RBC # BLD: 4.2 E12/L (ref 3.5–5.5)
RBC UA: NORMAL /HPF (ref 0–2)
SALICYLATE, SERUM: <0.3 MG/DL (ref 0–30)
SODIUM BLD-SCNC: 139 MMOL/L (ref 132–146)
SODIUM BLD-SCNC: 141 MMOL/L (ref 132–146)
SPECIFIC GRAVITY UA: <=1.005 (ref 1–1.03)
TARGET CELLS: ABNORMAL
TEAR DROP CELLS: ABNORMAL
TOTAL PROTEIN: 7.2 G/DL (ref 6.4–8.3)
TRICYCLIC ANTIDEPRESSANTS SCREEN SERUM: NEGATIVE NG/ML
TSH SERPL DL<=0.05 MIU/L-ACNC: 2.56 UIU/ML (ref 0.27–4.2)
UROBILINOGEN, URINE: 0.2 E.U./DL
WBC # BLD: 7.8 E9/L (ref 4.5–11.5)
WBC UA: NORMAL /HPF (ref 0–5)

## 2022-06-15 PROCEDURE — 80179 DRUG ASSAY SALICYLATE: CPT

## 2022-06-15 PROCEDURE — 85025 COMPLETE CBC W/AUTO DIFF WBC: CPT

## 2022-06-15 PROCEDURE — 81001 URINALYSIS AUTO W/SCOPE: CPT

## 2022-06-15 PROCEDURE — 80048 BASIC METABOLIC PNL TOTAL CA: CPT

## 2022-06-15 PROCEDURE — 80307 DRUG TEST PRSMV CHEM ANLYZR: CPT

## 2022-06-15 PROCEDURE — 70450 CT HEAD/BRAIN W/O DYE: CPT

## 2022-06-15 PROCEDURE — 96361 HYDRATE IV INFUSION ADD-ON: CPT

## 2022-06-15 PROCEDURE — 93005 ELECTROCARDIOGRAM TRACING: CPT | Performed by: EMERGENCY MEDICINE

## 2022-06-15 PROCEDURE — 2580000003 HC RX 258: Performed by: EMERGENCY MEDICINE

## 2022-06-15 PROCEDURE — 82077 ASSAY SPEC XCP UR&BREATH IA: CPT

## 2022-06-15 PROCEDURE — 99285 EMERGENCY DEPT VISIT HI MDM: CPT

## 2022-06-15 PROCEDURE — 80053 COMPREHEN METABOLIC PANEL: CPT

## 2022-06-15 PROCEDURE — 96360 HYDRATION IV INFUSION INIT: CPT

## 2022-06-15 PROCEDURE — 6370000000 HC RX 637 (ALT 250 FOR IP): Performed by: EMERGENCY MEDICINE

## 2022-06-15 PROCEDURE — 84443 ASSAY THYROID STIM HORMONE: CPT

## 2022-06-15 PROCEDURE — 80143 DRUG ASSAY ACETAMINOPHEN: CPT

## 2022-06-15 PROCEDURE — 70486 CT MAXILLOFACIAL W/O DYE: CPT

## 2022-06-15 RX ORDER — 0.9 % SODIUM CHLORIDE 0.9 %
1000 INTRAVENOUS SOLUTION INTRAVENOUS ONCE
Status: COMPLETED | OUTPATIENT
Start: 2022-06-15 | End: 2022-06-15

## 2022-06-15 RX ORDER — TOPIRAMATE 25 MG/1
75 TABLET ORAL ONCE
Status: COMPLETED | OUTPATIENT
Start: 2022-06-15 | End: 2022-06-15

## 2022-06-15 RX ORDER — LEVETIRACETAM 500 MG/1
750 TABLET ORAL ONCE
Status: COMPLETED | OUTPATIENT
Start: 2022-06-15 | End: 2022-06-15

## 2022-06-15 RX ADMIN — SODIUM CHLORIDE 1000 ML: 9 INJECTION, SOLUTION INTRAVENOUS at 21:45

## 2022-06-15 RX ADMIN — SODIUM CHLORIDE 1000 ML: 9 INJECTION, SOLUTION INTRAVENOUS at 17:25

## 2022-06-15 RX ADMIN — LEVETIRACETAM 750 MG: 500 TABLET, FILM COATED ORAL at 17:24

## 2022-06-15 RX ADMIN — TOPIRAMATE 75 MG: 25 TABLET, FILM COATED ORAL at 17:24

## 2022-06-15 ASSESSMENT — PAIN - FUNCTIONAL ASSESSMENT: PAIN_FUNCTIONAL_ASSESSMENT: NONE - DENIES PAIN

## 2022-06-15 NOTE — ED NOTES
Pt escorted to bathroom and provided with hospital attire. Pt also provided with urine spec. Pt upset that this RN was locking her belongings up to include a blanket that belonged to her  son.  Notified RN, Misael Mims that pt is in bathroom and belongings will need locked once she comes out     Kelly Nascimento, [facility], [facility]  06/15/22 5519

## 2022-06-15 NOTE — ED NOTES
Pt remains in care of EMS until a bed is available. Dr Villa Ugalde notified of pts story. Pink slip obtained at this time for pt safety until further assessment can be properly performed. Staffing aware of CO need.       Diamante Nickerson RN  06/15/22 9749

## 2022-06-15 NOTE — ED PROVIDER NOTES
HPI:  6/15/22,   Time: 5:03 PM EDT         Ngozi Rodriguez is a 40 y.o. female presenting to the ED for an ingestion of sleeping pills as well as multiple bottles of wine and a fall with an injury to her eyes, beginning 3 days ago. The complaint has been persistent, moderate in severity, and worsened by nothing. She denies suicidal ideation she states she did this to just go to sleep. Patient also complains that she has had multiple seizures and falls. Patient states she has stage IV carcinoid but is currently not on chemotherapy    ROS:   Pertinent positives and negatives are stated within HPI, all other systems reviewed and are negative.  --------------------------------------------- PAST HISTORY ---------------------------------------------  Past Medical History:  has a past medical history of Abdominal pain, Cancer ([facility] [address] Diarrhea, Hypocalcemia, Hypothyroidism, Irritable bowel syndrome, Migraines, Seizures ([facility] [address] and Status post alcohol detoxification. Past Surgical History:  has a past surgical history that includes Parathyroid gland surgery; Cholecystectomy, laparoscopic (07/06/2016); Thyroidectomy; Colonoscopy (N/A, 6/22/2018); CT NEEDLE BIOPSY LIVER PERCUTANEOUS (9/1/2020); Small intestine surgery (N/A, 10/21/2020); CT BIOPSY RENAL (1/25/2021); hc dialysis catheter (N/A, 1/28/2021); sigmoidoscopy (N/A, 5/14/2021); and Port Surgery (Left, 2/18/2022). Social History:  reports that she has been smoking cigarettes. She has been smoking about 0.25 packs per day. She has never used smokeless tobacco. She reports that she does not drink alcohol and does not use drugs.     Family History: family history includes Alzheimer's Disease in an other family member; Asthma in her father; Breast Cancer in her maternal grandmother; Cancer in her father, maternal grandmother, and paternal grandfather; Diabetes in an other family member; Heart Disease in her father; High Blood Pressure in her father and mother; High Cholesterol in her mother; Keron Moura in an other family member; Other in her mother. The patients home medications have been reviewed. Allergies: Patient has no known allergies.     -------------------------------------------------- RESULTS -------------------------------------------------  All laboratory and radiology results have been personally reviewed by myself   LABS:  Results for orders placed or performed during the hospital encounter of 06/15/22   Comprehensive Metabolic Panel   Result Value Ref Range    Sodium 141 132 - 146 mmol/L    Potassium 4.1 3.5 - 5.0 mmol/L    Chloride 93 (L) 98 - 107 mmol/L    CO2 15 (L) 22 - 29 mmol/L    Anion Gap 33 (H) 7 - 16 mmol/L    Glucose 90 74 - 99 mg/dL    BUN 6 6 - 20 mg/dL    CREATININE 0.6 0.5 - 1.0 mg/dL    GFR Non-African American >60 >=60 mL/min/1.73    GFR African American >60     Calcium 8.0 (L) 8.6 - 10.2 mg/dL    Total Protein 7.2 6.4 - 8.3 g/dL    Albumin 4.6 3.5 - 5.2 g/dL    Total Bilirubin 0.5 0.0 - 1.2 mg/dL    Alkaline Phosphatase 142 (H) 35 - 104 U/L    ALT 22 0 - 32 U/L    AST 52 (H) 0 - 31 U/L   CBC with Auto Differential   Result Value Ref Range    WBC 7.8 4.5 - 11.5 E9/L    RBC 4.20 3.50 - 5.50 E12/L    Hemoglobin 12.2 11.5 - 15.5 g/dL    Hematocrit 37.6 34.0 - 48.0 %    MCV 89.5 80.0 - 99.9 fL    MCH 29.0 26.0 - 35.0 pg    MCHC 32.4 32.0 - 34.5 %    RDW 21.2 (H) 11.5 - 15.0 fL    Platelets 408 778 - 011 E9/L    MPV 9.2 7.0 - 12.0 fL    Neutrophils % 68.7 43.0 - 80.0 %    Lymphocytes % 22.6 20.0 - 42.0 %    Monocytes % 7.8 2.0 - 12.0 %    Eosinophils % 0.4 0.0 - 6.0 %    Basophils % 0.9 0.0 - 2.0 %    Neutrophils Absolute 5.38 1.80 - 7.30 E9/L    Lymphocytes Absolute 1.79 1.50 - 4.00 E9/L    Monocytes Absolute 0.62 0.10 - 0.95 E9/L    Eosinophils Absolute 0.00 (L) 0.05 - 0.50 E9/L    Basophils Absolute 0.07 0.00 - 0.20 E9/L    Anisocytosis 1+     Polychromasia 1+     Hypochromia 1+     Poikilocytes 1+     Ovalocytes 1+     Target Cells 1+ Tear Drop Cells 1+    Serum Drug Screen   Result Value Ref Range    Ethanol Lvl 246 mg/dL    Acetaminophen Level <5.0 (L) 10.0 - 94.8 mcg/mL    Salicylate, Serum <2.9 0.0 - 30.0 mg/dL    TCA Scrn NEGATIVE Cutoff:300 ng/mL   Urine Drug Screen   Result Value Ref Range    Amphetamine Screen, Urine NOT DETECTED Negative <1000 ng/mL    Barbiturate Screen, Ur NOT DETECTED Negative < 200 ng/mL    Benzodiazepine Screen, Urine NOT DETECTED Negative < 200 ng/mL    Cannabinoid Scrn, Ur NOT DETECTED Negative < 50ng/mL    Cocaine Metabolite Screen, Urine NOT DETECTED Negative < 300 ng/mL    Opiate Scrn, Ur NOT DETECTED Negative < 300ng/mL    PCP Screen, Urine NOT DETECTED Negative < 25 ng/mL    Methadone Screen, Urine NOT DETECTED Negative <300 ng/mL    Oxycodone Urine NOT DETECTED Negative <100 ng/mL    FENTANYL SCREEN, URINE NOT DETECTED Negative <1 ng/mL    Drug Screen Comment: see below    Urinalysis   Result Value Ref Range    Color, UA Yellow Straw/Yellow    Clarity, UA Clear Clear    Glucose, Ur Negative Negative mg/dL    Bilirubin Urine Negative Negative    Ketones, Urine 15 (A) Negative mg/dL    Specific Gravity, UA <=1.005 1.005 - 1.030    Blood, Urine TRACE-LYSED Negative    pH, UA 6.0 5.0 - 9.0    Protein, UA Negative Negative mg/dL    Urobilinogen, Urine 0.2 <2.0 E.U./dL    Nitrite, Urine Negative Negative    Leukocyte Esterase, Urine Negative Negative   TSH   Result Value Ref Range    TSH 2.560 0.270 - 4.200 uIU/mL   Microscopic Urinalysis   Result Value Ref Range    WBC, UA NONE 0 - 5 /HPF    RBC, UA NONE 0 - 2 /HPF    Epithelial Cells, UA RARE /HPF    Bacteria, UA NONE SEEN None Seen /HPF   POC Pregnancy Urine Qual   Result Value Ref Range    HCG, Urine, POC Negative Negative    Lot Number [id number]     Positive QC Pass/Fail Pass     Negative QC Pass/Fail Pass    EKG 12 Lead   Result Value Ref Range    Ventricular Rate 108 BPM    Atrial Rate 108 BPM    QRS Duration 74 ms    Q-T Interval 488 ms    QTc Calculation (Bazett) 653 ms    R Axis 67 degrees    T Axis 66 degrees       RADIOLOGY:  Interpreted by Radiologist.  CT HEAD WO CONTRAST   Final Result   No acute intracranial abnormality. Right periorbital soft tissue swelling. CT FACIAL BONES WO CONTRAST   Final Result   No acute facial bone trauma. Right periorbital soft tissue swelling.             ------------------------- NURSING NOTES AND VITALS REVIEWED ---------------------------   The nursing notes within the ED encounter and vital signs as below have been reviewed. /73   Pulse (!) 122   Temp 98.6 °F (37 °C)   Resp 18   SpO2 97%   Oxygen Saturation Interpretation: Normal      ---------------------------------------------------PHYSICAL EXAM--------------------------------------      Constitutional/General: Alert and oriented x3, well appearing, non toxic in NAD. Alcohol on breath  Head: NC/AT ecchymosis  movements intact  Eyes: PERRL, EOMI right eye without obvious entrapment extraocular  Mouth: Oropharynx clear, handling secretions, no trismus  Neck: Supple, full ROM, no meningeal signs  Pulmonary: Lungs clear to auscultation bilaterally, no wheezes, rales, or rhonchi. Not in respiratory distress  Cardiovascular:  Regular rate and rhythm, no murmurs, gallops, or rubs. 2+ distal pulses  Abdomen: Soft, non tender, non distended,   Extremities: Moves all extremities x 4. Warm and well perfused  Skin: warm and dry without rash ultimately ecchymoses bilateral knees right elbow right flank  Neurologic: GCS 15, radial nerves intact 5+ strength all extremities  Psych: Bizarre flat affect poor judgment and insight linear thought patient denies suicidal ideation at the current time. She denies homicidal ideation she denies hallucinations or delusions.   Mildly slurred speech      ------------------------------ ED COURSE/MEDICAL DECISION MAKING----------------------  Medications   0.9 % sodium chloride bolus (has no administration in time range) levETIRAcetam (KEPPRA) tablet 750 mg (750 mg Oral Given 6/15/22 1724)   topiramate (TOPAMAX) tablet 75 mg (75 mg Oral Given 6/15/22 1724)   0.9 % sodium chloride bolus (1,000 mLs IntraVENous New Bag 6/15/22 1725)         Medical Decision Making:    Patient was given her home anticonvulsant medications and fluid bolus for her tachycardia and apparent alcohol intoxication. Patient's imaging was negative patient will be evaluated by social work disposition will be as per the . Patient will receive a repeat BMP prior to social work evaluation to demonstrate preceding anion gap. Counseling: The emergency provider has spoken with the patient and discussed todays results, in addition to providing specific details for the plan of care and counseling regarding the diagnosis and prognosis. Questions are answered at this time and they are agreeable with the plan.      --------------------------------- IMPRESSION AND DISPOSITION ---------------------------------    IMPRESSION  1. Facial contusion, initial encounter    2.  Acute alcoholic intoxication with complication ([facility] [address]        DISPOSITION  Disposition: Other Disposition: as per     Patient condition is stable                  Kelli Milian MD  06/15/22 2018

## 2022-06-16 ENCOUNTER — HOSPITAL ENCOUNTER (OUTPATIENT)
Dept: INFUSION THERAPY | Age: 37
End: 2022-06-16

## 2022-06-16 PROBLEM — F10.929 ACUTE ALCOHOLIC INTOXICATION WITH COMPLICATION (HCC): Status: ACTIVE | Noted: 2022-06-16

## 2022-06-16 LAB
ANION GAP SERPL CALCULATED.3IONS-SCNC: 10 MMOL/L (ref 7–16)
BUN BLDV-MCNC: 5 MG/DL (ref 6–20)
CALCIUM SERPL-MCNC: 8.7 MG/DL (ref 8.6–10.2)
CHLORIDE BLD-SCNC: 105 MMOL/L (ref 98–107)
CO2: 24 MMOL/L (ref 22–29)
CREAT SERPL-MCNC: 0.6 MG/DL (ref 0.5–1)
EKG ATRIAL RATE: 108 BPM
EKG ATRIAL RATE: 83 BPM
EKG P AXIS: -102 DEGREES
EKG P-R INTERVAL: 170 MS
EKG Q-T INTERVAL: 432 MS
EKG Q-T INTERVAL: 488 MS
EKG QRS DURATION: 74 MS
EKG QRS DURATION: 74 MS
EKG QTC CALCULATION (BAZETT): 507 MS
EKG QTC CALCULATION (BAZETT): 653 MS
EKG R AXIS: 67 DEGREES
EKG R AXIS: 79 DEGREES
EKG T AXIS: 66 DEGREES
EKG T AXIS: 80 DEGREES
EKG VENTRICULAR RATE: 108 BPM
EKG VENTRICULAR RATE: 83 BPM
GFR AFRICAN AMERICAN: >60
GFR NON-AFRICAN AMERICAN: >60 ML/MIN/1.73
GLUCOSE BLD-MCNC: 134 MG/DL (ref 74–99)
INFLUENZA A: NOT DETECTED
INFLUENZA B: NOT DETECTED
POTASSIUM SERPL-SCNC: 3.9 MMOL/L (ref 3.5–5)
SARS-COV-2 RNA, RT PCR: NOT DETECTED
SODIUM BLD-SCNC: 139 MMOL/L (ref 132–146)

## 2022-06-16 PROCEDURE — 1240000000 HC EMOTIONAL WELLNESS R&B

## 2022-06-16 PROCEDURE — 80048 BASIC METABOLIC PNL TOTAL CA: CPT

## 2022-06-16 PROCEDURE — 87636 SARSCOV2 & INF A&B AMP PRB: CPT

## 2022-06-16 PROCEDURE — 6370000000 HC RX 637 (ALT 250 FOR IP): Performed by: EMERGENCY MEDICINE

## 2022-06-16 PROCEDURE — 93005 ELECTROCARDIOGRAM TRACING: CPT | Performed by: EMERGENCY MEDICINE

## 2022-06-16 RX ORDER — HALOPERIDOL 5 MG
5 TABLET ORAL EVERY 6 HOURS PRN
Status: DISCONTINUED | OUTPATIENT
Start: 2022-06-16 | End: 2022-06-21 | Stop reason: HOSPADM

## 2022-06-16 RX ORDER — FLUTICASONE PROPIONATE 50 MCG
1 SPRAY, SUSPENSION (ML) NASAL DAILY PRN
COMMUNITY

## 2022-06-16 RX ORDER — CALCIUM CARBONATE 200(500)MG
500 TABLET,CHEWABLE ORAL 2 TIMES DAILY
Status: DISCONTINUED | OUTPATIENT
Start: 2022-06-16 | End: 2022-06-21 | Stop reason: HOSPADM

## 2022-06-16 RX ORDER — OMEGA-3S/DHA/EPA/FISH OIL/D3 300MG-1000
400 CAPSULE ORAL 2 TIMES DAILY
Status: DISCONTINUED | OUTPATIENT
Start: 2022-06-16 | End: 2022-06-16 | Stop reason: SDUPTHER

## 2022-06-16 RX ORDER — GABAPENTIN 300 MG/1
300 CAPSULE ORAL 3 TIMES DAILY
Status: DISCONTINUED | OUTPATIENT
Start: 2022-06-16 | End: 2022-06-21 | Stop reason: HOSPADM

## 2022-06-16 RX ORDER — TOPIRAMATE 25 MG/1
50 TABLET ORAL 2 TIMES DAILY
Status: DISCONTINUED | OUTPATIENT
Start: 2022-06-16 | End: 2022-06-21 | Stop reason: HOSPADM

## 2022-06-16 RX ORDER — MAGNESIUM HYDROXIDE/ALUMINUM HYDROXICE/SIMETHICONE 120; 1200; 1200 MG/30ML; MG/30ML; MG/30ML
30 SUSPENSION ORAL PRN
Status: DISCONTINUED | OUTPATIENT
Start: 2022-06-16 | End: 2022-06-21 | Stop reason: HOSPADM

## 2022-06-16 RX ORDER — ZOLPIDEM TARTRATE 10 MG/1
10 TABLET ORAL NIGHTLY PRN
Status: ON HOLD | COMMUNITY
End: 2022-06-21 | Stop reason: HOSPADM

## 2022-06-16 RX ORDER — CHLORDIAZEPOXIDE HYDROCHLORIDE 25 MG/1
25 CAPSULE, GELATIN COATED ORAL
Status: DISCONTINUED | OUTPATIENT
Start: 2022-06-16 | End: 2022-06-17

## 2022-06-16 RX ORDER — NICOTINE 21 MG/24HR
1 PATCH, TRANSDERMAL 24 HOURS TRANSDERMAL DAILY
Status: DISCONTINUED | OUTPATIENT
Start: 2022-06-17 | End: 2022-06-19

## 2022-06-16 RX ORDER — HALOPERIDOL 5 MG/ML
5 INJECTION INTRAMUSCULAR EVERY 6 HOURS PRN
Status: DISCONTINUED | OUTPATIENT
Start: 2022-06-16 | End: 2022-06-21 | Stop reason: HOSPADM

## 2022-06-16 RX ORDER — FOLIC ACID 1 MG/1
1 TABLET ORAL DAILY
Status: DISCONTINUED | OUTPATIENT
Start: 2022-06-17 | End: 2022-06-17

## 2022-06-16 RX ORDER — OMEGA-3S/DHA/EPA/FISH OIL/D3 300MG-1000
400 CAPSULE ORAL 2 TIMES DAILY
Status: DISCONTINUED | OUTPATIENT
Start: 2022-06-16 | End: 2022-06-21 | Stop reason: HOSPADM

## 2022-06-16 RX ORDER — LANOLIN ALCOHOL/MO/W.PET/CERES
3 CREAM (GRAM) TOPICAL NIGHTLY
Status: DISCONTINUED | OUTPATIENT
Start: 2022-06-16 | End: 2022-06-21 | Stop reason: HOSPADM

## 2022-06-16 RX ORDER — HYDROXYZINE HYDROCHLORIDE 10 MG/1
50 TABLET, FILM COATED ORAL 3 TIMES DAILY PRN
Status: DISCONTINUED | OUTPATIENT
Start: 2022-06-16 | End: 2022-06-16 | Stop reason: SDUPTHER

## 2022-06-16 RX ORDER — POTASSIUM CHLORIDE 20 MEQ/1
20 TABLET, EXTENDED RELEASE ORAL 2 TIMES DAILY
COMMUNITY

## 2022-06-16 RX ORDER — LEVETIRACETAM 500 MG/1
750 TABLET ORAL 2 TIMES DAILY
Status: DISCONTINUED | OUTPATIENT
Start: 2022-06-16 | End: 2022-06-21 | Stop reason: HOSPADM

## 2022-06-16 RX ORDER — TOPIRAMATE 50 MG/1
50 TABLET, FILM COATED ORAL 2 TIMES DAILY
COMMUNITY

## 2022-06-16 RX ORDER — DIVALPROEX SODIUM 250 MG/1
250 TABLET, DELAYED RELEASE ORAL 2 TIMES DAILY
Status: DISCONTINUED | OUTPATIENT
Start: 2022-06-16 | End: 2022-06-17

## 2022-06-16 RX ORDER — POTASSIUM CHLORIDE 20 MEQ/1
20 TABLET, EXTENDED RELEASE ORAL 2 TIMES DAILY
Status: DISCONTINUED | OUTPATIENT
Start: 2022-06-16 | End: 2022-06-21 | Stop reason: HOSPADM

## 2022-06-16 RX ORDER — LEVOTHYROXINE SODIUM 112 UG/1
112 TABLET ORAL DAILY
COMMUNITY
End: 2022-06-22

## 2022-06-16 RX ORDER — LEVOTHYROXINE SODIUM 112 UG/1
112 TABLET ORAL ONCE
Status: COMPLETED | OUTPATIENT
Start: 2022-06-16 | End: 2022-06-16

## 2022-06-16 RX ORDER — PANTOPRAZOLE SODIUM 40 MG/1
40 TABLET, DELAYED RELEASE ORAL
Status: DISCONTINUED | OUTPATIENT
Start: 2022-06-17 | End: 2022-06-21 | Stop reason: HOSPADM

## 2022-06-16 RX ORDER — ACETAMINOPHEN 325 MG/1
650 TABLET ORAL EVERY 6 HOURS PRN
Status: DISCONTINUED | OUTPATIENT
Start: 2022-06-16 | End: 2022-06-21 | Stop reason: HOSPADM

## 2022-06-16 RX ORDER — CALCIUM CARBONATE 200(500)MG
500 TABLET,CHEWABLE ORAL 2 TIMES DAILY
Status: DISCONTINUED | OUTPATIENT
Start: 2022-06-16 | End: 2022-06-16 | Stop reason: SDUPTHER

## 2022-06-16 RX ORDER — BUTALBITAL, ACETAMINOPHEN AND CAFFEINE 50; 325; 40 MG/1; MG/1; MG/1
1 TABLET ORAL EVERY 4 HOURS PRN
COMMUNITY
End: 2022-07-05

## 2022-06-16 RX ORDER — TRAMADOL HYDROCHLORIDE 50 MG/1
50 TABLET ORAL EVERY 6 HOURS PRN
Status: DISCONTINUED | OUTPATIENT
Start: 2022-06-16 | End: 2022-06-21 | Stop reason: HOSPADM

## 2022-06-16 RX ORDER — GAUZE BANDAGE 2" X 2"
100 BANDAGE TOPICAL DAILY
Status: DISCONTINUED | OUTPATIENT
Start: 2022-06-17 | End: 2022-06-17

## 2022-06-16 RX ORDER — HYDROXYZINE PAMOATE 50 MG/1
50 CAPSULE ORAL 3 TIMES DAILY PRN
Status: DISCONTINUED | OUTPATIENT
Start: 2022-06-16 | End: 2022-06-21 | Stop reason: HOSPADM

## 2022-06-16 RX ORDER — TOPIRAMATE 50 MG/1
75 TABLET, FILM COATED ORAL 2 TIMES DAILY
Status: DISCONTINUED | OUTPATIENT
Start: 2022-06-16 | End: 2022-06-16 | Stop reason: SDUPTHER

## 2022-06-16 RX ORDER — LEVETIRACETAM 750 MG/1
750 TABLET ORAL 2 TIMES DAILY
COMMUNITY
End: 2022-08-22

## 2022-06-16 RX ORDER — LEVOTHYROXINE SODIUM 112 UG/1
112 TABLET ORAL DAILY
Status: DISCONTINUED | OUTPATIENT
Start: 2022-06-16 | End: 2022-06-16 | Stop reason: SDUPTHER

## 2022-06-16 RX ORDER — LEVETIRACETAM 500 MG/1
750 TABLET ORAL 2 TIMES DAILY
Status: DISCONTINUED | OUTPATIENT
Start: 2022-06-16 | End: 2022-06-16 | Stop reason: SDUPTHER

## 2022-06-16 RX ORDER — BRIMONIDINE TARTRATE 2 MG/ML
1 SOLUTION/ DROPS OPHTHALMIC EVERY MORNING
COMMUNITY

## 2022-06-16 RX ORDER — LANOLIN ALCOHOL/MO/W.PET/CERES
400 CREAM (GRAM) TOPICAL 2 TIMES DAILY
Status: DISCONTINUED | OUTPATIENT
Start: 2022-06-16 | End: 2022-06-21 | Stop reason: HOSPADM

## 2022-06-16 RX ADMIN — Medication 400 MG: at 20:37

## 2022-06-16 RX ADMIN — CHOLECALCIFEROL TAB 10 MCG (400 UNIT) 400 UNITS: 10 TAB at 11:18

## 2022-06-16 RX ADMIN — GABAPENTIN 300 MG: 300 CAPSULE ORAL at 20:37

## 2022-06-16 RX ADMIN — TRAMADOL HYDROCHLORIDE 50 MG: 50 TABLET, COATED ORAL at 20:37

## 2022-06-16 RX ADMIN — CALCIUM CARBONATE 500 MG: 500 TABLET, CHEWABLE ORAL at 20:36

## 2022-06-16 RX ADMIN — Medication 400 MG: at 11:18

## 2022-06-16 RX ADMIN — POTASSIUM CHLORIDE 20 MEQ: 20 TABLET, EXTENDED RELEASE ORAL at 10:49

## 2022-06-16 RX ADMIN — GABAPENTIN 300 MG: 300 CAPSULE ORAL at 17:02

## 2022-06-16 RX ADMIN — TOPIRAMATE 50 MG: 25 TABLET, FILM COATED ORAL at 13:04

## 2022-06-16 RX ADMIN — LEVETIRACETAM 750 MG: 500 TABLET, FILM COATED ORAL at 10:49

## 2022-06-16 RX ADMIN — TOPIRAMATE 50 MG: 25 TABLET, FILM COATED ORAL at 20:38

## 2022-06-16 RX ADMIN — POTASSIUM CHLORIDE 20 MEQ: 20 TABLET, EXTENDED RELEASE ORAL at 20:37

## 2022-06-16 RX ADMIN — LEVOTHYROXINE SODIUM 112 MCG: 0.11 TABLET ORAL at 11:18

## 2022-06-16 RX ADMIN — GABAPENTIN 300 MG: 300 CAPSULE ORAL at 10:49

## 2022-06-16 RX ADMIN — CHOLECALCIFEROL TAB 10 MCG (400 UNIT) 400 UNITS: 10 TAB at 20:38

## 2022-06-16 RX ADMIN — LEVETIRACETAM 750 MG: 500 TABLET, FILM COATED ORAL at 20:37

## 2022-06-16 RX ADMIN — TRAMADOL HYDROCHLORIDE 50 MG: 50 TABLET, COATED ORAL at 10:49

## 2022-06-16 ASSESSMENT — PAIN SCALES - GENERAL
PAINLEVEL_OUTOF10: 7
PAINLEVEL_OUTOF10: 0

## 2022-06-16 ASSESSMENT — PAIN DESCRIPTION - LOCATION: LOCATION: EYE

## 2022-06-16 ASSESSMENT — PAIN - FUNCTIONAL ASSESSMENT: PAIN_FUNCTIONAL_ASSESSMENT: NONE - DENIES PAIN

## 2022-06-16 ASSESSMENT — PAIN DESCRIPTION - ORIENTATION: ORIENTATION: RIGHT

## 2022-06-16 NOTE — ED NOTES
SW made NEPTALI Cantu aware of of needing repeat BMP and a repeat EKG with a QTC less than 470. Once completed Pt can be reviewed for admission.       LALIT Patiño, [place]  06/16/22 1939

## 2022-06-16 NOTE — ED NOTES
Pt ETOH level is 246 upon admission, lethargic does not fully respond to questions. SW discussed with Dr Claribel Umanzor, he will order ETOH re-draw at 22:00.      [address] [place], [place] [address]marcio, LALIT, [place]  06/15/22 204

## 2022-06-16 NOTE — ED NOTES
Patient presented to [facility], declined at this time.  Patient will needs a repeat BMP and a repeat EKG with a QTC less than 600 Bonilla Kurtz RN  06/16/22 0664

## 2022-06-16 NOTE — ED NOTES
Pt is alert and oriented asking about pink slip. Process explained to pt. Pt calm and cooperative. Pt has no other questions at this time.       Roderick Hyman RN  06/15/22 4239

## 2022-06-16 NOTE — CARE COORDINATION
Peer Recovery Support Note    Name: Ramakrishna Frankel  Date: 6/16/2022    Chief Complaint   Patient presents with    Ingestion     pt drank 14 bottles of wine over 3 days. pt took sleeping pills \" to go to sleep\". pt states she just broke up with her boyfriend of 7 years. pt fell today striking left eye off of bathroom sink. Peer Support met with patient. [x] Support and education provided  [] Resources provided   [] Treatment referral:   [] Other:   [] Patient declined peer recovery services     Referred By:   Notes: Patient was given resources and support from Peer.     Signed: Astrid Kaba, 6/16/2022
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
## 2022-06-16 NOTE — ED NOTES
Patient resting comfortably in fulton bed with sitter at bedside.       Kacey Mane, NEPTALI  06/16/22 7034

## 2022-06-16 NOTE — ED NOTES
Per previous shift RN, pt belongings secured in locker [address] [place] [address]  06/15/22 5384

## 2022-06-17 PROBLEM — F10.10 ALCOHOL ABUSE: Status: ACTIVE | Noted: 2022-06-17

## 2022-06-17 PROBLEM — F31.81 DEPRESSED BIPOLAR II DISORDER (HCC): Status: ACTIVE | Noted: 2022-06-17

## 2022-06-17 PROBLEM — E44.0 MODERATE PROTEIN-CALORIE MALNUTRITION (HCC): Chronic | Status: ACTIVE | Noted: 2022-06-17

## 2022-06-17 LAB
EKG ATRIAL RATE: 72 BPM
EKG P-R INTERVAL: 138 MS
EKG Q-T INTERVAL: 430 MS
EKG QRS DURATION: 64 MS
EKG QTC CALCULATION (BAZETT): 470 MS
EKG R AXIS: 57 DEGREES
EKG T AXIS: 53 DEGREES
EKG VENTRICULAR RATE: 72 BPM

## 2022-06-17 PROCEDURE — 6370000000 HC RX 637 (ALT 250 FOR IP): Performed by: EMERGENCY MEDICINE

## 2022-06-17 PROCEDURE — 1240000000 HC EMOTIONAL WELLNESS R&B

## 2022-06-17 PROCEDURE — 6370000000 HC RX 637 (ALT 250 FOR IP): Performed by: PSYCHIATRY & NEUROLOGY

## 2022-06-17 PROCEDURE — 90792 PSYCH DIAG EVAL W/MED SRVCS: CPT | Performed by: NURSE PRACTITIONER

## 2022-06-17 RX ORDER — GAUZE BANDAGE 2" X 2"
100 BANDAGE TOPICAL DAILY
Status: DISCONTINUED | OUTPATIENT
Start: 2022-06-18 | End: 2022-06-21 | Stop reason: HOSPADM

## 2022-06-17 RX ORDER — OXCARBAZEPINE 150 MG/1
150 TABLET, FILM COATED ORAL 2 TIMES DAILY
Status: DISCONTINUED | OUTPATIENT
Start: 2022-06-17 | End: 2022-06-20

## 2022-06-17 RX ORDER — CHLORDIAZEPOXIDE HYDROCHLORIDE 25 MG/1
25 CAPSULE, GELATIN COATED ORAL EVERY 4 HOURS PRN
Status: DISCONTINUED | OUTPATIENT
Start: 2022-06-17 | End: 2022-06-21 | Stop reason: HOSPADM

## 2022-06-17 RX ORDER — FOLIC ACID 1 MG/1
1 TABLET ORAL DAILY
Status: DISCONTINUED | OUTPATIENT
Start: 2022-06-18 | End: 2022-06-21 | Stop reason: HOSPADM

## 2022-06-17 RX ADMIN — PANTOPRAZOLE SODIUM 40 MG: 40 TABLET, DELAYED RELEASE ORAL at 06:36

## 2022-06-17 RX ADMIN — CALCIUM CARBONATE 500 MG: 500 TABLET, CHEWABLE ORAL at 09:42

## 2022-06-17 RX ADMIN — GABAPENTIN 300 MG: 300 CAPSULE ORAL at 09:41

## 2022-06-17 RX ADMIN — CHLORDIAZEPOXIDE HYDROCHLORIDE 25 MG: 25 CAPSULE ORAL at 09:57

## 2022-06-17 RX ADMIN — CHLORDIAZEPOXIDE HYDROCHLORIDE 25 MG: 25 CAPSULE ORAL at 02:07

## 2022-06-17 RX ADMIN — CHLORDIAZEPOXIDE HYDROCHLORIDE 25 MG: 25 CAPSULE ORAL at 06:35

## 2022-06-17 RX ADMIN — Medication 3 MG: at 02:07

## 2022-06-17 RX ADMIN — Medication 3 MG: at 20:54

## 2022-06-17 RX ADMIN — GABAPENTIN 300 MG: 300 CAPSULE ORAL at 14:28

## 2022-06-17 RX ADMIN — TOPIRAMATE 50 MG: 25 TABLET, FILM COATED ORAL at 20:54

## 2022-06-17 RX ADMIN — DIVALPROEX SODIUM 250 MG: 250 TABLET, DELAYED RELEASE ORAL at 09:42

## 2022-06-17 RX ADMIN — Medication 400 MG: at 09:41

## 2022-06-17 RX ADMIN — TRAMADOL HYDROCHLORIDE 50 MG: 50 TABLET, COATED ORAL at 09:56

## 2022-06-17 RX ADMIN — POTASSIUM CHLORIDE 20 MEQ: 20 TABLET, EXTENDED RELEASE ORAL at 09:41

## 2022-06-17 RX ADMIN — TOPIRAMATE 50 MG: 25 TABLET, FILM COATED ORAL at 09:41

## 2022-06-17 RX ADMIN — DIVALPROEX SODIUM 250 MG: 250 TABLET, DELAYED RELEASE ORAL at 02:07

## 2022-06-17 RX ADMIN — CHLORDIAZEPOXIDE HYDROCHLORIDE 25 MG: 25 CAPSULE ORAL at 12:48

## 2022-06-17 RX ADMIN — Medication 100 MG: at 09:41

## 2022-06-17 RX ADMIN — Medication 400 MG: at 20:54

## 2022-06-17 RX ADMIN — CHOLECALCIFEROL TAB 10 MCG (400 UNIT) 400 UNITS: 10 TAB at 09:40

## 2022-06-17 RX ADMIN — LEVETIRACETAM 750 MG: 500 TABLET, FILM COATED ORAL at 09:41

## 2022-06-17 RX ADMIN — GABAPENTIN 300 MG: 300 CAPSULE ORAL at 20:54

## 2022-06-17 RX ADMIN — CALCIUM CARBONATE 500 MG: 500 TABLET, CHEWABLE ORAL at 20:54

## 2022-06-17 RX ADMIN — TRAMADOL HYDROCHLORIDE 50 MG: 50 TABLET, COATED ORAL at 21:14

## 2022-06-17 RX ADMIN — FOLIC ACID 1 MG: 1 TABLET ORAL at 09:40

## 2022-06-17 RX ADMIN — POTASSIUM CHLORIDE 20 MEQ: 20 TABLET, EXTENDED RELEASE ORAL at 20:54

## 2022-06-17 RX ADMIN — CHOLECALCIFEROL TAB 10 MCG (400 UNIT) 400 UNITS: 10 TAB at 20:54

## 2022-06-17 RX ADMIN — LEVETIRACETAM 750 MG: 500 TABLET, FILM COATED ORAL at 20:54

## 2022-06-17 ASSESSMENT — PAIN DESCRIPTION - PAIN TYPE
TYPE: ACUTE PAIN
TYPE: ACUTE PAIN

## 2022-06-17 ASSESSMENT — PAIN DESCRIPTION - ONSET
ONSET: ON-GOING
ONSET: ON-GOING

## 2022-06-17 ASSESSMENT — SLEEP AND FATIGUE QUESTIONNAIRES
DO YOU USE A SLEEP AID: YES
SLEEP PATTERN: DIFFICULTY FALLING ASLEEP;DIFFICULTY ARISING;NIGHTMARES/TERRORS
DO YOU HAVE DIFFICULTY SLEEPING: YES
DO YOU USE A SLEEP AID: YES
SLEEP PATTERN: DIFFICULTY FALLING ASLEEP;DIFFICULTY ARISING;NIGHTMARES/TERRORS
AVERAGE NUMBER OF SLEEP HOURS: 4
AVERAGE NUMBER OF SLEEP HOURS: 4
DO YOU HAVE DIFFICULTY SLEEPING: YES

## 2022-06-17 ASSESSMENT — LIFESTYLE VARIABLES
HOW MANY STANDARD DRINKS CONTAINING ALCOHOL DO YOU HAVE ON A TYPICAL DAY: 10 OR MORE
HOW OFTEN DO YOU HAVE A DRINK CONTAINING ALCOHOL: MONTHLY OR LESS
HOW OFTEN DO YOU HAVE A DRINK CONTAINING ALCOHOL: MONTHLY OR LESS
HOW MANY STANDARD DRINKS CONTAINING ALCOHOL DO YOU HAVE ON A TYPICAL DAY: 10 OR MORE

## 2022-06-17 ASSESSMENT — PAIN - FUNCTIONAL ASSESSMENT
PAIN_FUNCTIONAL_ASSESSMENT: ACTIVITIES ARE NOT PREVENTED

## 2022-06-17 ASSESSMENT — PAIN DESCRIPTION - LOCATION
LOCATION: EYE;SHOULDER
LOCATION: SHOULDER;EYE
LOCATION: EYE;SHOULDER

## 2022-06-17 ASSESSMENT — PAIN DESCRIPTION - DESCRIPTORS
DESCRIPTORS: SORE
DESCRIPTORS: ACHING
DESCRIPTORS: SHARP;ACHING
DESCRIPTORS: SHARP;THROBBING;ACHING

## 2022-06-17 ASSESSMENT — PAIN SCALES - WONG BAKER
WONGBAKER_NUMERICALRESPONSE: 2
WONGBAKER_NUMERICALRESPONSE: 2

## 2022-06-17 ASSESSMENT — PAIN SCALES - GENERAL
PAINLEVEL_OUTOF10: 7
PAINLEVEL_OUTOF10: 8
PAINLEVEL_OUTOF10: 4

## 2022-06-17 ASSESSMENT — PAIN DESCRIPTION - FREQUENCY
FREQUENCY: CONTINUOUS
FREQUENCY: CONTINUOUS

## 2022-06-17 ASSESSMENT — PAIN DESCRIPTION - ORIENTATION
ORIENTATION: LEFT;RIGHT
ORIENTATION: RIGHT
ORIENTATION: RIGHT

## 2022-06-17 ASSESSMENT — PATIENT HEALTH QUESTIONNAIRE - PHQ9
SUM OF ALL RESPONSES TO PHQ QUESTIONS 1-9: 10
SUM OF ALL RESPONSES TO PHQ QUESTIONS 1-9: 10

## 2022-06-17 NOTE — PROGRESS NOTES
[facility]  Initial Interdisciplinary Treatment Plan NOTE    Review Date & Time: 6/17/22 10:00 AM    Patient was not in treatment team    Admission Type:   Admission Type:  Involuntary    Reason for admission:  Reason for Admission: \"because of the fall, my mom called the ambulance and for the drinking\"      Estimated Length of Stay Update:  3-5 days  Estimated Discharge Date Update: 3-5 days    EDUCATION:   Learner Progress Toward Treatment Goals: Reviewed results and recommendations of this team    Method: Small group    Outcome: Verbalized understanding    PATIENT GOALS: seek clarity    PLAN/TREATMENT RECOMMENDATIONS UPDATE:6/17/22    GOALS UPDATE:   Time frame for Short-Term Goals: 3-5 days    Anne Marie Mejia RN

## 2022-06-17 NOTE — GROUP NOTE
Group Therapy Note    Date: 6/17/2022    Group Start Time: 1000  Group End Time: 1100  Group Topic: Cognitive Skills    [facility] ACUTE [facility] 1    Thankful LALIT Shahid LSW        Group Therapy Note    Attendees: 13         Patient's Goal:  Pt attended community support and was able to identify a daily goal as \"seek clarity. \"    Notes:  Pt was an active participant in group. We discussed trauma and the effects along with therapeutic techniques to help alleviate the symptoms. Status After Intervention:  Unchanged    Participation Level: Active Listener and Interactive    Participation Quality: Appropriate, Attentive and Sharing      Speech:  normal      Thought Process/Content: Logical      Affective Functioning: Congruent      Mood: euthymic      Level of consciousness:  Alert, Oriented x4 and Attentive      Response to Learning: Able to verbalize current knowledge/experience, Able to verbalize/acknowledge new learning and Able to retain information      Endings: None Reported    Modes of Intervention: Education, Support, Socialization, Exploration, Clarifying and Problem-solving      Discipline Responsible: /Counselor      Signature:   LALIT King LSW
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
## 2022-06-17 NOTE — PROGRESS NOTES
Comprehensive Nutrition Assessment    Type and Reason for Visit:  Initial,Consult,Positive Nutrition Screen    Nutrition Recommendations/Plan:   1. Recommend and start Ensure high protein supplement daily and Gelatein supplement daily to help meet increased nutritional needs. Malnutrition Assessment:  Malnutrition Status: Moderate malnutrition (06/17/22 0946)    Context:  Chronic Illness     Findings of the 6 clinical characteristics of malnutrition:  Energy Intake:  75% or less estimated energy requirements for 1 month or longer  Weight Loss:  Unable to assess (d/t lack of long term weight history)     Body Fat Loss:  Mild body fat loss Orbital,Triceps   Muscle Mass Loss:  Mild muscle mass loss Temples (temporalis),Clavicles (pectoralis & deltoids)  Fluid Accumulation:  No significant fluid accumulation     Strength:  Not Performed    Nutrition Assessment:    Patient adm acute alcoholic intoxication with complication ; s/p fall w/ facial contusion ; hx of metastatic neuroendocrine tumor ; hx of seizures and moderate malnutrition ; pt does meet criteria for moderate malnutrition ; will start ONS    Nutrition Related Findings:    I&Os WNL, no edema, active BS, muscle/fat wasting ; Wound Type: None (noted facial contusion)       Current Nutrition Intake & Therapies:    Average Meal Intake: 51-75%     ADULT DIET; Regular; Pt likes to avoid high fat and salty foods    Anthropometric Measures:  Height: 5' (152.4 cm)  Ideal Body Weight (IBW): 100 lbs (45 kg)       Current Body Weight: 115 lb (52.2 kg) (6/17, stated), 115 % IBW. Current BMI (kg/m2): 22.5  Usual Body Weight:  (EMR shows past weight of 118# actual on 4/21/22)                       BMI Categories: Normal Weight (BMI 18.5-24. 9)    Estimated Daily Nutrient Needs:  Energy Requirements Based On: Formula  Weight Used for Energy Requirements: Current  Energy (kcal/day): 1348-4416 (REE 1134 x 1.2 SF)  Weight Used for Protein Requirements: Current  Protein (g/day): 60-75 (1.2-1.4g/kg CBW)  Method Used for Fluid Requirements: 1 ml/kcal  Fluid (ml/day): 2033-9624    Nutrition Diagnosis:   · Moderate malnutrition,In context of chronic illness related to catabolic illness (hx of metastatic neuroendocrine tumor) as evidenced by poor intake prior to admission,mild muscle loss,mild loss of subcutaneous fat      Nutrition Interventions:   Food and/or Nutrient Delivery: Continue Current Diet,Start Oral Nutrition Supplement  Nutrition Education/Counseling: Education not indicated  Coordination of Nutrition Care: Continue to monitor while inpatient       Goals:  Previous Goal Met: Progressing toward Goal(s)  Goals: PO intake 75% or greater,by next RD assessment       Nutrition Monitoring and Evaluation:   Behavioral-Environmental Outcomes: None Identified  Food/Nutrient Intake Outcomes: Food and Nutrient Intake,Supplement Intake  Physical Signs/Symptoms Outcomes: Biochemical Data,Chewing or Swallowing,GI Status,Fluid Status or Edema,Hemodynamic Status,Meal Time Behavior,Nutrition Focused Physical Findings,Skin,Weight    Discharge Planning:     Too soon to determine     Poonam Frankel RD, LD  Contact: [phone]

## 2022-06-17 NOTE — H&P
Department of Psychiatry  History and Physical - Adult     CHIEF COMPLAINT: \"I was drunk and just want to go to sleep. \"    Patient was seen after discussing with the treatment team and reviewing the chart    CIRCUMSTANCES OF ADMISSION:resented to the ED after patient was intoxicated at home fell on the toilet injuring her right eye in the ED she reported that she had taken some sleeping pills because she \"just wanted to go to sleep. \"  She also reported having stress due to a break-up with her boyfriend of 7 years and her cancer diagnosis. HISTORY OF PRESENT ILLNESS:      The patient is a 40 y.o. female with significant past history of neuroendocrine tumor, hypothyroidism, irritable bowel syndrome, seizures presented to the ED after patient was intoxicated at home fell on the toilet injuring her right eye in the ED she reported that she had taken some sleeping pills because she \"just wanted to go to sleep. \"  She also reported having stress due to a break-up with her boyfriend of 7 years and her cancer diagnosis. In the ED she maintained that she just wanted to go to sleep and not to harm herself however she was pink slipped her urine drug screen was negative her blood alcohol level was 246 she was medically clear admitted 7 SE. adult psychiatric unit for further psychiatric assessment stabilization and treatment. Upon evaluation today patient states that she believes that she relapsed to alcohol because she is \"just bored. \"  She maintained that she took the sleeping pills just to sleep but states that she was so intoxicated she is not sure how many she took. When asked what sleeping pills she took she states Ambien when asked how many she is not able to tell me this I asked if it was 5 and she states \"probably around there. \"  I asked patient about the recent break-up with her boyfriend and she denied that she had a break-up with her boyfriend.   She states that she started drinking again because she was \"bored because she is not able to work due to her cancer diagnosis. Throughout the interview she is very bright and pleasant. She denies feeling hopeless worthless or guilty. She denies ever experiencing any auditory or visual hallucinations any paranoia. She denies feeling empty inside easily banded by others or feeling like she is not the person she is supposed to be. She does endorse periods of time lasting approximately 3 days where her mood is elevated she is impulsive during that time she she spends money she should not be spending people tell her to slow down with flights of ideas. Past psychiatric history: Patient denies any history of any inpatient or outpatient psychiatric treatment and she denies ever taking psychotropic medications. 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   She is currently on some security disability not able to work she lives with her parents denies access to any guns      Past Medical History:        Diagnosis Date    Abdominal pain     Cancer ([facility]     neuroendocrime tumor    Diarrhea     Hypocalcemia     Hypothyroidism     Irritable bowel syndrome     Migraines     Seizures ([facility]     last episode 2021    Status post alcohol detoxification     2018-2018       Medications Prior to Admission: Medications Prior to Admission: butalbital-acetaminophen-caffeine (FIORICET, ESGIC) -40 MG per tablet, Take 1 tablet by mouth every 4 hours as needed for Headaches  fluticasone (FLONASE) 50 MCG/ACT nasal spray, 1 spray by Nasal route daily as needed for Rhinitis  levETIRAcetam (KEPPRA) 750 MG tablet, Take 750 mg by mouth 2 times daily  levothyroxine (SYNTHROID) 112 MCG tablet, Take 112 mcg by mouth Daily  potassium chloride (KLOR-CON M) 20 MEQ extended release tablet, Take 20 mEq by mouth 2 times daily  topiramate (TOPAMAX) 50 MG tablet, Take 50 mg by mouth 2 times daily  zolpidem (AMBIEN) 10 MG tablet, Take 10 mg by mouth nightly as needed for Sleep. brimonidine (ALPHAGAN) 0.2 % ophthalmic solution, Place 1 drop into both eyes every morning  calcium carbonate-vitamin D3 (CALTRATE) 600-400 MG-UNIT TABS per tab, take 1 tablet by mouth every morning and at bedtime  traMADol (ULTRAM) 50 MG tablet, Take 1 tablet by mouth every 6 hours as needed for Pain for up to 90 days. gabapentin (NEURONTIN) 300 MG capsule, Take 1 capsule by mouth 3 times daily for 90 days.   pantoprazole (PROTONIX) 40 MG tablet, take 1 tablet by mouth every morning before breakfast  famotidine (PEPCID) 40 MG tablet, Take 40 mg by mouth daily   promethazine (PHENERGAN) 25 MG tablet, Take 1 tablet by mouth as needed for Nausea or Vomiting  magnesium oxide (MAG-OX) 400 MG tablet, Take 400 mg by mouth 2 times daily    Past Surgical History:        Procedure Laterality Date    CHOLECYSTECTOMY, LAPAROSCOPIC  07/06/2016    COLONOSCOPY N/A 6/22/2018    COLONOSCOPY WITH BIOPSY performed by Sam Pires MD at [address] BIOPSY RENAL  1/25/2021    CT BIOPSY RENAL 1/25/2021 Darren Kuo MD [facility]    CT NEEDLE BIOPSY LIVER PERCUTANEOUS  9/1/2020    CT NEEDLE BIOPSY LIVER PERCUTANEOUS 9/1/2020 [facility]    HC DIALYSIS CATHETER N/A 1/28/2021    TESIO CATHETER INSERTION AND REMOVAL OF TEMPORARY performed by Abran Lamb Ricardo Smith MD at [address]  PORT SURGERY Left 2/18/2022    MEDI PORT PLACEMENT, Left side. performed by Hany Arboleda MD at [facility] 5/14/2021    SIGMOIDOSCOPY PERIANAL BOTOX INJECTION   (50 UNITS) performed by Jesus Tovar MD at [facility] N/A 10/21/2020    LAPAROSCOPIC ROBOTIC SMALL BOWEL RESECTION WITH PLANNED TRANSITION TO OPEN performed by Dara Carr MD at [facility]         Allergies:   Patient has no known allergies. Family History  Family History   Problem Relation Age of Onset    High Blood Pressure Mother     High Cholesterol Mother     Other Mother         migraine headaches    Heart Disease Father     Asthma Father     High Blood Pressure Father     Cancer Father         Sarcoidosis    Diabetes Other         GRANDMOTHER    Lung Cancer Other         GRANDFATHER    Alzheimer's Disease Other         GRANDMOTHER    Breast Cancer Maternal Grandmother     Cancer Maternal Grandmother         skin    Cancer Paternal Grandfather         lung             EXAMINATION:    REVIEW OF SYSTEMS:    ROS:  [x] All negative/unchanged except if checked.  Explain positive(checked items) below:  [] Constitutional  [] Eyes  [] Ear/Nose/Mouth/Throat  [] Respiratory  [] CV  [] GI  []   [] Musculoskeletal  [] Skin/Breast  [] Neurological  [] Endocrine  [] Heme/Lymph  [] Allergic/Immunologic    Explanation:     Vitals:  /87   Pulse 80   Temp 98.6 °F (37 °C) (Oral)   Resp 15   Ht 5' (1.524 m)   Wt 115 lb (52.2 kg)   SpO2 99%   Breastfeeding No   BMI 22.46 kg/m²      Physical Examination:   Head: x  Atraumatic: x normocephalic  Skin and Mucosa        Moist x  Dry   Pale  x Normal   Neck:  Thyroid  Palpable   x  Not palpable   venus distention   adenopathy   Chest: x Clear   Rhonchi     Wheezing   CV:  xS1   xS2    xNo murmer   Abdomen:  x  Soft    Tender Viceromegaly   Extremities:  x No Edema     Edema     Cranial Nerves Examination:   CN II:   xPupils are reactive to light  Pupils are non reactive to light  CN III, IV, VI:  xNo eye deviation    No diplopia or ptosis   CN V:    xFacial Sensation is intact     Facial Sensation is not intact   CN IIIV:   x Hearing is normal to rubbing fingers   CN IX, X:     xNormal gag reflex and phonation   CN XI:   xShoulder shrug and neck rotation is normal  CNXII:    xTongue is midline no deviation or atrophy    Mental Status Examination:    Level of consciousness:  within normal limits   Appearance:  well-appearing  Behavior/Motor:  no abnormalities noted  Attitude toward examiner:  cooperative  Speech:  spontaneous, normal rate and normal volume   Mood: \" I am okay. \"  Affect: Appropriate and pleasant  Thought processes: Linear without flight of ideas loose associations  Thought content: Devoid of any auditory visualizations delusions or other perceptual normalities.   Denies SI/HI intent or plan  Cognition:  oriented to person, place, and time   Concentration intact  Memory intact  Insight fair   Judgement fair   Fund of Knowledge adequate      DIAGNOSIS:  Depressed bipolar 2 disorder  Alcohol abuse          LABS: REVIEWED TODAY:  Recent Labs     06/15/22  1711   WBC 7.8   HGB 12.2        Recent Labs     06/15/22  1711 06/15/22  2048 06/16/22 2032    139 139   K 4.1 3.7 3.9   CL 93* 93* 105   CO2 15* 17* 24   BUN 6 4* 5*   CREATININE 0.6 0.6 0.6   GLUCOSE 90 135* 134*     Recent Labs     06/15/22  1711   BILITOT 0.5   ALKPHOS 142*   AST 52*   ALT 22     Lab Results   Component Value Date    LABAMPH NOT DETECTED 06/15/2022    BARBSCNU NOT DETECTED 06/15/2022    LABBENZ NOT DETECTED 06/15/2022    LABMETH NOT DETECTED 06/15/2022    OPIATESCREENURINE NOT DETECTED 06/15/2022    PHENCYCLIDINESCREENURINE NOT DETECTED 06/15/2022    ETOH 133 06/15/2022     Lab Results   Component Value Date    TSH 2.560 06/15/2022     No results found for: LITHIUM  No results found for: VALPROATE, CBMZ  No results found for: LITHIUM, VALPROATE      Radiology CT HEAD WO CONTRAST    Result Date: 6/15/2022  EXAMINATION: CT OF THE HEAD WITHOUT CONTRAST  6/15/2022 7:08 pm TECHNIQUE: CT of the head was performed without the administration of intravenous contrast. Automated exposure control, iterative reconstruction, and/or weight based adjustment of the mA/kV was utilized to reduce the radiation dose to as low as reasonably achievable. COMPARISON: May 8, 2022 HISTORY: ORDERING SYSTEM PROVIDED HISTORY: [facility] TECHNOLOGIST PROVIDED HISTORY: Has a \"code stroke\" or \"stroke alert\" been called? ->No Reason for exam:->[facility] Decision Support Exception - unselect if not a suspected or confirmed emergency medical condition->Emergency Medical Condition (MA) What reading provider will be dictating this exam?->CRC FINDINGS: BRAIN/VENTRICLES: There is no acute intracranial hemorrhage, mass effect or midline shift. No abnormal extra-axial fluid collection. The gray-white differentiation is maintained without evidence of an acute infarct. There is no evidence of hydrocephalus. ORBITS: The visualized portion of the orbits demonstrate no acute abnormality. SINUSES: The visualized paranasal sinuses and mastoid air cells demonstrate no acute abnormality. SOFT TISSUES/SKULL:  No acute abnormality of the visualized skull. There is right periorbital soft tissue swelling. No acute intracranial abnormality. Right periorbital soft tissue swelling. CT FACIAL BONES WO CONTRAST    Result Date: 6/15/2022  EXAMINATION: CT OF THE FACE WITHOUT CONTRAST  6/15/2022 7:08 pm TECHNIQUE: CT of the face was performed without the administration of intravenous contrast. Multiplanar reformatted images are provided for review.  Automated exposure control, iterative reconstruction, and/or weight based adjustment of the mA/kV was utilized to reduce the radiation dose to as low as reasonably achievable. COMPARISON: May 8, 2022 HISTORY: ORDERING SYSTEM PROVIDED HISTORY: [facility] TECHNOLOGIST PROVIDED HISTORY: Reason for exam:->[facility] Decision Support Exception - unselect if not a suspected or confirmed emergency medical condition->Emergency Medical Condition (MA) What reading provider will be dictating this exam?->CRC FINDINGS: FACIAL BONES: The frontal sinuses, orbital walls, maxilla, pterygoid plates, zygomatic arches, hard palate, nasal bones and mandible are intact. The temporomandibular joints are aligned. ORBITAL CONTENTS: The globes appear intact. The extraocular muscles, optic nerve sheath complexes and lacrimal glands appear unremarkable. No retrobulbar hematoma or mass is seen. SINUSES: There is no evidence of acute sinusitis, such as air fluid level. The mastoid air cells are clear. SOFT TISSUES: There is right periorbital soft tissue swelling. No acute facial bone trauma. Right periorbital soft tissue swelling. TREATMENT PLAN:    Risk Management: Based on the diagnosis and assessment biopsychosocial treatment model was presented to the patient and was given the opportunity to ask any question. The patient was agreeable to the plan and all the patient's questions were answered to the patient's satisfaction. I discussed with the patient the risk, benefit, alternative and common side effects for the proposed medication treatment. The patient is consenting to this treatment. Collateral Information:  Will obtain collateral information from the family or friends. Will obtain medical records as appropriate from out patient providers  Will consult the hospitalist for a physical exam to rule out any co-morbid physical condition. Patient's diagnosis, treatment plan, medication management was formulated at the end of evaluation and after reviewing relevant documentation.  Patient was seen directly by myself and Dr. Darby Medellin:      Patient is currently on multiple antiepileptic medications also act as mood stabilizers including Topamax and Neurontin will continue current medications to help with patient's mood        Prn Haldol 5mg and Vistaril 50mg q6hr for extreme agitation. Trazodone as ordered for insomnia  Vistaril as ordered for anxiety      Psychotherapy:   Encourage participation in milieu and group therapy  Individual therapy as needed              Behavioral Services  Medicare Certification Upon Admission    I certify that this patient's inpatient psychiatric hospital admission is medically necessary for:    [x] (1) Treatment which could reasonably be expected to improve this patient's condition,       [] (2) Or for diagnostic study;     AND     [x](2) The inpatient psychiatric services are provided while the individual is under the care of a physician and are included in the individualized plan of care.     Estimated length of stay/service 3 to 7 days based on stability    Plan for post-hospital care outpatient psychiatric and counseling services    Electronically signed by TRINA Sousa CNP on 0/30/2656 at 11:50 AM        Electronically signed by TRINA Sousa CNP on 5/33/9747 at 11:50 AM

## 2022-06-17 NOTE — PROGRESS NOTES
Patient out on unit,social with peers. Denies SI,HI or AV hallucinations. Anxiety 6 out of 10. Depression 5-6 out of 10. Compliant wit medications. Attends group. Q 15 minute rounds continue.

## 2022-06-17 NOTE — ED NOTES
Patient has been accepted for admission to [facility] by Dr. Sanna Layton. Patient will go to room 7521B. Called admitting and notified [facility]. LORNA RN is aware to call nurse to nurse and put in for patient transport.      LALIT Quinonez, [place]  06/16/22 8140

## 2022-06-17 NOTE — PROGRESS NOTES
Patient has dental appointment scheduled with dental clinic on Monday 20,22 at 13:00 and called and confirmed. Patient stating that she wants to keep as long as here.

## 2022-06-17 NOTE — PROGRESS NOTES
1:1 to complete admission data base. Pt was transferred from ED to 7S Ext for involuntary pink slip [facility]. Alert and oriented x4. Thoughts logical, organized and relevant. Pleasant, polite and cooperative. Offered information readily to complete admission data base. Pt denied suicidal ideations and contracts to keep self safe on 7S Ext. Denied homicidal ideations and hallucinations. No delusions or preoccupation revealed. Admits to being sad, depressed, tense, anxious. Pt said she has been sober for 5 yrs and relapsed 4 days ago. Pt binge drank and fell in bathroom yesterday after drinking \"a bottle of high proof vodka\". Pt has bruises scattered over her body, a black eye and bit lips. (note continues below)    [facility]  Admission Note     Admission Type:   Admission Type:  Involuntary    Reason for admission:  Reason for Admission: \"because of the fall, my mom called the ambulance and for the drinking\"    PATIENT STRENGTHS:       Patient Strengths and Limitations:       Addictive Behavior:   Addictive Behavior  In the Past 3 Months, Have You Felt or Has Someone Told You That You Have a Problem With  : None    Medical Problems:   Past Medical History:   Diagnosis Date    Abdominal pain     Cancer ([facility] [address]     neuroendocrime tumor    Diarrhea     Hypocalcemia     Hypothyroidism     Irritable bowel syndrome     Migraines     Seizures ([facility] [address]     last episode January 2021    Status post alcohol detoxification     5/22/2018-5/29/2018       Status EXAM:  Mental Status and Behavioral Exam  Normal: No  Facial Expression: Sad  Affect: Appropriate  Level of Consciousness: Alert  Frequency of Checks: 4 times per hour, close  Mood:Normal: No  Mood: Depressed,Anxious,Sad  Motor Activity:Normal: Yes  Eye Contact: Fair  Observed Behavior: Cooperative  Sexual Misconduct History: Past - no  Preception: [place] to person,[place] to time,[place] to place,[place] to situation  Attention:Normal: Yes  Thought Processes:  (logical, organized relevant)  Thought Content:Normal: Yes  Depression Symptoms: Crying,Feelings of helplessness,Sleep disturbance  Anxiety Symptoms: Generalized  Valerie Symptoms: No problems reported or observed. Hallucinations: None  Delusions: No (none revealed)  Memory:Normal: Yes  Insight and Judgment: No  Insight and Judgment: Poor judgment    Tobacco Screening:  Practical Counseling, on admission, ana X, if applicable and completed (first 3 are required if patient doesn't refuse): (x )  Recognizing danger situations (included triggers and roadblocks)                    (x )  Coping skills (new ways to manage stress, exercise, relaxation techniques, changing routine, distraction)                                                           ( x)  Basic information about quitting (benefits of quitting, techniques in how to quit, available resources  ( ) Referral for counseling faxed to Kim                                           ( x) Patient refused counseling  ( ) Patient has not smoked in the last 30 days    Metabolic Screening:    Lab Results   Component Value Date    LABA1C 5.3 03/11/2021       No results found for: CHOL  No results found for: TRIG  No results found for: HDL  No components found for: LDLCAL  No results found for: LABVLDL      Body mass index is 22.46 kg/m². BP Readings from Last 2 Encounters:   06/17/22 129/89   05/31/22 120/81           Pt admitted with followings belongings:  Dental Appliances: None  Vision - Corrective Lenses: Eyeglasses  Hearing Aid: None  Jewelry: None  Body Piercings Removed: N/A  Clothing: Footwear,Pants,Shirt,Socks,Undergarments (biege backpack)  Other Valuables: Money,Wallet (2 [employer] 2ID EBT $43 dollars)     Patient's home medications were none. Patient oriented to surroundings and program expectations and copy of patient rights given. Received admission packet:   With copies of code of conduct and treatment agreement. Consents reviewed, signed involuntary rights. Refused none. Patient verbalize understanding: To immediately seek any staff with any self threatening and/or violent ideations. Patient education on precautions: close observation staggered q 15 min checks. Jeanette Sheridan RN    Pt is stage 4 metastatic neuroendocrine tumor and is receiving Lanreotide injections from her oncologist Jose J vázquez at [facility]. Pt is also using a clinical trial drug, Jenaro [place], for cancer treatment. Pt has a medport in her left chest due to being \"a hard stick\". Pt has chronic loose stools from cancer and medication side effects. Pt is wearing a peripad due to \"spotting from my IUD\". Pt left interview and went immediately to bed. Refused HS snack. Placed on close observation staggered q 15 min checks.

## 2022-06-17 NOTE — ED NOTES
Pt belongings removed from [facility] locker 31 and placed on pt to go up to unit.       Eloisa Griffin RN  06/16/22 9951

## 2022-06-17 NOTE — PLAN OF CARE
Patient was out on unit,social with peers. Denies SI,HI or AV hallucinations. Rates anxiety 9 out of 10 do to her health. No tremors,headache(had migraines has a child) . Depression 4-5 out of 10 do to health issues. Appetite comes and goes. Johnny Rash feels rested waking up this am.  Multiple bruise on body from fall prior to admit, R& L knees. Scabbed right knee and elbow healing no S/S of infection. Verbalizes that she has history of seizure, stating a has absent seizures and had a grandma seizure about a month ago and her black eyes had just gone away. Compliant with medications and attends group. Q 15 minute rounds continue.

## 2022-06-18 PROCEDURE — 1240000000 HC EMOTIONAL WELLNESS R&B

## 2022-06-18 PROCEDURE — 6370000000 HC RX 637 (ALT 250 FOR IP): Performed by: EMERGENCY MEDICINE

## 2022-06-18 PROCEDURE — 6370000000 HC RX 637 (ALT 250 FOR IP): Performed by: PSYCHIATRY & NEUROLOGY

## 2022-06-18 PROCEDURE — 99232 SBSQ HOSP IP/OBS MODERATE 35: CPT | Performed by: NURSE PRACTITIONER

## 2022-06-18 PROCEDURE — 6370000000 HC RX 637 (ALT 250 FOR IP): Performed by: NURSE PRACTITIONER

## 2022-06-18 RX ADMIN — TOPIRAMATE 50 MG: 25 TABLET, FILM COATED ORAL at 09:31

## 2022-06-18 RX ADMIN — TRAMADOL HYDROCHLORIDE 50 MG: 50 TABLET, COATED ORAL at 23:29

## 2022-06-18 RX ADMIN — CHOLECALCIFEROL TAB 10 MCG (400 UNIT) 400 UNITS: 10 TAB at 09:36

## 2022-06-18 RX ADMIN — TRAMADOL HYDROCHLORIDE 50 MG: 50 TABLET, COATED ORAL at 09:41

## 2022-06-18 RX ADMIN — CALCIUM CARBONATE 500 MG: 500 TABLET, CHEWABLE ORAL at 22:28

## 2022-06-18 RX ADMIN — CHOLECALCIFEROL TAB 10 MCG (400 UNIT) 400 UNITS: 10 TAB at 22:25

## 2022-06-18 RX ADMIN — POTASSIUM CHLORIDE 20 MEQ: 20 TABLET, EXTENDED RELEASE ORAL at 22:24

## 2022-06-18 RX ADMIN — Medication 3 MG: at 22:42

## 2022-06-18 RX ADMIN — PANTOPRAZOLE SODIUM 40 MG: 40 TABLET, DELAYED RELEASE ORAL at 06:31

## 2022-06-18 RX ADMIN — Medication 400 MG: at 09:33

## 2022-06-18 RX ADMIN — Medication 400 MG: at 22:26

## 2022-06-18 RX ADMIN — LEVETIRACETAM 750 MG: 500 TABLET, FILM COATED ORAL at 22:26

## 2022-06-18 RX ADMIN — GABAPENTIN 300 MG: 300 CAPSULE ORAL at 22:26

## 2022-06-18 RX ADMIN — THIAMINE HCL TAB 100 MG 100 MG: 100 TAB at 09:33

## 2022-06-18 RX ADMIN — CALCIUM CARBONATE 500 MG: 500 TABLET, CHEWABLE ORAL at 09:32

## 2022-06-18 RX ADMIN — GABAPENTIN 300 MG: 300 CAPSULE ORAL at 14:10

## 2022-06-18 RX ADMIN — TOPIRAMATE 50 MG: 25 TABLET, FILM COATED ORAL at 22:26

## 2022-06-18 RX ADMIN — LEVETIRACETAM 750 MG: 500 TABLET, FILM COATED ORAL at 09:31

## 2022-06-18 RX ADMIN — POTASSIUM CHLORIDE 20 MEQ: 20 TABLET, EXTENDED RELEASE ORAL at 09:32

## 2022-06-18 RX ADMIN — TRAMADOL HYDROCHLORIDE 50 MG: 50 TABLET, COATED ORAL at 17:28

## 2022-06-18 RX ADMIN — GABAPENTIN 300 MG: 300 CAPSULE ORAL at 09:31

## 2022-06-18 RX ADMIN — FOLIC ACID 1 MG: 1 TABLET ORAL at 09:33

## 2022-06-18 ASSESSMENT — PAIN DESCRIPTION - ORIENTATION
ORIENTATION: RIGHT
ORIENTATION: LEFT
ORIENTATION: RIGHT

## 2022-06-18 ASSESSMENT — PAIN DESCRIPTION - LOCATION
LOCATION: EYE
LOCATION: SHOULDER
LOCATION: EYE

## 2022-06-18 ASSESSMENT — PAIN DESCRIPTION - DESCRIPTORS
DESCRIPTORS: ACHING;BURNING
DESCRIPTORS: ACHING
DESCRIPTORS: ACHING

## 2022-06-18 ASSESSMENT — PAIN SCALES - GENERAL
PAINLEVEL_OUTOF10: 7

## 2022-06-18 NOTE — GROUP NOTE
Date: 6/18/2022    Group Start Time: 0945  Group End Time: 5580  Group Topic: Psychoeducation    [facility]  [address] [place], [place]                                       Group Therapy Note    Date: 6/18/2022  Number of Participants: 16  Type of Group: Psychoeducation    Wellness Binder Information  Module Name:Nurturing  your inner child     Patient's Goal: patient will be able to id things you  struggle with and what you would tell your younger self. Notes: Pleasant and engaged in group, willing to share with others. Status After Intervention:  Improved    Participation Level:  Active Listener and Interactive    Participation Quality: Appropriate, Attentive, and Sharing      Speech:  normal       Thought Process/Content: Logical      Affective Functioning: Congruent      Mood: euthymic      Level of consciousness:  Alert, Oriented x4, and Attentive      Response to Learning: Able to verbalize/acknowledge new learning, Able to retain information, and Progressing to goal      Endings: None Reported    Modes of Intervention: Education, Support, Socialization, Exploration, and Problem-solving      Discipline Responsible: Psychoeducational Specialist      Signature:  Ramu Disla

## 2022-06-18 NOTE — PROGRESS NOTES
BEHAVIORAL HEALTH FOLLOW-UP NOTE     6/18/2022     Patient was seen and examined in person, Chart reviewed   Patient's case discussed with staff/team    Chief Complaint: \" I am doing good. \"    Interim History:   Patient up on the unit bright pleasant affect. She vehemently denies SI/HI intent or plan denies auditory visualizations. I asked patient again if she had had a fight with her boyfriend is clearly documented in the ED that she had a fight with her boyfriend of 7 years and she denies this and states that she has nowhere this came from she blames everything on her intoxication and she blames taking 20 sleeping pills and her intoxication was a fight with her boyfriend. She does not believe that she needs any help with her current alcohol use states that was just a one-time slip up.   Affect is bright and pleasant she is out visible in the milieu attending groups socializing with peers minimizing circumstance or hospitalization    Appetite:   [x] Normal/Unchanged  [] Increased  [] Decreased      Sleep:       [x] Normal/Unchanged  [] Fair       [] Poor              Energy:    [x] Normal/Unchanged  [] Increased  [] Decreased        SI [] Present  [x] Absent    HI  []Present  [x] Absent     Aggression:  [] yes  [x] no    Patient is [x] able  [] unable to CONTRACT FOR SAFETY     PAST MEDICAL/PSYCHIATRIC HISTORY:   Past Medical History:   Diagnosis Date    Abdominal pain     Cancer ([facility] [address]     neuroendocrime tumor    Diarrhea     Hypocalcemia     Hypothyroidism     Irritable bowel syndrome     Migraines     Seizures ([facility] [address]     last episode January 2021    Status post alcohol detoxification     5/22/2018-5/29/2018       FAMILY/SOCIAL HISTORY:  Family History   Problem Relation Age of Onset    High Blood Pressure Mother     High Cholesterol Mother     Other Mother         migraine headaches    Heart Disease Father     Asthma Father     High Blood Pressure Father     Cancer Father         Sarcoidosis    Diabetes Other         GRANDMOTHER    Lung Cancer Other         GRANDFATHER    Alzheimer's Disease Other         GRANDMOTHER    Breast Cancer Maternal Grandmother     Cancer Maternal Grandmother         skin    Cancer Paternal Grandfather         lung     Social History     Socioeconomic History    Marital status: Single     Spouse name: Not on file    Number of children: Not on file    Years of education: Not on file    Highest education level: Not on file   Occupational History    Occupation: cleaning     Employer: [employer]   Tobacco Use    Smoking status: Current Every Day Smoker     Packs/day: 0.25     Types: Cigarettes    Smokeless tobacco: Never Used   Vaping Use    Vaping Use: Never used   Substance and Sexual Activity    Alcohol use: No     Alcohol/week: 0.0 standard drinks     Comment: 5 years sober    Drug use: No    Sexual activity: Not on file   Other Topics Concern    Not on file   Social History Narrative    Not on file     Social Determinants of Health     Financial Resource Strain:     Difficulty of Paying Living Expenses: Not on file   Food Insecurity:     Worried About [address] in the Last Year: Not on file    Dao of Food in the Last Year: Not on file   Transportation Needs:     Lack of Transportation (Medical): Not on file    Lack of Transportation (Non-Medical):  Not on file   Physical Activity:     Days of Exercise per Week: Not on file    Minutes of Exercise per Session: Not on file   Stress:     Feeling of Stress : Not on file   Social Connections:     Frequency of Communication with Friends and Family: Not on file    Frequency of Social Gatherings with Friends and Family: Not on file    Attends Nondenominational Services: Not on file    Active Member of Clubs or Organizations: Not on file    Attends Club or Organization Meetings: Not on file    Marital Status: Not on file   Intimate Partner Violence:     Fear of Current or Ex-Partner: Not on file  Emotionally Abused: Not on file    Physically Abused: Not on file    Sexually Abused: Not on file   Housing Stability:     Unable to Pay for Housing in the Last Year: Not on file    Number of Places Lived in the Last Year: Not on file    Unstable Housing in the Last Year: Not on file           ROS:  [x] All negative/unchanged except if checked.  Explain positive(checked items) below:  [] Constitutional  [] Eyes  [] Ear/Nose/Mouth/Throat  [] Respiratory  [] CV  [] GI  []   [] Musculoskeletal  [] Skin/Breast  [] Neurological  [] Endocrine  [] Heme/Lymph  [] Allergic/Immunologic    Explanation:     MEDICATIONS:    Current Facility-Administered Medications:     [Held by provider] OXcarbazepine (TRILEPTAL) tablet 150 mg, 150 mg, Oral, BID, TRINA Armijo - CNP    chlordiazePOXIDE (LIBRIUM) capsule 25 mg, 25 mg, Oral, P2A PRN **AND** folic acid (FOLVITE) tablet 1 mg, 1 mg, Oral, Daily **AND** thiamine mononitrate tablet 100 mg, 100 mg, Oral, Daily **AND** [DISCONTINUED] divalproex (DEPAKOTE) DR tablet 250 mg, 250 mg, Oral, BID, Annie Lange MD, 250 mg at 06/17/22 7653    levETIRAcetam (KEPPRA) tablet 750 mg, 750 mg, Oral, BID, Carmen Verdin DO, 750 mg at 06/17/22 2054    topiramate (TOPAMAX) tablet 50 mg, 50 mg, Oral, BID, Carmen Verdin DO, 50 mg at 06/17/22 2054    gabapentin (NEURONTIN) capsule 300 mg, 300 mg, Oral, TID, Carmen Verdin DO, 300 mg at 06/17/22 2054    magnesium oxide (MAG-OX) tablet 400 mg, 400 mg, Oral, BID, Carmen Verdin DO, 400 mg at 06/17/22 2054    pantoprazole (PROTONIX) tablet 40 mg, 40 mg, Oral, QAM AC, Carmen Verdin DO, 40 mg at 06/18/22 0631    potassium chloride (KLOR-CON M) extended release tablet 20 mEq, 20 mEq, Oral, BID, Carmen Verdin DO, 20 mEq at 06/17/22 2054    traMADol (ULTRAM) tablet 50 mg, 50 mg, Oral, Q6H PRN, Adan Diehl DO, 50 mg at 06/17/22 2114    calcium carbonate (TUMS) chewable tablet 500 mg, 500 mg, Oral, BID, Adan Diehl DO, 500 mg at 06/17/22 2054    vitamin D3 (CHOLECALCIFEROL) tablet 400 Units, 400 Units, Oral, BID, Remus Pod, DO, 400 Units at 06/17/22 2054    acetaminophen (TYLENOL) tablet 650 mg, 650 mg, Oral, Q6H PRN, Keshia Kessler MD    magnesium hydroxide (MILK OF MAGNESIA) 400 MG/5ML suspension 30 mL, 30 mL, Oral, Daily PRN, Keshia Kessler MD    nicotine (NICODERM CQ) 21 MG/24HR 1 patch, 1 patch, TransDERmal, Daily, Keshia Kessler MD    aluminum & magnesium hydroxide-simethicone (MAALOX) 200-200-20 MG/5ML suspension 30 mL, 30 mL, Oral, PRN, Keshia Kessler MD    haloperidol (HALDOL) tablet 5 mg, 5 mg, Oral, Q6H PRN **OR** haloperidol lactate (HALDOL) injection 5 mg, 5 mg, IntraMUSCular, Q6H PRN, Keshia Kessler MD    melatonin tablet 3 mg, 3 mg, Oral, Nightly, Keshia Kessler MD, 3 mg at 06/17/22 2054    hydrOXYzine pamoate (VISTARIL) capsule 50 mg, 50 mg, Oral, TID PRN, Keshia Kessler MD      Examination:  BP (!) 90/44   Pulse 73   Temp 96.9 °F (36.1 °C)   Resp 15   Ht 5' (1.524 m)   Wt 115 lb (52.2 kg)   SpO2 99%   Breastfeeding No   BMI 22.46 kg/m²   Gait - steady  Medication side effects(SE): Denies    Mental Status Examination:    Level of consciousness:  within normal limits   Appearance:  fair grooming and fair hygiene  Behavior/Motor:  no abnormalities noted  Attitude toward examiner:  cooperative  Speech:  spontaneous, normal rate and normal volume   Mood: \" I feel good. \"  Affect: Bright appropriate and pleasant  Thought processes: Linear without flight of ideas loose associations  Thought content: Devoid of any auditory visualizations delusions or other perceptual normalities.   Denies SI/HI intent or plan  Cognition:  oriented to person, place, and time   Concentration intact  Insight fair   Judgement fair     ASSESSMENT:   Patient symptoms are:  [] Well controlled  [x] Improving  [] Worsening  [] No change      Diagnosis:  Principal Problem:    Depressed bipolar II disorder

## 2022-06-18 NOTE — PROGRESS NOTES
Patient denies SI,HI, or any Hallucinations at this time. She rates depression 5/10 and anxiety 5/10. Takes her meds and groups offered. Will continue to monitor.

## 2022-06-18 NOTE — PLAN OF CARE
Problem: Pain  Goal: Verbalizes/displays adequate comfort level or baseline comfort level  6/17/2022 2049 by Lobo Rodriguez RN  Outcome: Progressing     Problem: Safety - Adult  Goal: Free from fall injury  6/17/2022 2049 by Lobo Rodriguez RN  Outcome: Progressing  Flowsheets (Taken 6/17/2022 1022 by Flores Blanc, RN)  Free From Fall Injury: Instruct family/caregiver on patient safety     Problem: Anxiety  Goal: Will report anxiety at manageable levels  Description: INTERVENTIONS:  1. Administer medication as ordered  2. Teach and rehearse alternative coping skills  3. Provide emotional support with 1:1 interaction with staff  6/17/2022 2049 by Lobo Rodriguez RN  Outcome: Progressing  Flowsheets (Taken 6/17/2022 1017 by Flores Blanc, RN)  Will report anxiety at manageable levels:   Administer medication as ordered   Teach and rehearse alternative coping skills   Provide emotional support with 1:1 interaction with staff     Problem: Death & Dying  Goal: Pt/Family communicate acceptance of impending death and feel psychological comfort and peace  Description: INTERVENTIONS:  1. Assess patient/family anxiety and grief process related to end of life issues  2. Provide emotional and spiritual support  3. Provide information about the patient's health status with consideration of family and cultural values  4. Communicate willingness to discuss death and facilitate grief process  with patient/family as appropriate  5. Emphasize sustaining relationships within family system and community, or kenney/spiritual traditions  6.  Initiate Spiritual Care, Psychosocial Clinical Specialist, consult as needed  6/17/2022 2049 by Lobo Rodriguez RN  Outcome: Progressing  Flowsheets (Taken 6/17/2022 1017 by Flores Blanc, RN)  Patient/family communicates acceptance of loss or impending death and feels physical/psychological comfort and peace: Provide information about the patients health status with consideration of family and cultural values

## 2022-06-18 NOTE — PROGRESS NOTES
Patient is asking for a BMP. States she has numbness and tingling in her hands and thighs. States she feels this when labs are off. She is well aware of her cancer treatments and her monitoring her labs. Last was the 6/16. states her labs usually drop fast and also she did not attend her appt for her cancer treatmetn drug on Tuesday.  Farhad Fontenot NP.

## 2022-06-19 PROCEDURE — 99232 SBSQ HOSP IP/OBS MODERATE 35: CPT | Performed by: NURSE PRACTITIONER

## 2022-06-19 PROCEDURE — 6370000000 HC RX 637 (ALT 250 FOR IP): Performed by: EMERGENCY MEDICINE

## 2022-06-19 PROCEDURE — 1240000000 HC EMOTIONAL WELLNESS R&B

## 2022-06-19 PROCEDURE — 6370000000 HC RX 637 (ALT 250 FOR IP): Performed by: PSYCHIATRY & NEUROLOGY

## 2022-06-19 PROCEDURE — 6370000000 HC RX 637 (ALT 250 FOR IP): Performed by: NURSE PRACTITIONER

## 2022-06-19 RX ADMIN — TRAMADOL HYDROCHLORIDE 50 MG: 50 TABLET, COATED ORAL at 10:28

## 2022-06-19 RX ADMIN — GABAPENTIN 300 MG: 300 CAPSULE ORAL at 14:37

## 2022-06-19 RX ADMIN — TRAMADOL HYDROCHLORIDE 50 MG: 50 TABLET, COATED ORAL at 21:53

## 2022-06-19 RX ADMIN — THIAMINE HCL TAB 100 MG 100 MG: 100 TAB at 08:42

## 2022-06-19 RX ADMIN — CALCIUM CARBONATE 500 MG: 500 TABLET, CHEWABLE ORAL at 08:41

## 2022-06-19 RX ADMIN — Medication 3 MG: at 21:53

## 2022-06-19 RX ADMIN — LEVETIRACETAM 750 MG: 500 TABLET, FILM COATED ORAL at 21:53

## 2022-06-19 RX ADMIN — Medication 400 MG: at 21:52

## 2022-06-19 RX ADMIN — Medication 400 MG: at 08:41

## 2022-06-19 RX ADMIN — GABAPENTIN 300 MG: 300 CAPSULE ORAL at 08:41

## 2022-06-19 RX ADMIN — TOPIRAMATE 50 MG: 25 TABLET, FILM COATED ORAL at 08:41

## 2022-06-19 RX ADMIN — CHOLECALCIFEROL TAB 10 MCG (400 UNIT) 400 UNITS: 10 TAB at 08:41

## 2022-06-19 RX ADMIN — LEVETIRACETAM 750 MG: 500 TABLET, FILM COATED ORAL at 08:41

## 2022-06-19 RX ADMIN — TOPIRAMATE 50 MG: 25 TABLET, FILM COATED ORAL at 21:52

## 2022-06-19 RX ADMIN — GABAPENTIN 300 MG: 300 CAPSULE ORAL at 21:53

## 2022-06-19 RX ADMIN — PANTOPRAZOLE SODIUM 40 MG: 40 TABLET, DELAYED RELEASE ORAL at 06:47

## 2022-06-19 RX ADMIN — CHOLECALCIFEROL TAB 10 MCG (400 UNIT) 400 UNITS: 10 TAB at 21:52

## 2022-06-19 RX ADMIN — FOLIC ACID 1 MG: 1 TABLET ORAL at 08:41

## 2022-06-19 RX ADMIN — POTASSIUM CHLORIDE 20 MEQ: 20 TABLET, EXTENDED RELEASE ORAL at 08:41

## 2022-06-19 RX ADMIN — POTASSIUM CHLORIDE 20 MEQ: 20 TABLET, EXTENDED RELEASE ORAL at 21:53

## 2022-06-19 RX ADMIN — TRAMADOL HYDROCHLORIDE 50 MG: 50 TABLET, COATED ORAL at 16:30

## 2022-06-19 RX ADMIN — CALCIUM CARBONATE 500 MG: 500 TABLET, CHEWABLE ORAL at 21:52

## 2022-06-19 ASSESSMENT — PAIN DESCRIPTION - ORIENTATION
ORIENTATION: LEFT
ORIENTATION: LEFT
ORIENTATION: RIGHT;LEFT;MID
ORIENTATION: RIGHT;LEFT;MID

## 2022-06-19 ASSESSMENT — PAIN SCALES - GENERAL
PAINLEVEL_OUTOF10: 7

## 2022-06-19 ASSESSMENT — PAIN DESCRIPTION - LOCATION
LOCATION: FACE;SHOULDER
LOCATION: EYE;SHOULDER
LOCATION: HEAD
LOCATION: HEAD

## 2022-06-19 ASSESSMENT — PAIN DESCRIPTION - DESCRIPTORS
DESCRIPTORS: ACHING
DESCRIPTORS: ACHING
DESCRIPTORS: DULL;ACHING
DESCRIPTORS: ACHING;SHOOTING

## 2022-06-19 ASSESSMENT — PAIN DESCRIPTION - PAIN TYPE: TYPE: ACUTE PAIN

## 2022-06-19 ASSESSMENT — PAIN - FUNCTIONAL ASSESSMENT
PAIN_FUNCTIONAL_ASSESSMENT: ACTIVITIES ARE NOT PREVENTED

## 2022-06-19 ASSESSMENT — PAIN DESCRIPTION - ONSET: ONSET: PROGRESSIVE

## 2022-06-19 ASSESSMENT — PAIN DESCRIPTION - FREQUENCY: FREQUENCY: INTERMITTENT

## 2022-06-19 NOTE — GROUP NOTE
Group Therapy Note    Date: 6/18/2022    Group Start Time: 1100  Group End Time: 1140  Group Topic: Psychoeducation    [facility]    LALIT Scherer LSW        Group Therapy Note    Attendees: 11         Patient's Goal:  To understand the effect values have on our lives     Notes:  Patient attended and participated well    Status After Intervention:  Unchanged    Participation Level:  Active Listener and Interactive    Participation Quality: Appropriate, Attentive and Sharing      Speech:  normal      Thought Process/Content: Logical  Linear      Affective Functioning: Congruent      Mood: anxious and depressed      Level of consciousness:  Alert, Oriented x4 and Attentive      Response to Learning: Able to retain information      Endings: None Reported    Modes of Intervention: Education, Support, Socialization, Exploration and Clarifying      Discipline Responsible: /Counselor      Signature:  LALIT Scherer LSW

## 2022-06-19 NOTE — GROUP NOTE
Date: 6/19/2022    Group Start Time: 0930  Group End Time: 0507  Group Topic: Psychoeducation    [facility]  [address], [address]        Group Therapy Note                                                                       Date: 6/19/2022  Number of Participants:13  Subject: patient safety plan   Type of Group: Psychoeducation      Patient's Goal: patient will be able to id triggers, coping skills, and support that can contribute to his and her own wellness once they are d/piyush. Notes:  Pleasant and sharing in group able to complete handout. Status After Intervention:  Improved    Participation Level:  Active Listener and Interactive    Participation Quality: Appropriate, Attentive, Sharing, and Supportive      Speech: normal     Thought Process/Content: Logical      Affective Functioning: Congruent      Mood: euthymic      Level of consciousness:  Alert, Oriented x4, and Attentive      Response to Learning: Able to verbalize/acknowledge new learning, Able to retain information, and Progressing to goal      Endings: None Reported    Modes of Intervention: Education, Support, Socialization, Exploration, and Problem-solving      Discipline Responsible: Psychoeducational Specialist      Signature:  Elsa Doll

## 2022-06-19 NOTE — PLAN OF CARE
Problem: Pain  Goal: Verbalizes/displays adequate comfort level or baseline comfort level  6/19/2022 1619 by Sherrie Taylor RN  Outcome: Progressing  6/19/2022 1302 by Al Lopez RN  Outcome: Progressing          Reports she is \"feeling an aura\"for a migraine. Reports she takes Fioricet at home. Is due for ultram shortly. Refuses tylenol at present, stating she will wait for ultram. Denies suicidal thoughts or intent to harm herself or others. No voiced delusions. Denies hallucinations.

## 2022-06-19 NOTE — PROGRESS NOTES
BEHAVIORAL HEALTH FOLLOW-UP NOTE     6/19/2022     Patient was seen and examined in person, Chart reviewed   Patient's case discussed with staff/team    Chief Complaint: \" I am having a hard day today\"    Interim History:   Patient seen in her room states that she is [place] a hard day she states that today is 6 years from the death of her son. She states that he had multiple neurological conditions and states including microcephalus she denies SI/HI intent or plan denies auditory visual hallucinations no overt or covert signs psychosis appreciate help that she is receiving here.   She is been out visible in the milieu denies any alcohol withdrawal symptoms    Appetite:   [x] Normal/Unchanged  [] Increased  [] Decreased      Sleep:       [x] Normal/Unchanged  [] Fair       [] Poor              Energy:    [x] Normal/Unchanged  [] Increased  [] Decreased        SI [] Present  [x] Absent    HI  []Present  [x] Absent     Aggression:  [] yes  [x] no    Patient is [x] able  [] unable to CONTRACT FOR SAFETY     PAST MEDICAL/PSYCHIATRIC HISTORY:   Past Medical History:   Diagnosis Date    Abdominal pain     Cancer ([facility] [address]     neuroendocrime tumor    Diarrhea     Hypocalcemia     Hypothyroidism     Irritable bowel syndrome     Migraines     Seizures ([facility] [address]     last episode January 2021    Status post alcohol detoxification     5/22/2018-5/29/2018       FAMILY/SOCIAL HISTORY:  Family History   Problem Relation Age of Onset    High Blood Pressure Mother     High Cholesterol Mother     Other Mother         migraine headaches    Heart Disease Father     Asthma Father     High Blood Pressure Father     Cancer Father         Sarcoidosis    Diabetes Other         GRANDMOTHER    Lung Cancer Other         GRANDFATHER    Alzheimer's Disease Other         GRANDMOTHER    Breast Cancer Maternal Grandmother     Cancer Maternal Grandmother         skin    Cancer Paternal Grandfather         lung     Social History Socioeconomic History    Marital status: Single     Spouse name: Not on file    Number of children: Not on file    Years of education: Not on file    Highest education level: Not on file   Occupational History    Occupation: cleaning     Employer: the [address] [place] [address] Drive Use    Smoking status: Current Every Day Smoker     Packs/day: 0.25     Types: Cigarettes    Smokeless tobacco: Never Used   Vaping Use    Vaping Use: Never used   Substance and Sexual Activity    Alcohol use: No     Alcohol/week: 0.0 standard drinks     Comment: 5 years sober    Drug use: No    Sexual activity: Not on file   Other Topics Concern    Not on file   Social History Narrative    Not on file     Social Determinants of Health     Financial Resource Strain:     Difficulty of Paying Living Expenses: Not on file   Food Insecurity:     Worried About [address] in the Last Year: Not on file    Dao of Food in the Last Year: Not on file   Transportation Needs:     Lack of Transportation (Medical): Not on file    Lack of Transportation (Non-Medical):  Not on file   Physical Activity:     Days of Exercise per Week: Not on file    Minutes of Exercise per Session: Not on file   Stress:     Feeling of Stress : Not on file   Social Connections:     Frequency of Communication with Friends and Family: Not on file    Frequency of Social Gatherings with Friends and Family: Not on file    Attends Jew Services: Not on file    Active Member of Clubs or Organizations: Not on file    Attends Club or Organization Meetings: Not on file    Marital Status: Not on file   Intimate Partner Violence:     Fear of Current or Ex-Partner: Not on file    Emotionally Abused: Not on file    Physically Abused: Not on file    Sexually Abused: Not on file   Housing Stability:     Unable to Pay for Housing in the Last Year: Not on file    Number of [place] in the Last Year: Not on file    Unstable Housing in the Last Year: Not on file           ROS:  [x] All negative/unchanged except if checked.  Explain positive(checked items) below:  [] Constitutional  [] Eyes  [] Ear/Nose/Mouth/Throat  [] Respiratory  [] CV  [] GI  []   [] Musculoskeletal  [] Skin/Breast  [] Neurological  [] Endocrine  [] Heme/Lymph  [] Allergic/Immunologic    Explanation:     MEDICATIONS:    Current Facility-Administered Medications:     [Held by provider] OXcarbazepine (TRILEPTAL) tablet 150 mg, 150 mg, Oral, BID, TRINA Armijo - CNP    chlordiazePOXIDE (LIBRIUM) capsule 25 mg, 25 mg, Oral, Y4Q PRN **AND** folic acid (FOLVITE) tablet 1 mg, 1 mg, Oral, Daily, 1 mg at 06/19/22 0841 **AND** thiamine mononitrate tablet 100 mg, 100 mg, Oral, Daily, 100 mg at 06/19/22 0842 **AND** [DISCONTINUED] divalproex (DEPAKOTE) DR tablet 250 mg, 250 mg, Oral, BID, Jessika Kevin MD, 250 mg at 06/17/22 0942    levETIRAcetam (KEPPRA) tablet 750 mg, 750 mg, Oral, BID, Carmen Verdin DO, 750 mg at 06/19/22 0841    topiramate (TOPAMAX) tablet 50 mg, 50 mg, Oral, BID, Carmen Verdin DO, 50 mg at 06/19/22 0841    gabapentin (NEURONTIN) capsule 300 mg, 300 mg, Oral, TID, Carmen Verdin DO, 300 mg at 06/19/22 0841    magnesium oxide (MAG-OX) tablet 400 mg, 400 mg, Oral, BID, Carmen Verdin DO, 400 mg at 06/19/22 0841    pantoprazole (PROTONIX) tablet 40 mg, 40 mg, Oral, QAM AC, Carmen Verdin DO, 40 mg at 06/19/22 1405    potassium chloride (KLOR-CON M) extended release tablet 20 mEq, 20 mEq, Oral, BID, Carmen Verdin DO, 20 mEq at 06/19/22 0841    traMADol (ULTRAM) tablet 50 mg, 50 mg, Oral, Q6H PRN, Carmen Verdin DO, 50 mg at 06/19/22 1028    calcium carbonate (TUMS) chewable tablet 500 mg, 500 mg, Oral, BID, Carmen Verdin DO, 500 mg at 06/19/22 0841    vitamin D3 (CHOLECALCIFEROL) tablet 400 Units, 400 Units, Oral, BID, Kevin Sanford DO, 400 Units at 06/19/22 0841    acetaminophen (TYLENOL) tablet 650 mg, 650 mg, Oral, Q6H PRN, Jessika Kevin, MD    magnesium hydroxide (MILK OF MAGNESIA) 400 MG/5ML suspension 30 mL, 30 mL, Oral, Daily PRN, Jessica Garland MD    aluminum & magnesium hydroxide-simethicone (MAALOX) 200-200-20 MG/5ML suspension 30 mL, 30 mL, Oral, PRN, Jessica Garland MD    haloperidol (HALDOL) tablet 5 mg, 5 mg, Oral, Q6H PRN **OR** haloperidol lactate (HALDOL) injection 5 mg, 5 mg, IntraMUSCular, Q6H PRN, Jessica Garland MD    melatonin tablet 3 mg, 3 mg, Oral, Nightly, Jessica Garland MD, 3 mg at 06/18/22 2242    hydrOXYzine pamoate (VISTARIL) capsule 50 mg, 50 mg, Oral, TID PRN, Jessica Garland MD      Examination:  /72   Pulse 65   Temp 97.4 °F (36.3 °C)   Resp 16   Ht 5' (1.524 m)   Wt 115 lb (52.2 kg)   SpO2 100%   Breastfeeding No   BMI 22.46 kg/m²   Gait - steady  Medication side effects(SE): Denies    Mental Status Examination:    Level of consciousness:  within normal limits   Appearance:  fair grooming and fair hygiene  Behavior/Motor:  no abnormalities noted  Attitude toward examiner:  cooperative  Speech:  spontaneous, normal rate and normal volume   Mood: \" I feel good. \"  Affect: Bright appropriate and pleasant  Thought processes: Linear without flight of ideas loose associations  Thought content: Devoid of any auditory visualizations delusions or other perceptual normalities. Denies SI/HI intent or plan  Cognition:  oriented to person, place, and time   Concentration intact  Insight fair   Judgement fair     ASSESSMENT:   Patient symptoms are:  [] Well controlled  [x] Improving  [] Worsening  [] No change      Diagnosis:  Principal Problem:    Depressed bipolar II disorder (HCC)  Active Problems:    Alcohol abuse    Moderate protein-calorie malnutrition ([facility] [address]  Resolved Problems:    * No resolved hospital problems. *      LABS:    No results for input(s): WBC, HGB, PLT in the last 72 hours.   Recent Labs     06/16/22 2032      K 3.9      CO2 24   BUN 5*   CREATININE 0.6   GLUCOSE 134* No results for input(s): BILITOT, ALKPHOS, AST, ALT in the last 72 hours. Lab Results   Component Value Date    LABAMPH NOT DETECTED 06/15/2022    BARBSCNU NOT DETECTED 06/15/2022    LABBENZ NOT DETECTED 06/15/2022    LABMETH NOT DETECTED 06/15/2022    OPIATESCREENURINE NOT DETECTED 06/15/2022    PHENCYCLIDINESCREENURINE NOT DETECTED 06/15/2022    ETOH 133 06/15/2022     Lab Results   Component Value Date    TSH 2.560 06/15/2022     No results found for: LITHIUM  No results found for: VALPROATE, CBMZ        Treatment Plan:  Reviewed current Medications with the patient. Risks, benefits, side effects, drug-to-drug interactions and alternatives to treatment were discussed. Collateral information:   CD evaluation  Encourage patient to attend group and other milieu activities.   Discharge planning discussed with the patient and treatment team.      Continue current treatment    PSYCHOTHERAPY/COUNSELING:  [x] Therapeutic interview  [x] Supportive  [] CBT  [] Ongoing  [] Other    [x] Patient continues to need, on a daily basis, active treatment furnished directly by or requiring the supervision of inpatient psychiatric personnel      Anticipated Length of stay: 3 to 7 days based on stability            Electronically signed by TRINA Joe CNP on 0/41/6191 at 10:44 AM

## 2022-06-19 NOTE — PLAN OF CARE
Problem: Pain  Goal: Verbalizes/displays adequate comfort level or baseline comfort level  6/18/2022 2138 by Kimberly Moran RN  Outcome: Progressing     Problem: Safety - Adult  Goal: Free from fall injury  6/18/2022 2138 by Kimberly Moran RN  Outcome: Progressing     Problem: Anxiety  Goal: Will report anxiety at manageable levels  Description: INTERVENTIONS:  1. Administer medication as ordered  2. Teach and rehearse alternative coping skills  3. Provide emotional support with 1:1 interaction with staff  6/18/2022 2138 by Kimberly Moran RN  Outcome: Progressing     Problem: Death & Dying  Goal: Pt/Family communicate acceptance of impending death and feel psychological comfort and peace  Description: INTERVENTIONS:  1. Assess patient/family anxiety and grief process related to end of life issues  2. Provide emotional and spiritual support  3. Provide information about the patient's health status with consideration of family and cultural values  4. Communicate willingness to discuss death and facilitate grief process  with patient/family as appropriate  5. Emphasize sustaining relationships within family system and community, or [place]/spiritual traditions  6. Initiate Spiritual Care, Psychosocial Clinical Specialist, consult as needed  6/18/2022 2138 by Kimberly Moran RN  Outcome: Progressing       Pt is alert and oriented x 3, calm and cooperative with good eye contact. Pt denies any thoughts of suicide or homicide and no dangerous behavior is noted. Pt denies any visual or auditory hallucinations and no delusional thinking is noted.

## 2022-06-20 LAB
ALBUMIN SERPL-MCNC: 4.6 G/DL (ref 3.5–5.2)
ALP BLD-CCNC: 119 U/L (ref 35–104)
ALT SERPL-CCNC: 25 U/L (ref 0–32)
ANION GAP SERPL CALCULATED.3IONS-SCNC: 13 MMOL/L (ref 7–16)
ANISOCYTOSIS: ABNORMAL
AST SERPL-CCNC: 36 U/L (ref 0–31)
BASOPHILS ABSOLUTE: 0.11 E9/L (ref 0–0.2)
BASOPHILS RELATIVE PERCENT: 1.4 % (ref 0–2)
BILIRUB SERPL-MCNC: 0.4 MG/DL (ref 0–1.2)
BUN BLDV-MCNC: 14 MG/DL (ref 6–20)
CALCIUM SERPL-MCNC: 9.3 MG/DL (ref 8.6–10.2)
CHLORIDE BLD-SCNC: 101 MMOL/L (ref 98–107)
CO2: 23 MMOL/L (ref 22–29)
CREAT SERPL-MCNC: 0.7 MG/DL (ref 0.5–1)
EOSINOPHILS ABSOLUTE: 0.12 E9/L (ref 0.05–0.5)
EOSINOPHILS RELATIVE PERCENT: 1.6 % (ref 0–6)
GFR AFRICAN AMERICAN: >60
GFR NON-AFRICAN AMERICAN: >60 ML/MIN/1.73
GLUCOSE BLD-MCNC: 91 MG/DL (ref 74–99)
HCT VFR BLD CALC: 37.9 % (ref 34–48)
HEMOGLOBIN: 11.7 G/DL (ref 11.5–15.5)
IMMATURE GRANULOCYTES #: 0.16 E9/L
IMMATURE GRANULOCYTES %: 2.1 % (ref 0–5)
LYMPHOCYTES ABSOLUTE: 1.86 E9/L (ref 1.5–4)
LYMPHOCYTES RELATIVE PERCENT: 24.1 % (ref 20–42)
MCH RBC QN AUTO: 29.2 PG (ref 26–35)
MCHC RBC AUTO-ENTMCNC: 30.9 % (ref 32–34.5)
MCV RBC AUTO: 94.5 FL (ref 80–99.9)
MONOCYTES ABSOLUTE: 0.77 E9/L (ref 0.1–0.95)
MONOCYTES RELATIVE PERCENT: 10 % (ref 2–12)
NEUTROPHILS ABSOLUTE: 4.7 E9/L (ref 1.8–7.3)
NEUTROPHILS RELATIVE PERCENT: 60.8 % (ref 43–80)
OVALOCYTES: ABNORMAL
PDW BLD-RTO: 22.5 FL (ref 11.5–15)
PLATELET # BLD: 223 E9/L (ref 130–450)
PMV BLD AUTO: 10.6 FL (ref 7–12)
POIKILOCYTES: ABNORMAL
POLYCHROMASIA: ABNORMAL
POTASSIUM SERPL-SCNC: 4.5 MMOL/L (ref 3.5–5)
RBC # BLD: 4.01 E12/L (ref 3.5–5.5)
SODIUM BLD-SCNC: 137 MMOL/L (ref 132–146)
STOMATOCYTES: ABNORMAL
TOTAL PROTEIN: 7.6 G/DL (ref 6.4–8.3)
WBC # BLD: 7.7 E9/L (ref 4.5–11.5)

## 2022-06-20 PROCEDURE — 36415 COLL VENOUS BLD VENIPUNCTURE: CPT

## 2022-06-20 PROCEDURE — 6370000000 HC RX 637 (ALT 250 FOR IP): Performed by: NURSE PRACTITIONER

## 2022-06-20 PROCEDURE — 6370000000 HC RX 637 (ALT 250 FOR IP): Performed by: EMERGENCY MEDICINE

## 2022-06-20 PROCEDURE — 99232 SBSQ HOSP IP/OBS MODERATE 35: CPT | Performed by: NURSE PRACTITIONER

## 2022-06-20 PROCEDURE — 85025 COMPLETE CBC W/AUTO DIFF WBC: CPT

## 2022-06-20 PROCEDURE — 80053 COMPREHEN METABOLIC PANEL: CPT

## 2022-06-20 PROCEDURE — 6370000000 HC RX 637 (ALT 250 FOR IP): Performed by: PSYCHIATRY & NEUROLOGY

## 2022-06-20 PROCEDURE — 1240000000 HC EMOTIONAL WELLNESS R&B

## 2022-06-20 RX ORDER — BACITRACIN, NEOMYCIN, POLYMYXIN B 400; 3.5; 5 [USP'U]/G; MG/G; [USP'U]/G
OINTMENT TOPICAL 2 TIMES DAILY PRN
Status: DISCONTINUED | OUTPATIENT
Start: 2022-06-20 | End: 2022-06-21 | Stop reason: HOSPADM

## 2022-06-20 RX ADMIN — CALCIUM CARBONATE 500 MG: 500 TABLET, CHEWABLE ORAL at 21:19

## 2022-06-20 RX ADMIN — Medication 400 MG: at 21:18

## 2022-06-20 RX ADMIN — CHOLECALCIFEROL TAB 10 MCG (400 UNIT) 400 UNITS: 10 TAB at 10:05

## 2022-06-20 RX ADMIN — LEVETIRACETAM 750 MG: 500 TABLET, FILM COATED ORAL at 21:18

## 2022-06-20 RX ADMIN — Medication 400 MG: at 10:05

## 2022-06-20 RX ADMIN — Medication 3 MG: at 21:19

## 2022-06-20 RX ADMIN — TRAMADOL HYDROCHLORIDE 50 MG: 50 TABLET, COATED ORAL at 16:36

## 2022-06-20 RX ADMIN — FOLIC ACID 1 MG: 1 TABLET ORAL at 10:03

## 2022-06-20 RX ADMIN — CHOLECALCIFEROL TAB 10 MCG (400 UNIT) 400 UNITS: 10 TAB at 21:18

## 2022-06-20 RX ADMIN — LEVETIRACETAM 750 MG: 500 TABLET, FILM COATED ORAL at 10:03

## 2022-06-20 RX ADMIN — BACITRACIN ZINC, NEOMYCIN, POLYMYXIN B 1 G: 400; 3.5; 5 OINTMENT TOPICAL at 21:18

## 2022-06-20 RX ADMIN — GABAPENTIN 300 MG: 300 CAPSULE ORAL at 10:03

## 2022-06-20 RX ADMIN — THIAMINE HCL TAB 100 MG 100 MG: 100 TAB at 10:03

## 2022-06-20 RX ADMIN — TRAMADOL HYDROCHLORIDE 50 MG: 50 TABLET, COATED ORAL at 21:24

## 2022-06-20 RX ADMIN — POTASSIUM CHLORIDE 20 MEQ: 20 TABLET, EXTENDED RELEASE ORAL at 21:19

## 2022-06-20 RX ADMIN — GABAPENTIN 300 MG: 300 CAPSULE ORAL at 21:19

## 2022-06-20 RX ADMIN — TOPIRAMATE 50 MG: 25 TABLET, FILM COATED ORAL at 21:18

## 2022-06-20 RX ADMIN — POTASSIUM CHLORIDE 20 MEQ: 20 TABLET, EXTENDED RELEASE ORAL at 10:03

## 2022-06-20 RX ADMIN — TRAMADOL HYDROCHLORIDE 50 MG: 50 TABLET, COATED ORAL at 10:04

## 2022-06-20 RX ADMIN — CALCIUM CARBONATE 500 MG: 500 TABLET, CHEWABLE ORAL at 10:03

## 2022-06-20 RX ADMIN — GABAPENTIN 300 MG: 300 CAPSULE ORAL at 13:57

## 2022-06-20 RX ADMIN — PANTOPRAZOLE SODIUM 40 MG: 40 TABLET, DELAYED RELEASE ORAL at 06:38

## 2022-06-20 RX ADMIN — TOPIRAMATE 50 MG: 25 TABLET, FILM COATED ORAL at 10:05

## 2022-06-20 ASSESSMENT — PAIN SCALES - GENERAL
PAINLEVEL_OUTOF10: 8
PAINLEVEL_OUTOF10: 6
PAINLEVEL_OUTOF10: 6
PAINLEVEL_OUTOF10: 0
PAINLEVEL_OUTOF10: 7
PAINLEVEL_OUTOF10: 10

## 2022-06-20 ASSESSMENT — PAIN DESCRIPTION - LOCATION: LOCATION: BACK;ARM

## 2022-06-20 ASSESSMENT — PAIN DESCRIPTION - DESCRIPTORS
DESCRIPTORS: ACHING;DISCOMFORT;SORE
DESCRIPTORS: SHARP;ACHING;DISCOMFORT

## 2022-06-20 ASSESSMENT — PAIN DESCRIPTION - ORIENTATION
ORIENTATION: RIGHT;LEFT
ORIENTATION: RIGHT

## 2022-06-20 ASSESSMENT — PAIN - FUNCTIONAL ASSESSMENT: PAIN_FUNCTIONAL_ASSESSMENT: ACTIVITIES ARE NOT PREVENTED

## 2022-06-20 NOTE — GROUP NOTE
Date: 6/20/2022    Group Start Time: 1000  Group End Time: 1055  Group Topic: Psychoeducation    [facility]    [place], [address]                                                                        Group Therapy Note    Date: 6/20/2022  Number of Participants: 14    Type of Group: Psychoeducation    Wellness Binder Information  Module Name:  dimensions of wellness     Patient's Goal: patient will be able to id what dimensions he or can improve to find balance. Notes: pleasant and sharing in group. Willing to id areas that he or she would like to improve. Status After Intervention:  Improved    Participation Level:  Active Listener and Interactive    Participation Quality: Appropriate, Attentive, Sharing, and Supportive      Speech:  normal       Thought Process/Content: Logical      Affective Functioning: Congruent      Mood: euthymic      Level of consciousness:  Alert, Oriented x4, and Attentive      Response to Learning: Able to verbalize/acknowledge new learning, Able to retain information, and Progressing to goal      Endings: None Reported    Modes of Intervention: Education, Support, Socialization, Exploration, and Problem-solving      Discipline Responsible: Psychoeducational Specialist      Signature:  Abel Felder

## 2022-06-20 NOTE — GROUP NOTE
Group Therapy Note    Date: 6/20/2022    Group Start Time: 1100  Group End Time: 1140  Group Topic: Psychotherapy    [facility]    LALIT Stauffer, JONH        Group Therapy Note    Attendees: 4         Patient's Goal:  To increase social interaction and improve relationships with others. Notes: Pt was attentive in group and was able to identify an agenda. She was also able to verbalize relating to others within the group. Status After Intervention:  Improved    Participation Level:  Active Listener and Interactive    Participation Quality: Appropriate, Attentive, Sharing and Supportive      Speech:  normal      Thought Process/Content: Logical      Affective Functioning: Congruent      Mood: anxious      Level of consciousness:  Alert and Oriented x4      Response to Learning: Able to verbalize current knowledge/experience      Endings: None Reported    Modes of Intervention: Support, Socialization and Exploration      Discipline Responsible: /Counselor      Signature:  LALIT Vazquez, JONH

## 2022-06-20 NOTE — PROGRESS NOTES
Attended community meeting. Updated on staffing and daily expectations. Shared goal for the day as to have a good mindset.

## 2022-06-20 NOTE — CARE COORDINATION
Sw contacted the [address] [place] [address] in [place] to refer pt for services. No answer, yoel left voicemail. Sw received call back from the [address] [place] [address] in [place] and referred pt for services.  Pt has appointment for counseling 6/29 at 10:30am and a medication appointment on 7/11 at 1pm.

## 2022-06-20 NOTE — PROGRESS NOTES
BEHAVIORAL HEALTH FOLLOW-UP NOTE     6/20/2022     Patient was seen and examined in person, Chart reviewed   Patient's case discussed with staff/team    Chief Complaint: \" I am not broken up with my boyfriend\"    Interim History:   Patient seen in her room she maintains that her boyfriend and not break-up despite all the documentation indicating that they had a break-up. She vehemently denies SI/HI intent or plan and she denies any auditory visual hallucinations she has been eating well sleeping well no neurovegetative signs of depression.         Appetite:   [x] Normal/Unchanged  [] Increased  [] Decreased      Sleep:       [x] Normal/Unchanged  [] Fair       [] Poor              Energy:    [x] Normal/Unchanged  [] Increased  [] Decreased        SI [] Present  [x] Absent    HI  []Present  [x] Absent     Aggression:  [] yes  [x] no    Patient is [x] able  [] unable to CONTRACT FOR SAFETY     PAST MEDICAL/PSYCHIATRIC HISTORY:   Past Medical History:   Diagnosis Date    Abdominal pain     Cancer ([facility] [address]     neuroendocrime tumor    Diarrhea     Hypocalcemia     Hypothyroidism     Irritable bowel syndrome     Migraines     Seizures ([facility] [address]     last episode January 2021    Status post alcohol detoxification     5/22/2018-5/29/2018       FAMILY/SOCIAL HISTORY:  Family History   Problem Relation Age of Onset    High Blood Pressure Mother     High Cholesterol Mother     Other Mother         migraine headaches    Heart Disease Father     Asthma Father     High Blood Pressure Father     Cancer Father         Sarcoidosis    Diabetes Other         GRANDMOTHER    Lung Cancer Other         GRANDFATHER    Alzheimer's Disease Other         GRANDMOTHER    Breast Cancer Maternal Grandmother     Cancer Maternal Grandmother         skin    Cancer Paternal Grandfather         lung     Social History     Socioeconomic History    Marital status: Single     Spouse name: Not on file    Number of children: Not on file    Years of education: Not on file    Highest education level: Not on file   Occupational History    Occupation: cleaning     Employer: the [address] [place] [address] Drive Use    Smoking status: Current Every Day Smoker     Packs/day: 0.25     Types: Cigarettes    Smokeless tobacco: Never Used   Vaping Use    Vaping Use: Never used   Substance and Sexual Activity    Alcohol use: No     Alcohol/week: 0.0 standard drinks     Comment: 5 years sober    Drug use: No    Sexual activity: Not on file   Other Topics Concern    Not on file   Social History Narrative    Not on file     Social Determinants of Health     Financial Resource Strain:     Difficulty of Paying Living Expenses: Not on file   Food Insecurity:     Worried About Running Out of Food in the Last Year: Not on file    Dao of Food in the Last Year: Not on file   Transportation Needs:     Lack of Transportation (Medical): Not on file    Lack of Transportation (Non-Medical): Not on file   Physical Activity:     Days of Exercise per Week: Not on file    Minutes of Exercise per Session: Not on file   Stress:     Feeling of Stress : Not on file   Social Connections:     Frequency of Communication with Friends and Family: Not on file    Frequency of Social Gatherings with Friends and Family: Not on file    Attends Holiness Services: Not on file    Active Member of [address] [place] [address] [employer] or Organizations: Not on file    Attends Club or Organization Meetings: Not on file    Marital Status: Not on file   Intimate Partner Violence:     Fear of Current or Ex-Partner: Not on file    Emotionally Abused: Not on file    Physically Abused: Not on file    Sexually Abused: Not on file   Housing Stability:     Unable to Pay for Housing in the Last Year: Not on file    Number of [place] in the Last Year: Not on file    Unstable Housing in the Last Year: Not on file           ROS:  [x] All negative/unchanged except if checked.  Explain positive(checked items) below:  [] Constitutional  [] Eyes  [] Ear/Nose/Mouth/Throat  [] Respiratory  [] CV  [] GI  []   [] Musculoskeletal  [] Skin/Breast  [] Neurological  [] Endocrine  [] Heme/Lymph  [] Allergic/Immunologic    Explanation:     MEDICATIONS:    Current Facility-Administered Medications:     chlordiazePOXIDE (LIBRIUM) capsule 25 mg, 25 mg, Oral, D9L PRN **AND** folic acid (FOLVITE) tablet 1 mg, 1 mg, Oral, Daily, 1 mg at 06/20/22 1003 **AND** thiamine mononitrate tablet 100 mg, 100 mg, Oral, Daily, 100 mg at 06/20/22 1003 **AND** [DISCONTINUED] divalproex (DEPAKOTE) DR tablet 250 mg, 250 mg, Oral, BID, Keshia Kessler MD, 250 mg at 06/17/22 5242    levETIRAcetam (KEPPRA) tablet 750 mg, 750 mg, Oral, BID, Carmen Verdin DO, 750 mg at 06/20/22 1003    topiramate (TOPAMAX) tablet 50 mg, 50 mg, Oral, BID, Carmen Verdin DO, 50 mg at 06/20/22 1005    gabapentin (NEURONTIN) capsule 300 mg, 300 mg, Oral, TID, Carmen Verdin DO, 300 mg at 06/20/22 1357    magnesium oxide (MAG-OX) tablet 400 mg, 400 mg, Oral, BID, Carmen Verdin, DO, 400 mg at 06/20/22 1005    pantoprazole (PROTONIX) tablet 40 mg, 40 mg, Oral, QAM AC, Carmen Verdin DO, 40 mg at 06/20/22 3496    potassium chloride (KLOR-CON M) extended release tablet 20 mEq, 20 mEq, Oral, BID, Carmen Verdin DO, 20 mEq at 06/20/22 1003    traMADol (ULTRAM) tablet 50 mg, 50 mg, Oral, Q6H PRN, Remus Pod, DO, 50 mg at 06/20/22 1004    calcium carbonate (TUMS) chewable tablet 500 mg, 500 mg, Oral, BID, Carmen Verdin, DO, 500 mg at 06/20/22 1003    vitamin D3 (CHOLECALCIFEROL) tablet 400 Units, 400 Units, Oral, BID, Remus Pod, DO, 400 Units at 06/20/22 1005    acetaminophen (TYLENOL) tablet 650 mg, 650 mg, Oral, Q6H PRN, Keshia Kessler MD    magnesium hydroxide (MILK OF MAGNESIA) 400 MG/5ML suspension 30 mL, 30 mL, Oral, Daily PRN, Keshia Kessler MD    aluminum & magnesium hydroxide-simethicone (MAALOX) 200-200-20 MG/5ML suspension 30 mL, 30 mL, Oral, PRN, Luda Rivera MD    haloperidol (HALDOL) tablet 5 mg, 5 mg, Oral, Q6H PRN **OR** haloperidol lactate (HALDOL) injection 5 mg, 5 mg, IntraMUSCular, Q6H PRN, Luda Rivera MD    melatonin tablet 3 mg, 3 mg, Oral, Nightly, Luda Rivera MD, 3 mg at 06/19/22 2153    hydrOXYzine pamoate (VISTARIL) capsule 50 mg, 50 mg, Oral, TID PRN, Luda Rivera MD      Examination:  BP 98/62   Pulse 65   Temp 97 °F (36.1 °C)   Resp 16   Ht 5' (1.524 m)   Wt 115 lb (52.2 kg)   SpO2 100%   Breastfeeding No   BMI 22.46 kg/m²   Gait - steady  Medication side effects(SE): Denies    Mental Status Examination:    Level of consciousness:  within normal limits   Appearance:  fair grooming and fair hygiene  Behavior/Motor:  no abnormalities noted  Attitude toward examiner:  cooperative  Speech:  spontaneous, normal rate and normal volume   Mood: \" I feel good. \"  Affect: Bright appropriate and pleasant  Thought processes: Linear without flight of ideas loose associations  Thought content: Devoid of any auditory visualizations delusions or other perceptual normalities. Denies SI/HI intent or plan  Cognition:  oriented to person, place, and time   Concentration intact  Insight fair   Judgement fair     ASSESSMENT:   Patient symptoms are:  [] Well controlled  [x] Improving  [] Worsening  [] No change      Diagnosis:  Principal Problem:    Depressed bipolar II disorder (HCC)  Active Problems:    Alcohol abuse    Moderate protein-calorie malnutrition ([facility] [address]  Resolved Problems:    * No resolved hospital problems.  *      LABS:    Recent Labs     06/20/22  1201   WBC 7.7   HGB 11.7        Recent Labs     06/20/22  1201      K 4.5      CO2 23   BUN 14   CREATININE 0.7   GLUCOSE 91     Recent Labs     06/20/22  1201   BILITOT 0.4   ALKPHOS 119*   AST 36*   ALT 25     Lab Results   Component Value Date    LABAMPH NOT DETECTED 06/15/2022    BARBSCNU NOT DETECTED 06/15/2022    LABBENZ NOT DETECTED 06/15/2022 LABMETH NOT DETECTED 06/15/2022    OPIATESCREENURINE NOT DETECTED 06/15/2022    PHENCYCLIDINESCREENURINE NOT DETECTED 06/15/2022    ETOH 133 06/15/2022     Lab Results   Component Value Date    TSH 2.560 06/15/2022     No results found for: LITHIUM  No results found for: VALPROATE, CBMZ        Treatment Plan:  Reviewed current Medications with the patient. Risks, benefits, side effects, drug-to-drug interactions and alternatives to treatment were discussed. Collateral information:   CD evaluation  Encourage patient to attend group and other milieu activities.   Discharge planning discussed with the patient and treatment team.    Continue current treatment    PSYCHOTHERAPY/COUNSELING:  [x] Therapeutic interview  [x] Supportive  [] CBT  [] Ongoing  [] Other    [x] Patient continues to need, on a daily basis, active treatment furnished directly by or requiring the supervision of inpatient psychiatric personnel      Anticipated Length of stay: 3 to 7 days based on stability            Electronically signed by Gleda Kanner, APRN - CNP on 1/96/3356 at 4:09 PM

## 2022-06-20 NOTE — PLAN OF CARE
Problem: Anxiety  Goal: Will report anxiety at manageable levels  Description: INTERVENTIONS:  1. Administer medication as ordered  2. Teach and rehearse alternative coping skills  3. Provide emotional support with 1:1 interaction with staff  6/20/2022 1255 by Yue Tello RN  Outcome: Progressing  Flowsheets (Taken 6/20/2022 0882)  Will report anxiety at manageable levels: Provide emotional support with 1:1 interaction with staff  6/20/2022 0423 by Anish Martin RN  Outcome: Progressing     Problem: Safety - Adult  Goal: Free from fall injury  6/20/2022 1255 by Yue Tello RN  Outcome: Progressing  6/20/2022 0423 by Anish Martin RN  Outcome: Progressing         Patient has been up on the unit. Pleasant and cooperative, social.  Anxious. Denies suicidal and homicidal thoughts. Denies hallucinations.

## 2022-06-20 NOTE — CONSULTS
ConsultationNote    Patient's Name/Date of Birth: King Alvarez / 1985 (75 y.o.)    Date: June 20, 2022     Reason for Consult:  Pt presents from floor for consult stating she was drinking and fell and hit her had on the toilet and chipped her front tooth. Requesting Physician:  Shayan Murry MD    HPI  3 days ago    Past Medical History:   Diagnosis Date    Abdominal pain     Cancer ([facility]     neuroendocrime tumor    Diarrhea     Hypocalcemia     Hypothyroidism     Irritable bowel syndrome     Migraines     Seizures ([facility]     last episode January 2021    Status post alcohol detoxification     5/22/2018-5/29/2018       Past Surgical History:   Procedure Laterality Date    CHOLECYSTECTOMY, LAPAROSCOPIC  07/06/2016    COLONOSCOPY N/A 6/22/2018    COLONOSCOPY WITH BIOPSY performed by Colette Reyes MD at [facility] ENDOSCOPY    CT BIOPSY RENAL  1/25/2021    CT BIOPSY RENAL 1/25/2021 Kalpana Kim II, MD [facility]    CT NEEDLE BIOPSY LIVER PERCUTANEOUS  9/1/2020    CT NEEDLE BIOPSY LIVER PERCUTANEOUS 9/1/2020 [facility] CT    HC DIALYSIS CATHETER N/A 1/28/2021    TESIO CATHETER INSERTION AND REMOVAL OF TEMPORARY performed by Srini Seals MD at [address] Left 2/18/2022    MEDI PORT PLACEMENT, Left side. performed by Myke Black MD at [address] [facility] N/A 5/14/2021    SIGMOIDOSCOPY PERIANAL BOTOX INJECTION   (50 UNITS) performed by Colette Reyes MD at [address] [place] [address] N/A 10/21/2020    LAPAROSCOPIC ROBOTIC SMALL BOWEL RESECTION WITH PLANNED TRANSITION TO OPEN performed by Marielos Prado MD at [address]         Prior to Admission medications    Medication Sig Start Date End Date Taking?  Authorizing Provider   butalbital-acetaminophen-caffeine (FIORICET, ESGIC) -40 MG per tablet Take 1 tablet by mouth every 4 hours as needed for Headaches   Yes Historical Provider, MD fluticasone (FLONASE) 50 MCG/ACT nasal spray 1 spray by Nasal route daily as needed for Rhinitis   Yes Historical Provider, MD   levETIRAcetam (KEPPRA) 750 MG tablet Take 750 mg by mouth 2 times daily   Yes Historical Provider, MD   levothyroxine (SYNTHROID) 112 MCG tablet Take 112 mcg by mouth Daily   Yes Historical Provider, MD   potassium chloride (KLOR-CON M) 20 MEQ extended release tablet Take 20 mEq by mouth 2 times daily   Yes Historical Provider, MD   topiramate (TOPAMAX) 50 MG tablet Take 50 mg by mouth 2 times daily   Yes Historical Provider, MD   zolpidem (AMBIEN) 10 MG tablet Take 10 mg by mouth nightly as needed for Sleep. Yes Historical Provider, MD   brimonidine (ALPHAGAN) 0.2 % ophthalmic solution Place 1 drop into both eyes every morning   Yes Historical Provider, MD   calcium carbonate-vitamin D3 (CALTRATE) 600-400 MG-UNIT TABS per tab take 1 tablet by mouth every morning and at bedtime 5/31/22   TRINA Pickett CNP   traMADol (ULTRAM) 50 MG tablet Take 1 tablet by mouth every 6 hours as needed for Pain for up to 90 days. 4/4/22 7/3/22  TRINA Pedersen CNP   gabapentin (NEURONTIN) 300 MG capsule Take 1 capsule by mouth 3 times daily for 90 days.  4/4/22 7/3/22  TRINA Pedersen CNP   pantoprazole (PROTONIX) 40 MG tablet take 1 tablet by mouth every morning before breakfast 2/14/22   Sheila Hall DO   famotidine (PEPCID) 40 MG tablet Take 40 mg by mouth daily  6/20/21   Historical Provider, MD   promethazine (PHENERGAN) 25 MG tablet Take 1 tablet by mouth as needed for Nausea or Vomiting 1/11/21   Historical Provider, MD   magnesium oxide (MAG-OX) 400 MG tablet Take 400 mg by mouth 2 times daily    Historical Provider, MD       No Known Allergies    Family History   Problem Relation Age of Onset    High Blood Pressure Mother     High Cholesterol Mother     Other Mother         migraine headaches    Heart Disease Father     Asthma Father     High Blood Pressure Father     Cancer Father         Sarcoidosis    Diabetes Other         GRANDMOTHER    Lung Cancer Other         GRANDFATHER    Alzheimer's Disease Other         GRANDMOTHER    Breast Cancer Maternal Grandmother     Cancer Maternal Grandmother         skin    Cancer Paternal Grandfather         lung       Social History     Socioeconomic History    Marital status: Single     Spouse name: Not on file    Number of children: Not on file    Years of education: Not on file    Highest education level: Not on file   Occupational History    Occupation: cleaning     Employer: the [address] [place] [address]   Tobacco Use    Smoking status: Current Every Day Smoker     Packs/day: 0.25     Types: Cigarettes    Smokeless tobacco: Never Used   Vaping Use    Vaping Use: Never used   Substance and Sexual Activity    Alcohol use: No     Alcohol/week: 0.0 standard drinks     Comment: 5 years sober    Drug use: No    Sexual activity: Not on file   Other Topics Concern    Not on file   Social History Narrative    Not on file     Social Determinants of Health     Financial Resource Strain:     Difficulty of Paying Living Expenses: Not on file   Food Insecurity:     Worried About [address] in the Last Year: Not on file    [place]ao of Food in the Last Year: Not on file   Transportation Needs:     Lack of Transportation (Medical): Not on file    Lack of Transportation (Non-Medical):  Not on file   Physical Activity:     Days of Exercise per Week: Not on file    Minutes of Exercise per Session: Not on file   Stress:     Feeling of Stress : Not on file   Social Connections:     Frequency of Communication with Friends and Family: Not on file    Frequency of Social Gatherings with Friends and Family: Not on file    Attends Jewish Services: Not on file    Active Member of Clubs or Organizations: Not on file    Attends Club or Organization Meetings: Not on file    Marital Status: Not on file   Intimate Partner Violence:     Fear of Current or Ex-Partner: Not on file Emotionally Abused: Not on file    Physically Abused: Not on file    Sexually Abused: Not on file   Housing Stability:     Unable to Pay for Housing in the Last Year: Not on file    Number of Places Lived in the Last Year: Not on file    Unstable Housing in the Last Year: Not on file       Review of Systems    Vitals:    06/19/22 1641 06/19/22 2153 06/20/22 0600 06/20/22 1004   BP:   98/62    Pulse:   65    Resp: 16 18 14 16   Temp: 97.9 °F (36.6 °C)  97 °F (36.1 °C)    TempSrc: Oral      SpO2:       Weight:       Height:         Physical Exam  Panoramic and periapical radiographs taken. Clinical and radiographic exam reveal ML fracture tooth #10. PT states the tooth feels sore. No PARL noted on radiograph, no signs of swelling or purulent drainage. No radiographic evidence of decay. Informed pt that we can restore tooth #10 with a filling. Pt also states that she is interested in partials. Informed pt that we will schedule her for a comprehensive exam and determine if she is a candidate for partials. Assessment:  Principal Problem:    Depressed bipolar II disorder (HCC)  Active Problems:    Alcohol abuse    Moderate protein-calorie malnutrition ([address]  Resolved Problems:    * No resolved hospital problems. *      Plan:  Pt to return to dental clinic for ML #10    Electronically signed by Jessica Donnelly DMD on 6/20/22 at 1:39 PM EDT      Attending Physician Statement  I have discussed the case, including pertinent history and exam findings with the resident. I have seen and examined the patient and the key elements of the encounter have been performed by me. I agree with the assessment, plan and orders as documented by the resident.       Caitlyn Galeas DDS  6/27/2022 5:13 PM

## 2022-06-20 NOTE — CARE COORDINATION
Collateral Contact (ASIA signed)  Name: Emily Arnett  /  Yancey Severs   Relationship: Mom  /  Dad  Number:   /  [phone]     Collateral Information: SW spoke with pt's mom and dad (ASIA for both) Dad stated that she has been drinking and she has erratic behaviors when she drinks. Father stated that at some points the pt became unresponsive. Pt stays upstairs in the home and she came downstairs laid down of floor and passed out. They then took the pt back upstairs and she fell in bathroom and hit head on toilet. Shortly after the pt went outside on the porch to smoke a cigarette and they found her laying on the front porch due to falling again and blacking out. Father stated that this is the second time this has happened, and this is her second time in hospital for this situation. Father stated that the pt has been very depressed due to her special need child passing away, and her boyfriend leaving her, along with having stage 4 cancer. Father stated that the pt lives with them and she is able to return upon discharge, mom and dad will be able to provide transportation. Father denied the pt to have access to any weapons and stated that there are no guns in the home. Concerns: Father feels that the pt needs a change in medications for depression, someone to come into the home to check on her mental health, and have her meet with some for counseling/ medications management. Father stated that she has a palliative team at [address] [place] [address] where she is able to talk to people but she does not have a mental health provider.        Access to Weapons per Collateral Contact: []? Reports [x]?  Denies

## 2022-06-21 VITALS
TEMPERATURE: 97.1 F | SYSTOLIC BLOOD PRESSURE: 92 MMHG | BODY MASS INDEX: 22.58 KG/M2 | DIASTOLIC BLOOD PRESSURE: 57 MMHG | OXYGEN SATURATION: 100 % | WEIGHT: 115 LBS | RESPIRATION RATE: 14 BRPM | HEART RATE: 72 BPM | HEIGHT: 60 IN

## 2022-06-21 PROCEDURE — 99239 HOSP IP/OBS DSCHRG MGMT >30: CPT | Performed by: NURSE PRACTITIONER

## 2022-06-21 PROCEDURE — 6370000000 HC RX 637 (ALT 250 FOR IP): Performed by: PSYCHIATRY & NEUROLOGY

## 2022-06-21 PROCEDURE — 6370000000 HC RX 637 (ALT 250 FOR IP): Performed by: EMERGENCY MEDICINE

## 2022-06-21 PROCEDURE — 6370000000 HC RX 637 (ALT 250 FOR IP): Performed by: NURSE PRACTITIONER

## 2022-06-21 RX ORDER — LANOLIN ALCOHOL/MO/W.PET/CERES
3 CREAM (GRAM) TOPICAL NIGHTLY
Qty: 30 TABLET | Refills: 0 | Status: SHIPPED | OUTPATIENT
Start: 2022-06-21 | End: 2022-09-26

## 2022-06-21 RX ORDER — LEVOTHYROXINE SODIUM 112 UG/1
112 TABLET ORAL DAILY
Status: DISCONTINUED | OUTPATIENT
Start: 2022-06-21 | End: 2022-06-21 | Stop reason: HOSPADM

## 2022-06-21 RX ORDER — BUTALBITAL, ACETAMINOPHEN AND CAFFEINE 50; 325; 40 MG/1; MG/1; MG/1
1 TABLET ORAL EVERY 4 HOURS PRN
Status: DISCONTINUED | OUTPATIENT
Start: 2022-06-21 | End: 2022-06-21 | Stop reason: HOSPADM

## 2022-06-21 RX ADMIN — CALCIUM CARBONATE 500 MG: 500 TABLET, CHEWABLE ORAL at 09:40

## 2022-06-21 RX ADMIN — LEVOTHYROXINE SODIUM 112 MCG: 0.11 TABLET ORAL at 12:11

## 2022-06-21 RX ADMIN — LEVETIRACETAM 750 MG: 500 TABLET, FILM COATED ORAL at 09:40

## 2022-06-21 RX ADMIN — GABAPENTIN 300 MG: 300 CAPSULE ORAL at 13:43

## 2022-06-21 RX ADMIN — TOPIRAMATE 50 MG: 25 TABLET, FILM COATED ORAL at 09:39

## 2022-06-21 RX ADMIN — TRAMADOL HYDROCHLORIDE 50 MG: 50 TABLET, COATED ORAL at 09:49

## 2022-06-21 RX ADMIN — ACETAMINOPHEN 650 MG: 325 TABLET ORAL at 02:32

## 2022-06-21 RX ADMIN — PANTOPRAZOLE SODIUM 40 MG: 40 TABLET, DELAYED RELEASE ORAL at 05:47

## 2022-06-21 RX ADMIN — POTASSIUM CHLORIDE 20 MEQ: 20 TABLET, EXTENDED RELEASE ORAL at 09:40

## 2022-06-21 RX ADMIN — THIAMINE HCL TAB 100 MG 100 MG: 100 TAB at 09:40

## 2022-06-21 RX ADMIN — Medication 400 MG: at 09:40

## 2022-06-21 RX ADMIN — GABAPENTIN 300 MG: 300 CAPSULE ORAL at 09:39

## 2022-06-21 RX ADMIN — CHOLECALCIFEROL TAB 10 MCG (400 UNIT) 400 UNITS: 10 TAB at 09:39

## 2022-06-21 RX ADMIN — FOLIC ACID 1 MG: 1 TABLET ORAL at 09:40

## 2022-06-21 RX ADMIN — TRAMADOL HYDROCHLORIDE 50 MG: 50 TABLET, COATED ORAL at 15:45

## 2022-06-21 ASSESSMENT — PAIN DESCRIPTION - LOCATION
LOCATION: HEAD
LOCATION: BACK
LOCATION: EYE;SHOULDER

## 2022-06-21 ASSESSMENT — PAIN SCALES - GENERAL
PAINLEVEL_OUTOF10: 7
PAINLEVEL_OUTOF10: 8
PAINLEVEL_OUTOF10: 7

## 2022-06-21 ASSESSMENT — PAIN DESCRIPTION - DESCRIPTORS
DESCRIPTORS: ACHING
DESCRIPTORS: ACHING;DISCOMFORT

## 2022-06-21 NOTE — GROUP NOTE
Group Therapy Note    Date: 6/21/2022    Group Start Time: 1000  Group End Time: 0098  Group Topic: Psychoeducation    [facility]        Group Therapy Note    Attendees: 12         Notes: Active and engaged during discussion on goal planning. Pleasant in sharing experiences with peers. Status After Intervention:  Improved    Participation Level:  Active Listener and Interactive    Participation Quality: Appropriate, Attentive and Sharing      Speech:  normal      Thought Process/Content: Logical      Affective Functioning: Congruent      Mood: euthymic      Level of consciousness:  Alert and Attentive      Response to Learning: Progressing to goal      Endings: None Reported    Modes of Intervention: Education, Support, Socialization and Exploration      Discipline Responsible: Psychoeducational Specialist      Signature:  Nancy Wakefield, [address]

## 2022-06-21 NOTE — CARE COORDINATION
Yoel met with pt to discuss signing an ASIA for her boyfriend Tamara Hickey. Pt agreed to sign ASIA Efren- [phone]. Sw attempted to call Tamara Hickey, number has been disconnected. Sw attempted to contact pt mom Harmony Garland , no answer, sw left voicemail. Sw contacted pt dad Kingsley Alarconkiersten . Kingsley Mayorga reports that he talked with pt this morning and she seems fine. Kingsley Mayorga reports that pt seems much better compared to how she has been for the past few weeks. Kingsley Mayorga denies having any concerns if pt were to discharge, reports that pt is very close with him and her mom and that they are able to keep an eye on her. Yoel met with pt again to see if she had a different number for Tamara Hickey. Pt states \"no that's his only number, \". Sw attempted to contact this number, no answer, yoel left voicemail.

## 2022-06-21 NOTE — CARE COORDINATION
Sw received call back from pt boyfriend Mai Foley . Mai Foley denies having any concerns for pt, reports that they are broken up but are still talking.

## 2022-06-21 NOTE — PLAN OF CARE
Problem: Anxiety  Goal: Will report anxiety at manageable levels  Description: INTERVENTIONS:  1. Administer medication as ordered  2. Teach and rehearse alternative coping skills  3. Provide emotional support with 1:1 interaction with staff  6/20/2022 2039 by Maryjo Neville RN  Outcome: Progressing     Problem: Behavior  Goal: Pt/Family maintain appropriate behavior and adhere to behavioral management agreement, if implemented  Description: INTERVENTIONS:  1. Assess patient/family's coping skills and  non-compliant behavior (including use of illegal substances)  2. Notify security of behavior or suspected illegal substances which indicate the need for search of the patient and/or belongings  3. Encourage verbalization of thoughts and concerns in a socially appropriate manner  4. Utilize positive, consistent limit setting strategies supporting safety of patient, staff and others  5. Encourage participation in the decision making process about the behavioral management agreement  6. Implement a Health Care Agreement if patient meets criteria  7. If a patient's behavior jeopardizes the safety of the patient, staff, or others refer to organization policy. If a visitor's behavior poses a threat to safety call refer to organization policy. 8. Initiate consult with , Psychosocial CNS, Spiritual Care as appropriate  Outcome: Progressing     Patient has been out on the unit. Social with peers. Calm and cooperative during conversation. Affect is sad, mood is congruent. Denies suicidal/homicidal ideations and hallucinations at this time. Purposeful rounding continued.

## 2022-06-21 NOTE — GROUP NOTE
Group Therapy Note    Date: 6/21/2022    Group Start Time: 1100  Group End Time: 1907  Group Topic: Psychotherapy    [facility]  [address], MSW, LSW        Group Therapy Note    Attendees: 5         Patient's Goal:  Pt's agenda is to speak her mind during group. Notes:  Pt made connections and participated in group. Status After Intervention:  Improved    Participation Level:  Active Listener and Interactive    Participation Quality: Appropriate, Attentive and Sharing      Speech:  normal      Thought Process/Content: Logical  Linear      Affective Functioning: Congruent      Mood: depressed      Level of consciousness:  Alert, Oriented x4 and Attentive      Response to Learning: Able to verbalize current knowledge/experience      Endings: None Reported    Modes of Intervention: Education, Support, Socialization and Exploration      Discipline Responsible: /Counselor      Signature:  Timoteo Herrera MSW, LSW

## 2022-06-21 NOTE — CARE COORDINATION
Kinga met with pt to discuss discharge. Pt report that she feels ready to discharge and has no concerns. Pt denies SI/HI/AVH, reports that she is looking forward to going home and finding new hobbies to participate in since she isn't able to work anymore. Pt informed of her appointments, reports that she plans to return home with parents, them to . Kinga contacted pt dad Redd Guzman  and informed him of pt discharge today. Redd Guzman states no concerns for pt discharge and can still  pt.      In order to ensure appropriate transition and discharge planning is in place, the following documents have been transmitted to [facility], as the new outpatient provider:     The d/c diagnosis was transmitted to the next care provider   The reason for hospitalization was transmitted to the next care provider   The d/c medications (dosage and indication) were transmitted to the next care provider    The continuing care plan was transmitted to the next care provider

## 2022-06-21 NOTE — BH NOTE
Pt discharged with followings belongings:   Dental Appliances: None  Vision - Corrective Lenses: Eyeglasses  Hearing Aid: None  Jewelry: None  Body Piercings Removed: N/A  Clothing: Footwear,Pants,Shirt,Socks,Undergarments (biege backpack)  Other Valuables: Money,Wallet (2 Visa [employer] EBT $43 dollars)   Valuables sent home with patient. Valuables retrieved from safe, Security envelope number:   Returned to patient and returned to patient. Patient left department with Departure Mode: With parents via Mobility at Departure: Ambulatory, discharged to Discharged to: Private Residence. Patient education on aftercare instructions:  yes  Patient verbalize understanding of AVS:   yes. Given suicide prevention handout  . Discharged in stable condition. Status EXAM upon discharge:  Mental Status and Behavioral Exam  Normal: No  Level of Assistance: Independent/Self  Facial Expression: Flat  Affect: Blunt  Level of Consciousness: Alert  Frequency of Checks: 4 times per hour, close  Mood:Normal: No  Mood: Depressed,Anxious,Sad  Motor Activity:Normal: Yes  Eye Contact: Good  Observed Behavior: Cooperative  Sexual Misconduct History: Current - no  Involved In Any Sexual Misconduct With Others? : No  History of Sexually Inappropriate Behavior When Previously Hospitalized?: No  Uncontrollable/Compulsive Masturbation?: No  Difficulty Controlling Sexual Impulses?: No  Preception: [place] to person,[place] to time,[place] to place,[place] to situation  Attention:Normal: Yes  Thought Processes: Other (comment) (clear)  Thought Content:Normal: Yes  Depression Symptoms: No problems reported or observed. Anxiety Symptoms: Generalized  Valerie Symptoms: No problems reported or observed.   Hallucinations: None  Delusions: No  Memory:Normal: Yes  Insight and Judgment: No  Insight and Judgment: Poor insight    Nelda Burns RN

## 2022-06-21 NOTE — PROGRESS NOTES
Attended peer recovery group. Active and engaged while sharing positive affirmations. Was 1 of 10 in attendance.

## 2022-06-21 NOTE — PROGRESS NOTES
CLINICAL PHARMACY NOTE: MEDS TO BEDS    Total # of Prescriptions Filled: 1   The following medications were delivered to the patient:  · Melatonin 3mg    Additional Documentation:    Delivered to NEPTALI Bowers 6/21

## 2022-06-22 RX ORDER — LEVOTHYROXINE SODIUM 112 UG/1
TABLET ORAL
Qty: 90 TABLET | Refills: 1 | Status: SHIPPED
Start: 2022-06-22 | End: 2022-09-26

## 2022-06-23 ENCOUNTER — TELEPHONE (OUTPATIENT)
Dept: ONCOLOGY | Age: 37
End: 2022-06-23

## 2022-06-23 ENCOUNTER — OFFICE VISIT (OUTPATIENT)
Dept: ONCOLOGY | Age: 37
End: 2022-06-23
Payer: MEDICARE

## 2022-06-23 ENCOUNTER — HOSPITAL ENCOUNTER (OUTPATIENT)
Dept: INFUSION THERAPY | Age: 37
Discharge: HOME | End: 2022-06-23
Payer: MEDICARE

## 2022-06-23 VITALS
SYSTOLIC BLOOD PRESSURE: 99 MMHG | DIASTOLIC BLOOD PRESSURE: 57 MMHG | TEMPERATURE: 98.6 F | HEIGHT: 60 IN | BODY MASS INDEX: 22.42 KG/M2 | WEIGHT: 114.2 LBS | OXYGEN SATURATION: 97 % | HEART RATE: 105 BPM

## 2022-06-23 DIAGNOSIS — C7B.8 METASTATIC MALIGNANT NEUROENDOCRINE TUMOR TO LIVER (HCC): Primary | ICD-10-CM

## 2022-06-23 DIAGNOSIS — G89.3 NEOPLASM RELATED PAIN: ICD-10-CM

## 2022-06-23 DIAGNOSIS — E44.0 MODERATE PROTEIN-CALORIE MALNUTRITION (HCC): ICD-10-CM

## 2022-06-23 DIAGNOSIS — C7A.8 NEUROENDOCRINE CANCER (HCC): Primary | ICD-10-CM

## 2022-06-23 PROCEDURE — G8427 DOCREV CUR MEDS BY ELIG CLIN: HCPCS | Performed by: INTERNAL MEDICINE

## 2022-06-23 PROCEDURE — 99215 OFFICE O/P EST HI 40 MIN: CPT | Performed by: INTERNAL MEDICINE

## 2022-06-23 PROCEDURE — 4004F PT TOBACCO SCREEN RCVD TLK: CPT | Performed by: INTERNAL MEDICINE

## 2022-06-23 PROCEDURE — G8420 CALC BMI NORM PARAMETERS: HCPCS | Performed by: INTERNAL MEDICINE

## 2022-06-23 PROCEDURE — 1111F DSCHRG MED/CURRENT MED MERGE: CPT | Performed by: INTERNAL MEDICINE

## 2022-06-23 PROCEDURE — 99212 OFFICE O/P EST SF 10 MIN: CPT

## 2022-06-23 PROCEDURE — 6360000002 HC RX W HCPCS: Performed by: INTERNAL MEDICINE

## 2022-06-23 PROCEDURE — 96372 THER/PROPH/DIAG INJ SC/IM: CPT

## 2022-06-23 RX ORDER — LANREOTIDE ACETATE 120 MG/.5ML
120 INJECTION SUBCUTANEOUS ONCE
Status: CANCELLED | OUTPATIENT
Start: 2022-07-14 | End: 2022-07-14

## 2022-06-23 RX ORDER — LANREOTIDE ACETATE 120 MG/.5ML
120 INJECTION SUBCUTANEOUS ONCE
Status: COMPLETED | OUTPATIENT
Start: 2022-06-23 | End: 2022-06-23

## 2022-06-23 RX ADMIN — LANREOTIDE ACETATE 120 MG: 120 INJECTION SUBCUTANEOUS at 14:38

## 2022-06-23 NOTE — PROGRESS NOTES
function    Dotatate PET/CT scan 10/14/2020 increased tracer uptake seen throughout the liver compatible with neoplasm. This involves both the left and the right lobe of liver. In addition there are 2 foci of increased tracer uptake along the mesentery of the small bowel. This may involve the wall the small bowel. No other convincing evidence for extra-abdominal metastatic disease. EGD/Colonoscopy 10/05/2020 by Dr. Vimal Noel; records reviewed. Hepatobiliary team (Dr. Chace Link) consult appreciated. Small bowel resection on 10/21/2020. Small bowel resection on 10/21/2020. A.  Ileum, resection:   Multifocal (4 foci) neuroendocrine tumor (NET). Extensive perineural and angiolymphatic invasion. 3 of 10 lymph nodes with metastatic neuroendocrine tumor and extracapsular extension of tumor cells (3/10). Bilateral viable small bowel resection margins with no evidence of tumor. Anastasiia Quick received as \"Meckel's\":   Nodular scar tissue with patchy chronic inflammation. Negative for neuroendocrine tumor. Intestinal mucosal tissue is not present. CASE SUMMARY:   Procedure: Small bowel resection   Tumor site: Ileum   Tumor size: Greatest dimension of 1.5 cm   Tumor focality: Multifocal: 4 separate tumors   Histologic type and grade: G2: Well-differentiated neuroendocrine tumor   Mitotic rate: between 2-20 mitoses/2 mm2   Ki-67 labeling index: between 3-20%   Tumor extension: Tumor invades through the muscularis propria into subserosal tissue without penetration of overlying serosa   Margins:  All margins are uninvolved by tumor    Margins examined: Proximal and distal   Lymphovascular invasion: Present   Perineural invasion: Present   Large mesenteric masses (greater than 2 cm): Present (one 2.5 cm mass)   Regional lymph nodes:    Number of lymph nodes involved: 3    Number of lymph nodes examined: 10   Pathologic stage classification (pTNM, AJCC eighth edition):    pT3    pN2    pM1a -confirmed liver metastasis, VR-     Comment:   A. 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 Dose # 1 Lanreotide was on 11/05/2020. Dose # 2 Lanreotide was on 12/03/2020. Dose # 3 Lanreotide was on 01/07/2021. Serum Chromogranin A 95 on 01/07/2021. CT chest 02/07/2021 negative for metastatic disease. CT abdomen/pelvis 02/07/2021 Persistent bilobar hepatic lesions which are grossly stable consistent with stable widespread hepatic metastasis. Imaging reviewed. Continue Lanreotide and repeat scans in 3 months. Dose # 4 Lanreotide was on 02/11/2021. Ga 76 Dotatate PET 03/09/2021 Gallium 68 dotatate avid uptake throughout multiple regions of the liver compatible with multiple foci of neuroendocrine tumor in both the right and the left lobe of the liver, when compared to previous not significantly changed in the interval.  Dose # 5 Lanreotide was on 03/11/2021. Dose # 6 Lanreotide was on 04/08/2021. Serum chromogranin A 115 (0-103)  Dose # 7 Lanreotide was on 05/06/2021. Serum chromogranin A 114 (0-103)  Dose # 8 Lanreotide was on 06/03/2021. Serum chromogranin A  84  (0-103)    Ga 76 Dotatate PET 06/29/2021 Numerous foci of increased Gallium 68 dotatate avid uptake throughout both lobes of the liver, not significantly changed from previous. No significant progression of disease. No definite metastatic disease identified  Dose # 9 Lanreotide was on 07/01/2021. Serum Chromogranin A 97 (0-103)  Dose # 10 Lanreotide was on 07/29/2021. Serum Chromogranin A 89 (0-103)  Dose # 11 Lanreotide was on 08/30/2021. Serum Chromogranin A 120 (0-103)  Dose # 12 Lanreotide was on 09/30/2021.  Serum Chromogranin A 141 (0-103)  PET/CT scan 11/09/2021 There is significant gallium avid tracer uptake in the liver, numerous lesions are seen, tracer uptake overall is diminished. There is no convincing extrahepatic disease identified. Dose # 13 Lanreotide was on 11/11/2021. Serum chromogranin A 105 on 11/11/2021. Dose # 14 Lanreotide was on 12/30/2021. Serum chromogranin A 184 on 12/30/2021. Dose # 15 Lanreotide was on 01/27/2022. Serum chromogranin A  98 on 01/27/2022. CT head 01/28/2022 noted no acute abnormality. CT cervical spine 01/28/2022 noted no acute abnormality of cervical spine  PET/CT Gallium 68 02/22/2022 noted Multiple regions of tracer uptake seen within the liver consistent with neuroendocrine tumor. No other evidence to suggest metastatic disease. Reviewed with Dr. Cole Bah from Radiology team; overall stable disease. Continue Lanreotide and repeat scans in 3 months. Dose # 16 Lanreotide was on 02/24/2022. Serum chromogranin A 116 on 02/24/2022  Dose # 17 Lanreotide was on 03/24/2022. Serum Chromogranin A 173 on 03/24/2022. Dose # 18 Lanreotide was on 04/21/2022. Serum Chromogranin A 140 on 04/21/2022. Dose # 19 Lanreotide was on 05/19/2022. Serum Chromogranin A 114 on 05/19/2022. Recent hospital admission reviewed. Imaging reviewed. Today 06/23/2022: No fever chills. Fair appetite and energy level. She is in good spirits now. Diarrhea controlled. Review of Systems;  CONSTITUTIONAL: No fever chills. Fair appetite and energy level. ENMT: Eyes: No diplopia; Nose: No epistaxis. Mouth: No sore throat. RESPIRATORY: No hemoptysis SOB  CARDIOVASCULAR: No chest pain, palpitations. GASTROINTESTINAL: + diarrhea controlled. GENITOURINARY: No hematuria frequency  NEURO: No syncope, presyncope, headache.    PSYCH: in good spirits now   Remainder:ROS NEGATIVE    Past Medical History:      Diagnosis Date    Abdominal pain     Cancer ([facility] [address]     neuroendocrime tumor    Diarrhea     Hypocalcemia     Hypothyroidism     Irritable bowel syndrome     Migraines     Seizures ([facility] [address]     last episode January 2021    Status post alcohol detoxification     5/22/2018-5/29/2018     Medications:  Reviewed and reconciled. Allergies:  No Known Allergies     Physical Exam:  BP (!) 99/57   Pulse (!) 105   Temp 98.6 °F (37 °C)   Ht 5' (1.524 m)   Wt 114 lb 3.2 oz (51.8 kg)   SpO2 97%   BMI 22.30 kg/m²   GENERAL: Alert oriented x 3, not in acute distress   LUNGS: CTA Med  CVS: RRR  EXTREMITIES: Without clubbing, cyanosis, or edema. ECOG PS 1    Lab Results   Component Value Date    WBC 7.7 06/20/2022    HGB 11.7 06/20/2022    HCT 37.9 06/20/2022    MCV 94.5 06/20/2022     06/20/2022     Lab Results   Component Value Date     06/20/2022    K 4.5 06/20/2022     06/20/2022    CO2 23 06/20/2022    BUN 14 06/20/2022    CREATININE 0.7 06/20/2022    GLUCOSE 91 06/20/2022    CALCIUM 9.3 06/20/2022    PROT 7.6 06/20/2022    LABALBU 4.6 06/20/2022    BILITOT 0.4 06/20/2022    ALKPHOS 119 (H) 06/20/2022    AST 36 (H) 06/20/2022    ALT 25 06/20/2022    LABGLOM >60 06/20/2022    GFRAA >60 06/20/2022     Impression/Plan:  41 y/o female with 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 Multiple liver masses suspicious for metastatic disease. 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 Comment: Sections of the liver tissue cores show the hepatic parenchyma to be partially replaced by a proliferation of epithelioid cells with relatively uniform round nuclei and eosinophilic granular cytoplasm.  The   epithelioid cells form anastomosing solid cords/nests that are surrounded by delicate fibrovascular stroma.  There are no mitotic figures or tumor cell necrosis present.  The histologic changes seen are suggestive of well-differentiated neuroendocrine (carcinoid) tumor.      Immunostaining for pankeratin, chromogranin, synaptophysin, TTF-1 and Ki-67 was performed on sections of one tissue block (A1) and the neoplastic epithelioid cells show diffuse and strong positivity for neuroendocrine markers (chromogranin and synaptophysin) and moderate positivity for pankeratin.    There is no staining reactivity for TTF-1. The Ki-67 proliferation labeling index is essentially negative, (very low, <1%). This staining pattern confirms the histologic impression of a well-differentiated neuroendocrine (carcinoid) tumor. FL Small bowel 09/02/2020:  Rapid small bowel transit. Serum Chromogranin A 160 on 09/23/2020. Urine 5-HIAA 21 (0-14)  2d-Echo 10/10/2020: EF 60-65%   Normal right ventricular size and function    Dotatate PET/CT scan 10/14/2020 increased tracer uptake seen throughout the liver compatible with neoplasm. This involves both the left and the right lobe of liver. In addition there are 2 foci of increased tracer uptake along the mesentery of the small bowel. This may involve the wall the small bowel. No other convincing evidence for extra-abdominal metastatic disease. EGD/Colonoscopy 10/05/2020 by Dr. Nargis Montgomery; records reviewed. Hepatobiliary team (Dr. Salvador Young) consult appreciated. Small bowel resection on 10/21/2020. A.  Ileum, resection:   Multifocal (4 foci) neuroendocrine tumor (NET). Extensive perineural and angiolymphatic invasion. 3 of 10 lymph nodes with metastatic neuroendocrine tumor and extracapsular extension of tumor cells (3/10). Bilateral viable small bowel resection margins with no evidence of tumor. Last Schmidt received as \"Meckel's\":   Nodular scar tissue with patchy chronic inflammation. Negative for neuroendocrine tumor. Intestinal mucosal tissue is not present.      CASE SUMMARY:   Procedure: Small bowel resection   Tumor site: Ileum   Tumor size: Greatest dimension of 1.5 cm   Tumor focality: Multifocal: 4 separate tumors   Histologic type and grade: G2: Well-differentiated neuroendocrine tumor   Mitotic rate: between 2-20 mitoses/2 mm2   Ki-67 labeling index: between 3-20%   Tumor extension: Tumor invades through the muscularis propria into subserosal tissue without penetration of overlying serosa   Margins: All margins are uninvolved by tumor    Margins examined: Proximal and distal   Lymphovascular invasion: Present   Perineural invasion: Present   Large mesenteric masses (greater than 2 cm): Present (one 2.5 cm mass)   Regional lymph nodes:    Number of lymph nodes involved: 3    Number of lymph nodes examined: 10   Pathologic stage classification (pTNM, AJCC eighth edition):    pT3    pN2    pM1a -confirmed liver metastasis, HBS-     Comment:   A. 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 Dose # 1 Lanreotide was on 11/05/2020. Dose # 2 Lanreotide was on 12/03/2020. Dose # 3 Lanreotide was on 01/07/2021. Serum Chromogranin A 95 on 01/07/2021. CT chest 02/07/2021 negative for metastatic disease. CT abdomen/pelvis 02/07/2021 Persistent bilobar hepatic lesions which are grossly stable consistent with stable widespread hepatic metastasis. Imaging reviewed. Continue Lanreotide and repeat scans in 3 months. Dose # 4 Lanreotide was on 02/11/2021. Ga 76 Dotatate PET 03/09/2021 Gallium 68 dotatate avid uptake throughout multiple regions of the liver compatible with multiple foci of neuroendocrine tumor in both the right and the left lobe of the liver, when compared to previous not significantly changed in the interval.  Dose # 5 Lanreotide was on 03/11/2021. Dose # 6 Lanreotide was on 04/08/2021. Serum chromogranin A 115 (0-103)  Dose # 7 Lanreotide was on 05/06/2021.  Serum chromogranin A 114 (0-103)  Dose # 8 Lanreotide was on 06/03/2021. Serum chromogranin A  84  (0-103)  Ga 76 Dotatate PET 06/29/2021 Numerous foci of increased Gallium 68 dotatate avid uptake throughout both lobes of the liver, not significantly changed from previous. No significant progression of disease. No definite metastatic disease identified  Dose # 9 Lanreotide was on 07/01/2021. Serum Chromogranin A 97 (0-103)  Dose # 10 Lanreotide was on 07/29/2021. Serum Chromogranin A 89 (0-103)  MRI Thoracic/Lumbar Spine 08/27/2021 unremarkable  CT head 08/27/2021 no acute intracranial abnormality  CT chest 08/27/2021 unremarkable. Stable hypodense lesions in liver grossly stable as of the CT of the chest from 02/07/2021  Dose # 11 Lanreotide was on 08/30/2021. Serum Chromogranin A 120 (0-103)  Dose # 12 Lanreotide was on 09/30/2021. Serum Chromogranin A 141 (0-103)  PET/CT scan 11/09/2021 There is significant gallium avid tracer uptake in the liver, numerous lesions are seen, tracer uptake overall is diminished. There is no convincing extrahepatic disease identified. Imaging reviewed. Continue Lanreotide and repeat scans in 3 months. Dose # 13 Lanreotide was on 11/11/2021. Serum chromogranin A 105 on 11/11/2021. Had COVID-19 and recovered. Dose # 14 Lanreotide was on 12/30/2021. Serum chromogranin A 184 on 12/30/2021. Dose # 15 Lanreotide was on 01/27/2022. Serum chromogranin A 98 on 01/27/2022. CT head 01/28/2022 noted no acute abnormality. CT cervical spine 01/28/2022 noted no acute abnormality of cervical spine  PET/CT Gallium 68 02/22/2022 noted Multiple regions of tracer uptake seen within the liver consistent with neuroendocrine tumor. No other evidence to suggest metastatic disease. Reviewed with Dr. Miguel Browning from Radiology team; overall stable disease. Continue Lanreotide and repeat scans in 3 months. Dose # 16 Lanreotide was on 02/24/2022.  Serum chromogranin A 116 on 02/24/2022  CTA chest 03/08/2022 noted no PE or acute pulmonary abnormality  Dose # 17 Lanreotide was on 03/24/2022. Serum Chromogranin A 173 on 03/24/2022. Dose # 18 Lanreotide was on 04/21/2022. Serum Chromogranin A 140 on 04/21/2022. Dose # 19 Lanreotide was on 05/19/2022. Serum Chromogranin A 114 on 05/19/2022. Recent hospital admission reviewed. CT head 06/15/2022 noted acute abnormality; right periorbital soft tissue swelling. Imaging reviewed  Dose # 20 Lanreotide is today 06/23/2022. Serum Chromogranin A pending. Labs reviewed ok to proceed. Imodium, Lomotil, Xermelo for diarrhea    RTC in 4 weeks for Dose # 21 Lanreotide. PET/CT Gallium 68 ordered in the interim.   If disease progression down the line can consider Nilsa Niño MD   0/85/2513  Board Certified Medical Oncologist

## 2022-06-23 NOTE — TELEPHONE ENCOUNTER
Met with pt and pt's father in conjunction with medical oncology visit re: recent psychiatric admission. Pt is 61-year-old female being treated for metastatic malignant neuroendocrine tumor to live. Pt's mood appeared euthymic with full affect, she appeared A&Ox4, and she was willing/able to participate in session. She appeared appropriately dressed/groomed and was able to ambulate/transfer without assistance. Pt indicated that she is doing well following discharge. Stated that she is established with a counselor at [facility] and has two upcoming appointments scheduled. Pt noted having good family support. She is in the process of rescheduling PET scan due to missing it while she was admitted. Encouraged pt to keep scheduled appointment and to notify clinic if additional needs arise.     Jane Carney, MSW, BR[facility]  Oncology Social Worker

## 2022-06-24 RX ORDER — TRAMADOL HYDROCHLORIDE 50 MG/1
TABLET ORAL
Qty: 120 TABLET | OUTPATIENT
Start: 2022-06-24

## 2022-06-27 DIAGNOSIS — F51.01 PRIMARY INSOMNIA: Primary | ICD-10-CM

## 2022-06-27 RX ORDER — ZOLPIDEM TARTRATE 10 MG/1
TABLET ORAL NIGHTLY PRN
COMMUNITY
End: 2022-06-27 | Stop reason: SDUPTHER

## 2022-06-27 RX ORDER — ZOLPIDEM TARTRATE 10 MG/1
10 TABLET ORAL NIGHTLY PRN
Qty: 30 TABLET | Refills: 0 | Status: SHIPPED
Start: 2022-06-27 | End: 2022-07-25

## 2022-06-27 RX ORDER — PANTOPRAZOLE SODIUM 40 MG/1
TABLET, DELAYED RELEASE ORAL
Qty: 30 TABLET | Refills: 3 | Status: SHIPPED
Start: 2022-06-27 | End: 2022-09-26

## 2022-06-28 ENCOUNTER — OFFICE VISIT (OUTPATIENT)
Dept: PALLATIVE CARE | Age: 37
End: 2022-06-28

## 2022-06-28 ENCOUNTER — TELEPHONE (OUTPATIENT)
Dept: PALLATIVE CARE | Age: 37
End: 2022-06-28

## 2022-06-28 VITALS
WEIGHT: 112 LBS | TEMPERATURE: 98.8 F | HEART RATE: 89 BPM | DIASTOLIC BLOOD PRESSURE: 71 MMHG | BODY MASS INDEX: 21.87 KG/M2 | OXYGEN SATURATION: 99 % | SYSTOLIC BLOOD PRESSURE: 106 MMHG

## 2022-06-28 DIAGNOSIS — G89.3 NEOPLASM RELATED PAIN: ICD-10-CM

## 2022-06-28 DIAGNOSIS — C7A.8 NEUROENDOCRINE CARCINOMA METASTATIC TO LIVER (HCC): ICD-10-CM

## 2022-06-28 DIAGNOSIS — Z51.5 PALLIATIVE CARE BY SPECIALIST: ICD-10-CM

## 2022-06-28 DIAGNOSIS — R19.7 DIARRHEA, UNSPECIFIED TYPE: Primary | ICD-10-CM

## 2022-06-28 DIAGNOSIS — C7B.8 NEUROENDOCRINE CARCINOMA METASTATIC TO LIVER (HCC): ICD-10-CM

## 2022-06-28 DIAGNOSIS — K58.0 IRRITABLE BOWEL SYNDROME WITH DIARRHEA: ICD-10-CM

## 2022-06-28 PROCEDURE — 1111F DSCHRG MED/CURRENT MED MERGE: CPT | Performed by: NURSE PRACTITIONER

## 2022-06-28 PROCEDURE — G8428 CUR MEDS NOT DOCUMENT: HCPCS | Performed by: NURSE PRACTITIONER

## 2022-06-28 PROCEDURE — 4004F PT TOBACCO SCREEN RCVD TLK: CPT | Performed by: NURSE PRACTITIONER

## 2022-06-28 PROCEDURE — G8420 CALC BMI NORM PARAMETERS: HCPCS | Performed by: NURSE PRACTITIONER

## 2022-06-28 PROCEDURE — 99213 OFFICE O/P EST LOW 20 MIN: CPT | Performed by: NURSE PRACTITIONER

## 2022-06-28 RX ORDER — TRAMADOL HYDROCHLORIDE 50 MG/1
50 TABLET ORAL EVERY 6 HOURS PRN
Qty: 120 TABLET | Refills: 2 | Status: ON HOLD
Start: 2022-06-28 | End: 2022-09-30 | Stop reason: HOSPADM

## 2022-06-28 NOTE — PROGRESS NOTES
.  Department of Palliative Medicine  Ambulatory Note  Provider: TRINA Leon - CNP     Chief Complaint: Ramakrishna Frankel is a 40 y.o. female with chief complaint of pain    HPI:  Ramakrishna Frankel is a 29 y.o. female with significant medical history of hypothyroidism, IBS, migraine who was complaining of abdominal pain associated with diarrhea and flushing/sweating. 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 Denny Presley. Assessment/Plan      Neuroendocrine cancer  - Follows with Dr. Khari driscoll Bowel resection on 10/21/20  - On Lanreotide     Neoplasm related pain  - Gabapentin 300mg TID  - Tramadol 50mg q 6 hrs prn for the pain      Nausea  - Continue Zofran and Phenergan PRN    Diarrhea:   -Currently on Lanreotide and Xermelo  -Imodium she uses this daily  -Eat 1-3 jumbo marshmallows daily PRN    - Goals of care: cure, live longer and improve or maintain function/quality of life    - Code Status: full code    Follow Up:  8 weeks. Encouraged to call with any questions, concerns, needs, or changes in symptoms. Subjective:   Kleber Padilla presents today with her father present. She is at her baseline for the most part. She continues to have abdominal pain, as well as some reports of some joint pain, which is managed well with tramadol and gabapentin. Her continues to have diarrhea, at times 4-5 episodes daily. She not able to associate this with meals, other than if she eats anything that is especially greasy, fried, or has milk products. She has had some mild improvement utilizing antidiarrheals as well as trying marshmallows, diarrhea continues to be mildly disruptive to her quality of life. She did try the Viberzi, however she stated this exacerbated her abdominal discomfort, she did not feel as though she tolerated this well, did not notice a significant improvement in her diarrhea.   We discussed options, this point time she does not wish to make any other changes to her plan of care, we will not make any changes to her antidiarrheal regimen. We will have her return in 2 months to reassess. Pain Assessment   Ratin  Description: burning, sharp and shooting  Duration: minutes  Frequency: daily  Location: Abdomen   Alleviating Factors: relaxation  Exacerbating Factors: unable to associate with any factor  Effect: change in function and sleep    Objective:     Physical Exam  Wt Readings from Last 3 Encounters:   22 112 lb (50.8 kg)   22 114 lb 3.2 oz (51.8 kg)   22 115 lb (52.2 kg)     /71   Pulse 89   Temp 98.8 °F (37.1 °C)   Wt 112 lb (50.8 kg)   SpO2 99% Comment: RA  BMI 21.87 kg/m²     Gen:  Alert, appears stated age, well nourished, in no acute distress  HEENT:  Normocephalic, conjunctiva pink, no drainage, mucosa moist  Neck:  Supple  Lungs:  CTA bilaterally, no audible rhonchi or wheezes noted  Heart[de-identified]  RRR, no murmur, rub, or gallop noted during exam  Abd:  Soft, non tender, non distended, BS+  M/S/Ext:  Moving all extremities, no edema, pulses present  Skin:  Warm and dry  Neuro:  PERRL, Alert, oriented x 3; following commands    [place] Symptom Assessment Score   [place] Score 2022 2022 2022 10/25/2021 2021   Pain Score 6 4 5 7 5   Tiredness Score 3 4 3 6 6   Nausea Score Not nauseated 1 3 2 4   Depression Score 2 Not depressed 1 5 Not depressed   Anxiety Score 2 2 2 5 2   Drowsiness Score 2 2 2 2 4   Appetite Score 5 2 3 3 4   Wellbeing Score 5 3 1 3 4   Dyspnea Score No shortness of breath 7 1 3 4   Other Problem Score Best possible response Best possible response Best possible response Best possible response Best possible response   Total Assessment Score(calculated) 25 25 21 36 33     Assessed by: patient. Current Medications:  Medications reviewed: yes    Controlled Substances Monitoring: [place] reviewed 22.   Wanda Ascencio Monitoring 6/28/2022   Attestation -   Periodic Controlled Substance Monitoring Possible medication side effects, risk of tolerance/dependence & alternative treatments discussed. ;No signs of potential drug abuse or diversion identified. ;Assessed functional status. ;Obtaining appropriate analgesic effect of treatment. TRINA Karimi - CNP   Palliative Care Department     Time/Communication  Greater than 50% of time spent, total 25 minutes in face-to-face counseling and coordination of care regarding symptom management. Note: This report was completed using computerPeopleJam voiced recognition software. Every effort has been made to ensure accuracy; however, inadvertent computerized transcription errors may be present.

## 2022-06-28 NOTE — DISCHARGE SUMMARY
DISCHARGE SUMMARY      Patient ID:  Maribeth Henriquez  [id number]  89 y.o.  1985    Admit date: 6/15/2022    Discharge date and time: 6/21/22    Admitting Physician: Rose Mary Hunter MD     Discharge Physician: Dr Luis F Gregory MD    Discharge Diagnoses:   Patient Active Problem List   Diagnosis    Primary insomnia    Fatty liver    H/O small bowel obstruction    Hypothyroidism    Depression    PTSD (post-traumatic stress disorder)    Liver masses    Migraines    Mesenteric mass    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 ([address]    Malignant carcinoid tumor of ileum ([address]    New onset seizure ([address]    Neuroendocrine tumor    Hypomagnesemia    Hypocalcemia    Hypoparathyroidism (HCC)    Tobacco abuse    Elevated liver enzymes    Moderate protein-calorie malnutrition ([address]    Encounter regarding vascular access for dialysis for ESRD ([address]    Difficulty with speech    Breakthrough seizure ([address]    Neuroendocrine cancer ([address]    Alcohol abuse    Depressed bipolar II disorder ([address]       Admission Condition: poor    Discharged Condition: stable    Admission Circumstance: presented to the ED after patient was intoxicated at home fell on the toilet injuring her right eye in the ED she reported that she had taken some sleeping pills because she \"just wanted to go to sleep. \"  She also reported having stress due to a break-up with her boyfriend of 7 years and her cancer diagnosis.        PAST MEDICAL/PSYCHIATRIC HISTORY:   Past Medical History:   Diagnosis Date    Abdominal pain     Cancer ([address]     neuroendocrime tumor    Diarrhea     Hypocalcemia     Hypothyroidism     Irritable bowel syndrome     Migraines     Seizures ([address]     last episode January 2021    Status post alcohol detoxification     5/22/2018-5/29/2018       FAMILY/SOCIAL HISTORY:  Family History   Problem Relation Age of Onset    High Blood Pressure Mother     High Cholesterol Mother     Other Mother         migraine headaches    Heart Disease Father     Asthma Father     High Blood Pressure Father     Cancer Father         Sarcoidosis    Diabetes Other         GRANDMOTHER    Lung Cancer Other         GRANDFATHER    Alzheimer's Disease Other         GRANDMOTHER    Breast Cancer Maternal Grandmother     Cancer Maternal Grandmother         skin    Cancer Paternal Grandfather         lung     Social History     Socioeconomic History    Marital status: Single     Spouse name: Not on file    Number of children: Not on file    Years of education: Not on file    Highest education level: Not on file   Occupational History    Occupation: cleaning     Employer: [employer]   Tobacco Use    Smoking status: Current Every Day Smoker     Packs/day: 0.25     Types: Cigarettes    Smokeless tobacco: Never Used   Vaping Use    Vaping Use: Never used   Substance and Sexual Activity    Alcohol use: No     Alcohol/week: 0.0 standard drinks     Comment: 5 years sober    Drug use: No    Sexual activity: Not on file   Other Topics Concern    Not on file   Social History Narrative    Not on file     Social Determinants of Health     Financial Resource Strain:     Difficulty of Paying Living Expenses: Not on file   Food Insecurity:     Worried About [address] in the Last Year: Not on file    [place] of Food in the Last Year: Not on file   Transportation Needs:     Lack of Transportation (Medical): Not on file    Lack of Transportation (Non-Medical):  Not on file   Physical Activity:     Days of Exercise per Week: Not on file    Minutes of Exercise per Session: Not on file   Stress:     Feeling of Stress : Not on file   Social Connections:     Frequency of Communication with Friends and Family: Not on file    Frequency of Social Gatherings with Friends and Family: Not on file    Attends Judaism Services: Not on file    Active Member of Clubs or Organizations: Not on file    Attends Club or Organization Meetings: Not on file    Marital Status: Not on file   Intimate Partner Violence:     Fear of Current or Ex-Partner: Not on file    Emotionally Abused: Not on file    Physically Abused: Not on file    Sexually Abused: Not on file   Housing Stability:     Unable to Pay for Housing in the Last Year: Not on file    Number of [place] in the Last Year: Not on file    Unstable Housing in the Last Year: Not on file       MEDICATIONS:  No current facility-administered medications for this encounter. Current Outpatient Medications:     melatonin 3 MG TABS tablet, Take 1 tablet by mouth nightly, Disp: 30 tablet, Rfl: 0    butalbital-acetaminophen-caffeine (FIORICET, ESGIC) -40 MG per tablet, Take 1 tablet by mouth every 4 hours as needed for Headaches, Disp: , Rfl:     fluticasone (FLONASE) 50 MCG/ACT nasal spray, 1 spray by Nasal route daily as needed for Rhinitis, Disp: , Rfl:     levETIRAcetam (KEPPRA) 750 MG tablet, Take 750 mg by mouth 2 times daily, Disp: , Rfl:     potassium chloride (KLOR-CON M) 20 MEQ extended release tablet, Take 20 mEq by mouth 2 times daily, Disp: , Rfl:     topiramate (TOPAMAX) 50 MG tablet, Take 50 mg by mouth 2 times daily, Disp: , Rfl:     brimonidine (ALPHAGAN) 0.2 % ophthalmic solution, Place 1 drop into both eyes every morning, Disp: , Rfl:     pantoprazole (PROTONIX) 40 MG tablet, take 1 tablet by mouth every morning before breakfast, Disp: 30 tablet, Rfl: 3    zolpidem (AMBIEN) 10 MG tablet, Take 1 tablet by mouth nightly as needed for Sleep for up to 30 days. , Disp: 30 tablet, Rfl: 0    levothyroxine (SYNTHROID) 112 MCG tablet, take 1 tablet by mouth once daily, Disp: 90 tablet, Rfl: 1    calcium carbonate-vitamin D3 (CALTRATE) 600-400 MG-UNIT TABS per tab, take 1 tablet by mouth every morning and at bedtime, Disp: 60 tablet, Rfl: 3    traMADol (ULTRAM) 50 MG tablet, Take 1 tablet by mouth every 6 hours as needed for Pain for up to 90 days. , Disp: 120 tablet, Rfl: 2    gabapentin (NEURONTIN) 300 MG capsule, Take 1 capsule by mouth 3 times daily for 90 days. , Disp: 270 capsule, Rfl: 0    magnesium oxide (MAG-OX) 400 MG tablet, Take 400 mg by mouth 2 times daily, Disp: , Rfl:     Examination:  BP (!) 92/57   Pulse 72   Temp 97.1 °F (36.2 °C) (Temporal)   Resp 14   Ht 5' (1.524 m)   Wt 115 lb (52.2 kg)   SpO2 100%   Breastfeeding No   BMI 22.46 kg/m²   Gait - steady    HOSPITAL COURSE[de-identified]  Patient was admitted to the unit on 6/15/22 was closely monitored for psychosis. She was evaluated and was continued on her antiseizure medications of Neurontin 300 mg 3 times daily as well as Topamax 50 mg twice daily which act as mood stabilizers for the patient. Patient declined having any medication for alcohol cravings prescribed. .  Medical events were insignificant and patient continued to improve on the floor. She started coming out of her room she was attending groups to socializing with peers. She never made any suicidal statements or any suicidal gestures while in the unit. Social workers obtain confirmation patient's father who was able to voice any concerns that he had. He reported no safety concerns no access to any guns. Treatment team felt the patient obtain the maximum benefit from her hospitalization She was set up with an outpatient mental health agency for outpatient follow-up services . At the time of discharge patient  did not show impulsive behavior. She was up on the unit she was attending groups and socializing with peers. She vehemently denied any suicidal homicidal ideations intent or plan. She was eating well and sleeping well there are no neurovegetative signs or symptoms of depression she denied any auditory visualizations. There are no overt or covert signs psychosis. She was appreciative of the help that she received here. This patient no longer meets criteria for inpatient hospitalization.           No AVH or paranoid thoughts  No Hopeless or worthless feeling  No active SI/HI  Appetite:  [x] Normal  [] Increased  [] Decreased    Sleep:       [x] Normal  [] Fair       [] Poor            Energy:    [x] Normal  [] Increased  [] Decreased     SI [] Present  [x] Absent  HI  []Present  [x] Absent   Aggression:  [] yes  [x] no  Patient is [x] able  [] unable to CONTRACT FOR SAFETY   Medication side effects(SE):  [x] None(Psych. Meds.) [] Other      Mental Status Examination on discharge:    Level of consciousness:  within normal limits   Appearance:  well-appearing  Behavior/Motor:  no abnormalities noted  Attitude toward examiner:  attentive and good eye contact  Speech:  spontaneous, normal rate and normal volume   Mood: \"My mood is good. \"  Affect: Appropriate and pleasant   Thought processes: Linear without flight of ideas loose associations  Thought content: Devoid of any auditory visual loose Nations delusions or other perceptual normalities. Denies SI/HI intent or plan  Cognition:  oriented to person, place, and time   Concentration intact  Memory intact  Insight good   Judgement fair   Fund of Knowledge adequate      ASSESSMENT:  Patient symptoms are:  [x] Well controlled  [x] Improving  [] Worsening  [] No change    Reason for more than one antipsychotic:  [x] N/A  [] 3 Failed Monotherapy attempts (Drugs tried:)  [] Crossover to a new antipsychotic  [] Taper to Monotherapy from Polypharmacy  [] Augmentation of clozapine therapy due to treatment resistance to single therapy    Diagnosis:  Principal Problem:    Depressed bipolar II disorder ([facility]  Active Problems:    Alcohol abuse    Moderate protein-calorie malnutrition ([facility]  Resolved Problems:    * No resolved hospital problems. *      LABS:    No results for input(s): WBC, HGB, PLT in the last 72 hours. No results for input(s): NA, K, CL, CO2, BUN, CREATININE, GLUCOSE in the last 72 hours. No results for input(s): BILITOT, ALKPHOS, AST, ALT in the last 72 hours.   Lab Results Component Value Date    LABAMPH NOT DETECTED 06/15/2022    BARBSCNU NOT DETECTED 06/15/2022    LABBENZ NOT DETECTED 06/15/2022    LABMETH NOT DETECTED 06/15/2022    OPIATESCREENURINE NOT DETECTED 06/15/2022    PHENCYCLIDINESCREENURINE NOT DETECTED 06/15/2022    ETOH 133 06/15/2022     Lab Results   Component Value Date    TSH 2.560 06/15/2022     No results found for: LITHIUM  No results found for: VALPROATE, CBMZ    RISK ASSESSMENT AT DISCHARGE: Low risk for suicide and homicide. Treatment Plan:  Reviewed current Medications with the patient. Education provided on the complaince with treatment. Risks, benefits, side effects, drug-to-drug interactions and alternatives to treatment were discussed. Encourage patient to attend outpatient follow up appointment and therapy. Patient was advised to call the outpatient provider, visit the nearest ED or call 911 if symptoms are not manageable. Patient's family member was contacted prior to the discharge. Medication List      START taking these medications    melatonin 3 MG Tabs tablet  Take 1 tablet by mouth nightly        CONTINUE taking these medications    brimonidine 0.2 % ophthalmic solution  Commonly known as: ALPHAGAN     butalbital-acetaminophen-caffeine -40 MG per tablet  Commonly known as: FIORICET, ESGIC     calcium carbonate-vitamin D3 600-400 MG-UNIT Tabs per tab  Commonly known as: CALTRATE  take 1 tablet by mouth every morning and at bedtime     fluticasone 50 MCG/ACT nasal spray  Commonly known as: FLONASE     gabapentin 300 MG capsule  Commonly known as: NEURONTIN  Take 1 capsule by mouth 3 times daily for 90 days.      levETIRAcetam 750 MG tablet  Commonly known as: KEPPRA     magnesium oxide 400 MG tablet  Commonly known as: MAG-OX     potassium chloride 20 MEQ extended release tablet  Commonly known as: KLOR-CON M     Topamax 50 MG tablet  Generic drug: topiramate     traMADol 50 MG tablet  Commonly known as: ULTRAM  Take 1 tablet by mouth every 6 hours as needed for Pain for up to 90 days.         STOP taking these medications    famotidine 40 MG tablet  Commonly known as: PEPCID     levothyroxine 112 MCG tablet  Commonly known as: SYNTHROID     pantoprazole 40 MG tablet  Commonly known as: PROTONIX     promethazine 25 MG tablet  Commonly known as: PHENERGAN     zolpidem 10 MG tablet  Commonly known as: AMBIEN           Where to Get Your Medications      These medications were sent to [facility] [address] [place] [address]    Phone: [phone]   · melatonin 3 MG Tabs tablet     Patient is counseled if she continues to abuse drugs or alcohol she could act out impulsively causing serious harm to herself or others even though it may be unintentional.  She demonstrated understanding of this and has the capacity understand this    Patient is counseled her mental health treatment will be difficult to optimize with ongoing use of drugs or alcohol she demonstrated understanding of this and has the capacity to understand this     Patient is counseled that if she uses any amount of opiates after any clean time she could have an unintentional overdose she demonstrated understanding standing of this and has  the capacity understand this    Patient is counseled she must remain compliant with all medications outpatient follow-up appointments    Patient is discharged home in stable condition        TIME SPEND - 35 MINUTES TO COMPLETE THE EVALUATION, DISCHARGE SUMMARY, MEDICATION RECONCILIATION AND FOLLOW UP CARE     Signed:  TRINA Mccabe CNP  0/90/6691  11:17 AM

## 2022-06-29 DIAGNOSIS — F51.01 PRIMARY INSOMNIA: ICD-10-CM

## 2022-06-29 RX ORDER — ZOLPIDEM TARTRATE 10 MG/1
TABLET ORAL
Qty: 30 TABLET | OUTPATIENT
Start: 2022-06-29

## 2022-07-05 RX ORDER — BUTALBITAL, ACETAMINOPHEN AND CAFFEINE 50; 325; 40 MG/1; MG/1; MG/1
TABLET ORAL
Qty: 30 TABLET | Refills: 1 | Status: SHIPPED
Start: 2022-07-05 | End: 2022-08-24

## 2022-07-19 ENCOUNTER — HOSPITAL ENCOUNTER (OUTPATIENT)
Dept: NUCLEAR MEDICINE | Age: 37
Discharge: HOME | End: 2022-07-21
Payer: MEDICARE

## 2022-07-19 DIAGNOSIS — C7B.8 METASTATIC MALIGNANT NEUROENDOCRINE TUMOR TO LIVER (HCC): ICD-10-CM

## 2022-07-19 PROCEDURE — A9587 GALLIUM GA-68: HCPCS | Performed by: RADIOLOGY

## 2022-07-19 PROCEDURE — 78815 PET IMAGE W/CT SKULL-THIGH: CPT

## 2022-07-19 PROCEDURE — 78815 PET IMAGE W/CT SKULL-THIGH: CPT | Performed by: RADIOLOGY

## 2022-07-19 PROCEDURE — 3430000000 HC RX DIAGNOSTIC RADIOPHARMACEUTICAL: Performed by: RADIOLOGY

## 2022-07-19 RX ORDER — 68GA-DOTATATE 40 MCG
KIT INTRAVENOUS ONCE
Status: CANCELLED | OUTPATIENT
Start: 2022-07-19

## 2022-07-19 RX ORDER — 68GA-DOTATATE 40 MCG
2.8 KIT INTRAVENOUS ONCE
Status: COMPLETED | OUTPATIENT
Start: 2022-07-19 | End: 2022-07-19

## 2022-07-19 RX ADMIN — 68GA-DOTATATE 2.8 MILLICURIE: KIT INTRAVENOUS at 08:15

## 2022-07-21 ENCOUNTER — HOSPITAL ENCOUNTER (OUTPATIENT)
Dept: INFUSION THERAPY | Age: 37
Discharge: HOME | End: 2022-07-21
Payer: MEDICARE

## 2022-07-21 ENCOUNTER — OFFICE VISIT (OUTPATIENT)
Dept: ONCOLOGY | Age: 37
End: 2022-07-21
Payer: MEDICARE

## 2022-07-21 VITALS
BODY MASS INDEX: 22.71 KG/M2 | SYSTOLIC BLOOD PRESSURE: 108 MMHG | OXYGEN SATURATION: 99 % | TEMPERATURE: 98.2 F | WEIGHT: 115.7 LBS | HEIGHT: 60 IN | HEART RATE: 67 BPM | DIASTOLIC BLOOD PRESSURE: 60 MMHG

## 2022-07-21 DIAGNOSIS — C7B.8 METASTATIC MALIGNANT NEUROENDOCRINE TUMOR TO LIVER (HCC): Primary | ICD-10-CM

## 2022-07-21 DIAGNOSIS — C7A.8 NEUROENDOCRINE CANCER (HCC): Primary | ICD-10-CM

## 2022-07-21 LAB
ALBUMIN SERPL-MCNC: 4.7 G/DL (ref 3.5–5.2)
ALP BLD-CCNC: 87 U/L (ref 35–104)
ALT SERPL-CCNC: 12 U/L (ref 0–32)
ANION GAP SERPL CALCULATED.3IONS-SCNC: 13 MMOL/L (ref 7–16)
ANISOCYTOSIS: ABNORMAL
AST SERPL-CCNC: 16 U/L (ref 0–31)
BASOPHILS ABSOLUTE: 0.09 E9/L (ref 0–0.2)
BASOPHILS RELATIVE PERCENT: 1.1 % (ref 0–2)
BILIRUB SERPL-MCNC: 0.3 MG/DL (ref 0–1.2)
BUN BLDV-MCNC: 12 MG/DL (ref 6–20)
CALCIUM SERPL-MCNC: 9.1 MG/DL (ref 8.6–10.2)
CHLORIDE BLD-SCNC: 106 MMOL/L (ref 98–107)
CO2: 21 MMOL/L (ref 22–29)
CREAT SERPL-MCNC: 0.8 MG/DL (ref 0.5–1)
EOSINOPHILS ABSOLUTE: 0.14 E9/L (ref 0.05–0.5)
EOSINOPHILS RELATIVE PERCENT: 1.8 % (ref 0–6)
GFR AFRICAN AMERICAN: >60
GFR NON-AFRICAN AMERICAN: >60 ML/MIN/1.73
GLUCOSE BLD-MCNC: 99 MG/DL (ref 74–99)
HCT VFR BLD CALC: 37.2 % (ref 34–48)
HEMOGLOBIN: 11.8 G/DL (ref 11.5–15.5)
HYPOCHROMIA: ABNORMAL
IMMATURE GRANULOCYTES #: 0.03 E9/L
IMMATURE GRANULOCYTES %: 0.4 % (ref 0–5)
LYMPHOCYTES ABSOLUTE: 2.09 E9/L (ref 1.5–4)
LYMPHOCYTES RELATIVE PERCENT: 26.3 % (ref 20–42)
MCH RBC QN AUTO: 28 PG (ref 26–35)
MCHC RBC AUTO-ENTMCNC: 31.7 % (ref 32–34.5)
MCV RBC AUTO: 88.4 FL (ref 80–99.9)
MONOCYTES ABSOLUTE: 0.83 E9/L (ref 0.1–0.95)
MONOCYTES RELATIVE PERCENT: 10.4 % (ref 2–12)
NEUTROPHILS ABSOLUTE: 4.77 E9/L (ref 1.8–7.3)
NEUTROPHILS RELATIVE PERCENT: 60 % (ref 43–80)
OVALOCYTES: ABNORMAL
PDW BLD-RTO: 21.2 FL (ref 11.5–15)
PLATELET # BLD: 225 E9/L (ref 130–450)
PMV BLD AUTO: 9.4 FL (ref 7–12)
POIKILOCYTES: ABNORMAL
POLYCHROMASIA: ABNORMAL
POTASSIUM SERPL-SCNC: 4.2 MMOL/L (ref 3.5–5)
RBC # BLD: 4.21 E12/L (ref 3.5–5.5)
SODIUM BLD-SCNC: 140 MMOL/L (ref 132–146)
TEAR DROP CELLS: ABNORMAL
TOTAL PROTEIN: 7.3 G/DL (ref 6.4–8.3)
WBC # BLD: 8 E9/L (ref 4.5–11.5)

## 2022-07-21 PROCEDURE — 86316 IMMUNOASSAY TUMOR OTHER: CPT

## 2022-07-21 PROCEDURE — 85025 COMPLETE CBC W/AUTO DIFF WBC: CPT

## 2022-07-21 PROCEDURE — 36591 DRAW BLOOD OFF VENOUS DEVICE: CPT

## 2022-07-21 PROCEDURE — G8427 DOCREV CUR MEDS BY ELIG CLIN: HCPCS | Performed by: INTERNAL MEDICINE

## 2022-07-21 PROCEDURE — 4004F PT TOBACCO SCREEN RCVD TLK: CPT | Performed by: INTERNAL MEDICINE

## 2022-07-21 PROCEDURE — 6360000002 HC RX W HCPCS: Performed by: INTERNAL MEDICINE

## 2022-07-21 PROCEDURE — G8420 CALC BMI NORM PARAMETERS: HCPCS | Performed by: INTERNAL MEDICINE

## 2022-07-21 PROCEDURE — 2580000003 HC RX 258: Performed by: NURSE PRACTITIONER

## 2022-07-21 PROCEDURE — 80053 COMPREHEN METABOLIC PANEL: CPT

## 2022-07-21 PROCEDURE — 6360000002 HC RX W HCPCS: Performed by: NURSE PRACTITIONER

## 2022-07-21 PROCEDURE — 96372 THER/PROPH/DIAG INJ SC/IM: CPT

## 2022-07-21 PROCEDURE — 99215 OFFICE O/P EST HI 40 MIN: CPT | Performed by: INTERNAL MEDICINE

## 2022-07-21 PROCEDURE — 1111F DSCHRG MED/CURRENT MED MERGE: CPT | Performed by: INTERNAL MEDICINE

## 2022-07-21 RX ORDER — HEPARIN SODIUM (PORCINE) LOCK FLUSH IV SOLN 100 UNIT/ML 100 UNIT/ML
500 SOLUTION INTRAVENOUS PRN
Status: CANCELLED | OUTPATIENT
Start: 2022-07-21

## 2022-07-21 RX ORDER — HEPARIN SODIUM (PORCINE) LOCK FLUSH IV SOLN 100 UNIT/ML 100 UNIT/ML
500 SOLUTION INTRAVENOUS PRN
Status: DISCONTINUED | OUTPATIENT
Start: 2022-07-21 | End: 2022-07-22 | Stop reason: HOSPADM

## 2022-07-21 RX ORDER — LANREOTIDE ACETATE 120 MG/.5ML
120 INJECTION SUBCUTANEOUS ONCE
Status: COMPLETED | OUTPATIENT
Start: 2022-07-21 | End: 2022-07-21

## 2022-07-21 RX ORDER — LANREOTIDE ACETATE 120 MG/.5ML
120 INJECTION SUBCUTANEOUS ONCE
Status: CANCELLED | OUTPATIENT
Start: 2022-08-18 | End: 2022-08-18

## 2022-07-21 RX ORDER — CITALOPRAM 10 MG/1
TABLET ORAL
COMMUNITY
Start: 2022-07-11 | End: 2022-09-26

## 2022-07-21 RX ORDER — SODIUM CHLORIDE 9 MG/ML
25 INJECTION, SOLUTION INTRAVENOUS PRN
Status: CANCELLED | OUTPATIENT
Start: 2022-07-21

## 2022-07-21 RX ORDER — SODIUM CHLORIDE 0.9 % (FLUSH) 0.9 %
5-40 SYRINGE (ML) INJECTION PRN
Status: DISCONTINUED | OUTPATIENT
Start: 2022-07-21 | End: 2022-07-22 | Stop reason: HOSPADM

## 2022-07-21 RX ORDER — SODIUM CHLORIDE 0.9 % (FLUSH) 0.9 %
5-40 SYRINGE (ML) INJECTION PRN
Status: CANCELLED | OUTPATIENT
Start: 2022-07-21

## 2022-07-21 RX ADMIN — SODIUM CHLORIDE, PRESERVATIVE FREE 10 ML: 5 INJECTION INTRAVENOUS at 13:30

## 2022-07-21 RX ADMIN — SODIUM CHLORIDE, PRESERVATIVE FREE 10 ML: 5 INJECTION INTRAVENOUS at 13:33

## 2022-07-21 RX ADMIN — HEPARIN 500 UNITS: 100 SYRINGE at 13:35

## 2022-07-21 RX ADMIN — LANREOTIDE ACETATE 120 MG: 120 INJECTION SUBCUTANEOUS at 14:42

## 2022-07-21 NOTE — PROGRESS NOTES
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 CT abdomen/pelvis 08/28/2020:  2 cm masslike density in the mid abdominal small bowel mesentery with a spiculated appearance. Multiple liver masses suspicious for metastatic disease. 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 Comment: Sections of the liver tissue cores show the hepatic parenchyma to be partially replaced by a proliferation of epithelioid cells with relatively uniform round nuclei and eosinophilic granular cytoplasm. The   epithelioid cells form anastomosing solid cords/nests that are surrounded by delicate fibrovascular stroma. There are no mitotic figures or tumor cell necrosis present. The histologic changes seen are suggestive of well-differentiated neuroendocrine (carcinoid) tumor. Immunostaining for pankeratin, chromogranin, synaptophysin, TTF-1 and Ki-67 was performed on sections of one tissue block (A1) and the neoplastic epithelioid cells show diffuse and strong positivity for neuroendocrine markers (chromogranin and synaptophysin) and moderate positivity for pankeratin. There is no staining reactivity for TTF-1. The Ki-67 proliferation labeling index is essentially negative, (very low, <1%). This staining pattern confirms the histologic impression of a well-differentiated neuroendocrine (carcinoid) tumor. FL Small bowel 09/02/2020:  Rapid small bowel transit. Serum Chromogranin A 160 on 09/23/2020.   Urine 5-HIAA 21 (0-14)  2d-Echo 10/10/2020: EF 60-65%   Normal right ventricular size and function    Dotatate PET/CT scan 10/14/2020 increased tracer uptake seen throughout the liver compatible with neoplasm. This involves both the left and the right lobe of liver. In addition there are 2 foci of increased tracer uptake along the mesentery of the small bowel. This may involve the wall the small bowel. No other convincing evidence for extra-abdominal metastatic disease. EGD/Colonoscopy 10/05/2020 by Dr. Khalida Herrmann; records reviewed. Hepatobiliary team (Dr. Wilda Kline) consult appreciated. Small bowel resection on 10/21/2020. Small bowel resection on 10/21/2020. A. Ileum, resection:   Multifocal (4 foci) neuroendocrine tumor (NET). Extensive perineural and angiolymphatic invasion. 3 of 10 lymph nodes with metastatic neuroendocrine tumor and extracapsular extension of tumor cells (3/10). Bilateral viable small bowel resection margins with no evidence of tumor. B. Specimen received as \"Meckel's\":   Nodular scar tissue with patchy chronic inflammation. Negative for neuroendocrine tumor. Intestinal mucosal tissue is not present. CASE SUMMARY:   Procedure: Small bowel resection   Tumor site: Ileum   Tumor size: Greatest dimension of 1.5 cm   Tumor focality: Multifocal: 4 separate tumors   Histologic type and grade: G2: Well-differentiated neuroendocrine tumor   Mitotic rate: between 2-20 mitoses/2 mm2   Ki-67 labeling index: between 3-20%   Tumor extension: Tumor invades through the muscularis propria into subserosal tissue without penetration of overlying serosa   Margins:  All margins are uninvolved by tumor    Margins examined: Proximal and distal   Lymphovascular invasion: Present   Perineural invasion: Present   Large mesenteric masses (greater than 2 cm): Present (one 2.5 cm mass)   Regional lymph nodes:    Number of lymph nodes involved: 3    Number of lymph nodes examined: 10   Pathologic stage classification (pTNM, AJCC eighth edition):    pT3    pN2    pM1a -confirmed liver metastasis, CYK-     Comment:   A. 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 Dose # 1 Lanreotide was on 11/05/2020. Dose # 2 Lanreotide was on 12/03/2020. Dose # 3 Lanreotide was on 01/07/2021. Serum Chromogranin A 95 on 01/07/2021. CT chest 02/07/2021 negative for metastatic disease. CT abdomen/pelvis 02/07/2021 Persistent bilobar hepatic lesions which are grossly stable consistent with stable widespread hepatic metastasis. Imaging reviewed. Continue Lanreotide and repeat scans in 3 months. Dose # 4 Lanreotide was on 02/11/2021. Ga 76 Dotatate PET 03/09/2021 Gallium 68 dotatate avid uptake throughout multiple regions of the liver compatible with multiple foci of neuroendocrine tumor in both the right and the left lobe of the liver, when compared to previous not significantly changed in the interval.  Dose # 5 Lanreotide was on 03/11/2021. Dose # 6 Lanreotide was on 04/08/2021. Serum chromogranin A 115 (0-103)  Dose # 7 Lanreotide was on 05/06/2021. Serum chromogranin A 114 (0-103)  Dose # 8 Lanreotide was on 06/03/2021. Serum chromogranin A  84  (0-103)    Ga 76 Dotatate PET 06/29/2021 Numerous foci of increased Gallium 68 dotatate avid uptake throughout both lobes of the liver, not significantly changed from previous. No significant progression of disease. No definite metastatic disease identified  Dose # 9 Lanreotide was on 07/01/2021. Serum Chromogranin A 97 (0-103)  Dose # 10 Lanreotide was on 07/29/2021. Serum Chromogranin A 89 (0-103)  Dose # 11 Lanreotide was on 08/30/2021. Serum Chromogranin A 120 (0-103)  Dose # 12 Lanreotide was on 09/30/2021.  Serum Chromogranin A 141 (0-103)  PET/CT scan 11/09/2021 There is significant gallium avid tracer uptake in the liver, numerous lesions are seen, tracer uptake overall is diminished. There is no convincing extrahepatic disease identified. Dose # 13 Lanreotide was on 11/11/2021. Serum chromogranin A 105 on 11/11/2021. Dose # 14 Lanreotide was on 12/30/2021. Serum chromogranin A 184 on 12/30/2021. Dose # 15 Lanreotide was on 01/27/2022. Serum chromogranin A  98 on 01/27/2022. CT head 01/28/2022 noted no acute abnormality. CT cervical spine 01/28/2022 noted no acute abnormality of cervical spine  PET/CT Gallium 68 02/22/2022 noted Multiple regions of tracer uptake seen within the liver consistent with neuroendocrine tumor. No other evidence to suggest metastatic disease. Reviewed with Dr. Geremias Villalba from Radiology team; overall stable disease. Continue Lanreotide and repeat scans in 3 months. Dose # 16 Lanreotide was on 02/24/2022. Serum chromogranin A 116 on 02/24/2022  Dose # 17 Lanreotide was on 03/24/2022. Serum Chromogranin A 173 on 03/24/2022. Dose # 18 Lanreotide was on 04/21/2022. Serum Chromogranin A 140 on 04/21/2022. Dose # 19 Lanreotide was on 05/19/2022. Serum Chromogranin A 114 on 05/19/2022. Dose # 20 Lanreotide was on 06/23/2022. PET/CT scan 07/19/2022: Gallium 76 dotatate avid uptake is present within multiple masses within the liver, allowing for slight technical differences, not significantly changed in the interval. No convincing extrahepatic metastatic disease identified. Today 07/21/2022: No fever chills. Fair appetite and energy level. Diarrhea controlled. Review of Systems;  CONSTITUTIONAL: No fever chills. Fair appetite and energy level. ENMT: Eyes: No diplopia; Nose: No epistaxis. Mouth: No sore throat. RESPIRATORY: No hemoptysis SOB  CARDIOVASCULAR: No chest pain, palpitations. GASTROINTESTINAL: Diarrhea controlled. GENITOURINARY: No hematuria frequency  NEURO: No syncope, presyncope, headache.    Remainder:ROS NEGATIVE    Past Medical History:      Diagnosis Date    Abdominal pain     Cancer ([address] mitotic figures or tumor cell necrosis present. The histologic changes seen are suggestive of well-differentiated neuroendocrine (carcinoid) tumor. Immunostaining for pankeratin, chromogranin, synaptophysin, TTF-1 and Ki-67 was performed on sections of one tissue block (A1) and the neoplastic epithelioid cells show diffuse and strong positivity for neuroendocrine markers (chromogranin and synaptophysin) and moderate positivity for pankeratin. There is no staining reactivity for TTF-1. The Ki-67 proliferation labeling index is essentially negative, (very low, <1%). This staining pattern confirms the histologic impression of a well-differentiated neuroendocrine (carcinoid) tumor. FL Small bowel 09/02/2020:  Rapid small bowel transit. Serum Chromogranin A 160 on 09/23/2020. Urine 5-HIAA 21 (0-14)  2d-Echo 10/10/2020: EF 60-65%   Normal right ventricular size and function    Dotatate PET/CT scan 10/14/2020 increased tracer uptake seen throughout the liver compatible with neoplasm. This involves both the left and the right lobe of liver. In addition there are 2 foci of increased tracer uptake along the mesentery of the small bowel. This may involve the wall the small bowel. No other convincing evidence for extra-abdominal metastatic disease. EGD/Colonoscopy 10/05/2020 by Dr. Sanrda Thomas; records reviewed. Hepatobiliary team (Dr. Vicenta Varner) consult appreciated. Small bowel resection on 10/21/2020. A. Ileum, resection:   Multifocal (4 foci) neuroendocrine tumor (NET). Extensive perineural and angiolymphatic invasion. 3 of 10 lymph nodes with metastatic neuroendocrine tumor and extracapsular extension of tumor cells (3/10). Bilateral viable small bowel resection margins with no evidence of tumor. B. Specimen received as \"Meckel's\":   Nodular scar tissue with patchy chronic inflammation. Negative for neuroendocrine tumor. Intestinal mucosal tissue is not present.      CASE SUMMARY: Procedure: Small bowel resection   Tumor site: Ileum   Tumor size: Greatest dimension of 1.5 cm   Tumor focality: Multifocal: 4 separate tumors   Histologic type and grade: G2: Well-differentiated neuroendocrine tumor   Mitotic rate: between 2-20 mitoses/2 mm2   Ki-67 labeling index: between 3-20%   Tumor extension: Tumor invades through the muscularis propria into subserosal tissue without penetration of overlying serosa   Margins: All margins are uninvolved by tumor    Margins examined: Proximal and distal   Lymphovascular invasion: Present   Perineural invasion: Present   Large mesenteric masses (greater than 2 cm): Present (one 2.5 cm mass)   Regional lymph nodes:    Number of lymph nodes involved: 3    Number of lymph nodes examined: 10   Pathologic stage classification (pTNM, AJCC eighth edition):    pT3    pN2    pM1a -confirmed liver metastasis, HBS-     Comment:   A. 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 Dose # 1 Lanreotide was on 11/05/2020. Dose # 2 Lanreotide was on 12/03/2020. Dose # 3 Lanreotide was on 01/07/2021. Serum Chromogranin A 95 on 01/07/2021. CT chest 02/07/2021 negative for metastatic disease. CT abdomen/pelvis 02/07/2021 Persistent bilobar hepatic lesions which are grossly stable consistent with stable widespread hepatic metastasis. Imaging reviewed. Continue Lanreotide and repeat scans in 3 months. Dose # 4 Lanreotide was on 02/11/2021. Ga 76 Dotatate PET 03/09/2021 Gallium 68 dotatate avid uptake throughout multiple regions of the liver compatible with multiple foci of neuroendocrine tumor in both the right and the left lobe of the liver, when compared to previous not significantly changed in the interval.  Dose # 5 Lanreotide was on 03/11/2021.    Dose # 6 Lanreotide was on 04/08/2021. Serum chromogranin A 115 (0-103)  Dose # 7 Lanreotide was on 05/06/2021. Serum chromogranin A 114 (0-103)  Dose # 8 Lanreotide was on 06/03/2021. Serum chromogranin A  84  (0-103)  Ga 76 Dotatate PET 06/29/2021 Numerous foci of increased Gallium 68 dotatate avid uptake throughout both lobes of the liver, not significantly changed from previous. No significant progression of disease. No definite metastatic disease identified  Dose # 9 Lanreotide was on 07/01/2021. Serum Chromogranin A 97 (0-103)  Dose # 10 Lanreotide was on 07/29/2021. Serum Chromogranin A 89 (0-103)  MRI Thoracic/Lumbar Spine 08/27/2021 unremarkable  CT head 08/27/2021 no acute intracranial abnormality  CT chest 08/27/2021 unremarkable. Stable hypodense lesions in liver grossly stable as of the CT of the chest from 02/07/2021  Dose # 11 Lanreotide was on 08/30/2021. Serum Chromogranin A 120 (0-103)  Dose # 12 Lanreotide was on 09/30/2021. Serum Chromogranin A 141 (0-103)  PET/CT scan 11/09/2021 There is significant gallium avid tracer uptake in the liver, numerous lesions are seen, tracer uptake overall is diminished. There is no convincing extrahepatic disease identified. Imaging reviewed. Continue Lanreotide and repeat scans in 3 months. Dose # 13 Lanreotide was on 11/11/2021. Serum chromogranin A 105 on 11/11/2021. Had COVID-19 and recovered. Dose # 14 Lanreotide was on 12/30/2021. Serum chromogranin A 184 on 12/30/2021. Dose # 15 Lanreotide was on 01/27/2022. Serum chromogranin A 98 on 01/27/2022. CT head 01/28/2022 noted no acute abnormality. CT cervical spine 01/28/2022 noted no acute abnormality of cervical spine  PET/CT Gallium 68 02/22/2022 noted Multiple regions of tracer uptake seen within the liver consistent with neuroendocrine tumor. No other evidence to suggest metastatic disease. Reviewed with Dr. Santi Lopez from Radiology team; overall stable disease.   Continue Lanreotide and repeat scans in 3 months. Dose # 16 Lanreotide was on 02/24/2022. Serum chromogranin A 116 on 02/24/2022  CTA chest 03/08/2022 noted no PE or acute pulmonary abnormality  Dose # 17 Lanreotide was on 03/24/2022. Serum Chromogranin A 173 on 03/24/2022. Dose # 18 Lanreotide was on 04/21/2022. Serum Chromogranin A 140 on 04/21/2022. Dose # 19 Lanreotide was on 05/19/2022. Serum Chromogranin A 114 on 05/19/2022. CT head 06/15/2022 noted acute abnormality; right periorbital soft tissue swelling. Dose # 20 Lanreotide was on 06/23/2022  PET/CT scan 07/19/2022: Gallium 76 dotatate avid uptake is present within multiple masses within the liver, allowing for slight technical differences, not significantly changed in the interval. No convincing extrahepatic metastatic disease identified. Imaging reviewed. Continue Lanreotide and repeat scans in 3-4 months  Dose # 21 Lanreotide is today 07/21/2022. Labs reviewed ok to proceed. Serum Chromogranin A pending. Recommended Imodium, Lomotil, Xermelo for diarrhea    RTC in 4 weeks for Dose # 22 Lanreotide.   If disease progression down the line on future scans can consider Beau Aguila MD   [id number]  Board Certified Medical Oncologist

## 2022-07-23 DIAGNOSIS — F51.01 PRIMARY INSOMNIA: ICD-10-CM

## 2022-07-25 LAB — CHROMOGRANIN A: 112 NG/ML (ref 0–103)

## 2022-07-25 RX ORDER — ZOLPIDEM TARTRATE 10 MG/1
TABLET ORAL
Qty: 30 TABLET | Refills: 2 | Status: SHIPPED
Start: 2022-07-25 | End: 2022-10-28 | Stop reason: SDUPTHER

## 2022-07-27 ENCOUNTER — TELEPHONE (OUTPATIENT)
Dept: SURGERY | Age: 37
End: 2022-07-27

## 2022-07-27 NOTE — TELEPHONE ENCOUNTER
Patient called in with concern over her Mediport which was placed on 2/18/22. Patient states recently it has appeared to be sticking out more. States it is uncomfortable, and has been experiencing some neck and shoulder pain. States she has been experiencing some redness and irritation around the port site as well. States they were still able to access/flush the port, but was informed by another provider to contact surgeon for advisement. Patient questioning if she should be seen in office. MA routing message to Dr Janet Dunne for advisement.      Electronically signed by Daya Rodrigez on 7/27/22 at 1:37 PM EDT

## 2022-07-27 NOTE — TELEPHONE ENCOUNTER
MA received advisement from Dr Janet Dunne stating, \"If she has lost weight that is likely why it appears to be sticking out more. I'm happy to see her in the office at my next office day. \"    MA contacted patient to inform. Patient stated she would still like to be seen in the office. MA scheduled patient for an appointment on 8/22 @ 1030. Patient informed of date, time, location, and what to bring to appointment. Appointment letter mailed to patient. Patient informed to call office with concerns if anything changes.      Electronically signed by Daya Rodrigez on 7/27/22 at 4:16 PM EDT

## 2022-08-01 ENCOUNTER — TELEPHONE (OUTPATIENT)
Dept: SURGERY | Age: 37
End: 2022-08-01

## 2022-08-01 NOTE — TELEPHONE ENCOUNTER
MA made attempt to contact patient to schedule appointment w/ Dr Ame Hernandez sooner than 8/22. Patient did not answer so MA left  requesting return call to schedule.      Electronically signed by Barbara Damon on 8/1/22 at 9:03 AM EDT

## 2022-08-03 NOTE — TELEPHONE ENCOUNTER
Good postoperative appearance. Patient has returned call stating she has other appointments scheduled for sooner clinic days and would like to keep the appointment on 8/22 w/ Dr Demetris Rey.      Electronically signed by David Carter on 8/3/22 at 8:33 AM EDT

## 2022-08-17 DIAGNOSIS — C7B.8 METASTATIC MALIGNANT NEUROENDOCRINE TUMOR TO LIVER (HCC): Primary | ICD-10-CM

## 2022-08-18 ENCOUNTER — HOSPITAL ENCOUNTER (OUTPATIENT)
Dept: INFUSION THERAPY | Age: 37
Discharge: HOME | End: 2022-08-18
Payer: MEDICARE

## 2022-08-18 ENCOUNTER — OFFICE VISIT (OUTPATIENT)
Dept: ONCOLOGY | Age: 37
End: 2022-08-18
Payer: MEDICARE

## 2022-08-18 VITALS
DIASTOLIC BLOOD PRESSURE: 69 MMHG | BODY MASS INDEX: 23.16 KG/M2 | HEART RATE: 73 BPM | WEIGHT: 118 LBS | OXYGEN SATURATION: 98 % | SYSTOLIC BLOOD PRESSURE: 111 MMHG | HEIGHT: 60 IN | TEMPERATURE: 98 F

## 2022-08-18 DIAGNOSIS — C7B.8 METASTATIC MALIGNANT NEUROENDOCRINE TUMOR TO LIVER (HCC): Primary | ICD-10-CM

## 2022-08-18 LAB
ALBUMIN SERPL-MCNC: 4.4 G/DL (ref 3.5–5.2)
ALP BLD-CCNC: 89 U/L (ref 35–104)
ALT SERPL-CCNC: 14 U/L (ref 0–32)
ANION GAP SERPL CALCULATED.3IONS-SCNC: 11 MMOL/L (ref 7–16)
AST SERPL-CCNC: 19 U/L (ref 0–31)
BASOPHILS ABSOLUTE: 0.1 E9/L (ref 0–0.2)
BASOPHILS RELATIVE PERCENT: 1.1 % (ref 0–2)
BILIRUB SERPL-MCNC: 0.2 MG/DL (ref 0–1.2)
BUN BLDV-MCNC: 9 MG/DL (ref 6–20)
CALCIUM SERPL-MCNC: 9 MG/DL (ref 8.6–10.2)
CHLORIDE BLD-SCNC: 103 MMOL/L (ref 98–107)
CO2: 24 MMOL/L (ref 22–29)
CREAT SERPL-MCNC: 0.7 MG/DL (ref 0.5–1)
EOSINOPHILS ABSOLUTE: 0.11 E9/L (ref 0.05–0.5)
EOSINOPHILS RELATIVE PERCENT: 1.2 % (ref 0–6)
GFR AFRICAN AMERICAN: >60
GFR NON-AFRICAN AMERICAN: >60 ML/MIN/1.73
GLUCOSE BLD-MCNC: 92 MG/DL (ref 74–99)
HCT VFR BLD CALC: 37.4 % (ref 34–48)
HEMOGLOBIN: 12.1 G/DL (ref 11.5–15.5)
IMMATURE GRANULOCYTES #: 0.03 E9/L
IMMATURE GRANULOCYTES %: 0.3 % (ref 0–5)
LYMPHOCYTES ABSOLUTE: 2.09 E9/L (ref 1.5–4)
LYMPHOCYTES RELATIVE PERCENT: 22.5 % (ref 20–42)
MCH RBC QN AUTO: 28.5 PG (ref 26–35)
MCHC RBC AUTO-ENTMCNC: 32.4 % (ref 32–34.5)
MCV RBC AUTO: 88.2 FL (ref 80–99.9)
MONOCYTES ABSOLUTE: 1.08 E9/L (ref 0.1–0.95)
MONOCYTES RELATIVE PERCENT: 11.6 % (ref 2–12)
NEUTROPHILS ABSOLUTE: 5.88 E9/L (ref 1.8–7.3)
NEUTROPHILS RELATIVE PERCENT: 63.3 % (ref 43–80)
PDW BLD-RTO: 18.8 FL (ref 11.5–15)
PLATELET # BLD: 240 E9/L (ref 130–450)
PMV BLD AUTO: 9.7 FL (ref 7–12)
POTASSIUM SERPL-SCNC: 4.3 MMOL/L (ref 3.5–5)
RBC # BLD: 4.24 E12/L (ref 3.5–5.5)
SODIUM BLD-SCNC: 138 MMOL/L (ref 132–146)
TOTAL PROTEIN: 7 G/DL (ref 6.4–8.3)
WBC # BLD: 9.3 E9/L (ref 4.5–11.5)

## 2022-08-18 PROCEDURE — G8427 DOCREV CUR MEDS BY ELIG CLIN: HCPCS | Performed by: INTERNAL MEDICINE

## 2022-08-18 PROCEDURE — 85025 COMPLETE CBC W/AUTO DIFF WBC: CPT

## 2022-08-18 PROCEDURE — 6360000002 HC RX W HCPCS: Performed by: NURSE PRACTITIONER

## 2022-08-18 PROCEDURE — 99214 OFFICE O/P EST MOD 30 MIN: CPT | Performed by: INTERNAL MEDICINE

## 2022-08-18 PROCEDURE — 96372 THER/PROPH/DIAG INJ SC/IM: CPT

## 2022-08-18 PROCEDURE — G8420 CALC BMI NORM PARAMETERS: HCPCS | Performed by: INTERNAL MEDICINE

## 2022-08-18 PROCEDURE — 80053 COMPREHEN METABOLIC PANEL: CPT

## 2022-08-18 PROCEDURE — 4004F PT TOBACCO SCREEN RCVD TLK: CPT | Performed by: INTERNAL MEDICINE

## 2022-08-18 PROCEDURE — 2580000003 HC RX 258: Performed by: NURSE PRACTITIONER

## 2022-08-18 PROCEDURE — 6360000002 HC RX W HCPCS: Performed by: INTERNAL MEDICINE

## 2022-08-18 RX ORDER — HEPARIN SODIUM (PORCINE) LOCK FLUSH IV SOLN 100 UNIT/ML 100 UNIT/ML
500 SOLUTION INTRAVENOUS PRN
Status: CANCELLED | OUTPATIENT
Start: 2022-08-18

## 2022-08-18 RX ORDER — SODIUM CHLORIDE 0.9 % (FLUSH) 0.9 %
5-40 SYRINGE (ML) INJECTION PRN
Status: DISCONTINUED | OUTPATIENT
Start: 2022-08-18 | End: 2022-08-19 | Stop reason: HOSPADM

## 2022-08-18 RX ORDER — GABAPENTIN 300 MG/1
CAPSULE ORAL
Qty: 270 CAPSULE | Refills: 0 | OUTPATIENT
Start: 2022-08-18

## 2022-08-18 RX ORDER — LANREOTIDE ACETATE 120 MG/.5ML
120 INJECTION SUBCUTANEOUS ONCE
Status: COMPLETED | OUTPATIENT
Start: 2022-08-18 | End: 2022-08-18

## 2022-08-18 RX ORDER — SODIUM CHLORIDE 9 MG/ML
25 INJECTION, SOLUTION INTRAVENOUS PRN
Status: CANCELLED | OUTPATIENT
Start: 2022-08-18

## 2022-08-18 RX ORDER — HEPARIN SODIUM (PORCINE) LOCK FLUSH IV SOLN 100 UNIT/ML 100 UNIT/ML
500 SOLUTION INTRAVENOUS PRN
Status: DISCONTINUED | OUTPATIENT
Start: 2022-08-18 | End: 2022-08-19 | Stop reason: HOSPADM

## 2022-08-18 RX ORDER — GABAPENTIN 300 MG/1
300 CAPSULE ORAL 3 TIMES DAILY
Qty: 270 CAPSULE | Refills: 1 | Status: SHIPPED | OUTPATIENT
Start: 2022-08-18 | End: 2023-02-14

## 2022-08-18 RX ORDER — LANREOTIDE ACETATE 120 MG/.5ML
120 INJECTION SUBCUTANEOUS ONCE
Status: CANCELLED | OUTPATIENT
Start: 2022-09-15 | End: 2022-09-15

## 2022-08-18 RX ORDER — SODIUM CHLORIDE 0.9 % (FLUSH) 0.9 %
5-40 SYRINGE (ML) INJECTION PRN
Status: CANCELLED | OUTPATIENT
Start: 2022-08-18

## 2022-08-18 RX ADMIN — HEPARIN 500 UNITS: 100 SYRINGE at 13:41

## 2022-08-18 RX ADMIN — SODIUM CHLORIDE, PRESERVATIVE FREE 10 ML: 5 INJECTION INTRAVENOUS at 13:41

## 2022-08-18 RX ADMIN — LANREOTIDE ACETATE 120 MG: 120 INJECTION SUBCUTANEOUS at 14:54

## 2022-08-19 NOTE — PROGRESS NOTES
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 CT abdomen/pelvis 08/28/2020:  2 cm masslike density in the mid abdominal small bowel mesentery with a spiculated appearance. Multiple liver masses suspicious for metastatic disease. 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 Comment: Sections of the liver tissue cores show the hepatic parenchyma to be partially replaced by a proliferation of epithelioid cells with relatively uniform round nuclei and eosinophilic granular cytoplasm. The   epithelioid cells form anastomosing solid cords/nests that are surrounded by delicate fibrovascular stroma. There are no mitotic figures or tumor cell necrosis present. The histologic changes seen are suggestive of well-differentiated neuroendocrine (carcinoid) tumor. Immunostaining for pankeratin, chromogranin, synaptophysin, TTF-1 and Ki-67 was performed on sections of one tissue block (A1) and the neoplastic epithelioid cells show diffuse and strong positivity for neuroendocrine markers (chromogranin and synaptophysin) and moderate positivity for pankeratin. There is no staining reactivity for TTF-1. The Ki-67 proliferation labeling index is essentially negative, (very low, <1%). This staining pattern confirms the histologic impression of a well-differentiated neuroendocrine (carcinoid) tumor. FL Small bowel 09/02/2020:  Rapid small bowel transit. Serum Chromogranin A 160 on 09/23/2020.   Urine 5-HIAA 21 (0-14)  2d-Echo 10/10/2020: EF 60-65%   Normal right ventricular size and function    Dotatate PET/CT scan 10/14/2020 increased tracer uptake seen throughout the liver compatible with neoplasm. This involves both the left and the right lobe of liver. In addition there are 2 foci of increased tracer uptake along the mesentery of the small bowel. This may involve the wall the small bowel. No other convincing evidence for extra-abdominal metastatic disease. EGD/Colonoscopy 10/05/2020 by Dr. Cande Bah; records reviewed. Hepatobiliary team (Dr. Sampson Schneider) consult appreciated. Small bowel resection on 10/21/2020. Small bowel resection on 10/21/2020. A. Ileum, resection:   Multifocal (4 foci) neuroendocrine tumor (NET). Extensive perineural and angiolymphatic invasion. 3 of 10 lymph nodes with metastatic neuroendocrine tumor and extracapsular extension of tumor cells (3/10). Bilateral viable small bowel resection margins with no evidence of tumor. B. Specimen received as \"Meckel's\":   Nodular scar tissue with patchy chronic inflammation. Negative for neuroendocrine tumor. Intestinal mucosal tissue is not present. CASE SUMMARY:   Procedure: Small bowel resection   Tumor site: Ileum   Tumor size: Greatest dimension of 1.5 cm   Tumor focality: Multifocal: 4 separate tumors   Histologic type and grade: G2: Well-differentiated neuroendocrine tumor   Mitotic rate: between 2-20 mitoses/2 mm2   Ki-67 labeling index: between 3-20%   Tumor extension: Tumor invades through the muscularis propria into subserosal tissue without penetration of overlying serosa   Margins:  All margins are uninvolved by tumor    Margins examined: Proximal and distal   Lymphovascular invasion: Present   Perineural invasion: Present   Large mesenteric masses (greater than 2 cm): Present (one 2.5 cm mass)   Regional lymph nodes:    Number of lymph nodes involved: 3    Number of lymph nodes examined: 10   Pathologic stage classification (pTNM, AJCC eighth edition):    pT3    pN2    pM1a -confirmed liver metastasis, [facility]-     Comment:   A. 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 Dose # 1 Lanreotide was on 11/05/2020. Dose # 2 Lanreotide was on 12/03/2020. Dose # 3 Lanreotide was on 01/07/2021. Serum Chromogranin A 95 on 01/07/2021. CT chest 02/07/2021 negative for metastatic disease. CT abdomen/pelvis 02/07/2021 Persistent bilobar hepatic lesions which are grossly stable consistent with stable widespread hepatic metastasis. Imaging reviewed. Continue Lanreotide and repeat scans in 3 months. Dose # 4 Lanreotide was on 02/11/2021. Ga 76 Dotatate PET 03/09/2021 Gallium 68 dotatate avid uptake throughout multiple regions of the liver compatible with multiple foci of neuroendocrine tumor in both the right and the left lobe of the liver, when compared to previous not significantly changed in the interval.  Dose # 5 Lanreotide was on 03/11/2021. Dose # 6 Lanreotide was on 04/08/2021. Serum chromogranin A 115 (0-103)  Dose # 7 Lanreotide was on 05/06/2021. Serum chromogranin A 114 (0-103)  Dose # 8 Lanreotide was on 06/03/2021. Serum chromogranin A  84  (0-103)    Ga 76 Dotatate PET 06/29/2021 Numerous foci of increased Gallium 68 dotatate avid uptake throughout both lobes of the liver, not significantly changed from previous. No significant progression of disease. No definite metastatic disease identified  Dose # 9 Lanreotide was on 07/01/2021. Serum Chromogranin A 97 (0-103)  Dose # 10 Lanreotide was on 07/29/2021. Serum Chromogranin A 89 (0-103)  Dose # 11 Lanreotide was on 08/30/2021. Serum Chromogranin A 120 (0-103)  Dose # 12 Lanreotide was on 09/30/2021.  Serum Chromogranin A 141 (0-103)  PET/CT scan 11/09/2021 There is significant gallium avid tracer uptake in the liver, numerous lesions are seen, tracer uptake overall is diminished. There is no convincing extrahepatic disease identified. Dose # 13 Lanreotide was on 11/11/2021. Serum chromogranin A 105 on 11/11/2021. Dose # 14 Lanreotide was on 12/30/2021. Serum chromogranin A 184 on 12/30/2021. Dose # 15 Lanreotide was on 01/27/2022. Serum chromogranin A  98 on 01/27/2022. CT head 01/28/2022 noted no acute abnormality. CT cervical spine 01/28/2022 noted no acute abnormality of cervical spine  PET/CT Gallium 68 02/22/2022 noted Multiple regions of tracer uptake seen within the liver consistent with neuroendocrine tumor. No other evidence to suggest metastatic disease. Reviewed with Dr. Huy Goel from Radiology team; overall stable disease. Continue Lanreotide and repeat scans in 3 months. Dose # 16 Lanreotide was on 02/24/2022. Serum chromogranin A 116 on 02/24/2022  Dose # 17 Lanreotide was on 03/24/2022. Serum Chromogranin A 173 on 03/24/2022. Dose # 18 Lanreotide was on 04/21/2022. Serum Chromogranin A 140 on 04/21/2022. Dose # 19 Lanreotide was on 05/19/2022. Serum Chromogranin A 114 on 05/19/2022. Dose # 20 Lanreotide was on 06/23/2022. PET/CT scan 07/19/2022: Gallium 76 dotatate avid uptake is present within multiple masses within the liver, allowing for slight technical differences, not significantly changed in the interval. No convincing extrahepatic metastatic disease identified. Imaging reviewed. Continue Lanreotide and repeat scans in 3-4 months  Dose # 21 Lanreotide was on 07/21/2022. Serum Chromogranin A 112 on 07/21/2022    Today 07/21/2022: No fever chills. Fair appetite and energy level. Diarrhea controlled on current regimen     Review of Systems;  CONSTITUTIONAL: No fever chills. Fair appetite and energy level. ENMT: Eyes: No diplopia; Nose: No epistaxis. Mouth: No sore throat. RESPIRATORY: No hemoptysis SOB  CARDIOVASCULAR: No chest pain, palpitations. GASTROINTESTINAL: Diarrhea controlled.   GENITOURINARY: No both the right and the left lobe of the liver, when compared to previous not significantly changed in the interval.  Dose # 5 Lanreotide was on 03/11/2021. Dose # 6 Lanreotide was on 04/08/2021. Serum chromogranin A 115 (0-103)  Dose # 7 Lanreotide was on 05/06/2021. Serum chromogranin A 114 (0-103)  Dose # 8 Lanreotide was on 06/03/2021. Serum chromogranin A  84  (0-103)  Ga 76 Dotatate PET 06/29/2021 Numerous foci of increased Gallium 68 dotatate avid uptake throughout both lobes of the liver, not significantly changed from previous. No significant progression of disease. No definite metastatic disease identified  Dose # 9 Lanreotide was on 07/01/2021. Serum Chromogranin A 97 (0-103)  Dose # 10 Lanreotide was on 07/29/2021. Serum Chromogranin A 89 (0-103)  MRI Thoracic/Lumbar Spine 08/27/2021 unremarkable  CT head 08/27/2021 no acute intracranial abnormality  CT chest 08/27/2021 unremarkable. Stable hypodense lesions in liver grossly stable as of the CT of the chest from 02/07/2021  Dose # 11 Lanreotide was on 08/30/2021. Serum Chromogranin A 120 (0-103)  Dose # 12 Lanreotide was on 09/30/2021. Serum Chromogranin A 141 (0-103)  PET/CT scan 11/09/2021 There is significant gallium avid tracer uptake in the liver, numerous lesions are seen, tracer uptake overall is diminished. There is no convincing extrahepatic disease identified. Imaging reviewed. Continue Lanreotide and repeat scans in 3 months. Dose # 13 Lanreotide was on 11/11/2021. Serum chromogranin A 105 on 11/11/2021. Had COVID-19 and recovered. Dose # 14 Lanreotide was on 12/30/2021. Serum chromogranin A 184 on 12/30/2021. Dose # 15 Lanreotide was on 01/27/2022. Serum chromogranin A 98 on 01/27/2022. CT head 01/28/2022 noted no acute abnormality.   CT cervical spine 01/28/2022 noted no acute abnormality of cervical spine  PET/CT Gallium 68 02/22/2022 noted Multiple regions of tracer uptake seen within the liver consistent with neuroendocrine tumor. No other evidence to suggest metastatic disease. Reviewed with Dr. Eric Aguilar from Radiology team; overall stable disease. Continue Lanreotide and repeat scans in 3 months. Dose # 16 Lanreotide was on 02/24/2022. Serum chromogranin A 116 on 02/24/2022  CTA chest 03/08/2022 noted no PE or acute pulmonary abnormality  Dose # 17 Lanreotide was on 03/24/2022. Serum Chromogranin A 173 on 03/24/2022. Dose # 18 Lanreotide was on 04/21/2022. Serum Chromogranin A 140 on 04/21/2022. Dose # 19 Lanreotide was on 05/19/2022. Serum Chromogranin A 114 on 05/19/2022. CT head 06/15/2022 noted acute abnormality; right periorbital soft tissue swelling. Dose # 20 Lanreotide was on 06/23/2022  PET/CT scan 07/19/2022: Gallium 76 dotatate avid uptake is present within multiple masses within the liver, allowing for slight technical differences, not significantly changed in the interval. No convincing extrahepatic metastatic disease identified. Imaging reviewed. Continue Lanreotide and repeat scans in 3-4 months  Dose # 21 Lanreotide was on 07/21/2022. Serum Chromogranin A 112 on 07/21/2022  Dose # 22 Lanreotide is today 08/18/2022. Labs reviewed ok to proceed. Recommended Imodium, Lomotil, Xermelo for diarrhea. RTC in 4 weeks for Dose # 23 Lanreotide.   If disease progression down the line on future scans can consider Lilian Doyle MD   5/21/3077  Board Certified Medical Oncologist

## 2022-08-22 ENCOUNTER — OFFICE VISIT (OUTPATIENT)
Dept: SURGERY | Age: 37
End: 2022-08-22
Payer: MEDICARE

## 2022-08-22 VITALS
OXYGEN SATURATION: 98 % | HEART RATE: 76 BPM | HEIGHT: 60 IN | RESPIRATION RATE: 16 BRPM | SYSTOLIC BLOOD PRESSURE: 111 MMHG | TEMPERATURE: 97.6 F | BODY MASS INDEX: 24.35 KG/M2 | DIASTOLIC BLOOD PRESSURE: 72 MMHG | WEIGHT: 124 LBS

## 2022-08-22 DIAGNOSIS — Z95.828 PORT-A-CATH IN PLACE: ICD-10-CM

## 2022-08-22 DIAGNOSIS — Z09 FOLLOW UP: Primary | ICD-10-CM

## 2022-08-22 DIAGNOSIS — C7B.8 METASTATIC MALIGNANT NEUROENDOCRINE TUMOR TO LIVER (HCC): Chronic | ICD-10-CM

## 2022-08-22 PROCEDURE — 99212 OFFICE O/P EST SF 10 MIN: CPT | Performed by: SURGERY

## 2022-08-22 PROCEDURE — G8420 CALC BMI NORM PARAMETERS: HCPCS | Performed by: SURGERY

## 2022-08-22 PROCEDURE — 4004F PT TOBACCO SCREEN RCVD TLK: CPT | Performed by: SURGERY

## 2022-08-22 PROCEDURE — G8428 CUR MEDS NOT DOCUMENT: HCPCS | Performed by: SURGERY

## 2022-08-22 RX ORDER — LEVETIRACETAM 750 MG/1
TABLET ORAL
Qty: 60 TABLET | Refills: 5 | Status: SHIPPED
Start: 2022-08-22 | End: 2022-09-26

## 2022-08-22 NOTE — PROGRESS NOTES
[facility]  PROGRESS NOTE  ATTENDING NOTE    Chief Complaint   Patient presents with    Follow-up     Pt states that Mediport is uncomfortable, painful to access and sticking out far. S:  38y/o F presents to review mediport. She states that it is sticking out. There are no signs of infection. They are able to access it, withdraw blood and flush easily. There is no thinning of the skin. She states she is gaining weight. /72 (Site: Right Upper Arm, Position: Sitting, Cuff Size: Large Adult)   Pulse 76   Temp 97.6 °F (36.4 °C) (Infrared)   Resp 16   Ht 5' (1.524 m)   Wt 124 lb (56.2 kg)   SpO2 98%   BMI 24.22 kg/m²   Gen:  NAD  Left mediport on left chest--no infection, no thinning of skin, it is sticking out but normal    ASSESSMENT/PLAN:  Metastatic neuroendocrine tumor receiving IM injection for tx, but requiring mediport for blood draws. --I explained to her that the port sticking up is normal.  She may have a layer of subcutaneous tissue under the port that is enlarging some as she gains weight pushing it forward. As long as the port functions, no infection, and no thinning of skin I would NOT redo the port as it increases risk of infection.   --Patient's questions answered and advised to call if any further issues    Remi Snyder MD, MSc, FACS  8/22/2022  12:18 PM

## 2022-08-23 ENCOUNTER — OFFICE VISIT (OUTPATIENT)
Dept: PALLATIVE CARE | Age: 37
End: 2022-08-23
Payer: MEDICARE

## 2022-08-23 VITALS
TEMPERATURE: 98.4 F | SYSTOLIC BLOOD PRESSURE: 110 MMHG | OXYGEN SATURATION: 98 % | HEART RATE: 85 BPM | BODY MASS INDEX: 23.83 KG/M2 | WEIGHT: 122 LBS | DIASTOLIC BLOOD PRESSURE: 72 MMHG

## 2022-08-23 DIAGNOSIS — Z51.5 PALLIATIVE CARE BY SPECIALIST: ICD-10-CM

## 2022-08-23 DIAGNOSIS — G89.3 NEOPLASM RELATED PAIN: Primary | ICD-10-CM

## 2022-08-23 DIAGNOSIS — C7B.8 NEUROENDOCRINE CARCINOMA METASTATIC TO LIVER (HCC): ICD-10-CM

## 2022-08-23 DIAGNOSIS — C7A.8 NEUROENDOCRINE CARCINOMA METASTATIC TO LIVER (HCC): ICD-10-CM

## 2022-08-23 DIAGNOSIS — G89.3 CANCER ASSOCIATED PAIN: ICD-10-CM

## 2022-08-23 DIAGNOSIS — R19.7 DIARRHEA, UNSPECIFIED TYPE: ICD-10-CM

## 2022-08-23 PROCEDURE — G8428 CUR MEDS NOT DOCUMENT: HCPCS | Performed by: NURSE PRACTITIONER

## 2022-08-23 PROCEDURE — 99212 OFFICE O/P EST SF 10 MIN: CPT | Performed by: NURSE PRACTITIONER

## 2022-08-23 PROCEDURE — G8420 CALC BMI NORM PARAMETERS: HCPCS | Performed by: NURSE PRACTITIONER

## 2022-08-23 PROCEDURE — 4004F PT TOBACCO SCREEN RCVD TLK: CPT | Performed by: NURSE PRACTITIONER

## 2022-08-23 NOTE — PROGRESS NOTES
.  Department of Palliative Medicine  Ambulatory Note  Provider: Cris Isaacs, APRN - CNP     Chief Complaint: Evonne Leung is a 40 y.o. female with chief complaint of pain    HPI:  Evonne Leung is a 29 y.o. female with significant medical history of hypothyroidism, IBS, migraine who was complaining of abdominal pain associated with diarrhea and flushing/sweating. She was diagnosed with metastatic well differentiated Neuroendocrine cancer to liver who was referred to [address] [place] [address] by Dr. Laura Murphy. Assessment/Plan      Neuroendocrine cancer  - Follows with Dr. Virgilio driscoll Bowel resection on 10/21/20  - On Lanreotide     Neoplasm related pain  - Gabapentin 300mg TID  - Tramadol 50mg q 6 hrs prn for the pain      Nausea  - Continue Zofran and Phenergan PRN    Diarrhea:   -Currently on Lanreotide and Xermelo  -Imodium she uses this daily  -Eat 1-3 jumbo marshmallows daily PRN    - Goals of care: cure, live longer and improve or maintain function/quality of life    - Code Status: full code    Follow Up:  3 months. Encouraged to call with any questions, concerns, needs, or changes in symptoms. Subjective:   Asuncion Franklin presents today with her boyfriend present. She is alert and oriented able voice needs concerns well. Overall she reports that she is doing very well, with her pain well controlled tramadol typically only using 1-2 tabs daily. She also continues with gabapentin unchanged. She does continue to have some diarrhea, but for the most part this is also controlled with her current regimen. She has no other significant plaints, other than seasonal allergies, would not make any changes to her plan of care. She does not require any refills at this point time. We will have her return in 3 months to reassess her needs.       Pain Assessment   Ratin  Description: burning, sharp and shooting  Duration: minutes  Frequency: daily  Location: Abdomen   Alleviating Factors: relaxation  Exacerbating Factors: unable to associate with any factor  Effect: change in function and sleep    Objective:     Physical Exam  Wt Readings from Last 3 Encounters:   08/23/22 122 lb (55.3 kg)   08/22/22 124 lb (56.2 kg)   08/18/22 118 lb (53.5 kg)     /72   Pulse 85   Temp 98.4 °F (36.9 °C)   Wt 122 lb (55.3 kg)   SpO2 98%   BMI 23.83 kg/m²     Gen:  Alert, appears stated age, well nourished, in no acute distress  HEENT:  Normocephalic, conjunctiva pink, no drainage, mucosa moist  Neck:  Supple  Lungs:  CTA bilaterally, no audible rhonchi or wheezes noted  Heart[de-identified]  RRR, no murmur, rub, or gallop noted during exam  Abd:  Soft, non tender, non distended, BS+  M/S/Ext:  Moving all extremities, no edema, pulses present  Skin:  Warm and dry  Neuro:  PERRL, Alert, oriented x 3; following commands    [place] Symptom Assessment Score   [place] Score 8/23/2022 6/28/2022 5/31/2022 4/4/2022 10/25/2021   Pain Score 7 6 4 5 7   Tiredness Score 6 3 4 3 6   Nausea Score Not nauseated Not nauseated 1 3 2   Depression Score 2 2 Not depressed 1 5   Anxiety Score 2 2 2 2 5   Drowsiness Score 4 2 2 2 2   Appetite Score 5 5 2 3 3   Wellbeing Score Best feeling of wellbeing 5 3 1 3   Dyspnea Score No shortness of breath No shortness of breath 7 1 3   Other Problem Score Best possible response Best possible response Best possible response Best possible response Best possible response   Total Assessment Score(calculated) 26 25 25 21 36     Assessed by: patient. Current Medications:  Medications reviewed: yes    Controlled Substances Monitoring: [place] reviewed 8/23/22. RX Monitoring 8/23/2022   Attestation -   Periodic Controlled Substance Monitoring Possible medication side effects, risk of tolerance/dependence & alternative treatments discussed. ;No signs of potential drug abuse or diversion identified. ;Assessed functional status. ;Obtaining appropriate analgesic effect of treatment.      Deniz Cloud Cayden Hwang, APRN - CNP   Palliative Care Department     Time/Communication  Greater than 50% of time spent, total 25 minutes in face-to-face counseling and coordination of care regarding symptom management. Note: This report was completed using [employer] voiced recognition software. Every effort has been made to ensure accuracy; however, inadvertent computerized transcription errors may be present.

## 2022-08-24 RX ORDER — BUTALBITAL, ACETAMINOPHEN AND CAFFEINE 50; 325; 40 MG/1; MG/1; MG/1
TABLET ORAL
Qty: 30 TABLET | Refills: 1 | Status: SHIPPED
Start: 2022-08-24 | End: 2022-09-26

## 2022-09-12 DIAGNOSIS — G89.3 NEOPLASM RELATED PAIN: ICD-10-CM

## 2022-09-15 ENCOUNTER — HOSPITAL ENCOUNTER (OUTPATIENT)
Dept: INFUSION THERAPY | Age: 37
Discharge: HOME | End: 2022-09-15
Payer: MEDICARE

## 2022-09-15 ENCOUNTER — OFFICE VISIT (OUTPATIENT)
Dept: ONCOLOGY | Age: 37
End: 2022-09-15
Payer: MEDICARE

## 2022-09-15 VITALS
TEMPERATURE: 98.1 F | SYSTOLIC BLOOD PRESSURE: 129 MMHG | OXYGEN SATURATION: 97 % | DIASTOLIC BLOOD PRESSURE: 65 MMHG | WEIGHT: 116.4 LBS | HEIGHT: 60 IN | HEART RATE: 96 BPM | BODY MASS INDEX: 22.85 KG/M2

## 2022-09-15 DIAGNOSIS — C7A.8 NEUROENDOCRINE CANCER (HCC): Primary | ICD-10-CM

## 2022-09-15 DIAGNOSIS — C7B.8 METASTATIC MALIGNANT NEUROENDOCRINE TUMOR TO LIVER (HCC): Primary | ICD-10-CM

## 2022-09-15 LAB
ALBUMIN SERPL-MCNC: 4.6 G/DL (ref 3.5–5.2)
ALP BLD-CCNC: 105 U/L (ref 35–104)
ALT SERPL-CCNC: 14 U/L (ref 0–32)
ANION GAP SERPL CALCULATED.3IONS-SCNC: 17 MMOL/L (ref 7–16)
AST SERPL-CCNC: 19 U/L (ref 0–31)
BASOPHILS ABSOLUTE: 0.12 E9/L (ref 0–0.2)
BASOPHILS RELATIVE PERCENT: 1.3 % (ref 0–2)
BILIRUB SERPL-MCNC: 0.3 MG/DL (ref 0–1.2)
BUN BLDV-MCNC: 15 MG/DL (ref 6–20)
CALCIUM SERPL-MCNC: 8.9 MG/DL (ref 8.6–10.2)
CHLORIDE BLD-SCNC: 101 MMOL/L (ref 98–107)
CO2: 21 MMOL/L (ref 22–29)
CREAT SERPL-MCNC: 0.6 MG/DL (ref 0.5–1)
EOSINOPHILS ABSOLUTE: 0.13 E9/L (ref 0.05–0.5)
EOSINOPHILS RELATIVE PERCENT: 1.4 % (ref 0–6)
GFR AFRICAN AMERICAN: >60
GFR NON-AFRICAN AMERICAN: >60 ML/MIN/1.73
GLUCOSE BLD-MCNC: 185 MG/DL (ref 74–99)
HCT VFR BLD CALC: 39.7 % (ref 34–48)
HEMOGLOBIN: 12.9 G/DL (ref 11.5–15.5)
IMMATURE GRANULOCYTES #: 0.02 E9/L
IMMATURE GRANULOCYTES %: 0.2 % (ref 0–5)
LYMPHOCYTES ABSOLUTE: 3.2 E9/L (ref 1.5–4)
LYMPHOCYTES RELATIVE PERCENT: 34.6 % (ref 20–42)
MCH RBC QN AUTO: 27.9 PG (ref 26–35)
MCHC RBC AUTO-ENTMCNC: 32.5 % (ref 32–34.5)
MCV RBC AUTO: 85.7 FL (ref 80–99.9)
MONOCYTES ABSOLUTE: 0.67 E9/L (ref 0.1–0.95)
MONOCYTES RELATIVE PERCENT: 7.2 % (ref 2–12)
NEUTROPHILS ABSOLUTE: 5.11 E9/L (ref 1.8–7.3)
NEUTROPHILS RELATIVE PERCENT: 55.3 % (ref 43–80)
PDW BLD-RTO: 17.8 FL (ref 11.5–15)
PLATELET # BLD: 273 E9/L (ref 130–450)
PMV BLD AUTO: 9.1 FL (ref 7–12)
POTASSIUM SERPL-SCNC: 3.9 MMOL/L (ref 3.5–5)
RBC # BLD: 4.63 E12/L (ref 3.5–5.5)
SODIUM BLD-SCNC: 139 MMOL/L (ref 132–146)
TOTAL PROTEIN: 7.6 G/DL (ref 6.4–8.3)
WBC # BLD: 9.3 E9/L (ref 4.5–11.5)

## 2022-09-15 PROCEDURE — 96402 CHEMO HORMON ANTINEOPL SQ/IM: CPT

## 2022-09-15 PROCEDURE — 6360000002 HC RX W HCPCS: Performed by: NURSE PRACTITIONER

## 2022-09-15 PROCEDURE — 80053 COMPREHEN METABOLIC PANEL: CPT

## 2022-09-15 PROCEDURE — 86316 IMMUNOASSAY TUMOR OTHER: CPT

## 2022-09-15 PROCEDURE — 99214 OFFICE O/P EST MOD 30 MIN: CPT | Performed by: INTERNAL MEDICINE

## 2022-09-15 PROCEDURE — 85025 COMPLETE CBC W/AUTO DIFF WBC: CPT

## 2022-09-15 PROCEDURE — 36591 DRAW BLOOD OFF VENOUS DEVICE: CPT

## 2022-09-15 PROCEDURE — G8420 CALC BMI NORM PARAMETERS: HCPCS | Performed by: INTERNAL MEDICINE

## 2022-09-15 PROCEDURE — 96372 THER/PROPH/DIAG INJ SC/IM: CPT

## 2022-09-15 PROCEDURE — 2580000003 HC RX 258: Performed by: NURSE PRACTITIONER

## 2022-09-15 PROCEDURE — 4004F PT TOBACCO SCREEN RCVD TLK: CPT | Performed by: INTERNAL MEDICINE

## 2022-09-15 PROCEDURE — G8427 DOCREV CUR MEDS BY ELIG CLIN: HCPCS | Performed by: INTERNAL MEDICINE

## 2022-09-15 PROCEDURE — 6360000002 HC RX W HCPCS: Performed by: INTERNAL MEDICINE

## 2022-09-15 RX ORDER — SODIUM CHLORIDE 0.9 % (FLUSH) 0.9 %
5-40 SYRINGE (ML) INJECTION PRN
Status: CANCELLED | OUTPATIENT
Start: 2022-09-15

## 2022-09-15 RX ORDER — SODIUM CHLORIDE 9 MG/ML
25 INJECTION, SOLUTION INTRAVENOUS PRN
OUTPATIENT
Start: 2022-09-15

## 2022-09-15 RX ORDER — SODIUM CHLORIDE 0.9 % (FLUSH) 0.9 %
5-40 SYRINGE (ML) INJECTION PRN
Status: DISCONTINUED | OUTPATIENT
Start: 2022-09-15 | End: 2022-09-16 | Stop reason: HOSPADM

## 2022-09-15 RX ORDER — LANREOTIDE ACETATE 120 MG/.5ML
120 INJECTION SUBCUTANEOUS ONCE
Status: COMPLETED | OUTPATIENT
Start: 2022-09-15 | End: 2022-09-15

## 2022-09-15 RX ORDER — HEPARIN SODIUM (PORCINE) LOCK FLUSH IV SOLN 100 UNIT/ML 100 UNIT/ML
500 SOLUTION INTRAVENOUS PRN
Status: DISCONTINUED | OUTPATIENT
Start: 2022-09-15 | End: 2022-09-16 | Stop reason: HOSPADM

## 2022-09-15 RX ORDER — HEPARIN SODIUM (PORCINE) LOCK FLUSH IV SOLN 100 UNIT/ML 100 UNIT/ML
500 SOLUTION INTRAVENOUS PRN
Status: CANCELLED | OUTPATIENT
Start: 2022-09-15

## 2022-09-15 RX ORDER — LANREOTIDE ACETATE 120 MG/.5ML
120 INJECTION SUBCUTANEOUS ONCE
OUTPATIENT
Start: 2022-10-13 | End: 2022-10-13

## 2022-09-15 RX ADMIN — SODIUM CHLORIDE, PRESERVATIVE FREE 10 ML: 5 INJECTION INTRAVENOUS at 13:35

## 2022-09-15 RX ADMIN — HEPARIN 500 UNITS: 100 SYRINGE at 13:36

## 2022-09-15 RX ADMIN — LANREOTIDE ACETATE 120 MG: 120 INJECTION SUBCUTANEOUS at 15:07

## 2022-09-15 RX ADMIN — SODIUM CHLORIDE, PRESERVATIVE FREE 10 ML: 5 INJECTION INTRAVENOUS at 13:36

## 2022-09-15 NOTE — PROGRESS NOTES
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 CT abdomen/pelvis 08/28/2020:  2 cm masslike density in the mid abdominal small bowel mesentery with a spiculated appearance. Multiple liver masses suspicious for metastatic disease. 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 Comment: Sections of the liver tissue cores show the hepatic parenchyma to be partially replaced by a proliferation of epithelioid cells with relatively uniform round nuclei and eosinophilic granular cytoplasm. The   epithelioid cells form anastomosing solid cords/nests that are surrounded by delicate fibrovascular stroma. There are no mitotic figures or tumor cell necrosis present. The histologic changes seen are suggestive of well-differentiated neuroendocrine (carcinoid) tumor. Immunostaining for pankeratin, chromogranin, synaptophysin, TTF-1 and Ki-67 was performed on sections of one tissue block (A1) and the neoplastic epithelioid cells show diffuse and strong positivity for neuroendocrine markers (chromogranin and synaptophysin) and moderate positivity for pankeratin. There is no staining reactivity for TTF-1. The Ki-67 proliferation labeling index is essentially negative, (very low, <1%). This staining pattern confirms the histologic impression of a well-differentiated neuroendocrine (carcinoid) tumor. FL Small bowel 09/02/2020:  Rapid small bowel transit. Serum Chromogranin A 160 on 09/23/2020.   Urine 5-HIAA 21 (0-14)  2d-Echo 10/10/2020: EF 60-65%   Normal right ventricular size and function    Dotatate PET/CT scan 10/14/2020 increased tracer uptake seen throughout the liver compatible with neoplasm. This involves both the left and the right lobe of liver. In addition there are 2 foci of increased tracer uptake along the mesentery of the small bowel. This may involve the wall the small bowel. No other convincing evidence for extra-abdominal metastatic disease. EGD/Colonoscopy 10/05/2020 by Dr. Davina Guevara; records reviewed. Hepatobiliary team (Dr. Fabiene Brittle) consult appreciated. Small bowel resection on 10/21/2020. Small bowel resection on 10/21/2020. A. Ileum, resection:   Multifocal (4 foci) neuroendocrine tumor (NET). Extensive perineural and angiolymphatic invasion. 3 of 10 lymph nodes with metastatic neuroendocrine tumor and extracapsular extension of tumor cells (3/10). Bilateral viable small bowel resection margins with no evidence of tumor. B. Specimen received as \"Meckel's\":   Nodular scar tissue with patchy chronic inflammation. Negative for neuroendocrine tumor. Intestinal mucosal tissue is not present. CASE SUMMARY:   Procedure: Small bowel resection   Tumor site: Ileum   Tumor size: Greatest dimension of 1.5 cm   Tumor focality: Multifocal: 4 separate tumors   Histologic type and grade: G2: Well-differentiated neuroendocrine tumor   Mitotic rate: between 2-20 mitoses/2 mm2   Ki-67 labeling index: between 3-20%   Tumor extension: Tumor invades through the muscularis propria into subserosal tissue without penetration of overlying serosa   Margins:  All margins are uninvolved by tumor    Margins examined: Proximal and distal   Lymphovascular invasion: Present   Perineural invasion: Present   Large mesenteric masses (greater than 2 cm): Present (one 2.5 cm mass)   Regional lymph nodes:    Number of lymph nodes involved: 3    Number of lymph nodes examined: 10   Pathologic stage classification (pTNM, AJCC eighth edition):    pT3    pN2    pM1a -confirmed liver metastasis, [facility]-     Comment:   A. 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 Dose # 1 Lanreotide was on 11/05/2020. Dose # 2 Lanreotide was on 12/03/2020. Dose # 3 Lanreotide was on 01/07/2021. Serum Chromogranin A 95 on 01/07/2021. CT chest 02/07/2021 negative for metastatic disease. CT abdomen/pelvis 02/07/2021 Persistent bilobar hepatic lesions which are grossly stable consistent with stable widespread hepatic metastasis. Imaging reviewed. Continue Lanreotide and repeat scans in 3 months. Dose # 4 Lanreotide was on 02/11/2021. Ga 76 Dotatate PET 03/09/2021 Gallium 68 dotatate avid uptake throughout multiple regions of the liver compatible with multiple foci of neuroendocrine tumor in both the right and the left lobe of the liver, when compared to previous not significantly changed in the interval.  Dose # 5 Lanreotide was on 03/11/2021. Dose # 6 Lanreotide was on 04/08/2021. Serum chromogranin A 115 (0-103)  Dose # 7 Lanreotide was on 05/06/2021. Serum chromogranin A 114 (0-103)  Dose # 8 Lanreotide was on 06/03/2021. Serum chromogranin A  84  (0-103)    Ga 76 Dotatate PET 06/29/2021 Numerous foci of increased Gallium 68 dotatate avid uptake throughout both lobes of the liver, not significantly changed from previous. No significant progression of disease. No definite metastatic disease identified  Dose # 9 Lanreotide was on 07/01/2021. Serum Chromogranin A 97 (0-103)  Dose # 10 Lanreotide was on 07/29/2021. Serum Chromogranin A 89 (0-103)  Dose # 11 Lanreotide was on 08/30/2021. Serum Chromogranin A 120 (0-103)  Dose # 12 Lanreotide was on 09/30/2021.  Serum Chromogranin A 141 (0-103)  PET/CT scan 11/09/2021 There is significant gallium avid tracer uptake in the liver, numerous lesions are seen, tracer uptake overall is diminished. There is no convincing extrahepatic disease identified. Dose # 13 Lanreotide was on 11/11/2021. Serum chromogranin A 105 on 11/11/2021. Dose # 14 Lanreotide was on 12/30/2021. Serum chromogranin A 184 on 12/30/2021. Dose # 15 Lanreotide was on 01/27/2022. Serum chromogranin A  98 on 01/27/2022. CT head 01/28/2022 noted no acute abnormality. CT cervical spine 01/28/2022 noted no acute abnormality of cervical spine  PET/CT Gallium 68 02/22/2022 noted Multiple regions of tracer uptake seen within the liver consistent with neuroendocrine tumor. No other evidence to suggest metastatic disease. Reviewed with Dr. Ajith Carreon from Radiology team; overall stable disease. Continue Lanreotide and repeat scans in 3 months. Dose # 16 Lanreotide was on 02/24/2022. Serum chromogranin A 116 on 02/24/2022  Dose # 17 Lanreotide was on 03/24/2022. Serum Chromogranin A 173 on 03/24/2022. Dose # 18 Lanreotide was on 04/21/2022. Serum Chromogranin A 140 on 04/21/2022. Dose # 19 Lanreotide was on 05/19/2022. Serum Chromogranin A 114 on 05/19/2022. Dose # 20 Lanreotide was on 06/23/2022. PET/CT scan 07/19/2022: Gallium 76 dotatate avid uptake is present within multiple masses within the liver, allowing for slight technical differences, not significantly changed in the interval. No convincing extrahepatic metastatic disease identified. Imaging reviewed. Continue Lanreotide and repeat scans in 3-4 months  Dose # 21 Lanreotide was on 07/21/2022. Serum Chromogranin A 112 on 07/21/2022  Dose # 22 Lanreotide was on 08/18/2022. Today 09/15/2022; No fever chills. Fair appetite and energy level. Intermittent nausea. Diarrhea controlled on current regimen    Review of Systems;  CONSTITUTIONAL: No fever chills. Fair appetite and energy level. ENMT: Eyes: No diplopia; Nose: No epistaxis. Mouth: No sore throat.   RESPIRATORY: No hemoptysis SOB  CARDIOVASCULAR: No chest pain, palpitations. GASTROINTESTINAL: Diarrhea controlled on current regimen. Intermittent nausea  GENITOURINARY: No hematuria frequency  NEURO: No syncope, presyncope, headache. Remainder:ROS NEGATIVE    Past Medical History:      Diagnosis Date    Abdominal pain     Cancer ([facility] [address]     neuroendocrime tumor    Diarrhea     Hypocalcemia     Hypothyroidism     Irritable bowel syndrome     Migraines     Seizures ([facility] [address]     last episode January 2021    Status post alcohol detoxification     5/22/2018-5/29/2018     Medications:  Reviewed and reconciled. Allergies:  No Known Allergies     Physical Exam:  /65   Pulse 96   Temp 98.1 °F (36.7 °C)   Ht 5' (1.524 m)   Wt 116 lb 6.4 oz (52.8 kg)   SpO2 97%   BMI 22.73 kg/m²   GENERAL: Alert oriented x 3, not in acute distress   LUNGS: CTA Med  CVS: RRR  EXTREMITIES: Without clubbing, cyanosis, or edema. ECOG PS 1    Lab Results   Component Value Date    WBC 9.3 09/15/2022    HGB 12.9 09/15/2022    HCT 39.7 09/15/2022    MCV 85.7 09/15/2022     09/15/2022     Lab Results   Component Value Date     09/15/2022    K 3.9 09/15/2022     09/15/2022    CO2 21 (L) 09/15/2022    BUN 15 09/15/2022    CREATININE 0.6 09/15/2022    GLUCOSE 185 (H) 09/15/2022    CALCIUM 8.9 09/15/2022    PROT 7.6 09/15/2022    LABALBU 4.6 09/15/2022    BILITOT 0.3 09/15/2022    ALKPHOS 105 (H) 09/15/2022    AST 19 09/15/2022    ALT 14 09/15/2022    LABGLOM >60 09/15/2022    GFRAA >60 09/15/2022     Impression/Plan:  41 y/o female with 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 Multiple liver masses suspicious for metastatic disease. 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      Comment: Sections of the liver tissue cores show the hepatic parenchyma to be partially replaced by a proliferation of epithelioid cells with relatively uniform round nuclei and eosinophilic granular cytoplasm. The   epithelioid cells form anastomosing solid cords/nests that are surrounded by delicate fibrovascular stroma. There are no mitotic figures or tumor cell necrosis present. The histologic changes seen are suggestive of well-differentiated neuroendocrine (carcinoid) tumor. Immunostaining for pankeratin, chromogranin, synaptophysin, TTF-1 and Ki-67 was performed on sections of one tissue block (A1) and the neoplastic epithelioid cells show diffuse and strong positivity for neuroendocrine markers (chromogranin and synaptophysin) and moderate positivity for pankeratin. There is no staining reactivity for TTF-1. The Ki-67 proliferation labeling index is essentially negative, (very low, <1%). This staining pattern confirms the histologic impression of a well-differentiated neuroendocrine (carcinoid) tumor. FL Small bowel 09/02/2020:  Rapid small bowel transit. Serum Chromogranin A 160 on 09/23/2020. Urine 5-HIAA 21 (0-14)  2d-Echo 10/10/2020: EF 60-65%   Normal right ventricular size and function    Dotatate PET/CT scan 10/14/2020 increased tracer uptake seen throughout the liver compatible with neoplasm. This involves both the left and the right lobe of liver. In addition there are 2 foci of increased tracer uptake along the mesentery of the small bowel. This may involve the wall the small bowel. No other convincing evidence for extra-abdominal metastatic disease. EGD/Colonoscopy 10/05/2020 by Dr. Miguelito Burr; records reviewed. Hepatobiliary team (Dr. Hal Abarca) consult appreciated. Small bowel resection on 10/21/2020. A. Ileum, resection:   Multifocal (4 foci) neuroendocrine tumor (NET). Extensive perineural and angiolymphatic invasion.    3 of 10 lymph nodes with metastatic neuroendocrine tumor and extracapsular extension of tumor cells (3/10). Bilateral viable small bowel resection margins with no evidence of tumor. B. Specimen received as \"Meckel's\":   Nodular scar tissue with patchy chronic inflammation. Negative for neuroendocrine tumor. Intestinal mucosal tissue is not present. CASE SUMMARY:   Procedure: Small bowel resection   Tumor site: Ileum   Tumor size: Greatest dimension of 1.5 cm   Tumor focality: Multifocal: 4 separate tumors   Histologic type and grade: G2: Well-differentiated neuroendocrine tumor   Mitotic rate: between 2-20 mitoses/2 mm2   Ki-67 labeling index: between 3-20%   Tumor extension: Tumor invades through the muscularis propria into subserosal tissue without penetration of overlying serosa   Margins: All margins are uninvolved by tumor    Margins examined: Proximal and distal   Lymphovascular invasion: Present   Perineural invasion: Present   Large mesenteric masses (greater than 2 cm): Present (one 2.5 cm mass)   Regional lymph nodes:    Number of lymph nodes involved: 3    Number of lymph nodes examined: 10   Pathologic stage classification (pTNM, AJCC eighth edition):    pT3    pN2    pM1a -confirmed liver metastasis, [facility]-     Comment:   A. 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 Dose # 1 Lanreotide was on 11/05/2020. Dose # 2 Lanreotide was on 12/03/2020. Dose # 3 Lanreotide was on 01/07/2021. Serum Chromogranin A 95 on 01/07/2021. CT chest 02/07/2021 negative for metastatic disease. CT abdomen/pelvis 02/07/2021 Persistent bilobar hepatic lesions which are grossly stable consistent with stable widespread hepatic metastasis. Imaging reviewed. Continue Lanreotide and repeat scans in 3 months. Dose # 4 Lanreotide was on 02/11/2021.    Ga 68 Dotatate PET 03/09/2021 Gallium 76 dotatate avid uptake throughout multiple regions of the liver compatible with multiple foci of neuroendocrine tumor in both the right and the left lobe of the liver, when compared to previous not significantly changed in the interval.  Dose # 5 Lanreotide was on 03/11/2021. Dose # 6 Lanreotide was on 04/08/2021. Serum chromogranin A 115 (0-103)  Dose # 7 Lanreotide was on 05/06/2021. Serum chromogranin A 114 (0-103)  Dose # 8 Lanreotide was on 06/03/2021. Serum chromogranin A  84  (0-103)  Ga 76 Dotatate PET 06/29/2021 Numerous foci of increased Gallium 68 dotatate avid uptake throughout both lobes of the liver, not significantly changed from previous. No significant progression of disease. No definite metastatic disease identified  Dose # 9 Lanreotide was on 07/01/2021. Serum Chromogranin A 97 (0-103)  Dose # 10 Lanreotide was on 07/29/2021. Serum Chromogranin A 89 (0-103)  MRI Thoracic/Lumbar Spine 08/27/2021 unremarkable  CT head 08/27/2021 no acute intracranial abnormality  CT chest 08/27/2021 unremarkable. Stable hypodense lesions in liver grossly stable as of the CT of the chest from 02/07/2021  Dose # 11 Lanreotide was on 08/30/2021. Serum Chromogranin A 120 (0-103)  Dose # 12 Lanreotide was on 09/30/2021. Serum Chromogranin A 141 (0-103)  PET/CT scan 11/09/2021 There is significant gallium avid tracer uptake in the liver, numerous lesions are seen, tracer uptake overall is diminished. There is no convincing extrahepatic disease identified. Imaging reviewed. Continue Lanreotide and repeat scans in 3 months. Dose # 13 Lanreotide was on 11/11/2021. Serum chromogranin A 105 on 11/11/2021. Had COVID-19 and recovered. Dose # 14 Lanreotide was on 12/30/2021. Serum chromogranin A 184 on 12/30/2021. Dose # 15 Lanreotide was on 01/27/2022. Serum chromogranin A 98 on 01/27/2022. CT head 01/28/2022 noted no acute abnormality.   CT cervical spine 01/28/2022 noted no acute abnormality of cervical spine  PET/CT Gallium 68 02/22/2022 noted Multiple regions of tracer uptake seen within the liver consistent with neuroendocrine tumor. No other evidence to suggest metastatic disease. Reviewed with Dr. Radha Rodríguez from Radiology team; overall stable disease. Continue Lanreotide and repeat scans in 3 months. Dose # 16 Lanreotide was on 02/24/2022. Serum chromogranin A 116 on 02/24/2022  CTA chest 03/08/2022 noted no PE or acute pulmonary abnormality  Dose # 17 Lanreotide was on 03/24/2022. Serum Chromogranin A 173 on 03/24/2022. Dose # 18 Lanreotide was on 04/21/2022. Serum Chromogranin A 140 on 04/21/2022. Dose # 19 Lanreotide was on 05/19/2022. Serum Chromogranin A 114 on 05/19/2022. CT head 06/15/2022 noted acute abnormality; right periorbital soft tissue swelling. Dose # 20 Lanreotide was on 06/23/2022  PET/CT scan 07/19/2022: Gallium 76 dotatate avid uptake is present within multiple masses within the liver, allowing for slight technical differences, not significantly changed in the interval. No convincing extrahepatic metastatic disease identified. Imaging reviewed. Continue Lanreotide and repeat scans in 3-4 months  Dose # 21 Lanreotide was on 07/21/2022. Serum Chromogranin A 112 on 07/21/2022  Dose # 22 Lanreotide was on 08/18/2022. Dose # 23 Lanreotide is given today 09/15/2022. Serum Chromogranin A pending. Labs reviewed ok to proceed. Recommended to continue Imodium, Lomotil, Xermelo for diarrhea. Recommended Zofran as needed for nausea    RTC in 4 weeks for Dose # 24 Lanreotide.   PET/CT after next visit  If disease progression down the line on future scans can consider Brianne Kilgore MD   [id number]/46/[id number]  Board Certified Medical Oncologist

## 2022-09-17 ENCOUNTER — HOSPITAL ENCOUNTER (EMERGENCY)
Age: 37
Discharge: HOME | End: 2022-09-17
Payer: MEDICARE

## 2022-09-17 VITALS
WEIGHT: 116 LBS | BODY MASS INDEX: 22.78 KG/M2 | OXYGEN SATURATION: 98 % | RESPIRATION RATE: 16 BRPM | SYSTOLIC BLOOD PRESSURE: 122 MMHG | HEIGHT: 60 IN | HEART RATE: 89 BPM | DIASTOLIC BLOOD PRESSURE: 99 MMHG | TEMPERATURE: 98.2 F

## 2022-09-17 DIAGNOSIS — T07.XXXA ABRASIONS OF MULTIPLE SITES: ICD-10-CM

## 2022-09-17 DIAGNOSIS — S40.021A CONTUSION OF MULTIPLE SITES OF RIGHT SHOULDER AND UPPER ARM, INITIAL ENCOUNTER: ICD-10-CM

## 2022-09-17 DIAGNOSIS — G43.809 OTHER MIGRAINE WITHOUT STATUS MIGRAINOSUS, NOT INTRACTABLE: Primary | ICD-10-CM

## 2022-09-17 DIAGNOSIS — S40.011A CONTUSION OF MULTIPLE SITES OF RIGHT SHOULDER AND UPPER ARM, INITIAL ENCOUNTER: ICD-10-CM

## 2022-09-17 PROCEDURE — 99283 EMERGENCY DEPT VISIT LOW MDM: CPT

## 2022-09-17 PROCEDURE — 6370000000 HC RX 637 (ALT 250 FOR IP): Performed by: PHYSICIAN ASSISTANT

## 2022-09-17 RX ORDER — HYDROCODONE BITARTRATE AND ACETAMINOPHEN 5; 325 MG/1; MG/1
1 TABLET ORAL ONCE
Status: COMPLETED | OUTPATIENT
Start: 2022-09-17 | End: 2022-09-17

## 2022-09-17 RX ADMIN — HYDROCODONE BITARTRATE AND ACETAMINOPHEN 1 TABLET: 5; 325 TABLET ORAL at 13:42

## 2022-09-17 ASSESSMENT — PAIN - FUNCTIONAL ASSESSMENT: PAIN_FUNCTIONAL_ASSESSMENT: 0-10

## 2022-09-17 ASSESSMENT — PAIN DESCRIPTION - LOCATION: LOCATION: HEAD

## 2022-09-17 ASSESSMENT — PAIN SCALES - GENERAL: PAINLEVEL_OUTOF10: 10

## 2022-09-17 ASSESSMENT — PAIN DESCRIPTION - PAIN TYPE: TYPE: ACUTE PAIN

## 2022-09-17 ASSESSMENT — PAIN DESCRIPTION - DESCRIPTORS: DESCRIPTORS: ACHING

## 2022-09-17 ASSESSMENT — PAIN DESCRIPTION - ORIENTATION: ORIENTATION: LEFT;RIGHT

## 2022-09-17 NOTE — ED NOTES
Called patient in [place]. No answer. Checked bathroom and radiology.      [facility]  09/17/22 [id number]

## 2022-09-17 NOTE — ED PROVIDER NOTES
deficits, symmetric strength 5/5 in the upper and lower extremities bilaterally    Lab / Imaging Results   (All laboratory and radiology results have been personally reviewed by myself)  Labs:  No results found for this visit on 09/17/22. Imaging: All Radiology results interpreted by Radiologist unless otherwise noted. XR RADIUS ULNA RIGHT (2 VIEWS)    (Results Pending)       ED Course / Medical Decision Making     Medications   HYDROcodone-acetaminophen (NORCO) 5-325 MG per tablet 1 tablet (1 tablet Oral Given 9/17/22 1342)        Re-examination:  9/17/22       Time: 1600  Patient was called twice before 4 PM with no answer from waiting room. Patient not in x-ray. Patient apparently has eloped from the premises prior to obtaining any imaging. ASSESSMENT     1. Other migraine without status migrainosus, not intractable    2. Abrasions of multiple sites    3. Contusion of multiple sites of right shoulder and upper arm, initial encounter      PLAN   Patient eloped from emergency department before evaluation completed. .    New Medications     New Prescriptions    No medications on file     Electronically signed by DOMINIC Dugan   DD: 9/17/22  **This report was transcribed using voice recognition software. Every effort was made to ensure accuracy; however, inadvertent computerized transcription errors may be present.   END OF ED PROVIDER NOTE       DOMINIC Sim  09/17/22 1600

## 2022-09-19 LAB — CHROMOGRANIN A: 75 NG/ML (ref 0–103)

## 2022-09-21 DIAGNOSIS — G89.3 NEOPLASM RELATED PAIN: ICD-10-CM

## 2022-09-21 RX ORDER — TRAMADOL HYDROCHLORIDE 50 MG/1
TABLET ORAL
Qty: 120 TABLET | OUTPATIENT
Start: 2022-09-21

## 2022-09-21 RX ORDER — TRAMADOL HYDROCHLORIDE 50 MG/1
TABLET ORAL
Qty: 120 TABLET | Refills: 2 | OUTPATIENT
Start: 2022-09-21 | End: 2022-10-21

## 2022-09-21 NOTE — TELEPHONE ENCOUNTER
Second message from pharmacy [facility] in [facility], patient will need refill for Tramadol. Next george 11/15/22.

## 2022-09-22 ENCOUNTER — APPOINTMENT (OUTPATIENT)
Dept: CT IMAGING | Age: 37
End: 2022-09-22
Payer: MEDICARE

## 2022-09-22 ENCOUNTER — HOSPITAL ENCOUNTER (EMERGENCY)
Age: 37
Discharge: HOME | End: 2022-09-23
Attending: EMERGENCY MEDICINE
Payer: MEDICARE

## 2022-09-22 VITALS
RESPIRATION RATE: 17 BRPM | OXYGEN SATURATION: 98 % | SYSTOLIC BLOOD PRESSURE: 126 MMHG | DIASTOLIC BLOOD PRESSURE: 84 MMHG | TEMPERATURE: 97.7 F | HEART RATE: 74 BPM

## 2022-09-22 DIAGNOSIS — R11.2 NON-INTRACTABLE VOMITING WITH NAUSEA, UNSPECIFIED VOMITING TYPE: Primary | ICD-10-CM

## 2022-09-22 DIAGNOSIS — E87.6 HYPOKALEMIA: ICD-10-CM

## 2022-09-22 DIAGNOSIS — C78.7 LIVER METASTASES (HCC): ICD-10-CM

## 2022-09-22 DIAGNOSIS — R10.84 GENERALIZED ABDOMINAL PAIN: ICD-10-CM

## 2022-09-22 LAB
AMPHETAMINE SCREEN, URINE: NOT DETECTED
ANION GAP SERPL CALCULATED.3IONS-SCNC: 17 MMOL/L (ref 7–16)
BACTERIA: ABNORMAL /HPF
BARBITURATE SCREEN URINE: POSITIVE
BASOPHILS ABSOLUTE: 0.04 E9/L (ref 0–0.2)
BASOPHILS RELATIVE PERCENT: 0.4 % (ref 0–2)
BENZODIAZEPINE SCREEN, URINE: NOT DETECTED
BILIRUBIN URINE: NEGATIVE
BLOOD, URINE: NEGATIVE
BUN BLDV-MCNC: 7 MG/DL (ref 6–20)
CALCIUM SERPL-MCNC: 7.8 MG/DL (ref 8.6–10.2)
CANNABINOID SCREEN URINE: POSITIVE
CHLORIDE BLD-SCNC: 92 MMOL/L (ref 98–107)
CLARITY: ABNORMAL
CO2: 23 MMOL/L (ref 22–29)
COCAINE METABOLITE SCREEN URINE: NOT DETECTED
COLOR: YELLOW
CREAT SERPL-MCNC: 0.5 MG/DL (ref 0.5–1)
EOSINOPHILS ABSOLUTE: 0 E9/L (ref 0.05–0.5)
EOSINOPHILS RELATIVE PERCENT: 0 % (ref 0–6)
EPITHELIAL CELLS, UA: ABNORMAL /HPF
ETHANOL: <10 MG/DL (ref 0–0.08)
FENTANYL SCREEN, URINE: NOT DETECTED
GFR AFRICAN AMERICAN: >60
GFR NON-AFRICAN AMERICAN: >60 ML/MIN/1.73
GLUCOSE BLD-MCNC: 135 MG/DL (ref 74–99)
GLUCOSE URINE: 250 MG/DL
HCG(URINE) PREGNANCY TEST: NEGATIVE
HCT VFR BLD CALC: 34.4 % (ref 34–48)
HEMOGLOBIN: 11.5 G/DL (ref 11.5–15.5)
IMMATURE GRANULOCYTES #: 0.08 E9/L
IMMATURE GRANULOCYTES %: 0.7 % (ref 0–5)
KETONES, URINE: ABNORMAL MG/DL
LEUKOCYTE ESTERASE, URINE: NEGATIVE
LYMPHOCYTES ABSOLUTE: 0.34 E9/L (ref 1.5–4)
LYMPHOCYTES RELATIVE PERCENT: 3.1 % (ref 20–42)
Lab: ABNORMAL
MAGNESIUM: 1.3 MG/DL (ref 1.6–2.6)
MCH RBC QN AUTO: 28.3 PG (ref 26–35)
MCHC RBC AUTO-ENTMCNC: 33.4 % (ref 32–34.5)
MCV RBC AUTO: 84.7 FL (ref 80–99.9)
METHADONE SCREEN, URINE: NOT DETECTED
MONOCYTES ABSOLUTE: 0.2 E9/L (ref 0.1–0.95)
MONOCYTES RELATIVE PERCENT: 1.8 % (ref 2–12)
NEUTROPHILS ABSOLUTE: 10.24 E9/L (ref 1.8–7.3)
NEUTROPHILS RELATIVE PERCENT: 94 % (ref 43–80)
NITRITE, URINE: NEGATIVE
OPIATE SCREEN URINE: NOT DETECTED
OXYCODONE URINE: NOT DETECTED
PDW BLD-RTO: 17.8 FL (ref 11.5–15)
PH UA: 7 (ref 5–9)
PHENCYCLIDINE SCREEN URINE: NOT DETECTED
PLATELET # BLD: 78 E9/L (ref 130–450)
PLATELET CONFIRMATION: NORMAL
PMV BLD AUTO: 8.7 FL (ref 7–12)
POTASSIUM REFLEX MAGNESIUM: 2.9 MMOL/L (ref 3.5–5)
POTASSIUM SERPL-SCNC: 3.3 MMOL/L (ref 3.5–5)
PROTEIN UA: NEGATIVE MG/DL
RBC # BLD: 4.06 E12/L (ref 3.5–5.5)
RBC # BLD: NORMAL 10*6/UL
RBC UA: ABNORMAL /HPF (ref 0–2)
SODIUM BLD-SCNC: 132 MMOL/L (ref 132–146)
SPECIFIC GRAVITY UA: 1.02 (ref 1–1.03)
UROBILINOGEN, URINE: 1 E.U./DL
WBC # BLD: 10.9 E9/L (ref 4.5–11.5)
WBC UA: ABNORMAL /HPF (ref 0–5)

## 2022-09-22 PROCEDURE — 6360000002 HC RX W HCPCS: Performed by: EMERGENCY MEDICINE

## 2022-09-22 PROCEDURE — 96368 THER/DIAG CONCURRENT INF: CPT

## 2022-09-22 PROCEDURE — 96372 THER/PROPH/DIAG INJ SC/IM: CPT

## 2022-09-22 PROCEDURE — 81025 URINE PREGNANCY TEST: CPT

## 2022-09-22 PROCEDURE — 80048 BASIC METABOLIC PNL TOTAL CA: CPT

## 2022-09-22 PROCEDURE — 85025 COMPLETE CBC W/AUTO DIFF WBC: CPT

## 2022-09-22 PROCEDURE — 96376 TX/PRO/DX INJ SAME DRUG ADON: CPT

## 2022-09-22 PROCEDURE — 96365 THER/PROPH/DIAG IV INF INIT: CPT

## 2022-09-22 PROCEDURE — 36415 COLL VENOUS BLD VENIPUNCTURE: CPT

## 2022-09-22 PROCEDURE — 80307 DRUG TEST PRSMV CHEM ANLYZR: CPT

## 2022-09-22 PROCEDURE — 6370000000 HC RX 637 (ALT 250 FOR IP): Performed by: EMERGENCY MEDICINE

## 2022-09-22 PROCEDURE — 82077 ASSAY SPEC XCP UR&BREATH IA: CPT

## 2022-09-22 PROCEDURE — 96366 THER/PROPH/DIAG IV INF ADDON: CPT

## 2022-09-22 PROCEDURE — 99284 EMERGENCY DEPT VISIT MOD MDM: CPT

## 2022-09-22 PROCEDURE — 84132 ASSAY OF SERUM POTASSIUM: CPT

## 2022-09-22 PROCEDURE — 81001 URINALYSIS AUTO W/SCOPE: CPT

## 2022-09-22 PROCEDURE — 96361 HYDRATE IV INFUSION ADD-ON: CPT

## 2022-09-22 PROCEDURE — 83735 ASSAY OF MAGNESIUM: CPT

## 2022-09-22 PROCEDURE — 70450 CT HEAD/BRAIN W/O DYE: CPT

## 2022-09-22 PROCEDURE — 74176 CT ABD & PELVIS W/O CONTRAST: CPT

## 2022-09-22 PROCEDURE — 2580000003 HC RX 258: Performed by: EMERGENCY MEDICINE

## 2022-09-22 PROCEDURE — 96375 TX/PRO/DX INJ NEW DRUG ADDON: CPT

## 2022-09-22 PROCEDURE — 80177 DRUG SCRN QUAN LEVETIRACETAM: CPT

## 2022-09-22 RX ORDER — POTASSIUM CHLORIDE 7.45 MG/ML
10 INJECTION INTRAVENOUS ONCE
Status: COMPLETED | OUTPATIENT
Start: 2022-09-22 | End: 2022-09-23

## 2022-09-22 RX ORDER — POTASSIUM CHLORIDE 750 MG/1
10 TABLET, EXTENDED RELEASE ORAL ONCE
Status: COMPLETED | OUTPATIENT
Start: 2022-09-22 | End: 2022-09-22

## 2022-09-22 RX ORDER — KETOROLAC TROMETHAMINE 30 MG/ML
30 INJECTION, SOLUTION INTRAMUSCULAR; INTRAVENOUS ONCE
Status: COMPLETED | OUTPATIENT
Start: 2022-09-22 | End: 2022-09-22

## 2022-09-22 RX ORDER — MAGNESIUM SULFATE 1 G/100ML
1000 INJECTION INTRAVENOUS ONCE
Status: COMPLETED | OUTPATIENT
Start: 2022-09-22 | End: 2022-09-22

## 2022-09-22 RX ORDER — 0.9 % SODIUM CHLORIDE 0.9 %
1000 INTRAVENOUS SOLUTION INTRAVENOUS ONCE
Status: COMPLETED | OUTPATIENT
Start: 2022-09-22 | End: 2022-09-22

## 2022-09-22 RX ORDER — ONDANSETRON 2 MG/ML
4 INJECTION INTRAMUSCULAR; INTRAVENOUS ONCE
Status: DISCONTINUED | OUTPATIENT
Start: 2022-09-22 | End: 2022-09-23 | Stop reason: HOSPADM

## 2022-09-22 RX ORDER — ONDANSETRON 2 MG/ML
4 INJECTION INTRAMUSCULAR; INTRAVENOUS EVERY 6 HOURS PRN
Status: DISCONTINUED | OUTPATIENT
Start: 2022-09-22 | End: 2022-09-23 | Stop reason: HOSPADM

## 2022-09-22 RX ORDER — MORPHINE SULFATE 4 MG/ML
4 INJECTION, SOLUTION INTRAMUSCULAR; INTRAVENOUS ONCE
Status: COMPLETED | OUTPATIENT
Start: 2022-09-22 | End: 2022-09-23

## 2022-09-22 RX ADMIN — SODIUM CHLORIDE 1000 ML: 9 INJECTION, SOLUTION INTRAVENOUS at 12:58

## 2022-09-22 RX ADMIN — MAGNESIUM SULFATE HEPTAHYDRATE 1000 MG: 1 INJECTION, SOLUTION INTRAVENOUS at 18:07

## 2022-09-22 RX ADMIN — POTASSIUM CHLORIDE 10 MEQ: 7.46 INJECTION, SOLUTION INTRAVENOUS at 18:09

## 2022-09-22 RX ADMIN — POTASSIUM CHLORIDE 10 MEQ: 750 TABLET, EXTENDED RELEASE ORAL at 18:13

## 2022-09-22 RX ADMIN — KETOROLAC TROMETHAMINE 30 MG: 30 INJECTION, SOLUTION INTRAMUSCULAR; INTRAVENOUS at 21:12

## 2022-09-22 RX ADMIN — ONDANSETRON 4 MG: 2 INJECTION INTRAMUSCULAR; INTRAVENOUS at 12:59

## 2022-09-22 RX ADMIN — ONDANSETRON 4 MG: 2 INJECTION INTRAMUSCULAR; INTRAVENOUS at 21:12

## 2022-09-22 ASSESSMENT — PAIN SCALES - GENERAL: PAINLEVEL_OUTOF10: 8

## 2022-09-22 ASSESSMENT — PAIN DESCRIPTION - LOCATION: LOCATION: ABDOMEN

## 2022-09-22 NOTE — ED PROVIDER NOTES
Department of Emergency Medicine   ED  Provider Note  Admit Date/RoomTime: 9/22/2022 12:14 PM  ED Room: JAVED/JAVED              9/22/22  12:26 PM EDT    History of Present Illness:   Nia Sifuentes is a 40 y.o. female presenting to the ED for nausea vomiting, abd pain beginning 1 day prior. Provoking factors - patient with neuroendocrine tumor with mets to liver  Quality - non blood vomiting  Severity - moderate  Associated symptoms - patient denies fever chills      Review of Systems:   A complete review of systems was performed and pertinent positives and negatives are stated within HPI, all other systems reviewed and are negative.          --------------------------------------------- PAST HISTORY ---------------------------------------------  Past Medical History:  has a past medical history of Abdominal pain, Cancer ([facility] [address] Diarrhea, Hypocalcemia, Hypothyroidism, Irritable bowel syndrome, Migraines, Seizures ([facility] [address] and Status post alcohol detoxification. Past Surgical History:  has a past surgical history that includes Parathyroid gland surgery; Cholecystectomy, laparoscopic (07/06/2016); Thyroidectomy; Colonoscopy (N/A, 6/22/2018); CT NEEDLE BIOPSY LIVER PERCUTANEOUS (9/1/2020); Small intestine surgery (N/A, 10/21/2020); CT BIOPSY RENAL (1/25/2021); hc dialysis catheter (N/A, 1/28/2021); sigmoidoscopy (N/A, 5/14/2021); and Port Surgery (Left, 2/18/2022). Social History:  reports that she has been smoking cigarettes. She has been smoking an average of .25 packs per day. She has never used smokeless tobacco. She reports that she does not drink alcohol and does not use drugs.     Family History: family history includes Alzheimer's Disease in an other family member; Asthma in her father; Breast Cancer in her maternal grandmother; Cancer in her father, maternal grandmother, and paternal grandfather; Diabetes in an other family member; Heart Disease in her father; High Blood Pressure in her father and mother; High Cholesterol in her mother; Yeimi Walker in an other family member; Other in her mother. Unless otherwise noted, family history is non contributory    The patients home medications have been reviewed. Allergies: Patient has no known allergies.     -------------------------------------------------- RESULTS -------------------------------------------------  All laboratory and radiology results have been personally reviewed by myself   LABS:  Results for orders placed or performed during the hospital encounter of 09/22/22   CBC with Auto Differential   Result Value Ref Range    WBC 10.9 4.5 - 11.5 E9/L    RBC 4.06 3.50 - 5.50 E12/L    Hemoglobin 11.5 11.5 - 15.5 g/dL    Hematocrit 34.4 34.0 - 48.0 %    MCV 84.7 80.0 - 99.9 fL    MCH 28.3 26.0 - 35.0 pg    MCHC 33.4 32.0 - 34.5 %    RDW 17.8 (H) 11.5 - 15.0 fL    Platelets 78 (L) 728 - 450 E9/L    MPV 8.7 7.0 - 12.0 fL    Neutrophils % 94.0 (H) 43.0 - 80.0 %    Immature Granulocytes % 0.7 0.0 - 5.0 %    Lymphocytes % 3.1 (L) 20.0 - 42.0 %    Monocytes % 1.8 (L) 2.0 - 12.0 %    Eosinophils % 0.0 0.0 - 6.0 %    Basophils % 0.4 0.0 - 2.0 %    Neutrophils Absolute 10.24 (H) 1.80 - 7.30 E9/L    Immature Granulocytes # 0.08 E9/L    Lymphocytes Absolute 0.34 (L) 1.50 - 4.00 E9/L    Monocytes Absolute 0.20 0.10 - 0.95 E9/L    Eosinophils Absolute 0.00 (L) 0.05 - 0.50 E9/L    Basophils Absolute 0.04 0.00 - 0.20 E9/L    RBC Morphology Normal    Basic Metabolic Panel w/ Reflex to MG   Result Value Ref Range    Sodium 132 132 - 146 mmol/L    Potassium reflex Magnesium 2.9 (L) 3.5 - 5.0 mmol/L    Chloride 92 (L) 98 - 107 mmol/L    CO2 23 22 - 29 mmol/L    Anion Gap 17 (H) 7 - 16 mmol/L    Glucose 135 (H) 74 - 99 mg/dL    BUN 7 6 - 20 mg/dL    Creatinine 0.5 0.5 - 1.0 mg/dL    GFR Non-African American >60 >=60 mL/min/1.73    GFR African American >60     Calcium 7.8 (L) 8.6 - 10.2 mg/dL   Urinalysis   Result Value Ref Range    Color, UA Yellow Straw/Yellow    Clarity, UA SLCLOUDY Clear    Glucose, Ur 250 (A) Negative mg/dL    Bilirubin Urine Negative Negative    Ketones, Urine TRACE (A) Negative mg/dL    Specific Gravity, UA 1.020 1.005 - 1.030    Blood, Urine Negative Negative    pH, UA 7.0 5.0 - 9.0    Protein, UA Negative Negative mg/dL    Urobilinogen, Urine 1.0 <2.0 E.U./dL    Nitrite, Urine Negative Negative    Leukocyte Esterase, Urine Negative Negative   Levetiracetam Level   Result Value Ref Range    Levetiracetam Lvl <2 (L) 10 - 40 ug/mL   URINE DRUG SCREEN   Result Value Ref Range    Amphetamine Screen, Urine NOT DETECTED Negative <1000 ng/mL    Barbiturate Screen, Ur POSITIVE (A) Negative < 200 ng/mL    Benzodiazepine Screen, Urine NOT DETECTED Negative < 200 ng/mL    Cannabinoid Scrn, Ur POSITIVE (A) Negative < 50ng/mL    Cocaine Metabolite Screen, Urine NOT DETECTED Negative < 300 ng/mL    Opiate Scrn, Ur NOT DETECTED Negative < 300ng/mL    PCP Screen, Urine NOT DETECTED Negative < 25 ng/mL    Methadone Screen, Urine NOT DETECTED Negative <300 ng/mL    Oxycodone Urine NOT DETECTED Negative <100 ng/mL    FENTANYL SCREEN, URINE NOT DETECTED Negative <1 ng/mL    Drug Screen Comment: see below    Pregnancy, urine   Result Value Ref Range    HCG(Urine) Pregnancy Test NEGATIVE NEGATIVE   Ethanol   Result Value Ref Range    Ethanol Lvl <10 mg/dL   Platelet Confirmation   Result Value Ref Range    Platelet Confirmation CONFIRMED    Magnesium   Result Value Ref Range    Magnesium 1.3 (L) 1.6 - 2.6 mg/dL   Microscopic Urinalysis   Result Value Ref Range    WBC, UA 0-1 0 - 5 /HPF    RBC, UA NONE 0 - 2 /HPF    Epithelial Cells, UA RARE /HPF    Bacteria, UA MANY (A) None Seen /HPF   Potassium   Result Value Ref Range    Potassium 3.3 (L) 3.5 - 5.0 mmol/L       RADIOLOGY:  Interpreted by Radiologist.  CT ABDOMEN PELVIS WO CONTRAST Additional Contrast? None   Final Result   Innumerable attenuation liver masses increased in size and number compared to   02/07/2021 and consistent with metastatic disease. CT HEAD WO CONTRAST   Final Result   No acute intracranial abnormality.             ------------------------- NURSING NOTES AND VITALS REVIEWED ---------------------------   The nursing notes within the ED encounter and vital signs as below have been reviewed. /84   Pulse 74   Temp 97.7 °F (36.5 °C) (Oral)   Resp 17   SpO2 98%   Oxygen Saturation Interpretation: Normal      ---------------------------------------------------PHYSICAL EXAM--------------------------------------    Constitutional/General: Alert and oriented x3, well appearing, non toxic in NAD  Head: Normocephalic and atraumatic  Eyes: PERRL, EOMI, conjunctiva normal, sclera non icteric  Mouth: Oropharynx clear, handling secretions, no trismus, no asymmetry of the posterior oropharynx or uvular edema  Neck: Supple, full ROM, non tender to palpation in the midline, no stridor, no crepitus, no meningeal signs  Respiratory: Lungs clear to auscultation bilaterally, no wheezes, rales, or rhonchi. Not in respiratory distress  Cardiovascular:  Regular rate. Regular rhythm. No murmurs, gallops, or rubs. 2+ distal pulses  Chest: No chest wall tenderness  GI:  Abdomen Soft, Non tender, Non distended. +BS. No organomegaly, no palpable masses,  No rebound, guarding, or rigidity. Musculoskeletal: Moves all extremities x 4. Warm and well perfused, no clubbing, cyanosis, or edema. Capillary refill <3 seconds  Integument: skin warm and dry. No rashes.    Lymphatic: no lymphadenopathy noted  Neurologic: GCS 15, no focal deficits, symmetric strength 5/5 in the upper and lower extremities bilaterally  Psychiatric: Normal Affect      ------------------------------ ED COURSE/MEDICAL DECISION MAKING----------------------  Medications   0.9 % sodium chloride bolus (0 mLs IntraVENous Stopped 9/22/22 1534)   magnesium sulfate 1000 mg in dextrose 5% 100 mL IVPB (0 mg IntraVENous Stopped 9/22/22 1932)   potassium chloride 10 mEq/100 mL IVPB (Peripheral Line) (0 mEq IntraVENous Stopped 9/23/22 0038)   potassium chloride (KLOR-CON M) extended release tablet 10 mEq (10 mEq Oral Given 9/22/22 1813)   ketorolac (TORADOL) injection 30 mg (30 mg IntraVENous Given 9/22/22 2112)   morphine injection 4 mg (4 mg IntraVENous Given 9/23/22 0040)   promethazine (PHENERGAN) injection 6.25 mg (6.25 mg IntraMUSCular Given 9/23/22 0235)         Medical Decision Making:   Patient seen and examined. Patient had IV placed, labs drawn and patient was sent for CT. Patient pending reassessment at time of signout    Counseling: The emergency provider has spoken with the patient and discussed todays results, in addition to providing specific details for the plan of care and counseling regarding the diagnosis and prognosis. Questions are answered at this time and they are agreeable with the plan.      --------------------------------- IMPRESSION AND DISPOSITION ---------------------------------    IMPRESSION  1. Non-intractable vomiting with nausea, unspecified vomiting type    2. Hypokalemia    3. Generalized abdominal pain    4.  Liver metastases ([address]        DISPOSITION  Disposition: Discharge to home  Patient condition is stable              Emily Clifton MD  10/17/22 2961 Juliana Pizano MD  10/19/22 5972

## 2022-09-22 NOTE — ED NOTES
Patient got up out of bed, walked to the doorway with her jacket and IV bag, and laid on the floor. Assisted back to bed X2.       Ulises Iverson RN  09/22/22 7938

## 2022-09-22 NOTE — ED NOTES
Patient complains of needing to urinate. Patient placed on bedpan 4 separate times within the last 2 hours. Patient states \"please strait cath me or something like my mom said. \" Will notify provider.      Reed Malone RN  09/22/22 4422

## 2022-09-23 PROCEDURE — 6360000002 HC RX W HCPCS: Performed by: EMERGENCY MEDICINE

## 2022-09-23 RX ORDER — PROMETHAZINE HYDROCHLORIDE 25 MG/ML
6.25 INJECTION, SOLUTION INTRAMUSCULAR; INTRAVENOUS ONCE
Status: COMPLETED | OUTPATIENT
Start: 2022-09-23 | End: 2022-09-23

## 2022-09-23 RX ADMIN — MORPHINE SULFATE 4 MG: 4 INJECTION, SOLUTION INTRAMUSCULAR; INTRAVENOUS at 00:40

## 2022-09-23 RX ADMIN — PROMETHAZINE HYDROCHLORIDE 6.25 MG: 25 INJECTION INTRAMUSCULAR; INTRAVENOUS at 02:35

## 2022-09-23 NOTE — ED PROVIDER NOTES
Patient obtained on signout. For full initial chief complaint, history present was, review of systems physical exam and further information please see Dr. Eric Wilson note of same visit. 4:38 PM EDT  I received this patient at sign out from Dr. Ekta Lowry. I have discussed the patient's initial exam, treatment and plan of care with the out going physician. I have introduced my self to the patient / family and have answered their questions to this point. I have examined the patient myself and reviewed ordered tests / medications and  reviewed any available results to this point. If a resident is involved in the Emergency Department care, I have discussed my findings and plan with them as well. 11:39 PM EDT  Patient resting comfortably in bed in no distress. Has been able to get up and ambulate to the restroom on her own. Potassium has been rechecked and now to an acceptable level. Work-up results are discussed with her including the CT abdomen, she already knew of this. She states she is seeing palliative care, not currently on chemo or radiation. Has some pain medications at home. Continue to close outpatient follow-up are discussed with the patient.    --------------------------------------------- PAST HISTORY ---------------------------------------------  Past Medical History:  has a past medical history of Abdominal pain, Cancer ([facility] [address] Diarrhea, Hypocalcemia, Hypothyroidism, Irritable bowel syndrome, Migraines, Seizures ([facility] [address] and Status post alcohol detoxification. Past Surgical History:  has a past surgical history that includes Parathyroid gland surgery; Cholecystectomy, laparoscopic (07/06/2016); Thyroidectomy; Colonoscopy (N/A, 6/22/2018); CT NEEDLE BIOPSY LIVER PERCUTANEOUS (9/1/2020); Small intestine surgery (N/A, 10/21/2020); CT BIOPSY RENAL (1/25/2021); hc dialysis catheter (N/A, 1/28/2021); sigmoidoscopy (N/A, 5/14/2021); and Port Surgery (Left, 2/18/2022).     Social History:  reports that she has been smoking cigarettes. She has been smoking an average of .25 packs per day. She has never used smokeless tobacco. She reports that she does not drink alcohol and does not use drugs. Family History: family history includes Alzheimer's Disease in an other family member; Asthma in her father; Breast Cancer in her maternal grandmother; Cancer in her father, maternal grandmother, and paternal grandfather; Diabetes in an other family member; Heart Disease in her father; High Blood Pressure in her father and mother; High Cholesterol in her mother; Smith Soles in an other family member; Other in her mother. The patients home medications have been reviewed. Allergies: Patient has no known allergies.     -------------------------------------------------- RESULTS -------------------------------------------------  Labs:  Results for orders placed or performed during the hospital encounter of 09/22/22   CBC with Auto Differential   Result Value Ref Range    WBC 10.9 4.5 - 11.5 E9/L    RBC 4.06 3.50 - 5.50 E12/L    Hemoglobin 11.5 11.5 - 15.5 g/dL    Hematocrit 34.4 34.0 - 48.0 %    MCV 84.7 80.0 - 99.9 fL    MCH 28.3 26.0 - 35.0 pg    MCHC 33.4 32.0 - 34.5 %    RDW 17.8 (H) 11.5 - 15.0 fL    Platelets 78 (L) 262 - 450 E9/L    MPV 8.7 7.0 - 12.0 fL    Neutrophils % 94.0 (H) 43.0 - 80.0 %    Immature Granulocytes % 0.7 0.0 - 5.0 %    Lymphocytes % 3.1 (L) 20.0 - 42.0 %    Monocytes % 1.8 (L) 2.0 - 12.0 %    Eosinophils % 0.0 0.0 - 6.0 %    Basophils % 0.4 0.0 - 2.0 %    Neutrophils Absolute 10.24 (H) 1.80 - 7.30 E9/L    Immature Granulocytes # 0.08 E9/L    Lymphocytes Absolute 0.34 (L) 1.50 - 4.00 E9/L    Monocytes Absolute 0.20 0.10 - 0.95 E9/L    Eosinophils Absolute 0.00 (L) 0.05 - 0.50 E9/L    Basophils Absolute 0.04 0.00 - 0.20 E9/L    RBC Morphology Normal    Basic Metabolic Panel w/ Reflex to MG   Result Value Ref Range    Sodium 132 132 - 146 mmol/L    Potassium reflex Magnesium 2.9 (L) 3.5 - 5.0 mmol/L    Chloride 92 (L) 98 - 107 mmol/L    CO2 23 22 - 29 mmol/L    Anion Gap 17 (H) 7 - 16 mmol/L    Glucose 135 (H) 74 - 99 mg/dL    BUN 7 6 - 20 mg/dL    Creatinine 0.5 0.5 - 1.0 mg/dL    GFR Non-African American >60 >=60 mL/min/1.73    GFR African American >60     Calcium 7.8 (L) 8.6 - 10.2 mg/dL   Urinalysis   Result Value Ref Range    Color, UA Yellow Straw/Yellow    Clarity, UA SLCLOUDY Clear    Glucose, Ur 250 (A) Negative mg/dL    Bilirubin Urine Negative Negative    Ketones, Urine TRACE (A) Negative mg/dL    Specific Gravity, UA 1.020 1.005 - 1.030    Blood, Urine Negative Negative    pH, UA 7.0 5.0 - 9.0    Protein, UA Negative Negative mg/dL    Urobilinogen, Urine 1.0 <2.0 E.U./dL    Nitrite, Urine Negative Negative    Leukocyte Esterase, Urine Negative Negative   URINE DRUG SCREEN   Result Value Ref Range    Amphetamine Screen, Urine NOT DETECTED Negative <1000 ng/mL    Barbiturate Screen, Ur POSITIVE (A) Negative < 200 ng/mL    Benzodiazepine Screen, Urine NOT DETECTED Negative < 200 ng/mL    Cannabinoid Scrn, Ur POSITIVE (A) Negative < 50ng/mL    Cocaine Metabolite Screen, Urine NOT DETECTED Negative < 300 ng/mL    Opiate Scrn, Ur NOT DETECTED Negative < 300ng/mL    PCP Screen, Urine NOT DETECTED Negative < 25 ng/mL    Methadone Screen, Urine NOT DETECTED Negative <300 ng/mL    Oxycodone Urine NOT DETECTED Negative <100 ng/mL    FENTANYL SCREEN, URINE NOT DETECTED Negative <1 ng/mL    Drug Screen Comment: see below    Pregnancy, urine   Result Value Ref Range    HCG(Urine) Pregnancy Test NEGATIVE NEGATIVE   Ethanol   Result Value Ref Range    Ethanol Lvl <10 mg/dL   Platelet Confirmation   Result Value Ref Range    Platelet Confirmation CONFIRMED    Magnesium   Result Value Ref Range    Magnesium 1.3 (L) 1.6 - 2.6 mg/dL   Microscopic Urinalysis   Result Value Ref Range    WBC, UA 0-1 0 - 5 /HPF    RBC, UA NONE 0 - 2 /HPF    Epithelial Cells, UA RARE /HPF    Bacteria, UA MANY (A) None Seen /HPF Potassium   Result Value Ref Range    Potassium 3.3 (L) 3.5 - 5.0 mmol/L       Radiology:  CT ABDOMEN PELVIS WO CONTRAST Additional Contrast? None   Final Result   Innumerable attenuation liver masses increased in size and number compared to   02/07/2021 and consistent with metastatic disease. CT HEAD WO CONTRAST   Final Result   No acute intracranial abnormality.             ------------------------- NURSING NOTES AND VITALS REVIEWED ---------------------------  Date / Time Roomed:  9/22/2022 12:14 PM  ED Bed Assignment:  04/04    The nursing notes within the ED encounter and vital signs as below have been reviewed. /84   Pulse 74   Temp 97.7 °F (36.5 °C) (Oral)   Resp 17   SpO2 98%   Oxygen Saturation Interpretation: Normal      ------------------------------------------ PROGRESS NOTES ------------------------------------------  I have spoken with the patient and discussed todays results, in addition to providing specific details for the plan of care and counseling regarding the diagnosis and prognosis. Their questions are answered at this time and they are agreeable with the plan. I discussed at length with them reasons for immediate return here for re evaluation. They will followup with primary care by calling their office tomorrow. --------------------------------- ADDITIONAL PROVIDER NOTES ---------------------------------  At this time the patient is without objective evidence of an acute process requiring hospitalization or inpatient management. They have remained hemodynamically stable throughout their entire ED visit and are stable for discharge with outpatient follow-up. The plan has been discussed in detail and they are aware of the specific conditions for emergent return, as well as the importance of follow-up. New Prescriptions    No medications on file       Diagnosis:  1. Non-intractable vomiting with nausea, unspecified vomiting type    2. Hypokalemia    3. Generalized abdominal pain    4. Liver metastases ([facility] [address]        Disposition:  Patient's disposition: Discharge to home  Patient's condition is stable.          Rachael Peoples DO  09/22/22 4981

## 2022-09-23 NOTE — ED NOTES
Patient attempting to call [facility] at this time [facility]  09/23/22 [id number]

## 2022-09-25 LAB — KEPPRA: <2 UG/ML (ref 10–40)

## 2022-09-26 ENCOUNTER — APPOINTMENT (OUTPATIENT)
Dept: CT IMAGING | Age: 37
DRG: 843 | End: 2022-09-26
Payer: MEDICARE

## 2022-09-26 ENCOUNTER — HOSPITAL ENCOUNTER (INPATIENT)
Age: 37
LOS: 4 days | Discharge: HOME | DRG: 843 | End: 2022-09-30
Attending: STUDENT IN AN ORGANIZED HEALTH CARE EDUCATION/TRAINING PROGRAM | Admitting: INTERNAL MEDICINE
Payer: MEDICARE

## 2022-09-26 DIAGNOSIS — C78.7 LIVER METASTASES (HCC): Primary | ICD-10-CM

## 2022-09-26 DIAGNOSIS — R31.9 URINARY TRACT INFECTION WITH HEMATURIA, SITE UNSPECIFIED: ICD-10-CM

## 2022-09-26 DIAGNOSIS — Z51.5 PALLIATIVE CARE BY SPECIALIST: ICD-10-CM

## 2022-09-26 DIAGNOSIS — N39.0 URINARY TRACT INFECTION WITH HEMATURIA, SITE UNSPECIFIED: ICD-10-CM

## 2022-09-26 DIAGNOSIS — R10.9 ABDOMINAL PAIN, UNSPECIFIED ABDOMINAL LOCATION: ICD-10-CM

## 2022-09-26 DIAGNOSIS — R17 ELEVATED BILIRUBIN: ICD-10-CM

## 2022-09-26 DIAGNOSIS — R79.89 ELEVATED LFTS: ICD-10-CM

## 2022-09-26 LAB
ACETAMINOPHEN LEVEL: <5 MCG/ML (ref 10–30)
ALBUMIN SERPL-MCNC: 3.4 G/DL (ref 3.5–5.2)
ALP BLD-CCNC: 189 U/L (ref 35–104)
ALT SERPL-CCNC: 1958 U/L (ref 0–32)
ANION GAP SERPL CALCULATED.3IONS-SCNC: 13 MMOL/L (ref 7–16)
AST SERPL-CCNC: 1069 U/L (ref 0–31)
BACTERIA: ABNORMAL /HPF
BASOPHILS ABSOLUTE: 0.03 E9/L (ref 0–0.2)
BASOPHILS RELATIVE PERCENT: 0.7 % (ref 0–2)
BILIRUB SERPL-MCNC: 8.8 MG/DL (ref 0–1.2)
BILIRUBIN URINE: ABNORMAL
BLOOD, URINE: NEGATIVE
BUN BLDV-MCNC: 12 MG/DL (ref 6–20)
CALCIUM SERPL-MCNC: 8.4 MG/DL (ref 8.6–10.2)
CHLORIDE BLD-SCNC: 107 MMOL/L (ref 98–107)
CLARITY: ABNORMAL
CO2: 20 MMOL/L (ref 22–29)
COLOR: YELLOW
CREAT SERPL-MCNC: 0.7 MG/DL (ref 0.5–1)
EOSINOPHILS ABSOLUTE: 0.11 E9/L (ref 0.05–0.5)
EOSINOPHILS RELATIVE PERCENT: 2.4 % (ref 0–6)
EPITHELIAL CELLS, UA: ABNORMAL /HPF
ETHANOL: <10 MG/DL (ref 0–0.08)
GFR AFRICAN AMERICAN: >60
GFR NON-AFRICAN AMERICAN: >60 ML/MIN/1.73
GLUCOSE BLD-MCNC: 77 MG/DL (ref 74–99)
GLUCOSE URINE: NEGATIVE MG/DL
HCG QUALITATIVE: NEGATIVE
HCT VFR BLD CALC: 29.7 % (ref 34–48)
HEMOGLOBIN: 10.3 G/DL (ref 11.5–15.5)
IMMATURE GRANULOCYTES #: 0.03 E9/L
IMMATURE GRANULOCYTES %: 0.7 % (ref 0–5)
INR BLD: 2.3
KETONES, URINE: NEGATIVE MG/DL
LACTIC ACID: 1.6 MMOL/L (ref 0.5–2.2)
LEUKOCYTE ESTERASE, URINE: ABNORMAL
LIPASE: 50 U/L (ref 13–60)
LYMPHOCYTES ABSOLUTE: 1.36 E9/L (ref 1.5–4)
LYMPHOCYTES RELATIVE PERCENT: 29.5 % (ref 20–42)
MAGNESIUM: 1.3 MG/DL (ref 1.6–2.6)
MCH RBC QN AUTO: 28.6 PG (ref 26–35)
MCHC RBC AUTO-ENTMCNC: 34.7 % (ref 32–34.5)
MCV RBC AUTO: 82.5 FL (ref 80–99.9)
MONOCYTES ABSOLUTE: 1.17 E9/L (ref 0.1–0.95)
MONOCYTES RELATIVE PERCENT: 25.4 % (ref 2–12)
NEUTROPHILS ABSOLUTE: 1.91 E9/L (ref 1.8–7.3)
NEUTROPHILS RELATIVE PERCENT: 41.3 % (ref 43–80)
NITRITE, URINE: POSITIVE
PDW BLD-RTO: 19.9 FL (ref 11.5–15)
PH UA: 7.5 (ref 5–9)
PLATELET # BLD: 62 E9/L (ref 130–450)
PLATELET CONFIRMATION: NORMAL
PMV BLD AUTO: 10.6 FL (ref 7–12)
POTASSIUM REFLEX MAGNESIUM: 3.1 MMOL/L (ref 3.5–5)
PROTEIN UA: NEGATIVE MG/DL
PROTHROMBIN TIME: 24.7 SEC (ref 9.3–12.4)
RBC # BLD: 3.6 E12/L (ref 3.5–5.5)
RBC UA: ABNORMAL /HPF (ref 0–2)
SALICYLATE, SERUM: <0.3 MG/DL (ref 0–30)
SODIUM BLD-SCNC: 140 MMOL/L (ref 132–146)
SPECIFIC GRAVITY UA: 1.01 (ref 1–1.03)
TOTAL PROTEIN: 5.7 G/DL (ref 6.4–8.3)
TRICYCLIC ANTIDEPRESSANTS SCREEN SERUM: NEGATIVE NG/ML
UROBILINOGEN, URINE: 1 E.U./DL
WBC # BLD: 4.6 E9/L (ref 4.5–11.5)
WBC UA: ABNORMAL /HPF (ref 0–5)

## 2022-09-26 PROCEDURE — 85025 COMPLETE CBC W/AUTO DIFF WBC: CPT

## 2022-09-26 PROCEDURE — 96374 THER/PROPH/DIAG INJ IV PUSH: CPT

## 2022-09-26 PROCEDURE — 74176 CT ABD & PELVIS W/O CONTRAST: CPT

## 2022-09-26 PROCEDURE — 1200000000 HC SEMI PRIVATE

## 2022-09-26 PROCEDURE — 83690 ASSAY OF LIPASE: CPT

## 2022-09-26 PROCEDURE — 80179 DRUG ASSAY SALICYLATE: CPT

## 2022-09-26 PROCEDURE — 93005 ELECTROCARDIOGRAM TRACING: CPT | Performed by: STUDENT IN AN ORGANIZED HEALTH CARE EDUCATION/TRAINING PROGRAM

## 2022-09-26 PROCEDURE — 6370000000 HC RX 637 (ALT 250 FOR IP): Performed by: INTERNAL MEDICINE

## 2022-09-26 PROCEDURE — 2580000003 HC RX 258: Performed by: STUDENT IN AN ORGANIZED HEALTH CARE EDUCATION/TRAINING PROGRAM

## 2022-09-26 PROCEDURE — 96372 THER/PROPH/DIAG INJ SC/IM: CPT

## 2022-09-26 PROCEDURE — 85610 PROTHROMBIN TIME: CPT

## 2022-09-26 PROCEDURE — 82077 ASSAY SPEC XCP UR&BREATH IA: CPT

## 2022-09-26 PROCEDURE — 6360000002 HC RX W HCPCS: Performed by: STUDENT IN AN ORGANIZED HEALTH CARE EDUCATION/TRAINING PROGRAM

## 2022-09-26 PROCEDURE — 36415 COLL VENOUS BLD VENIPUNCTURE: CPT

## 2022-09-26 PROCEDURE — 87186 SC STD MICRODIL/AGAR DIL: CPT

## 2022-09-26 PROCEDURE — 80053 COMPREHEN METABOLIC PANEL: CPT

## 2022-09-26 PROCEDURE — 80307 DRUG TEST PRSMV CHEM ANLYZR: CPT

## 2022-09-26 PROCEDURE — 83735 ASSAY OF MAGNESIUM: CPT

## 2022-09-26 PROCEDURE — 81001 URINALYSIS AUTO W/SCOPE: CPT

## 2022-09-26 PROCEDURE — 82248 BILIRUBIN DIRECT: CPT

## 2022-09-26 PROCEDURE — 83605 ASSAY OF LACTIC ACID: CPT

## 2022-09-26 PROCEDURE — 80143 DRUG ASSAY ACETAMINOPHEN: CPT

## 2022-09-26 PROCEDURE — 96375 TX/PRO/DX INJ NEW DRUG ADDON: CPT

## 2022-09-26 PROCEDURE — 99285 EMERGENCY DEPT VISIT HI MDM: CPT

## 2022-09-26 PROCEDURE — 87077 CULTURE AEROBIC IDENTIFY: CPT

## 2022-09-26 PROCEDURE — 6360000002 HC RX W HCPCS: Performed by: INTERNAL MEDICINE

## 2022-09-26 PROCEDURE — 87088 URINE BACTERIA CULTURE: CPT

## 2022-09-26 PROCEDURE — 84703 CHORIONIC GONADOTROPIN ASSAY: CPT

## 2022-09-26 PROCEDURE — 6360000002 HC RX W HCPCS

## 2022-09-26 RX ORDER — ONDANSETRON 4 MG/1
4 TABLET, ORALLY DISINTEGRATING ORAL EVERY 8 HOURS PRN
Status: DISCONTINUED | OUTPATIENT
Start: 2022-09-26 | End: 2022-09-30 | Stop reason: HOSPADM

## 2022-09-26 RX ORDER — FENTANYL CITRATE 50 UG/ML
50 INJECTION, SOLUTION INTRAMUSCULAR; INTRAVENOUS ONCE
Status: COMPLETED | OUTPATIENT
Start: 2022-09-26 | End: 2022-09-26

## 2022-09-26 RX ORDER — 0.9 % SODIUM CHLORIDE 0.9 %
500 INTRAVENOUS SOLUTION INTRAVENOUS ONCE
Status: COMPLETED | OUTPATIENT
Start: 2022-09-26 | End: 2022-09-26

## 2022-09-26 RX ORDER — LANOLIN ALCOHOL/MO/W.PET/CERES
3 CREAM (GRAM) TOPICAL NIGHTLY
Status: DISCONTINUED | OUTPATIENT
Start: 2022-09-26 | End: 2022-09-30 | Stop reason: HOSPADM

## 2022-09-26 RX ORDER — POLYETHYLENE GLYCOL 3350 17 G/17G
17 POWDER, FOR SOLUTION ORAL DAILY PRN
Status: DISCONTINUED | OUTPATIENT
Start: 2022-09-26 | End: 2022-09-30 | Stop reason: HOSPADM

## 2022-09-26 RX ORDER — FAMOTIDINE 40 MG/1
40 TABLET, FILM COATED ORAL NIGHTLY PRN
COMMUNITY

## 2022-09-26 RX ORDER — PANTOPRAZOLE SODIUM 40 MG/1
40 TABLET, DELAYED RELEASE ORAL
Status: DISCONTINUED | OUTPATIENT
Start: 2022-09-27 | End: 2022-09-30 | Stop reason: HOSPADM

## 2022-09-26 RX ORDER — SODIUM CHLORIDE 0.9 % (FLUSH) 0.9 %
5-40 SYRINGE (ML) INJECTION PRN
Status: DISCONTINUED | OUTPATIENT
Start: 2022-09-26 | End: 2022-09-30 | Stop reason: HOSPADM

## 2022-09-26 RX ORDER — PROMETHAZINE HYDROCHLORIDE 25 MG/ML
12.5 INJECTION, SOLUTION INTRAMUSCULAR; INTRAVENOUS ONCE
Status: COMPLETED | OUTPATIENT
Start: 2022-09-26 | End: 2022-09-26

## 2022-09-26 RX ORDER — ZOLPIDEM TARTRATE 10 MG/1
10 TABLET ORAL NIGHTLY PRN
COMMUNITY

## 2022-09-26 RX ORDER — LEVETIRACETAM 750 MG/1
750 TABLET ORAL 2 TIMES DAILY
COMMUNITY

## 2022-09-26 RX ORDER — TOPIRAMATE 25 MG/1
50 TABLET ORAL 2 TIMES DAILY
Status: DISCONTINUED | OUTPATIENT
Start: 2022-09-26 | End: 2022-09-30 | Stop reason: HOSPADM

## 2022-09-26 RX ORDER — LEVOTHYROXINE SODIUM 112 UG/1
112 TABLET ORAL DAILY
COMMUNITY

## 2022-09-26 RX ORDER — BUTALBITAL, ACETAMINOPHEN AND CAFFEINE 50; 325; 40 MG/1; MG/1; MG/1
1 TABLET ORAL DAILY PRN
Status: ON HOLD | COMMUNITY
End: 2022-09-30 | Stop reason: HOSPADM

## 2022-09-26 RX ORDER — SODIUM CHLORIDE 9 MG/ML
INJECTION, SOLUTION INTRAVENOUS PRN
Status: DISCONTINUED | OUTPATIENT
Start: 2022-09-26 | End: 2022-09-30 | Stop reason: HOSPADM

## 2022-09-26 RX ORDER — MORPHINE SULFATE 4 MG/ML
4 INJECTION, SOLUTION INTRAMUSCULAR; INTRAVENOUS ONCE
Status: COMPLETED | OUTPATIENT
Start: 2022-09-26 | End: 2022-09-26

## 2022-09-26 RX ORDER — MAGNESIUM SULFATE IN WATER 40 MG/ML
4000 INJECTION, SOLUTION INTRAVENOUS ONCE
Status: COMPLETED | OUTPATIENT
Start: 2022-09-26 | End: 2022-09-27

## 2022-09-26 RX ORDER — LEVETIRACETAM 500 MG/1
750 TABLET ORAL 2 TIMES DAILY
Status: DISCONTINUED | OUTPATIENT
Start: 2022-09-27 | End: 2022-09-30 | Stop reason: HOSPADM

## 2022-09-26 RX ORDER — LANOLIN ALCOHOL/MO/W.PET/CERES
3 CREAM (GRAM) TOPICAL NIGHTLY
COMMUNITY

## 2022-09-26 RX ORDER — LEVOTHYROXINE SODIUM 112 UG/1
112 TABLET ORAL DAILY
Status: DISCONTINUED | OUTPATIENT
Start: 2022-09-27 | End: 2022-09-30 | Stop reason: HOSPADM

## 2022-09-26 RX ORDER — TRAMADOL HYDROCHLORIDE 50 MG/1
50 TABLET ORAL EVERY 6 HOURS PRN
Status: DISCONTINUED | OUTPATIENT
Start: 2022-09-26 | End: 2022-09-27

## 2022-09-26 RX ORDER — POTASSIUM CHLORIDE 20 MEQ/1
20 TABLET, EXTENDED RELEASE ORAL 2 TIMES DAILY WITH MEALS
Status: DISCONTINUED | OUTPATIENT
Start: 2022-09-27 | End: 2022-09-30 | Stop reason: HOSPADM

## 2022-09-26 RX ORDER — ONDANSETRON 2 MG/ML
4 INJECTION INTRAMUSCULAR; INTRAVENOUS ONCE
Status: COMPLETED | OUTPATIENT
Start: 2022-09-26 | End: 2022-09-26

## 2022-09-26 RX ORDER — GABAPENTIN 300 MG/1
300 CAPSULE ORAL 3 TIMES DAILY
Status: DISCONTINUED | OUTPATIENT
Start: 2022-09-26 | End: 2022-09-30 | Stop reason: HOSPADM

## 2022-09-26 RX ORDER — POTASSIUM CHLORIDE 20 MEQ/1
40 TABLET, EXTENDED RELEASE ORAL ONCE
Status: COMPLETED | OUTPATIENT
Start: 2022-09-26 | End: 2022-09-26

## 2022-09-26 RX ORDER — SODIUM CHLORIDE 0.9 % (FLUSH) 0.9 %
5-40 SYRINGE (ML) INJECTION EVERY 12 HOURS SCHEDULED
Status: DISCONTINUED | OUTPATIENT
Start: 2022-09-26 | End: 2022-09-30 | Stop reason: HOSPADM

## 2022-09-26 RX ORDER — BRIMONIDINE TARTRATE 2 MG/ML
1 SOLUTION/ DROPS OPHTHALMIC EVERY MORNING
Status: DISCONTINUED | OUTPATIENT
Start: 2022-09-27 | End: 2022-09-30 | Stop reason: HOSPADM

## 2022-09-26 RX ORDER — LANOLIN ALCOHOL/MO/W.PET/CERES
400 CREAM (GRAM) TOPICAL 2 TIMES DAILY
Status: DISCONTINUED | OUTPATIENT
Start: 2022-09-27 | End: 2022-09-30 | Stop reason: HOSPADM

## 2022-09-26 RX ORDER — ZOLPIDEM TARTRATE 5 MG/1
10 TABLET ORAL NIGHTLY PRN
Status: DISCONTINUED | OUTPATIENT
Start: 2022-09-26 | End: 2022-09-27

## 2022-09-26 RX ORDER — ONDANSETRON 2 MG/ML
4 INJECTION INTRAMUSCULAR; INTRAVENOUS EVERY 6 HOURS PRN
Status: DISCONTINUED | OUTPATIENT
Start: 2022-09-26 | End: 2022-09-30 | Stop reason: HOSPADM

## 2022-09-26 RX ORDER — LEVETIRACETAM 10 MG/ML
INJECTION INTRAVASCULAR
Status: COMPLETED
Start: 2022-09-26 | End: 2022-09-26

## 2022-09-26 RX ADMIN — GABAPENTIN 300 MG: 300 CAPSULE ORAL at 23:39

## 2022-09-26 RX ADMIN — CEFTRIAXONE 2000 MG: 2 INJECTION, POWDER, FOR SOLUTION INTRAMUSCULAR; INTRAVENOUS at 20:03

## 2022-09-26 RX ADMIN — MAGNESIUM SULFATE HEPTAHYDRATE 4000 MG: 40 INJECTION, SOLUTION INTRAVENOUS at 21:55

## 2022-09-26 RX ADMIN — PROMETHAZINE HYDROCHLORIDE 12.5 MG: 25 INJECTION INTRAMUSCULAR; INTRAVENOUS at 18:18

## 2022-09-26 RX ADMIN — TOPIRAMATE 50 MG: 25 TABLET, FILM COATED ORAL at 23:39

## 2022-09-26 RX ADMIN — ZOLPIDEM TARTRATE 10 MG: 5 TABLET ORAL at 23:40

## 2022-09-26 RX ADMIN — SODIUM CHLORIDE 500 ML: 9 INJECTION, SOLUTION INTRAVENOUS at 21:23

## 2022-09-26 RX ADMIN — TRAMADOL HYDROCHLORIDE 50 MG: 50 TABLET, COATED ORAL at 23:39

## 2022-09-26 RX ADMIN — POTASSIUM CHLORIDE 40 MEQ: 1500 TABLET, EXTENDED RELEASE ORAL at 21:46

## 2022-09-26 RX ADMIN — LEVETIRACETAM 1000 MG: 10 INJECTION INTRAVENOUS at 17:21

## 2022-09-26 RX ADMIN — ONDANSETRON 4 MG: 2 INJECTION INTRAMUSCULAR; INTRAVENOUS at 20:27

## 2022-09-26 RX ADMIN — FENTANYL CITRATE 50 MCG: 50 INJECTION, SOLUTION INTRAMUSCULAR; INTRAVENOUS at 20:28

## 2022-09-26 RX ADMIN — MORPHINE SULFATE 4 MG: 4 INJECTION, SOLUTION INTRAMUSCULAR; INTRAVENOUS at 17:28

## 2022-09-26 ASSESSMENT — PAIN DESCRIPTION - ORIENTATION
ORIENTATION: RIGHT;LEFT
ORIENTATION: RIGHT

## 2022-09-26 ASSESSMENT — PAIN SCALES - GENERAL
PAINLEVEL_OUTOF10: 8
PAINLEVEL_OUTOF10: 9

## 2022-09-26 ASSESSMENT — ENCOUNTER SYMPTOMS
PHOTOPHOBIA: 0
CHEST TIGHTNESS: 0
COLOR CHANGE: 1
VOMITING: 0
ABDOMINAL DISTENTION: 0
NAUSEA: 1
ABDOMINAL PAIN: 1
COUGH: 0
DIARRHEA: 0
SHORTNESS OF BREATH: 0

## 2022-09-26 ASSESSMENT — PAIN DESCRIPTION - LOCATION: LOCATION: ABDOMEN

## 2022-09-26 ASSESSMENT — PAIN DESCRIPTION - PAIN TYPE: TYPE: ACUTE PAIN

## 2022-09-26 ASSESSMENT — PAIN DESCRIPTION - DESCRIPTORS
DESCRIPTORS: ACHING;DISCOMFORT
DESCRIPTORS: CRAMPING

## 2022-09-27 ENCOUNTER — APPOINTMENT (OUTPATIENT)
Dept: ULTRASOUND IMAGING | Age: 37
DRG: 843 | End: 2022-09-27
Payer: MEDICARE

## 2022-09-27 LAB
ALBUMIN SERPL-MCNC: 3.4 G/DL (ref 3.5–5.2)
ALP BLD-CCNC: 194 U/L (ref 35–104)
ALT SERPL-CCNC: 1793 U/L (ref 0–32)
AMMONIA: 116 UMOL/L (ref 11–51)
AMMONIA: 82 UMOL/L (ref 11–51)
ANION GAP SERPL CALCULATED.3IONS-SCNC: 12 MMOL/L (ref 7–16)
AST SERPL-CCNC: 725 U/L (ref 0–31)
BILIRUB SERPL-MCNC: 8.8 MG/DL (ref 0–1.2)
BILIRUBIN DIRECT: 6.1 MG/DL (ref 0–0.3)
BILIRUBIN DIRECT: 6.8 MG/DL (ref 0–0.3)
BILIRUBIN, INDIRECT: 2 MG/DL (ref 0–1)
BUN BLDV-MCNC: 11 MG/DL (ref 6–20)
CALCIUM SERPL-MCNC: 8.3 MG/DL (ref 8.6–10.2)
CHLORIDE BLD-SCNC: 104 MMOL/L (ref 98–107)
CO2: 21 MMOL/L (ref 22–29)
CREAT SERPL-MCNC: 0.6 MG/DL (ref 0.5–1)
GFR AFRICAN AMERICAN: >60
GFR NON-AFRICAN AMERICAN: >60 ML/MIN/1.73
GLUCOSE BLD-MCNC: 112 MG/DL (ref 74–99)
HAV IGM SER IA-ACNC: NORMAL
HCT VFR BLD CALC: 28.4 % (ref 34–48)
HEMOGLOBIN: 9.7 G/DL (ref 11.5–15.5)
HEPATITIS B CORE IGM ANTIBODY: NORMAL
HEPATITIS B SURFACE ANTIGEN INTERPRETATION: NORMAL
HEPATITIS C ANTIBODY INTERPRETATION: NORMAL
INR BLD: 1.7
MAGNESIUM: 2.7 MG/DL (ref 1.6–2.6)
MCH RBC QN AUTO: 28.9 PG (ref 26–35)
MCHC RBC AUTO-ENTMCNC: 34.2 % (ref 32–34.5)
MCV RBC AUTO: 84.5 FL (ref 80–99.9)
PDW BLD-RTO: 20.3 FL (ref 11.5–15)
PLATELET # BLD: 67 E9/L (ref 130–450)
PLATELET CONFIRMATION: NORMAL
PMV BLD AUTO: 10.3 FL (ref 7–12)
POTASSIUM SERPL-SCNC: 3.4 MMOL/L (ref 3.5–5)
PROTHROMBIN TIME: 18.7 SEC (ref 9.3–12.4)
RBC # BLD: 3.36 E12/L (ref 3.5–5.5)
SODIUM BLD-SCNC: 137 MMOL/L (ref 132–146)
TOTAL PROTEIN: 5.5 G/DL (ref 6.4–8.3)
WBC # BLD: 5.7 E9/L (ref 4.5–11.5)

## 2022-09-27 PROCEDURE — 6370000000 HC RX 637 (ALT 250 FOR IP): Performed by: INTERNAL MEDICINE

## 2022-09-27 PROCEDURE — 6370000000 HC RX 637 (ALT 250 FOR IP): Performed by: STUDENT IN AN ORGANIZED HEALTH CARE EDUCATION/TRAINING PROGRAM

## 2022-09-27 PROCEDURE — 36415 COLL VENOUS BLD VENIPUNCTURE: CPT

## 2022-09-27 PROCEDURE — 2580000003 HC RX 258: Performed by: STUDENT IN AN ORGANIZED HEALTH CARE EDUCATION/TRAINING PROGRAM

## 2022-09-27 PROCEDURE — 80048 BASIC METABOLIC PNL TOTAL CA: CPT

## 2022-09-27 PROCEDURE — 2580000003 HC RX 258: Performed by: INTERNAL MEDICINE

## 2022-09-27 PROCEDURE — 2500000003 HC RX 250 WO HCPCS: Performed by: STUDENT IN AN ORGANIZED HEALTH CARE EDUCATION/TRAINING PROGRAM

## 2022-09-27 PROCEDURE — 80076 HEPATIC FUNCTION PANEL: CPT

## 2022-09-27 PROCEDURE — 6360000002 HC RX W HCPCS: Performed by: INTERNAL MEDICINE

## 2022-09-27 PROCEDURE — 85610 PROTHROMBIN TIME: CPT

## 2022-09-27 PROCEDURE — 99222 1ST HOSP IP/OBS MODERATE 55: CPT | Performed by: TRANSPLANT SURGERY

## 2022-09-27 PROCEDURE — 1200000000 HC SEMI PRIVATE

## 2022-09-27 PROCEDURE — 85027 COMPLETE CBC AUTOMATED: CPT

## 2022-09-27 PROCEDURE — 82140 ASSAY OF AMMONIA: CPT

## 2022-09-27 PROCEDURE — 80074 ACUTE HEPATITIS PANEL: CPT

## 2022-09-27 PROCEDURE — 83735 ASSAY OF MAGNESIUM: CPT

## 2022-09-27 PROCEDURE — 6360000002 HC RX W HCPCS: Performed by: STUDENT IN AN ORGANIZED HEALTH CARE EDUCATION/TRAINING PROGRAM

## 2022-09-27 PROCEDURE — 76705 ECHO EXAM OF ABDOMEN: CPT

## 2022-09-27 PROCEDURE — 99223 1ST HOSP IP/OBS HIGH 75: CPT | Performed by: INTERNAL MEDICINE

## 2022-09-27 PROCEDURE — 93975 VASCULAR STUDY: CPT

## 2022-09-27 RX ORDER — LACTULOSE 10 G/15ML
30 SOLUTION ORAL 3 TIMES DAILY
Status: DISCONTINUED | OUTPATIENT
Start: 2022-09-27 | End: 2022-09-30 | Stop reason: HOSPADM

## 2022-09-27 RX ORDER — SODIUM CHLORIDE 9 MG/ML
INJECTION, SOLUTION INTRAVENOUS CONTINUOUS
Status: DISCONTINUED | OUTPATIENT
Start: 2022-09-27 | End: 2022-09-30

## 2022-09-27 RX ORDER — LACTULOSE 10 G/15ML
20 SOLUTION ORAL 3 TIMES DAILY
Status: DISCONTINUED | OUTPATIENT
Start: 2022-09-27 | End: 2022-09-27

## 2022-09-27 RX ORDER — TRAMADOL HYDROCHLORIDE 50 MG/1
25 TABLET ORAL EVERY 6 HOURS PRN
Status: DISCONTINUED | OUTPATIENT
Start: 2022-09-27 | End: 2022-09-28

## 2022-09-27 RX ORDER — METRONIDAZOLE 500 MG/100ML
500 INJECTION, SOLUTION INTRAVENOUS EVERY 8 HOURS
Status: DISCONTINUED | OUTPATIENT
Start: 2022-09-27 | End: 2022-09-30

## 2022-09-27 RX ORDER — POTASSIUM CHLORIDE 20 MEQ/1
40 TABLET, EXTENDED RELEASE ORAL ONCE
Status: COMPLETED | OUTPATIENT
Start: 2022-09-27 | End: 2022-09-27

## 2022-09-27 RX ORDER — ZOLPIDEM TARTRATE 5 MG/1
5 TABLET ORAL NIGHTLY PRN
Status: DISCONTINUED | OUTPATIENT
Start: 2022-09-27 | End: 2022-09-30 | Stop reason: HOSPADM

## 2022-09-27 RX ADMIN — TOPIRAMATE 50 MG: 25 TABLET, FILM COATED ORAL at 09:21

## 2022-09-27 RX ADMIN — SODIUM CHLORIDE: 9 INJECTION, SOLUTION INTRAVENOUS at 07:24

## 2022-09-27 RX ADMIN — TOPIRAMATE 50 MG: 25 TABLET, FILM COATED ORAL at 20:51

## 2022-09-27 RX ADMIN — POTASSIUM CHLORIDE 20 MEQ: 1500 TABLET, EXTENDED RELEASE ORAL at 16:34

## 2022-09-27 RX ADMIN — RIFAXIMIN 550 MG: 550 TABLET ORAL at 16:34

## 2022-09-27 RX ADMIN — LEVETIRACETAM 750 MG: 500 TABLET, FILM COATED ORAL at 09:22

## 2022-09-27 RX ADMIN — GABAPENTIN 300 MG: 300 CAPSULE ORAL at 20:50

## 2022-09-27 RX ADMIN — GABAPENTIN 300 MG: 300 CAPSULE ORAL at 13:56

## 2022-09-27 RX ADMIN — LACTULOSE 30 G: 20 SOLUTION ORAL at 13:56

## 2022-09-27 RX ADMIN — METRONIDAZOLE 500 MG: 500 INJECTION, SOLUTION INTRAVENOUS at 01:39

## 2022-09-27 RX ADMIN — METRONIDAZOLE 500 MG: 500 INJECTION, SOLUTION INTRAVENOUS at 09:26

## 2022-09-27 RX ADMIN — PANTOPRAZOLE SODIUM 40 MG: 40 TABLET, DELAYED RELEASE ORAL at 05:35

## 2022-09-27 RX ADMIN — SODIUM CHLORIDE, PRESERVATIVE FREE 10 ML: 5 INJECTION INTRAVENOUS at 20:51

## 2022-09-27 RX ADMIN — SODIUM CHLORIDE: 9 INJECTION, SOLUTION INTRAVENOUS at 22:31

## 2022-09-27 RX ADMIN — LACTULOSE 30 G: 20 SOLUTION ORAL at 20:49

## 2022-09-27 RX ADMIN — ZOLPIDEM TARTRATE 5 MG: 5 TABLET ORAL at 20:57

## 2022-09-27 RX ADMIN — POTASSIUM CHLORIDE 40 MEQ: 1500 TABLET, EXTENDED RELEASE ORAL at 09:21

## 2022-09-27 RX ADMIN — LEVETIRACETAM 750 MG: 500 TABLET, FILM COATED ORAL at 20:50

## 2022-09-27 RX ADMIN — LEVOTHYROXINE SODIUM 112 MCG: 0.11 TABLET ORAL at 05:35

## 2022-09-27 RX ADMIN — POTASSIUM CHLORIDE 20 MEQ: 1500 TABLET, EXTENDED RELEASE ORAL at 09:21

## 2022-09-27 RX ADMIN — SODIUM CHLORIDE, PRESERVATIVE FREE 10 ML: 5 INJECTION INTRAVENOUS at 09:22

## 2022-09-27 RX ADMIN — Medication 400 MG: at 09:22

## 2022-09-27 RX ADMIN — Medication 400 MG: at 20:49

## 2022-09-27 RX ADMIN — RIFAXIMIN 550 MG: 550 TABLET ORAL at 20:51

## 2022-09-27 RX ADMIN — GABAPENTIN 300 MG: 300 CAPSULE ORAL at 09:21

## 2022-09-27 RX ADMIN — LACTULOSE 20 G: 20 SOLUTION ORAL at 09:21

## 2022-09-27 RX ADMIN — PHYTONADIONE 10 MG: 10 INJECTION, EMULSION INTRAMUSCULAR; INTRAVENOUS; SUBCUTANEOUS at 02:16

## 2022-09-27 RX ADMIN — TRAMADOL HYDROCHLORIDE 25 MG: 50 TABLET, COATED ORAL at 18:37

## 2022-09-27 RX ADMIN — ONDANSETRON HYDROCHLORIDE 4 MG: 2 SOLUTION INTRAMUSCULAR; INTRAVENOUS at 00:18

## 2022-09-27 RX ADMIN — BRIMONIDINE TARTRATE 1 DROP: 2 SOLUTION OPHTHALMIC at 09:21

## 2022-09-27 RX ADMIN — METRONIDAZOLE 500 MG: 500 INJECTION, SOLUTION INTRAVENOUS at 16:34

## 2022-09-27 RX ADMIN — WATER 2000 MG: 1 INJECTION INTRAMUSCULAR; INTRAVENOUS; SUBCUTANEOUS at 20:50

## 2022-09-27 ASSESSMENT — PAIN DESCRIPTION - ORIENTATION
ORIENTATION: RIGHT;LOWER

## 2022-09-27 ASSESSMENT — PAIN DESCRIPTION - DESCRIPTORS
DESCRIPTORS: ACHING

## 2022-09-27 ASSESSMENT — PAIN DESCRIPTION - LOCATION
LOCATION: ABDOMEN
LOCATION: ABDOMEN
LOCATION: ABDOMEN;SHOULDER

## 2022-09-27 ASSESSMENT — PAIN SCALES - GENERAL
PAINLEVEL_OUTOF10: 6
PAINLEVEL_OUTOF10: 10
PAINLEVEL_OUTOF10: 10
PAINLEVEL_OUTOF10: 9

## 2022-09-27 NOTE — ED PROVIDER NOTES
Faina Frazier is a 40year old female with PMH of neuroendocrine tumor, seizures who presented to ED with concern for abdominal pain and jaundice. Patient has a history of neuroendocrine tumor with mets and follows with Dr. Radu Martin. Patient has noticed increasing abdominal pain and fatigue worsening over the past week. Patient went to [facility] and has had worsening symptoms including jaundice since discharge. Patient is not receiving chemotherapy or radiation. Nothing makes symptoms better or worse symptoms moderate in severity and constant. Patient also reported breakthrough seizure yesterday. Patient denies fever, chills, chest pain, shortness of breath. Patient does use tobacco, denies alcohol, or drug use. The history is provided by the patient and medical records. Review of Systems   Constitutional:  Positive for fatigue. Negative for chills, diaphoresis and fever. Eyes:  Negative for photophobia and visual disturbance. Respiratory:  Negative for cough, chest tightness and shortness of breath. Cardiovascular:  Negative for chest pain, palpitations and leg swelling. Gastrointestinal:  Positive for abdominal pain and nausea. Negative for abdominal distention, diarrhea and vomiting. Genitourinary:  Negative for dysuria. Musculoskeletal:  Negative for neck pain and neck stiffness. Skin:  Positive for color change. Negative for pallor and rash. Neurological:  Positive for seizures. Negative for headaches. Psychiatric/Behavioral:  Negative for confusion. Physical Exam  Vitals and nursing note reviewed. Constitutional:       General: She is not in acute distress. Appearance: She is ill-appearing. HENT:      Head: Normocephalic and atraumatic. Eyes:      General: Scleral icterus present. Pupils: Pupils are equal, round, and reactive to light. Cardiovascular:      Rate and Rhythm: Normal rate and regular rhythm.    Pulmonary:      Effort: Pulmonary effort is normal.      Breath sounds: Normal breath sounds. Abdominal:      General: Bowel sounds are normal. There is no distension. Palpations: Abdomen is soft. Tenderness: There is abdominal tenderness. There is no guarding or rebound. Comments: Diffuse tenderness worse RUQ   Musculoskeletal:      Cervical back: Normal range of motion and neck supple. No rigidity. No muscular tenderness. Right lower leg: No edema. Left lower leg: No edema. Skin:     General: Skin is warm and dry. Capillary Refill: Capillary refill takes less than 2 seconds. Coloration: Skin is jaundiced. Skin is not pale. Findings: No erythema or rash. Neurological:      Mental Status: She is alert and oriented to person, place, and time. Psychiatric:         Mood and Affect: Mood normal.        Procedures     MDM  Number of Diagnoses or Management Options  Diagnosis management comments: Lee Cohn is a 40year old female who presented to ED with abdominal pain. Patient was given IV pain medication and nausea medication with some improvement of symptoms, patient improved at repeat evaluation  Patient was found to have new jaundice with elevated LFT, bilirubin, and alk phos. Patient also has new elevation in INR  Patient treated with rocephin for UTI  Cultures pending, consult placed to Dr. Malathi Rudd   Patient will be admitted for worsening symptoms with jaundice  Patient stable at repeat evaluation and improved. ED Course as of 09/26/22 2326   Mon Sep 26, 2022   2022 EKG: This EKG is signed and interpreted by me. Rate: 70  Rhythm: Sinus  Interpretation: non-specific EKG, no St elevation or depression, abnormal p wave  Comparison: stable as compared to patient's most recent EKG   [SS]      ED Course User Index  [SS] Jb Hernandez MD        ED Course as of 09/26/22 2326   Mon Sep 26, 2022   2022 EKG: This EKG is signed and interpreted by me.     Rate: 70  Rhythm: Sinus  Interpretation: non-specific EKG, no St elevation or depression, abnormal p wave  Comparison: stable as compared to patient's most recent EKG   [SS]      ED Course User Index  [SS] Anju Field MD       --------------------------------------------- PAST HISTORY ---------------------------------------------  Past Medical History:  has a past medical history of Abdominal pain, Cancer ([facility] Diarrhea, Hypocalcemia, Hypothyroidism, Irritable bowel syndrome, Migraines, Seizures ([facility] and Status post alcohol detoxification. Past Surgical History:  has a past surgical history that includes Parathyroid gland surgery; Cholecystectomy, laparoscopic (07/06/2016); Thyroidectomy; Colonoscopy (N/A, 6/22/2018); CT NEEDLE BIOPSY LIVER PERCUTANEOUS (9/1/2020); Small intestine surgery (N/A, 10/21/2020); CT BIOPSY RENAL (1/25/2021); hc dialysis catheter (N/A, 1/28/2021); sigmoidoscopy (N/A, 5/14/2021); and Port Surgery (Left, 2/18/2022). Social History:  reports that she has been smoking cigarettes. She has been smoking an average of .25 packs per day. She has never used smokeless tobacco. She reports that she does not drink alcohol and does not use drugs. Family History: family history includes Alzheimer's Disease in an other family member; Asthma in her father; Breast Cancer in her maternal grandmother; Cancer in her father, maternal grandmother, and paternal grandfather; Diabetes in an other family member; Heart Disease in her father; High Blood Pressure in her father and mother; High Cholesterol in her mother; Coralyn Raisin in an other family member; Other in her mother. The patients home medications have been reviewed. Allergies: Patient has no known allergies.     -------------------------------------------------- RESULTS -------------------------------------------------    LABS:  Results for orders placed or performed during the hospital encounter of 09/26/22   CBC with Auto Differential   Result Value Ref Range    WBC 4.6 4.5 - 11.5 E9/L    RBC 3.60 3.50 - 5.50 E12/L    Hemoglobin 10.3 (L) 11.5 - 15.5 g/dL    Hematocrit 29.7 (L) 34.0 - 48.0 %    MCV 82.5 80.0 - 99.9 fL    MCH 28.6 26.0 - 35.0 pg    MCHC 34.7 (H) 32.0 - 34.5 %    RDW 19.9 (H) 11.5 - 15.0 fL    Platelets 62 (L) 674 - 450 E9/L    MPV 10.6 7.0 - 12.0 fL    Neutrophils % 41.3 (L) 43.0 - 80.0 %    Immature Granulocytes % 0.7 0.0 - 5.0 %    Lymphocytes % 29.5 20.0 - 42.0 %    Monocytes % 25.4 (H) 2.0 - 12.0 %    Eosinophils % 2.4 0.0 - 6.0 %    Basophils % 0.7 0.0 - 2.0 %    Neutrophils Absolute 1.91 1.80 - 7.30 E9/L    Immature Granulocytes # 0.03 E9/L    Lymphocytes Absolute 1.36 (L) 1.50 - 4.00 E9/L    Monocytes Absolute 1.17 (H) 0.10 - 0.95 E9/L    Eosinophils Absolute 0.11 0.05 - 0.50 E9/L    Basophils Absolute 0.03 0.00 - 0.20 E9/L   Comprehensive Metabolic Panel w/ Reflex to MG   Result Value Ref Range    Sodium 140 132 - 146 mmol/L    Potassium reflex Magnesium 3.1 (L) 3.5 - 5.0 mmol/L    Chloride 107 98 - 107 mmol/L    CO2 20 (L) 22 - 29 mmol/L    Anion Gap 13 7 - 16 mmol/L    Glucose 77 74 - 99 mg/dL    BUN 12 6 - 20 mg/dL    Creatinine 0.7 0.5 - 1.0 mg/dL    GFR Non-African American >60 >=60 mL/min/1.73    GFR African American >60     Calcium 8.4 (L) 8.6 - 10.2 mg/dL    Total Protein 5.7 (L) 6.4 - 8.3 g/dL    Albumin 3.4 (L) 3.5 - 5.2 g/dL    Total Bilirubin 8.8 (H) 0.0 - 1.2 mg/dL    Alkaline Phosphatase 189 (H) 35 - 104 U/L    ALT 1,958 (H) 0 - 32 U/L    AST 1,069 (H) 0 - 31 U/L   Protime-INR   Result Value Ref Range    Protime 24.7 (H) 9.3 - 12.4 sec    INR 2.3    Lactic Acid   Result Value Ref Range    Lactic Acid 1.6 0.5 - 2.2 mmol/L   Lipase   Result Value Ref Range    Lipase 50 13 - 60 U/L   Urinalysis with Microscopic   Result Value Ref Range    Color, UA Yellow Straw/Yellow    Clarity, UA CLOUDY (A) Clear    Glucose, Ur Negative Negative mg/dL    Bilirubin Urine MODERATE (A) Negative    Ketones, Urine Negative Negative mg/dL    Specific Gravity, UA 1.010 1.005 - 1.030    Blood, Urine Negative Negative    pH, UA 7.5 5.0 - 9.0    Protein, UA Negative Negative mg/dL    Urobilinogen, Urine 1.0 <2.0 E.U./dL    Nitrite, Urine POSITIVE (A) Negative    Leukocyte Esterase, Urine LARGE (A) Negative    WBC, UA 10-20 (A) 0 - 5 /HPF    RBC, UA 0-1 0 - 2 /HPF    Epithelial Cells, UA MODERATE /HPF    Bacteria, UA MANY (A) None Seen /HPF   HCG Qualitative, Serum   Result Value Ref Range    hCG Qual NEGATIVE NEGATIVE   Serum Drug Screen   Result Value Ref Range    Ethanol Lvl <10 mg/dL    Acetaminophen Level <5.0 (L) 10.0 - 25.7 mcg/mL    Salicylate, Serum <0.2 0.0 - 30.0 mg/dL    TCA Scrn NEGATIVE Cutoff:300 ng/mL   Platelet Confirmation   Result Value Ref Range    Platelet Confirmation CONFIRMED    Magnesium   Result Value Ref Range    Magnesium 1.3 (L) 1.6 - 2.6 mg/dL   EKG 12 Lead   Result Value Ref Range    Ventricular Rate 70 BPM    Atrial Rate 70 BPM    P-R Interval 162 ms    QRS Duration 72 ms    Q-T Interval 390 ms    QTc Calculation (Bazett) 421 ms    R Axis 53 degrees    T Axis 29 degrees       RADIOLOGY:  CT ABDOMEN PELVIS WO CONTRAST Additional Contrast? None   Final Result   1. No evidence of bowel obstruction. Moderate fecal retention in the colon   which may signify constipation. 2. Liver metastases. Interval change is difficult to gauge due to absence of   contrast enhancement. 3. Innumerable subcutaneous nodules in the buttocks, likely representing   injection site reactions/granuloma. ------------------------- NURSING NOTES AND VITALS REVIEWED ---------------------------  Date / Time Roomed:  9/26/2022  4:48 PM  ED Bed Assignment:  [id number]    The nursing notes within the ED encounter and vital signs as below have been reviewed.      Patient Vitals for the past 24 hrs:   BP Temp Temp src Pulse Resp SpO2 Height Weight   09/26/22 2245 -- -- -- -- -- -- 5' (1.524 m) 116 lb (52.6 kg)   09/26/22 2231 107/60 97.9 °F (36.6 °C) Oral 68 17 99 % -- --   09/26/22 2124 105/60 -- -- 72 18 -- -- --   09/26/22 2030 94/82 -- -- 73 18 100 % -- --   09/26/22 1958 107/62 -- -- 69 18 99 % -- --   09/26/22 1625 135/78 98.3 °F (36.8 °C) Oral 78 16 98 % -- --       Oxygen Saturation Interpretation: Normal    ------------------------------------------ PROGRESS NOTES ------------------------------------------  Re-evaluation(s):  Time: 7pm  Patients symptoms show no change  Repeat physical examination is not changed    Counseling:  I have spoken with the patient and discussed todays results, in addition to providing specific details for the plan of care and counseling regarding the diagnosis and prognosis. Their questions are answered at this time and they are agreeable with the plan of admission.    --------------------------------- ADDITIONAL PROVIDER NOTES ---------------------------------  Consultations:   Spoke with Dr. Darnell Quiñonez. Discussed case. They will admit the patient. This patient's ED course included: a personal history and physicial examination, re-evaluation prior to disposition, multiple bedside re-evaluations, and IV medications    This patient has remained hemodynamically stable during their ED course. Diagnosis:  1. Liver metastases ([address]    2. Abdominal pain, unspecified abdominal location    3. Urinary tract infection with hematuria, site unspecified    4. Elevated bilirubin    5. Elevated LFTs        Disposition:  Patient's disposition: Admit to med/surg floor  Patient's condition is stable.          Rafael Castrejon MD  09/26/22 5149

## 2022-09-27 NOTE — CONSULTS
Marlynclaritza  [facility] MED SURG ONC  [address]  [address] [facility] [address]  Dept: [phone]  Loc: [phone]  Attending Consult Note      Reason for Visit:   Well differentiated NET of the small with metastatic disease to the liver. Referring Physician:  Delilah Pleitez MD    PCP:  Amanda García DO    History of Present Illness:     41 y/o female with hx of hypothyroidism, IBS,migraine who was complaining of abdominal pain associated with diarrhea and flushing/sweating. CT abdomen/pelvis 08/28/2020:  2 cm masslike density in the mid abdominal small bowel mesentery with a spiculated appearance. Multiple liver masses suspicious for metastatic disease. 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 Comment: Sections of the liver tissue cores show the hepatic parenchyma to be partially replaced by a proliferation of epithelioid cells with relatively uniform round nuclei and eosinophilic granular cytoplasm. The   epithelioid cells form anastomosing solid cords/nests that are surrounded by delicate fibrovascular stroma. There are no mitotic figures or tumor cell necrosis present. The histologic changes seen are suggestive of well-differentiated neuroendocrine (carcinoid) tumor. Immunostaining for pankeratin, chromogranin, synaptophysin, TTF-1 and Ki-67 was performed on sections of one tissue block (A1) and the neoplastic epithelioid cells show diffuse and strong positivity for neuroendocrine markers (chromogranin and synaptophysin) and moderate positivity for pankeratin. There is no staining reactivity for TTF-1. The Ki-67 proliferation labeling index is essentially negative, (very low, <1%). This staining pattern confirms the histologic impression of a well-differentiated neuroendocrine (carcinoid) tumor. FL Small bowel 09/02/2020:  Rapid small bowel transit.      Serum Chromogranin A 160 on 09/23/2020. Urine 5-HIAA 21 (0-14)  2d-Echo 10/10/2020: EF 60-65%   Normal right ventricular size and function     Dotatate PET/CT scan 10/14/2020 increased tracer uptake seen throughout the liver compatible with neoplasm. This involves both the left and the right lobe of liver. In addition there are 2 foci of increased tracer uptake along the mesentery of the small bowel. This may involve the wall the small bowel. No other convincing evidence for extra-abdominal metastatic disease. EGD/Colonoscopy 10/05/2020 by Dr. Lawrence Elder; records reviewed. Hepatobiliary team (Dr. Mohit Brar) consult appreciated. Small bowel resection on 10/21/2020. Small bowel resection on 10/21/2020. A. Ileum, resection:   Multifocal (4 foci) neuroendocrine tumor (NET). Extensive perineural and angiolymphatic invasion. 3 of 10 lymph nodes with metastatic neuroendocrine tumor and extracapsular extension of tumor cells (3/10). Bilateral viable small bowel resection margins with no evidence of tumor. B. Specimen received as \"Meckel's\":   Nodular scar tissue with patchy chronic inflammation. Negative for neuroendocrine tumor. Intestinal mucosal tissue is not present. CASE SUMMARY:   Procedure: Small bowel resection   Tumor site: Ileum   Tumor size: Greatest dimension of 1.5 cm   Tumor focality: Multifocal: 4 separate tumors   Histologic type and grade: G2: Well-differentiated neuroendocrine tumor   Mitotic rate: between 2-20 mitoses/2 mm2   Ki-67 labeling index: between 3-20%   Tumor extension: Tumor invades through the muscularis propria into subserosal tissue without penetration of overlying serosa   Margins:  All margins are uninvolved by tumor    Margins examined: Proximal and distal   Lymphovascular invasion: Present   Perineural invasion: Present   Large mesenteric masses (greater than 2 cm): Present (one 2.5 cm mass)   Regional lymph nodes:    Number of lymph nodes involved: 3    Number of lymph nodes examined: 10   Pathologic stage classification (pTNM, AJCC eighth edition):    pT3    pN2    pM1a -confirmed liver metastasis, HBS-     Comment:   A. Immunostains stain the tumor cells as follows in block A6:   Synaptophysin and chromogranin: Positive   Ki-67: Positive in 5% of tumor cells      We recommended Lanreotide once monthly for metastatic well differentiated neuroendocrine cancer to liver. Dose # 1 Lanreotide was on 11/05/2020. Dose # 2 Lanreotide was on 12/03/2020. Dose # 3 Lanreotide was on 01/07/2021. Serum Chromogranin A 95 on 01/07/2021. CT chest 02/07/2021 negative for metastatic disease. CT abdomen/pelvis 02/07/2021 Persistent bilobar hepatic lesions which are grossly stable consistent with stable widespread hepatic metastasis. Imaging reviewed. Continue Lanreotide and repeat scans in 3 months. Dose # 4 Lanreotide was on 02/11/2021. Ga 76 Dotatate PET 03/09/2021 Gallium 68 dotatate avid uptake throughout multiple regions of the liver compatible with multiple foci of neuroendocrine tumor in both the right and the left lobe of the liver, when compared to previous not significantly changed in the interval.  Dose # 5 Lanreotide was on 03/11/2021. Dose # 6 Lanreotide was on 04/08/2021. Serum chromogranin A 115 (0-103)  Dose # 7 Lanreotide was on 05/06/2021. Serum chromogranin A 114 (0-103)  Dose # 8 Lanreotide was on 06/03/2021. Serum chromogranin A  84  (0-103)     Ga 76 Dotatate PET 06/29/2021 Numerous foci of increased Gallium 68 dotatate avid uptake throughout both lobes of the liver, not significantly changed from previous. No significant progression of disease. No definite metastatic disease identified  Dose # 9 Lanreotide was on 07/01/2021. Serum Chromogranin A 97 (0-103)  Dose # 10 Lanreotide was on 07/29/2021. Serum Chromogranin A 89 (0-103)  Dose # 11 Lanreotide was on 08/30/2021. Serum Chromogranin A 120 (0-103)  Dose # 12 Lanreotide was on 09/30/2021.  Serum Chromogranin A 141 (0-103)  PET/CT scan 11/09/2021 There is significant gallium avid tracer uptake in the liver, numerous lesions are seen, tracer uptake overall is diminished. There is no convincing extrahepatic disease identified. Dose # 13 Lanreotide was on 11/11/2021. Serum chromogranin A 105 on 11/11/2021. Dose # 14 Lanreotide was on 12/30/2021. Serum chromogranin A 184 on 12/30/2021. Dose # 15 Lanreotide was on 01/27/2022. Serum chromogranin A  98 on 01/27/2022. CT head 01/28/2022 noted no acute abnormality. CT cervical spine 01/28/2022 noted no acute abnormality of cervical spine  PET/CT Gallium 68 02/22/2022 noted Multiple regions of tracer uptake seen within the liver consistent with neuroendocrine tumor. No other evidence to suggest metastatic disease. Reviewed with Dr. Nichole Flores from Radiology team; overall stable disease. Continue Lanreotide and repeat scans in 3 months. Dose # 16 Lanreotide was on 02/24/2022. Serum chromogranin A 116 on 02/24/2022  Dose # 17 Lanreotide was on 03/24/2022. Serum Chromogranin A 173 on 03/24/2022. Dose # 18 Lanreotide was on 04/21/2022. Serum Chromogranin A 140 on 04/21/2022. Dose # 19 Lanreotide was on 05/19/2022. Serum Chromogranin A 114 on 05/19/2022. Dose # 20 Lanreotide was on 06/23/2022. PET/CT scan 07/19/2022: Gallium 76 dotatate avid uptake is present within multiple masses within the liver, allowing for slight technical differences, not significantly changed in the interval. No convincing extrahepatic metastatic disease identified. Imaging reviewed. Continue Lanreotide and repeat scans in 3-4 months  Dose # 21 Lanreotide was on 07/21/2022. Serum Chromogranin A 112 on 07/21/2022  Dose # 22 Lanreotide was on 08/18/2022. Dose # 23 Lanreotide was on 9/15/2022. The patient had presented to the ED yesterday with abdominal pain, nausea and jaundice. She started having the pain about a week prior to presentation, it was getting progressively worse.   She had developed jaundice yesterday. Upon presentation bilirubin was 8.8, direct bilirubin 6.1 alk phos 189, ALT 1958, AST 1069, CT scan of the abdomen the pelvis was negative for bowel obstruction, liver metastasis, interval changes difficult to gauge due to the absence of the contrast-enhancement, innumerable subcutaneous nodules in the buttocks, likely secondary to the lanreotide injections. He was found to have UTI. Review of Systems;  CONSTITUTIONAL: No fever, chills. Decreased appetite, positive for weight loss. ENMT: Eyes: No diplopia; Nose: No epistaxis. Mouth: No sore throat. RESPIRATORY: No hemoptysis, shortness of breath, cough. CARDIOVASCULAR: No chest pain, palpitations. GASTROINTESTINAL: As per HPI  GENITOURINARY: As per HPI. NEURO: No syncope, presyncope, headache.   Remainder:  ROS NEGATIVE    Past Medical History:      Diagnosis Date    Abdominal pain     Cancer ([address]     neuroendocrime tumor    Diarrhea     Hypocalcemia     Hypothyroidism     Irritable bowel syndrome     Migraines     Seizures ([address]     last episode January 2021    Status post alcohol detoxification     5/22/2018-5/29/2018     Patient Active Problem List   Diagnosis    Primary insomnia    Fatty liver    H/O small bowel obstruction    Hypothyroidism    Depression    PTSD (post-traumatic stress disorder)    Liver masses    Migraines    Mesenteric mass    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[address]    New onset seizure ([address]    Neuroendocrine tumor    Hypomagnesemia    Hypocalcemia    Hypoparathyroidism (HCC)    Tobacco abuse    Elevated liver enzymes    Moderate protein-calorie malnutrition ([address]    Encounter regarding vascular access for dialysis for ESRD ([address]    Difficulty with speech    Breakthrough seizure ([address]    Neuroendocrine cancer ([address]    Alcohol abuse    Depressed bipolar II disorder ([address]    Liver metastases ([address]        Past Surgical History: Procedure Laterality Date    CHOLECYSTECTOMY, LAPAROSCOPIC  2016    COLONOSCOPY N/A 2018    COLONOSCOPY WITH BIOPSY performed by Daya Johnston MD at [facility] ENDOSCOPY    CT BIOPSY RENAL  2021    CT BIOPSY RENAL 2021 Miguel A Neely II, MD [facility] CT    CT NEEDLE BIOPSY LIVER PERCUTANEOUS  2020    CT NEEDLE BIOPSY LIVER PERCUTANEOUS 2020 [facility] CT    HC DIALYSIS CATHETER N/A 2021    TESIO CATHETER INSERTION AND REMOVAL OF TEMPORARY performed by Bambi Abebe MD at [address] Left 2022    MEDI PORT PLACEMENT, Left side. performed by Tabatha Jefferson MD at [address] [facility] N/A 2021    SIGMOIDOSCOPY PERIANAL BOTOX INJECTION   (50 UNITS) performed by Daya Johnston MD at [address] [place] [address] Pkwy N/A 10/21/2020    LAPAROSCOPIC ROBOTIC SMALL BOWEL RESECTION WITH PLANNED TRANSITION TO OPEN performed by Sarina Apgar, MD at [address]         Family History:  Family History   Problem Relation Age of Onset    High Blood Pressure Mother     High Cholesterol Mother     Other Mother         migraine headaches    Heart Disease Father     Asthma Father     High Blood Pressure Father     Cancer Father         Sarcoidosis    Diabetes Other         GRANDMOTHER    Lung Cancer Other         GRANDFATHER    Alzheimer's Disease Other         GRANDMOTHER    Breast Cancer Maternal Grandmother     Cancer Maternal Grandmother         skin    Cancer Paternal Grandfather         lung       Medications:  Reviewed and reconciled.     Social History:  Social History     Socioeconomic History    Marital status: Single     Spouse name: Not on file    Number of children: Not on file    Years of education: Not on file    Highest education level: Not on file   Occupational History    Occupation: cleaning     Employer: [email or web address] Use    Smoking status: Every Day     Packs/day: 0.25     Types: Cigarettes    Smokeless tobacco: Never   Vaping Use    Vaping Use: Never used   Substance and Sexual Activity    Alcohol use: No     Alcohol/week: 0.0 standard drinks     Comment: 5 years sober    Drug use: No    Sexual activity: Not on file   Other Topics Concern    Not on file   Social History Narrative    Not on file     Social Determinants of Health     Financial Resource Strain: Not on file   Food Insecurity: Not on file   Transportation Needs: Not on file   Physical Activity: Not on file   Stress: Not on file   Social Connections: Not on file   Intimate Partner Violence: Not on file   Housing Stability: Not on file       Allergies:  No Known Allergies    Physical Exam:  BP (!) 87/54   Pulse 68   Temp 97 °F (36.1 °C) (Oral)   Resp 16   Ht 5' (1.524 m)   Wt 116 lb (52.6 kg)   SpO2 100%   BMI 22.65 kg/m²   GENERAL: Awake and alert, jaundiced. HEENT: PERRLA; EOMI. Oropharynx clear. Icteric sclera. NECK: Supple. No palpable cervical or supraclavicular lymphadenopathy. LUNGS: Good air entry bilaterally. No wheezing, crackles or rhonchi. CARDIOVASCULAR: Regular rate. No murmurs, rubs or gallops. ABDOMEN: Soft. Positive for tenderness in the right upper quadrant. Non-distended. Positive bowel sounds. EXTREMITIES: Without clubbing, cyanosis, or edema. NEUROLOGIC: No focal deficits. ECOG PS 1      Impression/Plan:     41 y/o female with hx of hypothyroidism, IBS,migraine who was complaining of abdominal pain associated with diarrhea and flushing/sweating. CT abdomen/pelvis 08/28/2020:  2 cm masslike density in the mid abdominal small bowel mesentery with a spiculated appearance. Multiple liver masses suspicious for metastatic disease. 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      Comment: Sections of the liver tissue cores show the hepatic parenchyma to be partially replaced by a proliferation of epithelioid cells with relatively uniform round nuclei and eosinophilic granular cytoplasm. The   epithelioid cells form anastomosing solid cords/nests that are surrounded by delicate fibrovascular stroma. There are no mitotic figures or tumor cell necrosis present. The histologic changes seen are suggestive of well-differentiated neuroendocrine (carcinoid) tumor. Immunostaining for pankeratin, chromogranin, synaptophysin, TTF-1 and Ki-67 was performed on sections of one tissue block (A1) and the neoplastic epithelioid cells show diffuse and strong positivity for neuroendocrine markers (chromogranin and synaptophysin) and moderate positivity for pankeratin. There is no staining reactivity for TTF-1. The Ki-67 proliferation labeling index is essentially negative, (very low, <1%). This staining pattern confirms the histologic impression of a well-differentiated neuroendocrine (carcinoid) tumor. FL Small bowel 09/02/2020:  Rapid small bowel transit. Serum Chromogranin A 160 on 09/23/2020. Urine 5-HIAA 21 (0-14)  2d-Echo 10/10/2020: EF 60-65%   Normal right ventricular size and function     Dotatate PET/CT scan 10/14/2020 increased tracer uptake seen throughout the liver compatible with neoplasm. This involves both the left and the right lobe of liver. In addition there are 2 foci of increased tracer uptake along the mesentery of the small bowel. This may involve the wall the small bowel. No other convincing evidence for extra-abdominal metastatic disease. EGD/Colonoscopy 10/05/2020 by Dr. Gaby French; records reviewed. Hepatobiliary team (Dr. Didier Plaza) consult appreciated. Small bowel resection on 10/21/2020. Small bowel resection on 10/21/2020. A. Ileum, resection:   Multifocal (4 foci) neuroendocrine tumor (NET). Extensive perineural and angiolymphatic invasion.    3 of 10 lymph nodes with metastatic neuroendocrine tumor and extracapsular extension of tumor cells (3/10). Bilateral viable small bowel resection margins with no evidence of tumor. B. Specimen received as \"Meckel's\":   Nodular scar tissue with patchy chronic inflammation. Negative for neuroendocrine tumor. Intestinal mucosal tissue is not present. CASE SUMMARY:   Procedure: Small bowel resection   Tumor site: Ileum   Tumor size: Greatest dimension of 1.5 cm   Tumor focality: Multifocal: 4 separate tumors   Histologic type and grade: G2: Well-differentiated neuroendocrine tumor   Mitotic rate: between 2-20 mitoses/2 mm2   Ki-67 labeling index: between 3-20%   Tumor extension: Tumor invades through the muscularis propria into subserosal tissue without penetration of overlying serosa   Margins: All margins are uninvolved by tumor    Margins examined: Proximal and distal   Lymphovascular invasion: Present   Perineural invasion: Present   Large mesenteric masses (greater than 2 cm): Present (one 2.5 cm mass)   Regional lymph nodes:    Number of lymph nodes involved: 3    Number of lymph nodes examined: 10   Pathologic stage classification (pTNM, AJCC eighth edition):    pT3    pN2    pM1a -confirmed liver metastasis, [facility]-     Comment:   A. Immunostains stain the tumor cells as follows in block A6:   Synaptophysin and chromogranin: Positive   Ki-67: Positive in 5% of tumor cells      We recommended Lanreotide once monthly for metastatic well differentiated neuroendocrine cancer to liver. Dose # 1 Lanreotide was on 11/05/2020. Dose # 2 Lanreotide was on 12/03/2020. Dose # 3 Lanreotide was on 01/07/2021. Serum Chromogranin A 95 on 01/07/2021. CT chest 02/07/2021 negative for metastatic disease. CT abdomen/pelvis 02/07/2021 Persistent bilobar hepatic lesions which are grossly stable consistent with stable widespread hepatic metastasis. Imaging reviewed. Continue Lanreotide and repeat scans in 3 months. Dose # 4 Lanreotide was on 02/11/2021.    Ga 68 Dotatate PET 03/09/2021 Gallium 68 dotatate avid uptake throughout multiple regions of the liver compatible with multiple foci of neuroendocrine tumor in both the right and the left lobe of the liver, when compared to previous not significantly changed in the interval.  Dose # 5 Lanreotide was on 03/11/2021. Dose # 6 Lanreotide was on 04/08/2021. Serum chromogranin A 115 (0-103)  Dose # 7 Lanreotide was on 05/06/2021. Serum chromogranin A 114 (0-103)  Dose # 8 Lanreotide was on 06/03/2021. Serum chromogranin A  84  (0-103)     Ga 76 Dotatate PET 06/29/2021 Numerous foci of increased Gallium 68 dotatate avid uptake throughout both lobes of the liver, not significantly changed from previous. No significant progression of disease. No definite metastatic disease identified  Dose # 9 Lanreotide was on 07/01/2021. Serum Chromogranin A 97 (0-103)  Dose # 10 Lanreotide was on 07/29/2021. Serum Chromogranin A 89 (0-103)  Dose # 11 Lanreotide was on 08/30/2021. Serum Chromogranin A 120 (0-103)  Dose # 12 Lanreotide was on 09/30/2021. Serum Chromogranin A 141 (0-103)  PET/CT scan 11/09/2021 There is significant gallium avid tracer uptake in the liver, numerous lesions are seen, tracer uptake overall is diminished. There is no convincing extrahepatic disease identified. Dose # 13 Lanreotide was on 11/11/2021. Serum chromogranin A 105 on 11/11/2021. Dose # 14 Lanreotide was on 12/30/2021. Serum chromogranin A 184 on 12/30/2021. Dose # 15 Lanreotide was on 01/27/2022. Serum chromogranin A  98 on 01/27/2022. CT head 01/28/2022 noted no acute abnormality. CT cervical spine 01/28/2022 noted no acute abnormality of cervical spine  PET/CT Gallium 68 02/22/2022 noted Multiple regions of tracer uptake seen within the liver consistent with neuroendocrine tumor. No other evidence to suggest metastatic disease. Reviewed with Dr. Ernesto Mays from Radiology team; overall stable disease. Continue Lanreotide and repeat scans in 3 months.   Dose # 16 Lanreotide was on 02/24/2022. Serum chromogranin A 116 on 02/24/2022  Dose # 17 Lanreotide was on 03/24/2022. Serum Chromogranin A 173 on 03/24/2022. Dose # 18 Lanreotide was on 04/21/2022. Serum Chromogranin A 140 on 04/21/2022. Dose # 19 Lanreotide was on 05/19/2022. Serum Chromogranin A 114 on 05/19/2022. Dose # 20 Lanreotide was on 06/23/2022. PET/CT scan 07/19/2022: Gallium 76 dotatate avid uptake is present within multiple masses within the liver, allowing for slight technical differences, not significantly changed in the interval. No convincing extrahepatic metastatic disease identified. Imaging reviewed. Continue Lanreotide and repeat scans in 3-4 months  Dose # 21 Lanreotide was on 07/21/2022. Serum Chromogranin A 112 on 07/21/2022  Dose # 22 Lanreotide was on 08/18/2022. Dose # 23 Lanreotide was on 9/15/2022. The patient had presented to the ED yesterday with abdominal pain, nausea and jaundice. She started having the pain about a week prior to presentation, it was getting progressively worse. She had developed jaundice yesterday. Upon presentation bilirubin was 8.8, direct bilirubin 6.1 alk phos 189, ALT 1958, AST 1069, INR 2.3, the patient was admitted with acute liver failure, possibly decompensation in the setting of a urinary tract infection. - CT scan of the abdomen the pelvis was negative for bowel obstruction, liver metastasis, interval changes difficult to gauge due to the absence of the contrast-enhancement, innumerable subcutaneous nodules in the buttocks, likely secondary to the lanreotide injections. Ultrasound of the abdomen had revealed heterogeneous liver with multiple hypoechoic lesions, consistent with persistent diffuse hepatic metastasis. -Chromogranin A from 9/15/2022 was 75. -HBP team is on board.  -Ammonia is 116, she was started on lactulose. -Vitamin K ordered.   -GI consult  -Thrombocytopenia, likely secondary to liver failure.  -Continue to monitor her counts, and LFTs  -Antibiotics per primary team.    -Replete electrolytes. Thank you for allowing us to participate in the care of Ms.  Gracy MCCARTNEY MD   HEMATOLOGY/MEDICAL ONCOLOGY  [address] MED SURG [facility] [address]  [place] [address]  Dept: [address]: [phone]

## 2022-09-27 NOTE — H&P
[address]                  [address] [facility],  [facility]                              HISTORY AND PHYSICAL    PATIENT NAME: Timothy Kc                    :        1985  MED REC NO:   [id number]                            ROOM:       8413  ACCOUNT NO:   [de-identified]                           ADMIT DATE: 2022  PROVIDER:     Ramo Singleton DO    CHIEF COMPLAINT:  Abdominal pain. HISTORY OF PRESENT ILLNESS:  The patient is a 44-year-old   female who presented to the emergency room with abdominal pain and  jaundice. She has a history of neuroendocrine tumor with mets to the  liver. Diagnostic evaluation in the emergency room revealed elevated  bilirubin, elevated ammonia level, elevated INR. She was admitted for  further evaluation and treatment. PAST MEDICAL HISTORY:  Neuroendocrine tumor with mets to the liver,  hypoparathyroidism, history of alcohol abuse, migraine headaches. PAST SURGICAL HISTORY:  Small intestine surgery, wisdom teeth  extraction, cholecystectomy, CT-guided needle biopsy of the liver,  parathyroid gland surgery, thyroidectomy. MEDICATIONS PRIOR TO ADMISSION:  Fioricet p.r.n., Keppra, Synthroid,  melatonin, Pepcid p.r.n., Ambien p.r.n., gabapentin, Flonase p.r.n.,  potassium chloride, Topamax, Alphagan eyedrops, Mag-Ox. PRIMARY CARE PROVIDER:  Quinn Larry DO    SOCIAL HISTORY:  The patient quit alcohol. Smoke cigarettes. REVIEW OF SYSTEMS:  Remarkable for above-stated chief complaint plus  allergies none known. ONCOLOGIST:  Dr. Eric Westflal. PHYSICAL EXAMINATION:  GENERAL APPEARANCE:  Reveals a 44-year-old  female who is  lethargic, but arousable. VITAL SIGNS:  On admission, temperature 98.3, pulse 78, respirations 16,  blood pressure 135/78. HEENT:  Head:  Normocephalic, atraumatic. Eyes:  Pupils are equal and  reactive to light. Extraocular muscles intact.   Fundi not well  visualized. Nose:  No obstruction, polyp or discharge noted. Mouth:   Mucosa without lesion. Pharynx:  Noninjected without exudate. NECK:  Supple. No JVD. No thyromegaly. No carotid bruits. HEART:  Regular rate and rhythm without murmur. LUNGS:  Clear to auscultation bilaterally. ABDOMEN:  Positive bowel sounds. Soft. There is tenderness to  palpation in right upper quadrant. No rebound or guarding. No  hepatosplenomegaly. No masses. BACK:  Intact without lesion. EXTREMITIES:  There are ecchymoses noted on the bilateral lower  extremities. LYMPH NODES:  No adenopathy noted. SKIN:  With ecchymoses as above. IMPRESSION:  Neuroendocrine tumor with mets to the liver, hypokalemia,  hypomagnesemia, hyperammonemia, coagulopathy, hypothyroidism. PLAN:  Admit. IV hydration. Replace potassium. Replace magnesium. Start lactulose for elevated ammonia. Oncologic Surgery and oncology to  see. Discharge plan home when stable.         Landon Smalls DO    D: 09/27/2022 7:27:34       T: 09/27/2022 7:31:50     [id number]  Job#: [id number]     Doc#: [id number]    CC:

## 2022-09-27 NOTE — PLAN OF CARE
Problem: Pain  Goal: Verbalizes/displays adequate comfort level or baseline comfort level  9/27/2022 0034 by Isaías Rapp RN  Outcome: Progressing  9/27/2022 0034 by Isaías Rapp RN  Outcome: Progressing     Problem: Safety - Adult  Goal: Free from fall injury  9/27/2022 0034 by Isaías Rapp RN  Outcome: Progressing  9/27/2022 0034 by Isaías Rapp RN  Outcome: Progressing

## 2022-09-27 NOTE — CONSULTS
HEPATOBILIARY SURGERY  CONSULT NOTE  9/27/2022    Physician Consulted: Dr. Gayathri Monk  Reason for Consult: Hyperbilirubinemia  Referring Physician: Dr. Balaji Banks    HPI  Meir Boss is a 40 y.o. 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 She states approximately 1 week ago she developed right upper quadrant abdominal pain which got progressively worse. She also noticed increased nausea and dry heaving as well as increased fatigue from her baseline. Today her mom noticed she became jaundiced and so presents to the ED for further evaluation. Endorses some constipation recently, normally she struggles with diarrhea. Found to have a UTI on admission was given Rocephin. Up until this point she been doing relatively well and follows with Dr. Bernadette Ling with oncology. She gets monthly Lantreotide injections and her most recent one was on 9/15/2022. She has tolerated these well without issue. Her cancer has been stable up until this point. She has had hepatic decompensation a year ago due to a UTI.         Past Medical History:   Diagnosis Date    Abdominal pain     Cancer ([address]     neuroendocrime tumor    Diarrhea     Hypocalcemia     Hypothyroidism     Irritable bowel syndrome     Migraines     Seizures ([address]     last episode January 2021    Status post alcohol detoxification     5/22/2018-5/29/2018       Past Surgical History:   Procedure Laterality Date    CHOLECYSTECTOMY, LAPAROSCOPIC  07/06/2016    COLONOSCOPY N/A 6/22/2018    COLONOSCOPY WITH BIOPSY performed by Michael Adler MD at [facility] ENDOSCOPY    CT BIOPSY RENAL  1/25/2021    CT BIOPSY RENAL 1/25/2021 MD AMADA Bustillo II CT    CT NEEDLE BIOPSY LIVER PERCUTANEOUS  9/1/2020    CT NEEDLE BIOPSY LIVER PERCUTANEOUS 9/1/2020 LURDES CT    [facility], [facility]. DIALYSIS CATHETER N/A 1/28/2021    TESIO CATHETER INSERTION AND REMOVAL OF TEMPORARY performed by Sherley Martinez MD at [address] Left 2/18/2022    MEDI PORT PLACEMENT, Left side. performed by Radha Saab MD at [address] [facility] N/A 5/14/2021    SIGMOIDOSCOPY PERIANAL BOTOX INJECTION   (50 UNITS) performed by Marisol Begum MD at [address] [facility] N/A 10/21/2020    LAPAROSCOPIC ROBOTIC SMALL BOWEL RESECTION WITH PLANNED TRANSITION TO OPEN performed by Luigi Goldmann, MD at [facility]         Medications Prior to Admission    Prior to Admission medications    Medication Sig Start Date End Date Taking? Authorizing Provider   butalbital-acetaminophen-caffeine (FIORICET, ESGIC) -40 MG per tablet Take 1 tablet by mouth daily as needed for Headaches   Yes Historical Provider, MD   levETIRAcetam (KEPPRA) 750 MG tablet Take 750 mg by mouth 2 times daily   Yes Historical Provider, MD   levothyroxine (SYNTHROID) 112 MCG tablet Take 112 mcg by mouth Daily   Yes Historical Provider, MD   melatonin 3 MG TABS tablet Take 3 mg by mouth at bedtime   Yes Historical Provider, MD   famotidine (PEPCID) 40 MG tablet Take 40 mg by mouth nightly as needed   Yes Historical Provider, MD   zolpidem (AMBIEN) 10 MG tablet Take 10 mg by mouth nightly as needed for Sleep. Yes Historical Provider, MD   gabapentin (NEURONTIN) 300 MG capsule Take 1 capsule by mouth 3 times daily for 180 days.  8/18/22 2/14/23  TRINA Wright - CNP   fluticasone (FLONASE) 50 MCG/ACT nasal spray 1 spray by Nasal route daily as needed for Rhinitis    Historical Provider, MD   potassium chloride (KLOR-CON M) 20 MEQ extended release tablet Take 20 mEq by mouth 2 times daily    Historical Provider, MD   topiramate (TOPAMAX) 50 MG tablet Take 50 mg by mouth 2 times daily    Historical Provider, MD   brimonidine (ALPHAGAN) 0.2 % ophthalmic solution Place 1 drop into both eyes every morning    Historical Provider, MD   magnesium oxide (MAG-OX) 400 MG tablet Take 400 mg by mouth 2 times daily    Historical Provider, MD       No Known Allergies    Family History   Problem Relation Age of Onset    High Blood Pressure Mother     High Cholesterol Mother     Other Mother         migraine headaches    Heart Disease Father     Asthma Father     High Blood Pressure Father     Cancer Father         Sarcoidosis    Diabetes Other         GRANDMOTHER    Lung Cancer Other         GRANDFATHER    Alzheimer's Disease Other         GRANDMOTHER    Breast Cancer Maternal Grandmother     Cancer Maternal Grandmother         skin    Cancer Paternal Grandfather         lung       Social History     Tobacco Use    Smoking status: Every Day     Packs/day: 0.25     Types: Cigarettes    Smokeless tobacco: Never   Vaping Use    Vaping Use: Never used   Substance Use Topics    Alcohol use: No     Alcohol/week: 0.0 standard drinks     Comment: 5 years sober    Drug use: No         Review of Systems:   Review of Systems - General ROS: positive for  - chills, fatigue  ENT ROS: positive for - Scleral icterus  Hematological and Lymphatic ROS: positive for - bruising and fatigue, jaundice  Respiratory ROS: no cough, shortness of breath, or wheezing  Cardiovascular ROS: no chest pain or dyspnea on exertion  Gastrointestinal ROS: positive for - constipation, abdominal pain  Musculoskeletal ROS: positive for- muscle aches  Neurological ROS: positive for- seizures      PHYSICAL EXAM:    /73   Pulse 66   Temp 97.9 °F (36.6 °C) (Oral)   Resp 14   Ht 5' (1.524 m)   Wt 116 lb (52.6 kg)   SpO2 99%   BMI 22.65 kg/m²       GENERAL:  NAD. Jaundiced  HEAD:  Normocephalic. Atraumatic. EYES:   Scleral icterus. PERRL. LUNGS: no acute respiratory distress. On room air  CARDIOVASCULAR: Hemodynamically stable, RRR   ABDOMEN:  Soft, non-distended, tender to palpation in RUQ.  No guarding, rigidity, rebound. EXTREMITIES:   MAEx4. Atraumatic. No LE edema. SKIN:  Warm and dry, jaundice  NEUROLOGIC:  No focal neurologic deficits    ASSESSMENT/PLAN:  40 y.o. female with acute liver failure secondary to UTI, UTI, neuroendocrine tumor with metastasis to the liver status post resection of small bowel approximately 80 cm and 22 doses of lanreotide. Plan  -Hepatitis panel ordered  -Abdominal ultrasound ordered to evaluate liver vasculature  -10 mg vitamin K ordered  -Meld of 24  -Replace electrolytes as needed  -D bili ordered to fractionate bilirubin, daily LFTs  -Ordered ammonia level  -On Rocephin and Flagyl for UTI, may need to consider different antibiotic management of UTI as Rocephin can worsen cholestasis  -will ask Dr. Norm Bird to evaluate given normal LFT's prior to this.   However, a year and a half ago had aUTI with hepatic decompensation    Electronically signed by Margy Bowers MD on 9/27/2022 at 3:41 PM

## 2022-09-27 NOTE — PROGRESS NOTES
Comprehensive Nutrition Assessment    Type and Reason for Visit:  Initial, Positive Nutrition Screen (wt loss)    Nutrition Recommendations/Plan:     Recommend and start Boost pudding BID and  Magic Cup supplement daily to help meet increased nutritional needs and d/t decreased po intake of meals. Minimal intake x 1 week PTA and noted Hypokalemia/Mg. Monitor [employer] and replete as appropriate as pt as risk of refeeding syndrome. Malnutrition Assessment:  Malnutrition Status: Moderate malnutrition (09/27/22 1602)    Context:  Chronic Illness     Findings of the 6 clinical characteristics of malnutrition:  Energy Intake:  75% or less estimated energy requirements for 1 month or longer (Family reports was drinking ONS & doing well, now declining ONS, poor PO > 1 week PTA.)  Weight Loss:  Mild weight loss (specify amount and time period) (Lack specified wt measurements, per EMR down ~3% x past 5-6 months; parents report weight loss of unspecified amt/time)     Body Fat Loss:        Muscle Mass Loss:  Unable to assess (sleeping, sheets covering)    Fluid Accumulation:  Unable to assess (sleeping, sheets covering)     Strength:  Not Performed    Nutrition Assessment:    Pt admitted abdominal pain, jaundice; neuroendocrine tumor of small bowel with liver mets s/p small bowel resection. Meets criteria for moderate malnutrition. Previously drinking high calorie/ high protein ONS, but declining in recent weeks. Will trial alternate ONS in hopes to increase acceptance / promote oral intake; will monitor. Nutrition Related Findings:    A/O x 4; I/Os WNL; Abd soft, +constipation, active BS, +1 edema; hypoK+/Mg, elevated LFTs. Wound Type: None       Current Nutrition Intake & Therapies:    Average Meal Intake: Unable to assess  Average Supplements Intake: Unable to assess  ADULT DIET;  Regular    Anthropometric Measures:  Height: 5' (152.4 cm)  Ideal Body Weight (IBW): 100 lbs (45 kg)    Admission Body Weight: 115 lb 15.4 oz (52.6 kg) (9/26; stated)  Current Body Weight: 115 lb 15.4 oz (52.6 kg) (9/26; stated), 116 % IBW. Weight Source: Stated  Current BMI (kg/m2): 22.6  Usual Body Weight: 118 lb (53.5 kg) (4/4/22 per EMR outpt visit; lack of specified wt measurements, parents report struggles with wt, has been declining d/t poor po)  % Weight Change (Calculated): -1.7  Weight Adjustment For: No Adjustment                 BMI Categories: Normal Weight (BMI 22.0 to 24.9) age over 72    Estimated Daily Nutrient Needs:  Energy Requirements Based On: Formula  Weight Used for Energy Requirements: Admission  Energy (kcal/day):  kcal/d (30-35 kcal/kg CBW)  Weight Used for Protein Requirements: Current  Protein (g/day): 65-75 gm pro/d ( 1.2-1.4 gm pro/kg CBW)  Method Used for Fluid Requirements: 1 ml/kcal  Fluid (ml/day):      Nutrition Diagnosis:   Moderate malnutrition, In context of chronic illness related to catabolic illness (r/t Ca with liver mets) as evidenced by Criteria as identified in malnutrition assessment, poor intake prior to admission    Nutrition Interventions:   Food and/or Nutrient Delivery: Continue Current Diet, Start Oral Nutrition Supplement (Recommend Boost Pudding BID, Magic Cup once/d to promote adequate oral intake.)  Nutrition Education/Counseling: No recommendation at this time  Coordination of Nutrition Care: Continue to monitor while inpatient       Goals:     Goals: PO intake 75% or greater, by next RD assessment       Nutrition Monitoring and Evaluation:   Behavioral-Environmental Outcomes: None Identified  Food/Nutrient Intake Outcomes: Food and Nutrient Intake, Supplement Intake  Physical Signs/Symptoms Outcomes: Biochemical Data, Chewing or Swallowing, Constipation, GI Status, Nausea or Vomiting, Fluid Status or Edema, Hemodynamic Status, Nutrition Focused Physical Findings, Skin, Weight    Discharge Planning:     Too soon to determine     [employer], RD  Contact: [phone]

## 2022-09-27 NOTE — PROGRESS NOTES
HEPATOBILIARY SURGERY  DAILY PROGRESS NOTE  9/27/2022    Subjective:  Pain currently at 8/10. The pain is worst in her head but also abdominal . Patient reports history of absence seizures, and she thinks she may have had episodes overnight. She has not been able to tolerate much oral intake prior to admission     Objective:  /60   Pulse 68   Temp 97.9 °F (36.6 °C) (Oral)   Resp 17   Ht 5' (1.524 m)   Wt 116 lb (52.6 kg)   SpO2 99%   BMI 22.65 kg/m²     General Appearance:  awake, somnolent, oriented, very slow speech  Skin:  Skin jaundiced, turgor poor  Head/face:  NCAT  Eyes:  No gross abnormalities. Sclera icteric  Lungs/Chest:  Normal expansion. No respiratory distress. On room air  Heart: Warm throughout. Regular rate   Abdomen:  Soft, moderate diffuse tenderness, mildly distended. Extremities: Extremities warm to touch    I have personally reviewed all relevant labs and imaging    Direct hyperbilirubinemia, transaminitis  MELD 24  INR improvement to 1.7    Assessment/Plan:  40 y.o. female with acute liver failure versus cholestasis secondary to UTI. History of small bowel neuroendocrine tumor with metastasis to the liver s/p small bowel resection & 22 doses of lanreotide.        - Hepatitis panel and ammonia level ordered  - Abdominal ultrasound ordered to evaluate liver vasculature  - Vitamin K given with improvement of INR  - Replace electrolytes as needed  - Daily LFTs  - Patient currently receiving Rocephin and Flagyl for UTI, may need to consider different antibiotic management of UTI as Rocephin can worsen cholestasis  - Smoking cessation recommended     Electronically signed by Cristhian Kilpatrick DO on 9/27/2022 at 6:11 AM

## 2022-09-27 NOTE — CARE COORDINATION
Social Work/Case Management Transition of Care Planning ([facility] [phone]):   Patient presented to the hospital due to abdominal pain and constipation. Patient has Stage 4 liver cancer. US of abdomen has been ordered. Met with patient and her parents, Selma Hamman and Alyssia Bowers, at bedside. Patient was very somnolent during visit. She responded to some questions but her parents provided most of the information. Patient resides in a 2 story home with her parents. There are 3 CLAUDIO. Her bedroom and bathroom are on the second floor but her parents said if she needs to stay on the first floor there is a bedroom and bathroom. Patient had 2 children. One  at birth and another  at the age of 9years old. Patient is currently on disability. Parents provide transportation to appointments. Family does housekeeping, laundry, and meal preparation. Patient is independent with sponge bath. PCP is Dr. Abelardo Patel. Pharmacy is [facility] in [place]. Patient has a single point cane in the home. No oxygen needs at this time. No HHC or JELLY history. Plan is for discharge to home with no needs. Family will provide transport.   JONH Perez  2022

## 2022-09-27 NOTE — PLAN OF CARE
Problem: Pain  Goal: Verbalizes/displays adequate comfort level or baseline comfort level  9/27/2022 1025 by Prakash Michael RN  Outcome: Progressing  9/27/2022 0034 by Corey Gutierrez RN  Outcome: Progressing  9/27/2022 0034 by Corey Gutierrez RN  Outcome: Progressing     Problem: Safety - Adult  Goal: Free from fall injury  9/27/2022 1025 by Prakash Michael RN  Outcome: Progressing  9/27/2022 0034 by Corey Gutierrez RN  Outcome: Progressing  9/27/2022 0034 by Corey Gutierrez RN  Outcome: Progressing

## 2022-09-28 ENCOUNTER — HOSPITAL ENCOUNTER (OUTPATIENT)
Dept: INFUSION THERAPY | Age: 37
Discharge: HOME | End: 2022-09-28

## 2022-09-28 PROBLEM — K72.91 HEPATIC COMA/ENCEPHALOPATHY (HCC): Status: ACTIVE | Noted: 2022-09-28

## 2022-09-28 PROBLEM — D69.6 THROMBOCYTOPENIA (HCC): Status: ACTIVE | Noted: 2022-09-28

## 2022-09-28 LAB
ALBUMIN SERPL-MCNC: 3.2 G/DL (ref 3.5–5.2)
ALP BLD-CCNC: 214 U/L (ref 35–104)
ALT SERPL-CCNC: 1167 U/L (ref 0–32)
AMMONIA: 68 UMOL/L (ref 11–51)
ANION GAP SERPL CALCULATED.3IONS-SCNC: 11 MMOL/L (ref 7–16)
AST SERPL-CCNC: 257 U/L (ref 0–31)
BILIRUB SERPL-MCNC: 5.3 MG/DL (ref 0–1.2)
BILIRUBIN DIRECT: 4.1 MG/DL (ref 0–0.3)
BILIRUBIN, INDIRECT: 1.2 MG/DL (ref 0–1)
BUN BLDV-MCNC: 11 MG/DL (ref 6–20)
CALCIUM SERPL-MCNC: 8 MG/DL (ref 8.6–10.2)
CHLORIDE BLD-SCNC: 111 MMOL/L (ref 98–107)
CO2: 17 MMOL/L (ref 22–29)
CREAT SERPL-MCNC: 0.6 MG/DL (ref 0.5–1)
EKG ATRIAL RATE: 70 BPM
EKG P-R INTERVAL: 162 MS
EKG Q-T INTERVAL: 390 MS
EKG QRS DURATION: 72 MS
EKG QTC CALCULATION (BAZETT): 421 MS
EKG R AXIS: 53 DEGREES
EKG T AXIS: 29 DEGREES
EKG VENTRICULAR RATE: 70 BPM
FERRITIN: 214 NG/ML
GFR AFRICAN AMERICAN: >60
GFR NON-AFRICAN AMERICAN: >60 ML/MIN/1.73
GLUCOSE BLD-MCNC: 106 MG/DL (ref 74–99)
INR BLD: 1.5
IRON SATURATION: 16 % (ref 15–50)
IRON: 41 MCG/DL (ref 37–145)
POTASSIUM SERPL-SCNC: 3.7 MMOL/L (ref 3.5–5)
PROTHROMBIN TIME: 16.3 SEC (ref 9.3–12.4)
SMOOTH MUSCLE ANTIBODY: NEGATIVE
SODIUM BLD-SCNC: 139 MMOL/L (ref 132–146)
TOTAL IRON BINDING CAPACITY: 251 MCG/DL (ref 250–450)
TOTAL PROTEIN: 5.4 G/DL (ref 6.4–8.3)

## 2022-09-28 PROCEDURE — 2580000003 HC RX 258: Performed by: STUDENT IN AN ORGANIZED HEALTH CARE EDUCATION/TRAINING PROGRAM

## 2022-09-28 PROCEDURE — 82390 ASSAY OF CERULOPLASMIN: CPT

## 2022-09-28 PROCEDURE — 6370000000 HC RX 637 (ALT 250 FOR IP): Performed by: INTERNAL MEDICINE

## 2022-09-28 PROCEDURE — 2500000003 HC RX 250 WO HCPCS: Performed by: STUDENT IN AN ORGANIZED HEALTH CARE EDUCATION/TRAINING PROGRAM

## 2022-09-28 PROCEDURE — 80048 BASIC METABOLIC PNL TOTAL CA: CPT

## 2022-09-28 PROCEDURE — 80076 HEPATIC FUNCTION PANEL: CPT

## 2022-09-28 PROCEDURE — 1200000000 HC SEMI PRIVATE

## 2022-09-28 PROCEDURE — 83516 IMMUNOASSAY NONANTIBODY: CPT

## 2022-09-28 PROCEDURE — 82104 ALPHA-1-ANTITRYPSIN PHENO: CPT

## 2022-09-28 PROCEDURE — 83540 ASSAY OF IRON: CPT

## 2022-09-28 PROCEDURE — 99232 SBSQ HOSP IP/OBS MODERATE 35: CPT | Performed by: TRANSPLANT SURGERY

## 2022-09-28 PROCEDURE — 99233 SBSQ HOSP IP/OBS HIGH 50: CPT | Performed by: INTERNAL MEDICINE

## 2022-09-28 PROCEDURE — 85610 PROTHROMBIN TIME: CPT

## 2022-09-28 PROCEDURE — 82140 ASSAY OF AMMONIA: CPT

## 2022-09-28 PROCEDURE — 86255 FLUORESCENT ANTIBODY SCREEN: CPT

## 2022-09-28 PROCEDURE — 6360000002 HC RX W HCPCS: Performed by: STUDENT IN AN ORGANIZED HEALTH CARE EDUCATION/TRAINING PROGRAM

## 2022-09-28 PROCEDURE — 6370000000 HC RX 637 (ALT 250 FOR IP): Performed by: STUDENT IN AN ORGANIZED HEALTH CARE EDUCATION/TRAINING PROGRAM

## 2022-09-28 PROCEDURE — 82728 ASSAY OF FERRITIN: CPT

## 2022-09-28 PROCEDURE — 2580000003 HC RX 258: Performed by: INTERNAL MEDICINE

## 2022-09-28 PROCEDURE — 6370000000 HC RX 637 (ALT 250 FOR IP)

## 2022-09-28 PROCEDURE — 82103 ALPHA-1-ANTITRYPSIN TOTAL: CPT

## 2022-09-28 PROCEDURE — 36415 COLL VENOUS BLD VENIPUNCTURE: CPT

## 2022-09-28 PROCEDURE — 83550 IRON BINDING TEST: CPT

## 2022-09-28 RX ORDER — 0.9 % SODIUM CHLORIDE 0.9 %
500 INTRAVENOUS SOLUTION INTRAVENOUS ONCE
Status: COMPLETED | OUTPATIENT
Start: 2022-09-28 | End: 2022-09-29

## 2022-09-28 RX ORDER — HYDROMORPHONE HYDROCHLORIDE 2 MG/1
0.5 TABLET ORAL EVERY 6 HOURS PRN
Status: DISCONTINUED | OUTPATIENT
Start: 2022-09-28 | End: 2022-09-30 | Stop reason: HOSPADM

## 2022-09-28 RX ORDER — TRAMADOL HYDROCHLORIDE 50 MG/1
50 TABLET ORAL EVERY 6 HOURS PRN
Status: DISCONTINUED | OUTPATIENT
Start: 2022-09-28 | End: 2022-09-28

## 2022-09-28 RX ADMIN — SODIUM CHLORIDE, PRESERVATIVE FREE 10 ML: 5 INJECTION INTRAVENOUS at 21:24

## 2022-09-28 RX ADMIN — GABAPENTIN 300 MG: 300 CAPSULE ORAL at 13:00

## 2022-09-28 RX ADMIN — LACTULOSE 30 G: 20 SOLUTION ORAL at 21:22

## 2022-09-28 RX ADMIN — LACTULOSE 30 G: 20 SOLUTION ORAL at 08:44

## 2022-09-28 RX ADMIN — RIFAXIMIN 550 MG: 550 TABLET ORAL at 08:47

## 2022-09-28 RX ADMIN — SODIUM CHLORIDE, PRESERVATIVE FREE 10 ML: 5 INJECTION INTRAVENOUS at 08:47

## 2022-09-28 RX ADMIN — POTASSIUM CHLORIDE 20 MEQ: 1500 TABLET, EXTENDED RELEASE ORAL at 17:24

## 2022-09-28 RX ADMIN — LEVETIRACETAM 750 MG: 500 TABLET, FILM COATED ORAL at 08:43

## 2022-09-28 RX ADMIN — BRIMONIDINE TARTRATE 1 DROP: 2 SOLUTION OPHTHALMIC at 08:46

## 2022-09-28 RX ADMIN — LEVOTHYROXINE SODIUM 112 MCG: 0.11 TABLET ORAL at 05:13

## 2022-09-28 RX ADMIN — ZOLPIDEM TARTRATE 5 MG: 5 TABLET ORAL at 21:23

## 2022-09-28 RX ADMIN — TOPIRAMATE 50 MG: 25 TABLET, FILM COATED ORAL at 08:44

## 2022-09-28 RX ADMIN — Medication 400 MG: at 21:23

## 2022-09-28 RX ADMIN — Medication 400 MG: at 08:44

## 2022-09-28 RX ADMIN — HYDROMORPHONE HYDROCHLORIDE 0.5 MG: 2 TABLET ORAL at 22:03

## 2022-09-28 RX ADMIN — WATER 2000 MG: 1 INJECTION INTRAMUSCULAR; INTRAVENOUS; SUBCUTANEOUS at 21:23

## 2022-09-28 RX ADMIN — METRONIDAZOLE 500 MG: 500 INJECTION, SOLUTION INTRAVENOUS at 17:25

## 2022-09-28 RX ADMIN — METRONIDAZOLE 500 MG: 500 INJECTION, SOLUTION INTRAVENOUS at 08:43

## 2022-09-28 RX ADMIN — METRONIDAZOLE 500 MG: 500 INJECTION, SOLUTION INTRAVENOUS at 00:44

## 2022-09-28 RX ADMIN — MELATONIN 3 MG ORAL TABLET 3 MG: 3 TABLET ORAL at 21:23

## 2022-09-28 RX ADMIN — SODIUM CHLORIDE 500 ML: 9 INJECTION, SOLUTION INTRAVENOUS at 22:00

## 2022-09-28 RX ADMIN — PANTOPRAZOLE SODIUM 40 MG: 40 TABLET, DELAYED RELEASE ORAL at 05:13

## 2022-09-28 RX ADMIN — RIFAXIMIN 550 MG: 550 TABLET ORAL at 21:23

## 2022-09-28 RX ADMIN — GABAPENTIN 300 MG: 300 CAPSULE ORAL at 21:23

## 2022-09-28 RX ADMIN — TRAMADOL HYDROCHLORIDE 25 MG: 50 TABLET, COATED ORAL at 08:44

## 2022-09-28 RX ADMIN — TOPIRAMATE 50 MG: 25 TABLET, FILM COATED ORAL at 21:23

## 2022-09-28 RX ADMIN — SODIUM CHLORIDE: 9 INJECTION, SOLUTION INTRAVENOUS at 13:03

## 2022-09-28 RX ADMIN — LACTULOSE 30 G: 20 SOLUTION ORAL at 12:59

## 2022-09-28 RX ADMIN — LEVETIRACETAM 750 MG: 500 TABLET, FILM COATED ORAL at 21:23

## 2022-09-28 RX ADMIN — GABAPENTIN 300 MG: 300 CAPSULE ORAL at 08:44

## 2022-09-28 ASSESSMENT — PAIN DESCRIPTION - ORIENTATION
ORIENTATION: LOWER
ORIENTATION: LOWER

## 2022-09-28 ASSESSMENT — PAIN DESCRIPTION - LOCATION
LOCATION: ABDOMEN;BACK
LOCATION: ABDOMEN;BACK
LOCATION: ABDOMEN

## 2022-09-28 ASSESSMENT — PAIN DESCRIPTION - PAIN TYPE: TYPE: ACUTE PAIN

## 2022-09-28 ASSESSMENT — PAIN DESCRIPTION - DESCRIPTORS
DESCRIPTORS: ACHING
DESCRIPTORS: ACHING

## 2022-09-28 ASSESSMENT — PAIN - FUNCTIONAL ASSESSMENT: PAIN_FUNCTIONAL_ASSESSMENT: PREVENTS OR INTERFERES SOME ACTIVE ACTIVITIES AND ADLS

## 2022-09-28 ASSESSMENT — PAIN SCALES - GENERAL
PAINLEVEL_OUTOF10: 9
PAINLEVEL_OUTOF10: 10
PAINLEVEL_OUTOF10: 10

## 2022-09-28 NOTE — CARE COORDINATION
Social Work/Case Management Transition of Care Planning ([facility] [place] [phone]):   Per report, patient's ammonia level has gone from 116 to 68 with lactulose. She remains on IVF. LFTs are being trending. Met with patient at bedside. She requested information regarding assistance in the home. Educated her regarding skilled vs penitentiary care. She indicated she needs penitentiary care. Discussed waiver program through [facility]. Patient requested phone number to call regarding services. Number provided to patient. Patient also requested a cane be ordered. Explained equipment can be ordered but a PT eval would be needed. Order entered. Plan is for discharge to home with no needs.   Family will provide transport  Gian Chen  9/28/2022

## 2022-09-28 NOTE — PLAN OF CARE
Problem: Pain  Goal: Verbalizes/displays adequate comfort level or baseline comfort level  9/27/2022 2337 by Bre Barrientos RN  Outcome: Progressing  9/27/2022 1025 by Huma Amaya RN  Outcome: Progressing     Problem: Safety - Adult  Goal: Free from fall injury  9/27/2022 2337 by Bre Barrientos RN  Outcome: Progressing  Flowsheets (Taken 9/27/2022 1039 by Huma Amaya RN)  Free From Fall Injury: Instruct family/caregiver on patient safety  9/27/2022 1025 by Huma Amaya RN  Outcome: Progressing     Problem: Nutrition Deficit:  Goal: Optimize nutritional status  Outcome: Progressing

## 2022-09-28 NOTE — PROGRESS NOTES
Hospital Medicine    Subjective:  pt much more alert conversive      Current Facility-Administered Medications:     cefTRIAXone (ROCEPHIN) 2,000 mg in sterile water 20 mL IV syringe, 2,000 mg, IntraVENous, Q24H, Yulia Singh MD, 2,000 mg at 09/27/22 2050    metronidazole (FLAGYL) 500 mg in 0.9% NaCl 100 mL IVPB premix, 500 mg, IntraVENous, Q8H, Yulia Singh MD, Stopped at 09/28/22 0144    0.9 % sodium chloride infusion, , IntraVENous, Continuous, Brinda Castellanos DO, Last Rate: 75 mL/hr at 09/27/22 2231, New Bag at 09/27/22 2231    traMADol (ULTRAM) tablet 25 mg, 25 mg, Oral, Q6H PRN, Brinda Castellanos DO, 25 mg at 09/27/22 1837    zolpidem (AMBIEN) tablet 5 mg, 5 mg, Oral, Nightly PRN, Brinda Castellanos DO, 5 mg at 09/27/22 2057    lactulose ([facility] [facility]) 10 GM/15ML solution 30 g, 30 g, Oral, TID, Karissa Moura MD, 30 g at 09/27/22 2049    rifAXIMin (XIFAXAN) tablet 550 mg, 550 mg, Oral, BID, Karissa Moura MD, 550 mg at 09/27/22 2051    brimonidine (ALPHAGAN) 0.2 % ophthalmic solution 1 drop, 1 drop, Both Eyes, QAM, Brinda Castellanos DO, 1 drop at 09/27/22 8506    gabapentin (NEURONTIN) capsule 300 mg, 300 mg, Oral, TID, Brinda Castellanos DO, 300 mg at 09/27/22 2050    levETIRAcetam (KEPPRA) tablet 750 mg, 750 mg, Oral, BID, Brinda Castellanos DO, 750 mg at 09/27/22 2050    levothyroxine (SYNTHROID) tablet 112 mcg, 112 mcg, Oral, Daily, Brinda Castellanos DO, 112 mcg at 09/28/22 1543    magnesium oxide (MAG-OX) tablet 400 mg, 400 mg, Oral, BID, Brinda Beards Malmer, DO, 400 mg at 09/27/22 2049    melatonin tablet 3 mg, 3 mg, Oral, Nightly, Brinda Beards Malmer, DO    potassium chloride (KLOR-CON M) extended release tablet 20 mEq, 20 mEq, Oral, BID WC, Brinda Beards Malmer, DO, 20 mEq at 09/27/22 1634    topiramate (TOPAMAX) tablet 50 mg, 50 mg, Oral, BID, Brinda Beards Malmer, DO, 50 mg at 09/27/22 2051    sodium chloride flush 0.9 % injection 5-40 mL, 5-40 mL, IntraVENous, 2 times per day, Brinda Beards Malmer, DO, 10 mL at 09/27/22 2051    sodium chloride flush 0.9 % injection 5-40 mL, 5-40 mL, IntraVENous, PRN, Gina Ferraris Malmer, DO    0.9 % sodium chloride infusion, , IntraVENous, PRN, Gina Ferraris Malmer, DO    ondansetron (ZOFRAN-ODT) disintegrating tablet 4 mg, 4 mg, Oral, Q8H PRN **OR** ondansetron (ZOFRAN) injection 4 mg, 4 mg, IntraVENous, Q6H PRN, Gina Ferraris Malmer, DO, 4 mg at 09/27/22 0018    polyethylene glycol (GLYCOLAX) packet 17 g, 17 g, Oral, Daily PRN, Gina Ferraris Malmer, DO    pantoprazole (PROTONIX) tablet 40 mg, 40 mg, Oral, QAM Kylie Jew Malmer, DO, 40 mg at 09/28/22 0513    Objective:    BP (!) 98/52   Pulse 62   Temp 97.9 °F (36.6 °C) (Oral)   Resp 17   Ht 5' (1.524 m)   Wt 116 lb (52.6 kg)   SpO2 100%   BMI 22.65 kg/m²     Heart:  reg  Lungs:  ctab  Abd: + bs nondistended  Extrem:  w/o edema    CBC with Differential:    Lab Results   Component Value Date/Time    WBC 5.7 09/27/2022 05:54 AM    RBC 3.36 09/27/2022 05:54 AM    HGB 9.7 09/27/2022 05:54 AM    HCT 28.4 09/27/2022 05:54 AM    PLT 67 09/27/2022 05:54 AM    MCV 84.5 09/27/2022 05:54 AM    MCH 28.9 09/27/2022 05:54 AM    MCHC 34.2 09/27/2022 05:54 AM    RDW 20.3 09/27/2022 05:54 AM    NRBC 0.0 08/27/2021 07:15 PM    METASPCT 1.8 05/06/2021 01:37 PM    LYMPHOPCT 29.5 09/26/2022 05:38 PM    MONOPCT 25.4 09/26/2022 05:38 PM    MYELOPCT 3.5 05/06/2021 01:37 PM    BASOPCT 0.7 09/26/2022 05:38 PM    MONOSABS 1.17 09/26/2022 05:38 PM    LYMPHSABS 1.36 09/26/2022 05:38 PM    EOSABS 0.11 09/26/2022 05:38 PM    BASOSABS 0.03 09/26/2022 05:38 PM     CMP:    Lab Results   Component Value Date/Time     09/28/2022 04:00 AM    K 3.7 09/28/2022 04:00 AM    K 3.1 09/26/2022 05:38 PM     09/28/2022 04:00 AM    CO2 17 09/28/2022 04:00 AM    BUN 11 09/28/2022 04:00 AM    CREATININE 0.6 09/28/2022 04:00 AM    GFRAA >60 09/28/2022 04:00 AM    LABGLOM >60 09/28/2022 04:00 AM    GLUCOSE 106 09/28/2022 04:00 AM    PROT 5.4 09/28/2022 04:00 AM    LABALBU 3.2 09/28/2022 04:00 AM    CALCIUM 8.0 09/28/2022 04:00 AM    BILITOT 5.3 09/28/2022 04:00 AM    ALKPHOS 214 09/28/2022 04:00 AM     09/28/2022 04:00 AM    ALT 1,167 09/28/2022 04:00 AM     Warfarin PT/INR:    Lab Results   Component Value Date    INR 1.5 09/28/2022    INR 1.7 09/27/2022    INR 2.3 09/26/2022    PROTIME 16.3 (H) 09/28/2022    PROTIME 18.7 (H) 09/27/2022    PROTIME 24.7 (H) 09/26/2022       Assessment:    Principal Problem:    Liver metastases ([address]  Resolved Problems:    * No resolved hospital problems.  *      Plan:  Cont ivf serial lab lactulose rifaximin        Salima John DO  7:33 AM  9/28/2022

## 2022-09-28 NOTE — PROGRESS NOTES
HEPATOBILIARY SURGERY  DAILY PROGRESS NOTE  9/28/2022    Subjective:  Mentation has dramatically improved. Persistent lower abdominal pain with nausea and vomiting. Patient has consumed some oral nutrition. Objective:  BP (!) 98/52   Pulse 62   Temp 97.9 °F (36.6 °C) (Oral)   Resp 17   Ht 5' (1.524 m)   Wt 116 lb (52.6 kg)   SpO2 100%   BMI 22.65 kg/m²     General Appearance:  awake, alert, orient  Skin:  Skin jaundiced, improving, turgor normal  Head/face:  NCAT  Eyes:  No gross abnormalities. Sclera mildly icteric  Lungs/Chest:  Normal expansion. No respiratory distress. On room air  Heart: Warm throughout. Regular rate   Abdomen:  Soft, moderate diffuse tenderness, mildly distended. Extremities: Extremities warm to touch    I have personally reviewed all relevant labs and imaging    Direct hyperbilirubinemia, transaminitis; improving  Ammonia 68 from 116  INR 1.5    Ultrasounds have shown multiple hepatic lesions consistent with metastases and normal doppler flow within the hepatic vessels    Assessment/Plan:  40 y.o. female with acute liver failure secondary to UTI, UTI, neuroendocrine tumor with metastasis to the liver status post resection of small bowel approximately 80 cm and 22 doses of lanreotide. - Hepatitis panel ordered  - Replace electrolytes as needed  - Daily LFTs  - lactulose, rifaximin  - GI consulted, appreciate input  - Patient currently receiving Rocephin and Flagyl for UTI, may need to consider different antibiotic management of UTI as Rocephin can worsen cholestasis  - Smoking cessation recommended     Electronically signed by Migel Moses DO on 9/28/2022 at 6:05 AM     Patients liver function is getting better with treatment  Acute hepatic decompensation with hepatic encephalopathy in the setting of metastatic neuroendocrine tumor to the liver  Continue supportive care.   Doubtful this is an obstructive process    Electronically signed by Cleo Morgan MD on 9/28/2022 at 9:30 AM

## 2022-09-28 NOTE — PROGRESS NOTES
Case reviewed. Patient was getting RUQ US when I went to see her. Will perform full Consultation tomorrow. In short, 37y/F w/ metastatic NET heavily involving the liver w/ significant hepatic decompensation in the setting of active infection.     PLAN:  -I will follow-up US results  -I will order serologic testing  -Aggressive lactulose titrated to mental status  -Trend LFTs daily  -3 doses of IV Vit K 10mg  -Full consultation tomorrow

## 2022-09-28 NOTE — CONSULTS
Palliative Care Department  [phone]  Palliative Care Initial Consult  Provider TRINA Ortiz - CNP      PATIENT: Wendy Justice  : 1985  MRN: [id number]  ADMISSION DATE: 2022  4:48 PM  Referring Provider: Stephanie Bay DO    Palliative Medicine was consulted on hospital day 2 for assistance with Goals of care, Code Status Discussion, Symptom management     HPI:     Lane Jimenez is a 40 y.o. y/o female with a history of neuroendocrine cancer with mets to the liver. Who presented to [facility]) on 2022 with abdominal pain and jaundice. Significant laboratory findings in the emergency room revealed bilirubin was 8.8, direct bilirubin 6.1 alk phos 189, ALT 1958, AST 1069, INR 2.3. The patient was admitted with liver metastases. Palliative medicine is asked to assist with goal of care, CODE STATUS discussion, symptom management. ASSESSMENT/PLAN:     Pertinent Hospital Diagnoses     Liver metastases    Neuroendocrine cancer  - Follows with Dr. Raul Crum & Dr. Evelina Pinto  - s.p Bowel resection on 10/21/20  - On Lanreotide      Neoplasm related pain  - Gabapentin 300mg TID  -  D/C Tramadol 50mg q 6 hrs prn for the pain    - start po Dilaudid 0.5 mg every 6 hours as needed     Nausea  -Zofran 4 mg IV or p.o. every 6 hours as needed  -Phenergan    Constipation:          -GlycoLax daily as needed  -Lactulose 30 mg 3 times daily       Palliative Care Encounter / Counseling Regarding Goals of Care  Please see detailed goals of care discussion as below  At this time, Wendy Justice, Does Not have capacity for medical decision-making.   Capacity is time limited and situation/question specific  Outcome of goals of care meeting:  Continue with current medical treatment  Which to complete POA paperwork  CODE STATUS discussed, patient wants to remain a full code with no long-term vent support  Following for symptom management  This is a clinic patient  Code status Full Code  Advanced Directives: no POA or living will in epic  Surrogate/Legal NOK:  Cheli Ha (Parent) [facility] (Parent) [phone]    Spiritual assessment: no spiritual distress identified  Bereavement and grief: to be determined  Referrals to: none today    Thank you for the opportunity to participate in the care of Dariana Friedman. TRINA Carrillo CNP  Palliative Medicine     SUBJECTIVE:     Details of Conversation:   Chart reviewed. Saw patient at the bedside. Patient was sitting on side of bed, complaining of having generalized pain, worsening in her right upper abdomen. She has been taking tramadol at home, it was therapeutic, but now seems to know be helping manage her symptoms. We discussed trying a different analgesic. Patient having intermittent nausea, she had a emesis this morning, reports the Zofran does help. She has been having issues with diarrhea, however this time she feel constipated, she has not had a bowel movement since admitted, she had reported a small BM yesterday morning. She is getting lactulose 3 times daily, and has GlycoLax as needed. Advised her to ask for GlycoLax. We will reassess her bowel movement in the morning. She voiced no new concerns. She asked for assistance to complete power of  during hospitalization. Notify her that my  is going to assist her with this. We discussed CODE STATUS. Patient wishes to remain a full code, with no long-term vent support, report her parents know her wishes. Comfort support provided. We will continue to follow.       Prognosis: Guarded    OBJECTIVE:     BP (!) 92/53   Pulse 67   Temp 97 °F (36.1 °C)   Resp 17   Ht 5' (1.524 m)   Wt 116 lb (52.6 kg)   SpO2 100%   BMI 22.65 kg/m²     Physical Examination:  Gen: elderly, thin, NAD, awake, alert   HEENT: normocephalic, atraumatic, PERRL, EOMI,   Neck: trachea midline, no JVD  Lungs: respirations easy and not labored,   Heart: regular rate and rhythm, distant heart tones,   Abdomen: normoactive bowel sounds, soft, right upper quadrant tenderness  Extremities: no clubbing, cyanosis or edema, moving all extremities    Skin: warm, dry without rashes, lesions, bruising  Neuro: awake, alert, oriented x 3, follows commands, no gross neurologic deficit    Objective data reviewed: labs, images, records, medication use, vitals, and chart    Time/Communication  Greater than 50% of time spent, total 50 minutes in counseling and coordination of care at the bedside regarding  status discussion, goals of care, and symptom management. Thank you for allowing Palliative Medicine to participate in the care of José Manuel Gramajo. Note: This report was completed using computerPiktochart voiced recognition software. Every effort has been made to ensure accuracy; however, inadvertent computerized transcription errors may be present.

## 2022-09-28 NOTE — PROGRESS NOTES
Palliative Medicine LSW met with pt to review and complete HCPOA. She appoints her father Reyna Burnett as her main agent, alternates are her mother Selvin Zacarias, and her sister Norris Doyle.

## 2022-09-29 DIAGNOSIS — G89.3 NEOPLASM RELATED PAIN: ICD-10-CM

## 2022-09-29 LAB
ALBUMIN SERPL-MCNC: 2.9 G/DL (ref 3.5–5.2)
ALP BLD-CCNC: 162 U/L (ref 35–104)
ALT SERPL-CCNC: 727 U/L (ref 0–32)
AMMONIA: 57 UMOL/L (ref 11–51)
ANION GAP SERPL CALCULATED.3IONS-SCNC: 7 MMOL/L (ref 7–16)
AST SERPL-CCNC: 109 U/L (ref 0–31)
BILIRUB SERPL-MCNC: 2.8 MG/DL (ref 0–1.2)
BILIRUBIN DIRECT: 2 MG/DL (ref 0–0.3)
BILIRUBIN, INDIRECT: 0.8 MG/DL (ref 0–1)
BUN BLDV-MCNC: 10 MG/DL (ref 6–20)
CALCIUM SERPL-MCNC: 7.1 MG/DL (ref 8.6–10.2)
CERULOPLASMIN: 15 MG/DL (ref 16–45)
CHLORIDE BLD-SCNC: 112 MMOL/L (ref 98–107)
CO2: 19 MMOL/L (ref 22–29)
CREAT SERPL-MCNC: 0.6 MG/DL (ref 0.5–1)
GFR AFRICAN AMERICAN: >60
GFR NON-AFRICAN AMERICAN: >60 ML/MIN/1.73
GLUCOSE BLD-MCNC: 118 MG/DL (ref 74–99)
POTASSIUM SERPL-SCNC: 3.5 MMOL/L (ref 3.5–5)
SODIUM BLD-SCNC: 138 MMOL/L (ref 132–146)
TOTAL PROTEIN: 5 G/DL (ref 6.4–8.3)

## 2022-09-29 PROCEDURE — 97530 THERAPEUTIC ACTIVITIES: CPT

## 2022-09-29 PROCEDURE — 80048 BASIC METABOLIC PNL TOTAL CA: CPT

## 2022-09-29 PROCEDURE — 2580000003 HC RX 258: Performed by: INTERNAL MEDICINE

## 2022-09-29 PROCEDURE — 97161 PT EVAL LOW COMPLEX 20 MIN: CPT

## 2022-09-29 PROCEDURE — 80076 HEPATIC FUNCTION PANEL: CPT

## 2022-09-29 PROCEDURE — 82140 ASSAY OF AMMONIA: CPT

## 2022-09-29 PROCEDURE — 6360000002 HC RX W HCPCS: Performed by: STUDENT IN AN ORGANIZED HEALTH CARE EDUCATION/TRAINING PROGRAM

## 2022-09-29 PROCEDURE — 2500000003 HC RX 250 WO HCPCS: Performed by: STUDENT IN AN ORGANIZED HEALTH CARE EDUCATION/TRAINING PROGRAM

## 2022-09-29 PROCEDURE — 6360000002 HC RX W HCPCS: Performed by: INTERNAL MEDICINE

## 2022-09-29 PROCEDURE — 6370000000 HC RX 637 (ALT 250 FOR IP): Performed by: INTERNAL MEDICINE

## 2022-09-29 PROCEDURE — 1200000000 HC SEMI PRIVATE

## 2022-09-29 PROCEDURE — 99232 SBSQ HOSP IP/OBS MODERATE 35: CPT

## 2022-09-29 PROCEDURE — 6370000000 HC RX 637 (ALT 250 FOR IP): Performed by: STUDENT IN AN ORGANIZED HEALTH CARE EDUCATION/TRAINING PROGRAM

## 2022-09-29 PROCEDURE — 2580000003 HC RX 258: Performed by: STUDENT IN AN ORGANIZED HEALTH CARE EDUCATION/TRAINING PROGRAM

## 2022-09-29 PROCEDURE — 6370000000 HC RX 637 (ALT 250 FOR IP)

## 2022-09-29 PROCEDURE — 99232 SBSQ HOSP IP/OBS MODERATE 35: CPT | Performed by: INTERNAL MEDICINE

## 2022-09-29 RX ORDER — SENNA AND DOCUSATE SODIUM 50; 8.6 MG/1; MG/1
1 TABLET, FILM COATED ORAL DAILY
Status: DISCONTINUED | OUTPATIENT
Start: 2022-09-29 | End: 2022-09-30 | Stop reason: HOSPADM

## 2022-09-29 RX ADMIN — ONDANSETRON HYDROCHLORIDE 4 MG: 2 SOLUTION INTRAMUSCULAR; INTRAVENOUS at 17:19

## 2022-09-29 RX ADMIN — GABAPENTIN 300 MG: 300 CAPSULE ORAL at 21:25

## 2022-09-29 RX ADMIN — HYDROMORPHONE HYDROCHLORIDE 0.5 MG: 2 TABLET ORAL at 04:42

## 2022-09-29 RX ADMIN — HYDROMORPHONE HYDROCHLORIDE 0.5 MG: 2 TABLET ORAL at 13:02

## 2022-09-29 RX ADMIN — LACTULOSE 30 G: 20 SOLUTION ORAL at 21:26

## 2022-09-29 RX ADMIN — MELATONIN 3 MG ORAL TABLET 3 MG: 3 TABLET ORAL at 21:24

## 2022-09-29 RX ADMIN — Medication 400 MG: at 09:50

## 2022-09-29 RX ADMIN — POTASSIUM CHLORIDE 20 MEQ: 1500 TABLET, EXTENDED RELEASE ORAL at 09:50

## 2022-09-29 RX ADMIN — LEVETIRACETAM 750 MG: 500 TABLET, FILM COATED ORAL at 21:25

## 2022-09-29 RX ADMIN — LEVETIRACETAM 750 MG: 500 TABLET, FILM COATED ORAL at 09:49

## 2022-09-29 RX ADMIN — ZOLPIDEM TARTRATE 5 MG: 5 TABLET ORAL at 21:54

## 2022-09-29 RX ADMIN — POTASSIUM CHLORIDE 20 MEQ: 1500 TABLET, EXTENDED RELEASE ORAL at 17:21

## 2022-09-29 RX ADMIN — GABAPENTIN 300 MG: 300 CAPSULE ORAL at 13:35

## 2022-09-29 RX ADMIN — RIFAXIMIN 550 MG: 550 TABLET ORAL at 09:52

## 2022-09-29 RX ADMIN — TOPIRAMATE 50 MG: 25 TABLET, FILM COATED ORAL at 09:51

## 2022-09-29 RX ADMIN — BRIMONIDINE TARTRATE 1 DROP: 2 SOLUTION OPHTHALMIC at 09:52

## 2022-09-29 RX ADMIN — WATER 2000 MG: 1 INJECTION INTRAMUSCULAR; INTRAVENOUS; SUBCUTANEOUS at 21:23

## 2022-09-29 RX ADMIN — SODIUM CHLORIDE, PRESERVATIVE FREE 10 ML: 5 INJECTION INTRAVENOUS at 21:28

## 2022-09-29 RX ADMIN — Medication 400 MG: at 21:25

## 2022-09-29 RX ADMIN — SENNOSIDES AND DOCUSATE SODIUM 1 TABLET: 50; 8.6 TABLET ORAL at 17:19

## 2022-09-29 RX ADMIN — GABAPENTIN 300 MG: 300 CAPSULE ORAL at 09:51

## 2022-09-29 RX ADMIN — RIFAXIMIN 550 MG: 550 TABLET ORAL at 21:24

## 2022-09-29 RX ADMIN — METRONIDAZOLE 500 MG: 500 INJECTION, SOLUTION INTRAVENOUS at 09:55

## 2022-09-29 RX ADMIN — METRONIDAZOLE 500 MG: 500 INJECTION, SOLUTION INTRAVENOUS at 00:40

## 2022-09-29 RX ADMIN — TOPIRAMATE 50 MG: 25 TABLET, FILM COATED ORAL at 21:25

## 2022-09-29 RX ADMIN — LEVOTHYROXINE SODIUM 112 MCG: 0.11 TABLET ORAL at 09:50

## 2022-09-29 RX ADMIN — METRONIDAZOLE 500 MG: 500 INJECTION, SOLUTION INTRAVENOUS at 17:23

## 2022-09-29 RX ADMIN — HYDROMORPHONE HYDROCHLORIDE 0.5 MG: 2 TABLET ORAL at 21:21

## 2022-09-29 RX ADMIN — ONDANSETRON HYDROCHLORIDE 4 MG: 2 SOLUTION INTRAMUSCULAR; INTRAVENOUS at 09:51

## 2022-09-29 RX ADMIN — PANTOPRAZOLE SODIUM 40 MG: 40 TABLET, DELAYED RELEASE ORAL at 09:51

## 2022-09-29 ASSESSMENT — PAIN DESCRIPTION - LOCATION
LOCATION: BACK
LOCATION: SHOULDER;GROIN

## 2022-09-29 ASSESSMENT — PAIN SCALES - WONG BAKER: WONGBAKER_NUMERICALRESPONSE: 2

## 2022-09-29 ASSESSMENT — PAIN DESCRIPTION - ORIENTATION: ORIENTATION: RIGHT;LOWER

## 2022-09-29 ASSESSMENT — PAIN DESCRIPTION - DESCRIPTORS
DESCRIPTORS: ACHING
DESCRIPTORS: ACHING

## 2022-09-29 ASSESSMENT — PAIN SCALES - GENERAL
PAINLEVEL_OUTOF10: 8
PAINLEVEL_OUTOF10: 10

## 2022-09-29 NOTE — CONSULTS
[facility] ([facility])   Gastroenterology, Hepatology, &  Advanced Endoscopy    Consult       ASSESSMENT AND PLAN:    37y/F w/ history of small bowel NET w/ significant hepatic metastasis who presents w/ hepatic decompensation in the setting of UTI. Most likely, due to significant NET burden of liver, very little functional parenchyma remaining which leads to significant decompensation in the setting of systemic insult such as infection. PLAN:  -Serologic w/u for causes of underlying liver disease  -Hold hepatotoxic medications  -Continue trending LFTs  -Continue abx for UTI  -Titrate lactulose to mental status. I will follow. Thank you for including us in the care of this patient. Please do not hesitate to contact us with any additional questions or concerns. Fina Yepez MD  Gastroenterology/Hepatology  Advanced Endoscopy          HISTORY OF PRESENT ILLNESS:      Ms. Jc Myrick is a 37y/F w/ history of metastatic NET from small bowel primary s/p resection w/ heavy involvement of the liver on maintenance therapy who presented to the ED w/ jaundice. She has had previous episodes of jaundice and hepatic decompensation w/ UTI in the past. At baseline, LFTs normal and no s/s of hepatic decompensation. LFTs on admission again elevated to Tbili 8.8 which is now downtrending after therapy starting for UTI. She was also noted to be encephalopathic on admission and treated w/ lactulose. INR 2.3 on admission and treated w/ Vit K. Imaging on admission negative for biliary dilation.      Past Medical History:        Diagnosis Date    Abdominal pain     Cancer ([address]     neuroendocrime tumor    Diarrhea     Hypocalcemia     Hypothyroidism     Irritable bowel syndrome     Migraines     Seizures ([address]     last episode January 2021    Status post alcohol detoxification     5/22/2018-5/29/2018     Past Surgical History:        Procedure Laterality Date    CHOLECYSTECTOMY, LAPAROSCOPIC  07/06/2016    COLONOSCOPY N/A 6/22/2018    COLONOSCOPY WITH BIOPSY performed by Keily Kaiser MD at [facility] ENDOSCOPY    CT BIOPSY RENAL  1/25/2021    CT BIOPSY RENAL 1/25/2021 Bartolo Nesbitt MD [facility]    CT NEEDLE BIOPSY LIVER PERCUTANEOUS  9/1/2020    CT NEEDLE BIOPSY LIVER PERCUTANEOUS 9/1/2020 [facility] CT    HC DIALYSIS CATHETER N/A 1/28/2021    TESIO CATHETER INSERTION AND REMOVAL OF TEMPORARY performed by Lucila Krishna MD at [address] Left 2/18/2022    MEDI PORT PLACEMENT, Left side. performed by Buddy Larson MD at [address] N/A 5/14/2021    SIGMOIDOSCOPY PERIANAL BOTOX INJECTION   (50 UNITS) performed by Keily Kaiser MD at [address] [place] [address] N/A 10/21/2020    LAPAROSCOPIC ROBOTIC SMALL BOWEL RESECTION WITH PLANNED TRANSITION TO OPEN performed by Aidan Tobar MD at [facility]       Social History:    TOBACCO:   reports that she has been smoking cigarettes. She has been smoking an average of .25 packs per day. She has never used smokeless tobacco.  ETOH:   reports no history of alcohol use. DRUGS:   reports no history of drug use.   Family History:       Problem Relation Age of Onset    High Blood Pressure Mother     High Cholesterol Mother     Other Mother         migraine headaches    Heart Disease Father     Asthma Father     High Blood Pressure Father     Cancer Father         Sarcoidosis    Diabetes Other         GRANDMOTHER    Lung Cancer Other         GRANDFATHER    Alzheimer's Disease Other         GRANDMOTHER    Breast Cancer Maternal Grandmother     Cancer Maternal Grandmother         skin    Cancer Paternal Grandfather         lung         Current Facility-Administered Medications:     HYDROmorphone (DILAUDID) tablet 0.5 mg, 0.5 mg, Oral, Q6H PRN, Barbara Smith, APRN - CNP, 0.5 mg at 09/29/22 0442    cefTRIAXone (ROCEPHIN) 2,000 mg in sterile water 20 mL IV syringe, 2,000 mg, IntraVENous, Q24H, Ninfa Renner MD, 2,000 mg at 09/28/22 2123    metronidazole (FLAGYL) 500 mg in 0.9% NaCl 100 mL IVPB premix, 500 mg, IntraVENous, Q8H, Ninfa Renner MD, Stopped at 09/29/22 0148    0.9 % sodium chloride infusion, , IntraVENous, Continuous, Meg Castellanos DO, Last Rate: 75 mL/hr at 09/28/22 1303, New Bag at 09/28/22 1303    zolpidem (AMBIEN) tablet 5 mg, 5 mg, Oral, Nightly PRN, Meg Castellanos DO, 5 mg at 09/28/22 2123    lactulose ([facility]) 10 GM/15ML solution 30 g, 30 g, Oral, TID, Ricardo Ramirez MD, 30 g at 09/28/22 2122    rifAXIMin (XIFAXAN) tablet 550 mg, 550 mg, Oral, BID, Ricarod Ramirez MD, 550 mg at 09/28/22 2123    brimonidine (ALPHAGAN) 0.2 % ophthalmic solution 1 drop, 1 drop, Both Eyes, QAM, Meg Castellanos DO, 1 drop at 09/28/22 0846    gabapentin (NEURONTIN) capsule 300 mg, 300 mg, Oral, TID, Meg Castellanos DO, 300 mg at 09/28/22 2123    levETIRAcetam (KEPPRA) tablet 750 mg, 750 mg, Oral, BID, Meg Castellanos DO, 750 mg at 09/28/22 2123    levothyroxine (SYNTHROID) tablet 112 mcg, 112 mcg, Oral, Daily, Meg Castellanos DO, 112 mcg at 09/28/22 6936    magnesium oxide (MAG-OX) tablet 400 mg, 400 mg, Oral, BID, Meg Castellanos DO, 400 mg at 09/28/22 2123    melatonin tablet 3 mg, 3 mg, Oral, Nightly, Meg Castellanos DO, 3 mg at 09/28/22 2123    potassium chloride (KLOR-CON M) extended release tablet 20 mEq, 20 mEq, Oral, BID WC, [place] Chantal Malmer, DO, 20 mEq at 09/28/22 1724    topiramate (TOPAMAX) tablet 50 mg, 50 mg, Oral, BID, Meg Chantal Malmer, DO, 50 mg at 09/28/22 2123    sodium chloride flush 0.9 % injection 5-40 mL, 5-40 mL, IntraVENous, 2 times per day, Juanis Byrne, DO, 10 mL at 09/28/22 2124    sodium chloride flush 0.9 % injection 5-40 mL, 5-40 mL, IntraVENous, PRN, [place] Chantal Malmer, DO    0.9 % sodium chloride infusion, , IntraVENous, PRN, Meg Chantal Malmer, DO    ondansetron (ZOFRAN-ODT) disintegrating tablet 4 mg, 4 mg, Oral, Q8H PRN **OR** ondansetron (ZOFRAN) injection 4 mg, 4 mg, IntraVENous, Q6H PRN, [place], , 4 mg at 09/27/22 0018    polyethylene glycol (GLYCOLAX) packet 17 g, 17 g, Oral, Daily PRN, Meg Zuñigamer, DO    pantoprazole (PROTONIX) tablet 40 mg, 40 mg, Oral, QAM AC, Meg Chantal Yogeshlanie, , 40 mg at 09/28/22 3896     Allergies:  Patient has no known allergies. ROS:  General: Patient denies n/v/f/c or weight loss. HEENT: Patient denies persistent postnasal drip, drooling, persistent bleeding from nose/mouth. Resp: Patient denies SOB, wheezing, productive cough. Cards: Patient denies CP, palpitations, significant edema  GI: As above. Derm: Patient denies rashes. + Jaundice. Musc: Patient denies diffuse/irregular joint swelling or myalgias. PHYSICAL EXAM:  /67   Pulse 71   Temp 98.1 °F (36.7 °C) (Oral)   Resp 18   Ht 5' (1.524 m)   Wt 116 lb (52.6 kg)   SpO2 100%   BMI 22.65 kg/m²   Physical Exam:  General: Overall well-appearing, NAD  HEENT: PERRLA, EOMI, MMM, no rhinorrhea. Scleral icterus. Cards: RRR, no LE edema  Resp: Breathing comfortably on room air, good air movement, no use of accessory muscles, no audible wheezing  Abdomen: soft, NT, ND. Extremities: Moves all extremities, no effusions or bruising.   Skin: No rashes or jaundice  Neuro: A&O x 3, CN grossly intact, non-focal exam               Electronically signed by Janae Stockton MD on 9/29/2022 at 9:45 AM

## 2022-09-29 NOTE — PROGRESS NOTES
Inpatient Medical Oncology Progress Note    Subjective:  No fever. Fatigue. Objective:  BP (!) 84/42   Pulse 77   Temp 98 °F (36.7 °C) (Oral)   Resp 16   Ht 5' (1.524 m)   Wt 116 lb (52.6 kg)   SpO2 100%   BMI 22.65 kg/m²   GENERAL: Awake and alert, jaundiced. HEENT: PERRLA; EOMI. Oropharynx clear. Icteric sclera. NECK: Supple. No palpable cervical or supraclavicular lymphadenopathy. LUNGS: Good air entry bilaterally. No wheezing, crackles or rhonchi. CARDIOVASCULAR: Regular rate. No murmurs, rubs or gallops. ABDOMEN: Soft. Positive for tenderness in the right upper quadrant. Non-distended. Positive bowel sounds. EXTREMITIES: Without clubbing, cyanosis, or edema. NEUROLOGIC: No focal deficits. ECOG PS 1    Diagnostics:  Lab Results   Component Value Date    WBC 5.7 09/27/2022    HGB 9.7 (L) 09/27/2022    HCT 28.4 (L) 09/27/2022    MCV 84.5 09/27/2022    PLT 67 (L) 09/27/2022     Lab Results   Component Value Date     09/28/2022    K 3.7 09/28/2022     (H) 09/28/2022    CO2 17 (L) 09/28/2022    BUN 11 09/28/2022    CREATININE 0.6 09/28/2022    GLUCOSE 106 (H) 09/28/2022    CALCIUM 8.0 (L) 09/28/2022    PROT 5.4 (L) 09/28/2022    LABALBU 3.2 (L) 09/28/2022    BILITOT 5.3 (H) 09/28/2022    ALKPHOS 214 (H) 09/28/2022     (H) 09/28/2022    ALT 1,167 (H) 09/28/2022    LABGLOM >60 09/28/2022    GFRAA >60 09/28/2022     Impression/Plan:  41 y/o female with hx of hypothyroidism, IBS,migraine who was complaining of abdominal pain associated with diarrhea and flushing/sweating. CT abdomen/pelvis 08/28/2020:  2 cm masslike density in the mid abdominal small bowel mesentery with a spiculated appearance. Multiple liver masses suspicious for metastatic disease. 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      Comment: Sections of the liver tissue cores show the hepatic parenchyma to be partially replaced by a proliferation of epithelioid cells with relatively uniform round nuclei and eosinophilic granular cytoplasm. The   epithelioid cells form anastomosing solid cords/nests that are surrounded by delicate fibrovascular stroma. There are no mitotic figures or tumor cell necrosis present. The histologic changes seen are suggestive of well-differentiated neuroendocrine (carcinoid) tumor. Immunostaining for pankeratin, chromogranin, synaptophysin, TTF-1 and Ki-67 was performed on sections of one tissue block (A1) and the neoplastic epithelioid cells show diffuse and strong positivity for neuroendocrine markers (chromogranin and synaptophysin) and moderate positivity for pankeratin. There is no staining reactivity for TTF-1. The Ki-67 proliferation labeling index is essentially negative, (very low, <1%). This staining pattern confirms the histologic impression of a well-differentiated neuroendocrine (carcinoid) tumor. FL Small bowel 09/02/2020:  Rapid small bowel transit. Serum Chromogranin A 160 on 09/23/2020. Urine 5-HIAA 21 (0-14)  2d-Echo 10/10/2020: EF 60-65%   Normal right ventricular size and function     Dotatate PET/CT scan 10/14/2020 increased tracer uptake seen throughout the liver compatible with neoplasm. This involves both the left and the right lobe of liver. In addition there are 2 foci of increased tracer uptake along the mesentery of the small bowel. This may involve the wall the small bowel. No other convincing evidence for extra-abdominal metastatic disease. EGD/Colonoscopy 10/05/2020 by Dr. Kiel Akins; records reviewed. Hepatobiliary team (Dr. Patricia Mathews) consult appreciated. Small bowel resection on 10/21/2020. Small bowel resection on 10/21/2020. A. Ileum, resection:   Multifocal (4 foci) neuroendocrine tumor (NET). Extensive perineural and angiolymphatic invasion.    3 of 10 lymph nodes with metastatic neuroendocrine tumor and extracapsular extension of tumor cells (3/10). Bilateral viable small bowel resection margins with no evidence of tumor. B. Specimen received as \"Meckel's\":   Nodular scar tissue with patchy chronic inflammation. Negative for neuroendocrine tumor. Intestinal mucosal tissue is not present. CASE SUMMARY:   Procedure: Small bowel resection   Tumor site: Ileum   Tumor size: Greatest dimension of 1.5 cm   Tumor focality: Multifocal: 4 separate tumors   Histologic type and grade: G2: Well-differentiated neuroendocrine tumor   Mitotic rate: between 2-20 mitoses/2 mm2   Ki-67 labeling index: between 3-20%   Tumor extension: Tumor invades through the muscularis propria into subserosal tissue without penetration of overlying serosa   Margins: All margins are uninvolved by tumor    Margins examined: Proximal and distal   Lymphovascular invasion: Present   Perineural invasion: Present   Large mesenteric masses (greater than 2 cm): Present (one 2.5 cm mass)   Regional lymph nodes:    Number of lymph nodes involved: 3    Number of lymph nodes examined: 10   Pathologic stage classification (pTNM, AJCC eighth edition):    pT3    pN2    pM1a -confirmed liver metastasis, \[facility]\""-     Comment:   A. Immunostains stain the tumor cells as follows in block A6:   Synaptophysin and chromogranin: Positive   Ki-67: Positive in 5% of tumor cells      We recommended Lanreotide once monthly for metastatic well differentiated neuroendocrine cancer to liver. Dose # 1 Lanreotide was on 11/05/2020. Dose # 2 Lanreotide was on 12/03/2020. Dose # 3 Lanreotide was on 01/07/2021. Serum Chromogranin A 95 on 01/07/2021. CT chest 02/07/2021 negative for metastatic disease. CT abdomen/pelvis 02/07/2021 Persistent bilobar hepatic lesions which are grossly stable consistent with stable widespread hepatic metastasis. Imaging reviewed. Continue Lanreotide and repeat scans in 3 months. Dose # 4 Lanreotide was on 02/11/2021.    Ga 68 Dotatate PET 03/09/2021 Gallium 68 dotatate avid uptake throughout multiple regions of the liver compatible with multiple foci of neuroendocrine tumor in both the right and the left lobe of the liver, when compared to previous not significantly changed in the interval.  Dose # 5 Lanreotide was on 03/11/2021. Dose # 6 Lanreotide was on 04/08/2021. Serum chromogranin A 115 (0-103)  Dose # 7 Lanreotide was on 05/06/2021. Serum chromogranin A 114 (0-103)  Dose # 8 Lanreotide was on 06/03/2021. Serum chromogranin A  84  (0-103)     Ga 76 Dotatate PET 06/29/2021 Numerous foci of increased Gallium 68 dotatate avid uptake throughout both lobes of the liver, not significantly changed from previous. No significant progression of disease. No definite metastatic disease identified  Dose # 9 Lanreotide was on 07/01/2021. Serum Chromogranin A 97 (0-103)  Dose # 10 Lanreotide was on 07/29/2021. Serum Chromogranin A 89 (0-103)  Dose # 11 Lanreotide was on 08/30/2021. Serum Chromogranin A 120 (0-103)  Dose # 12 Lanreotide was on 09/30/2021. Serum Chromogranin A 141 (0-103)  PET/CT scan 11/09/2021 There is significant gallium avid tracer uptake in the liver, numerous lesions are seen, tracer uptake overall is diminished. There is no convincing extrahepatic disease identified. Dose # 13 Lanreotide was on 11/11/2021. Serum chromogranin A 105 on 11/11/2021. Dose # 14 Lanreotide was on 12/30/2021. Serum chromogranin A 184 on 12/30/2021. Dose # 15 Lanreotide was on 01/27/2022. Serum chromogranin A  98 on 01/27/2022. CT head 01/28/2022 noted no acute abnormality. CT cervical spine 01/28/2022 noted no acute abnormality of cervical spine  PET/CT Gallium 68 02/22/2022 noted Multiple regions of tracer uptake seen within the liver consistent with neuroendocrine tumor. No other evidence to suggest metastatic disease. Reviewed with Dr. Nichole Flores from Radiology team; overall stable disease.   Continue Lanreotide and repeat scans in 3 months. Dose # 16 Lanreotide was on 02/24/2022. Serum chromogranin A 116 on 02/24/2022  Dose # 17 Lanreotide was on 03/24/2022. Serum Chromogranin A 173 on 03/24/2022. Dose # 18 Lanreotide was on 04/21/2022. Serum Chromogranin A 140 on 04/21/2022. Dose # 19 Lanreotide was on 05/19/2022. Serum Chromogranin A 114 on 05/19/2022. Dose # 20 Lanreotide was on 06/23/2022. PET/CT scan 07/19/2022: Gallium 76 dotatate avid uptake is present within multiple masses within the liver, allowing for slight technical differences, not significantly changed in the interval. No convincing extrahepatic metastatic disease identified. Imaging reviewed. Continue Lanreotide and repeat scans in 3-4 months  Dose # 21 Lanreotide was on 07/21/2022. Serum Chromogranin A 112 on 07/21/2022  Dose # 22 Lanreotide was on 08/18/2022. Dose # 23 Lanreotide was on 9/15/2022. The patient had presented to the ED with abdominal pain, nausea and jaundice. Upon presentation bilirubin was 8.8, direct bilirubin 6.1 alk phos 189, ALT 1958, AST 1069, INR 2.3, the patient was admitted with acute liver failure, possibly decompensation in the setting of a urinary tract infection. CT scan of the abdomen the pelvis was negative for bowel obstruction, liver metastasis, interval changes difficult to gauge due to the absence of the contrast-enhancement, innumerable subcutaneous nodules in the buttocks, likely secondary to the lanreotide injections. Ultrasound of the abdomen had revealed heterogeneous liver with multiple hypoechoic lesions, consistent with persistent diffuse hepatic metastasis. Chromogranin A from 9/15/2022 was 75. HBP team is on board. No obstructive process. Continue supportive care  Ammonia 116  Vitamin K given  GI on board; aggressive lactulose titrated to mental status  Liver U/S noted heterogeneous liver with multiple hypoechoic lesions consistent with the persistent diffuse hepatic metastasis.    Thrombocytopenia, likely secondary to liver failure.   Continue to monitor her counts, and LFTs  Antibiotics per primary team.    Palliative care team for sx management  Will continue to continue     09/28/2022  Beatriz Lefort, MD

## 2022-09-29 NOTE — PROGRESS NOTES
Inpatient Medical Oncology Progress Note    Subjective:  She feels better. No fever. Objective:  /67   Pulse 71   Temp 98.1 °F (36.7 °C) (Oral)   Resp 18   Ht 5' (1.524 m)   Wt 116 lb (52.6 kg)   SpO2 100%   BMI 22.65 kg/m²   GENERAL: Awake and alert, jaundiced. HEENT: PERRLA; EOMI. Oropharynx clear. Icteric sclera. NECK: Supple. No palpable cervical or supraclavicular lymphadenopathy. LUNGS: Good air entry bilaterally. No wheezing, crackles or rhonchi. CARDIOVASCULAR: Regular rate. No murmurs, rubs or gallops. ABDOMEN: Soft. Positive for tenderness in the right upper quadrant. Non-distended. Positive bowel sounds. EXTREMITIES: Without clubbing, cyanosis, or edema. NEUROLOGIC: No focal deficits. ECOG PS 1    Diagnostics:  Lab Results   Component Value Date    WBC 5.7 09/27/2022    HGB 9.7 (L) 09/27/2022    HCT 28.4 (L) 09/27/2022    MCV 84.5 09/27/2022    PLT 67 (L) 09/27/2022     Lab Results   Component Value Date     09/29/2022    K 3.5 09/29/2022     (H) 09/29/2022    CO2 19 (L) 09/29/2022    BUN 10 09/29/2022    CREATININE 0.6 09/29/2022    GLUCOSE 118 (H) 09/29/2022    CALCIUM 7.1 (L) 09/29/2022    PROT 5.0 (L) 09/29/2022    LABALBU 2.9 (L) 09/29/2022    BILITOT 2.8 (H) 09/29/2022    ALKPHOS 162 (H) 09/29/2022     (H) 09/29/2022     (H) 09/29/2022    LABGLOM >60 09/29/2022    GFRAA >60 09/29/2022     Impression/Plan:  41 y/o female with hx of hypothyroidism, IBS,migraine who was complaining of abdominal pain associated with diarrhea and flushing/sweating. CT abdomen/pelvis 08/28/2020:  2 cm masslike density in the mid abdominal small bowel mesentery with a spiculated appearance. Multiple liver masses suspicious for metastatic disease. 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      Comment: Sections of the liver tissue cores show the hepatic parenchyma to be partially replaced by a proliferation of epithelioid cells with relatively uniform round nuclei and eosinophilic granular cytoplasm. The   epithelioid cells form anastomosing solid cords/nests that are surrounded by delicate fibrovascular stroma. There are no mitotic figures or tumor cell necrosis present. The histologic changes seen are suggestive of well-differentiated neuroendocrine (carcinoid) tumor. Immunostaining for pankeratin, chromogranin, synaptophysin, TTF-1 and Ki-67 was performed on sections of one tissue block (A1) and the neoplastic epithelioid cells show diffuse and strong positivity for neuroendocrine markers (chromogranin and synaptophysin) and moderate positivity for pankeratin. There is no staining reactivity for TTF-1. The Ki-67 proliferation labeling index is essentially negative, (very low, <1%). This staining pattern confirms the histologic impression of a well-differentiated neuroendocrine (carcinoid) tumor. FL Small bowel 09/02/2020:  Rapid small bowel transit. Serum Chromogranin A 160 on 09/23/2020. Urine 5-HIAA 21 (0-14)  2d-Echo 10/10/2020: EF 60-65%   Normal right ventricular size and function     Dotatate PET/CT scan 10/14/2020 increased tracer uptake seen throughout the liver compatible with neoplasm. This involves both the left and the right lobe of liver. In addition there are 2 foci of increased tracer uptake along the mesentery of the small bowel. This may involve the wall the small bowel. No other convincing evidence for extra-abdominal metastatic disease. EGD/Colonoscopy 10/05/2020 by Dr. Aramis Johnson; records reviewed. Hepatobiliary team (Dr. Evelina Pinto) consult appreciated. Small bowel resection on 10/21/2020. Small bowel resection on 10/21/2020. A. Ileum, resection:   Multifocal (4 foci) neuroendocrine tumor (NET). Extensive perineural and angiolymphatic invasion.    3 of 10 lymph nodes with metastatic neuroendocrine tumor and extracapsular extension of tumor cells (3/10). Bilateral viable small bowel resection margins with no evidence of tumor. B. Specimen received as \"Meckel's\":   Nodular scar tissue with patchy chronic inflammation. Negative for neuroendocrine tumor. Intestinal mucosal tissue is not present. CASE SUMMARY:   Procedure: Small bowel resection   Tumor site: Ileum   Tumor size: Greatest dimension of 1.5 cm   Tumor focality: Multifocal: 4 separate tumors   Histologic type and grade: G2: Well-differentiated neuroendocrine tumor   Mitotic rate: between 2-20 mitoses/2 mm2   Ki-67 labeling index: between 3-20%   Tumor extension: Tumor invades through the muscularis propria into subserosal tissue without penetration of overlying serosa   Margins: All margins are uninvolved by tumor    Margins examined: Proximal and distal   Lymphovascular invasion: Present   Perineural invasion: Present   Large mesenteric masses (greater than 2 cm): Present (one 2.5 cm mass)   Regional lymph nodes:    Number of lymph nodes involved: 3    Number of lymph nodes examined: 10   Pathologic stage classification (pTNM, AJCC eighth edition):    pT3    pN2    pM1a -confirmed liver metastasis, [facility]-     Comment:   A. Immunostains stain the tumor cells as follows in block A6:   Synaptophysin and chromogranin: Positive   Ki-67: Positive in 5% of tumor cells      We recommended Lanreotide once monthly for metastatic well differentiated neuroendocrine cancer to liver. Dose # 1 Lanreotide was on 11/05/2020. Dose # 2 Lanreotide was on 12/03/2020. Dose # 3 Lanreotide was on 01/07/2021. Serum Chromogranin A 95 on 01/07/2021. CT chest 02/07/2021 negative for metastatic disease. CT abdomen/pelvis 02/07/2021 Persistent bilobar hepatic lesions which are grossly stable consistent with stable widespread hepatic metastasis. Imaging reviewed. Continue Lanreotide and repeat scans in 3 months. Dose # 4 Lanreotide was on 02/11/2021.    Ga 68 Dotatate PET 03/09/2021 Gallium 68 dotatate avid uptake throughout multiple regions of the liver compatible with multiple foci of neuroendocrine tumor in both the right and the left lobe of the liver, when compared to previous not significantly changed in the interval.  Dose # 5 Lanreotide was on 03/11/2021. Dose # 6 Lanreotide was on 04/08/2021. Serum chromogranin A 115 (0-103)  Dose # 7 Lanreotide was on 05/06/2021. Serum chromogranin A 114 (0-103)  Dose # 8 Lanreotide was on 06/03/2021. Serum chromogranin A  84  (0-103)     Ga 76 Dotatate PET 06/29/2021 Numerous foci of increased Gallium 68 dotatate avid uptake throughout both lobes of the liver, not significantly changed from previous. No significant progression of disease. No definite metastatic disease identified  Dose # 9 Lanreotide was on 07/01/2021. Serum Chromogranin A 97 (0-103)  Dose # 10 Lanreotide was on 07/29/2021. Serum Chromogranin A 89 (0-103)  Dose # 11 Lanreotide was on 08/30/2021. Serum Chromogranin A 120 (0-103)  Dose # 12 Lanreotide was on 09/30/2021. Serum Chromogranin A 141 (0-103)  PET/CT scan 11/09/2021 There is significant gallium avid tracer uptake in the liver, numerous lesions are seen, tracer uptake overall is diminished. There is no convincing extrahepatic disease identified. Dose # 13 Lanreotide was on 11/11/2021. Serum chromogranin A 105 on 11/11/2021. Dose # 14 Lanreotide was on 12/30/2021. Serum chromogranin A 184 on 12/30/2021. Dose # 15 Lanreotide was on 01/27/2022. Serum chromogranin A  98 on 01/27/2022. CT head 01/28/2022 noted no acute abnormality. CT cervical spine 01/28/2022 noted no acute abnormality of cervical spine  PET/CT Gallium 68 02/22/2022 noted Multiple regions of tracer uptake seen within the liver consistent with neuroendocrine tumor. No other evidence to suggest metastatic disease. Reviewed with Dr. Akihl Gallardo from Radiology team; overall stable disease.   Continue Lanreotide and repeat scans in 3 months. Dose # 16 Lanreotide was on 02/24/2022. Serum chromogranin A 116 on 02/24/2022  Dose # 17 Lanreotide was on 03/24/2022. Serum Chromogranin A 173 on 03/24/2022. Dose # 18 Lanreotide was on 04/21/2022. Serum Chromogranin A 140 on 04/21/2022. Dose # 19 Lanreotide was on 05/19/2022. Serum Chromogranin A 114 on 05/19/2022. Dose # 20 Lanreotide was on 06/23/2022. PET/CT scan 07/19/2022: Gallium 76 dotatate avid uptake is present within multiple masses within the liver, allowing for slight technical differences, not significantly changed in the interval. No convincing extrahepatic metastatic disease identified. Imaging reviewed. Continue Lanreotide and repeat scans in 3-4 months  Dose # 21 Lanreotide was on 07/21/2022. Serum Chromogranin A 112 on 07/21/2022  Dose # 22 Lanreotide was on 08/18/2022. Dose # 23 Lanreotide was on 9/15/2022. The patient had presented to the ED with abdominal pain, nausea and jaundice. Upon presentation bilirubin was 8.8, direct bilirubin 6.1 alk phos 189, ALT 1958, AST 1069, INR 2.3, the patient was admitted with acute liver failure, possibly decompensation in the setting of a urinary tract infection. CT scan of the abdomen the pelvis was negative for bowel obstruction, liver metastasis, interval changes difficult to gauge due to the absence of the contrast-enhancement, innumerable subcutaneous nodules in the buttocks, likely secondary to the lanreotide injections. Ultrasound of the abdomen had revealed heterogeneous liver with multiple hypoechoic lesions, consistent with persistent diffuse hepatic metastasis. Chromogranin A from 9/15/2022 was 75. HBP team is on board. No obstructive process. Continue supportive care  Ammonia 116  Vitamin K given  GI on board; aggressive lactulose titrated to mental status  Serologic studies  Liver U/S noted heterogeneous liver with multiple hypoechoic lesions consistent with the persistent diffuse hepatic metastasis. Thrombocytopenia, likely secondary to liver failure.   Continue to monitor her counts, and LFTs  LFTs trending down  Antibiotics per primary team.    Palliative care team for sx management  Will continue to continue     09/29/2022  Amos Mahan MD

## 2022-09-29 NOTE — PROGRESS NOTES
Physical Therapy  Physical Therapy Initial Assessment       Name: Laxmi Florence  : 1985  MRN: [id number]      Date of Service: 2022    Evaluating PT:  Yashira Pena PT, DPT  [id number]    Room #:  9963/7711-W  Diagnosis:  Liver metastases ([facility] [C78.7]  PMHx/PSHx:   has a past medical history of Abdominal pain, Cancer ([facility] Diarrhea, Hypocalcemia, Hypothyroidism, Irritable bowel syndrome, Migraines, Seizures ([facility] and Status post alcohol detoxification. Procedure/Surgery:    Precautions:  Falls  Equipment Needs:  Rollator    SUBJECTIVE:    Pt lives with parents in a 2 story home with 3+3 stairs to enter and single rail. Bed is on 2nd floor and bath is on 2nd floor. Flight of stairs with rail. Pt ambulated with SPC independently PTA. Equipment Owned: SPC    OBJECTIVE:   Initial Evaluation  Date: 22 Treatment Short Term/ Long Term   Goals   AM-PAC 6 Clicks      Was pt agreeable to Eval/treatment? Yes     Does pt have pain? Moderate pain in B knees with activity     Bed Mobility  Rolling: NT  Supine to sit: NT  Sit to supine: NT  Scooting: SBA  Rolling: Independent  Supine to sit:  Independent  Sit to supine: Independent  Scooting: Independent     Transfers Sit to stand: SBA  Stand to sit: SBA  Stand pivot: SBA SPC  Supervision with [employer]  Sit to stand: Modified Independent  Stand to sit: Modified Independent  Stand pivot: Modified Independent     Ambulation    50 feet with SBA SPC  125' Supervision [employer]  300 feet with Modified Independent      Stair negotiation: ascended and descended  NT  >4 steps with single rail Modified Independent     ROM BUE:  Defer to OT  BLE:  WFL     Strength BUE:  Defer to OT  BLE:  4/5  Improve 1 MMT   Balance Sitting EOB:  SBA  Dynamic Standing:  Supervision [employer]  Sitting EOB:  Independent    Dynamic Standing:  Modified Independent       Pt is A & O x 4  Sensation:  Decreased hands and knees  Edema:  WNL        Patient education  Pt educated on role of PT    Patient response to education:   Pt verbalized understanding Pt demonstrated skill Pt requires further education in this area   x x x     ASSESSMENT:    Conditions Requiring Skilled Therapeutic Intervention:    [x]Decreased strength     []Decreased ROM  [x]Decreased functional mobility  [x]Decreased balance   [x]Decreased endurance   []Decreased posture  []Decreased sensation  []Decreased coordination   []Decreased vision  []Decreased safety awareness   [x]Increased pain       Comments:  Pt agreeable to PT evaluation. Pt performing transfers and ambulation without assist. Pt demonstrates improved stability and improved endurance when ambulating with WW in comparison to [facility]. Pt would benefit from rollator, as it would allow for seated rest breaks due to endurance and knee pain which limit distance. Patient would benefit from continued skilled PT to maximize functional mobility independence. Treatment:  Patient practiced and was instructed in the following treatment:    Functional transfers-Verbal cues for proper positioning and sequencing to perform transfers safely with maximum independence. Gait training-Verbal cues for proper positioning and sequencing using assistive device to maximize functional mobility independence. Pt's/ family goals   1. Get better    Prognosis is good for reaching above PT goals. Patient and or family understand(s) diagnosis, prognosis, and plan of care.   yes    PHYSICAL THERAPY PLAN OF CARE:    PT POC is established based on physician order and patient diagnosis     Referring provider/PT Order:    09/28/22 1145  PT eval and treat  Start:  09/28/22 1145,   End:  09/28/22 1145,   ONE TIME,   Standing Count:  1 Occurrences,   R         Naida Castellanos DO     Diagnosis:  Liver metastases ([facility] [C78.7]  Specific instructions for next treatment:  Gait training    Current Treatment Recommendations:     [x] Strengthening to improve independence with functional mobility   [x] ROM to improve independence with functional mobility   [x] Balance Training to improve static/dynamic balance and to reduce fall risk  [x] Endurance Training to improve activity tolerance during functional mobility   [x] Transfer Training to improve safety and independence with all functional transfers   [x] Gait Training to improve gait mechanics, endurance and assess need for appropriate assistive device  [x] Stair Training in preparation for safe discharge home and/or into the community   [x] Positioning to prevent skin breakdown and contractures  [x] Safety and Education Training   [x] Patient/Caregiver Education   [x] HEP  [] Other     PT long term treatment goals are located in above grid    Frequency of treatments: 2-5x/week x 1-2 weeks. Time in  1010  Time out  1030  Total Treatment Time  8 minutes     Evaluation Time includes thorough review of current medical information, gathering information on past medical history/social history and prior level of function, completion of standardized testing/informal observation of tasks, assessment of data and education on plan of care and goals.     CPT codes:  [x] Low Complexity PT evaluation 02361  [] Moderate Complexity PT evaluation 71859  [] High Complexity PT evaluation 20050  [] PT Re-evaluation 24876  [] Gait training 48060 0 minutes  [] Manual therapy 25363 0 minutes  [x] Therapeutic activities 28229 8 minutes  [] Therapeutic exercises 83422 0 minutes  [] Neuromuscular reeducation 27640 0 minutes       Grace Beauchamp PT, DPT   [id number]

## 2022-09-29 NOTE — CARE COORDINATION
Social Work/Case Management Transition of Care Planning (Sheila Dominguez [place] [phone]):   Per report, patient remains on IV Flagyl and Rocephin. Ammonia is trending down. Continue trending liver tests. Palliative care consult was made yesterday. Patient completed a HCPOA with her father, Davy Grace as the first agent. Dilaudid was started for pain management. Plan is for discharge to home with no needs. Patient plans to contact [facility] regarding waiver services. PT to eval for any need for assistive device prior to discharge.   Family will provide transport  Sheila Dominguez, [place]  9/29/2022

## 2022-09-29 NOTE — PLAN OF CARE
Problem: Pain  Goal: Verbalizes/displays adequate comfort level or baseline comfort level  9/28/2022 2022 by Anat Tom RN  Outcome: Progressing  9/28/2022 1535 by Karol Soler RN  Outcome: Progressing     Problem: Safety - Adult  Goal: Free from fall injury  Outcome: Progressing     Problem: Nutrition Deficit:  Goal: Optimize nutritional status  Outcome: Progressing

## 2022-09-29 NOTE — PROGRESS NOTES
Palliative Care Department  [phone]  Palliative Care Progress Note  Provider TRINA Valderrama - CNP      PATIENT: Leyla Martinez  : 1985  MRN: [id number]  ADMISSION DATE: 2022  4:48 PM  Referring Provider: Amari Villalba DO    Palliative Medicine was consulted on hospital day 3 for assistance with Goals of care, Code Status Discussion, Symptom management     HPI:     Radha Baeza is a 40 y.o. y/o female with a history of neuroendocrine cancer with mets to the liver. Who presented to [facility] on 2022 with abdominal pain and jaundice. Significant laboratory findings in the emergency room revealed bilirubin was 8.8, direct bilirubin 6.1 alk phos 189, ALT 1958, AST 1069, INR 2.3. The patient was admitted with liver metastases. Palliative medicine is asked to assist with goal of care, CODE STATUS discussion, symptom management. ASSESSMENT/PLAN:     Pertinent Hospital Diagnoses     Liver metastases    Neuroendocrine cancer  - Follows with Dr. Ambika Barnett & Dr. Jostin Lowry  - s.p Bowel resection on 10/21/20  - On Lanreotide      Neoplasm related pain  - Gabapentin 300mg TID  -  D/C Tramadol 50mg q 6 hrs prn for the pain    - start po Dilaudid 0.5 mg every 6 hours as needed     Nausea  -Zofran 4 mg IV or p.o. every 6 hours as needed    Constipation:          -Start senna S 2 tabs daily  -GlycoLax daily as needed  -Lactulose 30 mg 3 times daily       Palliative Care Encounter / Counseling Regarding Goals of Care  Please see detailed goals of care discussion as below  At this time, Leyla Martinez, Does Not have capacity for medical decision-making.   Capacity is time limited and situation/question specific  Outcome of goals of care meeting:  Continue with current medical treatment  Which to complete POA paperwork  CODE STATUS discussed, patient wants to remain a full code with no long-term vent support  Following for symptom management  This is a clinic patient  Code status Full Code  Advanced Directives: no POA or living will in [facility]  Surrogate/Legal NOK:  Ruthie Found (Parent) [phone] (Parent) [phone]    Spiritual assessment: no spiritual distress identified  Bereavement and grief: to be determined  Referrals to: none today    Thank you for the opportunity to participate in the care of Alex Malone. TRINA Santoyo CNP  Palliative Medicine     SUBJECTIVE:     Details of Conversation:   Chart reviewed. Met with patient at the bedside. Patient reported her pain is better controlled with current regimen, she has not had a bowel movement, continues to take lactulose, ammonia level trending down. As of today patient has not had a bowel movement for 4 days. Going to add senna S to her bowel regimen. She voiced no new concerns. We will continue to follow      Prognosis: Guarded    OBJECTIVE:     /67   Pulse 71   Temp 98.1 °F (36.7 °C) (Oral)   Resp 18   Ht 5' (1.524 m)   Wt 116 lb (52.6 kg)   SpO2 100%   BMI 22.65 kg/m²     Physical Examination:  Gen: elderly, thin, NAD, awake, alert   HEENT: normocephalic, atraumatic, PERRL, EOMI,   Neck: trachea midline, no JVD  Lungs: respirations easy and not labored,   Heart: regular rate and rhythm, distant heart tones,   Abdomen: normoactive bowel sounds, soft, right upper quadrant tenderness  Extremities: no clubbing, cyanosis or edema, moving all extremities    Skin: warm, dry without rashes, lesions, bruising  Neuro: awake, alert, oriented x 3, follows commands, no gross neurologic deficit    Objective data reviewed: labs, images, records, medication use, vitals, and chart    Time/Communication  Greater than 50% of time spent, total 25 minutes in counseling and coordination of care at the bedside regarding  status discussion, goals of care, and symptom management. Thank you for allowing Palliative Medicine to participate in the care of Alex Malone.     Note: This report was completed using computerize voiced recognition software. Every effort has been made to ensure accuracy; however, inadvertent computerized transcription errors may be present.

## 2022-09-29 NOTE — PROGRESS NOTES
Hospital Medicine    Subjective:  pt alert conversive pain under better control      Current Facility-Administered Medications:     HYDROmorphone (DILAUDID) tablet 0.5 mg, 0.5 mg, Oral, Q6H PRN, TRINA Farrell CNP, 0.5 mg at 09/29/22 0442    cefTRIAXone (ROCEPHIN) 2,000 mg in sterile water 20 mL IV syringe, 2,000 mg, IntraVENous, Q24H, Mason Martínez MD, 2,000 mg at 09/28/22 2123    metronidazole (FLAGYL) 500 mg in 0.9% NaCl 100 mL IVPB premix, 500 mg, IntraVENous, Q8H, Masno Martínez MD, Stopped at 09/29/22 0148    0.9 % sodium chloride infusion, , IntraVENous, Continuous, Areli Castellanos DO, Last Rate: 75 mL/hr at 09/28/22 1303, New Bag at 09/28/22 1303    zolpidem (AMBIEN) tablet 5 mg, 5 mg, Oral, Nightly PRN, Areli Castelalnos DO, 5 mg at 09/28/22 2123    lactulose ([facility]) 10 GM/15ML solution 30 g, 30 g, Oral, TID, Chasity Oliveira MD, 30 g at 09/28/22 2122    rifAXIMin (XIFAXAN) tablet 550 mg, 550 mg, Oral, BID, Chasity Oliveira MD, 550 mg at 09/28/22 2123    brimonidine (ALPHAGAN) 0.2 % ophthalmic solution 1 drop, 1 drop, Both Eyes, QAM, Areli Castellanos DO, 1 drop at 09/28/22 0846    gabapentin (NEURONTIN) capsule 300 mg, 300 mg, Oral, TID, Areli Castellanos DO, 300 mg at 09/28/22 2123    levETIRAcetam (KEPPRA) tablet 750 mg, 750 mg, Oral, BID, Areli Castellanos DO, 750 mg at 09/28/22 2123    levothyroxine (SYNTHROID) tablet 112 mcg, 112 mcg, Oral, Daily, Areli Castellanos DO, 112 mcg at 09/28/22 3153    magnesium oxide (MAG-OX) tablet 400 mg, 400 mg, Oral, BID, Areli Jas Malmer, DO, 400 mg at 09/28/22 2123    melatonin tablet 3 mg, 3 mg, Oral, Nightly, Areli Jas Malmer, DO, 3 mg at 09/28/22 2123    potassium chloride (KLOR-CON M) extended release tablet 20 mEq, 20 mEq, Oral, BID WC, Areli Jas Malmer, DO, 20 mEq at 09/28/22 1724    topiramate (TOPAMAX) tablet 50 mg, 50 mg, Oral, BID, Areli Jas Malmer, DO, 50 mg at 09/28/22 2123    sodium chloride flush 0.9 % injection 5-40 mL, 5-40 mL, IntraVENous, 2 times per day, Rachel Agosto, , 10 mL at 09/28/22 2124    sodium chloride flush 0.9 % injection 5-40 mL, 5-40 mL, IntraVENous, PRN, Nickie Raymond Malmer, DO    0.9 % sodium chloride infusion, , IntraVENous, PRN, Nickie Raymond Malmer, DO    ondansetron (ZOFRAN-ODT) disintegrating tablet 4 mg, 4 mg, Oral, Q8H PRN **OR** ondansetron (ZOFRAN) injection 4 mg, 4 mg, IntraVENous, Q6H PRN, Nickie Zuñigamer, DO, 4 mg at 09/27/22 0018    polyethylene glycol (GLYCOLAX) packet 17 g, 17 g, Oral, Daily PRN, Nickie Raymond Malmer, DO    pantoprazole (PROTONIX) tablet 40 mg, 40 mg, Oral, QAM Kalani Chime Malmer, DO, 40 mg at 09/28/22 0513    Objective:    BP (!) 83/56 Comment: RN Notified  Pulse 67   Temp 98.1 °F (36.7 °C) (Oral)   Resp 18   Ht 5' (1.524 m)   Wt 116 lb (52.6 kg)   SpO2 100%   BMI 22.65 kg/m²     Heart:  reg  Lungs:  ctab  Abd: + bs nondistended  Extrem:  w/o edema    CBC with Differential:    Lab Results   Component Value Date/Time    WBC 5.7 09/27/2022 05:54 AM    RBC 3.36 09/27/2022 05:54 AM    HGB 9.7 09/27/2022 05:54 AM    HCT 28.4 09/27/2022 05:54 AM    PLT 67 09/27/2022 05:54 AM    MCV 84.5 09/27/2022 05:54 AM    MCH 28.9 09/27/2022 05:54 AM    MCHC 34.2 09/27/2022 05:54 AM    RDW 20.3 09/27/2022 05:54 AM    NRBC 0.0 08/27/2021 07:15 PM    METASPCT 1.8 05/06/2021 01:37 PM    LYMPHOPCT 29.5 09/26/2022 05:38 PM    MONOPCT 25.4 09/26/2022 05:38 PM    MYELOPCT 3.5 05/06/2021 01:37 PM    BASOPCT 0.7 09/26/2022 05:38 PM    MONOSABS 1.17 09/26/2022 05:38 PM    LYMPHSABS 1.36 09/26/2022 05:38 PM    EOSABS 0.11 09/26/2022 05:38 PM    BASOSABS 0.03 09/26/2022 05:38 PM     CMP:    Lab Results   Component Value Date/Time     09/29/2022 04:15 AM    K 3.5 09/29/2022 04:15 AM    K 3.1 09/26/2022 05:38 PM     09/29/2022 04:15 AM    CO2 19 09/29/2022 04:15 AM    BUN 10 09/29/2022 04:15 AM    CREATININE 0.6 09/29/2022 04:15 AM    GFRAA >60 09/29/2022 04:15 AM    LABGLOM >60 09/29/2022 04:15 AM GLUCOSE 118 09/29/2022 04:15 AM    PROT 5.0 09/29/2022 04:15 AM    LABALBU 2.9 09/29/2022 04:15 AM    CALCIUM 7.1 09/29/2022 04:15 AM    BILITOT 2.8 09/29/2022 04:15 AM    ALKPHOS 162 09/29/2022 04:15 AM     09/29/2022 04:15 AM     09/29/2022 04:15 AM     Warfarin PT/INR:    Lab Results   Component Value Date    INR 1.5 09/28/2022    INR 1.7 09/27/2022    INR 2.3 09/26/2022    PROTIME 16.3 (H) 09/28/2022    PROTIME 18.7 (H) 09/27/2022    PROTIME 24.7 (H) 09/26/2022       Assessment:    Principal Problem:    Liver metastases ([address]  Active Problems:    Hepatic coma/encephalopathy (HCC)    Thrombocytopenia (HCC)  Resolved Problems:    * No resolved hospital problems.  *      Plan:  Cont ivf serial lab increase activity hopefully home 24 hours        Dalila Arnold DO  8:50 AM  9/29/2022

## 2022-09-30 VITALS
RESPIRATION RATE: 18 BRPM | WEIGHT: 116 LBS | DIASTOLIC BLOOD PRESSURE: 61 MMHG | OXYGEN SATURATION: 98 % | TEMPERATURE: 97.8 F | SYSTOLIC BLOOD PRESSURE: 96 MMHG | HEIGHT: 60 IN | HEART RATE: 70 BPM | BODY MASS INDEX: 22.78 KG/M2

## 2022-09-30 LAB
ALBUMIN SERPL-MCNC: 2.6 G/DL (ref 3.5–5.2)
ALP BLD-CCNC: 150 U/L (ref 35–104)
ALT SERPL-CCNC: 489 U/L (ref 0–32)
AMMONIA: 55 UMOL/L (ref 11–51)
ANION GAP SERPL CALCULATED.3IONS-SCNC: 7 MMOL/L (ref 7–16)
AST SERPL-CCNC: 69 U/L (ref 0–31)
BILIRUB SERPL-MCNC: 2.3 MG/DL (ref 0–1.2)
BILIRUBIN DIRECT: 1.6 MG/DL (ref 0–0.3)
BILIRUBIN, INDIRECT: 0.7 MG/DL (ref 0–1)
BUN BLDV-MCNC: 7 MG/DL (ref 6–20)
CALCIUM SERPL-MCNC: 7.5 MG/DL (ref 8.6–10.2)
CHLORIDE BLD-SCNC: 109 MMOL/L (ref 98–107)
CO2: 17 MMOL/L (ref 22–29)
CREAT SERPL-MCNC: 0.6 MG/DL (ref 0.5–1)
GFR AFRICAN AMERICAN: >60
GFR NON-AFRICAN AMERICAN: >60 ML/MIN/1.73
GLUCOSE BLD-MCNC: 94 MG/DL (ref 74–99)
METER GLUCOSE: 92 MG/DL (ref 74–99)
MITOCHONDRIAL M2 AB, IGG: 0.6 U/ML (ref 0–4)
ORGANISM: ABNORMAL
ORGANISM: ABNORMAL
POTASSIUM SERPL-SCNC: 3.5 MMOL/L (ref 3.5–5)
SODIUM BLD-SCNC: 133 MMOL/L (ref 132–146)
TOTAL PROTEIN: 5 G/DL (ref 6.4–8.3)
URINE CULTURE, ROUTINE: ABNORMAL
URINE CULTURE, ROUTINE: ABNORMAL

## 2022-09-30 PROCEDURE — 99233 SBSQ HOSP IP/OBS HIGH 50: CPT | Performed by: INTERNAL MEDICINE

## 2022-09-30 PROCEDURE — 80076 HEPATIC FUNCTION PANEL: CPT

## 2022-09-30 PROCEDURE — 6370000000 HC RX 637 (ALT 250 FOR IP)

## 2022-09-30 PROCEDURE — 2500000003 HC RX 250 WO HCPCS: Performed by: STUDENT IN AN ORGANIZED HEALTH CARE EDUCATION/TRAINING PROGRAM

## 2022-09-30 PROCEDURE — 2580000003 HC RX 258: Performed by: INTERNAL MEDICINE

## 2022-09-30 PROCEDURE — 6360000002 HC RX W HCPCS: Performed by: INTERNAL MEDICINE

## 2022-09-30 PROCEDURE — 82962 GLUCOSE BLOOD TEST: CPT

## 2022-09-30 PROCEDURE — 6370000000 HC RX 637 (ALT 250 FOR IP): Performed by: INTERNAL MEDICINE

## 2022-09-30 PROCEDURE — 36415 COLL VENOUS BLD VENIPUNCTURE: CPT

## 2022-09-30 PROCEDURE — 80048 BASIC METABOLIC PNL TOTAL CA: CPT

## 2022-09-30 PROCEDURE — 82140 ASSAY OF AMMONIA: CPT

## 2022-09-30 PROCEDURE — 6370000000 HC RX 637 (ALT 250 FOR IP): Performed by: STUDENT IN AN ORGANIZED HEALTH CARE EDUCATION/TRAINING PROGRAM

## 2022-09-30 PROCEDURE — 99232 SBSQ HOSP IP/OBS MODERATE 35: CPT

## 2022-09-30 RX ORDER — LACTULOSE 10 G/15ML
30 SOLUTION ORAL 3 TIMES DAILY
Qty: 4050 ML | Refills: 0 | Status: SHIPPED | OUTPATIENT
Start: 2022-09-30 | End: 2022-10-30

## 2022-09-30 RX ORDER — HEPARIN SODIUM (PORCINE) LOCK FLUSH IV SOLN 100 UNIT/ML 100 UNIT/ML
500 SOLUTION INTRAVENOUS PRN
Status: DISCONTINUED | OUTPATIENT
Start: 2022-09-30 | End: 2022-09-30 | Stop reason: HOSPADM

## 2022-09-30 RX ORDER — ONDANSETRON 4 MG/1
4 TABLET, ORALLY DISINTEGRATING ORAL EVERY 6 HOURS PRN
Qty: 28 TABLET | Refills: 0 | Status: SHIPPED | OUTPATIENT
Start: 2022-09-30 | End: 2022-10-07

## 2022-09-30 RX ORDER — TRAMADOL HYDROCHLORIDE 50 MG/1
TABLET ORAL
Qty: 120 TABLET | OUTPATIENT
Start: 2022-09-30

## 2022-09-30 RX ORDER — HYDROMORPHONE HYDROCHLORIDE 2 MG/1
1 TABLET ORAL EVERY 6 HOURS PRN
Qty: 10 TABLET | Refills: 0 | Status: SHIPPED | OUTPATIENT
Start: 2022-09-30 | End: 2022-10-05

## 2022-09-30 RX ADMIN — RIFAXIMIN 550 MG: 550 TABLET ORAL at 09:36

## 2022-09-30 RX ADMIN — HYDROMORPHONE HYDROCHLORIDE 0.5 MG: 2 TABLET ORAL at 06:19

## 2022-09-30 RX ADMIN — Medication 400 MG: at 09:34

## 2022-09-30 RX ADMIN — BRIMONIDINE TARTRATE 1 DROP: 2 SOLUTION OPHTHALMIC at 09:39

## 2022-09-30 RX ADMIN — LEVETIRACETAM 750 MG: 500 TABLET, FILM COATED ORAL at 09:35

## 2022-09-30 RX ADMIN — ONDANSETRON HYDROCHLORIDE 4 MG: 2 SOLUTION INTRAMUSCULAR; INTRAVENOUS at 13:45

## 2022-09-30 RX ADMIN — POTASSIUM CHLORIDE 20 MEQ: 1500 TABLET, EXTENDED RELEASE ORAL at 09:34

## 2022-09-30 RX ADMIN — SODIUM CHLORIDE, PRESERVATIVE FREE 10 ML: 5 INJECTION INTRAVENOUS at 09:39

## 2022-09-30 RX ADMIN — TOPIRAMATE 50 MG: 25 TABLET, FILM COATED ORAL at 09:34

## 2022-09-30 RX ADMIN — SENNOSIDES AND DOCUSATE SODIUM 1 TABLET: 50; 8.6 TABLET ORAL at 09:35

## 2022-09-30 RX ADMIN — GABAPENTIN 300 MG: 300 CAPSULE ORAL at 13:31

## 2022-09-30 RX ADMIN — METRONIDAZOLE 500 MG: 500 INJECTION, SOLUTION INTRAVENOUS at 00:49

## 2022-09-30 RX ADMIN — HYDROMORPHONE HYDROCHLORIDE 0.5 MG: 2 TABLET ORAL at 13:31

## 2022-09-30 RX ADMIN — ONDANSETRON HYDROCHLORIDE 4 MG: 2 SOLUTION INTRAMUSCULAR; INTRAVENOUS at 00:08

## 2022-09-30 RX ADMIN — ONDANSETRON HYDROCHLORIDE 4 MG: 2 SOLUTION INTRAMUSCULAR; INTRAVENOUS at 06:56

## 2022-09-30 RX ADMIN — PANTOPRAZOLE SODIUM 40 MG: 40 TABLET, DELAYED RELEASE ORAL at 06:20

## 2022-09-30 RX ADMIN — SODIUM CHLORIDE: 9 INJECTION, SOLUTION INTRAVENOUS at 02:20

## 2022-09-30 RX ADMIN — LEVOTHYROXINE SODIUM 112 MCG: 0.11 TABLET ORAL at 06:20

## 2022-09-30 RX ADMIN — GABAPENTIN 300 MG: 300 CAPSULE ORAL at 09:34

## 2022-09-30 ASSESSMENT — PAIN SCALES - GENERAL
PAINLEVEL_OUTOF10: 8
PAINLEVEL_OUTOF10: 9

## 2022-09-30 ASSESSMENT — PAIN DESCRIPTION - LOCATION
LOCATION: BACK
LOCATION: ABDOMEN;BACK

## 2022-09-30 ASSESSMENT — PAIN DESCRIPTION - DESCRIPTORS: DESCRIPTORS: ACHING;CRUSHING

## 2022-09-30 ASSESSMENT — PAIN SCALES - WONG BAKER: WONGBAKER_NUMERICALRESPONSE: 0

## 2022-09-30 NOTE — PROGRESS NOTES
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 Referring Physician:  Jaiden Landrum MD    PCP:  Francis Shafer DO    Subjective: The patient is feeling much better, moving her bowels, she has slight tenderness in the right upper quadrant. Physical Exam:  BP 96/61   Pulse 70   Temp 97.8 °F (36.6 °C) (Oral)   Resp 18   Ht 5' (1.524 m)   Wt 116 lb (52.6 kg)   SpO2 98%   BMI 22.65 kg/m²   GENERAL: Awake and alert, jaundiced. HEENT: PERRLA; EOMI. Oropharynx clear. Icteric sclera. NECK: Supple. No palpable cervical or supraclavicular lymphadenopathy. LUNGS: Good air entry bilaterally. No wheezing, crackles or rhonchi. CARDIOVASCULAR: Regular rate. No murmurs, rubs or gallops. ABDOMEN: Soft. Positive for tenderness in the right upper quadrant. Non-distended. Positive bowel sounds. EXTREMITIES: Without clubbing, cyanosis, or edema. NEUROLOGIC: No focal deficits. ECOG PS 1      Impression/Plan:     41 y/o female with hx of hypothyroidism, IBS,migraine who was complaining of abdominal pain associated with diarrhea and flushing/sweating. CT abdomen/pelvis 08/28/2020:  2 cm masslike density in the mid abdominal small bowel mesentery with a spiculated appearance. Multiple liver masses suspicious for metastatic disease. 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 Comment: Sections of the liver tissue cores show the hepatic parenchyma to be partially replaced by a proliferation of epithelioid cells with relatively uniform round nuclei and eosinophilic granular cytoplasm. The   epithelioid cells form anastomosing solid cords/nests that are surrounded by delicate fibrovascular stroma. There are no mitotic figures or tumor cell necrosis present.   The histologic changes seen are suggestive of well-differentiated neuroendocrine (carcinoid) tumor. Immunostaining for pankeratin, chromogranin, synaptophysin, TTF-1 and Ki-67 was performed on sections of one tissue block (A1) and the neoplastic epithelioid cells show diffuse and strong positivity for neuroendocrine markers (chromogranin and synaptophysin) and moderate positivity for pankeratin. There is no staining reactivity for TTF-1. The Ki-67 proliferation labeling index is essentially negative, (very low, <1%). This staining pattern confirms the histologic impression of a well-differentiated neuroendocrine (carcinoid) tumor. FL Small bowel 09/02/2020:  Rapid small bowel transit. Serum Chromogranin A 160 on 09/23/2020. Urine 5-HIAA 21 (0-14)  2d-Echo 10/10/2020: EF 60-65%   Normal right ventricular size and function     Dotatate PET/CT scan 10/14/2020 increased tracer uptake seen throughout the liver compatible with neoplasm. This involves both the left and the right lobe of liver. In addition there are 2 foci of increased tracer uptake along the mesentery of the small bowel. This may involve the wall the small bowel. No other convincing evidence for extra-abdominal metastatic disease. EGD/Colonoscopy 10/05/2020 by Dr. Cadence Pearl; records reviewed. Hepatobiliary team (Dr. Zane Vieyra) consult appreciated. Small bowel resection on 10/21/2020. Small bowel resection on 10/21/2020. A. Ileum, resection:   Multifocal (4 foci) neuroendocrine tumor (NET). Extensive perineural and angiolymphatic invasion. 3 of 10 lymph nodes with metastatic neuroendocrine tumor and extracapsular extension of tumor cells (3/10). Bilateral viable small bowel resection margins with no evidence of tumor. B. Specimen received as \"Meckel's\":   Nodular scar tissue with patchy chronic inflammation. Negative for neuroendocrine tumor. Intestinal mucosal tissue is not present.      CASE SUMMARY:   Procedure: Small bowel resection   Tumor site: Ileum Tumor size: Greatest dimension of 1.5 cm   Tumor focality: Multifocal: 4 separate tumors   Histologic type and grade: G2: Well-differentiated neuroendocrine tumor   Mitotic rate: between 2-20 mitoses/2 mm2   Ki-67 labeling index: between 3-20%   Tumor extension: Tumor invades through the muscularis propria into subserosal tissue without penetration of overlying serosa   Margins: All margins are uninvolved by tumor    Margins examined: Proximal and distal   Lymphovascular invasion: Present   Perineural invasion: Present   Large mesenteric masses (greater than 2 cm): Present (one 2.5 cm mass)   Regional lymph nodes:    Number of lymph nodes involved: 3    Number of lymph nodes examined: 10   Pathologic stage classification (pTNM, AJCC eighth edition):    pT3    pN2    pM1a -confirmed liver metastasis, HBS-     Comment:   A. Immunostains stain the tumor cells as follows in block A6:   Synaptophysin and chromogranin: Positive   Ki-67: Positive in 5% of tumor cells      We recommended Lanreotide once monthly for metastatic well differentiated neuroendocrine cancer to liver. Dose # 1 Lanreotide was on 11/05/2020. Dose # 2 Lanreotide was on 12/03/2020. Dose # 3 Lanreotide was on 01/07/2021. Serum Chromogranin A 95 on 01/07/2021. CT chest 02/07/2021 negative for metastatic disease. CT abdomen/pelvis 02/07/2021 Persistent bilobar hepatic lesions which are grossly stable consistent with stable widespread hepatic metastasis. Imaging reviewed. Continue Lanreotide and repeat scans in 3 months. Dose # 4 Lanreotide was on 02/11/2021. Ga 76 Dotatate PET 03/09/2021 Gallium 68 dotatate avid uptake throughout multiple regions of the liver compatible with multiple foci of neuroendocrine tumor in both the right and the left lobe of the liver, when compared to previous not significantly changed in the interval.  Dose # 5 Lanreotide was on 03/11/2021. Dose # 6 Lanreotide was on 04/08/2021.  Serum chromogranin A 115 (0-103)  Dose # 7 Lanreotide was on 05/06/2021. Serum chromogranin A 114 (0-103)  Dose # 8 Lanreotide was on 06/03/2021. Serum chromogranin A  84  (0-103)     Ga 76 Dotatate PET 06/29/2021 Numerous foci of increased Gallium 68 dotatate avid uptake throughout both lobes of the liver, not significantly changed from previous. No significant progression of disease. No definite metastatic disease identified  Dose # 9 Lanreotide was on 07/01/2021. Serum Chromogranin A 97 (0-103)  Dose # 10 Lanreotide was on 07/29/2021. Serum Chromogranin A 89 (0-103)  Dose # 11 Lanreotide was on 08/30/2021. Serum Chromogranin A 120 (0-103)  Dose # 12 Lanreotide was on 09/30/2021. Serum Chromogranin A 141 (0-103)  PET/CT scan 11/09/2021 There is significant gallium avid tracer uptake in the liver, numerous lesions are seen, tracer uptake overall is diminished. There is no convincing extrahepatic disease identified. Dose # 13 Lanreotide was on 11/11/2021. Serum chromogranin A 105 on 11/11/2021. Dose # 14 Lanreotide was on 12/30/2021. Serum chromogranin A 184 on 12/30/2021. Dose # 15 Lanreotide was on 01/27/2022. Serum chromogranin A  98 on 01/27/2022. CT head 01/28/2022 noted no acute abnormality. CT cervical spine 01/28/2022 noted no acute abnormality of cervical spine  PET/CT Gallium 68 02/22/2022 noted Multiple regions of tracer uptake seen within the liver consistent with neuroendocrine tumor. No other evidence to suggest metastatic disease. Reviewed with Dr. Zaynab Clarke from Radiology team; overall stable disease. Continue Lanreotide and repeat scans in 3 months. Dose # 16 Lanreotide was on 02/24/2022. Serum chromogranin A 116 on 02/24/2022  Dose # 17 Lanreotide was on 03/24/2022. Serum Chromogranin A 173 on 03/24/2022. Dose # 18 Lanreotide was on 04/21/2022. Serum Chromogranin A 140 on 04/21/2022. Dose # 19 Lanreotide was on 05/19/2022. Serum Chromogranin A 114 on 05/19/2022. Dose # 20 Lanreotide was on 06/23/2022.   PET/CT scan 07/19/2022: Gallium 76 dotatate avid uptake is present within multiple masses within the liver, allowing for slight technical differences, not significantly changed in the interval. No convincing extrahepatic metastatic disease identified. Imaging reviewed. Continue Lanreotide and repeat scans in 3-4 months  Dose # 21 Lanreotide was on 07/21/2022. Serum Chromogranin A 112 on 07/21/2022  Dose # 22 Lanreotide was on 08/18/2022. Dose # 23 Lanreotide was on 9/15/2022. The patient had presented to the ED yesterday with abdominal pain, nausea and jaundice. She started having the pain about a week prior to presentation, it was getting progressively worse. She had developed jaundice yesterday. Upon presentation bilirubin was 8.8, direct bilirubin 6.1 alk phos 189, ALT 1958, AST 1069, INR 2.3, the patient was admitted with acute liver failure, possibly decompensation in the setting of a urinary tract infection. - CT scan of the abdomen the pelvis was negative for bowel obstruction, liver metastasis, interval changes difficult to gauge due to the absence of the contrast-enhancement, innumerable subcutaneous nodules in the buttocks, likely secondary to the lanreotide injections. Ultrasound of the abdomen had revealed heterogeneous liver with multiple hypoechoic lesions, consistent with persistent diffuse hepatic metastasis. -Chromogranin A from 9/15/2022 was 75. -HBP team is on board, Den Maurice to be considered as outpatient.  -The patient received lactulose and vitamin K. -GI team is following  -Thrombocytopenia, likely secondary to liver failure, platelets from 5/13 had improved to 67K. -LFTs trending down.  -Antibiotics per primary team.    -Okay for CAROLYN from heme-onc POV outpatient follow-up    Thank you for allowing us to participate in the care of Ms.  Gracy    SJ MCCARTNEY MD   HEMATOLOGY/MEDICAL ONCOLOGY  [facility]  SE[address] MED SURG [place] [address]  [address] [facility] [address]  Dept: [address]: [phone]

## 2022-09-30 NOTE — PROGRESS NOTES
[facility] Surgery    Will plan to see her when she is discharged    Discussed case with Dr. Romina Woodson, once her liver recovers she likely will benefit from [facility] therapy    Electronically signed by Jillian Nunn MD on 9/30/2022 at 12:39 PM

## 2022-09-30 NOTE — PLAN OF CARE
Problem: Pain  Goal: Verbalizes/displays adequate comfort level or baseline comfort level  9/30/2022 1508 by Binu Constantino RN  Outcome: Progressing  9/30/2022 0224 by Tamara Klein RN  Outcome: Progressing     Problem: Safety - Adult  Goal: Free from fall injury  9/30/2022 1508 by Binu Constantino RN  Outcome: Progressing  9/30/2022 0224 by Tamara Klein RN  Outcome: Progressing     Problem: Nutrition Deficit:  Goal: Optimize nutritional status  Outcome: Progressing

## 2022-09-30 NOTE — PLAN OF CARE
Problem: Pain  Goal: Verbalizes/displays adequate comfort level or baseline comfort level  9/30/2022 1607 by Feliz Zarate RN  Outcome: Adequate for Discharge  9/30/2022 1508 by Feliz Zarate RN  Outcome: Progressing  9/30/2022 0224 by Daniel King RN  Outcome: Progressing     Problem: Safety - Adult  Goal: Free from fall injury  9/30/2022 1607 by Feliz Zarate RN  Outcome: Adequate for Discharge  9/30/2022 1508 by Feliz Zarate RN  Outcome: Progressing  9/30/2022 0224 by Daniel King RN  Outcome: Progressing     Problem: Nutrition Deficit:  Goal: Optimize nutritional status  9/30/2022 1607 by Feliz Zarate RN  Outcome: Adequate for Discharge  9/30/2022 1508 by Feliz Zarate RN  Outcome: Progressing

## 2022-09-30 NOTE — CARE COORDINATION
Social Work/Case Management Transition of Care Planning (Alvaro Varner [place] [phone]):   Discharge order noted. Cathy Diallo at [facility] re: rollator. It will be delivered around 2:00pm  Met with patient at bedside. Updated her regarding this delivery. Plan is for patient to discharge to home with no needs. Family to transport.   JONH Ferrer  9/30/2022

## 2022-09-30 NOTE — PROGRESS NOTES
Palliative Care Department  [phone]  Palliative Care Progress Note  Provider TRINA Bishop - CNP      PATIENT: Brad Espinal  : 1985  MRN: [id number]  ADMISSION DATE: 2022  4:48 PM  Referring Provider: Zenaida Ryan DO    Palliative Medicine was consulted on hospital day 4 for assistance with Goals of care, Code Status Discussion, Symptom management     HPI:     Astrid Stephens is a 40 y.o. y/o female with a history of neuroendocrine cancer with mets to the liver. Who [facility] to [facility] on 2022 with abdominal pain and jaundice. Significant laboratory findings in the emergency room revealed bilirubin was 8.8, direct bilirubin 6.1 alk phos 189, ALT 1958, AST 1069, INR 2.3. The patient was admitted with liver metastases. Palliative medicine is asked to assist with goal of care, CODE STATUS discussion, symptom management. ASSESSMENT/PLAN:     Pertinent Hospital Diagnoses     Liver metastases    Neuroendocrine cancer  - Follows with Dr. Eric Westfall & Dr. Issa Robin  - s.p Bowel resection on 10/21/20  - On Lanreotide      Neoplasm related pain  - Gabapentin 300mg TID  -  D/C Tramadol 50mg q 6 hrs prn for the pain    - Dilaudid 1 mg every 6 hours as needed     Nausea  -Zofran 4 mg IV or p.o. every 6 hours as needed    Constipation:          -Start senna S 2 tabs daily  -GlycoLax daily as needed  -Lactulose 30 mg 3 times daily       Palliative Care Encounter / Counseling Regarding Goals of Care  Please see detailed goals of care discussion as below  At this time, Brad Espinal, Does Not have capacity for medical decision-making.   Capacity is time limited and situation/question specific  Outcome of goals of care meeting:  Continue with current medical treatment  Which to complete POA paperwork  CODE STATUS discussed, patient wants to remain a full code with no long-term vent support  Following for symptom management  This is a clinic patient  Code status Full Code  Advanced Directives: no POA or living will in [facility]  Surrogate/Legal NOK:  Tiny Brooks (Parent) [address] [facility] (Parent) [phone]    Spiritual assessment: no spiritual distress identified  Bereavement and grief: to be determined  Referrals to: none today    Thank you for the opportunity to participate in the care of Wendy Justice. TRINA Mendosa CNP  Palliative Medicine     SUBJECTIVE:     Details of Conversation:     Chart reviewed. Patient is going to be discharged today home. Patient was given a prescription for Dilaudid and Zofran. She is going to be seeing our outpatient palliative clinic next week, she is going to be given a call with a date and time. Prognosis: Guarded    OBJECTIVE:     BP 96/61   Pulse 70   Temp 97.8 °F (36.6 °C) (Oral)   Resp 18   Ht 5' (1.524 m)   Wt 116 lb (52.6 kg)   SpO2 98%   BMI 22.65 kg/m²     Physical Examination:  Gen: elderly, thin, NAD, awake, alert   HEENT: normocephalic, atraumatic, PERRL, EOMI,   Neck: trachea midline, no JVD  Lungs: respirations easy and not labored,   Heart: regular rate and rhythm, distant heart tones,   Abdomen: normoactive bowel sounds, soft, right upper quadrant tenderness  Extremities: no clubbing, cyanosis or edema, moving all extremities    Skin: warm, dry without rashes, lesions, bruising  Neuro: awake, alert, oriented x 3, follows commands, no gross neurologic deficit    Objective data reviewed: labs, images, records, medication use, vitals, and chart    Time/Communication  Greater than 50% of time spent, total 25 minutes in counseling and coordination of care at the bedside regarding  status discussion, goals of care, and symptom management. Thank you for allowing Palliative Medicine to participate in the care of Wendy Justice. Note: This report was completed using computerRicebook voiced recognition software.   Every effort has been made to ensure accuracy; however, inadvertent computerized transcription errors may be present.

## 2022-09-30 NOTE — PROGRESS NOTES
[facility] Medicine    Subjective:  pt alert conversive chet diet + bm      Current Facility-Administered Medications:     sennosides-docusate sodium (SENOKOT-S) 8.6-50 MG tablet 1 tablet, 1 tablet, Oral, Daily, TRINA Farrell - CNP, 1 tablet at 09/29/22 1719    HYDROmorphone (DILAUDID) tablet 0.5 mg, 0.5 mg, Oral, Q6H PRN, TRINA Farrell - CNP, 0.5 mg at 09/30/22 5102    cefTRIAXone (ROCEPHIN) 2,000 mg in sterile water 20 mL IV syringe, 2,000 mg, IntraVENous, Q24H, Kali England MD, 2,000 mg at 09/29/22 2123    zolpidem (AMBIEN) tablet 5 mg, 5 mg, Oral, Nightly PRN, Jeromy Castellanos, DO, 5 mg at 09/29/22 2154    lactulose (CHRONULAC) 10 GM/15ML solution 30 g, 30 g, Oral, TID, Sy Brian MD, 30 g at 09/29/22 2126    rifAXIMin (XIFAXAN) tablet 550 mg, 550 mg, Oral, BID, Sy Brian MD, 550 mg at 09/29/22 2124    brimonidine (ALPHAGAN) 0.2 % ophthalmic solution 1 drop, 1 drop, Both Eyes, QAM, Jeromy Castellanos DO, 1 drop at 09/29/22 3098    gabapentin (NEURONTIN) capsule 300 mg, 300 mg, Oral, TID, Jeromy Castellanos DO, 300 mg at 09/29/22 2125    levETIRAcetam (KEPPRA) tablet 750 mg, 750 mg, Oral, BID, Jeromy Castellanos DO, 750 mg at 09/29/22 2125    levothyroxine (SYNTHROID) tablet 112 mcg, 112 mcg, Oral, Daily, Jeromy Castellanos DO, 112 mcg at 09/30/22 7386    magnesium oxide (MAG-OX) tablet 400 mg, 400 mg, Oral, BID, Jeromy Castellanos DO, 400 mg at 09/29/22 2125    melatonin tablet 3 mg, 3 mg, Oral, Nightly, Jeromy Castellanos, DO, 3 mg at 09/29/22 2124    potassium chloride (KLOR-CON M) extended release tablet 20 mEq, 20 mEq, Oral, BID WC, Jeromy Castellanos DO, 20 mEq at 09/29/22 1721    topiramate (TOPAMAX) tablet 50 mg, 50 mg, Oral, BID, Jeromy Castellanos DO, 50 mg at 09/29/22 2125    sodium chloride flush 0.9 % injection 5-40 mL, 5-40 mL, IntraVENous, 2 times per day, Jeromy Castellanos DO, 10 mL at 09/29/22 2128    sodium chloride flush 0.9 % injection 5-40 mL, 5-40 mL, IntraVENous, PRN, Gina Ferraris Malmer, DO    0.9 % sodium chloride infusion, , IntraVENous, PRN, Gina Ferraris Malmer, DO    ondansetron (ZOFRAN-ODT) disintegrating tablet 4 mg, 4 mg, Oral, Q8H PRN **OR** ondansetron (ZOFRAN) injection 4 mg, 4 mg, IntraVENous, Q6H PRN, Gina Ferraris Malmer, DO, 4 mg at 09/30/22 0656    polyethylene glycol (GLYCOLAX) packet 17 g, 17 g, Oral, Daily PRN, Gina Ferraris Malmer, DO    pantoprazole (PROTONIX) tablet 40 mg, 40 mg, Oral, QAM Muldraugh Temple Malmer, DO, 40 mg at 09/30/22 7511    Objective:    BP 92/66   Pulse 67   Temp 98.7 °F (37.1 °C) (Oral)   Resp 19   Ht 5' (1.524 m)   Wt 116 lb (52.6 kg)   SpO2 98%   BMI 22.65 kg/m²     Heart:  reg  Lungs:  ctab  Abd: + bs soft nontender  Extrem:  w/o edema    CBC with Differential:    Lab Results   Component Value Date/Time    WBC 5.7 09/27/2022 05:54 AM    RBC 3.36 09/27/2022 05:54 AM    HGB 9.7 09/27/2022 05:54 AM    HCT 28.4 09/27/2022 05:54 AM    PLT 67 09/27/2022 05:54 AM    MCV 84.5 09/27/2022 05:54 AM    MCH 28.9 09/27/2022 05:54 AM    MCHC 34.2 09/27/2022 05:54 AM    RDW 20.3 09/27/2022 05:54 AM    NRBC 0.0 08/27/2021 07:15 PM    METASPCT 1.8 05/06/2021 01:37 PM    LYMPHOPCT 29.5 09/26/2022 05:38 PM    MONOPCT 25.4 09/26/2022 05:38 PM    MYELOPCT 3.5 05/06/2021 01:37 PM    BASOPCT 0.7 09/26/2022 05:38 PM    MONOSABS 1.17 09/26/2022 05:38 PM    LYMPHSABS 1.36 09/26/2022 05:38 PM    EOSABS 0.11 09/26/2022 05:38 PM    BASOSABS 0.03 09/26/2022 05:38 PM     CMP:    Lab Results   Component Value Date/Time     09/29/2022 04:15 AM    K 3.5 09/29/2022 04:15 AM    K 3.1 09/26/2022 05:38 PM     09/29/2022 04:15 AM    CO2 19 09/29/2022 04:15 AM    BUN 10 09/29/2022 04:15 AM    CREATININE 0.6 09/29/2022 04:15 AM    GFRAA >60 09/29/2022 04:15 AM    LABGLOM >60 09/29/2022 04:15 AM    GLUCOSE 118 09/29/2022 04:15 AM    PROT 5.0 09/29/2022 04:15 AM    LABALBU 2.9 09/29/2022 04:15 AM    CALCIUM 7.1 09/29/2022 04:15 AM    BILITOT 2.8 09/29/2022 04:15 AM ALKPHOS 162 09/29/2022 04:15 AM     09/29/2022 04:15 AM     09/29/2022 04:15 AM     Warfarin PT/INR:    Lab Results   Component Value Date    INR 1.5 09/28/2022    INR 1.7 09/27/2022    INR 2.3 09/26/2022    PROTIME 16.3 (H) 09/28/2022    PROTIME 18.7 (H) 09/27/2022    PROTIME 24.7 (H) 09/26/2022       Assessment:    Principal Problem:    Liver metastases ([facility] [address]  Active Problems:    Hepatic coma/encephalopathy (HCC)    Thrombocytopenia (HCC)  Resolved Problems:    * No resolved hospital problems.  *      Plan:  Dc ivf dc home pain meds per nai Moy DO  6:58 AM  9/30/2022

## 2022-10-03 LAB
ALPHA-1 ANTITRYPSIN PHENOTYPE: ABNORMAL
ALPHA-1 ANTITRYPSIN: 203 MG/DL (ref 90–200)

## 2022-10-03 RX ORDER — BUTALBITAL, ACETAMINOPHEN AND CAFFEINE 50; 325; 40 MG/1; MG/1; MG/1
TABLET ORAL
Qty: 30 TABLET | Refills: 1 | Status: SHIPPED | OUTPATIENT
Start: 2022-10-03

## 2022-10-13 ENCOUNTER — HOSPITAL ENCOUNTER (OUTPATIENT)
Dept: INFUSION THERAPY | Age: 37
Discharge: HOME | End: 2022-10-13
Payer: MEDICARE

## 2022-10-13 ENCOUNTER — OFFICE VISIT (OUTPATIENT)
Dept: ONCOLOGY | Age: 37
End: 2022-10-13
Payer: MEDICARE

## 2022-10-13 VITALS
WEIGHT: 119.4 LBS | TEMPERATURE: 98.1 F | OXYGEN SATURATION: 100 % | DIASTOLIC BLOOD PRESSURE: 56 MMHG | HEIGHT: 60 IN | SYSTOLIC BLOOD PRESSURE: 111 MMHG | HEART RATE: 81 BPM | BODY MASS INDEX: 23.44 KG/M2

## 2022-10-13 DIAGNOSIS — C7A.8 NEUROENDOCRINE CARCINOMA METASTATIC TO LIVER (HCC): Primary | ICD-10-CM

## 2022-10-13 DIAGNOSIS — G89.3 NEOPLASM RELATED PAIN: ICD-10-CM

## 2022-10-13 DIAGNOSIS — C7B.8 METASTATIC MALIGNANT NEUROENDOCRINE TUMOR TO LIVER (HCC): Primary | ICD-10-CM

## 2022-10-13 DIAGNOSIS — C7B.8 NEUROENDOCRINE CARCINOMA METASTATIC TO LIVER (HCC): Primary | ICD-10-CM

## 2022-10-13 LAB
ALBUMIN SERPL-MCNC: 4 G/DL (ref 3.5–5.2)
ALP BLD-CCNC: 107 U/L (ref 35–104)
ALT SERPL-CCNC: 36 U/L (ref 0–32)
ANION GAP SERPL CALCULATED.3IONS-SCNC: 12 MMOL/L (ref 7–16)
ANISOCYTOSIS: ABNORMAL
AST SERPL-CCNC: 22 U/L (ref 0–31)
BASOPHILS ABSOLUTE: 0.08 E9/L (ref 0–0.2)
BASOPHILS RELATIVE PERCENT: 0.9 % (ref 0–2)
BILIRUB SERPL-MCNC: 1 MG/DL (ref 0–1.2)
BUN BLDV-MCNC: 6 MG/DL (ref 6–20)
CALCIUM SERPL-MCNC: 8.4 MG/DL (ref 8.6–10.2)
CHLORIDE BLD-SCNC: 107 MMOL/L (ref 98–107)
CO2: 22 MMOL/L (ref 22–29)
CREAT SERPL-MCNC: 0.6 MG/DL (ref 0.5–1)
EOSINOPHILS ABSOLUTE: 0 E9/L (ref 0.05–0.5)
EOSINOPHILS RELATIVE PERCENT: 0.8 % (ref 0–6)
GFR AFRICAN AMERICAN: >60
GFR NON-AFRICAN AMERICAN: >60 ML/MIN/1.73
GLUCOSE BLD-MCNC: 151 MG/DL (ref 74–99)
HCT VFR BLD CALC: 35.3 % (ref 34–48)
HEMOGLOBIN: 11.1 G/DL (ref 11.5–15.5)
LYMPHOCYTES ABSOLUTE: 1.66 E9/L (ref 1.5–4)
LYMPHOCYTES RELATIVE PERCENT: 18.3 % (ref 20–42)
MCH RBC QN AUTO: 28.9 PG (ref 26–35)
MCHC RBC AUTO-ENTMCNC: 31.4 % (ref 32–34.5)
MCV RBC AUTO: 91.9 FL (ref 80–99.9)
MONOCYTES ABSOLUTE: 0.46 E9/L (ref 0.1–0.95)
MONOCYTES RELATIVE PERCENT: 5.2 % (ref 2–12)
NEUTROPHILS ABSOLUTE: 6.99 E9/L (ref 1.8–7.3)
NEUTROPHILS RELATIVE PERCENT: 75.6 % (ref 43–80)
PDW BLD-RTO: 20.9 FL (ref 11.5–15)
PLATELET # BLD: 294 E9/L (ref 130–450)
PMV BLD AUTO: 10 FL (ref 7–12)
POIKILOCYTES: ABNORMAL
POTASSIUM SERPL-SCNC: 3.6 MMOL/L (ref 3.5–5)
RBC # BLD: 3.84 E12/L (ref 3.5–5.5)
SODIUM BLD-SCNC: 141 MMOL/L (ref 132–146)
TARGET CELLS: ABNORMAL
TOTAL PROTEIN: 6.6 G/DL (ref 6.4–8.3)
WBC # BLD: 9.2 E9/L (ref 4.5–11.5)

## 2022-10-13 PROCEDURE — 99214 OFFICE O/P EST MOD 30 MIN: CPT

## 2022-10-13 PROCEDURE — G8420 CALC BMI NORM PARAMETERS: HCPCS | Performed by: INTERNAL MEDICINE

## 2022-10-13 PROCEDURE — G8484 FLU IMMUNIZE NO ADMIN: HCPCS | Performed by: INTERNAL MEDICINE

## 2022-10-13 PROCEDURE — 99215 OFFICE O/P EST HI 40 MIN: CPT | Performed by: INTERNAL MEDICINE

## 2022-10-13 PROCEDURE — 85025 COMPLETE CBC W/AUTO DIFF WBC: CPT

## 2022-10-13 PROCEDURE — 6360000002 HC RX W HCPCS: Performed by: NURSE PRACTITIONER

## 2022-10-13 PROCEDURE — 1111F DSCHRG MED/CURRENT MED MERGE: CPT | Performed by: INTERNAL MEDICINE

## 2022-10-13 PROCEDURE — 36591 DRAW BLOOD OFF VENOUS DEVICE: CPT

## 2022-10-13 PROCEDURE — 4004F PT TOBACCO SCREEN RCVD TLK: CPT | Performed by: INTERNAL MEDICINE

## 2022-10-13 PROCEDURE — 2580000003 HC RX 258: Performed by: NURSE PRACTITIONER

## 2022-10-13 PROCEDURE — 86316 IMMUNOASSAY TUMOR OTHER: CPT

## 2022-10-13 PROCEDURE — 80053 COMPREHEN METABOLIC PANEL: CPT

## 2022-10-13 PROCEDURE — G8427 DOCREV CUR MEDS BY ELIG CLIN: HCPCS | Performed by: INTERNAL MEDICINE

## 2022-10-13 RX ORDER — HEPARIN SODIUM (PORCINE) LOCK FLUSH IV SOLN 100 UNIT/ML 100 UNIT/ML
500 SOLUTION INTRAVENOUS PRN
Status: DISCONTINUED | OUTPATIENT
Start: 2022-10-13 | End: 2022-10-14 | Stop reason: HOSPADM

## 2022-10-13 RX ORDER — SODIUM CHLORIDE 0.9 % (FLUSH) 0.9 %
5-40 SYRINGE (ML) INJECTION PRN
OUTPATIENT
Start: 2022-10-13

## 2022-10-13 RX ORDER — SODIUM CHLORIDE 9 MG/ML
25 INJECTION, SOLUTION INTRAVENOUS PRN
OUTPATIENT
Start: 2022-10-13

## 2022-10-13 RX ORDER — FLUOXETINE HYDROCHLORIDE 20 MG/1
CAPSULE ORAL
COMMUNITY
Start: 2022-09-12

## 2022-10-13 RX ORDER — HEPARIN SODIUM (PORCINE) LOCK FLUSH IV SOLN 100 UNIT/ML 100 UNIT/ML
500 SOLUTION INTRAVENOUS PRN
OUTPATIENT
Start: 2022-10-13

## 2022-10-13 RX ORDER — SODIUM CHLORIDE 0.9 % (FLUSH) 0.9 %
5-40 SYRINGE (ML) INJECTION PRN
Status: DISCONTINUED | OUTPATIENT
Start: 2022-10-13 | End: 2022-10-14 | Stop reason: HOSPADM

## 2022-10-13 RX ADMIN — SODIUM CHLORIDE, PRESERVATIVE FREE 10 ML: 5 INJECTION INTRAVENOUS at 14:32

## 2022-10-13 RX ADMIN — SODIUM CHLORIDE, PRESERVATIVE FREE 10 ML: 5 INJECTION INTRAVENOUS at 13:17

## 2022-10-13 RX ADMIN — HEPARIN 500 UNITS: 100 SYRINGE at 13:17

## 2022-10-13 RX ADMIN — SODIUM CHLORIDE, PRESERVATIVE FREE 10 ML: 5 INJECTION INTRAVENOUS at 13:15

## 2022-10-13 NOTE — PROGRESS NOTES
Medical Oncology Outpatient Progress Note    Reason for visit: Neuroendocrine cancer to liver     PCP:  Awilda Orozco DO     History of Present Illness:  41 y/o female with hx of hypothyroidism, IBS,migraine who was complaining of abdominal pain associated with diarrhea and flushing/sweating. CT abdomen/pelvis 08/28/2020:  2 cm masslike density in the mid abdominal small bowel mesentery with a spiculated appearance. Multiple liver masses suspicious for metastatic disease. 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 Comment: Sections of the liver tissue cores show the hepatic parenchyma to be partially replaced by a proliferation of epithelioid cells with relatively uniform round nuclei and eosinophilic granular cytoplasm. The   epithelioid cells form anastomosing solid cords/nests that are surrounded by delicate fibrovascular stroma. There are no mitotic figures or tumor cell necrosis present. The histologic changes seen are suggestive of well-differentiated neuroendocrine (carcinoid) tumor. Immunostaining for pankeratin, chromogranin, synaptophysin, TTF-1 and Ki-67 was performed on sections of one tissue block (A1) and the neoplastic epithelioid cells show diffuse and strong positivity for neuroendocrine markers (chromogranin and synaptophysin) and moderate positivity for pankeratin. There is no staining reactivity for TTF-1. The Ki-67 proliferation labeling index is essentially negative, (very low, <1%). This staining pattern confirms the histologic impression of a well-differentiated neuroendocrine (carcinoid) tumor. FL Small bowel 09/02/2020:  Rapid small bowel transit. Serum Chromogranin A 160 on 09/23/2020.   Urine 5-HIAA 21 (0-14)  2d-Echo 10/10/2020: EF 60-65%   Normal right ventricular size and function     Dotatate PET/CT scan 10/14/2020 increased tracer uptake seen throughout the liver compatible with neoplasm. This involves both the left and the right lobe of liver. In addition there are 2 foci of increased tracer uptake along the mesentery of the small bowel. This may involve the wall the small bowel. No other convincing evidence for extra-abdominal metastatic disease. EGD/Colonoscopy 10/05/2020 by Dr. Tianna Rodriguez; records reviewed. Hepatobiliary team (Dr. Lucila Silva) consult appreciated. Small bowel resection on 10/21/2020. Small bowel resection on 10/21/2020. A. Ileum, resection:   Multifocal (4 foci) neuroendocrine tumor (NET). Extensive perineural and angiolymphatic invasion. 3 of 10 lymph nodes with metastatic neuroendocrine tumor and extracapsular extension of tumor cells (3/10). Bilateral viable small bowel resection margins with no evidence of tumor. B. Specimen received as \"Meckel's\":   Nodular scar tissue with patchy chronic inflammation. Negative for neuroendocrine tumor. Intestinal mucosal tissue is not present. CASE SUMMARY:   Procedure: Small bowel resection   Tumor site: Ileum   Tumor size: Greatest dimension of 1.5 cm   Tumor focality: Multifocal: 4 separate tumors   Histologic type and grade: G2: Well-differentiated neuroendocrine tumor   Mitotic rate: between 2-20 mitoses/2 mm2   Ki-67 labeling index: between 3-20%   Tumor extension: Tumor invades through the muscularis propria into subserosal tissue without penetration of overlying serosa   Margins: All margins are uninvolved by tumor    Margins examined: Proximal and distal   Lymphovascular invasion: Present   Perineural invasion: Present   Large mesenteric masses (greater than 2 cm): Present (one 2.5 cm mass)   Regional lymph nodes:    Number of lymph nodes involved: 3    Number of lymph nodes examined: 10   Pathologic stage classification (pTNM, AJCC eighth edition):    pT3    pN2    pM1a -confirmed liver metastasis, HBS-     Comment:   A.  Immunostains stain the tumor cells as follows in block A6:   Synaptophysin and chromogranin: Positive   Ki-67: Positive in 5% of tumor cells      We recommended Lanreotide once monthly for metastatic well differentiated neuroendocrine cancer to liver. Dose # 1 Lanreotide was on 11/05/2020. Dose # 2 Lanreotide was on 12/03/2020. Dose # 3 Lanreotide was on 01/07/2021. Serum Chromogranin A 95 on 01/07/2021. CT chest 02/07/2021 negative for metastatic disease. CT abdomen/pelvis 02/07/2021 Persistent bilobar hepatic lesions which are grossly stable consistent with stable widespread hepatic metastasis. Imaging reviewed. Continue Lanreotide and repeat scans in 3 months. Dose # 4 Lanreotide was on 02/11/2021. Ga 76 Dotatate PET 03/09/2021 Gallium 68 dotatate avid uptake throughout multiple regions of the liver compatible with multiple foci of neuroendocrine tumor in both the right and the left lobe of the liver, when compared to previous not significantly changed in the interval.  Dose # 5 Lanreotide was on 03/11/2021. Dose # 6 Lanreotide was on 04/08/2021. Serum chromogranin A 115 (0-103)  Dose # 7 Lanreotide was on 05/06/2021. Serum chromogranin A 114 (0-103)  Dose # 8 Lanreotide was on 06/03/2021. Serum chromogranin A  84  (0-103)     Ga 76 Dotatate PET 06/29/2021 Numerous foci of increased Gallium 68 dotatate avid uptake throughout both lobes of the liver, not significantly changed from previous. No significant progression of disease. No definite metastatic disease identified  Dose # 9 Lanreotide was on 07/01/2021. Serum Chromogranin A 97 (0-103)  Dose # 10 Lanreotide was on 07/29/2021. Serum Chromogranin A 89 (0-103)  Dose # 11 Lanreotide was on 08/30/2021. Serum Chromogranin A 120 (0-103)  Dose # 12 Lanreotide was on 09/30/2021. Serum Chromogranin A 141 (0-103)  PET/CT scan 11/09/2021 There is significant gallium avid tracer uptake in the liver, numerous lesions are seen, tracer uptake overall is diminished.    There is no convincing extrahepatic disease identified. Dose # 13 Lanreotide was on 11/11/2021. Serum chromogranin A 105 on 11/11/2021. Dose # 14 Lanreotide was on 12/30/2021. Serum chromogranin A 184 on 12/30/2021. Dose # 15 Lanreotide was on 01/27/2022. Serum chromogranin A  98 on 01/27/2022. CT head 01/28/2022 noted no acute abnormality. CT cervical spine 01/28/2022 noted no acute abnormality of cervical spine  PET/CT Gallium 68 02/22/2022 noted Multiple regions of tracer uptake seen within the liver consistent with neuroendocrine tumor. No other evidence to suggest metastatic disease. Reviewed with Dr. Akhil Gallardo from Radiology team; overall stable disease. Continue Lanreotide and repeat scans in 3 months. Dose # 16 Lanreotide was on 02/24/2022. Serum chromogranin A 116 on 02/24/2022  Dose # 17 Lanreotide was on 03/24/2022. Serum Chromogranin A 173 on 03/24/2022. Dose # 18 Lanreotide was on 04/21/2022. Serum Chromogranin A 140 on 04/21/2022. Dose # 19 Lanreotide was on 05/19/2022. Serum Chromogranin A 114 on 05/19/2022. Dose # 20 Lanreotide was on 06/23/2022. PET/CT scan 07/19/2022: Gallium 76 dotatate avid uptake is present within multiple masses within the liver, allowing for slight technical differences, not significantly changed in the interval. No convincing extrahepatic metastatic disease identified. Imaging reviewed. Continue Lanreotide and repeat scans in 3-4 months  Dose # 21 Lanreotide was on 07/21/2022. Serum Chromogranin A 112 on 07/21/2022  Dose # 22 Lanreotide was on 08/18/2022. Dose # 23 Lanreotide was on 09/15/2022. Serum Chromogranin A 75 on 09/15/2022     Admitted with acute liver failure, possibly decompensation in the setting of a urinary tract infection.     CT abdomen/pelvis was negative for bowel obstruction, liver metastasis, interval changes difficult to gauge due to the absence of the contrast-enhancement, innumerable subcutaneous nodules in the buttocks, likely secondary to the lanreotide injections. Ultrasound of the abdomen had revealed heterogeneous liver with multiple hypoechoic lesions, consistent with persistent diffuse hepatic metastasis. Liver U/S noted heterogeneous liver with multiple hypoechoic lesions consistent with the persistent diffuse hepatic metastasis. HBP and GI teams on board. No obstructive process. Aggressive lactulose titrated to mental status    Today 10/13/2022. No fever, chills. Fair appetite and energy level. No nausea vomiting. Bowels moving regularly    Review of Systems;  CONSTITUTIONAL: No fever chills. Fair appetite and energy level. ENMT: Eyes: No diplopia; Nose: No epistaxis. Mouth: No sore throat. RESPIRATORY: No hemoptysis SOB  CARDIOVASCULAR: No chest pain, palpitations. GASTROINTESTINAL: No nausea vomiting. Bowels moving regularly  GENITOURINARY: No hematuria frequency  NEURO: No syncope, presyncope, headache. Remainder:ROS NEGATIVE    Medications:  Reviewed and reconciled. Allergies:  No Known Allergies     Physical Examination:  BP (!) 111/56   Pulse 81   Temp 98.1 °F (36.7 °C)   Ht 5' (1.524 m)   Wt 119 lb 6.4 oz (54.2 kg)   SpO2 100%   BMI 23.32 kg/m²   GENERAL: Awake and alert, not in distress  HEENT: PERRLA; EOMI. Oropharynx clear. NECK: Supple. No palpable lymphadenopathy. LUNGS: Good air entry bilaterally. No wheezing, crackles or rhonchi. CARDIOVASCULAR: Regular rate. No murmurs, rubs or gallops. ABDOMEN: Soft. NT ND   EXTREMITIES: Without clubbing, cyanosis, or edema. NEUROLOGIC: No focal deficits.    ECOG PS 1    Diagnostics:  Lab Results   Component Value Date    WBC 9.2 10/13/2022    HGB 11.1 (L) 10/13/2022    HCT 35.3 10/13/2022    MCV 91.9 10/13/2022     10/13/2022     Lab Results   Component Value Date     10/13/2022    K 3.6 10/13/2022     10/13/2022    CO2 22 10/13/2022    BUN 6 10/13/2022    CREATININE 0.6 10/13/2022    GLUCOSE 151 (H) 10/13/2022    CALCIUM 8.4 (L) 10/13/2022 PROT 6.6 10/13/2022    LABALBU 4.0 10/13/2022    BILITOT 1.0 10/13/2022    ALKPHOS 107 (H) 10/13/2022    AST 22 10/13/2022    ALT 36 (H) 10/13/2022    LABGLOM >60 10/13/2022    GFRAA >60 10/13/2022     Impression/Plan:  41 y/o female with hx of hypothyroidism, IBS,migraine who was complaining of abdominal pain associated with diarrhea and flushing/sweating. CT abdomen/pelvis 08/28/2020:  2 cm masslike density in the mid abdominal small bowel mesentery with a spiculated appearance. Multiple liver masses suspicious for metastatic disease. 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 Comment: Sections of the liver tissue cores show the hepatic parenchyma to be partially replaced by a proliferation of epithelioid cells with relatively uniform round nuclei and eosinophilic granular cytoplasm. The   epithelioid cells form anastomosing solid cords/nests that are surrounded by delicate fibrovascular stroma. There are no mitotic figures or tumor cell necrosis present. The histologic changes seen are suggestive of well-differentiated neuroendocrine (carcinoid) tumor. Immunostaining for pankeratin, chromogranin, synaptophysin, TTF-1 and Ki-67 was performed on sections of one tissue block (A1) and the neoplastic epithelioid cells show diffuse and strong positivity for neuroendocrine markers (chromogranin and synaptophysin) and moderate positivity for pankeratin. There is no staining reactivity for TTF-1. The Ki-67 proliferation labeling index is essentially negative, (very low, <1%). This staining pattern confirms the histologic impression of a well-differentiated neuroendocrine (carcinoid) tumor. FL Small bowel 09/02/2020:  Rapid small bowel transit. Serum Chromogranin A 160 on 09/23/2020.   Urine 5-HIAA 21 (0-14)  2d-Echo 10/10/2020: EF 60-65%   Normal right ventricular size and function Dotatate PET/CT scan 10/14/2020 increased tracer uptake seen throughout the liver compatible with neoplasm. This involves both the left and the right lobe of liver. In addition there are 2 foci of increased tracer uptake along the mesentery of the small bowel. This may involve the wall the small bowel. No other convincing evidence for extra-abdominal metastatic disease. EGD/Colonoscopy 10/05/2020 by Dr. Vernadine Angelucci; records reviewed. Hepatobiliary team (Dr. Maryjo Archer) consult appreciated. Small bowel resection on 10/21/2020. Small bowel resection on 10/21/2020. A. Ileum, resection:   Multifocal (4 foci) neuroendocrine tumor (NET). Extensive perineural and angiolymphatic invasion. 3 of 10 lymph nodes with metastatic neuroendocrine tumor and extracapsular extension of tumor cells (3/10). Bilateral viable small bowel resection margins with no evidence of tumor. B. Specimen received as \"Meckel's\":   Nodular scar tissue with patchy chronic inflammation. Negative for neuroendocrine tumor. Intestinal mucosal tissue is not present. CASE SUMMARY:   Procedure: Small bowel resection   Tumor site: Ileum   Tumor size: Greatest dimension of 1.5 cm   Tumor focality: Multifocal: 4 separate tumors   Histologic type and grade: G2: Well-differentiated neuroendocrine tumor   Mitotic rate: between 2-20 mitoses/2 mm2   Ki-67 labeling index: between 3-20%   Tumor extension: Tumor invades through the muscularis propria into subserosal tissue without penetration of overlying serosa   Margins:  All margins are uninvolved by tumor    Margins examined: Proximal and distal   Lymphovascular invasion: Present   Perineural invasion: Present   Large mesenteric masses (greater than 2 cm): Present (one 2.5 cm mass)   Regional lymph nodes:    Number of lymph nodes involved: 3    Number of lymph nodes examined: 10   Pathologic stage classification (pTNM, AJCC eighth edition):    pT3    pN2    pM1a -confirmed liver metastasis, QYX-     Comment:   A. Immunostains stain the tumor cells as follows in block A6:   Synaptophysin and chromogranin: Positive   Ki-67: Positive in 5% of tumor cells      We recommended Lanreotide once monthly for metastatic well differentiated neuroendocrine cancer to liver. Dose # 1 Lanreotide was on 11/05/2020. Dose # 2 Lanreotide was on 12/03/2020. Dose # 3 Lanreotide was on 01/07/2021. Serum Chromogranin A 95 on 01/07/2021. CT chest 02/07/2021 negative for metastatic disease. CT abdomen/pelvis 02/07/2021 Persistent bilobar hepatic lesions which are grossly stable consistent with stable widespread hepatic metastasis. Imaging reviewed. Continue Lanreotide and repeat scans in 3 months. Dose # 4 Lanreotide was on 02/11/2021. Ga 76 Dotatate PET 03/09/2021 Gallium 68 dotatate avid uptake throughout multiple regions of the liver compatible with multiple foci of neuroendocrine tumor in both the right and the left lobe of the liver, when compared to previous not significantly changed in the interval.  Dose # 5 Lanreotide was on 03/11/2021. Dose # 6 Lanreotide was on 04/08/2021. Serum chromogranin A 115 (0-103)  Dose # 7 Lanreotide was on 05/06/2021. Serum chromogranin A 114 (0-103)  Dose # 8 Lanreotide was on 06/03/2021. Serum chromogranin A  84  (0-103)     Ga 76 Dotatate PET 06/29/2021 Numerous foci of increased Gallium 68 dotatate avid uptake throughout both lobes of the liver, not significantly changed from previous. No significant progression of disease. No definite metastatic disease identified  Dose # 9 Lanreotide was on 07/01/2021. Serum Chromogranin A 97 (0-103)  Dose # 10 Lanreotide was on 07/29/2021. Serum Chromogranin A 89 (0-103)  Dose # 11 Lanreotide was on 08/30/2021. Serum Chromogranin A 120 (0-103)  Dose # 12 Lanreotide was on 09/30/2021.  Serum Chromogranin A 141 (0-103)  PET/CT scan 11/09/2021 There is significant gallium avid tracer uptake in the liver, numerous lesions are seen, tracer uptake overall is diminished. There is no convincing extrahepatic disease identified. Dose # 13 Lanreotide was on 11/11/2021. Serum chromogranin A 105 on 11/11/2021. Dose # 14 Lanreotide was on 12/30/2021. Serum chromogranin A 184 on 12/30/2021. Dose # 15 Lanreotide was on 01/27/2022. Serum chromogranin A  98 on 01/27/2022. CT head 01/28/2022 noted no acute abnormality. CT cervical spine 01/28/2022 noted no acute abnormality of cervical spine  PET/CT Gallium 68 02/22/2022 noted Multiple regions of tracer uptake seen within the liver consistent with neuroendocrine tumor. No other evidence to suggest metastatic disease. Reviewed with Dr. Donna Rascon from Radiology team; overall stable disease. Continue Lanreotide and repeat scans in 3 months. Dose # 16 Lanreotide was on 02/24/2022. Serum chromogranin A 116 on 02/24/2022  Dose # 17 Lanreotide was on 03/24/2022. Serum Chromogranin A 173 on 03/24/2022. Dose # 18 Lanreotide was on 04/21/2022. Serum Chromogranin A 140 on 04/21/2022. Dose # 19 Lanreotide was on 05/19/2022. Serum Chromogranin A 114 on 05/19/2022. Dose # 20 Lanreotide was on 06/23/2022. PET/CT scan 07/19/2022: Gallium 76 dotatate avid uptake is present within multiple masses within the liver, allowing for slight technical differences, not significantly changed in the interval. No convincing extrahepatic metastatic disease identified. Imaging reviewed. Continue Lanreotide and repeat scans in 3-4 months  Dose # 21 Lanreotide was on 07/21/2022. Serum Chromogranin A 112 on 07/21/2022  Dose # 22 Lanreotide was on 08/18/2022. Dose # 23 Lanreotide was on 09/15/2022. Serum Chromogranin A 75 on 09/15/2022     Admitted with acute liver failure, possibly decompensation in the setting of a urinary tract infection.     CT abdomen/pelvis was negative for bowel obstruction, liver metastasis, interval changes difficult to gauge due to the absence of the contrast-enhancement, innumerable subcutaneous nodules in the buttocks, likely secondary to the lanreotide injections. Ultrasound of the abdomen had revealed heterogeneous liver with multiple hypoechoic lesions, consistent with persistent diffuse hepatic metastasis. Liver U/S noted heterogeneous liver with multiple hypoechoic lesions consistent with the persistent diffuse hepatic metastasis. HBP and GI teams on board. No obstructive process. Aggressive lactulose titrated to mental status    Recovered well post hospital discharge. Serum chromogranin A pending. PET Ga 76 ordered  Hospital course reviewed. Imaging reviewed. Labs reviewed. Discussed with HBP team (Dr. Calvin Moody). Recommended Lutathera 7.4 GBq (200 mCi) every 8 weeks for a total of 4 doses ([place] 2017). Authorization to be obtained.      10/13/2022  Padmaja Lagos MD

## 2022-10-14 ENCOUNTER — TELEPHONE (OUTPATIENT)
Dept: INFUSION THERAPY | Age: 37
End: 2022-10-14

## 2022-10-14 NOTE — TELEPHONE ENCOUNTER
Notified Cristel Rosa MA and Eve Munoz/Asia Flower in [facility] of new referral for Lutathera ordered by Dr. Pepper Lundborg. Patient has Medicare A/B and Medicaid secondary. Patient is approved for Oncology supportive drugs. Awaiting follow up from [facility] on [facility]/coordination scheduling/teach for Lutathera.

## 2022-10-17 DIAGNOSIS — G89.3 NEOPLASM RELATED PAIN: ICD-10-CM

## 2022-10-17 LAB — CHROMOGRANIN A: 107 NG/ML (ref 0–103)

## 2022-10-18 ENCOUNTER — OFFICE VISIT (OUTPATIENT)
Dept: PALLATIVE CARE | Age: 37
End: 2022-10-18
Payer: MEDICARE

## 2022-10-18 VITALS
SYSTOLIC BLOOD PRESSURE: 111 MMHG | HEART RATE: 82 BPM | DIASTOLIC BLOOD PRESSURE: 57 MMHG | BODY MASS INDEX: 23.63 KG/M2 | WEIGHT: 121 LBS | OXYGEN SATURATION: 97 %

## 2022-10-18 DIAGNOSIS — G89.3 NEOPLASM RELATED PAIN: ICD-10-CM

## 2022-10-18 PROCEDURE — 4004F PT TOBACCO SCREEN RCVD TLK: CPT | Performed by: NURSE PRACTITIONER

## 2022-10-18 PROCEDURE — G8420 CALC BMI NORM PARAMETERS: HCPCS | Performed by: NURSE PRACTITIONER

## 2022-10-18 PROCEDURE — 99211 OFF/OP EST MAY X REQ PHY/QHP: CPT | Performed by: NURSE PRACTITIONER

## 2022-10-18 PROCEDURE — 1111F DSCHRG MED/CURRENT MED MERGE: CPT | Performed by: NURSE PRACTITIONER

## 2022-10-18 PROCEDURE — G8427 DOCREV CUR MEDS BY ELIG CLIN: HCPCS | Performed by: NURSE PRACTITIONER

## 2022-10-18 PROCEDURE — G8484 FLU IMMUNIZE NO ADMIN: HCPCS | Performed by: NURSE PRACTITIONER

## 2022-10-18 PROCEDURE — 99213 OFFICE O/P EST LOW 20 MIN: CPT | Performed by: NURSE PRACTITIONER

## 2022-10-18 RX ORDER — TRAMADOL HYDROCHLORIDE 50 MG/1
50 TABLET ORAL EVERY 8 HOURS PRN
Qty: 90 TABLET | Refills: 2 | Status: SHIPPED | OUTPATIENT
Start: 2022-10-18 | End: 2023-01-16

## 2022-10-18 NOTE — PROGRESS NOTES
.  Department of Palliative Medicine  Ambulatory Note  Provider: TRINA Ramey - CNP     Chief Complaint: Areli Childress is a 40 y.o. female with chief complaint of pain    HPI:  Areli Childress is a 29 y.o. female with significant medical history of hypothyroidism, IBS, migraine who was complaining of abdominal pain associated with diarrhea and flushing/sweating. She was diagnosed with metastatic well differentiated Neuroendocrine cancer to liver who was referred to [address] [place] [address] by Dr. Jak Collier. Assessment/Plan      Neuroendocrine cancer  - Follows with Dr. Osman Siddiqi  - s.p Bowel resection on 10/21/20  - On Lanreotide   -Jesús Evans recommended    Neoplasm related pain  - Gabapentin 300mg TID  - Tramadol 50 mg q 6 hrs prn for the pain      Nausea  - Continue Zofran and Phenergan PRN    Diarrhea:   -Currently on Lanreotide and Xermelo  -Imodium she uses this daily  -Eat 1-3 jumbo marshmallows daily PRN  -Lactulose needed due to hyperammonemia     - Goals of care: cure, live longer and improve or maintain function/quality of life    - Code Status: full code    Follow Up:  4 weeks. Encouraged to call with any questions, concerns, needs, or changes in symptoms. Subjective:   Darryl Potter presents today with her father. She had a hospitalization recently with jaundice and abdominal pain, treated for a UTI. She still has pain in her abdomen, with her pain medication changed to hydromorphone due to her acute liver decomposition, but she feels tramadol is more helpful for her. Her liver function has improved and she will continue with tramadol at this time. She has no other new complaints and at this time reports she is doing well.     Pain Assessment   Ratin  Description: burning, sharp and shooting  Duration: minutes  Frequency: daily  Location: Abdomen   Alleviating Factors: relaxation  Exacerbating Factors: unable to associate with any factor  Effect: change in function and sleep    Objective:     Physical Exam  Wt Readings from Last 3 Encounters:   10/18/22 121 lb (54.9 kg)   10/13/22 119 lb 6.4 oz (54.2 kg)   09/26/22 116 lb (52.6 kg)     BP (!) 111/57   Pulse 82   Wt 121 lb (54.9 kg)   SpO2 97%   BMI 23.63 kg/m²     Gen:  Alert, appears stated age, well nourished, in no acute distress  HEENT:  Normocephalic, conjunctiva pink, no drainage, mucosa moist  Neck:  Supple  Lungs:  CTA bilaterally, no audible rhonchi or wheezes noted  Heart[de-identified]  RRR, no murmur, rub, or gallop noted during exam  Abd:  Soft, non tender, non distended, BS+  M/S/Ext:  Moving all extremities, no edema, pulses present  Skin:  Warm and dry  Neuro:  PERRL, Alert, oriented x 3; following commands    Van Lear Symptom Assessment Score   [place] Lear Score 10/18/2022 8/23/2022 6/28/2022 5/31/2022 4/4/2022   Pain Score 7 7 6 4 5   Tiredness Score 7 6 3 4 3   Nausea Score Not nauseated Not nauseated Not nauseated 1 3   Depression Score Not depressed 2 2 Not depressed 1   Anxiety Score 2 2 2 2 2   Drowsiness Score 3 4 2 2 2   Appetite Score 4 5 5 2 3   Wellbeing Score 4 Best feeling of wellbeing 5 3 1   Dyspnea Score No shortness of breath No shortness of breath No shortness of breath 7 1   Other Problem Score Best possible response Best possible response Best possible response Best possible response Best possible response   Total Assessment Score(calculated) 27 26 25 25 21     Assessed by: patient. Current Medications:  Medications reviewed: yes    Controlled Substances Monitoring: [place] reviewed 10/18/22. RX Monitoring 10/18/2022   Attestation -   Periodic Controlled Substance Monitoring No signs of potential drug abuse or diversion identified. ;Possible medication side effects, risk of tolerance/dependence & alternative treatments discussed. ;Assessed functional status.      TRINA Martins - CNP   Palliative Care Department       Time/Communication  Greater than 50% of time spent, total 25 minutes in face-to-face counseling and coordination of care regarding symptom management. Note: This report was completed using [employer] voiced recognition software. Every effort has been made to ensure accuracy; however, inadvertent computerized transcription errors may be present.

## 2022-10-19 RX ORDER — TRAMADOL HYDROCHLORIDE 50 MG/1
TABLET ORAL
Qty: 120 TABLET | OUTPATIENT
Start: 2022-10-19

## 2022-10-24 ENCOUNTER — HOSPITAL ENCOUNTER (EMERGENCY)
Age: 37
Discharge: HOME | End: 2022-10-25
Attending: EMERGENCY MEDICINE
Payer: MEDICARE

## 2022-10-24 ENCOUNTER — APPOINTMENT (OUTPATIENT)
Dept: ULTRASOUND IMAGING | Age: 37
End: 2022-10-24
Payer: MEDICARE

## 2022-10-24 DIAGNOSIS — R07.9 CHEST PAIN, UNSPECIFIED TYPE: Primary | ICD-10-CM

## 2022-10-24 DIAGNOSIS — R10.84 GENERALIZED ABDOMINAL PAIN: ICD-10-CM

## 2022-10-24 DIAGNOSIS — G40.919 BREAKTHROUGH SEIZURE (HCC): ICD-10-CM

## 2022-10-24 PROCEDURE — 93970 EXTREMITY STUDY: CPT

## 2022-10-24 PROCEDURE — 83605 ASSAY OF LACTIC ACID: CPT

## 2022-10-24 PROCEDURE — 99285 EMERGENCY DEPT VISIT HI MDM: CPT

## 2022-10-24 PROCEDURE — 83735 ASSAY OF MAGNESIUM: CPT

## 2022-10-24 PROCEDURE — 80053 COMPREHEN METABOLIC PANEL: CPT

## 2022-10-24 PROCEDURE — 2580000003 HC RX 258: Performed by: EMERGENCY MEDICINE

## 2022-10-24 PROCEDURE — 6360000002 HC RX W HCPCS: Performed by: STUDENT IN AN ORGANIZED HEALTH CARE EDUCATION/TRAINING PROGRAM

## 2022-10-24 PROCEDURE — 84484 ASSAY OF TROPONIN QUANT: CPT

## 2022-10-24 PROCEDURE — 96374 THER/PROPH/DIAG INJ IV PUSH: CPT

## 2022-10-24 PROCEDURE — 83690 ASSAY OF LIPASE: CPT

## 2022-10-24 PROCEDURE — 85025 COMPLETE CBC W/AUTO DIFF WBC: CPT

## 2022-10-24 PROCEDURE — 36415 COLL VENOUS BLD VENIPUNCTURE: CPT

## 2022-10-24 PROCEDURE — 96375 TX/PRO/DX INJ NEW DRUG ADDON: CPT

## 2022-10-24 PROCEDURE — 93005 ELECTROCARDIOGRAM TRACING: CPT | Performed by: EMERGENCY MEDICINE

## 2022-10-24 PROCEDURE — 96365 THER/PROPH/DIAG IV INF INIT: CPT

## 2022-10-24 RX ORDER — MECLIZINE HYDROCHLORIDE 25 MG/1
25 TABLET ORAL 3 TIMES DAILY PRN
Qty: 15 TABLET | Refills: 0 | Status: SHIPPED | OUTPATIENT
Start: 2022-10-24 | End: 2022-10-24 | Stop reason: CLARIF

## 2022-10-24 RX ORDER — ONDANSETRON 2 MG/ML
4 INJECTION INTRAMUSCULAR; INTRAVENOUS ONCE
Status: DISCONTINUED | OUTPATIENT
Start: 2022-10-24 | End: 2022-10-24

## 2022-10-24 RX ORDER — ZOLPIDEM TARTRATE 10 MG/1
TABLET ORAL
Qty: 30 TABLET | OUTPATIENT
Start: 2022-10-24

## 2022-10-24 RX ORDER — SODIUM CHLORIDE 9 MG/ML
INJECTION, SOLUTION INTRAVENOUS CONTINUOUS
Status: DISCONTINUED | OUTPATIENT
Start: 2022-10-24 | End: 2022-10-25 | Stop reason: HOSPADM

## 2022-10-24 RX ORDER — FENTANYL CITRATE 50 UG/ML
50 INJECTION, SOLUTION INTRAMUSCULAR; INTRAVENOUS ONCE
Status: COMPLETED | OUTPATIENT
Start: 2022-10-24 | End: 2022-10-24

## 2022-10-24 RX ORDER — MECLIZINE HCL 12.5 MG/1
25 TABLET ORAL ONCE
Status: DISCONTINUED | OUTPATIENT
Start: 2022-10-24 | End: 2022-10-24

## 2022-10-24 RX ADMIN — SODIUM CHLORIDE: 9 INJECTION, SOLUTION INTRAVENOUS at 23:56

## 2022-10-24 RX ADMIN — FENTANYL CITRATE 50 MCG: 50 INJECTION, SOLUTION INTRAMUSCULAR; INTRAVENOUS at 23:53

## 2022-10-24 ASSESSMENT — ENCOUNTER SYMPTOMS
NAUSEA: 0
COUGH: 0
SORE THROAT: 0
DIARRHEA: 0
ABDOMINAL DISTENTION: 0
VOMITING: 0
BACK PAIN: 0
SHORTNESS OF BREATH: 1
CHEST TIGHTNESS: 0
ABDOMINAL PAIN: 0

## 2022-10-24 ASSESSMENT — PAIN DESCRIPTION - DESCRIPTORS: DESCRIPTORS: SHARP

## 2022-10-24 ASSESSMENT — PAIN SCALES - GENERAL
PAINLEVEL_OUTOF10: 9
PAINLEVEL_OUTOF10: 10

## 2022-10-24 ASSESSMENT — PAIN DESCRIPTION - ORIENTATION: ORIENTATION: LEFT

## 2022-10-24 ASSESSMENT — PAIN DESCRIPTION - LOCATION: LOCATION: CHEST;ARM

## 2022-10-24 ASSESSMENT — PAIN - FUNCTIONAL ASSESSMENT: PAIN_FUNCTIONAL_ASSESSMENT: 0-10

## 2022-10-25 ENCOUNTER — APPOINTMENT (OUTPATIENT)
Dept: CT IMAGING | Age: 37
End: 2022-10-25
Payer: MEDICARE

## 2022-10-25 VITALS
DIASTOLIC BLOOD PRESSURE: 50 MMHG | RESPIRATION RATE: 18 BRPM | BODY MASS INDEX: 23.44 KG/M2 | TEMPERATURE: 99.2 F | SYSTOLIC BLOOD PRESSURE: 95 MMHG | HEART RATE: 100 BPM | OXYGEN SATURATION: 95 % | WEIGHT: 120 LBS

## 2022-10-25 LAB
ALBUMIN SERPL-MCNC: 3.9 G/DL (ref 3.5–5.2)
ALP BLD-CCNC: 122 U/L (ref 35–104)
ALT SERPL-CCNC: 23 U/L (ref 0–32)
ANION GAP SERPL CALCULATED.3IONS-SCNC: 16 MMOL/L (ref 7–16)
ANISOCYTOSIS: ABNORMAL
AST SERPL-CCNC: 40 U/L (ref 0–31)
BASOPHILS ABSOLUTE: 0.09 E9/L (ref 0–0.2)
BASOPHILS RELATIVE PERCENT: 0.9 % (ref 0–2)
BILIRUB SERPL-MCNC: 0.6 MG/DL (ref 0–1.2)
BILIRUBIN URINE: NEGATIVE
BLOOD, URINE: NEGATIVE
BUN BLDV-MCNC: 5 MG/DL (ref 6–20)
CALCIUM SERPL-MCNC: 8.3 MG/DL (ref 8.6–10.2)
CHLORIDE BLD-SCNC: 109 MMOL/L (ref 98–107)
CLARITY: CLEAR
CO2: 20 MMOL/L (ref 22–29)
COLOR: YELLOW
CREAT SERPL-MCNC: 0.8 MG/DL (ref 0.5–1)
EOSINOPHILS ABSOLUTE: 0.06 E9/L (ref 0.05–0.5)
EOSINOPHILS RELATIVE PERCENT: 0.6 % (ref 0–6)
GFR SERPL CREATININE-BSD FRML MDRD: >60 ML/MIN/1.73
GLUCOSE BLD-MCNC: 92 MG/DL (ref 74–99)
GLUCOSE URINE: NEGATIVE MG/DL
HCG QUALITATIVE: NEGATIVE
HCT VFR BLD CALC: 35.2 % (ref 34–48)
HEMOGLOBIN: 11.4 G/DL (ref 11.5–15.5)
IMMATURE GRANULOCYTES #: 0.03 E9/L
IMMATURE GRANULOCYTES %: 0.3 % (ref 0–5)
KETONES, URINE: NEGATIVE MG/DL
LACTIC ACID, SEPSIS: 2.5 MMOL/L (ref 0.5–1.9)
LACTIC ACID, SEPSIS: 2.7 MMOL/L (ref 0.5–1.9)
LACTIC ACID: 3.6 MMOL/L (ref 0.5–2.2)
LEUKOCYTE ESTERASE, URINE: NEGATIVE
LIPASE: 104 U/L (ref 13–60)
LYMPHOCYTES ABSOLUTE: 3.3 E9/L (ref 1.5–4)
LYMPHOCYTES RELATIVE PERCENT: 33.3 % (ref 20–42)
MAGNESIUM: 1.8 MG/DL (ref 1.6–2.6)
MCH RBC QN AUTO: 28.5 PG (ref 26–35)
MCHC RBC AUTO-ENTMCNC: 32.4 % (ref 32–34.5)
MCV RBC AUTO: 88 FL (ref 80–99.9)
MONOCYTES ABSOLUTE: 0.87 E9/L (ref 0.1–0.95)
MONOCYTES RELATIVE PERCENT: 8.8 % (ref 2–12)
NEUTROPHILS ABSOLUTE: 5.55 E9/L (ref 1.8–7.3)
NEUTROPHILS RELATIVE PERCENT: 56.1 % (ref 43–80)
NITRITE, URINE: NEGATIVE
OVALOCYTES: ABNORMAL
PDW BLD-RTO: 21.5 FL (ref 11.5–15)
PH UA: 5.5 (ref 5–9)
PLATELET # BLD: 245 E9/L (ref 130–450)
PMV BLD AUTO: 10.3 FL (ref 7–12)
POIKILOCYTES: ABNORMAL
POLYCHROMASIA: ABNORMAL
POTASSIUM SERPL-SCNC: 3.9 MMOL/L (ref 3.5–5)
PROTEIN UA: NEGATIVE MG/DL
RBC # BLD: 4 E12/L (ref 3.5–5.5)
SODIUM BLD-SCNC: 145 MMOL/L (ref 132–146)
SPECIFIC GRAVITY UA: 1.01 (ref 1–1.03)
TARGET CELLS: ABNORMAL
TEAR DROP CELLS: ABNORMAL
TOTAL PROTEIN: 6.6 G/DL (ref 6.4–8.3)
TROPONIN, HIGH SENSITIVITY: <6 NG/L (ref 0–9)
UROBILINOGEN, URINE: 0.2 E.U./DL
WBC # BLD: 9.9 E9/L (ref 4.5–11.5)

## 2022-10-25 PROCEDURE — 2580000003 HC RX 258: Performed by: STUDENT IN AN ORGANIZED HEALTH CARE EDUCATION/TRAINING PROGRAM

## 2022-10-25 PROCEDURE — 83605 ASSAY OF LACTIC ACID: CPT

## 2022-10-25 PROCEDURE — 6360000002 HC RX W HCPCS: Performed by: STUDENT IN AN ORGANIZED HEALTH CARE EDUCATION/TRAINING PROGRAM

## 2022-10-25 PROCEDURE — 84703 CHORIONIC GONADOTROPIN ASSAY: CPT

## 2022-10-25 PROCEDURE — 6360000004 HC RX CONTRAST MEDICATION: Performed by: RADIOLOGY

## 2022-10-25 PROCEDURE — 74177 CT ABD & PELVIS W/CONTRAST: CPT

## 2022-10-25 PROCEDURE — 81003 URINALYSIS AUTO W/O SCOPE: CPT

## 2022-10-25 PROCEDURE — 2580000003 HC RX 258: Performed by: RADIOLOGY

## 2022-10-25 PROCEDURE — 36415 COLL VENOUS BLD VENIPUNCTURE: CPT

## 2022-10-25 PROCEDURE — 71275 CT ANGIOGRAPHY CHEST: CPT

## 2022-10-25 RX ORDER — 0.9 % SODIUM CHLORIDE 0.9 %
1000 INTRAVENOUS SOLUTION INTRAVENOUS ONCE
Status: COMPLETED | OUTPATIENT
Start: 2022-10-25 | End: 2022-10-25

## 2022-10-25 RX ORDER — SODIUM CHLORIDE 0.9 % (FLUSH) 0.9 %
10 SYRINGE (ML) INJECTION PRN
Status: DISCONTINUED | OUTPATIENT
Start: 2022-10-25 | End: 2022-10-25 | Stop reason: HOSPADM

## 2022-10-25 RX ORDER — LEVETIRACETAM 10 MG/ML
1000 INJECTION INTRAVASCULAR ONCE
Status: COMPLETED | OUTPATIENT
Start: 2022-10-25 | End: 2022-10-25

## 2022-10-25 RX ADMIN — SODIUM CHLORIDE 1000 ML: 9 INJECTION, SOLUTION INTRAVENOUS at 01:47

## 2022-10-25 RX ADMIN — Medication 10 ML: at 00:50

## 2022-10-25 RX ADMIN — LEVETIRACETAM 1000 MG: 10 INJECTION INTRAVENOUS at 03:07

## 2022-10-25 RX ADMIN — IOPAMIDOL 75 ML: 755 INJECTION, SOLUTION INTRAVENOUS at 00:50

## 2022-10-25 NOTE — ED NOTES
Radiology Procedure Waiver   Name: Scooby Novak  : 1985  MRN: [id number]    Date:  10/24/22    Time: 11:02 PM EDT    Benefits of immediately proceeding with Radiology exam(s) without pre-testing outweigh the risks or are not indicated as specified below and therefore the following is/are being waived:    [x] Pregnancy test   [] Patients LMP on-time and regular.   [] Patient had Tubal Ligation or has other Contraception Device. [] Patient  is Menopausal or Premenarcheal.    [] Patient had Full or Partial Hysterectomy. [] Protocol for Iodine allergy    [] MRI Questionnaire     [x] BUN/Creatinine   [] Patient age w/no hx of renal dysfunction. [] Patient on Dialysis. [] Recent Normal Labs.   Electronically signed by Izaiah Villalba MD on 10/24/22 at 11:02 PM EDT               Izaiah Villalba MD  10/24/22 2421

## 2022-10-25 NOTE — FLOWSHEET NOTE
Patient to the ed for n/v and chest pain to the right arm pit. Patient has hx of stage 4 cancer.  Ems report a seizure pta, patient alert and talking for triage

## 2022-10-25 NOTE — ED NOTES
Patient blood pressure 93/48. Resident physician Dr. Venkat Ball notified. Awaiting new orders.      Dominic Tabor RN  10/25/22 [id number]

## 2022-10-25 NOTE — ED PROVIDER NOTES
HPI     Patient is a 40 y.o. female presents with a chief complaint of chest pain. This has been occurring for a few hours. Patient states that it gets better with nothing  Patient states that it gets worse with movement. Patient states that it is moderate in severity. Patient states it was gradual in onset. Patient has a history of lumbar mets. Patient has developed left-sided chest pain and some mild shortness of breath over the past day. Patient stated that she is scheduled to have radiation in 1 week. Patient states that she has chronic back pain but this is unchanged. Patient is still able to urinate. No saddle anesthesia. No recent trauma. No exacerbating or relieving factors. Patient has never had a blood clot in the past.  Denies any leg swelling. Patient states that she has a history of seizures. Patient notes that she has been taking her Keppra at home. Patient stated that she may have had a seizure when EMS arrived. States that this is consistent with previous seizures. Patient has Keppra at home. Review of Systems   Constitutional:  Negative for activity change, appetite change, chills, fatigue and fever. HENT:  Negative for congestion, drooling and sore throat. Respiratory:  Positive for shortness of breath. Negative for cough and chest tightness. Cardiovascular:  Positive for chest pain. Negative for palpitations. Gastrointestinal:  Negative for abdominal distention, abdominal pain, diarrhea, nausea and vomiting. Genitourinary:  Negative for decreased urine volume, difficulty urinating, enuresis, flank pain, frequency and hematuria. Musculoskeletal:  Negative for arthralgias, back pain and neck stiffness. Skin:  Negative for rash and wound. Neurological:  Negative for dizziness, facial asymmetry, light-headedness and headaches. Psychiatric/Behavioral:  Negative for agitation, confusion and decreased concentration.        Physical Exam  Constitutional: General: She is not in acute distress. Appearance: Normal appearance. She is not ill-appearing. HENT:      Mouth/Throat:      Mouth: Mucous membranes are moist.   Eyes:      Extraocular Movements: Extraocular movements intact. Pupils: Pupils are equal, round, and reactive to light. Cardiovascular:      Rate and Rhythm: Normal rate and regular rhythm. Pulses: Normal pulses. Heart sounds: Normal heart sounds. No murmur heard. Comments: Tenderness to palpation of the left lateral chest wall. Pulmonary:      Effort: Pulmonary effort is normal.      Breath sounds: Normal breath sounds. Abdominal:      General: Abdomen is flat. There is no distension. Palpations: Abdomen is soft. Tenderness: There is no abdominal tenderness. There is no guarding. Musculoskeletal:         General: No swelling or tenderness. Skin:     General: Skin is warm and dry. Capillary Refill: Capillary refill takes less than 2 seconds. Comments: Port in place. No overlying erythema. No tenderness palpation of the ports. Neurological:      General: No focal deficit present. Mental Status: She is alert and oriented to person, place, and time. Psychiatric:         Mood and Affect: Mood normal.         Behavior: Behavior normal.        Procedures     [facility]       ED Course as of 10/25/22 0259   Mon Oct 24, 2022   2129 EKG read interpreted by me. Rate 100 bpm.  Normal axis. Normal HI interval.  Normal QRS. No ST elevations or depressions. Normal EKG. [JM]      ED Course User Index  [JM] Maureen Kenny MD      Patient is a 40 y.o. female presenting with chest pain. Patient has history of cancer. Patient clinically appears well. Neurologically intact. Patient does have a history of seizures. Patient was given a dose of Keppra. Patient was given fluids. Patient had a CTA of the chest that was negative. EKG was negative. Patient had no changes in urinary or bowel habits.   Patient's clinical exam was benign. Patient's white count was normal.  Patient CT of the abdomen that showed no acute findings but she will follow-up with her cancer doctors. Patient was given return precautions. Labs were interpreted by me. Patient will follow up with their primary care provider. Patient is agreeable to this plan. Patient has remained stable throughout their stay in the ED. Patient was seen and evaluated by myself and my attending Bill Gutierrez MD. Assessment and Plan discussed with attending provider, please see attestation for final plan of care. This note was done using dictation software and there may be some grammatical errors associated with this. Marina Potts MD      ED Course as of 10/25/22 0259   Mon Oct 24, 2022   2129 EKG read interpreted by me. Rate 100 bpm.  Normal axis. Normal MO interval.  Normal QRS. No ST elevations or depressions. Normal EKG. [JM]      ED Course User Index  [JM] Marina Potts MD       --------------------------------------------- PAST HISTORY ---------------------------------------------  Past Medical History:  has a past medical history of Abdominal pain, Cancer ([facility] [address] Diarrhea, Hypocalcemia, Hypothyroidism, Irritable bowel syndrome, Migraines, Seizures ([facility] [address] and Status post alcohol detoxification. Past Surgical History:  has a past surgical history that includes Parathyroid gland surgery; Cholecystectomy, laparoscopic (07/06/2016); Thyroidectomy; Colonoscopy (N/A, 6/22/2018); CT NEEDLE BIOPSY LIVER PERCUTANEOUS (9/1/2020); Small intestine surgery (N/A, 10/21/2020); CT BIOPSY RENAL (1/25/2021); hc dialysis catheter (N/A, 1/28/2021); sigmoidoscopy (N/A, 5/14/2021); and Port Surgery (Left, 2/18/2022). Social History:  reports that she has been smoking cigarettes. She has been smoking an average of .25 packs per day. She has never used smokeless tobacco. She reports that she does not drink alcohol and does not use drugs.     Family History: family history includes Alzheimer's Disease in an other family member; Asthma in her father; Breast Cancer in her maternal grandmother; Cancer in her father, maternal grandmother, and paternal grandfather; Diabetes in an other family member; Heart Disease in her father; High Blood Pressure in her father and mother; High Cholesterol in her mother; Coralyn Ramickeyin in an other family member; Other in her mother. The patients home medications have been reviewed. Allergies: Patient has no known allergies.     -------------------------------------------------- RESULTS -------------------------------------------------  Labs:  Results for orders placed or performed during the hospital encounter of 10/24/22   CBC with Auto Differential   Result Value Ref Range    WBC 9.9 4.5 - 11.5 E9/L    RBC 4.00 3.50 - 5.50 E12/L    Hemoglobin 11.4 (L) 11.5 - 15.5 g/dL    Hematocrit 35.2 34.0 - 48.0 %    MCV 88.0 80.0 - 99.9 fL    MCH 28.5 26.0 - 35.0 pg    MCHC 32.4 32.0 - 34.5 %    RDW 21.5 (H) 11.5 - 15.0 fL    Platelets 616 187 - 043 E9/L    MPV 10.3 7.0 - 12.0 fL    Neutrophils % 56.1 43.0 - 80.0 %    Immature Granulocytes % 0.3 0.0 - 5.0 %    Lymphocytes % 33.3 20.0 - 42.0 %    Monocytes % 8.8 2.0 - 12.0 %    Eosinophils % 0.6 0.0 - 6.0 %    Basophils % 0.9 0.0 - 2.0 %    Neutrophils Absolute 5.55 1.80 - 7.30 E9/L    Immature Granulocytes # 0.03 E9/L    Lymphocytes Absolute 3.30 1.50 - 4.00 E9/L    Monocytes Absolute 0.87 0.10 - 0.95 E9/L    Eosinophils Absolute 0.06 0.05 - 0.50 E9/L    Basophils Absolute 0.09 0.00 - 0.20 E9/L    Anisocytosis 2+     Polychromasia 2+     Poikilocytes 1+     Ovalocytes 1+     Target Cells 1+     Tear Drop Cells 1+    Comprehensive Metabolic Panel   Result Value Ref Range    Sodium 145 132 - 146 mmol/L    Potassium 3.9 3.5 - 5.0 mmol/L    Chloride 109 (H) 98 - 107 mmol/L    CO2 20 (L) 22 - 29 mmol/L    Anion Gap 16 7 - 16 mmol/L    Glucose 92 74 - 99 mg/dL    BUN 5 (L) 6 - 20 mg/dL    Creatinine 0.8 0.5 - 1.0 mg/dL    Est, Glom Filt Rate >60 >=60 mL/min/1.73    Calcium 8.3 (L) 8.6 - 10.2 mg/dL    Total Protein 6.6 6.4 - 8.3 g/dL    Albumin 3.9 3.5 - 5.2 g/dL    Total Bilirubin 0.6 0.0 - 1.2 mg/dL    Alkaline Phosphatase 122 (H) 35 - 104 U/L    ALT 23 0 - 32 U/L    AST 40 (H) 0 - 31 U/L   Lipase   Result Value Ref Range    Lipase 104 (H) 13 - 60 U/L   Lactic Acid   Result Value Ref Range    Lactic Acid 3.6 (HH) 0.5 - 2.2 mmol/L   Urinalysis   Result Value Ref Range    Color, UA Yellow Straw/Yellow    Clarity, UA Clear Clear    Glucose, Ur Negative Negative mg/dL    Bilirubin Urine Negative Negative    Ketones, Urine Negative Negative mg/dL    Specific Gravity, UA 1.010 1.005 - 1.030    Blood, Urine Negative Negative    pH, UA 5.5 5.0 - 9.0    Protein, UA Negative Negative mg/dL    Urobilinogen, Urine 0.2 <2.0 E.U./dL    Nitrite, Urine Negative Negative    Leukocyte Esterase, Urine Negative Negative   Troponin   Result Value Ref Range    Troponin, High Sensitivity <6 0 - 9 ng/L   HCG, SERUM, QUALITATIVE   Result Value Ref Range    hCG Qual NEGATIVE NEGATIVE   Lactate, Sepsis   Result Value Ref Range    Lactic Acid, Sepsis 2.7 (H) 0.5 - 1.9 mmol/L   Magnesium   Result Value Ref Range    Magnesium 1.8 1.6 - 2.6 mg/dL   EKG 12 Lead   Result Value Ref Range    Ventricular Rate 100 BPM    Atrial Rate 100 BPM    P-R Interval 150 ms    QRS Duration 70 ms    Q-T Interval 338 ms    QTc Calculation (Bazett) 436 ms    P Axis 54 degrees    R Axis 50 degrees    T Axis 49 degrees       Radiology:  CTA PULMONARY W CONTRAST   Final Result   No evidence of pulmonary embolism or acute pulmonary abnormality. CT ABDOMEN PELVIS W IV CONTRAST Additional Contrast? None   Final Result   1. Known liver metastases are not well seen, although 1 lesion appears to   measure mildly larger. Recommend attention on follow-up MRI or three-phase   CT as clinically warranted   2.  Dilated urinary bladder noted         US DUP LOWER EXTREMITIES BILATERAL VENOUS   Final Result   No evidence of DVT in either lower extremity.             ------------------------- NURSING NOTES AND VITALS REVIEWED ---------------------------  Date / Time Roomed:  10/24/2022  8:20 PM  ED Bed Assignment:  [facility]/    The nursing notes within the ED encounter and vital signs as below have been reviewed. BP (!) 100/56   Pulse 85   Temp 99 °F (37.2 °C) (Oral)   Resp 18   Wt 120 lb (54.4 kg)   SpO2 94%   BMI 23.44 kg/m²   Oxygen Saturation Interpretation: Normal      ------------------------------------------ PROGRESS NOTES ------------------------------------------  2:59 AM EDT  I have spoken with the patient and family if present and discussed todays results, in addition to providing specific details for the plan of care and counseling regarding the diagnosis and prognosis. Their questions are answered at this time and they are agreeable with the plan. I discussed at length with them reasons for immediate return here for re evaluation. They will followup with their primary care provider and neurology and oncoogist by calling their office as soon as possible.      --------------------------------- ADDITIONAL PROVIDER NOTES ---------------------------------  At this time the patient is without objective evidence of an acute process requiring hospitalization or inpatient management. They have remained hemodynamically stable throughout their entire ED visit and are stable for discharge with outpatient follow-up. The plan has been discussed in detail and they are aware of the specific conditions for emergent return, as well as the importance of follow-up. Current Discharge Medication List          Diagnosis:  1. Chest pain, unspecified type    2. Generalized abdominal pain    3. Breakthrough seizure ([address]        Disposition:  Patient's disposition: Discharge to home  Patient's condition is stable.          Zoraida Khan MD  Resident  10/25/22 1101 [facility] ATTESTATION:     I have personally performed and/or participated in the history, exam, medical decision making, and procedures and agree with all pertinent clinical information. I have also reviewed and agree with the past medical, family and social history unless otherwise noted. I have discussed this patient in detail with the resident, and provided the instruction and education regarding chest pain. My findings/Plan: I Was the primary provider for patient. Patient presenting here for chest pain. Patient reports its been intermittent since the day before. Patient reports it does go into her shoulder. Patient also reports ongoing abdominal pain which she does have a history of patient does have a history of liver cancer. Patient does report diarrhea as well as having some occasional vomiting. Patient reporting no productive cough she does report shortness of breath. Patient reporting some cramping in her legs at times. Patient reports that she has seizure activity and believes she had a seizure prior. She reports not tonic-clonic it is actually she stares on the space more of an absence type seizure. Patient reports no upper or lower extremity weakness she reports no visual disturbance. Patient here is awake alert oriented neurologically intact heart and lung exam normal abdomen soft she is mildly tender mainly in her lower as well as epigastric region. Patient neurologically intact. Patient has normal strength in all extremities. Patient labs noted reviewed patient did undergo ultrasound which were negative for DVT. Patient EKG sinus rhythm no acute ST elevation. And agree with EKG interpretation by resident. Patient did undergo CT of her chest as well as her abdomen. Patient CT chest shows no PE.  CT abdomen pelvis shows no acute findings. Patient has no bowel obstruction noted on CT.   Patient medicated here in the emergency department she was given dose of Keppra here because of her history of seizure. Patient vital signs noted given IV fluids repeat lactic acid improved. Patient in no acute distress here nontoxic-appearing on recheck.   Patient comfortable being discharged home she is to follow with her PCP she is to return if symptoms persist.  Patient will be discharged home         Tess Eisenberg MD  10/25/22 [id number]       Tess Eisenberg MD  10/25/22 [id number]

## 2022-10-26 ENCOUNTER — HOSPITAL ENCOUNTER (OUTPATIENT)
Dept: GENERAL RADIOLOGY | Age: 37
Discharge: HOME | End: 2022-10-28

## 2022-10-26 LAB
EKG ATRIAL RATE: 100 BPM
EKG P AXIS: 54 DEGREES
EKG P-R INTERVAL: 150 MS
EKG Q-T INTERVAL: 338 MS
EKG QRS DURATION: 70 MS
EKG QTC CALCULATION (BAZETT): 436 MS
EKG R AXIS: 50 DEGREES
EKG T AXIS: 49 DEGREES
EKG VENTRICULAR RATE: 100 BPM

## 2022-10-26 PROCEDURE — 93010 ELECTROCARDIOGRAM REPORT: CPT | Performed by: INTERNAL MEDICINE

## 2022-10-28 ENCOUNTER — OFFICE VISIT (OUTPATIENT)
Dept: PRIMARY CARE CLINIC | Age: 37
End: 2022-10-28
Payer: MEDICARE

## 2022-10-28 VITALS
DIASTOLIC BLOOD PRESSURE: 68 MMHG | WEIGHT: 120 LBS | BODY MASS INDEX: 23.56 KG/M2 | HEART RATE: 104 BPM | RESPIRATION RATE: 16 BRPM | HEIGHT: 60 IN | SYSTOLIC BLOOD PRESSURE: 116 MMHG | OXYGEN SATURATION: 97 %

## 2022-10-28 DIAGNOSIS — F51.01 PRIMARY INSOMNIA: ICD-10-CM

## 2022-10-28 DIAGNOSIS — C7B.8 NEUROENDOCRINE CARCINOMA METASTATIC TO LIVER (HCC): Primary | ICD-10-CM

## 2022-10-28 DIAGNOSIS — C7A.8 NEUROENDOCRINE CARCINOMA METASTATIC TO LIVER (HCC): Primary | ICD-10-CM

## 2022-10-28 PROCEDURE — G8420 CALC BMI NORM PARAMETERS: HCPCS | Performed by: FAMILY MEDICINE

## 2022-10-28 PROCEDURE — G8427 DOCREV CUR MEDS BY ELIG CLIN: HCPCS | Performed by: FAMILY MEDICINE

## 2022-10-28 PROCEDURE — G8484 FLU IMMUNIZE NO ADMIN: HCPCS | Performed by: FAMILY MEDICINE

## 2022-10-28 PROCEDURE — 1111F DSCHRG MED/CURRENT MED MERGE: CPT | Performed by: FAMILY MEDICINE

## 2022-10-28 PROCEDURE — 99213 OFFICE O/P EST LOW 20 MIN: CPT | Performed by: FAMILY MEDICINE

## 2022-10-28 PROCEDURE — 4004F PT TOBACCO SCREEN RCVD TLK: CPT | Performed by: FAMILY MEDICINE

## 2022-10-28 RX ORDER — ZOLPIDEM TARTRATE 10 MG/1
TABLET ORAL
Qty: 30 TABLET | Refills: 5 | Status: ON HOLD
Start: 2022-10-28 | End: 2022-11-15 | Stop reason: HOSPADM

## 2022-10-28 ASSESSMENT — PATIENT HEALTH QUESTIONNAIRE - PHQ9
9. THOUGHTS THAT YOU WOULD BE BETTER OFF DEAD, OR OF HURTING YOURSELF: 0
1. LITTLE INTEREST OR PLEASURE IN DOING THINGS: 0
SUM OF ALL RESPONSES TO PHQ QUESTIONS 1-9: 0
10. IF YOU CHECKED OFF ANY PROBLEMS, HOW DIFFICULT HAVE THESE PROBLEMS MADE IT FOR YOU TO DO YOUR WORK, TAKE CARE OF THINGS AT HOME, OR GET ALONG WITH OTHER PEOPLE: 0
SUM OF ALL RESPONSES TO PHQ QUESTIONS 1-9: 0
3. TROUBLE FALLING OR STAYING ASLEEP: 0
SUM OF ALL RESPONSES TO PHQ QUESTIONS 1-9: 0
8. MOVING OR SPEAKING SO SLOWLY THAT OTHER PEOPLE COULD HAVE NOTICED. OR THE OPPOSITE, BEING SO FIGETY OR RESTLESS THAT YOU HAVE BEEN MOVING AROUND A LOT MORE THAN USUAL: 0
6. FEELING BAD ABOUT YOURSELF - OR THAT YOU ARE A FAILURE OR HAVE LET YOURSELF OR YOUR FAMILY DOWN: 0
7. TROUBLE CONCENTRATING ON THINGS, SUCH AS READING THE NEWSPAPER OR WATCHING TELEVISION: 0
4. FEELING TIRED OR HAVING LITTLE ENERGY: 0
SUM OF ALL RESPONSES TO PHQ QUESTIONS 1-9: 0
2. FEELING DOWN, DEPRESSED OR HOPELESS: 0
5. POOR APPETITE OR OVEREATING: 0
SUM OF ALL RESPONSES TO PHQ9 QUESTIONS 1 & 2: 0

## 2022-10-28 ASSESSMENT — ENCOUNTER SYMPTOMS
PHOTOPHOBIA: 0
BLOOD IN STOOL: 0
SORE THROAT: 0
CHEST TIGHTNESS: 0
ALLERGIC/IMMUNOLOGIC NEGATIVE: 1
SHORTNESS OF BREATH: 0
APNEA: 0
VOMITING: 0
NAUSEA: 0
COLOR CHANGE: 0
WHEEZING: 0
BACK PAIN: 0
FACIAL SWELLING: 0
SINUS PRESSURE: 0
DIARRHEA: 0
COUGH: 0
ABDOMINAL PAIN: 0

## 2022-10-28 NOTE — PROGRESS NOTES
Chief Complaint:   Chief Complaint   Patient presents with    Cancer     Was in hospital 10/24/22, levels were all over the place       Insomnia  This is a chronic problem. The current episode started more than 1 year ago. The problem occurs daily. Pertinent negatives include no abdominal pain, arthralgias, chest pain, congestion, coughing, headaches, joint swelling, myalgias, nausea, rash, sore throat, vomiting or weakness.      Patient Active Problem List   Diagnosis    Primary insomnia    Fatty liver    H/O small bowel obstruction    Hypothyroidism    Depression    PTSD (post-traumatic stress disorder)    Liver masses    Migraines    Mesenteric mass    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[address]    New onset seizure ([address]    Neuroendocrine tumor    Hypomagnesemia    Hypocalcemia    Hypoparathyroidism (HCC)    Tobacco abuse    Elevated liver enzymes    Moderate protein-calorie malnutrition ([address]    Encounter regarding vascular access for dialysis for ESRD ([address]    Difficulty with speech    Breakthrough seizure ([address]    Neuroendocrine cancer ([address]    Alcohol abuse    Depressed bipolar II disorder ([address]    Liver metastases ([address]    Hepatic coma/encephalopathy ([address]    Thrombocytopenia ([address]       Past Medical History:   Diagnosis Date    Abdominal pain     Cancer ([address]     neuroendocrime tumor    Diarrhea     Hypocalcemia     Hypothyroidism     Irritable bowel syndrome     Migraines     Seizures ([address]     last episode January 2021    Status post alcohol detoxification     5/22/2018-5/29/2018       Past Surgical History:   Procedure Laterality Date    CHOLECYSTECTOMY, LAPAROSCOPIC  07/06/2016    COLONOSCOPY N/A 6/22/2018    COLONOSCOPY WITH BIOPSY performed by Gregoria Ruiz MD at [facility] ENDOSCOPY    CT BIOPSY RENAL  1/25/2021    CT BIOPSY RENAL 1/25/2021 Thiago Borja II, MD [facility]    CT NEEDLE BIOPSY LIVER PERCUTANEOUS  9/1/2020    CT NEEDLE BIOPSY LIVER PERCUTANEOUS 9/1/2020 [facility] CT    [facility] DIALYSIS CATHETER N/A 1/28/2021    TESIO CATHETER INSERTION AND REMOVAL OF TEMPORARY performed by Lucila Krishna MD at [address]      PORT SURGERY Left 2/18/2022    MEDI PORT PLACEMENT, Left side. performed by Buddy Larson MD at [address] [facility] N/A 5/14/2021    SIGMOIDOSCOPY PERIANAL BOTOX INJECTION   (50 UNITS) performed by Keily Kaiser MD at [address] 10/21/2020    LAPAROSCOPIC ROBOTIC SMALL BOWEL RESECTION WITH PLANNED TRANSITION TO OPEN performed by Aidan Tobar MD at [address]         Current Outpatient Medications   Medication Sig Dispense Refill    zolpidem (AMBIEN) 10 MG tablet take 1 tablet by mouth nightly if needed for sleep 30 tablet 5    traMADol (ULTRAM) 50 MG tablet Take 1 tablet by mouth every 8 hours as needed for Pain for up to 90 days. 90 tablet 2    FLUoxetine (PROZAC) 20 MG capsule       butalbital-acetaminophen-caffeine (FIORICET, ESGIC) -40 MG per tablet take 1 tablet by mouth once daily if needed for headache 30 tablet 1    lactulose (CHRONULAC) 10 GM/15ML solution Take 45 mLs by mouth 3 times daily 4050 mL 0    rifAXIMin (XIFAXAN) 550 MG tablet Take 1 tablet by mouth 2 times daily 60 tablet 0    levETIRAcetam (KEPPRA) 750 MG tablet Take 750 mg by mouth 2 times daily      levothyroxine (SYNTHROID) 112 MCG tablet Take 112 mcg by mouth Daily      melatonin 3 MG TABS tablet Take 3 mg by mouth at bedtime      famotidine (PEPCID) 40 MG tablet Take 40 mg by mouth nightly as needed      zolpidem (AMBIEN) 10 MG tablet Take 10 mg by mouth nightly as needed for Sleep.      gabapentin (NEURONTIN) 300 MG capsule Take 1 capsule by mouth 3 times daily for 180 days.  270 capsule 1    fluticasone (FLONASE) 50 MCG/ACT nasal spray 1 spray by Nasal route daily as needed for Rhinitis      potassium chloride (KLOR-CON M) 20 MEQ extended release tablet Take 20 mEq by mouth 2 times daily      topiramate (TOPAMAX) 50 MG tablet Take 50 mg by mouth 2 times daily      brimonidine (ALPHAGAN) 0.2 % ophthalmic solution Place 1 drop into both eyes every morning      magnesium oxide (MAG-OX) 400 MG tablet Take 400 mg by mouth 2 times daily       No current facility-administered medications for this visit. No Known Allergies    Social History     Socioeconomic History    Marital status: Single     Spouse name: None    Number of children: None    Years of education: None    Highest education level: None   Occupational History    Occupation: cleaning     Employer: [employer] [address] [place] [address]   Tobacco Use    Smoking status: Every Day     Packs/day: 0.25     Types: Cigarettes    Smokeless tobacco: Never   Vaping Use    Vaping Use: Never used   Substance and Sexual Activity    Alcohol use: No     Alcohol/week: 0.0 standard drinks     Comment: 5 years sober    Drug use: No       Family History   Problem Relation Age of Onset    High Blood Pressure Mother     High Cholesterol Mother     Other Mother         migraine headaches    Heart Disease Father     Asthma Father     High Blood Pressure Father     Cancer Father         Sarcoidosis    Diabetes Other         GRANDMOTHER    Lung Cancer Other         GRANDFATHER    Alzheimer's Disease Other         GRANDMOTHER    Breast Cancer Maternal Grandmother     Cancer Maternal Grandmother         skin    Cancer Paternal Grandfather         lung         Review of Systems   Constitutional: Negative. HENT:  Negative for congestion, facial swelling, hearing loss, nosebleeds, sinus pressure and sore throat. Eyes:  Negative for photophobia and visual disturbance. Respiratory:  Negative for apnea, cough, chest tightness, shortness of breath and wheezing. Cardiovascular:  Negative for chest pain, palpitations and leg swelling. Gastrointestinal:  Negative for abdominal pain, blood in stool, diarrhea, nausea and vomiting. Genitourinary:  Negative for difficulty urinating, frequency and urgency. Musculoskeletal:  Negative for arthralgias, back pain, joint swelling and myalgias. Skin:  Negative for color change and rash. Allergic/Immunologic: Negative. Neurological:  Negative for syncope, weakness, light-headedness and headaches. Hematological: Negative. Psychiatric/Behavioral:  Negative for agitation, behavioral problems, confusion and self-injury. The patient has insomnia. The patient is not nervous/anxious. All other systems reviewed and are negative. Physical Exam  Vitals and nursing note reviewed. Constitutional:       General: She is not in acute distress. Appearance: She is well-developed. HENT:      Head: Normocephalic and atraumatic. Nose: Nose normal.   Eyes:      Conjunctiva/sclera: Conjunctivae normal.      Pupils: Pupils are equal, round, and reactive to light. Neck:      Thyroid: No thyromegaly. Vascular: No JVD. Cardiovascular:      Rate and Rhythm: Normal rate and regular rhythm. Heart sounds: Normal heart sounds. No murmur heard. No friction rub. No gallop. Pulmonary:      Effort: Pulmonary effort is normal. No respiratory distress. Breath sounds: Normal breath sounds. No wheezing. Abdominal:      General: Bowel sounds are normal. There is no distension. Palpations: Abdomen is soft. Tenderness: There is no abdominal tenderness. There is no guarding or rebound. Musculoskeletal:         General: Normal range of motion. Cervical back: Normal range of motion and neck supple. Lymphadenopathy:      Cervical: No cervical adenopathy. Skin:     General: Skin is warm and dry. Findings: No erythema or rash. Neurological:      Mental Status: She is alert and oriented to person, place, and time. Cranial Nerves: No cranial nerve deficit. Motor: No abnormal muscle tone.       Coordination: Coordination normal.      Deep Tendon Reflexes: Reflexes are normal and symmetric. Psychiatric:         Behavior: Behavior normal.         Judgment: Judgment normal.                             ASSESSMENT/PLAN:    Antonino Zurita was seen today for cancer.     Diagnoses and all orders for this visit:    Neuroendocrine carcinoma metastatic to liver ([facility] [address]    Primary insomnia  -     zolpidem (AMBIEN) 10 MG tablet; take 1 tablet by mouth nightly if needed for sleep          Madelyn Wolf DO    10/28/2022  11:05 AM

## 2022-11-01 ENCOUNTER — HOSPITAL ENCOUNTER (OUTPATIENT)
Dept: NUCLEAR MEDICINE | Age: 37
Discharge: HOME | End: 2022-11-03
Payer: MEDICARE

## 2022-11-01 DIAGNOSIS — C7A.8 NEUROENDOCRINE CARCINOMA METASTATIC TO LIVER (HCC): ICD-10-CM

## 2022-11-01 DIAGNOSIS — C7B.8 NEUROENDOCRINE CARCINOMA METASTATIC TO LIVER (HCC): ICD-10-CM

## 2022-11-01 PROCEDURE — A9587 GALLIUM GA-68: HCPCS | Performed by: RADIOLOGY

## 2022-11-01 PROCEDURE — 3430000000 HC RX DIAGNOSTIC RADIOPHARMACEUTICAL: Performed by: RADIOLOGY

## 2022-11-01 PROCEDURE — 78815 PET IMAGE W/CT SKULL-THIGH: CPT

## 2022-11-01 PROCEDURE — 78815 PET IMAGE W/CT SKULL-THIGH: CPT | Performed by: RADIOLOGY

## 2022-11-01 RX ORDER — 68GA-DOTATATE 40 MCG
2.92 KIT INTRAVENOUS ONCE
Status: COMPLETED | OUTPATIENT
Start: 2022-11-01 | End: 2022-11-01

## 2022-11-01 RX ADMIN — 68GA-DOTATATE 2.92 MILLICURIE: KIT INTRAVENOUS at 12:04

## 2022-11-10 NOTE — DISCHARGE SUMMARY
[address]                  [address] [facility],  [facility]                               DISCHARGE SUMMARY    PATIENT NAME: Rody Brooks                    :        1985  MED REC NO:   [id number]                            ROOM:       8413  ACCOUNT NO:   [de-identified]                           ADMIT DATE: 2022  PROVIDER:     Td Chavarria DO                  DISCHARGE DATE:  2022    DISCHARGE DIAGNOSES:  1.  Neuroendocrine tumor with mets to the liver. 2.  Hypokalemia. 3.  Hypomagnesemia. 4.  UTI. 5.  Hyperammonemia. 6.  Coagulopathy. 7.  Hypothyroidism. HISTORY OF PRESENT ILLNESS AND HOSPITAL COURSE:  The patient is a  41-year-old  female who presented to the emergency room with  abdominal pain and jaundice. She has a history of neuroendocrine tumor  with mets to the liver. Diagnostic evaluation in the emergency room  revealed elevated bilirubin, elevated ammonia level, elevated INR. She  was admitted to hospital for further evaluation and treatment. She was  felt to have UTI. She was seen by Surgery, GI, Oncology and she was  treated for elevated ammonia. Her potassium and magnesium were  replaced. She was given vitamin K for coagulopathy. Her status  improved and she was eventually discharged to home in stable condition  on 2022. DISCHARGE MEDICATIONS:  Discharge meds as per discharge med rec which  include;  1. Rifaximin. 2.  Alphagan eye drops. 3.  Famotidine. 4.  Flonase. 5.  Gabapentin. 6.  Keppra. 7.  Levothyroxine. 8.  Mag-Ox. 9.  Melatonin. 10.  Potassium chloride. 11.  Topamax. 12.  Ambien p.r.n. 13.  Dilaudid. 14.  Lactulose. 15.  Zofran p.r.n. DISCHARGE INSTRUCTIONS:  The patient was instructed to follow up with  Dr. Carlito Calderón. Call office for appointment. Follow up with Dr. Nic Valverde, call office for appointment.         Alisia Hu DO    D: 2022 13:37:50       T: 11/09/2022 13:40:06     [id number]  Job#: [id number]     Doc#: [id number]    CC:

## 2022-11-13 ENCOUNTER — APPOINTMENT (OUTPATIENT)
Dept: CT IMAGING | Age: 37
DRG: 392 | End: 2022-11-13
Payer: MEDICARE

## 2022-11-13 ENCOUNTER — HOSPITAL ENCOUNTER (INPATIENT)
Age: 37
LOS: 2 days | Discharge: HOME | DRG: 392 | End: 2022-11-15
Attending: EMERGENCY MEDICINE | Admitting: INTERNAL MEDICINE
Payer: MEDICARE

## 2022-11-13 DIAGNOSIS — R56.9 INCREASING FREQUENCY OF SEIZURE ACTIVITY (HCC): ICD-10-CM

## 2022-11-13 DIAGNOSIS — R11.2 NAUSEA AND VOMITING, UNSPECIFIED VOMITING TYPE: Primary | ICD-10-CM

## 2022-11-13 DIAGNOSIS — R46.89 ALTERED BEHAVIOR: ICD-10-CM

## 2022-11-13 PROBLEM — R41.82 ALTERED MENTAL STATUS: Status: ACTIVE | Noted: 2022-11-13

## 2022-11-13 LAB
ACETAMINOPHEN LEVEL: 10.8 MCG/ML (ref 10–30)
ALBUMIN SERPL-MCNC: 3.9 G/DL (ref 3.5–5.2)
ALP BLD-CCNC: 94 U/L (ref 35–104)
ALT SERPL-CCNC: 35 U/L (ref 0–32)
AMPHETAMINE SCREEN, URINE: NOT DETECTED
ANION GAP SERPL CALCULATED.3IONS-SCNC: 14 MMOL/L (ref 7–16)
ANISOCYTOSIS: ABNORMAL
AST SERPL-CCNC: 61 U/L (ref 0–31)
BACTERIA: NORMAL /HPF
BARBITURATE SCREEN URINE: POSITIVE
BASOPHILS ABSOLUTE: 0 E9/L (ref 0–0.2)
BASOPHILS RELATIVE PERCENT: 1.1 % (ref 0–2)
BENZODIAZEPINE SCREEN, URINE: NOT DETECTED
BILIRUB SERPL-MCNC: 0.9 MG/DL (ref 0–1.2)
BILIRUBIN DIRECT: 0.2 MG/DL (ref 0–0.3)
BILIRUBIN URINE: NEGATIVE
BILIRUBIN, INDIRECT: 0.7 MG/DL (ref 0–1)
BLOOD, URINE: NEGATIVE
BUN BLDV-MCNC: 7 MG/DL (ref 6–20)
CALCIUM SERPL-MCNC: 8.4 MG/DL (ref 8.6–10.2)
CANNABINOID SCREEN URINE: POSITIVE
CHLORIDE BLD-SCNC: 107 MMOL/L (ref 98–107)
CLARITY: CLEAR
CO2: 19 MMOL/L (ref 22–29)
COCAINE METABOLITE SCREEN URINE: NOT DETECTED
COLOR: YELLOW
CREAT SERPL-MCNC: 0.6 MG/DL (ref 0.5–1)
EOSINOPHILS ABSOLUTE: 0.26 E9/L (ref 0.05–0.5)
EOSINOPHILS RELATIVE PERCENT: 3.4 % (ref 0–6)
ETHANOL: <10 MG/DL (ref 0–0.08)
FENTANYL SCREEN, URINE: NOT DETECTED
GFR SERPL CREATININE-BSD FRML MDRD: >60 ML/MIN/1.73
GLUCOSE BLD-MCNC: 89 MG/DL (ref 74–99)
GLUCOSE URINE: NEGATIVE MG/DL
HCG, URINE, POC: NEGATIVE
HCT VFR BLD CALC: 33 % (ref 34–48)
HEMOGLOBIN: 10.7 G/DL (ref 11.5–15.5)
KETONES, URINE: NEGATIVE MG/DL
LACTIC ACID, SEPSIS: 1.3 MMOL/L (ref 0.5–1.9)
LEUKOCYTE ESTERASE, URINE: NEGATIVE
LYMPHOCYTES ABSOLUTE: 2.36 E9/L (ref 1.5–4)
LYMPHOCYTES RELATIVE PERCENT: 30.8 % (ref 20–42)
Lab: ABNORMAL
Lab: NORMAL
MCH RBC QN AUTO: 28.1 PG (ref 26–35)
MCHC RBC AUTO-ENTMCNC: 32.4 % (ref 32–34.5)
MCV RBC AUTO: 86.6 FL (ref 80–99.9)
METHADONE SCREEN, URINE: NOT DETECTED
MONOCYTES ABSOLUTE: 0.23 E9/L (ref 0.1–0.95)
MONOCYTES RELATIVE PERCENT: 2.6 % (ref 2–12)
NEGATIVE QC PASS/FAIL: NORMAL
NEUTROPHILS ABSOLUTE: 4.79 E9/L (ref 1.8–7.3)
NEUTROPHILS RELATIVE PERCENT: 63.2 % (ref 43–80)
NITRITE, URINE: NEGATIVE
OPIATE SCREEN URINE: NOT DETECTED
OVALOCYTES: ABNORMAL
OXYCODONE URINE: NOT DETECTED
PDW BLD-RTO: 20.1 FL (ref 11.5–15)
PH UA: 7.5 (ref 5–9)
PHENCYCLIDINE SCREEN URINE: NOT DETECTED
PLATELET # BLD: 257 E9/L (ref 130–450)
PMV BLD AUTO: 9.5 FL (ref 7–12)
POIKILOCYTES: ABNORMAL
POLYCHROMASIA: ABNORMAL
POSITIVE QC PASS/FAIL: NORMAL
POTASSIUM SERPL-SCNC: 4 MMOL/L (ref 3.5–5)
PROTEIN UA: NEGATIVE MG/DL
RBC # BLD: 3.81 E12/L (ref 3.5–5.5)
RBC UA: NORMAL /HPF (ref 0–2)
SALICYLATE, SERUM: <0.3 MG/DL (ref 0–30)
SCHISTOCYTES: ABNORMAL
SODIUM BLD-SCNC: 140 MMOL/L (ref 132–146)
SPECIFIC GRAVITY UA: 1.01 (ref 1–1.03)
TARGET CELLS: ABNORMAL
TOTAL PROTEIN: 6.5 G/DL (ref 6.4–8.3)
TRICYCLIC ANTIDEPRESSANTS SCREEN SERUM: NEGATIVE NG/ML
UROBILINOGEN, URINE: 0.2 E.U./DL
WBC # BLD: 7.6 E9/L (ref 4.5–11.5)
WBC UA: NORMAL /HPF (ref 0–5)

## 2022-11-13 PROCEDURE — 99285 EMERGENCY DEPT VISIT HI MDM: CPT

## 2022-11-13 PROCEDURE — 83605 ASSAY OF LACTIC ACID: CPT

## 2022-11-13 PROCEDURE — 70450 CT HEAD/BRAIN W/O DYE: CPT

## 2022-11-13 PROCEDURE — 74177 CT ABD & PELVIS W/CONTRAST: CPT

## 2022-11-13 PROCEDURE — 80143 DRUG ASSAY ACETAMINOPHEN: CPT

## 2022-11-13 PROCEDURE — 85025 COMPLETE CBC W/AUTO DIFF WBC: CPT

## 2022-11-13 PROCEDURE — 80048 BASIC METABOLIC PNL TOTAL CA: CPT

## 2022-11-13 PROCEDURE — 6370000000 HC RX 637 (ALT 250 FOR IP)

## 2022-11-13 PROCEDURE — 81001 URINALYSIS AUTO W/SCOPE: CPT

## 2022-11-13 PROCEDURE — 82140 ASSAY OF AMMONIA: CPT

## 2022-11-13 PROCEDURE — 80307 DRUG TEST PRSMV CHEM ANLYZR: CPT

## 2022-11-13 PROCEDURE — 96365 THER/PROPH/DIAG IV INF INIT: CPT

## 2022-11-13 PROCEDURE — 6360000002 HC RX W HCPCS

## 2022-11-13 PROCEDURE — 2060000000 HC ICU INTERMEDIATE R&B

## 2022-11-13 PROCEDURE — 6360000004 HC RX CONTRAST MEDICATION: Performed by: RADIOLOGY

## 2022-11-13 PROCEDURE — 82077 ASSAY SPEC XCP UR&BREATH IA: CPT

## 2022-11-13 PROCEDURE — 96375 TX/PRO/DX INJ NEW DRUG ADDON: CPT

## 2022-11-13 PROCEDURE — 80179 DRUG ASSAY SALICYLATE: CPT

## 2022-11-13 PROCEDURE — 87040 BLOOD CULTURE FOR BACTERIA: CPT

## 2022-11-13 PROCEDURE — 80076 HEPATIC FUNCTION PANEL: CPT

## 2022-11-13 RX ORDER — ONDANSETRON 2 MG/ML
4 INJECTION INTRAMUSCULAR; INTRAVENOUS EVERY 6 HOURS PRN
Status: DISCONTINUED | OUTPATIENT
Start: 2022-11-13 | End: 2022-11-15 | Stop reason: HOSPADM

## 2022-11-13 RX ORDER — LORAZEPAM 1 MG/1
1 TABLET ORAL ONCE
Status: COMPLETED | OUTPATIENT
Start: 2022-11-13 | End: 2022-11-13

## 2022-11-13 RX ORDER — KETOROLAC TROMETHAMINE 30 MG/ML
15 INJECTION, SOLUTION INTRAMUSCULAR; INTRAVENOUS EVERY 6 HOURS PRN
Status: DISCONTINUED | OUTPATIENT
Start: 2022-11-13 | End: 2022-11-15 | Stop reason: HOSPADM

## 2022-11-13 RX ORDER — SODIUM CHLORIDE 0.9 % (FLUSH) 0.9 %
5-40 SYRINGE (ML) INJECTION EVERY 12 HOURS SCHEDULED
Status: DISCONTINUED | OUTPATIENT
Start: 2022-11-13 | End: 2022-11-15 | Stop reason: HOSPADM

## 2022-11-13 RX ORDER — ACETAMINOPHEN 325 MG/1
650 TABLET ORAL EVERY 4 HOURS PRN
Status: DISCONTINUED | OUTPATIENT
Start: 2022-11-13 | End: 2022-11-14 | Stop reason: SDUPTHER

## 2022-11-13 RX ORDER — SODIUM CHLORIDE 9 MG/ML
INJECTION, SOLUTION INTRAVENOUS PRN
Status: DISCONTINUED | OUTPATIENT
Start: 2022-11-13 | End: 2022-11-15 | Stop reason: HOSPADM

## 2022-11-13 RX ORDER — ENOXAPARIN SODIUM 100 MG/ML
40 INJECTION SUBCUTANEOUS DAILY
Status: DISCONTINUED | OUTPATIENT
Start: 2022-11-14 | End: 2022-11-14

## 2022-11-13 RX ORDER — LEVETIRACETAM 15 MG/ML
1500 INJECTION INTRAVASCULAR ONCE
Status: COMPLETED | OUTPATIENT
Start: 2022-11-13 | End: 2022-11-13

## 2022-11-13 RX ORDER — ONDANSETRON 4 MG/1
4 TABLET, ORALLY DISINTEGRATING ORAL EVERY 8 HOURS PRN
Status: DISCONTINUED | OUTPATIENT
Start: 2022-11-13 | End: 2022-11-15 | Stop reason: HOSPADM

## 2022-11-13 RX ORDER — SODIUM CHLORIDE 0.9 % (FLUSH) 0.9 %
5-40 SYRINGE (ML) INJECTION PRN
Status: DISCONTINUED | OUTPATIENT
Start: 2022-11-13 | End: 2022-11-15 | Stop reason: HOSPADM

## 2022-11-13 RX ADMIN — LORAZEPAM 1 MG: 1 TABLET ORAL at 21:01

## 2022-11-13 RX ADMIN — LEVETIRACETAM 1500 MG: 15 INJECTION INTRAVENOUS at 18:15

## 2022-11-13 RX ADMIN — KETOROLAC TROMETHAMINE 15 MG: 30 INJECTION, SOLUTION INTRAMUSCULAR at 21:02

## 2022-11-13 RX ADMIN — IOPAMIDOL 75 ML: 755 INJECTION, SOLUTION INTRAVENOUS at 21:12

## 2022-11-13 ASSESSMENT — PAIN - FUNCTIONAL ASSESSMENT: PAIN_FUNCTIONAL_ASSESSMENT: 0-10

## 2022-11-13 ASSESSMENT — ENCOUNTER SYMPTOMS
DIARRHEA: 1
BACK PAIN: 0
VOMITING: 1
ABDOMINAL PAIN: 0
EYE PAIN: 0
NAUSEA: 1
SHORTNESS OF BREATH: 0
SINUS PAIN: 0
COUGH: 0
CONSTIPATION: 0

## 2022-11-13 NOTE — ED PROVIDER NOTES
HPI     Patient is a 40 y.o. female presents with a chief complaint of nausea/vomiting  This has been occurring for 1 day. Patient states that it gets better with nothing. Patient states that it gets worse with nothing. Patient states that it is moderate in severity. Patient states it was gradual in onset. Pt w/ PMH of metastatic carcinoid with neuroendocrine tumor, absence seizures, migraines, and a small bowel resection is a 39 y/o here for nausea and vomiting since last night. She has vomited too many times to count. On arrival pt had stable vitals but required assistance to her bed. She was AOX3 but talking slowly. She states her voice sounds slurred although I did not note slurring. She denies fevers, chills, CP, SOB, syncope. She admits to abdominal pain in unspecified location. She takes keppra for her absence seizures but has missed a few doses. She states that even with the [address] she still has several absence seizures per day. Review of Systems   Constitutional:  Negative for chills and fever. HENT:  Negative for ear pain and sinus pain. Eyes:  Negative for pain. Respiratory:  Negative for cough and shortness of breath. Cardiovascular:  Negative for chest pain. Gastrointestinal:  Positive for diarrhea (chronic), nausea and vomiting. Negative for abdominal pain and constipation. Genitourinary:  Negative for difficulty urinating, dysuria and flank pain. Musculoskeletal:  Negative for back pain. Skin:  Negative for rash. Neurological:  Positive for seizures. Negative for dizziness, weakness, light-headedness and headaches. Psychiatric/Behavioral:  Negative for confusion. The patient is nervous/anxious. Physical Exam  Constitutional:       General: She is not in acute distress. Appearance: Normal appearance. She is not ill-appearing. HENT:      Head: Normocephalic.       Right Ear: External ear normal.      Left Ear: External ear normal.      Nose: Nose normal. No congestion or rhinorrhea. Mouth/Throat:      Mouth: Mucous membranes are moist.   Eyes:      Conjunctiva/sclera: Conjunctivae normal.      Pupils: Pupils are equal, round, and reactive to light. Cardiovascular:      Rate and Rhythm: Normal rate and regular rhythm. Pulses: Normal pulses. Pulmonary:      Effort: Pulmonary effort is normal. No respiratory distress. Breath sounds: Normal breath sounds. No stridor. No wheezing or rales. Abdominal:      General: Abdomen is flat. Bowel sounds are normal. There is no distension. Palpations: Abdomen is soft. Tenderness: There is abdominal tenderness. There is no guarding. Comments: Mild Suprapubic tenderness  Scar present from prior small bowel resection   Musculoskeletal:         General: No tenderness. Normal range of motion. Cervical back: Normal range of motion and neck supple. Skin:     General: Skin is warm. Findings: No erythema, lesion or rash. Neurological:      General: No focal deficit present. Mental Status: She is oriented to person, place, and time. Sensory: No sensory deficit. Motor: No weakness. Psychiatric:         Behavior: Behavior normal.        Procedures     MDM         Patient is a 40 y.o. female presenting with nausea and vomiting since last night. She arrived with stable vitals. Exam revealed unspecified abd pain, slow speech, otherwise no findings. She was given antiemetics via EMS. She was given Keppra in the ED since she missed her last few doses. Around 7 pm patient went to bathroom and nurse states she had seizure like activity, she had no post ictal state after the absence like seizure and was acting herself directly after. She had multiple of these episodes while in the ED. She was given 1mg ativan to help calm her. Labs and imaging were ordered. Labs showed no acute findings and CT head negative. CT abd confirmed liver mets from her cancer.  BF arrived towards end of her visit and told us she has a history of elevated ammonia levels and that was why she has been admitted in the past. Ammonia level was ordered and while pending I spoke with Dr. Marissa Turner who agreed to have the patient admitted to [facility] with tele.         --------------------------------------------- PAST HISTORY ---------------------------------------------  Past Medical History:  has a past medical history of Abdominal pain, Cancer ([facility] [address] Diarrhea, Hypocalcemia, Hypothyroidism, Irritable bowel syndrome, Migraines, [facility] and Status post alcohol detoxification. Past Surgical History:  has a past surgical history that includes Parathyroid gland surgery; Cholecystectomy, laparoscopic (07/06/2016); Thyroidectomy; Colonoscopy (N/A, 6/22/2018); CT NEEDLE BIOPSY LIVER PERCUTANEOUS (9/1/2020); Small intestine surgery (N/A, 10/21/2020); CT BIOPSY RENAL (1/25/2021); hc dialysis catheter (N/A, 1/28/2021); sigmoidoscopy (N/A, 5/14/2021); and Port Surgery (Left, 2/18/2022). Social History:  reports that she has been smoking cigarettes. She has been smoking an average of .25 packs per day. She has never used smokeless tobacco. She reports that she does not drink alcohol and does not use drugs. Family History: family history includes Alzheimer's Disease in an other family member; Asthma in her father; Breast Cancer in her maternal grandmother; Cancer in her father, maternal grandmother, and paternal grandfather; Diabetes in an other family member; Heart Disease in her father; High Blood Pressure in her father and mother; High Cholesterol in her mother; Marilyne Day in an other family member; Other in her mother. The patients home medications have been reviewed. Allergies: Patient has no known allergies.     -------------------------------------------------- RESULTS -------------------------------------------------    LABS:  Results for orders placed or performed during the hospital encounter of 11/13/22 CBC with Auto Differential   Result Value Ref Range    WBC 7.6 4.5 - 11.5 E9/L    RBC 3.81 3.50 - 5.50 E12/L    Hemoglobin 10.7 (L) 11.5 - 15.5 g/dL    Hematocrit 33.0 (L) 34.0 - 48.0 %    MCV 86.6 80.0 - 99.9 fL    MCH 28.1 26.0 - 35.0 pg    MCHC 32.4 32.0 - 34.5 %    RDW 20.1 (H) 11.5 - 15.0 fL    Platelets 179 005 - 436 E9/L    MPV 9.5 7.0 - 12.0 fL    Neutrophils % 63.2 43.0 - 80.0 %    Lymphocytes % 30.8 20.0 - 42.0 %    Monocytes % 2.6 2.0 - 12.0 %    Eosinophils % 3.4 0.0 - 6.0 %    Basophils % 1.1 0.0 - 2.0 %    Neutrophils Absolute 4.79 1.80 - 7.30 E9/L    Lymphocytes Absolute 2.36 1.50 - 4.00 E9/L    Monocytes Absolute 0.23 0.10 - 0.95 E9/L    Eosinophils Absolute 0.26 0.05 - 0.50 E9/L    Basophils Absolute 0.00 0.00 - 0.20 E9/L    Anisocytosis 2+     Polychromasia 1+     Poikilocytes 2+     Schistocytes 1+     Ovalocytes 1+     Target Cells 1+    Basic Metabolic Panel   Result Value Ref Range    Sodium 140 132 - 146 mmol/L    Potassium 4.0 3.5 - 5.0 mmol/L    Chloride 107 98 - 107 mmol/L    CO2 19 (L) 22 - 29 mmol/L    Anion Gap 14 7 - 16 mmol/L    Glucose 89 74 - 99 mg/dL    BUN 7 6 - 20 mg/dL    Creatinine 0.6 0.5 - 1.0 mg/dL    Est, Glom Filt Rate >60 >=60 mL/min/1.73    Calcium 8.4 (L) 8.6 - 10.2 mg/dL   Hepatic Function Panel   Result Value Ref Range    Total Protein 6.5 6.4 - 8.3 g/dL    Albumin 3.9 3.5 - 5.2 g/dL    Alkaline Phosphatase 94 35 - 104 U/L    ALT 35 (H) 0 - 32 U/L    AST 61 (H) 0 - 31 U/L    Total Bilirubin 0.9 0.0 - 1.2 mg/dL    Bilirubin, Direct 0.2 0.0 - 0.3 mg/dL    Bilirubin, Indirect 0.7 0.0 - 1.0 mg/dL   Serum Drug Screen   Result Value Ref Range    Ethanol Lvl <10 mg/dL    Acetaminophen Level 10.8 10.0 - 20.5 mcg/mL    Salicylate, Serum <5.5 0.0 - 30.0 mg/dL    TCA Scrn NEGATIVE Cutoff:300 ng/mL   URINE DRUG SCREEN   Result Value Ref Range    Amphetamine Screen, Urine NOT DETECTED Negative <1000 ng/mL    Barbiturate Screen, Ur POSITIVE (A) Negative < 200 ng/mL Benzodiazepine Screen, Urine NOT DETECTED Negative < 200 ng/mL    Cannabinoid Scrn, Ur POSITIVE (A) Negative < 50ng/mL    Cocaine Metabolite Screen, Urine NOT DETECTED Negative < 300 ng/mL    Opiate Scrn, Ur NOT DETECTED Negative < 300ng/mL    PCP Screen, Urine NOT DETECTED Negative < 25 ng/mL    Methadone Screen, Urine NOT DETECTED Negative <300 ng/mL    Oxycodone Urine NOT DETECTED Negative <100 ng/mL    FENTANYL SCREEN, URINE NOT DETECTED Negative <1 ng/mL    Drug Screen Comment: see below    Urinalysis with Microscopic   Result Value Ref Range    Color, UA Yellow Straw/Yellow    Clarity, UA Clear Clear    Glucose, Ur Negative Negative mg/dL    Bilirubin Urine Negative Negative    Ketones, Urine Negative Negative mg/dL    Specific Gravity, UA 1.010 1.005 - 1.030    Blood, Urine Negative Negative    pH, UA 7.5 5.0 - 9.0    Protein, UA Negative Negative mg/dL    Urobilinogen, Urine 0.2 <2.0 E.U./dL    Nitrite, Urine Negative Negative    Leukocyte Esterase, Urine Negative Negative    WBC, UA NONE 0 - 5 /HPF    RBC, UA NONE 0 - 2 /HPF    Bacteria, UA NONE SEEN None Seen /HPF   Lactate, Sepsis   Result Value Ref Range    Lactic Acid, Sepsis 1.3 0.5 - 1.9 mmol/L   POC Pregnancy Urine Qual   Result Value Ref Range    HCG, Urine, POC Negative Negative    Lot Number [id number]     Positive QC Pass/Fail Pass     Negative QC Pass/Fail Pass        RADIOLOGY:  CT ABDOMEN PELVIS W IV CONTRAST Additional Contrast? None   Final Result   No acute abdominopelvic abnormality. Fluid throughout the colon, which can   be seen in the setting of a diarrheal illness. Multiple hepatic lesions, not well evaluated on today's examination but   overall not significantly changed from prior study. Additional findings as above.          CT HEAD WO CONTRAST   Final Result   No acute intracranial abnormality.                 ------------------------- NURSING NOTES AND VITALS REVIEWED ---------------------------  Date / Time Roomed: 11/13/2022  5:12 PM  ED Bed Assignment:  17A/17A-17    The nursing notes within the ED encounter and vital signs as below have been reviewed. Patient Vitals for the past 24 hrs:   BP Temp Pulse Resp SpO2   11/13/22 2033 95/66 -- 69 16 98 %   11/13/22 1717 99/60 98 °F (36.7 °C) 67 18 97 %       Oxygen Saturation Interpretation: Normal    ------------------------------------------ PROGRESS NOTES ------------------------------------------  Re-evaluation(s):  Time: 2200  Patients symptoms show no change  Repeat physical examination is not changed    Counseling:  I have spoken with the patient and discussed todays results, in addition to providing specific details for the plan of care and counseling regarding the diagnosis and prognosis. Their questions are answered at this time and they are agreeable with the plan of admission.    --------------------------------- ADDITIONAL PROVIDER NOTES ---------------------------------  Consultations:  Time: 2300. Spoke with Dr. Jacey Constantino. Discussed case. They will admit the patient. This patient's ED course included: a personal history and physicial examination, re-evaluation prior to disposition, multiple bedside re-evaluations, IV medications, cardiac monitoring, continuous pulse oximetry, and complex medical decision making and emergency management    This patient has remained hemodynamically stable during their ED course. Diagnosis:  1. Nausea and vomiting, unspecified vomiting type    2. Increasing frequency of seizure activity ([address]    3. Altered behavior        Disposition:  Patient's disposition: Admit to telemetry  Patient's condition is stable. Patient was given return precautions. Labs were interpreted by me. Patient will follow up with their primary care provider. Patient is agreeable to this plan. Patient has remained stable throughout their stay in the ED. Patient was seen and evaluated by myself and my attending Grady Molina DO.  Assessment and Plan discussed with attending provider, please see attestation for final plan of care. This note was done using dictation software and there may be some grammatical errors associated with this.     Susan Borja, DO Susan Borja DO  Resident  11/13/22 6708

## 2022-11-14 ENCOUNTER — APPOINTMENT (OUTPATIENT)
Dept: GENERAL RADIOLOGY | Age: 37
DRG: 392 | End: 2022-11-14
Payer: MEDICARE

## 2022-11-14 ENCOUNTER — APPOINTMENT (OUTPATIENT)
Dept: CT IMAGING | Age: 37
DRG: 392 | End: 2022-11-14
Payer: MEDICARE

## 2022-11-14 PROBLEM — E44.0 MODERATE PROTEIN-CALORIE MALNUTRITION (HCC): Chronic | Status: ACTIVE | Noted: 2022-11-14

## 2022-11-14 PROBLEM — R11.2 NAUSEA AND VOMITING: Status: ACTIVE | Noted: 2022-11-14

## 2022-11-14 LAB
AMMONIA: 33 UMOL/L (ref 11–51)
ANION GAP SERPL CALCULATED.3IONS-SCNC: 13 MMOL/L (ref 7–16)
BUN BLDV-MCNC: 8 MG/DL (ref 6–20)
CALCIUM SERPL-MCNC: 8.2 MG/DL (ref 8.6–10.2)
CHLORIDE BLD-SCNC: 108 MMOL/L (ref 98–107)
CO2: 21 MMOL/L (ref 22–29)
CREAT SERPL-MCNC: 0.8 MG/DL (ref 0.5–1)
GFR SERPL CREATININE-BSD FRML MDRD: >60 ML/MIN/1.73
GLUCOSE BLD-MCNC: 147 MG/DL (ref 74–99)
LACTIC ACID, SEPSIS: 1.4 MMOL/L (ref 0.5–1.9)
POTASSIUM REFLEX MAGNESIUM: 3.7 MMOL/L (ref 3.5–5)
SODIUM BLD-SCNC: 142 MMOL/L (ref 132–146)

## 2022-11-14 PROCEDURE — 6370000000 HC RX 637 (ALT 250 FOR IP): Performed by: NURSE PRACTITIONER

## 2022-11-14 PROCEDURE — 6360000002 HC RX W HCPCS

## 2022-11-14 PROCEDURE — 6360000002 HC RX W HCPCS: Performed by: EMERGENCY MEDICINE

## 2022-11-14 PROCEDURE — 2580000003 HC RX 258: Performed by: INTERNAL MEDICINE

## 2022-11-14 PROCEDURE — 73110 X-RAY EXAM OF WRIST: CPT

## 2022-11-14 PROCEDURE — 83735 ASSAY OF MAGNESIUM: CPT

## 2022-11-14 PROCEDURE — 72125 CT NECK SPINE W/O DYE: CPT

## 2022-11-14 PROCEDURE — 6370000000 HC RX 637 (ALT 250 FOR IP): Performed by: INTERNAL MEDICINE

## 2022-11-14 PROCEDURE — 6360000002 HC RX W HCPCS: Performed by: INTERNAL MEDICINE

## 2022-11-14 PROCEDURE — 83605 ASSAY OF LACTIC ACID: CPT

## 2022-11-14 PROCEDURE — 36415 COLL VENOUS BLD VENIPUNCTURE: CPT

## 2022-11-14 PROCEDURE — 99222 1ST HOSP IP/OBS MODERATE 55: CPT | Performed by: NURSE PRACTITIONER

## 2022-11-14 PROCEDURE — 80048 BASIC METABOLIC PNL TOTAL CA: CPT

## 2022-11-14 PROCEDURE — 70450 CT HEAD/BRAIN W/O DYE: CPT

## 2022-11-14 PROCEDURE — 2060000000 HC ICU INTERMEDIATE R&B

## 2022-11-14 PROCEDURE — 99222 1ST HOSP IP/OBS MODERATE 55: CPT | Performed by: TRANSPLANT SURGERY

## 2022-11-14 RX ORDER — LANOLIN ALCOHOL/MO/W.PET/CERES
400 CREAM (GRAM) TOPICAL 2 TIMES DAILY
Status: DISCONTINUED | OUTPATIENT
Start: 2022-11-14 | End: 2022-11-15 | Stop reason: HOSPADM

## 2022-11-14 RX ORDER — GABAPENTIN 300 MG/1
300 CAPSULE ORAL 3 TIMES DAILY
Status: DISCONTINUED | OUTPATIENT
Start: 2022-11-14 | End: 2022-11-14

## 2022-11-14 RX ORDER — FENTANYL CITRATE 50 UG/ML
25 INJECTION, SOLUTION INTRAMUSCULAR; INTRAVENOUS ONCE
Status: COMPLETED | OUTPATIENT
Start: 2022-11-14 | End: 2022-11-14

## 2022-11-14 RX ORDER — SODIUM CHLORIDE 9 MG/ML
INJECTION, SOLUTION INTRAVENOUS CONTINUOUS
Status: DISCONTINUED | OUTPATIENT
Start: 2022-11-14 | End: 2022-11-15 | Stop reason: HOSPADM

## 2022-11-14 RX ORDER — POLYETHYLENE GLYCOL 3350 17 G/17G
17 POWDER, FOR SOLUTION ORAL DAILY PRN
Status: DISCONTINUED | OUTPATIENT
Start: 2022-11-14 | End: 2022-11-15 | Stop reason: HOSPADM

## 2022-11-14 RX ORDER — LANOLIN ALCOHOL/MO/W.PET/CERES
3 CREAM (GRAM) TOPICAL NIGHTLY
Status: DISCONTINUED | OUTPATIENT
Start: 2022-11-14 | End: 2022-11-15 | Stop reason: HOSPADM

## 2022-11-14 RX ORDER — FLUTICASONE PROPIONATE 50 MCG
1 SPRAY, SUSPENSION (ML) NASAL DAILY PRN
Status: DISCONTINUED | OUTPATIENT
Start: 2022-11-14 | End: 2022-11-15 | Stop reason: HOSPADM

## 2022-11-14 RX ORDER — SODIUM CHLORIDE 9 MG/ML
INJECTION, SOLUTION INTRAVENOUS PRN
Status: DISCONTINUED | OUTPATIENT
Start: 2022-11-14 | End: 2022-11-15 | Stop reason: HOSPADM

## 2022-11-14 RX ORDER — TOPIRAMATE 25 MG/1
50 TABLET ORAL 2 TIMES DAILY
Status: DISCONTINUED | OUTPATIENT
Start: 2022-11-14 | End: 2022-11-15 | Stop reason: HOSPADM

## 2022-11-14 RX ORDER — SODIUM CHLORIDE 0.9 % (FLUSH) 0.9 %
5-40 SYRINGE (ML) INJECTION EVERY 12 HOURS SCHEDULED
Status: DISCONTINUED | OUTPATIENT
Start: 2022-11-14 | End: 2022-11-15 | Stop reason: HOSPADM

## 2022-11-14 RX ORDER — BUTALBITAL, ACETAMINOPHEN AND CAFFEINE 50; 325; 40 MG/1; MG/1; MG/1
1 TABLET ORAL DAILY PRN
Status: DISCONTINUED | OUTPATIENT
Start: 2022-11-14 | End: 2022-11-15 | Stop reason: HOSPADM

## 2022-11-14 RX ORDER — LEVETIRACETAM 500 MG/1
750 TABLET ORAL 2 TIMES DAILY
Status: DISCONTINUED | OUTPATIENT
Start: 2022-11-14 | End: 2022-11-15 | Stop reason: HOSPADM

## 2022-11-14 RX ORDER — POTASSIUM CHLORIDE 20 MEQ/1
20 TABLET, EXTENDED RELEASE ORAL 2 TIMES DAILY
Status: DISCONTINUED | OUTPATIENT
Start: 2022-11-14 | End: 2022-11-15 | Stop reason: HOSPADM

## 2022-11-14 RX ORDER — SODIUM CHLORIDE 0.9 % (FLUSH) 0.9 %
5-40 SYRINGE (ML) INJECTION PRN
Status: DISCONTINUED | OUTPATIENT
Start: 2022-11-14 | End: 2022-11-15 | Stop reason: HOSPADM

## 2022-11-14 RX ORDER — LEVOTHYROXINE SODIUM 112 UG/1
112 TABLET ORAL DAILY
Status: DISCONTINUED | OUTPATIENT
Start: 2022-11-14 | End: 2022-11-15 | Stop reason: HOSPADM

## 2022-11-14 RX ORDER — ACETAMINOPHEN 325 MG/1
650 TABLET ORAL EVERY 6 HOURS PRN
Status: DISCONTINUED | OUTPATIENT
Start: 2022-11-14 | End: 2022-11-15 | Stop reason: HOSPADM

## 2022-11-14 RX ORDER — TRAMADOL HYDROCHLORIDE 50 MG/1
50 TABLET ORAL EVERY 8 HOURS PRN
Status: DISCONTINUED | OUTPATIENT
Start: 2022-11-14 | End: 2022-11-15 | Stop reason: HOSPADM

## 2022-11-14 RX ORDER — ZOLPIDEM TARTRATE 5 MG/1
5 TABLET ORAL NIGHTLY PRN
Status: DISCONTINUED | OUTPATIENT
Start: 2022-11-14 | End: 2022-11-15 | Stop reason: HOSPADM

## 2022-11-14 RX ORDER — BRIMONIDINE TARTRATE 2 MG/ML
1 SOLUTION/ DROPS OPHTHALMIC EVERY MORNING
Status: DISCONTINUED | OUTPATIENT
Start: 2022-11-14 | End: 2022-11-15 | Stop reason: HOSPADM

## 2022-11-14 RX ORDER — PANTOPRAZOLE SODIUM 40 MG/1
40 TABLET, DELAYED RELEASE ORAL
Status: DISCONTINUED | OUTPATIENT
Start: 2022-11-14 | End: 2022-11-15 | Stop reason: HOSPADM

## 2022-11-14 RX ORDER — GABAPENTIN 300 MG/1
600 CAPSULE ORAL 3 TIMES DAILY
Status: DISCONTINUED | OUTPATIENT
Start: 2022-11-14 | End: 2022-11-15 | Stop reason: HOSPADM

## 2022-11-14 RX ORDER — ACETAMINOPHEN 650 MG/1
650 SUPPOSITORY RECTAL EVERY 6 HOURS PRN
Status: DISCONTINUED | OUTPATIENT
Start: 2022-11-14 | End: 2022-11-15 | Stop reason: HOSPADM

## 2022-11-14 RX ADMIN — TOPIRAMATE 50 MG: 25 TABLET, FILM COATED ORAL at 10:37

## 2022-11-14 RX ADMIN — FENTANYL CITRATE 25 MCG: 50 INJECTION INTRAMUSCULAR; INTRAVENOUS at 02:39

## 2022-11-14 RX ADMIN — POTASSIUM CHLORIDE 20 MEQ: 1500 TABLET, EXTENDED RELEASE ORAL at 20:16

## 2022-11-14 RX ADMIN — FENTANYL CITRATE 25 MCG: 0.05 INJECTION, SOLUTION INTRAMUSCULAR; INTRAVENOUS at 00:44

## 2022-11-14 RX ADMIN — POTASSIUM CHLORIDE 20 MEQ: 1500 TABLET, EXTENDED RELEASE ORAL at 10:37

## 2022-11-14 RX ADMIN — SODIUM CHLORIDE: 9 INJECTION, SOLUTION INTRAVENOUS at 12:24

## 2022-11-14 RX ADMIN — MAGNESIUM OXIDE 400 MG (241.3 MG MAGNESIUM) TABLET 400 MG: TABLET at 10:37

## 2022-11-14 RX ADMIN — ZOLPIDEM TARTRATE 5 MG: 5 TABLET ORAL at 20:16

## 2022-11-14 RX ADMIN — TRAMADOL HYDROCHLORIDE 50 MG: 50 TABLET, COATED ORAL at 12:19

## 2022-11-14 RX ADMIN — ONDANSETRON HYDROCHLORIDE 4 MG: 2 SOLUTION INTRAMUSCULAR; INTRAVENOUS at 12:19

## 2022-11-14 RX ADMIN — KETOROLAC TROMETHAMINE 15 MG: 30 INJECTION, SOLUTION INTRAMUSCULAR at 20:16

## 2022-11-14 RX ADMIN — LEVETIRACETAM 750 MG: 500 TABLET, FILM COATED ORAL at 20:16

## 2022-11-14 RX ADMIN — ONDANSETRON HYDROCHLORIDE 4 MG: 2 SOLUTION INTRAMUSCULAR; INTRAVENOUS at 17:27

## 2022-11-14 RX ADMIN — PANTOPRAZOLE SODIUM 40 MG: 40 TABLET, DELAYED RELEASE ORAL at 10:37

## 2022-11-14 RX ADMIN — GABAPENTIN 300 MG: 300 CAPSULE ORAL at 15:16

## 2022-11-14 RX ADMIN — GABAPENTIN 600 MG: 300 CAPSULE ORAL at 20:16

## 2022-11-14 RX ADMIN — BRIMONIDINE TARTRATE 1 DROP: 2 SOLUTION OPHTHALMIC at 12:19

## 2022-11-14 RX ADMIN — TOPIRAMATE 50 MG: 25 TABLET, FILM COATED ORAL at 20:16

## 2022-11-14 RX ADMIN — ONDANSETRON HYDROCHLORIDE 4 MG: 2 SOLUTION INTRAMUSCULAR; INTRAVENOUS at 21:07

## 2022-11-14 RX ADMIN — GABAPENTIN 300 MG: 300 CAPSULE ORAL at 10:37

## 2022-11-14 RX ADMIN — MAGNESIUM OXIDE 400 MG (241.3 MG MAGNESIUM) TABLET 400 MG: TABLET at 20:16

## 2022-11-14 RX ADMIN — LEVETIRACETAM 750 MG: 500 TABLET, FILM COATED ORAL at 10:37

## 2022-11-14 ASSESSMENT — PAIN DESCRIPTION - DESCRIPTORS
DESCRIPTORS: ACHING;NAGGING;DISCOMFORT
DESCRIPTORS: ACHING;NAGGING;DISCOMFORT

## 2022-11-14 ASSESSMENT — PAIN SCALES - GENERAL
PAINLEVEL_OUTOF10: 9
PAINLEVEL_OUTOF10: 10
PAINLEVEL_OUTOF10: 9

## 2022-11-14 ASSESSMENT — PAIN DESCRIPTION - ORIENTATION: ORIENTATION: RIGHT

## 2022-11-14 ASSESSMENT — PAIN DESCRIPTION - LOCATION
LOCATION: ABDOMEN;GENERALIZED
LOCATION: ABDOMEN;GENERALIZED

## 2022-11-14 NOTE — H&P
[address]                  [address] [facility]  [facility]                              HISTORY AND PHYSICAL    PATIENT NAME: Surinder Newberry                    :        1985  MED REC NO:   [id number]                            ROOM:       7421  ACCOUNT NO:   [de-identified]                           ADMIT DATE: 2022  PROVIDER:     Garrick Nina DO    CHIEF COMPLAINT:  Nausea, vomiting. HISTORY OF PRESENT ILLNESS:  The patient is a 64-year-old   female who presented to the emergency room complaining of persistent  nausea, vomiting. She takes Phenergan and Zofran at home. She has  history of neuroendocrine tumor with mets to the liver. She sees Dr. Zane Vieyra. She is scheduled for radiation therapy on 2022. She  does have a history of seizure disorder, on Keppra. She missed a few  doses. She states she has had some increased frequency of seizures. She was seen in the emergency room and admitted for further evaluation  and treatment. MEDICATIONS PRIOR TO ADMISSION:  Ambien, Fioricet p.r.n. headache. The  patient was on rifaximin in the past which she is not taking. Keppra,  Synthroid, melatonin, Pepcid, Ambien, Neurontin, Flonase, potassium  chloride, Topamax, Alphagan eyedrops, Mag-Ox. PRIMARY CARE PHYSICIAN:  Zenaida Krishnamurthy DO    PAST MEDICAL HISTORY:  Neuroendocrine tumor with mets to the liver,  hypoparathyroidism, history of alcohol abuse, migraine headaches. PAST SURGICAL HISTORY:  Small intestine surgery, wisdom teeth  extraction, cholecystectomy, CT-guided needle biopsy of the liver,  parathyroid gland surgery, thyroidectomy. SOCIAL HISTORY:  The patient quit alcohol. Smokes cigarettes. REVIEW OF SYSTEMS:  Remarkable for above-stated chief complaint. ALLERGIES:  None known. The patient's oncologist is Dr. Jesse Felix.     PHYSICAL EXAMINATION:  GENERAL APPEARANCE:  Physical exam reveals a 36-year-old   female who is alert, cooperative, and a good historian. VITAL SIGNS:  On admission, temperature 98 degrees, pulse 67,  respirations 18, blood pressure 99/60. HEAD:  Normocephalic, atraumatic. EYES:  Pupils equal and reactive to light. Extraocular muscles intact. Fundi not well visualized. NOSE:  No obstruction, polyp, or discharge noted. MOUTH:  Mucosa without lesion. PHARYNX:  Noninjected without exudate. NECK:  Supple. No JVD. No thyromegaly. No carotid bruits. HEART:  Regular rate and rhythm without murmur. LUNGS:  Clear to auscultation bilaterally. ABDOMEN:  Positive bowel sounds. Soft. There is tenderness to  palpation of the right upper quadrant. No rebound or guarding. No  hepatosplenomegaly. No masses. BACK:  Intact without lesion. EXTREMITIES:  Without edema. LYMPH NODES:  No adenopathy noted. SKIN:  Without rash or lesion. IMPRESSION:  Intractable nausea; vomiting; neuroendocrine tumor with  mets to the liver; hypothyroidism; seizure disorder, on Keppra with  noncompliance with Keppra with breakthrough seizures. PLAN:  Admit. Continue Keppra. Surgery to see. IV hydration,  anti-nausea meds. Discharge plan, home when stable.         Denys Leonard DO    D: 11/14/2022 8:27:50       T: 11/14/2022 8:30:07     [id number]  Job#: [id number]     Doc#: [id number]    CC:

## 2022-11-14 NOTE — PROGRESS NOTES
Called to access left chest port. The site appears asymptomatic. The site was cleaned with chorprep scrub x 1 min. Bell Balling allowed to dry. A 20 gauge. 1 inch bowers needle ws used to access the port without diff. Brisk venous return. Flushed with 20 cc NSS. Bell Balling antimicrobal dressing applied. Alcohol caps applied. Pt. Tolerated well.

## 2022-11-14 NOTE — ED NOTES
Patient yelling out. Multiple RNs found the patient standing at bed side, both IV sites pulled out and blood on the floor. RN tried to access another site with no success. Patient started thrashing around almost needle sticking the RN trying to gain access. Patient to CT and to the floor from there.       Chelsea Parekh RN  11/14/22 [id number]

## 2022-11-14 NOTE — CONSULTS
Palliative Care Department  [phone]  Palliative Care Initial Consult  Provider Collette Come, APRN - CNP      PATIENT: William Jones  : 1985  MRN: [id number]  ADMISSION DATE: 2022  5:12 PM  Referring Provider: Anastacia Kurtz DO    Palliative Medicine was consulted on hospital day 1 for assistance with Symptom management     HPI:     Addison Clemons is a 40 y.o. y/o female with a history of IBS, migraines, seizures, metastatic neuroendocrine cancer with mets to the liver who presented to [facility]) on 2022 with nausea, vomiting, and headaches. She had seizure-like activity in the ED. CT of the head showed no acute abnormalities. She was admitted to telemetry for further medical management. ASSESSMENT/PLAN:     Pertinent Hospital Diagnoses     Metastatic neuroendocrine cancer  Seizure-like activity  Intractable nausea and vomiting    Symptom management    Pain due to neoplasm   -Continue tramadol 50 mg every 8 hours as needed   -Increase gabapentin 600 mg 3 times daily   -Continue tramadol 50 mg every 6 hours as needed   -Consider starting duloxetine    Nausea   -Continue Zofran as needed    Palliative Care Encounter / Counseling Regarding Goals of Care  Please see detailed goals of care discussion as below  At this time, William Jones, Does Not have capacity for medical decision-making. Capacity is time limited and situation/question specific  Code status Full Code  Advanced Directives: no POA or living will in epic  Surrogate/Legal NOK:  Selina Sanchez (Father) [phone]    Spiritual assessment: no spiritual distress identified  Bereavement and grief: to be determined  Referrals to: none today    Thank you for the opportunity to participate in the care of William Jones. Collette Come, APRN - CNP   Palliative Medicine     SUBJECTIVE:     Details of Conversation: Chart reviewed and spoke with the patient's bedside nurse.   Nurse was concerned about patient's current mentation. She states that she was alert and conversant with her prior and now is drowsy. Nurse is concerned that she may have taken something. I met with the patient and her significant other at the bedside. She was somnolent, but able to answer questions, but is confused. She does not remember if she has had any vomiting today. Her significant other states that she was able to eat some things for lunch. She states that she has been getting pain in her right hip that radiates down her leg. She describes the pain as sharp and burning. Discussed increasing her gabapentin and considering starting duloxetine. She was agreeable with plan. She denied any other current needs at this time. Prognosis: Guarded    OBJECTIVE:     BP (!) 107/57   Pulse 70   Temp 97 °F (36.1 °C)   Resp 17   Ht 5' (1.524 m)   Wt 128 lb (58.1 kg)   LMP  (LMP Unknown) Comment: mirena  SpO2 98%   BMI 25.00 kg/m²     Physical Examination:  Gen: thin, NAD, awake, alert   HEENT: normocephalic, atraumatic  Neck: trachea midline, no JVD  Lungs: respirations easy and not labored,   Heart: regular rate and rhythm, distant heart tones,   Abdomen: normoactive bowel sounds, soft, non-tender  Extremities: no clubbing, cyanosis or edema, moving all extremities    Skin: warm, dry without rashes, lesions, bruising  Neuro: Somnolent, confusion, follows commands, no gross neurologic deficit    Objective data reviewed: labs, images, records, medication use, vitals, and chart    Time/Communication  Greater than 50% of time spent, total 50 minutes in counseling and coordination of care at the bedside regarding symptom management. Thank you for allowing Palliative Medicine to participate in the care of Gissel Greene. Note: This report was completed using [employer] voiced recognition software. Every effort has been made to ensure accuracy; however, inadvertent computerized transcription errors may be present.

## 2022-11-14 NOTE — ED NOTES
Pt on call light and this RN in. Pt stating she forgot what she needed and directing this RN to pick a piece of paper up off the floor. Pt sitting comfortably on her phone. This RN into shared room next door when patient begins to throw herself onto the floor. This RN immediately into room. Pt laying on the floor. Pt denies head or neck pain and refusing C-collar. Pt climbed back in bed and rails put up. Call light in reach.  Charge RN, physician, and primary RN notified     Deacon Mathews RN  11/14/22 [id number]

## 2022-11-14 NOTE — CARE COORDINATION
11/14/22  Attempted to speak with patient about transition of care. Patient admitted for altered mental state, seizures, nausea and vomiting. Patient is noted to have  neuroendocrine tumor with mets to the liver. Patient was planned to start radiation on 11/16 at [facility] per patients father. Patient very lethargic and declining to answer most questions. Patient does state that she lives with her parents who help her out. Call placed to patients father Shan Laurent who confirms patient lives with him. Father states patient was independent prior to hospital admit but does have a walker for periods of fatigue. Patient was not active with any home care and goal is to return home with parents once medically stable. PCP is Dr. Omi Armijo and pharmacy choice is [facility]. Patient is pending a Palliative consult for symptom management. SW/ANNIKA to follow for any discharge needs.     Electronically signed by JONH Zamora on 11/14/2022 at 2:46 PM

## 2022-11-14 NOTE — CONSULTS
HEPATOBILIARY AND PANCREATIC SURGERY  CONSULT NOTE  11/14/2022    Physician Consulted: Dr. Griselda Mango  Reason for Consult: Intractable nausea and vomiting with history of metastatic neuroendocrine tumor  Referring Physician: Dr. Ag Jordan    [facility]  Carolann Members is a 40 y.o. 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 Patient states that over the last day and a half prior to presentation to the ED she noted to have more than 12 episodes of vomiting gastric contents/bilious emesis. Patient denies any blood noted in emesis. 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 She denies any current sick contacts. Patient denies any fever, chills, hematemesis, bright red blood per rectum, black/tarry stools. Initial work-up in the ED demonstrated, lab work largely unremarkable with very mildly elevated AST/ALT but otherwise normal LFTs, normal WBC at 7.6, and unremarkable BMP. Patient underwent CT of the abdomen pelvis with IV contrast which demonstrated an no significant change from prior CTs of the abdomen and pelvis with continued demonstration of multiple hepatic lesions consistent with metastasis as well as noted fluid levels in the colon. Hepatobiliary and pancreatic surgery was consulted secondary to patient's history of metastatic neuroendocrine tumor and current intractable nausea/vomiting. Patient states that since being admitted to the hospital that this a.m. she was able to tolerate a diet but without much appetite.   Patient denies any nausea/vomiting this AM.      Past Medical History:   Diagnosis Date Abdominal pain     Cancer ([facility] [address]     neuroendocrime tumor    Diarrhea     Hypocalcemia     Hypothyroidism     Irritable bowel syndrome     Migraines     Seizures ([facility] [address]     last episode January 2021    Status post alcohol detoxification     5/22/2018-5/29/2018       Past Surgical History:   Procedure Laterality Date    CHOLECYSTECTOMY, LAPAROSCOPIC  07/06/2016    COLONOSCOPY N/A 6/22/2018    COLONOSCOPY WITH BIOPSY performed by Sofia Smith MD at [facility] ENDOSCOPY    CT BIOPSY RENAL  1/25/2021    CT BIOPSY RENAL 1/25/2021 Renetta Santillan II, MD [facility]    CT NEEDLE BIOPSY LIVER PERCUTANEOUS  9/1/2020    CT NEEDLE BIOPSY LIVER PERCUTANEOUS 9/1/2020 [facility] CT    HC DIALYSIS CATHETER N/A 1/28/2021    TESIO CATHETER INSERTION AND REMOVAL OF TEMPORARY performed by Kamari Song MD at [address] Left 2/18/2022    MEDI PORT PLACEMENT, Left side. performed by Valentina Jordan MD at [address] [facility] [facility] N/A 5/14/2021    SIGMOIDOSCOPY PERIANAL BOTOX INJECTION   (50 UNITS) performed by Sofia Smith MD at [address] N/A 10/21/2020    LAPAROSCOPIC ROBOTIC SMALL BOWEL RESECTION WITH PLANNED TRANSITION TO OPEN performed by Daya Sykes MD at [address]         Medications Prior to Admission:    Prior to Admission medications    Medication Sig Start Date End Date Taking? Authorizing Provider   zolpidem (AMBIEN) 10 MG tablet take 1 tablet by mouth nightly if needed for sleep 10/28/22 11/28/22  Sarkis Mcclellan DO   traMADol (ULTRAM) 50 MG tablet Take 1 tablet by mouth every 8 hours as needed for Pain for up to 90 days.  10/18/22 1/16/23  TRINA Rivera - CNP   FLUoxetine (PROZAC) 20 MG capsule  9/12/22   Historical Provider, MD   butalbital-acetaminophen-caffeine Patjabier Ek, ESGIC) -71 MG per tablet take 1 tablet by mouth once daily if needed for headache 10/3/22   Sarkis Mcclellan DO Cigarettes    Smokeless tobacco: Never   Vaping Use    Vaping Use: Never used   Substance Use Topics    Alcohol use: No     Alcohol/week: 0.0 standard drinks     Comment: 5 years sober    Drug use: No         Review of Systems negative unless otherwise noted in HPI      PHYSICAL EXAM:    Vitals:    11/14/22 1431   BP: (!) 107/57   Pulse: 70   Resp: 17   Temp: 97 °F (36.1 °C)   SpO2: 98%       General Appearance:  awake, alert, oriented, in no acute distress  Skin:  Skin color, texture, turgor normal. No rashes or lesions. Head/face:  NCAT  Eyes:  No gross abnormalities. , PERRL, EOMI, and Sclera nonicteric  Lungs:  Normal expansion. Clear to auscultation. No rales, rhonchi, or wheezing. Heart:  Heart regular rate and rhythm  Abdomen: Soft, nondistended, minimally tender bilateral lower quadrants without rebound/guarding/rigidity. Extremities: Extremities warm to touch, pink, with no edema. LABS:    CBC  Recent Labs     11/13/22  1747   WBC 7.6   HGB 10.7*   HCT 33.0*        BMP  Recent Labs     11/13/22  1747      K 4.0      CO2 19*   BUN 7   CREATININE 0.6   CALCIUM 8.4*     Liver Function  Recent Labs     11/13/22  1747   BILITOT 0.9   BILIDIR 0.2   AST 61*   ALT 35*   ALKPHOS 94   PROT 6.5   LABALBU 3.9     No results for input(s): LACTATE in the last 72 hours. No results for input(s): INR, PTT in the last 72 hours. Invalid input(s): PT    RADIOLOGY    XR WRIST RIGHT (MIN 3 VIEWS)    Result Date: 11/14/2022  EXAMINATION: 3 XRAY VIEWS OF THE RIGHT WRIST 11/14/2022 2:42 am COMPARISON: 19 July 2019 HISTORY: ORDERING SYSTEM PROVIDED HISTORY: Fall wrist pain TECHNOLOGIST PROVIDED HISTORY: Reason for exam:->Fall wrist pain What reading provider will be dictating this exam?->CRC FINDINGS: There is no evidence of acute fracture. There is normal alignment. No acute joint abnormality. No focal osseous lesion. No focal soft tissue abnormality. No acute osseous abnormality.      CT HEAD WO CONTRAST    Result Date: 11/14/2022  EXAMINATION: CT OF THE HEAD WITHOUT CONTRAST  11/14/2022 5:25 am TECHNIQUE: CT of the head was performed without the administration of intravenous contrast. Automated exposure control, iterative reconstruction, and/or weight based adjustment of the mA/kV was utilized to reduce the radiation dose to as low as reasonably achievable. COMPARISON: CT head dated 11/13/2022 HISTORY: ORDERING SYSTEM PROVIDED HISTORY: Evaluate intracranial abnormality TECHNOLOGIST PROVIDED HISTORY: Has a \"code stroke\" or \"stroke alert\" been called? ->No Reason for exam:->Evaluate intracranial abnormality What reading provider will be dictating this exam?->CRC FINDINGS: BRAIN/VENTRICLES: There is no acute intracranial hemorrhage, mass effect or midline shift. No abnormal extra-axial fluid collection. The gray-white differentiation is maintained without evidence of an acute infarct. There is no evidence of hydrocephalus. ORBITS: The visualized portion of the orbits demonstrate no acute abnormality. SINUSES: The visualized paranasal sinuses and mastoid air cells demonstrate no acute abnormality. SOFT TISSUES/SKULL:  No acute abnormality of the visualized skull or soft tissues. No acute intracranial abnormality. CT HEAD WO CONTRAST    Result Date: 11/13/2022  EXAMINATION: CT OF THE HEAD WITHOUT CONTRAST  11/13/2022 9:28 pm TECHNIQUE: CT of the head was performed without the administration of intravenous contrast. Automated exposure control, iterative reconstruction, and/or weight based adjustment of the mA/kV was utilized to reduce the radiation dose to as low as reasonably achievable. COMPARISON: 09/22/2022 HISTORY: ORDERING SYSTEM PROVIDED HISTORY: feels her speech is slurred TECHNOLOGIST PROVIDED HISTORY: Reason for exam:->feels her speech is slurred Has a \"code stroke\" or \"stroke alert\" been called? ->No Decision Support Exception - unselect if not a suspected or confirmed emergency medical condition->Emergency Medical Condition (MA) What reading provider will be dictating this exam?->CRC FINDINGS: BRAIN/VENTRICLES: There is no acute intracranial hemorrhage, mass effect or midline shift. No abnormal extra-axial fluid collection. The gray-white differentiation is maintained without evidence of an acute infarct. There is no evidence of hydrocephalus. ORBITS: The visualized portion of the orbits demonstrate no acute abnormality. SINUSES: The visualized paranasal sinuses and mastoid air cells demonstrate no acute abnormality. SOFT TISSUES/SKULL:  No acute abnormality of the visualized skull or soft tissues. No acute intracranial abnormality. CT CERVICAL SPINE WO CONTRAST    Result Date: 11/14/2022  EXAMINATION: CT OF THE CERVICAL SPINE WITHOUT CONTRAST 11/14/2022 5:25 am TECHNIQUE: CT of the cervical spine was performed without the administration of intravenous contrast. Multiplanar reformatted images are provided for review. Automated exposure control, iterative reconstruction, and/or weight based adjustment of the mA/kV was utilized to reduce the radiation dose to as low as reasonably achievable. COMPARISON: None. HISTORY: ORDERING SYSTEM PROVIDED HISTORY: Fall out of bed TECHNOLOGIST PROVIDED HISTORY: Reason for exam:->Fall out of bed What reading provider will be dictating this exam?->CRC FINDINGS: BONES/ALIGNMENT: There is no acute fracture or traumatic malalignment. DEGENERATIVE CHANGES: No significant degenerative changes. SOFT TISSUES: There is no prevertebral soft tissue swelling. No acute abnormality of the cervical spine. CT ABDOMEN PELVIS W IV CONTRAST Additional Contrast? None    Result Date: 11/13/2022  EXAMINATION: CT OF THE ABDOMEN AND PELVIS WITH CONTRAST 11/13/2022 9:28 pm TECHNIQUE: CT of the abdomen and pelvis was performed with the administration of intravenous contrast. Multiplanar reformatted images are provided for review.  Automated exposure control, iterative reconstruction, and/or weight based adjustment of the mA/kV was utilized to reduce the radiation dose to as low as reasonably achievable. COMPARISON: 10/25/2022 HISTORY: ORDERING SYSTEM PROVIDED HISTORY: nausea and vomiting abd pain TECHNOLOGIST PROVIDED HISTORY: Reason for exam:->nausea and vomiting abd pain Additional Contrast?->None Decision Support Exception - unselect if not a suspected or confirmed emergency medical condition->Emergency Medical Condition (MA) What reading provider will be dictating this exam?->CRC FINDINGS: Lower Chest:  Visualized portion of the lower chest demonstrates no acute abnormality. Organs: The liver is not cirrhotic in configuration. There are multiple hypoattenuating hepatic lesions, not well evaluated on today's examination. The gallbladder is surgically absent. The spleen, pancreas, adrenals, and kidneys are unremarkable. GI/Bowel: Status post prior bowel resection anastomosis. No evidence of obstruction or inflammation. There is fluid throughout the colon. Pelvis: The urinary bladder is decompressed. IUD noted within the uterus. Peritoneum/Retroperitoneum: Trace free fluid layering within the pelvis. No extraluminal gas. No evidence of lymphadenopathy. Aorta is normal in caliber. Bones/Soft Tissues:  No acute abnormality of the visualized osseous structures. Soft tissue nodules within the bilateral gluteal subcutaneous fat, favored to represent injection granulomas, unchanged from prior examination. No acute abdominopelvic abnormality. Fluid throughout the colon, which can be seen in the setting of a diarrheal illness. Multiple hepatic lesions, not well evaluated on today's examination but overall not significantly changed from prior study. Additional findings as above.          ASSESSMENT:  40 y.o. 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

## 2022-11-14 NOTE — PROGRESS NOTES
Comprehensive Nutrition Assessment    Type and Reason for Visit:  Initial, Consult    Nutrition Recommendations/Plan:   Recommend and start Ensure Plus supplement BID and Gelatein supplement daily to help meet increased nutritional needs and d/t decreased po intake of meals. Malnutrition Assessment:  Malnutrition Status: Moderate malnutrition (11/14/22 1016)    Context:  Chronic Illness     Findings of the 6 clinical characteristics of malnutrition:  Energy Intake:  75% or less estimated energy requirements for 1 month or longer  Weight Loss:  Unable to assess (d/t possible fluid shifts)     Body Fat Loss:   (moderate) Orbital, Triceps   Muscle Mass Loss:   (moderate) Temples (temporalis), Clavicles (pectoralis & deltoids)  Fluid Accumulation:  No significant fluid accumulation     Strength:  Not Performed    Nutrition Assessment:    Patient adm w/ AMS and abd pain and N/V  ; ammonia levels WNL ; hx of metastatic carcinoid with neuroendocrine tumor (noted liver mets) ; hx of IBS and small bowel resection ; remote hx of Crohns ; hx of seizures and moderate malnutrition ; pt does meet criteria for moderate malnutrition ; hx of ETOH abuse ; will start ONS    Nutrition Related Findings:    I&Os WNL, no edema, active BS, chronic loose stools, N/V PTA, muscle/fat wasting ; Wound Type: None       Current Nutrition Intake & Therapies:    Average Meal Intake: 26-50%     ADULT DIET; Regular    Anthropometric Measures:  Height: 5' (152.4 cm)  Ideal Body Weight (IBW): 100 lbs (45 kg)       Current Body Weight: 128 lb (58.1 kg) (11/14, no method), 128 % IBW. Current BMI (kg/m2): 25  Usual Body Weight:  (UTO ; EMR shows past weights of 119# actual on 10/13/22 and 116# actual on 9/15/22 and 130# actual on 1/22/21)                       BMI Categories: Normal Weight (BMI 18.5-24. 9)    Estimated Daily Nutrient Needs:  Energy Requirements Based On: Formula  Weight Used for Energy Requirements: Current  Energy (kcal/day): 9102-1973 (REE 1193 x 1.3 SF)  Weight Used for Protein Requirements: Current  Protein (g/day): 75-90 (1.3-1.5g/kg CBW)  Method Used for Fluid Requirements: 1 ml/kcal  Fluid (ml/day): 2315-7108    Nutrition Diagnosis:   Moderate malnutrition, In context of chronic illness related to catabolic illness (hx of metastatic carcinoid with neuroendocrine tumor) as evidenced by poor intake prior to admission, moderate loss of subcutaneous fat, moderate muscle loss    Nutrition Interventions:   Food and/or Nutrient Delivery: Continue Current Diet, Start Oral Nutrition Supplement  Nutrition Education/Counseling: Education not indicated  Coordination of Nutrition Care: Continue to monitor while inpatient       Goals:  Previous Goal Met: Progressing toward Goal(s)  Goals: PO intake 75% or greater, by next RD assessment       Nutrition Monitoring and Evaluation:   Behavioral-Environmental Outcomes: None Identified  Food/Nutrient Intake Outcomes: Food and Nutrient Intake, Supplement Intake  Physical Signs/Symptoms Outcomes: Biochemical Data, Chewing or Swallowing, GI Status, Fluid Status or Edema, Hemodynamic Status, Meal Time Behavior, Skin, Nutrition Focused Physical Findings, Weight, Nausea or Vomiting, Diarrhea    Discharge Planning:     Too soon to determine     [place] GAL Zamora, LD  Contact: [phone]

## 2022-11-14 NOTE — ED NOTES
This RN was in another patients room. This patient ended up falling on the hospital floor. Unwitnessed. Multiple RNs attended to patient post fall, as well as Dr. Red Dobson. Patient refused to wear c collar. Vitals updated.       Patricia Jerry RN  11/14/22 3585

## 2022-11-14 NOTE — ED NOTES
Patient spilled drink all over her bed. Complete linen and clothing changed.       Lizbeth Serrano RN  11/14/22 9774

## 2022-11-14 NOTE — ED NOTES
I was called to bed side patient reportedly fell out of her bed she was at the edge of the bed and reportedly was looking for her phone number. Patient did fall did not hit her head she did hit her face. Patient does complain of facial pain head pain as well as neck pain. Patient reporting no numbness she does complain of right wrist.  Patient able move all other extremities. There is no gross swelling to her wrist.  Patient will undergo imaging c-collar was ordered. Patient declined c-collar.   CTs and x-rays will be reviewed as long as CT showed no acute findings plan will be to admit to floor     Rayne Morris MD  11/14/22 3527

## 2022-11-14 NOTE — PROGRESS NOTES
General Surgery consult called to [address] [facility] per perfect serv/PT add to census.

## 2022-11-15 ENCOUNTER — TELEPHONE (OUTPATIENT)
Dept: CASE MANAGEMENT | Age: 37
End: 2022-11-15

## 2022-11-15 VITALS
TEMPERATURE: 97.1 F | HEIGHT: 60 IN | DIASTOLIC BLOOD PRESSURE: 46 MMHG | RESPIRATION RATE: 20 BRPM | OXYGEN SATURATION: 100 % | SYSTOLIC BLOOD PRESSURE: 94 MMHG | BODY MASS INDEX: 25.13 KG/M2 | HEART RATE: 66 BPM | WEIGHT: 128 LBS

## 2022-11-15 PROBLEM — A08.4 VIRAL GASTROENTERITIS: Status: ACTIVE | Noted: 2022-11-15

## 2022-11-15 LAB — MAGNESIUM: 1.9 MG/DL (ref 1.6–2.6)

## 2022-11-15 PROCEDURE — 6360000002 HC RX W HCPCS

## 2022-11-15 PROCEDURE — 6370000000 HC RX 637 (ALT 250 FOR IP): Performed by: INTERNAL MEDICINE

## 2022-11-15 PROCEDURE — 97165 OT EVAL LOW COMPLEX 30 MIN: CPT

## 2022-11-15 PROCEDURE — 99232 SBSQ HOSP IP/OBS MODERATE 35: CPT | Performed by: TRANSPLANT SURGERY

## 2022-11-15 PROCEDURE — 6370000000 HC RX 637 (ALT 250 FOR IP)

## 2022-11-15 PROCEDURE — 2580000003 HC RX 258: Performed by: INTERNAL MEDICINE

## 2022-11-15 PROCEDURE — 6370000000 HC RX 637 (ALT 250 FOR IP): Performed by: NURSE PRACTITIONER

## 2022-11-15 PROCEDURE — 6360000002 HC RX W HCPCS: Performed by: INTERNAL MEDICINE

## 2022-11-15 RX ORDER — GABAPENTIN 300 MG/1
600 CAPSULE ORAL 3 TIMES DAILY
Qty: 540 CAPSULE | Refills: 1
Start: 2022-11-15 | End: 2022-11-15 | Stop reason: SDUPTHER

## 2022-11-15 RX ORDER — LOPERAMIDE HYDROCHLORIDE 2 MG/1
2 CAPSULE ORAL 4 TIMES DAILY PRN
Status: DISCONTINUED | OUTPATIENT
Start: 2022-11-15 | End: 2022-11-15 | Stop reason: HOSPADM

## 2022-11-15 RX ORDER — GABAPENTIN 300 MG/1
600 CAPSULE ORAL 3 TIMES DAILY
Qty: 180 CAPSULE | Refills: 0 | Status: ON HOLD | OUTPATIENT
Start: 2022-11-15 | End: 2022-12-15

## 2022-11-15 RX ADMIN — MAGNESIUM OXIDE 400 MG (241.3 MG MAGNESIUM) TABLET 400 MG: TABLET at 10:00

## 2022-11-15 RX ADMIN — KETOROLAC TROMETHAMINE 15 MG: 30 INJECTION, SOLUTION INTRAMUSCULAR at 10:00

## 2022-11-15 RX ADMIN — TRAMADOL HYDROCHLORIDE 50 MG: 50 TABLET, COATED ORAL at 02:54

## 2022-11-15 RX ADMIN — LOPERAMIDE HYDROCHLORIDE 2 MG: 2 CAPSULE ORAL at 10:00

## 2022-11-15 RX ADMIN — TOPIRAMATE 50 MG: 25 TABLET, FILM COATED ORAL at 10:00

## 2022-11-15 RX ADMIN — SODIUM CHLORIDE: 9 INJECTION, SOLUTION INTRAVENOUS at 02:50

## 2022-11-15 RX ADMIN — PANTOPRAZOLE SODIUM 40 MG: 40 TABLET, DELAYED RELEASE ORAL at 06:44

## 2022-11-15 RX ADMIN — BUTALBITAL, ACETAMINOPHEN, AND CAFFEINE 1 TABLET: 50; 325; 40 TABLET ORAL at 01:04

## 2022-11-15 RX ADMIN — LEVOTHYROXINE SODIUM 112 MCG: 0.11 TABLET ORAL at 06:44

## 2022-11-15 RX ADMIN — POTASSIUM CHLORIDE 20 MEQ: 1500 TABLET, EXTENDED RELEASE ORAL at 10:01

## 2022-11-15 RX ADMIN — Medication 10 ML: at 10:00

## 2022-11-15 RX ADMIN — GABAPENTIN 600 MG: 300 CAPSULE ORAL at 10:00

## 2022-11-15 RX ADMIN — TRAMADOL HYDROCHLORIDE 50 MG: 50 TABLET, COATED ORAL at 10:55

## 2022-11-15 RX ADMIN — LEVETIRACETAM 750 MG: 500 TABLET, FILM COATED ORAL at 10:00

## 2022-11-15 RX ADMIN — ONDANSETRON HYDROCHLORIDE 4 MG: 2 SOLUTION INTRAMUSCULAR; INTRAVENOUS at 11:49

## 2022-11-15 ASSESSMENT — PAIN SCALES - GENERAL
PAINLEVEL_OUTOF10: 10
PAINLEVEL_OUTOF10: 7
PAINLEVEL_OUTOF10: 8
PAINLEVEL_OUTOF10: 6
PAINLEVEL_OUTOF10: 7

## 2022-11-15 ASSESSMENT — PAIN DESCRIPTION - LOCATION
LOCATION: ABDOMEN
LOCATION: ABDOMEN;GENERALIZED
LOCATION: ABDOMEN
LOCATION: ABDOMEN;HEAD

## 2022-11-15 ASSESSMENT — PAIN DESCRIPTION - FREQUENCY
FREQUENCY: CONTINUOUS
FREQUENCY: INTERMITTENT

## 2022-11-15 ASSESSMENT — PAIN DESCRIPTION - PAIN TYPE
TYPE: CHRONIC PAIN

## 2022-11-15 ASSESSMENT — PAIN DESCRIPTION - DESCRIPTORS
DESCRIPTORS: ACHING;SORE
DESCRIPTORS: ACHING
DESCRIPTORS: ACHING;SORE

## 2022-11-15 ASSESSMENT — PAIN DESCRIPTION - ONSET: ONSET: ON-GOING

## 2022-11-15 NOTE — CARE COORDINATION
Discharge order is in. Pt is waiting to speak with Palliative before discharging. Discharge Plan is to return home with parents. Parents to provide transport.    Vera Fothergill, DEION.S.W.  [phone]

## 2022-11-15 NOTE — PROGRESS NOTES
Hospital Medicine    Subjective:  pt alert conversive chet diet gabapentin dose increased per palliative care      Current Facility-Administered Medications:     loperamide (IMODIUM) capsule 2 mg, 2 mg, Oral, 4x Daily PRN, Jaymie Mora DO    0.9 % sodium chloride infusion, , IntraVENous, Continuous, Thony Castellanos DO, Last Rate: 100 mL/hr at 11/15/22 0540, Rate Verify at 11/15/22 0540    brimonidine (ALPHAGAN) 0.2 % ophthalmic solution 1 drop, 1 drop, Both Eyes, QAM, Thony Castellanos DO, 1 drop at 11/14/22 1219    butalbital-acetaminophen-caffeine (FIORICET, ESGIC) per tablet 1 tablet, 1 tablet, Oral, Daily PRN, Thony Castellanos DO, 1 tablet at 11/15/22 0104    pantoprazole (PROTONIX) tablet 40 mg, 40 mg, Oral, QAM AC, Thony Castellanos DO, 40 mg at 11/15/22 0644    fluticasone (FLONASE) 50 MCG/ACT nasal spray 1 spray, 1 spray, Nasal, Daily PRN, Thony Castellanos DO    levETIRAcetam (KEPPRA) tablet 750 mg, 750 mg, Oral, BID, Thony Castellanos DO, 750 mg at 11/14/22 2016    levothyroxine (SYNTHROID) tablet 112 mcg, 112 mcg, Oral, Daily, Thony Castellanos DO, 112 mcg at 11/15/22 6125    magnesium oxide (MAG-OX) tablet 400 mg, 400 mg, Oral, BID, Thony Castellanos DO, 400 mg at 11/14/22 2016    melatonin tablet 3 mg, 3 mg, Oral, Nightly, Thony Castellanos DO    potassium chloride (KLOR-CON M) extended release tablet 20 mEq, 20 mEq, Oral, BID, Thony Castellanos DO, 20 mEq at 11/14/22 2016    topiramate (TOPAMAX) tablet 50 mg, 50 mg, Oral, BID, Thony Castellanos DO, 50 mg at 11/14/22 2016    traMADol (ULTRAM) tablet 50 mg, 50 mg, Oral, Q8H PRN, Thony Castellanos DO, 50 mg at 11/15/22 0254    zolpidem (AMBIEN) tablet 5 mg, 5 mg, Oral, Nightly PRN, Thony Castellanos DO, 5 mg at 11/14/22 2016    sodium chloride flush 0.9 % injection 5-40 mL, 5-40 mL, IntraVENous, 2 times per day, Thony Castellanos DO    sodium chloride flush 0.9 % injection 5-40 mL, 5-40 mL, IntraVENous, PRN, Thony Castellanos DO    0.9 % sodium chloride infusion, , IntraVENous, PRN, Addis Estevez Malmer, DO    polyethylene glycol (GLYCOLAX) packet 17 g, 17 g, Oral, Daily PRN, Addis Estevez Malmer, DO    acetaminophen (TYLENOL) tablet 650 mg, 650 mg, Oral, Q6H PRN **OR** acetaminophen (TYLENOL) suppository 650 mg, 650 mg, Rectal, Q6H PRN, Addis Estevez Malmer, DO    gabapentin (NEURONTIN) capsule 600 mg, 600 mg, Oral, TID, Jacqui Bhakta APRN - CNP, 600 mg at 11/14/22 2016    ketorolac (TORADOL) injection 15 mg, 15 mg, IntraVENous, Q6H PRN, Joseph Dinorah, DO, 15 mg at 11/14/22 2016    sodium chloride flush 0.9 % injection 5-40 mL, 5-40 mL, IntraVENous, 2 times per day, Pito Riggins, DO    sodium chloride flush 0.9 % injection 5-40 mL, 5-40 mL, IntraVENous, PRN, Addis Estevez Malmer, DO    0.9 % sodium chloride infusion, , IntraVENous, PRN, Addis Estevez Malmer, DO    ondansetron (ZOFRAN-ODT) disintegrating tablet 4 mg, 4 mg, Oral, Q8H PRN **OR** ondansetron (ZOFRAN) injection 4 mg, 4 mg, IntraVENous, Q6H PRN, Addis Estevez Malmer, DO, 4 mg at 11/14/22 2107    Objective:    BP (!) 94/46   Pulse 66   Temp 97.1 °F (36.2 °C) (Temporal)   Resp 20   Ht 5' (1.524 m)   Wt 128 lb (58.1 kg)   LMP  (LMP Unknown) Comment: mirena  SpO2 100%   BMI 25.00 kg/m²     Heart:  reg  Lungs:  ctab  Abd: + bs nondistended  Extrem:  w/o edema    CBC with Differential:    Lab Results   Component Value Date/Time    WBC 7.6 11/13/2022 05:47 PM    RBC 3.81 11/13/2022 05:47 PM    HGB 10.7 11/13/2022 05:47 PM    HCT 33.0 11/13/2022 05:47 PM     11/13/2022 05:47 PM    MCV 86.6 11/13/2022 05:47 PM    MCH 28.1 11/13/2022 05:47 PM    MCHC 32.4 11/13/2022 05:47 PM    RDW 20.1 11/13/2022 05:47 PM    NRBC 0.0 08/27/2021 07:15 PM    METASPCT 1.8 05/06/2021 01:37 PM    LYMPHOPCT 30.8 11/13/2022 05:47 PM    MONOPCT 2.6 11/13/2022 05:47 PM    MYELOPCT 3.5 05/06/2021 01:37 PM    BASOPCT 1.1 11/13/2022 05:47 PM    MONOSABS 0.23 11/13/2022 05:47 PM    LYMPHSABS 2.36 11/13/2022 05:47 PM    EOSABS 0.26 11/13/2022 05:47 PM    BASOSABS 0.00 11/13/2022 05:47 PM     CMP:    Lab Results   Component Value Date/Time     11/14/2022 08:21 PM    K 3.7 11/14/2022 08:21 PM     11/14/2022 08:21 PM    CO2 21 11/14/2022 08:21 PM    BUN 8 11/14/2022 08:21 PM    CREATININE 0.8 11/14/2022 08:21 PM    GFRAA >60 10/13/2022 01:18 PM    LABGLOM >60 11/14/2022 08:21 PM    GLUCOSE 147 11/14/2022 08:21 PM    PROT 6.5 11/13/2022 05:47 PM    LABALBU 3.9 11/13/2022 05:47 PM    CALCIUM 8.2 11/14/2022 08:21 PM    BILITOT 0.9 11/13/2022 05:47 PM    ALKPHOS 94 11/13/2022 05:47 PM    AST 61 11/13/2022 05:47 PM    ALT 35 11/13/2022 05:47 PM     Warfarin PT/INR:    Lab Results   Component Value Date    INR 1.5 09/28/2022    INR 1.7 09/27/2022    INR 2.3 09/26/2022    PROTIME 16.3 (H) 09/28/2022    PROTIME 18.7 (H) 09/27/2022    PROTIME 24.7 (H) 09/26/2022       Assessment:    Principal Problem:    Altered mental status  Active Problems:    Nausea and vomiting    Moderate protein-calorie malnutrition (HCC)  Resolved Problems:    * No resolved hospital problems.  *      Plan:  Dc home outpt f/u        Salima John DO  8:43 AM  11/15/2022

## 2022-11-15 NOTE — PROGRESS NOTES
All discharge information reviewed with patient at this time including medication changes and important follow up visits. Patient's port de accessed without difficulty. (2) more than 100 beats/min

## 2022-11-15 NOTE — PLAN OF CARE
Problem: Discharge Planning  Goal: Discharge to home or other facility with appropriate resources  Outcome: Progressing     Problem: Pain  Goal: Verbalizes/displays adequate comfort level or baseline comfort level  Outcome: Progressing     Problem: Safety - Adult  Goal: Free from fall injury  Outcome: Progressing     Problem: Skin/Tissue Integrity  Goal: Absence of new skin breakdown  Outcome: Progressing     Problem: Nutrition Deficit:  Goal: Optimize nutritional status  Outcome: Progressing

## 2022-11-15 NOTE — TELEPHONE ENCOUNTER
I called the patient's father Courtney Stearns, he confirmed patient's Mia Chow treatment at [address] 11/16/2022.        Electronically signed by Aby Valentine on 11/15/22 at 4:01 PM EST

## 2022-11-15 NOTE — CARE COORDINATION
Radiology called to get Pt's Parents numbers so that they can confirm that Pt is going to radiation treatment tomorrow.    JACK LuuSBRYSON.  [phone]

## 2022-11-15 NOTE — PROGRESS NOTES
HEPATOBILIARY AND PANCREATIC SURGERY  DAILY PROGRESS NOTE  11/15/2022      CC: diarrhea    Subjective:  No events overnight. Patient resting comfortable this AM.  Patient complaining of some nausea but overall improved from prior. Patient is complaining of continued diarrhea approximately 4-5 episodes this AM but largely unchanged from her chronic diarrhea. Objective:  BP (!) 94/51   Pulse 58   Temp 97.4 °F (36.3 °C) (Temporal)   Resp 16   Ht 5' (1.524 m)   Wt 128 lb (58.1 kg)   LMP  (LMP Unknown) Comment: mirena  SpO2 98%   BMI 25.00 kg/m²     General appearance: alert, cooperative and in no acute distress. Eyes: grossly normal  Lungs: nonlabored breathing on room air  Heart: regular rate  Abdomen:  soft, non-tender, non distended  Skin: No skin abnormalities  Neurologic: Alert and oriented x 3.  Grossly normal  Musculoskeletal: No clubbing cyanosis or edema    Assessment/Plan:  40 y.o. 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

## 2022-11-15 NOTE — PROGRESS NOTES
[address]     BWXZ:  Patient Name: Claudia Saba  MRN: [id number]  : 1985  Room: [address] [place] [address]      Evaluating OT: Selina Lafleur OTR/L; [id number]    Referring Provider[de-identified] Emily Felix DO     Specific Provider Orders/Date: OT evaluation and treatment 22    AM-PAC Daily Activity Raw Score:     Recommended Adaptive Equipment: None at this time     Diagnosis:    1. Nausea and vomiting, unspecified vomiting type    2. Increasing frequency of seizure activity ([address]    3.  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[address]    New onset seizure ([address]    Neuroendocrine tumor    Hypomagnesemia    Hypocalcemia    Hypoparathyroidism (HCC)    Tobacco abuse    Elevated liver enzymes    Moderate protein-calorie malnutrition ([address]    Encounter regarding vascular access for dialysis for ESRD ([address]    Difficulty with speech    Breakthrough seizure ([address]    Neuroendocrine cancer ([address]    Alcohol abuse    Depressed bipolar II disorder ([address]    Liver metastases ([address]    Hepatic coma/encephalopathy ([address]    Thrombocytopenia ([address]    Altered mental status    Nausea and vomiting    Viral gastroenteritis      Pertinent Medical History:   Past Medical History:   Diagnosis Date    Abdominal pain     Cancer ([address]     neuroendocrime tumor    Diarrhea     Hypocalcemia     Hypothyroidism     Irritable bowel syndrome     Migraines     Seizures ([address]     last episode 2021    Status post alcohol detoxification     2018-2018      Precautions:  Falls     Assessment of current deficits N/A  [] Functional mobility   []ADLs  [] Strength               []Cognition   [] Functional transfers   [] IADLs         [] Safety Awareness   []Endurance   [] Fine Coordination              [] Balance      [] Vision/perception   []Sensation    []Gross Motor Coordination  [] ROM  [] Delirium                   [] Motor Control     OT PLAN OF CARE   OT POC based on physician orders, patient diagnosis and results of clinical assessment    Frequency/Duration : NA  Specific OT Treatment to include: NA      Home Living: Pt lives with mother & father in a 2 story with 12 step(s) to enter and 2 rail(s); bed/bath on 1st floor. Bathroom setup: Tub/shower & walk-in shower  Equipment owned: katheryn, Jong [employer]org[employer] Resendez  Prior Level of Function: Independent  with ADLs , Independent  with IADLs; using ww vs cane for functional mobility PRN (depending upon level of fatigue). Driving: Yes    Pain Level: Pt c/o 7/10 RLE pain; therapist provided repositioning techniques. Cognition: A&O: 4/4; Follows multi step directions   Memory:  good    Sequencing:  good    Problem solving:  good    Judgement/safety:  good      Functional Assessment:   Initial Eval Status  Date: 11/15/22   Feeding Independent    Grooming Independent    UB Dressing Independent    LB Dressing Independent    Bathing Independent   Toileting Independent    Bed Mobility  Supine to sit: Independent   Sit to supine: Independent    Functional Transfers Sit to stand: Independent   Stand to sit:  Independent   Stand pivot: Independent    Functional Mobility Indep with no AD   Balance Sitting:     Static:  wfl    Dynamic:wfl  Standing: wfl   Activity Tolerance wfl   Visual/  Perceptual Glasses: Yes  Appears WFL            Hand dominance: Right  UE ROM: RUE:  WFL  LUE:  WFL  Strength: RUE: grossly 5/5 LUE: grossly 5/5   Strength: B WFL  Fine Motor Coordination:  B WFL    Hearing: WFL  Sensation:  No c/o numbness or tingling  Tone:  WFL  Edema: None noted Comments/Treatment: RN approved session. Upon arrival, patient supine in bed & agreeable to OT session. Pt demonstrating independence with ADLs/mobility and good understanding of education/techniques. Pt completed functional mobility to<>from bathroom with no use of AD or LOB while managing IV pole. Pt simulated toileting task & engaged in LB dressing task with no dizziness or lightheadedness. At end of session, patient supine in bed with call light and phone within reach, all lines and tubes intact. Recommend d/c from OT d/t no acute skilled needs at this time. Evaluation time includes thorough review of current medical information, gathering information on past medical & social history & PLOF, completion of standardized testing, informal observation of tasks, consultation with other medical professions/disciplines, assessment of data & development of POC/goals.      Evaluation Complexity: Low    Time In: 10:35a  Time Out: 10:45a      Min Units   OT Eval Low 27879  x     OT Eval Medium 10644      OT Eval High 73130       OT Re-Eval [id number]       Therapeutic Ex 39379       Therapeutic Activities 27319       ADL/Self Care 64197       Orthotic Management [id number]       Neuro Re-Ed 50176       Non-Billable Time           Tamela Madrigal OTR/L; [id number]

## 2022-11-16 ENCOUNTER — HOSPITAL ENCOUNTER (OUTPATIENT)
Dept: NUCLEAR MEDICINE | Age: 37
Discharge: HOME | End: 2022-11-18
Payer: MEDICARE

## 2022-11-16 ENCOUNTER — OFFICE VISIT (OUTPATIENT)
Dept: ONCOLOGY | Age: 37
End: 2022-11-16
Payer: MEDICARE

## 2022-11-16 ENCOUNTER — HOSPITAL ENCOUNTER (OUTPATIENT)
Dept: INFUSION THERAPY | Age: 37
Discharge: HOME | End: 2022-11-16
Payer: MEDICARE

## 2022-11-16 VITALS
OXYGEN SATURATION: 97 % | TEMPERATURE: 96.9 F | SYSTOLIC BLOOD PRESSURE: 99 MMHG | RESPIRATION RATE: 16 BRPM | HEART RATE: 59 BPM | DIASTOLIC BLOOD PRESSURE: 54 MMHG

## 2022-11-16 VITALS
WEIGHT: 130 LBS | HEIGHT: 60 IN | OXYGEN SATURATION: 98 % | SYSTOLIC BLOOD PRESSURE: 97 MMHG | HEART RATE: 99 BPM | BODY MASS INDEX: 25.52 KG/M2 | RESPIRATION RATE: 16 BRPM | DIASTOLIC BLOOD PRESSURE: 54 MMHG | TEMPERATURE: 97.2 F

## 2022-11-16 DIAGNOSIS — C7A.8 NEUROENDOCRINE CANCER (HCC): Primary | ICD-10-CM

## 2022-11-16 DIAGNOSIS — D3A.8 NEUROENDOCRINE TUMOR: ICD-10-CM

## 2022-11-16 PROCEDURE — 79101 NUCLEAR RX IV ADMIN: CPT

## 2022-11-16 PROCEDURE — G8419 CALC BMI OUT NRM PARAM NOF/U: HCPCS | Performed by: INTERNAL MEDICINE

## 2022-11-16 PROCEDURE — 96365 THER/PROPH/DIAG IV INF INIT: CPT

## 2022-11-16 PROCEDURE — 99214 OFFICE O/P EST MOD 30 MIN: CPT | Performed by: INTERNAL MEDICINE

## 2022-11-16 PROCEDURE — 1111F DSCHRG MED/CURRENT MED MERGE: CPT | Performed by: INTERNAL MEDICINE

## 2022-11-16 PROCEDURE — 96366 THER/PROPH/DIAG IV INF ADDON: CPT

## 2022-11-16 PROCEDURE — 79101 NUCLEAR RX IV ADMIN: CPT | Performed by: RADIOLOGY

## 2022-11-16 PROCEDURE — A9513 LUTETIUM LU 177 DOTATAT THER: HCPCS | Performed by: RADIOLOGY

## 2022-11-16 PROCEDURE — 96367 TX/PROPH/DG ADDL SEQ IV INF: CPT

## 2022-11-16 PROCEDURE — 4004F PT TOBACCO SCREEN RCVD TLK: CPT | Performed by: INTERNAL MEDICINE

## 2022-11-16 PROCEDURE — 2580000003 HC RX 258: Performed by: INTERNAL MEDICINE

## 2022-11-16 PROCEDURE — 96375 TX/PRO/DX INJ NEW DRUG ADDON: CPT

## 2022-11-16 PROCEDURE — G8484 FLU IMMUNIZE NO ADMIN: HCPCS | Performed by: INTERNAL MEDICINE

## 2022-11-16 PROCEDURE — 3430000000 HC RX DIAGNOSTIC RADIOPHARMACEUTICAL: Performed by: RADIOLOGY

## 2022-11-16 PROCEDURE — 2500000003 HC RX 250 WO HCPCS: Performed by: INTERNAL MEDICINE

## 2022-11-16 PROCEDURE — G8427 DOCREV CUR MEDS BY ELIG CLIN: HCPCS | Performed by: INTERNAL MEDICINE

## 2022-11-16 PROCEDURE — 6360000002 HC RX W HCPCS: Performed by: INTERNAL MEDICINE

## 2022-11-16 RX ORDER — MEPERIDINE HYDROCHLORIDE 50 MG/ML
12.5 INJECTION INTRAMUSCULAR; INTRAVENOUS; SUBCUTANEOUS PRN
Status: CANCELLED | OUTPATIENT
Start: 2022-11-17

## 2022-11-16 RX ORDER — SODIUM CHLORIDE 9 MG/ML
5-250 INJECTION, SOLUTION INTRAVENOUS PRN
Status: CANCELLED | OUTPATIENT
Start: 2022-11-16

## 2022-11-16 RX ORDER — SODIUM CHLORIDE 0.9 % (FLUSH) 0.9 %
5-40 SYRINGE (ML) INJECTION PRN
Status: CANCELLED | OUTPATIENT
Start: 2022-11-16

## 2022-11-16 RX ORDER — EPINEPHRINE 1 MG/ML
0.3 INJECTION, SOLUTION, CONCENTRATE INTRAVENOUS PRN
Status: CANCELLED | OUTPATIENT
Start: 2022-11-16

## 2022-11-16 RX ORDER — SODIUM CHLORIDE 9 MG/ML
1000 INJECTION, SOLUTION INTRAVENOUS ONCE
Status: CANCELLED | OUTPATIENT
Start: 2022-11-16 | End: 2022-11-16

## 2022-11-16 RX ORDER — MEPERIDINE HYDROCHLORIDE 50 MG/ML
12.5 INJECTION INTRAMUSCULAR; INTRAVENOUS; SUBCUTANEOUS PRN
Status: CANCELLED | OUTPATIENT
Start: 2022-11-16

## 2022-11-16 RX ORDER — SODIUM CHLORIDE 9 MG/ML
5-40 INJECTION INTRAVENOUS PRN
Status: CANCELLED | OUTPATIENT
Start: 2022-11-16

## 2022-11-16 RX ORDER — ACETAMINOPHEN 325 MG/1
650 TABLET ORAL
Status: CANCELLED | OUTPATIENT
Start: 2022-11-17

## 2022-11-16 RX ORDER — HEPARIN SODIUM (PORCINE) LOCK FLUSH IV SOLN 100 UNIT/ML 100 UNIT/ML
500 SOLUTION INTRAVENOUS PRN
Status: DISCONTINUED | OUTPATIENT
Start: 2022-11-16 | End: 2022-11-17 | Stop reason: HOSPADM

## 2022-11-16 RX ORDER — PALONOSETRON HYDROCHLORIDE 0.05 MG/ML
0.25 INJECTION, SOLUTION INTRAVENOUS ONCE
Status: COMPLETED | OUTPATIENT
Start: 2022-11-16 | End: 2022-11-16

## 2022-11-16 RX ORDER — ONDANSETRON 2 MG/ML
8 INJECTION INTRAMUSCULAR; INTRAVENOUS
Status: CANCELLED | OUTPATIENT
Start: 2022-11-17

## 2022-11-16 RX ORDER — ONDANSETRON 2 MG/ML
8 INJECTION INTRAMUSCULAR; INTRAVENOUS
Status: CANCELLED | OUTPATIENT
Start: 2022-11-16

## 2022-11-16 RX ORDER — FAMOTIDINE 10 MG/ML
20 INJECTION, SOLUTION INTRAVENOUS
Status: CANCELLED | OUTPATIENT
Start: 2022-11-17

## 2022-11-16 RX ORDER — ALBUTEROL SULFATE 90 UG/1
4 AEROSOL, METERED RESPIRATORY (INHALATION) PRN
Status: CANCELLED | OUTPATIENT
Start: 2022-11-17

## 2022-11-16 RX ORDER — PALONOSETRON 0.05 MG/ML
0.25 INJECTION, SOLUTION INTRAVENOUS ONCE
Status: CANCELLED | OUTPATIENT
Start: 2022-11-16 | End: 2022-11-16

## 2022-11-16 RX ORDER — SODIUM CHLORIDE 9 MG/ML
INJECTION, SOLUTION INTRAVENOUS CONTINUOUS
Status: CANCELLED | OUTPATIENT
Start: 2022-11-16

## 2022-11-16 RX ORDER — MAGNESIUM HYDROXIDE/ALUMINUM HYDROXICE/SIMETHICONE 120; 1200; 1200 MG/30ML; MG/30ML; MG/30ML
30 SUSPENSION ORAL EVERY 6 HOURS PRN
Status: CANCELLED | OUTPATIENT
Start: 2022-11-16

## 2022-11-16 RX ORDER — ACETAMINOPHEN 325 MG/1
650 TABLET ORAL
Status: CANCELLED | OUTPATIENT
Start: 2022-11-16

## 2022-11-16 RX ORDER — EPINEPHRINE 1 MG/ML
0.3 INJECTION, SOLUTION, CONCENTRATE INTRAVENOUS PRN
Status: CANCELLED | OUTPATIENT
Start: 2022-11-17

## 2022-11-16 RX ORDER — FAMOTIDINE 10 MG/ML
20 INJECTION, SOLUTION INTRAVENOUS
Status: CANCELLED | OUTPATIENT
Start: 2022-11-16

## 2022-11-16 RX ORDER — PROCHLORPERAZINE EDISYLATE 5 MG/ML
10 INJECTION INTRAMUSCULAR; INTRAVENOUS EVERY 6 HOURS PRN
Status: CANCELLED | OUTPATIENT
Start: 2022-11-16

## 2022-11-16 RX ORDER — DIPHENHYDRAMINE HYDROCHLORIDE 50 MG/ML
50 INJECTION INTRAMUSCULAR; INTRAVENOUS
Status: CANCELLED | OUTPATIENT
Start: 2022-11-17

## 2022-11-16 RX ORDER — HEPARIN SODIUM (PORCINE) LOCK FLUSH IV SOLN 100 UNIT/ML 100 UNIT/ML
500 SOLUTION INTRAVENOUS PRN
Status: CANCELLED | OUTPATIENT
Start: 2022-11-16

## 2022-11-16 RX ORDER — SODIUM CHLORIDE 0.9 % (FLUSH) 0.9 %
5-40 SYRINGE (ML) INJECTION PRN
Status: DISCONTINUED | OUTPATIENT
Start: 2022-11-16 | End: 2022-11-17 | Stop reason: HOSPADM

## 2022-11-16 RX ORDER — OCTREOTIDE ACETATE 100 UG/ML
100 INJECTION, SOLUTION INTRAVENOUS; SUBCUTANEOUS
Status: CANCELLED | OUTPATIENT
Start: 2022-11-16

## 2022-11-16 RX ORDER — SODIUM CHLORIDE 9 MG/ML
1000 INJECTION, SOLUTION INTRAVENOUS ONCE
Status: COMPLETED | OUTPATIENT
Start: 2022-11-16 | End: 2022-11-16

## 2022-11-16 RX ORDER — FAMOTIDINE 10 MG/ML
20 INJECTION, SOLUTION INTRAVENOUS ONCE
Status: CANCELLED | OUTPATIENT
Start: 2022-11-16 | End: 2022-11-16

## 2022-11-16 RX ORDER — ARGININE/LYSINE/0.9 % SOD CHL 25-25MG/ML
50 PLASTIC BAG, INJECTION (ML) INTRAVENOUS ONCE
Status: COMPLETED | OUTPATIENT
Start: 2022-11-16 | End: 2022-11-16

## 2022-11-16 RX ORDER — SODIUM CHLORIDE 9 MG/ML
INJECTION, SOLUTION INTRAVENOUS CONTINUOUS
Status: CANCELLED | OUTPATIENT
Start: 2022-11-17

## 2022-11-16 RX ORDER — ARGININE/LYSINE/0.9 % SOD CHL 25-25MG/ML
50 PLASTIC BAG, INJECTION (ML) INTRAVENOUS ONCE
Status: CANCELLED | OUTPATIENT
Start: 2022-11-16 | End: 2022-11-16

## 2022-11-16 RX ORDER — ALBUTEROL SULFATE 90 UG/1
4 AEROSOL, METERED RESPIRATORY (INHALATION) PRN
Status: CANCELLED | OUTPATIENT
Start: 2022-11-16

## 2022-11-16 RX ORDER — PROCHLORPERAZINE EDISYLATE 5 MG/ML
10 INJECTION INTRAMUSCULAR; INTRAVENOUS EVERY 6 HOURS PRN
Status: DISCONTINUED | OUTPATIENT
Start: 2022-11-16 | End: 2022-11-17 | Stop reason: HOSPADM

## 2022-11-16 RX ORDER — DIPHENHYDRAMINE HYDROCHLORIDE 50 MG/ML
50 INJECTION INTRAMUSCULAR; INTRAVENOUS
Status: CANCELLED | OUTPATIENT
Start: 2022-11-16

## 2022-11-16 RX ADMIN — SODIUM CHLORIDE 1000 ML: 9 INJECTION, SOLUTION INTRAVENOUS at 10:10

## 2022-11-16 RX ADMIN — Medication 50 G: at 11:09

## 2022-11-16 RX ADMIN — SODIUM CHLORIDE, PRESERVATIVE FREE 20 MG: 5 INJECTION INTRAVENOUS at 10:11

## 2022-11-16 RX ADMIN — PALONOSETRON 0.25 MG: 0.25 INJECTION, SOLUTION INTRAVENOUS at 10:13

## 2022-11-16 RX ADMIN — FOSAPREPITANT 150 MG: 150 INJECTION, POWDER, LYOPHILIZED, FOR SOLUTION INTRAVENOUS at 10:17

## 2022-11-16 RX ADMIN — PROCHLORPERAZINE EDISYLATE 10 MG: 5 INJECTION INTRAMUSCULAR; INTRAVENOUS at 10:41

## 2022-11-16 RX ADMIN — SODIUM CHLORIDE, PRESERVATIVE FREE 10 ML: 5 INJECTION INTRAVENOUS at 10:41

## 2022-11-16 RX ADMIN — LUTETIUM LU 177 DOTATATE 198 MILLICURIE: 10 INJECTION INTRAVENOUS at 14:05

## 2022-11-16 RX ADMIN — HEPARIN 500 UNITS: 100 SYRINGE at 15:18

## 2022-11-16 RX ADMIN — SODIUM CHLORIDE, PRESERVATIVE FREE 10 ML: 5 INJECTION INTRAVENOUS at 15:18

## 2022-11-16 NOTE — PROGRESS NOTES
Medical Oncology Outpatient Progress Note    Reason for visit: Neuroendocrine cancer to liver     PCP:  Quinn Larry DO     History of Present Illness:  41 y/o female with hx of hypothyroidism, IBS,migraine who was complaining of abdominal pain associated with diarrhea and flushing/sweating. CT abdomen/pelvis 08/28/2020:  2 cm masslike density in the mid abdominal small bowel mesentery with a spiculated appearance. Multiple liver masses suspicious for metastatic disease. 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 Comment: Sections of the liver tissue cores show the hepatic parenchyma to be partially replaced by a proliferation of epithelioid cells with relatively uniform round nuclei and eosinophilic granular cytoplasm. The   epithelioid cells form anastomosing solid cords/nests that are surrounded by delicate fibrovascular stroma. There are no mitotic figures or tumor cell necrosis present. The histologic changes seen are suggestive of well-differentiated neuroendocrine (carcinoid) tumor. Immunostaining for pankeratin, chromogranin, synaptophysin, TTF-1 and Ki-67 was performed on sections of one tissue block (A1) and the neoplastic epithelioid cells show diffuse and strong positivity for neuroendocrine markers (chromogranin and synaptophysin) and moderate positivity for pankeratin. There is no staining reactivity for TTF-1. The Ki-67 proliferation labeling index is essentially negative, (very low, <1%). This staining pattern confirms the histologic impression of a well-differentiated neuroendocrine (carcinoid) tumor. FL Small bowel 09/02/2020:  Rapid small bowel transit. Serum Chromogranin A 160 on 09/23/2020.   Urine 5-HIAA 21 (0-14)  2d-Echo 10/10/2020: EF 60-65%   Normal right ventricular size and function     Dotatate PET/CT scan 10/14/2020 increased tracer uptake seen throughout the liver compatible with neoplasm. This involves both the left and the right lobe of liver. In addition there are 2 foci of increased tracer uptake along the mesentery of the small bowel. This may involve the wall the small bowel. No other convincing evidence for extra-abdominal metastatic disease. EGD/Colonoscopy 10/05/2020 by Dr. Janiya Ngo; records reviewed. Hepatobiliary team (Dr. Lucian Winters) consult appreciated. Small bowel resection on 10/21/2020. Small bowel resection on 10/21/2020. A. Ileum, resection:   Multifocal (4 foci) neuroendocrine tumor (NET). Extensive perineural and angiolymphatic invasion. 3 of 10 lymph nodes with metastatic neuroendocrine tumor and extracapsular extension of tumor cells (3/10). Bilateral viable small bowel resection margins with no evidence of tumor. B. Specimen received as \"Meckel's\":   Nodular scar tissue with patchy chronic inflammation. Negative for neuroendocrine tumor. Intestinal mucosal tissue is not present. CASE SUMMARY:   Procedure: Small bowel resection   Tumor site: Ileum   Tumor size: Greatest dimension of 1.5 cm   Tumor focality: Multifocal: 4 separate tumors   Histologic type and grade: G2: Well-differentiated neuroendocrine tumor   Mitotic rate: between 2-20 mitoses/2 mm2   Ki-67 labeling index: between 3-20%   Tumor extension: Tumor invades through the muscularis propria into subserosal tissue without penetration of overlying serosa   Margins: All margins are uninvolved by tumor    Margins examined: Proximal and distal   Lymphovascular invasion: Present   Perineural invasion: Present   Large mesenteric masses (greater than 2 cm): Present (one 2.5 cm mass)   Regional lymph nodes:    Number of lymph nodes involved: 3    Number of lymph nodes examined: 10   Pathologic stage classification (pTNM, AJCC eighth edition):    pT3    pN2    pM1a -confirmed liver metastasis, HBS-     Comment:   A.  Immunostains stain the tumor cells as follows in block A6:   Synaptophysin and chromogranin: Positive   Ki-67: Positive in 5% of tumor cells      We recommended Lanreotide once monthly for metastatic well differentiated neuroendocrine cancer to liver. Dose # 1 Lanreotide was on 11/05/2020. Dose # 2 Lanreotide was on 12/03/2020. Dose # 3 Lanreotide was on 01/07/2021. Serum Chromogranin A 95 on 01/07/2021. CT chest 02/07/2021 negative for metastatic disease. CT abdomen/pelvis 02/07/2021 Persistent bilobar hepatic lesions which are grossly stable consistent with stable widespread hepatic metastasis. Imaging reviewed. Continue Lanreotide and repeat scans in 3 months. Dose # 4 Lanreotide was on 02/11/2021. Ga 76 Dotatate PET 03/09/2021 Gallium 68 dotatate avid uptake throughout multiple regions of the liver compatible with multiple foci of neuroendocrine tumor in both the right and the left lobe of the liver, when compared to previous not significantly changed in the interval.  Dose # 5 Lanreotide was on 03/11/2021. Dose # 6 Lanreotide was on 04/08/2021. Serum chromogranin A 115 (0-103)  Dose # 7 Lanreotide was on 05/06/2021. Serum chromogranin A 114 (0-103)  Dose # 8 Lanreotide was on 06/03/2021. Serum chromogranin A  84  (0-103)     Ga 76 Dotatate PET 06/29/2021 Numerous foci of increased Gallium 68 dotatate avid uptake throughout both lobes of the liver, not significantly changed from previous. No significant progression of disease. No definite metastatic disease identified  Dose # 9 Lanreotide was on 07/01/2021. Serum Chromogranin A 97 (0-103)  Dose # 10 Lanreotide was on 07/29/2021. Serum Chromogranin A 89 (0-103)  Dose # 11 Lanreotide was on 08/30/2021. Serum Chromogranin A 120 (0-103)  Dose # 12 Lanreotide was on 09/30/2021. Serum Chromogranin A 141 (0-103)  PET/CT scan 11/09/2021 There is significant gallium avid tracer uptake in the liver, numerous lesions are seen, tracer uptake overall is diminished.    There is no convincing extrahepatic disease identified. Dose # 13 Lanreotide was on 11/11/2021. Serum chromogranin A 105 on 11/11/2021. Dose # 14 Lanreotide was on 12/30/2021. Serum chromogranin A 184 on 12/30/2021. Dose # 15 Lanreotide was on 01/27/2022. Serum chromogranin A  98 on 01/27/2022. CT head 01/28/2022 noted no acute abnormality. CT cervical spine 01/28/2022 noted no acute abnormality of cervical spine  PET/CT Gallium 68 02/22/2022 noted Multiple regions of tracer uptake seen within the liver consistent with neuroendocrine tumor. No other evidence to suggest metastatic disease. Reviewed with Dr. Tamia Knight from Radiology team; overall stable disease. Continue Lanreotide and repeat scans in 3 months. Dose # 16 Lanreotide was on 02/24/2022. Serum chromogranin A 116 on 02/24/2022  Dose # 17 Lanreotide was on 03/24/2022. Serum Chromogranin A 173 on 03/24/2022. Dose # 18 Lanreotide was on 04/21/2022. Serum Chromogranin A 140 on 04/21/2022. Dose # 19 Lanreotide was on 05/19/2022. Serum Chromogranin A 114 on 05/19/2022. Dose # 20 Lanreotide was on 06/23/2022. PET/CT scan 07/19/2022: Gallium 76 dotatate avid uptake is present within multiple masses within the liver, allowing for slight technical differences, not significantly changed in the interval. No convincing extrahepatic metastatic disease identified. Imaging reviewed. Continue Lanreotide and repeat scans in 3-4 months  Dose # 21 Lanreotide was on 07/21/2022. Serum Chromogranin A 112 on 07/21/2022  Dose # 22 Lanreotide was on 08/18/2022. Dose # 23 Lanreotide was on 09/15/2022. Serum Chromogranin A 75 on 09/15/2022     Admitted with acute liver failure, possibly decompensation in the setting of a urinary tract infection.     CT abdomen/pelvis was negative for bowel obstruction, liver metastasis, interval changes difficult to gauge due to the absence of the contrast-enhancement, innumerable subcutaneous nodules in the buttocks, likely secondary to the lanreotide injections. Ultrasound of the abdomen had revealed heterogeneous liver with multiple hypoechoic lesions, consistent with persistent diffuse hepatic metastasis. Liver U/S noted heterogeneous liver with multiple hypoechoic lesions consistent with the persistent diffuse hepatic metastasis. HBP and GI teams on board. No obstructive process. Aggressive lactulose titrated to mental status    Serum chromogranin A 107 on 10/13/2022  PET/CT Gallium 68 scan 11/01/2022: Gallium 76 dotatate avid uptake is seen within numerous lesions within the liver, not convincingly changed. No convincing extrahepatic metastatic disease identified. Recommended Lutathera 7.4 GBq (200 mCi) every 8 weeks for a total of 4 doses ([place] 2017). Today 11/16/2022; no fever chills. Fair appetite and energy level. No nausea vomiting. Diarrhea controlled. Dose # 1 [place] starting today 11/16/2022    Review of Systems;  CONSTITUTIONAL: No fever chills. Fair appetite and energy level. ENMT: Eyes: No diplopia; Nose: No epistaxis. Mouth: No sore throat. RESPIRATORY: No hemoptysis SOB  CARDIOVASCULAR: No chest pain, palpitations. GASTROINTESTINAL: No nausea vomiting. Diarrhea controlled. GENITOURINARY: No hematuria frequency  NEURO: No syncope, presyncope, headache. Remainder:ROS NEGATIVE    Medications:  Reviewed and reconciled. Allergies:  No Known Allergies     Physical Examination:  BP (!) 97/54   Pulse 99   Temp 97.2 °F (36.2 °C) (Infrared)   Resp 16   Ht 5' (1.524 m)   Wt 130 lb (59 kg)   LMP  (LMP Unknown) Comment: mirena  SpO2 98%   BMI 25.39 kg/m²   GENERAL: Awake and alert, not in distress  HEENT: PERRLA; EOMI. Oropharynx clear. NECK: Supple. No palpable lymphadenopathy. LUNGS: Good air entry bilaterally. No wheezing, crackles or rhonchi. CARDIOVASCULAR: Regular rate. No murmurs, rubs or gallops. ABDOMEN: Soft. NT ND   EXTREMITIES: Without clubbing, cyanosis, or edema.    NEUROLOGIC: No focal deficits. ECOG PS 1    Diagnostics:  Lab Results   Component Value Date    WBC 7.6 11/13/2022    HGB 10.7 (L) 11/13/2022    HCT 33.0 (L) 11/13/2022    MCV 86.6 11/13/2022     11/13/2022     Lab Results   Component Value Date     11/14/2022    K 3.7 11/14/2022     (H) 11/14/2022    CO2 21 (L) 11/14/2022    BUN 8 11/14/2022    CREATININE 0.8 11/14/2022    GLUCOSE 147 (H) 11/14/2022    CALCIUM 8.2 (L) 11/14/2022    PROT 6.5 11/13/2022    LABALBU 3.9 11/13/2022    BILITOT 0.9 11/13/2022    ALKPHOS 94 11/13/2022    AST 61 (H) 11/13/2022    ALT 35 (H) 11/13/2022    LABGLOM >60 11/14/2022    GFRAA >60 10/13/2022     Impression/Plan:  41 y/o female with hx of hypothyroidism, IBS,migraine who was complaining of abdominal pain associated with diarrhea and flushing/sweating. CT abdomen/pelvis 08/28/2020:  2 cm masslike density in the mid abdominal small bowel mesentery with a spiculated appearance. Multiple liver masses suspicious for metastatic disease. 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 Comment: Sections of the liver tissue cores show the hepatic parenchyma to be partially replaced by a proliferation of epithelioid cells with relatively uniform round nuclei and eosinophilic granular cytoplasm. The   epithelioid cells form anastomosing solid cords/nests that are surrounded by delicate fibrovascular stroma. There are no mitotic figures or tumor cell necrosis present. The histologic changes seen are suggestive of well-differentiated neuroendocrine (carcinoid) tumor. Immunostaining for pankeratin, chromogranin, synaptophysin, TTF-1 and Ki-67 was performed on sections of one tissue block (A1) and the neoplastic epithelioid cells show diffuse and strong positivity for neuroendocrine markers (chromogranin and synaptophysin) and moderate positivity for pankeratin.   There is no staining reactivity for TTF-1. The Ki-67 proliferation labeling index is essentially negative, (very low, <1%). This staining pattern confirms the histologic impression of a well-differentiated neuroendocrine (carcinoid) tumor. FL Small bowel 09/02/2020:  Rapid small bowel transit. Serum Chromogranin A 160 on 09/23/2020. Urine 5-HIAA 21 (0-14)  2d-Echo 10/10/2020: EF 60-65%   Normal right ventricular size and function     Dotatate PET/CT scan 10/14/2020 increased tracer uptake seen throughout the liver compatible with neoplasm. This involves both the left and the right lobe of liver. In addition there are 2 foci of increased tracer uptake along the mesentery of the small bowel. This may involve the wall the small bowel. No other convincing evidence for extra-abdominal metastatic disease. EGD/Colonoscopy 10/05/2020 by Dr. Adri Mayorga; records reviewed. Hepatobiliary team (Dr. Alejandrina Abebe) consult appreciated. Small bowel resection on 10/21/2020. Small bowel resection on 10/21/2020. A. Ileum, resection:   Multifocal (4 foci) neuroendocrine tumor (NET). Extensive perineural and angiolymphatic invasion. 3 of 10 lymph nodes with metastatic neuroendocrine tumor and extracapsular extension of tumor cells (3/10). Bilateral viable small bowel resection margins with no evidence of tumor. B. Specimen received as \"Meckel's\":   Nodular scar tissue with patchy chronic inflammation. Negative for neuroendocrine tumor. Intestinal mucosal tissue is not present.      CASE SUMMARY:   Procedure: Small bowel resection   Tumor site: Ileum   Tumor size: Greatest dimension of 1.5 cm   Tumor focality: Multifocal: 4 separate tumors   Histologic type and grade: G2: Well-differentiated neuroendocrine tumor   Mitotic rate: between 2-20 mitoses/2 mm2   Ki-67 labeling index: between 3-20%   Tumor extension: Tumor invades through the muscularis propria into subserosal tissue without penetration of overlying serosa Margins: All margins are uninvolved by tumor    Margins examined: Proximal and distal   Lymphovascular invasion: Present   Perineural invasion: Present   Large mesenteric masses (greater than 2 cm): Present (one 2.5 cm mass)   Regional lymph nodes:    Number of lymph nodes involved: 3    Number of lymph nodes examined: 10   Pathologic stage classification (pTNM, AJCC eighth edition):    pT3    pN2    pM1a -confirmed liver metastasis, HBS-     Comment:   A. Immunostains stain the tumor cells as follows in block A6:   Synaptophysin and chromogranin: Positive   Ki-67: Positive in 5% of tumor cells      We recommended Lanreotide once monthly for metastatic well differentiated neuroendocrine cancer to liver. Dose # 1 Lanreotide was on 11/05/2020. Dose # 2 Lanreotide was on 12/03/2020. Dose # 3 Lanreotide was on 01/07/2021. Serum Chromogranin A 95 on 01/07/2021. CT chest 02/07/2021 negative for metastatic disease. CT abdomen/pelvis 02/07/2021 Persistent bilobar hepatic lesions which are grossly stable consistent with stable widespread hepatic metastasis. Imaging reviewed. Continue Lanreotide and repeat scans in 3 months. Dose # 4 Lanreotide was on 02/11/2021. Ga 76 Dotatate PET 03/09/2021 Gallium 68 dotatate avid uptake throughout multiple regions of the liver compatible with multiple foci of neuroendocrine tumor in both the right and the left lobe of the liver, when compared to previous not significantly changed in the interval.  Dose # 5 Lanreotide was on 03/11/2021. Dose # 6 Lanreotide was on 04/08/2021. Serum chromogranin A 115 (0-103)  Dose # 7 Lanreotide was on 05/06/2021. Serum chromogranin A 114 (0-103)  Dose # 8 Lanreotide was on 06/03/2021. Serum chromogranin A  84  (0-103)     Ga 76 Dotatate PET 06/29/2021 Numerous foci of increased Gallium 68 dotatate avid uptake throughout both lobes of the liver, not significantly changed from previous. No significant progression of disease.  No definite metastatic disease identified  Dose # 9 Lanreotide was on 07/01/2021. Serum Chromogranin A 97 (0-103)  Dose # 10 Lanreotide was on 07/29/2021. Serum Chromogranin A 89 (0-103)  Dose # 11 Lanreotide was on 08/30/2021. Serum Chromogranin A 120 (0-103)  Dose # 12 Lanreotide was on 09/30/2021. Serum Chromogranin A 141 (0-103)  PET/CT scan 11/09/2021 There is significant gallium avid tracer uptake in the liver, numerous lesions are seen, tracer uptake overall is diminished. There is no convincing extrahepatic disease identified. Dose # 13 Lanreotide was on 11/11/2021. Serum chromogranin A 105 on 11/11/2021. Dose # 14 Lanreotide was on 12/30/2021. Serum chromogranin A 184 on 12/30/2021. Dose # 15 Lanreotide was on 01/27/2022. Serum chromogranin A  98 on 01/27/2022. CT head 01/28/2022 noted no acute abnormality. CT cervical spine 01/28/2022 noted no acute abnormality of cervical spine  PET/CT Gallium 68 02/22/2022 noted Multiple regions of tracer uptake seen within the liver consistent with neuroendocrine tumor. No other evidence to suggest metastatic disease. Reviewed with Dr. John Rodríguez from Radiology team; overall stable disease. Continue Lanreotide and repeat scans in 3 months. Dose # 16 Lanreotide was on 02/24/2022. Serum chromogranin A 116 on 02/24/2022  Dose # 17 Lanreotide was on 03/24/2022. Serum Chromogranin A 173 on 03/24/2022. Dose # 18 Lanreotide was on 04/21/2022. Serum Chromogranin A 140 on 04/21/2022. Dose # 19 Lanreotide was on 05/19/2022. Serum Chromogranin A 114 on 05/19/2022. Dose # 20 Lanreotide was on 06/23/2022. PET/CT scan 07/19/2022: Gallium 76 dotatate avid uptake is present within multiple masses within the liver, allowing for slight technical differences, not significantly changed in the interval. No convincing extrahepatic metastatic disease identified. Imaging reviewed. Continue Lanreotide and repeat scans in 3-4 months  Dose # 21 Lanreotide was on 07/21/2022.  Serum Chromogranin A 112 on 07/21/2022  Dose # 22 Lanreotide was on 08/18/2022. Dose # 23 Lanreotide was on 09/15/2022. Serum Chromogranin A 75 on 09/15/2022     Admitted with acute liver failure, possibly decompensation in the setting of a urinary tract infection. CT abdomen/pelvis was negative for bowel obstruction, liver metastasis, interval changes difficult to gauge due to the absence of the contrast-enhancement, innumerable subcutaneous nodules in the buttocks, likely secondary to the lanreotide injections. Ultrasound of the abdomen had revealed heterogeneous liver with multiple hypoechoic lesions, consistent with persistent diffuse hepatic metastasis. Liver U/S noted heterogeneous liver with multiple hypoechoic lesions consistent with the persistent diffuse hepatic metastasis. HBP and GI teams on board. No obstructive process. Aggressive lactulose titrated to mental status  Recovered well post hospital discharge. Serum chromogranin A 107 on 10/13/2022  PET Ga 68 11/01/2022 Gallium 68 dotatate avid uptake is seen within numerous lesions within the liver, not convincingly changed. No convincing extrahepatic metastatic disease identified. Imaging reviewed. Labs reviewed. Recommended Lutathera 7.4 GBq (200 mCi) every 8 weeks for a total of 4 doses ([place] 2017). Side effects reviewed. She agreed to proceed. Authorization obtained. Dose # 1 [place] Bray is today 11/16/2022. Labs reviewed ok to proceed. RTC 4 weeks with labs.      11/16/2022  Amos Mahan MD

## 2022-11-17 ENCOUNTER — HOSPITAL ENCOUNTER (OUTPATIENT)
Dept: INFUSION THERAPY | Age: 37
Discharge: HOME | End: 2022-11-17
Payer: MEDICARE

## 2022-11-17 DIAGNOSIS — C7A.8 NEUROENDOCRINE CANCER (HCC): Primary | ICD-10-CM

## 2022-11-17 PROCEDURE — 6360000002 HC RX W HCPCS: Performed by: INTERNAL MEDICINE

## 2022-11-17 PROCEDURE — 96372 THER/PROPH/DIAG INJ SC/IM: CPT

## 2022-11-17 RX ADMIN — OCTREOTIDE ACETATE 30 MG: KIT at 08:11

## 2022-11-18 LAB
BLOOD CULTURE, ROUTINE: NORMAL
CULTURE, BLOOD 2: NORMAL

## 2022-11-23 NOTE — PROGRESS NOTES
Physician Progress Note      Kenia Rice  [facility] #:                  [id number]  :                       1985  ADMIT DATE:       2022 5:12 PM  [address] DATE:        11/15/2022 12:49 PM  RESPONDING  PROVIDER #: Gaurav Tapia DO          QUERY TEXT:    Pt admitted with Nausea and Vomiting. Noted documentation of Viral   gastroenteritis per oncology. If possible, please document in progress notes   and discharge summary:      The medical record reflects the following:  Risk Factors: Neuroendocrine tumor; Chronic Illness  Clinical Indicators:  22: Per oncology: history of neuroendocrine tumor of the small bowel S/P   Small bowel resection  with significant liver metastasis currently on   lanreotide with viral gastroenteritis. CT Abdomen/pelvis:  Fluid throughout colon which can be seen in the setting of   a diarrheal illness  Treatment: Protonix; Oncology consult; Iv hydration; Zofran; Laboratory   studies; Imaging    Thank Ingrid GALVANN, R.N.  Clinical Documentation Improvement  [phone]  Options provided:  -- Viral Gastroenteritis confirmed present on admission  -- Viral Gastroenteritis ruled out  -- Other - I will add my own diagnosis  -- Disagree - Not applicable / Not valid  -- Disagree - Clinically unable to determine / Unknown  -- Refer to Clinical Documentation Reviewer    PROVIDER RESPONSE TEXT:    The diagnosis of Viral Gastroenteritis was confirmed as present on admission.     Query created by: Darell Christie on 2022 1:26 PM      Electronically signed by:  Gaurav Tapia DO 2022 11:09 AM

## 2022-11-28 RX ORDER — BUTALBITAL, ACETAMINOPHEN AND CAFFEINE 50; 325; 40 MG/1; MG/1; MG/1
TABLET ORAL
Qty: 30 TABLET | Refills: 1 | Status: SHIPPED
Start: 2022-11-28 | End: 2022-11-28 | Stop reason: SDUPTHER

## 2022-11-28 RX ORDER — BUTALBITAL, ACETAMINOPHEN AND CAFFEINE 50; 325; 40 MG/1; MG/1; MG/1
2 TABLET ORAL 2 TIMES DAILY
Qty: 120 TABLET | Refills: 1 | Status: ON HOLD | OUTPATIENT
Start: 2022-11-28

## 2022-11-28 NOTE — TELEPHONE ENCOUNTER
Per pt Fioricet was sent for 1 tab BID but pt states she takes it 2tabs BID and asking if script can be corrected

## 2022-12-02 ENCOUNTER — HOSPITAL ENCOUNTER (INPATIENT)
Age: 37
LOS: 2 days | Discharge: HOME | DRG: 100 | End: 2022-12-08
Attending: STUDENT IN AN ORGANIZED HEALTH CARE EDUCATION/TRAINING PROGRAM | Admitting: INTERNAL MEDICINE
Payer: MEDICARE

## 2022-12-02 ENCOUNTER — APPOINTMENT (OUTPATIENT)
Dept: CT IMAGING | Age: 37
DRG: 100 | End: 2022-12-02
Payer: MEDICARE

## 2022-12-02 DIAGNOSIS — R41.82 ALTERED MENTAL STATUS, UNSPECIFIED ALTERED MENTAL STATUS TYPE: Primary | ICD-10-CM

## 2022-12-02 LAB
ACETAMINOPHEN LEVEL: 25.9 MCG/ML (ref 10–30)
ALBUMIN SERPL-MCNC: 4.4 G/DL (ref 3.5–5.2)
ALP BLD-CCNC: 89 U/L (ref 35–104)
ALT SERPL-CCNC: 16 U/L (ref 0–32)
AMMONIA: 20 UMOL/L (ref 11–51)
ANION GAP SERPL CALCULATED.3IONS-SCNC: 16 MMOL/L (ref 7–16)
ANISOCYTOSIS: ABNORMAL
AST SERPL-CCNC: 28 U/L (ref 0–31)
BASOPHILS ABSOLUTE: 0.05 E9/L (ref 0–0.2)
BASOPHILS RELATIVE PERCENT: 0.9 % (ref 0–2)
BILIRUB SERPL-MCNC: 0.7 MG/DL (ref 0–1.2)
BUN BLDV-MCNC: 8 MG/DL (ref 6–20)
CALCIUM SERPL-MCNC: 8.9 MG/DL (ref 8.6–10.2)
CHLORIDE BLD-SCNC: 105 MMOL/L (ref 98–107)
CO2: 20 MMOL/L (ref 22–29)
CREAT SERPL-MCNC: 0.6 MG/DL (ref 0.5–1)
EOSINOPHILS ABSOLUTE: 0.18 E9/L (ref 0.05–0.5)
EOSINOPHILS RELATIVE PERCENT: 3.4 % (ref 0–6)
ETHANOL: <10 MG/DL (ref 0–0.08)
GFR SERPL CREATININE-BSD FRML MDRD: >60 ML/MIN/1.73
GLUCOSE BLD-MCNC: 76 MG/DL (ref 74–99)
HCT VFR BLD CALC: 33.9 % (ref 34–48)
HEMOGLOBIN: 10.6 G/DL (ref 11.5–15.5)
INFLUENZA A BY PCR: NOT DETECTED
INFLUENZA B BY PCR: NOT DETECTED
LACTIC ACID: 0.9 MMOL/L (ref 0.5–2.2)
LIPASE: 16 U/L (ref 13–60)
LYMPHOCYTES ABSOLUTE: 1.67 E9/L (ref 1.5–4)
LYMPHOCYTES RELATIVE PERCENT: 31 % (ref 20–42)
MAGNESIUM: 1.9 MG/DL (ref 1.6–2.6)
MCH RBC QN AUTO: 27.2 PG (ref 26–35)
MCHC RBC AUTO-ENTMCNC: 31.3 % (ref 32–34.5)
MCV RBC AUTO: 86.9 FL (ref 80–99.9)
METAMYELOCYTES RELATIVE PERCENT: 0.9 % (ref 0–1)
MONOCYTES ABSOLUTE: 0.54 E9/L (ref 0.1–0.95)
MONOCYTES RELATIVE PERCENT: 10.3 % (ref 2–12)
MYELOCYTE PERCENT: 0.9 % (ref 0–0)
NEUTROPHILS ABSOLUTE: 2.92 E9/L (ref 1.8–7.3)
NEUTROPHILS RELATIVE PERCENT: 51.7 % (ref 43–80)
NUCLEATED RED BLOOD CELLS: 3.4 /100 WBC
OVALOCYTES: ABNORMAL
PDW BLD-RTO: 20.4 FL (ref 11.5–15)
PLATELET # BLD: 262 E9/L (ref 130–450)
PMV BLD AUTO: 9.8 FL (ref 7–12)
POIKILOCYTES: ABNORMAL
POLYCHROMASIA: ABNORMAL
POTASSIUM SERPL-SCNC: 3.5 MMOL/L (ref 3.5–5)
PROMYELOCYTES PERCENT: 0.9 % (ref 0–0)
RBC # BLD: 3.9 E12/L (ref 3.5–5.5)
SALICYLATE, SERUM: <0.3 MG/DL (ref 0–30)
SARS-COV-2, NAAT: NOT DETECTED
SODIUM BLD-SCNC: 141 MMOL/L (ref 132–146)
TOTAL PROTEIN: 7.2 G/DL (ref 6.4–8.3)
TRICYCLIC ANTIDEPRESSANTS SCREEN SERUM: NEGATIVE NG/ML
TROPONIN, HIGH SENSITIVITY: <6 NG/L (ref 0–9)
WBC # BLD: 5.4 E9/L (ref 4.5–11.5)

## 2022-12-02 PROCEDURE — 82077 ASSAY SPEC XCP UR&BREATH IA: CPT

## 2022-12-02 PROCEDURE — 36415 COLL VENOUS BLD VENIPUNCTURE: CPT

## 2022-12-02 PROCEDURE — 82140 ASSAY OF AMMONIA: CPT

## 2022-12-02 PROCEDURE — 85025 COMPLETE CBC W/AUTO DIFF WBC: CPT

## 2022-12-02 PROCEDURE — 87502 INFLUENZA DNA AMP PROBE: CPT

## 2022-12-02 PROCEDURE — 70450 CT HEAD/BRAIN W/O DYE: CPT

## 2022-12-02 PROCEDURE — 99285 EMERGENCY DEPT VISIT HI MDM: CPT

## 2022-12-02 PROCEDURE — 84484 ASSAY OF TROPONIN QUANT: CPT

## 2022-12-02 PROCEDURE — 87635 SARS-COV-2 COVID-19 AMP PRB: CPT

## 2022-12-02 PROCEDURE — 80179 DRUG ASSAY SALICYLATE: CPT

## 2022-12-02 PROCEDURE — 93005 ELECTROCARDIOGRAM TRACING: CPT | Performed by: STUDENT IN AN ORGANIZED HEALTH CARE EDUCATION/TRAINING PROGRAM

## 2022-12-02 PROCEDURE — 96361 HYDRATE IV INFUSION ADD-ON: CPT

## 2022-12-02 PROCEDURE — 74177 CT ABD & PELVIS W/CONTRAST: CPT

## 2022-12-02 PROCEDURE — 2580000003 HC RX 258: Performed by: STUDENT IN AN ORGANIZED HEALTH CARE EDUCATION/TRAINING PROGRAM

## 2022-12-02 PROCEDURE — 83690 ASSAY OF LIPASE: CPT

## 2022-12-02 PROCEDURE — 72125 CT NECK SPINE W/O DYE: CPT

## 2022-12-02 PROCEDURE — 6360000004 HC RX CONTRAST MEDICATION: Performed by: RADIOLOGY

## 2022-12-02 PROCEDURE — 83605 ASSAY OF LACTIC ACID: CPT

## 2022-12-02 PROCEDURE — 80307 DRUG TEST PRSMV CHEM ANLYZR: CPT

## 2022-12-02 PROCEDURE — 80143 DRUG ASSAY ACETAMINOPHEN: CPT

## 2022-12-02 PROCEDURE — 80053 COMPREHEN METABOLIC PANEL: CPT

## 2022-12-02 PROCEDURE — 83735 ASSAY OF MAGNESIUM: CPT

## 2022-12-02 RX ORDER — ONDANSETRON 2 MG/ML
4 INJECTION INTRAMUSCULAR; INTRAVENOUS ONCE
Status: COMPLETED | OUTPATIENT
Start: 2022-12-02 | End: 2022-12-03

## 2022-12-02 RX ORDER — 0.9 % SODIUM CHLORIDE 0.9 %
1000 INTRAVENOUS SOLUTION INTRAVENOUS ONCE
Status: COMPLETED | OUTPATIENT
Start: 2022-12-02 | End: 2022-12-02

## 2022-12-02 RX ADMIN — IOPAMIDOL 75 ML: 755 INJECTION, SOLUTION INTRAVENOUS at 20:51

## 2022-12-02 RX ADMIN — SODIUM CHLORIDE 1000 ML: 9 INJECTION, SOLUTION INTRAVENOUS at 17:30

## 2022-12-02 ASSESSMENT — ENCOUNTER SYMPTOMS
EYE REDNESS: 0
EYE DISCHARGE: 0
VOMITING: 0
NAUSEA: 0
ABDOMINAL PAIN: 1
BACK PAIN: 0
SINUS PRESSURE: 0
WHEEZING: 0
DIARRHEA: 1
SORE THROAT: 0
EYE PAIN: 0
SHORTNESS OF BREATH: 0
ABDOMINAL DISTENTION: 0
COUGH: 0

## 2022-12-02 NOTE — ED PROVIDER NOTES
The history is provided by the patient and medical records. 28-year-old female presents emergency department does have a history neuroendocrine cancer with mets to the liver presents with complaint of diarrhea and abdominal pain. She denies any nausea or vomiting and states diarrhea worsening over the past week does have generalized abdominal pain nothing makes it better or worse. Denies any changes to urinary habits denies any fevers or chills no other acute complaints this time. The patient presents with diarrhea  that has been going on for 3-4 days. These symptoms are moderate in severity. Symptoms are made better by nothng. Symptoms are made worse by nothgn. Associated symptoms include none. Review of Systems   Constitutional:  Positive for fatigue. Negative for chills and fever. HENT:  Negative for ear pain, sinus pressure and sore throat. Eyes:  Negative for pain, discharge and redness. Respiratory:  Negative for cough, shortness of breath and wheezing. Cardiovascular:  Negative for chest pain. Gastrointestinal:  Positive for abdominal pain and diarrhea. Negative for abdominal distention, nausea and vomiting. Genitourinary:  Negative for dysuria and frequency. Musculoskeletal:  Negative for arthralgias and back pain. Skin:  Negative for rash and wound. Neurological:  Negative for weakness and headaches. Hematological:  Negative for adenopathy. All other systems reviewed and are negative. Physical Exam  Vitals and nursing note reviewed. Constitutional:       General: She is not in acute distress. Appearance: Normal appearance. She is normal weight. HENT:      Head: Normocephalic and atraumatic. Eyes:      Conjunctiva/sclera: Conjunctivae normal.   Cardiovascular:      Rate and Rhythm: Normal rate and regular rhythm. Pulses: Normal pulses. Heart sounds: Normal heart sounds. No murmur heard. No gallop.    Pulmonary:      Effort: Pulmonary effort is normal. No respiratory distress. Breath sounds: Normal breath sounds. No wheezing or rales. Abdominal:      General: Abdomen is flat. Bowel sounds are normal. There is no distension. Palpations: Abdomen is soft. Tenderness: There is generalized abdominal tenderness. There is no guarding. Skin:     General: Skin is warm and dry. Capillary Refill: Capillary refill takes less than 2 seconds. Neurological:      General: No focal deficit present. Mental Status: She is alert and oriented to person, place, and time. Psychiatric:         Mood and Affect: Mood normal.         Thought Content: Thought content normal.        Procedures     MDM     Amount and/or Complexity of Data Reviewed  Clinical lab tests: reviewed       52-year-old female history of neuroendocrine tumor with mets presents with complaint of vomiting fatigue and altered mental status. Patient CT scan of head unremarkable for any acute pathology. CT scan  Pelvis also negative for any acute changes. Her laboratory work-up is reassuring ammonia is not elevated however still pending a urinalysis and urine drug screen on the patient. However due to patient still being confused at this time she will be admitted to the hospital did speak with admitting provider who is agreeable to plan.                --------------------------------------------- PAST HISTORY ---------------------------------------------  Past Medical History:  has a past medical history of Abdominal pain, Cancer ([facility] [address] Diarrhea, Hypocalcemia, Hypothyroidism, Irritable bowel syndrome, Migraines, Seizures ([facility] [address] and Status post alcohol detoxification. Past Surgical History:  has a past surgical history that includes Parathyroid gland surgery; Cholecystectomy, laparoscopic (07/06/2016); Thyroidectomy; Colonoscopy (N/A, 6/22/2018); CT NEEDLE BIOPSY LIVER PERCUTANEOUS (9/1/2020);  Small intestine surgery (N/A, 10/21/2020); CT BIOPSY RENAL (1/25/2021);  dialysis catheter (N/A, 1/28/2021); sigmoidoscopy (N/A, 5/14/2021); and Port Surgery (Left, 2/18/2022). Social History:  reports that she has been smoking cigarettes. She has been smoking an average of .25 packs per day. She has never used smokeless tobacco. She reports that she does not drink alcohol and does not use drugs. Family History: family history includes Alzheimer's Disease in an other family member; Asthma in her father; Breast Cancer in her maternal grandmother; Cancer in her father, maternal grandmother, and paternal grandfather; Diabetes in an other family member; Heart Disease in her father; High Blood Pressure in her father and mother; High Cholesterol in her mother; Coralyn Raisin in an other family member; Other in her mother. The patients home medications have been reviewed. Allergies: Patient has no known allergies.     -------------------------------------------------- RESULTS -------------------------------------------------    LABS:  Results for orders placed or performed during the hospital encounter of 12/02/22   COVID-19, Rapid    Specimen: Nasopharyngeal Swab   Result Value Ref Range    SARS-CoV-2, NAAT Not Detected Not Detected   RAPID INFLUENZA A/B ANTIGENS    Specimen: Nasopharyngeal   Result Value Ref Range    Influenza A by PCR Not Detected Not Detected    Influenza B by PCR Not Detected Not Detected   Comprehensive Metabolic Panel   Result Value Ref Range    Sodium 141 132 - 146 mmol/L    Potassium 3.5 3.5 - 5.0 mmol/L    Chloride 105 98 - 107 mmol/L    CO2 20 (L) 22 - 29 mmol/L    Anion Gap 16 7 - 16 mmol/L    Glucose 76 74 - 99 mg/dL    BUN 8 6 - 20 mg/dL    Creatinine 0.6 0.5 - 1.0 mg/dL    Est, Glom Filt Rate >60 >=60 mL/min/1.73    Calcium 8.9 8.6 - 10.2 mg/dL    Total Protein 7.2 6.4 - 8.3 g/dL    Albumin 4.4 3.5 - 5.2 g/dL    Total Bilirubin 0.7 0.0 - 1.2 mg/dL    Alkaline Phosphatase 89 35 - 104 U/L    ALT 16 0 - 32 U/L    AST 28 0 - 31 U/L   CBC with Auto Differential   Result Value Ref Range    WBC 5.4 4.5 - 11.5 E9/L    RBC 3.90 3.50 - 5.50 E12/L    Hemoglobin 10.6 (L) 11.5 - 15.5 g/dL    Hematocrit 33.9 (L) 34.0 - 48.0 %    MCV 86.9 80.0 - 99.9 fL    MCH 27.2 26.0 - 35.0 pg    MCHC 31.3 (L) 32.0 - 34.5 %    RDW 20.4 (H) 11.5 - 15.0 fL    Platelets 493 533 - 979 E9/L    MPV 9.8 7.0 - 12.0 fL    Neutrophils % 51.7 43.0 - 80.0 %    Lymphocytes % 31.0 20.0 - 42.0 %    Monocytes % 10.3 2.0 - 12.0 %    Eosinophils % 3.4 0.0 - 6.0 %    Basophils % 0.9 0.0 - 2.0 %    Neutrophils Absolute 2.92 1.80 - 7.30 E9/L    Lymphocytes Absolute 1.67 1.50 - 4.00 E9/L    Monocytes Absolute 0.54 0.10 - 0.95 E9/L    Eosinophils Absolute 0.18 0.05 - 0.50 E9/L    Basophils Absolute 0.05 0.00 - 0.20 E9/L    Metamyelocytes Relative 0.9 0.0 - 1.0 %    Myelocyte Percent 0.9 0 - 0 %    Promyelocytes Percent 0.9 0 - 0 %    nRBC 3.4 /100 WBC    Anisocytosis 2+     Polychromasia 1+     Poikilocytes 2+     Ovalocytes 2+    Lipase   Result Value Ref Range    Lipase 16 13 - 60 U/L   Lactic Acid   Result Value Ref Range    Lactic Acid 0.9 0.5 - 2.2 mmol/L   Magnesium   Result Value Ref Range    Magnesium 1.9 1.6 - 2.6 mg/dL   Troponin   Result Value Ref Range    Troponin, High Sensitivity <6 0 - 9 ng/L   Serum Drug Screen   Result Value Ref Range    Ethanol Lvl <10 mg/dL    Acetaminophen Level 25.9 10.0 - 62.5 mcg/mL    Salicylate, Serum <9.2 0.0 - 30.0 mg/dL    TCA Scrn NEGATIVE Cutoff:300 ng/mL   Ammonia   Result Value Ref Range    Ammonia 20.0 11.0 - 51.0 umol/L       RADIOLOGY:  CT ABDOMEN PELVIS W IV CONTRAST Additional Contrast? None   Final Result   CERVICAL SPINE:      No acute abnormality of the cervical spine. ABDOMEN/PELVIS:      Wall thickening the left colon, sigmoid colon, and rectum compatible with   colitis. No free air, free fluid, or abscess. No bowel obstruction. There are subtle lesions in the liver, better depicted on the comparison   exams.          CT HEAD WO CONTRAST Final Result   No acute intracranial abnormality. CT CSpine W/O Contrast   Final Result   CERVICAL SPINE:      No acute abnormality of the cervical spine. ABDOMEN/PELVIS:      Wall thickening the left colon, sigmoid colon, and rectum compatible with   colitis. No free air, free fluid, or abscess. No bowel obstruction. There are subtle lesions in the liver, better depicted on the comparison   exams.                 ------------------------- NURSING NOTES AND VITALS REVIEWED ---------------------------  Date / Time Roomed:  12/2/2022  1:08 PM  ED Bed Assignment:  35/35    The nursing notes within the ED encounter and vital signs as below have been reviewed. Patient Vitals for the past 24 hrs:   BP Temp Pulse Resp SpO2 Weight   12/03/22 0032 114/70 -- 63 18 100 % --   12/02/22 1317 118/80 97 °F (36.1 °C) 62 18 99 % 130 lb (59 kg)       Oxygen Saturation Interpretation: Normal    ------------------------------------------ PROGRESS NOTES ------------------------------------------  Re-evaluation(s):  Time: 0036  Patients symptoms show no change  Repeat physical examination is not changed    Counseling:  I have spoken with the patient and discussed todays results, in addition to providing specific details for the plan of care and counseling regarding the diagnosis and prognosis. Their questions are answered at this time and they are agreeable with the plan of admission.    --------------------------------- ADDITIONAL PROVIDER NOTES ---------------------------------  Consultations:  . Spoke with iglesia. Discussed case. They will admit the patient. This patient's ED course included: a personal history and physicial examination, re-evaluation prior to disposition, multiple bedside re-evaluations, IV medications, cardiac monitoring, and continuous pulse oximetry    This patient has remained hemodynamically stable during their ED course. Diagnosis:  1.  Altered mental status, unspecified altered mental status type        Disposition:  Patient's disposition: Admit to med/surg floor  Patient's condition is stable.          Tracy Taveras MD  Resident  12/03/22 0076

## 2022-12-03 PROBLEM — R41.82 AMS (ALTERED MENTAL STATUS): Status: ACTIVE | Noted: 2022-12-03

## 2022-12-03 LAB
AMPHETAMINE SCREEN, URINE: NOT DETECTED
BACTERIA: ABNORMAL /HPF
BARBITURATE SCREEN URINE: POSITIVE
BENZODIAZEPINE SCREEN, URINE: NOT DETECTED
BILIRUBIN URINE: NEGATIVE
BLOOD, URINE: NEGATIVE
CANNABINOID SCREEN URINE: POSITIVE
CLARITY: CLEAR
COCAINE METABOLITE SCREEN URINE: NOT DETECTED
COLOR: YELLOW
FENTANYL SCREEN, URINE: NOT DETECTED
GLUCOSE URINE: NEGATIVE MG/DL
KETONES, URINE: 40 MG/DL
LEUKOCYTE ESTERASE, URINE: NEGATIVE
Lab: ABNORMAL
METHADONE SCREEN, URINE: NOT DETECTED
NITRITE, URINE: NEGATIVE
OPIATE SCREEN URINE: NOT DETECTED
OXYCODONE URINE: NOT DETECTED
PH UA: 5 (ref 5–9)
PHENCYCLIDINE SCREEN URINE: NOT DETECTED
PROCALCITONIN: 0.52 NG/ML (ref 0–0.08)
PROTEIN UA: NEGATIVE MG/DL
RBC UA: ABNORMAL /HPF (ref 0–2)
SPECIFIC GRAVITY UA: 1.01 (ref 1–1.03)
TSH SERPL DL<=0.05 MIU/L-ACNC: 1.66 UIU/ML (ref 0.27–4.2)
UROBILINOGEN, URINE: 0.2 E.U./DL
WBC UA: ABNORMAL /HPF (ref 0–5)

## 2022-12-03 PROCEDURE — 36415 COLL VENOUS BLD VENIPUNCTURE: CPT

## 2022-12-03 PROCEDURE — 80307 DRUG TEST PRSMV CHEM ANLYZR: CPT

## 2022-12-03 PROCEDURE — 6370000000 HC RX 637 (ALT 250 FOR IP): Performed by: INTERNAL MEDICINE

## 2022-12-03 PROCEDURE — 87040 BLOOD CULTURE FOR BACTERIA: CPT

## 2022-12-03 PROCEDURE — 81001 URINALYSIS AUTO W/SCOPE: CPT

## 2022-12-03 PROCEDURE — G0378 HOSPITAL OBSERVATION PER HR: HCPCS

## 2022-12-03 PROCEDURE — 84443 ASSAY THYROID STIM HORMONE: CPT

## 2022-12-03 PROCEDURE — 96374 THER/PROPH/DIAG INJ IV PUSH: CPT

## 2022-12-03 PROCEDURE — 99223 1ST HOSP IP/OBS HIGH 75: CPT | Performed by: PSYCHIATRY & NEUROLOGY

## 2022-12-03 PROCEDURE — 84145 PROCALCITONIN (PCT): CPT

## 2022-12-03 PROCEDURE — 6360000002 HC RX W HCPCS: Performed by: STUDENT IN AN ORGANIZED HEALTH CARE EDUCATION/TRAINING PROGRAM

## 2022-12-03 RX ORDER — IBUPROFEN 400 MG/1
400 TABLET ORAL EVERY 6 HOURS PRN
Status: DISCONTINUED | OUTPATIENT
Start: 2022-12-03 | End: 2022-12-08 | Stop reason: HOSPADM

## 2022-12-03 RX ORDER — ONDANSETRON 4 MG/1
4 TABLET, ORALLY DISINTEGRATING ORAL EVERY 6 HOURS PRN
Status: DISCONTINUED | OUTPATIENT
Start: 2022-12-03 | End: 2022-12-08 | Stop reason: HOSPADM

## 2022-12-03 RX ORDER — POTASSIUM CHLORIDE 20 MEQ/1
20 TABLET, EXTENDED RELEASE ORAL 2 TIMES DAILY
Status: DISCONTINUED | OUTPATIENT
Start: 2022-12-03 | End: 2022-12-08 | Stop reason: HOSPADM

## 2022-12-03 RX ORDER — GABAPENTIN 300 MG/1
300 CAPSULE ORAL 3 TIMES DAILY
Status: DISCONTINUED | OUTPATIENT
Start: 2022-12-03 | End: 2022-12-08 | Stop reason: HOSPADM

## 2022-12-03 RX ORDER — LORAZEPAM 2 MG/ML
4 CONCENTRATE ORAL
Status: DISCONTINUED | OUTPATIENT
Start: 2022-12-03 | End: 2022-12-05

## 2022-12-03 RX ORDER — LORAZEPAM 1 MG/1
3 TABLET ORAL
Status: DISCONTINUED | OUTPATIENT
Start: 2022-12-03 | End: 2022-12-05

## 2022-12-03 RX ORDER — LORAZEPAM 2 MG/ML
3 CONCENTRATE ORAL
Status: DISCONTINUED | OUTPATIENT
Start: 2022-12-03 | End: 2022-12-05

## 2022-12-03 RX ORDER — LANOLIN ALCOHOL/MO/W.PET/CERES
3 CREAM (GRAM) TOPICAL NIGHTLY
Status: DISCONTINUED | OUTPATIENT
Start: 2022-12-03 | End: 2022-12-03

## 2022-12-03 RX ORDER — ACETAMINOPHEN 325 MG/1
TABLET ORAL
Status: DISCONTINUED
Start: 2022-12-03 | End: 2022-12-03 | Stop reason: WASHOUT

## 2022-12-03 RX ORDER — LEVETIRACETAM 250 MG/1
750 TABLET ORAL 2 TIMES DAILY
Status: DISCONTINUED | OUTPATIENT
Start: 2022-12-03 | End: 2022-12-07

## 2022-12-03 RX ORDER — BUTALBITAL, ACETAMINOPHEN AND CAFFEINE 50; 325; 40 MG/1; MG/1; MG/1
2 TABLET ORAL 2 TIMES DAILY
Status: DISCONTINUED | OUTPATIENT
Start: 2022-12-03 | End: 2022-12-03

## 2022-12-03 RX ORDER — LORAZEPAM 1 MG/1
4 TABLET ORAL
Status: DISCONTINUED | OUTPATIENT
Start: 2022-12-03 | End: 2022-12-05

## 2022-12-03 RX ORDER — FLUTICASONE PROPIONATE 50 MCG
1 SPRAY, SUSPENSION (ML) NASAL DAILY PRN
Status: DISCONTINUED | OUTPATIENT
Start: 2022-12-03 | End: 2022-12-08 | Stop reason: HOSPADM

## 2022-12-03 RX ORDER — ZOLPIDEM TARTRATE 5 MG/1
10 TABLET ORAL NIGHTLY PRN
Status: DISCONTINUED | OUTPATIENT
Start: 2022-12-03 | End: 2022-12-03

## 2022-12-03 RX ORDER — FAMOTIDINE 20 MG/1
40 TABLET, FILM COATED ORAL DAILY
Status: DISCONTINUED | OUTPATIENT
Start: 2022-12-03 | End: 2022-12-08 | Stop reason: HOSPADM

## 2022-12-03 RX ORDER — TOPIRAMATE 50 MG/1
50 TABLET, FILM COATED ORAL 2 TIMES DAILY
Status: DISCONTINUED | OUTPATIENT
Start: 2022-12-03 | End: 2022-12-03 | Stop reason: ALTCHOICE

## 2022-12-03 RX ORDER — LANOLIN ALCOHOL/MO/W.PET/CERES
100 CREAM (GRAM) TOPICAL DAILY
Status: DISCONTINUED | OUTPATIENT
Start: 2022-12-03 | End: 2022-12-05

## 2022-12-03 RX ORDER — ACETAMINOPHEN 325 MG/1
650 TABLET ORAL EVERY 4 HOURS PRN
Status: DISCONTINUED | OUTPATIENT
Start: 2022-12-03 | End: 2022-12-08 | Stop reason: HOSPADM

## 2022-12-03 RX ORDER — MECOBALAMIN 5000 MCG
TABLET,DISINTEGRATING ORAL
Status: DISCONTINUED
Start: 2022-12-03 | End: 2022-12-03 | Stop reason: WASHOUT

## 2022-12-03 RX ORDER — LORAZEPAM 1 MG/1
1 TABLET ORAL
Status: DISCONTINUED | OUTPATIENT
Start: 2022-12-03 | End: 2022-12-05

## 2022-12-03 RX ORDER — LORAZEPAM 2 MG/ML
1 CONCENTRATE ORAL
Status: DISCONTINUED | OUTPATIENT
Start: 2022-12-03 | End: 2022-12-05

## 2022-12-03 RX ORDER — LORAZEPAM 2 MG/ML
2 CONCENTRATE ORAL
Status: DISCONTINUED | OUTPATIENT
Start: 2022-12-03 | End: 2022-12-05

## 2022-12-03 RX ORDER — TRAMADOL HYDROCHLORIDE 50 MG/1
50 TABLET ORAL EVERY 8 HOURS PRN
Status: DISCONTINUED | OUTPATIENT
Start: 2022-12-03 | End: 2022-12-03

## 2022-12-03 RX ORDER — LEVOTHYROXINE SODIUM 112 UG/1
112 TABLET ORAL DAILY
Status: DISCONTINUED | OUTPATIENT
Start: 2022-12-03 | End: 2022-12-08 | Stop reason: HOSPADM

## 2022-12-03 RX ORDER — LORAZEPAM 1 MG/1
2 TABLET ORAL
Status: DISCONTINUED | OUTPATIENT
Start: 2022-12-03 | End: 2022-12-05

## 2022-12-03 RX ORDER — BRIMONIDINE TARTRATE 2 MG/ML
1 SOLUTION/ DROPS OPHTHALMIC EVERY MORNING
Status: DISCONTINUED | OUTPATIENT
Start: 2022-12-03 | End: 2022-12-08 | Stop reason: HOSPADM

## 2022-12-03 RX ORDER — GABAPENTIN 300 MG/1
600 CAPSULE ORAL 3 TIMES DAILY
Status: DISCONTINUED | OUTPATIENT
Start: 2022-12-03 | End: 2022-12-03

## 2022-12-03 RX ORDER — LANOLIN ALCOHOL/MO/W.PET/CERES
400 CREAM (GRAM) TOPICAL 2 TIMES DAILY
Status: DISCONTINUED | OUTPATIENT
Start: 2022-12-03 | End: 2022-12-08 | Stop reason: HOSPADM

## 2022-12-03 RX ADMIN — BUTALBITAL, ACETAMINOPHEN, AND CAFFEINE 2 TABLET: 50; 325; 40 TABLET ORAL at 10:15

## 2022-12-03 RX ADMIN — LEVETIRACETAM 750 MG: 500 TABLET, FILM COATED ORAL at 10:16

## 2022-12-03 RX ADMIN — LEVOTHYROXINE SODIUM 112 MCG: 0.11 TABLET ORAL at 10:16

## 2022-12-03 RX ADMIN — LEVETIRACETAM 750 MG: 500 TABLET, FILM COATED ORAL at 20:53

## 2022-12-03 RX ADMIN — GABAPENTIN 300 MG: 300 CAPSULE ORAL at 20:53

## 2022-12-03 RX ADMIN — GABAPENTIN 600 MG: 300 CAPSULE ORAL at 10:15

## 2022-12-03 RX ADMIN — Medication 100 MG: at 13:36

## 2022-12-03 RX ADMIN — Medication 400 MG: at 20:53

## 2022-12-03 RX ADMIN — GABAPENTIN 300 MG: 300 CAPSULE ORAL at 13:36

## 2022-12-03 RX ADMIN — FAMOTIDINE 40 MG: 20 TABLET, FILM COATED ORAL at 10:16

## 2022-12-03 RX ADMIN — POTASSIUM CHLORIDE 20 MEQ: 20 TABLET, EXTENDED RELEASE ORAL at 20:53

## 2022-12-03 RX ADMIN — ONDANSETRON HYDROCHLORIDE 4 MG: 2 INJECTION, SOLUTION INTRAMUSCULAR; INTRAVENOUS at 00:50

## 2022-12-03 RX ADMIN — ONDANSETRON 4 MG: 4 TABLET, ORALLY DISINTEGRATING ORAL at 13:36

## 2022-12-03 RX ADMIN — BRIMONIDINE TARTRATE 1 DROP: 2 SOLUTION/ DROPS OPHTHALMIC at 10:16

## 2022-12-03 RX ADMIN — Medication 400 MG: at 10:15

## 2022-12-03 ASSESSMENT — PAIN SCALES - GENERAL: PAINLEVEL_OUTOF10: 0

## 2022-12-03 NOTE — PROGRESS NOTES
Name: Doc Sharma  : 1985  MRN: [id number]    Date: 2022    Benefits of immediately proceeding with Radiology exam outweigh the risks and therefore the following is being waived:      [x] Pregnancy test    [] Protocol for Iodine allergy    [] MRI questionnaire    [] Dav Green MD

## 2022-12-03 NOTE — ED NOTES
Patient requested to get up to go go the bathroom. Advised by nurse that I took over for that she recently fell while in the ED. I advised patient that she will have to use the bed pan and not able to get out of bed. She was upset that I advised this. I placed patient on bedpan.       Ronel Saeed RN  12/02/22 6605

## 2022-12-03 NOTE — ED NOTES
Pt roaming the hallway stating she is lost, pt attempting to remove supplies from nurse cart, pt reoriented asked to stay in bed unless assisted.      Kei Ness RN  12/02/22 1954

## 2022-12-03 NOTE — PROGRESS NOTES
Patient arrived to unit with no complaints of pain. Stated she did not want to go home because parents are going to a [employer] [place] [employer].

## 2022-12-03 NOTE — ED NOTES
Patient attempting to get out of bed. Helped her to get comfortable in the bed. Called charge nurse to obtain order for sitter.      Bren Mitchell RN  12/03/22 6118

## 2022-12-03 NOTE — H&P
[facility]  History and Physical      CHIEF COMPLAINT:  AMS    Reason for Admission:  AMS    History Obtained From:  EMR    PCP :  Alanis Bejarano DO    [address]  / [place] [address]      HISTORY OF PRESENT ILLNESS:      The patient is a 40 y.o. female with a history of neuroendocrine cancer of liver presents with abdominal pain and diarrhea. Ct abdomen did not show any acute pathology. She was noted to be confused. Admitted for further evaluation. Negative for influenza. Very poor historian. Past Medical History:        Diagnosis Date    Abdominal pain     Cancer ([address]     neuroendocrime tumor    Diarrhea     Hypocalcemia     Hypothyroidism     Irritable bowel syndrome     Migraines     Seizures ([facility] [address]     last episode January 2021    Status post alcohol detoxification     5/22/2018-5/29/2018     Past Surgical History:        Procedure Laterality Date    CHOLECYSTECTOMY, LAPAROSCOPIC  07/06/2016    COLONOSCOPY N/A 6/22/2018    COLONOSCOPY WITH BIOPSY performed by Stacie Carlisle MD at [facility] ENDOSCOPY    CT BIOPSY RENAL  1/25/2021    CT BIOPSY RENAL 1/25/2021 Marcelino Miner II, MD [facility]    CT NEEDLE BIOPSY LIVER PERCUTANEOUS  9/1/2020    CT NEEDLE BIOPSY LIVER PERCUTANEOUS 9/1/2020 [facility] CT    HC DIALYSIS CATHETER N/A 1/28/2021    TESIO CATHETER INSERTION AND REMOVAL OF TEMPORARY performed by Sarah Kelly MD at [address] Left 2/18/2022    MEDI PORT PLACEMENT, Left side.  performed by Karen Carvalho MD at [facility] N/A 5/14/2021    SIGMOIDOSCOPY PERIANAL BOTOX INJECTION   (50 UNITS) performed by Stacie Carlisle MD at [address] [place] [address] N/A 10/21/2020    LAPAROSCOPIC ROBOTIC SMALL BOWEL RESECTION WITH PLANNED TRANSITION TO OPEN performed by Bren Mclean MD at [address]           Medications Prior to Admission:    Medications Prior to Admission: butalbital-acetaminophen-caffeine (FIORICET, ESGIC) -40 MG per tablet, Take 2 tablets by mouth 2 times daily  gabapentin (NEURONTIN) 300 MG capsule, Take 2 capsules by mouth 3 times daily for 30 days. traMADol (ULTRAM) 50 MG tablet, Take 1 tablet by mouth every 8 hours as needed for Pain for up to 90 days. levETIRAcetam (KEPPRA) 750 MG tablet, Take 750 mg by mouth 2 times daily  levothyroxine (SYNTHROID) 112 MCG tablet, Take 112 mcg by mouth Daily  melatonin 3 MG TABS tablet, Take 3 mg by mouth at bedtime  famotidine (PEPCID) 40 MG tablet, Take 40 mg by mouth nightly as needed  zolpidem (AMBIEN) 10 MG tablet, Take 10 mg by mouth nightly as needed for Sleep. fluticasone (FLONASE) 50 MCG/ACT nasal spray, 1 spray by Nasal route daily as needed for Rhinitis  potassium chloride (KLOR-CON M) 20 MEQ extended release tablet, Take 20 mEq by mouth 2 times daily  topiramate (TOPAMAX) 50 MG tablet, Take 50 mg by mouth 2 times daily  brimonidine (ALPHAGAN) 0.2 % ophthalmic solution, Place 1 drop into both eyes every morning  magnesium oxide (MAG-OX) 400 MG tablet, Take 400 mg by mouth 2 times daily    Allergies:  Patient has no known allergies.     Social History:   Social History     Socioeconomic History    Marital status: Single     Spouse name: Not on file    Number of children: Not on file    Years of education: Not on file    Highest education level: Not on file   Occupational History    Occupation: cleaning     Employer: [employer]   Tobacco Use    Smoking status: Every Day     Packs/day: 0.25     Types: Cigarettes    Smokeless tobacco: Never   Vaping Use    Vaping Use: Never used   Substance and Sexual Activity    Alcohol use: No     Alcohol/week: 0.0 standard drinks     Comment: 5 years sober    Drug use: Yes     Types: Marijuana Janine Sahil)    Sexual activity: Not on file   Other Topics Concern    Not on file   Social History Narrative    Not on file     Social Determinants of Health     Financial Resource Strain: Not on file   Food Insecurity: Not on file   Transportation Needs: Not on file   Physical Activity: Not on file   Stress: Not on file   Social Connections: Not on file   Intimate Partner Violence: Not on file   Housing Stability: Not on file         Family History:       Problem Relation Age of Onset    High Blood Pressure Mother     High Cholesterol Mother     Other Mother         migraine headaches    Heart Disease Father     Asthma Father     High Blood Pressure Father     Cancer Father         Sarcoidosis    Diabetes Other         GRANDMOTHER    Lung Cancer Other         GRANDFATHER    Alzheimer's Disease Other         GRANDMOTHER    Breast Cancer Maternal Grandmother     Cancer Maternal Grandmother         skin    Cancer Paternal Grandfather         lung       REVIEW OF SYSTEMS:    General ROS: negative  Hematological and Lymphatic ROS: negative  Endocrine ROS: negative  Respiratory ROS: no cough,  wheezing  or shortness of breath,   Cardiovascular ROS: no chest pain or dyspnea on exertion  Gastrointestinal ROS: no abdominal pain, change in bowel habits, or black or bloody stools  Genito-Urinary ROS: no dysuria, trouble voiding, or hematuria  Neurological ROS: no TIA or stroke symptoms  negative    Vitals:  BP (!) 103/51   Pulse 77   Temp 98 °F (36.7 °C) (Oral)   Resp 22   Wt 130 lb (59 kg)   LMP  (LMP Unknown) Comment: mirena  SpO2 99%   BMI 25.39 kg/m²     PHYSICAL EXAM:  General:  Awake, confused without agitation or distress  HEENT:  Normocephalic, atraumatic. Pupils equal, round, reactive to light. No scleral icterus. No conjunctival injection. Neck:  Supple, no carotid bruits  Heart:  RRR,   Lungs:  CTA bilaterally, bilat symmetrical expansion, no wheeze, rales, or rhonchi  Abdomen:   Bowel sounds present, soft, nontender, no masses, no organomegaly, no peritoneal signs  Extremities:  No clubbing, cyanosis, or edema  Skin:  Warm and dry, no open lesions or rash  Neuro:  Cranial Collection Time: 12/02/22  3:55 PM    Specimen: Nasopharyngeal Swab   Result Value Ref Range    SARS-CoV-2, NAAT Not Detected Not Detected   RAPID INFLUENZA A/B ANTIGENS    Collection Time: 12/02/22  3:55 PM    Specimen: Nasopharyngeal   Result Value Ref Range    Influenza A by PCR Not Detected Not Detected    Influenza B by PCR Not Detected Not Detected   Magnesium    Collection Time: 12/02/22  3:55 PM   Result Value Ref Range    Magnesium 1.9 1.6 - 2.6 mg/dL   Troponin    Collection Time: 12/02/22  3:55 PM   Result Value Ref Range    Troponin, High Sensitivity <6 0 - 9 ng/L   Serum Drug Screen    Collection Time: 12/02/22 10:41 PM   Result Value Ref Range    Ethanol Lvl <10 mg/dL    Acetaminophen Level 25.9 10.0 - 73.7 mcg/mL    Salicylate, Serum <6.1 0.0 - 30.0 mg/dL    TCA Scrn NEGATIVE Cutoff:300 ng/mL   Ammonia    Collection Time: 12/02/22 10:41 PM   Result Value Ref Range    Ammonia 20.0 11.0 - 51.0 umol/L   Urinalysis with Microscopic    Collection Time: 12/03/22 12:31 AM   Result Value Ref Range    Color, UA Yellow Straw/Yellow    Clarity, UA Clear Clear    Glucose, Ur Negative Negative mg/dL    Bilirubin Urine Negative Negative    Ketones, Urine 40 (A) Negative mg/dL    Specific Gravity, UA 1.015 1.005 - 1.030    Blood, Urine Negative Negative    pH, UA 5.0 5.0 - 9.0    Protein, UA Negative Negative mg/dL    Urobilinogen, Urine 0.2 <2.0 E.U./dL    Nitrite, Urine Negative Negative    Leukocyte Esterase, Urine Negative Negative    WBC, UA NONE 0 - 5 /HPF    RBC, UA 0-1 0 - 2 /HPF    Bacteria, UA NONE SEEN None Seen /HPF   Urine Drug Screen    Collection Time: 12/03/22 12:31 AM   Result Value Ref Range    Amphetamine Screen, Urine NOT DETECTED Negative <1000 ng/mL    Barbiturate Screen, Ur POSITIVE (A) Negative < 200 ng/mL    Benzodiazepine Screen, Urine NOT DETECTED Negative < 200 ng/mL    Cannabinoid Scrn, Ur POSITIVE (A) Negative < 50ng/mL    Cocaine Metabolite Screen, Urine NOT DETECTED Negative < 300 ng/mL    Opiate Scrn, Ur NOT DETECTED Negative < 300ng/mL    PCP Screen, Urine NOT DETECTED Negative < 25 ng/mL    Methadone Screen, Urine NOT DETECTED Negative <300 ng/mL    Oxycodone Urine NOT DETECTED Negative <100 ng/mL    FENTANYL SCREEN, URINE NOT DETECTED Negative <1 ng/mL    Drug Screen Comment: see below        CT ABDOMEN PELVIS W IV CONTRAST Additional Contrast? None   Final Result   CERVICAL SPINE:      No acute abnormality of the cervical spine. ABDOMEN/PELVIS:      Wall thickening the left colon, sigmoid colon, and rectum compatible with   colitis. No free air, free fluid, or abscess. No bowel obstruction. There are subtle lesions in the liver, better depicted on the comparison   exams. CT HEAD WO CONTRAST   Final Result   No acute intracranial abnormality. CT CSpine W/O Contrast   Final Result   CERVICAL SPINE:      No acute abnormality of the cervical spine. ABDOMEN/PELVIS:      Wall thickening the left colon, sigmoid colon, and rectum compatible with   colitis. No free air, free fluid, or abscess. No bowel obstruction. There are subtle lesions in the liver, better depicted on the comparison   exams. ASSESSMENT :      Principal Problem:    AMS (altered mental status)  Resolved Problems:    * No resolved hospital problems.  *  Metabolic encephalopathy-no neuro deficits  Metastatic neuroendocrine tumor to liver  Likely poly pharmacy  PTSD by history  History of etoh abuse  History of bipolar disorder  Hypothyroidism  Anemia- hemoglobin is stable  History of seizures on keppra could be post ictal especially with tramdol use- can lower seizure threshold    Plan :    Ct head negative  Urine drug screen positive for cannabinoids and barbiturates  UA not consistent with UTI  Decrease gabapentin dose given confusion  Dc fioricet  Dc tramadol   Dc ambien  Melatonin for sleep  Tylenol and motrin for pain  She does have port in- blood cultures/check pro-calcitonin  Ammonia level normal  Check tsh  [facility] protocol  Ask neurology to see    Electronically signed by Liliya Anderson MD on 12/3/2022 at 10:49 AM    NOTE: This report was transcribed using voice recognition software.  Every effort was made to ensure accuracy; however, inadvertent transcription errors may be present

## 2022-12-03 NOTE — PROGRESS NOTES
Patient's jewelry removed and placed in an envelope. Envelope given to patient. Patient left CT department with jewelry.  One left earring

## 2022-12-03 NOTE — ED NOTES
Patient back from CT- stating she needs to use the restroom. I advised that she will need to use the bedpan and she refused. I advised due to her falling already she is not able to get up to ambulate she will need to use the bedpan. Patient refused.       Jeanine Martinez RN  12/02/22 4683

## 2022-12-03 NOTE — ED NOTES
Went to draw amonia from patients port. She refused to let me do so.       Gissel Olson RN  12/02/22 2129

## 2022-12-03 NOTE — ED NOTES
Went into the room, patient was out of bed with her IV tubing wrapped around the bed and about the pull it out with her port. I redirected her back to bed.       Marck Wolfe RN  12/02/22 7942

## 2022-12-03 NOTE — ED NOTES
Pt with call light on asking to use BR, pt ambulated with stand by assistance given urine collection kit. Pt exited bathroom increasingly confused, walked back to bed, reoriented. Pt asked to remain in bed unless assisted by staff. Pt states she could not provide urine and claimed she is anuric.      Priscilla Castaneda RN  12/02/22 1952

## 2022-12-03 NOTE — PROGRESS NOTES
Patient ambulating to bathroom and fell down onto knees. No loss of consciousness, patient denies hitting head. Vital signs stable. Dr. Anai Benson notified.

## 2022-12-04 LAB
BACTERIA: ABNORMAL /HPF
BILIRUBIN URINE: NEGATIVE
BLOOD, URINE: ABNORMAL
CLARITY: ABNORMAL
COLOR: YELLOW
EKG ATRIAL RATE: 64 BPM
EKG P-R INTERVAL: 182 MS
EKG Q-T INTERVAL: 448 MS
EKG QRS DURATION: 70 MS
EKG QTC CALCULATION (BAZETT): 462 MS
EKG R AXIS: 55 DEGREES
EKG T AXIS: 50 DEGREES
EKG VENTRICULAR RATE: 64 BPM
EPITHELIAL CELLS, UA: ABNORMAL /HPF
GLUCOSE URINE: NEGATIVE MG/DL
KETONES, URINE: NEGATIVE MG/DL
LEUKOCYTE ESTERASE, URINE: ABNORMAL
NITRITE, URINE: POSITIVE
PH UA: 6.5 (ref 5–9)
PROTEIN UA: NEGATIVE MG/DL
RBC UA: ABNORMAL /HPF (ref 0–2)
SPECIFIC GRAVITY UA: 1.01 (ref 1–1.03)
UROBILINOGEN, URINE: 0.2 E.U./DL
WBC UA: ABNORMAL /HPF (ref 0–5)

## 2022-12-04 PROCEDURE — 81001 URINALYSIS AUTO W/SCOPE: CPT

## 2022-12-04 PROCEDURE — 93010 ELECTROCARDIOGRAM REPORT: CPT | Performed by: INTERNAL MEDICINE

## 2022-12-04 PROCEDURE — G0378 HOSPITAL OBSERVATION PER HR: HCPCS

## 2022-12-04 PROCEDURE — 87186 SC STD MICRODIL/AGAR DIL: CPT

## 2022-12-04 PROCEDURE — 87088 URINE BACTERIA CULTURE: CPT

## 2022-12-04 PROCEDURE — 6370000000 HC RX 637 (ALT 250 FOR IP): Performed by: INTERNAL MEDICINE

## 2022-12-04 PROCEDURE — 87077 CULTURE AEROBIC IDENTIFY: CPT

## 2022-12-04 RX ADMIN — GABAPENTIN 300 MG: 300 CAPSULE ORAL at 21:18

## 2022-12-04 RX ADMIN — POTASSIUM CHLORIDE 20 MEQ: 20 TABLET, EXTENDED RELEASE ORAL at 10:28

## 2022-12-04 RX ADMIN — LEVETIRACETAM 750 MG: 500 TABLET, FILM COATED ORAL at 21:17

## 2022-12-04 RX ADMIN — FAMOTIDINE 40 MG: 20 TABLET, FILM COATED ORAL at 10:28

## 2022-12-04 RX ADMIN — GABAPENTIN 300 MG: 300 CAPSULE ORAL at 10:28

## 2022-12-04 RX ADMIN — BRIMONIDINE TARTRATE 1 DROP: 2 SOLUTION/ DROPS OPHTHALMIC at 10:27

## 2022-12-04 RX ADMIN — ONDANSETRON 4 MG: 4 TABLET, ORALLY DISINTEGRATING ORAL at 10:33

## 2022-12-04 RX ADMIN — LEVOTHYROXINE SODIUM 112 MCG: 0.11 TABLET ORAL at 06:53

## 2022-12-04 RX ADMIN — Medication 400 MG: at 21:17

## 2022-12-04 RX ADMIN — LEVETIRACETAM 750 MG: 500 TABLET, FILM COATED ORAL at 10:28

## 2022-12-04 RX ADMIN — Medication 100 MG: at 10:28

## 2022-12-04 RX ADMIN — POTASSIUM CHLORIDE 20 MEQ: 20 TABLET, EXTENDED RELEASE ORAL at 21:18

## 2022-12-04 RX ADMIN — Medication 400 MG: at 10:28

## 2022-12-04 RX ADMIN — GABAPENTIN 300 MG: 300 CAPSULE ORAL at 14:06

## 2022-12-04 ASSESSMENT — PAIN SCALES - GENERAL: PAINLEVEL_OUTOF10: 0

## 2022-12-04 NOTE — PLAN OF CARE
Problem: Safety - Adult  Goal: Free from fall injury  12/4/2022 1200 by Kandy Bates RN  Outcome: Progressing  12/3/2022 2339 by Giovanny Deng RN  Outcome: Progressing     Problem: Skin/Tissue Integrity  Goal: Absence of new skin breakdown  Description: 1. Monitor for areas of redness and/or skin breakdown  2. Assess vascular access sites hourly  3. Every 4-6 hours minimum:  Change oxygen saturation probe site  4. Every 4-6 hours:  If on nasal continuous positive airway pressure, respiratory therapy assess nares and determine need for appliance change or resting period.   12/4/2022 1200 by Kandy Bates RN  Outcome: Progressing  12/3/2022 2339 by Giovanny Deng RN  Outcome: Progressing

## 2022-12-04 NOTE — PROGRESS NOTES
Subjective:    Chief complaint:    Significantly more alert today  Had a fall last night apparently this was a mechanical fall  No problems overnight. Denies chest pain, angina, and dyspnea. Denies abdominal pain. Tolerating diet. No nausea or vomiting. Objective:    BP (!) 94/57   Pulse 64   Temp 97.3 °F (36.3 °C) (Temporal)   Resp 18   Wt 130 lb (59 kg)   LMP  (LMP Unknown) Comment: mirena  SpO2 100%   BMI 25.39 kg/m²   General : Awake ,alert,no distress. Heart:  RRR, no murmurs, gallops, or rubs. Lungs:  CTA bilaterally, no wheeze, rales or rhonchi  Abd: bowel sounds present, nontender, nondistended, no masses  Extrem:  No clubbing, cyanosis, or edema    CBC:   Lab Results   Component Value Date/Time    WBC 5.4 12/02/2022 03:55 PM    RBC 3.90 12/02/2022 03:55 PM    HGB 10.6 12/02/2022 03:55 PM    HCT 33.9 12/02/2022 03:55 PM    MCV 86.9 12/02/2022 03:55 PM    MCH 27.2 12/02/2022 03:55 PM    MCHC 31.3 12/02/2022 03:55 PM    RDW 20.4 12/02/2022 03:55 PM     12/02/2022 03:55 PM    MPV 9.8 12/02/2022 03:55 PM     BMP:    Lab Results   Component Value Date/Time     12/02/2022 03:55 PM    K 3.5 12/02/2022 03:55 PM    K 3.7 11/14/2022 08:21 PM     12/02/2022 03:55 PM    CO2 20 12/02/2022 03:55 PM    BUN 8 12/02/2022 03:55 PM    LABALBU 4.4 12/02/2022 03:55 PM    CREATININE 0.6 12/02/2022 03:55 PM    CALCIUM 8.9 12/02/2022 03:55 PM    GFRAA >60 10/13/2022 01:18 PM    LABGLOM >60 12/02/2022 03:55 PM    GLUCOSE 76 12/02/2022 03:55 PM     PT/INR:    Lab Results   Component Value Date/Time    PROTIME 16.3 09/28/2022 04:00 AM    INR 1.5 09/28/2022 04:00 AM     Troponin:    Lab Results   Component Value Date/Time    TROPONINI <0.01 02/07/2021 02:47 PM       No results for input(s): LABURIN in the last 72 hours. No results for input(s): BC in the last 72 hours. No results for input(s): Mian Juan in the last 72 hours.       Current Facility-Administered Medications:     cefTRIAXone (ROCEPHIN) 1,000 mg in sterile water 10 mL IV syringe, 1,000 mg, IntraVENous, Once, Gavin Sherman MD    magnesium oxide (MAG-OX) tablet 400 mg, 400 mg, Oral, BID, Gely Neal MD, 400 mg at 12/04/22 1028    fluticasone (FLONASE) 50 MCG/ACT nasal spray 1 spray, 1 spray, Nasal, Daily PRN, Gely Neal MD    potassium chloride (KLOR-CON M) extended release tablet 20 mEq, 20 mEq, Oral, BID, Gely Neal MD, 20 mEq at 12/04/22 1028    brimonidine (ALPHAGAN) 0.2 % ophthalmic solution 1 drop, 1 drop, Both Eyes, QAM, Gely Neal MD, 1 drop at 12/04/22 1027    levETIRAcetam (KEPPRA) tablet 750 mg, 750 mg, Oral, BID, Gely Neal MD, 750 mg at 12/04/22 1028    levothyroxine (SYNTHROID) tablet 112 mcg, 112 mcg, Oral, Daily, Gely Neal MD, 112 mcg at 12/04/22 0653    famotidine (PEPCID) tablet 40 mg, 40 mg, Oral, Daily, Gely Neal MD, 40 mg at 12/04/22 1028    gabapentin (NEURONTIN) capsule 300 mg, 300 mg, Oral, TID, Gely Neal MD, 300 mg at 12/04/22 1028    acetaminophen (TYLENOL) tablet 650 mg, 650 mg, Oral, Q4H PRN, Gely Neal MD    ibuprofen (ADVIL;MOTRIN) tablet 400 mg, 400 mg, Oral, Q6H PRN, Gely Neal MD    LORazepam (ATIVAN) tablet 1 mg, 1 mg, Oral, Q1H PRN **OR** LORazepam (ATIVAN) 2 MG/ML concentrated solution 1 mg, 1 mg, SubLINGual, Q1H PRN **OR** LORazepam (ATIVAN) tablet 2 mg, 2 mg, Oral, Q1H PRN **OR** LORazepam (ATIVAN) 2 MG/ML concentrated solution 2 mg, 2 mg, SubLINGual, Q1H PRN **OR** LORazepam (ATIVAN) tablet 3 mg, 3 mg, Oral, Q1H PRN **OR** LORazepam (ATIVAN) 2 MG/ML concentrated solution 3 mg, 3 mg, SubLINGual, Q1H PRN **OR** LORazepam (ATIVAN) tablet 4 mg, 4 mg, Oral, Q1H PRN **OR** LORazepam (ATIVAN) 2 MG/ML concentrated solution 4 mg, 4 mg, SubLINGual, Q1H PRN, Gely Neal MD    thiamine tablet 100 mg, 100 mg, Oral, Daily, Gely Neal MD, 100 mg at 12/04/22 1028    ondansetron (ZOFRAN-ODT) disintegrating tablet 4 mg, 4 mg, Oral, Q6H PRN, Gely Neal MD, 4 mg at 12/04/22 1033    ADULT DIET; Regular    CT ABDOMEN PELVIS W IV CONTRAST Additional Contrast? None   Final Result   CERVICAL SPINE:      No acute abnormality of the cervical spine. ABDOMEN/PELVIS:      Wall thickening the left colon, sigmoid colon, and rectum compatible with   colitis. No free air, free fluid, or abscess. No bowel obstruction. There are subtle lesions in the liver, better depicted on the comparison   exams. CT HEAD WO CONTRAST   Final Result   No acute intracranial abnormality. CT CSpine W/O Contrast   Final Result   CERVICAL SPINE:      No acute abnormality of the cervical spine. ABDOMEN/PELVIS:      Wall thickening the left colon, sigmoid colon, and rectum compatible with   colitis. No free air, free fluid, or abscess. No bowel obstruction. There are subtle lesions in the liver, better depicted on the comparison   exams. Assessment:    Principal Problem:    AMS (altered mental status)  Resolved Problems:    * No resolved hospital problems. *  History of seizures with possible breakthrough seizures  Polypharmacy  History of alcohol abuse with no recent alcohol abuse  History of bipolar disorder  Hypothyroidism      Plan:  Continue to monitor  Mentation seems to be better  Neurology evaluation pending  Avoid tramadol given potential for decreasing seizure threshold  Procalcitonin is elevated   Recheck urine        Gely Neal MD  12:56 PM  12/4/2022    NOTE: This report was transcribed using voice recognition software.  Every effort was made to ensure accuracy; however, inadvertent transcription errors may be present

## 2022-12-04 NOTE — CONSULTS
NEUROLOGY CONSULT NOTE      Requesting Physician:  Mando Yee MD    Reason for Consult:  Evaluate for AMS ? post ictal    History of Present Illness:  Leilani Nicholas is a 40 y.o. 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 Patient has noticed over the prior weeks development of worsening diarrhea and generalized abdominal pain. No report of any other associated symptoms. There has been no recent infection or change in overall medical condition. She denies any significant change in her habits or medications. Patient now with report of alteration in mentation and functioning of unclear etiology. CT scan of head was unremarkable. CT of cervical spine showed no acute abnormalities. COVID-19 test was negative  Influenza screen was negative  Labs showed no significant abnormalities that would be associated with symptoms. Patient has a h/o seizures with f/u with Abigail Mcgarry NP at [facility] Neurology. She was transferred from [facility] ED on 1/29/22 for  seizure activity. Patient had 5 seizure-like episodes prior to OSH ED presentation. She was given Keppra 2 g and Ativan 1 mg. CT neck showed a right ear abscess and she subsequently underwent I&D in the ED. Patient was then transferred to [facility] Neurology service for further evaluation. Her home antiepileptics were continued (Keppra 750 mg BID and Topamax 75 mg BID). EEG monitoring did not show seizures or epileptiform discharges, with normal background. During this visit, interrogation of [facility] neurologists records, nonepileptic seizures was noted among previous Dx considerations, and this was a relevant consideration for her presentation.  Patient with subsequent consideration of PNES with continuation [facility]. Patient also on Topamax likely for migraines as well as seizures. She had been receiving Fiorcet and Tramadol for Migraines with note that Tramadol an lower seizure threshold. Past Medical History:        Diagnosis Date    Abdominal pain     Cancer ([address]     neuroendocrime tumor    Diarrhea     Hypocalcemia     Hypothyroidism     Irritable bowel syndrome     Migraines     Seizures ([facility] [address]     last episode January 2021    Status post alcohol detoxification     5/22/2018-5/29/2018           Procedure Laterality Date    CHOLECYSTECTOMY, LAPAROSCOPIC  07/06/2016    COLONOSCOPY N/A 6/22/2018    COLONOSCOPY WITH BIOPSY performed by Beck Dunlap MD at [facility] ENDOSCOPY    CT BIOPSY RENAL  1/25/2021    CT BIOPSY RENAL 1/25/2021 Luis Benton II, MD [facility]    CT NEEDLE BIOPSY LIVER PERCUTANEOUS  9/1/2020    CT NEEDLE BIOPSY LIVER PERCUTANEOUS 9/1/2020 [facility] CT    HC DIALYSIS CATHETER N/A 1/28/2021    TESIO CATHETER INSERTION AND REMOVAL OF TEMPORARY performed by Elva Sharp MD at [address] Left 2/18/2022    MEDI PORT PLACEMENT, Left side.  performed by Hood Godinez MD at [address] [place] [address] N/A 5/14/2021    SIGMOIDOSCOPY PERIANAL BOTOX INJECTION   (50 UNITS) performed by Beck Dunlap MD at [address] [place] [address] N/A 10/21/2020    LAPAROSCOPIC ROBOTIC SMALL BOWEL RESECTION WITH PLANNED TRANSITION TO OPEN performed by Josh Irwin MD at [address]         Social History:  Social History     Tobacco Use   Smoking Status Every Day    Packs/day: 0.25    Types: Cigarettes   Smokeless Tobacco Never     Social History     Substance and Sexual Activity   Alcohol Use No    Alcohol/week: 0.0 standard drinks    Comment: 5 years sober     Social History     Substance and Sexual Activity   Drug Use Yes    Types: Marijuana Omayra Pleas)     Single    Family History:       Problem Relation Age of Onset    High Blood Pressure Mother     High Cholesterol Mother     Other Mother         migraine headaches    Heart Disease Father     Asthma Father     High Blood Pressure Father     Cancer Father         Sarcoidosis    Diabetes Other         GRANDMOTHER    Lung Cancer Other         GRANDFATHER    Alzheimer's Disease Other         GRANDMOTHER    Breast Cancer Maternal Grandmother     Cancer Maternal Grandmother         skin    Cancer Paternal Grandfather         lung       Review of Systems:  Constitutional:  Positive for fatigue. Negative for chills and fever. HENT:  Negative for ear pain, sinus pressure and sore throat. Eyes:  Negative for pain, discharge and redness. Respiratory:  Negative for cough, shortness of breath and wheezing. Cardiovascular:  Negative for chest pain. Gastrointestinal:  Positive for abdominal pain and diarrhea. Negative for abdominal distention, nausea and vomiting. Genitourinary:  Negative for dysuria and frequency. Musculoskeletal:  Negative for arthralgias and back pain. Skin:  Negative for rash and wound. Neurological:  Negative for weakness and headaches. Hematological:  Negative for adenopathy. All other systems reviewed and are negative.         Allergies:    No Known Allergies     Current Medications:   cefTRIAXone (ROCEPHIN) 1,000 mg in sterile water 10 mL IV syringe, Once  magnesium oxide (MAG-OX) tablet 400 mg, BID  fluticasone (FLONASE) 50 MCG/ACT nasal spray 1 spray, Daily PRN  potassium chloride (KLOR-CON M) extended release tablet 20 mEq, BID  brimonidine (ALPHAGAN) 0.2 % ophthalmic solution 1 drop, QAM  levETIRAcetam (KEPPRA) tablet 750 mg, BID  levothyroxine (SYNTHROID) tablet 112 mcg, Daily  famotidine (PEPCID) tablet 40 mg, Daily  gabapentin (NEURONTIN) capsule 300 mg, TID  acetaminophen (TYLENOL) tablet 650 mg, Q4H PRN  ibuprofen (ADVIL;MOTRIN) tablet 400 mg, Q6H PRN  LORazepam (ATIVAN) tablet 1 mg, Q1H PRN   Or  LORazepam (ATIVAN) 2 MG/ML concentrated solution 1 mg, Q1H PRN   Or  LORazepam (ATIVAN) tablet 2 mg, Q1H PRN   Or  LORazepam (ATIVAN) 2 MG/ML concentrated solution 2 mg, Q1H PRN   Or  LORazepam (ATIVAN) tablet 3 mg, Q1H PRN   Or  LORazepam (ATIVAN) 2 MG/ML concentrated solution 3 mg, Q1H PRN   Or  LORazepam (ATIVAN) tablet 4 mg, Q1H PRN   Or  LORazepam (ATIVAN) 2 MG/ML concentrated solution 4 mg, Q1H PRN  thiamine tablet 100 mg, Daily  ondansetron (ZOFRAN-ODT) disintegrating tablet 4 mg, Q6H PRN         Physical Exam:  BP (!) 113/59   Pulse 75   Temp 98.2 °F (36.8 °C) (Oral)   Resp 20   Wt 130 lb (59 kg)   LMP  (LMP Unknown) Comment: mirena  SpO2 99%   BMI 25.39 kg/m²  I Body mass index is 25.39 kg/m². I   Wt Readings from Last 1 Encounters:   12/02/22 130 lb (59 kg)          HEENT: Normocephalic, atraumatic, no lesions or abnormalities noted. Neck:  supple with full ROM; no masses, nodes or bruits; no cervical tenderness on palpation. Lungs:  clear to auscultation  bilaterally     CV: RRR without gallops or murmurs           Neurologic Exam     Mental Status:  Patient was alert, responsive, oriented, appropriate, answering questions, and following commands. Speech was fluent with normal sensorium and cognition. Cranial Nerves: Pupils were equal round and reactive to light and accommodation;    Visual fields were full on confrontation;  Funduscopic exam was normal; no pappilledema   Extraocular movements were intact; no nystagmus; Intact facial sensation to temp, pinprick, and light touch; Symmetric facial movements with good lip and eye closure bilaterally; Hearing was intact; Vines was midline;    Normal palatal elevation with midline uvula  Sternocleidomastoid  / Trapezius strength of 5/5. Tongue was midline with no atrophy or fasciculations    Motor Exam:  Strength was 5/5 throughout; normal tone and bulk; no atrophy or fasiculations noted.       Sensory Exam:  Normal sensation to light touch, pin-prick, and temperature; No sensory extinction. Cerebella Exam:  Intact finger-nose-finger, rapidly alternating movements, fine motor movements, and heel-down-shin maneuvers; No cerebellar rebound or drift; No tremors   Normal tandem gait. Negative Romberg     Gait:  Normal gait pattern with intact heel/toe walking. (Gait was not tested. Reflexes: normal and symmetric bilaterally; Babinski is negative     Labs:    CBC:   Recent Labs     12/02/22  1555   WBC 5.4   HGB 10.6*      MCV 86.9   MCH 27.2   MCHC 31.3*   RDW 20.4*     CMP:  Recent Labs     12/02/22  1555      K 3.5      CO2 20*   BUN 8   CREATININE 0.6   LABGLOM >60   GLUCOSE 76   CALCIUM 8.9     Liver:   Recent Labs     12/02/22  1555   AST 28   ALT 16   ALKPHOS 89   PROT 7.2   LABALBU 4.4   BILITOT 0.7     INR: No results for input(s): PROTIME, INR in the last 72 hours. ToxicologyNo results for input(s): PHENYTOIN, CARBTOT, PHENOBARB, VALPROATE, LAMOTRIG in the last 72 hours. Invalid input(s):  KEPPRA  No results for input(s): AMPMETHURSCR, BARBTQTU, BDZQTU, CANNABQUANT, COCMETQTU, OPIAU, PCPQUANT in the last 72 hours. Radiology:  XR WRIST RIGHT (MIN 3 VIEWS)    Result Date: 11/14/2022  EXAMINATION: 3 XRAY VIEWS OF THE RIGHT WRIST 11/14/2022 2:42 am COMPARISON: 19 July 2019 HISTORY: ORDERING SYSTEM PROVIDED HISTORY: Fall wrist pain TECHNOLOGIST PROVIDED HISTORY: Reason for exam:->Fall wrist pain What reading provider will be dictating this exam?->CRC FINDINGS: There is no evidence of acute fracture. There is normal alignment. No acute joint abnormality. No focal osseous lesion. No focal soft tissue abnormality. No acute osseous abnormality.      CT HEAD WO CONTRAST    Result Date: 12/2/2022  EXAMINATION: CT OF THE HEAD WITHOUT CONTRAST  12/2/2022 8:21 pm TECHNIQUE: CT of the head was performed without the administration of intravenous contrast. Automated exposure control, iterative reconstruction, and/or weight based adjustment of the mA/kV was utilized to reduce the radiation dose to as low as reasonably achievable. COMPARISON: None. HISTORY: ORDERING SYSTEM PROVIDED HISTORY: Evaluate intracranial abnormality TECHNOLOGIST PROVIDED HISTORY: Has a \"code stroke\" or \"stroke alert\" been called? ->No Reason for exam:->Evaluate intracranial abnormality Decision Support Exception - unselect if not a suspected or confirmed emergency medical condition->Emergency Medical Condition (MA) What reading provider will be dictating this exam?->CRC FINDINGS: BRAIN/VENTRICLES: There is no acute intracranial hemorrhage, mass effect or midline shift. No abnormal extra-axial fluid collection. The gray-white differentiation is maintained without evidence of an acute infarct. There is no evidence of hydrocephalus. ORBITS: The visualized portion of the orbits demonstrate no acute abnormality. SINUSES: The visualized paranasal sinuses and mastoid air cells demonstrate no acute abnormality. SOFT TISSUES/SKULL:  No acute abnormality of the visualized skull or soft tissues. No acute intracranial abnormality. CT HEAD WO CONTRAST    Result Date: 11/14/2022  EXAMINATION: CT OF THE HEAD WITHOUT CONTRAST  11/14/2022 5:25 am TECHNIQUE: CT of the head was performed without the administration of intravenous contrast. Automated exposure control, iterative reconstruction, and/or weight based adjustment of the mA/kV was utilized to reduce the radiation dose to as low as reasonably achievable. COMPARISON: CT head dated 11/13/2022 HISTORY: ORDERING SYSTEM PROVIDED HISTORY: Evaluate intracranial abnormality TECHNOLOGIST PROVIDED HISTORY: Has a \"code stroke\" or \"stroke alert\" been called? ->No Reason for exam:->Evaluate intracranial abnormality What reading provider will be dictating this exam?->CRC FINDINGS: BRAIN/VENTRICLES: There is no acute intracranial hemorrhage, mass effect or midline shift. No abnormal extra-axial fluid collection.   The gray-white differentiation is maintained without evidence of an acute infarct. There is no evidence of hydrocephalus. ORBITS: The visualized portion of the orbits demonstrate no acute abnormality. SINUSES: The visualized paranasal sinuses and mastoid air cells demonstrate no acute abnormality. SOFT TISSUES/SKULL:  No acute abnormality of the visualized skull or soft tissues. No acute intracranial abnormality. CT HEAD WO CONTRAST    Result Date: 11/13/2022  EXAMINATION: CT OF THE HEAD WITHOUT CONTRAST  11/13/2022 9:28 pm TECHNIQUE: CT of the head was performed without the administration of intravenous contrast. Automated exposure control, iterative reconstruction, and/or weight based adjustment of the mA/kV was utilized to reduce the radiation dose to as low as reasonably achievable. COMPARISON: 09/22/2022 HISTORY: ORDERING SYSTEM PROVIDED HISTORY: feels her speech is slurred TECHNOLOGIST PROVIDED HISTORY: Reason for exam:->feels her speech is slurred Has a \"code stroke\" or \"stroke alert\" been called? ->No Decision Support Exception - unselect if not a suspected or confirmed emergency medical condition->Emergency Medical Condition (MA) What reading provider will be dictating this exam?->CRC FINDINGS: BRAIN/VENTRICLES: There is no acute intracranial hemorrhage, mass effect or midline shift. No abnormal extra-axial fluid collection. The gray-white differentiation is maintained without evidence of an acute infarct. There is no evidence of hydrocephalus. ORBITS: The visualized portion of the orbits demonstrate no acute abnormality. SINUSES: The visualized paranasal sinuses and mastoid air cells demonstrate no acute abnormality. SOFT TISSUES/SKULL:  No acute abnormality of the visualized skull or soft tissues. No acute intracranial abnormality.      CT CSpine W/O Contrast    Result Date: 12/2/2022  EXAMINATION: CT OF THE CERVICAL SPINE WITHOUT CONTRAST; CT OF THE ABDOMEN AND PELVIS WITH CONTRAST 12/2/2022 5:21 pm TECHNIQUE: CT of the cervical spine was performed without the administration of intravenous contrast. Multiplanar reformatted images are provided for review. Automated exposure control, iterative reconstruction, and/or weight based adjustment of the mA/kV was utilized to reduce the radiation dose to as low as reasonably achievable.; CT of the abdomen and pelvis was performed with the administration of intravenous contrast. Multiplanar reformatted images are provided for review. Automated exposure control, iterative reconstruction, and/or weight based adjustment of the mA/kV was utilized to reduce the radiation dose to as low as reasonably achievable. COMPARISON: CT cervical spine 11/14/2022, CT abdomen pelvis 11/13/2022, CT abdomen pelvis 10/25/2022, gadolinium scan 11/01/2022 HISTORY: ORDERING SYSTEM PROVIDED HISTORY: fall TECHNOLOGIST PROVIDED HISTORY: Reason for exam:->fall Decision Support Exception - unselect if not a suspected or confirmed emergency medical condition->Emergency Medical Condition (MA) What reading provider will be dictating this exam?->CRC FINDINGS: CERVICAL SPINE: BONES/ALIGNMENT: There is no acute fracture or traumatic malalignment. DEGENERATIVE CHANGES: No significant spinal or neural foraminal stenosis. Small disc herniations noted. SOFT TISSUES: There is no prevertebral soft tissue swelling. ABDOMEN/PELVIS: Unremarkable visualized chest. Status post cholecystectomy. Subtle low-density lesions in the liver are again seen, and better depicted on comparison exams. Otherwise the solid organs are unremarkable. No free air, free fluid, or bowel obstruction. Prior surgical changes of the bowel. Wall thickening of the rectum, sigmoid colon, and descending colon. Nondilated appendix. Subcentimeter short axis lymph nodes in the abdomen/pelvis. Mild bladder distension. IUD is present in the uterus. No abdominal aortic aneurysm or dissection. No acute osseous abnormality.   Multiple soft tissue density nodules are present in the subcutaneous tissues of the flanks; these are favored to represent injection granulomas but should be correlated with injection history. CERVICAL SPINE: No acute abnormality of the cervical spine. ABDOMEN/PELVIS: Wall thickening the left colon, sigmoid colon, and rectum compatible with colitis. No free air, free fluid, or abscess. No bowel obstruction. There are subtle lesions in the liver, better depicted on the comparison exams. CT CERVICAL SPINE WO CONTRAST    Result Date: 11/14/2022  EXAMINATION: CT OF THE CERVICAL SPINE WITHOUT CONTRAST 11/14/2022 5:25 am TECHNIQUE: CT of the cervical spine was performed without the administration of intravenous contrast. Multiplanar reformatted images are provided for review. Automated exposure control, iterative reconstruction, and/or weight based adjustment of the mA/kV was utilized to reduce the radiation dose to as low as reasonably achievable. COMPARISON: None. HISTORY: ORDERING SYSTEM PROVIDED HISTORY: Fall out of bed TECHNOLOGIST PROVIDED HISTORY: Reason for exam:->Fall out of bed What reading provider will be dictating this exam?->CRC FINDINGS: BONES/ALIGNMENT: There is no acute fracture or traumatic malalignment. DEGENERATIVE CHANGES: No significant degenerative changes. SOFT TISSUES: There is no prevertebral soft tissue swelling. No acute abnormality of the cervical spine. CT ABDOMEN PELVIS W IV CONTRAST Additional Contrast? None    Result Date: 12/2/2022  EXAMINATION: CT OF THE CERVICAL SPINE WITHOUT CONTRAST; CT OF THE ABDOMEN AND PELVIS WITH CONTRAST 12/2/2022 5:21 pm TECHNIQUE: CT of the cervical spine was performed without the administration of intravenous contrast. Multiplanar reformatted images are provided for review.  Automated exposure control, iterative reconstruction, and/or weight based adjustment of the mA/kV was utilized to reduce the radiation dose to as low as reasonably achievable.; CT of the abdomen and pelvis was performed with the administration of intravenous contrast. Multiplanar reformatted images are provided for review. Automated exposure control, iterative reconstruction, and/or weight based adjustment of the mA/kV was utilized to reduce the radiation dose to as low as reasonably achievable. COMPARISON: CT cervical spine 11/14/2022, CT abdomen pelvis 11/13/2022, CT abdomen pelvis 10/25/2022, gadolinium scan 11/01/2022 HISTORY: ORDERING SYSTEM PROVIDED HISTORY: fall TECHNOLOGIST PROVIDED HISTORY: Reason for exam:->fall Decision Support Exception - unselect if not a suspected or confirmed emergency medical condition->Emergency Medical Condition (MA) What reading provider will be dictating this exam?->CRC FINDINGS: CERVICAL SPINE: BONES/ALIGNMENT: There is no acute fracture or traumatic malalignment. DEGENERATIVE CHANGES: No significant spinal or neural foraminal stenosis. Small disc herniations noted. SOFT TISSUES: There is no prevertebral soft tissue swelling. ABDOMEN/PELVIS: Unremarkable visualized chest. Status post cholecystectomy. Subtle low-density lesions in the liver are again seen, and better depicted on comparison exams. Otherwise the solid organs are unremarkable. No free air, free fluid, or bowel obstruction. Prior surgical changes of the bowel. Wall thickening of the rectum, sigmoid colon, and descending colon. Nondilated appendix. Subcentimeter short axis lymph nodes in the abdomen/pelvis. Mild bladder distension. IUD is present in the uterus. No abdominal aortic aneurysm or dissection. No acute osseous abnormality. Multiple soft tissue density nodules are present in the subcutaneous tissues of the flanks; these are favored to represent injection granulomas but should be correlated with injection history. CERVICAL SPINE: No acute abnormality of the cervical spine. ABDOMEN/PELVIS: Wall thickening the left colon, sigmoid colon, and rectum compatible with colitis.   No free air, free fluid, or abscess. No bowel obstruction. There are subtle lesions in the liver, better depicted on the comparison exams. CT ABDOMEN PELVIS W IV CONTRAST Additional Contrast? None    Result Date: 11/13/2022  EXAMINATION: CT OF THE ABDOMEN AND PELVIS WITH CONTRAST 11/13/2022 9:28 pm TECHNIQUE: CT of the abdomen and pelvis was performed with the administration of intravenous contrast. Multiplanar reformatted images are provided for review. Automated exposure control, iterative reconstruction, and/or weight based adjustment of the mA/kV was utilized to reduce the radiation dose to as low as reasonably achievable. COMPARISON: 10/25/2022 HISTORY: ORDERING SYSTEM PROVIDED HISTORY: nausea and vomiting abd pain TECHNOLOGIST PROVIDED HISTORY: Reason for exam:->nausea and vomiting abd pain Additional Contrast?->None Decision Support Exception - unselect if not a suspected or confirmed emergency medical condition->Emergency Medical Condition (MA) What reading provider will be dictating this exam?->CRC FINDINGS: Lower Chest:  Visualized portion of the lower chest demonstrates no acute abnormality. Organs: The liver is not cirrhotic in configuration. There are multiple hypoattenuating hepatic lesions, not well evaluated on today's examination. The gallbladder is surgically absent. The spleen, pancreas, adrenals, and kidneys are unremarkable. GI/Bowel: Status post prior bowel resection anastomosis. No evidence of obstruction or inflammation. There is fluid throughout the colon. Pelvis: The urinary bladder is decompressed. IUD noted within the uterus. Peritoneum/Retroperitoneum: Trace free fluid layering within the pelvis. No extraluminal gas. No evidence of lymphadenopathy. Aorta is normal in caliber. Bones/Soft Tissues:  No acute abnormality of the visualized osseous structures.   Soft tissue nodules within the bilateral gluteal subcutaneous fat, favored to represent injection granulomas, unchanged from prior examination. No acute abdominopelvic abnormality. Fluid throughout the colon, which can be seen in the setting of a diarrheal illness. Multiple hepatic lesions, not well evaluated on today's examination but overall not significantly changed from prior study. Additional findings as above. NM RADIOPHARM THERAPY IV    Result Date: 2022  Patient MRN:  [id number] : 1985 Age: 40 years Gender: Female Order Date:  2022 10:15 AM EXAM: NM RADIOPHARM THERAPY IV NUMBER OF IMAGES:  1 INDICATION: D3A.8 Neuroendocrine tumor What reading provider will be dictating this exam?->[facility] COMPARISON: None Nuclear Lutathera Therapy 198m[address] 177 lt ac iv @ 11:52 Consultation was performed. The risks, benefits, and alternatives were presented to the patient. Precautions were presented to the patient. Consent was obtained. The patient agrees and understands the therapy. The patient was given followup instructions. The patient tolerated the procedure well, there were no complications. IMPRESSION : Consultation and therapy for Lutathera         The patient's records from referring provider and available information in the EHR was reviewed. 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 Migraine Headaches  Altered Mental Status:     Patient on the neurology service from outpatient neurology follow-up with history of seizure type activity and previous diagnosis of therapy and PNES. She also has a history of migraines and is currently on therapy with Keppra and Topamax. She indicates that Antonietta Eddy has been working out well for seizure control. Patient reporting daily seizure activity with multiple seizures per day which could not be verified. She has been on current medication regimen for at least the last year.   No she does have a number of other symptoms including reported orthostasis and that she often runs a low blood pressure and gets dizzy whenever she sits up or standing. With recent admission she reports falling at home and hitting the back of her head after bending down to  something and then feeling lightheaded and dizzy when standing followed by loss of consciousness and fell backwards. She is currently undergoing therapy per PCP for management of this. She does a number of other confounding factors including ataxia and poor balance with impaired mobility along with constant abdominal pain, frequent dizzy spells, chronic fatigue, intractable headaches, and impaired motor control. No clear recommendation for her symptoms was notable for multiple medical conditions presenting as confounding factors. From a neurologic perspective there is no significant change in her status with no indication for further work-up at this point. She has been management clinic and would recommend continuation of outpatient clinic management. No recommendation for further intervention at this time. I agree with the discontinuation of tramadol as noted. Patient may benefit from further titration of her Topamax to help with headaches. Topamax also is used as a mood stabilizer as well as seizure therapy. Principal Problem:    AMS (altered mental status)  Resolved Problems:    * No resolved hospital problems. *      Recommendations:                                            Discontinue Tramadol. Continue  Keppra and Gabapentin use as before. Increase Topiramate to 75 mg BID with consideration of further titration with outpatient Neurology clinic follow-up. 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 No indication for further seizure workup or therapy. Please reconsult       It was my pleasure to evaluate Alysia Sifuentes today. Please call with questions.       Electronically signed by Gracie Gaviria MD on 12/3/2022 at 7:17 PM

## 2022-12-05 ENCOUNTER — TELEPHONE (OUTPATIENT)
Dept: PALLATIVE CARE | Age: 37
End: 2022-12-05

## 2022-12-05 LAB
ANION GAP SERPL CALCULATED.3IONS-SCNC: 13 MMOL/L (ref 7–16)
ANISOCYTOSIS: ABNORMAL
BASOPHILS ABSOLUTE: 0.07 E9/L (ref 0–0.2)
BASOPHILS RELATIVE PERCENT: 1.6 % (ref 0–2)
BUN BLDV-MCNC: 7 MG/DL (ref 6–20)
CALCIUM SERPL-MCNC: 9.2 MG/DL (ref 8.6–10.2)
CHLORIDE BLD-SCNC: 107 MMOL/L (ref 98–107)
CO2: 23 MMOL/L (ref 22–29)
CREAT SERPL-MCNC: 0.7 MG/DL (ref 0.5–1)
EOSINOPHILS ABSOLUTE: 0.12 E9/L (ref 0.05–0.5)
EOSINOPHILS RELATIVE PERCENT: 2.8 % (ref 0–6)
GFR SERPL CREATININE-BSD FRML MDRD: >60 ML/MIN/1.73
GLUCOSE BLD-MCNC: 88 MG/DL (ref 74–99)
HCT VFR BLD CALC: 34.3 % (ref 34–48)
HEMOGLOBIN: 10.9 G/DL (ref 11.5–15.5)
IMMATURE GRANULOCYTES #: 0.02 E9/L
IMMATURE GRANULOCYTES %: 0.5 % (ref 0–5)
LYMPHOCYTES ABSOLUTE: 1.83 E9/L (ref 1.5–4)
LYMPHOCYTES RELATIVE PERCENT: 42.1 % (ref 20–42)
MCH RBC QN AUTO: 27.7 PG (ref 26–35)
MCHC RBC AUTO-ENTMCNC: 31.8 % (ref 32–34.5)
MCV RBC AUTO: 87.1 FL (ref 80–99.9)
MONOCYTES ABSOLUTE: 0.61 E9/L (ref 0.1–0.95)
MONOCYTES RELATIVE PERCENT: 14 % (ref 2–12)
NEUTROPHILS ABSOLUTE: 1.7 E9/L (ref 1.8–7.3)
NEUTROPHILS RELATIVE PERCENT: 39 % (ref 43–80)
OVALOCYTES: ABNORMAL
PDW BLD-RTO: 20.1 FL (ref 11.5–15)
PLATELET # BLD: 222 E9/L (ref 130–450)
PMV BLD AUTO: 9.3 FL (ref 7–12)
POIKILOCYTES: ABNORMAL
POLYCHROMASIA: ABNORMAL
POTASSIUM SERPL-SCNC: 3.8 MMOL/L (ref 3.5–5)
RBC # BLD: 3.94 E12/L (ref 3.5–5.5)
SODIUM BLD-SCNC: 143 MMOL/L (ref 132–146)
WBC # BLD: 4.4 E9/L (ref 4.5–11.5)

## 2022-12-05 PROCEDURE — 6370000000 HC RX 637 (ALT 250 FOR IP): Performed by: INTERNAL MEDICINE

## 2022-12-05 PROCEDURE — 6360000002 HC RX W HCPCS: Performed by: INTERNAL MEDICINE

## 2022-12-05 PROCEDURE — 80048 BASIC METABOLIC PNL TOTAL CA: CPT

## 2022-12-05 PROCEDURE — 2580000003 HC RX 258: Performed by: INTERNAL MEDICINE

## 2022-12-05 PROCEDURE — 36415 COLL VENOUS BLD VENIPUNCTURE: CPT

## 2022-12-05 PROCEDURE — 85025 COMPLETE CBC W/AUTO DIFF WBC: CPT

## 2022-12-05 PROCEDURE — G0378 HOSPITAL OBSERVATION PER HR: HCPCS

## 2022-12-05 PROCEDURE — 96375 TX/PRO/DX INJ NEW DRUG ADDON: CPT

## 2022-12-05 RX ORDER — LOPERAMIDE HYDROCHLORIDE 2 MG/1
2 CAPSULE ORAL 4 TIMES DAILY PRN
Status: DISCONTINUED | OUTPATIENT
Start: 2022-12-05 | End: 2022-12-08 | Stop reason: HOSPADM

## 2022-12-05 RX ADMIN — LEVETIRACETAM 750 MG: 500 TABLET, FILM COATED ORAL at 20:37

## 2022-12-05 RX ADMIN — Medication 400 MG: at 11:01

## 2022-12-05 RX ADMIN — FAMOTIDINE 40 MG: 20 TABLET, FILM COATED ORAL at 11:00

## 2022-12-05 RX ADMIN — LEVOTHYROXINE SODIUM 112 MCG: 0.11 TABLET ORAL at 06:36

## 2022-12-05 RX ADMIN — POTASSIUM CHLORIDE 20 MEQ: 20 TABLET, EXTENDED RELEASE ORAL at 11:00

## 2022-12-05 RX ADMIN — GABAPENTIN 300 MG: 300 CAPSULE ORAL at 16:13

## 2022-12-05 RX ADMIN — Medication 400 MG: at 20:38

## 2022-12-05 RX ADMIN — POTASSIUM CHLORIDE 20 MEQ: 20 TABLET, EXTENDED RELEASE ORAL at 20:38

## 2022-12-05 RX ADMIN — GABAPENTIN 300 MG: 300 CAPSULE ORAL at 20:38

## 2022-12-05 RX ADMIN — BRIMONIDINE TARTRATE 1 DROP: 2 SOLUTION/ DROPS OPHTHALMIC at 11:00

## 2022-12-05 RX ADMIN — CEFTRIAXONE 1000 MG: 1 INJECTION, POWDER, FOR SOLUTION INTRAMUSCULAR; INTRAVENOUS at 11:00

## 2022-12-05 RX ADMIN — LEVETIRACETAM 750 MG: 500 TABLET, FILM COATED ORAL at 11:01

## 2022-12-05 RX ADMIN — GABAPENTIN 300 MG: 300 CAPSULE ORAL at 11:01

## 2022-12-05 NOTE — PLAN OF CARE
Problem: Safety - Adult  Goal: Free from fall injury  12/5/2022 1353 by Tyler Gilman RN  Outcome: Progressing     Problem: Skin/Tissue Integrity  Goal: Absence of new skin breakdown  Description: 1. Monitor for areas of redness and/or skin breakdown  2. Assess vascular access sites hourly  3. Every 4-6 hours minimum:  Change oxygen saturation probe site  4. Every 4-6 hours:  If on nasal continuous positive airway pressure, respiratory therapy assess nares and determine need for appliance change or resting period.   12/5/2022 1353 by Tyler Gilman RN  Outcome: Progressing

## 2022-12-05 NOTE — ACP (ADVANCE CARE PLANNING)
Advance Care Planning   The patient has the following advanced directives on file:  Advance Directives       Power of [address] Will ACP-Advance Directive ACP-Power of     Not on File Filed on 10/04/22 Filed Not on File            The patient has appointed the following active healthcare agents:    Primary Decision Maker: Arabella 35 - Parent - [phone]    Secondary Decision Maker: Rainer Al - Parent - [phone]    Supplemental (Other) Decision Maker: Verona Albarran - Brother/Sister - [phone]    The Patient has the following current code status:    Code Status: Full Code      JONH Andrea  12/5/2022

## 2022-12-05 NOTE — TELEPHONE ENCOUNTER
Incoming call from Nelson Alvarez while she is currently inpatient at [facility]. She stated she wanted some privacy in her room, that there was a camera in her room, and that she \"alicia feels unsafe. \" I asked if the patient had a visitor making her feel this way, she responded no. I asked if she had a roommate making her feel this way, she responded no. I asked if one of the staff members were making her feel this way, she responded that she wanted Palliative Care to come see her. Informed Nelson Alvarez she needs to ask her nurse or doctor to consult with Palliative Care and that I would reach out to our impatient team as well.  Message sent to Kaylie Garcia NP.

## 2022-12-05 NOTE — PROGRESS NOTES
[facility] Medicine    Subjective:  pt alert conversive c/o diarrhea      Current Facility-Administered Medications:     cefTRIAXone (ROCEPHIN) 1,000 mg in sterile water 10 mL IV syringe, 1,000 mg, IntraVENous, Q24H, Ita Castellanos DO    cefTRIAXone (ROCEPHIN) 1,000 mg in sterile water 10 mL IV syringe, 1,000 mg, IntraVENous, Once, Glory Reid MD    magnesium oxide (MAG-OX) tablet 400 mg, 400 mg, Oral, BID, Liliya Anderson MD, 400 mg at 12/04/22 2117    fluticasone (FLONASE) 50 MCG/ACT nasal spray 1 spray, 1 spray, Nasal, Daily PRN, Liliya Anderson MD    potassium chloride (KLOR-CON M) extended release tablet 20 mEq, 20 mEq, Oral, BID, Liliya Anderson MD, 20 mEq at 12/04/22 2118    brimonidine (ALPHAGAN) 0.2 % ophthalmic solution 1 drop, 1 drop, Both Eyes, QAM, Liliya Anderson MD, 1 drop at 12/04/22 1027    levETIRAcetam (KEPPRA) tablet 750 mg, 750 mg, Oral, BID, Liliya Anderson MD, 750 mg at 12/04/22 2117    levothyroxine (SYNTHROID) tablet 112 mcg, 112 mcg, Oral, Daily, Liliya Anderson MD, 112 mcg at 12/05/22 0636    famotidine (PEPCID) tablet 40 mg, 40 mg, Oral, Daily, Liliya Anderson MD, 40 mg at 12/04/22 1028    gabapentin (NEURONTIN) capsule 300 mg, 300 mg, Oral, TID, Liliya Anderson MD, 300 mg at 12/04/22 2118    acetaminophen (TYLENOL) tablet 650 mg, 650 mg, Oral, Q4H PRN, Liliya Anderson MD    ibuprofen (ADVIL;MOTRIN) tablet 400 mg, 400 mg, Oral, Q6H PRN, Liliya Anderson MD    thiamine tablet 100 mg, 100 mg, Oral, Daily, Liliya Anderson MD, 100 mg at 12/04/22 1028    ondansetron (ZOFRAN-ODT) disintegrating tablet 4 mg, 4 mg, Oral, Q6H PRN, Liliya Anderson MD, 4 mg at 12/04/22 1033    Objective:    BP (!) 96/52   Pulse 57   Temp 98 °F (36.7 °C) (Oral)   Resp 18   Wt 130 lb (59 kg)   LMP  (LMP Unknown) Comment: mirena  SpO2 99%   BMI 25.39 kg/m²     Heart:  reg  Lungs:  ctab  Abd: + bs soft nondistended  Extrem:  w/o edema    CBC with Differential:    Lab Results   Component Value Date/Time    WBC 4.4 12/05/2022 05:00 AM    RBC 3.94 12/05/2022 05:00 AM    HGB 10.9 12/05/2022 05:00 AM    HCT 34.3 12/05/2022 05:00 AM     12/05/2022 05:00 AM    MCV 87.1 12/05/2022 05:00 AM    MCH 27.7 12/05/2022 05:00 AM    MCHC 31.8 12/05/2022 05:00 AM    RDW 20.1 12/05/2022 05:00 AM    NRBC 3.4 12/02/2022 03:55 PM    METASPCT 0.9 12/02/2022 03:55 PM    LYMPHOPCT 31.0 12/02/2022 03:55 PM    PROMYELOPCT 0.9 12/02/2022 03:55 PM    MONOPCT 10.3 12/02/2022 03:55 PM    MYELOPCT 0.9 12/02/2022 03:55 PM    BASOPCT 0.9 12/02/2022 03:55 PM    MONOSABS 0.54 12/02/2022 03:55 PM    LYMPHSABS 1.67 12/02/2022 03:55 PM    EOSABS 0.18 12/02/2022 03:55 PM    BASOSABS 0.05 12/02/2022 03:55 PM     CMP:    Lab Results   Component Value Date/Time     12/05/2022 05:00 AM    K 3.8 12/05/2022 05:00 AM    K 3.7 11/14/2022 08:21 PM     12/05/2022 05:00 AM    CO2 23 12/05/2022 05:00 AM    BUN 7 12/05/2022 05:00 AM    CREATININE 0.7 12/05/2022 05:00 AM    GFRAA >60 10/13/2022 01:18 PM    LABGLOM >60 12/05/2022 05:00 AM    GLUCOSE 88 12/05/2022 05:00 AM    PROT 7.2 12/02/2022 03:55 PM    LABALBU 4.4 12/02/2022 03:55 PM    CALCIUM 9.2 12/05/2022 05:00 AM    BILITOT 0.7 12/02/2022 03:55 PM    ALKPHOS 89 12/02/2022 03:55 PM    AST 28 12/02/2022 03:55 PM    ALT 16 12/02/2022 03:55 PM     Warfarin PT/INR:    Lab Results   Component Value Date    INR 1.5 09/28/2022    INR 1.7 09/27/2022    INR 2.3 09/26/2022    PROTIME 16.3 (H) 09/28/2022    PROTIME 18.7 (H) 09/27/2022    PROTIME 24.7 (H) 09/26/2022       Assessment:    Principal Problem:    AMS (altered mental status)  Resolved Problems:    * No resolved hospital problems.  *  Neuroendocrine tumor    Plan:  Await urine culture cont nayelib        Cornell Leal DO  8:22 AM  12/5/2022

## 2022-12-05 NOTE — CARE COORDINATION
Social Work/Case Management Transition of Care Planning ([facility] [place] [phone]):  Patient presented to the hospital with concerns of abdominal pain and diarrhea. She had a fall while in the ED and a fall while on the unit. A sitter has been ordered. Patient is on IV Rocephin q24. She has a history of liver cancer. Neutropenic precautions are in place. Urine cultures are pending. Neurology has been consulted. Met with patient at bedside. Her mother and father were present. Patient resides in a 2 story home with her parents. There are 3 CLAUDIO. Her bedroom and bathroom are on the second floor but her parents said if she needs to stay on the first floor there is a bedroom and bathroom. Patient had 2 children. One  at birth and another  at the age of 10years old. Patient is currently on disability. Parents provide transportation to appointments. Family does housekeeping, laundry, and meal preparation. Patient is independent with sponge bath. PCP is Dr. Kelby Mcdowell. Pharmacy is [address] [place] [address] in [facility]. Patient has a single point cane and a rollator in the home. No oxygen needs at this time. No HHC or JELLY history. Plan is for discharge to home with no needs. Family will provide transport. CM/SW will follow.   JONH Andrea  2022

## 2022-12-06 ENCOUNTER — APPOINTMENT (OUTPATIENT)
Dept: GENERAL RADIOLOGY | Age: 37
DRG: 100 | End: 2022-12-06
Payer: MEDICARE

## 2022-12-06 LAB
ANION GAP SERPL CALCULATED.3IONS-SCNC: 14 MMOL/L (ref 7–16)
ANISOCYTOSIS: ABNORMAL
BASOPHILS ABSOLUTE: 0 E9/L (ref 0–0.2)
BASOPHILS RELATIVE PERCENT: 1.4 % (ref 0–2)
BUN BLDV-MCNC: 8 MG/DL (ref 6–20)
CALCIUM SERPL-MCNC: 9.3 MG/DL (ref 8.6–10.2)
CHLORIDE BLD-SCNC: 104 MMOL/L (ref 98–107)
CO2: 23 MMOL/L (ref 22–29)
CREAT SERPL-MCNC: 0.7 MG/DL (ref 0.5–1)
EOSINOPHILS ABSOLUTE: 0.08 E9/L (ref 0.05–0.5)
EOSINOPHILS RELATIVE PERCENT: 1.7 % (ref 0–6)
GFR SERPL CREATININE-BSD FRML MDRD: >60 ML/MIN/1.73
GLUCOSE BLD-MCNC: 90 MG/DL (ref 74–99)
HCT VFR BLD CALC: 35.1 % (ref 34–48)
HEMOGLOBIN: 11.2 G/DL (ref 11.5–15.5)
LYMPHOCYTES ABSOLUTE: 1.82 E9/L (ref 1.5–4)
LYMPHOCYTES RELATIVE PERCENT: 38.3 % (ref 20–42)
MCH RBC QN AUTO: 27.3 PG (ref 26–35)
MCHC RBC AUTO-ENTMCNC: 31.9 % (ref 32–34.5)
MCV RBC AUTO: 85.6 FL (ref 80–99.9)
MONOCYTES ABSOLUTE: 0.48 E9/L (ref 0.1–0.95)
MONOCYTES RELATIVE PERCENT: 9.6 % (ref 2–12)
NEUTROPHILS ABSOLUTE: 2.4 E9/L (ref 1.8–7.3)
NEUTROPHILS RELATIVE PERCENT: 50.4 % (ref 43–80)
ORGANISM: ABNORMAL
OVALOCYTES: ABNORMAL
PDW BLD-RTO: 20.3 FL (ref 11.5–15)
PLATELET # BLD: 217 E9/L (ref 130–450)
PMV BLD AUTO: 9.9 FL (ref 7–12)
POIKILOCYTES: ABNORMAL
POTASSIUM SERPL-SCNC: 4.2 MMOL/L (ref 3.5–5)
RBC # BLD: 4.1 E12/L (ref 3.5–5.5)
SCHISTOCYTES: ABNORMAL
SODIUM BLD-SCNC: 141 MMOL/L (ref 132–146)
URINE CULTURE, ROUTINE: ABNORMAL
WBC # BLD: 4.8 E9/L (ref 4.5–11.5)

## 2022-12-06 PROCEDURE — G0378 HOSPITAL OBSERVATION PER HR: HCPCS

## 2022-12-06 PROCEDURE — 2580000003 HC RX 258: Performed by: INTERNAL MEDICINE

## 2022-12-06 PROCEDURE — 73620 X-RAY EXAM OF FOOT: CPT

## 2022-12-06 PROCEDURE — 51798 US URINE CAPACITY MEASURE: CPT

## 2022-12-06 PROCEDURE — 6370000000 HC RX 637 (ALT 250 FOR IP): Performed by: INTERNAL MEDICINE

## 2022-12-06 PROCEDURE — 97165 OT EVAL LOW COMPLEX 30 MIN: CPT

## 2022-12-06 PROCEDURE — 36415 COLL VENOUS BLD VENIPUNCTURE: CPT

## 2022-12-06 PROCEDURE — 85025 COMPLETE CBC W/AUTO DIFF WBC: CPT

## 2022-12-06 PROCEDURE — 80048 BASIC METABOLIC PNL TOTAL CA: CPT

## 2022-12-06 PROCEDURE — 1200000000 HC SEMI PRIVATE

## 2022-12-06 PROCEDURE — 6360000002 HC RX W HCPCS: Performed by: INTERNAL MEDICINE

## 2022-12-06 PROCEDURE — 96376 TX/PRO/DX INJ SAME DRUG ADON: CPT

## 2022-12-06 RX ADMIN — POTASSIUM CHLORIDE 20 MEQ: 20 TABLET, EXTENDED RELEASE ORAL at 08:48

## 2022-12-06 RX ADMIN — LOPERAMIDE HYDROCHLORIDE 2 MG: 2 CAPSULE ORAL at 08:48

## 2022-12-06 RX ADMIN — Medication 400 MG: at 20:57

## 2022-12-06 RX ADMIN — CEFTRIAXONE 1000 MG: 1 INJECTION, POWDER, FOR SOLUTION INTRAMUSCULAR; INTRAVENOUS at 10:31

## 2022-12-06 RX ADMIN — FAMOTIDINE 40 MG: 20 TABLET, FILM COATED ORAL at 08:48

## 2022-12-06 RX ADMIN — POTASSIUM CHLORIDE 20 MEQ: 20 TABLET, EXTENDED RELEASE ORAL at 20:57

## 2022-12-06 RX ADMIN — GABAPENTIN 300 MG: 300 CAPSULE ORAL at 08:48

## 2022-12-06 RX ADMIN — GABAPENTIN 300 MG: 300 CAPSULE ORAL at 20:57

## 2022-12-06 RX ADMIN — LEVETIRACETAM 750 MG: 500 TABLET, FILM COATED ORAL at 08:48

## 2022-12-06 RX ADMIN — LEVETIRACETAM 750 MG: 500 TABLET, FILM COATED ORAL at 20:57

## 2022-12-06 RX ADMIN — Medication 400 MG: at 08:48

## 2022-12-06 RX ADMIN — GABAPENTIN 300 MG: 300 CAPSULE ORAL at 14:15

## 2022-12-06 RX ADMIN — LEVOTHYROXINE SODIUM 112 MCG: 0.11 TABLET ORAL at 06:17

## 2022-12-06 NOTE — PROGRESS NOTES
[facility] Medicine    Subjective:  pt alert responsive      Current Facility-Administered Medications:     cefTRIAXone (ROCEPHIN) 1,000 mg in sterile water 10 mL IV syringe, 1,000 mg, IntraVENous, Q24H, Nickie Castellanos DO, 1,000 mg at 12/05/22 1100    loperamide (IMODIUM) capsule 2 mg, 2 mg, Oral, 4x Daily PRN, Nickie Castellanos DO    magnesium oxide (MAG-OX) tablet 400 mg, 400 mg, Oral, BID, Renetta Watson MD, 400 mg at 12/05/22 2038    fluticasone (FLONASE) 50 MCG/ACT nasal spray 1 spray, 1 spray, Nasal, Daily PRN, Renetta Watson MD    potassium chloride (KLOR-CON M) extended release tablet 20 mEq, 20 mEq, Oral, BID, Renetta Watson MD, 20 mEq at 12/05/22 2038    brimonidine (ALPHAGAN) 0.2 % ophthalmic solution 1 drop, 1 drop, Both Eyes, QAM, Renetta Watson MD, 1 drop at 12/05/22 1100    levETIRAcetam (KEPPRA) tablet 750 mg, 750 mg, Oral, BID, Renetta Watson MD, 750 mg at 12/05/22 2037    levothyroxine (SYNTHROID) tablet 112 mcg, 112 mcg, Oral, Daily, Renetta Watson MD, 112 mcg at 12/06/22 0617    famotidine (PEPCID) tablet 40 mg, 40 mg, Oral, Daily, Renetta Watson MD, 40 mg at 12/05/22 1100    gabapentin (NEURONTIN) capsule 300 mg, 300 mg, Oral, TID, Renetta Watson MD, 300 mg at 12/05/22 2038    acetaminophen (TYLENOL) tablet 650 mg, 650 mg, Oral, Q4H PRN, Renetta Watson MD    ibuprofen (ADVIL;MOTRIN) tablet 400 mg, 400 mg, Oral, Q6H PRN, Renetta Waston MD    ondansetron (ZOFRAN-ODT) disintegrating tablet 4 mg, 4 mg, Oral, Q6H PRN, Renetta Watson MD, 4 mg at 12/04/22 1033    Objective:    /82   Pulse 59   Temp 97.2 °F (36.2 °C)   Resp 18   Wt 130 lb (59 kg)   LMP  (LMP Unknown) Comment: mirena  SpO2 90%   BMI 25.39 kg/m²     Heart:  reg  Lungs:  ctab  Abd: + bs soft nondistended  Extrem:  w/o edema    CBC with Differential:    Lab Results   Component Value Date/Time    WBC 4.8 12/06/2022 05:14 AM    RBC 4.10 12/06/2022 05:14 AM    HGB 11.2 12/06/2022 05:14 AM    HCT 35.1 12/06/2022 05:14 AM     12/06/2022 05:14 AM    MCV 85.6 12/06/2022 05:14 AM    MCH 27.3 12/06/2022 05:14 AM    MCHC 31.9 12/06/2022 05:14 AM    RDW 20.3 12/06/2022 05:14 AM    NRBC 3.4 12/02/2022 03:55 PM    METASPCT 0.9 12/02/2022 03:55 PM    LYMPHOPCT 38.3 12/06/2022 05:14 AM    PROMYELOPCT 0.9 12/02/2022 03:55 PM    MONOPCT 9.6 12/06/2022 05:14 AM    MYELOPCT 0.9 12/02/2022 03:55 PM    BASOPCT 1.4 12/06/2022 05:14 AM    MONOSABS 0.48 12/06/2022 05:14 AM    LYMPHSABS 1.82 12/06/2022 05:14 AM    EOSABS 0.08 12/06/2022 05:14 AM    BASOSABS 0.00 12/06/2022 05:14 AM     CMP:    Lab Results   Component Value Date/Time     12/06/2022 05:14 AM    K 4.2 12/06/2022 05:14 AM    K 3.7 11/14/2022 08:21 PM     12/06/2022 05:14 AM    CO2 23 12/06/2022 05:14 AM    BUN 8 12/06/2022 05:14 AM    CREATININE 0.7 12/06/2022 05:14 AM    GFRAA >60 10/13/2022 01:18 PM    LABGLOM >60 12/06/2022 05:14 AM    GLUCOSE 90 12/06/2022 05:14 AM    PROT 7.2 12/02/2022 03:55 PM    LABALBU 4.4 12/02/2022 03:55 PM    CALCIUM 9.3 12/06/2022 05:14 AM    BILITOT 0.7 12/02/2022 03:55 PM    ALKPHOS 89 12/02/2022 03:55 PM    AST 28 12/02/2022 03:55 PM    ALT 16 12/02/2022 03:55 PM     Warfarin PT/INR:    Lab Results   Component Value Date    INR 1.5 09/28/2022    INR 1.7 09/27/2022    INR 2.3 09/26/2022    PROTIME 16.3 (H) 09/28/2022    PROTIME 18.7 (H) 09/27/2022    PROTIME 24.7 (H) 09/26/2022       Assessment:    Principal Problem:    AMS (altered mental status)  Resolved Problems:    * No resolved hospital problems.  *      Plan:  Cont atb await urine culture        Sonja JaDO  8:26 AM  12/6/2022

## 2022-12-06 NOTE — PROGRESS NOTES
I spent some time talking with the patient today. She had some issues that I help resolve. I will continue to monitor.

## 2022-12-06 NOTE — PROGRESS NOTES
OCCUPATIONAL THERAPY INITIAL EVALUATION    [address] [address] [address]      Date:2022                                                  Patient Name: Laxmi Florence  MRN: [id number]  : 1985  Room: [address] [place], [place] [address]    Evaluating OT: Caryle Beers, MOT, OTR/L [id number]  Referring Tasneem Setting, DO  Specific Provider Orders: OT eval and treat   Recommended Adaptive Equipment: 24 hr physical assist     Diagnosis: AMS   Surgery: none   Pertinent Medical History: CA, seizure, hypocalcemia, hypothyroidism   Precautions:  Fall Risk, neutropenic    Assessment of current deficits   [x] Functional mobility  [x]ADLs  [x] Strength               [x]Cognition   [x] Functional transfers   [x] IADLs         [x] Safety Awareness   [x]Endurance   [] Fine Coordination              [x] Balance      [] Vision/perception   [x]Sensation    []Gross Motor Coordination  [] ROM  [] Delirium                   [] Motor Control     OT PLAN OF CARE   OT POC based on physician orders, patient diagnosis and results of clinical assessment    Frequency/Duration: 2-3 days/wk for 2 weeks PRN   Specific OT Treatment to include:   * Instruction/training on adapted ADL techniques and AE recommendations to increase functional independence within precautions       * Training on energy conservation strategies, correct breathing pattern and techniques to improve independence/tolerance for self-care routine  * Functional transfer/mobility training/DME recommendations for increased independence, safety, and fall prevention  * Patient/Family education to increase follow through with safety techniques and functional independence  * Recommendation of environmental modifications for increased safety with functional transfers/mobility and ADLs  * Cognitive retraining/development of therapeutic activities to improve problem solving, judgement, memory, and attention for increased safety/participation in ADL/IADL tasks  * Therapeutic exercise to improve motor endurance, ROM, and functional strength for ADLs/functional transfers  * Therapeutic activities to facilitate/challenge dynamic balance, stand tolerance for increased safety and independence with ADLs  * Neuro-muscular re-education: facilitation of righting/equilibrium reactions, midline orientation, scapular stability/mobility, normalization of muscle tone, and facilitation of volitional active controled movement    Home Living: Pt lives with parents in a 2 story home; bed/bath on 2nd level   Bathroom setup: tub shower unit on 2nd level, walk in shower in basement   Equipment owned: shower chair  Prior Level of Function: assist with ADLs/IADLs; using cane or ww for functional mobility     Pain Level: \"everywhere\"  Cognition: A&O: 2/4 (self, place);  Follows 1 step directions, with repetition and increased time   Memory:  fair    Sequencing:  fair    Problem solving:  fair    Judgement/safety:  fair     Functional Assessment:  AM-PAC Daily Activity Raw Score: 18/24   Initial Eval Status  Date: 12/6/22 Treatment Status  Date: STGs=LTGs  Time Frame: 10-14 days   Feeding SUP   IND   Grooming SBA (seated)   IND   UB Dressing SBA   IND   LB Dressing Min A (pt donned socks bed level)  SUP    Bathing Min A (simulated)  SBA    Toileting Min A (assist with balance)  SBA   Bed Mobility  Log roll: SBA  Supine to sit: SBA   Sit to supine: SBA   Log roll IDN  Supine to sit: IND   Sit to supine: IND   Functional Transfers Sit to stand:Min A   Stand to sit:Min A  Commode: Min A  SBA   Functional Mobility Min A (using ww, to/from bathroom)  SBA   Balance Sitting: SBA  Standing: Min A     Activity Tolerance fair     Visual/  Perceptual Glasses: yes                UE ROM: RUE:  WFL  LUE:  WFL  Strength: RUE: grossly 4-/5 LUE: grossly 4-/5   Strength: B WFL  Fine Motor Coordination:  fair     Hearing: WFL  Sensation:  No c/o numbness/tingling   Tone:  WFL  Edema: none noted Comments:Cleared by RN to see pt. Upon arrival, patient supine in bed and agreeable to OT session. At end of session, patient supine in bed with call light and phone within reach, all lines and tubes intact. Pt would benefit from continued OT to increase functional independence and quality of life. Treatment: Required re-orientation to time. Pt required frequent re-direction to tasks. Pt required vc's and physical assist for proper technique/safety with hand placement/body mechanics/posture for bed mobility/ADLs/functional tranfers/mobility/ww management. Pt required vc's for sequencing/initiation of ADLs/functional transfers. Pt required rest breaks during session . Pt appeared to have tolerated session well and appears cooperative/pleasant . Pt instructed on use of call light for assistance and fall prevention. Pt demo'ing fair understanding of education provided. Continue to educate. Eval Complexity: Low    Rehab Potential: Good for established goals, pt. assisted in establishment of goals. LTG: maximize independence with ADLs to return to PLOF    Patient  instructed on diagnosis, prognosis/goals and plan of care. Demonstrated fair understanding. [] Malnutrition indicators have been identified and nursing has been notified to ensure a dietitian consult is ordered. Evaluation time includes thorough review of current medical information, gathering information on past medical & social history & PLOF, completion of standardized testing, informal observation of tasks, consultation with other medical professions/disciplines, assessment of data & development of POC/goals.      Time In: 0900       Time Out: 0915     Total treatment time: 0       Treatment Charges: Mins Units   OT Eval Low 81847 X    OT Eval Medium 89245     OT Eval High 83876     OT Re-Eval 24533     Ther Ex  01784       Manual Therapy [id number]       Thera Activities 14005       ADL/Home Mgt 66610     Neuro Re-ed 41955 Group Therapy        Orthotic manage/training  [id number]       Non-Billable Time           Lindsay Tran, OTR/L [id number]

## 2022-12-06 NOTE — PROGRESS NOTES
Dr Jerrica Harris notified and Dr Dent Stake about pt claiming to have \"absent seizures\".  Waiting on response

## 2022-12-06 NOTE — CARE COORDINATION
Care Coordination   The patient was admitted due to altered mental status. She has a history of neuroendocrine cancer of liver presents with abdominal pain and diarrhea. The patient is alert and responsive . Urine culture + for proteus Mirabilis. The patient is on Iv rocephin 1 gm iv q24 hrs. Ot [facility] 18/24 she required reorientation to time, frequent redirection. Required verba ques. Patient resides in a 2 story home with her parents. Her plan is to return home under the care of her family. The patient has a rollator at home and single point cane. No plans for [facility] or c at this time.

## 2022-12-07 ENCOUNTER — TELEPHONE (OUTPATIENT)
Dept: ONCOLOGY | Age: 37
End: 2022-12-07

## 2022-12-07 LAB
ANION GAP SERPL CALCULATED.3IONS-SCNC: 13 MMOL/L (ref 7–16)
ANISOCYTOSIS: ABNORMAL
BASOPHILS ABSOLUTE: 0.05 E9/L (ref 0–0.2)
BASOPHILS RELATIVE PERCENT: 0.9 % (ref 0–2)
BUN BLDV-MCNC: 9 MG/DL (ref 6–20)
CALCIUM SERPL-MCNC: 9.1 MG/DL (ref 8.6–10.2)
CHLORIDE BLD-SCNC: 104 MMOL/L (ref 98–107)
CO2: 24 MMOL/L (ref 22–29)
CREAT SERPL-MCNC: 0.7 MG/DL (ref 0.5–1)
EOSINOPHILS ABSOLUTE: 0.09 E9/L (ref 0.05–0.5)
EOSINOPHILS RELATIVE PERCENT: 1.7 % (ref 0–6)
GFR SERPL CREATININE-BSD FRML MDRD: >60 ML/MIN/1.73
GLUCOSE BLD-MCNC: 87 MG/DL (ref 74–99)
HCT VFR BLD CALC: 34.8 % (ref 34–48)
HEMOGLOBIN: 11 G/DL (ref 11.5–15.5)
LYMPHOCYTES ABSOLUTE: 1.55 E9/L (ref 1.5–4)
LYMPHOCYTES RELATIVE PERCENT: 31.3 % (ref 20–42)
MCH RBC QN AUTO: 27.4 PG (ref 26–35)
MCHC RBC AUTO-ENTMCNC: 31.6 % (ref 32–34.5)
MCV RBC AUTO: 86.8 FL (ref 80–99.9)
MONOCYTES ABSOLUTE: 0.25 E9/L (ref 0.1–0.95)
MONOCYTES RELATIVE PERCENT: 5.2 % (ref 2–12)
NEUTROPHILS ABSOLUTE: 3.05 E9/L (ref 1.8–7.3)
NEUTROPHILS RELATIVE PERCENT: 60.9 % (ref 43–80)
OVALOCYTES: ABNORMAL
PDW BLD-RTO: 20.3 FL (ref 11.5–15)
PLATELET # BLD: 197 E9/L (ref 130–450)
PMV BLD AUTO: 10.2 FL (ref 7–12)
POIKILOCYTES: ABNORMAL
POLYCHROMASIA: ABNORMAL
POTASSIUM SERPL-SCNC: 3.9 MMOL/L (ref 3.5–5)
RBC # BLD: 4.01 E12/L (ref 3.5–5.5)
SODIUM BLD-SCNC: 141 MMOL/L (ref 132–146)
WBC # BLD: 5 E9/L (ref 4.5–11.5)

## 2022-12-07 PROCEDURE — 6370000000 HC RX 637 (ALT 250 FOR IP): Performed by: PHYSICIAN ASSISTANT

## 2022-12-07 PROCEDURE — 97161 PT EVAL LOW COMPLEX 20 MIN: CPT

## 2022-12-07 PROCEDURE — 97535 SELF CARE MNGMENT TRAINING: CPT

## 2022-12-07 PROCEDURE — 6360000002 HC RX W HCPCS: Performed by: INTERNAL MEDICINE

## 2022-12-07 PROCEDURE — 80048 BASIC METABOLIC PNL TOTAL CA: CPT

## 2022-12-07 PROCEDURE — 99233 SBSQ HOSP IP/OBS HIGH 50: CPT | Performed by: PHYSICIAN ASSISTANT

## 2022-12-07 PROCEDURE — 2580000003 HC RX 258: Performed by: INTERNAL MEDICINE

## 2022-12-07 PROCEDURE — 36415 COLL VENOUS BLD VENIPUNCTURE: CPT

## 2022-12-07 PROCEDURE — 85025 COMPLETE CBC W/AUTO DIFF WBC: CPT

## 2022-12-07 PROCEDURE — 51798 US URINE CAPACITY MEASURE: CPT

## 2022-12-07 PROCEDURE — 1200000000 HC SEMI PRIVATE

## 2022-12-07 PROCEDURE — 97530 THERAPEUTIC ACTIVITIES: CPT

## 2022-12-07 PROCEDURE — 6370000000 HC RX 637 (ALT 250 FOR IP): Performed by: INTERNAL MEDICINE

## 2022-12-07 RX ORDER — LEVETIRACETAM 100 MG/ML
1000 SOLUTION ORAL 2 TIMES DAILY
Status: DISCONTINUED | OUTPATIENT
Start: 2022-12-07 | End: 2022-12-08 | Stop reason: HOSPADM

## 2022-12-07 RX ADMIN — LEVOTHYROXINE SODIUM 112 MCG: 0.11 TABLET ORAL at 06:06

## 2022-12-07 RX ADMIN — ONDANSETRON 4 MG: 4 TABLET, ORALLY DISINTEGRATING ORAL at 20:42

## 2022-12-07 RX ADMIN — ONDANSETRON 4 MG: 4 TABLET, ORALLY DISINTEGRATING ORAL at 01:52

## 2022-12-07 RX ADMIN — GABAPENTIN 300 MG: 300 CAPSULE ORAL at 20:43

## 2022-12-07 RX ADMIN — LEVETIRACETAM 1000 MG: 100 SOLUTION ORAL at 21:11

## 2022-12-07 RX ADMIN — LEVETIRACETAM 750 MG: 500 TABLET, FILM COATED ORAL at 08:09

## 2022-12-07 RX ADMIN — FAMOTIDINE 40 MG: 20 TABLET, FILM COATED ORAL at 08:06

## 2022-12-07 RX ADMIN — CEFTRIAXONE 1000 MG: 1 INJECTION, POWDER, FOR SOLUTION INTRAMUSCULAR; INTRAVENOUS at 10:27

## 2022-12-07 RX ADMIN — POTASSIUM CHLORIDE 20 MEQ: 20 TABLET, EXTENDED RELEASE ORAL at 08:06

## 2022-12-07 RX ADMIN — GABAPENTIN 300 MG: 300 CAPSULE ORAL at 08:06

## 2022-12-07 RX ADMIN — Medication 400 MG: at 08:06

## 2022-12-07 RX ADMIN — POTASSIUM CHLORIDE 20 MEQ: 20 TABLET, EXTENDED RELEASE ORAL at 20:42

## 2022-12-07 RX ADMIN — GABAPENTIN 300 MG: 300 CAPSULE ORAL at 13:59

## 2022-12-07 RX ADMIN — Medication 400 MG: at 20:42

## 2022-12-07 ASSESSMENT — PAIN DESCRIPTION - LOCATION: LOCATION: ABDOMEN

## 2022-12-07 ASSESSMENT — PAIN SCALES - GENERAL: PAINLEVEL_OUTOF10: 4

## 2022-12-07 NOTE — PROGRESS NOTES
Sitter currently at the bedside per Dr. Leisa Alicia order, however there is no sitter available after 11:00. Telesitter to be placed in room at that time and bed alarm on with frequent checks.

## 2022-12-07 NOTE — PROGRESS NOTES
Hospital Medicine    Subjective:  pt alert conversive pt with recurrent urinary retention      Current Facility-Administered Medications:     cefTRIAXone (ROCEPHIN) 1,000 mg in sterile water 10 mL IV syringe, 1,000 mg, IntraVENous, Q24H, Addis Castellanos DO, 1,000 mg at 12/06/22 1031    loperamide (IMODIUM) capsule 2 mg, 2 mg, Oral, 4x Daily PRN, Addis Castellanos DO, 2 mg at 12/06/22 0848    magnesium oxide (MAG-OX) tablet 400 mg, 400 mg, Oral, BID, Jean Boyd MD, 400 mg at 12/06/22 2057    fluticasone (FLONASE) 50 MCG/ACT nasal spray 1 spray, 1 spray, Nasal, Daily PRN, Jean Boyd MD    potassium chloride (KLOR-CON M) extended release tablet 20 mEq, 20 mEq, Oral, BID, Jean Boyd MD, 20 mEq at 12/06/22 2057    brimonidine (ALPHAGAN) 0.2 % ophthalmic solution 1 drop, 1 drop, Both Eyes, QAM, Jean Boyd MD, 1 drop at 12/05/22 1100    levETIRAcetam (KEPPRA) tablet 750 mg, 750 mg, Oral, BID, Jean Boyd MD, 750 mg at 12/06/22 2057    levothyroxine (SYNTHROID) tablet 112 mcg, 112 mcg, Oral, Daily, Jean Boyd MD, 112 mcg at 12/07/22 0606    famotidine (PEPCID) tablet 40 mg, 40 mg, Oral, Daily, Jean Boyd MD, 40 mg at 12/06/22 0848    gabapentin (NEURONTIN) capsule 300 mg, 300 mg, Oral, TID, Jean Boyd MD, 300 mg at 12/06/22 2057    acetaminophen (TYLENOL) tablet 650 mg, 650 mg, Oral, Q4H PRN, Jean Boyd MD    ibuprofen (ADVIL;MOTRIN) tablet 400 mg, 400 mg, Oral, Q6H PRN, Jean Boyd MD    ondansetron (ZOFRAN-ODT) disintegrating tablet 4 mg, 4 mg, Oral, Q6H PRN, Jean Boyd MD, 4 mg at 12/07/22 0152    Objective:    BP (!) 95/52   Pulse 67   Temp 97.4 °F (36.3 °C) (Oral)   Resp 20   Wt 130 lb (59 kg)   LMP  (LMP Unknown) Comment: mirena  SpO2 99%   BMI 25.39 kg/m²     Heart:  reg  Lungs:  ctab  Abd: + bs bladder distended  Extrem:  w/o edema    CBC with Differential:    Lab Results   Component Value Date/Time    WBC 5.0 12/07/2022 04:43 AM    RBC 4.01 12/07/2022 04:43 AM    HGB 11.0 12/07/2022 04:43 AM    HCT 34.8 12/07/2022 04:43 AM     12/07/2022 04:43 AM    MCV 86.8 12/07/2022 04:43 AM    MCH 27.4 12/07/2022 04:43 AM    MCHC 31.6 12/07/2022 04:43 AM    RDW 20.3 12/07/2022 04:43 AM    NRBC 3.4 12/02/2022 03:55 PM    METASPCT 0.9 12/02/2022 03:55 PM    LYMPHOPCT 38.3 12/06/2022 05:14 AM    PROMYELOPCT 0.9 12/02/2022 03:55 PM    MONOPCT 9.6 12/06/2022 05:14 AM    MYELOPCT 0.9 12/02/2022 03:55 PM    BASOPCT 1.4 12/06/2022 05:14 AM    MONOSABS 0.48 12/06/2022 05:14 AM    LYMPHSABS 1.82 12/06/2022 05:14 AM    EOSABS 0.08 12/06/2022 05:14 AM    BASOSABS 0.00 12/06/2022 05:14 AM     CMP:    Lab Results   Component Value Date/Time     12/07/2022 04:43 AM    K 3.9 12/07/2022 04:43 AM    K 3.7 11/14/2022 08:21 PM     12/07/2022 04:43 AM    CO2 24 12/07/2022 04:43 AM    BUN 9 12/07/2022 04:43 AM    CREATININE 0.7 12/07/2022 04:43 AM    GFRAA >60 10/13/2022 01:18 PM    LABGLOM >60 12/07/2022 04:43 AM    GLUCOSE 87 12/07/2022 04:43 AM    PROT 7.2 12/02/2022 03:55 PM    LABALBU 4.4 12/02/2022 03:55 PM    CALCIUM 9.1 12/07/2022 04:43 AM    BILITOT 0.7 12/02/2022 03:55 PM    ALKPHOS 89 12/02/2022 03:55 PM    AST 28 12/02/2022 03:55 PM    ALT 16 12/02/2022 03:55 PM     Warfarin PT/INR:    Lab Results   Component Value Date    INR 1.5 09/28/2022    INR 1.7 09/27/2022    INR 2.3 09/26/2022    PROTIME 16.3 (H) 09/28/2022    PROTIME 18.7 (H) 09/27/2022    PROTIME 24.7 (H) 09/26/2022       Assessment:    Principal Problem:    AMS (altered mental status)  Resolved Problems:    * No resolved hospital problems.  *  Acute urinary retention    Plan:  Straight cath urology to see        Christine Oconnor DO  6:46 AM  12/7/2022

## 2022-12-07 NOTE — PROGRESS NOTES
[address] [id number] [facility]      Date:2022                                                  Patient Name: Willima Smith  MRN: [id number]  : 1985  Room: [address] [place] [address]    Evaluating OT: JANINE Mirza, OTR/L [id number]  Referring Jenny Richardson DO  Specific Provider Orders: OT eval and treat   Recommended Adaptive Equipment: 24 hr physical assist     Diagnosis: AMS   Surgery: none   Pertinent Medical History: CA, seizure, hypocalcemia, hypothyroidism   Precautions:  Fall Risk, neutropenic    Assessment of current deficits   [x] Functional mobility  [x]ADLs  [x] Strength               [x]Cognition   [x] Functional transfers   [x] IADLs         [x] Safety Awareness   [x]Endurance   [] Fine Coordination              [x] Balance      [] Vision/perception   [x]Sensation    []Gross Motor Coordination  [] ROM  [] Delirium                   [] Motor Control     OT PLAN OF CARE   OT POC based on physician orders, patient diagnosis and results of clinical assessment    Frequency/Duration: 2-3 days/wk for 2 weeks PRN   Specific OT Treatment to include:   * Instruction/training on adapted ADL techniques and AE recommendations to increase functional independence within precautions       * Training on energy conservation strategies, correct breathing pattern and techniques to improve independence/tolerance for self-care routine  * Functional transfer/mobility training/DME recommendations for increased independence, safety, and fall prevention  * Patient/Family education to increase follow through with safety techniques and functional independence  * Recommendation of environmental modifications for increased safety with functional transfers/mobility and ADLs  * Cognitive retraining/development of therapeutic activities to improve problem solving, judgement, memory, and attention for increased safety/participation in ADL/IADL tasks  * Therapeutic exercise to improve motor endurance, ROM, and functional strength for ADLs/functional transfers  * Therapeutic activities to facilitate/challenge dynamic balance, stand tolerance for increased safety and independence with ADLs  * Neuro-muscular re-education: facilitation of righting/equilibrium reactions, midline orientation, scapular stability/mobility, normalization of muscle tone, and facilitation of volitional active controled movement    Home Living: Pt lives with parents in a 2 story home; bed/bath on 2nd level   Bathroom setup: tub shower unit on 2nd level, walk in shower in basement   Equipment owned: shower chair, rollator  Prior Level of Function: assist with ADLs/IADLs; using cane or ww (rollator) for functional mobility     Pain Level: None reported this date  Cognition: A&O: 2/4 (self, place); Follows 1 step directions with encouragement and re-direction. Memory:  fair    Sequencing:  fair    Problem solving:  fair    Judgement/safety:  fair     Functional Assessment:  AM-PAC Daily Activity Raw Score: 18/24   Initial Eval Status  Date: 12/6/22 Treatment Status  Date: 12/7/22 STGs=LTGs  Time Frame: 10-14 days   Feeding SUP  S  Increased time/effort IND   Grooming SBA (seated)  Min A  Standing at sink to wash hands. IND   UB Dressing SBA  SBA   IND   LB Dressing Min A (pt donned socks bed level) SBA  To don pants seated at EOB/bed level to pull up. SUP    Bathing Min A (simulated) NT   SBA    Toileting Min A (assist with balance) NT  Pt declined   SBA   Bed Mobility  Log roll: SBA  Supine to sit: SBA   Sit to supine: SBA  Log roll: SBA  Supine to sit: SBA   Sit to supine: SBA    Log roll IDN  Supine to sit: IND   Sit to supine: IND   Functional Transfers Sit to stand:Min A   Stand to sit:Min A  Commode: Min A Sit to stand:Min A   Stand to sit:Min A  Commode: NT   SBA   Functional Mobility Min A (using ww, to/from bathroom) Min A with FWW  For short distance around room.  SBA Balance Sitting: SBA  Standing: Min A Sitting: SBA  Standing: Min A    Activity Tolerance fair     Visual/  Perceptual Glasses: yes                UE ROM: RUE:  WFL  LUE:  WFL  Strength: RUE: grossly 4-/5 LUE: grossly 4-/5   Strength: B WFL  Fine Motor Coordination:  fair     Hearing: WFL  Sensation:  No c/o numbness/tingling   Tone:  WFL  Edema: none noted                            Comments:Cleared by RN to see pt. Upon arrival, patient supine in bed and agreeable to OT session. Pt required increased time to complete tasks, encouragement, and re-direction with cues for safety. At end of session, patient seated upright in bed per nursing request with call light and phone within reach, all lines and tubes intact. Pt would benefit from continued OT to increase functional independence and quality of life. Treatment:   Instruction/training on safety and adapted techniques for completion of ADLs: Sequencing/cues provided to increase independence and safety in self-care. Good demo of adaptive techniques limited by impaired safety. Therapeutic activity: to challenge dynamic sitting/standing balance and endurance to promote safety during ADL tasks and functional transfers and mobility.       Time In: 0945       Time Out: 1014     Total treatment time: 29 min    Treatment Charges: Mins Units   OT Eval Low 74002     OT Eval Medium 38845     OT Eval High 76369     OT Re-Eval 97089     Ther Ex  32448       Manual Therapy 58166       Thera Activities 42688       ADL/Home Mgt 94250 29 2   Neuro Re-ed 29235       Group Therapy        Orthotic manage/training  86000       Non-Billable Time           Fly Eason [address] [facility] [place]/L, [id number]

## 2022-12-07 NOTE — CONSULTS
12/7/2022 1:51 PM  Lukasz Romano  [id number]     Chief Complaint:    Urinary retention      History of Present Illness: The patient is a 40 y.o. female patient who presented to the hospital with complaints of  altered mental status. She has a history of neuroendocrine cancer of the liver. She follows with Dr. Rylee Webster. She was bladder scanned yesterday for >999ml. She was straight cathed for 1200ml  She was again straight cathed this AM for >600ml. She does not have discomfort prior to this. She reports she typically voids well. She does have diarrhea at the time. Past Medical History:   Diagnosis Date    Abdominal pain     Cancer ([address]     neuroendocrime tumor    Diarrhea     Hypocalcemia     Hypothyroidism     Irritable bowel syndrome     Migraines     Seizures ([facility] [address]     last episode January 2021    Status post alcohol detoxification     5/22/2018-5/29/2018         Past Surgical History:   Procedure Laterality Date    CHOLECYSTECTOMY, LAPAROSCOPIC  07/06/2016    COLONOSCOPY N/A 6/22/2018    COLONOSCOPY WITH BIOPSY performed by Good Mccartney MD at [facility] ENDOSCOPY    CT BIOPSY RENAL  1/25/2021    CT BIOPSY RENAL 1/25/2021 Kamille Blackwell II, MD [facility]    CT NEEDLE BIOPSY LIVER PERCUTANEOUS  9/1/2020    CT NEEDLE BIOPSY LIVER PERCUTANEOUS 9/1/2020 [facility]    HC DIALYSIS CATHETER N/A 1/28/2021    TESIO CATHETER INSERTION AND REMOVAL OF TEMPORARY performed by Yaneli Clifton MD at [address] Left 2/18/2022    MEDI PORT PLACEMENT, Left side.  performed by Fabrice Rasmussen MD at [facility] N/A 5/14/2021    SIGMOIDOSCOPY PERIANAL BOTOX INJECTION   (50 UNITS) performed by Good Mccartney MD at [address] [place], [place] [address]y N/A 10/21/2020    LAPAROSCOPIC ROBOTIC SMALL BOWEL RESECTION WITH PLANNED TRANSITION TO OPEN performed by Uriel Cerda MD at [address] Prior to Admission:    Medications Prior to Admission: butalbital-acetaminophen-caffeine (FIORICET, ESGIC) -40 MG per tablet, Take 2 tablets by mouth 2 times daily  gabapentin (NEURONTIN) 300 MG capsule, Take 2 capsules by mouth 3 times daily for 30 days. traMADol (ULTRAM) 50 MG tablet, Take 1 tablet by mouth every 8 hours as needed for Pain for up to 90 days. levETIRAcetam (KEPPRA) 750 MG tablet, Take 750 mg by mouth 2 times daily  levothyroxine (SYNTHROID) 112 MCG tablet, Take 112 mcg by mouth Daily  melatonin 3 MG TABS tablet, Take 3 mg by mouth at bedtime  famotidine (PEPCID) 40 MG tablet, Take 40 mg by mouth nightly as needed  zolpidem (AMBIEN) 10 MG tablet, Take 10 mg by mouth nightly as needed for Sleep. fluticasone (FLONASE) 50 MCG/ACT nasal spray, 1 spray by Nasal route daily as needed for Rhinitis  potassium chloride (KLOR-CON M) 20 MEQ extended release tablet, Take 20 mEq by mouth 2 times daily  topiramate (TOPAMAX) 50 MG tablet, Take 50 mg by mouth 2 times daily  brimonidine (ALPHAGAN) 0.2 % ophthalmic solution, Place 1 drop into both eyes every morning  magnesium oxide (MAG-OX) 400 MG tablet, Take 400 mg by mouth 2 times daily    Allergies:    Patient has no known allergies. Social History:    reports that she has been smoking cigarettes. She has been smoking an average of .25 packs per day. She has never used smokeless tobacco. She reports current drug use. Drug: Marijuana Mellisa Haley). She reports that she does not drink alcohol.     Family History:   Non-contributory to this Urological problem  family history includes Alzheimer's Disease in an other family member; Asthma in her father; Breast Cancer in her maternal grandmother; Cancer in her father, maternal grandmother, and paternal grandfather; Diabetes in an other family member; Heart Disease in her father; High Blood Pressure in her father and mother; High Cholesterol in her mother; Parley Claritza in an other family member; Other in her mother. Review of Systems:  Constitutional: No fever or chills   Respiratory: negative for cough and hemoptysis  Cardiovascular: negative for chest pain and dyspnea  Gastrointestinal: negative for abdominal pain, diarrhea, nausea and vomiting   Derm: negative for rash and skin lesion(s)  Neurological: positive for seizures   Musculoskeletal: Negative    Psychiatric: Negative   : As above in the HPI, otherwise negative  All other reviews are negative    Physical Exam:     Vitals:  BP (!) 99/58   Pulse 75   Temp 97.6 °F (36.4 °C) (Oral)   Resp 18   Wt 130 lb (59 kg)   LMP  (LMP Unknown) Comment: mirena  SpO2 99%   BMI 25.39 kg/m²     General:  Awake, alert, oriented X 3. No apparent distress. HEENT:  Normocephalic, atraumatic. Lungs:  Respirations symmetric and non-labored. Abdomen:  soft, nontender, no masses  Extremities:  No clubbing, cyanosis, or edema  Skin:  Warm and dry, no open lesions or rashes  Neuro:  There are no motor or sensory deficits in the 4 quadrant extremities   Rectal: deferred  Genitourinary:  no brock     Labs:     Recent Labs     12/05/22  0500 12/06/22  0514 12/07/22  0443   WBC 4.4* 4.8 5.0   RBC 3.94 4.10 4.01   HGB 10.9* 11.2* 11.0*   HCT 34.3 35.1 34.8   MCV 87.1 85.6 86.8   MCH 27.7 27.3 27.4   MCHC 31.8* 31.9* 31.6*   RDW 20.1* 20.3* 20.3*    217 197   MPV 9.3 9.9 10.2         Recent Labs     12/05/22  0500 12/06/22  0514 12/07/22  0443   CREATININE 0.7 0.7 0.7       No results found for: PSA    Imaging:       Assessment/plan:  Urinary retention     Creatinine stable   Antibiotics per primary   PVRs are ordered and pending at this time   May need to learn self cath  Will need outpatient follow up       Electronically signed by TRINA Childress CNP on 12/7/2022 at 1:51 PM  [facility]

## 2022-12-07 NOTE — TELEPHONE ENCOUNTER
Multiple messages received from patient indicating that she is currently admitted and requesting callback. Attempted to return call; message left requesting callback.

## 2022-12-07 NOTE — PROGRESS NOTES
Physical Therapy  Physical Therapy Initial Assessment     Name: Dariana Friedman  : 1985  MRN: [id number]      Date of Service: 2022    Evaluating PT:  Rakan Hyatt PT, DPT [id number]    Room #:  5303/2416-G  Diagnosis:  Altered mental status, unspecified altered mental status type [R41.82]  AMS (altered mental status) [R41.82]  PMHx/PSHx:    Past Medical History:   Diagnosis Date    Abdominal pain     Cancer ([facility] [address]     neuroendocrime tumor    Diarrhea     Hypocalcemia     Hypothyroidism     Irritable bowel syndrome     Migraines     Seizures ([facility] [address]     last episode 2021    Status post alcohol detoxification     2018-2018     Precautions:  Fall risk, Alarms, AMS- Sitter, Hx CA, Neutropenic  Equipment Needs:  TBD    SUBJECTIVE:    Pt lives with mother and father in a two story home with three stairs to enter and one rail. Bed is on second floor with 13 steps and unilateral HR and bath is on first floor with 3 additional steps down and bilateral HR. Pt ambulated with [facility] and [facility]. Unable to specify times of use for both. OBJECTIVE:   Initial Evaluation  Date: 22 Treatment Short Term/ Long Term   Goals   AM-PAC 6 Clicks 40/13     Was pt agreeable to Eval/treatment? Yes     Does pt have pain? Yes \"all over\"     Bed Mobility  Rolling: NT  Supine to sit: SBA  Sit to supine: SBA  Scooting: SBA  Independent    Transfers Sit to stand: Min A  Stand to sit: Min A  Stand pivot: Min A with HHA  Mod I to/with AAD   Ambulation    50 feet with HHA Min A  >150 feet with AAD Mod I   Stair negotiation: ascended and descended  NT   >13 steps with unilateral rail Supervision   ROM BUE:  R UE hand contracture and limited flexion *unable to accurately assess; presentation varied throughout session  BLE:  AROM WFL     Strength BUE:  Refer to OT  BLE:  4-/5  4/5   Balance Sitting EOB:  SBA  Dynamic Standing:  Min A with HHA  Sitting EOB:  Independent  Dynamic Standing:   Mod I with AAD     Pt is A & O x 2 (self, location, month, year) confusion to situation, providing inconsistent subjective information, poor insight with recent hx   Sensation:  Pt denies numbness and tingling to extremities  Edema:  unremarkable  Vitals: WNLs    Therapeutic Exercises:  NA    Patient education  Pt educated on role and benefits of physical therapy, safety and technique for mobility    Patient response to education:   Pt verbalized understanding Pt demonstrated skill Pt requires further education in this area   x x x     ASSESSMENT:    Conditions Requiring Skilled Therapeutic Intervention:    [x]Decreased strength     [x]Decreased ROM  [x]Decreased functional mobility  [x]Decreased balance   [x]Decreased endurance   [x]Decreased posture  []Decreased sensation  []Decreased coordination   []Decreased vision  [x]Decreased safety awareness   [x]Increased pain       Comments:  Patient deemed medically stable prior to therapy evaluation. Patient was semi supine in bed with sitter present upon therapy arrival and provided consent to evaluation. Patient had inconsistent presentation with R UE limitations stating \"my contractures come and go\". Good efforts with independently completing bed mobility this date without use of R UE. Assist to maintain stability and upright posturing with dynamic sitting. Due to absence of cane, HHA provided for stability with ambulation. Deficits observed include: decreased romel, multiplanar trunk sway, decreased step length bilaterally. Mild LOB with turns and pivot. Patient left semi supine with all needs met and call light in reach for safety. Sitter present.     Treatment:  Patient practiced and was instructed in the following treatment:    Sitting EOB for >3 minutes for upright tolerance, postural awareness and BLE ROM, BUE ROM,   Transfer training - pt was given verbal and tactile cues to facilitate proper hand placement, technique and safety during sit to stand and stand to sit as well as provided with physical assistance. Gait training- pt was given verbal and tactile cues to facilitate improved use of assist from therapist to simulate UE support with cane during ambulation as well as provided with physical assistance to correct LOB. Pt's/ family goals   1. Return to PLOF    Prognosis is fair+ for reaching above PT goals. Patient and or family understand(s) diagnosis, prognosis, and plan of care. Yes    PHYSICAL THERAPY PLAN OF CARE:    PT POC is established based on physician order and patient diagnosis     Referring provider/PT Order:     PT eval and treat  Start:  12/05/22 0930,   End:  12/05/22 0930,   ONE TIME,   Standing Count:  1 Occurrences,   R         Gina Castellanos,       Diagnosis:  Altered mental status, unspecified altered mental status type [R41.82]  AMS (altered mental status) [R41.82]  Specific instructions for next treatment:  progress mobility as able; trial stair negotiation prior to DC    Current Treatment Recommendations:     [x] Strengthening to improve independence with functional mobility   [] ROM to improve independence with functional mobility   [x] Balance Training to improve static/dynamic balance and to reduce fall risk  [x] Endurance Training to improve activity tolerance during functional mobility   [x] Transfer Training to improve safety and independence with all functional transfers   [x] Gait Training to improve gait mechanics, endurance and assess need for appropriate assistive device  [x] Stair Training in preparation for safe discharge home and/or into the community   [] Positioning to prevent skin breakdown and contractures  [x] Safety and Education Training   [x] Patient/Caregiver Education   [] HEP  [] Other     PT long term treatment goals are located in above grid    Frequency of treatments: 2-5x/week x 1-2 weeks.     Time in  1100  Time out  1125    Total Treatment Time  10 minutes     Evaluation Time includes thorough review of current medical information, gathering information on past medical history/social history and prior level of function, completion of standardized testing/informal observation of tasks, assessment of data and education on plan of care and goals.     CPT codes:  [x] Low Complexity PT evaluation 33837  [] Moderate Complexity PT evaluation 04352  [] High Complexity PT evaluation 23914  [] PT Re-evaluation 45290  [] Gait training 37762 - minutes  [] Manual therapy 15461 - minutes  [x] Therapeutic activities 63448 10 minutes  [] Therapeutic exercises 90066 - minutes  [] Neuromuscular reeducation 87315 - minutes     Peg Wilson, PT, DPT [id number]

## 2022-12-07 NOTE — PLAN OF CARE
Problem: Safety - Adult  Goal: Free from fall injury  12/7/2022 0832 by Shun Altamirano RN  Outcome: Progressing  12/7/2022 0049 by Benito Chacon RN  Outcome: Progressing     Problem: Skin/Tissue Integrity  Goal: Absence of new skin breakdown  Description: 1. Monitor for areas of redness and/or skin breakdown  2. Assess vascular access sites hourly  3. Every 4-6 hours minimum:  Change oxygen saturation probe site  4. Every 4-6 hours:  If on nasal continuous positive airway pressure, respiratory therapy assess nares and determine need for appliance change or resting period.   12/7/2022 0832 by Shun Altamirano RN  Outcome: Progressing  12/7/2022 0049 by Benito Chacon RN  Outcome: Progressing

## 2022-12-07 NOTE — PROGRESS NOTES
[facility]  Neurology follow up     Date:  12/7/2022  Patient Name:  Wendy Justice  YOB: 1985  MRN: [id number]     PCP:  Krysten Razo DO   Referring:  No ref. provider found      Chief Complaint: seizures     History obtained from: patient and chart     Assessment    Seizure like activity. She has had prior routine EEG, MRI brain which were unrevealing. Reports multiple \"absence seizures\" described as blank stare for about 15 seconds. Needs spell capture for diagnosis- epileptic events vs PNES. Current UTI infection may lower seizure threshold. Will go ahead and increase Keppra to 1g BID at this time with recommendation or EMU eval or STRATUS at home vEEG testing. Plan  As above  Increase Keppra to 1 G BID - states she cannot swallow pills, requests liquid  Continue Topamax   Outpatient long term video EEG for spell capture  Seizure safety precautions until cleared by neurology   Neuro will sign off, please call with questions or new issues. Subjective  Wendy Justice is a 40 y.o. female presenting for evaluation of seizures. She follows with Sergio Cueto at the office for seizure activity and migraines. On Keppra and Topamax. She reports multiple \"absence seizures\" this admission. Described as staring spell for about 15 seconds followed by a deep breath. Reports last GTC seizure about a year ago. Prior workup has been unrevealing. Currently being treated for a UTI. On Abx    Hx of neuroendocrine tumor and is now on a treatment regimen. Sitter at bedside.      Review of Systems:  No chest pain or palpitations  No SOB  No vertigo, lightheadedness or loss of consciousness  No falls, tripping or stumbling  No incontinence of bowels or bladder  No itching or bruising appreciated  No numbness, tingling or focal arm/leg weakness    ROS otherwise negative      Allergies:      No Known Allergies     Physical Examination  Vitals   Vitals:    12/06/22 0829 12/06/22 1413 12/06/22 2055 12/07/22 0800   BP: (!) 98/57 112/71 (!) 95/52 (!) 99/58   Pulse: 77 79 67 75   Resp: 18 18 20 18   Temp: 97.8 °F (36.6 °C) 98.1 °F (36.7 °C) 97.4 °F (36.3 °C) 97.6 °F (36.4 °C)   TempSrc:   Oral Oral   SpO2:  96% 99% 99%   Weight:            General: Patient appears in no acute distress. HEENT: Normocephalic, atraumatic  Chest: effort normal on room air   Extremities/Peripheral vascular: No edema/swelling noted. No cold limbs noted. Neurologic Examination    Mental Status  Alert, and oriented to person, place and time. Speech is fluent with intact comprehension. No evidence of memory impairment. Attention and concentration appeared normal.     Cranial Nerves  II. Visual fields full to confrontation bilaterally. III, IV, VI: Pupils equally round and reactive to light, 3 to 2 mm bilaterally. EOMs: full, no nystagmus. V. Facial sensation intact to light touch bilaterally  VII: Facial movements symmetric and strong  VIII: Hearing intact to voice  IX,X: Palate elevates symmetrically.  No dysarthria  XI: Sternocleidomastoid and trapezius 5/5 bilaterally   XII: Tongue is midline    Motor     Right Left   Right Left   Deltoid 5 5  Hip Flexion 5 5   Biceps 5 5  Knee Extension 5 5   Triceps 5 5  Knee Flexion 5 5   Handgrip 5 5  Ankle Dorsiflexion 5 5       Ankle Plantarflexion 5 5     Tone: increased tone RUE - dystonia     Bulk: Normal in all four limbs with no evidence of atrophy    Pronator drift: absent bilaterally    Sensation  Light Touch: Intact distally in all four limbs    Reflexes    No babinski     Coordination  Rapid alternating movements normal in bilateral upper extremities  Finger to nose testing normal bilaterally    Gait    Deferred for safety/fall consideration      Labs  Recent Labs     12/07/22  0443      K 3.9      CO2 24   BUN 9   CREATININE 0.7   GLUCOSE 87   CALCIUM 9.1   WBC 5.0   RBC 4.01   HGB 11.0*   HCT 34.8   MCV 86.8   MCH 27.4   MCHC 31.6* RDW 20.3*      MPV 10.2       Imaging  XR FOOT RIGHT (2 VIEWS)   Final Result   No acute fracture is identified. CT ABDOMEN PELVIS W IV CONTRAST Additional Contrast? None   Final Result   CERVICAL SPINE:      No acute abnormality of the cervical spine. ABDOMEN/PELVIS:      Wall thickening the left colon, sigmoid colon, and rectum compatible with   colitis. No free air, free fluid, or abscess. No bowel obstruction. There are subtle lesions in the liver, better depicted on the comparison   exams. CT HEAD WO CONTRAST   Final Result   No acute intracranial abnormality. CT CSpine W/O Contrast   Final Result   CERVICAL SPINE:      No acute abnormality of the cervical spine. ABDOMEN/PELVIS:      Wall thickening the left colon, sigmoid colon, and rectum compatible with   colitis. No free air, free fluid, or abscess. No bowel obstruction. There are subtle lesions in the liver, better depicted on the comparison   exams.                I have personally reviewed the following images: Prior MRI brain, EEG    Electronically signed by DOMINIC Jeronimo on 12/7/2022 at 2:18 PM

## 2022-12-08 VITALS
DIASTOLIC BLOOD PRESSURE: 54 MMHG | OXYGEN SATURATION: 100 % | WEIGHT: 130 LBS | SYSTOLIC BLOOD PRESSURE: 105 MMHG | BODY MASS INDEX: 25.39 KG/M2 | HEART RATE: 64 BPM | TEMPERATURE: 97.4 F | RESPIRATION RATE: 18 BRPM

## 2022-12-08 LAB — BLOOD CULTURE, ROUTINE: NORMAL

## 2022-12-08 PROCEDURE — 6370000000 HC RX 637 (ALT 250 FOR IP): Performed by: PHYSICIAN ASSISTANT

## 2022-12-08 PROCEDURE — 6370000000 HC RX 637 (ALT 250 FOR IP): Performed by: INTERNAL MEDICINE

## 2022-12-08 PROCEDURE — 6360000002 HC RX W HCPCS: Performed by: INTERNAL MEDICINE

## 2022-12-08 RX ORDER — CEFDINIR 300 MG/1
300 CAPSULE ORAL EVERY 12 HOURS SCHEDULED
Status: DISCONTINUED | OUTPATIENT
Start: 2022-12-08 | End: 2022-12-08 | Stop reason: HOSPADM

## 2022-12-08 RX ORDER — HEPARIN SODIUM (PORCINE) LOCK FLUSH IV SOLN 100 UNIT/ML 100 UNIT/ML
500 SOLUTION INTRAVENOUS PRN
Status: DISCONTINUED | OUTPATIENT
Start: 2022-12-08 | End: 2022-12-08 | Stop reason: HOSPADM

## 2022-12-08 RX ORDER — ACETAMINOPHEN 325 MG/1
650 TABLET ORAL EVERY 6 HOURS PRN
Qty: 120 TABLET | Refills: 3 | COMMUNITY
Start: 2022-12-08

## 2022-12-08 RX ORDER — GABAPENTIN 300 MG/1
300 CAPSULE ORAL 3 TIMES DAILY
Qty: 90 CAPSULE | Refills: 0
Start: 2022-12-08 | End: 2023-01-07

## 2022-12-08 RX ORDER — LEVETIRACETAM 100 MG/ML
1000 SOLUTION ORAL 2 TIMES DAILY
Qty: 600 ML | Refills: 0 | Status: SHIPPED | OUTPATIENT
Start: 2022-12-08

## 2022-12-08 RX ORDER — CEFDINIR 300 MG/1
300 CAPSULE ORAL EVERY 12 HOURS SCHEDULED
Qty: 10 CAPSULE | Refills: 0 | Status: SHIPPED | OUTPATIENT
Start: 2022-12-08 | End: 2022-12-13

## 2022-12-08 RX ORDER — LOPERAMIDE HYDROCHLORIDE 2 MG/1
2 CAPSULE ORAL 4 TIMES DAILY PRN
COMMUNITY
Start: 2022-12-08 | End: 2022-12-18

## 2022-12-08 RX ADMIN — Medication 400 MG: at 09:34

## 2022-12-08 RX ADMIN — CEFDINIR 300 MG: 300 CAPSULE ORAL at 09:42

## 2022-12-08 RX ADMIN — GABAPENTIN 300 MG: 300 CAPSULE ORAL at 09:35

## 2022-12-08 RX ADMIN — LEVOTHYROXINE SODIUM 112 MCG: 0.11 TABLET ORAL at 05:41

## 2022-12-08 RX ADMIN — LEVETIRACETAM 1000 MG: 100 SOLUTION ORAL at 09:35

## 2022-12-08 RX ADMIN — BRIMONIDINE TARTRATE 1 DROP: 2 SOLUTION/ DROPS OPHTHALMIC at 09:37

## 2022-12-08 RX ADMIN — Medication 500 UNITS: at 13:58

## 2022-12-08 RX ADMIN — FAMOTIDINE 40 MG: 20 TABLET, FILM COATED ORAL at 09:35

## 2022-12-08 RX ADMIN — POTASSIUM CHLORIDE 20 MEQ: 20 TABLET, EXTENDED RELEASE ORAL at 09:35

## 2022-12-08 NOTE — PROGRESS NOTES
Hospital Medicine    Subjective:  pt alert conversive pt pulled urinary catheter out pt was able to void after that      Current Facility-Administered Medications:     cefdinir (OMNICEF) capsule 300 mg, 300 mg, Oral, 2 times per day, Dalila Arnold DO    levETIRAcetam (KEPPRA) 100 MG/ML solution 1,000 mg, 1,000 mg, Oral, BID, DOMINIC Gama, 1,000 mg at 12/07/22 2111    loperamide (IMODIUM) capsule 2 mg, 2 mg, Oral, 4x Daily PRN, Neptali Castellanos DO, 2 mg at 12/06/22 0848    magnesium oxide (MAG-OX) tablet 400 mg, 400 mg, Oral, BID, Lianet David MD, 400 mg at 12/07/22 2042    fluticasone (FLONASE) 50 MCG/ACT nasal spray 1 spray, 1 spray, Nasal, Daily PRN, Lianet David MD    potassium chloride (KLOR-CON M) extended release tablet 20 mEq, 20 mEq, Oral, BID, Lianet David MD, 20 mEq at 12/07/22 2042    brimonidine (ALPHAGAN) 0.2 % ophthalmic solution 1 drop, 1 drop, Both Eyes, QAM, Lianet David MD, 1 drop at 12/05/22 1100    levothyroxine (SYNTHROID) tablet 112 mcg, 112 mcg, Oral, Daily, Lianet David MD, 112 mcg at 12/08/22 0541    famotidine (PEPCID) tablet 40 mg, 40 mg, Oral, Daily, Lianet David MD, 40 mg at 12/07/22 0806    gabapentin (NEURONTIN) capsule 300 mg, 300 mg, Oral, TID, Lianet David MD, 300 mg at 12/07/22 2043    acetaminophen (TYLENOL) tablet 650 mg, 650 mg, Oral, Q4H PRN, Lianet David MD    ibuprofen (ADVIL;MOTRIN) tablet 400 mg, 400 mg, Oral, Q6H PRN, Lianet David MD    ondansetron (ZOFRAN-ODT) disintegrating tablet 4 mg, 4 mg, Oral, Q6H PRN, Lianet David MD, 4 mg at 12/07/22 2042    Objective:    BP (!) 102/57   Pulse 72   Temp 97.6 °F (36.4 °C) (Oral)   Resp 16   Wt 130 lb (59 kg)   LMP  (LMP Unknown) Comment: mirena  SpO2 99%   BMI 25.39 kg/m²     Heart:  reg  Lungs:  ctab  Abd: + bs nondistended  Extrem:  w/o edema    CBC with Differential:    Lab Results   Component Value Date/Time    WBC 5.0 12/07/2022 04:43 AM    RBC 4.01 12/07/2022 04:43 AM    HGB 11.0 12/07/2022 04:43 AM    HCT 34.8 12/07/2022 04:43 AM     12/07/2022 04:43 AM    MCV 86.8 12/07/2022 04:43 AM    MCH 27.4 12/07/2022 04:43 AM    MCHC 31.6 12/07/2022 04:43 AM    RDW 20.3 12/07/2022 04:43 AM    NRBC 3.4 12/02/2022 03:55 PM    METASPCT 0.9 12/02/2022 03:55 PM    LYMPHOPCT 31.3 12/07/2022 04:43 AM    PROMYELOPCT 0.9 12/02/2022 03:55 PM    MONOPCT 5.2 12/07/2022 04:43 AM    MYELOPCT 0.9 12/02/2022 03:55 PM    BASOPCT 0.9 12/07/2022 04:43 AM    MONOSABS 0.25 12/07/2022 04:43 AM    LYMPHSABS 1.55 12/07/2022 04:43 AM    EOSABS 0.09 12/07/2022 04:43 AM    BASOSABS 0.05 12/07/2022 04:43 AM     CMP:    Lab Results   Component Value Date/Time     12/07/2022 04:43 AM    K 3.9 12/07/2022 04:43 AM    K 3.7 11/14/2022 08:21 PM     12/07/2022 04:43 AM    CO2 24 12/07/2022 04:43 AM    BUN 9 12/07/2022 04:43 AM    CREATININE 0.7 12/07/2022 04:43 AM    GFRAA >60 10/13/2022 01:18 PM    LABGLOM >60 12/07/2022 04:43 AM    GLUCOSE 87 12/07/2022 04:43 AM    PROT 7.2 12/02/2022 03:55 PM    LABALBU 4.4 12/02/2022 03:55 PM    CALCIUM 9.1 12/07/2022 04:43 AM    BILITOT 0.7 12/02/2022 03:55 PM    ALKPHOS 89 12/02/2022 03:55 PM    AST 28 12/02/2022 03:55 PM    ALT 16 12/02/2022 03:55 PM     Warfarin PT/INR:    Lab Results   Component Value Date    INR 1.5 09/28/2022    INR 1.7 09/27/2022    INR 2.3 09/26/2022    PROTIME 16.3 (H) 09/28/2022    PROTIME 18.7 (H) 09/27/2022    PROTIME 24.7 (H) 09/26/2022       Assessment:    Principal Problem:    AMS (altered mental status)  Resolved Problems:    * No resolved hospital problems.  *      Plan:  Dc home        Gaurav Tapia DO  7:23 AM  12/8/2022

## 2022-12-08 NOTE — DISCHARGE INSTRUCTIONS
Call upon discharge to schedule a follow-up visit with Kristyn Rincon/Marilee/Breana ([facility]) at [phone]

## 2022-12-08 NOTE — PLAN OF CARE
Problem: Safety - Adult  Goal: Free from fall injury  Outcome: Completed     Problem: Skin/Tissue Integrity  Goal: Absence of new skin breakdown  Description: 1. Monitor for areas of redness and/or skin breakdown  2. Assess vascular access sites hourly  3. Every 4-6 hours minimum:  Change oxygen saturation probe site  4. Every 4-6 hours:  If on nasal continuous positive airway pressure, respiratory therapy assess nares and determine need for appliance change or resting period.   Outcome: Completed     Problem: Pain  Goal: Verbalizes/displays adequate comfort level or baseline comfort level  Outcome: Completed

## 2022-12-08 NOTE — CARE COORDINATION
Care Coordination  The patient had a brock placed yesterday for urinary retention . Last night she then pulled it out and plan is to leave it out as she was able to void post removal. Urology to see the patient. Per neurology increase keppra 1 gm bid needs liquid. Continue topomax, plan for outpatient long term video eeg for spell capture. Plan is to return home and lives with her parents. No plans for skilled care at this time. Home on po omnicef. Family to transport her home.  Will follow

## 2022-12-08 NOTE — PROGRESS NOTES
12/8/2022 3:34 PM  Aubrey Aguila  [id number]    Subjective: Brock was placed yesterday for >400ml  She removed this last night  She is voiding comfortably at this time   She is being discharged home today     Review of Systems  Constitutional: No fever or chills   Respiratory: negative for cough and hemoptysis  Cardiovascular: negative for chest pain and dyspnea  Gastrointestinal: negative for abdominal pain, diarrhea, nausea and vomiting   : See above  Derm: negative for rash and skin lesion(s)  Neurological: negative for seizures and tremors  Musculoskeletal: Negative    Psychiatric: Negative   All other reviews are negative      Scheduled Meds:   cefdinir  300 mg Oral 2 times per day    levETIRAcetam  1,000 mg Oral BID    magnesium oxide  400 mg Oral BID    potassium chloride  20 mEq Oral BID    brimonidine  1 drop Both Eyes QAM    levothyroxine  112 mcg Oral Daily    famotidine  40 mg Oral Daily    gabapentin  300 mg Oral TID       Objective:  Vitals:    12/08/22 0800   BP: (!) 105/54   Pulse: 64   Resp: 18   Temp: 97.4 °F (36.3 °C)   SpO2: 100%         Allergies: Patient has no known allergies.     General Appearance: alert and oriented to person, place and time and in no acute distress  Skin: no rash or erythema  Head: normocephalic and atraumatic  Pulmonary/Chest: normal air movement, no respiratory distress  Abdomen: soft, non-tender, non-distended  Genitourinary: no brock  Extremities: no cyanosis, clubbing or edema         Labs:     Recent Labs     12/07/22  0443      K 3.9      CO2 24   BUN 9   CREATININE 0.7   GLUCOSE 87   CALCIUM 9.1       Lab Results   Component Value Date/Time    HGB 11.0 12/07/2022 04:43 AM    HCT 34.8 12/07/2022 04:43 AM       No results found for: PSA      Assessment/Plan:  Urinary retention      Patient removed brock last night herself  She states she is voiding comfortably, per nursing she has voided since it was remove  Ok for DC from  standpoint  Will need outpatient follow up with one of our office NP's  No further  interventions are planned at this time  Please call with any further questions or concerns  Thank you for allowing us to participate in her care     Creatinine stable   Antibiotics per primary   PVRs are ordered and pending at this time   May need to learn self cath  Will need outpatient follow up     Сергей Pleitez, [address] [facility]  Urology

## 2022-12-12 ENCOUNTER — OFFICE VISIT (OUTPATIENT)
Dept: PALLATIVE CARE | Age: 37
End: 2022-12-12
Payer: MEDICARE

## 2022-12-12 VITALS
DIASTOLIC BLOOD PRESSURE: 39 MMHG | OXYGEN SATURATION: 96 % | WEIGHT: 121 LBS | SYSTOLIC BLOOD PRESSURE: 120 MMHG | TEMPERATURE: 98.6 F | BODY MASS INDEX: 23.63 KG/M2 | HEART RATE: 76 BPM

## 2022-12-12 DIAGNOSIS — G89.3 NEOPLASM RELATED PAIN: Primary | ICD-10-CM

## 2022-12-12 DIAGNOSIS — C7B.8 NEUROENDOCRINE CARCINOMA METASTATIC TO LIVER (HCC): ICD-10-CM

## 2022-12-12 DIAGNOSIS — Z51.5 PALLIATIVE CARE BY SPECIALIST: ICD-10-CM

## 2022-12-12 DIAGNOSIS — C7A.8 NEUROENDOCRINE CARCINOMA METASTATIC TO LIVER (HCC): ICD-10-CM

## 2022-12-12 PROCEDURE — 1111F DSCHRG MED/CURRENT MED MERGE: CPT | Performed by: NURSE PRACTITIONER

## 2022-12-12 PROCEDURE — G8420 CALC BMI NORM PARAMETERS: HCPCS | Performed by: NURSE PRACTITIONER

## 2022-12-12 PROCEDURE — G8484 FLU IMMUNIZE NO ADMIN: HCPCS | Performed by: NURSE PRACTITIONER

## 2022-12-12 PROCEDURE — 99213 OFFICE O/P EST LOW 20 MIN: CPT | Performed by: NURSE PRACTITIONER

## 2022-12-12 PROCEDURE — 99212 OFFICE O/P EST SF 10 MIN: CPT | Performed by: NURSE PRACTITIONER

## 2022-12-12 PROCEDURE — 4004F PT TOBACCO SCREEN RCVD TLK: CPT | Performed by: NURSE PRACTITIONER

## 2022-12-12 PROCEDURE — G8427 DOCREV CUR MEDS BY ELIG CLIN: HCPCS | Performed by: NURSE PRACTITIONER

## 2022-12-12 RX ORDER — HYDROCODONE BITARTRATE AND ACETAMINOPHEN 5; 325 MG/1; MG/1
1 TABLET ORAL EVERY 8 HOURS PRN
Qty: 45 TABLET | Refills: 0 | Status: SHIPPED | OUTPATIENT
Start: 2022-12-12 | End: 2022-12-27

## 2022-12-12 NOTE — PROGRESS NOTES
.  Department of Palliative Medicine  Ambulatory Note  Provider: TRINA Cordova - CNP     Chief Complaint: Riky Rosas is a 40 y.o. female with chief complaint of pain    HPI:  Riky Rosas is a 29 y.o. female with significant medical history of hypothyroidism, IBS, migraine who was complaining of abdominal pain associated with diarrhea and flushing/sweating. 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 Darrell Harp. Assessment/Plan      Neuroendocrine cancer  - Follows with Dr. Cuellar Room Dr. Alicia driscoll Bowel resection on 10/21/20  - On Lanreotide   -Cathryne April recommended    Neoplasm related pain  - Gabapentin 300mg TID  - Stop tramadol  - Norco 5/325 mg Q 8 PRN    Nausea  - Continue Zofran and Phenergan PRN    Diarrhea:   -Currently on Lanreotide and Xermelo  -Imodium she uses this daily  -Eat 1-3 jumbo marshmallows daily PRN  -Lactulose needed due to hyperammonemia     - Goals of care: cure, live longer and improve or maintain function/quality of life    - Code Status: full code    Follow Up:  4 weeks. Encouraged to call with any questions, concerns, needs, or changes in symptoms. Subjective:   Paul Wheeler presents today with her mother. She again was hospitalization recently seizures and UTI. She states she is recovering well and will follow up with neurology. Unfortunately her tramadol likely increases seizure risk and we discuss that this should be stopped. She does continue to have intermittent severe abdominal pain and we discussed the options of norco. She is agreeable to this. She is instructed to use only for severe pain. She continues to have diarrhea, but this is not worse and overall at its baseline. She is to start receiving lutathera treatment soon. She has no other uncontrolled issues or new complaints at this time.   I did discuss with her that her last few UDS have shown +cannabinoid and she states she has been using the OTC Delta 8. She also states that she has not been drinking alcohol recently either. She is encourage to continue to avoid alcohol use and only use her medications as prescribed.     Pain Assessment   Ratin  Description: burning, sharp and shooting  Duration: minutes  Frequency: daily  Location: Abdomen   Alleviating Factors: relaxation  Exacerbating Factors: unable to associate with any factor  Effect: change in function and sleep    Objective:     Physical Exam  Wt Readings from Last 3 Encounters:   22 121 lb (54.9 kg)   22 130 lb (59 kg)   22 130 lb (59 kg)     BP (!) 120/39   Pulse 76   Temp 98.6 °F (37 °C)   Wt 121 lb (54.9 kg)   LMP  (LMP Unknown) Comment: mirena  SpO2 96%   BMI 23.63 kg/m²     Gen:  Alert, appears stated age, well nourished, in no acute distress  HEENT:  Normocephalic, conjunctiva pink, no drainage, mucosa moist  Neck:  Supple  Lungs:  CTA bilaterally, no audible rhonchi or wheezes noted  Heart[de-identified]  RRR, no murmur, rub, or gallop noted during exam  Abd:  Soft, non tender, non distended, BS+  M/S/Ext:  Moving all extremities, no edema, pulses present  Skin:  Warm and dry  Neuro:  PERRL, Alert, oriented x 3; following commands    [place] Symptom Assessment Score   [place] Score 2022 2022 10/18/2022 2022 2022   Pain Score 5 5 7 7 6   Tiredness Score 5 5 7 6 3   Nausea Score Not nauseated Not nauseated Not nauseated Not nauseated Not nauseated   Depression Score 1 1 Not depressed 2 2   Anxiety Score 1 1 2 2 2   Drowsiness Score 2 2 3 4 2   Appetite Score 4 4 4 5 5   Wellbeing Score 4 4 4 Best feeling of wellbeing 5   Dyspnea Score No shortness of breath No shortness of breath No shortness of breath No shortness of breath No shortness of breath   Other Problem Score Best possible response Best possible response Best possible response Best possible response Best possible response   Total Assessment Score(calculated) 22 22 27 26 25     Assessed by: patient. Current Medications:  Medications reviewed: yes    Controlled Substances Monitoring: [place] reviewed 12/12/22. RX Monitoring 12/13/2022   Attestation -   Periodic Controlled Substance Monitoring Possible medication side effects, risk of tolerance/dependence & alternative treatments discussed. ;No signs of potential drug abuse or diversion identified. ;Assessed functional status. ;Obtaining appropriate analgesic effect of treatment. TRINA Salcedo - CNP   Palliative Care Department       Time/Communication  Greater than 50% of time spent, total 25 minutes in face-to-face counseling and coordination of care regarding symptom management. Note: This report was completed using computerize voiced recognition software. Every effort has been made to ensure accuracy; however, inadvertent computerized transcription errors may be present.

## 2022-12-14 ENCOUNTER — HOSPITAL ENCOUNTER (OUTPATIENT)
Dept: INFUSION THERAPY | Age: 37
Discharge: HOME | End: 2022-12-14
Payer: MEDICARE

## 2022-12-14 ENCOUNTER — OFFICE VISIT (OUTPATIENT)
Dept: ONCOLOGY | Age: 37
End: 2022-12-14
Payer: MEDICARE

## 2022-12-14 VITALS
BODY MASS INDEX: 23.91 KG/M2 | WEIGHT: 121.8 LBS | DIASTOLIC BLOOD PRESSURE: 51 MMHG | HEIGHT: 60 IN | SYSTOLIC BLOOD PRESSURE: 101 MMHG | HEART RATE: 64 BPM | OXYGEN SATURATION: 98 % | TEMPERATURE: 97.4 F

## 2022-12-14 DIAGNOSIS — C7B.8 NEUROENDOCRINE CARCINOMA METASTATIC TO LIVER (HCC): Primary | ICD-10-CM

## 2022-12-14 DIAGNOSIS — C7B.8 METASTATIC MALIGNANT NEUROENDOCRINE TUMOR TO LIVER (HCC): Primary | ICD-10-CM

## 2022-12-14 DIAGNOSIS — C7A.8 NEUROENDOCRINE CARCINOMA METASTATIC TO LIVER (HCC): Primary | ICD-10-CM

## 2022-12-14 LAB
ALBUMIN SERPL-MCNC: 4.1 G/DL (ref 3.5–5.2)
ALP BLD-CCNC: 101 U/L (ref 35–104)
ALT SERPL-CCNC: 62 U/L (ref 0–32)
ANION GAP SERPL CALCULATED.3IONS-SCNC: 10 MMOL/L (ref 7–16)
ANISOCYTOSIS: ABNORMAL
AST SERPL-CCNC: 86 U/L (ref 0–31)
BASOPHILS ABSOLUTE: 0 E9/L (ref 0–0.2)
BASOPHILS RELATIVE PERCENT: 0.8 % (ref 0–2)
BILIRUB SERPL-MCNC: 0.4 MG/DL (ref 0–1.2)
BUN BLDV-MCNC: 9 MG/DL (ref 6–20)
CALCIUM SERPL-MCNC: 8.7 MG/DL (ref 8.6–10.2)
CHLORIDE BLD-SCNC: 102 MMOL/L (ref 98–107)
CO2: 26 MMOL/L (ref 22–29)
CREAT SERPL-MCNC: 0.6 MG/DL (ref 0.5–1)
EOSINOPHILS ABSOLUTE: 0.06 E9/L (ref 0.05–0.5)
EOSINOPHILS RELATIVE PERCENT: 0.9 % (ref 0–6)
GFR SERPL CREATININE-BSD FRML MDRD: >60 ML/MIN/1.73
GLUCOSE BLD-MCNC: 102 MG/DL (ref 74–99)
HCT VFR BLD CALC: 34.2 % (ref 34–48)
HEMOGLOBIN: 10.8 G/DL (ref 11.5–15.5)
HYPOCHROMIA: ABNORMAL
LYMPHOCYTES ABSOLUTE: 0.83 E9/L (ref 1.5–4)
LYMPHOCYTES RELATIVE PERCENT: 13 % (ref 20–42)
MCH RBC QN AUTO: 27.6 PG (ref 26–35)
MCHC RBC AUTO-ENTMCNC: 31.6 % (ref 32–34.5)
MCV RBC AUTO: 87.2 FL (ref 80–99.9)
METAMYELOCYTES RELATIVE PERCENT: 0.9 % (ref 0–1)
MONOCYTES ABSOLUTE: 0.7 E9/L (ref 0.1–0.95)
MONOCYTES RELATIVE PERCENT: 11.3 % (ref 2–12)
NEUTROPHILS ABSOLUTE: 4.8 E9/L (ref 1.8–7.3)
NEUTROPHILS RELATIVE PERCENT: 73.9 % (ref 43–80)
OVALOCYTES: ABNORMAL
PDW BLD-RTO: 20.7 FL (ref 11.5–15)
PLATELET # BLD: 145 E9/L (ref 130–450)
PMV BLD AUTO: 10 FL (ref 7–12)
POIKILOCYTES: ABNORMAL
POLYCHROMASIA: ABNORMAL
POTASSIUM SERPL-SCNC: 3.7 MMOL/L (ref 3.5–5)
RBC # BLD: 3.92 E12/L (ref 3.5–5.5)
SODIUM BLD-SCNC: 138 MMOL/L (ref 132–146)
TOTAL PROTEIN: 6.7 G/DL (ref 6.4–8.3)
WBC # BLD: 6.4 E9/L (ref 4.5–11.5)

## 2022-12-14 PROCEDURE — 86316 IMMUNOASSAY TUMOR OTHER: CPT

## 2022-12-14 PROCEDURE — 2580000003 HC RX 258: Performed by: NURSE PRACTITIONER

## 2022-12-14 PROCEDURE — 99214 OFFICE O/P EST MOD 30 MIN: CPT

## 2022-12-14 PROCEDURE — 85025 COMPLETE CBC W/AUTO DIFF WBC: CPT

## 2022-12-14 PROCEDURE — 6360000002 HC RX W HCPCS: Performed by: NURSE PRACTITIONER

## 2022-12-14 PROCEDURE — 80053 COMPREHEN METABOLIC PANEL: CPT

## 2022-12-14 PROCEDURE — 36591 DRAW BLOOD OFF VENOUS DEVICE: CPT

## 2022-12-14 RX ORDER — HEPARIN SODIUM (PORCINE) LOCK FLUSH IV SOLN 100 UNIT/ML 100 UNIT/ML
500 SOLUTION INTRAVENOUS PRN
Status: DISCONTINUED | OUTPATIENT
Start: 2022-12-14 | End: 2022-12-15 | Stop reason: HOSPADM

## 2022-12-14 RX ORDER — HEPARIN SODIUM (PORCINE) LOCK FLUSH IV SOLN 100 UNIT/ML 100 UNIT/ML
500 SOLUTION INTRAVENOUS PRN
Status: CANCELLED | OUTPATIENT
Start: 2022-12-14

## 2022-12-14 RX ORDER — SODIUM CHLORIDE 0.9 % (FLUSH) 0.9 %
5-40 SYRINGE (ML) INJECTION PRN
Status: CANCELLED | OUTPATIENT
Start: 2022-12-14

## 2022-12-14 RX ORDER — SODIUM CHLORIDE 0.9 % (FLUSH) 0.9 %
5-40 SYRINGE (ML) INJECTION PRN
Status: DISCONTINUED | OUTPATIENT
Start: 2022-12-14 | End: 2022-12-15 | Stop reason: HOSPADM

## 2022-12-14 RX ORDER — WATER 1000 ML/1000ML
2.2 INJECTION, SOLUTION INTRAVENOUS ONCE
OUTPATIENT
Start: 2022-12-14 | End: 2022-12-14

## 2022-12-14 RX ORDER — SODIUM CHLORIDE 9 MG/ML
25 INJECTION, SOLUTION INTRAVENOUS PRN
OUTPATIENT
Start: 2022-12-14

## 2022-12-14 RX ADMIN — SODIUM CHLORIDE, PRESERVATIVE FREE 10 ML: 5 INJECTION INTRAVENOUS at 13:35

## 2022-12-14 RX ADMIN — Medication 500 UNITS: at 13:35

## 2022-12-14 NOTE — PROGRESS NOTES
Medical Oncology Outpatient Progress Note    Reason for visit: Neuroendocrine cancer to liver     PCP:  Berenice Dietrich DO     History of Present Illness:  41 y/o female with hx of hypothyroidism, IBS,migraine who was complaining of abdominal pain associated with diarrhea and flushing/sweating. CT abdomen/pelvis 08/28/2020:  2 cm masslike density in the mid abdominal small bowel mesentery with a spiculated appearance. Multiple liver masses suspicious for metastatic disease. 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 Comment: Sections of the liver tissue cores show the hepatic parenchyma to be partially replaced by a proliferation of epithelioid cells with relatively uniform round nuclei and eosinophilic granular cytoplasm. The   epithelioid cells form anastomosing solid cords/nests that are surrounded by delicate fibrovascular stroma. There are no mitotic figures or tumor cell necrosis present. The histologic changes seen are suggestive of well-differentiated neuroendocrine (carcinoid) tumor. Immunostaining for pankeratin, chromogranin, synaptophysin, TTF-1 and Ki-67 was performed on sections of one tissue block (A1) and the neoplastic epithelioid cells show diffuse and strong positivity for neuroendocrine markers (chromogranin and synaptophysin) and moderate positivity for pankeratin. There is no staining reactivity for TTF-1. The Ki-67 proliferation labeling index is essentially negative, (very low, <1%). This staining pattern confirms the histologic impression of a well-differentiated neuroendocrine (carcinoid) tumor. FL Small bowel 09/02/2020:  Rapid small bowel transit. Serum Chromogranin A 160 on 09/23/2020.   Urine 5-HIAA 21 (0-14)  2d-Echo 10/10/2020: EF 60-65%   Normal right ventricular size and function     Dotatate PET/CT scan 10/14/2020 increased tracer uptake seen throughout the liver compatible with neoplasm. This involves both the left and the right lobe of liver. In addition there are 2 foci of increased tracer uptake along the mesentery of the small bowel. This may involve the wall the small bowel. No other convincing evidence for extra-abdominal metastatic disease. EGD/Colonoscopy 10/05/2020 by Dr. Nishant Gamez; records reviewed. Hepatobiliary team (Dr. Herman Vincent) consult appreciated. Small bowel resection on 10/21/2020. Small bowel resection on 10/21/2020. A. Ileum, resection:   Multifocal (4 foci) neuroendocrine tumor (NET). Extensive perineural and angiolymphatic invasion. 3 of 10 lymph nodes with metastatic neuroendocrine tumor and extracapsular extension of tumor cells (3/10). Bilateral viable small bowel resection margins with no evidence of tumor. B. Specimen received as \"Meckel's\":   Nodular scar tissue with patchy chronic inflammation. Negative for neuroendocrine tumor. Intestinal mucosal tissue is not present. CASE SUMMARY:   Procedure: Small bowel resection   Tumor site: Ileum   Tumor size: Greatest dimension of 1.5 cm   Tumor focality: Multifocal: 4 separate tumors   Histologic type and grade: G2: Well-differentiated neuroendocrine tumor   Mitotic rate: between 2-20 mitoses/2 mm2   Ki-67 labeling index: between 3-20%   Tumor extension: Tumor invades through the muscularis propria into subserosal tissue without penetration of overlying serosa   Margins: All margins are uninvolved by tumor    Margins examined: Proximal and distal   Lymphovascular invasion: Present   Perineural invasion: Present   Large mesenteric masses (greater than 2 cm): Present (one 2.5 cm mass)   Regional lymph nodes:    Number of lymph nodes involved: 3    Number of lymph nodes examined: 10   Pathologic stage classification (pTNM, AJCC eighth edition):    pT3    pN2    pM1a -confirmed liver metastasis, HBS-     Comment:   A.  Immunostains stain the tumor cells as follows in block A6:   Synaptophysin and chromogranin: Positive   Ki-67: Positive in 5% of tumor cells      We recommended Lanreotide once monthly for metastatic well differentiated neuroendocrine cancer to liver. Dose # 1 Lanreotide was on 11/05/2020. Dose # 2 Lanreotide was on 12/03/2020. Dose # 3 Lanreotide was on 01/07/2021. Serum Chromogranin A 95 on 01/07/2021. CT chest 02/07/2021 negative for metastatic disease. CT abdomen/pelvis 02/07/2021 Persistent bilobar hepatic lesions which are grossly stable consistent with stable widespread hepatic metastasis. Imaging reviewed. Continue Lanreotide and repeat scans in 3 months. Dose # 4 Lanreotide was on 02/11/2021. Ga 76 Dotatate PET 03/09/2021 Gallium 68 dotatate avid uptake throughout multiple regions of the liver compatible with multiple foci of neuroendocrine tumor in both the right and the left lobe of the liver, when compared to previous not significantly changed in the interval.  Dose # 5 Lanreotide was on 03/11/2021. Dose # 6 Lanreotide was on 04/08/2021. Serum chromogranin A 115 (0-103)  Dose # 7 Lanreotide was on 05/06/2021. Serum chromogranin A 114 (0-103)  Dose # 8 Lanreotide was on 06/03/2021. Serum chromogranin A  84  (0-103)     Ga 76 Dotatate PET 06/29/2021 Numerous foci of increased Gallium 68 dotatate avid uptake throughout both lobes of the liver, not significantly changed from previous. No significant progression of disease. No definite metastatic disease identified  Dose # 9 Lanreotide was on 07/01/2021. Serum Chromogranin A 97 (0-103)  Dose # 10 Lanreotide was on 07/29/2021. Serum Chromogranin A 89 (0-103)  Dose # 11 Lanreotide was on 08/30/2021. Serum Chromogranin A 120 (0-103)  Dose # 12 Lanreotide was on 09/30/2021. Serum Chromogranin A 141 (0-103)  PET/CT scan 11/09/2021 There is significant gallium avid tracer uptake in the liver, numerous lesions are seen, tracer uptake overall is diminished.    There is no convincing extrahepatic disease identified. Dose # 13 Lanreotide was on 11/11/2021. Serum chromogranin A 105 on 11/11/2021. Dose # 14 Lanreotide was on 12/30/2021. Serum chromogranin A 184 on 12/30/2021. Dose # 15 Lanreotide was on 01/27/2022. Serum chromogranin A  98 on 01/27/2022. CT head 01/28/2022 noted no acute abnormality. CT cervical spine 01/28/2022 noted no acute abnormality of cervical spine  PET/CT Gallium 68 02/22/2022 noted Multiple regions of tracer uptake seen within the liver consistent with neuroendocrine tumor. No other evidence to suggest metastatic disease. Reviewed with Dr. Zaynab Clarke from Radiology team; overall stable disease. Continue Lanreotide and repeat scans in 3 months. Dose # 16 Lanreotide was on 02/24/2022. Serum chromogranin A 116 on 02/24/2022  Dose # 17 Lanreotide was on 03/24/2022. Serum Chromogranin A 173 on 03/24/2022. Dose # 18 Lanreotide was on 04/21/2022. Serum Chromogranin A 140 on 04/21/2022. Dose # 19 Lanreotide was on 05/19/2022. Serum Chromogranin A 114 on 05/19/2022. Dose # 20 Lanreotide was on 06/23/2022. PET/CT scan 07/19/2022: Gallium 76 dotatate avid uptake is present within multiple masses within the liver, allowing for slight technical differences, not significantly changed in the interval. No convincing extrahepatic metastatic disease identified. Imaging reviewed. Continue Lanreotide and repeat scans in 3-4 months  Dose # 21 Lanreotide was on 07/21/2022. Serum Chromogranin A 112 on 07/21/2022  Dose # 22 Lanreotide was on 08/18/2022. Dose # 23 Lanreotide was on 09/15/2022. Serum Chromogranin A 75 on 09/15/2022     Admitted with acute liver failure, possibly decompensation in the setting of a urinary tract infection.     CT abdomen/pelvis was negative for bowel obstruction, liver metastasis, interval changes difficult to gauge due to the absence of the contrast-enhancement, innumerable subcutaneous nodules in the buttocks, likely secondary to the lanreotide injections. Ultrasound of the abdomen had revealed heterogeneous liver with multiple hypoechoic lesions, consistent with persistent diffuse hepatic metastasis. Liver U/S noted heterogeneous liver with multiple hypoechoic lesions consistent with the persistent diffuse hepatic metastasis. HBP and GI teams on board. No obstructive process. Aggressive lactulose titrated to mental status    Serum chromogranin A 107 on 10/13/2022  PET/CT Gallium 68 scan 11/01/2022: Gallium 76 dotatate avid uptake is seen within numerous lesions within the liver, not convincingly changed. No convincing extrahepatic metastatic disease identified. Recommended Lutathera 7.4 GBq (200 mCi) every 8 weeks for a total of 4 doses ([place] 2017). Dose # 1 Kat Negron was on 11/16/2022  Today 12/14/2022; No fever, chills. Fair appetite. Mild fatigue. Intermittent nausea and diarrhea. Review of Systems;  CONSTITUTIONAL: No fever chills. Fair appetite. Mild fatigue. ENMT: Eyes: No diplopia; Nose: No epistaxis. Mouth: No sore throat. RESPIRATORY: No hemoptysis SOB  CARDIOVASCULAR: No chest pain, palpitations. GASTROINTESTINAL: Intermittent nausea and diarrhea  GENITOURINARY: No hematuria frequency  NEURO: No syncope, presyncope, headache. Remainder:ROS NEGATIVE    Medications:  Reviewed and reconciled. Allergies:  No Known Allergies     Physical Examination:  BP (!) 101/51   Pulse 64   Temp 97.4 °F (36.3 °C)   Ht 5' (1.524 m)   Wt 121 lb 12.8 oz (55.2 kg)   LMP  (LMP Unknown) Comment: mirena  SpO2 98%   BMI 23.79 kg/m²   GENERAL: Awake and alert, not in distress  HEENT: PERRLA; EOMI. Oropharynx clear. LUNGS: Good air entry bilaterally. No wheezing, crackles or rhonchi. CARDIOVASCULAR: Regular rate. No murmurs, rubs or gallops. EXTREMITIES: Without clubbing, cyanosis, or edema. NEUROLOGIC: No focal deficits.    ECOG PS 1    Diagnostics:  Lab Results   Component Value Date    WBC 6.4 12/14/2022    HGB 10.8 (L) 12/14/2022    HCT 34.2 12/14/2022    MCV 87.2 12/14/2022     12/14/2022     Lab Results   Component Value Date     12/14/2022    K 3.7 12/14/2022     12/14/2022    CO2 26 12/14/2022    BUN 9 12/14/2022    CREATININE 0.6 12/14/2022    GLUCOSE 102 (H) 12/14/2022    CALCIUM 8.7 12/14/2022    PROT 6.7 12/14/2022    LABALBU 4.1 12/14/2022    BILITOT 0.4 12/14/2022    ALKPHOS 101 12/14/2022    AST 86 (H) 12/14/2022    ALT 62 (H) 12/14/2022    LABGLOM >60 12/14/2022    GFRAA >60 10/13/2022     Impression/Plan:  41 y/o female with hx of hypothyroidism, IBS,migraine who was complaining of abdominal pain associated with diarrhea and flushing/sweating. CT abdomen/pelvis 08/28/2020:  2 cm masslike density in the mid abdominal small bowel mesentery with a spiculated appearance. Multiple liver masses suspicious for metastatic disease. 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 Comment: Sections of the liver tissue cores show the hepatic parenchyma to be partially replaced by a proliferation of epithelioid cells with relatively uniform round nuclei and eosinophilic granular cytoplasm. The   epithelioid cells form anastomosing solid cords/nests that are surrounded by delicate fibrovascular stroma. There are no mitotic figures or tumor cell necrosis present. The histologic changes seen are suggestive of well-differentiated neuroendocrine (carcinoid) tumor. Immunostaining for pankeratin, chromogranin, synaptophysin, TTF-1 and Ki-67 was performed on sections of one tissue block (A1) and the neoplastic epithelioid cells show diffuse and strong positivity for neuroendocrine markers (chromogranin and synaptophysin) and moderate positivity for pankeratin. There is no staining reactivity for TTF-1. The Ki-67 proliferation labeling index is essentially negative, (very low, <1%).    This staining pattern confirms the histologic impression of a well-differentiated neuroendocrine (carcinoid) tumor. FL Small bowel 09/02/2020:  Rapid small bowel transit. Serum Chromogranin A 160 on 09/23/2020. Urine 5-HIAA 21 (0-14)  2d-Echo 10/10/2020: EF 60-65%   Normal right ventricular size and function     Dotatate PET/CT scan 10/14/2020 increased tracer uptake seen throughout the liver compatible with neoplasm. This involves both the left and the right lobe of liver. In addition there are 2 foci of increased tracer uptake along the mesentery of the small bowel. This may involve the wall the small bowel. No other convincing evidence for extra-abdominal metastatic disease. EGD/Colonoscopy 10/05/2020 by Dr. Doni Prasad; records reviewed. Hepatobiliary team (Dr. Toby Polo) consult appreciated. Small bowel resection on 10/21/2020. Small bowel resection on 10/21/2020. A. Ileum, resection:   Multifocal (4 foci) neuroendocrine tumor (NET). Extensive perineural and angiolymphatic invasion. 3 of 10 lymph nodes with metastatic neuroendocrine tumor and extracapsular extension of tumor cells (3/10). Bilateral viable small bowel resection margins with no evidence of tumor. B. Specimen received as \"Meckel's\":   Nodular scar tissue with patchy chronic inflammation. Negative for neuroendocrine tumor. Intestinal mucosal tissue is not present. CASE SUMMARY:   Procedure: Small bowel resection   Tumor site: Ileum   Tumor size: Greatest dimension of 1.5 cm   Tumor focality: Multifocal: 4 separate tumors   Histologic type and grade: G2: Well-differentiated neuroendocrine tumor   Mitotic rate: between 2-20 mitoses/2 mm2   Ki-67 labeling index: between 3-20%   Tumor extension: Tumor invades through the muscularis propria into subserosal tissue without penetration of overlying serosa   Margins:  All margins are uninvolved by tumor    Margins examined: Proximal and distal   Lymphovascular invasion: Lanreotide was on 07/29/2021. Serum Chromogranin A 89 (0-103)  Dose # 11 Lanreotide was on 08/30/2021. Serum Chromogranin A 120 (0-103)  Dose # 12 Lanreotide was on 09/30/2021. Serum Chromogranin A 141 (0-103)  PET/CT scan 11/09/2021 There is significant gallium avid tracer uptake in the liver, numerous lesions are seen, tracer uptake overall is diminished. There is no convincing extrahepatic disease identified. Dose # 13 Lanreotide was on 11/11/2021. Serum chromogranin A 105 on 11/11/2021. Dose # 14 Lanreotide was on 12/30/2021. Serum chromogranin A 184 on 12/30/2021. Dose # 15 Lanreotide was on 01/27/2022. Serum chromogranin A  98 on 01/27/2022. CT head 01/28/2022 noted no acute abnormality. CT cervical spine 01/28/2022 noted no acute abnormality of cervical spine  PET/CT Gallium 68 02/22/2022 noted Multiple regions of tracer uptake seen within the liver consistent with neuroendocrine tumor. No other evidence to suggest metastatic disease. Reviewed with Dr. Jennifer Donnelly from Radiology team; overall stable disease. Continue Lanreotide and repeat scans in 3 months. Dose # 16 Lanreotide was on 02/24/2022. Serum chromogranin A 116 on 02/24/2022  Dose # 17 Lanreotide was on 03/24/2022. Serum Chromogranin A 173 on 03/24/2022. Dose # 18 Lanreotide was on 04/21/2022. Serum Chromogranin A 140 on 04/21/2022. Dose # 19 Lanreotide was on 05/19/2022. Serum Chromogranin A 114 on 05/19/2022. Dose # 20 Lanreotide was on 06/23/2022. PET/CT scan 07/19/2022: Gallium 76 dotatate avid uptake is present within multiple masses within the liver, allowing for slight technical differences, not significantly changed in the interval. No convincing extrahepatic metastatic disease identified. Imaging reviewed. Continue Lanreotide and repeat scans in 3-4 months  Dose # 21 Lanreotide was on 07/21/2022. Serum Chromogranin A 112 on 07/21/2022  Dose # 22 Lanreotide was on 08/18/2022. Dose # 23 Lanreotide was on 09/15/2022.   Serum Chromogranin A 75 on 09/15/2022     Admitted with acute liver failure, possibly decompensation in the setting of a urinary tract infection. CT abdomen/pelvis was negative for bowel obstruction, liver metastasis, interval changes difficult to gauge due to the absence of the contrast-enhancement, innumerable subcutaneous nodules in the buttocks, likely secondary to the lanreotide injections. Ultrasound of the abdomen had revealed heterogeneous liver with multiple hypoechoic lesions, consistent with persistent diffuse hepatic metastasis. Liver U/S noted heterogeneous liver with multiple hypoechoic lesions consistent with the persistent diffuse hepatic metastasis. HBP and GI teams on board. No obstructive process. Aggressive lactulose titrated to mental status  Recovered well post hospital discharge. Serum chromogranin A 107 on 10/13/2022  PET Ga 68 11/01/2022 Gallium 68 dotatate avid uptake is seen within numerous lesions within the liver, not convincingly changed. No convincing extrahepatic metastatic disease identified. Recommended Lutathera 7.4 GBq (200 mCi) every 8 weeks for a total of 4 doses ([place] 2017). Side effects reviewed. She agreed to proceed. Authorization obtained. Dose # 1 Victor Hugo Evie was on 11/16/2022. Labs reviewed. Recommended Zofran for nausea. Recommended Imodium, Lomotil and Xermelo for diarrhea.     RTC 4 weeks for Dose # 2 Lutathera    12/14/2022  Nina Wang MD

## 2022-12-14 NOTE — PROGRESS NOTES
Patient did stop at check out window, ok'd to leave without AVS.  Patient has [address] Will also call patient with next follow up appointment.

## 2022-12-17 LAB — CHROMOGRANIN A: 98 NG/ML (ref 0–103)

## 2022-12-21 ENCOUNTER — APPOINTMENT (OUTPATIENT)
Dept: INFUSION THERAPY | Age: 37
End: 2022-12-21
Payer: MEDICARE

## 2022-12-22 ENCOUNTER — HOSPITAL ENCOUNTER (OUTPATIENT)
Dept: INFUSION THERAPY | Age: 37
Discharge: HOME | End: 2022-12-22
Payer: MEDICARE

## 2022-12-22 DIAGNOSIS — C7B.8 METASTATIC MALIGNANT NEUROENDOCRINE TUMOR TO LIVER (HCC): Primary | ICD-10-CM

## 2022-12-22 LAB
ALBUMIN SERPL-MCNC: 4.4 G/DL (ref 3.5–5.2)
ALP BLD-CCNC: 98 U/L (ref 35–104)
ALT SERPL-CCNC: 23 U/L (ref 0–32)
ANION GAP SERPL CALCULATED.3IONS-SCNC: 17 MMOL/L (ref 7–16)
ANISOCYTOSIS: ABNORMAL
AST SERPL-CCNC: 25 U/L (ref 0–31)
BASOPHILS ABSOLUTE: 0.1 E9/L (ref 0–0.2)
BASOPHILS RELATIVE PERCENT: 1.8 % (ref 0–2)
BILIRUB SERPL-MCNC: 0.5 MG/DL (ref 0–1.2)
BUN BLDV-MCNC: 11 MG/DL (ref 6–20)
BURR CELLS: ABNORMAL
CALCIUM SERPL-MCNC: 9 MG/DL (ref 8.6–10.2)
CHLORIDE BLD-SCNC: 102 MMOL/L (ref 98–107)
CO2: 18 MMOL/L (ref 22–29)
CREAT SERPL-MCNC: 0.8 MG/DL (ref 0.5–1)
EOSINOPHILS ABSOLUTE: 0.19 E9/L (ref 0.05–0.5)
EOSINOPHILS RELATIVE PERCENT: 3.5 % (ref 0–6)
GFR SERPL CREATININE-BSD FRML MDRD: >60 ML/MIN/1.73
GLUCOSE BLD-MCNC: 83 MG/DL (ref 74–99)
HCT VFR BLD CALC: 35 % (ref 34–48)
HEMOGLOBIN: 11.4 G/DL (ref 11.5–15.5)
HYPOCHROMIA: ABNORMAL
LYMPHOCYTES ABSOLUTE: 1.46 E9/L (ref 1.5–4)
LYMPHOCYTES RELATIVE PERCENT: 26.6 % (ref 20–42)
MCH RBC QN AUTO: 28 PG (ref 26–35)
MCHC RBC AUTO-ENTMCNC: 32.6 % (ref 32–34.5)
MCV RBC AUTO: 86 FL (ref 80–99.9)
MONOCYTES ABSOLUTE: 0.54 E9/L (ref 0.1–0.95)
MONOCYTES RELATIVE PERCENT: 9.7 % (ref 2–12)
NEUTROPHILS ABSOLUTE: 3.13 E9/L (ref 1.8–7.3)
NEUTROPHILS RELATIVE PERCENT: 58.4 % (ref 43–80)
OVALOCYTES: ABNORMAL
PDW BLD-RTO: 21 FL (ref 11.5–15)
PLATELET # BLD: 160 E9/L (ref 130–450)
PMV BLD AUTO: 9.8 FL (ref 7–12)
POIKILOCYTES: ABNORMAL
POLYCHROMASIA: ABNORMAL
POTASSIUM SERPL-SCNC: 3.5 MMOL/L (ref 3.5–5)
RBC # BLD: 4.07 E12/L (ref 3.5–5.5)
SODIUM BLD-SCNC: 137 MMOL/L (ref 132–146)
TOTAL PROTEIN: 7.3 G/DL (ref 6.4–8.3)
TOXIC GRANULATION: ABNORMAL
WBC # BLD: 5.4 E9/L (ref 4.5–11.5)

## 2022-12-22 PROCEDURE — 36591 DRAW BLOOD OFF VENOUS DEVICE: CPT

## 2022-12-22 PROCEDURE — 2580000003 HC RX 258: Performed by: NURSE PRACTITIONER

## 2022-12-22 PROCEDURE — 6360000002 HC RX W HCPCS: Performed by: NURSE PRACTITIONER

## 2022-12-22 PROCEDURE — 85025 COMPLETE CBC W/AUTO DIFF WBC: CPT

## 2022-12-22 PROCEDURE — 80053 COMPREHEN METABOLIC PANEL: CPT

## 2022-12-22 PROCEDURE — 86316 IMMUNOASSAY TUMOR OTHER: CPT

## 2022-12-22 RX ORDER — SODIUM CHLORIDE 0.9 % (FLUSH) 0.9 %
5-40 SYRINGE (ML) INJECTION PRN
Status: DISCONTINUED | OUTPATIENT
Start: 2022-12-22 | End: 2022-12-23 | Stop reason: HOSPADM

## 2022-12-22 RX ORDER — SODIUM CHLORIDE 9 MG/ML
25 INJECTION, SOLUTION INTRAVENOUS PRN
OUTPATIENT
Start: 2022-12-22

## 2022-12-22 RX ORDER — HEPARIN SODIUM (PORCINE) LOCK FLUSH IV SOLN 100 UNIT/ML 100 UNIT/ML
500 SOLUTION INTRAVENOUS PRN
Status: DISCONTINUED | OUTPATIENT
Start: 2022-12-22 | End: 2022-12-23 | Stop reason: HOSPADM

## 2022-12-22 RX ORDER — WATER 1000 ML/1000ML
2.2 INJECTION, SOLUTION INTRAVENOUS ONCE
OUTPATIENT
Start: 2022-12-22 | End: 2022-12-22

## 2022-12-22 RX ORDER — HEPARIN SODIUM (PORCINE) LOCK FLUSH IV SOLN 100 UNIT/ML 100 UNIT/ML
500 SOLUTION INTRAVENOUS PRN
OUTPATIENT
Start: 2022-12-22

## 2022-12-22 RX ORDER — SODIUM CHLORIDE 0.9 % (FLUSH) 0.9 %
5-40 SYRINGE (ML) INJECTION PRN
OUTPATIENT
Start: 2022-12-22

## 2022-12-22 RX ADMIN — HEPARIN 500 UNITS: 100 SYRINGE at 13:18

## 2022-12-22 RX ADMIN — SODIUM CHLORIDE, PRESERVATIVE FREE 10 ML: 5 INJECTION INTRAVENOUS at 13:18

## 2022-12-28 DIAGNOSIS — G89.3 NEOPLASM RELATED PAIN: ICD-10-CM

## 2022-12-29 DIAGNOSIS — Z51.5 PALLIATIVE CARE BY SPECIALIST: ICD-10-CM

## 2022-12-29 DIAGNOSIS — C7A.8 NEUROENDOCRINE CARCINOMA METASTATIC TO LIVER (HCC): ICD-10-CM

## 2022-12-29 DIAGNOSIS — G89.3 NEOPLASM RELATED PAIN: ICD-10-CM

## 2022-12-29 DIAGNOSIS — C7B.8 NEUROENDOCRINE CARCINOMA METASTATIC TO LIVER (HCC): ICD-10-CM

## 2022-12-29 LAB — CHROMOGRANIN A: 66 NG/ML (ref 0–103)

## 2022-12-30 RX ORDER — HYDROCODONE BITARTRATE AND ACETAMINOPHEN 5; 325 MG/1; MG/1
TABLET ORAL
Qty: 45 TABLET | Refills: 0 | Status: SHIPPED | OUTPATIENT
Start: 2022-12-30 | End: 2023-01-13

## 2022-12-30 RX ORDER — TRAMADOL HYDROCHLORIDE 50 MG/1
TABLET ORAL
Qty: 90 TABLET | OUTPATIENT
Start: 2022-12-30

## 2023-01-04 ENCOUNTER — TELEPHONE (OUTPATIENT)
Dept: CASE MANAGEMENT | Age: 38
End: 2023-01-04

## 2023-01-04 NOTE — TELEPHONE ENCOUNTER
I called the patient and left a message informing her that her treatment date has been changed to 1/27/2023 due to the manufacture.             Electronically signed by Nic Cade on 1/4/23 at 10:47 AM EST

## 2023-01-05 ENCOUNTER — HOSPITAL ENCOUNTER (OUTPATIENT)
Dept: INFUSION THERAPY | Age: 38
Discharge: HOME | End: 2023-01-05
Payer: MEDICARE

## 2023-01-05 DIAGNOSIS — C7B.8 METASTATIC MALIGNANT NEUROENDOCRINE TUMOR TO LIVER (HCC): Primary | ICD-10-CM

## 2023-01-05 LAB
ALBUMIN SERPL-MCNC: 4.2 G/DL (ref 3.5–5.2)
ALP BLD-CCNC: 83 U/L (ref 35–104)
ALT SERPL-CCNC: 14 U/L (ref 0–32)
ANION GAP SERPL CALCULATED.3IONS-SCNC: 12 MMOL/L (ref 7–16)
ANISOCYTOSIS: ABNORMAL
AST SERPL-CCNC: 20 U/L (ref 0–31)
BASOPHILS ABSOLUTE: 0.05 E9/L (ref 0–0.2)
BASOPHILS RELATIVE PERCENT: 0.9 % (ref 0–2)
BILIRUB SERPL-MCNC: 0.3 MG/DL (ref 0–1.2)
BUN BLDV-MCNC: 7 MG/DL (ref 6–20)
CALCIUM SERPL-MCNC: 9.1 MG/DL (ref 8.6–10.2)
CHLORIDE BLD-SCNC: 103 MMOL/L (ref 98–107)
CO2: 24 MMOL/L (ref 22–29)
CREAT SERPL-MCNC: 0.5 MG/DL (ref 0.5–1)
EOSINOPHILS ABSOLUTE: 0 E9/L (ref 0.05–0.5)
EOSINOPHILS RELATIVE PERCENT: 0.5 % (ref 0–6)
GFR SERPL CREATININE-BSD FRML MDRD: >60 ML/MIN/1.73
GLUCOSE BLD-MCNC: 110 MG/DL (ref 74–99)
HCT VFR BLD CALC: 34.4 % (ref 34–48)
HEMOGLOBIN: 11.1 G/DL (ref 11.5–15.5)
LYMPHOCYTES ABSOLUTE: 1.48 E9/L (ref 1.5–4)
LYMPHOCYTES RELATIVE PERCENT: 26.3 % (ref 20–42)
MCH RBC QN AUTO: 28.1 PG (ref 26–35)
MCHC RBC AUTO-ENTMCNC: 32.3 % (ref 32–34.5)
MCV RBC AUTO: 87.1 FL (ref 80–99.9)
MONOCYTES ABSOLUTE: 0.28 E9/L (ref 0.1–0.95)
MONOCYTES RELATIVE PERCENT: 5.3 % (ref 2–12)
NEUTROPHILS ABSOLUTE: 3.88 E9/L (ref 1.8–7.3)
NEUTROPHILS RELATIVE PERCENT: 67.5 % (ref 43–80)
OVALOCYTES: ABNORMAL
PDW BLD-RTO: 21.2 FL (ref 11.5–15)
PLATELET # BLD: 158 E9/L (ref 130–450)
PMV BLD AUTO: 9.9 FL (ref 7–12)
POIKILOCYTES: ABNORMAL
POLYCHROMASIA: ABNORMAL
POTASSIUM SERPL-SCNC: 3.9 MMOL/L (ref 3.5–5)
RBC # BLD: 3.95 E12/L (ref 3.5–5.5)
SODIUM BLD-SCNC: 139 MMOL/L (ref 132–146)
TARGET CELLS: ABNORMAL
TOTAL PROTEIN: 7 G/DL (ref 6.4–8.3)
WBC # BLD: 5.7 E9/L (ref 4.5–11.5)

## 2023-01-05 PROCEDURE — 2580000003 HC RX 258: Performed by: NURSE PRACTITIONER

## 2023-01-05 PROCEDURE — 80053 COMPREHEN METABOLIC PANEL: CPT

## 2023-01-05 PROCEDURE — 36591 DRAW BLOOD OFF VENOUS DEVICE: CPT

## 2023-01-05 PROCEDURE — 85025 COMPLETE CBC W/AUTO DIFF WBC: CPT

## 2023-01-05 PROCEDURE — 6360000002 HC RX W HCPCS: Performed by: NURSE PRACTITIONER

## 2023-01-05 RX ORDER — SODIUM CHLORIDE 0.9 % (FLUSH) 0.9 %
5-40 SYRINGE (ML) INJECTION PRN
Status: DISCONTINUED | OUTPATIENT
Start: 2023-01-05 | End: 2023-01-06 | Stop reason: HOSPADM

## 2023-01-05 RX ORDER — SODIUM CHLORIDE 9 MG/ML
25 INJECTION, SOLUTION INTRAVENOUS PRN
OUTPATIENT
Start: 2023-01-05

## 2023-01-05 RX ORDER — HEPARIN SODIUM (PORCINE) LOCK FLUSH IV SOLN 100 UNIT/ML 100 UNIT/ML
500 SOLUTION INTRAVENOUS PRN
OUTPATIENT
Start: 2023-01-05

## 2023-01-05 RX ORDER — HEPARIN SODIUM (PORCINE) LOCK FLUSH IV SOLN 100 UNIT/ML 100 UNIT/ML
500 SOLUTION INTRAVENOUS PRN
Status: DISCONTINUED | OUTPATIENT
Start: 2023-01-05 | End: 2023-01-06 | Stop reason: HOSPADM

## 2023-01-05 RX ORDER — SODIUM CHLORIDE 0.9 % (FLUSH) 0.9 %
5-40 SYRINGE (ML) INJECTION PRN
OUTPATIENT
Start: 2023-01-05

## 2023-01-05 RX ORDER — WATER 1000 ML/1000ML
2.2 INJECTION, SOLUTION INTRAVENOUS ONCE
OUTPATIENT
Start: 2023-01-05 | End: 2023-01-05

## 2023-01-05 RX ADMIN — HEPARIN 500 UNITS: 100 SYRINGE at 13:46

## 2023-01-05 RX ADMIN — SODIUM CHLORIDE, PRESERVATIVE FREE 10 ML: 5 INJECTION INTRAVENOUS at 13:46

## 2023-01-09 ENCOUNTER — OFFICE VISIT (OUTPATIENT)
Dept: PALLATIVE CARE | Age: 38
End: 2023-01-09
Payer: MEDICARE

## 2023-01-09 VITALS
SYSTOLIC BLOOD PRESSURE: 111 MMHG | HEART RATE: 86 BPM | DIASTOLIC BLOOD PRESSURE: 69 MMHG | TEMPERATURE: 97.3 F | BODY MASS INDEX: 22.65 KG/M2 | OXYGEN SATURATION: 99 % | WEIGHT: 116 LBS

## 2023-01-09 DIAGNOSIS — Z51.5 PALLIATIVE CARE BY SPECIALIST: ICD-10-CM

## 2023-01-09 DIAGNOSIS — K50.10 CROHN'S DISEASE OF COLON WITHOUT COMPLICATION (HCC): Primary | ICD-10-CM

## 2023-01-09 DIAGNOSIS — E44.0 MODERATE PROTEIN-CALORIE MALNUTRITION (HCC): ICD-10-CM

## 2023-01-09 DIAGNOSIS — E72.20 HYPERAMMONEMIA (HCC): ICD-10-CM

## 2023-01-09 DIAGNOSIS — D69.6 THROMBOCYTOPENIA, UNSPECIFIED (HCC): ICD-10-CM

## 2023-01-09 DIAGNOSIS — C7B.8 NEUROENDOCRINE CARCINOMA METASTATIC TO LIVER (HCC): ICD-10-CM

## 2023-01-09 DIAGNOSIS — F31.81 BIPOLAR II DISORDER (HCC): ICD-10-CM

## 2023-01-09 DIAGNOSIS — C7A.8 NEUROENDOCRINE CARCINOMA METASTATIC TO LIVER (HCC): ICD-10-CM

## 2023-01-09 DIAGNOSIS — G89.3 NEOPLASM RELATED PAIN: ICD-10-CM

## 2023-01-09 DIAGNOSIS — Z99.2 DEPENDENCE ON RENAL DIALYSIS (HCC): ICD-10-CM

## 2023-01-09 PROCEDURE — G8484 FLU IMMUNIZE NO ADMIN: HCPCS | Performed by: NURSE PRACTITIONER

## 2023-01-09 PROCEDURE — G8420 CALC BMI NORM PARAMETERS: HCPCS | Performed by: NURSE PRACTITIONER

## 2023-01-09 PROCEDURE — 99212 OFFICE O/P EST SF 10 MIN: CPT | Performed by: NURSE PRACTITIONER

## 2023-01-09 PROCEDURE — G8427 DOCREV CUR MEDS BY ELIG CLIN: HCPCS | Performed by: NURSE PRACTITIONER

## 2023-01-09 PROCEDURE — 4004F PT TOBACCO SCREEN RCVD TLK: CPT | Performed by: NURSE PRACTITIONER

## 2023-01-09 RX ORDER — HYDROCODONE BITARTRATE AND ACETAMINOPHEN 5; 325 MG/1; MG/1
1 TABLET ORAL EVERY 8 HOURS PRN
Qty: 90 TABLET | Refills: 0 | Status: SHIPPED | OUTPATIENT
Start: 2023-01-09 | End: 2023-02-08

## 2023-01-09 NOTE — PROGRESS NOTES
.  Department of Palliative Medicine  Ambulatory Note  Provider: TRINA Seals - CNP     Chief Complaint: Wilberto Avery is a 40 y.o. female with chief complaint of pain    HPI:  Wilberto Avery is a 29 y.o. female with significant medical history of hypothyroidism, IBS, migraine who was complaining of abdominal pain associated with diarrhea and flushing/sweating. She was diagnosed with metastatic well differentiated Neuroendocrine cancer to liver who was referred to [address] [place] [address] by Dr. Roxy Hebert. Assessment/Plan      Neuroendocrine cancer  - Follows with Dr. Jemima Angelo  - s.p Bowel resection on 10/21/20  -On Lanreotide   -Vearl Jacki recommended    Neoplasm related pain  - Gabapentin 300mg TID  - Norco 5/325 mg Q 8 PRN    Nausea  - Continue Zofran and Phenergan PRN    Diarrhea:   -Currently on Lanreotide and Xermelo  -Imodium she uses this daily  -Eat 1-3 jumbo marshmallows daily PRN  -Lactulose needed due to hyperammonemia     - Goals of care: cure, live longer and improve or maintain function/quality of life    - Code Status: full code    Follow Up:  4 weeks. Encouraged to call with any questions, concerns, needs, or changes in symptoms. Subjective:   Ana Mooney presents today with her mother. She reports that she has been doing very well. Reports pain is 4 to scale 10 today, reports that Kalyn Sandy works very well at managing her pain, and she is use this typically only 1-2 times per day, as well as continue with her gabapentin unchanged. She continues to have some diarrhea, but for the most part this is at her baseline. She otherwise denies any other significant complaints today, and has no new needs. She continues await her new therapy regimen, will continue to follow closely with medical oncology regarding her ongoing treatment needs. Otherwise we will make no changes today.     Pain Assessment   Ratin  Description: burning, sharp and shooting  Duration: minutes  Frequency: daily  Location: Abdomen   Alleviating Factors: relaxation  Exacerbating Factors: unable to associate with any factor  Effect: change in function and sleep    Objective:     Physical Exam  Wt Readings from Last 3 Encounters:   01/09/23 116 lb (52.6 kg)   12/14/22 121 lb 12.8 oz (55.2 kg)   12/12/22 121 lb (54.9 kg)     /69   Pulse 86   Temp 97.3 °F (36.3 °C)   Wt 116 lb (52.6 kg)   SpO2 99%   BMI 22.65 kg/m²     Gen:  Alert, appears stated age, well nourished, in no acute distress  HEENT:  Normocephalic, conjunctiva pink, no drainage, mucosa moist  Neck:  Supple  Lungs:  CTA bilaterally, no audible rhonchi or wheezes noted  Heart[de-identified]  RRR, no murmur, rub, or gallop noted during exam  Abd:  Soft, non tender, non distended, BS+  M/S/Ext:  Moving all extremities, no edema, pulses present  Skin:  Warm and dry  Neuro:  PERRL, Alert, oriented x 3; following commands    [place] Symptom Assessment Score   [place] Score 1/9/2023 12/13/2022 12/12/2022 10/18/2022 8/23/2022   Pain Score 4 5 5 7 7   Tiredness Score 1 5 5 7 6   Nausea Score Not nauseated Not nauseated Not nauseated Not nauseated Not nauseated   Depression Score Not depressed 1 1 Not depressed 2   Anxiety Score 1 1 1 2 2   Drowsiness Score Not drowsy 2 2 3 4   Appetite Score 4 4 4 4 5   Wellbeing Score 4 4 4 4 Best feeling of wellbeing   Dyspnea Score No shortness of breath No shortness of breath No shortness of breath No shortness of breath No shortness of breath   Other Problem Score Best possible response Best possible response Best possible response Best possible response Best possible response   Total Assessment Score(calculated) 14 22 22 27 26     Assessed by: patient. Current Medications:  Medications reviewed: yes    Controlled Substances Monitoring: [place] reviewed 1/9/23.   RX Monitoring 1/9/2023   Attestation -   Periodic Controlled Substance Monitoring Possible medication side effects, risk of tolerance/dependence & alternative treatments discussed. ;No signs of potential drug abuse or diversion identified. ;Assessed functional status. ;Obtaining appropriate analgesic effect of treatment. TRINA Roy - CNP   Palliative Care Department       Time/Communication  Greater than 50% of time spent, total 15 minutes in face-to-face counseling and coordination of care regarding symptom management. Note: This report was completed using computeramSTATZ voiced recognition software. Every effort has been made to ensure accuracy; however, inadvertent computerized transcription errors may be present.

## 2023-01-10 DIAGNOSIS — G89.3 NEOPLASM RELATED PAIN: ICD-10-CM

## 2023-01-11 RX ORDER — TRAMADOL HYDROCHLORIDE 50 MG/1
TABLET ORAL
Qty: 90 TABLET | OUTPATIENT
Start: 2023-01-11

## 2023-01-26 ENCOUNTER — HOSPITAL ENCOUNTER (OUTPATIENT)
Dept: INFUSION THERAPY | Age: 38
Discharge: HOME | End: 2023-01-26
Payer: MEDICARE

## 2023-01-26 ENCOUNTER — OFFICE VISIT (OUTPATIENT)
Dept: ONCOLOGY | Age: 38
End: 2023-01-26
Payer: MEDICARE

## 2023-01-26 VITALS
SYSTOLIC BLOOD PRESSURE: 123 MMHG | BODY MASS INDEX: 23.75 KG/M2 | RESPIRATION RATE: 4 BRPM | DIASTOLIC BLOOD PRESSURE: 73 MMHG | HEART RATE: 96 BPM | TEMPERATURE: 98.1 F | WEIGHT: 121 LBS | HEIGHT: 60 IN | OXYGEN SATURATION: 99 %

## 2023-01-26 DIAGNOSIS — C7A.8 NEUROENDOCRINE CANCER (HCC): Primary | ICD-10-CM

## 2023-01-26 DIAGNOSIS — C7A.8 NEUROENDOCRINE CARCINOMA METASTATIC TO LIVER (HCC): Primary | ICD-10-CM

## 2023-01-26 DIAGNOSIS — C7B.8 METASTATIC MALIGNANT NEUROENDOCRINE TUMOR TO LIVER (HCC): Primary | ICD-10-CM

## 2023-01-26 DIAGNOSIS — C7B.8 NEUROENDOCRINE CARCINOMA METASTATIC TO LIVER (HCC): Primary | ICD-10-CM

## 2023-01-26 LAB
ALBUMIN SERPL-MCNC: 4.2 G/DL (ref 3.5–5.2)
ALP BLD-CCNC: 88 U/L (ref 35–104)
ALT SERPL-CCNC: 25 U/L (ref 0–32)
ANION GAP SERPL CALCULATED.3IONS-SCNC: 14 MMOL/L (ref 7–16)
ANISOCYTOSIS: ABNORMAL
AST SERPL-CCNC: 30 U/L (ref 0–31)
BASOPHILS ABSOLUTE: 0.05 E9/L (ref 0–0.2)
BASOPHILS RELATIVE PERCENT: 0.9 % (ref 0–2)
BILIRUB SERPL-MCNC: 0.2 MG/DL (ref 0–1.2)
BUN BLDV-MCNC: 11 MG/DL (ref 6–20)
CALCIUM SERPL-MCNC: 8.5 MG/DL (ref 8.6–10.2)
CHLORIDE BLD-SCNC: 100 MMOL/L (ref 98–107)
CO2: 24 MMOL/L (ref 22–29)
CREAT SERPL-MCNC: 0.6 MG/DL (ref 0.5–1)
EOSINOPHILS ABSOLUTE: 0.03 E9/L (ref 0.05–0.5)
EOSINOPHILS RELATIVE PERCENT: 0.5 % (ref 0–6)
GFR SERPL CREATININE-BSD FRML MDRD: >60 ML/MIN/1.73
GLUCOSE BLD-MCNC: 96 MG/DL (ref 74–99)
HCT VFR BLD CALC: 31.1 % (ref 34–48)
HEMOGLOBIN: 10.3 G/DL (ref 11.5–15.5)
IMMATURE GRANULOCYTES #: 0.03 E9/L
IMMATURE GRANULOCYTES %: 0.5 % (ref 0–5)
LYMPHOCYTES ABSOLUTE: 0.9 E9/L (ref 1.5–4)
LYMPHOCYTES RELATIVE PERCENT: 15.6 % (ref 20–42)
MCH RBC QN AUTO: 27.8 PG (ref 26–35)
MCHC RBC AUTO-ENTMCNC: 33.1 % (ref 32–34.5)
MCV RBC AUTO: 84.1 FL (ref 80–99.9)
MONOCYTES ABSOLUTE: 0.89 E9/L (ref 0.1–0.95)
MONOCYTES RELATIVE PERCENT: 15.4 % (ref 2–12)
NEUTROPHILS ABSOLUTE: 3.87 E9/L (ref 1.8–7.3)
NEUTROPHILS RELATIVE PERCENT: 67.1 % (ref 43–80)
OVALOCYTES: ABNORMAL
PDW BLD-RTO: 21.1 FL (ref 11.5–15)
PLATELET # BLD: 200 E9/L (ref 130–450)
PMV BLD AUTO: 9.3 FL (ref 7–12)
POIKILOCYTES: ABNORMAL
POLYCHROMASIA: ABNORMAL
POTASSIUM SERPL-SCNC: 3.3 MMOL/L (ref 3.5–5)
RBC # BLD: 3.7 E12/L (ref 3.5–5.5)
SODIUM BLD-SCNC: 138 MMOL/L (ref 132–146)
TARGET CELLS: ABNORMAL
TOTAL PROTEIN: 6.7 G/DL (ref 6.4–8.3)
WBC # BLD: 5.8 E9/L (ref 4.5–11.5)

## 2023-01-26 PROCEDURE — 2580000003 HC RX 258: Performed by: NURSE PRACTITIONER

## 2023-01-26 PROCEDURE — G8420 CALC BMI NORM PARAMETERS: HCPCS | Performed by: INTERNAL MEDICINE

## 2023-01-26 PROCEDURE — 36591 DRAW BLOOD OFF VENOUS DEVICE: CPT

## 2023-01-26 PROCEDURE — 99214 OFFICE O/P EST MOD 30 MIN: CPT | Performed by: INTERNAL MEDICINE

## 2023-01-26 PROCEDURE — G8427 DOCREV CUR MEDS BY ELIG CLIN: HCPCS | Performed by: INTERNAL MEDICINE

## 2023-01-26 PROCEDURE — 85025 COMPLETE CBC W/AUTO DIFF WBC: CPT

## 2023-01-26 PROCEDURE — G8484 FLU IMMUNIZE NO ADMIN: HCPCS | Performed by: INTERNAL MEDICINE

## 2023-01-26 PROCEDURE — 80053 COMPREHEN METABOLIC PANEL: CPT

## 2023-01-26 PROCEDURE — 6360000002 HC RX W HCPCS: Performed by: NURSE PRACTITIONER

## 2023-01-26 PROCEDURE — 4004F PT TOBACCO SCREEN RCVD TLK: CPT | Performed by: INTERNAL MEDICINE

## 2023-01-26 RX ORDER — ONDANSETRON 2 MG/ML
8 INJECTION INTRAMUSCULAR; INTRAVENOUS
Status: CANCELLED | OUTPATIENT
Start: 2023-01-27

## 2023-01-26 RX ORDER — FAMOTIDINE 10 MG/ML
20 INJECTION, SOLUTION INTRAVENOUS ONCE
Status: CANCELLED | OUTPATIENT
Start: 2023-01-27 | End: 2023-01-27

## 2023-01-26 RX ORDER — HEPARIN SODIUM (PORCINE) LOCK FLUSH IV SOLN 100 UNIT/ML 100 UNIT/ML
500 SOLUTION INTRAVENOUS PRN
Status: DISCONTINUED | OUTPATIENT
Start: 2023-01-26 | End: 2023-01-27 | Stop reason: HOSPADM

## 2023-01-26 RX ORDER — FAMOTIDINE 10 MG/ML
20 INJECTION, SOLUTION INTRAVENOUS
Status: CANCELLED | OUTPATIENT
Start: 2023-01-27

## 2023-01-26 RX ORDER — SODIUM CHLORIDE 9 MG/ML
INJECTION, SOLUTION INTRAVENOUS CONTINUOUS
Status: CANCELLED | OUTPATIENT
Start: 2023-01-27

## 2023-01-26 RX ORDER — SODIUM CHLORIDE 0.9 % (FLUSH) 0.9 %
5-40 SYRINGE (ML) INJECTION PRN
Status: CANCELLED | OUTPATIENT
Start: 2023-01-27

## 2023-01-26 RX ORDER — PROCHLORPERAZINE EDISYLATE 5 MG/ML
10 INJECTION INTRAMUSCULAR; INTRAVENOUS EVERY 6 HOURS PRN
Status: CANCELLED | OUTPATIENT
Start: 2023-01-27

## 2023-01-26 RX ORDER — MEPERIDINE HYDROCHLORIDE 25 MG/ML
12.5 INJECTION INTRAMUSCULAR; INTRAVENOUS; SUBCUTANEOUS PRN
Status: CANCELLED | OUTPATIENT
Start: 2023-01-27

## 2023-01-26 RX ORDER — SODIUM CHLORIDE 0.9 % (FLUSH) 0.9 %
5-40 SYRINGE (ML) INJECTION PRN
Status: DISCONTINUED | OUTPATIENT
Start: 2023-01-26 | End: 2023-01-27 | Stop reason: HOSPADM

## 2023-01-26 RX ORDER — SODIUM CHLORIDE 9 MG/ML
5-40 INJECTION INTRAVENOUS PRN
Status: CANCELLED | OUTPATIENT
Start: 2023-01-27

## 2023-01-26 RX ORDER — ALBUTEROL SULFATE 90 UG/1
4 AEROSOL, METERED RESPIRATORY (INHALATION) PRN
Status: CANCELLED | OUTPATIENT
Start: 2023-01-27

## 2023-01-26 RX ORDER — ACETAMINOPHEN 325 MG/1
650 TABLET ORAL
Status: CANCELLED | OUTPATIENT
Start: 2023-01-27

## 2023-01-26 RX ORDER — SODIUM CHLORIDE 9 MG/ML
5-250 INJECTION, SOLUTION INTRAVENOUS PRN
Status: CANCELLED | OUTPATIENT
Start: 2023-01-27

## 2023-01-26 RX ORDER — WATER 1000 ML/1000ML
2.2 INJECTION, SOLUTION INTRAVENOUS ONCE
OUTPATIENT
Start: 2023-01-26 | End: 2023-01-26

## 2023-01-26 RX ORDER — OCTREOTIDE ACETATE 100 UG/ML
100 INJECTION, SOLUTION INTRAVENOUS; SUBCUTANEOUS
Status: CANCELLED | OUTPATIENT
Start: 2023-01-27

## 2023-01-26 RX ORDER — ARGININE/LYSINE/0.9 % SOD CHL 25-25MG/ML
50 PLASTIC BAG, INJECTION (ML) INTRAVENOUS ONCE
Status: CANCELLED | OUTPATIENT
Start: 2023-01-27 | End: 2023-01-27

## 2023-01-26 RX ORDER — SODIUM CHLORIDE 0.9 % (FLUSH) 0.9 %
5-40 SYRINGE (ML) INJECTION PRN
OUTPATIENT
Start: 2023-01-26

## 2023-01-26 RX ORDER — MAGNESIUM HYDROXIDE/ALUMINUM HYDROXICE/SIMETHICONE 120; 1200; 1200 MG/30ML; MG/30ML; MG/30ML
30 SUSPENSION ORAL EVERY 6 HOURS PRN
Status: CANCELLED | OUTPATIENT
Start: 2023-01-27

## 2023-01-26 RX ORDER — PALONOSETRON HYDROCHLORIDE 0.05 MG/ML
0.25 INJECTION, SOLUTION INTRAVENOUS ONCE
Status: CANCELLED | OUTPATIENT
Start: 2023-01-27 | End: 2023-01-27

## 2023-01-26 RX ORDER — SODIUM CHLORIDE 9 MG/ML
25 INJECTION, SOLUTION INTRAVENOUS PRN
OUTPATIENT
Start: 2023-01-26

## 2023-01-26 RX ORDER — EPINEPHRINE 1 MG/ML
0.3 INJECTION, SOLUTION, CONCENTRATE INTRAVENOUS PRN
Status: CANCELLED | OUTPATIENT
Start: 2023-01-27

## 2023-01-26 RX ORDER — DIPHENHYDRAMINE HYDROCHLORIDE 50 MG/ML
50 INJECTION INTRAMUSCULAR; INTRAVENOUS
Status: CANCELLED | OUTPATIENT
Start: 2023-01-27

## 2023-01-26 RX ORDER — SODIUM CHLORIDE 9 MG/ML
1000 INJECTION, SOLUTION INTRAVENOUS ONCE
Status: CANCELLED | OUTPATIENT
Start: 2023-01-27 | End: 2023-01-27

## 2023-01-26 RX ORDER — HEPARIN SODIUM (PORCINE) LOCK FLUSH IV SOLN 100 UNIT/ML 100 UNIT/ML
500 SOLUTION INTRAVENOUS PRN
OUTPATIENT
Start: 2023-01-26

## 2023-01-26 RX ORDER — HEPARIN SODIUM (PORCINE) LOCK FLUSH IV SOLN 100 UNIT/ML 100 UNIT/ML
500 SOLUTION INTRAVENOUS PRN
Status: CANCELLED | OUTPATIENT
Start: 2023-01-27

## 2023-01-26 RX ADMIN — SODIUM CHLORIDE, PRESERVATIVE FREE 10 ML: 5 INJECTION INTRAVENOUS at 11:38

## 2023-01-26 RX ADMIN — HEPARIN 500 UNITS: 100 SYRINGE at 11:39

## 2023-01-26 NOTE — PROGRESS NOTES
Medical Oncology Outpatient Progress Note    Reason for visit: Neuroendocrine cancer to liver     PCP:  Mary Hogan DO     History of Present Illness:  39 y/o female with hx of hypothyroidism, IBS,migraine who was complaining of abdominal pain associated with diarrhea and flushing/sweating. CT abdomen/pelvis 08/28/2020:  2 cm masslike density in the mid abdominal small bowel mesentery with a spiculated appearance. Multiple liver masses suspicious for metastatic disease. 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 Comment: Sections of the liver tissue cores show the hepatic parenchyma to be partially replaced by a proliferation of epithelioid cells with relatively uniform round nuclei and eosinophilic granular cytoplasm. The   epithelioid cells form anastomosing solid cords/nests that are surrounded by delicate fibrovascular stroma. There are no mitotic figures or tumor cell necrosis present. The histologic changes seen are suggestive of well-differentiated neuroendocrine (carcinoid) tumor. Immunostaining for pankeratin, chromogranin, synaptophysin, TTF-1 and Ki-67 was performed on sections of one tissue block (A1) and the neoplastic epithelioid cells show diffuse and strong positivity for neuroendocrine markers (chromogranin and synaptophysin) and moderate positivity for pankeratin. There is no staining reactivity for TTF-1. The Ki-67 proliferation labeling index is essentially negative, (very low, <1%). This staining pattern confirms the histologic impression of a well-differentiated neuroendocrine (carcinoid) tumor. FL Small bowel 09/02/2020:  Rapid small bowel transit. Serum Chromogranin A 160 on 09/23/2020.   Urine 5-HIAA 21 (0-14)  2d-Echo 10/10/2020: EF 60-65%   Normal right ventricular size and function     Dotatate PET/CT scan 10/14/2020 increased tracer uptake seen throughout the liver compatible with neoplasm. This involves both the left and the right lobe of liver. In addition there are 2 foci of increased tracer uptake along the mesentery of the small bowel. This may involve the wall the small bowel. No other convincing evidence for extra-abdominal metastatic disease. EGD/Colonoscopy 10/05/2020 by Dr. Nedra Doll; records reviewed. Hepatobiliary team (Dr. Richard Gutierrez) consult appreciated. Small bowel resection on 10/21/2020. Small bowel resection on 10/21/2020. A. Ileum, resection:   Multifocal (4 foci) neuroendocrine tumor (NET). Extensive perineural and angiolymphatic invasion. 3 of 10 lymph nodes with metastatic neuroendocrine tumor and extracapsular extension of tumor cells (3/10). Bilateral viable small bowel resection margins with no evidence of tumor. B. Specimen received as \"Meckel's\":   Nodular scar tissue with patchy chronic inflammation. Negative for neuroendocrine tumor. Intestinal mucosal tissue is not present. CASE SUMMARY:   Procedure: Small bowel resection   Tumor site: Ileum   Tumor size: Greatest dimension of 1.5 cm   Tumor focality: Multifocal: 4 separate tumors   Histologic type and grade: G2: Well-differentiated neuroendocrine tumor   Mitotic rate: between 2-20 mitoses/2 mm2   Ki-67 labeling index: between 3-20%   Tumor extension: Tumor invades through the muscularis propria into subserosal tissue without penetration of overlying serosa   Margins: All margins are uninvolved by tumor    Margins examined: Proximal and distal   Lymphovascular invasion: Present   Perineural invasion: Present   Large mesenteric masses (greater than 2 cm): Present (one 2.5 cm mass)   Regional lymph nodes:    Number of lymph nodes involved: 3    Number of lymph nodes examined: 10   Pathologic stage classification (pTNM, AJCC eighth edition):    pT3    pN2    pM1a -confirmed liver metastasis, HBS-     Comment:   A.  Immunostains stain the tumor cells as follows in block A6:   Synaptophysin and chromogranin: Positive   Ki-67: Positive in 5% of tumor cells      We recommended Lanreotide once monthly for metastatic well differentiated neuroendocrine cancer to liver. Dose # 1 Lanreotide was on 11/05/2020. Dose # 2 Lanreotide was on 12/03/2020. Dose # 3 Lanreotide was on 01/07/2021. Serum Chromogranin A 95 on 01/07/2021. CT chest 02/07/2021 negative for metastatic disease. CT abdomen/pelvis 02/07/2021 Persistent bilobar hepatic lesions which are grossly stable consistent with stable widespread hepatic metastasis. Imaging reviewed. Continue Lanreotide and repeat scans in 3 months. Dose # 4 Lanreotide was on 02/11/2021. Ga 76 Dotatate PET 03/09/2021 Gallium 68 dotatate avid uptake throughout multiple regions of the liver compatible with multiple foci of neuroendocrine tumor in both the right and the left lobe of the liver, when compared to previous not significantly changed in the interval.  Dose # 5 Lanreotide was on 03/11/2021. Dose # 6 Lanreotide was on 04/08/2021. Serum chromogranin A 115 (0-103)  Dose # 7 Lanreotide was on 05/06/2021. Serum chromogranin A 114 (0-103)  Dose # 8 Lanreotide was on 06/03/2021. Serum chromogranin A  84  (0-103)     Ga 76 Dotatate PET 06/29/2021 Numerous foci of increased Gallium 68 dotatate avid uptake throughout both lobes of the liver, not significantly changed from previous. No significant progression of disease. No definite metastatic disease identified  Dose # 9 Lanreotide was on 07/01/2021. Serum Chromogranin A 97 (0-103)  Dose # 10 Lanreotide was on 07/29/2021. Serum Chromogranin A 89 (0-103)  Dose # 11 Lanreotide was on 08/30/2021. Serum Chromogranin A 120 (0-103)  Dose # 12 Lanreotide was on 09/30/2021. Serum Chromogranin A 141 (0-103)  PET/CT scan 11/09/2021 There is significant gallium avid tracer uptake in the liver, numerous lesions are seen, tracer uptake overall is diminished.    There is no convincing extrahepatic disease identified. Dose # 13 Lanreotide was on 11/11/2021. Serum chromogranin A 105 on 11/11/2021. Dose # 14 Lanreotide was on 12/30/2021. Serum chromogranin A 184 on 12/30/2021. Dose # 15 Lanreotide was on 01/27/2022. Serum chromogranin A  98 on 01/27/2022. CT head 01/28/2022 noted no acute abnormality. CT cervical spine 01/28/2022 noted no acute abnormality of cervical spine  PET/CT Gallium 68 02/22/2022 noted Multiple regions of tracer uptake seen within the liver consistent with neuroendocrine tumor. No other evidence to suggest metastatic disease. Reviewed with Dr. Soledad Pelayo from Radiology team; overall stable disease. Continue Lanreotide and repeat scans in 3 months. Dose # 16 Lanreotide was on 02/24/2022. Serum chromogranin A 116 on 02/24/2022  Dose # 17 Lanreotide was on 03/24/2022. Serum Chromogranin A 173 on 03/24/2022. Dose # 18 Lanreotide was on 04/21/2022. Serum Chromogranin A 140 on 04/21/2022. Dose # 19 Lanreotide was on 05/19/2022. Serum Chromogranin A 114 on 05/19/2022. Dose # 20 Lanreotide was on 06/23/2022. PET/CT scan 07/19/2022: Gallium 76 dotatate avid uptake is present within multiple masses within the liver, allowing for slight technical differences, not significantly changed in the interval. No convincing extrahepatic metastatic disease identified. Imaging reviewed. Continue Lanreotide and repeat scans in 3-4 months  Dose # 21 Lanreotide was on 07/21/2022. Serum Chromogranin A 112 on 07/21/2022  Dose # 22 Lanreotide was on 08/18/2022. Dose # 23 Lanreotide was on 09/15/2022. Serum Chromogranin A 75 on 09/15/2022     Admitted with acute liver failure, possibly decompensation in the setting of a urinary tract infection.     CT abdomen/pelvis was negative for bowel obstruction, liver metastasis, interval changes difficult to gauge due to the absence of the contrast-enhancement, innumerable subcutaneous nodules in the buttocks, likely secondary to the lanreotide injections. Ultrasound of the abdomen had revealed heterogeneous liver with multiple hypoechoic lesions, consistent with persistent diffuse hepatic metastasis. Liver U/S noted heterogeneous liver with multiple hypoechoic lesions consistent with the persistent diffuse hepatic metastasis. HBP and GI teams on board. No obstructive process. Aggressive lactulose titrated to mental status    Serum chromogranin A 107 on 10/13/2022  PET/CT Gallium 68 scan 11/01/2022: Gallium 76 dotatate avid uptake is seen within numerous lesions within the liver, not convincingly changed. No convincing extrahepatic metastatic disease identified. Recommended Lutathera 7.4 GBq (200 mCi) every 8 weeks for a total of 4 doses ([place] 2017). Dose # 1 Alanis Ambrosio was on 11/16/2022  Today 01/26/2023; No fever, chills. Fair appetite. Mild fatigue. Intermittent nausea and diarrhea    Review of Systems;  CONSTITUTIONAL: No fever chills. Fair appetite. Mild fatigue. ENMT: Eyes: No diplopia; Nose: No epistaxis. Mouth: No sore throat. RESPIRATORY: No hemoptysis SOB  CARDIOVASCULAR: No chest pain, palpitations. GASTROINTESTINAL: Intermittent nausea and diarrhea  GENITOURINARY: No hematuria frequency  NEURO: No syncope, presyncope, headache. Remainder:ROS NEGATIVE    Medications:  Reviewed and reconciled. Allergies:  No Known Allergies     Physical Examination:  /73 (Site: Right Upper Arm, Position: Sitting, Cuff Size: Large Adult)   Pulse 96   Temp 98.1 °F (36.7 °C) (Infrared)   Resp (!) 4   Ht 5' (1.524 m)   Wt 121 lb (54.9 kg)   SpO2 99%   BMI 23.63 kg/m²   GENERAL: Awake and alert, not in distress  HEENT: PERRLA; EOMI. Oropharynx clear. LUNGS: Good air entry bilaterally. No wheezing, crackles or rhonchi. CARDIOVASCULAR: Regular rate. No murmurs, rubs or gallops. EXTREMITIES: Without clubbing, cyanosis, or edema. NEUROLOGIC: No focal deficits.    ECOG PS 1    Diagnostics:  Lab Results   Component Value Date    WBC 5.8 01/26/2023    HGB 10.3 (L) 01/26/2023    HCT 31.1 (L) 01/26/2023    MCV 84.1 01/26/2023     01/26/2023     Lab Results   Component Value Date     01/26/2023    K 3.3 (L) 01/26/2023     01/26/2023    CO2 24 01/26/2023    BUN 11 01/26/2023    CREATININE 0.6 01/26/2023    GLUCOSE 96 01/26/2023    CALCIUM 8.5 (L) 01/26/2023    PROT 6.7 01/26/2023    LABALBU 4.2 01/26/2023    BILITOT 0.2 01/26/2023    ALKPHOS 88 01/26/2023    AST 30 01/26/2023    ALT 25 01/26/2023    LABGLOM >60 01/26/2023    GFRAA >60 10/13/2022     Impression/Plan:  39 y/o female with hx of hypothyroidism, IBS,migraine who was complaining of abdominal pain associated with diarrhea and flushing/sweating. CT abdomen/pelvis 08/28/2020:  2 cm masslike density in the mid abdominal small bowel mesentery with a spiculated appearance. Multiple liver masses suspicious for metastatic disease. 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 Comment: Sections of the liver tissue cores show the hepatic parenchyma to be partially replaced by a proliferation of epithelioid cells with relatively uniform round nuclei and eosinophilic granular cytoplasm. The   epithelioid cells form anastomosing solid cords/nests that are surrounded by delicate fibrovascular stroma. There are no mitotic figures or tumor cell necrosis present. The histologic changes seen are suggestive of well-differentiated neuroendocrine (carcinoid) tumor. Immunostaining for pankeratin, chromogranin, synaptophysin, TTF-1 and Ki-67 was performed on sections of one tissue block (A1) and the neoplastic epithelioid cells show diffuse and strong positivity for neuroendocrine markers (chromogranin and synaptophysin) and moderate positivity for pankeratin. There is no staining reactivity for TTF-1.    The Ki-67 proliferation labeling index is essentially negative, (very low, <1%). This staining pattern confirms the histologic impression of a well-differentiated neuroendocrine (carcinoid) tumor. FL Small bowel 09/02/2020:  Rapid small bowel transit. Serum Chromogranin A 160 on 09/23/2020. Urine 5-HIAA 21 (0-14)  2d-Echo 10/10/2020: EF 60-65%   Normal right ventricular size and function     Dotatate PET/CT scan 10/14/2020 increased tracer uptake seen throughout the liver compatible with neoplasm. This involves both the left and the right lobe of liver. In addition there are 2 foci of increased tracer uptake along the mesentery of the small bowel. This may involve the wall the small bowel. No other convincing evidence for extra-abdominal metastatic disease. EGD/Colonoscopy 10/05/2020 by Dr. Grayling Barthel; records reviewed. Hepatobiliary team (Dr. Tarsha Sepulveda) consult appreciated. Small bowel resection on 10/21/2020. Small bowel resection on 10/21/2020. A. Ileum, resection:   Multifocal (4 foci) neuroendocrine tumor (NET). Extensive perineural and angiolymphatic invasion. 3 of 10 lymph nodes with metastatic neuroendocrine tumor and extracapsular extension of tumor cells (3/10). Bilateral viable small bowel resection margins with no evidence of tumor. B. Specimen received as \"Meckel's\":   Nodular scar tissue with patchy chronic inflammation. Negative for neuroendocrine tumor. Intestinal mucosal tissue is not present. CASE SUMMARY:   Procedure: Small bowel resection   Tumor site: Ileum   Tumor size: Greatest dimension of 1.5 cm   Tumor focality: Multifocal: 4 separate tumors   Histologic type and grade: G2: Well-differentiated neuroendocrine tumor   Mitotic rate: between 2-20 mitoses/2 mm2   Ki-67 labeling index: between 3-20%   Tumor extension: Tumor invades through the muscularis propria into subserosal tissue without penetration of overlying serosa   Margins:  All margins are uninvolved by tumor    Margins examined: Proximal and distal   Lymphovascular invasion: Present   Perineural invasion: Present   Large mesenteric masses (greater than 2 cm): Present (one 2.5 cm mass)   Regional lymph nodes:    Number of lymph nodes involved: 3    Number of lymph nodes examined: 10   Pathologic stage classification (pTNM, AJCC eighth edition):    pT3    pN2    pM1a -confirmed liver metastasis, HBS-     Comment:   A. Immunostains stain the tumor cells as follows in block A6:   Synaptophysin and chromogranin: Positive   Ki-67: Positive in 5% of tumor cells      We recommended Lanreotide once monthly for metastatic well differentiated neuroendocrine cancer to liver. Dose # 1 Lanreotide was on 11/05/2020. Dose # 2 Lanreotide was on 12/03/2020. Dose # 3 Lanreotide was on 01/07/2021. Serum Chromogranin A 95 on 01/07/2021. CT chest 02/07/2021 negative for metastatic disease. CT abdomen/pelvis 02/07/2021 Persistent bilobar hepatic lesions which are grossly stable consistent with stable widespread hepatic metastasis. Imaging reviewed. Continue Lanreotide and repeat scans in 3 months. Dose # 4 Lanreotide was on 02/11/2021. Ga 76 Dotatate PET 03/09/2021 Gallium 68 dotatate avid uptake throughout multiple regions of the liver compatible with multiple foci of neuroendocrine tumor in both the right and the left lobe of the liver, when compared to previous not significantly changed in the interval.  Dose # 5 Lanreotide was on 03/11/2021. Dose # 6 Lanreotide was on 04/08/2021. Serum chromogranin A 115 (0-103)  Dose # 7 Lanreotide was on 05/06/2021. Serum chromogranin A 114 (0-103)  Dose # 8 Lanreotide was on 06/03/2021. Serum chromogranin A  84  (0-103)     Ga 76 Dotatate PET 06/29/2021 Numerous foci of increased Gallium 68 dotatate avid uptake throughout both lobes of the liver, not significantly changed from previous. No significant progression of disease. No definite metastatic disease identified  Dose # 9 Lanreotide was on 07/01/2021.  Serum Chromogranin A 97 (0-103)  Dose # 10 Lanreotide was on 07/29/2021. Serum Chromogranin A 89 (0-103)  Dose # 11 Lanreotide was on 08/30/2021. Serum Chromogranin A 120 (0-103)  Dose # 12 Lanreotide was on 09/30/2021. Serum Chromogranin A 141 (0-103)  PET/CT scan 11/09/2021 There is significant gallium avid tracer uptake in the liver, numerous lesions are seen, tracer uptake overall is diminished. There is no convincing extrahepatic disease identified. Dose # 13 Lanreotide was on 11/11/2021. Serum chromogranin A 105 on 11/11/2021. Dose # 14 Lanreotide was on 12/30/2021. Serum chromogranin A 184 on 12/30/2021. Dose # 15 Lanreotide was on 01/27/2022. Serum chromogranin A  98 on 01/27/2022. CT head 01/28/2022 noted no acute abnormality. CT cervical spine 01/28/2022 noted no acute abnormality of cervical spine  PET/CT Gallium 68 02/22/2022 noted Multiple regions of tracer uptake seen within the liver consistent with neuroendocrine tumor. No other evidence to suggest metastatic disease. Reviewed with Dr. Tuh Herring from Radiology team; overall stable disease. Continue Lanreotide and repeat scans in 3 months. Dose # 16 Lanreotide was on 02/24/2022. Serum chromogranin A 116 on 02/24/2022  Dose # 17 Lanreotide was on 03/24/2022. Serum Chromogranin A 173 on 03/24/2022. Dose # 18 Lanreotide was on 04/21/2022. Serum Chromogranin A 140 on 04/21/2022. Dose # 19 Lanreotide was on 05/19/2022. Serum Chromogranin A 114 on 05/19/2022. Dose # 20 Lanreotide was on 06/23/2022. PET/CT scan 07/19/2022: Gallium 76 dotatate avid uptake is present within multiple masses within the liver, allowing for slight technical differences, not significantly changed in the interval. No convincing extrahepatic metastatic disease identified. Imaging reviewed. Continue Lanreotide and repeat scans in 3-4 months  Dose # 21 Lanreotide was on 07/21/2022. Serum Chromogranin A 112 on 07/21/2022  Dose # 22 Lanreotide was on 08/18/2022.    Dose # 23 Lanreotide was on 09/15/2022. Serum Chromogranin A 75 on 09/15/2022     Admitted with acute liver failure, possibly decompensation in the setting of a urinary tract infection. CT abdomen/pelvis was negative for bowel obstruction, liver metastasis, interval changes difficult to gauge due to the absence of the contrast-enhancement, innumerable subcutaneous nodules in the buttocks, likely secondary to the lanreotide injections. Ultrasound of the abdomen had revealed heterogeneous liver with multiple hypoechoic lesions, consistent with persistent diffuse hepatic metastasis. Liver U/S noted heterogeneous liver with multiple hypoechoic lesions consistent with the persistent diffuse hepatic metastasis. HBP and GI teams on board. No obstructive process. Aggressive lactulose titrated to mental status  Recovered well post hospital discharge. Serum chromogranin A 107 on 10/13/2022  PET Ga 68 11/01/2022 Gallium 68 dotatate avid uptake is seen within numerous lesions within the liver, not convincingly changed. No convincing extrahepatic metastatic disease identified. Recommended Lutathera 7.4 GBq (200 mCi) every 8 weeks for a total of 4 doses ([place] 2017). Dose # 1 Edgar Pilling was on 11/16/2022. Labs reviewed. hypoK+ secondary to diarrhea; to be replaced. Ok to give Dose # 2 Lutathera tomorrow 01/27/2023. Recommended Zofran for nausea  Recommended Imodium, Lomotil and Xermelo for diarrhea.      RTC 4 weeks with labs     01/26/2023  Eboni Casanova MD

## 2023-01-27 ENCOUNTER — HOSPITAL ENCOUNTER (OUTPATIENT)
Dept: INFUSION THERAPY | Age: 38
Discharge: HOME | End: 2023-01-27
Payer: MEDICARE

## 2023-01-27 ENCOUNTER — HOSPITAL ENCOUNTER (OUTPATIENT)
Dept: NUCLEAR MEDICINE | Age: 38
End: 2023-01-27
Payer: MEDICARE

## 2023-01-27 VITALS
OXYGEN SATURATION: 98 % | DIASTOLIC BLOOD PRESSURE: 58 MMHG | RESPIRATION RATE: 14 BRPM | TEMPERATURE: 98 F | SYSTOLIC BLOOD PRESSURE: 111 MMHG | HEART RATE: 85 BPM

## 2023-01-27 DIAGNOSIS — D3A.8 NEUROENDOCRINE TUMOR: ICD-10-CM

## 2023-01-27 DIAGNOSIS — E87.6 HYPOKALEMIA: Primary | ICD-10-CM

## 2023-01-27 DIAGNOSIS — C7A.8 NEUROENDOCRINE CANCER (HCC): Primary | ICD-10-CM

## 2023-01-27 DIAGNOSIS — C7B.8 METASTATIC MALIGNANT NEUROENDOCRINE TUMOR TO LIVER (HCC): ICD-10-CM

## 2023-01-27 DIAGNOSIS — C7A.8 NEUROENDOCRINE CANCER (HCC): ICD-10-CM

## 2023-01-27 LAB — HCG QUALITATIVE: NEGATIVE

## 2023-01-27 PROCEDURE — 79101 NUCLEAR RX IV ADMIN: CPT

## 2023-01-27 PROCEDURE — 96365 THER/PROPH/DIAG IV INF INIT: CPT

## 2023-01-27 PROCEDURE — 79101 NUCLEAR RX IV ADMIN: CPT | Performed by: RADIOLOGY

## 2023-01-27 PROCEDURE — 6360000002 HC RX W HCPCS: Performed by: NURSE PRACTITIONER

## 2023-01-27 PROCEDURE — 96367 TX/PROPH/DG ADDL SEQ IV INF: CPT

## 2023-01-27 PROCEDURE — 96375 TX/PRO/DX INJ NEW DRUG ADDON: CPT

## 2023-01-27 PROCEDURE — A4216 STERILE WATER/SALINE, 10 ML: HCPCS | Performed by: NURSE PRACTITIONER

## 2023-01-27 PROCEDURE — A9513 LUTETIUM LU 177 DOTATAT THER: HCPCS | Performed by: RADIOLOGY

## 2023-01-27 PROCEDURE — 96374 THER/PROPH/DIAG INJ IV PUSH: CPT

## 2023-01-27 PROCEDURE — 3430000000 HC RX DIAGNOSTIC RADIOPHARMACEUTICAL: Performed by: RADIOLOGY

## 2023-01-27 PROCEDURE — 2580000003 HC RX 258: Performed by: NURSE PRACTITIONER

## 2023-01-27 PROCEDURE — 84703 CHORIONIC GONADOTROPIN ASSAY: CPT

## 2023-01-27 PROCEDURE — 96366 THER/PROPH/DIAG IV INF ADDON: CPT

## 2023-01-27 PROCEDURE — 96361 HYDRATE IV INFUSION ADD-ON: CPT

## 2023-01-27 PROCEDURE — 6370000000 HC RX 637 (ALT 250 FOR IP): Performed by: NURSE PRACTITIONER

## 2023-01-27 PROCEDURE — 2500000003 HC RX 250 WO HCPCS: Performed by: NURSE PRACTITIONER

## 2023-01-27 RX ORDER — ARGININE/LYSINE/0.9 % SOD CHL 25-25MG/ML
50 PLASTIC BAG, INJECTION (ML) INTRAVENOUS ONCE
Status: DISCONTINUED | OUTPATIENT
Start: 2023-01-27 | End: 2023-01-27

## 2023-01-27 RX ORDER — PROCHLORPERAZINE EDISYLATE 5 MG/ML
10 INJECTION INTRAMUSCULAR; INTRAVENOUS EVERY 6 HOURS PRN
Status: DISCONTINUED | OUTPATIENT
Start: 2023-01-27 | End: 2023-01-28 | Stop reason: HOSPADM

## 2023-01-27 RX ORDER — POTASSIUM CHLORIDE 20 MEQ/1
40 TABLET, EXTENDED RELEASE ORAL ONCE
Status: COMPLETED | OUTPATIENT
Start: 2023-01-27 | End: 2023-01-27

## 2023-01-27 RX ORDER — PALONOSETRON HYDROCHLORIDE 0.05 MG/ML
0.25 INJECTION, SOLUTION INTRAVENOUS ONCE
Status: COMPLETED | OUTPATIENT
Start: 2023-01-27 | End: 2023-01-27

## 2023-01-27 RX ORDER — POTASSIUM CHLORIDE 20 MEQ/1
20 TABLET, EXTENDED RELEASE ORAL DAILY
Qty: 14 TABLET | Refills: 0 | Status: SHIPPED | OUTPATIENT
Start: 2023-01-27 | End: 2023-02-10

## 2023-01-27 RX ORDER — MEPERIDINE HYDROCHLORIDE 25 MG/ML
12.5 INJECTION INTRAMUSCULAR; INTRAVENOUS; SUBCUTANEOUS PRN
Status: CANCELLED | OUTPATIENT
Start: 2023-01-28

## 2023-01-27 RX ORDER — HEPARIN SODIUM (PORCINE) LOCK FLUSH IV SOLN 100 UNIT/ML 100 UNIT/ML
500 SOLUTION INTRAVENOUS PRN
Status: DISCONTINUED | OUTPATIENT
Start: 2023-01-27 | End: 2023-01-28 | Stop reason: HOSPADM

## 2023-01-27 RX ORDER — EPINEPHRINE 1 MG/ML
0.3 INJECTION, SOLUTION, CONCENTRATE INTRAVENOUS PRN
Status: CANCELLED | OUTPATIENT
Start: 2023-01-28

## 2023-01-27 RX ORDER — ONDANSETRON 2 MG/ML
8 INJECTION INTRAMUSCULAR; INTRAVENOUS
Status: CANCELLED | OUTPATIENT
Start: 2023-01-28

## 2023-01-27 RX ORDER — ACETAMINOPHEN 325 MG/1
650 TABLET ORAL
Status: CANCELLED | OUTPATIENT
Start: 2023-01-28

## 2023-01-27 RX ORDER — LORAZEPAM 2 MG/ML
0.5 INJECTION INTRAMUSCULAR ONCE
Status: COMPLETED | OUTPATIENT
Start: 2023-01-27 | End: 2023-01-27

## 2023-01-27 RX ORDER — ISOLEUCINE, LEUCINE, LYSINE ACETATE, METHIONINE, PHENYLALANINE, THREONINE, TRYPTOPHAN, VALINE, CYSTEINE HYDROCHLORIDE, HISTIDINE, TYROSINE, N-ACETYL-TYROSINE, ALANINE, ARGININE, PROLINE, SERINE, GLYCINE, ASPARTIC ACID, GLUTAMIC ACID, AND TAURINE .82; 1.4; 1.2; .34; .48; .42; .2; .78; .024; .48; .044; .24; .54; 1.2; .68; .38; .36; .32; .5; .025 G/100ML; G/100ML; G/100ML; G/100ML; G/100ML; G/100ML; G/100ML; G/100ML; G/100ML; G/100ML; G/100ML; G/100ML; G/100ML; G/100ML; G/100ML; G/100ML; G/100ML; G/100ML; G/100ML; G/100ML
1000 SOLUTION INTRAVENOUS ONCE
Status: COMPLETED | OUTPATIENT
Start: 2023-01-27 | End: 2023-01-27

## 2023-01-27 RX ORDER — SODIUM CHLORIDE 0.9 % (FLUSH) 0.9 %
5-40 SYRINGE (ML) INJECTION PRN
Status: DISCONTINUED | OUTPATIENT
Start: 2023-01-27 | End: 2023-01-28 | Stop reason: HOSPADM

## 2023-01-27 RX ORDER — FAMOTIDINE 10 MG/ML
20 INJECTION, SOLUTION INTRAVENOUS
Status: CANCELLED | OUTPATIENT
Start: 2023-01-28

## 2023-01-27 RX ORDER — SODIUM CHLORIDE 9 MG/ML
INJECTION, SOLUTION INTRAVENOUS CONTINUOUS
Status: CANCELLED | OUTPATIENT
Start: 2023-01-28

## 2023-01-27 RX ORDER — SODIUM CHLORIDE 9 MG/ML
1000 INJECTION, SOLUTION INTRAVENOUS ONCE
Status: COMPLETED | OUTPATIENT
Start: 2023-01-27 | End: 2023-01-27

## 2023-01-27 RX ORDER — ALBUTEROL SULFATE 90 UG/1
4 AEROSOL, METERED RESPIRATORY (INHALATION) PRN
Status: CANCELLED | OUTPATIENT
Start: 2023-01-28

## 2023-01-27 RX ORDER — DIPHENHYDRAMINE HYDROCHLORIDE 50 MG/ML
50 INJECTION INTRAMUSCULAR; INTRAVENOUS
Status: CANCELLED | OUTPATIENT
Start: 2023-01-28

## 2023-01-27 RX ADMIN — SODIUM CHLORIDE, PRESERVATIVE FREE 10 ML: 5 INJECTION INTRAVENOUS at 14:26

## 2023-01-27 RX ADMIN — ISOLEUCINE, LEUCINE, LYSINE ACETATE, METHIONINE, PHENYLALANINE, THREONINE, TRYPTOPHAN, VALINE, CYSTEINE HYDROCHLORIDE, HISTIDINE, TYROSINE, N-ACETYL-TYROSINE, ALANINE, ARGININE, PROLINE, SERINE, GLYCINE, ASPARTIC ACID, GLUTAMIC ACID, AND TAURINE 1000 ML
.82; 1.4; 1.2; .34; .48; .42; .2; .78; .024; .48; .044; .24; .54; 1.2; .68; .38; .36; .32; .5; .025 SOLUTION INTRAVENOUS at 09:59

## 2023-01-27 RX ADMIN — HEPARIN 500 UNITS: 100 SYRINGE at 14:26

## 2023-01-27 RX ADMIN — POTASSIUM CHLORIDE 40 MEQ: 20 TABLET, EXTENDED RELEASE ORAL at 09:10

## 2023-01-27 RX ADMIN — SODIUM CHLORIDE, PRESERVATIVE FREE 10 ML: 5 INJECTION INTRAVENOUS at 10:58

## 2023-01-27 RX ADMIN — LUTETIUM LU 177 DOTATATE 198 MILLICURIE: 10 INJECTION INTRAVENOUS at 10:37

## 2023-01-27 RX ADMIN — FAMOTIDINE 20 MG: 10 INJECTION, SOLUTION INTRAVENOUS at 09:15

## 2023-01-27 RX ADMIN — FOSAPREPITANT 150 MG: 150 INJECTION, POWDER, LYOPHILIZED, FOR SOLUTION INTRAVENOUS at 09:19

## 2023-01-27 RX ADMIN — SODIUM CHLORIDE 1000 ML: 9 INJECTION, SOLUTION INTRAVENOUS at 09:11

## 2023-01-27 RX ADMIN — PROCHLORPERAZINE EDISYLATE 10 MG: 5 INJECTION INTRAMUSCULAR; INTRAVENOUS at 10:58

## 2023-01-27 RX ADMIN — PALONOSETRON 0.25 MG: 0.25 INJECTION, SOLUTION INTRAVENOUS at 09:18

## 2023-01-27 RX ADMIN — LORAZEPAM 0.5 MG: 2 INJECTION INTRAMUSCULAR; INTRAVENOUS at 11:52

## 2023-01-27 NOTE — PROGRESS NOTES
Infusion RN notified this nurse of patient potassium 3.3 and calcium 8.5. Per REGINA Starr telephone orders, give 40 meq potassium now and she will sent script to pharmacy. Calcium is ok. Infusion RN, Peg Sailors notified.

## 2023-01-27 NOTE — PROGRESS NOTES
Pt tolerated pre meds and amino acids. Vitals stable. Pt has no questions at this time. Pt discharged ambulatory with her mother.

## 2023-01-28 ENCOUNTER — HOSPITAL ENCOUNTER (OUTPATIENT)
Dept: INFUSION THERAPY | Age: 38
Setting detail: INFUSION SERIES
Discharge: HOME | End: 2023-01-28
Payer: MEDICARE

## 2023-01-28 VITALS
RESPIRATION RATE: 16 BRPM | OXYGEN SATURATION: 98 % | TEMPERATURE: 97.8 F | HEART RATE: 84 BPM | SYSTOLIC BLOOD PRESSURE: 86 MMHG | DIASTOLIC BLOOD PRESSURE: 76 MMHG

## 2023-01-28 DIAGNOSIS — C7A.8 NEUROENDOCRINE CANCER (HCC): Primary | ICD-10-CM

## 2023-01-28 PROCEDURE — 96372 THER/PROPH/DIAG INJ SC/IM: CPT

## 2023-01-28 PROCEDURE — 6360000002 HC RX W HCPCS: Performed by: NURSE PRACTITIONER

## 2023-01-28 PROCEDURE — 96402 CHEMO HORMON ANTINEOPL SQ/IM: CPT

## 2023-01-28 RX ADMIN — OCTREOTIDE ACETATE 30 MG: KIT at 10:03

## 2023-01-28 NOTE — PROGRESS NOTES
Patient tolerated sandostain injection well. Patient alert and oriented x3. No distress noted. Vital signs stable. Patient denies any new or worsening pain. Offered patient education and/or discharge material. Patient declined stating she has had these injections several times before. Patient denies any needs. All questions answered. D/C in stable condition.

## 2023-02-06 ENCOUNTER — OFFICE VISIT (OUTPATIENT)
Dept: PALLATIVE CARE | Age: 38
End: 2023-02-06
Payer: MEDICARE

## 2023-02-06 VITALS
TEMPERATURE: 97.9 F | OXYGEN SATURATION: 98 % | DIASTOLIC BLOOD PRESSURE: 64 MMHG | BODY MASS INDEX: 23.44 KG/M2 | SYSTOLIC BLOOD PRESSURE: 100 MMHG | HEART RATE: 79 BPM | WEIGHT: 120 LBS

## 2023-02-06 DIAGNOSIS — Z51.5 PALLIATIVE CARE BY SPECIALIST: ICD-10-CM

## 2023-02-06 DIAGNOSIS — G89.3 NEOPLASM RELATED PAIN: ICD-10-CM

## 2023-02-06 DIAGNOSIS — C7B.8 NEUROENDOCRINE CARCINOMA METASTATIC TO LIVER (HCC): ICD-10-CM

## 2023-02-06 DIAGNOSIS — C7A.8 NEUROENDOCRINE CARCINOMA METASTATIC TO LIVER (HCC): ICD-10-CM

## 2023-02-06 PROCEDURE — 99212 OFFICE O/P EST SF 10 MIN: CPT | Performed by: NURSE PRACTITIONER

## 2023-02-06 PROCEDURE — 4004F PT TOBACCO SCREEN RCVD TLK: CPT | Performed by: NURSE PRACTITIONER

## 2023-02-06 PROCEDURE — G8420 CALC BMI NORM PARAMETERS: HCPCS | Performed by: NURSE PRACTITIONER

## 2023-02-06 PROCEDURE — G8427 DOCREV CUR MEDS BY ELIG CLIN: HCPCS | Performed by: NURSE PRACTITIONER

## 2023-02-06 PROCEDURE — G8484 FLU IMMUNIZE NO ADMIN: HCPCS | Performed by: NURSE PRACTITIONER

## 2023-02-06 RX ORDER — HYDROCODONE BITARTRATE AND ACETAMINOPHEN 5; 325 MG/1; MG/1
1 TABLET ORAL EVERY 8 HOURS PRN
Qty: 90 TABLET | Refills: 0 | Status: SHIPPED | OUTPATIENT
Start: 2023-02-06 | End: 2023-03-08

## 2023-02-06 RX ORDER — LANOLIN ALCOHOL/MO/W.PET/CERES
3 CREAM (GRAM) TOPICAL NIGHTLY
Qty: 90 TABLET | Refills: 0 | Status: SHIPPED | OUTPATIENT
Start: 2023-02-06 | End: 2023-05-07

## 2023-02-06 RX ORDER — GABAPENTIN 300 MG/1
300 CAPSULE ORAL 3 TIMES DAILY
Qty: 270 CAPSULE | Refills: 1 | Status: SHIPPED | OUTPATIENT
Start: 2023-02-06 | End: 2023-08-05

## 2023-02-06 NOTE — PROGRESS NOTES
.  Department of Palliative Medicine  Ambulatory Note  Provider: TRINA Burdick - JESS     Chief Complaint: Jaron Landa is a 40 y.o. female with chief complaint of pain    HPI:  Jaron Landa is a 29 y.o. female with significant medical history of hypothyroidism, IBS, migraine who was complaining of abdominal pain associated with diarrhea and flushing/sweating. 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 Beryl Chi. Assessment/Plan      Neuroendocrine cancer  - Follows with Dr. Emery Pederson  - s.p Bowel resection on 10/21/20  -now on Lutathera    Neoplasm related pain  - Gabapentin 300mg TID  - Norco 5/325 mg Q 8 PRN    Nausea  - Continue Zofran and Phenergan PRN    Diarrhea:   Vanessa Mcgowan  -Imodium she uses this daily  -Eat 1-3 jumbo marshmallows daily PRN  -Lactulose needed due to hyperammonemia     - Goals of care: cure, live longer and improve or maintain function/quality of life    - Code Status: full code    Follow Up:  4 weeks. Encouraged to call with any questions, concerns, needs, or changes in symptoms. Subjective:   Maris Chavez presents today with her mother. She reports that she has been doing very well. Reports pain is 4 to scale 10 today, reports that Krista Jackson works well at managing her pain, and she is use this typically only 1-2 times per day, she also continues utilize gabapentin 3 mg 3 times during the day. She continues to have some diarrhea, reporting 1-2 episodes per day, and this is improved. She states she is tolerating her new chemotherapy well, stating that it did cause some nausea, for the most part is not causing any significant symptomatic issues. She otherwise denies any other significant complaints today, and has no new needs.     Pain Assessment   Ratin  Description: aching, burning, sharp, and shooting  Duration: minutes  Frequency: daily  Location: Abdomen, back with radiation to the hips and legs  Alleviating Factors: relaxation  Exacerbating Factors: unable to associate with any factor  Effect: change in function and sleep    Objective:     Physical Exam  Wt Readings from Last 3 Encounters:   02/06/23 120 lb (54.4 kg)   01/26/23 121 lb (54.9 kg)   01/09/23 116 lb (52.6 kg)     /64   Pulse 79   Temp 97.9 °F (36.6 °C)   Wt 120 lb (54.4 kg)   SpO2 98%   BMI 23.44 kg/m²     Gen:  Alert, appears stated age, well nourished, in no acute distress  HEENT:  Normocephalic, conjunctiva pink, no drainage, mucosa moist  Neck:  Supple  Lungs:  CTA bilaterally, no audible rhonchi or wheezes noted  Heart[de-identified]  RRR, no murmur, rub, or gallop noted during exam  Abd:  Soft, non tender, non distended, BS+  M/S/Ext:  Moving all extremities, no edema, pulses present  Skin:  Warm and dry  Neuro:  PERRL, Alert, oriented x 3; following commands    [place] Symptom Assessment Score   [place] Score 2/6/2023 1/9/2023 12/13/2022 12/12/2022 10/18/2022   Pain Score 4 4 5 5 7   Tiredness Score 4 1 5 5 7   Nausea Score Not nauseated Not nauseated Not nauseated Not nauseated Not nauseated   Depression Score Not depressed Not depressed 1 1 Not depressed   Anxiety Score Not anxious 1 1 1 2   Drowsiness Score Not drowsy Not drowsy 2 2 3   Appetite Score 5 4 4 4 4   Wellbeing Score 5 4 4 4 4   Dyspnea Score No shortness of breath No shortness of breath No shortness of breath No shortness of breath No shortness of breath   Other Problem Score Best possible response Best possible response Best possible response Best possible response Best possible response   Total Assessment Score(calculated) 18 14 22 22 27     Assessed by: patient. Current Medications:  Medications reviewed: yes    Controlled Substances Monitoring: [place] reviewed 2/6/23.   RX Monitoring 2/6/2023   Attestation -   Periodic Controlled Substance Monitoring Possible medication side effects, risk of tolerance/dependence & alternative treatments discussed. ;No signs of potential drug abuse or diversion identified. ;Assessed functional status. ;Obtaining appropriate analgesic effect of treatment. TRINA Teajda CNP   Palliative Care Department       Time/Communication  Greater than 50% of time spent, total 15 minutes in face-to-face counseling and coordination of care regarding symptom management. Note: This report was completed using computerGritness voiced recognition software. Every effort has been made to ensure accuracy; however, inadvertent computerized transcription errors may be present.

## 2023-02-13 NOTE — PROGRESS NOTES
General surgery consult sent out to Dr. Carolina Braden. USE 1/4 OF THE PATCH NIGHTLY    VOLTAREN GEL CAN BE PURCHASED OVER THE COUNTER AND APPLIED FOUR TIMES A DAY TO AFFECTED AREA     LIFT HEELS FROM BED

## 2023-02-21 RX ORDER — BUTALBITAL, ACETAMINOPHEN AND CAFFEINE 50; 325; 40 MG/1; MG/1; MG/1
TABLET ORAL
Qty: 30 TABLET | Refills: 1 | Status: SHIPPED | OUTPATIENT
Start: 2023-02-21

## 2023-02-21 RX ORDER — BUTALBITAL, ACETAMINOPHEN AND CAFFEINE 50; 325; 40 MG/1; MG/1; MG/1
TABLET ORAL
Qty: 30 TABLET | Refills: 1 | Status: SHIPPED
Start: 2023-02-21 | End: 2023-02-21 | Stop reason: SDUPTHER

## 2023-02-23 ENCOUNTER — HOSPITAL ENCOUNTER (OUTPATIENT)
Dept: INFUSION THERAPY | Age: 38
Discharge: HOME | End: 2023-02-23
Payer: MEDICARE

## 2023-02-23 ENCOUNTER — OFFICE VISIT (OUTPATIENT)
Dept: ONCOLOGY | Age: 38
End: 2023-02-23
Payer: MEDICARE

## 2023-02-23 VITALS
HEART RATE: 71 BPM | TEMPERATURE: 97.3 F | HEIGHT: 60 IN | BODY MASS INDEX: 23.56 KG/M2 | SYSTOLIC BLOOD PRESSURE: 100 MMHG | OXYGEN SATURATION: 100 % | WEIGHT: 120 LBS | DIASTOLIC BLOOD PRESSURE: 66 MMHG

## 2023-02-23 DIAGNOSIS — C7A.8 NEUROENDOCRINE CARCINOMA METASTATIC TO LIVER (HCC): Primary | ICD-10-CM

## 2023-02-23 DIAGNOSIS — C7B.8 NEUROENDOCRINE CARCINOMA METASTATIC TO LIVER (HCC): Primary | ICD-10-CM

## 2023-02-23 DIAGNOSIS — C7B.8 METASTATIC MALIGNANT NEUROENDOCRINE TUMOR TO LIVER (HCC): Primary | ICD-10-CM

## 2023-02-23 LAB
ALBUMIN SERPL-MCNC: 4.2 G/DL (ref 3.5–5.2)
ALP BLD-CCNC: 190 U/L (ref 35–104)
ALT SERPL-CCNC: 26 U/L (ref 0–32)
ANION GAP SERPL CALCULATED.3IONS-SCNC: 12 MMOL/L (ref 7–16)
AST SERPL-CCNC: 31 U/L (ref 0–31)
BASOPHILS ABSOLUTE: 0.07 E9/L (ref 0–0.2)
BASOPHILS RELATIVE PERCENT: 0.8 % (ref 0–2)
BILIRUB SERPL-MCNC: 0.3 MG/DL (ref 0–1.2)
BUN BLDV-MCNC: 14 MG/DL (ref 6–20)
CALCIUM SERPL-MCNC: 8.9 MG/DL (ref 8.6–10.2)
CHLORIDE BLD-SCNC: 104 MMOL/L (ref 98–107)
CO2: 23 MMOL/L (ref 22–29)
CREAT SERPL-MCNC: 0.6 MG/DL (ref 0.5–1)
EOSINOPHILS ABSOLUTE: 0.16 E9/L (ref 0.05–0.5)
EOSINOPHILS RELATIVE PERCENT: 1.9 % (ref 0–6)
GFR SERPL CREATININE-BSD FRML MDRD: >60 ML/MIN/1.73
GLUCOSE BLD-MCNC: 88 MG/DL (ref 74–99)
HCT VFR BLD CALC: 33.7 % (ref 34–48)
HEMOGLOBIN: 11 G/DL (ref 11.5–15.5)
IMMATURE GRANULOCYTES #: 0.07 E9/L
IMMATURE GRANULOCYTES %: 0.8 % (ref 0–5)
LYMPHOCYTES ABSOLUTE: 1.3 E9/L (ref 1.5–4)
LYMPHOCYTES RELATIVE PERCENT: 15.6 % (ref 20–42)
MCH RBC QN AUTO: 30.1 PG (ref 26–35)
MCHC RBC AUTO-ENTMCNC: 32.6 % (ref 32–34.5)
MCV RBC AUTO: 92.1 FL (ref 80–99.9)
MONOCYTES ABSOLUTE: 1.07 E9/L (ref 0.1–0.95)
MONOCYTES RELATIVE PERCENT: 12.8 % (ref 2–12)
NEUTROPHILS ABSOLUTE: 5.68 E9/L (ref 1.8–7.3)
NEUTROPHILS RELATIVE PERCENT: 68.1 % (ref 43–80)
PDW BLD-RTO: 18.6 FL (ref 11.5–15)
PLATELET # BLD: 195 E9/L (ref 130–450)
PMV BLD AUTO: 9.2 FL (ref 7–12)
POTASSIUM SERPL-SCNC: 4.1 MMOL/L (ref 3.5–5)
RBC # BLD: 3.66 E12/L (ref 3.5–5.5)
SODIUM BLD-SCNC: 139 MMOL/L (ref 132–146)
TOTAL PROTEIN: 7.5 G/DL (ref 6.4–8.3)
WBC # BLD: 8.4 E9/L (ref 4.5–11.5)

## 2023-02-23 PROCEDURE — 99213 OFFICE O/P EST LOW 20 MIN: CPT | Performed by: INTERNAL MEDICINE

## 2023-02-23 PROCEDURE — 80053 COMPREHEN METABOLIC PANEL: CPT

## 2023-02-23 PROCEDURE — 2580000003 HC RX 258: Performed by: NURSE PRACTITIONER

## 2023-02-23 PROCEDURE — 4004F PT TOBACCO SCREEN RCVD TLK: CPT | Performed by: INTERNAL MEDICINE

## 2023-02-23 PROCEDURE — 99214 OFFICE O/P EST MOD 30 MIN: CPT

## 2023-02-23 PROCEDURE — 6360000002 HC RX W HCPCS: Performed by: NURSE PRACTITIONER

## 2023-02-23 PROCEDURE — 85025 COMPLETE CBC W/AUTO DIFF WBC: CPT

## 2023-02-23 PROCEDURE — G8427 DOCREV CUR MEDS BY ELIG CLIN: HCPCS | Performed by: INTERNAL MEDICINE

## 2023-02-23 PROCEDURE — 36591 DRAW BLOOD OFF VENOUS DEVICE: CPT

## 2023-02-23 PROCEDURE — G8420 CALC BMI NORM PARAMETERS: HCPCS | Performed by: INTERNAL MEDICINE

## 2023-02-23 PROCEDURE — G8484 FLU IMMUNIZE NO ADMIN: HCPCS | Performed by: INTERNAL MEDICINE

## 2023-02-23 RX ORDER — HEPARIN SODIUM (PORCINE) LOCK FLUSH IV SOLN 100 UNIT/ML 100 UNIT/ML
500 SOLUTION INTRAVENOUS PRN
OUTPATIENT
Start: 2023-02-23

## 2023-02-23 RX ORDER — SODIUM CHLORIDE 0.9 % (FLUSH) 0.9 %
5-40 SYRINGE (ML) INJECTION PRN
Status: DISCONTINUED | OUTPATIENT
Start: 2023-02-23 | End: 2023-02-24 | Stop reason: HOSPADM

## 2023-02-23 RX ORDER — SODIUM CHLORIDE 0.9 % (FLUSH) 0.9 %
5-40 SYRINGE (ML) INJECTION PRN
OUTPATIENT
Start: 2023-02-23

## 2023-02-23 RX ORDER — SODIUM CHLORIDE 9 MG/ML
25 INJECTION, SOLUTION INTRAVENOUS PRN
OUTPATIENT
Start: 2023-02-23

## 2023-02-23 RX ORDER — WATER 1000 ML/1000ML
2.2 INJECTION, SOLUTION INTRAVENOUS ONCE
OUTPATIENT
Start: 2023-02-23 | End: 2023-02-23

## 2023-02-23 RX ORDER — HEPARIN SODIUM (PORCINE) LOCK FLUSH IV SOLN 100 UNIT/ML 100 UNIT/ML
500 SOLUTION INTRAVENOUS PRN
Status: DISCONTINUED | OUTPATIENT
Start: 2023-02-23 | End: 2023-02-24 | Stop reason: HOSPADM

## 2023-02-23 RX ADMIN — SODIUM CHLORIDE, PRESERVATIVE FREE 10 ML: 5 INJECTION INTRAVENOUS at 13:25

## 2023-02-23 RX ADMIN — HEPARIN 500 UNITS: 100 SYRINGE at 13:25

## 2023-02-23 NOTE — PROGRESS NOTES
Medical Oncology Outpatient Progress Note    Reason for visit: Neuroendocrine cancer to liver     PCP:  Clarita Thrasher DO     History of Present Illness:  39 y/o female with hx of hypothyroidism, IBS,migraine who was complaining of abdominal pain associated with diarrhea and flushing/sweating. CT abdomen/pelvis 08/28/2020:  2 cm masslike density in the mid abdominal small bowel mesentery with a spiculated appearance. Multiple liver masses suspicious for metastatic disease. 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 Comment: Sections of the liver tissue cores show the hepatic parenchyma to be partially replaced by a proliferation of epithelioid cells with relatively uniform round nuclei and eosinophilic granular cytoplasm. The   epithelioid cells form anastomosing solid cords/nests that are surrounded by delicate fibrovascular stroma. There are no mitotic figures or tumor cell necrosis present. The histologic changes seen are suggestive of well-differentiated neuroendocrine (carcinoid) tumor. Immunostaining for pankeratin, chromogranin, synaptophysin, TTF-1 and Ki-67 was performed on sections of one tissue block (A1) and the neoplastic epithelioid cells show diffuse and strong positivity for neuroendocrine markers (chromogranin and synaptophysin) and moderate positivity for pankeratin. There is no staining reactivity for TTF-1. The Ki-67 proliferation labeling index is essentially negative, (very low, <1%). This staining pattern confirms the histologic impression of a well-differentiated neuroendocrine (carcinoid) tumor. FL Small bowel 09/02/2020:  Rapid small bowel transit. Serum Chromogranin A 160 on 09/23/2020.   Urine 5-HIAA 21 (0-14)  2d-Echo 10/10/2020: EF 60-65%   Normal right ventricular size and function     Dotatate PET/CT scan 10/14/2020 increased tracer uptake seen throughout the liver compatible with neoplasm. This involves both the left and the right lobe of liver. In addition there are 2 foci of increased tracer uptake along the mesentery of the small bowel. This may involve the wall the small bowel. No other convincing evidence for extra-abdominal metastatic disease. EGD/Colonoscopy 10/05/2020 by Dr. Doris Peralta; records reviewed. Hepatobiliary team (Dr. Learta Collet) consult appreciated. Small bowel resection on 10/21/2020. Small bowel resection on 10/21/2020. A. Ileum, resection:   Multifocal (4 foci) neuroendocrine tumor (NET). Extensive perineural and angiolymphatic invasion. 3 of 10 lymph nodes with metastatic neuroendocrine tumor and extracapsular extension of tumor cells (3/10). Bilateral viable small bowel resection margins with no evidence of tumor. B. Specimen received as \"Meckel's\":   Nodular scar tissue with patchy chronic inflammation. Negative for neuroendocrine tumor. Intestinal mucosal tissue is not present. CASE SUMMARY:   Procedure: Small bowel resection   Tumor site: Ileum   Tumor size: Greatest dimension of 1.5 cm   Tumor focality: Multifocal: 4 separate tumors   Histologic type and grade: G2: Well-differentiated neuroendocrine tumor   Mitotic rate: between 2-20 mitoses/2 mm2   Ki-67 labeling index: between 3-20%   Tumor extension: Tumor invades through the muscularis propria into subserosal tissue without penetration of overlying serosa   Margins: All margins are uninvolved by tumor    Margins examined: Proximal and distal   Lymphovascular invasion: Present   Perineural invasion: Present   Large mesenteric masses (greater than 2 cm): Present (one 2.5 cm mass)   Regional lymph nodes:    Number of lymph nodes involved: 3    Number of lymph nodes examined: 10   Pathologic stage classification (pTNM, AJCC eighth edition):    pT3    pN2    pM1a -confirmed liver metastasis, HBS-     Comment:   A.  Immunostains stain the tumor cells as follows in block A6:   Synaptophysin and chromogranin: Positive   Ki-67: Positive in 5% of tumor cells      We recommended Lanreotide once monthly for metastatic well differentiated neuroendocrine cancer to liver. Dose # 1 Lanreotide was on 11/05/2020. Dose # 2 Lanreotide was on 12/03/2020. Dose # 3 Lanreotide was on 01/07/2021. Serum Chromogranin A 95 on 01/07/2021. CT chest 02/07/2021 negative for metastatic disease. CT abdomen/pelvis 02/07/2021 Persistent bilobar hepatic lesions which are grossly stable consistent with stable widespread hepatic metastasis. Imaging reviewed. Continue Lanreotide and repeat scans in 3 months. Dose # 4 Lanreotide was on 02/11/2021. Ga 76 Dotatate PET 03/09/2021 Gallium 68 dotatate avid uptake throughout multiple regions of the liver compatible with multiple foci of neuroendocrine tumor in both the right and the left lobe of the liver, when compared to previous not significantly changed in the interval.  Dose # 5 Lanreotide was on 03/11/2021. Dose # 6 Lanreotide was on 04/08/2021. Serum chromogranin A 115 (0-103)  Dose # 7 Lanreotide was on 05/06/2021. Serum chromogranin A 114 (0-103)  Dose # 8 Lanreotide was on 06/03/2021. Serum chromogranin A  84  (0-103)     Ga 76 Dotatate PET 06/29/2021 Numerous foci of increased Gallium 68 dotatate avid uptake throughout both lobes of the liver, not significantly changed from previous. No significant progression of disease. No definite metastatic disease identified  Dose # 9 Lanreotide was on 07/01/2021. Serum Chromogranin A 97 (0-103)  Dose # 10 Lanreotide was on 07/29/2021. Serum Chromogranin A 89 (0-103)  Dose # 11 Lanreotide was on 08/30/2021. Serum Chromogranin A 120 (0-103)  Dose # 12 Lanreotide was on 09/30/2021. Serum Chromogranin A 141 (0-103)  PET/CT scan 11/09/2021 There is significant gallium avid tracer uptake in the liver, numerous lesions are seen, tracer uptake overall is diminished.    There is no convincing extrahepatic disease identified. Dose # 13 Lanreotide was on 11/11/2021. Serum chromogranin A 105 on 11/11/2021. Dose # 14 Lanreotide was on 12/30/2021. Serum chromogranin A 184 on 12/30/2021. Dose # 15 Lanreotide was on 01/27/2022. Serum chromogranin A  98 on 01/27/2022. CT head 01/28/2022 noted no acute abnormality. CT cervical spine 01/28/2022 noted no acute abnormality of cervical spine  PET/CT Gallium 68 02/22/2022 noted Multiple regions of tracer uptake seen within the liver consistent with neuroendocrine tumor. No other evidence to suggest metastatic disease. Reviewed with Dr. John Rodríguez from Radiology team; overall stable disease. Continue Lanreotide and repeat scans in 3 months. Dose # 16 Lanreotide was on 02/24/2022. Serum chromogranin A 116 on 02/24/2022  Dose # 17 Lanreotide was on 03/24/2022. Serum Chromogranin A 173 on 03/24/2022. Dose # 18 Lanreotide was on 04/21/2022. Serum Chromogranin A 140 on 04/21/2022. Dose # 19 Lanreotide was on 05/19/2022. Serum Chromogranin A 114 on 05/19/2022. Dose # 20 Lanreotide was on 06/23/2022. PET/CT scan 07/19/2022: Gallium 76 dotatate avid uptake is present within multiple masses within the liver, allowing for slight technical differences, not significantly changed in the interval. No convincing extrahepatic metastatic disease identified. Imaging reviewed. Continue Lanreotide and repeat scans in 3-4 months  Dose # 21 Lanreotide was on 07/21/2022. Serum Chromogranin A 112 on 07/21/2022  Dose # 22 Lanreotide was on 08/18/2022. Dose # 23 Lanreotide was on 09/15/2022. Serum Chromogranin A 75 on 09/15/2022     Admitted with acute liver failure, possibly decompensation in the setting of a urinary tract infection.     CT abdomen/pelvis was negative for bowel obstruction, liver metastasis, interval changes difficult to gauge due to the absence of the contrast-enhancement, innumerable subcutaneous nodules in the buttocks, likely secondary to the lanreotide injections. Ultrasound of the abdomen had revealed heterogeneous liver with multiple hypoechoic lesions, consistent with persistent diffuse hepatic metastasis. Liver U/S noted heterogeneous liver with multiple hypoechoic lesions consistent with the persistent diffuse hepatic metastasis. HBP and GI teams on board. No obstructive process. Aggressive lactulose titrated to mental status    Serum chromogranin A 107 on 10/13/2022  PET/CT Gallium 68 scan 11/01/2022: Gallium 76 dotatate avid uptake is seen within numerous lesions within the liver, not convincingly changed. No convincing extrahepatic metastatic disease identified. Recommended Lutathera 7.4 GBq (200 mCi) every 8 weeks for a total of 4 doses ([place] 2017). Dose # 1 Molinda Ala was on 11/16/2022  Dose # 2 Molinda Ala was on 01/27/2023. Today 02/23/2023; No fever chills. Fair appetite. Mild fatigue. Intermittent nausea and diarrhea. Review of Systems;  CONSTITUTIONAL: No fever chills. Fair appetite. Mild fatigue. ENMT: Eyes: No diplopia; Nose: No epistaxis. Mouth: No sore throat. RESPIRATORY: No hemoptysis SOB  CARDIOVASCULAR: No chest pain, palpitations. GASTROINTESTINAL: Intermittent nausea and diarrhea  GENITOURINARY: No hematuria frequency  NEURO: No syncope, presyncope, headache. Remainder:ROS NEGATIVE    Medications:  Reviewed and reconciled. Allergies:  No Known Allergies     Physical Examination:  /66   Pulse 71   Temp 97.3 °F (36.3 °C)   Ht 5' (1.524 m)   Wt 120 lb (54.4 kg)   SpO2 100%   BMI 23.44 kg/m²   GENERAL: Awake and alert, tired  HEENT: PERRLA; EOMI. Oropharynx clear. LUNGS: Good air entry bilaterally. No wheezing, crackles or rhonchi. CARDIOVASCULAR: Regular rate. No murmurs, rubs or gallops. EXTREMITIES: Without clubbing, cyanosis, or edema. NEUROLOGIC: No focal deficits.    ECOG PS 1    Diagnostics:  Lab Results   Component Value Date    WBC 8.4 02/23/2023    HGB 11.0 (L) 02/23/2023 HCT 33.7 (L) 02/23/2023    MCV 92.1 02/23/2023     02/23/2023     Lab Results   Component Value Date     02/23/2023    K 4.1 02/23/2023     02/23/2023    CO2 23 02/23/2023    BUN 14 02/23/2023    CREATININE 0.6 02/23/2023    GLUCOSE 88 02/23/2023    CALCIUM 8.9 02/23/2023    PROT 7.5 02/23/2023    LABALBU 4.2 02/23/2023    BILITOT 0.3 02/23/2023    ALKPHOS 190 (H) 02/23/2023    AST 31 02/23/2023    ALT 26 02/23/2023    LABGLOM >60 02/23/2023    GFRAA >60 10/13/2022     Impression/Plan:  41 y/o female with hx of hypothyroidism, IBS,migraine who was complaining of abdominal pain associated with diarrhea and flushing/sweating. CT abdomen/pelvis 08/28/2020:  2 cm masslike density in the mid abdominal small bowel mesentery with a spiculated appearance. Multiple liver masses suspicious for metastatic disease. 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 Comment: Sections of the liver tissue cores show the hepatic parenchyma to be partially replaced by a proliferation of epithelioid cells with relatively uniform round nuclei and eosinophilic granular cytoplasm. The   epithelioid cells form anastomosing solid cords/nests that are surrounded by delicate fibrovascular stroma. There are no mitotic figures or tumor cell necrosis present. The histologic changes seen are suggestive of well-differentiated neuroendocrine (carcinoid) tumor. Immunostaining for pankeratin, chromogranin, synaptophysin, TTF-1 and Ki-67 was performed on sections of one tissue block (A1) and the neoplastic epithelioid cells show diffuse and strong positivity for neuroendocrine markers (chromogranin and synaptophysin) and moderate positivity for pankeratin. There is no staining reactivity for TTF-1. The Ki-67 proliferation labeling index is essentially negative, (very low, <1%).    This staining pattern confirms the histologic impression of a well-differentiated neuroendocrine (carcinoid) tumor. FL Small bowel 09/02/2020:  Rapid small bowel transit. Serum Chromogranin A 160 on 09/23/2020. Urine 5-HIAA 21 (0-14)  2d-Echo 10/10/2020: EF 60-65%   Normal right ventricular size and function     Dotatate PET/CT scan 10/14/2020 increased tracer uptake seen throughout the liver compatible with neoplasm. This involves both the left and the right lobe of liver. In addition there are 2 foci of increased tracer uptake along the mesentery of the small bowel. This may involve the wall the small bowel. No other convincing evidence for extra-abdominal metastatic disease. EGD/Colonoscopy 10/05/2020 by Dr. Doris Peralta; records reviewed. Hepatobiliary team (Dr. Learta Collet) consult appreciated. Small bowel resection on 10/21/2020. Small bowel resection on 10/21/2020. A. Ileum, resection:   Multifocal (4 foci) neuroendocrine tumor (NET). Extensive perineural and angiolymphatic invasion. 3 of 10 lymph nodes with metastatic neuroendocrine tumor and extracapsular extension of tumor cells (3/10). Bilateral viable small bowel resection margins with no evidence of tumor. B. Specimen received as \"Meckel's\":   Nodular scar tissue with patchy chronic inflammation. Negative for neuroendocrine tumor. Intestinal mucosal tissue is not present. CASE SUMMARY:   Procedure: Small bowel resection   Tumor site: Ileum   Tumor size: Greatest dimension of 1.5 cm   Tumor focality: Multifocal: 4 separate tumors   Histologic type and grade: G2: Well-differentiated neuroendocrine tumor   Mitotic rate: between 2-20 mitoses/2 mm2   Ki-67 labeling index: between 3-20%   Tumor extension: Tumor invades through the muscularis propria into subserosal tissue without penetration of overlying serosa   Margins:  All margins are uninvolved by tumor    Margins examined: Proximal and distal   Lymphovascular invasion: Present   Perineural invasion: Present   Large mesenteric masses (greater than 2 cm): Present (one 2.5 cm mass)   Regional lymph nodes:    Number of lymph nodes involved: 3    Number of lymph nodes examined: 10   Pathologic stage classification (pTNM, AJCC eighth edition):    pT3    pN2    pM1a -confirmed liver metastasis, HBS-     Comment:   A. Immunostains stain the tumor cells as follows in block A6:   Synaptophysin and chromogranin: Positive   Ki-67: Positive in 5% of tumor cells      We recommended Lanreotide once monthly for metastatic well differentiated neuroendocrine cancer to liver. Dose # 1 Lanreotide was on 11/05/2020. Dose # 2 Lanreotide was on 12/03/2020. Dose # 3 Lanreotide was on 01/07/2021. Serum Chromogranin A 95 on 01/07/2021. CT chest 02/07/2021 negative for metastatic disease. CT abdomen/pelvis 02/07/2021 Persistent bilobar hepatic lesions which are grossly stable consistent with stable widespread hepatic metastasis. Imaging reviewed. Continue Lanreotide and repeat scans in 3 months. Dose # 4 Lanreotide was on 02/11/2021. Ga 76 Dotatate PET 03/09/2021 Gallium 68 dotatate avid uptake throughout multiple regions of the liver compatible with multiple foci of neuroendocrine tumor in both the right and the left lobe of the liver, when compared to previous not significantly changed in the interval.  Dose # 5 Lanreotide was on 03/11/2021. Dose # 6 Lanreotide was on 04/08/2021. Serum chromogranin A 115 (0-103)  Dose # 7 Lanreotide was on 05/06/2021. Serum chromogranin A 114 (0-103)  Dose # 8 Lanreotide was on 06/03/2021. Serum chromogranin A  84  (0-103)     Ga 76 Dotatate PET 06/29/2021 Numerous foci of increased Gallium 68 dotatate avid uptake throughout both lobes of the liver, not significantly changed from previous. No significant progression of disease. No definite metastatic disease identified  Dose # 9 Lanreotide was on 07/01/2021. Serum Chromogranin A 97 (0-103)  Dose # 10 Lanreotide was on 07/29/2021. Serum Chromogranin A 89 (0-103)  Dose # 11 Lanreotide was on 08/30/2021. Serum Chromogranin A 120 (0-103)  Dose # 12 Lanreotide was on 09/30/2021. Serum Chromogranin A 141 (0-103)  PET/CT scan 11/09/2021 There is significant gallium avid tracer uptake in the liver, numerous lesions are seen, tracer uptake overall is diminished. There is no convincing extrahepatic disease identified. Dose # 13 Lanreotide was on 11/11/2021. Serum chromogranin A 105 on 11/11/2021. Dose # 14 Lanreotide was on 12/30/2021. Serum chromogranin A 184 on 12/30/2021. Dose # 15 Lanreotide was on 01/27/2022. Serum chromogranin A  98 on 01/27/2022. CT head 01/28/2022 noted no acute abnormality. CT cervical spine 01/28/2022 noted no acute abnormality of cervical spine  PET/CT Gallium 68 02/22/2022 noted Multiple regions of tracer uptake seen within the liver consistent with neuroendocrine tumor. No other evidence to suggest metastatic disease. Reviewed with Dr. Alanis Gregorio from Radiology team; overall stable disease. Continue Lanreotide and repeat scans in 3 months. Dose # 16 Lanreotide was on 02/24/2022. Serum chromogranin A 116 on 02/24/2022  Dose # 17 Lanreotide was on 03/24/2022. Serum Chromogranin A 173 on 03/24/2022. Dose # 18 Lanreotide was on 04/21/2022. Serum Chromogranin A 140 on 04/21/2022. Dose # 19 Lanreotide was on 05/19/2022. Serum Chromogranin A 114 on 05/19/2022. Dose # 20 Lanreotide was on 06/23/2022. PET/CT scan 07/19/2022: Gallium 76 dotatate avid uptake is present within multiple masses within the liver, allowing for slight technical differences, not significantly changed in the interval. No convincing extrahepatic metastatic disease identified. Imaging reviewed. Continue Lanreotide and repeat scans in 3-4 months  Dose # 21 Lanreotide was on 07/21/2022. Serum Chromogranin A 112 on 07/21/2022  Dose # 22 Lanreotide was on 08/18/2022. Dose # 23 Lanreotide was on 09/15/2022.   Serum Chromogranin A 75 on 09/15/2022 Admitted with acute liver failure, possibly decompensation in the setting of a urinary tract infection. CT abdomen/pelvis was negative for bowel obstruction, liver metastasis, interval changes difficult to gauge due to the absence of the contrast-enhancement, innumerable subcutaneous nodules in the buttocks, likely secondary to the lanreotide injections. Ultrasound of the abdomen had revealed heterogeneous liver with multiple hypoechoic lesions, consistent with persistent diffuse hepatic metastasis. Liver U/S noted heterogeneous liver with multiple hypoechoic lesions consistent with the persistent diffuse hepatic metastasis. HBP and GI teams on board. No obstructive process. Aggressive lactulose titrated to mental status  Recovered well post hospital discharge. Serum chromogranin A 107 on 10/13/2022  PET Ga 68 11/01/2022 Gallium 68 dotatate avid uptake is seen within numerous lesions within the liver, not convincingly changed. No convincing extrahepatic metastatic disease identified. Recommended Lutathera 7.4 GBq (200 mCi) every 8 weeks for a total of 4 doses ([place] 2017). Dose # 1 Susanna Juan was on 11/16/2022. Dose # 2 Susanna Juan was on 01/27/2023. Labs reviewed. Recommended Zofran for nausea. Recommended Imodium, Lomotil and Xermelo for diarrhea.      RTC 4 weeks with labs     02/23/2023  Santosh Martinez MD

## 2023-03-07 ENCOUNTER — HOSPITAL ENCOUNTER (OUTPATIENT)
Dept: INFUSION THERAPY | Age: 38
Discharge: HOME | End: 2023-03-07
Payer: MEDICARE

## 2023-03-07 DIAGNOSIS — C7B.8 METASTATIC MALIGNANT NEUROENDOCRINE TUMOR TO LIVER (HCC): Primary | ICD-10-CM

## 2023-03-07 LAB
ALBUMIN SERPL-MCNC: 4.3 G/DL (ref 3.5–5.2)
ALP BLD-CCNC: 132 U/L (ref 35–104)
ALT SERPL-CCNC: 18 U/L (ref 0–32)
ANION GAP SERPL CALCULATED.3IONS-SCNC: 10 MMOL/L (ref 7–16)
AST SERPL-CCNC: 21 U/L (ref 0–31)
BASOPHILS ABSOLUTE: 0.05 E9/L (ref 0–0.2)
BASOPHILS RELATIVE PERCENT: 0.8 % (ref 0–2)
BILIRUB SERPL-MCNC: <0.2 MG/DL (ref 0–1.2)
BUN BLDV-MCNC: 10 MG/DL (ref 6–20)
CALCIUM SERPL-MCNC: 8.6 MG/DL (ref 8.6–10.2)
CHLORIDE BLD-SCNC: 103 MMOL/L (ref 98–107)
CO2: 24 MMOL/L (ref 22–29)
CREAT SERPL-MCNC: 0.6 MG/DL (ref 0.5–1)
EOSINOPHILS ABSOLUTE: 0.22 E9/L (ref 0.05–0.5)
EOSINOPHILS RELATIVE PERCENT: 3.5 % (ref 0–6)
GFR SERPL CREATININE-BSD FRML MDRD: >60 ML/MIN/1.73
GLUCOSE BLD-MCNC: 98 MG/DL (ref 74–99)
HCT VFR BLD CALC: 35.8 % (ref 34–48)
HEMOGLOBIN: 11.5 G/DL (ref 11.5–15.5)
IMMATURE GRANULOCYTES #: 0.03 E9/L
IMMATURE GRANULOCYTES %: 0.5 % (ref 0–5)
LYMPHOCYTES ABSOLUTE: 0.84 E9/L (ref 1.5–4)
LYMPHOCYTES RELATIVE PERCENT: 13.3 % (ref 20–42)
MCH RBC QN AUTO: 30.2 PG (ref 26–35)
MCHC RBC AUTO-ENTMCNC: 32.1 % (ref 32–34.5)
MCV RBC AUTO: 94 FL (ref 80–99.9)
MONOCYTES ABSOLUTE: 0.68 E9/L (ref 0.1–0.95)
MONOCYTES RELATIVE PERCENT: 10.8 % (ref 2–12)
NEUTROPHILS ABSOLUTE: 4.48 E9/L (ref 1.8–7.3)
NEUTROPHILS RELATIVE PERCENT: 71.1 % (ref 43–80)
PDW BLD-RTO: 17.4 FL (ref 11.5–15)
PLATELET # BLD: 205 E9/L (ref 130–450)
PMV BLD AUTO: 9 FL (ref 7–12)
POTASSIUM SERPL-SCNC: 4 MMOL/L (ref 3.5–5)
RBC # BLD: 3.81 E12/L (ref 3.5–5.5)
SODIUM BLD-SCNC: 137 MMOL/L (ref 132–146)
TOTAL PROTEIN: 7.2 G/DL (ref 6.4–8.3)
WBC # BLD: 6.3 E9/L (ref 4.5–11.5)

## 2023-03-07 PROCEDURE — 36591 DRAW BLOOD OFF VENOUS DEVICE: CPT

## 2023-03-07 PROCEDURE — 80053 COMPREHEN METABOLIC PANEL: CPT

## 2023-03-07 PROCEDURE — 2580000003 HC RX 258: Performed by: INTERNAL MEDICINE

## 2023-03-07 PROCEDURE — 6360000002 HC RX W HCPCS: Performed by: INTERNAL MEDICINE

## 2023-03-07 PROCEDURE — 85025 COMPLETE CBC W/AUTO DIFF WBC: CPT

## 2023-03-07 RX ORDER — WATER 1000 ML/1000ML
2.2 INJECTION, SOLUTION INTRAVENOUS ONCE
OUTPATIENT
Start: 2023-03-07 | End: 2023-03-07

## 2023-03-07 RX ORDER — HEPARIN SODIUM (PORCINE) LOCK FLUSH IV SOLN 100 UNIT/ML 100 UNIT/ML
500 SOLUTION INTRAVENOUS PRN
Status: DISCONTINUED | OUTPATIENT
Start: 2023-03-07 | End: 2023-03-08 | Stop reason: HOSPADM

## 2023-03-07 RX ORDER — SODIUM CHLORIDE 9 MG/ML
25 INJECTION, SOLUTION INTRAVENOUS PRN
OUTPATIENT
Start: 2023-03-07

## 2023-03-07 RX ORDER — SODIUM CHLORIDE 0.9 % (FLUSH) 0.9 %
5-40 SYRINGE (ML) INJECTION PRN
OUTPATIENT
Start: 2023-03-07

## 2023-03-07 RX ORDER — SODIUM CHLORIDE 0.9 % (FLUSH) 0.9 %
5-40 SYRINGE (ML) INJECTION PRN
Status: DISCONTINUED | OUTPATIENT
Start: 2023-03-07 | End: 2023-03-08 | Stop reason: HOSPADM

## 2023-03-07 RX ORDER — HEPARIN SODIUM (PORCINE) LOCK FLUSH IV SOLN 100 UNIT/ML 100 UNIT/ML
500 SOLUTION INTRAVENOUS PRN
OUTPATIENT
Start: 2023-03-07

## 2023-03-07 RX ADMIN — SODIUM CHLORIDE, PRESERVATIVE FREE 10 ML: 5 INJECTION INTRAVENOUS at 10:51

## 2023-03-07 RX ADMIN — SODIUM CHLORIDE, PRESERVATIVE FREE 10 ML: 5 INJECTION INTRAVENOUS at 10:48

## 2023-03-07 RX ADMIN — HEPARIN 500 UNITS: 100 SYRINGE at 10:51

## 2023-03-08 DIAGNOSIS — C7B.8 NEUROENDOCRINE CARCINOMA METASTATIC TO LIVER (HCC): ICD-10-CM

## 2023-03-08 DIAGNOSIS — Z51.5 PALLIATIVE CARE BY SPECIALIST: ICD-10-CM

## 2023-03-08 DIAGNOSIS — G89.3 NEOPLASM RELATED PAIN: ICD-10-CM

## 2023-03-08 DIAGNOSIS — C7A.8 NEUROENDOCRINE CARCINOMA METASTATIC TO LIVER (HCC): ICD-10-CM

## 2023-03-08 RX ORDER — HYDROCODONE BITARTRATE AND ACETAMINOPHEN 5; 325 MG/1; MG/1
1 TABLET ORAL EVERY 8 HOURS PRN
Qty: 90 TABLET | Refills: 0 | Status: SHIPPED | OUTPATIENT
Start: 2023-03-08 | End: 2023-04-07

## 2023-03-15 ENCOUNTER — TELEPHONE (OUTPATIENT)
Dept: PRIMARY CARE CLINIC | Age: 38
End: 2023-03-15

## 2023-03-15 DIAGNOSIS — G43.909 MIGRAINE WITHOUT STATUS MIGRAINOSUS, NOT INTRACTABLE, UNSPECIFIED MIGRAINE TYPE: Primary | ICD-10-CM

## 2023-03-15 RX ORDER — BUTALBITAL, ACETAMINOPHEN AND CAFFEINE 50; 325; 40 MG/1; MG/1; MG/1
1 TABLET ORAL 2 TIMES DAILY PRN
Qty: 60 TABLET | Refills: 2 | Status: SHIPPED | OUTPATIENT
Start: 2023-03-15

## 2023-03-15 NOTE — CARE COORDINATION
Educated patient about risk for severe COVID-19 due to risk factors according to CDC guidelines. ACM reviewed discharge instructions, medical action plan and red flag symptoms with the patient who verbalized understanding. Discussed COVID vaccination status: Yes. Education provided on COVID-19 vaccination as appropriate. Discussed exposure protocols and quarantine with CDC Guidelines. Patient was given an opportunity to verbalize any questions and concerns and agrees to contact ACM or health care provider for questions related to their healthcare.    -Pt reports muscle cramps continue intermittently along with tingling in face, hands & feet. Pt said eyes are still a little puffy but no eye pain. No fever or cough and SOB with exertion is baseline.   -Pt reports keeping hydrated. -Pt reports continues to smoke cigarettes. She said she would like to stop.    -Pt said she has been out of potassium medication since Thursday 6/3/21. Pt said oncology had her increase her potassium dose a few days which caused her to run out early. Pt said [facility] emailed Pt her the medication will be available tomorrow, 6-9-2021. AC offered to call Oncology, Dr Hoa Goodrich office today for potassium medication for today's dose. Then, Pt can  her prescription tomorrow. Pt declined. He said she can wait until tomorrow. Pt said she takes pickle shots to supplement potassium because she doesn't like bananas.    -Pt has not received the Covid vaccine due to treatment she is going through. However, she does not want the vaccine when she is eligible. -Renal appt 6-  -Palliative Care appt 6-  -Oncology appt 7-1-2021  [facility] GYN appt 7-5-2021  -Pt active with [facility].  -Reviewed decision makers. Offered ACP Specialist, Pt declined. Shevsaid she has the paper work in process. -Offered Care Coordination, Pt declined. no chest pain/no palpitations/no dyspnea on exertion

## 2023-03-15 NOTE — TELEPHONE ENCOUNTER
Pharm calling to notify you that pt fills her Fioricet script early every month and they wanted to flag that to you and ask if you are ok with that. Script states 1 tab qd and pt tries to fill it 9-10 days early every month. Are you ok with that? ?

## 2023-03-16 RX ORDER — ACETAMINOPHEN 325 MG/1
650 TABLET ORAL EVERY 6 HOURS PRN
Qty: 120 TABLET | Refills: 3 | OUTPATIENT
Start: 2023-03-16

## 2023-03-23 ENCOUNTER — OFFICE VISIT (OUTPATIENT)
Dept: ONCOLOGY | Age: 38
End: 2023-03-23
Payer: MEDICARE

## 2023-03-23 ENCOUNTER — HOSPITAL ENCOUNTER (OUTPATIENT)
Dept: INFUSION THERAPY | Age: 38
Discharge: HOME | End: 2023-03-23
Payer: MEDICARE

## 2023-03-23 VITALS
HEART RATE: 62 BPM | TEMPERATURE: 98 F | WEIGHT: 126.1 LBS | SYSTOLIC BLOOD PRESSURE: 104 MMHG | DIASTOLIC BLOOD PRESSURE: 59 MMHG | BODY MASS INDEX: 24.76 KG/M2 | OXYGEN SATURATION: 100 % | HEIGHT: 60 IN

## 2023-03-23 DIAGNOSIS — C7B.8 METASTATIC MALIGNANT NEUROENDOCRINE TUMOR TO LIVER (HCC): Primary | ICD-10-CM

## 2023-03-23 DIAGNOSIS — R35.0 URINE FREQUENCY: ICD-10-CM

## 2023-03-23 DIAGNOSIS — C7A.8 NEUROENDOCRINE CANCER (HCC): ICD-10-CM

## 2023-03-23 DIAGNOSIS — N39.0 URINARY TRACT INFECTION WITHOUT HEMATURIA, SITE UNSPECIFIED: Primary | ICD-10-CM

## 2023-03-23 DIAGNOSIS — C78.7 LIVER METASTASES: ICD-10-CM

## 2023-03-23 DIAGNOSIS — E03.9 HYPOTHYROIDISM, UNSPECIFIED TYPE: ICD-10-CM

## 2023-03-23 LAB
ALBUMIN SERPL-MCNC: 4.3 G/DL (ref 3.5–5.2)
ALP SERPL-CCNC: 99 U/L (ref 35–104)
ALT SERPL-CCNC: 10 U/L (ref 0–32)
ANION GAP SERPL CALCULATED.3IONS-SCNC: 10 MMOL/L (ref 7–16)
AST SERPL-CCNC: 24 U/L (ref 0–31)
BASOPHILS # BLD: 0.05 E9/L (ref 0–0.2)
BASOPHILS NFR BLD: 0.9 % (ref 0–2)
BILIRUB SERPL-MCNC: 0.3 MG/DL (ref 0–1.2)
BILIRUB UR QL STRIP: NEGATIVE
BUN SERPL-MCNC: 13 MG/DL (ref 6–20)
CALCIUM SERPL-MCNC: 8.9 MG/DL (ref 8.6–10.2)
CHLORIDE SERPL-SCNC: 105 MMOL/L (ref 98–107)
CLARITY UR: CLEAR
CO2 SERPL-SCNC: 24 MMOL/L (ref 22–29)
COLOR UR: YELLOW
CREAT SERPL-MCNC: 0.7 MG/DL (ref 0.5–1)
EOSINOPHIL # BLD: 0.2 E9/L (ref 0.05–0.5)
EOSINOPHIL NFR BLD: 3.5 % (ref 0–6)
ERYTHROCYTE [DISTWIDTH] IN BLOOD BY AUTOMATED COUNT: 16.7 FL (ref 11.5–15)
GLUCOSE SERPL-MCNC: 96 MG/DL (ref 74–99)
GLUCOSE UR STRIP-MCNC: NEGATIVE MG/DL
HCT VFR BLD AUTO: 32.6 % (ref 34–48)
HGB BLD-MCNC: 10.8 G/DL (ref 11.5–15.5)
HGB UR QL STRIP: NEGATIVE
IMM GRANULOCYTES # BLD: 0.03 E9/L
IMM GRANULOCYTES NFR BLD: 0.5 % (ref 0–5)
KETONES UR STRIP-MCNC: NEGATIVE MG/DL
LEUKOCYTE ESTERASE UR QL STRIP: NEGATIVE
LYMPHOCYTES # BLD: 1.33 E9/L (ref 1.5–4)
LYMPHOCYTES NFR BLD: 23.3 % (ref 20–42)
MCH RBC QN AUTO: 30.9 PG (ref 26–35)
MCHC RBC AUTO-ENTMCNC: 33.1 % (ref 32–34.5)
MCV RBC AUTO: 93.4 FL (ref 80–99.9)
MONOCYTES # BLD: 0.98 E9/L (ref 0.1–0.95)
MONOCYTES NFR BLD: 17.2 % (ref 2–12)
NEUTROPHILS # BLD: 3.12 E9/L (ref 1.8–7.3)
NEUTS SEG NFR BLD: 54.6 % (ref 43–80)
NITRITE UR QL STRIP: NEGATIVE
PH UR STRIP: 5.5 [PH] (ref 5–9)
PLATELET # BLD AUTO: 193 E9/L (ref 130–450)
PMV BLD AUTO: 9.5 FL (ref 7–12)
POTASSIUM SERPL-SCNC: 3.8 MMOL/L (ref 3.5–5)
PROT SERPL-MCNC: 6.9 G/DL (ref 6.4–8.3)
PROT UR STRIP-MCNC: NEGATIVE MG/DL
RBC # BLD AUTO: 3.49 E12/L (ref 3.5–5.5)
SODIUM SERPL-SCNC: 139 MMOL/L (ref 132–146)
SP GR UR STRIP: <=1.005 (ref 1–1.03)
UROBILINOGEN UR STRIP-ACNC: 0.2 E.U./DL
WBC # BLD: 5.7 E9/L (ref 4.5–11.5)

## 2023-03-23 PROCEDURE — G8484 FLU IMMUNIZE NO ADMIN: HCPCS | Performed by: INTERNAL MEDICINE

## 2023-03-23 PROCEDURE — 2580000003 HC RX 258: Performed by: INTERNAL MEDICINE

## 2023-03-23 PROCEDURE — 4004F PT TOBACCO SCREEN RCVD TLK: CPT | Performed by: INTERNAL MEDICINE

## 2023-03-23 PROCEDURE — 85025 COMPLETE CBC W/AUTO DIFF WBC: CPT

## 2023-03-23 PROCEDURE — 99214 OFFICE O/P EST MOD 30 MIN: CPT

## 2023-03-23 PROCEDURE — G8420 CALC BMI NORM PARAMETERS: HCPCS | Performed by: INTERNAL MEDICINE

## 2023-03-23 PROCEDURE — G8427 DOCREV CUR MEDS BY ELIG CLIN: HCPCS | Performed by: INTERNAL MEDICINE

## 2023-03-23 PROCEDURE — 99213 OFFICE O/P EST LOW 20 MIN: CPT | Performed by: INTERNAL MEDICINE

## 2023-03-23 PROCEDURE — 80053 COMPREHEN METABOLIC PANEL: CPT

## 2023-03-23 PROCEDURE — 6360000002 HC RX W HCPCS: Performed by: INTERNAL MEDICINE

## 2023-03-23 PROCEDURE — 36591 DRAW BLOOD OFF VENOUS DEVICE: CPT

## 2023-03-23 RX ORDER — SODIUM CHLORIDE 9 MG/ML
25 INJECTION, SOLUTION INTRAVENOUS PRN
OUTPATIENT
Start: 2023-03-23

## 2023-03-23 RX ORDER — WATER 1000 ML/1000ML
2.2 INJECTION, SOLUTION INTRAVENOUS ONCE
OUTPATIENT
Start: 2023-03-23 | End: 2023-03-23

## 2023-03-23 RX ORDER — HEPARIN SODIUM (PORCINE) LOCK FLUSH IV SOLN 100 UNIT/ML 100 UNIT/ML
500 SOLUTION INTRAVENOUS PRN
OUTPATIENT
Start: 2023-03-23

## 2023-03-23 RX ORDER — HEPARIN SODIUM (PORCINE) LOCK FLUSH IV SOLN 100 UNIT/ML 100 UNIT/ML
500 SOLUTION INTRAVENOUS PRN
Status: DISCONTINUED | OUTPATIENT
Start: 2023-03-23 | End: 2023-03-24 | Stop reason: HOSPADM

## 2023-03-23 RX ORDER — SODIUM CHLORIDE 0.9 % (FLUSH) 0.9 %
5-40 SYRINGE (ML) INJECTION PRN
OUTPATIENT
Start: 2023-03-23

## 2023-03-23 RX ORDER — SODIUM CHLORIDE 0.9 % (FLUSH) 0.9 %
5-40 SYRINGE (ML) INJECTION PRN
Status: DISCONTINUED | OUTPATIENT
Start: 2023-03-23 | End: 2023-03-24 | Stop reason: HOSPADM

## 2023-03-23 RX ADMIN — HEPARIN 500 UNITS: 100 SYRINGE at 13:28

## 2023-03-23 RX ADMIN — SODIUM CHLORIDE, PRESERVATIVE FREE 10 ML: 5 INJECTION INTRAVENOUS at 13:26

## 2023-03-23 RX ADMIN — SODIUM CHLORIDE, PRESERVATIVE FREE 10 ML: 5 INJECTION INTRAVENOUS at 13:28

## 2023-03-24 RX ORDER — POTASSIUM CHLORIDE 20 MEQ/1
20 TABLET, EXTENDED RELEASE ORAL 2 TIMES DAILY
Qty: 60 TABLET | Refills: 0 | Status: SHIPPED | OUTPATIENT
Start: 2023-03-24

## 2023-03-24 RX ORDER — MAGNESIUM OXIDE 400 MG/1
400 TABLET ORAL 2 TIMES DAILY
Qty: 30 TABLET | Refills: 0 | Status: SHIPPED | OUTPATIENT
Start: 2023-03-24

## 2023-03-24 NOTE — PROGRESS NOTES
Medical Oncology Outpatient Progress Note    Reason for visit: Neuroendocrine cancer to liver     PCP:  Luis Winter DO     History of Present Illness:  42 y/o female with hx of hypothyroidism, IBS,migraine who was complaining of abdominal pain associated with diarrhea and flushing/sweating. CT abdomen/pelvis 08/28/2020:  2 cm masslike density in the mid abdominal small bowel mesentery with a spiculated appearance. Multiple liver masses suspicious for metastatic disease. 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 Comment: Sections of the liver tissue cores show the hepatic parenchyma to be partially replaced by a proliferation of epithelioid cells with relatively uniform round nuclei and eosinophilic granular cytoplasm. The   epithelioid cells form anastomosing solid cords/nests that are surrounded by delicate fibrovascular stroma. There are no mitotic figures or tumor cell necrosis present. The histologic changes seen are suggestive of well-differentiated neuroendocrine (carcinoid) tumor. Immunostaining for pankeratin, chromogranin, synaptophysin, TTF-1 and Ki-67 was performed on sections of one tissue block (A1) and the neoplastic epithelioid cells show diffuse and strong positivity for neuroendocrine markers (chromogranin and synaptophysin) and moderate positivity for pankeratin. There is no staining reactivity for TTF-1. The Ki-67 proliferation labeling index is essentially negative, (very low, <1%). This staining pattern confirms the histologic impression of a well-differentiated neuroendocrine (carcinoid) tumor. FL Small bowel 09/02/2020:  Rapid small bowel transit. Serum Chromogranin A 160 on 09/23/2020.   Urine 5-HIAA 21 (0-14)  2d-Echo 10/10/2020: EF 60-65%   Normal right ventricular size and function     Dotatate PET/CT scan 10/14/2020 increased tracer uptake seen

## 2023-03-24 NOTE — TELEPHONE ENCOUNTER
Patient contacted office regarding refill of Magnesium, and Potassium to be sent to [facility]

## 2023-03-25 LAB
BACTERIA UR CULT: ABNORMAL
ORGANISM: ABNORMAL

## 2023-03-26 RX ORDER — CIPROFLOXACIN 500 MG/1
500 TABLET, FILM COATED ORAL 2 TIMES DAILY
Qty: 14 TABLET | Refills: 0 | Status: SHIPPED | OUTPATIENT
Start: 2023-03-26 | End: 2023-04-02

## 2023-03-27 ENCOUNTER — HOSPITAL ENCOUNTER (OUTPATIENT)
Age: 38
Discharge: HOME | End: 2023-03-27
Payer: MEDICARE

## 2023-03-27 DIAGNOSIS — C7B.8 NEUROENDOCRINE CARCINOMA METASTATIC TO LIVER (HCC): ICD-10-CM

## 2023-03-27 DIAGNOSIS — C7A.8 NEUROENDOCRINE CARCINOMA METASTATIC TO LIVER (HCC): Primary | ICD-10-CM

## 2023-03-27 DIAGNOSIS — C7A.8 NEUROENDOCRINE CARCINOMA METASTATIC TO LIVER (HCC): ICD-10-CM

## 2023-03-27 DIAGNOSIS — C7B.8 NEUROENDOCRINE CARCINOMA METASTATIC TO LIVER (HCC): Primary | ICD-10-CM

## 2023-03-27 LAB — HCG SERPL QL: NEGATIVE

## 2023-03-27 PROCEDURE — 84703 CHORIONIC GONADOTROPIN ASSAY: CPT

## 2023-03-27 PROCEDURE — 36415 COLL VENOUS BLD VENIPUNCTURE: CPT

## 2023-03-27 RX ORDER — GINGER ROOT/GINGER ROOT EXT 262.5 MG
1 CAPSULE ORAL DAILY
Qty: 30 TABLET | Refills: 1 | Status: SHIPPED | OUTPATIENT
Start: 2023-03-27

## 2023-03-28 ENCOUNTER — HOSPITAL ENCOUNTER (OUTPATIENT)
Dept: NUCLEAR MEDICINE | Age: 38
Discharge: HOME | End: 2023-03-30
Payer: MEDICARE

## 2023-03-28 ENCOUNTER — HOSPITAL ENCOUNTER (OUTPATIENT)
Dept: INFUSION THERAPY | Age: 38
Discharge: HOME | End: 2023-03-28
Payer: MEDICARE

## 2023-03-28 VITALS
HEART RATE: 85 BPM | RESPIRATION RATE: 16 BRPM | OXYGEN SATURATION: 99 % | TEMPERATURE: 98.5 F | DIASTOLIC BLOOD PRESSURE: 53 MMHG | SYSTOLIC BLOOD PRESSURE: 111 MMHG

## 2023-03-28 DIAGNOSIS — C7A.8 NEUROENDOCRINE CANCER (HCC): Primary | ICD-10-CM

## 2023-03-28 DIAGNOSIS — C7A.8 NEUROENDOCRINE CARCINOMA (HCC): ICD-10-CM

## 2023-03-28 PROCEDURE — 3430000000 HC RX DIAGNOSTIC RADIOPHARMACEUTICAL: Performed by: RADIOLOGY

## 2023-03-28 PROCEDURE — 79101 NUCLEAR RX IV ADMIN: CPT

## 2023-03-28 PROCEDURE — 96367 TX/PROPH/DG ADDL SEQ IV INF: CPT

## 2023-03-28 PROCEDURE — 6360000002 HC RX W HCPCS: Performed by: INTERNAL MEDICINE

## 2023-03-28 PROCEDURE — 2580000003 HC RX 258: Performed by: INTERNAL MEDICINE

## 2023-03-28 PROCEDURE — A9513 LUTETIUM LU 177 DOTATAT THER: HCPCS | Performed by: RADIOLOGY

## 2023-03-28 PROCEDURE — 6370000000 HC RX 637 (ALT 250 FOR IP): Performed by: INTERNAL MEDICINE

## 2023-03-28 PROCEDURE — 96375 TX/PRO/DX INJ NEW DRUG ADDON: CPT

## 2023-03-28 PROCEDURE — 96366 THER/PROPH/DIAG IV INF ADDON: CPT

## 2023-03-28 PROCEDURE — 2500000003 HC RX 250 WO HCPCS: Performed by: INTERNAL MEDICINE

## 2023-03-28 RX ORDER — ARGININE/LYSINE/0.9 % SOD CHL 25-25MG/ML
50 PLASTIC BAG, INJECTION (ML) INTRAVENOUS ONCE
Status: CANCELLED | OUTPATIENT
Start: 2023-03-28 | End: 2023-03-28

## 2023-03-28 RX ORDER — SODIUM CHLORIDE 9 MG/ML
5-40 INJECTION INTRAVENOUS PRN
Status: CANCELLED | OUTPATIENT
Start: 2023-03-28

## 2023-03-28 RX ORDER — ONDANSETRON 2 MG/ML
8 INJECTION INTRAMUSCULAR; INTRAVENOUS
Status: CANCELLED | OUTPATIENT
Start: 2023-03-29

## 2023-03-28 RX ORDER — SODIUM CHLORIDE 9 MG/ML
INJECTION, SOLUTION INTRAVENOUS CONTINUOUS
Status: CANCELLED | OUTPATIENT
Start: 2023-03-29

## 2023-03-28 RX ORDER — ACETAMINOPHEN 325 MG/1
650 TABLET ORAL
Status: CANCELLED | OUTPATIENT
Start: 2023-03-29

## 2023-03-28 RX ORDER — FAMOTIDINE 10 MG/ML
20 INJECTION, SOLUTION INTRAVENOUS
Status: CANCELLED | OUTPATIENT
Start: 2023-03-28

## 2023-03-28 RX ORDER — ACETAMINOPHEN 325 MG/1
650 TABLET ORAL
Status: CANCELLED | OUTPATIENT
Start: 2023-03-28

## 2023-03-28 RX ORDER — OCTREOTIDE ACETATE 100 UG/ML
100 INJECTION, SOLUTION INTRAVENOUS; SUBCUTANEOUS
Status: CANCELLED | OUTPATIENT
Start: 2023-03-28

## 2023-03-28 RX ORDER — PALONOSETRON 0.05 MG/ML
0.25 INJECTION, SOLUTION INTRAVENOUS ONCE
Status: CANCELLED | OUTPATIENT
Start: 2023-03-28 | End: 2023-03-28

## 2023-03-28 RX ORDER — MEPERIDINE HYDROCHLORIDE 50 MG/ML
12.5 INJECTION INTRAMUSCULAR; INTRAVENOUS; SUBCUTANEOUS PRN
Status: CANCELLED | OUTPATIENT
Start: 2023-03-29

## 2023-03-28 RX ORDER — MAGNESIUM HYDROXIDE/ALUMINUM HYDROXICE/SIMETHICONE 120; 1200; 1200 MG/30ML; MG/30ML; MG/30ML
30 SUSPENSION ORAL EVERY 6 HOURS PRN
Status: CANCELLED | OUTPATIENT
Start: 2023-03-28

## 2023-03-28 RX ORDER — HEPARIN SODIUM (PORCINE) LOCK FLUSH IV SOLN 100 UNIT/ML 100 UNIT/ML
500 SOLUTION INTRAVENOUS PRN
Status: CANCELLED | OUTPATIENT
Start: 2023-03-28

## 2023-03-28 RX ORDER — SODIUM CHLORIDE 0.9 % (FLUSH) 0.9 %
5-40 SYRINGE (ML) INJECTION PRN
Status: CANCELLED | OUTPATIENT
Start: 2023-03-28

## 2023-03-28 RX ORDER — EPINEPHRINE 1 MG/ML
0.3 INJECTION, SOLUTION, CONCENTRATE INTRAVENOUS PRN
Status: CANCELLED | OUTPATIENT
Start: 2023-03-28

## 2023-03-28 RX ORDER — PROCHLORPERAZINE EDISYLATE 5 MG/ML
10 INJECTION INTRAMUSCULAR; INTRAVENOUS EVERY 6 HOURS PRN
Status: DISCONTINUED | OUTPATIENT
Start: 2023-03-28 | End: 2023-03-29 | Stop reason: HOSPADM

## 2023-03-28 RX ORDER — DIPHENHYDRAMINE HYDROCHLORIDE 50 MG/ML
50 INJECTION INTRAMUSCULAR; INTRAVENOUS
Status: CANCELLED | OUTPATIENT
Start: 2023-03-29

## 2023-03-28 RX ORDER — FAMOTIDINE 10 MG/ML
20 INJECTION, SOLUTION INTRAVENOUS ONCE
Status: CANCELLED | OUTPATIENT
Start: 2023-03-28 | End: 2023-03-28

## 2023-03-28 RX ORDER — ARGININE/LYSINE/0.9 % SOD CHL 25-25MG/ML
50 PLASTIC BAG, INJECTION (ML) INTRAVENOUS ONCE
Status: COMPLETED | OUTPATIENT
Start: 2023-03-28 | End: 2023-03-28

## 2023-03-28 RX ORDER — SODIUM CHLORIDE 9 MG/ML
5-250 INJECTION, SOLUTION INTRAVENOUS PRN
Status: CANCELLED | OUTPATIENT
Start: 2023-03-28

## 2023-03-28 RX ORDER — FAMOTIDINE 10 MG/ML
20 INJECTION, SOLUTION INTRAVENOUS
Status: CANCELLED | OUTPATIENT
Start: 2023-03-29

## 2023-03-28 RX ORDER — ONDANSETRON 2 MG/ML
8 INJECTION INTRAMUSCULAR; INTRAVENOUS
Status: CANCELLED | OUTPATIENT
Start: 2023-03-28

## 2023-03-28 RX ORDER — MEPERIDINE HYDROCHLORIDE 50 MG/ML
12.5 INJECTION INTRAMUSCULAR; INTRAVENOUS; SUBCUTANEOUS PRN
Status: CANCELLED | OUTPATIENT
Start: 2023-03-28

## 2023-03-28 RX ORDER — PROCHLORPERAZINE EDISYLATE 5 MG/ML
10 INJECTION INTRAMUSCULAR; INTRAVENOUS EVERY 6 HOURS PRN
Status: CANCELLED | OUTPATIENT
Start: 2023-03-28

## 2023-03-28 RX ORDER — SODIUM CHLORIDE 0.9 % (FLUSH) 0.9 %
5-40 SYRINGE (ML) INJECTION PRN
Status: DISCONTINUED | OUTPATIENT
Start: 2023-03-28 | End: 2023-03-29 | Stop reason: HOSPADM

## 2023-03-28 RX ORDER — DIPHENHYDRAMINE HYDROCHLORIDE 50 MG/ML
50 INJECTION INTRAMUSCULAR; INTRAVENOUS
Status: CANCELLED | OUTPATIENT
Start: 2023-03-28

## 2023-03-28 RX ORDER — SODIUM CHLORIDE 9 MG/ML
1000 INJECTION, SOLUTION INTRAVENOUS ONCE
Status: COMPLETED | OUTPATIENT
Start: 2023-03-28 | End: 2023-03-28

## 2023-03-28 RX ORDER — LORAZEPAM 1 MG/1
1 TABLET ORAL ONCE
Status: COMPLETED | OUTPATIENT
Start: 2023-03-28 | End: 2023-03-28

## 2023-03-28 RX ORDER — EPINEPHRINE 1 MG/ML
0.3 INJECTION, SOLUTION, CONCENTRATE INTRAVENOUS PRN
Status: CANCELLED | OUTPATIENT
Start: 2023-03-29

## 2023-03-28 RX ORDER — PALONOSETRON HYDROCHLORIDE 0.05 MG/ML
0.25 INJECTION, SOLUTION INTRAVENOUS ONCE
Status: COMPLETED | OUTPATIENT
Start: 2023-03-28 | End: 2023-03-28

## 2023-03-28 RX ORDER — HEPARIN SODIUM (PORCINE) LOCK FLUSH IV SOLN 100 UNIT/ML 100 UNIT/ML
500 SOLUTION INTRAVENOUS PRN
Status: DISCONTINUED | OUTPATIENT
Start: 2023-03-28 | End: 2023-03-29 | Stop reason: HOSPADM

## 2023-03-28 RX ORDER — SODIUM CHLORIDE 9 MG/ML
INJECTION, SOLUTION INTRAVENOUS CONTINUOUS
Status: CANCELLED | OUTPATIENT
Start: 2023-03-28

## 2023-03-28 RX ORDER — SODIUM CHLORIDE 9 MG/ML
1000 INJECTION, SOLUTION INTRAVENOUS ONCE
Status: CANCELLED | OUTPATIENT
Start: 2023-03-28 | End: 2023-03-28

## 2023-03-28 RX ORDER — ALBUTEROL SULFATE 90 UG/1
4 AEROSOL, METERED RESPIRATORY (INHALATION) PRN
Status: CANCELLED | OUTPATIENT
Start: 2023-03-28

## 2023-03-28 RX ORDER — ALBUTEROL SULFATE 90 UG/1
4 AEROSOL, METERED RESPIRATORY (INHALATION) PRN
Status: CANCELLED | OUTPATIENT
Start: 2023-03-29

## 2023-03-28 RX ADMIN — FOSAPREPITANT 150 MG: 150 INJECTION, POWDER, LYOPHILIZED, FOR SOLUTION INTRAVENOUS at 09:20

## 2023-03-28 RX ADMIN — PROCHLORPERAZINE EDISYLATE 10 MG: 5 INJECTION INTRAMUSCULAR; INTRAVENOUS at 11:10

## 2023-03-28 RX ADMIN — PALONOSETRON 0.25 MG: 0.25 INJECTION, SOLUTION INTRAVENOUS at 09:14

## 2023-03-28 RX ADMIN — LORAZEPAM 1 MG: 1 TABLET ORAL at 09:19

## 2023-03-28 RX ADMIN — SODIUM CHLORIDE, PRESERVATIVE FREE 10 ML: 5 INJECTION INTRAVENOUS at 11:11

## 2023-03-28 RX ADMIN — LUTETIUM LU 177 DOTATATE 200 MILLICURIE: 10 INJECTION INTRAVENOUS at 10:30

## 2023-03-28 RX ADMIN — SODIUM CHLORIDE, PRESERVATIVE FREE 20 MG: 5 INJECTION INTRAVENOUS at 09:10

## 2023-03-28 RX ADMIN — SODIUM CHLORIDE 1000 ML: 9 INJECTION, SOLUTION INTRAVENOUS at 09:08

## 2023-03-28 RX ADMIN — Medication 50 G: at 10:01

## 2023-03-28 RX ADMIN — SODIUM CHLORIDE, PRESERVATIVE FREE 10 ML: 5 INJECTION INTRAVENOUS at 14:19

## 2023-03-28 RX ADMIN — HEPARIN 500 UNITS: 100 SYRINGE at 14:19

## 2023-03-29 ENCOUNTER — HOSPITAL ENCOUNTER (OUTPATIENT)
Dept: INFUSION THERAPY | Age: 38
Discharge: HOME | End: 2023-03-29
Payer: MEDICARE

## 2023-03-29 DIAGNOSIS — C7A.8 NEUROENDOCRINE CANCER (HCC): Primary | ICD-10-CM

## 2023-03-29 PROCEDURE — 6360000002 HC RX W HCPCS: Performed by: INTERNAL MEDICINE

## 2023-03-29 PROCEDURE — 96372 THER/PROPH/DIAG INJ SC/IM: CPT

## 2023-03-29 RX ADMIN — OCTREOTIDE ACETATE 30 MG: KIT at 08:10

## 2023-04-03 ENCOUNTER — OFFICE VISIT (OUTPATIENT)
Dept: PALLATIVE CARE | Age: 38
End: 2023-04-03
Payer: MEDICARE

## 2023-04-03 VITALS
OXYGEN SATURATION: 98 % | SYSTOLIC BLOOD PRESSURE: 110 MMHG | WEIGHT: 126 LBS | DIASTOLIC BLOOD PRESSURE: 44 MMHG | HEART RATE: 76 BPM | TEMPERATURE: 98.2 F | BODY MASS INDEX: 24.61 KG/M2

## 2023-04-03 DIAGNOSIS — Z79.891 USE OF OPIATES FOR THERAPEUTIC PURPOSES: ICD-10-CM

## 2023-04-03 DIAGNOSIS — Z79.891 USE OF OPIATES FOR THERAPEUTIC PURPOSES: Primary | ICD-10-CM

## 2023-04-03 DIAGNOSIS — Z51.5 ENCOUNTER FOR PALLIATIVE CARE: ICD-10-CM

## 2023-04-03 LAB
6-MONOACETYLMORPHINE, URINE: NOT DETECTED
ALCOHOL URINE: NOT DETECTED
AMPHETAMINE SCREEN, URINE: NOT DETECTED
BARBITURATE SCREEN URINE: NOT DETECTED
BENZODIAZEPINE SCREEN, URINE: NOT DETECTED
BUPRENORPHINE URINE: NOT DETECTED
CANNABINOID SCREEN URINE: NOT DETECTED
COCAINE METABOLITE SCREEN URINE: NOT DETECTED
DRUG SCREEN COMMENT UR-IMP: NORMAL
FENTANYL SCREEN, URINE: NOT DETECTED
INTEGRITY CHECK, CREATININE, URINE: 38.1
INTEGRITY CHECK, OXIDANT, URINE: <40
INTEGRITY CHECK, PH, URINE: 7.3 (ref 4.5–9)
INTEGRITY CHECK, SPECIFIC GRAVITY, URINE: 1.01 (ref 1–1.03)
INTEGRITY CHECK, SPECIMEN INTEGRITY, URINE: NORMAL
METHADONE SCREEN, URINE: NOT DETECTED
OPIATE SCREEN URINE: NOT DETECTED
OXYCODONE URINE: NOT DETECTED
PHENCYCLIDINE SCREEN URINE: NOT DETECTED
TRAMADOL SCREEN URINE: NOT DETECTED

## 2023-04-03 PROCEDURE — G8420 CALC BMI NORM PARAMETERS: HCPCS | Performed by: NURSE PRACTITIONER

## 2023-04-03 PROCEDURE — 4004F PT TOBACCO SCREEN RCVD TLK: CPT | Performed by: NURSE PRACTITIONER

## 2023-04-03 PROCEDURE — 99212 OFFICE O/P EST SF 10 MIN: CPT | Performed by: NURSE PRACTITIONER

## 2023-04-03 PROCEDURE — 99213 OFFICE O/P EST LOW 20 MIN: CPT | Performed by: NURSE PRACTITIONER

## 2023-04-03 PROCEDURE — G8427 DOCREV CUR MEDS BY ELIG CLIN: HCPCS | Performed by: NURSE PRACTITIONER

## 2023-04-03 NOTE — PROGRESS NOTES
Monitoring Possible medication side effects, risk of tolerance/dependence & alternative treatments discussed. ;No signs of potential drug abuse or diversion identified. TRINA Dominguez - CNP   Palliative Care Department       Time/Communication  Greater than 50% of time spent, total 15 minutes in face-to-face counseling and coordination of care regarding symptom management. Note: This report was completed using computerFUELUP voiced recognition software. Every effort has been made to ensure accuracy; however, inadvertent computerized transcription errors may be present.

## 2023-04-05 LAB
6-MAM, QUANTITATIVE, URINE: <10
7-AMINOCLONAZEPAM, QUANTITATIVE, URINE: <50
ALPHA-HYDROXYALPRAZOLAM, QUANTITATIVE, URINE: <50
ALPHA-HYDROXYMIDAZOLAM, QUANTITATIVE, URINE: <50
ALPHA-HYDROXYTRIAZOLAM, QUANTITATIVE, URINE: <50
ALPRAZOLAM URINE QUANT: <50
BUTALBITAL URINE QUANT: 75.6
CHLORDIAZEPOXIDE, QUANTITATIVE, URINE: <50
CLONAZEPAM, QUANTITATIVE, URINE: <50
CODEINE, QUANTITATIVE, URINE: <50
COMMENT: NORMAL
DIAZEPAM URINE QUANT: <50
FLUNITRAZEPAM, QUANTITATIVE, URINE: <50
FLURAZEPAM, QUANTITATIVE, URINE: <50
HYDROCODONE, QUANTITATIVE, URINE: <50
HYDROMORPHONE, QUANTITATIVE, URINE: <50
LORAZEPAM URINE QUANT: <50
MIDAZOLAM URINE QUANT: <50
MORPHINE, QUANTITATIVE, URINE: <50
NORDIAZEPAM URINE QUANT: <50
NORHYDROCODONE, QUANTITATIVE, URINE: <50
NOROXYCODONE, QUANTITATIVE, URINE: <50
OXAZEPAM URINE QUANT: <50
OXYCODONE URINE, QUANTITATIVE: <50
OXYMORPHONE, QUANTITATIVE, URINE: <50
TEMAZEPAM, QUANTITATIVE, URINE: <50
THC NORMALIZED, QUANTITIATIVE, URINE: 61.2
THC-COOH, QUANTITATIVE, URINE: 23.3

## 2023-04-05 RX ORDER — MAGNESIUM OXIDE 400 MG/1
TABLET ORAL
Qty: 30 TABLET | Refills: 0 | OUTPATIENT
Start: 2023-04-05

## 2023-04-07 DIAGNOSIS — G89.3 NEOPLASM RELATED PAIN: ICD-10-CM

## 2023-04-07 DIAGNOSIS — C7B.8 NEUROENDOCRINE CARCINOMA METASTATIC TO LIVER (HCC): ICD-10-CM

## 2023-04-07 DIAGNOSIS — Z51.5 PALLIATIVE CARE BY SPECIALIST: ICD-10-CM

## 2023-04-07 DIAGNOSIS — C7A.8 NEUROENDOCRINE CARCINOMA METASTATIC TO LIVER (HCC): ICD-10-CM

## 2023-04-07 RX ORDER — HYDROCODONE BITARTRATE AND ACETAMINOPHEN 5; 325 MG/1; MG/1
1 TABLET ORAL EVERY 8 HOURS PRN
Qty: 90 TABLET | Refills: 0 | Status: SHIPPED | OUTPATIENT
Start: 2023-04-07 | End: 2023-05-07

## 2023-04-17 DIAGNOSIS — F51.01 PRIMARY INSOMNIA: Primary | ICD-10-CM

## 2023-04-17 RX ORDER — ZOLPIDEM TARTRATE 10 MG/1
10 TABLET ORAL NIGHTLY PRN
Qty: 30 TABLET | Refills: 0 | Status: SHIPPED | OUTPATIENT
Start: 2023-04-17 | End: 2023-05-17

## 2023-04-20 ENCOUNTER — HOSPITAL ENCOUNTER (OUTPATIENT)
Dept: INFUSION THERAPY | Age: 38
Discharge: HOME | End: 2023-04-20
Payer: MEDICARE

## 2023-04-20 ENCOUNTER — OFFICE VISIT (OUTPATIENT)
Dept: ONCOLOGY | Age: 38
End: 2023-04-20
Payer: MEDICARE

## 2023-04-20 VITALS
OXYGEN SATURATION: 100 % | TEMPERATURE: 98 F | DIASTOLIC BLOOD PRESSURE: 68 MMHG | WEIGHT: 127 LBS | HEIGHT: 60 IN | SYSTOLIC BLOOD PRESSURE: 104 MMHG | BODY MASS INDEX: 24.94 KG/M2 | HEART RATE: 67 BPM

## 2023-04-20 DIAGNOSIS — C7B.8 NEUROENDOCRINE CARCINOMA METASTATIC TO LIVER (HCC): ICD-10-CM

## 2023-04-20 DIAGNOSIS — C7A.8 NEUROENDOCRINE CANCER (HCC): Primary | ICD-10-CM

## 2023-04-20 DIAGNOSIS — C7A.8 NEUROENDOCRINE CARCINOMA METASTATIC TO LIVER (HCC): ICD-10-CM

## 2023-04-20 DIAGNOSIS — R19.7 DIARRHEA, UNSPECIFIED TYPE: ICD-10-CM

## 2023-04-20 DIAGNOSIS — R11.0 NAUSEA: ICD-10-CM

## 2023-04-20 DIAGNOSIS — C7B.8 METASTATIC MALIGNANT NEUROENDOCRINE TUMOR TO LIVER (HCC): Primary | ICD-10-CM

## 2023-04-20 LAB
ALBUMIN SERPL-MCNC: 4.3 G/DL (ref 3.5–5.2)
ALP SERPL-CCNC: 116 U/L (ref 35–104)
ALT SERPL-CCNC: 22 U/L (ref 0–32)
ANION GAP SERPL CALCULATED.3IONS-SCNC: 14 MMOL/L (ref 7–16)
AST SERPL-CCNC: 30 U/L (ref 0–31)
BASOPHILS # BLD: 0.07 E9/L (ref 0–0.2)
BASOPHILS NFR BLD: 1.1 % (ref 0–2)
BILIRUB SERPL-MCNC: <0.2 MG/DL (ref 0–1.2)
BUN SERPL-MCNC: 16 MG/DL (ref 6–20)
CALCIUM SERPL-MCNC: 8.9 MG/DL (ref 8.6–10.2)
CHLORIDE SERPL-SCNC: 105 MMOL/L (ref 98–107)
CO2 SERPL-SCNC: 20 MMOL/L (ref 22–29)
CREAT SERPL-MCNC: 0.8 MG/DL (ref 0.5–1)
EOSINOPHIL # BLD: 0.15 E9/L (ref 0.05–0.5)
EOSINOPHIL NFR BLD: 2.3 % (ref 0–6)
ERYTHROCYTE [DISTWIDTH] IN BLOOD BY AUTOMATED COUNT: 18.2 FL (ref 11.5–15)
GLUCOSE SERPL-MCNC: 91 MG/DL (ref 74–99)
HCG SERPL QL: NEGATIVE
HCT VFR BLD AUTO: 35.7 % (ref 34–48)
HGB BLD-MCNC: 11.5 G/DL (ref 11.5–15.5)
IMM GRANULOCYTES # BLD: 0.04 E9/L
IMM GRANULOCYTES NFR BLD: 0.6 % (ref 0–5)
LYMPHOCYTES # BLD: 0.97 E9/L (ref 1.5–4)
LYMPHOCYTES NFR BLD: 14.9 % (ref 20–42)
MCH RBC QN AUTO: 30.9 PG (ref 26–35)
MCHC RBC AUTO-ENTMCNC: 32.2 % (ref 32–34.5)
MCV RBC AUTO: 96 FL (ref 80–99.9)
MONOCYTES # BLD: 0.92 E9/L (ref 0.1–0.95)
MONOCYTES NFR BLD: 14.1 % (ref 2–12)
NEUTROPHILS # BLD: 4.36 E9/L (ref 1.8–7.3)
NEUTS SEG NFR BLD: 67 % (ref 43–80)
PLATELET # BLD AUTO: 151 E9/L (ref 130–450)
PMV BLD AUTO: 9.5 FL (ref 7–12)
POTASSIUM SERPL-SCNC: 4.2 MMOL/L (ref 3.5–5)
PROT SERPL-MCNC: 7.2 G/DL (ref 6.4–8.3)
RBC # BLD AUTO: 3.72 E12/L (ref 3.5–5.5)
SODIUM SERPL-SCNC: 139 MMOL/L (ref 132–146)
WBC # BLD: 6.5 E9/L (ref 4.5–11.5)

## 2023-04-20 PROCEDURE — 36591 DRAW BLOOD OFF VENOUS DEVICE: CPT

## 2023-04-20 PROCEDURE — 80053 COMPREHEN METABOLIC PANEL: CPT

## 2023-04-20 PROCEDURE — 84703 CHORIONIC GONADOTROPIN ASSAY: CPT

## 2023-04-20 PROCEDURE — 99213 OFFICE O/P EST LOW 20 MIN: CPT | Performed by: INTERNAL MEDICINE

## 2023-04-20 PROCEDURE — 85025 COMPLETE CBC W/AUTO DIFF WBC: CPT

## 2023-04-20 PROCEDURE — 2580000003 HC RX 258: Performed by: INTERNAL MEDICINE

## 2023-04-20 PROCEDURE — 6360000002 HC RX W HCPCS: Performed by: INTERNAL MEDICINE

## 2023-04-20 PROCEDURE — G8420 CALC BMI NORM PARAMETERS: HCPCS | Performed by: INTERNAL MEDICINE

## 2023-04-20 PROCEDURE — 4004F PT TOBACCO SCREEN RCVD TLK: CPT | Performed by: INTERNAL MEDICINE

## 2023-04-20 PROCEDURE — G8427 DOCREV CUR MEDS BY ELIG CLIN: HCPCS | Performed by: INTERNAL MEDICINE

## 2023-04-20 RX ORDER — SODIUM CHLORIDE 9 MG/ML
25 INJECTION, SOLUTION INTRAVENOUS PRN
OUTPATIENT
Start: 2023-04-20

## 2023-04-20 RX ORDER — WATER 1000 ML/1000ML
2.2 INJECTION, SOLUTION INTRAVENOUS ONCE
OUTPATIENT
Start: 2023-04-20 | End: 2023-04-20

## 2023-04-20 RX ORDER — PROCHLORPERAZINE MALEATE 10 MG
10 TABLET ORAL EVERY 6 HOURS PRN
Qty: 120 TABLET | Refills: 0 | Status: SHIPPED | OUTPATIENT
Start: 2023-04-20

## 2023-04-20 RX ORDER — POTASSIUM CHLORIDE 20 MEQ/1
20 TABLET, EXTENDED RELEASE ORAL 2 TIMES DAILY
Qty: 60 TABLET | Refills: 0 | Status: CANCELLED | OUTPATIENT
Start: 2023-04-20

## 2023-04-20 RX ORDER — HEPARIN SODIUM (PORCINE) LOCK FLUSH IV SOLN 100 UNIT/ML 100 UNIT/ML
500 SOLUTION INTRAVENOUS PRN
Status: DISCONTINUED | OUTPATIENT
Start: 2023-04-20 | End: 2023-04-21 | Stop reason: HOSPADM

## 2023-04-20 RX ORDER — SODIUM CHLORIDE 0.9 % (FLUSH) 0.9 %
5-40 SYRINGE (ML) INJECTION PRN
Status: DISCONTINUED | OUTPATIENT
Start: 2023-04-20 | End: 2023-04-21 | Stop reason: HOSPADM

## 2023-04-20 RX ORDER — SODIUM CHLORIDE 0.9 % (FLUSH) 0.9 %
5-40 SYRINGE (ML) INJECTION PRN
OUTPATIENT
Start: 2023-04-20

## 2023-04-20 RX ORDER — POTASSIUM CHLORIDE 20 MEQ/1
TABLET, EXTENDED RELEASE ORAL
Qty: 60 TABLET | Refills: 0 | OUTPATIENT
Start: 2023-04-20

## 2023-04-20 RX ORDER — HEPARIN SODIUM (PORCINE) LOCK FLUSH IV SOLN 100 UNIT/ML 100 UNIT/ML
500 SOLUTION INTRAVENOUS PRN
OUTPATIENT
Start: 2023-04-20

## 2023-04-20 RX ADMIN — SODIUM CHLORIDE, PRESERVATIVE FREE 10 ML: 5 INJECTION INTRAVENOUS at 13:12

## 2023-04-20 RX ADMIN — SODIUM CHLORIDE, PRESERVATIVE FREE 10 ML: 5 INJECTION INTRAVENOUS at 13:14

## 2023-04-20 RX ADMIN — HEPARIN 500 UNITS: 100 SYRINGE at 13:14

## 2023-04-20 NOTE — PROGRESS NOTES
Patient did stop at check out window, ok'd to leave without AVS.  Patient has [address]
throughout the liver compatible with neoplasm. This involves both the left and the right lobe of liver. In addition there are 2 foci of increased tracer uptake along the mesentery of the small bowel. This may involve the wall the small bowel. No other convincing evidence for extra-abdominal metastatic disease. EGD/Colonoscopy 10/05/2020 by Dr. William Contreras; records reviewed. Hepatobiliary team (Dr. Randy Barrera) consult appreciated. Small bowel resection on 10/21/2020. Small bowel resection on 10/21/2020. A. Ileum, resection:   Multifocal (4 foci) neuroendocrine tumor (NET). Extensive perineural and angiolymphatic invasion. 3 of 10 lymph nodes with metastatic neuroendocrine tumor and extracapsular extension of tumor cells (3/10). Bilateral viable small bowel resection margins with no evidence of tumor. B. Specimen received as \"Meckel's\":   Nodular scar tissue with patchy chronic inflammation. Negative for neuroendocrine tumor. Intestinal mucosal tissue is not present. CASE SUMMARY:   Procedure: Small bowel resection   Tumor site: Ileum   Tumor size: Greatest dimension of 1.5 cm   Tumor focality: Multifocal: 4 separate tumors   Histologic type and grade: G2: Well-differentiated neuroendocrine tumor   Mitotic rate: between 2-20 mitoses/2 mm2   Ki-67 labeling index: between 3-20%   Tumor extension: Tumor invades through the muscularis propria into subserosal tissue without penetration of overlying serosa   Margins: All margins are uninvolved by tumor    Margins examined: Proximal and distal   Lymphovascular invasion: Present   Perineural invasion: Present   Large mesenteric masses (greater than 2 cm): Present (one 2.5 cm mass)   Regional lymph nodes:    Number of lymph nodes involved: 3    Number of lymph nodes examined: 10   Pathologic stage classification (pTNM, AJCC eighth edition):    pT3    pN2    pM1a -confirmed liver metastasis, HBS-     Comment:   A.  Immunostains stain the tumor

## 2023-04-21 ENCOUNTER — TELEPHONE (OUTPATIENT)
Dept: PRIMARY CARE CLINIC | Age: 38
End: 2023-04-21

## 2023-04-22 NOTE — ADDENDUM NOTE
Addended by: Kiesha Palmer on: 4/22/2023 10:51 AM     Modules accepted: Level of Service Abdomen soft, non-tender, no guarding.

## 2023-04-24 RX ORDER — OMEPRAZOLE 20 MG/1
20 CAPSULE, DELAYED RELEASE ORAL
Qty: 30 CAPSULE | Refills: 5 | Status: SHIPPED | OUTPATIENT
Start: 2023-04-24

## 2023-04-27 ENCOUNTER — OFFICE VISIT (OUTPATIENT)
Dept: PRIMARY CARE CLINIC | Age: 38
End: 2023-04-27
Payer: MEDICARE

## 2023-04-27 VITALS
HEART RATE: 91 BPM | TEMPERATURE: 98.4 F | OXYGEN SATURATION: 98 % | SYSTOLIC BLOOD PRESSURE: 124 MMHG | HEIGHT: 60 IN | DIASTOLIC BLOOD PRESSURE: 76 MMHG | WEIGHT: 132 LBS | RESPIRATION RATE: 18 BRPM | BODY MASS INDEX: 25.91 KG/M2

## 2023-04-27 DIAGNOSIS — F51.01 PRIMARY INSOMNIA: Primary | ICD-10-CM

## 2023-04-27 DIAGNOSIS — F51.01 PRIMARY INSOMNIA: ICD-10-CM

## 2023-04-27 DIAGNOSIS — C7B.8 METASTATIC MALIGNANT NEUROENDOCRINE TUMOR TO LIVER (HCC): ICD-10-CM

## 2023-04-27 DIAGNOSIS — R10.13 EPIGASTRIC PAIN: ICD-10-CM

## 2023-04-27 PROBLEM — K72.91 HEPATIC COMA/ENCEPHALOPATHY (HCC): Status: RESOLVED | Noted: 2022-09-28 | Resolved: 2023-04-27

## 2023-04-27 PROBLEM — G40.919 BREAKTHROUGH SEIZURE (HCC): Status: RESOLVED | Noted: 2021-06-23 | Resolved: 2023-04-27

## 2023-04-27 PROBLEM — R56.9 NEW ONSET SEIZURE (HCC): Status: RESOLVED | Noted: 2021-01-21 | Resolved: 2023-04-27

## 2023-04-27 LAB
ALBUMIN SERPL-MCNC: 4.3 G/DL (ref 3.5–5.2)
ALP SERPL-CCNC: 126 U/L (ref 35–104)
ALT SERPL-CCNC: 17 U/L (ref 0–32)
ANION GAP SERPL CALCULATED.3IONS-SCNC: 14 MMOL/L (ref 7–16)
ANISOCYTOSIS: ABNORMAL
AST SERPL-CCNC: 22 U/L (ref 0–31)
BASOPHILS # BLD: 0.07 E9/L (ref 0–0.2)
BASOPHILS NFR BLD: 0.8 % (ref 0–2)
BILIRUB SERPL-MCNC: 0.3 MG/DL (ref 0–1.2)
BUN SERPL-MCNC: 7 MG/DL (ref 6–20)
CALCIUM SERPL-MCNC: 8.7 MG/DL (ref 8.6–10.2)
CHLORIDE SERPL-SCNC: 100 MMOL/L (ref 98–107)
CO2 SERPL-SCNC: 24 MMOL/L (ref 22–29)
CREAT SERPL-MCNC: 0.5 MG/DL (ref 0.5–1)
EOSINOPHIL # BLD: 0.08 E9/L (ref 0.05–0.5)
EOSINOPHIL NFR BLD: 0.9 % (ref 0–6)
ERYTHROCYTE [DISTWIDTH] IN BLOOD BY AUTOMATED COUNT: 18 FL (ref 11.5–15)
GLUCOSE SERPL-MCNC: 110 MG/DL (ref 74–99)
HCT VFR BLD AUTO: 37.1 % (ref 34–48)
HGB BLD-MCNC: 11.9 G/DL (ref 11.5–15.5)
LIPASE: 31 U/L (ref 13–60)
LYMPHOCYTES # BLD: 0.52 E9/L (ref 1.5–4)
LYMPHOCYTES NFR BLD: 5.9 % (ref 20–42)
MCH RBC QN AUTO: 31.3 PG (ref 26–35)
MCHC RBC AUTO-ENTMCNC: 32.1 % (ref 32–34.5)
MCV RBC AUTO: 97.6 FL (ref 80–99.9)
MONOCYTES # BLD: 0.96 E9/L (ref 0.1–0.95)
MONOCYTES NFR BLD: 11 % (ref 2–12)
NEUTROPHILS # BLD: 7.05 E9/L (ref 1.8–7.3)
NEUTS SEG NFR BLD: 81.4 % (ref 43–80)
PLATELET # BLD AUTO: 181 E9/L (ref 130–450)
PMV BLD AUTO: 10.1 FL (ref 7–12)
POLYCHROMASIA: ABNORMAL
POTASSIUM SERPL-SCNC: 3.9 MMOL/L (ref 3.5–5)
PROT SERPL-MCNC: 7.4 G/DL (ref 6.4–8.3)
RBC # BLD AUTO: 3.8 E12/L (ref 3.5–5.5)
SODIUM SERPL-SCNC: 138 MMOL/L (ref 132–146)
WBC # BLD: 8.7 E9/L (ref 4.5–11.5)

## 2023-04-27 PROCEDURE — G8419 CALC BMI OUT NRM PARAM NOF/U: HCPCS | Performed by: FAMILY MEDICINE

## 2023-04-27 PROCEDURE — 4004F PT TOBACCO SCREEN RCVD TLK: CPT | Performed by: FAMILY MEDICINE

## 2023-04-27 PROCEDURE — 99214 OFFICE O/P EST MOD 30 MIN: CPT | Performed by: FAMILY MEDICINE

## 2023-04-27 PROCEDURE — G8427 DOCREV CUR MEDS BY ELIG CLIN: HCPCS | Performed by: FAMILY MEDICINE

## 2023-04-27 ASSESSMENT — ENCOUNTER SYMPTOMS
BLOOD IN STOOL: 0
CHEST TIGHTNESS: 0
PHOTOPHOBIA: 0
SORE THROAT: 0
FACIAL SWELLING: 0
ALLERGIC/IMMUNOLOGIC NEGATIVE: 1
ABDOMINAL PAIN: 1
BACK PAIN: 0
SINUS PRESSURE: 0
VOMITING: 0
APNEA: 0
DIARRHEA: 0
COLOR CHANGE: 0
NAUSEA: 0
WHEEZING: 0
COUGH: 0
SHORTNESS OF BREATH: 0

## 2023-04-27 ASSESSMENT — PATIENT HEALTH QUESTIONNAIRE - PHQ9
5. POOR APPETITE OR OVEREATING: 0
6. FEELING BAD ABOUT YOURSELF - OR THAT YOU ARE A FAILURE OR HAVE LET YOURSELF OR YOUR FAMILY DOWN: 0
SUM OF ALL RESPONSES TO PHQ9 QUESTIONS 1 & 2: 0
8. MOVING OR SPEAKING SO SLOWLY THAT OTHER PEOPLE COULD HAVE NOTICED. OR THE OPPOSITE, BEING SO FIGETY OR RESTLESS THAT YOU HAVE BEEN MOVING AROUND A LOT MORE THAN USUAL: 0
SUM OF ALL RESPONSES TO PHQ QUESTIONS 1-9: 0
2. FEELING DOWN, DEPRESSED OR HOPELESS: 0
9. THOUGHTS THAT YOU WOULD BE BETTER OFF DEAD, OR OF HURTING YOURSELF: 0
7. TROUBLE CONCENTRATING ON THINGS, SUCH AS READING THE NEWSPAPER OR WATCHING TELEVISION: 0
3. TROUBLE FALLING OR STAYING ASLEEP: 0
1. LITTLE INTEREST OR PLEASURE IN DOING THINGS: 0
4. FEELING TIRED OR HAVING LITTLE ENERGY: 0
10. IF YOU CHECKED OFF ANY PROBLEMS, HOW DIFFICULT HAVE THESE PROBLEMS MADE IT FOR YOU TO DO YOUR WORK, TAKE CARE OF THINGS AT HOME, OR GET ALONG WITH OTHER PEOPLE: 0
SUM OF ALL RESPONSES TO PHQ QUESTIONS 1-9: 0

## 2023-04-27 NOTE — PROGRESS NOTES
Chief Complaint:   Chief Complaint   Patient presents with    6 Month Follow-Up       Insomnia  This is a chronic problem. The current episode started more than 1 year ago. The problem occurs daily. The problem has been unchanged. Associated symptoms include abdominal pain. Pertinent negatives include no arthralgias, chest pain, congestion, coughing, headaches, joint swelling, myalgias, nausea, rash, sore throat, vomiting or weakness. Abdominal Pain  This is a new problem. The current episode started in the past 7 days. The onset quality is gradual. The problem occurs intermittently. The problem has been waxing and waning. The pain is located in the epigastric region. The pain is at a severity of 1/10. The pain is mild. Pertinent negatives include no arthralgias, diarrhea, frequency, headaches, myalgias, nausea or vomiting. Her past medical history is significant for abdominal surgery.      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)    Malignant carcinoid tumor of ileum ([address]    Neuroendocrine tumor    Hypomagnesemia    Hypocalcemia    Hypoparathyroidism (HCC)    Tobacco abuse    Elevated liver enzymes    Moderate protein-calorie malnutrition ([address]    Encounter regarding vascular access for dialysis for ESRD ([address]    Difficulty with speech    Neuroendocrine cancer ([address]    Alcohol abuse    Depressed bipolar II disorder ([address]    Liver metastases    Thrombocytopenia (HCC)    Altered mental status    Nausea and vomiting    Viral gastroenteritis    AMS (altered mental status)    Crohn's disease of colon without complication ([address]       Past Medical History:   Diagnosis Date    Abdominal pain     Cancer ([address]     neuroendocrime tumor    Diarrhea     Hypocalcemia     Hypothyroidism

## 2023-05-01 ENCOUNTER — OFFICE VISIT (OUTPATIENT)
Dept: PALLATIVE CARE | Age: 38
End: 2023-05-01
Payer: MEDICARE

## 2023-05-01 VITALS
TEMPERATURE: 97.9 F | DIASTOLIC BLOOD PRESSURE: 77 MMHG | SYSTOLIC BLOOD PRESSURE: 118 MMHG | WEIGHT: 131 LBS | HEART RATE: 83 BPM | OXYGEN SATURATION: 98 % | BODY MASS INDEX: 25.58 KG/M2

## 2023-05-01 DIAGNOSIS — C7B.8 NEUROENDOCRINE CARCINOMA METASTATIC TO LIVER (HCC): ICD-10-CM

## 2023-05-01 DIAGNOSIS — Z51.5 ENCOUNTER FOR PALLIATIVE CARE: Primary | ICD-10-CM

## 2023-05-01 DIAGNOSIS — C7A.8 NEUROENDOCRINE CARCINOMA METASTATIC TO LIVER (HCC): ICD-10-CM

## 2023-05-01 DIAGNOSIS — G89.3 NEOPLASM RELATED PAIN: ICD-10-CM

## 2023-05-01 PROCEDURE — 99211 OFF/OP EST MAY X REQ PHY/QHP: CPT | Performed by: NURSE PRACTITIONER

## 2023-05-01 PROCEDURE — 4004F PT TOBACCO SCREEN RCVD TLK: CPT | Performed by: NURSE PRACTITIONER

## 2023-05-01 PROCEDURE — G8419 CALC BMI OUT NRM PARAM NOF/U: HCPCS | Performed by: NURSE PRACTITIONER

## 2023-05-01 PROCEDURE — 99213 OFFICE O/P EST LOW 20 MIN: CPT | Performed by: NURSE PRACTITIONER

## 2023-05-01 PROCEDURE — G8427 DOCREV CUR MEDS BY ELIG CLIN: HCPCS | Performed by: NURSE PRACTITIONER

## 2023-05-01 RX ORDER — HYDROXYZINE HYDROCHLORIDE 25 MG/1
25 TABLET, FILM COATED ORAL EVERY 8 HOURS PRN
Qty: 90 TABLET | Refills: 0 | Status: SHIPPED
Start: 2023-05-01 | End: 2023-05-02 | Stop reason: ALTCHOICE

## 2023-05-01 NOTE — PROGRESS NOTES
.  Department of Palliative Medicine  Ambulatory Note  Provider: TRINA Kathleen - CNP     Chief Complaint: Henrry Boothe is a 41 y.o. female with chief complaint of pain    HPI:  Henrry Boothe is a 29 y.o. female with significant medical history of hypothyroidism, IBS, migraine who was complaining of abdominal pain associated with diarrhea and flushing/sweating. She was diagnosed with metastatic well differentiated Neuroendocrine cancer to liver who was referred to [address] [place] [address] by Dr. Blessing Gutierrez. Assessment/Plan      Neuroendocrine cancer  - Follows with Dr. Kayleen driscoll Bowel resection on 10/21/20  -now on [address]  -receiving radiation     Neoplasm related pain  - Gabapentin 300mg TID  - Norco 5/325 mg Q 8 PRN    Nausea  - Continue Zofran and Phenergan PRN    Diarrhea:   Geraldo Restrepo  -Imodium she uses this daily  -Eat 1-3 jumbo marshmallows daily PRN  -Lactulose needed due to hyperammonemia     Anxiety:   -Encouraged to talk with counselor   -Hydroxyzine 25 mg TID PRN    - Goals of care: cure, live longer and improve or maintain function/quality of life    - Code Status: full code    Follow Up:  4 weeks. Encouraged to call with any questions, concerns, needs, or changes in symptoms. Subjective:   Claudine Angelo presents today with her mother. She reports that she has been doing well. Bhupendra Amor continues to work well at managing her pain, and she is use this typically only 1-2 times per day, she also continues utilize gabapentin. She reports she continues to have diarrhea but managed with current medications. She complains of some anxiety that is worse in the morning afternoons, present for proxy the last month, episodes of occurring correctly once every day. Encouraged with Dr. Vitor Steele regarding this, as well as to increase her activity, as appears to mostly be related to lack of activity.   Additionally provide a trial of hydroxyzine to see if this is beneficial.  She

## 2023-05-02 ENCOUNTER — TELEPHONE (OUTPATIENT)
Dept: CASE MANAGEMENT | Age: 38
End: 2023-05-02

## 2023-05-02 RX ORDER — HYDROXYZINE HYDROCHLORIDE 10 MG/1
10 TABLET, FILM COATED ORAL 3 TIMES DAILY PRN
Qty: 30 TABLET | Refills: 0 | Status: SHIPPED | OUTPATIENT
Start: 2023-05-02 | End: 2023-05-12

## 2023-05-02 NOTE — TELEPHONE ENCOUNTER
I called and reminded the patient of her blood work appt for tomorrow 5/3/2023. Patient confirmed.        Electronically signed by Mika Avina on 5/2/23 at 8:59 AM EDT

## 2023-05-02 NOTE — PROGRESS NOTES
Palliative Medicine  Progress Note    Hydroxyzine 25 was causing drowsiness, will try 10 mg. Cong MENA-CNP  Palliative Medicine    Note: This report was completed using computerize voiced recognition software. Every effort has been made to ensure accuracy; however, inadvertent computerized transcription errors may be present.

## 2023-05-03 ENCOUNTER — HOSPITAL ENCOUNTER (OUTPATIENT)
Dept: INFUSION THERAPY | Age: 38
Discharge: HOME | End: 2023-05-03
Payer: MEDICARE

## 2023-05-03 DIAGNOSIS — C7B.8 NEUROENDOCRINE CARCINOMA METASTATIC TO LIVER (HCC): ICD-10-CM

## 2023-05-03 DIAGNOSIS — C7A.8 NEUROENDOCRINE CARCINOMA METASTATIC TO LIVER (HCC): ICD-10-CM

## 2023-05-03 DIAGNOSIS — C7B.8 METASTATIC MALIGNANT NEUROENDOCRINE TUMOR TO LIVER (HCC): Primary | ICD-10-CM

## 2023-05-03 LAB
ALBUMIN SERPL-MCNC: 4 G/DL (ref 3.5–5.2)
ALP SERPL-CCNC: 89 U/L (ref 35–104)
ALT SERPL-CCNC: 14 U/L (ref 0–32)
ANION GAP SERPL CALCULATED.3IONS-SCNC: 10 MMOL/L (ref 7–16)
AST SERPL-CCNC: 20 U/L (ref 0–31)
BASOPHILS # BLD: 0.05 E9/L (ref 0–0.2)
BASOPHILS NFR BLD: 0.9 % (ref 0–2)
BILIRUB SERPL-MCNC: <0.2 MG/DL (ref 0–1.2)
BUN SERPL-MCNC: 14 MG/DL (ref 6–20)
CALCIUM SERPL-MCNC: 8.7 MG/DL (ref 8.6–10.2)
CHLORIDE SERPL-SCNC: 102 MMOL/L (ref 98–107)
CO2 SERPL-SCNC: 25 MMOL/L (ref 22–29)
CREAT SERPL-MCNC: 0.7 MG/DL (ref 0.5–1)
EOSINOPHIL # BLD: 0.19 E9/L (ref 0.05–0.5)
EOSINOPHIL NFR BLD: 3.3 % (ref 0–6)
ERYTHROCYTE [DISTWIDTH] IN BLOOD BY AUTOMATED COUNT: 17.7 FL (ref 11.5–15)
GLUCOSE SERPL-MCNC: 85 MG/DL (ref 74–99)
HCG SERPL QL: NEGATIVE
HCT VFR BLD AUTO: 34 % (ref 34–48)
HGB BLD-MCNC: 10.9 G/DL (ref 11.5–15.5)
IMM GRANULOCYTES # BLD: 0.04 E9/L
IMM GRANULOCYTES NFR BLD: 0.7 % (ref 0–5)
LYMPHOCYTES # BLD: 0.69 E9/L (ref 1.5–4)
LYMPHOCYTES NFR BLD: 12 % (ref 20–42)
MCH RBC QN AUTO: 30.6 PG (ref 26–35)
MCHC RBC AUTO-ENTMCNC: 32.1 % (ref 32–34.5)
MCV RBC AUTO: 95.5 FL (ref 80–99.9)
MONOCYTES # BLD: 0.9 E9/L (ref 0.1–0.95)
MONOCYTES NFR BLD: 15.6 % (ref 2–12)
NEUTROPHILS # BLD: 3.9 E9/L (ref 1.8–7.3)
NEUTS SEG NFR BLD: 67.5 % (ref 43–80)
PLATELET # BLD AUTO: 181 E9/L (ref 130–450)
PMV BLD AUTO: 9.3 FL (ref 7–12)
POTASSIUM SERPL-SCNC: 4.3 MMOL/L (ref 3.5–5)
PROT SERPL-MCNC: 7.2 G/DL (ref 6.4–8.3)
RBC # BLD AUTO: 3.56 E12/L (ref 3.5–5.5)
SODIUM SERPL-SCNC: 137 MMOL/L (ref 132–146)
WBC # BLD: 5.8 E9/L (ref 4.5–11.5)

## 2023-05-03 PROCEDURE — 36591 DRAW BLOOD OFF VENOUS DEVICE: CPT

## 2023-05-03 PROCEDURE — 80053 COMPREHEN METABOLIC PANEL: CPT

## 2023-05-03 PROCEDURE — 2580000003 HC RX 258: Performed by: INTERNAL MEDICINE

## 2023-05-03 PROCEDURE — 6360000002 HC RX W HCPCS: Performed by: INTERNAL MEDICINE

## 2023-05-03 PROCEDURE — 84703 CHORIONIC GONADOTROPIN ASSAY: CPT

## 2023-05-03 PROCEDURE — 85025 COMPLETE CBC W/AUTO DIFF WBC: CPT

## 2023-05-03 RX ORDER — SODIUM CHLORIDE 9 MG/ML
25 INJECTION, SOLUTION INTRAVENOUS PRN
OUTPATIENT
Start: 2023-05-03

## 2023-05-03 RX ORDER — HEPARIN SODIUM (PORCINE) LOCK FLUSH IV SOLN 100 UNIT/ML 100 UNIT/ML
500 SOLUTION INTRAVENOUS PRN
OUTPATIENT
Start: 2023-05-03

## 2023-05-03 RX ORDER — SODIUM CHLORIDE 0.9 % (FLUSH) 0.9 %
5-40 SYRINGE (ML) INJECTION PRN
Status: DISCONTINUED | OUTPATIENT
Start: 2023-05-03 | End: 2023-05-04 | Stop reason: HOSPADM

## 2023-05-03 RX ORDER — SODIUM CHLORIDE 0.9 % (FLUSH) 0.9 %
5-40 SYRINGE (ML) INJECTION PRN
OUTPATIENT
Start: 2023-05-03

## 2023-05-03 RX ORDER — WATER 1000 ML/1000ML
2.2 INJECTION, SOLUTION INTRAVENOUS ONCE
OUTPATIENT
Start: 2023-05-03 | End: 2023-05-03

## 2023-05-03 RX ORDER — HEPARIN SODIUM (PORCINE) LOCK FLUSH IV SOLN 100 UNIT/ML 100 UNIT/ML
500 SOLUTION INTRAVENOUS PRN
Status: DISCONTINUED | OUTPATIENT
Start: 2023-05-03 | End: 2023-05-04 | Stop reason: HOSPADM

## 2023-05-03 RX ADMIN — HEPARIN 500 UNITS: 100 SYRINGE at 10:24

## 2023-05-03 RX ADMIN — SODIUM CHLORIDE, PRESERVATIVE FREE 10 ML: 5 INJECTION INTRAVENOUS at 10:23

## 2023-05-03 NOTE — TELEPHONE ENCOUNTER
Incoming call from [facility] requesting refill of her Norco. Returned call to patient to inform her she is not due for refill for 4 more days and asked her to return call to clinic so we may discuss.

## 2023-05-04 DIAGNOSIS — C7B.8 NEUROENDOCRINE CARCINOMA METASTATIC TO LIVER (HCC): ICD-10-CM

## 2023-05-04 DIAGNOSIS — G89.3 NEOPLASM RELATED PAIN: ICD-10-CM

## 2023-05-04 DIAGNOSIS — Z51.5 PALLIATIVE CARE BY SPECIALIST: ICD-10-CM

## 2023-05-04 DIAGNOSIS — C7A.8 NEUROENDOCRINE CARCINOMA METASTATIC TO LIVER (HCC): ICD-10-CM

## 2023-05-04 RX ORDER — HYDROCODONE BITARTRATE AND ACETAMINOPHEN 5; 325 MG/1; MG/1
1 TABLET ORAL EVERY 8 HOURS PRN
Qty: 90 TABLET | Refills: 0 | Status: SHIPPED | OUTPATIENT
Start: 2023-05-04 | End: 2023-06-03

## 2023-05-04 NOTE — TELEPHONE ENCOUNTER
Call from [facility] request refill for [place] be sent to [facility] in [place]. [facility] still has [address] and knows to  refill when she is out appropriately. Next george 6/12/23.

## 2023-05-16 DIAGNOSIS — F51.01 PRIMARY INSOMNIA: ICD-10-CM

## 2023-05-16 DIAGNOSIS — G43.909 MIGRAINE WITHOUT STATUS MIGRAINOSUS, NOT INTRACTABLE, UNSPECIFIED MIGRAINE TYPE: ICD-10-CM

## 2023-05-16 RX ORDER — ZOLPIDEM TARTRATE 10 MG/1
10 TABLET ORAL NIGHTLY PRN
Qty: 30 TABLET | Refills: 2 | Status: SHIPPED | OUTPATIENT
Start: 2023-05-16 | End: 2023-08-14

## 2023-05-16 RX ORDER — BUTALBITAL, ACETAMINOPHEN AND CAFFEINE 50; 325; 40 MG/1; MG/1; MG/1
1 TABLET ORAL 2 TIMES DAILY PRN
Qty: 60 TABLET | Refills: 2 | Status: SHIPPED | OUTPATIENT
Start: 2023-05-16

## 2023-05-18 ENCOUNTER — OFFICE VISIT (OUTPATIENT)
Dept: ONCOLOGY | Age: 38
End: 2023-05-18
Payer: MEDICARE

## 2023-05-18 ENCOUNTER — HOSPITAL ENCOUNTER (OUTPATIENT)
Dept: INFUSION THERAPY | Age: 38
Discharge: HOME | End: 2023-05-18
Payer: MEDICARE

## 2023-05-18 VITALS
TEMPERATURE: 97.8 F | DIASTOLIC BLOOD PRESSURE: 66 MMHG | OXYGEN SATURATION: 100 % | SYSTOLIC BLOOD PRESSURE: 104 MMHG | WEIGHT: 132.8 LBS | HEART RATE: 69 BPM | BODY MASS INDEX: 26.07 KG/M2 | HEIGHT: 60 IN

## 2023-05-18 DIAGNOSIS — C7B.8 NEUROENDOCRINE CARCINOMA METASTATIC TO LIVER (HCC): ICD-10-CM

## 2023-05-18 DIAGNOSIS — C7B.8 METASTATIC MALIGNANT NEUROENDOCRINE TUMOR TO LIVER (HCC): ICD-10-CM

## 2023-05-18 DIAGNOSIS — C7B.8 METASTATIC MALIGNANT NEUROENDOCRINE TUMOR TO LIVER (HCC): Primary | ICD-10-CM

## 2023-05-18 DIAGNOSIS — C7A.8 NEUROENDOCRINE CANCER (HCC): Primary | ICD-10-CM

## 2023-05-18 DIAGNOSIS — R11.0 NAUSEA: ICD-10-CM

## 2023-05-18 DIAGNOSIS — C7A.8 NEUROENDOCRINE CARCINOMA METASTATIC TO LIVER (HCC): ICD-10-CM

## 2023-05-18 DIAGNOSIS — R19.7 DIARRHEA, UNSPECIFIED TYPE: ICD-10-CM

## 2023-05-18 LAB
ALBUMIN SERPL-MCNC: 4.3 G/DL (ref 3.5–5.2)
ALP SERPL-CCNC: 142 U/L (ref 35–104)
ALT SERPL-CCNC: 24 U/L (ref 0–32)
ANION GAP SERPL CALCULATED.3IONS-SCNC: 9 MMOL/L (ref 7–16)
AST SERPL-CCNC: 22 U/L (ref 0–31)
BASOPHILS # BLD: 0.06 E9/L (ref 0–0.2)
BASOPHILS NFR BLD: 0.9 % (ref 0–2)
BILIRUB SERPL-MCNC: <0.2 MG/DL (ref 0–1.2)
BUN SERPL-MCNC: 17 MG/DL (ref 6–20)
CALCIUM SERPL-MCNC: 8.9 MG/DL (ref 8.6–10.2)
CHLORIDE SERPL-SCNC: 102 MMOL/L (ref 98–107)
CO2 SERPL-SCNC: 22 MMOL/L (ref 22–29)
CREAT SERPL-MCNC: 0.6 MG/DL (ref 0.5–1)
EOSINOPHIL # BLD: 0.13 E9/L (ref 0.05–0.5)
EOSINOPHIL NFR BLD: 1.9 % (ref 0–6)
ERYTHROCYTE [DISTWIDTH] IN BLOOD BY AUTOMATED COUNT: 17.7 FL (ref 11.5–15)
GLUCOSE SERPL-MCNC: 110 MG/DL (ref 74–99)
HCG SERPL QL: NEGATIVE
HCT VFR BLD AUTO: 33.5 % (ref 34–48)
HGB BLD-MCNC: 11.1 G/DL (ref 11.5–15.5)
IMM GRANULOCYTES # BLD: 0.04 E9/L
IMM GRANULOCYTES NFR BLD: 0.6 % (ref 0–5)
LYMPHOCYTES # BLD: 1.3 E9/L (ref 1.5–4)
LYMPHOCYTES NFR BLD: 19.4 % (ref 20–42)
MCH RBC QN AUTO: 31.9 PG (ref 26–35)
MCHC RBC AUTO-ENTMCNC: 33.1 % (ref 32–34.5)
MCV RBC AUTO: 96.3 FL (ref 80–99.9)
MONOCYTES # BLD: 1.1 E9/L (ref 0.1–0.95)
MONOCYTES NFR BLD: 16.4 % (ref 2–12)
NEUTROPHILS # BLD: 4.06 E9/L (ref 1.8–7.3)
NEUTS SEG NFR BLD: 60.8 % (ref 43–80)
PLATELET # BLD AUTO: 205 E9/L (ref 130–450)
PMV BLD AUTO: 9.5 FL (ref 7–12)
POTASSIUM SERPL-SCNC: 3.8 MMOL/L (ref 3.5–5)
PROT SERPL-MCNC: 7.3 G/DL (ref 6.4–8.3)
RBC # BLD AUTO: 3.48 E12/L (ref 3.5–5.5)
SODIUM SERPL-SCNC: 133 MMOL/L (ref 132–146)
WBC # BLD: 6.7 E9/L (ref 4.5–11.5)

## 2023-05-18 PROCEDURE — 85025 COMPLETE CBC W/AUTO DIFF WBC: CPT

## 2023-05-18 PROCEDURE — 80053 COMPREHEN METABOLIC PANEL: CPT

## 2023-05-18 PROCEDURE — 2580000003 HC RX 258: Performed by: INTERNAL MEDICINE

## 2023-05-18 PROCEDURE — 36591 DRAW BLOOD OFF VENOUS DEVICE: CPT

## 2023-05-18 PROCEDURE — 99213 OFFICE O/P EST LOW 20 MIN: CPT | Performed by: INTERNAL MEDICINE

## 2023-05-18 PROCEDURE — 84703 CHORIONIC GONADOTROPIN ASSAY: CPT

## 2023-05-18 PROCEDURE — 6360000002 HC RX W HCPCS: Performed by: INTERNAL MEDICINE

## 2023-05-18 PROCEDURE — 4004F PT TOBACCO SCREEN RCVD TLK: CPT | Performed by: INTERNAL MEDICINE

## 2023-05-18 PROCEDURE — G8427 DOCREV CUR MEDS BY ELIG CLIN: HCPCS | Performed by: INTERNAL MEDICINE

## 2023-05-18 PROCEDURE — G8419 CALC BMI OUT NRM PARAM NOF/U: HCPCS | Performed by: INTERNAL MEDICINE

## 2023-05-18 RX ORDER — HEPARIN SODIUM (PORCINE) LOCK FLUSH IV SOLN 100 UNIT/ML 100 UNIT/ML
500 SOLUTION INTRAVENOUS PRN
Status: DISCONTINUED | OUTPATIENT
Start: 2023-05-18 | End: 2023-05-19 | Stop reason: HOSPADM

## 2023-05-18 RX ORDER — WATER 1000 ML/1000ML
2.2 INJECTION, SOLUTION INTRAVENOUS ONCE
OUTPATIENT
Start: 2023-05-18 | End: 2023-05-18

## 2023-05-18 RX ORDER — HEPARIN SODIUM (PORCINE) LOCK FLUSH IV SOLN 100 UNIT/ML 100 UNIT/ML
500 SOLUTION INTRAVENOUS PRN
OUTPATIENT
Start: 2023-05-18

## 2023-05-18 RX ORDER — SODIUM CHLORIDE 0.9 % (FLUSH) 0.9 %
5-40 SYRINGE (ML) INJECTION PRN
OUTPATIENT
Start: 2023-05-18

## 2023-05-18 RX ORDER — SODIUM CHLORIDE 0.9 % (FLUSH) 0.9 %
5-40 SYRINGE (ML) INJECTION PRN
Status: DISCONTINUED | OUTPATIENT
Start: 2023-05-18 | End: 2023-05-19 | Stop reason: HOSPADM

## 2023-05-18 RX ORDER — SODIUM CHLORIDE 9 MG/ML
25 INJECTION, SOLUTION INTRAVENOUS PRN
OUTPATIENT
Start: 2023-05-18

## 2023-05-18 RX ADMIN — SODIUM CHLORIDE, PRESERVATIVE FREE 10 ML: 5 INJECTION INTRAVENOUS at 13:24

## 2023-05-18 RX ADMIN — HEPARIN 500 UNITS: 100 SYRINGE at 13:25

## 2023-05-18 NOTE — PROGRESS NOTES
Medical Oncology Outpatient Progress Note    Reason for visit: Neuroendocrine cancer to liver     PCP:  Amauri Ramirez DO     History of Present Illness:  42 y/o female with hx of hypothyroidism, IBS,migraine who was complaining of abdominal pain associated with diarrhea and flushing/sweating. CT abdomen/pelvis 08/28/2020:  2 cm masslike density in the mid abdominal small bowel mesentery with a spiculated appearance. Multiple liver masses suspicious for metastatic disease. 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 Comment: Sections of the liver tissue cores show the hepatic parenchyma to be partially replaced by a proliferation of epithelioid cells with relatively uniform round nuclei and eosinophilic granular cytoplasm. The   epithelioid cells form anastomosing solid cords/nests that are surrounded by delicate fibrovascular stroma. There are no mitotic figures or tumor cell necrosis present. The histologic changes seen are suggestive of well-differentiated neuroendocrine (carcinoid) tumor. Immunostaining for pankeratin, chromogranin, synaptophysin, TTF-1 and Ki-67 was performed on sections of one tissue block (A1) and the neoplastic epithelioid cells show diffuse and strong positivity for neuroendocrine markers (chromogranin and synaptophysin) and moderate positivity for pankeratin. There is no staining reactivity for TTF-1. The Ki-67 proliferation labeling index is essentially negative, (very low, <1%). This staining pattern confirms the histologic impression of a well-differentiated neuroendocrine (carcinoid) tumor. FL Small bowel 09/02/2020:  Rapid small bowel transit. Serum Chromogranin A 160 on 09/23/2020.   Urine 5-HIAA 21 (0-14)  2d-Echo 10/10/2020: EF 60-65%   Normal right ventricular size and function     Dotatate PET/CT scan 10/14/2020 increased tracer uptake seen

## 2023-05-23 ENCOUNTER — HOSPITAL ENCOUNTER (OUTPATIENT)
Dept: NUCLEAR MEDICINE | Age: 38
Discharge: HOME | End: 2023-05-25
Payer: MEDICARE

## 2023-05-23 ENCOUNTER — HOSPITAL ENCOUNTER (OUTPATIENT)
Dept: INFUSION THERAPY | Age: 38
Discharge: HOME | End: 2023-05-23
Payer: MEDICARE

## 2023-05-23 VITALS
OXYGEN SATURATION: 99 % | HEART RATE: 76 BPM | RESPIRATION RATE: 16 BRPM | DIASTOLIC BLOOD PRESSURE: 55 MMHG | SYSTOLIC BLOOD PRESSURE: 109 MMHG | TEMPERATURE: 97.7 F

## 2023-05-23 DIAGNOSIS — C7A.8 NEUROENDOCRINE CANCER (HCC): Primary | ICD-10-CM

## 2023-05-23 DIAGNOSIS — C7A.8 NEUROENDOCRINE CARCINOMA (HCC): ICD-10-CM

## 2023-05-23 PROCEDURE — 96375 TX/PRO/DX INJ NEW DRUG ADDON: CPT

## 2023-05-23 PROCEDURE — 2580000003 HC RX 258: Performed by: INTERNAL MEDICINE

## 2023-05-23 PROCEDURE — 96366 THER/PROPH/DIAG IV INF ADDON: CPT

## 2023-05-23 PROCEDURE — 6360000002 HC RX W HCPCS: Performed by: INTERNAL MEDICINE

## 2023-05-23 PROCEDURE — 96367 TX/PROPH/DG ADDL SEQ IV INF: CPT

## 2023-05-23 PROCEDURE — 6370000000 HC RX 637 (ALT 250 FOR IP): Performed by: CLINICAL NURSE SPECIALIST

## 2023-05-23 PROCEDURE — 2500000003 HC RX 250 WO HCPCS: Performed by: INTERNAL MEDICINE

## 2023-05-23 PROCEDURE — A4216 STERILE WATER/SALINE, 10 ML: HCPCS | Performed by: INTERNAL MEDICINE

## 2023-05-23 PROCEDURE — A9513 LUTETIUM LU 177 DOTATAT THER: HCPCS | Performed by: RADIOLOGY

## 2023-05-23 PROCEDURE — 6360000002 HC RX W HCPCS

## 2023-05-23 PROCEDURE — 96365 THER/PROPH/DIAG IV INF INIT: CPT

## 2023-05-23 PROCEDURE — 3430000000 HC RX DIAGNOSTIC RADIOPHARMACEUTICAL: Performed by: RADIOLOGY

## 2023-05-23 PROCEDURE — 79101 NUCLEAR RX IV ADMIN: CPT

## 2023-05-23 RX ORDER — PALONOSETRON 0.05 MG/ML
0.25 INJECTION, SOLUTION INTRAVENOUS ONCE
Status: COMPLETED | OUTPATIENT
Start: 2023-05-23 | End: 2023-05-23

## 2023-05-23 RX ORDER — EPINEPHRINE 1 MG/ML
0.3 INJECTION, SOLUTION, CONCENTRATE INTRAVENOUS PRN
Status: CANCELLED | OUTPATIENT
Start: 2023-05-23

## 2023-05-23 RX ORDER — MEPERIDINE HYDROCHLORIDE 50 MG/ML
12.5 INJECTION INTRAMUSCULAR; INTRAVENOUS; SUBCUTANEOUS PRN
OUTPATIENT
Start: 2023-05-24

## 2023-05-23 RX ORDER — SODIUM CHLORIDE 9 MG/ML
INJECTION, SOLUTION INTRAVENOUS CONTINUOUS
OUTPATIENT
Start: 2023-05-24

## 2023-05-23 RX ORDER — ALBUTEROL SULFATE 90 UG/1
4 AEROSOL, METERED RESPIRATORY (INHALATION) PRN
OUTPATIENT
Start: 2023-05-24

## 2023-05-23 RX ORDER — PALONOSETRON 0.05 MG/ML
0.25 INJECTION, SOLUTION INTRAVENOUS ONCE
Status: CANCELLED | OUTPATIENT
Start: 2023-05-23 | End: 2023-05-23

## 2023-05-23 RX ORDER — ISOLEUCINE, LEUCINE, LYSINE ACETATE, METHIONINE, PHENYLALANINE, THREONINE, TRYPTOPHAN, VALINE, CYSTEINE HYDROCHLORIDE, HISTIDINE, TYROSINE, N-ACETYL-TYROSINE, ALANINE, ARGININE, PROLINE, SERINE, GLYCINE, ASPARTIC ACID, GLUTAMIC ACID, AND TAURINE .82; 1.4; 1.2; .34; .48; .42; .2; .78; .024; .48; .044; .24; .54; 1.2; .68; .38; .36; .32; .5; .025 G/100ML; G/100ML; G/100ML; G/100ML; G/100ML; G/100ML; G/100ML; G/100ML; G/100ML; G/100ML; G/100ML; G/100ML; G/100ML; G/100ML; G/100ML; G/100ML; G/100ML; G/100ML; G/100ML; G/100ML
50 SOLUTION INTRAVENOUS ONCE
Status: COMPLETED | OUTPATIENT
Start: 2023-05-23 | End: 2023-05-23

## 2023-05-23 RX ORDER — ACETAMINOPHEN 325 MG/1
650 TABLET ORAL
OUTPATIENT
Start: 2023-05-24

## 2023-05-23 RX ORDER — HEPARIN SODIUM (PORCINE) LOCK FLUSH IV SOLN 100 UNIT/ML 100 UNIT/ML
500 SOLUTION INTRAVENOUS PRN
Status: CANCELLED | OUTPATIENT
Start: 2023-05-23

## 2023-05-23 RX ORDER — DIPHENHYDRAMINE HYDROCHLORIDE 50 MG/ML
50 INJECTION INTRAMUSCULAR; INTRAVENOUS
OUTPATIENT
Start: 2023-05-24

## 2023-05-23 RX ORDER — ONDANSETRON 2 MG/ML
8 INJECTION INTRAMUSCULAR; INTRAVENOUS
Status: CANCELLED | OUTPATIENT
Start: 2023-05-23

## 2023-05-23 RX ORDER — LORAZEPAM 0.5 MG/1
1 TABLET ORAL ONCE
Status: DISCONTINUED | OUTPATIENT
Start: 2023-05-23 | End: 2023-07-23 | Stop reason: HOSPADM

## 2023-05-23 RX ORDER — ONDANSETRON 2 MG/ML
8 INJECTION INTRAMUSCULAR; INTRAVENOUS
OUTPATIENT
Start: 2023-05-24

## 2023-05-23 RX ORDER — SODIUM CHLORIDE 9 MG/ML
INJECTION, SOLUTION INTRAVENOUS CONTINUOUS
Status: CANCELLED | OUTPATIENT
Start: 2023-05-23

## 2023-05-23 RX ORDER — MAGNESIUM HYDROXIDE/ALUMINUM HYDROXICE/SIMETHICONE 120; 1200; 1200 MG/30ML; MG/30ML; MG/30ML
30 SUSPENSION ORAL EVERY 6 HOURS PRN
Status: CANCELLED | OUTPATIENT
Start: 2023-05-23

## 2023-05-23 RX ORDER — FAMOTIDINE 10 MG/ML
20 INJECTION, SOLUTION INTRAVENOUS
Status: CANCELLED | OUTPATIENT
Start: 2023-05-23

## 2023-05-23 RX ORDER — SODIUM CHLORIDE 9 MG/ML
5-250 INJECTION, SOLUTION INTRAVENOUS PRN
Status: CANCELLED | OUTPATIENT
Start: 2023-05-23

## 2023-05-23 RX ORDER — PROCHLORPERAZINE EDISYLATE 5 MG/ML
10 INJECTION INTRAMUSCULAR; INTRAVENOUS EVERY 6 HOURS PRN
Status: CANCELLED | OUTPATIENT
Start: 2023-05-23

## 2023-05-23 RX ORDER — EPINEPHRINE 1 MG/ML
0.3 INJECTION, SOLUTION, CONCENTRATE INTRAVENOUS PRN
OUTPATIENT
Start: 2023-05-24

## 2023-05-23 RX ORDER — DIPHENHYDRAMINE HYDROCHLORIDE 50 MG/ML
50 INJECTION INTRAMUSCULAR; INTRAVENOUS
Status: CANCELLED | OUTPATIENT
Start: 2023-05-23

## 2023-05-23 RX ORDER — HEPARIN SODIUM (PORCINE) LOCK FLUSH IV SOLN 100 UNIT/ML 100 UNIT/ML
500 SOLUTION INTRAVENOUS PRN
Status: DISCONTINUED | OUTPATIENT
Start: 2023-05-23 | End: 2023-05-24 | Stop reason: HOSPADM

## 2023-05-23 RX ORDER — PROCHLORPERAZINE EDISYLATE 5 MG/ML
INJECTION INTRAMUSCULAR; INTRAVENOUS
Status: COMPLETED
Start: 2023-05-23 | End: 2023-05-23

## 2023-05-23 RX ORDER — FAMOTIDINE 10 MG/ML
20 INJECTION, SOLUTION INTRAVENOUS
OUTPATIENT
Start: 2023-05-24

## 2023-05-23 RX ORDER — OCTREOTIDE ACETATE 100 UG/ML
100 INJECTION, SOLUTION INTRAVENOUS; SUBCUTANEOUS
Status: CANCELLED | OUTPATIENT
Start: 2023-05-23

## 2023-05-23 RX ORDER — SODIUM CHLORIDE 0.9 % (FLUSH) 0.9 %
5-40 SYRINGE (ML) INJECTION PRN
Status: DISCONTINUED | OUTPATIENT
Start: 2023-05-23 | End: 2023-05-24 | Stop reason: HOSPADM

## 2023-05-23 RX ORDER — SODIUM CHLORIDE 9 MG/ML
1000 INJECTION, SOLUTION INTRAVENOUS ONCE
Status: CANCELLED | OUTPATIENT
Start: 2023-05-23 | End: 2023-05-23

## 2023-05-23 RX ORDER — FAMOTIDINE 10 MG/ML
20 INJECTION, SOLUTION INTRAVENOUS ONCE
Status: CANCELLED | OUTPATIENT
Start: 2023-05-23 | End: 2023-05-23

## 2023-05-23 RX ORDER — ARGININE/LYSINE/0.9 % SOD CHL 25-25MG/ML
50 PLASTIC BAG, INJECTION (ML) INTRAVENOUS ONCE
Status: CANCELLED | OUTPATIENT
Start: 2023-05-23 | End: 2023-05-23

## 2023-05-23 RX ORDER — SODIUM CHLORIDE 0.9 % (FLUSH) 0.9 %
5-40 SYRINGE (ML) INJECTION PRN
Status: CANCELLED | OUTPATIENT
Start: 2023-05-23

## 2023-05-23 RX ORDER — MEPERIDINE HYDROCHLORIDE 50 MG/ML
12.5 INJECTION INTRAMUSCULAR; INTRAVENOUS; SUBCUTANEOUS PRN
Status: CANCELLED | OUTPATIENT
Start: 2023-05-23

## 2023-05-23 RX ORDER — LORAZEPAM 1 MG/1
1 TABLET ORAL ONCE
Status: COMPLETED | OUTPATIENT
Start: 2023-05-23 | End: 2023-05-23

## 2023-05-23 RX ORDER — SODIUM CHLORIDE 9 MG/ML
1000 INJECTION, SOLUTION INTRAVENOUS ONCE
Status: COMPLETED | OUTPATIENT
Start: 2023-05-23 | End: 2023-05-23

## 2023-05-23 RX ORDER — PROCHLORPERAZINE EDISYLATE 5 MG/ML
10 INJECTION INTRAMUSCULAR; INTRAVENOUS EVERY 6 HOURS PRN
Status: DISCONTINUED | OUTPATIENT
Start: 2023-05-23 | End: 2023-05-24 | Stop reason: HOSPADM

## 2023-05-23 RX ORDER — ALBUTEROL SULFATE 90 UG/1
4 AEROSOL, METERED RESPIRATORY (INHALATION) PRN
Status: CANCELLED | OUTPATIENT
Start: 2023-05-23

## 2023-05-23 RX ORDER — ACETAMINOPHEN 325 MG/1
650 TABLET ORAL
Status: CANCELLED | OUTPATIENT
Start: 2023-05-23

## 2023-05-23 RX ADMIN — LUTETIUM LU 177 DOTATATE 201 MILLICURIE: 10 INJECTION INTRAVENOUS at 10:31

## 2023-05-23 RX ADMIN — LORAZEPAM 1 MG: 1 TABLET ORAL at 10:23

## 2023-05-23 RX ADMIN — SODIUM CHLORIDE, PRESERVATIVE FREE 10 ML: 5 INJECTION INTRAVENOUS at 14:10

## 2023-05-23 RX ADMIN — SODIUM CHLORIDE, PRESERVATIVE FREE 10 ML: 5 INJECTION INTRAVENOUS at 10:47

## 2023-05-23 RX ADMIN — FAMOTIDINE 20 MG: 10 INJECTION, SOLUTION INTRAVENOUS at 09:01

## 2023-05-23 RX ADMIN — ISOLEUCINE, LEUCINE, LYSINE ACETATE, METHIONINE, PHENYLALANINE, THREONINE, TRYPTOPHAN, VALINE, CYSTEINE HYDROCHLORIDE, HISTIDINE, TYROSINE, N-ACETYL-TYROSINE, ALANINE, ARGININE, PROLINE, SERINE, GLYCINE, ASPARTIC ACID, GLUTAMIC ACID, AND TAURINE 50 G
.82; 1.4; 1.2; .34; .48; .42; .2; .78; .024; .48; .044; .24; .54; 1.2; .68; .38; .36; .32; .5; .025 SOLUTION INTRAVENOUS at 09:54

## 2023-05-23 RX ADMIN — PROCHLORPERAZINE EDISYLATE 10 MG: 5 INJECTION INTRAMUSCULAR; INTRAVENOUS at 10:46

## 2023-05-23 RX ADMIN — HEPARIN 500 UNITS: 100 SYRINGE at 14:10

## 2023-05-23 RX ADMIN — PALONOSETRON HYDROCHLORIDE 0.25 MG: 0.25 INJECTION, SOLUTION INTRAVENOUS at 09:06

## 2023-05-23 RX ADMIN — SODIUM CHLORIDE 1000 ML: 9 INJECTION, SOLUTION INTRAVENOUS at 09:10

## 2023-05-23 RX ADMIN — FOSAPREPITANT 150 MG: 150 INJECTION, POWDER, LYOPHILIZED, FOR SOLUTION INTRAVENOUS at 09:13

## 2023-05-24 ENCOUNTER — HOSPITAL ENCOUNTER (OUTPATIENT)
Dept: INFUSION THERAPY | Age: 38
Discharge: HOME | End: 2023-05-24
Payer: MEDICARE

## 2023-05-24 DIAGNOSIS — C7A.8 NEUROENDOCRINE CANCER (HCC): Primary | ICD-10-CM

## 2023-05-24 PROCEDURE — 96372 THER/PROPH/DIAG INJ SC/IM: CPT

## 2023-05-24 PROCEDURE — 6360000002 HC RX W HCPCS: Performed by: INTERNAL MEDICINE

## 2023-05-24 RX ADMIN — OCTREOTIDE ACETATE 30 MG: KIT at 08:35

## 2023-05-30 DIAGNOSIS — C7B.8 NEUROENDOCRINE CARCINOMA METASTATIC TO LIVER (HCC): ICD-10-CM

## 2023-05-30 DIAGNOSIS — G89.3 NEOPLASM RELATED PAIN: ICD-10-CM

## 2023-05-30 DIAGNOSIS — Z51.5 PALLIATIVE CARE BY SPECIALIST: ICD-10-CM

## 2023-05-30 DIAGNOSIS — C7A.8 NEUROENDOCRINE CARCINOMA METASTATIC TO LIVER (HCC): ICD-10-CM

## 2023-05-30 RX ORDER — HYDROCODONE BITARTRATE AND ACETAMINOPHEN 5; 325 MG/1; MG/1
1 TABLET ORAL EVERY 8 HOURS PRN
Qty: 90 TABLET | Refills: 0 | Status: SHIPPED | OUTPATIENT
Start: 2023-05-30 | End: 2023-06-29

## 2023-05-30 NOTE — TELEPHONE ENCOUNTER
Call from [facility] requesting refill for [place] is [facility] in [place]. Next george 6/12/23.

## 2023-06-15 ENCOUNTER — HOSPITAL ENCOUNTER (OUTPATIENT)
Dept: INFUSION THERAPY | Age: 38
Discharge: HOME | End: 2023-06-15
Payer: COMMERCIAL

## 2023-06-15 DIAGNOSIS — C7B.8 NEUROENDOCRINE CARCINOMA METASTATIC TO LIVER (HCC): ICD-10-CM

## 2023-06-15 DIAGNOSIS — C7A.8 NEUROENDOCRINE CARCINOMA METASTATIC TO LIVER (HCC): ICD-10-CM

## 2023-06-15 DIAGNOSIS — C7B.8 METASTATIC MALIGNANT NEUROENDOCRINE TUMOR TO LIVER (HCC): Primary | ICD-10-CM

## 2023-06-15 LAB
ALBUMIN SERPL-MCNC: 4.3 G/DL (ref 3.5–5.2)
ALP SERPL-CCNC: 201 U/L (ref 35–104)
ALT SERPL-CCNC: 30 U/L (ref 0–32)
ANION GAP SERPL CALCULATED.3IONS-SCNC: 12 MMOL/L (ref 7–16)
ANISOCYTOSIS: ABNORMAL
AST SERPL-CCNC: 40 U/L (ref 0–31)
BASOPHILS # BLD: 0.06 E9/L (ref 0–0.2)
BASOPHILS NFR BLD: 0.6 % (ref 0–2)
BILIRUB SERPL-MCNC: 0.4 MG/DL (ref 0–1.2)
BUN SERPL-MCNC: 10 MG/DL (ref 6–20)
CALCIUM SERPL-MCNC: 9 MG/DL (ref 8.6–10.2)
CHLORIDE SERPL-SCNC: 106 MMOL/L (ref 98–107)
CO2 SERPL-SCNC: 22 MMOL/L (ref 22–29)
CREAT SERPL-MCNC: 0.6 MG/DL (ref 0.5–1)
EOSINOPHIL # BLD: 0.13 E9/L (ref 0.05–0.5)
EOSINOPHIL NFR BLD: 1.2 % (ref 0–6)
ERYTHROCYTE [DISTWIDTH] IN BLOOD BY AUTOMATED COUNT: 16.5 FL (ref 11.5–15)
GLUCOSE SERPL-MCNC: 93 MG/DL (ref 74–99)
HCT VFR BLD AUTO: 33.3 % (ref 34–48)
HGB BLD-MCNC: 11 G/DL (ref 11.5–15.5)
IMM GRANULOCYTES # BLD: 0.06 E9/L
IMM GRANULOCYTES NFR BLD: 0.6 % (ref 0–5)
LYMPHOCYTES # BLD: 0.87 E9/L (ref 1.5–4)
LYMPHOCYTES NFR BLD: 8.3 % (ref 20–42)
MCH RBC QN AUTO: 31.8 PG (ref 26–35)
MCHC RBC AUTO-ENTMCNC: 33 % (ref 32–34.5)
MCV RBC AUTO: 96.2 FL (ref 80–99.9)
MONOCYTES # BLD: 1.55 E9/L (ref 0.1–0.95)
MONOCYTES NFR BLD: 14.8 % (ref 2–12)
NEUTROPHILS # BLD: 7.78 E9/L (ref 1.8–7.3)
NEUTS SEG NFR BLD: 74.5 % (ref 43–80)
OVALOCYTES: ABNORMAL
PLATELET # BLD AUTO: 154 E9/L (ref 130–450)
PMV BLD AUTO: 9.5 FL (ref 7–12)
POIKILOCYTES: ABNORMAL
POLYCHROMASIA: ABNORMAL
POTASSIUM SERPL-SCNC: 3.4 MMOL/L (ref 3.5–5)
PROT SERPL-MCNC: 7.4 G/DL (ref 6.4–8.3)
RBC # BLD AUTO: 3.46 E12/L (ref 3.5–5.5)
SODIUM SERPL-SCNC: 140 MMOL/L (ref 132–146)
WBC # BLD: 10.5 E9/L (ref 4.5–11.5)

## 2023-06-15 PROCEDURE — 80053 COMPREHEN METABOLIC PANEL: CPT

## 2023-06-15 PROCEDURE — 36591 DRAW BLOOD OFF VENOUS DEVICE: CPT

## 2023-06-15 PROCEDURE — 6360000002 HC RX W HCPCS: Performed by: INTERNAL MEDICINE

## 2023-06-15 PROCEDURE — 2580000003 HC RX 258: Performed by: INTERNAL MEDICINE

## 2023-06-15 PROCEDURE — 85025 COMPLETE CBC W/AUTO DIFF WBC: CPT

## 2023-06-15 RX ORDER — WATER 1000 ML/1000ML
2.2 INJECTION, SOLUTION INTRAVENOUS ONCE
OUTPATIENT
Start: 2023-06-15 | End: 2023-06-15

## 2023-06-15 RX ORDER — HEPARIN SODIUM 100 [USP'U]/ML
500 INJECTION, SOLUTION INTRAVENOUS PRN
Status: DISCONTINUED | OUTPATIENT
Start: 2023-06-15 | End: 2023-06-16 | Stop reason: HOSPADM

## 2023-06-15 RX ORDER — HEPARIN SODIUM 100 [USP'U]/ML
500 INJECTION, SOLUTION INTRAVENOUS PRN
OUTPATIENT
Start: 2023-06-15

## 2023-06-15 RX ORDER — SODIUM CHLORIDE 0.9 % (FLUSH) 0.9 %
5-40 SYRINGE (ML) INJECTION PRN
OUTPATIENT
Start: 2023-06-15

## 2023-06-15 RX ORDER — SODIUM CHLORIDE 0.9 % (FLUSH) 0.9 %
5-40 SYRINGE (ML) INJECTION PRN
Status: DISCONTINUED | OUTPATIENT
Start: 2023-06-15 | End: 2023-06-16 | Stop reason: HOSPADM

## 2023-06-15 RX ORDER — SODIUM CHLORIDE 9 MG/ML
25 INJECTION, SOLUTION INTRAVENOUS PRN
OUTPATIENT
Start: 2023-06-15

## 2023-06-15 RX ADMIN — SODIUM CHLORIDE, PRESERVATIVE FREE 10 ML: 5 INJECTION INTRAVENOUS at 13:12

## 2023-06-15 RX ADMIN — SODIUM CHLORIDE, PRESERVATIVE FREE 10 ML: 5 INJECTION INTRAVENOUS at 13:13

## 2023-06-15 RX ADMIN — HEPARIN 500 UNITS: 100 SYRINGE at 13:14

## 2023-06-19 RX ORDER — POTASSIUM CHLORIDE 20 MEQ/1
TABLET, EXTENDED RELEASE ORAL
Qty: 3 TABLET | Refills: 0 | OUTPATIENT
Start: 2023-06-19

## 2023-06-22 ENCOUNTER — HOSPITAL ENCOUNTER (OUTPATIENT)
Dept: INFUSION THERAPY | Age: 38
Discharge: HOME | End: 2023-06-22
Payer: COMMERCIAL

## 2023-06-22 DIAGNOSIS — C7B.8 METASTATIC MALIGNANT NEUROENDOCRINE TUMOR TO LIVER (HCC): Primary | ICD-10-CM

## 2023-06-22 PROCEDURE — 6360000002 HC RX W HCPCS: Performed by: INTERNAL MEDICINE

## 2023-06-22 PROCEDURE — 96402 CHEMO HORMON ANTINEOPL SQ/IM: CPT

## 2023-06-22 RX ORDER — LANREOTIDE ACETATE 120 MG/.5ML
120 INJECTION SUBCUTANEOUS ONCE
Status: COMPLETED | OUTPATIENT
Start: 2023-06-22 | End: 2023-06-22

## 2023-06-22 RX ORDER — LANREOTIDE ACETATE 120 MG/.5ML
120 INJECTION SUBCUTANEOUS ONCE
OUTPATIENT
Start: 2023-07-20 | End: 2023-07-20

## 2023-06-22 RX ADMIN — LANREOTIDE ACETATE 120 MG: 120 INJECTION SUBCUTANEOUS at 13:23

## 2023-06-27 DIAGNOSIS — G89.3 NEOPLASM RELATED PAIN: ICD-10-CM

## 2023-06-27 DIAGNOSIS — C7B.8 NEUROENDOCRINE CARCINOMA METASTATIC TO LIVER (HCC): ICD-10-CM

## 2023-06-27 DIAGNOSIS — Z51.5 PALLIATIVE CARE BY SPECIALIST: ICD-10-CM

## 2023-06-27 DIAGNOSIS — C7A.8 NEUROENDOCRINE CARCINOMA METASTATIC TO LIVER (HCC): ICD-10-CM

## 2023-06-27 RX ORDER — HYDROCODONE BITARTRATE AND ACETAMINOPHEN 5; 325 MG/1; MG/1
1 TABLET ORAL EVERY 8 HOURS PRN
Qty: 90 TABLET | Refills: 0 | Status: SHIPPED | OUTPATIENT
Start: 2023-06-27 | End: 2023-07-27

## 2023-07-20 ENCOUNTER — OFFICE VISIT (OUTPATIENT)
Dept: ONCOLOGY | Age: 38
End: 2023-07-20
Payer: COMMERCIAL

## 2023-07-20 ENCOUNTER — HOSPITAL ENCOUNTER (OUTPATIENT)
Dept: INFUSION THERAPY | Age: 38
Discharge: HOME | End: 2023-07-20
Payer: COMMERCIAL

## 2023-07-20 VITALS
BODY MASS INDEX: 27.29 KG/M2 | OXYGEN SATURATION: 99 % | SYSTOLIC BLOOD PRESSURE: 116 MMHG | HEART RATE: 71 BPM | HEIGHT: 60 IN | WEIGHT: 139 LBS | DIASTOLIC BLOOD PRESSURE: 66 MMHG | TEMPERATURE: 97.9 F

## 2023-07-20 DIAGNOSIS — R19.7 DIARRHEA, UNSPECIFIED TYPE: ICD-10-CM

## 2023-07-20 DIAGNOSIS — C7A.8 NEUROENDOCRINE CANCER (HCC): Primary | ICD-10-CM

## 2023-07-20 DIAGNOSIS — R11.0 NAUSEA: ICD-10-CM

## 2023-07-20 DIAGNOSIS — C7B.8 METASTATIC MALIGNANT NEUROENDOCRINE TUMOR TO LIVER (HCC): ICD-10-CM

## 2023-07-20 LAB
ALBUMIN SERPL-MCNC: 4.6 G/DL (ref 3.5–5.2)
ALP SERPL-CCNC: 120 U/L (ref 35–104)
ALT SERPL-CCNC: 21 U/L (ref 0–32)
ANION GAP SERPL CALCULATED.3IONS-SCNC: 10 MMOL/L (ref 7–16)
AST SERPL-CCNC: 25 U/L (ref 0–31)
BASOPHILS # BLD: 0.04 K/UL (ref 0–0.2)
BASOPHILS NFR BLD: 1 % (ref 0–2)
BILIRUB SERPL-MCNC: <0.2 MG/DL (ref 0–1.2)
BUN SERPL-MCNC: 8 MG/DL (ref 6–20)
CALCIUM SERPL-MCNC: 8.9 MG/DL (ref 8.6–10.2)
CHLORIDE SERPL-SCNC: 104 MMOL/L (ref 98–107)
CO2 SERPL-SCNC: 27 MMOL/L (ref 22–29)
CREAT SERPL-MCNC: 0.6 MG/DL (ref 0.5–1)
EOSINOPHIL # BLD: 0.28 K/UL (ref 0.05–0.5)
EOSINOPHILS RELATIVE PERCENT: 5 % (ref 0–6)
ERYTHROCYTE [DISTWIDTH] IN BLOOD BY AUTOMATED COUNT: 16.6 % (ref 11.5–15)
GFR SERPL CREATININE-BSD FRML MDRD: >60 ML/MIN/1.73M2
GLUCOSE SERPL-MCNC: 99 MG/DL (ref 74–99)
HCT VFR BLD AUTO: 36.7 % (ref 34–48)
HGB BLD-MCNC: 11.7 G/DL (ref 11.5–15.5)
IMM GRANULOCYTES # BLD AUTO: 0.04 K/UL (ref 0–0.58)
IMM GRANULOCYTES NFR BLD: 1 % (ref 0–5)
LYMPHOCYTES NFR BLD: 0.63 K/UL (ref 1.5–4)
LYMPHOCYTES RELATIVE PERCENT: 11 % (ref 20–42)
MCH RBC QN AUTO: 32.9 PG (ref 26–35)
MCHC RBC AUTO-ENTMCNC: 31.9 G/DL (ref 32–34.5)
MCV RBC AUTO: 103.1 FL (ref 80–99.9)
MONOCYTES NFR BLD: 0.77 K/UL (ref 0.1–0.95)
MONOCYTES NFR BLD: 13 % (ref 2–12)
NEUTROPHILS NFR BLD: 70 % (ref 43–80)
NEUTS SEG NFR BLD: 4.12 K/UL (ref 1.8–7.3)
PLATELET # BLD AUTO: 187 K/UL (ref 130–450)
PMV BLD AUTO: 9.7 FL (ref 7–12)
POTASSIUM SERPL-SCNC: 3.9 MMOL/L (ref 3.5–5)
PROT SERPL-MCNC: 7.7 G/DL (ref 6.4–8.3)
RBC # BLD AUTO: 3.56 M/UL (ref 3.5–5.5)
SODIUM SERPL-SCNC: 141 MMOL/L (ref 132–146)
WBC OTHER # BLD: 5.9 K/UL (ref 4.5–11.5)

## 2023-07-20 PROCEDURE — 4004F PT TOBACCO SCREEN RCVD TLK: CPT | Performed by: INTERNAL MEDICINE

## 2023-07-20 PROCEDURE — G8419 CALC BMI OUT NRM PARAM NOF/U: HCPCS | Performed by: INTERNAL MEDICINE

## 2023-07-20 PROCEDURE — 6360000002 HC RX W HCPCS: Performed by: INTERNAL MEDICINE

## 2023-07-20 PROCEDURE — 96372 THER/PROPH/DIAG INJ SC/IM: CPT

## 2023-07-20 PROCEDURE — 85027 COMPLETE CBC AUTOMATED: CPT

## 2023-07-20 PROCEDURE — G8427 DOCREV CUR MEDS BY ELIG CLIN: HCPCS | Performed by: INTERNAL MEDICINE

## 2023-07-20 PROCEDURE — 99214 OFFICE O/P EST MOD 30 MIN: CPT

## 2023-07-20 PROCEDURE — 36591 DRAW BLOOD OFF VENOUS DEVICE: CPT

## 2023-07-20 PROCEDURE — 80053 COMPREHEN METABOLIC PANEL: CPT

## 2023-07-20 PROCEDURE — 99214 OFFICE O/P EST MOD 30 MIN: CPT | Performed by: INTERNAL MEDICINE

## 2023-07-20 RX ORDER — HEPARIN 100 UNIT/ML
500 SYRINGE INTRAVENOUS PRN
Status: DISCONTINUED | OUTPATIENT
Start: 2023-07-20 | End: 2023-07-21 | Stop reason: HOSPADM

## 2023-07-20 RX ORDER — SODIUM CHLORIDE 0.9 % (FLUSH) 0.9 %
5-40 SYRINGE (ML) INJECTION PRN
OUTPATIENT
Start: 2023-07-20

## 2023-07-20 RX ORDER — SODIUM CHLORIDE 0.9 % (FLUSH) 0.9 %
5-40 SYRINGE (ML) INJECTION PRN
Status: DISCONTINUED | OUTPATIENT
Start: 2023-07-20 | End: 2023-07-21 | Stop reason: HOSPADM

## 2023-07-20 RX ORDER — LANREOTIDE ACETATE 120 MG/.5ML
120 INJECTION SUBCUTANEOUS ONCE
Status: COMPLETED | OUTPATIENT
Start: 2023-07-20 | End: 2023-07-20

## 2023-07-20 RX ORDER — WATER 1000 ML/1000ML
2.2 INJECTION, SOLUTION INTRAVENOUS ONCE
OUTPATIENT
Start: 2023-07-20 | End: 2023-07-20

## 2023-07-20 RX ORDER — SODIUM CHLORIDE 9 MG/ML
25 INJECTION, SOLUTION INTRAVENOUS PRN
OUTPATIENT
Start: 2023-07-20

## 2023-07-20 RX ORDER — HEPARIN 100 UNIT/ML
500 SYRINGE INTRAVENOUS PRN
OUTPATIENT
Start: 2023-07-20

## 2023-07-20 RX ORDER — LANREOTIDE ACETATE 120 MG/.5ML
120 INJECTION SUBCUTANEOUS ONCE
OUTPATIENT
Start: 2023-08-17 | End: 2023-08-17

## 2023-07-20 RX ADMIN — LANREOTIDE ACETATE 120 MG: 120 INJECTION SUBCUTANEOUS at 14:58

## 2023-07-21 NOTE — PROGRESS NOTES
Patient did stop at check out window, ok'd to leave without AVS.  Patient has [address] [facility].
convincing extrahepatic disease identified. Dose # 13 Lanreotide was on 11/11/2021. Serum chromogranin A 105 on 11/11/2021. Dose # 14 Lanreotide was on 12/30/2021. Serum chromogranin A 184 on 12/30/2021. Dose # 15 Lanreotide was on 01/27/2022. Serum chromogranin A  98 on 01/27/2022. CT head 01/28/2022 noted no acute abnormality. CT cervical spine 01/28/2022 noted no acute abnormality of cervical spine  PET/CT Gallium 68 02/22/2022 noted Multiple regions of tracer uptake seen within the liver consistent with neuroendocrine tumor. No other evidence to suggest metastatic disease. Reviewed with Dr. Whiting Figures from Radiology team; overall stable disease. Continue Lanreotide and repeat scans in 3 months. Dose # 16 Lanreotide was on 02/24/2022. Serum chromogranin A 116 on 02/24/2022  Dose # 17 Lanreotide was on 03/24/2022. Serum Chromogranin A 173 on 03/24/2022. Dose # 18 Lanreotide was on 04/21/2022. Serum Chromogranin A 140 on 04/21/2022. Dose # 19 Lanreotide was on 05/19/2022. Serum Chromogranin A 114 on 05/19/2022. Dose # 20 Lanreotide was on 06/23/2022. PET/CT scan 07/19/2022: Gallium 76 dotatate avid uptake is present within multiple masses within the liver, allowing for slight technical differences, not significantly changed in the interval. No convincing extrahepatic metastatic disease identified. Imaging reviewed. Continue Lanreotide and repeat scans in 3-4 months  Dose # 21 Lanreotide was on 07/21/2022. Serum Chromogranin A 112 on 07/21/2022  Dose # 22 Lanreotide was on 08/18/2022. Dose # 23 Lanreotide was on 09/15/2022. Serum Chromogranin A 75 on 09/15/2022     Admitted with acute liver failure, possibly decompensation in the setting of a urinary tract infection.     CT abdomen/pelvis was negative for bowel obstruction, liver metastasis, interval changes difficult to gauge due to the absence of the contrast-enhancement, innumerable subcutaneous nodules in the buttocks, likely secondary to the

## 2023-07-26 DIAGNOSIS — C7A.8 NEUROENDOCRINE CARCINOMA METASTATIC TO LIVER (HCC): ICD-10-CM

## 2023-07-26 DIAGNOSIS — G89.3 NEOPLASM RELATED PAIN: ICD-10-CM

## 2023-07-26 DIAGNOSIS — Z51.5 PALLIATIVE CARE BY SPECIALIST: ICD-10-CM

## 2023-07-26 DIAGNOSIS — C7B.8 NEUROENDOCRINE CARCINOMA METASTATIC TO LIVER (HCC): ICD-10-CM

## 2023-07-26 RX ORDER — HYDROCODONE BITARTRATE AND ACETAMINOPHEN 5; 325 MG/1; MG/1
1 TABLET ORAL EVERY 8 HOURS PRN
Qty: 90 TABLET | Refills: 0 | Status: SHIPPED | OUTPATIENT
Start: 2023-07-26 | End: 2023-08-25

## 2023-08-07 DIAGNOSIS — G43.909 MIGRAINE WITHOUT STATUS MIGRAINOSUS, NOT INTRACTABLE, UNSPECIFIED MIGRAINE TYPE: ICD-10-CM

## 2023-08-07 DIAGNOSIS — F51.01 PRIMARY INSOMNIA: ICD-10-CM

## 2023-08-07 RX ORDER — ZOLPIDEM TARTRATE 10 MG/1
10 TABLET ORAL NIGHTLY PRN
Qty: 30 TABLET | Refills: 2 | Status: SHIPPED | OUTPATIENT
Start: 2023-08-07 | End: 2023-11-05

## 2023-08-07 RX ORDER — BUTALBITAL, ACETAMINOPHEN AND CAFFEINE 50; 325; 40 MG/1; MG/1; MG/1
1 TABLET ORAL 2 TIMES DAILY PRN
Qty: 60 TABLET | Refills: 2 | Status: SHIPPED | OUTPATIENT
Start: 2023-08-07

## 2023-08-15 ENCOUNTER — HOSPITAL ENCOUNTER (OUTPATIENT)
Dept: NUCLEAR MEDICINE | Age: 38
Discharge: HOME | End: 2023-08-17
Attending: INTERNAL MEDICINE
Payer: COMMERCIAL

## 2023-08-15 DIAGNOSIS — C7A.8 NEUROENDOCRINE CANCER (HCC): ICD-10-CM

## 2023-08-15 PROCEDURE — 3430000000 HC RX DIAGNOSTIC RADIOPHARMACEUTICAL: Performed by: RADIOLOGY

## 2023-08-15 PROCEDURE — A9587 GALLIUM GA-68: HCPCS | Performed by: RADIOLOGY

## 2023-08-15 RX ORDER — 68GA-DOTATATE 40 MCG
5 KIT INTRAVENOUS ONCE
Status: COMPLETED | OUTPATIENT
Start: 2023-08-15 | End: 2023-08-15

## 2023-08-15 RX ADMIN — 68GA-DOTATATE 5 MILLICURIE: KIT INTRAVENOUS at 12:25

## 2023-08-17 ENCOUNTER — HOSPITAL ENCOUNTER (OUTPATIENT)
Dept: INFUSION THERAPY | Age: 38
Discharge: HOME | End: 2023-08-17
Payer: COMMERCIAL

## 2023-08-17 ENCOUNTER — OFFICE VISIT (OUTPATIENT)
Dept: ONCOLOGY | Age: 38
End: 2023-08-17
Payer: COMMERCIAL

## 2023-08-17 VITALS
HEIGHT: 60 IN | SYSTOLIC BLOOD PRESSURE: 129 MMHG | OXYGEN SATURATION: 97 % | TEMPERATURE: 97.1 F | BODY MASS INDEX: 27.48 KG/M2 | HEART RATE: 69 BPM | WEIGHT: 140 LBS | DIASTOLIC BLOOD PRESSURE: 67 MMHG

## 2023-08-17 DIAGNOSIS — C7A.8 NEUROENDOCRINE CANCER (HCC): Primary | ICD-10-CM

## 2023-08-17 DIAGNOSIS — C7B.8 METASTATIC MALIGNANT NEUROENDOCRINE TUMOR TO LIVER (HCC): Primary | ICD-10-CM

## 2023-08-17 LAB
ALBUMIN SERPL-MCNC: 4.6 G/DL (ref 3.5–5.2)
ALP SERPL-CCNC: 121 U/L (ref 35–104)
ALT SERPL-CCNC: 14 U/L (ref 0–32)
ANION GAP SERPL CALCULATED.3IONS-SCNC: 11 MMOL/L (ref 7–16)
AST SERPL-CCNC: 17 U/L (ref 0–31)
BASOPHILS # BLD: 0.05 K/UL (ref 0–0.2)
BASOPHILS NFR BLD: 1 % (ref 0–2)
BILIRUB SERPL-MCNC: 0.2 MG/DL (ref 0–1.2)
BUN SERPL-MCNC: 11 MG/DL (ref 6–20)
CALCIUM SERPL-MCNC: 9 MG/DL (ref 8.6–10.2)
CHLORIDE SERPL-SCNC: 99 MMOL/L (ref 98–107)
CO2 SERPL-SCNC: 25 MMOL/L (ref 22–29)
CREAT SERPL-MCNC: 0.6 MG/DL (ref 0.5–1)
EOSINOPHIL # BLD: 0.14 K/UL (ref 0.05–0.5)
EOSINOPHILS RELATIVE PERCENT: 2 % (ref 0–6)
ERYTHROCYTE [DISTWIDTH] IN BLOOD BY AUTOMATED COUNT: 15.4 % (ref 11.5–15)
GFR SERPL CREATININE-BSD FRML MDRD: >60 ML/MIN/1.73M2
GLUCOSE SERPL-MCNC: 101 MG/DL (ref 74–99)
HCT VFR BLD AUTO: 35.8 % (ref 34–48)
HGB BLD-MCNC: 11.9 G/DL (ref 11.5–15.5)
IMM GRANULOCYTES # BLD AUTO: 0.03 K/UL (ref 0–0.58)
IMM GRANULOCYTES NFR BLD: 0 % (ref 0–5)
LYMPHOCYTES NFR BLD: 0.79 K/UL (ref 1.5–4)
LYMPHOCYTES RELATIVE PERCENT: 12 % (ref 20–42)
MCH RBC QN AUTO: 32.2 PG (ref 26–35)
MCHC RBC AUTO-ENTMCNC: 33.2 G/DL (ref 32–34.5)
MCV RBC AUTO: 97 FL (ref 80–99.9)
MONOCYTES NFR BLD: 0.77 K/UL (ref 0.1–0.95)
MONOCYTES NFR BLD: 12 % (ref 2–12)
NEUTROPHILS NFR BLD: 74 % (ref 43–80)
NEUTS SEG NFR BLD: 4.91 K/UL (ref 1.8–7.3)
PLATELET # BLD AUTO: 194 K/UL (ref 130–450)
PMV BLD AUTO: 9.7 FL (ref 7–12)
POTASSIUM SERPL-SCNC: 3.6 MMOL/L (ref 3.5–5)
PROT SERPL-MCNC: 7.6 G/DL (ref 6.4–8.3)
RBC # BLD AUTO: 3.69 M/UL (ref 3.5–5.5)
SODIUM SERPL-SCNC: 135 MMOL/L (ref 132–146)
WBC OTHER # BLD: 6.7 K/UL (ref 4.5–11.5)

## 2023-08-17 PROCEDURE — 4004F PT TOBACCO SCREEN RCVD TLK: CPT | Performed by: STUDENT IN AN ORGANIZED HEALTH CARE EDUCATION/TRAINING PROGRAM

## 2023-08-17 PROCEDURE — 99214 OFFICE O/P EST MOD 30 MIN: CPT | Performed by: STUDENT IN AN ORGANIZED HEALTH CARE EDUCATION/TRAINING PROGRAM

## 2023-08-17 PROCEDURE — 6360000002 HC RX W HCPCS: Performed by: INTERNAL MEDICINE

## 2023-08-17 PROCEDURE — 96402 CHEMO HORMON ANTINEOPL SQ/IM: CPT

## 2023-08-17 PROCEDURE — G8419 CALC BMI OUT NRM PARAM NOF/U: HCPCS | Performed by: STUDENT IN AN ORGANIZED HEALTH CARE EDUCATION/TRAINING PROGRAM

## 2023-08-17 PROCEDURE — 80053 COMPREHEN METABOLIC PANEL: CPT

## 2023-08-17 PROCEDURE — 2580000003 HC RX 258: Performed by: INTERNAL MEDICINE

## 2023-08-17 PROCEDURE — 85025 COMPLETE CBC W/AUTO DIFF WBC: CPT

## 2023-08-17 PROCEDURE — G8427 DOCREV CUR MEDS BY ELIG CLIN: HCPCS | Performed by: STUDENT IN AN ORGANIZED HEALTH CARE EDUCATION/TRAINING PROGRAM

## 2023-08-17 PROCEDURE — 36591 DRAW BLOOD OFF VENOUS DEVICE: CPT

## 2023-08-17 RX ORDER — SODIUM CHLORIDE 9 MG/ML
25 INJECTION, SOLUTION INTRAVENOUS PRN
OUTPATIENT
Start: 2023-08-17

## 2023-08-17 RX ORDER — SODIUM CHLORIDE 0.9 % (FLUSH) 0.9 %
5-40 SYRINGE (ML) INJECTION PRN
Status: DISCONTINUED | OUTPATIENT
Start: 2023-08-17 | End: 2023-08-18 | Stop reason: HOSPADM

## 2023-08-17 RX ORDER — LANREOTIDE ACETATE 120 MG/.5ML
120 INJECTION SUBCUTANEOUS ONCE
Status: COMPLETED | OUTPATIENT
Start: 2023-08-17 | End: 2023-08-17

## 2023-08-17 RX ORDER — GABAPENTIN 300 MG/1
300 CAPSULE ORAL 3 TIMES DAILY
Qty: 270 CAPSULE | Refills: 1 | Status: SHIPPED | OUTPATIENT
Start: 2023-08-17 | End: 2024-02-13

## 2023-08-17 RX ORDER — SODIUM CHLORIDE 0.9 % (FLUSH) 0.9 %
5-40 SYRINGE (ML) INJECTION PRN
OUTPATIENT
Start: 2023-08-17

## 2023-08-17 RX ORDER — LANREOTIDE ACETATE 120 MG/.5ML
120 INJECTION SUBCUTANEOUS ONCE
OUTPATIENT
Start: 2023-09-14 | End: 2023-09-14

## 2023-08-17 RX ORDER — WATER 1000 ML/1000ML
2.2 INJECTION, SOLUTION INTRAVENOUS ONCE
OUTPATIENT
Start: 2023-08-17 | End: 2023-08-17

## 2023-08-17 RX ORDER — HEPARIN 100 UNIT/ML
500 SYRINGE INTRAVENOUS PRN
Status: DISCONTINUED | OUTPATIENT
Start: 2023-08-17 | End: 2023-08-18 | Stop reason: HOSPADM

## 2023-08-17 RX ORDER — HEPARIN 100 UNIT/ML
500 SYRINGE INTRAVENOUS PRN
OUTPATIENT
Start: 2023-08-17

## 2023-08-17 RX ADMIN — SODIUM CHLORIDE, PRESERVATIVE FREE 10 ML: 5 INJECTION INTRAVENOUS at 13:37

## 2023-08-17 RX ADMIN — LANREOTIDE ACETATE 120 MG: 120 INJECTION SUBCUTANEOUS at 15:10

## 2023-08-17 RX ADMIN — HEPARIN 500 UNITS: 100 SYRINGE at 13:37

## 2023-08-17 NOTE — PROGRESS NOTES
[address]  [address] [facility] MED ONCOLOGY  [address] [place] [address]  Dept: [phone]  Loc: [phone]    Clinical Progress Note    Reason for visit: Neuroendocrine cancer to liver     PCP:  Giovanny Flores DO       Subjective:  Overall doing well. Reviewed results today. Sometimes may have GI symptoms. Symptoms have been better controlled with treatment on lantreotide. ONCOLOGIC HISTORY:  41 y.o. female with hx of hypothyroidism, IBS,migraine who was complaining of abdominal pain associated with diarrhea and flushing/sweating. CT abdomen/pelvis 08/28/2020:  2 cm masslike density in the mid abdominal small bowel mesentery with a spiculated appearance. Multiple liver masses suspicious for metastatic disease. 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 Comment: Sections of the liver tissue cores show the hepatic parenchyma to be partially replaced by a proliferation of epithelioid cells with relatively uniform round nuclei and eosinophilic granular cytoplasm. The   epithelioid cells form anastomosing solid cords/nests that are surrounded by delicate fibrovascular stroma. There are no mitotic figures or tumor cell necrosis present. The histologic changes seen are suggestive of well-differentiated neuroendocrine (carcinoid) tumor. Immunostaining for pankeratin, chromogranin, synaptophysin, TTF-1 and Ki-67 was performed on sections of one tissue block (A1) and the neoplastic epithelioid cells show diffuse and strong positivity for neuroendocrine markers (chromogranin and synaptophysin) and moderate positivity for pankeratin. There is no staining reactivity for TTF-1. The Ki-67 proliferation labeling index is essentially negative, (very low, <1%).    This staining pattern confirms the histologic impression of a well-differentiated neuroendocrine

## 2023-08-24 DIAGNOSIS — Z51.5 PALLIATIVE CARE BY SPECIALIST: ICD-10-CM

## 2023-08-24 DIAGNOSIS — C7B.8 NEUROENDOCRINE CARCINOMA METASTATIC TO LIVER (HCC): ICD-10-CM

## 2023-08-24 DIAGNOSIS — G89.3 NEOPLASM RELATED PAIN: ICD-10-CM

## 2023-08-24 DIAGNOSIS — C7A.8 NEUROENDOCRINE CARCINOMA METASTATIC TO LIVER (HCC): ICD-10-CM

## 2023-08-24 RX ORDER — HYDROCODONE BITARTRATE AND ACETAMINOPHEN 5; 325 MG/1; MG/1
1 TABLET ORAL EVERY 8 HOURS PRN
Qty: 90 TABLET | Refills: 0 | Status: SHIPPED | OUTPATIENT
Start: 2023-08-24 | End: 2023-09-23

## 2023-08-24 NOTE — TELEPHONE ENCOUNTER
Call from Enio Reid requesting refill for [address] is [employer] in [facility].  Next george confirmed 8/28/23

## 2023-09-14 ENCOUNTER — OFFICE VISIT (OUTPATIENT)
Dept: ONCOLOGY | Age: 38
End: 2023-09-14
Payer: COMMERCIAL

## 2023-09-14 ENCOUNTER — HOSPITAL ENCOUNTER (OUTPATIENT)
Dept: INFUSION THERAPY | Age: 38
Discharge: HOME | End: 2023-09-14
Payer: COMMERCIAL

## 2023-09-14 VITALS
HEART RATE: 69 BPM | SYSTOLIC BLOOD PRESSURE: 121 MMHG | TEMPERATURE: 97.3 F | HEIGHT: 60 IN | OXYGEN SATURATION: 98 % | BODY MASS INDEX: 27.74 KG/M2 | DIASTOLIC BLOOD PRESSURE: 81 MMHG | WEIGHT: 141.3 LBS

## 2023-09-14 DIAGNOSIS — C7A.8 NEUROENDOCRINE CANCER (HCC): ICD-10-CM

## 2023-09-14 DIAGNOSIS — C7B.8 METASTATIC MALIGNANT NEUROENDOCRINE TUMOR TO LIVER (HCC): Primary | ICD-10-CM

## 2023-09-14 DIAGNOSIS — C7A.8 NEUROENDOCRINE CANCER (HCC): Primary | ICD-10-CM

## 2023-09-14 LAB
ALBUMIN SERPL-MCNC: 4.3 G/DL (ref 3.5–5.2)
ALP SERPL-CCNC: 135 U/L (ref 35–104)
ALT SERPL-CCNC: 22 U/L (ref 0–32)
ANION GAP SERPL CALCULATED.3IONS-SCNC: 11 MMOL/L (ref 7–16)
AST SERPL-CCNC: 18 U/L (ref 0–31)
BASOPHILS # BLD: 0.02 K/UL (ref 0–0.2)
BASOPHILS NFR BLD: 0 % (ref 0–2)
BILIRUB SERPL-MCNC: <0.2 MG/DL (ref 0–1.2)
BUN SERPL-MCNC: 11 MG/DL (ref 6–20)
CALCIUM SERPL-MCNC: 8.6 MG/DL (ref 8.6–10.2)
CHLORIDE SERPL-SCNC: 104 MMOL/L (ref 98–107)
CO2 SERPL-SCNC: 23 MMOL/L (ref 22–29)
CREAT SERPL-MCNC: 0.6 MG/DL (ref 0.5–1)
EOSINOPHIL # BLD: 0.11 K/UL (ref 0.05–0.5)
EOSINOPHILS RELATIVE PERCENT: 2 % (ref 0–6)
ERYTHROCYTE [DISTWIDTH] IN BLOOD BY AUTOMATED COUNT: 15.4 % (ref 11.5–15)
GFR SERPL CREATININE-BSD FRML MDRD: >60 ML/MIN/1.73M2
GLUCOSE SERPL-MCNC: 87 MG/DL (ref 74–99)
HCT VFR BLD AUTO: 35.7 % (ref 34–48)
HGB BLD-MCNC: 11.8 G/DL (ref 11.5–15.5)
IMM GRANULOCYTES # BLD AUTO: 0.03 K/UL (ref 0–0.58)
IMM GRANULOCYTES NFR BLD: 0 % (ref 0–5)
LYMPHOCYTES NFR BLD: 0.75 K/UL (ref 1.5–4)
LYMPHOCYTES RELATIVE PERCENT: 11 % (ref 20–42)
MCH RBC QN AUTO: 32.9 PG (ref 26–35)
MCHC RBC AUTO-ENTMCNC: 33.1 G/DL (ref 32–34.5)
MCV RBC AUTO: 99.4 FL (ref 80–99.9)
MONOCYTES NFR BLD: 0.67 K/UL (ref 0.1–0.95)
MONOCYTES NFR BLD: 10 % (ref 2–12)
NEUTROPHILS NFR BLD: 77 % (ref 43–80)
NEUTS SEG NFR BLD: 5.42 K/UL (ref 1.8–7.3)
PLATELET # BLD AUTO: 204 K/UL (ref 130–450)
PMV BLD AUTO: 9.5 FL (ref 7–12)
POTASSIUM SERPL-SCNC: 4.2 MMOL/L (ref 3.5–5)
PROT SERPL-MCNC: 7.1 G/DL (ref 6.4–8.3)
RBC # BLD AUTO: 3.59 M/UL (ref 3.5–5.5)
SODIUM SERPL-SCNC: 138 MMOL/L (ref 132–146)
WBC OTHER # BLD: 7 K/UL (ref 4.5–11.5)

## 2023-09-14 PROCEDURE — G8419 CALC BMI OUT NRM PARAM NOF/U: HCPCS | Performed by: STUDENT IN AN ORGANIZED HEALTH CARE EDUCATION/TRAINING PROGRAM

## 2023-09-14 PROCEDURE — 96372 THER/PROPH/DIAG INJ SC/IM: CPT

## 2023-09-14 PROCEDURE — G8427 DOCREV CUR MEDS BY ELIG CLIN: HCPCS | Performed by: STUDENT IN AN ORGANIZED HEALTH CARE EDUCATION/TRAINING PROGRAM

## 2023-09-14 PROCEDURE — 80053 COMPREHEN METABOLIC PANEL: CPT

## 2023-09-14 PROCEDURE — 4004F PT TOBACCO SCREEN RCVD TLK: CPT | Performed by: STUDENT IN AN ORGANIZED HEALTH CARE EDUCATION/TRAINING PROGRAM

## 2023-09-14 PROCEDURE — 6360000002 HC RX W HCPCS: Performed by: INTERNAL MEDICINE

## 2023-09-14 PROCEDURE — 2580000003 HC RX 258: Performed by: INTERNAL MEDICINE

## 2023-09-14 PROCEDURE — 6360000002 HC RX W HCPCS: Performed by: STUDENT IN AN ORGANIZED HEALTH CARE EDUCATION/TRAINING PROGRAM

## 2023-09-14 PROCEDURE — 99214 OFFICE O/P EST MOD 30 MIN: CPT

## 2023-09-14 PROCEDURE — 85025 COMPLETE CBC W/AUTO DIFF WBC: CPT

## 2023-09-14 PROCEDURE — 99214 OFFICE O/P EST MOD 30 MIN: CPT | Performed by: STUDENT IN AN ORGANIZED HEALTH CARE EDUCATION/TRAINING PROGRAM

## 2023-09-14 PROCEDURE — 36591 DRAW BLOOD OFF VENOUS DEVICE: CPT

## 2023-09-14 RX ORDER — HEPARIN 100 UNIT/ML
500 SYRINGE INTRAVENOUS PRN
Status: DISCONTINUED | OUTPATIENT
Start: 2023-09-14 | End: 2023-09-15 | Stop reason: HOSPADM

## 2023-09-14 RX ORDER — LANREOTIDE ACETATE 120 MG/.5ML
120 INJECTION SUBCUTANEOUS ONCE
Status: COMPLETED | OUTPATIENT
Start: 2023-09-14 | End: 2023-09-14

## 2023-09-14 RX ORDER — HEPARIN 100 UNIT/ML
500 SYRINGE INTRAVENOUS PRN
OUTPATIENT
Start: 2023-09-14

## 2023-09-14 RX ORDER — SODIUM CHLORIDE 0.9 % (FLUSH) 0.9 %
5-40 SYRINGE (ML) INJECTION PRN
Status: DISCONTINUED | OUTPATIENT
Start: 2023-09-14 | End: 2023-09-15 | Stop reason: HOSPADM

## 2023-09-14 RX ORDER — WATER 1000 ML/1000ML
2.2 INJECTION, SOLUTION INTRAVENOUS ONCE
OUTPATIENT
Start: 2023-09-14 | End: 2023-09-14

## 2023-09-14 RX ORDER — LANREOTIDE ACETATE 120 MG/.5ML
120 INJECTION SUBCUTANEOUS ONCE
OUTPATIENT
Start: 2023-10-12 | End: 2023-10-12

## 2023-09-14 RX ORDER — SODIUM CHLORIDE 9 MG/ML
25 INJECTION, SOLUTION INTRAVENOUS PRN
OUTPATIENT
Start: 2023-09-14

## 2023-09-14 RX ORDER — SODIUM CHLORIDE 0.9 % (FLUSH) 0.9 %
5-40 SYRINGE (ML) INJECTION PRN
OUTPATIENT
Start: 2023-09-14

## 2023-09-14 RX ADMIN — HEPARIN 500 UNITS: 100 SYRINGE at 13:04

## 2023-09-14 RX ADMIN — LANREOTIDE ACETATE 120 MG: 120 INJECTION SUBCUTANEOUS at 14:37

## 2023-09-14 RX ADMIN — SODIUM CHLORIDE, PRESERVATIVE FREE 10 ML: 5 INJECTION INTRAVENOUS at 13:04

## 2023-09-14 NOTE — PROGRESS NOTES
Patient did stop at check out window, ok'd to leave without AVS.  Patient has [address].   Patient aware to arrive @ 1:00 pm. for labs same day first.
09/14/2023    CREATININE 0.6 09/14/2023    GLUCOSE 87 09/14/2023    CALCIUM 8.6 09/14/2023    PROT 7.1 09/14/2023    LABALBU 4.3 09/14/2023    BILITOT <0.2 09/14/2023    ALKPHOS 135 (H) 09/14/2023    AST 18 09/14/2023    ALT 22 09/14/2023    LABGLOM >60 09/14/2023    GFRAA >60 10/13/2022       Assessment  Metastatic neuroendocrine tumor, likely small bowel (ileal) origin  History of seizures on Keppra  History of renal failure, needing temporary HD: Due to complications from [place]  No new changes in symptoms  Patient is overall well  Labs have been reviewed  Due for next dose of lanreotide today  S/p Lutathera x4 from Nov. 2022 to May 2023  Plan for next PET dotatate around November 2023, last done on 8/15/2023, noting partial treatment response. No new disease elsewhere. Continue supportive meds including PPI, antidiarrheals, antiemetics      Dispo:  Lantreotide today  RTC in 4 weeks for next lantreotide      Approximately spent 31 minutes on chart review as well as time spent on patient encounter, discussion of the laboratory, imaging, clinical findings, and documentation. I have discussed clinical implications and recommendations on the patient's primary issues. More than 50% of time was spent counseling patient. The patient verbalized understanding.       Dontae Nuon MD  Medical Oncology  [facility]  09/14/23 2:16 PM

## 2023-09-18 ENCOUNTER — OFFICE VISIT (OUTPATIENT)
Dept: PALLATIVE CARE | Age: 38
End: 2023-09-18
Payer: COMMERCIAL

## 2023-09-18 VITALS
HEART RATE: 74 BPM | WEIGHT: 137 LBS | SYSTOLIC BLOOD PRESSURE: 131 MMHG | OXYGEN SATURATION: 97 % | BODY MASS INDEX: 26.76 KG/M2 | TEMPERATURE: 98.4 F | DIASTOLIC BLOOD PRESSURE: 69 MMHG

## 2023-09-18 DIAGNOSIS — C7A.8 NEUROENDOCRINE CARCINOMA METASTATIC TO LIVER (HCC): ICD-10-CM

## 2023-09-18 DIAGNOSIS — C7B.8 NEUROENDOCRINE CARCINOMA METASTATIC TO LIVER (HCC): ICD-10-CM

## 2023-09-18 DIAGNOSIS — Z51.5 PALLIATIVE CARE BY SPECIALIST: ICD-10-CM

## 2023-09-18 DIAGNOSIS — G89.3 NEOPLASM RELATED PAIN: ICD-10-CM

## 2023-09-18 PROCEDURE — 4004F PT TOBACCO SCREEN RCVD TLK: CPT | Performed by: NURSE PRACTITIONER

## 2023-09-18 PROCEDURE — 99212 OFFICE O/P EST SF 10 MIN: CPT | Performed by: NURSE PRACTITIONER

## 2023-09-18 PROCEDURE — G8419 CALC BMI OUT NRM PARAM NOF/U: HCPCS | Performed by: NURSE PRACTITIONER

## 2023-09-18 PROCEDURE — G8427 DOCREV CUR MEDS BY ELIG CLIN: HCPCS | Performed by: NURSE PRACTITIONER

## 2023-09-18 PROCEDURE — 99211 OFF/OP EST MAY X REQ PHY/QHP: CPT | Performed by: NURSE PRACTITIONER

## 2023-09-18 RX ORDER — HYDROCODONE BITARTRATE AND ACETAMINOPHEN 5; 325 MG/1; MG/1
1 TABLET ORAL EVERY 8 HOURS PRN
Qty: 90 TABLET | Refills: 0 | Status: SHIPPED | OUTPATIENT
Start: 2023-09-18 | End: 2023-10-18

## 2023-09-18 NOTE — PROGRESS NOTES
Department of Palliative Medicine  Ambulatory Note  Provider: TRINA Gaviria - CNP     Chief Complaint: Raymon Bello is a 41 y.o. female with chief complaint of pain    HPI:  Raymon Bello is a 29 y.o. female with significant medical history of hypothyroidism, IBS, migraine who was complaining of abdominal pain associated with diarrhea and flushing/sweating. She was diagnosed with metastatic well differentiated Neuroendocrine cancer to liver who was referred to [facility] by Dr. Garret Jimenes. Assessment/Plan      Neuroendocrine cancer  - Follows with Dr. Garret Reyez  - s.p Bowel resection on 10/21/20  -Completed Lutathera   -Lantreotide    Neoplasm related pain  - Gabapentin 300mg TID  - Norco 5/325 mg Q 8 PRN    Nausea  - Continue Zofran and Phenergan PRN    Diarrhea:   Germaine Plant  -Imodium she uses this daily  -Eat 1-3 jumbo marshmallows daily PRN  -Lactulose needed due to hyperammonemia     Anxiety:  -Encouraged to talk with counselor  -Hydroxyzine 25 mg TID PRN    Poor sleep:  -Melatonin 10 mg HS    - Goals of care: cure, live longer and improve or maintain function/quality of life    - Code Status: full code    Follow Up:  3 months. Encouraged to call with any questions, concerns, needs, or changes in symptoms. Subjective:   Daniela Robles presents today with her mother. She reports that she has been very doing well. Juanell Nose continues to work well at managing her pain, and she is use this typically only 1-2 times per day, she also continues utilize gabapentin. She reports not significant side effects. She is tolerating her treatments well still, with slight worse fatigue following her injections. She otherwise has no other significant complaints at this time.      Pain Assessment   Ratin  Description: aching, burning, sharp, and shooting  Duration: minutes  Frequency: daily  Location: Abdomen, back with radiation to the hips and legs  Alleviating Factors:

## 2023-10-13 DIAGNOSIS — C7B.8 METASTATIC MALIGNANT NEUROENDOCRINE TUMOR TO LIVER (HCC): Primary | ICD-10-CM

## 2023-10-16 DIAGNOSIS — Z51.5 PALLIATIVE CARE BY SPECIALIST: ICD-10-CM

## 2023-10-16 DIAGNOSIS — C7A.8 NEUROENDOCRINE CARCINOMA METASTATIC TO LIVER (HCC): ICD-10-CM

## 2023-10-16 DIAGNOSIS — G89.3 NEOPLASM RELATED PAIN: ICD-10-CM

## 2023-10-16 DIAGNOSIS — C7B.8 NEUROENDOCRINE CARCINOMA METASTATIC TO LIVER (HCC): ICD-10-CM

## 2023-10-16 RX ORDER — HYDROCODONE BITARTRATE AND ACETAMINOPHEN 5; 325 MG/1; MG/1
1 TABLET ORAL EVERY 8 HOURS PRN
Qty: 90 TABLET | Refills: 0 | Status: SHIPPED | OUTPATIENT
Start: 2023-10-16 | End: 2023-11-15

## 2023-10-16 NOTE — TELEPHONE ENCOUNTER
Call from [facility] requesting refill for [address] is [employer] in [facility]. Next george 12/11/23.

## 2023-10-19 ENCOUNTER — HOSPITAL ENCOUNTER (OUTPATIENT)
Dept: INFUSION THERAPY | Age: 38
Discharge: HOME | End: 2023-10-19
Payer: COMMERCIAL

## 2023-10-19 ENCOUNTER — OFFICE VISIT (OUTPATIENT)
Dept: ONCOLOGY | Age: 38
End: 2023-10-19
Payer: COMMERCIAL

## 2023-10-19 VITALS
HEIGHT: 60 IN | TEMPERATURE: 97.7 F | DIASTOLIC BLOOD PRESSURE: 70 MMHG | SYSTOLIC BLOOD PRESSURE: 119 MMHG | BODY MASS INDEX: 27.27 KG/M2 | HEART RATE: 77 BPM | OXYGEN SATURATION: 98 % | WEIGHT: 138.9 LBS

## 2023-10-19 DIAGNOSIS — C7B.8 METASTATIC MALIGNANT NEUROENDOCRINE TUMOR TO LIVER (HCC): Primary | ICD-10-CM

## 2023-10-19 DIAGNOSIS — C7A.8 NEUROENDOCRINE CANCER (HCC): Primary | ICD-10-CM

## 2023-10-19 LAB
ALBUMIN SERPL-MCNC: 4.3 G/DL (ref 3.5–5.2)
ALP SERPL-CCNC: 121 U/L (ref 35–104)
ALT SERPL-CCNC: 12 U/L (ref 0–32)
ANION GAP SERPL CALCULATED.3IONS-SCNC: 12 MMOL/L (ref 7–16)
AST SERPL-CCNC: 19 U/L (ref 0–31)
BASOPHILS # BLD: 0.05 K/UL (ref 0–0.2)
BASOPHILS NFR BLD: 1 % (ref 0–2)
BILIRUB SERPL-MCNC: 0.3 MG/DL (ref 0–1.2)
BUN SERPL-MCNC: 12 MG/DL (ref 6–20)
CALCIUM SERPL-MCNC: 8.8 MG/DL (ref 8.6–10.2)
CHLORIDE SERPL-SCNC: 98 MMOL/L (ref 98–107)
CO2 SERPL-SCNC: 23 MMOL/L (ref 22–29)
CREAT SERPL-MCNC: 0.6 MG/DL (ref 0.5–1)
EOSINOPHIL # BLD: 0.11 K/UL (ref 0.05–0.5)
EOSINOPHILS RELATIVE PERCENT: 1 % (ref 0–6)
ERYTHROCYTE [DISTWIDTH] IN BLOOD BY AUTOMATED COUNT: 15.1 % (ref 11.5–15)
GFR SERPL CREATININE-BSD FRML MDRD: >60 ML/MIN/1.73M2
GLUCOSE SERPL-MCNC: 100 MG/DL (ref 74–99)
HCT VFR BLD AUTO: 36.3 % (ref 34–48)
HGB BLD-MCNC: 12.2 G/DL (ref 11.5–15.5)
IMM GRANULOCYTES # BLD AUTO: 0.07 K/UL (ref 0–0.58)
IMM GRANULOCYTES NFR BLD: 1 % (ref 0–5)
LYMPHOCYTES NFR BLD: 0.96 K/UL (ref 1.5–4)
LYMPHOCYTES RELATIVE PERCENT: 9 % (ref 20–42)
MCH RBC QN AUTO: 32.6 PG (ref 26–35)
MCHC RBC AUTO-ENTMCNC: 33.6 G/DL (ref 32–34.5)
MCV RBC AUTO: 97.1 FL (ref 80–99.9)
MONOCYTES NFR BLD: 1 K/UL (ref 0.1–0.95)
MONOCYTES NFR BLD: 10 % (ref 2–12)
NEUTROPHILS NFR BLD: 79 % (ref 43–80)
NEUTS SEG NFR BLD: 8.02 K/UL (ref 1.8–7.3)
PLATELET # BLD AUTO: 178 K/UL (ref 130–450)
PMV BLD AUTO: 9.6 FL (ref 7–12)
POTASSIUM SERPL-SCNC: 4.4 MMOL/L (ref 3.5–5)
PROT SERPL-MCNC: 7.1 G/DL (ref 6.4–8.3)
RBC # BLD AUTO: 3.74 M/UL (ref 3.5–5.5)
SODIUM SERPL-SCNC: 133 MMOL/L (ref 132–146)
WBC OTHER # BLD: 10.2 K/UL (ref 4.5–11.5)

## 2023-10-19 PROCEDURE — 4004F PT TOBACCO SCREEN RCVD TLK: CPT | Performed by: STUDENT IN AN ORGANIZED HEALTH CARE EDUCATION/TRAINING PROGRAM

## 2023-10-19 PROCEDURE — 36591 DRAW BLOOD OFF VENOUS DEVICE: CPT

## 2023-10-19 PROCEDURE — G8419 CALC BMI OUT NRM PARAM NOF/U: HCPCS | Performed by: STUDENT IN AN ORGANIZED HEALTH CARE EDUCATION/TRAINING PROGRAM

## 2023-10-19 PROCEDURE — 99214 OFFICE O/P EST MOD 30 MIN: CPT

## 2023-10-19 PROCEDURE — G8427 DOCREV CUR MEDS BY ELIG CLIN: HCPCS | Performed by: STUDENT IN AN ORGANIZED HEALTH CARE EDUCATION/TRAINING PROGRAM

## 2023-10-19 PROCEDURE — 80053 COMPREHEN METABOLIC PANEL: CPT

## 2023-10-19 PROCEDURE — 85025 COMPLETE CBC W/AUTO DIFF WBC: CPT

## 2023-10-19 PROCEDURE — 96372 THER/PROPH/DIAG INJ SC/IM: CPT

## 2023-10-19 PROCEDURE — 99214 OFFICE O/P EST MOD 30 MIN: CPT | Performed by: STUDENT IN AN ORGANIZED HEALTH CARE EDUCATION/TRAINING PROGRAM

## 2023-10-19 PROCEDURE — G8484 FLU IMMUNIZE NO ADMIN: HCPCS | Performed by: STUDENT IN AN ORGANIZED HEALTH CARE EDUCATION/TRAINING PROGRAM

## 2023-10-19 PROCEDURE — 6360000002 HC RX W HCPCS: Performed by: STUDENT IN AN ORGANIZED HEALTH CARE EDUCATION/TRAINING PROGRAM

## 2023-10-19 RX ORDER — LANREOTIDE ACETATE 120 MG/.5ML
120 INJECTION SUBCUTANEOUS ONCE
Status: COMPLETED | OUTPATIENT
Start: 2023-10-19 | End: 2023-10-19

## 2023-10-19 RX ORDER — LANREOTIDE ACETATE 120 MG/.5ML
120 INJECTION SUBCUTANEOUS ONCE
OUTPATIENT
Start: 2023-11-09 | End: 2023-11-09

## 2023-10-19 RX ADMIN — LANREOTIDE ACETATE 120 MG: 120 INJECTION SUBCUTANEOUS at 15:29

## 2023-10-19 NOTE — PROGRESS NOTES
[address]  [address] [facility] MED ONCOLOGY  [address] [place] [address]  Dept: [phone]  Loc: [phone]    Clinical Progress Note    Reason for visit: Neuroendocrine cancer to liver     PCP:  Nasim Winston DO       Subjective:  Overall doing fairly well. Denies of new symptoms apart from baseline. She intermittently has diarrhea and some fatigue, but otherwise doing well today. ONCOLOGIC HISTORY:  41 y.o. female with hx of hypothyroidism, IBS,migraine who was complaining of abdominal pain associated with diarrhea and flushing/sweating. CT abdomen/pelvis 08/28/2020:  2 cm masslike density in the mid abdominal small bowel mesentery with a spiculated appearance. Multiple liver masses suspicious for metastatic disease. 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 Comment: Sections of the liver tissue cores show the hepatic parenchyma to be partially replaced by a proliferation of epithelioid cells with relatively uniform round nuclei and eosinophilic granular cytoplasm. The   epithelioid cells form anastomosing solid cords/nests that are surrounded by delicate fibrovascular stroma. There are no mitotic figures or tumor cell necrosis present. The histologic changes seen are suggestive of well-differentiated neuroendocrine (carcinoid) tumor. Immunostaining for pankeratin, chromogranin, synaptophysin, TTF-1 and Ki-67 was performed on sections of one tissue block (A1) and the neoplastic epithelioid cells show diffuse and strong positivity for neuroendocrine markers (chromogranin and synaptophysin) and moderate positivity for pankeratin. There is no staining reactivity for TTF-1. The Ki-67 proliferation labeling index is essentially negative, (very low, <1%).    This staining pattern confirms the histologic impression of a well-differentiated

## 2023-10-20 NOTE — PROGRESS NOTES
Patient did stop at check out window, ok'd to leave without AVS.  Patient has [facility].   Patient aware to arrive @ 1:00 pm. for labs same day first.

## 2023-10-24 SDOH — ECONOMIC STABILITY: FOOD INSECURITY: WITHIN THE PAST 12 MONTHS, THE FOOD YOU BOUGHT JUST DIDN'T LAST AND YOU DIDN'T HAVE MONEY TO GET MORE.: OFTEN TRUE

## 2023-10-24 SDOH — ECONOMIC STABILITY: FOOD INSECURITY: WITHIN THE PAST 12 MONTHS, YOU WORRIED THAT YOUR FOOD WOULD RUN OUT BEFORE YOU GOT MONEY TO BUY MORE.: OFTEN TRUE

## 2023-10-24 SDOH — ECONOMIC STABILITY: TRANSPORTATION INSECURITY
IN THE PAST 12 MONTHS, HAS LACK OF TRANSPORTATION KEPT YOU FROM MEETINGS, WORK, OR FROM GETTING THINGS NEEDED FOR DAILY LIVING?: NO

## 2023-10-24 SDOH — ECONOMIC STABILITY: HOUSING INSECURITY
IN THE LAST 12 MONTHS, WAS THERE A TIME WHEN YOU DID NOT HAVE A STEADY PLACE TO SLEEP OR SLEPT IN A SHELTER (INCLUDING NOW)?: NO

## 2023-10-24 SDOH — ECONOMIC STABILITY: INCOME INSECURITY: HOW HARD IS IT FOR YOU TO PAY FOR THE VERY BASICS LIKE FOOD, HOUSING, MEDICAL CARE, AND HEATING?: SOMEWHAT HARD

## 2023-10-25 RX ORDER — OMEPRAZOLE 20 MG/1
20 CAPSULE, DELAYED RELEASE ORAL
Qty: 30 CAPSULE | Refills: 5 | Status: SHIPPED | OUTPATIENT
Start: 2023-10-25

## 2023-10-27 ENCOUNTER — OFFICE VISIT (OUTPATIENT)
Dept: PRIMARY CARE CLINIC | Age: 38
End: 2023-10-27
Payer: COMMERCIAL

## 2023-10-27 VITALS
BODY MASS INDEX: 27.68 KG/M2 | WEIGHT: 141 LBS | DIASTOLIC BLOOD PRESSURE: 76 MMHG | TEMPERATURE: 97.7 F | HEIGHT: 60 IN | HEART RATE: 74 BPM | OXYGEN SATURATION: 98 % | SYSTOLIC BLOOD PRESSURE: 110 MMHG

## 2023-10-27 DIAGNOSIS — F51.01 PRIMARY INSOMNIA: Primary | ICD-10-CM

## 2023-10-27 DIAGNOSIS — G43.909 MIGRAINE WITHOUT STATUS MIGRAINOSUS, NOT INTRACTABLE, UNSPECIFIED MIGRAINE TYPE: ICD-10-CM

## 2023-10-27 DIAGNOSIS — C7B.8 METASTATIC MALIGNANT NEUROENDOCRINE TUMOR TO LIVER (HCC): ICD-10-CM

## 2023-10-27 PROCEDURE — G8419 CALC BMI OUT NRM PARAM NOF/U: HCPCS | Performed by: FAMILY MEDICINE

## 2023-10-27 PROCEDURE — G8427 DOCREV CUR MEDS BY ELIG CLIN: HCPCS | Performed by: FAMILY MEDICINE

## 2023-10-27 PROCEDURE — G8484 FLU IMMUNIZE NO ADMIN: HCPCS | Performed by: FAMILY MEDICINE

## 2023-10-27 PROCEDURE — 99214 OFFICE O/P EST MOD 30 MIN: CPT | Performed by: FAMILY MEDICINE

## 2023-10-27 PROCEDURE — 4004F PT TOBACCO SCREEN RCVD TLK: CPT | Performed by: FAMILY MEDICINE

## 2023-10-27 RX ORDER — BUTALBITAL, ACETAMINOPHEN AND CAFFEINE 50; 325; 40 MG/1; MG/1; MG/1
1 TABLET ORAL 2 TIMES DAILY PRN
Qty: 60 TABLET | Refills: 2 | Status: SHIPPED | OUTPATIENT
Start: 2023-10-27

## 2023-10-27 RX ORDER — ZOLPIDEM TARTRATE 10 MG/1
10 TABLET ORAL
COMMUNITY
Start: 2023-10-03

## 2023-10-27 ASSESSMENT — ENCOUNTER SYMPTOMS
ALLERGIC/IMMUNOLOGIC NEGATIVE: 1
WHEEZING: 0
COLOR CHANGE: 0
PHOTOPHOBIA: 0
SINUS PRESSURE: 0
NAUSEA: 0
DIARRHEA: 0
SORE THROAT: 0
ABDOMINAL PAIN: 1
FACIAL SWELLING: 0
CHEST TIGHTNESS: 0
SHORTNESS OF BREATH: 0
APNEA: 0
COUGH: 0
BLOOD IN STOOL: 0
VOMITING: 0
BACK PAIN: 0

## 2023-10-27 NOTE — PROGRESS NOTES
Chief Complaint:   Chief Complaint   Patient presents with    Abdominal Pain     Burning has improved    Insomnia     Taking Ambien which helps       Abdominal Pain  This is a chronic problem. The current episode started more than 1 year ago. The onset quality is gradual. The problem occurs intermittently. The problem has been gradually improving. The pain is located in the generalized abdominal region. Pertinent negatives include no arthralgias, diarrhea, frequency, headaches, myalgias, nausea or vomiting. Insomnia  This is a chronic problem. The current episode started more than 1 year ago. The problem occurs daily. Associated symptoms include abdominal pain. Pertinent negatives include no arthralgias, chest pain, congestion, coughing, headaches, joint swelling, myalgias, nausea, rash, sore throat, vomiting or weakness. Migraine   This is a chronic problem. The current episode started more than 1 year ago. The problem occurs intermittently. The problem has been waxing and waning. The pain is located in the Bilateral region. Associated symptoms include abdominal pain and insomnia. Pertinent negatives include no back pain, coughing, hearing loss, nausea, photophobia, sinus pressure, sore throat, vomiting or weakness.        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

## 2023-11-01 DIAGNOSIS — F51.01 PRIMARY INSOMNIA: ICD-10-CM

## 2023-11-01 RX ORDER — ZOLPIDEM TARTRATE 10 MG/1
10 TABLET ORAL NIGHTLY PRN
Qty: 30 TABLET | Refills: 2 | Status: SHIPPED | OUTPATIENT
Start: 2023-11-01 | End: 2024-01-30

## 2023-11-01 NOTE — TELEPHONE ENCOUNTER
Refill request    Last Appointment:  10/27/2023    Future appts:  Future Appointments   Date Time Provider [address]   11/14/2023  9:30 AM [facility] PET ROOM SEYZ NM [facility] Rad/Car   11/16/2023  2:00 PM Carleen Moss MD MED ONC [facility]   11/16/2023  2:45 PM [facility] MED ONC FAST TRACK 2 [facility]   12/11/2023 10:00 AM SCHEDULE, [facility] PALLIATIVE CARE PROVIDER [facility] PALLIAT [facility]   4/22/2024 10:15 AM Gordo JanuaryDO [facility]

## 2023-11-14 ENCOUNTER — HOSPITAL ENCOUNTER (OUTPATIENT)
Dept: NUCLEAR MEDICINE | Age: 38
Discharge: HOME | End: 2023-11-16
Attending: STUDENT IN AN ORGANIZED HEALTH CARE EDUCATION/TRAINING PROGRAM
Payer: COMMERCIAL

## 2023-11-14 DIAGNOSIS — C7A.8 NEUROENDOCRINE CANCER (HCC): ICD-10-CM

## 2023-11-14 PROCEDURE — 3430000000 HC RX DIAGNOSTIC RADIOPHARMACEUTICAL: Performed by: RADIOLOGY

## 2023-11-14 PROCEDURE — A9587 GALLIUM GA-68: HCPCS | Performed by: RADIOLOGY

## 2023-11-14 RX ORDER — 68GA-DOTATATE 40 MCG
3 KIT INTRAVENOUS ONCE
Status: COMPLETED | OUTPATIENT
Start: 2023-11-14 | End: 2023-11-14

## 2023-11-14 RX ADMIN — 68GA-DOTATATE 3 MILLICURIE: KIT INTRAVENOUS at 09:46

## 2023-11-16 ENCOUNTER — HOSPITAL ENCOUNTER (OUTPATIENT)
Dept: INFUSION THERAPY | Age: 38
Discharge: HOME | End: 2023-11-16
Payer: COMMERCIAL

## 2023-11-16 ENCOUNTER — OFFICE VISIT (OUTPATIENT)
Dept: ONCOLOGY | Age: 38
End: 2023-11-16
Payer: COMMERCIAL

## 2023-11-16 VITALS
BODY MASS INDEX: 27.64 KG/M2 | WEIGHT: 140.8 LBS | HEIGHT: 60 IN | TEMPERATURE: 97.2 F | DIASTOLIC BLOOD PRESSURE: 73 MMHG | HEART RATE: 77 BPM | OXYGEN SATURATION: 100 % | SYSTOLIC BLOOD PRESSURE: 115 MMHG

## 2023-11-16 DIAGNOSIS — C7B.8 NEUROENDOCRINE CARCINOMA METASTATIC TO LIVER (HCC): ICD-10-CM

## 2023-11-16 DIAGNOSIS — Z51.5 PALLIATIVE CARE BY SPECIALIST: ICD-10-CM

## 2023-11-16 DIAGNOSIS — C7B.8 METASTATIC MALIGNANT NEUROENDOCRINE TUMOR TO LIVER (HCC): Primary | ICD-10-CM

## 2023-11-16 DIAGNOSIS — C7A.8 NEUROENDOCRINE CANCER (HCC): Primary | ICD-10-CM

## 2023-11-16 DIAGNOSIS — C7A.8 NEUROENDOCRINE CARCINOMA METASTATIC TO LIVER (HCC): ICD-10-CM

## 2023-11-16 DIAGNOSIS — B37.2 CANDIDIASIS OF SKIN: ICD-10-CM

## 2023-11-16 DIAGNOSIS — G89.3 NEOPLASM RELATED PAIN: ICD-10-CM

## 2023-11-16 LAB
ALBUMIN SERPL-MCNC: 4.5 G/DL (ref 3.5–5.2)
ALP SERPL-CCNC: 134 U/L (ref 35–104)
ALT SERPL-CCNC: 21 U/L (ref 0–32)
ANION GAP SERPL CALCULATED.3IONS-SCNC: 11 MMOL/L (ref 7–16)
AST SERPL-CCNC: 21 U/L (ref 0–31)
BASOPHILS # BLD: 0.06 K/UL (ref 0–0.2)
BASOPHILS NFR BLD: 1 % (ref 0–2)
BILIRUB SERPL-MCNC: 0.2 MG/DL (ref 0–1.2)
BUN SERPL-MCNC: 12 MG/DL (ref 6–20)
CALCIUM SERPL-MCNC: 8.8 MG/DL (ref 8.6–10.2)
CHLORIDE SERPL-SCNC: 100 MMOL/L (ref 98–107)
CO2 SERPL-SCNC: 24 MMOL/L (ref 22–29)
CREAT SERPL-MCNC: 0.6 MG/DL (ref 0.5–1)
EOSINOPHIL # BLD: 0.22 K/UL (ref 0.05–0.5)
EOSINOPHILS RELATIVE PERCENT: 3 % (ref 0–6)
ERYTHROCYTE [DISTWIDTH] IN BLOOD BY AUTOMATED COUNT: 14.8 % (ref 11.5–15)
GFR SERPL CREATININE-BSD FRML MDRD: >60 ML/MIN/1.73M2
GLUCOSE SERPL-MCNC: 80 MG/DL (ref 74–99)
HCT VFR BLD AUTO: 36.5 % (ref 34–48)
HGB BLD-MCNC: 12.2 G/DL (ref 11.5–15.5)
IMM GRANULOCYTES # BLD AUTO: 0.04 K/UL (ref 0–0.58)
IMM GRANULOCYTES NFR BLD: 1 % (ref 0–5)
LYMPHOCYTES NFR BLD: 1.1 K/UL (ref 1.5–4)
LYMPHOCYTES RELATIVE PERCENT: 14 % (ref 20–42)
MCH RBC QN AUTO: 32.6 PG (ref 26–35)
MCHC RBC AUTO-ENTMCNC: 33.4 G/DL (ref 32–34.5)
MCV RBC AUTO: 97.6 FL (ref 80–99.9)
MONOCYTES NFR BLD: 0.98 K/UL (ref 0.1–0.95)
MONOCYTES NFR BLD: 13 % (ref 2–12)
NEUTROPHILS NFR BLD: 69 % (ref 43–80)
NEUTS SEG NFR BLD: 5.28 K/UL (ref 1.8–7.3)
PLATELET # BLD AUTO: 198 K/UL (ref 130–450)
PMV BLD AUTO: 9.6 FL (ref 7–12)
POTASSIUM SERPL-SCNC: 3.9 MMOL/L (ref 3.5–5)
PROT SERPL-MCNC: 7.1 G/DL (ref 6.4–8.3)
RBC # BLD AUTO: 3.74 M/UL (ref 3.5–5.5)
SODIUM SERPL-SCNC: 135 MMOL/L (ref 132–146)
WBC OTHER # BLD: 7.7 K/UL (ref 4.5–11.5)

## 2023-11-16 PROCEDURE — 6360000002 HC RX W HCPCS: Performed by: INTERNAL MEDICINE

## 2023-11-16 PROCEDURE — 4004F PT TOBACCO SCREEN RCVD TLK: CPT | Performed by: STUDENT IN AN ORGANIZED HEALTH CARE EDUCATION/TRAINING PROGRAM

## 2023-11-16 PROCEDURE — 99212 OFFICE O/P EST SF 10 MIN: CPT

## 2023-11-16 PROCEDURE — 6360000002 HC RX W HCPCS: Performed by: STUDENT IN AN ORGANIZED HEALTH CARE EDUCATION/TRAINING PROGRAM

## 2023-11-16 PROCEDURE — 96372 THER/PROPH/DIAG INJ SC/IM: CPT

## 2023-11-16 PROCEDURE — G8419 CALC BMI OUT NRM PARAM NOF/U: HCPCS | Performed by: STUDENT IN AN ORGANIZED HEALTH CARE EDUCATION/TRAINING PROGRAM

## 2023-11-16 PROCEDURE — G8427 DOCREV CUR MEDS BY ELIG CLIN: HCPCS | Performed by: STUDENT IN AN ORGANIZED HEALTH CARE EDUCATION/TRAINING PROGRAM

## 2023-11-16 PROCEDURE — 80053 COMPREHEN METABOLIC PANEL: CPT

## 2023-11-16 PROCEDURE — 2580000003 HC RX 258: Performed by: INTERNAL MEDICINE

## 2023-11-16 PROCEDURE — 99214 OFFICE O/P EST MOD 30 MIN: CPT | Performed by: STUDENT IN AN ORGANIZED HEALTH CARE EDUCATION/TRAINING PROGRAM

## 2023-11-16 PROCEDURE — G8484 FLU IMMUNIZE NO ADMIN: HCPCS | Performed by: STUDENT IN AN ORGANIZED HEALTH CARE EDUCATION/TRAINING PROGRAM

## 2023-11-16 PROCEDURE — 85025 COMPLETE CBC W/AUTO DIFF WBC: CPT

## 2023-11-16 RX ORDER — HEPARIN 100 UNIT/ML
500 SYRINGE INTRAVENOUS PRN
Status: DISCONTINUED | OUTPATIENT
Start: 2023-11-16 | End: 2023-11-17 | Stop reason: HOSPADM

## 2023-11-16 RX ORDER — LANREOTIDE ACETATE 120 MG/.5ML
120 INJECTION SUBCUTANEOUS ONCE
OUTPATIENT
Start: 2023-12-14 | End: 2023-12-14

## 2023-11-16 RX ORDER — LANREOTIDE ACETATE 120 MG/.5ML
120 INJECTION SUBCUTANEOUS ONCE
Status: COMPLETED | OUTPATIENT
Start: 2023-11-16 | End: 2023-11-16

## 2023-11-16 RX ORDER — SODIUM CHLORIDE 9 MG/ML
25 INJECTION, SOLUTION INTRAVENOUS PRN
OUTPATIENT
Start: 2023-11-16

## 2023-11-16 RX ORDER — SODIUM CHLORIDE 0.9 % (FLUSH) 0.9 %
5-40 SYRINGE (ML) INJECTION PRN
Status: DISCONTINUED | OUTPATIENT
Start: 2023-11-16 | End: 2023-11-17 | Stop reason: HOSPADM

## 2023-11-16 RX ORDER — NYSTATIN 100000 [USP'U]/G
POWDER TOPICAL
Qty: 30 G | Refills: 1 | Status: SHIPPED | OUTPATIENT
Start: 2023-11-16

## 2023-11-16 RX ORDER — HYDROCODONE BITARTRATE AND ACETAMINOPHEN 5; 325 MG/1; MG/1
1 TABLET ORAL EVERY 8 HOURS PRN
Qty: 90 TABLET | Refills: 0 | Status: SHIPPED | OUTPATIENT
Start: 2023-11-16 | End: 2023-12-16

## 2023-11-16 RX ORDER — HEPARIN 100 UNIT/ML
500 SYRINGE INTRAVENOUS PRN
OUTPATIENT
Start: 2023-11-16

## 2023-11-16 RX ORDER — WATER 10 ML/10ML
2.2 INJECTION INTRAMUSCULAR; INTRAVENOUS; SUBCUTANEOUS ONCE
OUTPATIENT
Start: 2023-11-16 | End: 2023-11-16

## 2023-11-16 RX ORDER — SODIUM CHLORIDE 0.9 % (FLUSH) 0.9 %
5-40 SYRINGE (ML) INJECTION PRN
OUTPATIENT
Start: 2023-11-16

## 2023-11-16 RX ADMIN — SODIUM CHLORIDE, PRESERVATIVE FREE 10 ML: 5 INJECTION INTRAVENOUS at 13:37

## 2023-11-16 RX ADMIN — HEPARIN 500 UNITS: 100 SYRINGE at 13:37

## 2023-11-16 RX ADMIN — LANREOTIDE ACETATE 120 MG: 120 INJECTION SUBCUTANEOUS at 14:53

## 2023-11-16 NOTE — TELEPHONE ENCOUNTER
Call from Sofi Michelle requesting refill for [address] is [employer] in [facility]. Next george 12/11/23.

## 2023-11-16 NOTE — PROGRESS NOTES
[address]  [address] [facility] MED ONCOLOGY  [address] [facility] [address]  Dept: [phone]  Loc: [phone]    Clinical Progress Note    Reason for visit: Neuroendocrine cancer to liver     PCP:  Angela Sotomayor DO       Subjective:  No major events. Patient complains of some rash/\"boils\" of her armpits that are pruritic, which she had addressed in the past with Dr. Eun Ha prior to establishing with me. She has been using hydrocortisone cream.  Otherwise she is tolerating treatments well. Continues to have diarrhea which is largely chronic, but has not worsened. No new symptoms otherwise. She was accompanied by her mother today. ONCOLOGIC HISTORY:  41 y.o. female with hx of hypothyroidism, IBS,migraine who was complaining of abdominal pain associated with diarrhea and flushing/sweating. CT abdomen/pelvis 08/28/2020:  2 cm masslike density in the mid abdominal small bowel mesentery with a spiculated appearance. Multiple liver masses suspicious for metastatic disease. 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 Comment: Sections of the liver tissue cores show the hepatic parenchyma to be partially replaced by a proliferation of epithelioid cells with relatively uniform round nuclei and eosinophilic granular cytoplasm. The   epithelioid cells form anastomosing solid cords/nests that are surrounded by delicate fibrovascular stroma. There are no mitotic figures or tumor cell necrosis present. The histologic changes seen are suggestive of well-differentiated neuroendocrine (carcinoid) tumor.      Immunostaining for pankeratin, chromogranin, synaptophysin, TTF-1 and Ki-67 was performed on sections of one tissue block (A1) and the neoplastic epithelioid cells show diffuse and strong positivity for neuroendocrine markers (chromogranin and synaptophysin) and

## 2023-11-17 NOTE — PROGRESS NOTES
Patient did stop at check out window, ok'd to leave without AVS.  Patient has [address] [facility].   Patient aware to arrive @ 1:00 pm. for labs same day first.

## 2023-12-11 ENCOUNTER — OFFICE VISIT (OUTPATIENT)
Dept: PALLATIVE CARE | Age: 38
End: 2023-12-11
Payer: COMMERCIAL

## 2023-12-11 VITALS
DIASTOLIC BLOOD PRESSURE: 58 MMHG | OXYGEN SATURATION: 97 % | SYSTOLIC BLOOD PRESSURE: 108 MMHG | TEMPERATURE: 97.2 F | HEART RATE: 80 BPM | BODY MASS INDEX: 26.8 KG/M2 | WEIGHT: 137.2 LBS

## 2023-12-11 DIAGNOSIS — Z51.5 PALLIATIVE CARE BY SPECIALIST: Primary | ICD-10-CM

## 2023-12-11 DIAGNOSIS — G89.3 NEOPLASM RELATED PAIN: ICD-10-CM

## 2023-12-11 PROCEDURE — 4004F PT TOBACCO SCREEN RCVD TLK: CPT | Performed by: NURSE PRACTITIONER

## 2023-12-11 PROCEDURE — 99211 OFF/OP EST MAY X REQ PHY/QHP: CPT | Performed by: NURSE PRACTITIONER

## 2023-12-11 PROCEDURE — 99212 OFFICE O/P EST SF 10 MIN: CPT | Performed by: NURSE PRACTITIONER

## 2023-12-11 PROCEDURE — G8484 FLU IMMUNIZE NO ADMIN: HCPCS | Performed by: NURSE PRACTITIONER

## 2023-12-11 PROCEDURE — G8419 CALC BMI OUT NRM PARAM NOF/U: HCPCS | Performed by: NURSE PRACTITIONER

## 2023-12-11 PROCEDURE — G8428 CUR MEDS NOT DOCUMENT: HCPCS | Performed by: NURSE PRACTITIONER

## 2023-12-15 ENCOUNTER — TELEPHONE (OUTPATIENT)
Dept: PALLATIVE CARE | Age: 38
End: 2023-12-15

## 2023-12-15 DIAGNOSIS — C7B.8 NEUROENDOCRINE CARCINOMA METASTATIC TO LIVER (HCC): ICD-10-CM

## 2023-12-15 DIAGNOSIS — G89.3 NEOPLASM RELATED PAIN: ICD-10-CM

## 2023-12-15 DIAGNOSIS — Z51.5 PALLIATIVE CARE BY SPECIALIST: ICD-10-CM

## 2023-12-15 DIAGNOSIS — C7A.8 NEUROENDOCRINE CARCINOMA METASTATIC TO LIVER (HCC): ICD-10-CM

## 2023-12-21 ENCOUNTER — HOSPITAL ENCOUNTER (OUTPATIENT)
Dept: INFUSION THERAPY | Age: 38
Discharge: HOME | End: 2023-12-21
Payer: COMMERCIAL

## 2023-12-21 DIAGNOSIS — C7B.8 METASTATIC MALIGNANT NEUROENDOCRINE TUMOR TO LIVER (HCC): Primary | ICD-10-CM

## 2023-12-21 LAB
ALBUMIN SERPL-MCNC: 4.2 G/DL (ref 3.5–5.2)
ALP SERPL-CCNC: 161 U/L (ref 35–104)
ALT SERPL-CCNC: 79 U/L (ref 0–32)
ANION GAP SERPL CALCULATED.3IONS-SCNC: 11 MMOL/L (ref 7–16)
AST SERPL-CCNC: 55 U/L (ref 0–31)
BASOPHILS # BLD: 0.05 K/UL (ref 0–0.2)
BASOPHILS NFR BLD: 1 % (ref 0–2)
BILIRUB SERPL-MCNC: 0.5 MG/DL (ref 0–1.2)
BUN SERPL-MCNC: 11 MG/DL (ref 6–20)
CALCIUM SERPL-MCNC: 8.8 MG/DL (ref 8.6–10.2)
CHLORIDE SERPL-SCNC: 99 MMOL/L (ref 98–107)
CO2 SERPL-SCNC: 25 MMOL/L (ref 22–29)
CREAT SERPL-MCNC: 0.6 MG/DL (ref 0.5–1)
EOSINOPHIL # BLD: 0.13 K/UL (ref 0.05–0.5)
EOSINOPHILS RELATIVE PERCENT: 2 % (ref 0–6)
ERYTHROCYTE [DISTWIDTH] IN BLOOD BY AUTOMATED COUNT: 14.8 % (ref 11.5–15)
GFR SERPL CREATININE-BSD FRML MDRD: >60 ML/MIN/1.73M2
GLUCOSE SERPL-MCNC: 113 MG/DL (ref 74–99)
HCT VFR BLD AUTO: 38.3 % (ref 34–48)
HGB BLD-MCNC: 12.9 G/DL (ref 11.5–15.5)
IMM GRANULOCYTES # BLD AUTO: <0.03 K/UL (ref 0–0.58)
IMM GRANULOCYTES NFR BLD: 0 % (ref 0–5)
LYMPHOCYTES NFR BLD: 0.96 K/UL (ref 1.5–4)
LYMPHOCYTES RELATIVE PERCENT: 16 % (ref 20–42)
MCH RBC QN AUTO: 32.1 PG (ref 26–35)
MCHC RBC AUTO-ENTMCNC: 33.7 G/DL (ref 32–34.5)
MCV RBC AUTO: 95.3 FL (ref 80–99.9)
MONOCYTES NFR BLD: 0.81 K/UL (ref 0.1–0.95)
MONOCYTES NFR BLD: 13 % (ref 2–12)
NEUTROPHILS NFR BLD: 68 % (ref 43–80)
NEUTS SEG NFR BLD: 4.17 K/UL (ref 1.8–7.3)
PLATELET # BLD AUTO: 189 K/UL (ref 130–450)
PMV BLD AUTO: 9.3 FL (ref 7–12)
POTASSIUM SERPL-SCNC: 3.8 MMOL/L (ref 3.5–5)
PROT SERPL-MCNC: 7.1 G/DL (ref 6.4–8.3)
RBC # BLD AUTO: 4.02 M/UL (ref 3.5–5.5)
SODIUM SERPL-SCNC: 135 MMOL/L (ref 132–146)
WBC OTHER # BLD: 6.1 K/UL (ref 4.5–11.5)

## 2023-12-21 PROCEDURE — 6360000002 HC RX W HCPCS: Performed by: INTERNAL MEDICINE

## 2023-12-21 PROCEDURE — 96372 THER/PROPH/DIAG INJ SC/IM: CPT

## 2023-12-21 PROCEDURE — 6360000002 HC RX W HCPCS: Performed by: STUDENT IN AN ORGANIZED HEALTH CARE EDUCATION/TRAINING PROGRAM

## 2023-12-21 PROCEDURE — 80053 COMPREHEN METABOLIC PANEL: CPT

## 2023-12-21 PROCEDURE — 85025 COMPLETE CBC W/AUTO DIFF WBC: CPT

## 2023-12-21 PROCEDURE — 2580000003 HC RX 258: Performed by: INTERNAL MEDICINE

## 2023-12-21 PROCEDURE — 36591 DRAW BLOOD OFF VENOUS DEVICE: CPT

## 2023-12-21 RX ORDER — LANREOTIDE ACETATE 120 MG/.5ML
120 INJECTION SUBCUTANEOUS ONCE
OUTPATIENT
Start: 2024-01-11 | End: 2024-01-11

## 2023-12-21 RX ORDER — WATER 10 ML/10ML
2.2 INJECTION INTRAMUSCULAR; INTRAVENOUS; SUBCUTANEOUS ONCE
OUTPATIENT
Start: 2023-12-21 | End: 2023-12-21

## 2023-12-21 RX ORDER — HEPARIN 100 UNIT/ML
500 SYRINGE INTRAVENOUS PRN
OUTPATIENT
Start: 2023-12-21

## 2023-12-21 RX ORDER — SODIUM CHLORIDE 0.9 % (FLUSH) 0.9 %
5-40 SYRINGE (ML) INJECTION PRN
OUTPATIENT
Start: 2023-12-21

## 2023-12-21 RX ORDER — SODIUM CHLORIDE 9 MG/ML
25 INJECTION, SOLUTION INTRAVENOUS PRN
OUTPATIENT
Start: 2023-12-21

## 2023-12-21 RX ORDER — SODIUM CHLORIDE 0.9 % (FLUSH) 0.9 %
5-40 SYRINGE (ML) INJECTION PRN
Status: DISCONTINUED | OUTPATIENT
Start: 2023-12-21 | End: 2023-12-22 | Stop reason: HOSPADM

## 2023-12-21 RX ORDER — LANREOTIDE ACETATE 120 MG/.5ML
120 INJECTION SUBCUTANEOUS ONCE
Status: COMPLETED | OUTPATIENT
Start: 2023-12-21 | End: 2023-12-21

## 2023-12-21 RX ORDER — HEPARIN 100 UNIT/ML
500 SYRINGE INTRAVENOUS PRN
Status: DISCONTINUED | OUTPATIENT
Start: 2023-12-21 | End: 2023-12-22 | Stop reason: HOSPADM

## 2023-12-21 RX ADMIN — SODIUM CHLORIDE, PRESERVATIVE FREE 10 ML: 5 INJECTION INTRAVENOUS at 13:22

## 2023-12-21 RX ADMIN — SODIUM CHLORIDE, PRESERVATIVE FREE 10 ML: 5 INJECTION INTRAVENOUS at 13:20

## 2023-12-21 RX ADMIN — HEPARIN 500 UNITS: 100 SYRINGE at 13:22

## 2023-12-21 RX ADMIN — LANREOTIDE ACETATE 120 MG: 120 INJECTION SUBCUTANEOUS at 14:48

## 2024-01-04 ENCOUNTER — HOSPITAL ENCOUNTER (OUTPATIENT)
Dept: INFUSION THERAPY | Age: 39
Discharge: HOME | End: 2024-01-04
Payer: COMMERCIAL

## 2024-01-04 DIAGNOSIS — C7B.8 METASTATIC MALIGNANT NEUROENDOCRINE TUMOR TO LIVER (HCC): Primary | ICD-10-CM

## 2024-01-04 LAB
ALBUMIN SERPL-MCNC: 4.1 G/DL (ref 3.5–5.2)
ALP SERPL-CCNC: 135 U/L (ref 35–104)
ALT SERPL-CCNC: 16 U/L (ref 0–32)
ANION GAP SERPL CALCULATED.3IONS-SCNC: 8 MMOL/L (ref 7–16)
AST SERPL-CCNC: 17 U/L (ref 0–31)
BASOPHILS # BLD: 0.04 K/UL (ref 0–0.2)
BASOPHILS NFR BLD: 1 % (ref 0–2)
BILIRUB SERPL-MCNC: <0.2 MG/DL (ref 0–1.2)
BUN SERPL-MCNC: 8 MG/DL (ref 6–20)
CALCIUM SERPL-MCNC: 8.7 MG/DL (ref 8.6–10.2)
CHLORIDE SERPL-SCNC: 103 MMOL/L (ref 98–107)
CO2 SERPL-SCNC: 27 MMOL/L (ref 22–29)
CREAT SERPL-MCNC: 0.6 MG/DL (ref 0.5–1)
EOSINOPHIL # BLD: 0.08 K/UL (ref 0.05–0.5)
EOSINOPHILS RELATIVE PERCENT: 1 % (ref 0–6)
ERYTHROCYTE [DISTWIDTH] IN BLOOD BY AUTOMATED COUNT: 15.5 % (ref 11.5–15)
GFR SERPL CREATININE-BSD FRML MDRD: >60 ML/MIN/1.73M2
GLUCOSE SERPL-MCNC: 99 MG/DL (ref 74–99)
HCT VFR BLD AUTO: 36.3 % (ref 34–48)
HGB BLD-MCNC: 11.9 G/DL (ref 11.5–15.5)
IMM GRANULOCYTES # BLD AUTO: 0.06 K/UL (ref 0–0.58)
IMM GRANULOCYTES NFR BLD: 1 % (ref 0–5)
LYMPHOCYTES NFR BLD: 0.92 K/UL (ref 1.5–4)
LYMPHOCYTES RELATIVE PERCENT: 11 % (ref 20–42)
MCH RBC QN AUTO: 31.6 PG (ref 26–35)
MCHC RBC AUTO-ENTMCNC: 32.8 G/DL (ref 32–34.5)
MCV RBC AUTO: 96.5 FL (ref 80–99.9)
MONOCYTES NFR BLD: 0.94 K/UL (ref 0.1–0.95)
MONOCYTES NFR BLD: 11 % (ref 2–12)
NEUTROPHILS NFR BLD: 75 % (ref 43–80)
NEUTS SEG NFR BLD: 6.28 K/UL (ref 1.8–7.3)
PLATELET # BLD AUTO: 226 K/UL (ref 130–450)
PMV BLD AUTO: 9.9 FL (ref 7–12)
POTASSIUM SERPL-SCNC: 4.4 MMOL/L (ref 3.5–5)
PROT SERPL-MCNC: 7 G/DL (ref 6.4–8.3)
RBC # BLD AUTO: 3.76 M/UL (ref 3.5–5.5)
SODIUM SERPL-SCNC: 138 MMOL/L (ref 132–146)
WBC OTHER # BLD: 8.3 K/UL (ref 4.5–11.5)

## 2024-01-04 PROCEDURE — 36591 DRAW BLOOD OFF VENOUS DEVICE: CPT

## 2024-01-04 PROCEDURE — 2580000003 HC RX 258: Performed by: INTERNAL MEDICINE

## 2024-01-04 PROCEDURE — 6360000002 HC RX W HCPCS: Performed by: INTERNAL MEDICINE

## 2024-01-04 PROCEDURE — 85025 COMPLETE CBC W/AUTO DIFF WBC: CPT

## 2024-01-04 PROCEDURE — 80053 COMPREHEN METABOLIC PANEL: CPT

## 2024-01-04 RX ORDER — SODIUM CHLORIDE 9 MG/ML
25 INJECTION, SOLUTION INTRAVENOUS PRN
OUTPATIENT
Start: 2024-01-04

## 2024-01-04 RX ORDER — WATER 10 ML/10ML
2.2 INJECTION INTRAMUSCULAR; INTRAVENOUS; SUBCUTANEOUS ONCE
OUTPATIENT
Start: 2024-01-04 | End: 2024-01-04

## 2024-01-04 RX ORDER — SODIUM CHLORIDE 0.9 % (FLUSH) 0.9 %
5-40 SYRINGE (ML) INJECTION PRN
Status: DISCONTINUED | OUTPATIENT
Start: 2024-01-04 | End: 2024-01-05 | Stop reason: HOSPADM

## 2024-01-04 RX ORDER — HEPARIN 100 UNIT/ML
500 SYRINGE INTRAVENOUS PRN
OUTPATIENT
Start: 2024-01-04

## 2024-01-04 RX ORDER — HEPARIN 100 UNIT/ML
500 SYRINGE INTRAVENOUS PRN
Status: DISCONTINUED | OUTPATIENT
Start: 2024-01-04 | End: 2024-01-05 | Stop reason: HOSPADM

## 2024-01-04 RX ORDER — SODIUM CHLORIDE 0.9 % (FLUSH) 0.9 %
5-40 SYRINGE (ML) INJECTION PRN
OUTPATIENT
Start: 2024-01-04

## 2024-01-04 RX ADMIN — SODIUM CHLORIDE, PRESERVATIVE FREE 10 ML: 5 INJECTION INTRAVENOUS at 13:20

## 2024-01-04 RX ADMIN — HEPARIN 500 UNITS: 100 SYRINGE at 13:20

## 2024-01-05 ENCOUNTER — TELEPHONE (OUTPATIENT)
Dept: INFUSION THERAPY | Age: 39
End: 2024-01-05

## 2024-01-05 NOTE — TELEPHONE ENCOUNTER
Patient's labs reviewed with Dr Hu. LFT's reviewed with patient. Labs are better today and probably elevated previously due to sickness. Patient states understanding

## 2024-01-16 DIAGNOSIS — C7B.8 NEUROENDOCRINE CARCINOMA METASTATIC TO LIVER (HCC): ICD-10-CM

## 2024-01-16 DIAGNOSIS — Z51.5 PALLIATIVE CARE BY SPECIALIST: ICD-10-CM

## 2024-01-16 DIAGNOSIS — G89.3 NEOPLASM RELATED PAIN: ICD-10-CM

## 2024-01-16 DIAGNOSIS — C7A.8 NEUROENDOCRINE CARCINOMA METASTATIC TO LIVER (HCC): ICD-10-CM

## 2024-01-16 RX ORDER — HYDROCODONE BITARTRATE AND ACETAMINOPHEN 5; 325 MG/1; MG/1
1 TABLET ORAL EVERY 8 HOURS PRN
Qty: 90 TABLET | Refills: 0 | Status: SHIPPED | OUTPATIENT
Start: 2024-01-16 | End: 2024-02-15

## 2024-01-16 NOTE — TELEPHONE ENCOUNTER
Received call from pt requesting refill for Norco. Medication refill routed to provider to refill completion

## 2024-01-18 ENCOUNTER — HOSPITAL ENCOUNTER (OUTPATIENT)
Dept: INFUSION THERAPY | Age: 39
Discharge: HOME | End: 2024-01-18
Payer: COMMERCIAL

## 2024-01-18 ENCOUNTER — OFFICE VISIT (OUTPATIENT)
Dept: ONCOLOGY | Age: 39
End: 2024-01-18
Payer: COMMERCIAL

## 2024-01-18 VITALS
HEIGHT: 60 IN | TEMPERATURE: 97.6 F | HEART RATE: 62 BPM | SYSTOLIC BLOOD PRESSURE: 100 MMHG | OXYGEN SATURATION: 98 % | DIASTOLIC BLOOD PRESSURE: 76 MMHG | BODY MASS INDEX: 26.9 KG/M2 | WEIGHT: 137 LBS

## 2024-01-18 DIAGNOSIS — H47.329 DRUSEN OF OPTIC DISC, UNSPECIFIED LATERALITY: Primary | ICD-10-CM

## 2024-01-18 DIAGNOSIS — C7B.8 METASTATIC MALIGNANT NEUROENDOCRINE TUMOR TO LIVER (HCC): Primary | ICD-10-CM

## 2024-01-18 DIAGNOSIS — D3A.8 NEUROENDOCRINE TUMOR: Chronic | ICD-10-CM

## 2024-01-18 DIAGNOSIS — C7A.8 NEUROENDOCRINE CANCER (HCC): ICD-10-CM

## 2024-01-18 LAB
ALBUMIN SERPL-MCNC: 4.1 G/DL (ref 3.5–5.2)
ALP SERPL-CCNC: 131 U/L (ref 35–104)
ALT SERPL-CCNC: 20 U/L (ref 0–32)
ANION GAP SERPL CALCULATED.3IONS-SCNC: 9 MMOL/L (ref 7–16)
AST SERPL-CCNC: 18 U/L (ref 0–31)
BASOPHILS # BLD: 0.04 K/UL (ref 0–0.2)
BASOPHILS NFR BLD: 1 % (ref 0–2)
BILIRUB SERPL-MCNC: 0.4 MG/DL (ref 0–1.2)
BUN SERPL-MCNC: 12 MG/DL (ref 6–20)
CALCIUM SERPL-MCNC: 8.7 MG/DL (ref 8.6–10.2)
CHLORIDE SERPL-SCNC: 102 MMOL/L (ref 98–107)
CO2 SERPL-SCNC: 26 MMOL/L (ref 22–29)
CREAT SERPL-MCNC: 0.7 MG/DL (ref 0.5–1)
EOSINOPHIL # BLD: 0.12 K/UL (ref 0.05–0.5)
EOSINOPHILS RELATIVE PERCENT: 1 % (ref 0–6)
ERYTHROCYTE [DISTWIDTH] IN BLOOD BY AUTOMATED COUNT: 15.9 % (ref 11.5–15)
GFR SERPL CREATININE-BSD FRML MDRD: >60 ML/MIN/1.73M2
GLUCOSE SERPL-MCNC: 129 MG/DL (ref 74–99)
HCT VFR BLD AUTO: 35.7 % (ref 34–48)
HGB BLD-MCNC: 11.8 G/DL (ref 11.5–15.5)
IMM GRANULOCYTES # BLD AUTO: 0.05 K/UL (ref 0–0.58)
IMM GRANULOCYTES NFR BLD: 1 % (ref 0–5)
LYMPHOCYTES NFR BLD: 0.87 K/UL (ref 1.5–4)
LYMPHOCYTES RELATIVE PERCENT: 10 % (ref 20–42)
MCH RBC QN AUTO: 31.9 PG (ref 26–35)
MCHC RBC AUTO-ENTMCNC: 33.1 G/DL (ref 32–34.5)
MCV RBC AUTO: 96.5 FL (ref 80–99.9)
MONOCYTES NFR BLD: 0.99 K/UL (ref 0.1–0.95)
MONOCYTES NFR BLD: 11 % (ref 2–12)
NEUTROPHILS NFR BLD: 76 % (ref 43–80)
NEUTS SEG NFR BLD: 6.58 K/UL (ref 1.8–7.3)
PLATELET # BLD AUTO: 200 K/UL (ref 130–450)
PMV BLD AUTO: 9.3 FL (ref 7–12)
POTASSIUM SERPL-SCNC: 3.9 MMOL/L (ref 3.5–5)
PROT SERPL-MCNC: 7.2 G/DL (ref 6.4–8.3)
RBC # BLD AUTO: 3.7 M/UL (ref 3.5–5.5)
SODIUM SERPL-SCNC: 137 MMOL/L (ref 132–146)
WBC OTHER # BLD: 8.7 K/UL (ref 4.5–11.5)

## 2024-01-18 PROCEDURE — 96372 THER/PROPH/DIAG INJ SC/IM: CPT

## 2024-01-18 PROCEDURE — 80053 COMPREHEN METABOLIC PANEL: CPT

## 2024-01-18 PROCEDURE — 6360000002 HC RX W HCPCS: Performed by: STUDENT IN AN ORGANIZED HEALTH CARE EDUCATION/TRAINING PROGRAM

## 2024-01-18 PROCEDURE — 2580000003 HC RX 258: Performed by: INTERNAL MEDICINE

## 2024-01-18 PROCEDURE — 99214 OFFICE O/P EST MOD 30 MIN: CPT | Performed by: STUDENT IN AN ORGANIZED HEALTH CARE EDUCATION/TRAINING PROGRAM

## 2024-01-18 PROCEDURE — 99213 OFFICE O/P EST LOW 20 MIN: CPT

## 2024-01-18 PROCEDURE — 85025 COMPLETE CBC W/AUTO DIFF WBC: CPT

## 2024-01-18 PROCEDURE — 4004F PT TOBACCO SCREEN RCVD TLK: CPT | Performed by: STUDENT IN AN ORGANIZED HEALTH CARE EDUCATION/TRAINING PROGRAM

## 2024-01-18 PROCEDURE — 6360000002 HC RX W HCPCS: Performed by: INTERNAL MEDICINE

## 2024-01-18 PROCEDURE — G8427 DOCREV CUR MEDS BY ELIG CLIN: HCPCS | Performed by: STUDENT IN AN ORGANIZED HEALTH CARE EDUCATION/TRAINING PROGRAM

## 2024-01-18 PROCEDURE — G8484 FLU IMMUNIZE NO ADMIN: HCPCS | Performed by: STUDENT IN AN ORGANIZED HEALTH CARE EDUCATION/TRAINING PROGRAM

## 2024-01-18 PROCEDURE — G8419 CALC BMI OUT NRM PARAM NOF/U: HCPCS | Performed by: STUDENT IN AN ORGANIZED HEALTH CARE EDUCATION/TRAINING PROGRAM

## 2024-01-18 RX ORDER — SODIUM CHLORIDE 0.9 % (FLUSH) 0.9 %
5-40 SYRINGE (ML) INJECTION PRN
Status: DISCONTINUED | OUTPATIENT
Start: 2024-01-18 | End: 2024-01-19 | Stop reason: HOSPADM

## 2024-01-18 RX ORDER — HEPARIN 100 UNIT/ML
500 SYRINGE INTRAVENOUS PRN
Status: DISCONTINUED | OUTPATIENT
Start: 2024-01-18 | End: 2024-01-19 | Stop reason: HOSPADM

## 2024-01-18 RX ORDER — LANREOTIDE ACETATE 120 MG/.5ML
120 INJECTION SUBCUTANEOUS ONCE
Status: COMPLETED | OUTPATIENT
Start: 2024-01-18 | End: 2024-01-18

## 2024-01-18 RX ORDER — LANREOTIDE ACETATE 120 MG/.5ML
120 INJECTION SUBCUTANEOUS ONCE
OUTPATIENT
Start: 2024-02-15 | End: 2024-02-15

## 2024-01-18 RX ORDER — SODIUM CHLORIDE 0.9 % (FLUSH) 0.9 %
5-40 SYRINGE (ML) INJECTION PRN
OUTPATIENT
Start: 2024-01-18

## 2024-01-18 RX ORDER — SODIUM CHLORIDE 9 MG/ML
25 INJECTION, SOLUTION INTRAVENOUS PRN
OUTPATIENT
Start: 2024-01-18

## 2024-01-18 RX ORDER — WATER 10 ML/10ML
2.2 INJECTION INTRAMUSCULAR; INTRAVENOUS; SUBCUTANEOUS ONCE
OUTPATIENT
Start: 2024-01-18 | End: 2024-01-18

## 2024-01-18 RX ORDER — HEPARIN 100 UNIT/ML
500 SYRINGE INTRAVENOUS PRN
OUTPATIENT
Start: 2024-01-18

## 2024-01-18 RX ADMIN — SODIUM CHLORIDE, PRESERVATIVE FREE 10 ML: 5 INJECTION INTRAVENOUS at 13:50

## 2024-01-18 RX ADMIN — SODIUM CHLORIDE, PRESERVATIVE FREE 10 ML: 5 INJECTION INTRAVENOUS at 13:48

## 2024-01-18 RX ADMIN — HEPARIN 500 UNITS: 100 SYRINGE at 13:50

## 2024-01-18 RX ADMIN — LANREOTIDE ACETATE 120 MG: 120 INJECTION SUBCUTANEOUS at 15:11

## 2024-01-18 NOTE — PROGRESS NOTES
Patient did stop at check out window, ok'd to leave without AVS.  Patient has MYCHART.  Patient aware to arrive @ 1:00 pm. for labs same day first.        
neuroendocrine tumor  Monitor for possible progression within liver  If there is more apparent progression, will consider changing/incorporating additional interventions  Consider utility of local regional therapies  Continue supportive meds including PPI, antidiarrheals, antiemetics    Dispo:  Lantreotide today  RTC in 4 weeks for next lantreotide      Approximately spent 31 minutes on chart review as well as time spent on patient encounter, discussion of the laboratory, imaging, clinical findings, and documentation. I have discussed clinical implications and recommendations on the patient's primary issues. More than 50% of time was spent counseling patient. The patient verbalized understanding.      Geoff Hu MD  Medical Oncology  [facility]  01/18/24 4:29 PM

## 2024-01-23 DIAGNOSIS — G43.909 MIGRAINE WITHOUT STATUS MIGRAINOSUS, NOT INTRACTABLE, UNSPECIFIED MIGRAINE TYPE: ICD-10-CM

## 2024-01-23 RX ORDER — BUTALBITAL, ACETAMINOPHEN AND CAFFEINE 50; 325; 40 MG/1; MG/1; MG/1
1 TABLET ORAL 2 TIMES DAILY PRN
Qty: 60 TABLET | Refills: 2 | Status: SHIPPED | OUTPATIENT
Start: 2024-01-23

## 2024-01-24 NOTE — DISCHARGE SUMMARY
[facility] Physician Discharge Summary       Vinita Borja DO  [address] [address] [phone]          Vinita Borja DO  [address] [place], [place] [id number] [phone]            Activity level: As tolerated     Dispo: Home with self care    Condition on discharge: Stable     Patient ID:  Mega Suarez  [id number]  30 y.o.  1985    Admit date: 8/30/2020    Discharge date and time:  9/3/2020  10:16 AM    Admission Diagnoses: Principal Problem:    Liver masses  Active Problems:    Primary insomnia    Hypothyroidism    Abdominal pain, right lower quadrant    Depression    PTSD (post-traumatic stress disorder)    Migraines    Mesenteric mass  Resolved Problems:    * No resolved hospital problems. *      Discharge Diagnoses: Principal Problem:    Liver masses  Active Problems:    Primary insomnia    Hypothyroidism    Abdominal pain, right lower quadrant    Depression    PTSD (post-traumatic stress disorder)    Migraines    Mesenteric mass  Resolved Problems:    * No resolved hospital problems. *      Consults:  IP CONSULT TO GENERAL SURGERY  IP CONSULT TO GI  IP CONSULT TO IV TEAM    Procedures: SBFT    Hospital Course:   Patient Mega Suarez is a 30 y.o. presented with Abdominal mass, RUQ (right upper quadrant) [R19.01]  Abdominal mass, RUQ (right upper quadrant) [R19.01]  Abdominal mass, right upper quadrant [R19.01]   Patient was managed for  Abdominal pain with mesenteric mass  also has associated nausea & diarrhea sec to Crohn's flare versus a mesenteric carcinoid neoplasm: on carafate. CT abdomen revealing mesenteric mass and segmental enteritis/ multifocal liver lesions s/p Liver biopsy 9/1. Pt reporting h/o crohns. EGD at [facility] 3 years ago. General surgery & GI were on board. Was continued on IV steroids/ antibiotics- IV ceftriaxone & flagyl. SBFT- rapid small bowel transit. Monitored labs/ vitals.  Will need Op f/u with GI. Discharging on po steroids and Augmentin. Reported Tingling in hands/ face s/p PTH surgery:Hypocalcemia, Hypophosphatemia, Hypomagnesimia-  F/u BMP/mg/phos, F/u ionized calcium low, were repleted and trended   Acidosis- likely sec to Topamax. Considering possibility of neoplastic origin of mass-consider OP screening with PCP -no previous mammogram; Pap smears up-to-date, GI planning for nonemergent colonoscopy likely outpatient. Discharge Exam:    General Appearance: alert and oriented to person, place and time and in no acute distress  Skin: warm and dry  Head: normocephalic and atraumatic  Eyes: pupils equal, round, and reactive to light, extraocular eye movements intact, conjunctivae normal  Neck: neck supple and non tender without mass   Pulmonary/Chest: clear to auscultation bilaterally- no wheezes, rales or rhonchi, normal air movement, no respiratory distress  Cardiovascular: normal rate, normal S1 and S2 and no carotid bruits  Abdomen: soft, mildly tender, non-distended, normal bowel sounds, no masses or organomegaly  Extremities: no cyanosis, no clubbing and no edema  Neurologic: no cranial nerve deficit and speech normal    I/O last 3 completed shifts: In: 20 [I.V.:20]  Out: -   No intake/output data recorded. LABS:  Recent Labs     20  1115 20  0630 20  0530    137 135   K 4.2 4.0 3.8    105 109*   CO2 20* 23 17*   BUN 4* 6 5*   CREATININE 0.6 0.7 0.7   GLUCOSE 141* 125* 115*   CALCIUM 7.7* 7.2* 7.7*       Recent Labs     20  0630 20  0530   WBC 17.7* 13.6*   RBC 4.00 4.12   HGB 13.3 13.5   HCT 38.2 39.9   MCV 95.5 96.8   MCH 33.3 32.8   MCHC 34.8* 33.8   RDW 13.4 13.4    217   MPV 9.5 9.9       No results for input(s): POCGLU in the last 72 hours.     Imaging:  Ct Abdomen Pelvis W Iv Contrast Additional Contrast? None    Result Date: 2020  Patient MRN:  [id number] : 1985 Age: 30 years Gender: Female Order Date:  2020 5:15 PM TECHNIQUE/NUMBER OF IMAGES/COMPARISON/CLINICAL HISTORY: CT of abdomen and pelvis with IV contrast After IV contrast, axial images were obtained with sagittal and coronal reconstructions 404 images IV contrast 100 mL of Isovue-370 Comparison August 28, 2020 and June 9 218 64-year-old female patient with right lower quadrant abdominal pain. The patient had parathyroid surgery, thyroidectomy, cholecystectomy. The FINDINGS: In the right mesogastric region of the abdomen, there is a localized segmental group of small bowel loops with thickening of the corresponding mesenteric and some thickening of the wall with some air-fluid levels, tethered together by amorphous enhancing spiculated scirrhous appearing mesenteric mass measuring 2.4 x 1.7 cm. The more proximal jejunal segments are only mildly dilated but there are no mesenteric tethering or thickening of the mesentery of the proximal jejunum. The distal ileum is decompressed. There is an overall unremarkable appearance for the colon . Some fluid contents are seen in the left-sided colon which can indicate a diarrhea-like condition or impending diarrhea. There is no dislocation of the stomach. Patient had previous cholecystectomy, biliary tree and pancreatic ductal systems are not dilated. There are multiple hypoenhancing lesions scattered throughout the liver seen in the left lobe and as well in the right lobe, the largest one measures up to 2 cm is seen in the right lobe liver. Multifocal hypoenhancing lesions in the liver during a late arterial/portal venous phase and can be indication for metastatic disease. Ages lesions are not associated the with the perilesional edema or hypodense halo. There is normal size and enhancement for the pancreas and for the spleen Adrenals not enlarged. Aorta and IVC appear unremarkable. There is no midline retroperitoneal adenopathy.  The Kidneys have preserved size, cortical thickness, excretion of the contrast. There is no obstruction. The ureters and bladder are of unremarkable appearance. There are unremarkable appearance for the uterus and ovaries. There is a follicular size cyst in the left ovary measuring 1.5 cm. There is no free fluid in the cul-de-sac. There is an IUD device centrally located within the uterus. The appendix in this study appear unremarkable. There is no specific pericolonic inflammatory process. There is no free intraperitoneal air. There is no ascites. Lower lung bases appear unremarkable. Bone structures are of unremarkable appearance. The SI joints have unremarkable appearance. 1. Amorphous 2.4 x 1.7 cm scirrhous enhancing spiculated mesenteric mass causing tethering of mid small bowel segments with thickening of the corresponding mesenterium, thickening of the bowel wall form a pattern of closed loop like syndrome. 2. The differential diagnosis of the mesenteric changes includes: Inflammatory changes, lymphatic engorgement of the mesenteric folds, or bowel ischemia. 3. The differential diagnosis of the scirrhous mesenteric mass includes mesenteric inflammatory mass in presence of referred clinical history of Crohn's disease versus a mesenteric carcinoid neoplasm. 4. Multifocal lesions in the liver in absence of clinical signs of systemic infection are more suspicious for metastasis, particularly in presence of a scirrhous mesenteric mass. Preliminary report given to Dr. Selina Bradshaw[facility] ALERT:  ABNORMAL REPORT.        Patient Instructions:      Medication List      START taking these medications    amoxicillin-clavulanate 875-125 MG per tablet  Commonly known as:  AUGMENTIN  Take 1 tablet by mouth 2 times daily for 10 days     predniSONE 10 MG tablet  Commonly known as:  DELTASONE  4 tablets p.o. daily x5 days then 3 tablets p.o. daily x5 days than 2 tablets p.o. daily x5 days then 1 tablet p.o. daily x5 days        CHANGE how you take these medications    pantoprazole 40 MG tablet  Commonly known as:  PROTONIX  Take 1 tablet by mouth every morning (before breakfast)  What changed:  See the new instructions. CONTINUE taking these medications    butalbital-acetaminophen-caffeine -40 MG per tablet  Commonly known as:  FIORICET, ESGIC     Geronimo Carb-Mag Hydrox-Simeth 800-270-80 MG/10ML Susp     calcium-vitamin D 500-200 MG-UNIT per tablet     * dicyclomine 10 MG capsule  Commonly known as:  Bentyl  Take 2 capsules by mouth 4 times daily (before meals and nightly) for 20 doses     * dicyclomine 20 MG tablet  Commonly known as:  BENTYL  take 1 tablet by mouth four times a day     levothyroxine 112 MCG tablet  Commonly known as:  SYNTHROID     Mylanta 200-200-20 MG/5ML Susp suspension  Generic drug:  aluminum & magnesium hydroxide-simethicone     ondansetron 4 MG tablet  Commonly known as:  ZOFRAN     sucralfate 1 GM/10ML suspension  Commonly known as:  Carafate  Take 10 mLs by mouth 4 times daily     topiramate 25 MG capsule  Commonly known as:  TOPAMAX SPRINKLE     zolpidem 10 MG tablet  Commonly known as:  AMBIEN         * This list has 2 medication(s) that are the same as other medications prescribed for you. Read the directions carefully, and ask your doctor or other care provider to review them with you.             STOP taking these medications    famotidine 10 MG tablet  Commonly known as:  PEPCID     fluticasone 50 MCG/ACT nasal spray  Commonly known as:  FLONASE           Where to Get Your Medications      You can get these medications from any pharmacy    Bring a paper prescription for each of these medications  · amoxicillin-clavulanate 875-125 MG per tablet  · pantoprazole 40 MG tablet  · predniSONE 10 MG tablet           Note that more than 30 minutes was spent in preparing discharge papers, discussing discharge with patient, medication review, etc.    Signed:  Electronically signed by Thuy Guardado MD on 9/3/2020 at 10:16 AM Detail Level: Detailed Wound Evaluated By: Liseth Sy

## 2024-01-27 DIAGNOSIS — B37.2 CANDIDIASIS OF SKIN: ICD-10-CM

## 2024-01-29 RX ORDER — NYSTATIN 100000 [USP'U]/G
POWDER TOPICAL
Qty: 60 G | Refills: 0 | Status: SHIPPED | OUTPATIENT
Start: 2024-01-29

## 2024-01-31 DIAGNOSIS — F51.01 PRIMARY INSOMNIA: ICD-10-CM

## 2024-01-31 RX ORDER — ZOLPIDEM TARTRATE 10 MG/1
10 TABLET ORAL NIGHTLY PRN
Qty: 30 TABLET | Refills: 2 | Status: SHIPPED | OUTPATIENT
Start: 2024-01-31 | End: 2024-04-30

## 2024-02-08 DIAGNOSIS — C7B.8 METASTATIC MALIGNANT NEUROENDOCRINE TUMOR TO LIVER (HCC): Primary | ICD-10-CM

## 2024-02-13 ENCOUNTER — HOSPITAL ENCOUNTER (OUTPATIENT)
Dept: NUCLEAR MEDICINE | Age: 39
Discharge: HOME | End: 2024-02-15
Attending: STUDENT IN AN ORGANIZED HEALTH CARE EDUCATION/TRAINING PROGRAM
Payer: COMMERCIAL

## 2024-02-13 DIAGNOSIS — C7B.8 NEUROENDOCRINE CARCINOMA METASTATIC TO LIVER (HCC): ICD-10-CM

## 2024-02-13 DIAGNOSIS — C7A.8 NEUROENDOCRINE CARCINOMA METASTATIC TO LIVER (HCC): ICD-10-CM

## 2024-02-13 DIAGNOSIS — D3A.8 NEUROENDOCRINE TUMOR: Chronic | ICD-10-CM

## 2024-02-13 DIAGNOSIS — Z51.5 PALLIATIVE CARE BY SPECIALIST: ICD-10-CM

## 2024-02-13 DIAGNOSIS — G89.3 NEOPLASM RELATED PAIN: ICD-10-CM

## 2024-02-13 PROCEDURE — A9587 GALLIUM GA-68: HCPCS | Performed by: RADIOLOGY

## 2024-02-13 PROCEDURE — 3430000000 HC RX DIAGNOSTIC RADIOPHARMACEUTICAL: Performed by: RADIOLOGY

## 2024-02-13 PROCEDURE — 78815 PET IMAGE W/CT SKULL-THIGH: CPT

## 2024-02-13 RX ORDER — HYDROCODONE BITARTRATE AND ACETAMINOPHEN 5; 325 MG/1; MG/1
1 TABLET ORAL EVERY 8 HOURS PRN
Qty: 90 TABLET | Refills: 0 | Status: SHIPPED | OUTPATIENT
Start: 2024-02-13 | End: 2024-03-14

## 2024-02-13 RX ORDER — 68GA-DOTATATE 40 MCG
3.6 KIT INTRAVENOUS ONCE
Status: COMPLETED | OUTPATIENT
Start: 2024-02-13 | End: 2024-02-13

## 2024-02-13 RX ADMIN — 68GA-DOTATATE 3.6 MILLICURIE: KIT INTRAVENOUS at 11:44

## 2024-02-13 NOTE — TELEPHONE ENCOUNTER
Patient Brooklyn called and requested a refill for Cary 5-325.  Pharmacy [facility].  Next appointment 3/4/24.

## 2024-02-15 ENCOUNTER — HOSPITAL ENCOUNTER (OUTPATIENT)
Dept: INFUSION THERAPY | Age: 39
Discharge: HOME | End: 2024-02-15
Payer: COMMERCIAL

## 2024-02-15 ENCOUNTER — OFFICE VISIT (OUTPATIENT)
Dept: ONCOLOGY | Age: 39
End: 2024-02-15
Payer: COMMERCIAL

## 2024-02-15 VITALS
HEIGHT: 60 IN | TEMPERATURE: 97.3 F | HEART RATE: 59 BPM | WEIGHT: 137 LBS | OXYGEN SATURATION: 98 % | DIASTOLIC BLOOD PRESSURE: 56 MMHG | SYSTOLIC BLOOD PRESSURE: 115 MMHG | BODY MASS INDEX: 26.9 KG/M2

## 2024-02-15 DIAGNOSIS — C7B.8 METASTATIC MALIGNANT NEUROENDOCRINE TUMOR TO LIVER (HCC): Primary | ICD-10-CM

## 2024-02-15 DIAGNOSIS — D3A.8 NEUROENDOCRINE TUMOR: Primary | ICD-10-CM

## 2024-02-15 LAB
ALBUMIN SERPL-MCNC: 4.4 G/DL (ref 3.5–5.2)
ALP SERPL-CCNC: 117 U/L (ref 35–104)
ALT SERPL-CCNC: 103 U/L (ref 0–32)
ANION GAP SERPL CALCULATED.3IONS-SCNC: 12 MMOL/L (ref 7–16)
AST SERPL-CCNC: 33 U/L (ref 0–31)
BASOPHILS # BLD: 0.05 K/UL (ref 0–0.2)
BASOPHILS NFR BLD: 1 % (ref 0–2)
BILIRUB SERPL-MCNC: 0.5 MG/DL (ref 0–1.2)
BUN SERPL-MCNC: 10 MG/DL (ref 6–20)
CALCIUM SERPL-MCNC: 8.5 MG/DL (ref 8.6–10.2)
CHLORIDE SERPL-SCNC: 103 MMOL/L (ref 98–107)
CO2 SERPL-SCNC: 26 MMOL/L (ref 22–29)
CREAT SERPL-MCNC: 0.6 MG/DL (ref 0.5–1)
EOSINOPHIL # BLD: 0.18 K/UL (ref 0.05–0.5)
EOSINOPHILS RELATIVE PERCENT: 2 % (ref 0–6)
ERYTHROCYTE [DISTWIDTH] IN BLOOD BY AUTOMATED COUNT: 15.9 % (ref 11.5–15)
GFR SERPL CREATININE-BSD FRML MDRD: >60 ML/MIN/1.73M2
GLUCOSE SERPL-MCNC: 104 MG/DL (ref 74–99)
HCT VFR BLD AUTO: 36.5 % (ref 34–48)
HGB BLD-MCNC: 12.1 G/DL (ref 11.5–15.5)
IMM GRANULOCYTES # BLD AUTO: 0.04 K/UL (ref 0–0.58)
IMM GRANULOCYTES NFR BLD: 1 % (ref 0–5)
LYMPHOCYTES NFR BLD: 0.81 K/UL (ref 1.5–4)
LYMPHOCYTES RELATIVE PERCENT: 9 % (ref 20–42)
MCH RBC QN AUTO: 31.4 PG (ref 26–35)
MCHC RBC AUTO-ENTMCNC: 33.2 G/DL (ref 32–34.5)
MCV RBC AUTO: 94.8 FL (ref 80–99.9)
MONOCYTES NFR BLD: 0.69 K/UL (ref 0.1–0.95)
MONOCYTES NFR BLD: 8 % (ref 2–12)
NEUTROPHILS NFR BLD: 79 % (ref 43–80)
NEUTS SEG NFR BLD: 6.89 K/UL (ref 1.8–7.3)
PLATELET # BLD AUTO: 183 K/UL (ref 130–450)
PMV BLD AUTO: 9.1 FL (ref 7–12)
POTASSIUM SERPL-SCNC: 3.8 MMOL/L (ref 3.5–5)
PROT SERPL-MCNC: 7.3 G/DL (ref 6.4–8.3)
RBC # BLD AUTO: 3.85 M/UL (ref 3.5–5.5)
SODIUM SERPL-SCNC: 141 MMOL/L (ref 132–146)
WBC OTHER # BLD: 8.7 K/UL (ref 4.5–11.5)

## 2024-02-15 PROCEDURE — 36591 DRAW BLOOD OFF VENOUS DEVICE: CPT

## 2024-02-15 PROCEDURE — 96372 THER/PROPH/DIAG INJ SC/IM: CPT

## 2024-02-15 PROCEDURE — 6360000002 HC RX W HCPCS: Performed by: STUDENT IN AN ORGANIZED HEALTH CARE EDUCATION/TRAINING PROGRAM

## 2024-02-15 PROCEDURE — G8484 FLU IMMUNIZE NO ADMIN: HCPCS | Performed by: STUDENT IN AN ORGANIZED HEALTH CARE EDUCATION/TRAINING PROGRAM

## 2024-02-15 PROCEDURE — 85025 COMPLETE CBC W/AUTO DIFF WBC: CPT

## 2024-02-15 PROCEDURE — 4004F PT TOBACCO SCREEN RCVD TLK: CPT | Performed by: STUDENT IN AN ORGANIZED HEALTH CARE EDUCATION/TRAINING PROGRAM

## 2024-02-15 PROCEDURE — G8427 DOCREV CUR MEDS BY ELIG CLIN: HCPCS | Performed by: STUDENT IN AN ORGANIZED HEALTH CARE EDUCATION/TRAINING PROGRAM

## 2024-02-15 PROCEDURE — 2580000003 HC RX 258: Performed by: INTERNAL MEDICINE

## 2024-02-15 PROCEDURE — 99214 OFFICE O/P EST MOD 30 MIN: CPT

## 2024-02-15 PROCEDURE — 99214 OFFICE O/P EST MOD 30 MIN: CPT | Performed by: STUDENT IN AN ORGANIZED HEALTH CARE EDUCATION/TRAINING PROGRAM

## 2024-02-15 PROCEDURE — G8419 CALC BMI OUT NRM PARAM NOF/U: HCPCS | Performed by: STUDENT IN AN ORGANIZED HEALTH CARE EDUCATION/TRAINING PROGRAM

## 2024-02-15 PROCEDURE — 80053 COMPREHEN METABOLIC PANEL: CPT

## 2024-02-15 PROCEDURE — 6360000002 HC RX W HCPCS: Performed by: INTERNAL MEDICINE

## 2024-02-15 RX ORDER — HEPARIN 100 UNIT/ML
500 SYRINGE INTRAVENOUS PRN
Status: DISCONTINUED | OUTPATIENT
Start: 2024-02-15 | End: 2024-02-16 | Stop reason: HOSPADM

## 2024-02-15 RX ORDER — SODIUM CHLORIDE 9 MG/ML
25 INJECTION, SOLUTION INTRAVENOUS PRN
OUTPATIENT
Start: 2024-02-15

## 2024-02-15 RX ORDER — SODIUM CHLORIDE 0.9 % (FLUSH) 0.9 %
5-40 SYRINGE (ML) INJECTION PRN
OUTPATIENT
Start: 2024-02-15

## 2024-02-15 RX ORDER — LANREOTIDE ACETATE 120 MG/.5ML
120 INJECTION SUBCUTANEOUS ONCE
Status: COMPLETED | OUTPATIENT
Start: 2024-02-15 | End: 2024-02-15

## 2024-02-15 RX ORDER — LANREOTIDE ACETATE 120 MG/.5ML
120 INJECTION SUBCUTANEOUS ONCE
OUTPATIENT
Start: 2024-03-14 | End: 2024-03-14

## 2024-02-15 RX ORDER — HEPARIN 100 UNIT/ML
500 SYRINGE INTRAVENOUS PRN
OUTPATIENT
Start: 2024-02-15

## 2024-02-15 RX ORDER — WATER 10 ML/10ML
2.2 INJECTION INTRAMUSCULAR; INTRAVENOUS; SUBCUTANEOUS ONCE
OUTPATIENT
Start: 2024-02-15 | End: 2024-02-15

## 2024-02-15 RX ORDER — SODIUM CHLORIDE 0.9 % (FLUSH) 0.9 %
5-40 SYRINGE (ML) INJECTION PRN
Status: DISCONTINUED | OUTPATIENT
Start: 2024-02-15 | End: 2024-02-16 | Stop reason: HOSPADM

## 2024-02-15 RX ADMIN — SODIUM CHLORIDE, PRESERVATIVE FREE 10 ML: 5 INJECTION INTRAVENOUS at 13:30

## 2024-02-15 RX ADMIN — HEPARIN 500 UNITS: 100 SYRINGE at 13:30

## 2024-02-15 RX ADMIN — LANREOTIDE ACETATE 120 MG: 120 INJECTION SUBCUTANEOUS at 15:12

## 2024-02-15 NOTE — PROGRESS NOTES
Patient presents to clinic for Port Flush today.  SQ port accessed per policy using 20G. 3/4in Mares needle for good blood return.  Aspirate for waste and specimen sent to lab.  Site flushed easily with 10 mL NSS followed by 5 mL Heparin solution 100 units/ml rinse prior to de-access.  Dry sterile dressing to area.  Tolerated procedure well.  Encouraged to schedule port flush every 4 weeks.

## 2024-02-15 NOTE — PROGRESS NOTES
[facility] PHYSICIANS [facility] CARE [facility] MED ONCOLOGY  [address]  [facility] [address]  Dept: [phone]  Loc: [phone]    Clinical Progress Note    Reason for visit: Neuroendocrine cancer to liver     PCP:  Abdelrahman Montana DO       Subjective:  No new or major issues.  Before each cycle, patient complains of increased diarrhea and nausea/vomiting.  She currently denies of changes the pattern of this symptomatology.  Patient's father was present with us again today.    ONCOLOGIC HISTORY:  38 y.o. female with hx of hypothyroidism, IBS,migraine who was complaining of abdominal pain associated with diarrhea and flushing/sweating.      CT abdomen/pelvis 08/28/2020:  2 cm masslike density in the mid abdominal small bowel mesentery with a spiculated appearance.  Multiple liver masses suspicious for metastatic disease.     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     Comment: Sections of the liver tissue cores show the hepatic parenchyma to be partially replaced by a proliferation of epithelioid cells with relatively uniform round nuclei and eosinophilic granular cytoplasm.  The   epithelioid cells form anastomosing solid cords/nests that are surrounded by delicate fibrovascular stroma.  There are no mitotic figures or tumor cell necrosis present.  The histologic changes seen are suggestive of well-differentiated neuroendocrine (carcinoid) tumor.     Immunostaining for pankeratin, chromogranin, synaptophysin, TTF-1 and Ki-67 was performed on sections of one tissue block (A1) and the neoplastic epithelioid cells show diffuse and strong positivity for neuroendocrine markers (chromogranin and synaptophysin) and moderate positivity for pankeratin.  There is no staining reactivity for TTF-1.   The Ki-67 proliferation labeling index is essentially negative, (very low, <1%).   This staining

## 2024-02-15 NOTE — PROGRESS NOTES
Patient tolerated injection well. Patient alert and oriented x3.   No distress noted.    Patient educated on signs and symptoms of reaction to medication.   Educated patient on possible side effects and treatment of medication.     Patient verbalized understanding.   Offered patient education an/or discharge material.  Patient declined.   Patient denies any needs.  All questions answered.

## 2024-02-16 NOTE — PROGRESS NOTES
Patient did stop at check out window, ok'd to leave without AVS.  Patient has MYCHART.  Patient aware to arrive @ 1:00 pm. for labs same day first.

## 2024-03-04 ENCOUNTER — HOSPITAL ENCOUNTER (OUTPATIENT)
Dept: MRI IMAGING | Age: 39
Discharge: HOME | End: 2024-03-06
Attending: STUDENT IN AN ORGANIZED HEALTH CARE EDUCATION/TRAINING PROGRAM
Payer: COMMERCIAL

## 2024-03-04 ENCOUNTER — OFFICE VISIT (OUTPATIENT)
Dept: PALLATIVE CARE | Age: 39
End: 2024-03-04
Payer: COMMERCIAL

## 2024-03-04 VITALS
HEART RATE: 80 BPM | TEMPERATURE: 97.9 F | SYSTOLIC BLOOD PRESSURE: 116 MMHG | OXYGEN SATURATION: 98 % | WEIGHT: 135 LBS | BODY MASS INDEX: 26.37 KG/M2 | DIASTOLIC BLOOD PRESSURE: 77 MMHG

## 2024-03-04 DIAGNOSIS — H47.329 DRUSEN OF OPTIC DISC, UNSPECIFIED LATERALITY: ICD-10-CM

## 2024-03-04 DIAGNOSIS — Z79.891 USE OF OPIATES FOR THERAPEUTIC PURPOSES: ICD-10-CM

## 2024-03-04 DIAGNOSIS — C7B.8 NEUROENDOCRINE CARCINOMA METASTATIC TO LIVER (HCC): ICD-10-CM

## 2024-03-04 DIAGNOSIS — Z51.5 PALLIATIVE CARE BY SPECIALIST: ICD-10-CM

## 2024-03-04 DIAGNOSIS — C7A.8 NEUROENDOCRINE CARCINOMA METASTATIC TO LIVER (HCC): ICD-10-CM

## 2024-03-04 DIAGNOSIS — Z79.891 USE OF OPIATES FOR THERAPEUTIC PURPOSES: Primary | ICD-10-CM

## 2024-03-04 DIAGNOSIS — G89.3 NEOPLASM RELATED PAIN: ICD-10-CM

## 2024-03-04 PROCEDURE — 99212 OFFICE O/P EST SF 10 MIN: CPT | Performed by: NURSE PRACTITIONER

## 2024-03-04 PROCEDURE — G8427 DOCREV CUR MEDS BY ELIG CLIN: HCPCS | Performed by: NURSE PRACTITIONER

## 2024-03-04 PROCEDURE — 70553 MRI BRAIN STEM W/O & W/DYE: CPT | Performed by: STUDENT IN AN ORGANIZED HEALTH CARE EDUCATION/TRAINING PROGRAM

## 2024-03-04 PROCEDURE — 4004F PT TOBACCO SCREEN RCVD TLK: CPT | Performed by: NURSE PRACTITIONER

## 2024-03-04 PROCEDURE — A9577 INJ MULTIHANCE: HCPCS | Performed by: RADIOLOGY

## 2024-03-04 PROCEDURE — G8484 FLU IMMUNIZE NO ADMIN: HCPCS | Performed by: NURSE PRACTITIONER

## 2024-03-04 PROCEDURE — G8419 CALC BMI OUT NRM PARAM NOF/U: HCPCS | Performed by: NURSE PRACTITIONER

## 2024-03-04 PROCEDURE — 6360000004 HC RX CONTRAST MEDICATION: Performed by: RADIOLOGY

## 2024-03-04 RX ORDER — GABAPENTIN 300 MG/1
300 CAPSULE ORAL 3 TIMES DAILY
Qty: 270 CAPSULE | Refills: 1 | Status: SHIPPED | OUTPATIENT
Start: 2024-03-04 | End: 2024-08-31

## 2024-03-04 RX ORDER — HYDROCODONE BITARTRATE AND ACETAMINOPHEN 5; 325 MG/1; MG/1
1 TABLET ORAL EVERY 8 HOURS PRN
Qty: 90 TABLET | Refills: 0 | Status: SHIPPED | OUTPATIENT
Start: 2024-03-04 | End: 2024-04-03

## 2024-03-04 RX ORDER — HYDROXYZINE HYDROCHLORIDE 25 MG/1
TABLET, FILM COATED ORAL
COMMUNITY

## 2024-03-04 RX ADMIN — GADOBENATE DIMEGLUMINE 13 ML: 529 INJECTION, SOLUTION INTRAVENOUS at 14:01

## 2024-03-04 NOTE — PROGRESS NOTES
Department of Palliative Medicine  Ambulatory Note  Provider: TRINA Bass - CNP     Chief Complaint: Brooklyn Olmstead is a 38 y.o. female with chief complaint of pain    HPI:  Brooklyn Olmstead is a 36 y.o. female with significant medical history of hypothyroidism, IBS, migraine who was complaining of abdominal pain associated with diarrhea and flushing/sweating. She was diagnosed with metastatic well differentiated Neuroendocrine cancer to liver who was referred to [facility] Pall[facility] Medicine by Dr. Trimble.      Assessment/Plan      Neuroendocrine cancer  - Follows with Dr. Hu & Dr. Castro  - s.p. Bowel resection on 10/21/20  -Completed Lutathera   -Lantreotide    Neoplasm related pain  - Gabapentin 300mg TID  - Norco 5/325 mg Q 8 PRN    Nausea  - Continue Zofran and Phenergan PRN    Diarrhea:   -Xermelo  -Imodium she uses this daily  -marshmallows daily PRN  -Lactulose needed due to hyperammonemia     Anxiety:  -Encouraged to talk with counselor  -Hydroxyzine 25 mg TID PRN      Follow Up:  3 months.  Encouraged to call with any questions, concerns, needs, or changes in symptoms.    Subjective:   Brooklyn presents today with her mother.    She continues to reports that she is doing well.    Norco continues to work well at managing her pain, and she is use this typically only 2-3 times per day, along with gabapentin.    She reports no significant side effects.    Anxiety is better, following with a counselor   Utilize hydroxyzine as needed   Sleep also is better per her report   she is tolerating her treatments well. She otherwise has no other significant complaints at this time.   She is for an MRI later today  She will provide a urine drug screen today   otherwise no changes needed to plan of care    Pain Assessment   Ratin  Description: aching, burning, sharp, and shooting  Duration: minutes  Frequency: daily  Location: Abdomen, back with radiation to the hips and legs  Alleviating Factors:

## 2024-03-05 LAB
6-MONOACETYLMORPHINE, URINE: NEGATIVE
ABNORMAL SPECIMEN VALIDITY TEST: ABNORMAL
ALCOHOL URINE: NOT DETECTED MG/DL
AMPHETAMINE SCREEN URINE: NEGATIVE
BARBITURATE SCREEN URINE: POSITIVE
BENZODIAZEPINE SCREEN, URINE: NEGATIVE
BUPRENORPHINE URINE: NEGATIVE
CANNABINOID SCREEN URINE: POSITIVE
COCAINE METABOLITE, URINE: NEGATIVE
FENTANYL URINE: NEGATIVE
INTEGRITY CHECK, CREATININE, URINE: 61.4 MG/DL (ref 22–250)
INTEGRITY CHECK, OXIDANT, URINE: <40 MG/L
INTEGRITY CHECK, PH, URINE: 7.1 (ref 4.5–9)
INTEGRITY CHECK, SPECIFIC GRAVITY, URINE: 1.02 (ref 1–1.03)
METHADONE SCREEN, URINE: NEGATIVE
OPIATES, URINE: NEGATIVE
OXYCODONE SCREEN URINE: NEGATIVE
PHENCYCLIDINE, URINE: NEGATIVE
TEST INFORMATION: ABNORMAL
TRAMADOL, URINE: NEGATIVE

## 2024-03-06 LAB
6-MAM, QUANTITATIVE, URINE: <10 NG/ML
7-AMINOCLONAZEPAM, QUANTITATIVE, URINE: <50 NG/ML
ALPHA-HYDROXYALPRAZOLAM, QUANTITATIVE, URINE: <50 NG/ML
ALPHA-HYDROXYMIDAZOLAM, QUANTITATIVE, URINE: <50 NG/ML
ALPHA-HYDROXYTRIAZOLAM, QUANTITATIVE, URINE: <50 NG/ML
ALPRAZOLAM URINE QUANT: <50 NG/ML
AMO_PENTOBARBITAL, QUANTITATIVE, URINE: <50 NG/ML
BUTABARBITAL URINE QUANT: <50 NG/ML
BUTALBITAL URINE QUANT: 252.4 NG/ML
CHLORDIAZEPOXIDE, QUANTITATIVE, URINE: <50 NG/ML
CLONAZEPAM, QUANTITATIVE, URINE: <50 NG/ML
CODEINE, QUANTITATIVE, URINE: <50 NG/ML
COMPLIANCE DRUG ANALYSIS, URINE: NORMAL
DIAZEPAM URINE QUANT: <50 NG/ML
FLUNITRAZEPAM, QUANTITATIVE, URINE: <50 NG/ML
FLURAZEPAM, QUANTITATIVE, URINE: <50 NG/ML
HYDROCODONE, QUANTITATIVE, URINE: <50 NG/ML
HYDROMORPHONE, QUANTITATIVE, URINE: <50 NG/ML
LORAZEPAM URINE QUANT: <50 NG/ML
MIDAZOLAM URINE QUANT: <50 NG/ML
MORPHINE, QUANTITATIVE, URINE: <50 NG/ML
NORDIAZEPAM URINE QUANT: <50 NG/ML
NORHYDROCODONE, QUANTITATIVE, URINE: <50 NG/ML
NOROXYCODONE, QUANTITATIVE, URINE: <50 NG/ML
OXAZEPAM URINE QUANT: <50 NG/ML
OXYCODONE URINE, QUANTITATIVE: <50 NG/ML
OXYMORPHONE, QUANTITATIVE, URINE: <50 NG/ML
PHENOBARBITAL URINE QUANT: <50 NG/ML
SECOBARBITAL URINE QUANT: <50 NG/ML
TEMAZEPAM, QUANTITATIVE, URINE: <50 NG/ML
THC NORMALIZED, QUANTITIATIVE, URINE: ABNORMAL NG/ML
THC-COOH, QUANTITATIVE, URINE: >1000 NG/ML

## 2024-03-07 DIAGNOSIS — C7A.8 NEUROENDOCRINE CANCER (HCC): Primary | ICD-10-CM

## 2024-03-14 ENCOUNTER — HOSPITAL ENCOUNTER (OUTPATIENT)
Dept: INFUSION THERAPY | Age: 39
End: 2024-03-14

## 2024-03-21 ENCOUNTER — HOSPITAL ENCOUNTER (OUTPATIENT)
Dept: INFUSION THERAPY | Age: 39
Discharge: HOME | End: 2024-03-21
Payer: COMMERCIAL

## 2024-03-21 ENCOUNTER — OFFICE VISIT (OUTPATIENT)
Dept: ONCOLOGY | Age: 39
End: 2024-03-21

## 2024-03-21 VITALS
HEART RATE: 61 BPM | WEIGHT: 134.3 LBS | BODY MASS INDEX: 26.37 KG/M2 | DIASTOLIC BLOOD PRESSURE: 60 MMHG | SYSTOLIC BLOOD PRESSURE: 105 MMHG | OXYGEN SATURATION: 98 % | HEIGHT: 60 IN | TEMPERATURE: 97.5 F

## 2024-03-21 VITALS
TEMPERATURE: 97.8 F | RESPIRATION RATE: 16 BRPM | SYSTOLIC BLOOD PRESSURE: 115 MMHG | HEART RATE: 63 BPM | DIASTOLIC BLOOD PRESSURE: 64 MMHG

## 2024-03-21 DIAGNOSIS — K76.89 OTHER SPECIFIED DISEASES OF LIVER: ICD-10-CM

## 2024-03-21 DIAGNOSIS — C7B.8 METASTATIC MALIGNANT NEUROENDOCRINE TUMOR TO LIVER (HCC): Primary | ICD-10-CM

## 2024-03-21 DIAGNOSIS — D3A.8 NEUROENDOCRINE TUMOR: Primary | ICD-10-CM

## 2024-03-21 DIAGNOSIS — C7A.8 NEUROENDOCRINE CANCER (HCC): ICD-10-CM

## 2024-03-21 DIAGNOSIS — H83.3X3 NOISE-INDUCED HEARING LOSS OF BOTH EARS: ICD-10-CM

## 2024-03-21 LAB
ALBUMIN SERPL-MCNC: 4.4 G/DL (ref 3.5–5.2)
ALP SERPL-CCNC: 101 U/L (ref 35–104)
ALT SERPL-CCNC: 15 U/L (ref 0–32)
ANION GAP SERPL CALCULATED.3IONS-SCNC: 15 MMOL/L (ref 7–16)
AST SERPL-CCNC: 18 U/L (ref 0–31)
BASOPHILS # BLD: 0.06 K/UL (ref 0–0.2)
BASOPHILS NFR BLD: 1 % (ref 0–2)
BILIRUB SERPL-MCNC: 0.7 MG/DL (ref 0–1.2)
BUN SERPL-MCNC: 13 MG/DL (ref 6–20)
CALCIUM SERPL-MCNC: 8.7 MG/DL (ref 8.6–10.2)
CHLORIDE SERPL-SCNC: 105 MMOL/L (ref 98–107)
CO2 SERPL-SCNC: 22 MMOL/L (ref 22–29)
CREAT SERPL-MCNC: 0.7 MG/DL (ref 0.5–1)
EOSINOPHIL # BLD: 0.09 K/UL (ref 0.05–0.5)
EOSINOPHILS RELATIVE PERCENT: 2 % (ref 0–6)
ERYTHROCYTE [DISTWIDTH] IN BLOOD BY AUTOMATED COUNT: 15.6 % (ref 11.5–15)
GFR SERPL CREATININE-BSD FRML MDRD: >60 ML/MIN/1.73M2
GLUCOSE SERPL-MCNC: 100 MG/DL (ref 74–99)
HCT VFR BLD AUTO: 35.7 % (ref 34–48)
HGB BLD-MCNC: 11.9 G/DL (ref 11.5–15.5)
IMM GRANULOCYTES # BLD AUTO: 0.05 K/UL (ref 0–0.58)
IMM GRANULOCYTES NFR BLD: 1 % (ref 0–5)
LYMPHOCYTES NFR BLD: 1.2 K/UL (ref 1.5–4)
LYMPHOCYTES RELATIVE PERCENT: 20 % (ref 20–42)
MCH RBC QN AUTO: 31.9 PG (ref 26–35)
MCHC RBC AUTO-ENTMCNC: 33.3 G/DL (ref 32–34.5)
MCV RBC AUTO: 95.7 FL (ref 80–99.9)
MONOCYTES NFR BLD: 0.72 K/UL (ref 0.1–0.95)
MONOCYTES NFR BLD: 12 % (ref 2–12)
NEUTROPHILS NFR BLD: 64 % (ref 43–80)
NEUTS SEG NFR BLD: 3.79 K/UL (ref 1.8–7.3)
PLATELET # BLD AUTO: 209 K/UL (ref 130–450)
PMV BLD AUTO: 9.5 FL (ref 7–12)
POTASSIUM SERPL-SCNC: 3.7 MMOL/L (ref 3.5–5)
PROT SERPL-MCNC: 7.1 G/DL (ref 6.4–8.3)
RBC # BLD AUTO: 3.73 M/UL (ref 3.5–5.5)
SODIUM SERPL-SCNC: 142 MMOL/L (ref 132–146)
WBC OTHER # BLD: 5.9 K/UL (ref 4.5–11.5)

## 2024-03-21 PROCEDURE — 80053 COMPREHEN METABOLIC PANEL: CPT

## 2024-03-21 PROCEDURE — 85025 COMPLETE CBC W/AUTO DIFF WBC: CPT

## 2024-03-21 PROCEDURE — 36591 DRAW BLOOD OFF VENOUS DEVICE: CPT

## 2024-03-21 PROCEDURE — 96402 CHEMO HORMON ANTINEOPL SQ/IM: CPT

## 2024-03-21 PROCEDURE — 96375 TX/PRO/DX INJ NEW DRUG ADDON: CPT

## 2024-03-21 PROCEDURE — 96361 HYDRATE IV INFUSION ADD-ON: CPT

## 2024-03-21 PROCEDURE — 6360000002 HC RX W HCPCS: Performed by: STUDENT IN AN ORGANIZED HEALTH CARE EDUCATION/TRAINING PROGRAM

## 2024-03-21 PROCEDURE — 2580000003 HC RX 258: Performed by: STUDENT IN AN ORGANIZED HEALTH CARE EDUCATION/TRAINING PROGRAM

## 2024-03-21 PROCEDURE — 96374 THER/PROPH/DIAG INJ IV PUSH: CPT

## 2024-03-21 RX ORDER — DIPHENHYDRAMINE HYDROCHLORIDE 50 MG/ML
50 INJECTION INTRAMUSCULAR; INTRAVENOUS
OUTPATIENT
Start: 2024-03-21

## 2024-03-21 RX ORDER — SODIUM CHLORIDE 0.9 % (FLUSH) 0.9 %
5-40 SYRINGE (ML) INJECTION PRN
Status: DISCONTINUED | OUTPATIENT
Start: 2024-03-21 | End: 2024-03-22 | Stop reason: HOSPADM

## 2024-03-21 RX ORDER — SODIUM CHLORIDE 0.9 % (FLUSH) 0.9 %
5-40 SYRINGE (ML) INJECTION PRN
OUTPATIENT
Start: 2024-03-21

## 2024-03-21 RX ORDER — ONDANSETRON 2 MG/ML
8 INJECTION INTRAMUSCULAR; INTRAVENOUS
OUTPATIENT
Start: 2024-03-21

## 2024-03-21 RX ORDER — ONDANSETRON 2 MG/ML
8 INJECTION INTRAMUSCULAR; INTRAVENOUS ONCE
Status: CANCELLED
Start: 2024-03-21 | End: 2024-03-21

## 2024-03-21 RX ORDER — LANREOTIDE ACETATE 120 MG/.5ML
120 INJECTION SUBCUTANEOUS ONCE
Status: COMPLETED | OUTPATIENT
Start: 2024-03-21 | End: 2024-03-21

## 2024-03-21 RX ORDER — EVEROLIMUS 10 MG/1
10 TABLET ORAL DAILY
Qty: 30 TABLET | Refills: 0 | Status: ACTIVE | OUTPATIENT
Start: 2024-03-21

## 2024-03-21 RX ORDER — EPINEPHRINE 1 MG/ML
0.3 INJECTION, SOLUTION, CONCENTRATE INTRAVENOUS PRN
OUTPATIENT
Start: 2024-03-21

## 2024-03-21 RX ORDER — 0.9 % SODIUM CHLORIDE 0.9 %
1000 INTRAVENOUS SOLUTION INTRAVENOUS PRN
Status: CANCELLED | OUTPATIENT
Start: 2024-03-21

## 2024-03-21 RX ORDER — SODIUM CHLORIDE 9 MG/ML
5-250 INJECTION, SOLUTION INTRAVENOUS PRN
OUTPATIENT
Start: 2024-03-21

## 2024-03-21 RX ORDER — HEPARIN SODIUM (PORCINE) LOCK FLUSH IV SOLN 100 UNIT/ML 100 UNIT/ML
500 SOLUTION INTRAVENOUS PRN
OUTPATIENT
Start: 2024-03-21

## 2024-03-21 RX ORDER — FAMOTIDINE 10 MG/ML
20 INJECTION, SOLUTION INTRAVENOUS
OUTPATIENT
Start: 2024-03-21

## 2024-03-21 RX ORDER — 0.9 % SODIUM CHLORIDE 0.9 %
1000 INTRAVENOUS SOLUTION INTRAVENOUS PRN
Status: DISCONTINUED | OUTPATIENT
Start: 2024-03-21 | End: 2024-03-22 | Stop reason: HOSPADM

## 2024-03-21 RX ORDER — ONDANSETRON 2 MG/ML
8 INJECTION INTRAMUSCULAR; INTRAVENOUS ONCE
Status: COMPLETED | OUTPATIENT
Start: 2024-03-21 | End: 2024-03-21

## 2024-03-21 RX ORDER — HEPARIN 100 UNIT/ML
500 SYRINGE INTRAVENOUS PRN
OUTPATIENT
Start: 2024-03-21

## 2024-03-21 RX ORDER — HEPARIN 100 UNIT/ML
500 SYRINGE INTRAVENOUS PRN
Status: DISCONTINUED | OUTPATIENT
Start: 2024-03-21 | End: 2024-03-22 | Stop reason: HOSPADM

## 2024-03-21 RX ORDER — ALBUTEROL SULFATE 90 UG/1
4 AEROSOL, METERED RESPIRATORY (INHALATION) PRN
OUTPATIENT
Start: 2024-03-21

## 2024-03-21 RX ORDER — SODIUM CHLORIDE 9 MG/ML
INJECTION, SOLUTION INTRAVENOUS CONTINUOUS
OUTPATIENT
Start: 2024-03-21

## 2024-03-21 RX ORDER — LANREOTIDE ACETATE 120 MG/.5ML
120 INJECTION SUBCUTANEOUS ONCE
OUTPATIENT
Start: 2024-04-11 | End: 2024-04-11

## 2024-03-21 RX ORDER — ACETAMINOPHEN 325 MG/1
650 TABLET ORAL
OUTPATIENT
Start: 2024-03-21

## 2024-03-21 RX ADMIN — LANREOTIDE ACETATE 120 MG: 120 INJECTION SUBCUTANEOUS at 16:08

## 2024-03-21 RX ADMIN — SODIUM CHLORIDE, PRESERVATIVE FREE 10 ML: 5 INJECTION INTRAVENOUS at 13:29

## 2024-03-21 RX ADMIN — SODIUM CHLORIDE, PRESERVATIVE FREE 10 ML: 5 INJECTION INTRAVENOUS at 15:08

## 2024-03-21 RX ADMIN — HEPARIN 500 UNITS: 100 SYRINGE at 13:29

## 2024-03-21 RX ADMIN — ONDANSETRON 8 MG: 2 INJECTION INTRAMUSCULAR; INTRAVENOUS at 15:08

## 2024-03-21 RX ADMIN — SODIUM CHLORIDE, PRESERVATIVE FREE 10 ML: 5 INJECTION INTRAVENOUS at 15:05

## 2024-03-21 RX ADMIN — SODIUM CHLORIDE 1000 ML: 9 INJECTION, SOLUTION INTRAVENOUS at 15:05

## 2024-03-22 NOTE — PROGRESS NOTES
Patient did stop at check out window, ok'd to leave without AVS.  Patient has MYCHART.    
as needed (NAUSEA) 120 tablet 0    calcium carb-cholecalciferol (CALTRATE 600+D3) 600-20 MG-MCG TABS Take 1 tablet by mouth daily 30 tablet 1    potassium chloride (KLOR-CON M) 20 MEQ extended release tablet Take 1 tablet by mouth 2 times daily 60 tablet 0    magnesium oxide (MAG-OX) 400 MG tablet Take 1 tablet by mouth 2 times daily 30 tablet 0    acetaminophen (TYLENOL) 325 MG tablet Take 2 tablets by mouth every 6 hours as needed for Pain 120 tablet 3    levETIRAcetam (KEPPRA) 100 MG/ML solution Take 10 mLs by mouth 2 times daily 600 mL 0    levothyroxine (SYNTHROID) 112 MCG tablet Take 1 tablet by mouth Daily      famotidine (PEPCID) 40 MG tablet Take 1 tablet by mouth nightly as needed      fluticasone (FLONASE) 50 MCG/ACT nasal spray 1 spray by Nasal route daily as needed for Rhinitis      brimonidine (ALPHAGAN) 0.2 % ophthalmic solution Place 1 drop into both eyes every morning      melatonin 10 MG CAPS capsule Take 1 capsule by mouth nightly 30 capsule 2     No current facility-administered medications on file prior to visit.   Reviewed and reconciled.    Allergies  No Known Allergies      Physical Examination:  /60   Pulse 61   Temp 97.5 °F (36.4 °C)   Ht 1.524 m (5')   Wt 60.9 kg (134 lb 4.8 oz)   SpO2 98%   BMI 26.23 kg/m²   GENERAL: Well developed, well nourished; in no acute distress  HEENT:  Nornmocephalic, artaumatic.   EOMI. No scleral icterus. Oropharynx clear.   NECK:  Supple.  Normal range of motion.  LUNGS: No significant wheezing.  Otherwise clear lung sounds.  CARDIOVASCULAR: RRR without murmurs, rubs or gallops.  ABDOMEN: Soft, without distention, ascites, or tenderness.  EXTREMITIES: without clubbing, cyanosis, or edema.  NEUROLOGIC:  Alert, awake, oriented to time, place and person. No focal deficits.  SKIN : No Rash. No Bruising.      ECOG PS 0    Diagnostics:  Lab Results   Component Value Date    WBC 5.9 03/21/2024    HGB 11.9 03/21/2024    HCT 35.7 03/21/2024    MCV 95.7

## 2024-03-25 ENCOUNTER — TELEPHONE (OUTPATIENT)
Dept: AUDIOLOGY | Age: 39
End: 2024-03-25

## 2024-03-25 NOTE — TELEPHONE ENCOUNTER
Called and left message for patient to call back to [phone] to [facility] hearing test per referral received

## 2024-03-26 ENCOUNTER — TELEPHONE (OUTPATIENT)
Dept: AUDIOLOGY | Age: 39
End: 2024-03-26

## 2024-03-26 NOTE — TELEPHONE ENCOUNTER
Patient called and LVM to schedule hearing test from referral. Called and LVM to schedule.    Electronically signed by Janelle Tinoco on 3/26/2024 at 10:54 AM

## 2024-03-27 ENCOUNTER — TELEPHONE (OUTPATIENT)
Dept: AUDIOLOGY | Age: 39
End: 2024-03-27

## 2024-04-03 ENCOUNTER — TELEPHONE (OUTPATIENT)
Dept: AUDIOLOGY | Age: 39
End: 2024-04-03

## 2024-04-03 NOTE — TELEPHONE ENCOUNTER
Called and scheduled patient for tomorrow at 230 at [facility] [place], [place] office

## 2024-04-04 ENCOUNTER — PROCEDURE VISIT (OUTPATIENT)
Dept: AUDIOLOGY | Age: 39
End: 2024-04-04
Payer: COMMERCIAL

## 2024-04-04 DIAGNOSIS — H91.93 HIGH FREQUENCY HEARING LOSS OF BOTH EARS: Primary | ICD-10-CM

## 2024-04-04 PROCEDURE — 92557 COMPREHENSIVE HEARING TEST: CPT | Performed by: AUDIOLOGIST

## 2024-04-04 PROCEDURE — 92567 TYMPANOMETRY: CPT | Performed by: AUDIOLOGIST

## 2024-04-04 PROCEDURE — 92588 EVOKED AUDITORY TST COMPLETE: CPT | Performed by: AUDIOLOGIST

## 2024-04-04 NOTE — PROGRESS NOTES
This patient was referred for Ototoxic Monitoring by Geoff Hu MD. Therefore, baselines were established for audiometric thresholds. In order to monitor hearing changes in a timely manner, testing included high frequency audiometry (9-20 KHz), per [employer] Guidelines. Testing was accomplished using a [employer] AudioStar Pro Clinical Audiometer, and utilizing high frequency calibrated ear phones. Extra time to test high frequencies was 10 minutes.     The patient reported bilateral hearing loss, tinnitus, loud noise exposure, major head injuries, and family history of hearing loss.  The patient denied any dizziness, ear pain, ear drainage, ear infections, history of ear infections, and ear surgery.   Tinnitus - right more than left starting 1+ year ago and getting worse  Noise exposure - [place]  Head trauma - history of concussion from seizure   Family history - father and grandparent          Audiometry using pure tone air and bone conduction testing revealed hearing sensitivity within normal limits through 12.5 kHz sloping to a mild loss at 16 kHz. No response at 18 and 20 kHz.   Reliability was good. Speech reception thresholds were in good agreement with the pure tone averages, bilaterally. Speech discrimination scores were excellent, bilaterally.    Distortion product otoacoustic emissions (DPOAE) testing was conducted bilaterally and revealed present cochlear responses, bilaterally    Tympanometry revealed normal middle ear peak pressure and compliance, bilaterally.    Repeat testing is suggested with any of the following symptoms: tinnitus, aural fullness, decreased hearing or vertigo. This was discussed with the patient.      If I can be of further assistance or provide additional information, please do not hesitate to contact this office.      Thank you for the referral.  Electronically signed by Janelle Bal on 4/11/2024 at 2:51 PM

## 2024-04-08 RX ORDER — OMEPRAZOLE 20 MG/1
20 CAPSULE, DELAYED RELEASE ORAL
Qty: 30 CAPSULE | Refills: 5 | Status: SHIPPED | OUTPATIENT
Start: 2024-04-08

## 2024-04-18 ENCOUNTER — OFFICE VISIT (OUTPATIENT)
Dept: ONCOLOGY | Age: 39
End: 2024-04-18
Payer: COMMERCIAL

## 2024-04-18 ENCOUNTER — HOSPITAL ENCOUNTER (OUTPATIENT)
Dept: INFUSION THERAPY | Age: 39
Discharge: HOME | End: 2024-04-18
Payer: COMMERCIAL

## 2024-04-18 VITALS
WEIGHT: 135 LBS | BODY MASS INDEX: 26.5 KG/M2 | OXYGEN SATURATION: 98 % | HEART RATE: 66 BPM | DIASTOLIC BLOOD PRESSURE: 57 MMHG | TEMPERATURE: 98 F | SYSTOLIC BLOOD PRESSURE: 105 MMHG | HEIGHT: 60 IN

## 2024-04-18 DIAGNOSIS — C7B.8 METASTATIC MALIGNANT NEUROENDOCRINE TUMOR TO LIVER (HCC): Primary | ICD-10-CM

## 2024-04-18 DIAGNOSIS — C7A.8 NEUROENDOCRINE CANCER (HCC): ICD-10-CM

## 2024-04-18 DIAGNOSIS — C7B.8 METASTATIC MALIGNANT NEUROENDOCRINE TUMOR TO LIVER (HCC): Primary | Chronic | ICD-10-CM

## 2024-04-18 LAB
ALBUMIN SERPL-MCNC: 4.4 G/DL (ref 3.5–5.2)
ALP SERPL-CCNC: 129 U/L (ref 35–104)
ALT SERPL-CCNC: 20 U/L (ref 0–32)
ANION GAP SERPL CALCULATED.3IONS-SCNC: 13 MMOL/L (ref 7–16)
AST SERPL-CCNC: 21 U/L (ref 0–31)
BASOPHILS # BLD: 0.04 K/UL (ref 0–0.2)
BASOPHILS NFR BLD: 1 % (ref 0–2)
BILIRUB SERPL-MCNC: 0.2 MG/DL (ref 0–1.2)
BUN SERPL-MCNC: 10 MG/DL (ref 6–20)
CALCIUM SERPL-MCNC: 8.9 MG/DL (ref 8.6–10.2)
CHLORIDE SERPL-SCNC: 101 MMOL/L (ref 98–107)
CO2 SERPL-SCNC: 24 MMOL/L (ref 22–29)
CREAT SERPL-MCNC: 0.8 MG/DL (ref 0.5–1)
EOSINOPHIL # BLD: 0.09 K/UL (ref 0.05–0.5)
EOSINOPHILS RELATIVE PERCENT: 2 % (ref 0–6)
ERYTHROCYTE [DISTWIDTH] IN BLOOD BY AUTOMATED COUNT: 14.4 % (ref 11.5–15)
GFR SERPL CREATININE-BSD FRML MDRD: >90 ML/MIN/1.73M2
GLUCOSE SERPL-MCNC: 107 MG/DL (ref 74–99)
HCT VFR BLD AUTO: 36.4 % (ref 34–48)
HGB BLD-MCNC: 12.4 G/DL (ref 11.5–15.5)
IMM GRANULOCYTES # BLD AUTO: <0.03 K/UL (ref 0–0.58)
IMM GRANULOCYTES NFR BLD: 0 % (ref 0–5)
LYMPHOCYTES NFR BLD: 1.04 K/UL (ref 1.5–4)
LYMPHOCYTES RELATIVE PERCENT: 18 % (ref 20–42)
MCH RBC QN AUTO: 31.9 PG (ref 26–35)
MCHC RBC AUTO-ENTMCNC: 34.1 G/DL (ref 32–34.5)
MCV RBC AUTO: 93.6 FL (ref 80–99.9)
MONOCYTES NFR BLD: 0.64 K/UL (ref 0.1–0.95)
MONOCYTES NFR BLD: 11 % (ref 2–12)
NEUTROPHILS NFR BLD: 69 % (ref 43–80)
NEUTS SEG NFR BLD: 4.09 K/UL (ref 1.8–7.3)
PLATELET # BLD AUTO: 205 K/UL (ref 130–450)
PMV BLD AUTO: 9.7 FL (ref 7–12)
POTASSIUM SERPL-SCNC: 3.6 MMOL/L (ref 3.5–5)
PROT SERPL-MCNC: 7.7 G/DL (ref 6.4–8.3)
RBC # BLD AUTO: 3.89 M/UL (ref 3.5–5.5)
SODIUM SERPL-SCNC: 138 MMOL/L (ref 132–146)
WBC OTHER # BLD: 5.9 K/UL (ref 4.5–11.5)

## 2024-04-18 PROCEDURE — 80053 COMPREHEN METABOLIC PANEL: CPT

## 2024-04-18 PROCEDURE — 96372 THER/PROPH/DIAG INJ SC/IM: CPT

## 2024-04-18 PROCEDURE — 85025 COMPLETE CBC W/AUTO DIFF WBC: CPT

## 2024-04-18 PROCEDURE — 99213 OFFICE O/P EST LOW 20 MIN: CPT | Performed by: NURSE PRACTITIONER

## 2024-04-18 PROCEDURE — 6360000002 HC RX W HCPCS: Performed by: STUDENT IN AN ORGANIZED HEALTH CARE EDUCATION/TRAINING PROGRAM

## 2024-04-18 PROCEDURE — 99212 OFFICE O/P EST SF 10 MIN: CPT

## 2024-04-18 RX ORDER — LANREOTIDE ACETATE 120 MG/.5ML
120 INJECTION SUBCUTANEOUS ONCE
Status: COMPLETED | OUTPATIENT
Start: 2024-04-18 | End: 2024-04-18

## 2024-04-18 RX ORDER — LANREOTIDE ACETATE 120 MG/.5ML
120 INJECTION SUBCUTANEOUS ONCE
OUTPATIENT
Start: 2024-05-16 | End: 2024-05-16

## 2024-04-18 RX ADMIN — LANREOTIDE ACETATE 120 MG: 120 INJECTION SUBCUTANEOUS at 14:54

## 2024-04-22 ENCOUNTER — OFFICE VISIT (OUTPATIENT)
Dept: PRIMARY CARE CLINIC | Age: 39
End: 2024-04-22
Payer: COMMERCIAL

## 2024-04-22 VITALS
WEIGHT: 138 LBS | OXYGEN SATURATION: 97 % | HEART RATE: 67 BPM | BODY MASS INDEX: 27.09 KG/M2 | SYSTOLIC BLOOD PRESSURE: 100 MMHG | RESPIRATION RATE: 18 BRPM | HEIGHT: 60 IN | TEMPERATURE: 97.8 F | DIASTOLIC BLOOD PRESSURE: 62 MMHG

## 2024-04-22 DIAGNOSIS — C7B.8 METASTATIC MALIGNANT NEUROENDOCRINE TUMOR TO LIVER (HCC): ICD-10-CM

## 2024-04-22 DIAGNOSIS — Z23 NEED FOR PROPHYLACTIC VACCINATION AGAINST STREPTOCOCCUS PNEUMONIAE (PNEUMOCOCCUS): ICD-10-CM

## 2024-04-22 DIAGNOSIS — F51.01 PRIMARY INSOMNIA: Primary | ICD-10-CM

## 2024-04-22 DIAGNOSIS — J30.2 SEASONAL ALLERGIES: ICD-10-CM

## 2024-04-22 DIAGNOSIS — C7B.8 NEUROENDOCRINE CARCINOMA METASTATIC TO LIVER (HCC): ICD-10-CM

## 2024-04-22 DIAGNOSIS — C7A.8 NEUROENDOCRINE CARCINOMA METASTATIC TO LIVER (HCC): ICD-10-CM

## 2024-04-22 PROCEDURE — 4004F PT TOBACCO SCREEN RCVD TLK: CPT | Performed by: FAMILY MEDICINE

## 2024-04-22 PROCEDURE — G0009 ADMIN PNEUMOCOCCAL VACCINE: HCPCS | Performed by: FAMILY MEDICINE

## 2024-04-22 PROCEDURE — 99214 OFFICE O/P EST MOD 30 MIN: CPT | Performed by: FAMILY MEDICINE

## 2024-04-22 PROCEDURE — G8427 DOCREV CUR MEDS BY ELIG CLIN: HCPCS | Performed by: FAMILY MEDICINE

## 2024-04-22 PROCEDURE — 90732 PPSV23 VACC 2 YRS+ SUBQ/IM: CPT | Performed by: FAMILY MEDICINE

## 2024-04-22 PROCEDURE — G8419 CALC BMI OUT NRM PARAM NOF/U: HCPCS | Performed by: FAMILY MEDICINE

## 2024-04-22 RX ORDER — FLUTICASONE PROPIONATE 50 MCG
1 SPRAY, SUSPENSION (ML) NASAL DAILY PRN
Qty: 16 G | Refills: 3 | Status: SHIPPED | OUTPATIENT
Start: 2024-04-22

## 2024-04-22 ASSESSMENT — ENCOUNTER SYMPTOMS
NAUSEA: 0
CHEST TIGHTNESS: 0
ALLERGIC/IMMUNOLOGIC NEGATIVE: 1
BACK PAIN: 0
COLOR CHANGE: 0
RHINORRHEA: 1
BLOOD IN STOOL: 0
VOMITING: 0
ABDOMINAL PAIN: 0
PHOTOPHOBIA: 0
APNEA: 0
COUGH: 0
WHEEZING: 0
DIARRHEA: 0
FACIAL SWELLING: 0
SHORTNESS OF BREATH: 0
SINUS PRESSURE: 0
SORE THROAT: 0

## 2024-04-22 ASSESSMENT — PATIENT HEALTH QUESTIONNAIRE - PHQ9
SUM OF ALL RESPONSES TO PHQ QUESTIONS 1-9: 0
SUM OF ALL RESPONSES TO PHQ QUESTIONS 1-9: 0
3. TROUBLE FALLING OR STAYING ASLEEP: NOT AT ALL
9. THOUGHTS THAT YOU WOULD BE BETTER OFF DEAD, OR OF HURTING YOURSELF: NOT AT ALL
7. TROUBLE CONCENTRATING ON THINGS, SUCH AS READING THE NEWSPAPER OR WATCHING TELEVISION: NOT AT ALL
8. MOVING OR SPEAKING SO SLOWLY THAT OTHER PEOPLE COULD HAVE NOTICED. OR THE OPPOSITE, BEING SO FIGETY OR RESTLESS THAT YOU HAVE BEEN MOVING AROUND A LOT MORE THAN USUAL: NOT AT ALL
5. POOR APPETITE OR OVEREATING: NOT AT ALL
8. MOVING OR SPEAKING SO SLOWLY THAT OTHER PEOPLE COULD HAVE NOTICED. OR THE OPPOSITE - BEING SO FIDGETY OR RESTLESS THAT YOU HAVE BEEN MOVING AROUND A LOT MORE THAN USUAL: NOT AT ALL
SUM OF ALL RESPONSES TO PHQ QUESTIONS 1-9: 0
4. FEELING TIRED OR HAVING LITTLE ENERGY: NOT AT ALL
3. TROUBLE FALLING OR STAYING ASLEEP: NOT AT ALL
SUM OF ALL RESPONSES TO PHQ QUESTIONS 1-9: 0
6. FEELING BAD ABOUT YOURSELF - OR THAT YOU ARE A FAILURE OR HAVE LET YOURSELF OR YOUR FAMILY DOWN: NOT AT ALL
10. IF YOU CHECKED OFF ANY PROBLEMS, HOW DIFFICULT HAVE THESE PROBLEMS MADE IT FOR YOU TO DO YOUR WORK, TAKE CARE OF THINGS AT HOME, OR GET ALONG WITH OTHER PEOPLE: NOT DIFFICULT AT ALL
5. POOR APPETITE OR OVEREATING: NOT AT ALL
9. THOUGHTS THAT YOU WOULD BE BETTER OFF DEAD, OR OF HURTING YOURSELF: NOT AT ALL
7. TROUBLE CONCENTRATING ON THINGS, SUCH AS READING THE NEWSPAPER OR WATCHING TELEVISION: NOT AT ALL
1. LITTLE INTEREST OR PLEASURE IN DOING THINGS: NOT AT ALL
4. FEELING TIRED OR HAVING LITTLE ENERGY: NOT AT ALL
10. IF YOU CHECKED OFF ANY PROBLEMS, HOW DIFFICULT HAVE THESE PROBLEMS MADE IT FOR YOU TO DO YOUR WORK, TAKE CARE OF THINGS AT HOME, OR GET ALONG WITH OTHER PEOPLE: NOT DIFFICULT AT ALL
SUM OF ALL RESPONSES TO PHQ QUESTIONS 1-9: 0
6. FEELING BAD ABOUT YOURSELF - OR THAT YOU ARE A FAILURE OR HAVE LET YOURSELF OR YOUR FAMILY DOWN: NOT AT ALL
2. FEELING DOWN, DEPRESSED OR HOPELESS: NOT AT ALL
1. LITTLE INTEREST OR PLEASURE IN DOING THINGS: NOT AT ALL
2. FEELING DOWN, DEPRESSED OR HOPELESS: NOT AT ALL
SUM OF ALL RESPONSES TO PHQ9 QUESTIONS 1 & 2: 0

## 2024-04-22 NOTE — PROGRESS NOTES
Chief Complaint:   Chief Complaint   Patient presents with    6 Month Follow-Up    Allergies       Allergies  Presents for follow-up visit. She complains of congestion and rhinorrhea. She reports no cough, headaches, rash, sinus pressure, sore throat or wheezing. The problem occurs daily. Symptom severity has been moderate.   Insomnia  This is a chronic problem. The current episode started more than 1 year ago. The problem occurs daily. Associated symptoms include congestion. Pertinent negatives include no abdominal pain, arthralgias, chest pain, coughing, headaches, joint swelling, myalgias, nausea, rash, sore throat, vomiting or weakness.       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)    Malignant carcinoid tumor of ileum (HCC)    Neuroendocrine tumor    Hypomagnesemia    Hypocalcemia    Hypoparathyroidism (HCC)    Tobacco abuse    Elevated liver enzymes    Moderate protein-calorie malnutrition (HCC)    Encounter regarding vascular access for dialysis for ESRD (HCC)    Difficulty with speech    Neuroendocrine cancer (HCC)    Alcohol abuse    Depressed bipolar II disorder (HCC)    Liver metastases    Thrombocytopenia (HCC)    Altered mental status    Nausea and vomiting    Viral gastroenteritis    AMS (altered mental status)    Crohn's disease of colon without complication (HCC)       Past Medical History:   Diagnosis Date    Abdominal pain     Anxiety     Cancer (HCC)     neuroendocrime tumor    Chronic kidney disease     Diarrhea     Hypocalcemia     Hypothyroidism     Irritable bowel syndrome     Liver disease     Migraines     Seizures (HCC)     last episode January 2021    Status post alcohol detoxification     5/22/2018-5/29/2018       Past Surgical History:  Form received at [facility] on 5/10/2018.     Please note that it takes 7-10 business days for completion.     Signed authorization received with form.

## 2024-04-24 DIAGNOSIS — F51.01 PRIMARY INSOMNIA: Primary | ICD-10-CM

## 2024-04-24 RX ORDER — ZOLPIDEM TARTRATE 10 MG/1
10 TABLET ORAL NIGHTLY PRN
Qty: 30 TABLET | Refills: 2 | Status: SHIPPED | OUTPATIENT
Start: 2024-04-24 | End: 2024-07-23

## 2024-04-24 RX ORDER — ZOLPIDEM TARTRATE 10 MG/1
10 TABLET ORAL NIGHTLY PRN
COMMUNITY
End: 2024-04-24 | Stop reason: SDUPTHER

## 2024-05-02 ASSESSMENT — ENCOUNTER SYMPTOMS
GASTROINTESTINAL NEGATIVE: 1
RESPIRATORY NEGATIVE: 1

## 2024-05-02 NOTE — PROGRESS NOTES
Patient did stop at check out window, ok'd to leave without AVS.  Patient has MYCHART.  Patient aware to arrive @ 1:00 pm. for labs same day first.    
(HCC)    Neuroendocrine tumor    Hypomagnesemia    Hypocalcemia    Hypoparathyroidism (HCC)    Tobacco abuse    Elevated liver enzymes    Moderate protein-calorie malnutrition (HCC)    Encounter regarding vascular access for dialysis for ESRD (HCC)    Difficulty with speech    Neuroendocrine cancer (HCC)    Alcohol abuse    Depressed bipolar II disorder (HCC)    Liver metastases    Thrombocytopenia (HCC)    Altered mental status    Nausea and vomiting    Viral gastroenteritis    AMS (altered mental status)    Crohn's disease of colon without complication (HCC)           PLAN:   Proceed with lanreotide today as scheduled. Continue Afinitor at current dose.     Continue current supportive care treatments       DISPO:    RTC Return in about 4 weeks (around 5/16/2024) for treatment, exam and labs.       Approximately spent 25 minutes on chart review as well as time spent on patient encounter, discussing the laboratory, imaging, and clinical findings; and documentation. I have discussed clinical implications and recommendations on the patient's primary issues. More than 50% of time was spent counseling patient. The patient verbalized understanding.    Jossie Burnette APRN - CNP  [facility] Medical Oncology  5/2/2024 11:22 AM    [facility] Office  P: [phone]  F: [phone]    [facility] Office  P: [phone]  F: [phone]     [facility] Office  P: [phone]  F: [phone]

## 2024-05-07 DIAGNOSIS — D3A.8 NEUROENDOCRINE TUMOR: ICD-10-CM

## 2024-05-07 RX ORDER — EVEROLIMUS 10 MG/1
10 TABLET ORAL DAILY
Qty: 30 TABLET | Refills: 0 | Status: ACTIVE | OUTPATIENT
Start: 2024-05-07

## 2024-05-16 ENCOUNTER — OFFICE VISIT (OUTPATIENT)
Dept: ONCOLOGY | Age: 39
End: 2024-05-16
Payer: COMMERCIAL

## 2024-05-16 ENCOUNTER — HOSPITAL ENCOUNTER (OUTPATIENT)
Dept: INFUSION THERAPY | Age: 39
Discharge: HOME | End: 2024-05-16
Payer: COMMERCIAL

## 2024-05-16 VITALS
OXYGEN SATURATION: 98 % | DIASTOLIC BLOOD PRESSURE: 50 MMHG | WEIGHT: 134 LBS | TEMPERATURE: 97.3 F | BODY MASS INDEX: 26.31 KG/M2 | HEART RATE: 65 BPM | SYSTOLIC BLOOD PRESSURE: 109 MMHG | HEIGHT: 60 IN

## 2024-05-16 DIAGNOSIS — C7B.8 METASTATIC MALIGNANT NEUROENDOCRINE TUMOR TO LIVER (HCC): Primary | ICD-10-CM

## 2024-05-16 DIAGNOSIS — K76.89 OTHER SPECIFIED DISEASES OF LIVER: ICD-10-CM

## 2024-05-16 DIAGNOSIS — C7A.8 NEUROENDOCRINE CANCER (HCC): ICD-10-CM

## 2024-05-16 DIAGNOSIS — D3A.8 NEUROENDOCRINE TUMOR: ICD-10-CM

## 2024-05-16 LAB
ALBUMIN SERPL-MCNC: 4.4 G/DL (ref 3.5–5.2)
ALP SERPL-CCNC: 105 U/L (ref 35–104)
ALT SERPL-CCNC: 10 U/L (ref 0–32)
ANION GAP SERPL CALCULATED.3IONS-SCNC: 14 MMOL/L (ref 7–16)
AST SERPL-CCNC: 15 U/L (ref 0–31)
BASOPHILS # BLD: 0.04 K/UL (ref 0–0.2)
BASOPHILS NFR BLD: 1 % (ref 0–2)
BILIRUB SERPL-MCNC: 0.3 MG/DL (ref 0–1.2)
BUN SERPL-MCNC: 10 MG/DL (ref 6–20)
CALCIUM SERPL-MCNC: 8.7 MG/DL (ref 8.6–10.2)
CHLORIDE SERPL-SCNC: 102 MMOL/L (ref 98–107)
CHOLEST SERPL-MCNC: 214 MG/DL
CO2 SERPL-SCNC: 22 MMOL/L (ref 22–29)
CREAT SERPL-MCNC: 0.7 MG/DL (ref 0.5–1)
EOSINOPHIL # BLD: 0.17 K/UL (ref 0.05–0.5)
EOSINOPHILS RELATIVE PERCENT: 3 % (ref 0–6)
ERYTHROCYTE [DISTWIDTH] IN BLOOD BY AUTOMATED COUNT: 15.1 % (ref 11.5–15)
GFR, ESTIMATED: >90 ML/MIN/1.73M2
GLUCOSE SERPL-MCNC: 105 MG/DL (ref 74–99)
HCT VFR BLD AUTO: 35.2 % (ref 34–48)
HDLC SERPL-MCNC: 48 MG/DL
HGB BLD-MCNC: 11.7 G/DL (ref 11.5–15.5)
IMM GRANULOCYTES # BLD AUTO: 0.05 K/UL (ref 0–0.58)
IMM GRANULOCYTES NFR BLD: 1 % (ref 0–5)
LDLC SERPL CALC-MCNC: 143 MG/DL
LYMPHOCYTES NFR BLD: 1.48 K/UL (ref 1.5–4)
LYMPHOCYTES RELATIVE PERCENT: 23 % (ref 20–42)
MCH RBC QN AUTO: 31.1 PG (ref 26–35)
MCHC RBC AUTO-ENTMCNC: 33.2 G/DL (ref 32–34.5)
MCV RBC AUTO: 93.6 FL (ref 80–99.9)
MONOCYTES NFR BLD: 0.77 K/UL (ref 0.1–0.95)
MONOCYTES NFR BLD: 12 % (ref 2–12)
NEUTROPHILS NFR BLD: 61 % (ref 43–80)
NEUTS SEG NFR BLD: 3.98 K/UL (ref 1.8–7.3)
PLATELET # BLD AUTO: 217 K/UL (ref 130–450)
PMV BLD AUTO: 9.3 FL (ref 7–12)
POTASSIUM SERPL-SCNC: 3.4 MMOL/L (ref 3.5–5)
PROT SERPL-MCNC: 7.1 G/DL (ref 6.4–8.3)
RBC # BLD AUTO: 3.76 M/UL (ref 3.5–5.5)
SODIUM SERPL-SCNC: 138 MMOL/L (ref 132–146)
TRIGL SERPL-MCNC: 115 MG/DL
VLDLC SERPL CALC-MCNC: 23 MG/DL
WBC OTHER # BLD: 6.5 K/UL (ref 4.5–11.5)

## 2024-05-16 PROCEDURE — 85025 COMPLETE CBC W/AUTO DIFF WBC: CPT

## 2024-05-16 PROCEDURE — 99214 OFFICE O/P EST MOD 30 MIN: CPT

## 2024-05-16 PROCEDURE — 36591 DRAW BLOOD OFF VENOUS DEVICE: CPT

## 2024-05-16 PROCEDURE — 6360000002 HC RX W HCPCS: Performed by: STUDENT IN AN ORGANIZED HEALTH CARE EDUCATION/TRAINING PROGRAM

## 2024-05-16 PROCEDURE — 99214 OFFICE O/P EST MOD 30 MIN: CPT | Performed by: STUDENT IN AN ORGANIZED HEALTH CARE EDUCATION/TRAINING PROGRAM

## 2024-05-16 PROCEDURE — 96372 THER/PROPH/DIAG INJ SC/IM: CPT

## 2024-05-16 PROCEDURE — 4004F PT TOBACCO SCREEN RCVD TLK: CPT | Performed by: STUDENT IN AN ORGANIZED HEALTH CARE EDUCATION/TRAINING PROGRAM

## 2024-05-16 PROCEDURE — 2580000003 HC RX 258: Performed by: STUDENT IN AN ORGANIZED HEALTH CARE EDUCATION/TRAINING PROGRAM

## 2024-05-16 PROCEDURE — 80053 COMPREHEN METABOLIC PANEL: CPT

## 2024-05-16 PROCEDURE — G8419 CALC BMI OUT NRM PARAM NOF/U: HCPCS | Performed by: STUDENT IN AN ORGANIZED HEALTH CARE EDUCATION/TRAINING PROGRAM

## 2024-05-16 PROCEDURE — G8427 DOCREV CUR MEDS BY ELIG CLIN: HCPCS | Performed by: STUDENT IN AN ORGANIZED HEALTH CARE EDUCATION/TRAINING PROGRAM

## 2024-05-16 PROCEDURE — 80061 LIPID PANEL: CPT

## 2024-05-16 RX ORDER — LANREOTIDE ACETATE 120 MG/.5ML
120 INJECTION SUBCUTANEOUS ONCE
Status: COMPLETED | OUTPATIENT
Start: 2024-05-16 | End: 2024-05-16

## 2024-05-16 RX ORDER — SODIUM CHLORIDE 0.9 % (FLUSH) 0.9 %
5-40 SYRINGE (ML) INJECTION PRN
Status: DISCONTINUED | OUTPATIENT
Start: 2024-05-16 | End: 2024-05-17 | Stop reason: HOSPADM

## 2024-05-16 RX ORDER — LANREOTIDE ACETATE 120 MG/.5ML
120 INJECTION SUBCUTANEOUS ONCE
OUTPATIENT
Start: 2024-06-13 | End: 2024-06-13

## 2024-05-16 RX ORDER — HEPARIN 100 UNIT/ML
500 SYRINGE INTRAVENOUS PRN
Status: DISCONTINUED | OUTPATIENT
Start: 2024-05-16 | End: 2024-05-17 | Stop reason: HOSPADM

## 2024-05-16 RX ADMIN — LANREOTIDE ACETATE 120 MG: 120 INJECTION SUBCUTANEOUS at 14:26

## 2024-05-16 RX ADMIN — HEPARIN 500 UNITS: 100 SYRINGE at 13:35

## 2024-05-16 RX ADMIN — SODIUM CHLORIDE, PRESERVATIVE FREE 10 ML: 5 INJECTION INTRAVENOUS at 13:35

## 2024-05-16 RX ADMIN — SODIUM CHLORIDE, PRESERVATIVE FREE 10 ML: 5 INJECTION INTRAVENOUS at 13:33

## 2024-05-16 NOTE — PROGRESS NOTES
Patient did stop at check out window, ok'd to leave without AVS.  Patient has MYCHART.  Patient aware to arrive @ 1:00 pm. for labs same day first.      
Present   Perineural invasion: Present   Large mesenteric masses (greater than 2 cm): Present (one 2.5 cm mass)   Regional lymph nodes:    Number of lymph nodes involved: 3    Number of lymph nodes examined: 10   Pathologic stage classification (pTNM, AJCC eighth edition):    pT3    pN2    pM1a -confirmed liver metastasis, HBS-     Comment:   A. Immunostains stain the tumor cells as follows in block A6:   Synaptophysin and chromogranin: Positive   Ki-67: Positive in 5% of tumor cells          Treatment  We recommended Lanreotide once monthly for metastatic well differentiated neuroendocrine cancer to liver.  Dose # 1 Lanreotide was on 11/05/2020.  Dose # 2 Lanreotide was on 12/03/2020.  Dose # 3 Lanreotide was on 01/07/2021.   Serum Chromogranin A 95 on 01/07/2021.  CT chest 02/07/2021 negative for metastatic disease.  CT abdomen/pelvis 02/07/2021 Persistent bilobar hepatic lesions which are grossly stable consistent with stable widespread hepatic metastasis.   Imaging reviewed. Continue Lanreotide and repeat scans in 3 months.  Dose # 4 Lanreotide was on 02/11/2021.   Ga 68 Dotatate PET 03/09/2021 Gallium 68 dotatate avid uptake throughout multiple regions of the liver compatible with multiple foci of neuroendocrine tumor in both the right and the left lobe of the liver, when compared to previous not significantly changed in the interval.  Dose # 5 Lanreotide was on 03/11/2021.   Dose # 6 Lanreotide was on 04/08/2021. Serum chromogranin A 115 (0-103)  Dose # 7 Lanreotide was on 05/06/2021. Serum chromogranin A 114 (0-103)  Dose # 8 Lanreotide was on 06/03/2021. Serum chromogranin A  84  (0-103)     Ga 68 Dotatate PET 06/29/2021 Numerous foci of increased Gallium 68 dotatate avid uptake throughout both lobes of the liver, not significantly changed from previous.  No significant progression of disease. No definite metastatic disease identified  Dose # 9 Lanreotide was on 07/01/2021. Serum Chromogranin A 97

## 2024-06-11 ENCOUNTER — HOSPITAL ENCOUNTER (OUTPATIENT)
Dept: NUCLEAR MEDICINE | Age: 39
Discharge: HOME | End: 2024-06-13
Attending: STUDENT IN AN ORGANIZED HEALTH CARE EDUCATION/TRAINING PROGRAM
Payer: COMMERCIAL

## 2024-06-11 DIAGNOSIS — C7B.8 METASTATIC MALIGNANT NEUROENDOCRINE TUMOR TO LIVER (HCC): ICD-10-CM

## 2024-06-11 DIAGNOSIS — D3A.8 NEUROENDOCRINE TUMOR: ICD-10-CM

## 2024-06-11 PROCEDURE — 3430000000 HC RX DIAGNOSTIC RADIOPHARMACEUTICAL: Performed by: RADIOLOGY

## 2024-06-11 PROCEDURE — A9587 GALLIUM GA-68: HCPCS | Performed by: RADIOLOGY

## 2024-06-11 PROCEDURE — 78815 PET IMAGE W/CT SKULL-THIGH: CPT

## 2024-06-11 RX ORDER — GINGER ROOT/GINGER ROOT EXT 262.5 MG
1 CAPSULE ORAL DAILY
Qty: 30 TABLET | Refills: 1 | Status: SHIPPED
Start: 2024-06-11 | End: 2024-06-11 | Stop reason: SDUPTHER

## 2024-06-11 RX ORDER — MAGNESIUM OXIDE 400 MG/1
400 TABLET ORAL 2 TIMES DAILY
Qty: 30 TABLET | Refills: 0 | Status: SHIPPED | OUTPATIENT
Start: 2024-06-11

## 2024-06-11 RX ORDER — POTASSIUM CHLORIDE 20 MEQ/1
20 TABLET, EXTENDED RELEASE ORAL 2 TIMES DAILY
Qty: 60 TABLET | Refills: 0 | Status: SHIPPED | OUTPATIENT
Start: 2024-06-11

## 2024-06-11 RX ORDER — 68GA-DOTATATE 40 MCG
3.17 KIT INTRAVENOUS ONCE
Status: COMPLETED | OUTPATIENT
Start: 2024-06-11 | End: 2024-06-11

## 2024-06-11 RX ORDER — GINGER ROOT/GINGER ROOT EXT 262.5 MG
1 CAPSULE ORAL DAILY
Qty: 30 TABLET | Refills: 1 | Status: SHIPPED | OUTPATIENT
Start: 2024-06-11

## 2024-06-11 RX ADMIN — 68GA-DOTATATE 3 MILLICURIE: KIT INTRAVENOUS at 13:38

## 2024-06-13 ENCOUNTER — HOSPITAL ENCOUNTER (OUTPATIENT)
Dept: INFUSION THERAPY | Age: 39
Discharge: HOME | End: 2024-06-13
Payer: COMMERCIAL

## 2024-06-13 ENCOUNTER — OFFICE VISIT (OUTPATIENT)
Dept: ONCOLOGY | Age: 39
End: 2024-06-13
Payer: COMMERCIAL

## 2024-06-13 VITALS
WEIGHT: 132.1 LBS | DIASTOLIC BLOOD PRESSURE: 51 MMHG | OXYGEN SATURATION: 93 % | BODY MASS INDEX: 25.8 KG/M2 | TEMPERATURE: 97.9 F | HEART RATE: 118 BPM | SYSTOLIC BLOOD PRESSURE: 102 MMHG

## 2024-06-13 DIAGNOSIS — C7B.8 METASTATIC MALIGNANT NEUROENDOCRINE TUMOR TO LIVER (HCC): Primary | ICD-10-CM

## 2024-06-13 DIAGNOSIS — D3A.8 NEUROENDOCRINE TUMOR: Primary | ICD-10-CM

## 2024-06-13 DIAGNOSIS — C7A.8 NEUROENDOCRINE CANCER (HCC): ICD-10-CM

## 2024-06-13 LAB
ALBUMIN SERPL-MCNC: 4.2 G/DL (ref 3.5–5.2)
ALP SERPL-CCNC: 103 U/L (ref 35–104)
ALT SERPL-CCNC: 13 U/L (ref 0–32)
ANION GAP SERPL CALCULATED.3IONS-SCNC: 13 MMOL/L (ref 7–16)
AST SERPL-CCNC: 17 U/L (ref 0–31)
BASOPHILS # BLD: 0.07 K/UL (ref 0–0.2)
BASOPHILS NFR BLD: 1 % (ref 0–2)
BILIRUB SERPL-MCNC: 0.3 MG/DL (ref 0–1.2)
BUN SERPL-MCNC: 14 MG/DL (ref 6–20)
CALCIUM SERPL-MCNC: 8.8 MG/DL (ref 8.6–10.2)
CHLORIDE SERPL-SCNC: 104 MMOL/L (ref 98–107)
CO2 SERPL-SCNC: 21 MMOL/L (ref 22–29)
CREAT SERPL-MCNC: 0.8 MG/DL (ref 0.5–1)
EOSINOPHIL # BLD: 0.11 K/UL (ref 0.05–0.5)
EOSINOPHILS RELATIVE PERCENT: 2 % (ref 0–6)
ERYTHROCYTE [DISTWIDTH] IN BLOOD BY AUTOMATED COUNT: 14.8 % (ref 11.5–15)
GFR, ESTIMATED: >90 ML/MIN/1.73M2
GLUCOSE SERPL-MCNC: 88 MG/DL (ref 74–99)
HCT VFR BLD AUTO: 35.3 % (ref 34–48)
HGB BLD-MCNC: 11.7 G/DL (ref 11.5–15.5)
IMM GRANULOCYTES # BLD AUTO: <0.03 K/UL (ref 0–0.58)
IMM GRANULOCYTES NFR BLD: 0 % (ref 0–5)
LYMPHOCYTES NFR BLD: 1.53 K/UL (ref 1.5–4)
LYMPHOCYTES RELATIVE PERCENT: 24 % (ref 20–42)
MCH RBC QN AUTO: 31.3 PG (ref 26–35)
MCHC RBC AUTO-ENTMCNC: 33.1 G/DL (ref 32–34.5)
MCV RBC AUTO: 94.4 FL (ref 80–99.9)
MONOCYTES NFR BLD: 0.72 K/UL (ref 0.1–0.95)
MONOCYTES NFR BLD: 11 % (ref 2–12)
NEUTROPHILS NFR BLD: 62 % (ref 43–80)
NEUTS SEG NFR BLD: 3.94 K/UL (ref 1.8–7.3)
PLATELET # BLD AUTO: 188 K/UL (ref 130–450)
PMV BLD AUTO: 9.3 FL (ref 7–12)
POTASSIUM SERPL-SCNC: 3.8 MMOL/L (ref 3.5–5)
PROT SERPL-MCNC: 7.1 G/DL (ref 6.4–8.3)
RBC # BLD AUTO: 3.74 M/UL (ref 3.5–5.5)
SODIUM SERPL-SCNC: 138 MMOL/L (ref 132–146)
WBC OTHER # BLD: 6.4 K/UL (ref 4.5–11.5)

## 2024-06-13 PROCEDURE — G8419 CALC BMI OUT NRM PARAM NOF/U: HCPCS | Performed by: STUDENT IN AN ORGANIZED HEALTH CARE EDUCATION/TRAINING PROGRAM

## 2024-06-13 PROCEDURE — 6360000002 HC RX W HCPCS: Performed by: STUDENT IN AN ORGANIZED HEALTH CARE EDUCATION/TRAINING PROGRAM

## 2024-06-13 PROCEDURE — 4004F PT TOBACCO SCREEN RCVD TLK: CPT | Performed by: STUDENT IN AN ORGANIZED HEALTH CARE EDUCATION/TRAINING PROGRAM

## 2024-06-13 PROCEDURE — 99214 OFFICE O/P EST MOD 30 MIN: CPT | Performed by: STUDENT IN AN ORGANIZED HEALTH CARE EDUCATION/TRAINING PROGRAM

## 2024-06-13 PROCEDURE — 36591 DRAW BLOOD OFF VENOUS DEVICE: CPT

## 2024-06-13 PROCEDURE — 99214 OFFICE O/P EST MOD 30 MIN: CPT

## 2024-06-13 PROCEDURE — 2580000003 HC RX 258: Performed by: STUDENT IN AN ORGANIZED HEALTH CARE EDUCATION/TRAINING PROGRAM

## 2024-06-13 PROCEDURE — 96372 THER/PROPH/DIAG INJ SC/IM: CPT

## 2024-06-13 PROCEDURE — G8427 DOCREV CUR MEDS BY ELIG CLIN: HCPCS | Performed by: STUDENT IN AN ORGANIZED HEALTH CARE EDUCATION/TRAINING PROGRAM

## 2024-06-13 PROCEDURE — 80053 COMPREHEN METABOLIC PANEL: CPT

## 2024-06-13 PROCEDURE — 85025 COMPLETE CBC W/AUTO DIFF WBC: CPT

## 2024-06-13 RX ORDER — HEPARIN 100 UNIT/ML
500 SYRINGE INTRAVENOUS PRN
Status: DISCONTINUED | OUTPATIENT
Start: 2024-06-13 | End: 2024-06-14 | Stop reason: HOSPADM

## 2024-06-13 RX ORDER — LANREOTIDE ACETATE 120 MG/.5ML
120 INJECTION SUBCUTANEOUS ONCE
OUTPATIENT
Start: 2024-07-11 | End: 2024-07-11

## 2024-06-13 RX ORDER — SODIUM CHLORIDE 0.9 % (FLUSH) 0.9 %
5-40 SYRINGE (ML) INJECTION PRN
Status: DISCONTINUED | OUTPATIENT
Start: 2024-06-13 | End: 2024-06-14 | Stop reason: HOSPADM

## 2024-06-13 RX ORDER — LANREOTIDE ACETATE 120 MG/.5ML
120 INJECTION SUBCUTANEOUS ONCE
Status: COMPLETED | OUTPATIENT
Start: 2024-06-13 | End: 2024-06-13

## 2024-06-13 RX ORDER — EVEROLIMUS 10 MG/1
10 TABLET ORAL DAILY
Qty: 30 TABLET | Refills: 3 | Status: ACTIVE | OUTPATIENT
Start: 2024-06-13

## 2024-06-13 RX ADMIN — HEPARIN 500 UNITS: 100 SYRINGE at 13:25

## 2024-06-13 RX ADMIN — LANREOTIDE ACETATE 120 MG: 120 INJECTION SUBCUTANEOUS at 14:51

## 2024-06-13 RX ADMIN — SODIUM CHLORIDE, PRESERVATIVE FREE 10 ML: 5 INJECTION INTRAVENOUS at 13:25

## 2024-06-13 RX ADMIN — SODIUM CHLORIDE, PRESERVATIVE FREE 10 ML: 5 INJECTION INTRAVENOUS at 13:23

## 2024-06-14 NOTE — PROGRESS NOTES
Patient did stop at check out window, ok'd to leave without AVS.  Patient has MYCHART.  Patient aware to arrive @ 1:00 pm. for labs same day first.    
otherwise patient continues to have intermittent nausea, vomiting, and diarrhea.    06/13/2024: No major events. Reviewed PET dotatate, noting grossly stable disease in liver. Pt seems to suggest feeling a little better since starting everolimus.     Plan  Will give lanreotide today  Continue Afinitor, will refill  Consider repeat scans in a few months  Consider utility of local regional therapies   Follow-up with Dr. Mcmanus with ophthalmology as directed  Continue supportive meds including PPI, antidiarrheals, antiemetics    Dispo:  RTC in 4 weeks for next lantreotide          Approximately spent 31 minutes on chart review as well as time spent on patient encounter, discussion of the laboratory, imaging, clinical findings, and documentation. I have discussed clinical implications and recommendations on the patient's primary issues. More than 50% of time was spent counseling patient. The patient verbalized understanding.      Geoff Hu MD  Medical Oncology  [facility]  06/13/24 11:29 PM

## 2024-06-20 RX ORDER — MAGNESIUM OXIDE 400 MG/1
1 TABLET ORAL 2 TIMES DAILY
Qty: 30 TABLET | Refills: 0 | Status: SHIPPED | OUTPATIENT
Start: 2024-06-20

## 2024-07-01 RX ORDER — MAGNESIUM OXIDE 400 MG/1
1 TABLET ORAL 2 TIMES DAILY
Qty: 30 TABLET | Refills: 0 | Status: SHIPPED | OUTPATIENT
Start: 2024-07-01

## 2024-07-03 DIAGNOSIS — G43.909 MIGRAINE WITHOUT STATUS MIGRAINOSUS, NOT INTRACTABLE, UNSPECIFIED MIGRAINE TYPE: ICD-10-CM

## 2024-07-03 DIAGNOSIS — C7A.8 NEUROENDOCRINE CANCER (HCC): Primary | ICD-10-CM

## 2024-07-03 RX ORDER — BUTALBITAL, ACETAMINOPHEN AND CAFFEINE 50; 325; 40 MG/1; MG/1; MG/1
TABLET ORAL
Qty: 60 TABLET | Refills: 2 | Status: SHIPPED
Start: 2024-07-03 | End: 2024-07-03 | Stop reason: SDUPTHER

## 2024-07-03 RX ORDER — BUTALBITAL, ACETAMINOPHEN AND CAFFEINE 50; 325; 40 MG/1; MG/1; MG/1
TABLET ORAL
Qty: 60 TABLET | Refills: 2 | Status: SHIPPED | OUTPATIENT
Start: 2024-07-03

## 2024-07-07 RX ORDER — POTASSIUM CHLORIDE 20 MEQ/1
20 TABLET, EXTENDED RELEASE ORAL 2 TIMES DAILY
Qty: 60 TABLET | Refills: 0 | Status: SHIPPED | OUTPATIENT
Start: 2024-07-07

## 2024-07-10 DIAGNOSIS — C7B.8 METASTATIC MALIGNANT NEUROENDOCRINE TUMOR TO LIVER (HCC): Primary | Chronic | ICD-10-CM

## 2024-07-11 ENCOUNTER — HOSPITAL ENCOUNTER (OUTPATIENT)
Dept: INFUSION THERAPY | Age: 39
Discharge: HOME | End: 2024-07-11
Payer: COMMERCIAL

## 2024-07-11 ENCOUNTER — OFFICE VISIT (OUTPATIENT)
Dept: ONCOLOGY | Age: 39
End: 2024-07-11
Payer: COMMERCIAL

## 2024-07-11 VITALS
BODY MASS INDEX: 25.41 KG/M2 | WEIGHT: 130.1 LBS | SYSTOLIC BLOOD PRESSURE: 105 MMHG | HEART RATE: 62 BPM | TEMPERATURE: 96.7 F | OXYGEN SATURATION: 99 % | DIASTOLIC BLOOD PRESSURE: 58 MMHG

## 2024-07-11 DIAGNOSIS — D3A.8 NEUROENDOCRINE TUMOR: Primary | ICD-10-CM

## 2024-07-11 DIAGNOSIS — C7B.8 METASTATIC MALIGNANT NEUROENDOCRINE TUMOR TO LIVER (HCC): Primary | ICD-10-CM

## 2024-07-11 DIAGNOSIS — C7A.8 NEUROENDOCRINE CANCER (HCC): ICD-10-CM

## 2024-07-11 DIAGNOSIS — C78.7 MALIGNANT NEOPLASM METASTATIC TO LIVER (HCC): Primary | ICD-10-CM

## 2024-07-11 LAB
ALBUMIN SERPL-MCNC: 4.3 G/DL (ref 3.5–5.2)
ALP SERPL-CCNC: 89 U/L (ref 35–104)
ALT SERPL-CCNC: 12 U/L (ref 0–32)
ANION GAP SERPL CALCULATED.3IONS-SCNC: 12 MMOL/L (ref 7–16)
AST SERPL-CCNC: 16 U/L (ref 0–31)
BASOPHILS # BLD: 0.05 K/UL (ref 0–0.2)
BASOPHILS NFR BLD: 1 % (ref 0–2)
BILIRUB SERPL-MCNC: 0.2 MG/DL (ref 0–1.2)
BUN SERPL-MCNC: 13 MG/DL (ref 6–20)
CALCIUM SERPL-MCNC: 8.4 MG/DL (ref 8.6–10.2)
CHLORIDE SERPL-SCNC: 103 MMOL/L (ref 98–107)
CO2 SERPL-SCNC: 23 MMOL/L (ref 22–29)
CREAT SERPL-MCNC: 0.8 MG/DL (ref 0.5–1)
EOSINOPHIL # BLD: 0.06 K/UL (ref 0.05–0.5)
EOSINOPHILS RELATIVE PERCENT: 1 % (ref 0–6)
ERYTHROCYTE [DISTWIDTH] IN BLOOD BY AUTOMATED COUNT: 15.9 % (ref 11.5–15)
GFR, ESTIMATED: >90 ML/MIN/1.73M2
GLUCOSE SERPL-MCNC: 90 MG/DL (ref 74–99)
HCT VFR BLD AUTO: 34.4 % (ref 34–48)
HGB BLD-MCNC: 11.5 G/DL (ref 11.5–15.5)
IMM GRANULOCYTES # BLD AUTO: 0.03 K/UL (ref 0–0.58)
IMM GRANULOCYTES NFR BLD: 1 % (ref 0–5)
LYMPHOCYTES NFR BLD: 0.9 K/UL (ref 1.5–4)
LYMPHOCYTES RELATIVE PERCENT: 15 % (ref 20–42)
MCH RBC QN AUTO: 31.8 PG (ref 26–35)
MCHC RBC AUTO-ENTMCNC: 33.4 G/DL (ref 32–34.5)
MCV RBC AUTO: 95 FL (ref 80–99.9)
MONOCYTES NFR BLD: 0.58 K/UL (ref 0.1–0.95)
MONOCYTES NFR BLD: 10 % (ref 2–12)
NEUTROPHILS NFR BLD: 73 % (ref 43–80)
NEUTS SEG NFR BLD: 4.41 K/UL (ref 1.8–7.3)
PLATELET # BLD AUTO: 189 K/UL (ref 130–450)
PMV BLD AUTO: 10.1 FL (ref 7–12)
POTASSIUM SERPL-SCNC: 3.9 MMOL/L (ref 3.5–5)
PROT SERPL-MCNC: 7 G/DL (ref 6.4–8.3)
RBC # BLD AUTO: 3.62 M/UL (ref 3.5–5.5)
SODIUM SERPL-SCNC: 138 MMOL/L (ref 132–146)
WBC OTHER # BLD: 6 K/UL (ref 4.5–11.5)

## 2024-07-11 PROCEDURE — 99214 OFFICE O/P EST MOD 30 MIN: CPT | Performed by: STUDENT IN AN ORGANIZED HEALTH CARE EDUCATION/TRAINING PROGRAM

## 2024-07-11 PROCEDURE — 85025 COMPLETE CBC W/AUTO DIFF WBC: CPT

## 2024-07-11 PROCEDURE — 99214 OFFICE O/P EST MOD 30 MIN: CPT

## 2024-07-11 PROCEDURE — 6360000002 HC RX W HCPCS: Performed by: STUDENT IN AN ORGANIZED HEALTH CARE EDUCATION/TRAINING PROGRAM

## 2024-07-11 PROCEDURE — 96372 THER/PROPH/DIAG INJ SC/IM: CPT

## 2024-07-11 PROCEDURE — 80053 COMPREHEN METABOLIC PANEL: CPT

## 2024-07-11 PROCEDURE — G8427 DOCREV CUR MEDS BY ELIG CLIN: HCPCS | Performed by: STUDENT IN AN ORGANIZED HEALTH CARE EDUCATION/TRAINING PROGRAM

## 2024-07-11 PROCEDURE — 4004F PT TOBACCO SCREEN RCVD TLK: CPT | Performed by: STUDENT IN AN ORGANIZED HEALTH CARE EDUCATION/TRAINING PROGRAM

## 2024-07-11 PROCEDURE — G8419 CALC BMI OUT NRM PARAM NOF/U: HCPCS | Performed by: STUDENT IN AN ORGANIZED HEALTH CARE EDUCATION/TRAINING PROGRAM

## 2024-07-11 RX ORDER — LANREOTIDE ACETATE 120 MG/.5ML
120 INJECTION SUBCUTANEOUS ONCE
OUTPATIENT
Start: 2024-08-08 | End: 2024-08-08

## 2024-07-11 RX ORDER — LANREOTIDE ACETATE 120 MG/.5ML
120 INJECTION SUBCUTANEOUS ONCE
Status: COMPLETED | OUTPATIENT
Start: 2024-07-11 | End: 2024-07-11

## 2024-07-11 RX ORDER — SODIUM CHLORIDE 0.9 % (FLUSH) 0.9 %
5-40 SYRINGE (ML) INJECTION PRN
Status: DISCONTINUED | OUTPATIENT
Start: 2024-07-11 | End: 2024-07-12 | Stop reason: HOSPADM

## 2024-07-11 RX ORDER — HEPARIN 100 UNIT/ML
500 SYRINGE INTRAVENOUS PRN
Status: DISCONTINUED | OUTPATIENT
Start: 2024-07-11 | End: 2024-07-12 | Stop reason: HOSPADM

## 2024-07-11 RX ADMIN — LANREOTIDE ACETATE 120 MG: 120 INJECTION SUBCUTANEOUS at 15:03

## 2024-07-11 NOTE — PROGRESS NOTES
Patient refused printed AVS, pt states they have MYCHART. All questions answered.   Patient to arrive at 1:00 pm for labs first.

## 2024-07-12 NOTE — PROGRESS NOTES
[facility] PHYSICIANS [facility] CARE [facility] MED ONCOLOGY  [address]  [facility] [address]  Dept: [phone]  Loc: [phone]    Clinical Progress Note    Reason for visit: Neuroendocrine cancer to liver     PCP:  Abdelrahman Montana DO       Subjective:  Patient states that she is feeling okay, still struggling with similar carcinoid symptoms.  She remains in good spirits      ONCOLOGIC HISTORY:  39 y.o. female with hx of hypothyroidism, IBS,migraine who was complaining of abdominal pain associated with diarrhea and flushing/sweating.      CT abdomen/pelvis 08/28/2020:  2 cm masslike density in the mid abdominal small bowel mesentery with a spiculated appearance.  Multiple liver masses suspicious for metastatic disease.     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     Comment: Sections of the liver tissue cores show the hepatic parenchyma to be partially replaced by a proliferation of epithelioid cells with relatively uniform round nuclei and eosinophilic granular cytoplasm.  The   epithelioid cells form anastomosing solid cords/nests that are surrounded by delicate fibrovascular stroma.  There are no mitotic figures or tumor cell necrosis present.  The histologic changes seen are suggestive of well-differentiated neuroendocrine (carcinoid) tumor.     Immunostaining for pankeratin, chromogranin, synaptophysin, TTF-1 and Ki-67 was performed on sections of one tissue block (A1) and the neoplastic epithelioid cells show diffuse and strong positivity for neuroendocrine markers (chromogranin and synaptophysin) and moderate positivity for pankeratin.  There is no staining reactivity for TTF-1.   The Ki-67 proliferation labeling index is essentially negative, (very low, <1%).   This staining pattern confirms the histologic impression of a well-differentiated neuroendocrine (carcinoid) tumor.     FL

## 2024-07-15 ENCOUNTER — TELEPHONE (OUTPATIENT)
Dept: INTERVENTIONAL RADIOLOGY/VASCULAR | Age: 39
End: 2024-07-15

## 2024-07-15 DIAGNOSIS — C7B.8 METASTATIC MALIGNANT NEUROENDOCRINE TUMOR TO LIVER (HCC): Primary | ICD-10-CM

## 2024-07-15 NOTE — TELEPHONE ENCOUNTER
Patient has mediport for labs/CT scan. I will call office to get order to access and flush mediport.

## 2024-07-17 DIAGNOSIS — F51.01 PRIMARY INSOMNIA: ICD-10-CM

## 2024-07-17 RX ORDER — ZOLPIDEM TARTRATE 10 MG/1
10 TABLET ORAL NIGHTLY PRN
Qty: 30 TABLET | Refills: 2 | Status: SHIPPED | OUTPATIENT
Start: 2024-07-17 | End: 2024-10-15

## 2024-07-17 NOTE — PROGRESS NOTES
[facility] for patients head CT. Patient had CT scan done in the ER the day after ordering it in patient and that is not covered through this peer to peer. 0

## 2024-07-19 ENCOUNTER — HOSPITAL ENCOUNTER (OUTPATIENT)
Dept: CT IMAGING | Age: 39
End: 2024-07-19
Payer: COMMERCIAL

## 2024-07-19 ENCOUNTER — HOSPITAL ENCOUNTER (OUTPATIENT)
Dept: INTERVENTIONAL RADIOLOGY/VASCULAR | Age: 39
End: 2024-07-19
Payer: COMMERCIAL

## 2024-07-19 VITALS
RESPIRATION RATE: 18 BRPM | HEART RATE: 66 BPM | SYSTOLIC BLOOD PRESSURE: 96 MMHG | OXYGEN SATURATION: 100 % | DIASTOLIC BLOOD PRESSURE: 62 MMHG

## 2024-07-19 DIAGNOSIS — C7B.8 METASTATIC MALIGNANT NEUROENDOCRINE TUMOR TO LIVER (HCC): Chronic | ICD-10-CM

## 2024-07-19 LAB
ALBUMIN SERPL-MCNC: 4.3 G/DL (ref 3.5–5.2)
ALP SERPL-CCNC: 109 U/L (ref 35–104)
ALT SERPL-CCNC: 11 U/L (ref 0–32)
AMMONIA PLAS-SCNC: 18 UMOL/L (ref 11–51)
ANION GAP SERPL CALCULATED.3IONS-SCNC: 10 MMOL/L (ref 7–16)
AST SERPL-CCNC: 13 U/L (ref 0–31)
BASOPHILS # BLD: 0.05 K/UL (ref 0–0.2)
BASOPHILS NFR BLD: 1 % (ref 0–2)
BILIRUB DIRECT SERPL-MCNC: <0.2 MG/DL (ref 0–0.3)
BILIRUB INDIRECT SERPL-MCNC: ABNORMAL MG/DL (ref 0–1)
BILIRUB SERPL-MCNC: 0.3 MG/DL (ref 0–1.2)
BUN SERPL-MCNC: 11 MG/DL (ref 6–20)
CALCIUM SERPL-MCNC: 8.9 MG/DL (ref 8.6–10.2)
CEA SERPL-MCNC: 1.6 NG/ML (ref 0–5.2)
CHLORIDE SERPL-SCNC: 98 MMOL/L (ref 98–107)
CO2 SERPL-SCNC: 26 MMOL/L (ref 22–29)
CREAT SERPL-MCNC: 0.6 MG/DL (ref 0.5–1)
EOSINOPHIL # BLD: 0.14 K/UL (ref 0.05–0.5)
EOSINOPHILS RELATIVE PERCENT: 2 % (ref 0–6)
ERYTHROCYTE [DISTWIDTH] IN BLOOD BY AUTOMATED COUNT: 15.5 % (ref 11.5–15)
GFR, ESTIMATED: >90 ML/MIN/1.73M2
GLUCOSE SERPL-MCNC: 122 MG/DL (ref 74–99)
HCT VFR BLD AUTO: 37.9 % (ref 34–48)
HGB BLD-MCNC: 12.9 G/DL (ref 11.5–15.5)
IMM GRANULOCYTES # BLD AUTO: 0.05 K/UL (ref 0–0.58)
IMM GRANULOCYTES NFR BLD: 1 % (ref 0–5)
INR PPP: 1.2
LYMPHOCYTES NFR BLD: 1.3 K/UL (ref 1.5–4)
LYMPHOCYTES RELATIVE PERCENT: 16 % (ref 20–42)
MCH RBC QN AUTO: 31.9 PG (ref 26–35)
MCHC RBC AUTO-ENTMCNC: 34 G/DL (ref 32–34.5)
MCV RBC AUTO: 93.8 FL (ref 80–99.9)
MONOCYTES NFR BLD: 0.74 K/UL (ref 0.1–0.95)
MONOCYTES NFR BLD: 9 % (ref 2–12)
NEUTROPHILS NFR BLD: 72 % (ref 43–80)
NEUTS SEG NFR BLD: 5.88 K/UL (ref 1.8–7.3)
PLATELET # BLD AUTO: 187 K/UL (ref 130–450)
PMV BLD AUTO: 10 FL (ref 7–12)
POTASSIUM SERPL-SCNC: 3.8 MMOL/L (ref 3.5–5)
PROT SERPL-MCNC: 7.3 G/DL (ref 6.4–8.3)
PROTHROMBIN TIME: 12.8 SEC (ref 9.3–12.4)
RBC # BLD AUTO: 4.04 M/UL (ref 3.5–5.5)
SODIUM SERPL-SCNC: 134 MMOL/L (ref 132–146)
WBC OTHER # BLD: 8.2 K/UL (ref 4.5–11.5)

## 2024-07-19 PROCEDURE — 82248 BILIRUBIN DIRECT: CPT

## 2024-07-19 PROCEDURE — 80053 COMPREHEN METABOLIC PANEL: CPT

## 2024-07-19 PROCEDURE — 85025 COMPLETE CBC W/AUTO DIFF WBC: CPT

## 2024-07-19 PROCEDURE — 6360000004 HC RX CONTRAST MEDICATION: Performed by: RADIOLOGY

## 2024-07-19 PROCEDURE — 74160 CT ABDOMEN W/CONTRAST: CPT

## 2024-07-19 PROCEDURE — 6360000002 HC RX W HCPCS: Performed by: TRANSPLANT SURGERY

## 2024-07-19 PROCEDURE — 85610 PROTHROMBIN TIME: CPT

## 2024-07-19 PROCEDURE — 82140 ASSAY OF AMMONIA: CPT

## 2024-07-19 PROCEDURE — 2580000003 HC RX 258: Performed by: TRANSPLANT SURGERY

## 2024-07-19 PROCEDURE — 82105 ALPHA-FETOPROTEIN SERUM: CPT

## 2024-07-19 PROCEDURE — 82378 CARCINOEMBRYONIC ANTIGEN: CPT

## 2024-07-19 RX ORDER — HEPARIN 100 UNIT/ML
500 SYRINGE INTRAVENOUS PRN
Status: DISCONTINUED | OUTPATIENT
Start: 2024-07-19 | End: 2024-07-22 | Stop reason: HOSPADM

## 2024-07-19 RX ORDER — MAGNESIUM OXIDE 400 MG/1
1 TABLET ORAL 2 TIMES DAILY
Qty: 30 TABLET | Refills: 0 | Status: SHIPPED | OUTPATIENT
Start: 2024-07-19

## 2024-07-19 RX ORDER — SODIUM CHLORIDE 0.9 % (FLUSH) 0.9 %
10 SYRINGE (ML) INJECTION PRN
Status: DISCONTINUED | OUTPATIENT
Start: 2024-07-19 | End: 2024-07-22 | Stop reason: HOSPADM

## 2024-07-19 RX ADMIN — IOPAMIDOL 75 ML: 755 INJECTION, SOLUTION INTRAVENOUS at 11:39

## 2024-07-19 RX ADMIN — HEPARIN 500 UNITS: 100 SYRINGE at 12:48

## 2024-07-19 RX ADMIN — SODIUM CHLORIDE, PRESERVATIVE FREE 10 ML: 5 INJECTION INTRAVENOUS at 12:47

## 2024-07-19 NOTE — PLAN OF CARE
Patient arrived for Y90 consult with Dr. Elliott. Mother, Bonnie was present also for consult.    Dr Elliott explained risks/ benefits of Y90 treatments. Dr Elliott reviewed imaging with patient. Vitals and labs were taken today also.      Patient is agreeable to proceed with Y90 and I will call patient with dates when confirmed.

## 2024-07-20 LAB — AFP SERPL-MCNC: <1.8 UG/L

## 2024-07-24 DIAGNOSIS — C7B.8 METASTATIC MALIGNANT NEUROENDOCRINE TUMOR TO LIVER (HCC): Primary | ICD-10-CM

## 2024-07-30 ENCOUNTER — TELEPHONE (OUTPATIENT)
Dept: HEMATOLOGY | Age: 39
End: 2024-07-30

## 2024-07-30 NOTE — TELEPHONE ENCOUNTER
I called patient and left a VM to call Dr. Castro's office back to make an appt for after her Y90.      Electronically signed by Ada Ugalde RN on 7/30/2024 at 10:14 AM

## 2024-08-06 ENCOUNTER — HOSPITAL ENCOUNTER (OUTPATIENT)
Dept: INTERVENTIONAL RADIOLOGY/VASCULAR | Age: 39
Discharge: HOME | End: 2024-08-08
Payer: COMMERCIAL

## 2024-08-06 VITALS
OXYGEN SATURATION: 100 % | DIASTOLIC BLOOD PRESSURE: 68 MMHG | RESPIRATION RATE: 18 BRPM | SYSTOLIC BLOOD PRESSURE: 98 MMHG | HEIGHT: 60 IN | HEART RATE: 62 BPM | BODY MASS INDEX: 24.94 KG/M2 | WEIGHT: 127 LBS | TEMPERATURE: 97.8 F

## 2024-08-06 DIAGNOSIS — C7B.8 METASTATIC MALIGNANT NEUROENDOCRINE TUMOR TO LIVER (HCC): ICD-10-CM

## 2024-08-06 LAB — HCG UR QL: NEGATIVE

## 2024-08-06 PROCEDURE — 6360000002 HC RX W HCPCS: Performed by: RADIOLOGY

## 2024-08-06 PROCEDURE — 7100000010 HC PHASE II RECOVERY - FIRST 15 MIN

## 2024-08-06 PROCEDURE — 36248 INS CATH ABD/L-EXT ART ADDL: CPT

## 2024-08-06 PROCEDURE — 76377 3D RENDER W/INTRP POSTPROCES: CPT

## 2024-08-06 PROCEDURE — 36247 INS CATH ABD/L-EXT ART 3RD: CPT

## 2024-08-06 PROCEDURE — 7100000011 HC PHASE II RECOVERY - ADDTL 15 MIN

## 2024-08-06 PROCEDURE — 6360000004 HC RX CONTRAST MEDICATION: Performed by: RADIOLOGY

## 2024-08-06 PROCEDURE — C1760 CLOSURE DEV, VASC: HCPCS

## 2024-08-06 PROCEDURE — 75774 ARTERY X-RAY EACH VESSEL: CPT

## 2024-08-06 PROCEDURE — 75605 CONTRAST EXAM THORACIC AORTA: CPT

## 2024-08-06 PROCEDURE — 99153 MOD SED SAME PHYS/QHP EA: CPT

## 2024-08-06 PROCEDURE — 36245 INS CATH ABD/L-EXT ART 1ST: CPT

## 2024-08-06 PROCEDURE — 2500000003 HC RX 250 WO HCPCS: Performed by: RADIOLOGY

## 2024-08-06 PROCEDURE — 75726 ARTERY X-RAYS ABDOMEN: CPT

## 2024-08-06 PROCEDURE — 84703 CHORIONIC GONADOTROPIN ASSAY: CPT

## 2024-08-06 RX ORDER — ONDANSETRON 2 MG/ML
INJECTION INTRAMUSCULAR; INTRAVENOUS PRN
Status: COMPLETED | OUTPATIENT
Start: 2024-08-06 | End: 2024-08-06

## 2024-08-06 RX ORDER — ONDANSETRON 2 MG/ML
4 INJECTION INTRAMUSCULAR; INTRAVENOUS EVERY 8 HOURS PRN
Status: DISCONTINUED | OUTPATIENT
Start: 2024-08-06 | End: 2024-08-09 | Stop reason: HOSPADM

## 2024-08-06 RX ORDER — HEPARIN SODIUM 10000 [USP'U]/ML
INJECTION, SOLUTION INTRAVENOUS; SUBCUTANEOUS PRN
Status: COMPLETED | OUTPATIENT
Start: 2024-08-06 | End: 2024-08-06

## 2024-08-06 RX ORDER — MIDAZOLAM HYDROCHLORIDE 2 MG/2ML
INJECTION, SOLUTION INTRAMUSCULAR; INTRAVENOUS PRN
Status: COMPLETED | OUTPATIENT
Start: 2024-08-06 | End: 2024-08-06

## 2024-08-06 RX ORDER — FENTANYL CITRATE 50 UG/ML
INJECTION, SOLUTION INTRAMUSCULAR; INTRAVENOUS PRN
Status: COMPLETED | OUTPATIENT
Start: 2024-08-06 | End: 2024-08-06

## 2024-08-06 RX ORDER — LIDOCAINE HYDROCHLORIDE AND EPINEPHRINE BITARTRATE 20; .01 MG/ML; MG/ML
INJECTION, SOLUTION SUBCUTANEOUS PRN
Status: COMPLETED | OUTPATIENT
Start: 2024-08-06 | End: 2024-08-06

## 2024-08-06 RX ORDER — DIPHENHYDRAMINE HYDROCHLORIDE 50 MG/ML
INJECTION INTRAMUSCULAR; INTRAVENOUS PRN
Status: COMPLETED | OUTPATIENT
Start: 2024-08-06 | End: 2024-08-06

## 2024-08-06 RX ORDER — KETOROLAC TROMETHAMINE 30 MG/ML
INJECTION, SOLUTION INTRAMUSCULAR; INTRAVENOUS PRN
Status: COMPLETED | OUTPATIENT
Start: 2024-08-06 | End: 2024-08-06

## 2024-08-06 RX ORDER — LIDOCAINE HYDROCHLORIDE 20 MG/ML
INJECTION, SOLUTION INFILTRATION; PERINEURAL PRN
Status: COMPLETED | OUTPATIENT
Start: 2024-08-06 | End: 2024-08-06

## 2024-08-06 RX ORDER — SODIUM CHLORIDE 9 MG/ML
INJECTION, SOLUTION INTRAVENOUS CONTINUOUS
Status: DISCONTINUED | OUTPATIENT
Start: 2024-08-06 | End: 2024-08-09 | Stop reason: HOSPADM

## 2024-08-06 RX ORDER — HEPARIN SODIUM 1000 [USP'U]/ML
INJECTION, SOLUTION INTRAVENOUS; SUBCUTANEOUS PRN
Status: COMPLETED | OUTPATIENT
Start: 2024-08-06 | End: 2024-08-06

## 2024-08-06 RX ADMIN — DIPHENHYDRAMINE HYDROCHLORIDE 25 MG: 50 INJECTION, SOLUTION INTRAMUSCULAR; INTRAVENOUS at 09:37

## 2024-08-06 RX ADMIN — MIDAZOLAM HYDROCHLORIDE 0.5 MG: 1 INJECTION, SOLUTION INTRAMUSCULAR; INTRAVENOUS at 10:21

## 2024-08-06 RX ADMIN — MIDAZOLAM HYDROCHLORIDE 0.5 MG: 1 INJECTION, SOLUTION INTRAMUSCULAR; INTRAVENOUS at 11:48

## 2024-08-06 RX ADMIN — FENTANYL CITRATE 25 MCG: 50 INJECTION, SOLUTION INTRAMUSCULAR; INTRAVENOUS at 09:38

## 2024-08-06 RX ADMIN — Medication 5000 UNITS: at 09:50

## 2024-08-06 RX ADMIN — LIDOCAINE HYDROCHLORIDE 10 ML: 20 INJECTION, SOLUTION INFILTRATION; PERINEURAL at 09:49

## 2024-08-06 RX ADMIN — LIDOCAINE HYDROCHLORIDE,EPINEPHRINE BITARTRATE 5 ML: 20; .01 INJECTION, SOLUTION INFILTRATION; PERINEURAL at 09:49

## 2024-08-06 RX ADMIN — HEPARIN SODIUM 1000 UNITS: 1000 INJECTION INTRAVENOUS; SUBCUTANEOUS at 11:59

## 2024-08-06 RX ADMIN — MIDAZOLAM HYDROCHLORIDE 0.5 MG: 1 INJECTION, SOLUTION INTRAMUSCULAR; INTRAVENOUS at 11:42

## 2024-08-06 RX ADMIN — KETOROLAC TROMETHAMINE 30 MG: 30 INJECTION, SOLUTION INTRAMUSCULAR; INTRAVENOUS at 09:43

## 2024-08-06 RX ADMIN — FENTANYL CITRATE 25 MCG: 50 INJECTION, SOLUTION INTRAMUSCULAR; INTRAVENOUS at 11:42

## 2024-08-06 RX ADMIN — FENTANYL CITRATE 25 MCG: 50 INJECTION, SOLUTION INTRAMUSCULAR; INTRAVENOUS at 11:17

## 2024-08-06 RX ADMIN — IOPAMIDOL 160 ML: 755 INJECTION, SOLUTION INTRAVENOUS at 12:16

## 2024-08-06 RX ADMIN — FENTANYL CITRATE 25 MCG: 50 INJECTION, SOLUTION INTRAMUSCULAR; INTRAVENOUS at 10:37

## 2024-08-06 RX ADMIN — MIDAZOLAM HYDROCHLORIDE 0.5 MG: 1 INJECTION, SOLUTION INTRAMUSCULAR; INTRAVENOUS at 09:37

## 2024-08-06 RX ADMIN — MIDAZOLAM HYDROCHLORIDE 0.5 MG: 1 INJECTION, SOLUTION INTRAMUSCULAR; INTRAVENOUS at 11:16

## 2024-08-06 RX ADMIN — ONDANSETRON HYDROCHLORIDE 4 MG: 2 SOLUTION INTRAMUSCULAR; INTRAVENOUS at 09:39

## 2024-08-06 RX ADMIN — FENTANYL CITRATE 25 MCG: 50 INJECTION, SOLUTION INTRAMUSCULAR; INTRAVENOUS at 12:08

## 2024-08-06 RX ADMIN — FENTANYL CITRATE 25 MCG: 50 INJECTION, SOLUTION INTRAMUSCULAR; INTRAVENOUS at 11:49

## 2024-08-06 ASSESSMENT — PAIN - FUNCTIONAL ASSESSMENT: PAIN_FUNCTIONAL_ASSESSMENT: NONE - DENIES PAIN

## 2024-08-06 NOTE — OR NURSING
MynxControl Vascular closure device 6f/7f deployed in right fem. Pressure applied. No hematoma noted. Pulses unchanged.

## 2024-08-06 NOTE — BRIEF OP NOTE
Brief Postoperative Note      Patient: Brooklyn Olmstead  YOB: 1985  MRN: [id number]    Date of Procedure: 8/6/2024    Biliary neoplasm    Post-Op Diagnosis: Same       Y90 ward Elliott    Assistant:  * No surgical staff found *    Anesthesia: * Moderate sedation    Estimated Blood Loss (mL): Minimal    Complications: None    Specimens:   * No specimens in log *    Implants:  * No implants in log *      Drains:   Urinary Catheter 08/06/24 (Active)       Findings:  Infection Present At Time Of Surgery (PATOS) (choose all levels that have infection present):  No infection present  Other Findings: atypical  vascular supply. Many metastases    Electronically signed by Jeffery Elliott MD on 8/6/2024 at 1:56 PM

## 2024-08-06 NOTE — PROCEDURES
0810 Patient arrived to Radiology department for Y90 mapping. Vital signs taken. Reviewed order for port access from Dr. Castro. Accessed port in left chest with sterile technique, blood obtained, lines flushed with normal saline, adapter attached and cap placed on both lines. No s/s of complications noted or reported by patient. Allergies, home medications, medical history, H&P and fasting instructions reviewed with patient. Paper chart reviewed for correct documents.    0858 Procedural instructions given, questions answered, understanding expressed and consent signed.

## 2024-08-06 NOTE — PRE SEDATION
mouth twice a day 7/7/24   Abdelrahman Montana DO   butalbital-acetaminophen-caffeine (FIORICET, ESGIC) -40 MG per tablet take 1 tablet by mouth twice a day if needed for headache 7/3/24   Abdelrahman Montana DO   everolimus (AFINITOR) 10 MG TABS chemo tablet Take 1 tablet by mouth daily 6/13/24   Geoff Hu MD   calcium carb-cholecalciferol (CALTRATE 600+D3) 600-20 MG-MCG TABS Take 1 tablet by mouth daily 6/11/24   Joanie Farley APRN   fluticasone (FLONASE) 50 MCG/ACT nasal spray 1 spray by Nasal route daily as needed for Rhinitis 4/22/24   Abdelrahman Montana DO   omeprazole (PRILOSEC) 20 MG delayed release capsule take 1 capsule by mouth EVERY MORNING BEFORE BREAKFAST  Patient not taking: Reported on 8/6/2024 4/8/24   Abdelrahman Montana DO   hydrOXYzine HCl (ATARAX) 25 MG tablet take 1 tablet by mouth every 8 hours if needed for itching    Petey Parada MD   gabapentin (NEURONTIN) 300 MG capsule Take 1 capsule by mouth 3 times daily for 180 days. 3/4/24 8/31/24  Marcelino Newton APRN - CNP   nystatin (NYAMYC) 801338 UNIT/GM powder apply to affected area three times a day  Patient not taking: Reported on 8/6/2024 1/29/24   Geoff Hu MD   hydrocortisone 2.5 % cream Apply topically 2 times daily. 11/16/23   Geoff Hu MD   Telotristat Etiprate (XERMELO PO)     Petey Parada MD   NONFORMULARY Take by mouth Oscal    Petey Parada MD   NONFORMULARY Take by mouth Ferrous sulfate    Petey Parada MD   melatonin 10 MG CAPS capsule Take 1 capsule by mouth nightly 6/12/23 10/27/23  Marcelino Newton APRN - CNP   prochlorperazine (COMPAZINE) 10 MG tablet Take 1 tablet by mouth every 6 hours as needed (NAUSEA) 4/20/23   Oneil Trimble MD   acetaminophen (TYLENOL) 325 MG tablet Take 2 tablets by mouth every 6 hours as needed for Pain 12/8/22   Malmer, Demarco M, DO   levETIRAcetam (KEPPRA) 100 MG/ML solution Take 10 mLs by mouth 2 times daily 12/8/22   Demarco Castellanos, DO   levothyroxine

## 2024-08-06 NOTE — PROCEDURES
1328 Patient returned from Y90 mapping.  Dressing to right femoral checked, clean, dry, and intact. Patient stable. No s/s of complications noted or reported. Vitals will be checked q 15min, see flow sheets. Last medications given at 1208. Patient will remain on bedrest for at least 3 hours after groin closure.     1345 Patient eating and drinking well with no s/s of complications noted or reported.    1510 Port deaccessed per orders from Dr. Castro with no signs or symptoms noted or reported by patient. Ma removed and placed in biohazard bag for Nuclear medicine.    1525 Site was checked with every set of vitals. Site clean dry and intact. Discharge papers reviewed with patient and questions answered. Patient taken to door via wheelchair with her mother. Patient in stable condition, no s/s of complications noted or reported.

## 2024-08-06 NOTE — OR NURSING
Patient off telemetry/monitor. RN able to visualize patient breaths, and patient following command.

## 2024-08-07 ENCOUNTER — HOSPITAL ENCOUNTER (OUTPATIENT)
Dept: NUCLEAR MEDICINE | Age: 39
Discharge: HOME | End: 2024-08-09
Payer: COMMERCIAL

## 2024-08-07 DIAGNOSIS — C7B.8 METASTATIC MALIGNANT NEUROENDOCRINE TUMOR TO LIVER (HCC): ICD-10-CM

## 2024-08-07 PROCEDURE — A9540 TC99M MAA: HCPCS | Performed by: RADIOLOGY

## 2024-08-07 PROCEDURE — 3430000000 HC RX DIAGNOSTIC RADIOPHARMACEUTICAL: Performed by: RADIOLOGY

## 2024-08-07 PROCEDURE — 78803 RP LOCLZJ TUM SPECT 1 AREA: CPT

## 2024-08-07 RX ADMIN — Medication 4.5 MILLICURIE: at 10:11

## 2024-08-08 ENCOUNTER — HOSPITAL ENCOUNTER (OUTPATIENT)
Dept: NUCLEAR MEDICINE | Age: 39
Discharge: HOME | End: 2024-08-10
Payer: COMMERCIAL

## 2024-08-08 ENCOUNTER — OFFICE VISIT (OUTPATIENT)
Dept: ONCOLOGY | Age: 39
End: 2024-08-08
Payer: COMMERCIAL

## 2024-08-08 ENCOUNTER — HOSPITAL ENCOUNTER (OUTPATIENT)
Dept: INFUSION THERAPY | Age: 39
Discharge: HOME | End: 2024-08-08
Payer: COMMERCIAL

## 2024-08-08 VITALS
HEIGHT: 60 IN | HEART RATE: 70 BPM | TEMPERATURE: 97.8 F | WEIGHT: 128 LBS | DIASTOLIC BLOOD PRESSURE: 56 MMHG | SYSTOLIC BLOOD PRESSURE: 110 MMHG | OXYGEN SATURATION: 99 % | BODY MASS INDEX: 25.13 KG/M2

## 2024-08-08 DIAGNOSIS — C7B.8 METASTATIC MALIGNANT NEUROENDOCRINE TUMOR TO LIVER (HCC): Primary | ICD-10-CM

## 2024-08-08 DIAGNOSIS — C22.0 HEPATOCELLULAR CARCINOMA (HCC): ICD-10-CM

## 2024-08-08 DIAGNOSIS — C7A.8 NEUROENDOCRINE CANCER (HCC): ICD-10-CM

## 2024-08-08 LAB
ALBUMIN SERPL-MCNC: 4 G/DL (ref 3.5–5.2)
ALP SERPL-CCNC: 91 U/L (ref 35–104)
ALT SERPL-CCNC: 13 U/L (ref 0–32)
ANION GAP SERPL CALCULATED.3IONS-SCNC: 11 MMOL/L (ref 7–16)
AST SERPL-CCNC: 20 U/L (ref 0–31)
BASOPHILS # BLD: 0.04 K/UL (ref 0–0.2)
BASOPHILS NFR BLD: 1 % (ref 0–2)
BILIRUB SERPL-MCNC: 0.2 MG/DL (ref 0–1.2)
BUN SERPL-MCNC: 10 MG/DL (ref 6–20)
CALCIUM SERPL-MCNC: 8.3 MG/DL (ref 8.6–10.2)
CHLORIDE SERPL-SCNC: 105 MMOL/L (ref 98–107)
CO2 SERPL-SCNC: 22 MMOL/L (ref 22–29)
CREAT SERPL-MCNC: 0.7 MG/DL (ref 0.5–1)
EOSINOPHIL # BLD: 0.1 K/UL (ref 0.05–0.5)
EOSINOPHILS RELATIVE PERCENT: 1 % (ref 0–6)
ERYTHROCYTE [DISTWIDTH] IN BLOOD BY AUTOMATED COUNT: 15.7 % (ref 11.5–15)
GFR, ESTIMATED: >90 ML/MIN/1.73M2
GLUCOSE SERPL-MCNC: 108 MG/DL (ref 74–99)
HCT VFR BLD AUTO: 32.5 % (ref 34–48)
HGB BLD-MCNC: 11.1 G/DL (ref 11.5–15.5)
IMM GRANULOCYTES # BLD AUTO: <0.03 K/UL (ref 0–0.58)
IMM GRANULOCYTES NFR BLD: 0 % (ref 0–5)
LYMPHOCYTES NFR BLD: 1.26 K/UL (ref 1.5–4)
LYMPHOCYTES RELATIVE PERCENT: 18 % (ref 20–42)
MCH RBC QN AUTO: 32.6 PG (ref 26–35)
MCHC RBC AUTO-ENTMCNC: 34.2 G/DL (ref 32–34.5)
MCV RBC AUTO: 95.3 FL (ref 80–99.9)
MONOCYTES NFR BLD: 0.77 K/UL (ref 0.1–0.95)
MONOCYTES NFR BLD: 11 % (ref 2–12)
NEUTROPHILS NFR BLD: 70 % (ref 43–80)
NEUTS SEG NFR BLD: 5 K/UL (ref 1.8–7.3)
PLATELET # BLD AUTO: 161 K/UL (ref 130–450)
PMV BLD AUTO: 9.6 FL (ref 7–12)
POTASSIUM SERPL-SCNC: 3.7 MMOL/L (ref 3.5–5)
PROT SERPL-MCNC: 6.7 G/DL (ref 6.4–8.3)
RBC # BLD AUTO: 3.41 M/UL (ref 3.5–5.5)
SODIUM SERPL-SCNC: 138 MMOL/L (ref 132–146)
WBC OTHER # BLD: 7.2 K/UL (ref 4.5–11.5)

## 2024-08-08 PROCEDURE — 96372 THER/PROPH/DIAG INJ SC/IM: CPT

## 2024-08-08 PROCEDURE — G8427 DOCREV CUR MEDS BY ELIG CLIN: HCPCS | Performed by: STUDENT IN AN ORGANIZED HEALTH CARE EDUCATION/TRAINING PROGRAM

## 2024-08-08 PROCEDURE — 78803 RP LOCLZJ TUM SPECT 1 AREA: CPT

## 2024-08-08 PROCEDURE — 80053 COMPREHEN METABOLIC PANEL: CPT

## 2024-08-08 PROCEDURE — 6360000002 HC RX W HCPCS: Performed by: STUDENT IN AN ORGANIZED HEALTH CARE EDUCATION/TRAINING PROGRAM

## 2024-08-08 PROCEDURE — 99212 OFFICE O/P EST SF 10 MIN: CPT

## 2024-08-08 PROCEDURE — G8419 CALC BMI OUT NRM PARAM NOF/U: HCPCS | Performed by: STUDENT IN AN ORGANIZED HEALTH CARE EDUCATION/TRAINING PROGRAM

## 2024-08-08 PROCEDURE — 96402 CHEMO HORMON ANTINEOPL SQ/IM: CPT

## 2024-08-08 PROCEDURE — 99214 OFFICE O/P EST MOD 30 MIN: CPT | Performed by: STUDENT IN AN ORGANIZED HEALTH CARE EDUCATION/TRAINING PROGRAM

## 2024-08-08 PROCEDURE — 85025 COMPLETE CBC W/AUTO DIFF WBC: CPT

## 2024-08-08 PROCEDURE — 2580000003 HC RX 258: Performed by: STUDENT IN AN ORGANIZED HEALTH CARE EDUCATION/TRAINING PROGRAM

## 2024-08-08 PROCEDURE — 4004F PT TOBACCO SCREEN RCVD TLK: CPT | Performed by: STUDENT IN AN ORGANIZED HEALTH CARE EDUCATION/TRAINING PROGRAM

## 2024-08-08 RX ORDER — HEPARIN 100 UNIT/ML
500 SYRINGE INTRAVENOUS PRN
Status: DISCONTINUED | OUTPATIENT
Start: 2024-08-08 | End: 2024-08-09 | Stop reason: HOSPADM

## 2024-08-08 RX ORDER — SODIUM CHLORIDE 0.9 % (FLUSH) 0.9 %
5-40 SYRINGE (ML) INJECTION PRN
Status: DISCONTINUED | OUTPATIENT
Start: 2024-08-08 | End: 2024-08-09 | Stop reason: HOSPADM

## 2024-08-08 RX ORDER — LANREOTIDE ACETATE 120 MG/.5ML
120 INJECTION SUBCUTANEOUS ONCE
OUTPATIENT
Start: 2024-09-05 | End: 2024-09-05

## 2024-08-08 RX ORDER — LANREOTIDE ACETATE 120 MG/.5ML
120 INJECTION SUBCUTANEOUS ONCE
Status: COMPLETED | OUTPATIENT
Start: 2024-08-08 | End: 2024-08-08

## 2024-08-08 RX ADMIN — HEPARIN 500 UNITS: 100 SYRINGE at 13:19

## 2024-08-08 RX ADMIN — SODIUM CHLORIDE, PRESERVATIVE FREE 10 ML: 5 INJECTION INTRAVENOUS at 13:19

## 2024-08-08 RX ADMIN — LANREOTIDE ACETATE 120 MG: 120 INJECTION SUBCUTANEOUS at 14:51

## 2024-08-08 NOTE — PROGRESS NOTES
[facility] PHYSICIANS [facility] CARE [facility] MED ONCOLOGY  [address]  [facility] [address]  Dept: [phone]  Loc: [phone]    Clinical Progress Note    Reason for visit: Neuroendocrine cancer to liver     PCP:  Abdelrahman Montana DO       Subjective:  Patient doing fair.  Denies of new issues.  Continues to have regular diarrhea.  Father was present with us again today      ONCOLOGIC HISTORY:  39 y.o. female with hx of hypothyroidism, IBS,migraine who was complaining of abdominal pain associated with diarrhea and flushing/sweating.      CT abdomen/pelvis 08/28/2020:  2 cm masslike density in the mid abdominal small bowel mesentery with a spiculated appearance.  Multiple liver masses suspicious for metastatic disease.     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     Comment: Sections of the liver tissue cores show the hepatic parenchyma to be partially replaced by a proliferation of epithelioid cells with relatively uniform round nuclei and eosinophilic granular cytoplasm.  The   epithelioid cells form anastomosing solid cords/nests that are surrounded by delicate fibrovascular stroma.  There are no mitotic figures or tumor cell necrosis present.  The histologic changes seen are suggestive of well-differentiated neuroendocrine (carcinoid) tumor.     Immunostaining for pankeratin, chromogranin, synaptophysin, TTF-1 and Ki-67 was performed on sections of one tissue block (A1) and the neoplastic epithelioid cells show diffuse and strong positivity for neuroendocrine markers (chromogranin and synaptophysin) and moderate positivity for pankeratin.  There is no staining reactivity for TTF-1.   The Ki-67 proliferation labeling index is essentially negative, (very low, <1%).   This staining pattern confirms the histologic impression of a well-differentiated neuroendocrine (carcinoid) tumor.     FL

## 2024-08-09 ENCOUNTER — TELEPHONE (OUTPATIENT)
Dept: PRIMARY CARE CLINIC | Age: 39
End: 2024-08-09

## 2024-08-09 ENCOUNTER — TELEPHONE (OUTPATIENT)
Dept: INFUSION THERAPY | Age: 39
End: 2024-08-09

## 2024-08-09 DIAGNOSIS — R11.0 NAUSEA: ICD-10-CM

## 2024-08-09 DIAGNOSIS — R19.7 DIARRHEA, UNSPECIFIED TYPE: ICD-10-CM

## 2024-08-09 DIAGNOSIS — C7B.8 METASTATIC MALIGNANT NEUROENDOCRINE TUMOR TO LIVER (HCC): Primary | ICD-10-CM

## 2024-08-09 NOTE — TELEPHONE ENCOUNTER
Ana Cristina from Palliative care notified our office regarding referral for patient for DNR status. Palliative care unable to see patient and suggested patient may follow up with PCP. Dr Hu aware and agreed. Patient notified.

## 2024-08-09 NOTE — TELEPHONE ENCOUNTER
Patient has called and discussed DNR with Dr Hu. Patient would like paperwork for DNR. Per Dr Hu, patient was referred to palliative care for dicussion and paperwork for DNR. Patient aware

## 2024-08-09 NOTE — TELEPHONE ENCOUNTER
Patient is asking for a DNR. Do you need to see patient for a visit for this? Needing to have done before her next surgery on 8/20/2024

## 2024-08-13 ENCOUNTER — OFFICE VISIT (OUTPATIENT)
Dept: PRIMARY CARE CLINIC | Age: 39
End: 2024-08-13
Payer: COMMERCIAL

## 2024-08-13 VITALS
SYSTOLIC BLOOD PRESSURE: 108 MMHG | BODY MASS INDEX: 25.13 KG/M2 | HEART RATE: 78 BPM | TEMPERATURE: 97.2 F | DIASTOLIC BLOOD PRESSURE: 62 MMHG | OXYGEN SATURATION: 99 % | WEIGHT: 128 LBS | HEIGHT: 60 IN

## 2024-08-13 DIAGNOSIS — C7B.8 METASTATIC MALIGNANT NEUROENDOCRINE TUMOR TO LIVER (HCC): Primary | ICD-10-CM

## 2024-08-13 PROCEDURE — G8427 DOCREV CUR MEDS BY ELIG CLIN: HCPCS | Performed by: FAMILY MEDICINE

## 2024-08-13 PROCEDURE — 99213 OFFICE O/P EST LOW 20 MIN: CPT | Performed by: FAMILY MEDICINE

## 2024-08-13 PROCEDURE — G8419 CALC BMI OUT NRM PARAM NOF/U: HCPCS | Performed by: FAMILY MEDICINE

## 2024-08-13 PROCEDURE — 4004F PT TOBACCO SCREEN RCVD TLK: CPT | Performed by: FAMILY MEDICINE

## 2024-08-13 ASSESSMENT — ENCOUNTER SYMPTOMS
SHORTNESS OF BREATH: 0
PHOTOPHOBIA: 0
SORE THROAT: 0
APNEA: 0
DIARRHEA: 0
ALLERGIC/IMMUNOLOGIC NEGATIVE: 1
COUGH: 0
ABDOMINAL PAIN: 0
BACK PAIN: 0
SINUS PRESSURE: 0
FACIAL SWELLING: 0
NAUSEA: 0
CHEST TIGHTNESS: 0
VOMITING: 0
BLOOD IN STOOL: 0
COLOR CHANGE: 0

## 2024-08-13 NOTE — PROGRESS NOTES
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)    Malignant carcinoid tumor of ileum (HCC)    Neuroendocrine tumor    Hypomagnesemia    Hypocalcemia    Hypoparathyroidism (HCC)    Tobacco abuse    Elevated liver enzymes    Moderate protein-calorie malnutrition (HCC)    Encounter regarding vascular access for dialysis for ESRD (HCC)    Difficulty with speech    Neuroendocrine cancer (HCC)    Alcohol abuse    Depressed bipolar II disorder (HCC)    Liver metastases    Thrombocytopenia (HCC)    Altered mental status    Nausea and vomiting    Viral gastroenteritis    AMS (altered mental status)    Crohn's disease of colon without complication (HCC)       Past Medical History:   Diagnosis Date    Abdominal pain     Anxiety     Cancer (HCC)     neuroendocrime tumor    Chronic kidney disease     Diarrhea     Hypocalcemia     Hypothyroidism     Irritable bowel syndrome     Liver disease     Migraines     Seizures (HCC)     last episode January 2021    Status post alcohol detoxification     5/22/2018-5/29/2018       Past Surgical History:   Procedure Laterality Date    CHOLECYSTECTOMY, LAPAROSCOPIC  07/06/2016    COLONOSCOPY N/A 06/22/2018    COLONOSCOPY WITH BIOPSY performed by Francisca Bashir MD at [facility] ENDOSCOPY    CT BIOPSY RENAL  01/25/2021    CT BIOPSY RENAL 1/25/2021 [place] [facility] [place]    CT NEEDLE BIOPSY LIVER PERCUTANEOUS  09/01/2020    CT NEEDLE BIOPSY LIVER PERCUTANEOUS 9/1/2020 [facility] CT    HC DIALYSIS CATHETER N/A 01/28/2021    TESIO CATHETER INSERTION AND REMOVAL OF TEMPORARY performed [place] Sher [place] [facility] [place]

## 2024-08-19 ENCOUNTER — CLINICAL DOCUMENTATION (OUTPATIENT)
Dept: INTERVENTIONAL RADIOLOGY/VASCULAR | Age: 39
End: 2024-08-19

## 2024-08-19 RX ORDER — ONDANSETRON 2 MG/ML
12 INJECTION INTRAMUSCULAR; INTRAVENOUS ONCE
Status: DISCONTINUED | OUTPATIENT
Start: 2024-08-20 | End: 2024-08-23 | Stop reason: HOSPADM

## 2024-08-19 RX ORDER — DEXAMETHASONE SODIUM PHOSPHATE 10 MG/ML
10 INJECTION INTRAMUSCULAR; INTRAVENOUS ONCE
Status: DISCONTINUED | OUTPATIENT
Start: 2024-08-20 | End: 2024-08-23 | Stop reason: HOSPADM

## 2024-08-19 RX ORDER — METRONIDAZOLE 500 MG/100ML
500 INJECTION, SOLUTION INTRAVENOUS ONCE
Status: DISCONTINUED | OUTPATIENT
Start: 2024-08-20 | End: 2024-08-23 | Stop reason: HOSPADM

## 2024-08-19 RX ORDER — SODIUM CHLORIDE 9 MG/ML
INJECTION, SOLUTION INTRAVENOUS CONTINUOUS
Status: DISCONTINUED | OUTPATIENT
Start: 2024-08-20 | End: 2024-08-23 | Stop reason: HOSPADM

## 2024-08-19 RX ORDER — DIPHENHYDRAMINE HYDROCHLORIDE 50 MG/ML
50 INJECTION INTRAMUSCULAR; INTRAVENOUS ONCE
Status: DISCONTINUED | OUTPATIENT
Start: 2024-08-20 | End: 2024-08-23 | Stop reason: HOSPADM

## 2024-08-19 NOTE — PROGRESS NOTES
[employer]  [employer]  Prior Auth / Pre-D  Jeffery Elliott MD  RB - [id number]     [facility]     Contact Information:  Jeffery Elliott MD  [address] [place]  [address]  [email or web address]  [phone]     ?  Insurance Plan: [employer] [facility] Plan Type: Medicaid MCO     Employer:        Secondary Insurance:  Secondary Plan Type:      CPT Codes:         ICD-10 Codes: C78.8       Place of Service: [facility] Facility: [facility]        Condition:      Case Status: Approved     Case Level: 1st Appeal     Initial Call Date: 7/26/2024     Next Step Date:   Next Step:   Case Closed - Approved     Follow-Up:  08/19/2024 - Herb received an approval letter from [facility] regarding patient RB. The appeal for CPT codes 53597 and 53470 was approved under authorization # [id number] and valid 08/07/2024 to 11/07/2024. The letter has been forwarded to the provider's office.     Case Notes:  08/15/2024 - USPS shows the appeal for patient RB was delivered to the PO Box this morning. Herb will follow up for status.    08/13/2024 - The office provided a letter of medical necessity and additional clinicals for patient RB. The appeal for CPT 84497 and 73611 was mailed to [facility] to Attn: Member Appeals [address] [facility] [address]. The [facility] Tracking number is #[id number] [phone]. Herb will follow up.    08/12/2024 - The office advised [facility] did not allow the peer to peer to be completed for CPT 69237 and 34981 for patient RB. Herb requested a letter of medical necessity. Once received, the appeal will be mailed to Attn: Member Appeals [address] [facility] [address].    08/09/2024 - Herb spoke with Pratima at [facility] regarding patient RB. CPT 52649 is approved under authorization #[id number] and valid 08/07/2024 to 11/07/2024.  CPT 66503 and 98218 are denied as not meeting medical

## 2024-08-20 ENCOUNTER — HOSPITAL ENCOUNTER (OUTPATIENT)
Dept: NUCLEAR MEDICINE | Age: 39
Discharge: HOME | End: 2024-08-22
Payer: COMMERCIAL

## 2024-08-20 ENCOUNTER — HOSPITAL ENCOUNTER (OUTPATIENT)
Dept: INTERVENTIONAL RADIOLOGY/VASCULAR | Age: 39
Discharge: HOME | End: 2024-08-22
Payer: COMMERCIAL

## 2024-08-20 VITALS
SYSTOLIC BLOOD PRESSURE: 115 MMHG | HEART RATE: 62 BPM | BODY MASS INDEX: 25.13 KG/M2 | RESPIRATION RATE: 18 BRPM | WEIGHT: 128 LBS | TEMPERATURE: 98 F | HEIGHT: 60 IN | OXYGEN SATURATION: 98 % | DIASTOLIC BLOOD PRESSURE: 58 MMHG

## 2024-08-20 DIAGNOSIS — C7B.8 METASTATIC MALIGNANT NEUROENDOCRINE TUMOR TO LIVER (HCC): ICD-10-CM

## 2024-08-20 LAB
ALBUMIN SERPL-MCNC: 4.1 G/DL (ref 3.5–5.2)
ALP SERPL-CCNC: 114 U/L (ref 35–104)
ALT SERPL-CCNC: 17 U/L (ref 0–32)
AST SERPL-CCNC: 21 U/L (ref 0–31)
BILIRUB DIRECT SERPL-MCNC: <0.2 MG/DL (ref 0–0.3)
BILIRUB INDIRECT SERPL-MCNC: ABNORMAL MG/DL (ref 0–1)
BILIRUB SERPL-MCNC: <0.2 MG/DL (ref 0–1.2)
HCG SERPL QL: NEGATIVE
INR PPP: 1.4
PROT SERPL-MCNC: 6.9 G/DL (ref 6.4–8.3)
PROTHROMBIN TIME: 15.3 SEC (ref 9.3–12.4)

## 2024-08-20 PROCEDURE — 75774 ARTERY X-RAY EACH VESSEL: CPT

## 2024-08-20 PROCEDURE — 85610 PROTHROMBIN TIME: CPT

## 2024-08-20 PROCEDURE — 80076 HEPATIC FUNCTION PANEL: CPT

## 2024-08-20 PROCEDURE — 37243 VASC EMBOLIZE/OCCLUDE ORGAN: CPT

## 2024-08-20 PROCEDURE — 6360000004 HC RX CONTRAST MEDICATION: Performed by: RADIOLOGY

## 2024-08-20 PROCEDURE — 36248 INS CATH ABD/L-EXT ART ADDL: CPT

## 2024-08-20 PROCEDURE — 82105 ALPHA-FETOPROTEIN SERUM: CPT

## 2024-08-20 PROCEDURE — C1889 IMPLANT/INSERT DEVICE, NOC: HCPCS

## 2024-08-20 PROCEDURE — 84703 CHORIONIC GONADOTROPIN ASSAY: CPT

## 2024-08-20 PROCEDURE — 7100000011 HC PHASE II RECOVERY - ADDTL 15 MIN

## 2024-08-20 PROCEDURE — 75726 ARTERY X-RAYS ABDOMEN: CPT

## 2024-08-20 PROCEDURE — 79445 NUCLEAR RX INTRA-ARTERIAL: CPT

## 2024-08-20 PROCEDURE — 36247 INS CATH ABD/L-EXT ART 3RD: CPT

## 2024-08-20 PROCEDURE — 36415 COLL VENOUS BLD VENIPUNCTURE: CPT

## 2024-08-20 PROCEDURE — 76377 3D RENDER W/INTRP POSTPROCES: CPT

## 2024-08-20 PROCEDURE — 7100000010 HC PHASE II RECOVERY - FIRST 15 MIN

## 2024-08-20 PROCEDURE — 6360000002 HC RX W HCPCS: Performed by: RADIOLOGY

## 2024-08-20 PROCEDURE — 78803 RP LOCLZJ TUM SPECT 1 AREA: CPT

## 2024-08-20 PROCEDURE — 6360000002 HC RX W HCPCS: Performed by: TRANSPLANT SURGERY

## 2024-08-20 PROCEDURE — 37799 UNLISTED PX VASCULAR SURGERY: CPT

## 2024-08-20 PROCEDURE — 2500000003 HC RX 250 WO HCPCS: Performed by: RADIOLOGY

## 2024-08-20 PROCEDURE — 99153 MOD SED SAME PHYS/QHP EA: CPT

## 2024-08-20 RX ORDER — HEPARIN SODIUM 1000 [USP'U]/ML
INJECTION, SOLUTION INTRAVENOUS; SUBCUTANEOUS PRN
Status: COMPLETED | OUTPATIENT
Start: 2024-08-20 | End: 2024-08-20

## 2024-08-20 RX ORDER — LIDOCAINE HYDROCHLORIDE AND EPINEPHRINE BITARTRATE 20; .01 MG/ML; MG/ML
INJECTION, SOLUTION SUBCUTANEOUS PRN
Status: COMPLETED | OUTPATIENT
Start: 2024-08-20 | End: 2024-08-20

## 2024-08-20 RX ORDER — KETOROLAC TROMETHAMINE 30 MG/ML
INJECTION, SOLUTION INTRAMUSCULAR; INTRAVENOUS PRN
Status: COMPLETED | OUTPATIENT
Start: 2024-08-20 | End: 2024-08-20

## 2024-08-20 RX ORDER — DIPHENHYDRAMINE HYDROCHLORIDE 50 MG/ML
INJECTION INTRAMUSCULAR; INTRAVENOUS PRN
Status: COMPLETED | OUTPATIENT
Start: 2024-08-20 | End: 2024-08-20

## 2024-08-20 RX ORDER — ONDANSETRON 2 MG/ML
4 INJECTION INTRAMUSCULAR; INTRAVENOUS EVERY 8 HOURS PRN
Status: DISCONTINUED | OUTPATIENT
Start: 2024-08-20 | End: 2024-08-23 | Stop reason: HOSPADM

## 2024-08-20 RX ORDER — LIDOCAINE HYDROCHLORIDE 20 MG/ML
INJECTION, SOLUTION INFILTRATION; PERINEURAL PRN
Status: COMPLETED | OUTPATIENT
Start: 2024-08-20 | End: 2024-08-20

## 2024-08-20 RX ORDER — SODIUM CHLORIDE 9 MG/ML
INJECTION, SOLUTION INTRAVENOUS CONTINUOUS
Status: DISCONTINUED | OUTPATIENT
Start: 2024-08-20 | End: 2024-08-23 | Stop reason: HOSPADM

## 2024-08-20 RX ORDER — MIDAZOLAM HYDROCHLORIDE 2 MG/2ML
INJECTION, SOLUTION INTRAMUSCULAR; INTRAVENOUS PRN
Status: COMPLETED | OUTPATIENT
Start: 2024-08-20 | End: 2024-08-20

## 2024-08-20 RX ORDER — HEPARIN 100 UNIT/ML
500 SYRINGE INTRAVENOUS PRN
Status: DISCONTINUED | OUTPATIENT
Start: 2024-08-20 | End: 2024-08-23 | Stop reason: HOSPADM

## 2024-08-20 RX ORDER — FENTANYL CITRATE 50 UG/ML
INJECTION, SOLUTION INTRAMUSCULAR; INTRAVENOUS PRN
Status: COMPLETED | OUTPATIENT
Start: 2024-08-20 | End: 2024-08-20

## 2024-08-20 RX ADMIN — LIDOCAINE HYDROCHLORIDE 7 ML: 20 INJECTION, SOLUTION INFILTRATION; PERINEURAL at 08:58

## 2024-08-20 RX ADMIN — MIDAZOLAM HYDROCHLORIDE 0.5 MG: 1 INJECTION, SOLUTION INTRAMUSCULAR; INTRAVENOUS at 08:55

## 2024-08-20 RX ADMIN — IOPAMIDOL 100 ML: 755 INJECTION, SOLUTION INTRAVENOUS at 10:17

## 2024-08-20 RX ADMIN — HEPARIN SODIUM 1000 UNITS: 1000 INJECTION INTRAVENOUS; SUBCUTANEOUS at 09:21

## 2024-08-20 RX ADMIN — KETOROLAC TROMETHAMINE 15 MG: 30 INJECTION, SOLUTION INTRAMUSCULAR; INTRAVENOUS at 08:59

## 2024-08-20 RX ADMIN — DIPHENHYDRAMINE HYDROCHLORIDE 25 MG: 50 INJECTION, SOLUTION INTRAMUSCULAR; INTRAVENOUS at 08:43

## 2024-08-20 RX ADMIN — FENTANYL CITRATE 25 MCG: 50 INJECTION, SOLUTION INTRAMUSCULAR; INTRAVENOUS at 09:56

## 2024-08-20 RX ADMIN — KETOROLAC TROMETHAMINE 15 MG: 30 INJECTION, SOLUTION INTRAMUSCULAR; INTRAVENOUS at 09:23

## 2024-08-20 RX ADMIN — HEPARIN 500 UNITS: 100 SYRINGE at 12:25

## 2024-08-20 RX ADMIN — FENTANYL CITRATE 25 MCG: 50 INJECTION, SOLUTION INTRAMUSCULAR; INTRAVENOUS at 08:56

## 2024-08-20 RX ADMIN — LIDOCAINE HYDROCHLORIDE,EPINEPHRINE BITARTRATE 7 ML: 20; .01 INJECTION, SOLUTION INFILTRATION; PERINEURAL at 08:57

## 2024-08-20 RX ADMIN — MIDAZOLAM HYDROCHLORIDE 0.5 MG: 1 INJECTION, SOLUTION INTRAMUSCULAR; INTRAVENOUS at 08:51

## 2024-08-20 RX ADMIN — FENTANYL CITRATE 25 MCG: 50 INJECTION, SOLUTION INTRAMUSCULAR; INTRAVENOUS at 08:52

## 2024-08-20 RX ADMIN — HEPARIN SODIUM 1000 UNITS: 1000 INJECTION INTRAVENOUS; SUBCUTANEOUS at 09:29

## 2024-08-20 ASSESSMENT — PAIN - FUNCTIONAL ASSESSMENT: PAIN_FUNCTIONAL_ASSESSMENT: NONE - DENIES PAIN

## 2024-08-20 NOTE — BRIEF OP NOTE
Brief Postoperative Note      Patient: Brooklyn Olmstead  YOB: 1985  MRN: [id number]    Date of Procedure: 8/20/2024    Liver metastases    Post-Op Diagnosis: Same       Y 90-  Mapping    M. Earl    Assistant:  * No surgical staff found *    Anesthesia: * Moderate sedation    Estimated Blood Loss (mL): Minimal    Complications: None    Specimens:   * No specimens in log *    Implants:  Implant Name Type Inv. Item Serial No.  Lot No. LRB No. Used Action   [employer] COIL SYSTEM [id number]     [id number]  1 Implanted         Drains:   [REMOVED] Urinary Catheter 08/06/24 (Removed)       Findings:  Infection Present At Time Of Surgery (PATOS) (choose all levels that have infection present):  No infection present  Other Findings: Multiple metastases    Electronically signed by Jeffery Elliott MD on 8/20/2024 at 12:28 PM

## 2024-08-20 NOTE — PROGRESS NOTES
1155 Patient returned from Y90 mapping.  Dressing to right groin checked, clean, dry, and intact. Patient stable. No s/s of complications noted or reported. Vitals will be checked q 15min, see flow sheets. Patient must remain on bedrest with right leg straight until 1215.    1200 Patient eating and drinking well with no s/s of complications noted or reported.    1225 Site was checked with every set of vitals. Site clean dry and intact. Discharge papers reviewed with patient and questions answered. Patient taken to door via wheelchair with her mother. Patient in stable condition, no s/s of complications noted or reported.

## 2024-08-20 NOTE — PRE SEDATION
Sedation Pre-Procedure Note    Patient Name: Brooklyn Olmstead   YOB: 1985  Room/Bed: Room/bed info not found  Medical Record Number: [id number]  Date: 8/20/2024   Time: 12:27 PM       Indication:  MENII neoplasia    Consent: I have discussed with the patient and/or the patient representative the indication, alternatives, and the possible risks and/or complications of the planned procedure and the anesthesia methods. The patient and/or patient representative appear to understand and agree to proceed.    Vital Signs:   Vitals:    08/20/24 1215   BP: (!) 115/58   Pulse: 62   Resp: 18   Temp:    SpO2: 98%       Past Medical History:   has a past medical history of Abdominal pain, Anxiety, Cancer (HCC), Chronic kidney disease, Diarrhea, Hypocalcemia, Hypothyroidism, Irritable bowel syndrome, Liver disease, Migraines, Seizures (HCC), and Status post alcohol detoxification.    Past Surgical History:   has a past surgical history that includes Parathyroid gland surgery; Cholecystectomy, laparoscopic (07/06/2016); Thyroidectomy; Colonoscopy (N/A, 06/22/2018); CT NEEDLE BIOPSY LIVER PERCUTANEOUS (09/01/2020); Small intestine surgery (N/A, 10/21/2020); CT BIOPSY RENAL (01/25/2021); hc dialysis catheter (N/A, 01/28/2021); sigmoidoscopy (N/A, 05/14/2021); Port Surgery (Left, 02/18/2022); and Upper gastrointestinal endoscopy.    Medications:   Scheduled Meds:    ceFAZolin  1,000 mg IntraVENous Once    metroNIDAZOLE  500 mg IntraVENous Once    diphenhydrAMINE  50 mg IntraVENous Once    dexAMETHasone  10 mg IntraVENous Once    ondansetron  12 mg IntraVENous Once     Continuous Infusions:    sodium chloride       PRN Meds: heparin (PF)  Home Meds:   Prior to Admission medications    Medication Sig Start Date End Date Taking? Authorizing Provider   magnesium oxide (MAG-OX) 400 MG tablet take 1 tablet by mouth twice a day 7/19/24  Yes Abdelrahman Montana,    potassium chloride (KLOR-CON M) 20 MEQ extended release tablet take 1

## 2024-08-20 NOTE — PROCEDURES
0715 Patient arrived to Radiology department for Y90 mapping. Vital signs taken. Order received to access port. Port accessed under sterile technique with no complications noted or reported by patient. Blood sample sent to lab for ordered tests. Allergies, home medications, medical history, H&P and fasting instructions reviewed with patient. Paper chart reviewed for correct documents.    0841 Procedural instructions given, questions answered, understanding expressed and consent signed.

## 2024-08-21 LAB — AFP SERPL-MCNC: <1.8 UG/L

## 2024-09-05 ENCOUNTER — HOSPITAL ENCOUNTER (OUTPATIENT)
Dept: INFUSION THERAPY | Age: 39
Discharge: HOME | End: 2024-09-05
Payer: COMMERCIAL

## 2024-09-05 ENCOUNTER — OFFICE VISIT (OUTPATIENT)
Dept: ONCOLOGY | Age: 39
End: 2024-09-05
Payer: COMMERCIAL

## 2024-09-05 VITALS
HEART RATE: 60 BPM | OXYGEN SATURATION: 100 % | DIASTOLIC BLOOD PRESSURE: 62 MMHG | WEIGHT: 129.7 LBS | HEIGHT: 60 IN | BODY MASS INDEX: 25.46 KG/M2 | SYSTOLIC BLOOD PRESSURE: 104 MMHG | TEMPERATURE: 98.1 F | RESPIRATION RATE: 18 BRPM

## 2024-09-05 DIAGNOSIS — C7B.8 METASTATIC MALIGNANT NEUROENDOCRINE TUMOR TO LIVER (HCC): Primary | ICD-10-CM

## 2024-09-05 DIAGNOSIS — C7A.8 NEUROENDOCRINE CANCER (HCC): ICD-10-CM

## 2024-09-05 DIAGNOSIS — D3A.8 NEUROENDOCRINE TUMOR: Primary | ICD-10-CM

## 2024-09-05 LAB
ALBUMIN SERPL-MCNC: 4.1 G/DL (ref 3.5–5.2)
ALP SERPL-CCNC: 123 U/L (ref 35–104)
ALT SERPL-CCNC: 13 U/L (ref 0–32)
ANION GAP SERPL CALCULATED.3IONS-SCNC: 11 MMOL/L (ref 7–16)
AST SERPL-CCNC: 21 U/L (ref 0–31)
BASOPHILS # BLD: 0.07 K/UL (ref 0–0.2)
BASOPHILS NFR BLD: 1 % (ref 0–2)
BILIRUB SERPL-MCNC: 0.2 MG/DL (ref 0–1.2)
BUN SERPL-MCNC: 10 MG/DL (ref 6–20)
CALCIUM SERPL-MCNC: 8.5 MG/DL (ref 8.6–10.2)
CHLORIDE SERPL-SCNC: 101 MMOL/L (ref 98–107)
CO2 SERPL-SCNC: 26 MMOL/L (ref 22–29)
CREAT SERPL-MCNC: 0.7 MG/DL (ref 0.5–1)
EOSINOPHIL # BLD: 0.1 K/UL (ref 0.05–0.5)
EOSINOPHILS RELATIVE PERCENT: 1 % (ref 0–6)
ERYTHROCYTE [DISTWIDTH] IN BLOOD BY AUTOMATED COUNT: 15.2 % (ref 11.5–15)
GFR, ESTIMATED: >90 ML/MIN/1.73M2
GLUCOSE SERPL-MCNC: 121 MG/DL (ref 74–99)
HCT VFR BLD AUTO: 33.9 % (ref 34–48)
HGB BLD-MCNC: 11.4 G/DL (ref 11.5–15.5)
IMM GRANULOCYTES # BLD AUTO: 0.05 K/UL (ref 0–0.58)
IMM GRANULOCYTES NFR BLD: 1 % (ref 0–5)
LYMPHOCYTES NFR BLD: 1.19 K/UL (ref 1.5–4)
LYMPHOCYTES RELATIVE PERCENT: 13 % (ref 20–42)
MCH RBC QN AUTO: 32 PG (ref 26–35)
MCHC RBC AUTO-ENTMCNC: 33.6 G/DL (ref 32–34.5)
MCV RBC AUTO: 95.2 FL (ref 80–99.9)
MONOCYTES NFR BLD: 0.99 K/UL (ref 0.1–0.95)
MONOCYTES NFR BLD: 11 % (ref 2–12)
NEUTROPHILS NFR BLD: 74 % (ref 43–80)
NEUTS SEG NFR BLD: 6.79 K/UL (ref 1.8–7.3)
PLATELET # BLD AUTO: 240 K/UL (ref 130–450)
PMV BLD AUTO: 9.6 FL (ref 7–12)
POTASSIUM SERPL-SCNC: 4.4 MMOL/L (ref 3.5–5)
PROT SERPL-MCNC: 7.2 G/DL (ref 6.4–8.3)
RBC # BLD AUTO: 3.56 M/UL (ref 3.5–5.5)
SODIUM SERPL-SCNC: 138 MMOL/L (ref 132–146)
WBC OTHER # BLD: 9.2 K/UL (ref 4.5–11.5)

## 2024-09-05 PROCEDURE — 85025 COMPLETE CBC W/AUTO DIFF WBC: CPT

## 2024-09-05 PROCEDURE — 99212 OFFICE O/P EST SF 10 MIN: CPT

## 2024-09-05 PROCEDURE — 80053 COMPREHEN METABOLIC PANEL: CPT

## 2024-09-05 PROCEDURE — G8419 CALC BMI OUT NRM PARAM NOF/U: HCPCS | Performed by: STUDENT IN AN ORGANIZED HEALTH CARE EDUCATION/TRAINING PROGRAM

## 2024-09-05 PROCEDURE — 96372 THER/PROPH/DIAG INJ SC/IM: CPT

## 2024-09-05 PROCEDURE — G8427 DOCREV CUR MEDS BY ELIG CLIN: HCPCS | Performed by: STUDENT IN AN ORGANIZED HEALTH CARE EDUCATION/TRAINING PROGRAM

## 2024-09-05 PROCEDURE — 99214 OFFICE O/P EST MOD 30 MIN: CPT | Performed by: STUDENT IN AN ORGANIZED HEALTH CARE EDUCATION/TRAINING PROGRAM

## 2024-09-05 PROCEDURE — 6360000002 HC RX W HCPCS: Performed by: STUDENT IN AN ORGANIZED HEALTH CARE EDUCATION/TRAINING PROGRAM

## 2024-09-05 PROCEDURE — 4004F PT TOBACCO SCREEN RCVD TLK: CPT | Performed by: STUDENT IN AN ORGANIZED HEALTH CARE EDUCATION/TRAINING PROGRAM

## 2024-09-05 PROCEDURE — 2580000003 HC RX 258: Performed by: STUDENT IN AN ORGANIZED HEALTH CARE EDUCATION/TRAINING PROGRAM

## 2024-09-05 RX ORDER — SODIUM CHLORIDE 0.9 % (FLUSH) 0.9 %
5-40 SYRINGE (ML) INJECTION PRN
Status: DISCONTINUED | OUTPATIENT
Start: 2024-09-05 | End: 2024-09-06 | Stop reason: HOSPADM

## 2024-09-05 RX ORDER — LANREOTIDE ACETATE 120 MG/.5ML
120 INJECTION SUBCUTANEOUS ONCE
OUTPATIENT
Start: 2024-10-03 | End: 2024-10-03

## 2024-09-05 RX ORDER — LANREOTIDE ACETATE 120 MG/.5ML
120 INJECTION SUBCUTANEOUS ONCE
Status: COMPLETED | OUTPATIENT
Start: 2024-09-05 | End: 2024-09-05

## 2024-09-05 RX ORDER — HEPARIN 100 UNIT/ML
500 SYRINGE INTRAVENOUS PRN
Status: DISCONTINUED | OUTPATIENT
Start: 2024-09-05 | End: 2024-09-06 | Stop reason: HOSPADM

## 2024-09-05 RX ADMIN — LANREOTIDE ACETATE 120 MG: 120 INJECTION SUBCUTANEOUS at 15:04

## 2024-09-05 RX ADMIN — HEPARIN 500 UNITS: 100 SYRINGE at 13:17

## 2024-09-05 RX ADMIN — SODIUM CHLORIDE, PRESERVATIVE FREE 10 ML: 5 INJECTION INTRAVENOUS at 13:16

## 2024-09-05 NOTE — PROGRESS NOTES
[facility] PHYSICIANS [facility] CARE [facility] MED ONCOLOGY  [address]  [facility] [address]  Dept: [phone]  Loc: [phone]    Clinical Progress Note    Reason for visit: Neuroendocrine cancer to liver     PCP:  Abdelrahman Montana DO       Subjective:  Pt doing ok. No change or worsening of her known symptoms.   Her father was present with  us today.       ONCOLOGIC HISTORY:  39 y.o. female with hx of hypothyroidism, IBS,migraine who was complaining of abdominal pain associated with diarrhea and flushing/sweating.      CT abdomen/pelvis 08/28/2020:  2 cm masslike density in the mid abdominal small bowel mesentery with a spiculated appearance.  Multiple liver masses suspicious for metastatic disease.     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     Comment: Sections of the liver tissue cores show the hepatic parenchyma to be partially replaced by a proliferation of epithelioid cells with relatively uniform round nuclei and eosinophilic granular cytoplasm.  The   epithelioid cells form anastomosing solid cords/nests that are surrounded by delicate fibrovascular stroma.  There are no mitotic figures or tumor cell necrosis present.  The histologic changes seen are suggestive of well-differentiated neuroendocrine (carcinoid) tumor.     Immunostaining for pankeratin, chromogranin, synaptophysin, TTF-1 and Ki-67 was performed on sections of one tissue block (A1) and the neoplastic epithelioid cells show diffuse and strong positivity for neuroendocrine markers (chromogranin and synaptophysin) and moderate positivity for pankeratin.  There is no staining reactivity for TTF-1.   The Ki-67 proliferation labeling index is essentially negative, (very low, <1%).   This staining pattern confirms the histologic impression of a well-differentiated neuroendocrine (carcinoid) tumor.     FL Small bowel

## 2024-09-07 NOTE — PROGRESS NOTES
GENERAL SURGERY  DAILY PROGRESS NOTE  9/1/2020    Chief Complaint   Patient presents with    Abdominal Pain     right side, radiates into back, had a CT on friday, hx of chrons an bowel blockage       Subjective:  No acute events overnight. Pt states she had a bout of pain last night that was relieved with a heating pad. Denies any N/V, fever, or chills. Objective:  /69   Pulse 72   Temp 98.6 °F (37 °C) (Oral)   Resp 16   Ht 5' (1.524 m)   Wt 163 lb 9.6 oz (74.2 kg)   SpO2 96%   BMI 31.95 kg/m²     GENERAL:  Laying in bed, awake, alert, cooperative, no apparent distress  HEAD: Normocephalic, atraumatic  EYES: No sclera icterus, pupils equal  LUNGS:  No increased work of breathing  CARDIOVASCULAR: RR  ABDOMEN:  Soft, TTP right mid-abdomen, non-distended  EXTREMITIES: No edema or swelling  SKIN: Warm and dry    Assessment/Plan:  30 y.o. female with acute on chronic abdominal pain, possibly due to Crohn's flare.    - NPO  - IVFs  - GI recommendations appreciated  - IR consult for liver biopsy  - CLD after biopsy  - CT revealed mesenteric mass and segmental enteritis, multiple liver lesions  - Pain control PRN  - Antiemetic PRN    Electronically signed by Ana Yee MD on 9/1/2020 at 7:03 AM     I saw and examined the patient. I reviewed the above resident's note. I agree with the assessment and plan as outlined. SBFT with barium if GI not planning on scopes.     Constantin Davidson MD  General Surgery Discharged

## 2024-09-10 ENCOUNTER — HOSPITAL ENCOUNTER (OUTPATIENT)
Dept: NUCLEAR MEDICINE | Age: 39
Discharge: HOME | End: 2024-09-12
Payer: COMMERCIAL

## 2024-09-10 ENCOUNTER — HOSPITAL ENCOUNTER (OUTPATIENT)
Dept: INTERVENTIONAL RADIOLOGY/VASCULAR | Age: 39
Setting detail: OBSERVATION
Discharge: HOME | End: 2024-09-11
Attending: TRANSPLANT SURGERY | Admitting: TRANSPLANT SURGERY
Payer: COMMERCIAL

## 2024-09-10 DIAGNOSIS — C78.7 MALIGNANT NEOPLASM METASTATIC TO LIVER (HCC): ICD-10-CM

## 2024-09-10 DIAGNOSIS — C78.80: Primary | ICD-10-CM

## 2024-09-10 DIAGNOSIS — C7B.8 METASTATIC MALIGNANT NEUROENDOCRINE TUMOR TO LIVER (HCC): ICD-10-CM

## 2024-09-10 LAB — HCG SERPL QL: NEGATIVE

## 2024-09-10 PROCEDURE — 2580000003 HC RX 258: Performed by: RADIOLOGY

## 2024-09-10 PROCEDURE — 99151 MOD SED SAME PHYS/QHP <5 YRS: CPT

## 2024-09-10 PROCEDURE — 75726 ARTERY X-RAYS ABDOMEN: CPT

## 2024-09-10 PROCEDURE — 37243 VASC EMBOLIZE/OCCLUDE ORGAN: CPT

## 2024-09-10 PROCEDURE — 75774 ARTERY X-RAY EACH VESSEL: CPT

## 2024-09-10 PROCEDURE — 36247 INS CATH ABD/L-EXT ART 3RD: CPT

## 2024-09-10 PROCEDURE — 6370000000 HC RX 637 (ALT 250 FOR IP)

## 2024-09-10 PROCEDURE — 2709999900 IR RADIOPHARM THERAPY INTRA ART

## 2024-09-10 PROCEDURE — G0379 DIRECT REFER HOSPITAL OBSERV: HCPCS

## 2024-09-10 PROCEDURE — 78803 RP LOCLZJ TUM SPECT 1 AREA: CPT

## 2024-09-10 PROCEDURE — 6360000002 HC RX W HCPCS: Performed by: RADIOLOGY

## 2024-09-10 PROCEDURE — C2616 BRACHYTX, NON-STR,YTTRIUM-90: HCPCS | Performed by: RADIOLOGY

## 2024-09-10 PROCEDURE — 2500000003 HC RX 250 WO HCPCS: Performed by: RADIOLOGY

## 2024-09-10 PROCEDURE — 99153 MOD SED SAME PHYS/QHP EA: CPT

## 2024-09-10 PROCEDURE — 84703 CHORIONIC GONADOTROPIN ASSAY: CPT

## 2024-09-10 PROCEDURE — G0378 HOSPITAL OBSERVATION PER HR: HCPCS

## 2024-09-10 PROCEDURE — 3430000000 HC RX DIAGNOSTIC RADIOPHARMACEUTICAL: Performed by: RADIOLOGY

## 2024-09-10 PROCEDURE — 6360000004 HC RX CONTRAST MEDICATION: Performed by: RADIOLOGY

## 2024-09-10 PROCEDURE — 76377 3D RENDER W/INTRP POSTPROCES: CPT

## 2024-09-10 PROCEDURE — 79445 NUCLEAR RX INTRA-ARTERIAL: CPT

## 2024-09-10 RX ORDER — KETOROLAC TROMETHAMINE 30 MG/ML
15 INJECTION, SOLUTION INTRAMUSCULAR; INTRAVENOUS ONCE
Status: COMPLETED | OUTPATIENT
Start: 2024-09-10 | End: 2024-09-10

## 2024-09-10 RX ORDER — SODIUM CHLORIDE 9 MG/ML
INJECTION, SOLUTION INTRAVENOUS CONTINUOUS
Status: DISCONTINUED | OUTPATIENT
Start: 2024-09-10 | End: 2024-09-10 | Stop reason: SDUPTHER

## 2024-09-10 RX ORDER — SUCRALFATE 1 G/1
1 TABLET ORAL EVERY 6 HOURS SCHEDULED
Status: DISCONTINUED | OUTPATIENT
Start: 2024-09-10 | End: 2024-09-11 | Stop reason: HOSPADM

## 2024-09-10 RX ORDER — LIDOCAINE HYDROCHLORIDE AND EPINEPHRINE BITARTRATE 20; .01 MG/ML; MG/ML
INJECTION, SOLUTION SUBCUTANEOUS PRN
Status: COMPLETED | OUTPATIENT
Start: 2024-09-10 | End: 2024-09-10

## 2024-09-10 RX ORDER — LIDOCAINE HYDROCHLORIDE 20 MG/ML
INJECTION, SOLUTION INFILTRATION; PERINEURAL PRN
Status: COMPLETED | OUTPATIENT
Start: 2024-09-10 | End: 2024-09-10

## 2024-09-10 RX ORDER — IOPAMIDOL 755 MG/ML
INJECTION, SOLUTION INTRAVASCULAR PRN
Status: COMPLETED | OUTPATIENT
Start: 2024-09-10 | End: 2024-09-10

## 2024-09-10 RX ORDER — DIPHENHYDRAMINE HYDROCHLORIDE 50 MG/ML
INJECTION INTRAMUSCULAR; INTRAVENOUS PRN
Status: COMPLETED | OUTPATIENT
Start: 2024-09-10 | End: 2024-09-10

## 2024-09-10 RX ORDER — ACETAMINOPHEN 325 MG/1
650 TABLET ORAL EVERY 6 HOURS SCHEDULED
Status: DISCONTINUED | OUTPATIENT
Start: 2024-09-10 | End: 2024-09-11 | Stop reason: HOSPADM

## 2024-09-10 RX ORDER — MIDAZOLAM HYDROCHLORIDE 2 MG/2ML
INJECTION, SOLUTION INTRAMUSCULAR; INTRAVENOUS PRN
Status: COMPLETED | OUTPATIENT
Start: 2024-09-10 | End: 2024-09-10

## 2024-09-10 RX ORDER — DIPHENHYDRAMINE HYDROCHLORIDE 50 MG/ML
50 INJECTION INTRAMUSCULAR; INTRAVENOUS ONCE
Status: COMPLETED | OUTPATIENT
Start: 2024-09-10 | End: 2024-09-10

## 2024-09-10 RX ORDER — HEPARIN SODIUM 1000 [USP'U]/ML
INJECTION, SOLUTION INTRAVENOUS; SUBCUTANEOUS PRN
Status: COMPLETED | OUTPATIENT
Start: 2024-09-10 | End: 2024-09-10

## 2024-09-10 RX ORDER — FENTANYL CITRATE 50 UG/ML
INJECTION, SOLUTION INTRAMUSCULAR; INTRAVENOUS PRN
Status: COMPLETED | OUTPATIENT
Start: 2024-09-10 | End: 2024-09-10

## 2024-09-10 RX ORDER — OXYCODONE HYDROCHLORIDE 10 MG/1
10 TABLET ORAL EVERY 4 HOURS PRN
Status: DISCONTINUED | OUTPATIENT
Start: 2024-09-10 | End: 2024-09-11 | Stop reason: HOSPADM

## 2024-09-10 RX ORDER — SODIUM CHLORIDE 9 MG/ML
INJECTION, SOLUTION INTRAVENOUS CONTINUOUS
Status: DISCONTINUED | OUTPATIENT
Start: 2024-09-10 | End: 2024-09-11

## 2024-09-10 RX ORDER — OXYCODONE HYDROCHLORIDE 5 MG/1
5 TABLET ORAL EVERY 4 HOURS PRN
Status: DISCONTINUED | OUTPATIENT
Start: 2024-09-10 | End: 2024-09-11 | Stop reason: HOSPADM

## 2024-09-10 RX ORDER — PANTOPRAZOLE SODIUM 40 MG/1
40 TABLET, DELAYED RELEASE ORAL
Status: DISCONTINUED | OUTPATIENT
Start: 2024-09-11 | End: 2024-09-11 | Stop reason: HOSPADM

## 2024-09-10 RX ORDER — HYDROMORPHONE HYDROCHLORIDE 1 MG/ML
0.5 INJECTION, SOLUTION INTRAMUSCULAR; INTRAVENOUS; SUBCUTANEOUS
Status: COMPLETED | OUTPATIENT
Start: 2024-09-10 | End: 2024-09-10

## 2024-09-10 RX ORDER — ONDANSETRON 2 MG/ML
4 INJECTION INTRAMUSCULAR; INTRAVENOUS EVERY 8 HOURS PRN
Status: DISCONTINUED | OUTPATIENT
Start: 2024-09-10 | End: 2024-09-11 | Stop reason: HOSPADM

## 2024-09-10 RX ORDER — KETOROLAC TROMETHAMINE 30 MG/ML
INJECTION, SOLUTION INTRAMUSCULAR; INTRAVENOUS PRN
Status: COMPLETED | OUTPATIENT
Start: 2024-09-10 | End: 2024-09-10

## 2024-09-10 RX ORDER — HEPARIN SODIUM 10000 [USP'U]/ML
INJECTION, SOLUTION INTRAVENOUS; SUBCUTANEOUS PRN
Status: COMPLETED | OUTPATIENT
Start: 2024-09-10 | End: 2024-09-10

## 2024-09-10 RX ORDER — DEXAMETHASONE SODIUM PHOSPHATE 10 MG/ML
10 INJECTION INTRAMUSCULAR; INTRAVENOUS ONCE
Status: COMPLETED | OUTPATIENT
Start: 2024-09-10 | End: 2024-09-10

## 2024-09-10 RX ORDER — ONDANSETRON 2 MG/ML
12 INJECTION INTRAMUSCULAR; INTRAVENOUS ONCE
Status: COMPLETED | OUTPATIENT
Start: 2024-09-10 | End: 2024-09-10

## 2024-09-10 RX ORDER — METRONIDAZOLE 500 MG/100ML
500 INJECTION, SOLUTION INTRAVENOUS ONCE
Status: COMPLETED | OUTPATIENT
Start: 2024-09-10 | End: 2024-09-10

## 2024-09-10 RX ADMIN — MIDAZOLAM HYDROCHLORIDE 0.5 MG: 1 INJECTION, SOLUTION INTRAMUSCULAR; INTRAVENOUS at 09:11

## 2024-09-10 RX ADMIN — ACETAMINOPHEN 650 MG: 325 TABLET ORAL at 23:03

## 2024-09-10 RX ADMIN — DIPHENHYDRAMINE HYDROCHLORIDE 50 MG: 50 INJECTION, SOLUTION INTRAMUSCULAR; INTRAVENOUS at 08:30

## 2024-09-10 RX ADMIN — Medication 5000 UNITS: at 09:17

## 2024-09-10 RX ADMIN — ONDANSETRON 4 MG: 2 INJECTION INTRAMUSCULAR; INTRAVENOUS at 12:56

## 2024-09-10 RX ADMIN — ACETAMINOPHEN 650 MG: 325 TABLET ORAL at 17:55

## 2024-09-10 RX ADMIN — FENTANYL CITRATE 25 MCG: 50 INJECTION, SOLUTION INTRAMUSCULAR; INTRAVENOUS at 09:06

## 2024-09-10 RX ADMIN — HYDROMORPHONE HYDROCHLORIDE 0.5 MG: 1 INJECTION, SOLUTION INTRAMUSCULAR; INTRAVENOUS; SUBCUTANEOUS at 17:36

## 2024-09-10 RX ADMIN — HEPARIN SODIUM 2000 UNITS: 1000 INJECTION INTRAVENOUS; SUBCUTANEOUS at 09:39

## 2024-09-10 RX ADMIN — SUCRALFATE 1 G: 1 TABLET ORAL at 23:03

## 2024-09-10 RX ADMIN — LIDOCAINE HYDROCHLORIDE,EPINEPHRINE BITARTRATE 6 ML: 20; .01 INJECTION, SOLUTION INFILTRATION; PERINEURAL at 09:10

## 2024-09-10 RX ADMIN — SODIUM CHLORIDE: 9 INJECTION, SOLUTION INTRAVENOUS at 08:14

## 2024-09-10 RX ADMIN — METRONIDAZOLE 500 MG: 500 SOLUTION INTRAVENOUS at 08:30

## 2024-09-10 RX ADMIN — KETOROLAC TROMETHAMINE 15 MG: 30 INJECTION INTRAMUSCULAR; INTRAVENOUS at 11:20

## 2024-09-10 RX ADMIN — SUCRALFATE 1 G: 1 TABLET ORAL at 17:56

## 2024-09-10 RX ADMIN — MIDAZOLAM HYDROCHLORIDE 0.5 MG: 1 INJECTION, SOLUTION INTRAMUSCULAR; INTRAVENOUS at 09:05

## 2024-09-10 RX ADMIN — FENTANYL CITRATE 25 MCG: 50 INJECTION, SOLUTION INTRAMUSCULAR; INTRAVENOUS at 09:38

## 2024-09-10 RX ADMIN — ONDANSETRON 4 MG: 2 INJECTION INTRAMUSCULAR; INTRAVENOUS at 21:35

## 2024-09-10 RX ADMIN — ONDANSETRON HYDROCHLORIDE 12 MG: 2 SOLUTION INTRAMUSCULAR; INTRAVENOUS at 08:30

## 2024-09-10 RX ADMIN — OXYCODONE HYDROCHLORIDE 10 MG: 10 TABLET ORAL at 20:05

## 2024-09-10 RX ADMIN — IOPAMIDOL 30 ML: 755 INJECTION, SOLUTION INTRAVENOUS at 10:03

## 2024-09-10 RX ADMIN — KETOROLAC TROMETHAMINE 15 MG: 30 INJECTION, SOLUTION INTRAMUSCULAR; INTRAVENOUS at 09:12

## 2024-09-10 RX ADMIN — FENTANYL CITRATE 25 MCG: 50 INJECTION, SOLUTION INTRAMUSCULAR; INTRAVENOUS at 09:33

## 2024-09-10 RX ADMIN — CEFAZOLIN 1000 MG: 1 INJECTION, POWDER, FOR SOLUTION INTRAMUSCULAR; INTRAVENOUS at 08:29

## 2024-09-10 RX ADMIN — DEXAMETHASONE SODIUM PHOSPHATE 10 MG: 10 INJECTION INTRAMUSCULAR; INTRAVENOUS at 08:30

## 2024-09-10 RX ADMIN — OXYCODONE HYDROCHLORIDE 10 MG: 10 TABLET ORAL at 15:25

## 2024-09-10 RX ADMIN — HYDROMORPHONE HYDROCHLORIDE 0.5 MG: 1 INJECTION, SOLUTION INTRAMUSCULAR; INTRAVENOUS; SUBCUTANEOUS at 12:45

## 2024-09-10 RX ADMIN — DIPHENHYDRAMINE HYDROCHLORIDE 25 MG: 50 INJECTION, SOLUTION INTRAMUSCULAR; INTRAVENOUS at 09:05

## 2024-09-10 RX ADMIN — Medication 0.99 GBQ: at 10:10

## 2024-09-10 RX ADMIN — LIDOCAINE HYDROCHLORIDE 6 ML: 20 INJECTION, SOLUTION INFILTRATION; PERINEURAL at 09:10

## 2024-09-10 RX ADMIN — MIDAZOLAM HYDROCHLORIDE 0.5 MG: 1 INJECTION, SOLUTION INTRAMUSCULAR; INTRAVENOUS at 09:37

## 2024-09-10 RX ADMIN — SODIUM CHLORIDE: 9 INJECTION, SOLUTION INTRAVENOUS at 12:46

## 2024-09-10 ASSESSMENT — PAIN DESCRIPTION - LOCATION
LOCATION: ABDOMEN;RIB CAGE
LOCATION: RIB CAGE
LOCATION: RIB CAGE;ABDOMEN
LOCATION: ABDOMEN
LOCATION: ABDOMEN;RIB CAGE

## 2024-09-10 ASSESSMENT — PAIN DESCRIPTION - ORIENTATION
ORIENTATION: RIGHT;UPPER
ORIENTATION: RIGHT;UPPER
ORIENTATION: RIGHT
ORIENTATION: RIGHT;UPPER
ORIENTATION: RIGHT;MID;UPPER

## 2024-09-10 ASSESSMENT — PAIN DESCRIPTION - FREQUENCY: FREQUENCY: CONTINUOUS

## 2024-09-10 ASSESSMENT — PAIN DESCRIPTION - DESCRIPTORS
DESCRIPTORS: ACHING;CRAMPING
DESCRIPTORS: PRESSURE;DISCOMFORT;STABBING
DESCRIPTORS: SHARP;PRESSURE
DESCRIPTORS: ACHING;DISCOMFORT;SORE

## 2024-09-10 ASSESSMENT — PAIN DESCRIPTION - ONSET: ONSET: ON-GOING

## 2024-09-10 ASSESSMENT — PAIN SCALES - GENERAL
PAINLEVEL_OUTOF10: 6
PAINLEVEL_OUTOF10: 8
PAINLEVEL_OUTOF10: 5
PAINLEVEL_OUTOF10: 6
PAINLEVEL_OUTOF10: 3

## 2024-09-10 ASSESSMENT — PAIN - FUNCTIONAL ASSESSMENT: PAIN_FUNCTIONAL_ASSESSMENT: PREVENTS OR INTERFERES SOME ACTIVE ACTIVITIES AND ADLS

## 2024-09-10 ASSESSMENT — ENCOUNTER SYMPTOMS: ABDOMINAL PAIN: 1

## 2024-09-10 ASSESSMENT — PAIN DESCRIPTION - PAIN TYPE: TYPE: ACUTE PAIN

## 2024-09-11 VITALS
HEART RATE: 65 BPM | OXYGEN SATURATION: 100 % | DIASTOLIC BLOOD PRESSURE: 86 MMHG | RESPIRATION RATE: 18 BRPM | TEMPERATURE: 97.7 F | SYSTOLIC BLOOD PRESSURE: 122 MMHG | WEIGHT: 130 LBS | HEIGHT: 60 IN | BODY MASS INDEX: 25.52 KG/M2

## 2024-09-11 PROCEDURE — 6360000002 HC RX W HCPCS: Performed by: RADIOLOGY

## 2024-09-11 PROCEDURE — 6370000000 HC RX 637 (ALT 250 FOR IP)

## 2024-09-11 PROCEDURE — 96374 THER/PROPH/DIAG INJ IV PUSH: CPT

## 2024-09-11 PROCEDURE — G0378 HOSPITAL OBSERVATION PER HR: HCPCS

## 2024-09-11 RX ORDER — PANTOPRAZOLE SODIUM 40 MG/1
40 TABLET, DELAYED RELEASE ORAL
Qty: 30 TABLET | Refills: 3 | Status: SHIPPED | OUTPATIENT
Start: 2024-09-12

## 2024-09-11 RX ORDER — LEVOTHYROXINE SODIUM 100 UG/1
100 TABLET ORAL DAILY
Status: DISCONTINUED | OUTPATIENT
Start: 2024-09-11 | End: 2024-09-11 | Stop reason: HOSPADM

## 2024-09-11 RX ORDER — OXYCODONE HYDROCHLORIDE 5 MG/1
5 TABLET ORAL EVERY 6 HOURS PRN
Qty: 28 TABLET | Refills: 0 | Status: SHIPPED | OUTPATIENT
Start: 2024-09-11 | End: 2024-09-18

## 2024-09-11 RX ORDER — SUCRALFATE 1 G/1
1 TABLET ORAL 4 TIMES DAILY
Qty: 120 TABLET | Refills: 3 | Status: SHIPPED | OUTPATIENT
Start: 2024-09-11

## 2024-09-11 RX ORDER — DOCUSATE SODIUM 100 MG/1
100 CAPSULE, LIQUID FILLED ORAL 2 TIMES DAILY
Qty: 60 CAPSULE | Refills: 0 | Status: SHIPPED | OUTPATIENT
Start: 2024-09-11 | End: 2024-10-11

## 2024-09-11 RX ADMIN — ACETAMINOPHEN 650 MG: 325 TABLET ORAL at 05:48

## 2024-09-11 RX ADMIN — ONDANSETRON 4 MG: 2 INJECTION INTRAMUSCULAR; INTRAVENOUS at 05:48

## 2024-09-11 RX ADMIN — OXYCODONE HYDROCHLORIDE 10 MG: 10 TABLET ORAL at 00:09

## 2024-09-11 RX ADMIN — SUCRALFATE 1 G: 1 TABLET ORAL at 05:48

## 2024-09-11 RX ADMIN — LEVOTHYROXINE SODIUM 100 MCG: 0.1 TABLET ORAL at 08:10

## 2024-09-11 RX ADMIN — PANTOPRAZOLE SODIUM 40 MG: 40 TABLET, DELAYED RELEASE ORAL at 05:48

## 2024-09-11 RX ADMIN — OXYCODONE HYDROCHLORIDE 10 MG: 10 TABLET ORAL at 09:21

## 2024-09-11 RX ADMIN — OXYCODONE HYDROCHLORIDE 10 MG: 10 TABLET ORAL at 04:31

## 2024-09-11 ASSESSMENT — PAIN SCALES - GENERAL
PAINLEVEL_OUTOF10: 4
PAINLEVEL_OUTOF10: 7
PAINLEVEL_OUTOF10: 2
PAINLEVEL_OUTOF10: 5
PAINLEVEL_OUTOF10: 7
PAINLEVEL_OUTOF10: 6
PAINLEVEL_OUTOF10: 3

## 2024-09-12 ENCOUNTER — TELEPHONE (OUTPATIENT)
Dept: PRIMARY CARE CLINIC | Age: 39
End: 2024-09-12

## 2024-09-13 RX ORDER — ONDANSETRON 4 MG/1
4 TABLET, FILM COATED ORAL 3 TIMES DAILY PRN
Qty: 30 TABLET | Refills: 2 | Status: SHIPPED | OUTPATIENT
Start: 2024-09-13

## 2024-09-18 ENCOUNTER — OFFICE VISIT (OUTPATIENT)
Dept: PRIMARY CARE CLINIC | Age: 39
End: 2024-09-18

## 2024-09-18 VITALS
RESPIRATION RATE: 18 BRPM | HEART RATE: 80 BPM | BODY MASS INDEX: 24.94 KG/M2 | OXYGEN SATURATION: 99 % | HEIGHT: 60 IN | SYSTOLIC BLOOD PRESSURE: 118 MMHG | DIASTOLIC BLOOD PRESSURE: 74 MMHG | WEIGHT: 127 LBS | TEMPERATURE: 97.9 F

## 2024-09-18 DIAGNOSIS — Z23 NEED FOR PROPHYLACTIC VACCINATION AND INOCULATION AGAINST INFLUENZA: ICD-10-CM

## 2024-09-18 DIAGNOSIS — Z09 HOSPITAL DISCHARGE FOLLOW-UP: ICD-10-CM

## 2024-09-18 DIAGNOSIS — C7B.8 METASTATIC MALIGNANT NEUROENDOCRINE TUMOR TO LIVER (HCC): Primary | ICD-10-CM

## 2024-09-18 ASSESSMENT — ENCOUNTER SYMPTOMS
SORE THROAT: 0
SINUS PRESSURE: 0
SHORTNESS OF BREATH: 0
BACK PAIN: 0
WHEEZING: 0
ALLERGIC/IMMUNOLOGIC NEGATIVE: 1
COLOR CHANGE: 0
VOMITING: 0
FACIAL SWELLING: 0
BLOOD IN STOOL: 0
APNEA: 0
NAUSEA: 0
PHOTOPHOBIA: 0
DIARRHEA: 0
COUGH: 0
ABDOMINAL PAIN: 0
CHEST TIGHTNESS: 0

## 2024-09-19 ENCOUNTER — TELEPHONE (OUTPATIENT)
Dept: HEMATOLOGY | Age: 39
End: 2024-09-19

## 2024-09-23 DIAGNOSIS — F51.01 PRIMARY INSOMNIA: ICD-10-CM

## 2024-09-23 RX ORDER — ZOLPIDEM TARTRATE 10 MG/1
10 TABLET ORAL NIGHTLY PRN
Qty: 30 TABLET | Refills: 2 | Status: SHIPPED | OUTPATIENT
Start: 2024-09-23 | End: 2024-12-22

## 2024-09-26 NOTE — ED NOTES
Pt updated on plan of care. Informed UA needed prior to imaging. Pt c/o pain 9/10 face and neck. Abrasion to nose visualized.  Pt placed on monitor, VSS, NAD, call light in reach      Madeleine Boston RN  05/08/22 6229
22-year-old female with no past medical history presents to ED complaining of b/l eye tearing and swelling today.  Patient states she used a new mascara at 10 AM and within couple minutes developed eyelid and under eye swelling and tearing.  Patient is allergic to nuts.  States she also has allergies to dust with nasal congestion the last few days. States Claritin isn't helping anymore. Mom is sick with a cold at home.  on exam pt with mild periorbital edema, no hives, no erythema, watery eyes and sniffling. likely allergic reaction to mascara, possible cold w/ congestion vs allergy given sick contact at home. plan to provide prednisone, pepcid, benadryl, viral swab, dc home. will refer to [facility] for chronic environmental allergies.

## 2024-10-03 ENCOUNTER — OFFICE VISIT (OUTPATIENT)
Dept: HEMATOLOGY | Age: 39
End: 2024-10-03
Payer: COMMERCIAL

## 2024-10-03 ENCOUNTER — HOSPITAL ENCOUNTER (OUTPATIENT)
Dept: INFUSION THERAPY | Age: 39
Discharge: HOME | End: 2024-10-03
Payer: COMMERCIAL

## 2024-10-03 ENCOUNTER — OFFICE VISIT (OUTPATIENT)
Dept: ONCOLOGY | Age: 39
End: 2024-10-03
Payer: COMMERCIAL

## 2024-10-03 VITALS
HEIGHT: 60 IN | BODY MASS INDEX: 24.94 KG/M2 | TEMPERATURE: 98.1 F | DIASTOLIC BLOOD PRESSURE: 62 MMHG | WEIGHT: 127 LBS | OXYGEN SATURATION: 99 % | HEART RATE: 63 BPM | SYSTOLIC BLOOD PRESSURE: 112 MMHG

## 2024-10-03 DIAGNOSIS — C7A.8 NEUROENDOCRINE CANCER (HCC): ICD-10-CM

## 2024-10-03 DIAGNOSIS — C7B.8 METASTATIC MALIGNANT NEUROENDOCRINE TUMOR TO LIVER (HCC): Primary | ICD-10-CM

## 2024-10-03 LAB
ALBUMIN SERPL-MCNC: 4.3 G/DL (ref 3.5–5.2)
ALP SERPL-CCNC: 146 U/L (ref 35–104)
ALT SERPL-CCNC: 16 U/L (ref 0–32)
ANION GAP SERPL CALCULATED.3IONS-SCNC: 11 MMOL/L (ref 7–16)
AST SERPL-CCNC: 18 U/L (ref 0–31)
BASOPHILS # BLD: 0.04 K/UL (ref 0–0.2)
BASOPHILS NFR BLD: 1 % (ref 0–2)
BILIRUB SERPL-MCNC: 0.2 MG/DL (ref 0–1.2)
BUN SERPL-MCNC: 10 MG/DL (ref 6–20)
CALCIUM SERPL-MCNC: 8.9 MG/DL (ref 8.6–10.2)
CHLORIDE SERPL-SCNC: 104 MMOL/L (ref 98–107)
CO2 SERPL-SCNC: 25 MMOL/L (ref 22–29)
CREAT SERPL-MCNC: 0.6 MG/DL (ref 0.5–1)
EOSINOPHIL # BLD: 0.06 K/UL (ref 0.05–0.5)
EOSINOPHILS RELATIVE PERCENT: 1 % (ref 0–6)
ERYTHROCYTE [DISTWIDTH] IN BLOOD BY AUTOMATED COUNT: 15.4 % (ref 11.5–15)
GFR, ESTIMATED: >90 ML/MIN/1.73M2
GLUCOSE SERPL-MCNC: 117 MG/DL (ref 74–99)
HCT VFR BLD AUTO: 35.3 % (ref 34–48)
HGB BLD-MCNC: 11.6 G/DL (ref 11.5–15.5)
IMM GRANULOCYTES # BLD AUTO: 0.05 K/UL (ref 0–0.58)
IMM GRANULOCYTES NFR BLD: 1 % (ref 0–5)
LYMPHOCYTES NFR BLD: 0.71 K/UL (ref 1.5–4)
LYMPHOCYTES RELATIVE PERCENT: 9 % (ref 20–42)
MCH RBC QN AUTO: 31.4 PG (ref 26–35)
MCHC RBC AUTO-ENTMCNC: 32.9 G/DL (ref 32–34.5)
MCV RBC AUTO: 95.4 FL (ref 80–99.9)
MONOCYTES NFR BLD: 0.75 K/UL (ref 0.1–0.95)
MONOCYTES NFR BLD: 10 % (ref 2–12)
NEUTROPHILS NFR BLD: 79 % (ref 43–80)
NEUTS SEG NFR BLD: 5.95 K/UL (ref 1.8–7.3)
PLATELET # BLD AUTO: 181 K/UL (ref 130–450)
PMV BLD AUTO: 9.4 FL (ref 7–12)
POTASSIUM SERPL-SCNC: 4 MMOL/L (ref 3.5–5)
PROT SERPL-MCNC: 7.3 G/DL (ref 6.4–8.3)
RBC # BLD AUTO: 3.7 M/UL (ref 3.5–5.5)
SODIUM SERPL-SCNC: 140 MMOL/L (ref 132–146)
WBC OTHER # BLD: 7.6 K/UL (ref 4.5–11.5)

## 2024-10-03 PROCEDURE — 96372 THER/PROPH/DIAG INJ SC/IM: CPT

## 2024-10-03 PROCEDURE — G8484 FLU IMMUNIZE NO ADMIN: HCPCS | Performed by: STUDENT IN AN ORGANIZED HEALTH CARE EDUCATION/TRAINING PROGRAM

## 2024-10-03 PROCEDURE — 85025 COMPLETE CBC W/AUTO DIFF WBC: CPT

## 2024-10-03 PROCEDURE — G8427 DOCREV CUR MEDS BY ELIG CLIN: HCPCS | Performed by: STUDENT IN AN ORGANIZED HEALTH CARE EDUCATION/TRAINING PROGRAM

## 2024-10-03 PROCEDURE — 36591 DRAW BLOOD OFF VENOUS DEVICE: CPT

## 2024-10-03 PROCEDURE — 99214 OFFICE O/P EST MOD 30 MIN: CPT | Performed by: STUDENT IN AN ORGANIZED HEALTH CARE EDUCATION/TRAINING PROGRAM

## 2024-10-03 PROCEDURE — 6360000002 HC RX W HCPCS: Performed by: STUDENT IN AN ORGANIZED HEALTH CARE EDUCATION/TRAINING PROGRAM

## 2024-10-03 PROCEDURE — 99214 OFFICE O/P EST MOD 30 MIN: CPT

## 2024-10-03 PROCEDURE — 4004F PT TOBACCO SCREEN RCVD TLK: CPT | Performed by: STUDENT IN AN ORGANIZED HEALTH CARE EDUCATION/TRAINING PROGRAM

## 2024-10-03 PROCEDURE — 99212 OFFICE O/P EST SF 10 MIN: CPT | Performed by: TRANSPLANT SURGERY

## 2024-10-03 PROCEDURE — G8420 CALC BMI NORM PARAMETERS: HCPCS | Performed by: STUDENT IN AN ORGANIZED HEALTH CARE EDUCATION/TRAINING PROGRAM

## 2024-10-03 PROCEDURE — 2580000003 HC RX 258: Performed by: STUDENT IN AN ORGANIZED HEALTH CARE EDUCATION/TRAINING PROGRAM

## 2024-10-03 PROCEDURE — 80053 COMPREHEN METABOLIC PANEL: CPT

## 2024-10-03 RX ORDER — HEPARIN 100 UNIT/ML
500 SYRINGE INTRAVENOUS PRN
Status: DISCONTINUED | OUTPATIENT
Start: 2024-10-03 | End: 2024-10-04 | Stop reason: HOSPADM

## 2024-10-03 RX ORDER — LANREOTIDE ACETATE 120 MG/.5ML
120 INJECTION SUBCUTANEOUS ONCE
Status: CANCELLED | OUTPATIENT
Start: 2024-10-03 | End: 2024-10-03

## 2024-10-03 RX ORDER — LANREOTIDE ACETATE 120 MG/.5ML
120 INJECTION SUBCUTANEOUS ONCE
OUTPATIENT
Start: 2024-10-31 | End: 2024-10-31

## 2024-10-03 RX ORDER — LANREOTIDE ACETATE 120 MG/.5ML
120 INJECTION SUBCUTANEOUS ONCE
Status: COMPLETED | OUTPATIENT
Start: 2024-10-03 | End: 2024-10-03

## 2024-10-03 RX ORDER — SODIUM CHLORIDE 0.9 % (FLUSH) 0.9 %
5-40 SYRINGE (ML) INJECTION PRN
Status: DISCONTINUED | OUTPATIENT
Start: 2024-10-03 | End: 2024-10-04 | Stop reason: HOSPADM

## 2024-10-03 RX ADMIN — SODIUM CHLORIDE, PRESERVATIVE FREE 10 ML: 5 INJECTION INTRAVENOUS at 13:24

## 2024-10-03 RX ADMIN — HEPARIN 500 UNITS: 100 SYRINGE at 13:24

## 2024-10-03 RX ADMIN — LANREOTIDE ACETATE 120 MG: 120 INJECTION SUBCUTANEOUS at 14:46

## 2024-10-03 NOTE — PROGRESS NOTES
[facility] MED ONCOLOGY  [address]  [facility] [address]  Dept: [phone]  Loc: [phone]    Clinical Progress Note    Reason for visit: Neuroendocrine cancer to liver     PCP:  Abdelrahman Montana DO       Subjective:  Recovering well from Y90. No significant changes in her symptomatology at this time. But still feels may still be in the process of recovering.        ONCOLOGIC HISTORY:  39 y.o. female with hx of hypothyroidism, IBS,migraine who was complaining of abdominal pain associated with diarrhea and flushing/sweating.      CT abdomen/pelvis 08/28/2020:  2 cm masslike density in the mid abdominal small bowel mesentery with a spiculated appearance.  Multiple liver masses suspicious for metastatic disease.     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     Comment: Sections of the liver tissue cores show the hepatic parenchyma to be partially replaced by a proliferation of epithelioid cells with relatively uniform round nuclei and eosinophilic granular cytoplasm.  The   epithelioid cells form anastomosing solid cords/nests that are surrounded by delicate fibrovascular stroma.  There are no mitotic figures or tumor cell necrosis present.  The histologic changes seen are suggestive of well-differentiated neuroendocrine (carcinoid) tumor.     Immunostaining for pankeratin, chromogranin, synaptophysin, TTF-1 and Ki-67 was performed on sections of one tissue block (A1) and the neoplastic epithelioid cells show diffuse and strong positivity for neuroendocrine markers (chromogranin and synaptophysin) and moderate positivity for pankeratin.  There is no staining reactivity for TTF-1.   The Ki-67 proliferation labeling index is essentially negative, (very low, <1%).   This staining pattern confirms the histologic impression of a well-differentiated neuroendocrine

## 2024-10-03 NOTE — PROGRESS NOTES
Hepatobiliary and Pancreatic Surgery Progress Note    CC: Follow-up Y90    Subjective: Patient states that she is doing well.  She underwent Y90 to her liver.  She has had Lutathera in the past.  She is still having about 4 bowel movements a day.  She did have abdominal discomfort and nausea after her procedure.  She states that it is getting better each week.    OBJECTIVE      Physical    AFVSS  General appearance: appears in no acute distress  Lungs:respiratory effort normal without accessory numbers  Heart: no pedal edema  Abdomen: soft, nondistended, nontympanic, no guarding, no peritoneal signs, normoactive bowel sounds  Extremities: ROM normal    ASSESSMENT: Metastatic neuroendocrine tumor to the liver, small bowel primary, history of Lutathera    PLAN:    -Continue protonix and Carafate secondary to the Y90  -Will plan for repeat CT in December 2024    20 Minutes of which greater than 50% was spent counseling or coordinating her care.    Thank you Dr. Montana for the consultation and allowing me to take part in Ms. Olmstead's care.      Lambert Castro MD 10/3/2024 3:34 PM

## 2024-10-15 ENCOUNTER — HOSPITAL ENCOUNTER (EMERGENCY)
Age: 39
Discharge: HOME | End: 2024-10-15
Attending: STUDENT IN AN ORGANIZED HEALTH CARE EDUCATION/TRAINING PROGRAM
Payer: COMMERCIAL

## 2024-10-15 ENCOUNTER — APPOINTMENT (OUTPATIENT)
Dept: CT IMAGING | Age: 39
End: 2024-10-15
Payer: COMMERCIAL

## 2024-10-15 ENCOUNTER — TELEPHONE (OUTPATIENT)
Dept: ONCOLOGY | Age: 39
End: 2024-10-15

## 2024-10-15 VITALS
SYSTOLIC BLOOD PRESSURE: 114 MMHG | RESPIRATION RATE: 18 BRPM | TEMPERATURE: 98.4 F | HEART RATE: 59 BPM | WEIGHT: 130 LBS | DIASTOLIC BLOOD PRESSURE: 87 MMHG | BODY MASS INDEX: 25.52 KG/M2 | HEIGHT: 60 IN | OXYGEN SATURATION: 98 %

## 2024-10-15 DIAGNOSIS — K76.9 LIVER LESION: Primary | ICD-10-CM

## 2024-10-15 DIAGNOSIS — R10.11 ABDOMINAL PAIN, RIGHT UPPER QUADRANT: ICD-10-CM

## 2024-10-15 LAB
ALBUMIN SERPL-MCNC: 4.5 G/DL (ref 3.5–5.2)
ALP SERPL-CCNC: 170 U/L (ref 35–104)
ALT SERPL-CCNC: 14 U/L (ref 0–32)
ANION GAP SERPL CALCULATED.3IONS-SCNC: 12 MMOL/L (ref 7–16)
AST SERPL-CCNC: 17 U/L (ref 0–31)
BACTERIA URNS QL MICRO: ABNORMAL
BASOPHILS # BLD: 0.06 K/UL (ref 0–0.2)
BASOPHILS NFR BLD: 1 % (ref 0–2)
BILIRUB SERPL-MCNC: 0.4 MG/DL (ref 0–1.2)
BILIRUB UR QL STRIP: NEGATIVE
BUN SERPL-MCNC: 12 MG/DL (ref 6–20)
CALCIUM SERPL-MCNC: 8.9 MG/DL (ref 8.6–10.2)
CHLORIDE SERPL-SCNC: 103 MMOL/L (ref 98–107)
CLARITY UR: CLEAR
CO2 SERPL-SCNC: 22 MMOL/L (ref 22–29)
COLOR UR: YELLOW
CREAT SERPL-MCNC: 0.6 MG/DL (ref 0.5–1)
EOSINOPHIL # BLD: 0.04 K/UL (ref 0.05–0.5)
EOSINOPHILS RELATIVE PERCENT: 0 % (ref 0–6)
EPI CELLS #/AREA URNS HPF: ABNORMAL /HPF
ERYTHROCYTE [DISTWIDTH] IN BLOOD BY AUTOMATED COUNT: 15.2 % (ref 11.5–15)
GFR, ESTIMATED: >90 ML/MIN/1.73M2
GLUCOSE SERPL-MCNC: 103 MG/DL (ref 74–99)
GLUCOSE UR STRIP-MCNC: NEGATIVE MG/DL
HCG UR QL: NEGATIVE
HCT VFR BLD AUTO: 38.4 % (ref 34–48)
HGB BLD-MCNC: 12.7 G/DL (ref 11.5–15.5)
HGB UR QL STRIP.AUTO: NEGATIVE
IMM GRANULOCYTES # BLD AUTO: 0.06 K/UL (ref 0–0.58)
IMM GRANULOCYTES NFR BLD: 1 % (ref 0–5)
KETONES UR STRIP-MCNC: NEGATIVE MG/DL
LEUKOCYTE ESTERASE UR QL STRIP: NEGATIVE
LYMPHOCYTES NFR BLD: 0.99 K/UL (ref 1.5–4)
LYMPHOCYTES RELATIVE PERCENT: 10 % (ref 20–42)
MCH RBC QN AUTO: 30.8 PG (ref 26–35)
MCHC RBC AUTO-ENTMCNC: 33.1 G/DL (ref 32–34.5)
MCV RBC AUTO: 93.2 FL (ref 80–99.9)
MONOCYTES NFR BLD: 1.02 K/UL (ref 0.1–0.95)
MONOCYTES NFR BLD: 10 % (ref 2–12)
NEUTROPHILS NFR BLD: 79 % (ref 43–80)
NEUTS SEG NFR BLD: 8.14 K/UL (ref 1.8–7.3)
NITRITE UR QL STRIP: NEGATIVE
PH UR STRIP: 7 [PH] (ref 5–9)
PLATELET # BLD AUTO: 240 K/UL (ref 130–450)
PMV BLD AUTO: 9.5 FL (ref 7–12)
POTASSIUM SERPL-SCNC: 4.1 MMOL/L (ref 3.5–5)
PROT SERPL-MCNC: 7.9 G/DL (ref 6.4–8.3)
PROT UR STRIP-MCNC: NEGATIVE MG/DL
RBC # BLD AUTO: 4.12 M/UL (ref 3.5–5.5)
RBC #/AREA URNS HPF: ABNORMAL /HPF
SODIUM SERPL-SCNC: 137 MMOL/L (ref 132–146)
SP GR UR STRIP: 1.02 (ref 1–1.03)
TROPONIN I SERPL HS-MCNC: <6 NG/L (ref 0–9)
UROBILINOGEN UR STRIP-ACNC: 0.2 EU/DL (ref 0–1)
WBC #/AREA URNS HPF: ABNORMAL /HPF
WBC OTHER # BLD: 10.3 K/UL (ref 4.5–11.5)

## 2024-10-15 PROCEDURE — 6360000004 HC RX CONTRAST MEDICATION: Performed by: RADIOLOGY

## 2024-10-15 PROCEDURE — 80053 COMPREHEN METABOLIC PANEL: CPT

## 2024-10-15 PROCEDURE — 84484 ASSAY OF TROPONIN QUANT: CPT

## 2024-10-15 PROCEDURE — 81001 URINALYSIS AUTO W/SCOPE: CPT

## 2024-10-15 PROCEDURE — 96375 TX/PRO/DX INJ NEW DRUG ADDON: CPT

## 2024-10-15 PROCEDURE — 6360000002 HC RX W HCPCS: Performed by: STUDENT IN AN ORGANIZED HEALTH CARE EDUCATION/TRAINING PROGRAM

## 2024-10-15 PROCEDURE — 71275 CT ANGIOGRAPHY CHEST: CPT

## 2024-10-15 PROCEDURE — 99285 EMERGENCY DEPT VISIT HI MDM: CPT

## 2024-10-15 PROCEDURE — 2580000003 HC RX 258: Performed by: RADIOLOGY

## 2024-10-15 PROCEDURE — 96374 THER/PROPH/DIAG INJ IV PUSH: CPT

## 2024-10-15 PROCEDURE — 85025 COMPLETE CBC W/AUTO DIFF WBC: CPT

## 2024-10-15 PROCEDURE — 93005 ELECTROCARDIOGRAM TRACING: CPT | Performed by: STUDENT IN AN ORGANIZED HEALTH CARE EDUCATION/TRAINING PROGRAM

## 2024-10-15 PROCEDURE — 74177 CT ABD & PELVIS W/CONTRAST: CPT

## 2024-10-15 PROCEDURE — 84703 CHORIONIC GONADOTROPIN ASSAY: CPT

## 2024-10-15 RX ORDER — IOPAMIDOL 755 MG/ML
75 INJECTION, SOLUTION INTRAVASCULAR
Status: COMPLETED | OUTPATIENT
Start: 2024-10-15 | End: 2024-10-15

## 2024-10-15 RX ORDER — SODIUM CHLORIDE 0.9 % (FLUSH) 0.9 %
10 SYRINGE (ML) INJECTION PRN
Status: DISCONTINUED | OUTPATIENT
Start: 2024-10-15 | End: 2024-10-15 | Stop reason: HOSPADM

## 2024-10-15 RX ORDER — ONDANSETRON 4 MG/1
4 TABLET, ORALLY DISINTEGRATING ORAL 3 TIMES DAILY PRN
Qty: 21 TABLET | Refills: 0 | Status: SHIPPED | OUTPATIENT
Start: 2024-10-15

## 2024-10-15 RX ORDER — MORPHINE SULFATE 2 MG/ML
2 INJECTION, SOLUTION INTRAMUSCULAR; INTRAVENOUS ONCE
Status: COMPLETED | OUTPATIENT
Start: 2024-10-15 | End: 2024-10-15

## 2024-10-15 RX ORDER — OXYCODONE AND ACETAMINOPHEN 5; 325 MG/1; MG/1
1 TABLET ORAL EVERY 6 HOURS PRN
Qty: 12 TABLET | Refills: 0 | Status: SHIPPED | OUTPATIENT
Start: 2024-10-15 | End: 2024-10-18

## 2024-10-15 RX ORDER — ONDANSETRON 2 MG/ML
4 INJECTION INTRAMUSCULAR; INTRAVENOUS ONCE
Status: COMPLETED | OUTPATIENT
Start: 2024-10-15 | End: 2024-10-15

## 2024-10-15 RX ADMIN — MORPHINE SULFATE 2 MG: 2 INJECTION, SOLUTION INTRAMUSCULAR; INTRAVENOUS at 16:42

## 2024-10-15 RX ADMIN — IOPAMIDOL 75 ML: 755 INJECTION, SOLUTION INTRAVENOUS at 18:06

## 2024-10-15 RX ADMIN — ONDANSETRON 4 MG: 2 INJECTION INTRAMUSCULAR; INTRAVENOUS at 18:33

## 2024-10-15 RX ADMIN — Medication 10 ML: at 18:08

## 2024-10-15 RX ADMIN — HYDROMORPHONE HYDROCHLORIDE 0.5 MG: 0.5 INJECTION, SOLUTION INTRAMUSCULAR; INTRAVENOUS; SUBCUTANEOUS at 17:56

## 2024-10-15 ASSESSMENT — PAIN DESCRIPTION - LOCATION
LOCATION: RIB CAGE

## 2024-10-15 ASSESSMENT — PAIN SCALES - GENERAL
PAINLEVEL_OUTOF10: 8

## 2024-10-15 ASSESSMENT — PAIN DESCRIPTION - ORIENTATION
ORIENTATION: RIGHT
ORIENTATION: RIGHT

## 2024-10-15 ASSESSMENT — PAIN DESCRIPTION - DIRECTION: RADIATING_TOWARDS: BACK

## 2024-10-15 ASSESSMENT — LIFESTYLE VARIABLES
HOW MANY STANDARD DRINKS CONTAINING ALCOHOL DO YOU HAVE ON A TYPICAL DAY: PATIENT DOES NOT DRINK
HOW OFTEN DO YOU HAVE A DRINK CONTAINING ALCOHOL: NEVER

## 2024-10-15 ASSESSMENT — PAIN DESCRIPTION - DESCRIPTORS
DESCRIPTORS: ACHING;CRAMPING;DISCOMFORT
DESCRIPTORS: CRAMPING;DISCOMFORT;ACHING
DESCRIPTORS: BURNING;SHARP

## 2024-10-15 ASSESSMENT — PAIN - FUNCTIONAL ASSESSMENT: PAIN_FUNCTIONAL_ASSESSMENT: 0-10

## 2024-10-15 NOTE — DISCHARGE INSTRUCTIONS
Take all medication as prescribed  Follow-up with primary care doctor  Follow-up with surgical oncology  If you notice any new worrisome symptoms please return to emergency department for evaluation

## 2024-10-15 NOTE — ED PROVIDER NOTES
in the upper and lower extremities bilaterally  Psychiatric: Normal Affect    DIAGNOSTIC RESULTS   LABS:    Labs Reviewed   CBC WITH AUTO DIFFERENTIAL - Abnormal; Notable for the following components:       Result Value    RDW 15.2 (*)     Lymphocytes % 10 (*)     Neutrophils Absolute 8.14 (*)     Lymphocytes Absolute 0.99 (*)     Monocytes Absolute 1.02 (*)     Eosinophils Absolute 0.04 (*)     All other components within normal limits   COMPREHENSIVE METABOLIC PANEL W/ REFLEX TO MG FOR LOW K - Abnormal; Notable for the following components:    Glucose 103 (*)     Alkaline Phosphatase 170 (*)     All other components within normal limits   URINALYSIS WITH MICROSCOPIC - Abnormal; Notable for the following components:    Bacteria, UA TRACE (*)     All other components within normal limits   TROPONIN   PREGNANCY, URINE       As interpreted by me, the above displayed labs are abnormal. All other labs obtained during this visit were within normal range or not returned as of this dictation.    EKG Interpretation:  Interpreted by emergency department physician, Luisito Branch, DO  Rate: 55  Rhythm: Sinus  Interpretation: EKG obtained today sinus bradycardia sinus rhythm, rate 55, right axis, QTc 428, no acute ST segment changes.  Stable compared to previous EKG  Comparison: stable as compared to patient's most recent EKG    RADIOLOGY:   Non-plain film images such as CT, Ultrasound and MRI are read by the radiologist. Plain radiographic images are visualized and preliminarily interpreted by the ED Provider with the below findings:      Interpretation per the Radiologist below, if available at the time of this note:    CTA PULMONARY W CONTRAST   Final Result   No acute pulmonary emboli.      No aneurysm formation or dissection of the thoracic aorta.      No acute pulmonary parenchymal process.  No metastasis in the chest.      Progression of liver metastatic disease with new lesions since the previous   study of July 2024.

## 2024-10-15 NOTE — TELEPHONE ENCOUNTER
Patient called in to office stating that she woke up at 4am this morning with excruciating pain under right ribs going in to her back.  She called palliative and was told to call Dr. Hu.  I spoke with Dr. Hu.  He advised patient to go to ED for evaluation of pain and pain control.  I called the patient back and expressed that recommendation to go to ED per Dr. Hu.  Patient verbalizes understanding.

## 2024-10-16 LAB
EKG ATRIAL RATE: 55 BPM
EKG P-R INTERVAL: 134 MS
EKG Q-T INTERVAL: 448 MS
EKG QRS DURATION: 64 MS
EKG QTC CALCULATION (BAZETT): 428 MS
EKG R AXIS: 140 DEGREES
EKG T AXIS: 123 DEGREES
EKG VENTRICULAR RATE: 55 BPM

## 2024-10-16 PROCEDURE — 93010 ELECTROCARDIOGRAM REPORT: CPT | Performed by: INTERNAL MEDICINE

## 2024-10-29 DIAGNOSIS — D3A.8 NEUROENDOCRINE TUMOR: ICD-10-CM

## 2024-10-29 RX ORDER — EVEROLIMUS 10 MG/1
10 TABLET ORAL DAILY
Qty: 30 TABLET | Refills: 3 | Status: CANCELLED | OUTPATIENT
Start: 2024-10-29

## 2024-10-31 ENCOUNTER — HOSPITAL ENCOUNTER (OUTPATIENT)
Dept: INFUSION THERAPY | Age: 39
Discharge: HOME | End: 2024-10-31
Payer: COMMERCIAL

## 2024-10-31 ENCOUNTER — OFFICE VISIT (OUTPATIENT)
Dept: ONCOLOGY | Age: 39
End: 2024-10-31
Payer: COMMERCIAL

## 2024-10-31 VITALS
DIASTOLIC BLOOD PRESSURE: 58 MMHG | HEIGHT: 60 IN | SYSTOLIC BLOOD PRESSURE: 101 MMHG | BODY MASS INDEX: 24.7 KG/M2 | OXYGEN SATURATION: 100 % | HEART RATE: 57 BPM | TEMPERATURE: 98 F | WEIGHT: 125.8 LBS

## 2024-10-31 DIAGNOSIS — C7A.8 NEUROENDOCRINE CANCER (HCC): ICD-10-CM

## 2024-10-31 DIAGNOSIS — D3A.8 NEUROENDOCRINE TUMOR: ICD-10-CM

## 2024-10-31 DIAGNOSIS — C7B.8 METASTATIC MALIGNANT NEUROENDOCRINE TUMOR TO LIVER (HCC): Primary | ICD-10-CM

## 2024-10-31 LAB
ALBUMIN SERPL-MCNC: 4.2 G/DL (ref 3.5–5.2)
ALP SERPL-CCNC: 139 U/L (ref 35–104)
ALT SERPL-CCNC: 8 U/L (ref 0–32)
ANION GAP SERPL CALCULATED.3IONS-SCNC: 9 MMOL/L (ref 7–16)
AST SERPL-CCNC: 14 U/L (ref 0–31)
BASOPHILS # BLD: 0.04 K/UL (ref 0–0.2)
BASOPHILS NFR BLD: 1 % (ref 0–2)
BILIRUB SERPL-MCNC: 0.2 MG/DL (ref 0–1.2)
BUN SERPL-MCNC: 9 MG/DL (ref 6–20)
CALCIUM SERPL-MCNC: 8.4 MG/DL (ref 8.6–10.2)
CHLORIDE SERPL-SCNC: 109 MMOL/L (ref 98–107)
CO2 SERPL-SCNC: 24 MMOL/L (ref 22–29)
CREAT SERPL-MCNC: 0.6 MG/DL (ref 0.5–1)
EOSINOPHIL # BLD: 0.09 K/UL (ref 0.05–0.5)
EOSINOPHILS RELATIVE PERCENT: 1 % (ref 0–6)
ERYTHROCYTE [DISTWIDTH] IN BLOOD BY AUTOMATED COUNT: 15.4 % (ref 11.5–15)
GFR, ESTIMATED: >90 ML/MIN/1.73M2
GLUCOSE SERPL-MCNC: 98 MG/DL (ref 74–99)
HCT VFR BLD AUTO: 34.6 % (ref 34–48)
HGB BLD-MCNC: 11.5 G/DL (ref 11.5–15.5)
IMM GRANULOCYTES # BLD AUTO: 0.03 K/UL (ref 0–0.58)
IMM GRANULOCYTES NFR BLD: 0 % (ref 0–5)
LYMPHOCYTES NFR BLD: 0.73 K/UL (ref 1.5–4)
LYMPHOCYTES RELATIVE PERCENT: 10 % (ref 20–42)
MCH RBC QN AUTO: 31.3 PG (ref 26–35)
MCHC RBC AUTO-ENTMCNC: 33.2 G/DL (ref 32–34.5)
MCV RBC AUTO: 94.3 FL (ref 80–99.9)
MONOCYTES NFR BLD: 0.73 K/UL (ref 0.1–0.95)
MONOCYTES NFR BLD: 10 % (ref 2–12)
NEUTROPHILS NFR BLD: 77 % (ref 43–80)
NEUTS SEG NFR BLD: 5.44 K/UL (ref 1.8–7.3)
PLATELET # BLD AUTO: 181 K/UL (ref 130–450)
PMV BLD AUTO: 9.4 FL (ref 7–12)
POTASSIUM SERPL-SCNC: 4 MMOL/L (ref 3.5–5)
PROT SERPL-MCNC: 7.1 G/DL (ref 6.4–8.3)
RBC # BLD AUTO: 3.67 M/UL (ref 3.5–5.5)
SODIUM SERPL-SCNC: 142 MMOL/L (ref 132–146)
WBC OTHER # BLD: 7.1 K/UL (ref 4.5–11.5)

## 2024-10-31 PROCEDURE — 2580000003 HC RX 258: Performed by: STUDENT IN AN ORGANIZED HEALTH CARE EDUCATION/TRAINING PROGRAM

## 2024-10-31 PROCEDURE — 36591 DRAW BLOOD OFF VENOUS DEVICE: CPT

## 2024-10-31 PROCEDURE — 96372 THER/PROPH/DIAG INJ SC/IM: CPT

## 2024-10-31 PROCEDURE — G8427 DOCREV CUR MEDS BY ELIG CLIN: HCPCS | Performed by: STUDENT IN AN ORGANIZED HEALTH CARE EDUCATION/TRAINING PROGRAM

## 2024-10-31 PROCEDURE — 99214 OFFICE O/P EST MOD 30 MIN: CPT | Performed by: STUDENT IN AN ORGANIZED HEALTH CARE EDUCATION/TRAINING PROGRAM

## 2024-10-31 PROCEDURE — G8484 FLU IMMUNIZE NO ADMIN: HCPCS | Performed by: STUDENT IN AN ORGANIZED HEALTH CARE EDUCATION/TRAINING PROGRAM

## 2024-10-31 PROCEDURE — 80053 COMPREHEN METABOLIC PANEL: CPT

## 2024-10-31 PROCEDURE — G8420 CALC BMI NORM PARAMETERS: HCPCS | Performed by: STUDENT IN AN ORGANIZED HEALTH CARE EDUCATION/TRAINING PROGRAM

## 2024-10-31 PROCEDURE — 4004F PT TOBACCO SCREEN RCVD TLK: CPT | Performed by: STUDENT IN AN ORGANIZED HEALTH CARE EDUCATION/TRAINING PROGRAM

## 2024-10-31 PROCEDURE — 85025 COMPLETE CBC W/AUTO DIFF WBC: CPT

## 2024-10-31 PROCEDURE — 99214 OFFICE O/P EST MOD 30 MIN: CPT

## 2024-10-31 PROCEDURE — 6360000002 HC RX W HCPCS: Performed by: STUDENT IN AN ORGANIZED HEALTH CARE EDUCATION/TRAINING PROGRAM

## 2024-10-31 RX ORDER — SODIUM CHLORIDE 9 MG/ML
INJECTION, SOLUTION INTRAVENOUS CONTINUOUS
OUTPATIENT
Start: 2024-10-31

## 2024-10-31 RX ORDER — LANREOTIDE ACETATE 120 MG/.5ML
120 INJECTION SUBCUTANEOUS ONCE
OUTPATIENT
Start: 2024-11-28 | End: 2024-11-28

## 2024-10-31 RX ORDER — HEPARIN 100 UNIT/ML
500 SYRINGE INTRAVENOUS PRN
Status: DISCONTINUED | OUTPATIENT
Start: 2024-10-31 | End: 2024-11-01 | Stop reason: HOSPADM

## 2024-10-31 RX ORDER — HEPARIN 100 UNIT/ML
500 SYRINGE INTRAVENOUS PRN
OUTPATIENT
Start: 2024-10-31

## 2024-10-31 RX ORDER — ACETAMINOPHEN 325 MG/1
650 TABLET ORAL
OUTPATIENT
Start: 2024-10-31

## 2024-10-31 RX ORDER — SODIUM CHLORIDE 0.9 % (FLUSH) 0.9 %
5-40 SYRINGE (ML) INJECTION PRN
OUTPATIENT
Start: 2024-10-31

## 2024-10-31 RX ORDER — DIPHENHYDRAMINE HYDROCHLORIDE 50 MG/ML
50 INJECTION INTRAMUSCULAR; INTRAVENOUS
OUTPATIENT
Start: 2024-10-31

## 2024-10-31 RX ORDER — ALBUTEROL SULFATE 90 UG/1
4 INHALANT RESPIRATORY (INHALATION) PRN
OUTPATIENT
Start: 2024-10-31

## 2024-10-31 RX ORDER — SODIUM CHLORIDE 0.9 % (FLUSH) 0.9 %
5-40 SYRINGE (ML) INJECTION PRN
Status: DISCONTINUED | OUTPATIENT
Start: 2024-10-31 | End: 2024-11-01 | Stop reason: HOSPADM

## 2024-10-31 RX ORDER — LANREOTIDE ACETATE 120 MG/.5ML
120 INJECTION SUBCUTANEOUS ONCE
Status: COMPLETED | OUTPATIENT
Start: 2024-10-31 | End: 2024-10-31

## 2024-10-31 RX ORDER — EVEROLIMUS 10 MG/1
10 TABLET ORAL DAILY
Qty: 30 TABLET | Refills: 3 | Status: ACTIVE | OUTPATIENT
Start: 2024-10-31

## 2024-10-31 RX ORDER — ONDANSETRON 2 MG/ML
8 INJECTION INTRAMUSCULAR; INTRAVENOUS
OUTPATIENT
Start: 2024-10-31

## 2024-10-31 RX ORDER — SODIUM CHLORIDE 9 MG/ML
5-250 INJECTION, SOLUTION INTRAVENOUS PRN
OUTPATIENT
Start: 2024-10-31

## 2024-10-31 RX ORDER — EPINEPHRINE 1 MG/ML
0.3 INJECTION, SOLUTION, CONCENTRATE INTRAVENOUS PRN
OUTPATIENT
Start: 2024-10-31

## 2024-10-31 RX ADMIN — HEPARIN 500 UNITS: 100 SYRINGE at 13:11

## 2024-10-31 RX ADMIN — LANREOTIDE ACETATE 120 MG: 120 INJECTION SUBCUTANEOUS at 14:08

## 2024-10-31 RX ADMIN — SODIUM CHLORIDE, PRESERVATIVE FREE 10 ML: 5 INJECTION INTRAVENOUS at 13:11

## 2024-10-31 NOTE — PROGRESS NOTES
Angle from [employer] calling to request that refill request below be sent to [facility] [address], [facility] [address]  Or fax to [phone]   Port draw. See flowsheet.   Complex Repair And Double M Plasty Text: The defect edges were debeveled with a #15 scalpel blade.  The primary defect was closed partially with a complex linear closure.  Given the location of the remaining defect, shape of the defect and the proximity to free margins a double M plasty was deemed most appropriate for complete closure of the defect.  Using a sterile surgical marker, an appropriate advancement flap was drawn incorporating the defect and placing the expected incisions within the relaxed skin tension lines where possible.    The area thus outlined was incised deep to adipose tissue with a #15 scalpel blade.  The skin margins were undermined to an appropriate distance in all directions utilizing iris scissors.

## 2024-10-31 NOTE — PROGRESS NOTES
[facility] PHYSICIANS [facility] CARE [facility] MED ONCOLOGY  [address] [facility] [address]  Dept: [phone]  Loc: [phone]    Clinical Progress Note    Reason for visit: Neuroendocrine cancer to liver     PCP:  Abdelrahman Montana DO       Subjective:  Recently was in ED for RUQ abdominal pain.   She had Y90 within weeks prior to ED visit.      ONCOLOGIC HISTORY:  39 y.o. female with hx of hypothyroidism, IBS,migraine who was complaining of abdominal pain associated with diarrhea and flushing/sweating.      CT abdomen/pelvis 08/28/2020:  2 cm masslike density in the mid abdominal small bowel mesentery with a spiculated appearance.  Multiple liver masses suspicious for metastatic disease.     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     Comment: Sections of the liver tissue cores show the hepatic parenchyma to be partially replaced by a proliferation of epithelioid cells with relatively uniform round nuclei and eosinophilic granular cytoplasm.  The   epithelioid cells form anastomosing solid cords/nests that are surrounded by delicate fibrovascular stroma.  There are no mitotic figures or tumor cell necrosis present.  The histologic changes seen are suggestive of well-differentiated neuroendocrine (carcinoid) tumor.     Immunostaining for pankeratin, chromogranin, synaptophysin, TTF-1 and Ki-67 was performed on sections of one tissue block (A1) and the neoplastic epithelioid cells show diffuse and strong positivity for neuroendocrine markers (chromogranin and synaptophysin) and moderate positivity for pankeratin.  There is no staining reactivity for TTF-1.   The Ki-67 proliferation labeling index is essentially negative, (very low, <1%).   This staining pattern confirms the histologic impression of a well-differentiated neuroendocrine (carcinoid) tumor.     FL Small bowel 09/02/2020:  Rapid

## 2024-11-10 DIAGNOSIS — G43.909 MIGRAINE WITHOUT STATUS MIGRAINOSUS, NOT INTRACTABLE, UNSPECIFIED MIGRAINE TYPE: ICD-10-CM

## 2024-11-11 ENCOUNTER — TELEPHONE (OUTPATIENT)
Dept: HEMATOLOGY | Age: 39
End: 2024-11-11

## 2024-11-11 RX ORDER — BUTALBITAL, ACETAMINOPHEN AND CAFFEINE 50; 325; 40 MG/1; MG/1; MG/1
TABLET ORAL
Qty: 60 TABLET | Refills: 1 | Status: SHIPPED | OUTPATIENT
Start: 2024-11-11

## 2024-11-11 NOTE — TELEPHONE ENCOUNTER
CT approved. Auth# [id number] Valid 11/4/24 through 1/3/25. Brooklyn is scheduled for [facility] on 12/2/24. Arrive 730am, Scan 8am, NPO 3 hours. Called and LVM for Brooklyn to return call to clinic.

## 2024-11-21 ENCOUNTER — HOSPITAL ENCOUNTER (EMERGENCY)
Age: 39
Discharge: HOME | End: 2024-11-21
Attending: EMERGENCY MEDICINE
Payer: COMMERCIAL

## 2024-11-21 ENCOUNTER — APPOINTMENT (OUTPATIENT)
Dept: CT IMAGING | Age: 39
End: 2024-11-21
Payer: COMMERCIAL

## 2024-11-21 ENCOUNTER — TELEPHONE (OUTPATIENT)
Dept: ONCOLOGY | Age: 39
End: 2024-11-21

## 2024-11-21 VITALS
RESPIRATION RATE: 22 BRPM | SYSTOLIC BLOOD PRESSURE: 100 MMHG | TEMPERATURE: 97.9 F | OXYGEN SATURATION: 99 % | HEART RATE: 61 BPM | DIASTOLIC BLOOD PRESSURE: 54 MMHG

## 2024-11-21 DIAGNOSIS — R10.11 RIGHT UPPER QUADRANT ABDOMINAL PAIN: ICD-10-CM

## 2024-11-21 DIAGNOSIS — N30.00 ACUTE CYSTITIS WITHOUT HEMATURIA: Primary | ICD-10-CM

## 2024-11-21 LAB
ALBUMIN SERPL-MCNC: 4.5 G/DL (ref 3.5–5.2)
ALP SERPL-CCNC: 135 U/L (ref 35–104)
ALT SERPL-CCNC: 10 U/L (ref 0–32)
ANION GAP SERPL CALCULATED.3IONS-SCNC: 15 MMOL/L (ref 7–16)
AST SERPL-CCNC: 18 U/L (ref 0–31)
BACTERIA URNS QL MICRO: ABNORMAL
BASOPHILS # BLD: 0.06 K/UL (ref 0–0.2)
BASOPHILS NFR BLD: 1 % (ref 0–2)
BILIRUB DIRECT SERPL-MCNC: <0.2 MG/DL (ref 0–0.3)
BILIRUB INDIRECT SERPL-MCNC: ABNORMAL MG/DL (ref 0–1)
BILIRUB SERPL-MCNC: 0.5 MG/DL (ref 0–1.2)
BILIRUB UR QL STRIP: NEGATIVE
BUN SERPL-MCNC: 9 MG/DL (ref 6–20)
CALCIUM SERPL-MCNC: 8.9 MG/DL (ref 8.6–10.2)
CHLORIDE SERPL-SCNC: 106 MMOL/L (ref 98–107)
CLARITY UR: CLEAR
CO2 SERPL-SCNC: 22 MMOL/L (ref 22–29)
COLOR UR: YELLOW
CREAT SERPL-MCNC: 0.8 MG/DL (ref 0.5–1)
EOSINOPHIL # BLD: 0.07 K/UL (ref 0.05–0.5)
EOSINOPHILS RELATIVE PERCENT: 1 % (ref 0–6)
EPI CELLS #/AREA URNS HPF: ABNORMAL /HPF
ERYTHROCYTE [DISTWIDTH] IN BLOOD BY AUTOMATED COUNT: 15 % (ref 11.5–15)
GFR, ESTIMATED: >90 ML/MIN/1.73M2
GLUCOSE SERPL-MCNC: 92 MG/DL (ref 74–99)
GLUCOSE UR STRIP-MCNC: NEGATIVE MG/DL
HCG, URINE, POC: NEGATIVE
HCT VFR BLD AUTO: 36.9 % (ref 34–48)
HGB BLD-MCNC: 12.3 G/DL (ref 11.5–15.5)
HGB UR QL STRIP.AUTO: NEGATIVE
IMM GRANULOCYTES # BLD AUTO: 0.03 K/UL (ref 0–0.58)
IMM GRANULOCYTES NFR BLD: 1 % (ref 0–5)
INR PPP: 1.3
KETONES UR STRIP-MCNC: NEGATIVE MG/DL
LACTATE BLDV-SCNC: 1.2 MMOL/L (ref 0.5–2.2)
LEUKOCYTE ESTERASE UR QL STRIP: NEGATIVE
LIPASE SERPL-CCNC: 16 U/L (ref 13–60)
LYMPHOCYTES NFR BLD: 0.93 K/UL (ref 1.5–4)
LYMPHOCYTES RELATIVE PERCENT: 15 % (ref 20–42)
Lab: NORMAL
MAGNESIUM SERPL-MCNC: 2 MG/DL (ref 1.6–2.6)
MCH RBC QN AUTO: 30.6 PG (ref 26–35)
MCHC RBC AUTO-ENTMCNC: 33.3 G/DL (ref 32–34.5)
MCV RBC AUTO: 91.8 FL (ref 80–99.9)
MONOCYTES NFR BLD: 0.89 K/UL (ref 0.1–0.95)
MONOCYTES NFR BLD: 14 % (ref 2–12)
NEGATIVE QC PASS/FAIL: NORMAL
NEUTROPHILS NFR BLD: 69 % (ref 43–80)
NEUTS SEG NFR BLD: 4.38 K/UL (ref 1.8–7.3)
NITRITE UR QL STRIP: NEGATIVE
PH UR STRIP: 6 [PH] (ref 5–9)
PLATELET # BLD AUTO: 190 K/UL (ref 130–450)
PMV BLD AUTO: 9.7 FL (ref 7–12)
POSITIVE QC PASS/FAIL: NORMAL
POTASSIUM SERPL-SCNC: 3.8 MMOL/L (ref 3.5–5)
PROT SERPL-MCNC: 7.7 G/DL (ref 6.4–8.3)
PROT UR STRIP-MCNC: NEGATIVE MG/DL
PROTHROMBIN TIME: 14.2 SEC (ref 9.3–12.4)
RBC # BLD AUTO: 4.02 M/UL (ref 3.5–5.5)
RBC #/AREA URNS HPF: ABNORMAL /HPF
SODIUM SERPL-SCNC: 143 MMOL/L (ref 132–146)
SP GR UR STRIP: 1.01 (ref 1–1.03)
UROBILINOGEN UR STRIP-ACNC: 0.2 EU/DL (ref 0–1)
WBC #/AREA URNS HPF: ABNORMAL /HPF
WBC OTHER # BLD: 6.4 K/UL (ref 4.5–11.5)

## 2024-11-21 PROCEDURE — 2580000003 HC RX 258: Performed by: RADIOLOGY

## 2024-11-21 PROCEDURE — 83605 ASSAY OF LACTIC ACID: CPT

## 2024-11-21 PROCEDURE — 85025 COMPLETE CBC W/AUTO DIFF WBC: CPT

## 2024-11-21 PROCEDURE — 87086 URINE CULTURE/COLONY COUNT: CPT

## 2024-11-21 PROCEDURE — 6360000002 HC RX W HCPCS

## 2024-11-21 PROCEDURE — 81001 URINALYSIS AUTO W/SCOPE: CPT

## 2024-11-21 PROCEDURE — 83735 ASSAY OF MAGNESIUM: CPT

## 2024-11-21 PROCEDURE — 85610 PROTHROMBIN TIME: CPT

## 2024-11-21 PROCEDURE — 6370000000 HC RX 637 (ALT 250 FOR IP)

## 2024-11-21 PROCEDURE — 74177 CT ABD & PELVIS W/CONTRAST: CPT

## 2024-11-21 PROCEDURE — 87077 CULTURE AEROBIC IDENTIFY: CPT

## 2024-11-21 PROCEDURE — 6360000004 HC RX CONTRAST MEDICATION: Performed by: RADIOLOGY

## 2024-11-21 PROCEDURE — 83690 ASSAY OF LIPASE: CPT

## 2024-11-21 PROCEDURE — 96375 TX/PRO/DX INJ NEW DRUG ADDON: CPT

## 2024-11-21 PROCEDURE — 96376 TX/PRO/DX INJ SAME DRUG ADON: CPT

## 2024-11-21 PROCEDURE — 80053 COMPREHEN METABOLIC PANEL: CPT

## 2024-11-21 PROCEDURE — 82248 BILIRUBIN DIRECT: CPT

## 2024-11-21 PROCEDURE — 6360000002 HC RX W HCPCS: Performed by: EMERGENCY MEDICINE

## 2024-11-21 PROCEDURE — 96374 THER/PROPH/DIAG INJ IV PUSH: CPT

## 2024-11-21 PROCEDURE — 99285 EMERGENCY DEPT VISIT HI MDM: CPT

## 2024-11-21 RX ORDER — ONDANSETRON 4 MG/1
4 TABLET, ORALLY DISINTEGRATING ORAL 3 TIMES DAILY PRN
Qty: 21 TABLET | Refills: 0 | Status: SHIPPED | OUTPATIENT
Start: 2024-11-21

## 2024-11-21 RX ORDER — DICYCLOMINE HYDROCHLORIDE 10 MG/1
10 CAPSULE ORAL
Qty: 120 CAPSULE | Refills: 0 | Status: SHIPPED | OUTPATIENT
Start: 2024-11-21

## 2024-11-21 RX ORDER — ONDANSETRON 2 MG/ML
4 INJECTION INTRAMUSCULAR; INTRAVENOUS ONCE
Status: COMPLETED | OUTPATIENT
Start: 2024-11-21 | End: 2024-11-21

## 2024-11-21 RX ORDER — FENTANYL CITRATE 50 UG/ML
50 INJECTION, SOLUTION INTRAMUSCULAR; INTRAVENOUS ONCE
Status: COMPLETED | OUTPATIENT
Start: 2024-11-21 | End: 2024-11-21

## 2024-11-21 RX ORDER — CEFDINIR 300 MG/1
300 CAPSULE ORAL ONCE
Status: COMPLETED | OUTPATIENT
Start: 2024-11-21 | End: 2024-11-21

## 2024-11-21 RX ORDER — PHENAZOPYRIDINE HYDROCHLORIDE 100 MG/1
100 TABLET, FILM COATED ORAL 3 TIMES DAILY PRN
Qty: 9 TABLET | Refills: 0 | Status: SHIPPED | OUTPATIENT
Start: 2024-11-21 | End: 2024-11-24

## 2024-11-21 RX ORDER — IOPAMIDOL 755 MG/ML
75 INJECTION, SOLUTION INTRAVASCULAR
Status: COMPLETED | OUTPATIENT
Start: 2024-11-21 | End: 2024-11-21

## 2024-11-21 RX ORDER — SODIUM CHLORIDE 0.9 % (FLUSH) 0.9 %
10 SYRINGE (ML) INJECTION PRN
Status: DISCONTINUED | OUTPATIENT
Start: 2024-11-21 | End: 2024-11-22 | Stop reason: HOSPADM

## 2024-11-21 RX ORDER — OXYCODONE AND ACETAMINOPHEN 5; 325 MG/1; MG/1
1 TABLET ORAL ONCE
Status: COMPLETED | OUTPATIENT
Start: 2024-11-21 | End: 2024-11-21

## 2024-11-21 RX ORDER — CEFDINIR 300 MG/1
300 CAPSULE ORAL 2 TIMES DAILY
Qty: 14 CAPSULE | Refills: 0 | Status: SHIPPED | OUTPATIENT
Start: 2024-11-21 | End: 2024-11-28

## 2024-11-21 RX ADMIN — IOPAMIDOL 75 ML: 755 INJECTION, SOLUTION INTRAVENOUS at 18:37

## 2024-11-21 RX ADMIN — FENTANYL CITRATE 50 MCG: 50 INJECTION INTRAMUSCULAR; INTRAVENOUS at 19:34

## 2024-11-21 RX ADMIN — OXYCODONE HYDROCHLORIDE AND ACETAMINOPHEN 1 TABLET: 5; 325 TABLET ORAL at 22:54

## 2024-11-21 RX ADMIN — FENTANYL CITRATE 50 MCG: 50 INJECTION INTRAMUSCULAR; INTRAVENOUS at 17:35

## 2024-11-21 RX ADMIN — Medication 10 ML: at 18:40

## 2024-11-21 RX ADMIN — CEFDINIR 300 MG: 300 CAPSULE ORAL at 22:54

## 2024-11-21 RX ADMIN — ONDANSETRON 4 MG: 2 INJECTION INTRAMUSCULAR; INTRAVENOUS at 19:34

## 2024-11-21 ASSESSMENT — PAIN DESCRIPTION - ORIENTATION
ORIENTATION: OTHER (COMMENT)
ORIENTATION: RIGHT

## 2024-11-21 ASSESSMENT — ENCOUNTER SYMPTOMS
COUGH: 0
WHEEZING: 0
VOMITING: 1
CHEST TIGHTNESS: 0
NAUSEA: 1
ABDOMINAL PAIN: 1

## 2024-11-21 ASSESSMENT — PAIN DESCRIPTION - LOCATION
LOCATION: GENERALIZED
LOCATION: ABDOMEN

## 2024-11-21 ASSESSMENT — PAIN SCALES - GENERAL
PAINLEVEL_OUTOF10: 8
PAINLEVEL_OUTOF10: 10

## 2024-11-21 ASSESSMENT — PAIN - FUNCTIONAL ASSESSMENT: PAIN_FUNCTIONAL_ASSESSMENT: ACTIVITIES ARE NOT PREVENTED

## 2024-11-21 ASSESSMENT — PAIN DESCRIPTION - DESCRIPTORS: DESCRIPTORS: ACHING;BURNING;CRAMPING;CRUSHING

## 2024-11-21 NOTE — ED PROVIDER NOTES
[facility] EMERGENCY DEPARTMENT  EMERGENCY DEPARTMENT ENCOUNTER        Pt Name: Brooklyn Olmstead  MRN: [id number]  Birthdate 1985  Date of evaluation: 11/21/2024  Provider: Lacey Atkinson MD  PCP: Abdelrahman Montana DO  Note Started: 4:47 PM EST 11/21/24    CHIEF COMPLAINT       Chief Complaint   Patient presents with    Abdominal Pain     RUQ abd pain starting 2 days ago post treatment for liver CA       HISTORY OF PRESENT ILLNESS: 1 or more Elements   History From: PATIENT and chart review         Brooklyn Olmstead is a 39 y.o. female with PMHx of neuroendocrine tumor with metastasis to liver, hypothyroidism, IBS, migraine presented to ED for abdominal pain started few days ago, her family insisted her to get evaluation in ED. She also reported decreased eating and drinking, some nausea and vomiting, mainly yellowish. She has cough, denied fever or chills, no chest pain or shortness of breath. She reported chronic diarrhea due to the cancer, she denied symptoms of urination. She appeared emotional and tearful, howver she denied thoughts of hurting or killing herself, she has kids. She follows with Dr. Hu and Dr. Castro as outpatient.       Nursing Notes were all reviewed and agreed with or any disagreements were addressed in the HPI.    REVIEW OF SYSTEMS :      Review of Systems   Constitutional:  Negative for chills, diaphoresis and fever.   Respiratory:  Negative for cough, chest tightness and wheezing.    Cardiovascular:  Negative for chest pain, palpitations and leg swelling.   Gastrointestinal:  Positive for abdominal pain, nausea and vomiting.   Genitourinary:  Negative for dysuria, flank pain and hematuria.   Psychiatric/Behavioral:  Negative for confusion and suicidal ideas.        SURGICAL HISTORY     Past Surgical History:   Procedure Laterality Date    CHOLECYSTECTOMY, LAPAROSCOPIC  07/06/2016    COLONOSCOPY N/A 06/22/2018    COLONOSCOPY WITH BIOPSY

## 2024-11-22 NOTE — TELEPHONE ENCOUNTER
[employer] message sent to patient about following up with Dr. Castro after recent CT scan.

## 2024-11-22 NOTE — ED PROVIDER NOTES
HPI:  11/21/24, Time: 7:15 PM EST         Brooklyn Olmstead is a 39 y.o. female presenting to the ED for abdominal pain.    I reviewed the patient's chart.  Patient admitted on 9/10/2024 for Y90 treatment.  History of metastatic neuroendocrine tumor.    Patient's that she has had this pain before but it worsened today.  She reports associated nausea with nonbloody emesis.  She also has nonbloody diarrhea.  She denies fevers, chest pain, shortness of breath.  She does have some burning with urination.    --------------------------------------------- PAST HISTORY ---------------------------------------------  Past Medical History:  has a past medical history of Abdominal pain, Anxiety, Cancer (HCC), Chronic kidney disease, Diarrhea, Hypocalcemia, Hypothyroidism, Irritable bowel syndrome, Liver disease, Migraines, Seizures (HCC), and Status post alcohol detoxification.    Past Surgical History:  has a past surgical history that includes Parathyroid gland surgery; Cholecystectomy, laparoscopic (07/06/2016); Thyroidectomy; Colonoscopy (N/A, 06/22/2018); CT NEEDLE BIOPSY LIVER PERCUTANEOUS (09/01/2020); Small intestine surgery (N/A, 10/21/2020); CT BIOPSY RENAL (01/25/2021); hc dialysis catheter (N/A, 01/28/2021); sigmoidoscopy (N/A, 05/14/2021); Port Surgery (Left, 02/18/2022); Upper gastrointestinal endoscopy; IR EMBOLIZATION TUMOR/ORGAN ISCH/INFARC (8/20/2024); and IR EMBOLIZATION TUMOR/ORGAN ISCH/INFARC (9/10/2024).    Social History:  reports that she has been smoking cigarettes. She has a 0.8 pack-year smoking history. She has never used smokeless tobacco. She reports current drug use. Drug: Marijuana (Weed). She reports that she does not drink alcohol.    Family History: family history includes Alzheimer's Disease in an other family member; Asthma in her father; Breast Cancer in her maternal grandmother; Cancer in her father, maternal grandmother, and paternal grandfather; Diabetes in an other family member; Heart

## 2024-11-22 NOTE — TELEPHONE ENCOUNTER
Received after hours call from the patient. She is currently in the ER due to abdominal pain and nausea/vomiting. CT abd/pel, reviewed. Pt c/o abd pain being very severe. I offered to communicate with EM to further assess.   Discussed case with Dr. Atkinson. Plan is for symptom control and supportive management at this time.

## 2024-11-22 NOTE — PROGRESS NOTES
Spiritual Health History and Assessment/Progress Note  [facility]    (P) Initial Encounter, Spiritual/Emotional Needs,  ,  ,      Name: Brooklyn Olmstead MRN: [id number]    Age: 39 y.o.     Sex: female   Language: English   Congregation: Jewish   <principal problem not specified>     Date: 11/21/2024                           Spiritual Assessment began in [facility] EMERGENCY DEPARTMENT        Referral/Consult From: (P) Multi-disciplinary team   Encounter Overview/Reason: (P) Initial Encounter, Spiritual/Emotional Needs  Service Provided For: (P) Patient    Rhiannon, Belief, Meaning:   Patient identifies as spiritual  Family/Friends No family/friends present      Importance and Influence:  Patient has spiritual/personal beliefs that influence decisions regarding their health  Family/Friends No family/friends present    Community:  Patient is connected with a spiritual community  Family/Friends No family/friends present    Assessment and Plan of Care:     Patient Interventions include: Facilitated expression of thoughts and feelings and Affirmed coping skills/support systems  Family/Friends Interventions include: No family/friends present    Patient Plan of Care: No spiritual needs identified for follow-up  Family/Friends Plan of Care: No family/friends present    Electronically signed by Chaplain LISA on 11/21/2024 at 7:13 PM

## 2024-11-24 LAB
MICROORGANISM SPEC CULT: ABNORMAL
MICROORGANISM SPEC CULT: ABNORMAL
SERVICE CMNT-IMP: ABNORMAL
SPECIMEN DESCRIPTION: ABNORMAL

## 2024-11-27 LAB
EKG ATRIAL RATE: 56 BPM
EKG P AXIS: 62 DEGREES
EKG P-R INTERVAL: 150 MS
EKG Q-T INTERVAL: 444 MS
EKG QRS DURATION: 76 MS
EKG QTC CALCULATION (BAZETT): 428 MS
EKG R AXIS: 78 DEGREES
EKG T AXIS: 63 DEGREES
EKG VENTRICULAR RATE: 56 BPM

## 2024-12-02 DIAGNOSIS — C7B.8 METASTATIC MALIGNANT NEUROENDOCRINE TUMOR TO LIVER (HCC): Primary | ICD-10-CM

## 2024-12-05 ENCOUNTER — HOSPITAL ENCOUNTER (OUTPATIENT)
Dept: INFUSION THERAPY | Age: 39
Discharge: HOME | End: 2024-12-05
Payer: COMMERCIAL

## 2024-12-05 ENCOUNTER — OFFICE VISIT (OUTPATIENT)
Dept: ONCOLOGY | Age: 39
End: 2024-12-05
Payer: COMMERCIAL

## 2024-12-05 ENCOUNTER — TELEPHONE (OUTPATIENT)
Dept: ONCOLOGY | Age: 39
End: 2024-12-05

## 2024-12-05 ENCOUNTER — OFFICE VISIT (OUTPATIENT)
Dept: HEMATOLOGY | Age: 39
End: 2024-12-05
Payer: COMMERCIAL

## 2024-12-05 VITALS
WEIGHT: 128 LBS | HEART RATE: 68 BPM | DIASTOLIC BLOOD PRESSURE: 49 MMHG | HEIGHT: 60 IN | SYSTOLIC BLOOD PRESSURE: 101 MMHG | BODY MASS INDEX: 25.13 KG/M2 | OXYGEN SATURATION: 100 % | TEMPERATURE: 97.7 F

## 2024-12-05 DIAGNOSIS — D3A.8 NEUROENDOCRINE TUMOR: Primary | ICD-10-CM

## 2024-12-05 DIAGNOSIS — C7A.8 NEUROENDOCRINE CANCER (HCC): ICD-10-CM

## 2024-12-05 DIAGNOSIS — C7A.8 NEUROENDOCRINE CANCER (HCC): Primary | ICD-10-CM

## 2024-12-05 DIAGNOSIS — C7B.8 METASTATIC MALIGNANT NEUROENDOCRINE TUMOR TO LIVER (HCC): ICD-10-CM

## 2024-12-05 DIAGNOSIS — C7B.8 METASTATIC MALIGNANT NEUROENDOCRINE TUMOR TO LIVER (HCC): Primary | ICD-10-CM

## 2024-12-05 LAB
ALBUMIN SERPL-MCNC: 4.1 G/DL (ref 3.5–5.2)
ALP SERPL-CCNC: 115 U/L (ref 35–104)
ALT SERPL-CCNC: 14 U/L (ref 0–32)
ANION GAP SERPL CALCULATED.3IONS-SCNC: 9 MMOL/L (ref 7–16)
AST SERPL-CCNC: 19 U/L (ref 0–31)
BASOPHILS # BLD: 0.07 K/UL (ref 0–0.2)
BASOPHILS NFR BLD: 1 % (ref 0–2)
BILIRUB SERPL-MCNC: 0.2 MG/DL (ref 0–1.2)
BUN SERPL-MCNC: 8 MG/DL (ref 6–20)
CALCIUM SERPL-MCNC: 8.6 MG/DL (ref 8.6–10.2)
CHLORIDE SERPL-SCNC: 103 MMOL/L (ref 98–107)
CO2 SERPL-SCNC: 26 MMOL/L (ref 22–29)
CREAT SERPL-MCNC: 0.7 MG/DL (ref 0.5–1)
EOSINOPHIL # BLD: 0.11 K/UL (ref 0.05–0.5)
EOSINOPHILS RELATIVE PERCENT: 2 % (ref 0–6)
ERYTHROCYTE [DISTWIDTH] IN BLOOD BY AUTOMATED COUNT: 15.7 % (ref 11.5–15)
GFR, ESTIMATED: >90 ML/MIN/1.73M2
GLUCOSE SERPL-MCNC: 99 MG/DL (ref 74–99)
HCT VFR BLD AUTO: 35.6 % (ref 34–48)
HGB BLD-MCNC: 11.4 G/DL (ref 11.5–15.5)
IMM GRANULOCYTES # BLD AUTO: 0.03 K/UL (ref 0–0.58)
IMM GRANULOCYTES NFR BLD: 1 % (ref 0–5)
LYMPHOCYTES NFR BLD: 1.02 K/UL (ref 1.5–4)
LYMPHOCYTES RELATIVE PERCENT: 17 % (ref 20–42)
MCH RBC QN AUTO: 30.2 PG (ref 26–35)
MCHC RBC AUTO-ENTMCNC: 32 G/DL (ref 32–34.5)
MCV RBC AUTO: 94.2 FL (ref 80–99.9)
MONOCYTES NFR BLD: 0.78 K/UL (ref 0.1–0.95)
MONOCYTES NFR BLD: 13 % (ref 2–12)
NEUTROPHILS NFR BLD: 67 % (ref 43–80)
NEUTS SEG NFR BLD: 4.13 K/UL (ref 1.8–7.3)
PLATELET # BLD AUTO: 272 K/UL (ref 130–450)
PMV BLD AUTO: 9.2 FL (ref 7–12)
POTASSIUM SERPL-SCNC: 4.4 MMOL/L (ref 3.5–5)
PROT SERPL-MCNC: 6.8 G/DL (ref 6.4–8.3)
RBC # BLD AUTO: 3.78 M/UL (ref 3.5–5.5)
SODIUM SERPL-SCNC: 138 MMOL/L (ref 132–146)
WBC OTHER # BLD: 6.1 K/UL (ref 4.5–11.5)

## 2024-12-05 PROCEDURE — 99212 OFFICE O/P EST SF 10 MIN: CPT | Performed by: TRANSPLANT SURGERY

## 2024-12-05 PROCEDURE — G8484 FLU IMMUNIZE NO ADMIN: HCPCS | Performed by: STUDENT IN AN ORGANIZED HEALTH CARE EDUCATION/TRAINING PROGRAM

## 2024-12-05 PROCEDURE — 96372 THER/PROPH/DIAG INJ SC/IM: CPT

## 2024-12-05 PROCEDURE — G8427 DOCREV CUR MEDS BY ELIG CLIN: HCPCS | Performed by: STUDENT IN AN ORGANIZED HEALTH CARE EDUCATION/TRAINING PROGRAM

## 2024-12-05 PROCEDURE — 80053 COMPREHEN METABOLIC PANEL: CPT

## 2024-12-05 PROCEDURE — G8484 FLU IMMUNIZE NO ADMIN: HCPCS | Performed by: TRANSPLANT SURGERY

## 2024-12-05 PROCEDURE — G8428 CUR MEDS NOT DOCUMENT: HCPCS | Performed by: TRANSPLANT SURGERY

## 2024-12-05 PROCEDURE — G8419 CALC BMI OUT NRM PARAM NOF/U: HCPCS | Performed by: TRANSPLANT SURGERY

## 2024-12-05 PROCEDURE — 99213 OFFICE O/P EST LOW 20 MIN: CPT | Performed by: TRANSPLANT SURGERY

## 2024-12-05 PROCEDURE — 99214 OFFICE O/P EST MOD 30 MIN: CPT | Performed by: STUDENT IN AN ORGANIZED HEALTH CARE EDUCATION/TRAINING PROGRAM

## 2024-12-05 PROCEDURE — 96402 CHEMO HORMON ANTINEOPL SQ/IM: CPT

## 2024-12-05 PROCEDURE — 85025 COMPLETE CBC W/AUTO DIFF WBC: CPT

## 2024-12-05 PROCEDURE — 2580000003 HC RX 258: Performed by: STUDENT IN AN ORGANIZED HEALTH CARE EDUCATION/TRAINING PROGRAM

## 2024-12-05 PROCEDURE — 4004F PT TOBACCO SCREEN RCVD TLK: CPT | Performed by: STUDENT IN AN ORGANIZED HEALTH CARE EDUCATION/TRAINING PROGRAM

## 2024-12-05 PROCEDURE — 4004F PT TOBACCO SCREEN RCVD TLK: CPT | Performed by: TRANSPLANT SURGERY

## 2024-12-05 PROCEDURE — G8419 CALC BMI OUT NRM PARAM NOF/U: HCPCS | Performed by: STUDENT IN AN ORGANIZED HEALTH CARE EDUCATION/TRAINING PROGRAM

## 2024-12-05 PROCEDURE — 6360000002 HC RX W HCPCS: Performed by: STUDENT IN AN ORGANIZED HEALTH CARE EDUCATION/TRAINING PROGRAM

## 2024-12-05 RX ORDER — LANREOTIDE ACETATE 120 MG/.5ML
120 INJECTION SUBCUTANEOUS ONCE
OUTPATIENT
Start: 2024-12-26 | End: 2024-12-26

## 2024-12-05 RX ORDER — SODIUM CHLORIDE 0.9 % (FLUSH) 0.9 %
5-40 SYRINGE (ML) INJECTION PRN
Status: DISCONTINUED | OUTPATIENT
Start: 2024-12-05 | End: 2024-12-06 | Stop reason: HOSPADM

## 2024-12-05 RX ORDER — LANREOTIDE ACETATE 120 MG/.5ML
120 INJECTION SUBCUTANEOUS ONCE
Status: COMPLETED | OUTPATIENT
Start: 2024-12-05 | End: 2024-12-05

## 2024-12-05 RX ORDER — HEPARIN 100 UNIT/ML
500 SYRINGE INTRAVENOUS PRN
Status: DISCONTINUED | OUTPATIENT
Start: 2024-12-05 | End: 2024-12-06 | Stop reason: HOSPADM

## 2024-12-05 RX ADMIN — HEPARIN 500 UNITS: 100 SYRINGE at 13:39

## 2024-12-05 RX ADMIN — SODIUM CHLORIDE, PRESERVATIVE FREE 10 ML: 5 INJECTION INTRAVENOUS at 13:38

## 2024-12-05 RX ADMIN — LANREOTIDE ACETATE 120 MG: 120 INJECTION SUBCUTANEOUS at 14:33

## 2024-12-05 NOTE — TELEPHONE ENCOUNTER
Dr. Hu requesting TMB and MMR be done on surgical specimen from 10/21/2020 accession # [id number]  Order faxed to pathology with fax confirmation received.  Called pathology and spoke with Livia who confirmed that they received order.  Requested that pathology call if order can not be done on this specimen.

## 2024-12-05 NOTE — PROGRESS NOTES
[facility] PHYSICIANS [facility] CARE [facility] MED ONCOLOGY  [address]  [facility] [address]  Dept: [phone]  Loc: [phone]    Clinical Progress Note    Reason for visit: Neuroendocrine cancer to liver     PCP:  Abdelrahman Montana DO       Subjective:  Pt returns for next dose of Somatuline. She was in the ED recently for abdominal pain, n/v and diarrhea. She is feeling better. She feels these symptoms have been occurring more frequently of late.       ONCOLOGIC HISTORY:  39 y.o. female with hx of hypothyroidism, IBS,migraine who was complaining of abdominal pain associated with diarrhea and flushing/sweating.      CT abdomen/pelvis 08/28/2020:  2 cm masslike density in the mid abdominal small bowel mesentery with a spiculated appearance.  Multiple liver masses suspicious for metastatic disease.     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     Comment: Sections of the liver tissue cores show the hepatic parenchyma to be partially replaced by a proliferation of epithelioid cells with relatively uniform round nuclei and eosinophilic granular cytoplasm.  The   epithelioid cells form anastomosing solid cords/nests that are surrounded by delicate fibrovascular stroma.  There are no mitotic figures or tumor cell necrosis present.  The histologic changes seen are suggestive of well-differentiated neuroendocrine (carcinoid) tumor.     Immunostaining for pankeratin, chromogranin, synaptophysin, TTF-1 and Ki-67 was performed on sections of one tissue block (A1) and the neoplastic epithelioid cells show diffuse and strong positivity for neuroendocrine markers (chromogranin and synaptophysin) and moderate positivity for pankeratin.  There is no staining reactivity for TTF-1.   The Ki-67 proliferation labeling index is essentially negative, (very low, <1%).   This staining pattern confirms the

## 2024-12-05 NOTE — PROGRESS NOTES
Hepatobiliary and Pancreatic Surgery Progress Note    CC: Follow-up emergency room visit    Subjective: Patient states that she is doing well.  She has had Y90 and Lutathera in the past she is still having about 4 bowel movements a day.  She is having some right lower quadrant abdominal pain.    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 Hu  -Cussed the case with Dr. Elliott who is planning on performing a repeat TACE procedure.  - follow up after dotatate Pet CT    20 Minutes of which greater than 50% was spent counseling or coordinating her care.    Thank you Dr. Montana for the consultation and allowing me to take part in Ms. Olmstead's care.      Lambert Castro MD 12/5/2024 12:11 PM

## 2024-12-08 DIAGNOSIS — F51.01 PRIMARY INSOMNIA: ICD-10-CM

## 2024-12-08 RX ORDER — TEMOZOLOMIDE 100 MG/1
300 CAPSULE ORAL DAILY
Qty: 15 CAPSULE | Refills: 0 | Status: SHIPPED | OUTPATIENT
Start: 2024-12-08 | End: 2024-12-13

## 2024-12-08 RX ORDER — CAPECITABINE 500 MG/1
1000 TABLET, FILM COATED ORAL 2 TIMES DAILY
Qty: 56 TABLET | Refills: 0 | Status: SHIPPED | OUTPATIENT
Start: 2024-12-08 | End: 2024-12-22

## 2024-12-09 DIAGNOSIS — D3A.8 NEUROENDOCRINE TUMOR: Primary | ICD-10-CM

## 2024-12-09 RX ORDER — ZOLPIDEM TARTRATE 10 MG/1
10 TABLET ORAL NIGHTLY PRN
Qty: 30 TABLET | Refills: 2 | Status: SHIPPED | OUTPATIENT
Start: 2024-12-09 | End: 2025-01-08

## 2024-12-10 ENCOUNTER — TELEPHONE (OUTPATIENT)
Dept: ONCOLOGY | Age: 39
End: 2024-12-10

## 2024-12-10 ENCOUNTER — CLINICAL DOCUMENTATION (OUTPATIENT)
Dept: INTERVENTIONAL RADIOLOGY/VASCULAR | Age: 39
End: 2024-12-10

## 2024-12-10 RX ORDER — SULFAMETHOXAZOLE AND TRIMETHOPRIM 800; 160 MG/1; MG/1
1 TABLET ORAL
Qty: 12 TABLET | Refills: 5 | Status: SHIPPED | OUTPATIENT
Start: 2024-12-11

## 2024-12-10 NOTE — TELEPHONE ENCOUNTER
Oral Chemotherapy Management Program      Brooklyn Olmstead is a 39 y.o.female seen via telephone for initial consultation for pharmacist oral chemotherapy management.     Diagnosis: Neuroendocrine tumor    Medication name: capecitabine (Xeloda)  Dose: 1,000 mg (two 500 mg tabs)   Frequency: BID on Days 1 - 14 every 28 day cycle    Medication name: temozolomide (Temodar)  Dose: 300 mg (three 100 mg tabs)   Frequency: Daily at bedtime on Days 10 - 14 every 28 day cycle    Ordering provider: Mayte     An overview of my role in their care was given. Consent for enrollment into the pharmacist drug therapy management program was obtained verbally. Mechanism of action, dose, schedule, administration, and potential side effects of the prescribed therapy were discussed in detail. Some of the side effects discussed include, but are not limited to, bone marrow suppression, nausea/vomiting, hand foot syndrome, diarrhea, constipation, hepatotoxicity, mucositis, and infection. Patient verbalized understanding throughout the education session. A link to the information discussed on their therapy was provided to the patient via [employer].    Prescribed medication was reviewed for appropriate indication and dosing. Medication list was reviewed for potential drug interactions.    All questions asked were answered or deferred to the oncology team.    Supportive care medications prescribed: Bactrim. Patient has supply of Zofran already.    Prashant Ruiz PharmD, BCOP  Clinical Pharmacist, Hematology/Oncology  [facility]

## 2024-12-10 NOTE — PROGRESS NOTES
I used [facility] Provider portal to check for Prior Authorization needed for CPT code(s): 25651, 19412, 40914, 94699, 79947, 87363, 60797    Prior Authorization is required, clinicals were submitted via portal.  Prior Authorization is Approved.     Reference:[id number]  Valid Dates: 1/9/2025- 1/10/2025

## 2024-12-16 DIAGNOSIS — G43.909 MIGRAINE WITHOUT STATUS MIGRAINOSUS, NOT INTRACTABLE, UNSPECIFIED MIGRAINE TYPE: ICD-10-CM

## 2024-12-16 RX ORDER — BUTALBITAL, ACETAMINOPHEN AND CAFFEINE 50; 325; 40 MG/1; MG/1; MG/1
TABLET ORAL
Qty: 60 TABLET | Refills: 1 | Status: SHIPPED | OUTPATIENT
Start: 2024-12-16

## 2024-12-17 ENCOUNTER — TELEPHONE (OUTPATIENT)
Dept: CASE MANAGEMENT | Age: 39
End: 2024-12-17

## 2024-12-17 NOTE — TELEPHONE ENCOUNTER
Called pathology and spoke to Serena regarding order sent on 12/5/24 for TMB and MMR.  Serena states that she will call [facility] and check about order.    Received call back from Serena who stated that testing should be complete in a week.

## 2024-12-23 LAB — SURGICAL PATHOLOGY REPORT: NORMAL

## 2024-12-31 ENCOUNTER — HOSPITAL ENCOUNTER (OUTPATIENT)
Dept: NUCLEAR MEDICINE | Age: 39
Discharge: HOME | End: 2025-01-02
Payer: COMMERCIAL

## 2024-12-31 DIAGNOSIS — C7B.8 METASTATIC MALIGNANT NEUROENDOCRINE TUMOR TO LIVER (HCC): ICD-10-CM

## 2024-12-31 DIAGNOSIS — D3A.8 NEUROENDOCRINE TUMOR: ICD-10-CM

## 2024-12-31 PROCEDURE — A9587 GALLIUM GA-68: HCPCS | Performed by: RADIOLOGY

## 2024-12-31 PROCEDURE — 3430000000 HC RX DIAGNOSTIC RADIOPHARMACEUTICAL: Performed by: RADIOLOGY

## 2024-12-31 PROCEDURE — 78815 PET IMAGE W/CT SKULL-THIGH: CPT

## 2024-12-31 RX ORDER — 68GA-DOTATATE 40 MCG
3 KIT INTRAVENOUS ONCE
Status: COMPLETED | OUTPATIENT
Start: 2024-12-31 | End: 2024-12-31

## 2024-12-31 RX ADMIN — 68GA-DOTATATE 3 MILLICURIE: KIT INTRAVENOUS at 12:08

## 2025-01-02 ENCOUNTER — HOSPITAL ENCOUNTER (OUTPATIENT)
Dept: INFUSION THERAPY | Age: 40
Discharge: HOME | End: 2025-01-02
Payer: COMMERCIAL

## 2025-01-02 ENCOUNTER — OFFICE VISIT (OUTPATIENT)
Dept: ONCOLOGY | Age: 40
End: 2025-01-02
Payer: COMMERCIAL

## 2025-01-02 ENCOUNTER — OFFICE VISIT (OUTPATIENT)
Dept: HEMATOLOGY | Age: 40
End: 2025-01-02

## 2025-01-02 VITALS
OXYGEN SATURATION: 100 % | HEIGHT: 60 IN | SYSTOLIC BLOOD PRESSURE: 114 MMHG | BODY MASS INDEX: 24.9 KG/M2 | WEIGHT: 126.8 LBS | HEART RATE: 65 BPM | TEMPERATURE: 97.8 F | DIASTOLIC BLOOD PRESSURE: 59 MMHG

## 2025-01-02 DIAGNOSIS — C7B.8 METASTATIC MALIGNANT NEUROENDOCRINE TUMOR TO LIVER (HCC): Primary | ICD-10-CM

## 2025-01-02 DIAGNOSIS — C7A.8 NEUROENDOCRINE CANCER (HCC): ICD-10-CM

## 2025-01-02 DIAGNOSIS — Z09 FOLLOW-UP EXAM: Primary | ICD-10-CM

## 2025-01-02 LAB
ALBUMIN SERPL-MCNC: 4.6 G/DL (ref 3.5–5.2)
ALP SERPL-CCNC: 104 U/L (ref 35–104)
ALT SERPL-CCNC: 11 U/L (ref 0–32)
ANION GAP SERPL CALCULATED.3IONS-SCNC: 9 MMOL/L (ref 7–16)
AST SERPL-CCNC: 17 U/L (ref 0–31)
BASOPHILS # BLD: 0.07 K/UL (ref 0–0.2)
BASOPHILS NFR BLD: 1 % (ref 0–2)
BILIRUB SERPL-MCNC: 0.3 MG/DL (ref 0–1.2)
BUN SERPL-MCNC: 10 MG/DL (ref 6–20)
CALCIUM SERPL-MCNC: 8.9 MG/DL (ref 8.6–10.2)
CHLORIDE SERPL-SCNC: 103 MMOL/L (ref 98–107)
CO2 SERPL-SCNC: 26 MMOL/L (ref 22–29)
CREAT SERPL-MCNC: 0.6 MG/DL (ref 0.5–1)
EOSINOPHIL # BLD: 0.08 K/UL (ref 0.05–0.5)
EOSINOPHILS RELATIVE PERCENT: 1 % (ref 0–6)
ERYTHROCYTE [DISTWIDTH] IN BLOOD BY AUTOMATED COUNT: 17.2 % (ref 11.5–15)
GFR, ESTIMATED: >90 ML/MIN/1.73M2
GLUCOSE SERPL-MCNC: 88 MG/DL (ref 74–99)
HCT VFR BLD AUTO: 37.7 % (ref 34–48)
HGB BLD-MCNC: 12.3 G/DL (ref 11.5–15.5)
IMM GRANULOCYTES # BLD AUTO: 0.05 K/UL (ref 0–0.58)
IMM GRANULOCYTES NFR BLD: 1 % (ref 0–5)
LYMPHOCYTES NFR BLD: 0.93 K/UL (ref 1.5–4)
LYMPHOCYTES RELATIVE PERCENT: 12 % (ref 20–42)
MCH RBC QN AUTO: 30 PG (ref 26–35)
MCHC RBC AUTO-ENTMCNC: 32.6 G/DL (ref 32–34.5)
MCV RBC AUTO: 92 FL (ref 80–99.9)
MONOCYTES NFR BLD: 0.9 K/UL (ref 0.1–0.95)
MONOCYTES NFR BLD: 12 % (ref 2–12)
NEUTROPHILS NFR BLD: 74 % (ref 43–80)
NEUTS SEG NFR BLD: 5.69 K/UL (ref 1.8–7.3)
PLATELET # BLD AUTO: 276 K/UL (ref 130–450)
PMV BLD AUTO: 9.8 FL (ref 7–12)
POTASSIUM SERPL-SCNC: 3.9 MMOL/L (ref 3.5–5)
PROT SERPL-MCNC: 7.5 G/DL (ref 6.4–8.3)
RBC # BLD AUTO: 4.1 M/UL (ref 3.5–5.5)
SODIUM SERPL-SCNC: 138 MMOL/L (ref 132–146)
WBC OTHER # BLD: 7.7 K/UL (ref 4.5–11.5)

## 2025-01-02 PROCEDURE — 85025 COMPLETE CBC W/AUTO DIFF WBC: CPT

## 2025-01-02 PROCEDURE — 80053 COMPREHEN METABOLIC PANEL: CPT

## 2025-01-02 PROCEDURE — 2500000003 HC RX 250 WO HCPCS: Performed by: STUDENT IN AN ORGANIZED HEALTH CARE EDUCATION/TRAINING PROGRAM

## 2025-01-02 PROCEDURE — 99214 OFFICE O/P EST MOD 30 MIN: CPT

## 2025-01-02 PROCEDURE — 6360000002 HC RX W HCPCS: Performed by: STUDENT IN AN ORGANIZED HEALTH CARE EDUCATION/TRAINING PROGRAM

## 2025-01-02 PROCEDURE — 96372 THER/PROPH/DIAG INJ SC/IM: CPT

## 2025-01-02 PROCEDURE — 36591 DRAW BLOOD OFF VENOUS DEVICE: CPT

## 2025-01-02 RX ORDER — LANREOTIDE ACETATE 120 MG/.5ML
120 INJECTION SUBCUTANEOUS ONCE
Status: COMPLETED | OUTPATIENT
Start: 2025-01-02 | End: 2025-01-02

## 2025-01-02 RX ORDER — LANREOTIDE ACETATE 120 MG/.5ML
120 INJECTION SUBCUTANEOUS ONCE
OUTPATIENT
Start: 2025-01-30 | End: 2025-01-30

## 2025-01-02 RX ORDER — HEPARIN 100 UNIT/ML
500 SYRINGE INTRAVENOUS PRN
Status: DISCONTINUED | OUTPATIENT
Start: 2025-01-02 | End: 2025-01-03 | Stop reason: HOSPADM

## 2025-01-02 RX ORDER — SODIUM CHLORIDE 0.9 % (FLUSH) 0.9 %
5-40 SYRINGE (ML) INJECTION PRN
Status: DISCONTINUED | OUTPATIENT
Start: 2025-01-02 | End: 2025-01-03 | Stop reason: HOSPADM

## 2025-01-02 RX ADMIN — HEPARIN 500 UNITS: 100 SYRINGE at 13:37

## 2025-01-02 RX ADMIN — SODIUM CHLORIDE, PRESERVATIVE FREE 10 ML: 5 INJECTION INTRAVENOUS at 13:36

## 2025-01-02 RX ADMIN — LANREOTIDE ACETATE 120 MG: 120 INJECTION SUBCUTANEOUS at 14:50

## 2025-01-02 NOTE — PROGRESS NOTES
Patient did stop at check out window, ok'd to leave without AVS.  Patient has MYCHART.  Patient aware to arrive @ 1:00 pm. for labs same day first.      
impression of a well-differentiated neuroendocrine (carcinoid) tumor.     FL Small bowel 09/02/2020:  Rapid small bowel transit.     Serum Chromogranin A 160 on 09/23/2020.  Urine 5-HIAA 21 (0-14)  2d-Echo 10/10/2020: EF 60-65%   Normal right ventricular size and function     Dotatate PET/CT scan 10/14/2020 increased tracer uptake seen throughout the liver compatible with neoplasm. This involves both the left and the right lobe of liver.   In addition there are 2 foci of increased tracer uptake along the mesentery of the small bowel. This may involve the wall the small bowel.  No other convincing evidence for extra-abdominal metastatic disease.     EGD/Colonoscopy 10/05/2020 by Dr. Crawford; records reviewed.  Hepatobiliary team (Dr. Castro) consult appreciated. Small bowel resection on 10/21/2020.     Small bowel resection on 10/21/2020.  A.  Ileum, resection:   Multifocal (4 foci) neuroendocrine tumor (NET).   Extensive perineural and angiolymphatic invasion.   3 of 10 lymph nodes with metastatic neuroendocrine tumor and extracapsular extension of tumor cells (3/10).   Bilateral viable small bowel resection margins with no evidence of tumor.     B.  Specimen received as \"Meckel's\":   Nodular scar tissue with patchy chronic inflammation.   Negative for neuroendocrine tumor.   Intestinal mucosal tissue is not present.     CASE SUMMARY:   Procedure: Small bowel resection   Tumor site: Ileum   Tumor size: Greatest dimension of 1.5 cm   Tumor focality: Multifocal: 4 separate tumors   Histologic type and grade: G2: Well-differentiated neuroendocrine tumor   Mitotic rate: between 2-20 mitoses/2 mm2   Ki-67 labeling index: between 3-20%   Tumor extension: Tumor invades through the muscularis propria into subserosal tissue without penetration of overlying serosa   Margins: All margins are uninvolved by tumor    Margins examined: Proximal and distal   Lymphovascular invasion: Present   Perineural invasion: Present   Large

## 2025-01-03 DIAGNOSIS — C7B.8 METASTATIC MALIGNANT NEUROENDOCRINE TUMOR TO LIVER (HCC): ICD-10-CM

## 2025-01-03 DIAGNOSIS — D3A.8 NEUROENDOCRINE TUMOR: ICD-10-CM

## 2025-01-03 RX ORDER — TEMOZOLOMIDE 100 MG/1
300 CAPSULE ORAL DAILY
Qty: 15 CAPSULE | Refills: 2 | Status: ACTIVE | OUTPATIENT
Start: 2025-01-03

## 2025-01-03 RX ORDER — CAPECITABINE 500 MG/1
1000 TABLET, FILM COATED ORAL 2 TIMES DAILY
Qty: 56 TABLET | Refills: 2 | Status: ACTIVE | OUTPATIENT
Start: 2025-01-03

## 2025-01-07 RX ORDER — SODIUM CHLORIDE 0.9 % (FLUSH) 0.9 %
5-40 SYRINGE (ML) INJECTION PRN
Status: CANCELLED | OUTPATIENT
Start: 2025-01-09

## 2025-01-09 ENCOUNTER — HOSPITAL ENCOUNTER (OUTPATIENT)
Dept: INTERVENTIONAL RADIOLOGY/VASCULAR | Age: 40
Setting detail: OBSERVATION
Discharge: HOME | End: 2025-01-10
Attending: TRANSPLANT SURGERY | Admitting: TRANSPLANT SURGERY
Payer: COMMERCIAL

## 2025-01-09 DIAGNOSIS — C7A.8 NEUROENDOCRINE CANCER (HCC): Primary | ICD-10-CM

## 2025-01-09 DIAGNOSIS — C7A.8 NEURO-ENDOCRINE CARCINOMA (HCC): ICD-10-CM

## 2025-01-09 DIAGNOSIS — D3A.8 NEUROENDOCRINE TUMOR: ICD-10-CM

## 2025-01-09 DIAGNOSIS — C7B.8 NEUROENDOCRINE CARCINOMA METASTATIC TO LIVER (HCC): ICD-10-CM

## 2025-01-09 DIAGNOSIS — C7A.8 NEUROENDOCRINE CARCINOMA METASTATIC TO LIVER (HCC): ICD-10-CM

## 2025-01-09 LAB
BASOPHILS # BLD: 0.08 K/UL (ref 0–0.2)
BASOPHILS NFR BLD: 1 % (ref 0–2)
EOSINOPHIL # BLD: 0.13 K/UL (ref 0.05–0.5)
EOSINOPHILS RELATIVE PERCENT: 2 % (ref 0–6)
ERYTHROCYTE [DISTWIDTH] IN BLOOD BY AUTOMATED COUNT: 17 % (ref 11.5–15)
HCG SERPL QL: NEGATIVE
HCT VFR BLD AUTO: 32 % (ref 34–48)
HGB BLD-MCNC: 10.6 G/DL (ref 11.5–15.5)
IMM GRANULOCYTES # BLD AUTO: 0.04 K/UL (ref 0–0.58)
IMM GRANULOCYTES NFR BLD: 1 % (ref 0–5)
INR PPP: 1.4
LYMPHOCYTES NFR BLD: 0.85 K/UL (ref 1.5–4)
LYMPHOCYTES RELATIVE PERCENT: 14 % (ref 20–42)
MCH RBC QN AUTO: 30.2 PG (ref 26–35)
MCHC RBC AUTO-ENTMCNC: 33.1 G/DL (ref 32–34.5)
MCV RBC AUTO: 91.2 FL (ref 80–99.9)
MONOCYTES NFR BLD: 0.66 K/UL (ref 0.1–0.95)
MONOCYTES NFR BLD: 11 % (ref 2–12)
NEUTROPHILS NFR BLD: 72 % (ref 43–80)
NEUTS SEG NFR BLD: 4.4 K/UL (ref 1.8–7.3)
PLATELET # BLD AUTO: 201 K/UL (ref 130–450)
PMV BLD AUTO: 9.3 FL (ref 7–12)
PROTHROMBIN TIME: 15.5 SEC (ref 9.3–12.4)
RBC # BLD AUTO: 3.51 M/UL (ref 3.5–5.5)
WBC OTHER # BLD: 6.2 K/UL (ref 4.5–11.5)

## 2025-01-09 PROCEDURE — 2500000003 HC RX 250 WO HCPCS: Performed by: RADIOLOGY

## 2025-01-09 PROCEDURE — C1760 CLOSURE DEV, VASC: HCPCS

## 2025-01-09 PROCEDURE — 6360000002 HC RX W HCPCS: Performed by: RADIOLOGY

## 2025-01-09 PROCEDURE — 99153 MOD SED SAME PHYS/QHP EA: CPT

## 2025-01-09 PROCEDURE — 37243 VASC EMBOLIZE/OCCLUDE ORGAN: CPT

## 2025-01-09 PROCEDURE — 36247 INS CATH ABD/L-EXT ART 3RD: CPT

## 2025-01-09 PROCEDURE — G0379 DIRECT REFER HOSPITAL OBSERV: HCPCS

## 2025-01-09 PROCEDURE — 75726 ARTERY X-RAYS ABDOMEN: CPT

## 2025-01-09 PROCEDURE — 85610 PROTHROMBIN TIME: CPT

## 2025-01-09 PROCEDURE — 99222 1ST HOSP IP/OBS MODERATE 55: CPT | Performed by: TRANSPLANT SURGERY

## 2025-01-09 PROCEDURE — 96420 CHEMO IA PUSH TECNIQUE: CPT

## 2025-01-09 PROCEDURE — 6370000000 HC RX 637 (ALT 250 FOR IP)

## 2025-01-09 PROCEDURE — 6360000004 HC RX CONTRAST MEDICATION: Performed by: RADIOLOGY

## 2025-01-09 PROCEDURE — 36248 INS CATH ABD/L-EXT ART ADDL: CPT

## 2025-01-09 PROCEDURE — 84703 CHORIONIC GONADOTROPIN ASSAY: CPT

## 2025-01-09 PROCEDURE — 2580000003 HC RX 258: Performed by: RADIOLOGY

## 2025-01-09 PROCEDURE — C1889 IMPLANT/INSERT DEVICE, NOC: HCPCS

## 2025-01-09 PROCEDURE — 85025 COMPLETE CBC W/AUTO DIFF WBC: CPT

## 2025-01-09 PROCEDURE — 75774 ARTERY X-RAY EACH VESSEL: CPT

## 2025-01-09 PROCEDURE — 96374 THER/PROPH/DIAG INJ IV PUSH: CPT

## 2025-01-09 PROCEDURE — 76937 US GUIDE VASCULAR ACCESS: CPT

## 2025-01-09 PROCEDURE — G0378 HOSPITAL OBSERVATION PER HR: HCPCS

## 2025-01-09 PROCEDURE — 6370000000 HC RX 637 (ALT 250 FOR IP): Performed by: RADIOLOGY

## 2025-01-09 PROCEDURE — 76377 3D RENDER W/INTRP POSTPROCES: CPT

## 2025-01-09 RX ORDER — DIPHENHYDRAMINE HYDROCHLORIDE 50 MG/ML
50 INJECTION INTRAMUSCULAR; INTRAVENOUS ONCE
Status: COMPLETED | OUTPATIENT
Start: 2025-01-09 | End: 2025-01-09

## 2025-01-09 RX ORDER — LEVOTHYROXINE SODIUM 112 UG/1
112 TABLET ORAL DAILY
Status: DISCONTINUED | OUTPATIENT
Start: 2025-01-10 | End: 2025-01-10 | Stop reason: HOSPADM

## 2025-01-09 RX ORDER — PANTOPRAZOLE SODIUM 40 MG/1
40 TABLET, DELAYED RELEASE ORAL
Status: DISCONTINUED | OUTPATIENT
Start: 2025-01-09 | End: 2025-01-09

## 2025-01-09 RX ORDER — ONDANSETRON 2 MG/ML
12 INJECTION INTRAMUSCULAR; INTRAVENOUS ONCE
Status: COMPLETED | OUTPATIENT
Start: 2025-01-09 | End: 2025-01-09

## 2025-01-09 RX ORDER — SODIUM CHLORIDE 0.9 % (FLUSH) 0.9 %
5-40 SYRINGE (ML) INJECTION PRN
Status: ACTIVE | OUTPATIENT
Start: 2025-01-09 | End: 2025-01-10

## 2025-01-09 RX ORDER — IOPAMIDOL 755 MG/ML
INJECTION, SOLUTION INTRAVASCULAR PRN
Status: COMPLETED | OUTPATIENT
Start: 2025-01-09 | End: 2025-01-09

## 2025-01-09 RX ORDER — LORAZEPAM 2 MG/ML
INJECTION INTRAMUSCULAR PRN
Status: COMPLETED | OUTPATIENT
Start: 2025-01-09 | End: 2025-01-09

## 2025-01-09 RX ORDER — KETOROLAC TROMETHAMINE 30 MG/ML
INJECTION, SOLUTION INTRAMUSCULAR; INTRAVENOUS PRN
Status: COMPLETED | OUTPATIENT
Start: 2025-01-09 | End: 2025-01-09

## 2025-01-09 RX ORDER — ONDANSETRON 2 MG/ML
4 INJECTION INTRAMUSCULAR; INTRAVENOUS EVERY 8 HOURS PRN
Status: DISCONTINUED | OUTPATIENT
Start: 2025-01-09 | End: 2025-01-10 | Stop reason: HOSPADM

## 2025-01-09 RX ORDER — SODIUM CHLORIDE 9 MG/ML
INJECTION, SOLUTION INTRAVENOUS CONTINUOUS
Status: DISCONTINUED | OUTPATIENT
Start: 2025-01-09 | End: 2025-01-09

## 2025-01-09 RX ORDER — LIDOCAINE HYDROCHLORIDE 20 MG/ML
JELLY TOPICAL ONCE
Status: DISCONTINUED | OUTPATIENT
Start: 2025-01-09 | End: 2025-01-10 | Stop reason: HOSPADM

## 2025-01-09 RX ORDER — CAPECITABINE 500 MG/1
1000 TABLET, FILM COATED ORAL 2 TIMES DAILY
Status: DISCONTINUED | OUTPATIENT
Start: 2025-01-10 | End: 2025-01-10 | Stop reason: HOSPADM

## 2025-01-09 RX ORDER — FENTANYL CITRATE 50 UG/ML
INJECTION, SOLUTION INTRAMUSCULAR; INTRAVENOUS PRN
Status: COMPLETED | OUTPATIENT
Start: 2025-01-09 | End: 2025-01-09

## 2025-01-09 RX ORDER — DIPHENHYDRAMINE HYDROCHLORIDE 50 MG/ML
INJECTION INTRAMUSCULAR; INTRAVENOUS PRN
Status: COMPLETED | OUTPATIENT
Start: 2025-01-09 | End: 2025-01-09

## 2025-01-09 RX ORDER — DEXAMETHASONE SODIUM PHOSPHATE 4 MG/ML
4 INJECTION, SOLUTION INTRA-ARTICULAR; INTRALESIONAL; INTRAMUSCULAR; INTRAVENOUS; SOFT TISSUE ONCE
Status: COMPLETED | OUTPATIENT
Start: 2025-01-09 | End: 2025-01-09

## 2025-01-09 RX ORDER — PROCHLORPERAZINE EDISYLATE 5 MG/ML
10 INJECTION INTRAMUSCULAR; INTRAVENOUS ONCE
Status: COMPLETED | OUTPATIENT
Start: 2025-01-09 | End: 2025-01-09

## 2025-01-09 RX ORDER — LIDOCAINE HYDROCHLORIDE 20 MG/ML
JELLY TOPICAL ONCE
Status: DISCONTINUED | OUTPATIENT
Start: 2025-01-09 | End: 2025-01-09 | Stop reason: SDUPTHER

## 2025-01-09 RX ORDER — METOCLOPRAMIDE HYDROCHLORIDE 5 MG/ML
10 INJECTION INTRAMUSCULAR; INTRAVENOUS ONCE
Status: COMPLETED | OUTPATIENT
Start: 2025-01-09 | End: 2025-01-09

## 2025-01-09 RX ORDER — HEPARIN SODIUM 10000 [USP'U]/ML
INJECTION, SOLUTION INTRAVENOUS; SUBCUTANEOUS PRN
Status: COMPLETED | OUTPATIENT
Start: 2025-01-09 | End: 2025-01-09

## 2025-01-09 RX ORDER — DEXAMETHASONE SODIUM PHOSPHATE 10 MG/ML
10 INJECTION INTRAMUSCULAR; INTRAVENOUS ONCE
Status: COMPLETED | OUTPATIENT
Start: 2025-01-09 | End: 2025-01-09

## 2025-01-09 RX ORDER — MIDAZOLAM HYDROCHLORIDE 2 MG/2ML
INJECTION, SOLUTION INTRAMUSCULAR; INTRAVENOUS PRN
Status: COMPLETED | OUTPATIENT
Start: 2025-01-09 | End: 2025-01-09

## 2025-01-09 RX ORDER — ZOLPIDEM TARTRATE 5 MG/1
10 TABLET ORAL NIGHTLY PRN
Status: DISCONTINUED | OUTPATIENT
Start: 2025-01-09 | End: 2025-01-10 | Stop reason: HOSPADM

## 2025-01-09 RX ORDER — OXYCODONE HYDROCHLORIDE 10 MG/1
10 TABLET ORAL EVERY 4 HOURS PRN
Status: DISCONTINUED | OUTPATIENT
Start: 2025-01-09 | End: 2025-01-10 | Stop reason: HOSPADM

## 2025-01-09 RX ORDER — NITROGLYCERIN 20 MG/100ML
INJECTION INTRAVENOUS PRN
Status: COMPLETED | OUTPATIENT
Start: 2025-01-09 | End: 2025-01-09

## 2025-01-09 RX ORDER — PROMETHAZINE HYDROCHLORIDE 12.5 MG/1
25 TABLET ORAL EVERY 8 HOURS PRN
Status: DISCONTINUED | OUTPATIENT
Start: 2025-01-09 | End: 2025-01-10 | Stop reason: HOSPADM

## 2025-01-09 RX ORDER — TEMOZOLOMIDE 100 MG/1
300 CAPSULE ORAL DAILY
Status: DISCONTINUED | OUTPATIENT
Start: 2025-01-10 | End: 2025-01-10 | Stop reason: HOSPADM

## 2025-01-09 RX ORDER — OXYCODONE HYDROCHLORIDE 5 MG/1
5 TABLET ORAL EVERY 4 HOURS PRN
Status: DISCONTINUED | OUTPATIENT
Start: 2025-01-09 | End: 2025-01-10 | Stop reason: HOSPADM

## 2025-01-09 RX ORDER — HYDROMORPHONE HYDROCHLORIDE 1 MG/ML
INJECTION, SOLUTION INTRAMUSCULAR; INTRAVENOUS; SUBCUTANEOUS PRN
Status: COMPLETED | OUTPATIENT
Start: 2025-01-09 | End: 2025-01-09

## 2025-01-09 RX ORDER — PANTOPRAZOLE SODIUM 40 MG/1
40 TABLET, DELAYED RELEASE ORAL
Status: DISCONTINUED | OUTPATIENT
Start: 2025-01-09 | End: 2025-01-10 | Stop reason: HOSPADM

## 2025-01-09 RX ORDER — DICYCLOMINE HYDROCHLORIDE 10 MG/1
10 CAPSULE ORAL
Status: DISCONTINUED | OUTPATIENT
Start: 2025-01-09 | End: 2025-01-10 | Stop reason: HOSPADM

## 2025-01-09 RX ORDER — SUCRALFATE 1 G/1
1 TABLET ORAL EVERY 6 HOURS SCHEDULED
Status: DISCONTINUED | OUTPATIENT
Start: 2025-01-09 | End: 2025-01-10 | Stop reason: HOSPADM

## 2025-01-09 RX ADMIN — MIDAZOLAM HYDROCHLORIDE 0.5 MG: 1 INJECTION, SOLUTION INTRAMUSCULAR; INTRAVENOUS at 11:46

## 2025-01-09 RX ADMIN — DEXAMETHASONE SODIUM PHOSPHATE 4 MG: 4 INJECTION INTRA-ARTICULAR; INTRALESIONAL; INTRAMUSCULAR; INTRAVENOUS; SOFT TISSUE at 14:22

## 2025-01-09 RX ADMIN — MIDAZOLAM HYDROCHLORIDE 0.5 MG: 1 INJECTION, SOLUTION INTRAMUSCULAR; INTRAVENOUS at 11:33

## 2025-01-09 RX ADMIN — SODIUM CHLORIDE, PRESERVATIVE FREE 10 ML: 5 INJECTION INTRAVENOUS at 21:27

## 2025-01-09 RX ADMIN — Medication 5000 UNITS: at 11:15

## 2025-01-09 RX ADMIN — Medication 5000 UNITS: at 11:16

## 2025-01-09 RX ADMIN — SUCRALFATE 1 G: 1 TABLET ORAL at 21:26

## 2025-01-09 RX ADMIN — MIDAZOLAM HYDROCHLORIDE 0.5 MG: 1 INJECTION, SOLUTION INTRAMUSCULAR; INTRAVENOUS at 11:12

## 2025-01-09 RX ADMIN — IOPAMIDOL 110 ML: 755 INJECTION, SOLUTION INTRAVENOUS at 12:40

## 2025-01-09 RX ADMIN — KETOROLAC TROMETHAMINE 15 MG: 30 INJECTION, SOLUTION INTRAMUSCULAR; INTRAVENOUS at 11:35

## 2025-01-09 RX ADMIN — FENTANYL CITRATE 25 MCG: 50 INJECTION, SOLUTION INTRAMUSCULAR; INTRAVENOUS at 11:33

## 2025-01-09 RX ADMIN — PANTOPRAZOLE SODIUM 40 MG: 40 TABLET, DELAYED RELEASE ORAL at 21:26

## 2025-01-09 RX ADMIN — IOPAMIDOL: 755 INJECTION, SOLUTION INTRAVENOUS at 12:07

## 2025-01-09 RX ADMIN — PROCHLORPERAZINE EDISYLATE 10 MG: 5 INJECTION INTRAMUSCULAR; INTRAVENOUS at 12:37

## 2025-01-09 RX ADMIN — FENTANYL CITRATE 25 MCG: 50 INJECTION, SOLUTION INTRAMUSCULAR; INTRAVENOUS at 11:22

## 2025-01-09 RX ADMIN — HYDROMORPHONE HYDROCHLORIDE 0.5 MG: 1 INJECTION, SOLUTION INTRAMUSCULAR; INTRAVENOUS; SUBCUTANEOUS at 12:45

## 2025-01-09 RX ADMIN — DIPHENHYDRAMINE HYDROCHLORIDE 50 MG: 50 INJECTION INTRAMUSCULAR; INTRAVENOUS at 09:59

## 2025-01-09 RX ADMIN — FAMOTIDINE 20 MG: 10 INJECTION, SOLUTION INTRAVENOUS at 11:57

## 2025-01-09 RX ADMIN — ONDANSETRON 4 MG: 2 INJECTION INTRAMUSCULAR; INTRAVENOUS at 21:25

## 2025-01-09 RX ADMIN — OXYCODONE HYDROCHLORIDE 10 MG: 10 TABLET ORAL at 21:26

## 2025-01-09 RX ADMIN — NITROGLYCERIN 50 MCG: 20 INJECTION INTRAVENOUS at 12:37

## 2025-01-09 RX ADMIN — MIDAZOLAM HYDROCHLORIDE 0.5 MG: 1 INJECTION, SOLUTION INTRAMUSCULAR; INTRAVENOUS at 11:57

## 2025-01-09 RX ADMIN — PIPERACILLIN AND TAZOBACTAM 3375 MG: 3; .375 INJECTION, POWDER, LYOPHILIZED, FOR SOLUTION INTRAVENOUS at 09:58

## 2025-01-09 RX ADMIN — LORAZEPAM 0.5 MG: 2 INJECTION INTRAMUSCULAR; INTRAVENOUS at 12:47

## 2025-01-09 RX ADMIN — FENTANYL CITRATE 25 MCG: 50 INJECTION, SOLUTION INTRAMUSCULAR; INTRAVENOUS at 11:11

## 2025-01-09 RX ADMIN — MIDAZOLAM HYDROCHLORIDE 0.5 MG: 1 INJECTION, SOLUTION INTRAMUSCULAR; INTRAVENOUS at 11:50

## 2025-01-09 RX ADMIN — FENTANYL CITRATE 25 MCG: 50 INJECTION, SOLUTION INTRAMUSCULAR; INTRAVENOUS at 11:50

## 2025-01-09 RX ADMIN — ZOLPIDEM TARTRATE 10 MG: 5 TABLET ORAL at 22:14

## 2025-01-09 RX ADMIN — MIDAZOLAM HYDROCHLORIDE 0.5 MG: 1 INJECTION, SOLUTION INTRAMUSCULAR; INTRAVENOUS at 12:45

## 2025-01-09 RX ADMIN — METOCLOPRAMIDE 10 MG: 5 INJECTION, SOLUTION INTRAMUSCULAR; INTRAVENOUS at 14:22

## 2025-01-09 RX ADMIN — OXYCODONE HYDROCHLORIDE 10 MG: 10 TABLET ORAL at 16:23

## 2025-01-09 RX ADMIN — SODIUM CHLORIDE: 9 INJECTION, SOLUTION INTRAVENOUS at 10:04

## 2025-01-09 RX ADMIN — FENTANYL CITRATE 25 MCG: 50 INJECTION, SOLUTION INTRAMUSCULAR; INTRAVENOUS at 11:17

## 2025-01-09 RX ADMIN — LORAZEPAM 0.5 MG: 2 INJECTION INTRAMUSCULAR; INTRAVENOUS at 11:53

## 2025-01-09 RX ADMIN — FENTANYL CITRATE 25 MCG: 50 INJECTION, SOLUTION INTRAMUSCULAR; INTRAVENOUS at 11:03

## 2025-01-09 RX ADMIN — MIDAZOLAM HYDROCHLORIDE 0.5 MG: 1 INJECTION, SOLUTION INTRAMUSCULAR; INTRAVENOUS at 11:22

## 2025-01-09 RX ADMIN — FENTANYL CITRATE 25 MCG: 50 INJECTION, SOLUTION INTRAMUSCULAR; INTRAVENOUS at 11:57

## 2025-01-09 RX ADMIN — ONDANSETRON 12 MG: 2 INJECTION INTRAMUSCULAR; INTRAVENOUS at 09:59

## 2025-01-09 RX ADMIN — KETOROLAC TROMETHAMINE 15 MG: 30 INJECTION, SOLUTION INTRAMUSCULAR; INTRAVENOUS at 11:49

## 2025-01-09 RX ADMIN — DEXAMETHASONE SODIUM PHOSPHATE 10 MG: 10 INJECTION INTRAMUSCULAR; INTRAVENOUS at 09:58

## 2025-01-09 RX ADMIN — MIDAZOLAM HYDROCHLORIDE 0.5 MG: 1 INJECTION, SOLUTION INTRAMUSCULAR; INTRAVENOUS at 11:17

## 2025-01-09 RX ADMIN — MIDAZOLAM HYDROCHLORIDE 0.5 MG: 1 INJECTION, SOLUTION INTRAMUSCULAR; INTRAVENOUS at 11:03

## 2025-01-09 RX ADMIN — FENTANYL CITRATE 25 MCG: 50 INJECTION, SOLUTION INTRAMUSCULAR; INTRAVENOUS at 11:46

## 2025-01-09 RX ADMIN — DIPHENHYDRAMINE HYDROCHLORIDE 25 MG: 50 INJECTION, SOLUTION INTRAMUSCULAR; INTRAVENOUS at 11:03

## 2025-01-09 RX ADMIN — NITROGLYCERIN 200 MCG: 20 INJECTION INTRAVENOUS at 12:13

## 2025-01-09 ASSESSMENT — PAIN SCALES - GENERAL
PAINLEVEL_OUTOF10: 5
PAINLEVEL_OUTOF10: 6
PAINLEVEL_OUTOF10: 6

## 2025-01-09 ASSESSMENT — PAIN DESCRIPTION - DESCRIPTORS: DESCRIPTORS: DISCOMFORT;ACHING

## 2025-01-09 ASSESSMENT — PAIN - FUNCTIONAL ASSESSMENT: PAIN_FUNCTIONAL_ASSESSMENT: ACTIVITIES ARE NOT PREVENTED

## 2025-01-09 ASSESSMENT — PAIN DESCRIPTION - LOCATION: LOCATION: BACK;SHOULDER;ABDOMEN

## 2025-01-09 NOTE — PROGRESS NOTES
4 Eyes Skin Assessment     NAME:  Brooklyn Olmstead  YOB: 1985  MEDICAL RECORD NUMBER:  [id number]    The patient is being assessed for  Admission    I agree that at least one RN has performed a thorough Head to Toe Skin Assessment on the patient. ALL assessment sites listed below have been assessed.      Areas assessed by both nurses:    Head, Face, Ears, Shoulders, Back, Chest, Arms, Elbows, Hands, Sacrum. Buttock, Coccyx, Ischium, Legs. Feet and Heels, and Under Medical Devices         Does the Patient have a Wound? No noted wound(s)       Prem Prevention initiated by RN: No  Wound Care Orders initiated by RN: No    Pressure Injury (Stage 3,4, Unstageable, DTI, NWPT, and Complex wounds) if present, place Wound referral order by RN under : No    New Ostomies, if present place, Ostomy referral order under : No     Nurse 1 eSignature: Electronically signed by Jason Ordonez RN on 1/9/25 at 4:26 PM EST    **SHARE this note so that the co-signing nurse can place an eSignature**    Nurse 2 eSignature: Electronically signed by Jackie Mancera RN on 1/9/25 at 4:41 PM EST

## 2025-01-09 NOTE — BRIEF OP NOTE
Brief Postoperative Note      Patient: Brooklyn Olmstead  YOB: 1985  MRN: [id number]    Date of Procedure: 1/9/2025    Metastatic GIST neoplasm    Post-Op Diagnosis: Same       Trans arterial Chemotherapy and embolization    ANA Elliott    Assistant:  * No surgical staff found *    Anesthesia: * Moderate sedation    Estimated Blood Loss (mL): Minimal    Complications: None    Specimens:   * No specimens in log *    Implants:  * No implants in log *      Drains:   Urinary Catheter 01/09/25 2 Way (Active)       [REMOVED] Urinary Catheter 12/07/22 Ma (Removed)       [REMOVED] Urinary Catheter 08/06/24 (Removed)       [REMOVED] Urinary Catheter 09/10/24 2 Way (Removed)       Findings:  Infection Present At Time Of Surgery (PATOS) (choose all levels that have infection present):  No infection present  Other Findings: TACE to left and right lobe neoplasm    Electronically signed by Jeffery Elliott MD on 1/9/2025 at 1:40 PM

## 2025-01-09 NOTE — PROCEDURES
PROCEDURE NOTE  Date: 1/9/2025   Name: Brooklyn Olmstead  YOB: 1985    Procedures TACE    Patient arrived to radiology department for TACE. Allergies, home medications, H&P and fasting instructions reviewed with patient. Vital signs taken. IV placed, blood obtained, IV flushed and prn adapter attached. Blood sample sent to lab for ordered tests.  Procedural instructions given, questions answered, understanding expressed and consent signed. Patient given fluoroscopy education, no questions at this time

## 2025-01-09 NOTE — PRE SEDATION
Sedation Pre-Procedure Note    Patient Name: Brooklyn Olmstead   YOB: 1985  Room/Bed: 8416/8416-B  Medical Record Number: [id number]  Date: 1/9/2025   Time: 1:38 PM       Indication:  Metastatic GIST neoplasm    Consent: I have discussed with the patient and/or the patient representative the indication, alternatives, and the possible risks and/or complications of the planned procedure and the anesthesia methods. The patient and/or patient representative appear to understand and agree to proceed.    Vital Signs:   Vitals:    01/09/25 1329   BP: 112/66   Pulse: 61   Resp: 17   Temp: 97.2 °F (36.2 °C)   SpO2: 99%       Past Medical History:   has a past medical history of Abdominal pain, Anxiety, Cancer (HCC), Chronic kidney disease, Diarrhea, Hypocalcemia, Hypothyroidism, Irritable bowel syndrome, Liver disease, Migraines, Seizures (HCC), and Status post alcohol detoxification.    Past Surgical History:   has a past surgical history that includes Parathyroid gland surgery; Cholecystectomy, laparoscopic (07/06/2016); Thyroidectomy; Colonoscopy (N/A, 06/22/2018); CT NEEDLE BIOPSY LIVER PERCUTANEOUS (09/01/2020); Small intestine surgery (N/A, 10/21/2020); CT BIOPSY RENAL (01/25/2021); hc dialysis catheter (N/A, 01/28/2021); sigmoidoscopy (N/A, 05/14/2021); Port Surgery (Left, 02/18/2022); Upper gastrointestinal endoscopy; IR EMBOLIZATION TUMOR/ORGAN ISCH/INFARC (8/20/2024); and IR EMBOLIZATION TUMOR/ORGAN ISCH/INFARC (9/10/2024).    Medications:   Scheduled Meds:    piperacillin-tazobactam  3,375 mg IntraVENous Once    lidocaine   Topical Once    dexAMETHasone  4 mg IntraVENous Once    metoclopramide  10 mg IntraVENous Once     Continuous Infusions:    sodium chloride 200 mL/hr at 01/09/25 1004     PRN Meds: sodium chloride flush  Home Meds:   Prior to Admission medications    Medication Sig Start Date End Date Taking? Authorizing Provider   capecitabine (XELODA) 500 MG chemo tablet Take 2 tablets by mouth 2  times daily for 14 days followed by 14 days off each 28-day cycle 1/3/25  Yes Geoff Hu MD   temozolomide (TEMODAR) 100 MG chemo capsule Take 3 capsules (300 mg total) by mouth daily on Days 10 through 14 of each 28-day cycle 1/3/25  Yes Geoff Hu MD   butalbital-acetaminophen-caffeine (FIORICET, ESGIC) -40 MG per tablet TAKE 1 TABLET BY MOUTH TWICE DAILY FOR HEADACHE 12/16/24  Yes Abdelrahman Montana DO   sulfamethoxazole-trimethoprim (BACTRIM DS;SEPTRA DS) 800-160 MG per tablet Take 1 tablet by mouth three times a week On Monday, Wednesday, Friday 12/11/24  Yes Geoff Hu MD   dicyclomine (BENTYL) 10 MG capsule Take 1 capsule by mouth 4 times daily (before meals and nightly) 11/21/24  Yes Aki Lowry DO   ondansetron (ZOFRAN) 4 MG tablet Take 1 tablet by mouth 3 times daily as needed for Nausea or Vomiting 9/13/24  Yes Abdelrahman Montana DO   pantoprazole (PROTONIX) 40 MG tablet Take 1 tablet by mouth every morning (before breakfast) 9/12/24  Yes Taylor Sun MD   sucralfate (CARAFATE) 1 GM tablet Take 1 tablet by mouth 4 times daily 9/11/24  Yes Taylor Sun MD   magnesium oxide (MAG-OX) 400 MG tablet take 1 tablet by mouth twice a day 7/19/24  Yes Abdelrahman Montana DO   potassium chloride (KLOR-CON M) 20 MEQ extended release tablet take 1 tablet by mouth twice a day 7/7/24  Yes Abdelrahman Montana DO   calcium carb-cholecalciferol (CALTRATE 600+D3) 600-20 MG-MCG TABS Take 1 tablet by mouth daily 6/11/24  Yes Joanie Farley APRN   fluticasone (FLONASE) 50 MCG/ACT nasal spray 1 spray by Nasal route daily as needed for Rhinitis 4/22/24  Yes Abdelrahman Montana DO   hydrOXYzine HCl (ATARAX) 25 MG tablet take 1 tablet by mouth every 8 hours if needed for itching   Yes Petey Parada MD   prochlorperazine (COMPAZINE) 10 MG tablet Take 1 tablet by mouth every 6 hours as needed (NAUSEA) 4/20/23  Yes Oneil Trimble MD   acetaminophen (TYLENOL) 325 MG tablet Take 2 tablets by mouth every 6

## 2025-01-10 VITALS
HEART RATE: 54 BPM | SYSTOLIC BLOOD PRESSURE: 118 MMHG | DIASTOLIC BLOOD PRESSURE: 76 MMHG | TEMPERATURE: 97.4 F | WEIGHT: 125.66 LBS | OXYGEN SATURATION: 98 % | RESPIRATION RATE: 18 BRPM | BODY MASS INDEX: 24.67 KG/M2 | HEIGHT: 60 IN

## 2025-01-10 PROCEDURE — G0378 HOSPITAL OBSERVATION PER HR: HCPCS

## 2025-01-10 PROCEDURE — 96375 TX/PRO/DX INJ NEW DRUG ADDON: CPT

## 2025-01-10 PROCEDURE — 6370000000 HC RX 637 (ALT 250 FOR IP)

## 2025-01-10 PROCEDURE — 6360000002 HC RX W HCPCS: Performed by: RADIOLOGY

## 2025-01-10 PROCEDURE — 6370000000 HC RX 637 (ALT 250 FOR IP): Performed by: RADIOLOGY

## 2025-01-10 RX ORDER — OXYCODONE HYDROCHLORIDE 5 MG/1
5 TABLET ORAL EVERY 6 HOURS PRN
Qty: 28 TABLET | Refills: 0 | Status: SHIPPED | OUTPATIENT
Start: 2025-01-10 | End: 2025-01-10

## 2025-01-10 RX ORDER — OXYCODONE HYDROCHLORIDE 5 MG/1
5 TABLET ORAL EVERY 6 HOURS PRN
Qty: 28 TABLET | Refills: 0 | Status: SHIPPED | OUTPATIENT
Start: 2025-01-10 | End: 2025-01-17

## 2025-01-10 RX ADMIN — ONDANSETRON 4 MG: 2 INJECTION INTRAMUSCULAR; INTRAVENOUS at 08:00

## 2025-01-10 RX ADMIN — LEVOTHYROXINE SODIUM 112 MCG: 0.11 TABLET ORAL at 05:46

## 2025-01-10 RX ADMIN — OXYCODONE HYDROCHLORIDE 10 MG: 10 TABLET ORAL at 07:55

## 2025-01-10 RX ADMIN — OXYCODONE HYDROCHLORIDE 10 MG: 10 TABLET ORAL at 02:22

## 2025-01-10 RX ADMIN — PROMETHAZINE HYDROCHLORIDE 25 MG: 12.5 TABLET ORAL at 02:26

## 2025-01-10 RX ADMIN — PANTOPRAZOLE SODIUM 40 MG: 40 TABLET, DELAYED RELEASE ORAL at 05:46

## 2025-01-10 RX ADMIN — DICYCLOMINE HYDROCHLORIDE 10 MG: 10 CAPSULE ORAL at 05:46

## 2025-01-10 RX ADMIN — SUCRALFATE 1 G: 1 TABLET ORAL at 05:46

## 2025-01-10 ASSESSMENT — PAIN DESCRIPTION - DESCRIPTORS
DESCRIPTORS: ACHING;DISCOMFORT
DESCRIPTORS: ACHING;DISCOMFORT;SORE

## 2025-01-10 ASSESSMENT — PAIN DESCRIPTION - LOCATION
LOCATION: BACK;ABDOMEN
LOCATION: ABDOMEN

## 2025-01-10 ASSESSMENT — PAIN SCALES - GENERAL
PAINLEVEL_OUTOF10: 7
PAINLEVEL_OUTOF10: 7

## 2025-01-10 ASSESSMENT — PAIN DESCRIPTION - ORIENTATION: ORIENTATION: RIGHT

## 2025-01-10 ASSESSMENT — PAIN - FUNCTIONAL ASSESSMENT: PAIN_FUNCTIONAL_ASSESSMENT: ACTIVITIES ARE NOT PREVENTED

## 2025-01-10 NOTE — PROGRESS NOTES
Brooklyn EASON Ishan was ordered capecitabine which is a nonformulary medication.  This medication will need to be supplied by the patient as the pharmacy does not carry this non-formulary medication.    If the medication has not been administered by 1400 on the following day from the time the order was placed, a pharmacist will follow-up with the nurse of the patient to assess the capability of the patient to bring in the medication.    If it is determined that the patient cannot supply the medication and it is not available to be dispensed from the pharmacy, the provider will be notified.     Wilfredo Kim, PharmD

## 2025-01-10 NOTE — CARE COORDINATION
Social Work/Case Management Transition of Care Planning (Eve Ervin -[phone]):  Patient presented to the hospital for planned trans arterial chemotherapy and embolization on 1/9.  Discharge order noted.  Per nursing, patient discharged to home with no needs.  Patient was discharged prior to  being able to meet with her.    Eve Ervin, JONH  1/10/2025

## 2025-01-10 NOTE — DISCHARGE SUMMARY
of the left hepatic artery extending medially to segment 4 angiogram with embolization of 2 beds of capillary blush 12. Catheter placement and diagnostic angiogram of the left hepatic artery with a medial inferior branch extending to segment 4 which was selected with a true select catheter and direct chemoembolization. 13. CPT 26215 - Drug-eluting bead transarterial chemoembolization of neuroendocrine malignancy 14. CPT 93433 - Chemotherapy administration, intra-arterial 15. Right femoral artery angiogram with closure of the right groin using a 6 [place] Angio-Seal device : Dr. Elliott ASSISTANT: None MATERIALS: 6 Fr sheath 5 Fr Sim Angled glide wire Micropuncture Synchro soft wire Truselect catheter 6 [place] Angio-Seal 4 Fr Cobra Merit 30-60 micron Quadraspheres loaded with 50 mg Doxorubicin (80% delivered) 17 ml of 20 ml FLUORO: 25.29 min AIR KERMA DOSE: 793.8 mGy ANESTHESIA: Moderate conscious sedation administered. Patient was monitored throughout the procedure by nurse. INTRASERVICE TIME (min): 101 CONTRAST: 110 ml Isouvue 370 ARTERIAL PUNCTURE SITE: Right common femoral artery The procedure, risks, limitations, and alternatives were discussed. All questions answered. Written informed consent obtained. Maximal sterile barrier technique including cutaneous antisepsis utilized. Percutaneous site was sterilely prepped and draped. Time out performed. Access utilized ultrasound with permanent image stored. After administration of local anesthesia, a tiny skin incision was made and the artery was cannulated with a micropuncture set. Sheath was placed. ANGIOGRAPHY INDICATION: Diagnostic - no prior angiographic study CATHETERIZED VESSEL: Celiac artery FINDINGS: There is early bifurcation of the common hepatic artery into the left hepatic branches.  The right hepatic artery provides branches to the middle hepatic zone as well as the dome as well as inferior aspect the right lobe of the liver. CATHETERIZED VESSEL:  chemoembolization mixture.  This represented an area of multiple focal small metastases. FINDINGS: Multiple small areas of capillary blush were identified on the diagnostic angiogram CATHETERIZED VESSEL: Celiac artery FINDINGS: The left hepatic artery was selected.  The branch extending to segment 3 was individually selected and signs of capillary blush extending to the medial aspect of the left lobe was selected and embolism eyes CATHETERIZED VESSEL: Celiac artery FINDINGS: The left hepatic artery extending to segment 5 was selected and embolized.  The left hepatic artery extending to segment 4 from a medial approach was selected and embolized. EMBOLIZATION CATHETER POSITION: Right, left and middle hepatic arteries. EMBOLIC(S): Quadraspheres loaded with doxorubicin POST-EMBO ANGIO FINDINGS: Marked pruning of the small vessels with slow flow in the larger branches, which is an adequate endpoint. Intraarterial lidocaine was injected. Intraarterial nitroglycerin of the right and left hepatic arteries All catheters and wires were removed. Operators changed sterile gloves. The groin was prepped. Closure device deployed without incident with immediate hemostasis. Sterile dressing applied. COMPLICATIONS: None EBL: Minimal CONDITION: Stable, unchanged     1. Successful drug eluting bead transarterial chemoembolization (NED-TACE) of segment multiple metastases of neuroendocrine tumor involving several segments of the left lobe, middle as well as right hepatic lobes DISCHARGE SUMMARY: REASON FOR HOSPITALIZATION: Chemoembolization for liver malignancy OUTCOME: No acute complication. DISPOSITION: To in hospital stay FOLLOW UP: With IR in 2-3 weeks for follow up.     IR ANGIOGRAM CELIAC    Result Date: 1/9/2025  HISTORY: ORDERING SYSTEM PROVIDED HISTORY: neoplasm TACE, Neuroendocrine carcinoma metastatic to liver (HCC), Neuroendocrine tumor TECHNOLOGIST PROVIDED HISTORY: Reason for exam:->neoplasm TACE, Neuroendocrine carcinoma

## 2025-01-10 NOTE — H&P
HEPATOBILIARY AND PANCREATIC SURGERY  HISTORY AND PHYSICAL  1/9/2025    Cc: s/p Y90      HPI  Brooklyn Olmstead is a 39 y.o. female with history of small bowel neuroendocrine tumor metastatic to the liver status post small bowel resection on lanreotide and status post Y90 in September 2024 he was admitted status post second Y90 treatment on 1/9.  The patient complains of some mild abdominal discomfort but was able to tolerate a diet following her procedure.  Some mild nausea, no vomiting. She is afebrile and hemodynamically stable on room air.  She has been switched to Cap/Tem.    Past Medical History:   Diagnosis Date    Abdominal pain     Anxiety     Cancer (HCC)     neuroendocrime tumor    Chronic kidney disease     Diarrhea     Hypocalcemia     Hypothyroidism     Irritable bowel syndrome     Liver disease     Migraines     Seizures (HCC)     last episode January 2021    Status post alcohol detoxification     5/22/2018-5/29/2018       Past Surgical History:   Procedure Laterality Date    CHOLECYSTECTOMY, LAPAROSCOPIC  07/06/2016    COLONOSCOPY N/A 06/22/2018    COLONOSCOPY WITH BIOPSY performed by Francisca Bashir MD at [facility] ENDOSCOPY    CT BIOPSY RENAL  01/25/2021    CT BIOPSY RENAL 1/25/2021 Scooter Ray II, MD [facility] CT    CT NEEDLE BIOPSY LIVER PERCUTANEOUS  09/01/2020    CT NEEDLE BIOPSY LIVER PERCUTANEOUS 9/1/2020 [facility] CT    HC DIALYSIS CATHETER N/A 01/28/2021    TESIO CATHETER INSERTION AND REMOVAL OF TEMPORARY performed by Andrea Sher MD at [facility] OR    IR EMBOLIZATION TUMOR/ORGAN  8/20/2024    IR EMBOLIZATION TUMOR / ORGAN 8/20/2024 Jeffery Elliott MD [facility] SPECIAL PROCEDURES    IR EMBOLIZATION TUMOR/ORGAN  9/10/2024    IR EMBOLIZATION TUMOR / ORGAN 9/10/2024 Jeffery Elliott MD [facility] SPECIAL PROCEDURES    PARATHYROID GLAND SURGERY      PORT SURGERY Left 02/18/2022    MEDI PORT PLACEMENT, Left side. performed by Babita Davalos MD at [facility] OR    SIGMOIDOSCOPY N/A 05/14/2021     signed by Aron Nicole DO on 1/9/25 at 7:14 PM EST    Attending Physician Statement:    Chief Complaint: s/p TACE post op nausea    I have examined the patient and performed the key aspects of physical exam, reviewed the record (including all pertinent and new radiology images and laboratory findings), and discussed the case with the surgical team.  I agree with the assessment and plan with the following additions, corrections, and changes. 14pt review of symptoms completed and negative except as mentioned.    Had nausea post procedure.  She denies any emesis.  She underwent her TACE yesterday  Advance diet  Remove Ma  Home today    Lambert Castro MD  01/10/25  7:43 AM

## 2025-01-10 NOTE — PROGRESS NOTES
Brooklyn JENARO Beach was ordered temozolomide  which is a nonformulary medication.  This medication will need to be supplied by the patient as the pharmacy does not carry this non-formulary medication.    If the medication has not been administered by 1400 on the following day from the time the order was placed, a pharmacist will follow-up with the nurse of the patient to assess the capability of the patient to bring in the medication.    If it is determined that the patient cannot supply the medication and it is not available to be dispensed from the pharmacy, the provider will be notified.     Wilfredo Kim, PharmD

## 2025-01-10 NOTE — PROGRESS NOTES
Interventional radiology  I came to the bedside today to evaluate this patient postoperatively.  Her groin is stable without signs of hematoma.  She has no evidence of focal abdominal pain and the nausea noted previously has resolved.  She has generalized abdominal ache which is not unexpected in the setting of recent embolization.    She has no abdominal distention or firmness.    The patient has been eating and tolerating food without signs of indigestion or worsening nausea.  She is consuming liquids without problems.  She takes Zofran at home several times a day up to 16 mg and she may be developing some tolerance with this medication as was seen today.  Additional medication was provided for nausea inclusive of Reglan have been beneficial.    Our plan going forward will be to see this patient within 3-4 weeks in the office and in approximately 6 months, she may undergo a PET scan.  At that time we can assess for further decrease of her aggressive neuroendocrine metastatic disease to the liver.    She is to observe for abdominal swelling pain or worsening nausea.  She is presently undergoing oral immuno or chemotherapy and is to monitor herself for side effects with that regard given her recent chemotherapy today.

## 2025-01-16 ENCOUNTER — OFFICE VISIT (OUTPATIENT)
Dept: HEMATOLOGY | Age: 40
End: 2025-01-16
Payer: COMMERCIAL

## 2025-01-16 VITALS
DIASTOLIC BLOOD PRESSURE: 58 MMHG | WEIGHT: 129 LBS | TEMPERATURE: 98.2 F | RESPIRATION RATE: 15 BRPM | SYSTOLIC BLOOD PRESSURE: 118 MMHG | BODY MASS INDEX: 25.32 KG/M2 | HEART RATE: 89 BPM | OXYGEN SATURATION: 98 % | HEIGHT: 60 IN

## 2025-01-16 DIAGNOSIS — R10.12 LEFT UPPER QUADRANT ABDOMINAL PAIN: Primary | ICD-10-CM

## 2025-01-16 PROCEDURE — G8419 CALC BMI OUT NRM PARAM NOF/U: HCPCS | Performed by: TRANSPLANT SURGERY

## 2025-01-16 PROCEDURE — 99212 OFFICE O/P EST SF 10 MIN: CPT | Performed by: TRANSPLANT SURGERY

## 2025-01-16 PROCEDURE — G8427 DOCREV CUR MEDS BY ELIG CLIN: HCPCS | Performed by: TRANSPLANT SURGERY

## 2025-01-16 PROCEDURE — 99213 OFFICE O/P EST LOW 20 MIN: CPT | Performed by: TRANSPLANT SURGERY

## 2025-01-16 PROCEDURE — 4004F PT TOBACCO SCREEN RCVD TLK: CPT | Performed by: TRANSPLANT SURGERY

## 2025-01-16 RX ORDER — LACTULOSE 10 G/15ML
30 SOLUTION ORAL DAILY
Qty: 1350 ML | Refills: 0 | Status: SHIPPED | OUTPATIENT
Start: 2025-01-16 | End: 2025-02-15

## 2025-01-16 NOTE — PATIENT INSTRUCTIONS
Staff members for Dr Castro, Dr Kaiser, and Kolby Muñoz NP can be reached directly at [phone]

## 2025-01-16 NOTE — PROGRESS NOTES
Hepatobiliary and Pancreatic Surgery Progress Note    CC: Follow-up TACE    Subjective: Patient states that she is doing well.  She has had Y90 and Lutathera in the past she is still having about 4 bowel movements a day.   She is currently on Cap/Tem and was having 4 bowel movements a day.  Since TMZ she is only 1-2 hard/soft stools.  She is having some stomach cramping.  She had a repeat TACE January 9.    Has left upper quadrant pain that radiates to her shoulder.   She had the repeat TACE and is now moving her bowels better.  She states that the pain medications didn't touch it.      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  She has had marked improvement.  -Continue surveillance imaging  -Continue TACE as needed.  - lactulose for now constipation    20 Minutes of which greater than 50% was spent counseling or coordinating her care.    Thank you Dr. Montana for the consultation and allowing me to take part in Ms. Olmstead's care.      Electronically signed by Lambert Castro MD on 1/16/2025 at 12:40 PM

## 2025-01-17 DIAGNOSIS — G43.909 MIGRAINE WITHOUT STATUS MIGRAINOSUS, NOT INTRACTABLE, UNSPECIFIED MIGRAINE TYPE: ICD-10-CM

## 2025-01-17 RX ORDER — BUTALBITAL, ACETAMINOPHEN AND CAFFEINE 50; 325; 40 MG/1; MG/1; MG/1
TABLET ORAL
Qty: 60 TABLET | Refills: 1 | Status: SHIPPED | OUTPATIENT
Start: 2025-01-17

## 2025-01-17 NOTE — TELEPHONE ENCOUNTER
Name of Medication(s) Requested:  Requested Prescriptions     Pending Prescriptions Disp Refills    butalbital-acetaminophen-caffeine (FIORICET, ESGIC) -40 MG per tablet 60 tablet 1     Sig: TAKE 1 TABLET BY MOUTH TWICE DAILY FOR HEADACHE       Medication is on current medication list Yes    Dosage and directions were verified? Yes    Quantity verified: 30 day supply     Pharmacy Verified?  Yes    Last Appointment:  9/18/2024    Future appts:  Future Appointments   Date Time Provider Department Center   1/30/2025  1:45 PM Geoff Hu MD MED ONC [facility]   1/30/2025  2:30 PM [facility] MED ONC FAST TRACK 3 [facility] Med Onc [facility]   2/3/2025  1:40 PM Francisca Bashir MD BD GEN SURG [facility]        (If no appt send self scheduling link. .REFILLAPPT)  Scheduling request sent?     [] Yes  [x] No    Does patient need updated?  [] Yes  [x] No   Localized Dermabrasion Text: The patient was draped in routine manner.  Localized dermabrasion using 3 x 17 mm wire brush was performed in routine manner to papillary dermis. This spot dermabrasion is being performed to complete skin cancer reconstruction. It also will eliminate the other sun damaged precancerous cells that are known to be part of the regional effect of a lifetime's worth of sun exposure. This localized dermabrasion is therapeutic and should not be considered cosmetic in any regard. Localized Dermabrasion With Wire Brush Text: The patient was draped in routine manner.  Localized dermabrasion using 3 x 17 mm wire brush was performed in routine manner to papillary dermis. This spot dermabrasion is being performed to complete skin cancer reconstruction. It also will eliminate the other sun damaged precancerous cells that are known to be part of the regional effect of a lifetime's worth of sun exposure. This localized dermabrasion is therapeutic and should not be considered cosmetic in any regard.

## 2025-01-30 ENCOUNTER — TELEPHONE (OUTPATIENT)
Dept: ONCOLOGY | Age: 40
End: 2025-01-30

## 2025-01-30 ENCOUNTER — OFFICE VISIT (OUTPATIENT)
Dept: ONCOLOGY | Age: 40
End: 2025-01-30
Payer: COMMERCIAL

## 2025-01-30 ENCOUNTER — HOSPITAL ENCOUNTER (OUTPATIENT)
Dept: INFUSION THERAPY | Age: 40
Discharge: HOME | End: 2025-01-30
Payer: COMMERCIAL

## 2025-01-30 VITALS
OXYGEN SATURATION: 100 % | SYSTOLIC BLOOD PRESSURE: 130 MMHG | WEIGHT: 127 LBS | TEMPERATURE: 98 F | DIASTOLIC BLOOD PRESSURE: 59 MMHG | HEIGHT: 60 IN | BODY MASS INDEX: 24.94 KG/M2 | HEART RATE: 79 BPM

## 2025-01-30 DIAGNOSIS — C7B.8 NEUROENDOCRINE CARCINOMA METASTATIC TO LIVER (HCC): Primary | ICD-10-CM

## 2025-01-30 DIAGNOSIS — C7A.8 NEUROENDOCRINE CARCINOMA METASTATIC TO LIVER (HCC): Primary | ICD-10-CM

## 2025-01-30 DIAGNOSIS — R11.0 CHRONIC NAUSEA: ICD-10-CM

## 2025-01-30 DIAGNOSIS — C7B.8 METASTATIC MALIGNANT NEUROENDOCRINE TUMOR TO LIVER (HCC): Primary | ICD-10-CM

## 2025-01-30 DIAGNOSIS — C7A.8 NEUROENDOCRINE CANCER (HCC): ICD-10-CM

## 2025-01-30 DIAGNOSIS — E83.42 HYPOMAGNESEMIA: ICD-10-CM

## 2025-01-30 DIAGNOSIS — E87.6 HYPOKALEMIA: ICD-10-CM

## 2025-01-30 LAB
ALBUMIN SERPL-MCNC: 4.1 G/DL (ref 3.5–5.2)
ALP SERPL-CCNC: 220 U/L (ref 35–104)
ALT SERPL-CCNC: 39 U/L (ref 0–32)
ANION GAP SERPL CALCULATED.3IONS-SCNC: 11 MMOL/L (ref 7–16)
AST SERPL-CCNC: 41 U/L (ref 0–31)
BASOPHILS # BLD: 0.06 K/UL (ref 0–0.2)
BASOPHILS NFR BLD: 1 % (ref 0–2)
BILIRUB SERPL-MCNC: <0.2 MG/DL (ref 0–1.2)
BUN SERPL-MCNC: 9 MG/DL (ref 6–20)
CALCIUM SERPL-MCNC: 8.5 MG/DL (ref 8.6–10.2)
CHLORIDE SERPL-SCNC: 102 MMOL/L (ref 98–107)
CO2 SERPL-SCNC: 27 MMOL/L (ref 22–29)
CREAT SERPL-MCNC: 0.6 MG/DL (ref 0.5–1)
EOSINOPHIL # BLD: 0.31 K/UL (ref 0.05–0.5)
EOSINOPHILS RELATIVE PERCENT: 4 % (ref 0–6)
ERYTHROCYTE [DISTWIDTH] IN BLOOD BY AUTOMATED COUNT: 17.1 % (ref 11.5–15)
GFR, ESTIMATED: >90 ML/MIN/1.73M2
GLUCOSE SERPL-MCNC: 103 MG/DL (ref 74–99)
HCT VFR BLD AUTO: 34.7 % (ref 34–48)
HGB BLD-MCNC: 11.6 G/DL (ref 11.5–15.5)
IMM GRANULOCYTES # BLD AUTO: 0.07 K/UL (ref 0–0.58)
IMM GRANULOCYTES NFR BLD: 1 % (ref 0–5)
LYMPHOCYTES NFR BLD: 1.03 K/UL (ref 1.5–4)
LYMPHOCYTES RELATIVE PERCENT: 15 % (ref 20–42)
MCH RBC QN AUTO: 30.8 PG (ref 26–35)
MCHC RBC AUTO-ENTMCNC: 33.4 G/DL (ref 32–34.5)
MCV RBC AUTO: 92 FL (ref 80–99.9)
MONOCYTES NFR BLD: 0.79 K/UL (ref 0.1–0.95)
MONOCYTES NFR BLD: 11 % (ref 2–12)
NEUTROPHILS NFR BLD: 68 % (ref 43–80)
NEUTS SEG NFR BLD: 4.81 K/UL (ref 1.8–7.3)
PLATELET # BLD AUTO: 265 K/UL (ref 130–450)
PMV BLD AUTO: 9.6 FL (ref 7–12)
POTASSIUM SERPL-SCNC: 4 MMOL/L (ref 3.5–5)
PROT SERPL-MCNC: 7 G/DL (ref 6.4–8.3)
RBC # BLD AUTO: 3.77 M/UL (ref 3.5–5.5)
SODIUM SERPL-SCNC: 140 MMOL/L (ref 132–146)
WBC OTHER # BLD: 7.1 K/UL (ref 4.5–11.5)

## 2025-01-30 PROCEDURE — G8427 DOCREV CUR MEDS BY ELIG CLIN: HCPCS | Performed by: STUDENT IN AN ORGANIZED HEALTH CARE EDUCATION/TRAINING PROGRAM

## 2025-01-30 PROCEDURE — 80053 COMPREHEN METABOLIC PANEL: CPT

## 2025-01-30 PROCEDURE — 6360000002 HC RX W HCPCS: Performed by: STUDENT IN AN ORGANIZED HEALTH CARE EDUCATION/TRAINING PROGRAM

## 2025-01-30 PROCEDURE — 4004F PT TOBACCO SCREEN RCVD TLK: CPT | Performed by: STUDENT IN AN ORGANIZED HEALTH CARE EDUCATION/TRAINING PROGRAM

## 2025-01-30 PROCEDURE — 99214 OFFICE O/P EST MOD 30 MIN: CPT | Performed by: STUDENT IN AN ORGANIZED HEALTH CARE EDUCATION/TRAINING PROGRAM

## 2025-01-30 PROCEDURE — 2500000003 HC RX 250 WO HCPCS: Performed by: INTERNAL MEDICINE

## 2025-01-30 PROCEDURE — 6360000002 HC RX W HCPCS: Performed by: INTERNAL MEDICINE

## 2025-01-30 PROCEDURE — 99214 OFFICE O/P EST MOD 30 MIN: CPT

## 2025-01-30 PROCEDURE — 96372 THER/PROPH/DIAG INJ SC/IM: CPT

## 2025-01-30 PROCEDURE — 85025 COMPLETE CBC W/AUTO DIFF WBC: CPT

## 2025-01-30 PROCEDURE — G8420 CALC BMI NORM PARAMETERS: HCPCS | Performed by: STUDENT IN AN ORGANIZED HEALTH CARE EDUCATION/TRAINING PROGRAM

## 2025-01-30 RX ORDER — GINGER ROOT/GINGER ROOT EXT 262.5 MG
1 CAPSULE ORAL DAILY
Qty: 30 TABLET | Refills: 3 | Status: SHIPPED | OUTPATIENT
Start: 2025-01-30

## 2025-01-30 RX ORDER — SODIUM CHLORIDE 0.9 % (FLUSH) 0.9 %
5-40 SYRINGE (ML) INJECTION PRN
Status: DISCONTINUED | OUTPATIENT
Start: 2025-01-30 | End: 2025-01-31 | Stop reason: HOSPADM

## 2025-01-30 RX ORDER — ONDANSETRON 4 MG/1
4 TABLET, ORALLY DISINTEGRATING ORAL 3 TIMES DAILY PRN
Qty: 21 TABLET | Refills: 3 | Status: SHIPPED | OUTPATIENT
Start: 2025-01-30

## 2025-01-30 RX ORDER — LANREOTIDE ACETATE 120 MG/.5ML
120 INJECTION SUBCUTANEOUS ONCE
OUTPATIENT
Start: 2025-02-27 | End: 2025-02-27

## 2025-01-30 RX ORDER — HEPARIN 100 UNIT/ML
500 SYRINGE INTRAVENOUS PRN
Status: DISCONTINUED | OUTPATIENT
Start: 2025-01-30 | End: 2025-01-31 | Stop reason: HOSPADM

## 2025-01-30 RX ORDER — MAGNESIUM OXIDE 400 MG/1
1 TABLET ORAL 2 TIMES DAILY
Qty: 30 TABLET | Refills: 3 | Status: SHIPPED | OUTPATIENT
Start: 2025-01-30

## 2025-01-30 RX ORDER — LANREOTIDE ACETATE 120 MG/.5ML
120 INJECTION SUBCUTANEOUS ONCE
Status: COMPLETED | OUTPATIENT
Start: 2025-01-30 | End: 2025-01-30

## 2025-01-30 RX ORDER — POTASSIUM CHLORIDE 1500 MG/1
20 TABLET, EXTENDED RELEASE ORAL 2 TIMES DAILY
Qty: 60 TABLET | Refills: 3 | Status: SHIPPED | OUTPATIENT
Start: 2025-01-30

## 2025-01-30 RX ADMIN — LANREOTIDE ACETATE 120 MG: 120 INJECTION SUBCUTANEOUS at 14:10

## 2025-01-30 RX ADMIN — HEPARIN 500 UNITS: 100 SYRINGE at 13:12

## 2025-01-30 RX ADMIN — SODIUM CHLORIDE, PRESERVATIVE FREE 10 ML: 5 INJECTION INTRAVENOUS at 13:12

## 2025-01-30 RX ADMIN — SODIUM CHLORIDE, PRESERVATIVE FREE 10 ML: 5 INJECTION INTRAVENOUS at 13:10

## 2025-01-30 NOTE — PROGRESS NOTES
[facility] PHYSICIANS [facility] CARE [facility] MED ONCOLOGY  [address]  [facility] [address]  Dept: [phone]  Loc: [phone]    Clinical Progress Note    Reason for visit: Neuroendocrine cancer to liver     PCP:  Abdelrahman Montana DO       Subjective:  Doing fairly well. S/p TACE recently.   Has intermittent left abdominal pain.   Still has regular diarrhea.   Had some solid stool but also noted having some constipation recently, which she attributes due to pain medications.   Tolerating Xeloda and TMZ well.       ONCOLOGIC HISTORY:  39 y.o. female with hx of hypothyroidism, IBS,migraine who was complaining of abdominal pain associated with diarrhea and flushing/sweating.      CT abdomen/pelvis 08/28/2020:  2 cm masslike density in the mid abdominal small bowel mesentery with a spiculated appearance.  Multiple liver masses suspicious for metastatic disease.     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     Comment: Sections of the liver tissue cores show the hepatic parenchyma to be partially replaced by a proliferation of epithelioid cells with relatively uniform round nuclei and eosinophilic granular cytoplasm.  The   epithelioid cells form anastomosing solid cords/nests that are surrounded by delicate fibrovascular stroma.  There are no mitotic figures or tumor cell necrosis present.  The histologic changes seen are suggestive of well-differentiated neuroendocrine (carcinoid) tumor.     Immunostaining for pankeratin, chromogranin, synaptophysin, TTF-1 and Ki-67 was performed on sections of one tissue block (A1) and the neoplastic epithelioid cells show diffuse and strong positivity for neuroendocrine markers (chromogranin and synaptophysin) and moderate positivity for pankeratin.  There is no staining reactivity for TTF-1.   The Ki-67 proliferation labeling index is essentially negative,  normal...

## 2025-01-30 NOTE — TELEPHONE ENCOUNTER
Oral Chemotherapy Management Program    Brooklyn Olmstead is a 39 y.o.female who is being followed for pharmacist oral chemotherapy management.     Diagnosis: Neuroendocrine tumor    Medication name: capecitabine (Xeloda)  Dose: 1,000 mg (two 500 mg tabs)   Frequency: BID on Days 1 - 14 every 28 day cycle    Medication name: temozolomide (Temodar)  Dose: 300 mg (three 100 mg tabs)   Frequency: Daily at bedtime on Days 10 - 14 every 28 day cycle    Ordering provider: Mayte    Assessment/Plan    Followed up regarding recent GI symptoms that she has been experiencing. She has met with Dr. Castro recently.    Feels like side effects are due to the Y-90 treatment as they happened right after and doesn't feel like they are being caused by temozolomide or capecitabine. She is finishing her TMZ and capecitabine for this cycle now.    No concerns noted at this time. Encouraged to reach out if any questions or concerns regarding her chemotherapy arise.    Prashant Ruiz, PharmD, BCOP  Clinical Pharmacist, Hematology/Oncology  [facility]

## 2025-01-30 NOTE — PROGRESS NOTES
Patient tolerated lanreotide injection well.  Patient alert and oriented x3. No distress noted.  Patient denies any new or worsening pain.  Patient denies any needs. All questions answered. D/C in stable condition.

## 2025-01-30 NOTE — PROGRESS NOTES
Patient did NOT stop at check out window, left without AVS.  Patient has MYCHART.  Patient aware to arrive @ 1:00 pm. for labs same day first.

## 2025-02-03 ENCOUNTER — OFFICE VISIT (OUTPATIENT)
Dept: SURGERY | Age: 40
End: 2025-02-03
Payer: COMMERCIAL

## 2025-02-03 ENCOUNTER — TELEPHONE (OUTPATIENT)
Dept: SURGERY | Age: 40
End: 2025-02-03

## 2025-02-03 VITALS
HEART RATE: 80 BPM | HEIGHT: 60 IN | BODY MASS INDEX: 25.32 KG/M2 | SYSTOLIC BLOOD PRESSURE: 107 MMHG | RESPIRATION RATE: 18 BRPM | WEIGHT: 129 LBS | TEMPERATURE: 98 F | OXYGEN SATURATION: 98 % | DIASTOLIC BLOOD PRESSURE: 75 MMHG

## 2025-02-03 DIAGNOSIS — R10.12 LUQ ABDOMINAL PAIN: ICD-10-CM

## 2025-02-03 DIAGNOSIS — C7A.8 NEURO-ENDOCRINE CARCINOMA (HCC): ICD-10-CM

## 2025-02-03 DIAGNOSIS — R10.12 LUQ PAIN: Primary | ICD-10-CM

## 2025-02-03 PROBLEM — K21.00 REFLUX ESOPHAGITIS: Status: ACTIVE | Noted: 2025-02-03

## 2025-02-03 PROCEDURE — 99203 OFFICE O/P NEW LOW 30 MIN: CPT | Performed by: SURGERY

## 2025-02-03 PROCEDURE — G8419 CALC BMI OUT NRM PARAM NOF/U: HCPCS | Performed by: SURGERY

## 2025-02-03 PROCEDURE — 4004F PT TOBACCO SCREEN RCVD TLK: CPT | Performed by: SURGERY

## 2025-02-03 PROCEDURE — G8427 DOCREV CUR MEDS BY ELIG CLIN: HCPCS | Performed by: SURGERY

## 2025-02-03 NOTE — PROGRESS NOTES
General Surgery History and Physical    Patient's Name/Date of Birth: Brooklyn Olmstead / 1985    Date: 2/3/2025    PCP: Abdelrahman Montana DO    Referring Physician:   Lambert Castro*  [phone]    CHIEF COMPLAINT:  No chief complaint on file.        HISTORY OF PRESENT ILLNESS:    Brooklyn Olmstead is an 39 y.o. female who presents with metastatic neuroendocrine tumor. She has been getting Y90 treatments. She has had two so far. She is on two chemo pills. She has done radiation in the past. She said she started to get pain in her RUQ. This started after her first Y90 treatment. She said this last treatment it was more on the left side. The pain is intermittent. She said 4 days after her Y90 treatment, the pain was severe and radiated to her shoulder. She said this was similar to the pain she had before her cancer diagnosis. Pain medicine doesn't help much. She said she has had chronic diarrhea as a symptoms of her cancer. She said she has bene having more constipation lately and has mirtha on lactulose with some relief. She has intermittent nausea from her chemotherapy. She recently saw Dr. Castro and was sent here to discuss EGD. She had an EGD before her diagnosis by Dr. Crawford who did an EGD and colonoscopy at the time. No dysphagia or odynophagia. No NSAIDS, EtOH, + caffeine.  She is on protonix 40 mg once daily which she has been on for a while. She is on carafate 2 times daily.    Past Medical History:   Past Medical History:   Diagnosis Date    Abdominal pain     Anxiety     Cancer (HCC)     neuroendocrime tumor    Chronic kidney disease     Diarrhea     Hypocalcemia     Hypothyroidism     Irritable bowel syndrome     Liver disease     Migraines     Seizures (HCC)     last episode January 2021    Status post alcohol detoxification     5/22/2018-5/29/2018        Past Surgical History:   Past Surgical History:   Procedure Laterality Date    CHOLECYSTECTOMY, LAPAROSCOPIC  07/06/2016    COLONOSCOPY

## 2025-02-03 NOTE — PATIENT INSTRUCTIONS
General Surgery     Preoperative Instructions    Please read the following information very carefully. It contains information that is necessary to best prepare you for your upcoming procedure.    Make arrangements for a  to take you to and from your procedure.  Nothing to eat or drink after midnight the night before your procedure.  Follow your bowel prep instructions if you have them for this procedure.    3 days prior to your procedure: Stop taking blood thinners like Coumadin or Plavix or Xarelto.  5 days prior to your procedure: Stop taking Aspirin or Aspirin containing products.   If you cannot stop any of these medications prior to your procedure, please contact our office.    Medications morning of procedure:  Only heart, breathing, blood pressure, and seizure medications are permitted on the morning of your procedure. These medications can be taken with a sip of water.    IF YOU ARE UNABLE TO KEEP THE ABOVE SCHEDULED PROCEDURE, YOU MUST NOTIFY DR. ROBLERO'S OFFICE [phone]. NOT THE FACILITY.    NO CHEWING GUM OR CHEWING TOBACCO AFTER MIDNIGHT ON DAY OF PROCEDURE.    YOU MUST HAVE TRANSPORTATION TO AND FROM THE FACILITY.

## 2025-02-03 NOTE — TELEPHONE ENCOUNTER
Prior Authorization Form:      DEMOGRAPHICS:                     Patient Name:  Denton Olmstead  Patient :  1985            Insurance:  Payor: [facility] / Plan: [facility] DUAL / Product Type: *No Product type* /   Insurance ID Number:    Payer/Plan Subscr  Sex Relation Sub. Ins. ID Effective Group Num   1. DENTON CHAVEZ 1985 Female Self [id number]                                   [address]         DIAGNOSIS & PROCEDURE:                       Procedure/Operation:EGD           CPT Code: 86282    Diagnosis:  REFLUX   LUQ PAIN    ICD10 Code: K21.00   R10.12    Location:  [facility]    Surgeon:  DERICK    SCHEDULING INFORMATION:                          Date: 2025    Time: 11:15 AM              Anesthesia:  LMAC                                                       Status:  Outpatient        Special Comments:  URGENT SCHEDULE       Electronically signed by Krystle Holcomb MA on 2/3/2025 at 2:27 PM

## 2025-02-04 ENCOUNTER — TELEPHONE (OUTPATIENT)
Dept: SURGERY | Age: 40
End: 2025-02-04

## 2025-02-04 NOTE — PROGRESS NOTES
[facility] PRE-ADMISSION TESTING INSTRUCTIONS    The Preadmission Testing patient is instructed accordingly using the following criteria (check applicable):    Procedure date: 2/5/2025  Arrival time: 0945  Start time: 1115    ARRIVAL INSTRUCTIONS:  [x] Parking the day of Surgery is located in the Main Entrance lot.  Upon entering at Entrance A, make an immediate right to the surgery reception desk    [x] Bring photo ID and insurance card    [x] Bring in a copy of Living will or Durable Power of  papers.    [x] Please be sure to arrange for a responsible adult to provide transportation to and from the hospital    [x] Please arrange for a responsible adult to be with you for the 24 hour period post procedure due to having anesthesia    [x] If you awake morning of surgery not feeling well or have temperature >100 please call [phone]    GENERAL INSTRUCTIONS:    [x] No solid foods after midnight, may have up to 8oz of water until 4 hours prior to surgery. No gum, no candy, no mints.        [x] You may brush your teeth, do not swallow any toothpaste    [x] Take medications as instructed: Pantoprazole     [x] Stop herbal supplements and vitamins within 5 days (or as of now) prior to procedure    [x] For a pregnancy test, Bring urine specimen day of surgery * or hold your bladder before you arrive and be prepared to give a specimen     [x] Morning of surgery, Shower or bathe with with your usual products - wash hair, face and body - do not apply any products to any area after washing - you may use deodorant    [x] No tobacco products within 24 hours of surgery     [x] No alcohol or illegal drug use, marijuana included, within 24 hours of surgery.    [x] Leave all Jewelry and valuables at home, no type of jewelry, no body piercing are allowed in the surgery room  / Bring cases for eyeglasses, contact lenses and dentures       [x] No nail polish, make up or hair products on the day of  surgery    [x] You can expect a call the business day prior to procedure to notify you if your arrival time changes    [x] If you receive a survey after surgery we would greatly appreciate your comments    [x] Please notify surgeon if you develop any illness between now and time of surgery (cold, cough, sore throat, fever, nausea, vomiting) or any signs of infections  including skin, wounds, and dental.    [x]  The Outpatient Pharmacy is available to fill your prescription here on your day of surgery, ask your preop nurse for details

## 2025-02-05 ENCOUNTER — ANESTHESIA EVENT (OUTPATIENT)
Dept: ENDOSCOPY | Age: 40
End: 2025-02-05
Payer: COMMERCIAL

## 2025-02-05 ENCOUNTER — HOSPITAL ENCOUNTER (OUTPATIENT)
Age: 40
Setting detail: OUTPATIENT SURGERY
Discharge: HOME | End: 2025-02-05
Attending: SURGERY | Admitting: SURGERY
Payer: COMMERCIAL

## 2025-02-05 ENCOUNTER — ANESTHESIA (OUTPATIENT)
Dept: ENDOSCOPY | Age: 40
End: 2025-02-05
Payer: COMMERCIAL

## 2025-02-05 VITALS
WEIGHT: 125 LBS | HEART RATE: 77 BPM | TEMPERATURE: 97.3 F | RESPIRATION RATE: 16 BRPM | OXYGEN SATURATION: 96 % | HEIGHT: 60 IN | BODY MASS INDEX: 24.54 KG/M2 | DIASTOLIC BLOOD PRESSURE: 61 MMHG | SYSTOLIC BLOOD PRESSURE: 101 MMHG

## 2025-02-05 DIAGNOSIS — K21.00 REFLUX ESOPHAGITIS: ICD-10-CM

## 2025-02-05 DIAGNOSIS — R10.12 LUQ ABDOMINAL PAIN: ICD-10-CM

## 2025-02-05 LAB
HCG, URINE, POC: NEGATIVE
Lab: NORMAL
NEGATIVE QC PASS/FAIL: NORMAL
POSITIVE QC PASS/FAIL: NORMAL

## 2025-02-05 PROCEDURE — 2709999900 HC NON-CHARGEABLE SUPPLY: Performed by: SURGERY

## 2025-02-05 PROCEDURE — 43239 EGD BIOPSY SINGLE/MULTIPLE: CPT | Performed by: SURGERY

## 2025-02-05 PROCEDURE — 3609012400 HC EGD TRANSORAL BIOPSY SINGLE/MULTIPLE: Performed by: SURGERY

## 2025-02-05 PROCEDURE — 88342 IMHCHEM/IMCYTCHM 1ST ANTB: CPT

## 2025-02-05 PROCEDURE — 2580000003 HC RX 258

## 2025-02-05 PROCEDURE — 7100000011 HC PHASE II RECOVERY - ADDTL 15 MIN: Performed by: SURGERY

## 2025-02-05 PROCEDURE — 3700000000 HC ANESTHESIA ATTENDED CARE: Performed by: SURGERY

## 2025-02-05 PROCEDURE — 7100000010 HC PHASE II RECOVERY - FIRST 15 MIN: Performed by: SURGERY

## 2025-02-05 PROCEDURE — 88305 TISSUE EXAM BY PATHOLOGIST: CPT

## 2025-02-05 PROCEDURE — 6360000002 HC RX W HCPCS

## 2025-02-05 RX ORDER — PROPOFOL 10 MG/ML
INJECTION, EMULSION INTRAVENOUS
Status: DISCONTINUED | OUTPATIENT
Start: 2025-02-05 | End: 2025-02-05 | Stop reason: SDUPTHER

## 2025-02-05 RX ORDER — SODIUM CHLORIDE 9 MG/ML
INJECTION, SOLUTION INTRAVENOUS
Status: DISCONTINUED | OUTPATIENT
Start: 2025-02-05 | End: 2025-02-05 | Stop reason: SDUPTHER

## 2025-02-05 RX ORDER — PANTOPRAZOLE SODIUM 40 MG/1
40 TABLET, DELAYED RELEASE ORAL 2 TIMES DAILY
Qty: 120 TABLET | Refills: 2 | Status: SHIPPED | OUTPATIENT
Start: 2025-02-05 | End: 2025-04-06

## 2025-02-05 RX ADMIN — PROPOFOL 250 MG: 10 INJECTION, EMULSION INTRAVENOUS at 11:23

## 2025-02-05 RX ADMIN — SODIUM CHLORIDE: 9 INJECTION, SOLUTION INTRAVENOUS at 11:17

## 2025-02-05 ASSESSMENT — PAIN - FUNCTIONAL ASSESSMENT: PAIN_FUNCTIONAL_ASSESSMENT: NONE - DENIES PAIN

## 2025-02-05 ASSESSMENT — LIFESTYLE VARIABLES: SMOKING_STATUS: 1

## 2025-02-05 NOTE — OP NOTE
Operative Note: EGD    Brooklyn Olmstead     DATE OF PROCEDURE: 2/5/2025  SURGEON: Dr. FRANCISCA ROBLERO MD, M.D.     PREOPERATIVE DIAGNOSES:   Metastatic neuroendocrine tumor    POSTOPERATIVE DIAGNOSES:  Mild gastritis      OPERATION:    EGD esophagogastroduodenoscopy with antral and duodenal biopsies    SPECIMENS:  ID Type Source Tests Collected by Time Destination   A : duodenal bx Tissue Stomach SURGICAL PATHOLOGY Francisca Roblero MD 2/5/2025 1057    B : antral bx Tissue Stomach SURGICAL PATHOLOGY Francisca Roblero MD 2/5/2025 1057        ANESTHESIA: LMAC    BLOOD LOSS: Minimal    CONSENT AND INDICATIONS:  This is a 39 y.o. year old female who is having the above. I have discussed with the patient and/or the patient representative the indication, alternatives, and the possible risks and/or complications of the planned procedure and the anesthesia methods. The patient and/or patient representative appear to understand and agree to proceed.      PROCEDURE: The patient was placed on the table and sedated by anesthesia. Bite block was placed. A lubricated scope was easily passed into the upper esophagus which looked normal. The distal esophagus looked normal. The gastroesophageal junction was at 40 cm. The scope was passed into the stomach and retroflexed. There was no hiatal hernia. The scope was passed down toward the pylorus. The antral mucosa all looked abnormal: mild gastritis. Biopsy was taken. The scope was then passed through the pylorus into the duodenal bulb which looked normal, then around to the distal duodenum which looked normal and biopsies were taken, and the scope was then withdrawn. The patient tolerated the procedure well.    PLAN:   Follow up pathology results.  PPI increase to BID    Physician Signature: Electronically signed by Dr. FRANCISCA ROBLERO MD M.D. FACS    Send copy of H&P to PCP, Abdelrahman Montana DO and referring physician, No ref. provider found

## 2025-02-05 NOTE — ANESTHESIA PRE PROCEDURE
ABORH A POS 01/27/2021    LABANTI NEG 01/27/2021       Drug/Infectious Status (If Applicable):  No results found for: \"HIV\", \"HEPCAB\"    COVID-19 Screening (If Applicable):   Lab Results   Component Value Date/Time    COVID19 Not Detected 12/02/2022 03:55 PM    COVID19 NOT DETECTED 06/16/2022 03:11 AM    COVID19 Not Detected 02/14/2022 10:23 AM           Anesthesia Evaluation  Patient summary reviewed   no history of anesthetic complications:   Airway: Mallampati: I  TM distance: >3 FB   Neck ROM: full  Mouth opening: > = 3 FB   Dental:          Pulmonary:   (+)           current smoker          Patient smoked on day of surgery.                 Cardiovascular:Negative CV ROS  Exercise tolerance: good (>4 METS)        ECG reviewed               Beta Blocker:  Not on Beta Blocker         Neuro/Psych:   (+) seizures (last sz 2 years ago): well controlled, headaches:depression/anxiety             GI/Hepatic/Renal:   (+) GERD:, liver disease:          Endo/Other:    (+) hypothyroidism::., malignancy/cancer (metastatic malignant neuroendocrine tumor).                 Abdominal:             Vascular: negative vascular ROS.         Other Findings:         Anesthesia Plan      MAC     ASA 3       Induction: intravenous.      Anesthetic plan and risks discussed with patient.      Plan discussed with CRNA.                  Coni Garcia DO   2/5/2025

## 2025-02-05 NOTE — H&P
Patient's office history and physical was reviewed.    Patient examined.    There has been no change in the patient's history and physical.      Physician Signature: [facility]    Patient's Name/Date of Birth: Brooklyn Olmstead / 1985    Date: 2/3/2025    PCP: Abdelrahman Montana DO    Referring Physician:   No ref. provider found  N/A    CHIEF COMPLAINT:  No chief complaint on file.        HISTORY OF PRESENT ILLNESS:    Brooklyn Olmstead is an 39 y.o. female who presents with metastatic neuroendocrine tumor. She has been getting Y90 treatments. She has had two so far. She is on two chemo pills. She has done radiation in the past. She said she started to get pain in her RUQ. This started after her first Y90 treatment. She said this last treatment it was more on the left side. The pain is intermittent. She said 4 days after her Y90 treatment, the pain was severe and radiated to her shoulder. She said this was similar to the pain she had before her cancer diagnosis. Pain medicine doesn't help much. She said she has had chronic diarrhea as a symptoms of her cancer. She said she has bene having more constipation lately and has mirtha on lactulose with some relief. She has intermittent nausea from her chemotherapy. She recently saw Dr. Castro and was sent here to discuss EGD. She had an EGD before her diagnosis by Dr. Crawford who did an EGD and colonoscopy at the time. No dysphagia or odynophagia. No NSAIDS, EtOH, + caffeine.  She is on protonix 40 mg once daily which she has been on for a while. She is on carafate 2 times daily.    Past Medical History:   Past Medical History:   Diagnosis Date    Abdominal pain     Anxiety     Cancer (HCC)     neuroendocrime tumor    Chronic intermittent Diarrhea     Chronic kidney disease     due to her cancer: required HD in 2022 or 2023 for three days then acute kidney failure resolved    Hypocalcemia      packs/day: 0.3 packs/day for 3.0 years (0.8 ttl pk-yrs)     Types: Cigarettes    Smokeless tobacco: Never   Substance Use Topics    Alcohol use: No     Comment: 5 years sober        Family History:   Family History   Problem Relation Age of Onset    High Blood Pressure Mother     High Cholesterol Mother     Other Mother         migraine headaches    Heart Disease Father     Asthma Father     High Blood Pressure Father     Cancer Father         Sarcoidosis    Diabetes Other         GRANDMOTHER    Lung Cancer Other         GRANDFATHER    Alzheimer's Disease Other         GRANDMOTHER    Breast Cancer Maternal Grandmother     Cancer Maternal Grandmother         skin    Cancer Paternal Grandfather         lung       REVIEW OF SYSTEMS:    Constitutional: negative  Eyes: negative  Ears, nose, mouth, throat, and face: negative  Respiratory: negative  Cardiovascular: negative  Gastrointestinal: as in HPI  Genitourinary:negative  Integument/breast: negative  Hematologic/lymphatic: negative  Musculoskeletal:negative  Neurological: negative  Allergic/Immunologic: negative    PHYSICAL EXAM   BP (!) 97/55   Pulse 76   Temp 97.7 °F (36.5 °C)   Resp 18   Ht 1.524 m (5')   Wt 56.7 kg (125 lb)   SpO2 95%   BMI 24.41 kg/m²     General appearance: alert, cooperative and in no acute distress.  Eyes: Grossly normal   Lungs: normal work of breathing  Heart: regular rate  Abdomen:  soft, non-tender, non-distended  Skin: No skin abnormalities  Neurologic: Alert and oriented x 3. Grossly normal  Musculoskeletal: No edema.      ASSESSMENT AND PLAN:     Brooklyn Olmstead is an 39 y.o. female who presents for an EGD with LUQ pain     All available labs and pathology reviewed.  All imaging independently reviewed and interpreted.   All provider notes reviewed.  The above was discussed with the patient at length.    I will set the patient up for a Esophagogastroduodenoscopy, possible biopsy, possible polypectomy    I explained the risks

## 2025-02-05 NOTE — ANESTHESIA POSTPROCEDURE EVALUATION
Department of Anesthesiology  Postprocedure Note    Patient: Brooklyn Omlstead  MRN: [id number]  YOB: 1985  Date of evaluation: 2/5/2025    Procedure Summary       Date: 02/05/25 Room / Location: [place] [address] / [facility]    Anesthesia Start: 1117 Anesthesia Stop: 1132    Procedure: ESOPHAGOGASTRODUODENOSCOPY     +++CONTACT ISOLATION+++ Diagnosis:       LUQ abdominal pain      Reflux esophagitis      (LUQ abdominal pain [R10.12])      (Reflux esophagitis [K21.00])    Surgeons: Francisca Bashir MD Responsible Provider: Coni Garcia DO    Anesthesia Type: MAC ASA Status: 3            Anesthesia Type: No value filed.    Pili Phase I: Pili Score: 10    Pili Phase II:      Anesthesia Post Evaluation    Patient location during evaluation: bedside  Patient participation: complete - patient participated  Level of consciousness: lethargic  Pain score: 0  Airway patency: patent  Nausea & Vomiting: no nausea and no vomiting  Cardiovascular status: blood pressure returned to baseline and hemodynamically stable  Respiratory status: acceptable  Hydration status: euvolemic  Pain management: adequate        No notable events documented.

## 2025-02-05 NOTE — DISCHARGE INSTRUCTIONS
[facility] EGD PROCEDURE DISCHARGE INSTRUCTIONS  You may be drowsy or lightheaded after receiving sedation or anesthesia.    A responsible person should be with you for the next 24 hours.    Please follow the instructions checked below:    DIET INSTRUCTIONS:  [x]Start with light diet and progress to your normal diet as you feel like eating. If you experience nausea or repeated episodes of vomiting which persist beyond 12-24 hours, notify your doctor.  []Other     ACTIVITY INSTRUCTIONS:  [x]Rest today. Increase activity as tolerated    []No heavy lifting or strenuous activity     [x]No driving for today  []Other      MEDICATION INSTRUCTIONS:    []Prescriptions sent with you.  Use as directed.  When taking pain medications, you may experience dizziness or drowsiness.  Do not drink alcohol or drive when taking these medications.  [x]Continue preop medications                               Post-procedure Care   If any tissue was removed:   It will be sent to a lab to be examined. It may take 1-2 weeks for results. The doctor will usually give an initial report after the scope is removed. Other tests may be recommended.   A small amount of bleeding may occur during the first few days after the procedure.     When you return home after the procedure, be sure to follow your doctor's instructions, which may include:   Resume medicines as instructed by your doctor.   Resume normal diet, unless directed otherwise by your doctor.   The sedative will make you drowsy. Avoid driving, operating machinery, or making important decisions for the rest of the day.   Rest for the remainder of the day.   Rest when you get home.   Avoid alcohol for the rest of the day.   Be sure to follow your doctor's instructions .     Results   This test gives your doctor information about the health of your digestive system. The results can help to explain your symptoms. You and your doctor will talk about the results and your

## 2025-02-12 LAB — SURGICAL PATHOLOGY REPORT: NORMAL

## 2025-02-27 ENCOUNTER — HOSPITAL ENCOUNTER (OUTPATIENT)
Dept: INFUSION THERAPY | Age: 40
Discharge: HOME | End: 2025-02-27
Payer: COMMERCIAL

## 2025-02-27 ENCOUNTER — OFFICE VISIT (OUTPATIENT)
Dept: ONCOLOGY | Age: 40
End: 2025-02-27
Payer: COMMERCIAL

## 2025-02-27 VITALS
DIASTOLIC BLOOD PRESSURE: 49 MMHG | HEIGHT: 60 IN | BODY MASS INDEX: 25.03 KG/M2 | SYSTOLIC BLOOD PRESSURE: 102 MMHG | OXYGEN SATURATION: 97 % | HEART RATE: 73 BPM | TEMPERATURE: 97.4 F | WEIGHT: 127.5 LBS

## 2025-02-27 DIAGNOSIS — C7A.8 NEUROENDOCRINE CANCER (HCC): ICD-10-CM

## 2025-02-27 DIAGNOSIS — C78.7 MALIGNANT NEOPLASM METASTATIC TO LIVER (HCC): ICD-10-CM

## 2025-02-27 DIAGNOSIS — D3A.8 NEUROENDOCRINE TUMOR (HCC): Primary | ICD-10-CM

## 2025-02-27 DIAGNOSIS — C7B.8 METASTATIC MALIGNANT NEUROENDOCRINE TUMOR TO LIVER (HCC): Primary | ICD-10-CM

## 2025-02-27 LAB
ALBUMIN SERPL-MCNC: 4.4 G/DL (ref 3.5–5.2)
ALP SERPL-CCNC: 136 U/L (ref 35–104)
ALT SERPL-CCNC: 11 U/L (ref 0–32)
ANION GAP SERPL CALCULATED.3IONS-SCNC: 13 MMOL/L (ref 7–16)
AST SERPL-CCNC: 16 U/L (ref 0–31)
BASOPHILS # BLD: 0.07 K/UL (ref 0–0.2)
BASOPHILS NFR BLD: 1 % (ref 0–2)
BILIRUB SERPL-MCNC: 0.2 MG/DL (ref 0–1.2)
BUN SERPL-MCNC: 9 MG/DL (ref 6–20)
CALCIUM SERPL-MCNC: 8.8 MG/DL (ref 8.6–10.2)
CHLORIDE SERPL-SCNC: 102 MMOL/L (ref 98–107)
CO2 SERPL-SCNC: 23 MMOL/L (ref 22–29)
CREAT SERPL-MCNC: 0.6 MG/DL (ref 0.5–1)
EOSINOPHIL # BLD: 0.23 K/UL (ref 0.05–0.5)
EOSINOPHILS RELATIVE PERCENT: 3 % (ref 0–6)
ERYTHROCYTE [DISTWIDTH] IN BLOOD BY AUTOMATED COUNT: 17 % (ref 11.5–15)
GFR, ESTIMATED: >90 ML/MIN/1.73M2
GLUCOSE SERPL-MCNC: 100 MG/DL (ref 74–99)
HCT VFR BLD AUTO: 35 % (ref 34–48)
HGB BLD-MCNC: 11.5 G/DL (ref 11.5–15.5)
IMM GRANULOCYTES # BLD AUTO: 0.04 K/UL (ref 0–0.58)
IMM GRANULOCYTES NFR BLD: 1 % (ref 0–5)
LYMPHOCYTES NFR BLD: 1.1 K/UL (ref 1.5–4)
LYMPHOCYTES RELATIVE PERCENT: 15 % (ref 20–42)
MCH RBC QN AUTO: 30.6 PG (ref 26–35)
MCHC RBC AUTO-ENTMCNC: 32.9 G/DL (ref 32–34.5)
MCV RBC AUTO: 93.1 FL (ref 80–99.9)
MONOCYTES NFR BLD: 0.95 K/UL (ref 0.1–0.95)
MONOCYTES NFR BLD: 13 % (ref 2–12)
NEUTROPHILS NFR BLD: 68 % (ref 43–80)
NEUTS SEG NFR BLD: 5.03 K/UL (ref 1.8–7.3)
PLATELET # BLD AUTO: 233 K/UL (ref 130–450)
PMV BLD AUTO: 9.3 FL (ref 7–12)
POTASSIUM SERPL-SCNC: 4.3 MMOL/L (ref 3.5–5)
PROT SERPL-MCNC: 7.1 G/DL (ref 6.4–8.3)
RBC # BLD AUTO: 3.76 M/UL (ref 3.5–5.5)
SODIUM SERPL-SCNC: 138 MMOL/L (ref 132–146)
WBC OTHER # BLD: 7.4 K/UL (ref 4.5–11.5)

## 2025-02-27 PROCEDURE — 4004F PT TOBACCO SCREEN RCVD TLK: CPT | Performed by: STUDENT IN AN ORGANIZED HEALTH CARE EDUCATION/TRAINING PROGRAM

## 2025-02-27 PROCEDURE — G8427 DOCREV CUR MEDS BY ELIG CLIN: HCPCS | Performed by: STUDENT IN AN ORGANIZED HEALTH CARE EDUCATION/TRAINING PROGRAM

## 2025-02-27 PROCEDURE — 6360000002 HC RX W HCPCS: Performed by: STUDENT IN AN ORGANIZED HEALTH CARE EDUCATION/TRAINING PROGRAM

## 2025-02-27 PROCEDURE — G8420 CALC BMI NORM PARAMETERS: HCPCS | Performed by: STUDENT IN AN ORGANIZED HEALTH CARE EDUCATION/TRAINING PROGRAM

## 2025-02-27 PROCEDURE — 99214 OFFICE O/P EST MOD 30 MIN: CPT

## 2025-02-27 PROCEDURE — 2500000003 HC RX 250 WO HCPCS: Performed by: STUDENT IN AN ORGANIZED HEALTH CARE EDUCATION/TRAINING PROGRAM

## 2025-02-27 PROCEDURE — 80053 COMPREHEN METABOLIC PANEL: CPT

## 2025-02-27 PROCEDURE — 85025 COMPLETE CBC W/AUTO DIFF WBC: CPT

## 2025-02-27 PROCEDURE — 96372 THER/PROPH/DIAG INJ SC/IM: CPT

## 2025-02-27 PROCEDURE — 36591 DRAW BLOOD OFF VENOUS DEVICE: CPT

## 2025-02-27 PROCEDURE — 99214 OFFICE O/P EST MOD 30 MIN: CPT | Performed by: STUDENT IN AN ORGANIZED HEALTH CARE EDUCATION/TRAINING PROGRAM

## 2025-02-27 RX ORDER — LANREOTIDE ACETATE 120 MG/.5ML
120 INJECTION SUBCUTANEOUS ONCE
Status: COMPLETED | OUTPATIENT
Start: 2025-02-27 | End: 2025-02-27

## 2025-02-27 RX ORDER — HEPARIN 100 UNIT/ML
500 SYRINGE INTRAVENOUS PRN
Status: DISCONTINUED | OUTPATIENT
Start: 2025-02-27 | End: 2025-02-28 | Stop reason: HOSPADM

## 2025-02-27 RX ORDER — LANREOTIDE ACETATE 120 MG/.5ML
120 INJECTION SUBCUTANEOUS ONCE
OUTPATIENT
Start: 2025-03-27 | End: 2025-03-27

## 2025-02-27 RX ORDER — SODIUM CHLORIDE 0.9 % (FLUSH) 0.9 %
5-40 SYRINGE (ML) INJECTION PRN
Status: DISCONTINUED | OUTPATIENT
Start: 2025-02-27 | End: 2025-02-28 | Stop reason: HOSPADM

## 2025-02-27 RX ADMIN — SODIUM CHLORIDE, PRESERVATIVE FREE 10 ML: 5 INJECTION INTRAVENOUS at 13:09

## 2025-02-27 RX ADMIN — Medication 500 UNITS: at 13:09

## 2025-02-27 RX ADMIN — SODIUM CHLORIDE, PRESERVATIVE FREE 10 ML: 5 INJECTION INTRAVENOUS at 13:06

## 2025-02-27 RX ADMIN — LANREOTIDE ACETATE 120 MG: 120 INJECTION SUBCUTANEOUS at 14:34

## 2025-02-27 NOTE — PROGRESS NOTES
[facility] CARE Count includes the [facility] MED ONCOLOGY  [address]  [facility] [address]  Dept: [phone]  Loc: [phone]    Clinical Progress Note    Reason for visit: Neuroendocrine cancer to liver     PCP:  Abdelrahman Montana DO       Subjective:  No major issues. Pt feels largely well.   Tolerating TMZ and xeloda without major issues.       ONCOLOGIC HISTORY:  40 y.o. female with hx of hypothyroidism, IBS,migraine who was complaining of abdominal pain associated with diarrhea and flushing/sweating.      CT abdomen/pelvis 08/28/2020:  2 cm masslike density in the mid abdominal small bowel mesentery with a spiculated appearance.  Multiple liver masses suspicious for metastatic disease.     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     Comment: Sections of the liver tissue cores show the hepatic parenchyma to be partially replaced by a proliferation of epithelioid cells with relatively uniform round nuclei and eosinophilic granular cytoplasm.  The   epithelioid cells form anastomosing solid cords/nests that are surrounded by delicate fibrovascular stroma.  There are no mitotic figures or tumor cell necrosis present.  The histologic changes seen are suggestive of well-differentiated neuroendocrine (carcinoid) tumor.     Immunostaining for pankeratin, chromogranin, synaptophysin, TTF-1 and Ki-67 was performed on sections of one tissue block (A1) and the neoplastic epithelioid cells show diffuse and strong positivity for neuroendocrine markers (chromogranin and synaptophysin) and moderate positivity for pankeratin.  There is no staining reactivity for TTF-1.   The Ki-67 proliferation labeling index is essentially negative, (very low, <1%).   This staining pattern confirms the histologic impression of a well-differentiated neuroendocrine (carcinoid) tumor.     FL Small bowel

## 2025-02-28 DIAGNOSIS — G43.909 MIGRAINE WITHOUT STATUS MIGRAINOSUS, NOT INTRACTABLE, UNSPECIFIED MIGRAINE TYPE: ICD-10-CM

## 2025-02-28 DIAGNOSIS — F51.01 PRIMARY INSOMNIA: ICD-10-CM

## 2025-02-28 NOTE — TELEPHONE ENCOUNTER
Name of Medication(s) Requested:  Requested Prescriptions      No prescriptions requested or ordered in this encounter       Medication is on current medication list Yes    Dosage and directions were verified? Yes    Quantity verified: 30 day supply     Pharmacy Verified?  Yes    Last Appointment:  9/18/2024    Future appts:  Future Appointments   Date Time Provider Department Center   3/27/2025  1:00 PM [facility]C FAST TRACK 2 [facility] Med Onc [facility] [place]   3/27/2025  2:00 PM Denise Muñoz APRN - CNP MED ONC [facility]   3/27/2025  2:30 PM [facility]C FAST TRACK 2 [facility] Med Onc [facility]        (If no appt send self scheduling link. .REFILLAPPT)  Scheduling request sent?     [] Yes  [] No    Does patient need updated?  [] Yes  [] No

## 2025-03-01 RX ORDER — ZOLPIDEM TARTRATE 10 MG/1
10 TABLET ORAL NIGHTLY PRN
Qty: 30 TABLET | Refills: 0 | Status: SHIPPED | OUTPATIENT
Start: 2025-03-01 | End: 2025-03-31

## 2025-03-01 RX ORDER — BUTALBITAL, ACETAMINOPHEN AND CAFFEINE 50; 325; 40 MG/1; MG/1; MG/1
TABLET ORAL
Qty: 60 TABLET | Refills: 0 | Status: SHIPPED | OUTPATIENT
Start: 2025-03-01

## 2025-03-13 RX ORDER — LEVOTHYROXINE SODIUM 112 UG/1
112 TABLET ORAL DAILY
Qty: 30 TABLET | Refills: 5 | Status: SHIPPED | OUTPATIENT
Start: 2025-03-13

## 2025-03-13 NOTE — TELEPHONE ENCOUNTER
Name of Medication(s) Requested:  Requested Prescriptions     Pending Prescriptions Disp Refills    levothyroxine (SYNTHROID) 112 MCG tablet 30 tablet 5     Sig: Take 1 tablet by mouth Daily       Medication is on current medication list Yes    Dosage and directions were verified? Yes    Quantity verified: 30 day supply     Pharmacy Verified?  Yes    Last Appointment:  9/18/2024    Future appts:  Future Appointments   Date Time Provider Department Center   3/27/2025  1:00 PM [facility] MED [facility]C FAST TRACK 2 [facility] Med Onc [facility]   3/27/2025  2:00 PM Denise Muñoz APRN - CNP MED ONC [facility]   3/27/2025  2:30 PM [facility] MED ONC FAST TRACK 2 [facility] Med Onc [facility]        (If no appt send self scheduling link. .REFILLAPPT)  Scheduling request sent?     [] Yes  [] No    Does patient need updated?  [] Yes  [] No

## 2025-03-19 ENCOUNTER — TELEPHONE (OUTPATIENT)
Dept: CASE MANAGEMENT | Age: 40
End: 2025-03-19

## 2025-03-19 NOTE — TELEPHONE ENCOUNTER
Patient's PET gallium is scheduled at [facility] on 3/25/25 at 1 pm with 12:30 arrival time. Patient was called with the above information and is agreeable to appointment. She states that she has had many of these scans so she is aware of the instructions and location. She denies any questions or issues at this time.

## 2025-03-27 ENCOUNTER — HOSPITAL ENCOUNTER (OUTPATIENT)
Dept: INFUSION THERAPY | Age: 40
End: 2025-03-27

## 2025-03-28 DIAGNOSIS — C7B.8 METASTATIC MALIGNANT NEUROENDOCRINE TUMOR TO LIVER (HCC): ICD-10-CM

## 2025-03-28 DIAGNOSIS — D3A.8 NEUROENDOCRINE TUMOR (HCC): ICD-10-CM

## 2025-03-28 RX ORDER — TEMOZOLOMIDE 100 MG/1
300 CAPSULE ORAL DAILY
Qty: 15 CAPSULE | Refills: 2 | Status: ACTIVE | OUTPATIENT
Start: 2025-03-28

## 2025-03-28 RX ORDER — CAPECITABINE 500 MG/1
1000 TABLET, FILM COATED ORAL 2 TIMES DAILY
Qty: 56 TABLET | Refills: 2 | Status: ACTIVE | OUTPATIENT
Start: 2025-03-28

## 2025-04-01 ENCOUNTER — HOSPITAL ENCOUNTER (OUTPATIENT)
Dept: NUCLEAR MEDICINE | Age: 40
Discharge: HOME | End: 2025-04-03
Attending: STUDENT IN AN ORGANIZED HEALTH CARE EDUCATION/TRAINING PROGRAM
Payer: COMMERCIAL

## 2025-04-01 DIAGNOSIS — D3A.8 NEUROENDOCRINE TUMOR (HCC): ICD-10-CM

## 2025-04-01 PROCEDURE — 3430000000 HC RX DIAGNOSTIC RADIOPHARMACEUTICAL: Performed by: RADIOLOGY

## 2025-04-01 PROCEDURE — 78815 PET IMAGE W/CT SKULL-THIGH: CPT

## 2025-04-01 PROCEDURE — A9587 GALLIUM GA-68: HCPCS | Performed by: RADIOLOGY

## 2025-04-01 RX ORDER — 68GA-DOTATATE 40 MCG
2.9 KIT INTRAVENOUS ONCE
Status: COMPLETED | OUTPATIENT
Start: 2025-04-01 | End: 2025-04-01

## 2025-04-01 RX ADMIN — 68GA-DOTATATE 3 MILLICURIE: KIT INTRAVENOUS at 15:49

## 2025-04-03 ENCOUNTER — HOSPITAL ENCOUNTER (OUTPATIENT)
Dept: INFUSION THERAPY | Age: 40
Discharge: HOME | End: 2025-04-03
Payer: COMMERCIAL

## 2025-04-03 ENCOUNTER — OFFICE VISIT (OUTPATIENT)
Dept: ONCOLOGY | Age: 40
End: 2025-04-03
Payer: COMMERCIAL

## 2025-04-03 VITALS
SYSTOLIC BLOOD PRESSURE: 117 MMHG | OXYGEN SATURATION: 100 % | HEIGHT: 60 IN | HEART RATE: 80 BPM | WEIGHT: 126.2 LBS | DIASTOLIC BLOOD PRESSURE: 72 MMHG | BODY MASS INDEX: 24.77 KG/M2 | TEMPERATURE: 98.2 F

## 2025-04-03 DIAGNOSIS — C7B.8 METASTATIC MALIGNANT NEUROENDOCRINE TUMOR TO LIVER (HCC): Primary | ICD-10-CM

## 2025-04-03 DIAGNOSIS — C7A.8 NEUROENDOCRINE CANCER (HCC): ICD-10-CM

## 2025-04-03 DIAGNOSIS — D3A.8 NEUROENDOCRINE TUMOR (HCC): Primary | ICD-10-CM

## 2025-04-03 LAB
ALBUMIN SERPL-MCNC: 4.6 G/DL (ref 3.5–5.2)
ALP SERPL-CCNC: 132 U/L (ref 35–104)
ALT SERPL-CCNC: 14 U/L (ref 0–32)
ANION GAP SERPL CALCULATED.3IONS-SCNC: 15 MMOL/L (ref 7–16)
AST SERPL-CCNC: 16 U/L (ref 0–31)
BASOPHILS # BLD: 0.06 K/UL (ref 0–0.2)
BASOPHILS NFR BLD: 1 % (ref 0–2)
BILIRUB SERPL-MCNC: 0.3 MG/DL (ref 0–1.2)
BUN SERPL-MCNC: 12 MG/DL (ref 6–20)
CALCIUM SERPL-MCNC: 8.7 MG/DL (ref 8.6–10.2)
CHLORIDE SERPL-SCNC: 102 MMOL/L (ref 98–107)
CO2 SERPL-SCNC: 23 MMOL/L (ref 22–29)
CREAT SERPL-MCNC: 0.5 MG/DL (ref 0.5–1)
EOSINOPHIL # BLD: 0.05 K/UL (ref 0.05–0.5)
EOSINOPHILS RELATIVE PERCENT: 1 % (ref 0–6)
ERYTHROCYTE [DISTWIDTH] IN BLOOD BY AUTOMATED COUNT: 16.9 % (ref 11.5–15)
GFR, ESTIMATED: >90 ML/MIN/1.73M2
GLUCOSE SERPL-MCNC: 110 MG/DL (ref 74–99)
HCT VFR BLD AUTO: 36.2 % (ref 34–48)
HGB BLD-MCNC: 12.1 G/DL (ref 11.5–15.5)
IMM GRANULOCYTES # BLD AUTO: 0.04 K/UL (ref 0–0.58)
IMM GRANULOCYTES NFR BLD: 1 % (ref 0–5)
LYMPHOCYTES NFR BLD: 1.17 K/UL (ref 1.5–4)
LYMPHOCYTES RELATIVE PERCENT: 14 % (ref 20–42)
MCH RBC QN AUTO: 31.1 PG (ref 26–35)
MCHC RBC AUTO-ENTMCNC: 33.4 G/DL (ref 32–34.5)
MCV RBC AUTO: 93.1 FL (ref 80–99.9)
MONOCYTES NFR BLD: 0.96 K/UL (ref 0.1–0.95)
MONOCYTES NFR BLD: 12 % (ref 2–12)
NEUTROPHILS NFR BLD: 72 % (ref 43–80)
NEUTS SEG NFR BLD: 5.86 K/UL (ref 1.8–7.3)
PLATELET # BLD AUTO: 229 K/UL (ref 130–450)
PMV BLD AUTO: 9.4 FL (ref 7–12)
POTASSIUM SERPL-SCNC: 3.7 MMOL/L (ref 3.5–5)
PROT SERPL-MCNC: 7.1 G/DL (ref 6.4–8.3)
RBC # BLD AUTO: 3.89 M/UL (ref 3.5–5.5)
SODIUM SERPL-SCNC: 140 MMOL/L (ref 132–146)
WBC OTHER # BLD: 8.1 K/UL (ref 4.5–11.5)

## 2025-04-03 PROCEDURE — 2500000003 HC RX 250 WO HCPCS: Performed by: INTERNAL MEDICINE

## 2025-04-03 PROCEDURE — 99214 OFFICE O/P EST MOD 30 MIN: CPT | Performed by: STUDENT IN AN ORGANIZED HEALTH CARE EDUCATION/TRAINING PROGRAM

## 2025-04-03 PROCEDURE — 96372 THER/PROPH/DIAG INJ SC/IM: CPT

## 2025-04-03 PROCEDURE — 85025 COMPLETE CBC W/AUTO DIFF WBC: CPT

## 2025-04-03 PROCEDURE — 99214 OFFICE O/P EST MOD 30 MIN: CPT

## 2025-04-03 PROCEDURE — 36591 DRAW BLOOD OFF VENOUS DEVICE: CPT

## 2025-04-03 PROCEDURE — 80053 COMPREHEN METABOLIC PANEL: CPT

## 2025-04-03 PROCEDURE — G8420 CALC BMI NORM PARAMETERS: HCPCS | Performed by: STUDENT IN AN ORGANIZED HEALTH CARE EDUCATION/TRAINING PROGRAM

## 2025-04-03 PROCEDURE — G8427 DOCREV CUR MEDS BY ELIG CLIN: HCPCS | Performed by: STUDENT IN AN ORGANIZED HEALTH CARE EDUCATION/TRAINING PROGRAM

## 2025-04-03 PROCEDURE — 4004F PT TOBACCO SCREEN RCVD TLK: CPT | Performed by: STUDENT IN AN ORGANIZED HEALTH CARE EDUCATION/TRAINING PROGRAM

## 2025-04-03 PROCEDURE — 6360000002 HC RX W HCPCS: Performed by: INTERNAL MEDICINE

## 2025-04-03 PROCEDURE — 6360000002 HC RX W HCPCS: Performed by: STUDENT IN AN ORGANIZED HEALTH CARE EDUCATION/TRAINING PROGRAM

## 2025-04-03 RX ORDER — LANREOTIDE ACETATE 120 MG/.5ML
120 INJECTION SUBCUTANEOUS ONCE
OUTPATIENT
Start: 2025-04-24 | End: 2025-04-24

## 2025-04-03 RX ORDER — SODIUM CHLORIDE 0.9 % (FLUSH) 0.9 %
5-40 SYRINGE (ML) INJECTION PRN
Status: DISCONTINUED | OUTPATIENT
Start: 2025-04-03 | End: 2025-04-04 | Stop reason: HOSPADM

## 2025-04-03 RX ORDER — HEPARIN 100 UNIT/ML
500 SYRINGE INTRAVENOUS PRN
Status: DISCONTINUED | OUTPATIENT
Start: 2025-04-03 | End: 2025-04-04 | Stop reason: HOSPADM

## 2025-04-03 RX ORDER — LANREOTIDE ACETATE 120 MG/.5ML
120 INJECTION SUBCUTANEOUS ONCE
Status: COMPLETED | OUTPATIENT
Start: 2025-04-03 | End: 2025-04-03

## 2025-04-03 RX ADMIN — SODIUM CHLORIDE, PRESERVATIVE FREE 10 ML: 5 INJECTION INTRAVENOUS at 13:22

## 2025-04-03 RX ADMIN — HEPARIN 500 UNITS: 100 SYRINGE at 13:22

## 2025-04-03 RX ADMIN — LANREOTIDE ACETATE 120 MG: 120 INJECTION SUBCUTANEOUS at 15:21

## 2025-04-04 NOTE — PROGRESS NOTES
Patient did stop at check out window, ok'd to leave without AVS.  Patient has MYCHART.      
hydrOXYzine HCl (ATARAX) 25 MG tablet Take 1 tablet by mouth every 8 hours as needed for Itching      hydrocortisone 2.5 % cream Apply topically 2 times daily. 28 g 1    prochlorperazine (COMPAZINE) 10 MG tablet Take 1 tablet by mouth every 6 hours as needed (NAUSEA) 120 tablet 0    acetaminophen (TYLENOL) 325 MG tablet Take 2 tablets by mouth every 6 hours as needed for Pain 120 tablet 3    famotidine (PEPCID) 40 MG tablet Take 1 tablet by mouth nightly as needed      brimonidine (ALPHAGAN) 0.2 % ophthalmic solution Place 1 drop into both eyes every morning      zolpidem (AMBIEN) 10 MG tablet Take 1 tablet by mouth nightly as needed for Sleep for up to 30 days. for sleep Max Daily Amount: 10 mg 30 tablet 0     Current Facility-Administered Medications on File Prior to Visit   Medication Dose Route Frequency Provider Last Rate Last Admin    sodium chloride flush 0.9 % injection 5-40 mL  5-40 mL IntraVENous PRN Minda Mix MD   10 mL at 04/03/25 1322    heparin (PF) 100 UNIT/ML injection 500 Units  500 Units IntraCATHeter PRN Minda Mix MD   500 Units at 04/03/25 1322   Reviewed and reconciled.    Allergies  No Known Allergies          REVIEW OF SYSTEMS:  CONSTITUTIONAL :  No fever, chills, fatigue, night sweats, or unintended weight loss  EYES: No discharge, itchiness, pain, redness  ENMT: No mouth sores, thrush, sore throat,  dysphagia   RESPIRATORY: No shortness of breath, or cough  CARDIOVASCULAR: No chest pain, palpitations  GASTROINTESTINAL: Intermittent nausea and vomiting, and diarrhea.    GENITOURINARY: No dysuria   ENDOCRINE: No polydipsia, polyuria, cold or heat intolerance.  MUSCULOSKELETAL: No arthralgias, myalgias, or bony pain  INTEGUMENT.: No skin rash or bruises.  HEM/LYMPHATICS: No adenopathy, bruising or prolonged bleeding  NEURO: No headache, dizziness, syncope/presyncope, focal deficits or loss of balance.       PHYSICAL EXAM:  /72   Pulse 80   Temp 98.2 °F (36.8 °C)   Ht

## 2025-04-08 DIAGNOSIS — F51.01 PRIMARY INSOMNIA: ICD-10-CM

## 2025-04-08 DIAGNOSIS — G43.909 MIGRAINE WITHOUT STATUS MIGRAINOSUS, NOT INTRACTABLE, UNSPECIFIED MIGRAINE TYPE: ICD-10-CM

## 2025-04-08 RX ORDER — ZOLPIDEM TARTRATE 10 MG/1
10 TABLET ORAL NIGHTLY PRN
Qty: 30 TABLET | Refills: 0 | Status: SHIPPED | OUTPATIENT
Start: 2025-04-08 | End: 2025-05-08

## 2025-04-08 RX ORDER — BUTALBITAL, ACETAMINOPHEN AND CAFFEINE 50; 325; 40 MG/1; MG/1; MG/1
TABLET ORAL
Qty: 60 TABLET | Refills: 0 | Status: SHIPPED | OUTPATIENT
Start: 2025-04-08

## 2025-04-08 NOTE — TELEPHONE ENCOUNTER
Last Appointment:  9/18/2024  Future Appointments   Date Time Provider Department Center   5/1/2025  1:00 PM [facility] FAST TRACK 1 [facility] Med Onc [facility]   5/1/2025  1:45 PM Geoff Hu MD MED ONC [facility]   5/1/2025  2:30 PM [facility] MED ONC FAST TRACK 3 [facility] Med Onc [facility]

## 2025-04-14 DIAGNOSIS — C7B.8 METASTATIC MALIGNANT NEUROENDOCRINE TUMOR TO LIVER (HCC): Primary | ICD-10-CM

## 2025-04-14 DIAGNOSIS — D3A.8 NEUROENDOCRINE TUMOR (HCC): ICD-10-CM

## 2025-04-14 DIAGNOSIS — C22.0 HEPATOCELLULAR CARCINOMA: ICD-10-CM

## 2025-04-14 DIAGNOSIS — C7A.8 NEUROENDOCRINE CANCER (HCC): ICD-10-CM

## 2025-04-17 ENCOUNTER — TELEPHONE (OUTPATIENT)
Dept: GENERAL RADIOLOGY | Age: 40
End: 2025-04-17

## 2025-04-17 NOTE — TELEPHONE ENCOUNTER
No phone call made:  Checked with insurance and no auth is needed for the procedure on 4.30.25 - documentation scanned into media.

## 2025-04-21 RX ORDER — SODIUM CHLORIDE 0.9 % (FLUSH) 0.9 %
5-40 SYRINGE (ML) INJECTION PRN
OUTPATIENT
Start: 2025-04-30

## 2025-04-28 ENCOUNTER — TELEPHONE (OUTPATIENT)
Dept: GENERAL RADIOLOGY | Age: 40
End: 2025-04-28

## 2025-04-30 ENCOUNTER — HOSPITAL ENCOUNTER (INPATIENT)
Dept: INTERVENTIONAL RADIOLOGY/VASCULAR | Age: 40
LOS: 1 days | Discharge: HOME | DRG: 844 | End: 2025-05-01
Attending: TRANSPLANT SURGERY | Admitting: TRANSPLANT SURGERY
Payer: COMMERCIAL

## 2025-04-30 ENCOUNTER — ANESTHESIA (OUTPATIENT)
Dept: INTERVENTIONAL RADIOLOGY/VASCULAR | Age: 40
End: 2025-04-30
Payer: COMMERCIAL

## 2025-04-30 ENCOUNTER — ANESTHESIA EVENT (OUTPATIENT)
Dept: INTERVENTIONAL RADIOLOGY/VASCULAR | Age: 40
End: 2025-04-30
Payer: COMMERCIAL

## 2025-04-30 DIAGNOSIS — C7B.8 METASTATIC MALIGNANT NEUROENDOCRINE TUMOR TO LIVER (HCC): ICD-10-CM

## 2025-04-30 DIAGNOSIS — C22.0 HEPATOCELLULAR CARCINOMA (HCC): ICD-10-CM

## 2025-04-30 DIAGNOSIS — C7A.8 NEUROENDOCRINE CANCER (HCC): Primary | ICD-10-CM

## 2025-04-30 DIAGNOSIS — D3A.8 NEUROENDOCRINE TUMOR (HCC): ICD-10-CM

## 2025-04-30 LAB
AFP SERPL-MCNC: 2.7 UG/L
ALBUMIN SERPL-MCNC: 4.3 G/DL (ref 3.5–5.2)
ALP SERPL-CCNC: 138 U/L (ref 35–104)
ALT SERPL-CCNC: 11 U/L (ref 0–35)
ANION GAP SERPL CALCULATED.3IONS-SCNC: 12 MMOL/L (ref 7–16)
AST SERPL-CCNC: 18 U/L (ref 0–35)
BASOPHILS # BLD: 0.08 K/UL (ref 0–0.2)
BASOPHILS NFR BLD: 1 % (ref 0–2)
BILIRUB SERPL-MCNC: 0.2 MG/DL (ref 0–1.2)
BUN SERPL-MCNC: 8 MG/DL (ref 6–20)
CALCIUM SERPL-MCNC: 8.7 MG/DL (ref 8.6–10)
CHLORIDE SERPL-SCNC: 104 MMOL/L (ref 98–107)
CO2 SERPL-SCNC: 24 MMOL/L (ref 22–29)
CREAT SERPL-MCNC: 0.5 MG/DL (ref 0.5–1)
EOSINOPHIL # BLD: 0.07 K/UL (ref 0.05–0.5)
EOSINOPHILS RELATIVE PERCENT: 1 % (ref 0–6)
ERYTHROCYTE [DISTWIDTH] IN BLOOD BY AUTOMATED COUNT: 15.9 % (ref 11.5–15)
GFR, ESTIMATED: >90 ML/MIN/1.73M2
GLUCOSE SERPL-MCNC: 111 MG/DL (ref 74–99)
HCG SERPL QL: NEGATIVE
HCT VFR BLD AUTO: 34.4 % (ref 34–48)
HGB BLD-MCNC: 11.6 G/DL (ref 11.5–15.5)
IMM GRANULOCYTES # BLD AUTO: 0.09 K/UL (ref 0–0.58)
IMM GRANULOCYTES NFR BLD: 1 % (ref 0–5)
INR PPP: 1.1
LYMPHOCYTES NFR BLD: 0.82 K/UL (ref 1.5–4)
LYMPHOCYTES RELATIVE PERCENT: 9 % (ref 20–42)
MCH RBC QN AUTO: 30.9 PG (ref 26–35)
MCHC RBC AUTO-ENTMCNC: 33.7 G/DL (ref 32–34.5)
MCV RBC AUTO: 91.7 FL (ref 80–99.9)
MONOCYTES NFR BLD: 0.98 K/UL (ref 0.1–0.95)
MONOCYTES NFR BLD: 11 % (ref 2–12)
NEUTROPHILS NFR BLD: 77 % (ref 43–80)
NEUTS SEG NFR BLD: 6.91 K/UL (ref 1.8–7.3)
PLATELET # BLD AUTO: 214 K/UL (ref 130–450)
PMV BLD AUTO: 9 FL (ref 7–12)
POTASSIUM SERPL-SCNC: 3.6 MMOL/L (ref 3.5–5.1)
PROT SERPL-MCNC: 7.2 G/DL (ref 6.4–8.3)
PROTHROMBIN TIME: 11.5 SEC (ref 9.3–12.4)
RBC # BLD AUTO: 3.75 M/UL (ref 3.5–5.5)
SODIUM SERPL-SCNC: 140 MMOL/L (ref 136–145)
WBC OTHER # BLD: 9 K/UL (ref 4.5–11.5)

## 2025-04-30 PROCEDURE — 6360000002 HC RX W HCPCS: Performed by: RADIOLOGY

## 2025-04-30 PROCEDURE — C1889 IMPLANT/INSERT DEVICE, NOC: HCPCS

## 2025-04-30 PROCEDURE — 2500000003 HC RX 250 WO HCPCS: Performed by: RADIOLOGY

## 2025-04-30 PROCEDURE — 3E05305 INTRODUCTION OF OTHER ANTINEOPLASTIC INTO PERIPHERAL ARTERY, PERCUTANEOUS APPROACH: ICD-10-PCS | Performed by: TRANSPLANT SURGERY

## 2025-04-30 PROCEDURE — 1200000000 HC SEMI PRIVATE

## 2025-04-30 PROCEDURE — 3700000001 HC ADD 15 MINUTES (ANESTHESIA)

## 2025-04-30 PROCEDURE — C1769 GUIDE WIRE: HCPCS

## 2025-04-30 PROCEDURE — 80053 COMPREHEN METABOLIC PANEL: CPT

## 2025-04-30 PROCEDURE — 82105 ALPHA-FETOPROTEIN SERUM: CPT

## 2025-04-30 PROCEDURE — 37243 VASC EMBOLIZE/OCCLUDE ORGAN: CPT

## 2025-04-30 PROCEDURE — 96420 CHEMO IA PUSH TECNIQUE: CPT

## 2025-04-30 PROCEDURE — 6360000002 HC RX W HCPCS

## 2025-04-30 PROCEDURE — B41B1ZZ FLUOROSCOPY OF OTHER INTRA-ABDOMINAL ARTERIES USING LOW OSMOLAR CONTRAST: ICD-10-PCS | Performed by: TRANSPLANT SURGERY

## 2025-04-30 PROCEDURE — 6370000000 HC RX 637 (ALT 250 FOR IP)

## 2025-04-30 PROCEDURE — 3700000000 HC ANESTHESIA ATTENDED CARE

## 2025-04-30 PROCEDURE — 75726 ARTERY X-RAYS ABDOMEN: CPT

## 2025-04-30 PROCEDURE — 75774 ARTERY X-RAY EACH VESSEL: CPT

## 2025-04-30 PROCEDURE — 76937 US GUIDE VASCULAR ACCESS: CPT

## 2025-04-30 PROCEDURE — 85610 PROTHROMBIN TIME: CPT

## 2025-04-30 PROCEDURE — G0378 HOSPITAL OBSERVATION PER HR: HCPCS

## 2025-04-30 PROCEDURE — 36247 INS CATH ABD/L-EXT ART 3RD: CPT

## 2025-04-30 PROCEDURE — 84703 CHORIONIC GONADOTROPIN ASSAY: CPT

## 2025-04-30 PROCEDURE — 2580000003 HC RX 258: Performed by: RADIOLOGY

## 2025-04-30 PROCEDURE — 6370000000 HC RX 637 (ALT 250 FOR IP): Performed by: RADIOLOGY

## 2025-04-30 PROCEDURE — 6360000004 HC RX CONTRAST MEDICATION: Performed by: RADIOLOGY

## 2025-04-30 PROCEDURE — 2580000003 HC RX 258

## 2025-04-30 PROCEDURE — 99221 1ST HOSP IP/OBS SF/LOW 40: CPT | Performed by: TRANSPLANT SURGERY

## 2025-04-30 PROCEDURE — 85025 COMPLETE CBC W/AUTO DIFF WBC: CPT

## 2025-04-30 PROCEDURE — 36248 INS CATH ABD/L-EXT ART ADDL: CPT

## 2025-04-30 PROCEDURE — 2709999900 IR SEL ART CATH ABD PELV LOWER EXT INIT 3RD+ ORDER

## 2025-04-30 RX ORDER — SODIUM CHLORIDE 9 MG/ML
INJECTION, SOLUTION INTRAVENOUS CONTINUOUS
Status: DISCONTINUED | OUTPATIENT
Start: 2025-04-30 | End: 2025-04-30

## 2025-04-30 RX ORDER — DICYCLOMINE HYDROCHLORIDE 10 MG/1
10 CAPSULE ORAL
Status: DISCONTINUED | OUTPATIENT
Start: 2025-04-30 | End: 2025-05-01 | Stop reason: HOSPADM

## 2025-04-30 RX ORDER — ONDANSETRON 4 MG/1
4 TABLET, ORALLY DISINTEGRATING ORAL EVERY 8 HOURS PRN
Status: DISCONTINUED | OUTPATIENT
Start: 2025-04-30 | End: 2025-05-01 | Stop reason: HOSPADM

## 2025-04-30 RX ORDER — OXYCODONE HYDROCHLORIDE 5 MG/1
5 TABLET ORAL EVERY 6 HOURS PRN
Qty: 28 TABLET | Refills: 0 | Status: SHIPPED | OUTPATIENT
Start: 2025-04-30 | End: 2025-05-07

## 2025-04-30 RX ORDER — ONDANSETRON 2 MG/ML
4 INJECTION INTRAMUSCULAR; INTRAVENOUS ONCE
Status: COMPLETED | OUTPATIENT
Start: 2025-04-30 | End: 2025-04-30

## 2025-04-30 RX ORDER — BRIMONIDINE TARTRATE 2 MG/ML
1 SOLUTION/ DROPS OPHTHALMIC EVERY MORNING
Status: DISCONTINUED | OUTPATIENT
Start: 2025-05-01 | End: 2025-05-01 | Stop reason: HOSPADM

## 2025-04-30 RX ORDER — HEPARIN SODIUM 10000 [USP'U]/ML
INJECTION, SOLUTION INTRAVENOUS; SUBCUTANEOUS PRN
Status: COMPLETED | OUTPATIENT
Start: 2025-04-30 | End: 2025-04-30

## 2025-04-30 RX ORDER — ENOXAPARIN SODIUM 100 MG/ML
40 INJECTION SUBCUTANEOUS DAILY
Status: DISCONTINUED | OUTPATIENT
Start: 2025-05-01 | End: 2025-05-01 | Stop reason: HOSPADM

## 2025-04-30 RX ORDER — HYDROMORPHONE HYDROCHLORIDE 1 MG/ML
0.5 INJECTION, SOLUTION INTRAMUSCULAR; INTRAVENOUS; SUBCUTANEOUS ONCE
Status: COMPLETED | OUTPATIENT
Start: 2025-04-30 | End: 2025-04-30

## 2025-04-30 RX ORDER — ACETAMINOPHEN 325 MG/1
650 TABLET ORAL EVERY 4 HOURS PRN
Status: DISCONTINUED | OUTPATIENT
Start: 2025-04-30 | End: 2025-05-01

## 2025-04-30 RX ORDER — LIDOCAINE HYDROCHLORIDE 20 MG/ML
INJECTION, SOLUTION INFILTRATION; PERINEURAL PRN
Status: COMPLETED | OUTPATIENT
Start: 2025-04-30 | End: 2025-04-30

## 2025-04-30 RX ORDER — LIDOCAINE HYDROCHLORIDE AND EPINEPHRINE BITARTRATE 20; .01 MG/ML; MG/ML
INJECTION, SOLUTION SUBCUTANEOUS PRN
Status: COMPLETED | OUTPATIENT
Start: 2025-04-30 | End: 2025-04-30

## 2025-04-30 RX ORDER — NITROGLYCERIN 20 MG/100ML
INJECTION INTRAVENOUS PRN
Status: COMPLETED | OUTPATIENT
Start: 2025-04-30 | End: 2025-04-30

## 2025-04-30 RX ORDER — SODIUM CHLORIDE 0.9 % (FLUSH) 0.9 %
5-40 SYRINGE (ML) INJECTION PRN
Status: ACTIVE | OUTPATIENT
Start: 2025-04-30 | End: 2025-05-01

## 2025-04-30 RX ORDER — SUCRALFATE 1 G/1
1 TABLET ORAL 4 TIMES DAILY
Status: DISCONTINUED | OUTPATIENT
Start: 2025-04-30 | End: 2025-05-01 | Stop reason: HOSPADM

## 2025-04-30 RX ORDER — CAPECITABINE 500 MG/1
1000 TABLET, FILM COATED ORAL 2 TIMES DAILY
Status: DISCONTINUED | OUTPATIENT
Start: 2025-04-30 | End: 2025-04-30

## 2025-04-30 RX ORDER — TEMOZOLOMIDE 100 MG/1
300 CAPSULE ORAL DAILY
Status: DISCONTINUED | OUTPATIENT
Start: 2025-05-01 | End: 2025-05-01 | Stop reason: HOSPADM

## 2025-04-30 RX ORDER — IOPAMIDOL 755 MG/ML
INJECTION, SOLUTION INTRAVASCULAR PRN
Status: COMPLETED | OUTPATIENT
Start: 2025-04-30 | End: 2025-04-30

## 2025-04-30 RX ORDER — ONDANSETRON 4 MG/1
4 TABLET, ORALLY DISINTEGRATING ORAL EVERY 8 HOURS PRN
Qty: 30 TABLET | Refills: 0 | Status: SHIPPED | OUTPATIENT
Start: 2025-04-30 | End: 2025-05-10

## 2025-04-30 RX ORDER — FENTANYL CITRATE 50 UG/ML
INJECTION, SOLUTION INTRAMUSCULAR; INTRAVENOUS
Status: DISCONTINUED | OUTPATIENT
Start: 2025-04-30 | End: 2025-04-30 | Stop reason: SDUPTHER

## 2025-04-30 RX ORDER — BUTALBITAL, ACETAMINOPHEN AND CAFFEINE 50; 325; 40 MG/1; MG/1; MG/1
1 TABLET ORAL 2 TIMES DAILY PRN
Status: DISCONTINUED | OUTPATIENT
Start: 2025-04-30 | End: 2025-05-01 | Stop reason: HOSPADM

## 2025-04-30 RX ORDER — DIPHENHYDRAMINE HYDROCHLORIDE 50 MG/ML
50 INJECTION, SOLUTION INTRAMUSCULAR; INTRAVENOUS ONCE
Status: COMPLETED | OUTPATIENT
Start: 2025-04-30 | End: 2025-04-30

## 2025-04-30 RX ORDER — MIDAZOLAM HYDROCHLORIDE 1 MG/ML
INJECTION, SOLUTION INTRAMUSCULAR; INTRAVENOUS
Status: DISCONTINUED | OUTPATIENT
Start: 2025-04-30 | End: 2025-04-30 | Stop reason: SDUPTHER

## 2025-04-30 RX ORDER — SODIUM CHLORIDE 0.9 % (FLUSH) 0.9 %
10 SYRINGE (ML) INJECTION PRN
Status: DISCONTINUED | OUTPATIENT
Start: 2025-04-30 | End: 2025-05-01 | Stop reason: HOSPADM

## 2025-04-30 RX ORDER — TEMOZOLOMIDE 100 MG/1
300 CAPSULE ORAL DAILY
Status: DISCONTINUED | OUTPATIENT
Start: 2025-04-30 | End: 2025-04-30 | Stop reason: CLARIF

## 2025-04-30 RX ORDER — OXYCODONE HYDROCHLORIDE 5 MG/1
5 TABLET ORAL EVERY 4 HOURS PRN
Refills: 0 | Status: DISCONTINUED | OUTPATIENT
Start: 2025-04-30 | End: 2025-04-30

## 2025-04-30 RX ORDER — CAPECITABINE 500 MG/1
1000 TABLET, FILM COATED ORAL 2 TIMES DAILY
Status: DISCONTINUED | OUTPATIENT
Start: 2025-05-01 | End: 2025-05-01 | Stop reason: HOSPADM

## 2025-04-30 RX ORDER — SODIUM CHLORIDE 9 MG/ML
INJECTION, SOLUTION INTRAVENOUS PRN
Status: DISCONTINUED | OUTPATIENT
Start: 2025-04-30 | End: 2025-05-01 | Stop reason: HOSPADM

## 2025-04-30 RX ORDER — OXYCODONE HYDROCHLORIDE 10 MG/1
10 TABLET ORAL EVERY 4 HOURS PRN
Refills: 0 | Status: DISCONTINUED | OUTPATIENT
Start: 2025-04-30 | End: 2025-05-01 | Stop reason: HOSPADM

## 2025-04-30 RX ORDER — ONDANSETRON 2 MG/ML
4 INJECTION INTRAMUSCULAR; INTRAVENOUS EVERY 6 HOURS PRN
Status: DISCONTINUED | OUTPATIENT
Start: 2025-04-30 | End: 2025-05-01 | Stop reason: HOSPADM

## 2025-04-30 RX ORDER — OXYCODONE HYDROCHLORIDE 5 MG/1
5 TABLET ORAL EVERY 4 HOURS PRN
Refills: 0 | Status: DISCONTINUED | OUTPATIENT
Start: 2025-04-30 | End: 2025-05-01 | Stop reason: HOSPADM

## 2025-04-30 RX ORDER — PHENYLEPHRINE HYDROCHLORIDE 10 MG/ML
INJECTION INTRAVENOUS
Status: DISCONTINUED | OUTPATIENT
Start: 2025-04-30 | End: 2025-04-30 | Stop reason: SDUPTHER

## 2025-04-30 RX ORDER — LABETALOL HYDROCHLORIDE 5 MG/ML
INJECTION, SOLUTION INTRAVENOUS
Status: DISCONTINUED | OUTPATIENT
Start: 2025-04-30 | End: 2025-04-30 | Stop reason: SDUPTHER

## 2025-04-30 RX ORDER — KETOROLAC TROMETHAMINE 30 MG/ML
30 INJECTION, SOLUTION INTRAMUSCULAR; INTRAVENOUS ONCE
Status: COMPLETED | OUTPATIENT
Start: 2025-04-30 | End: 2025-04-30

## 2025-04-30 RX ORDER — SODIUM CHLORIDE 0.9 % (FLUSH) 0.9 %
10 SYRINGE (ML) INJECTION EVERY 12 HOURS SCHEDULED
Status: DISCONTINUED | OUTPATIENT
Start: 2025-04-30 | End: 2025-05-01 | Stop reason: HOSPADM

## 2025-04-30 RX ORDER — PROPOFOL 10 MG/ML
INJECTION, EMULSION INTRAVENOUS
Status: DISCONTINUED | OUTPATIENT
Start: 2025-04-30 | End: 2025-04-30 | Stop reason: SDUPTHER

## 2025-04-30 RX ORDER — ZOLPIDEM TARTRATE 5 MG/1
10 TABLET ORAL NIGHTLY PRN
Status: DISCONTINUED | OUTPATIENT
Start: 2025-04-30 | End: 2025-05-01 | Stop reason: HOSPADM

## 2025-04-30 RX ORDER — SUCRALFATE 1 G/1
1 TABLET ORAL 4 TIMES DAILY
Qty: 120 TABLET | Refills: 0 | Status: SHIPPED | OUTPATIENT
Start: 2025-04-30 | End: 2025-05-30

## 2025-04-30 RX ORDER — ONDANSETRON 2 MG/ML
12 INJECTION INTRAMUSCULAR; INTRAVENOUS ONCE
Status: COMPLETED | OUTPATIENT
Start: 2025-04-30 | End: 2025-04-30

## 2025-04-30 RX ORDER — SODIUM CHLORIDE 9 MG/ML
INJECTION, SOLUTION INTRAVENOUS CONTINUOUS
Status: ACTIVE | OUTPATIENT
Start: 2025-04-30 | End: 2025-05-01

## 2025-04-30 RX ORDER — DEXAMETHASONE SODIUM PHOSPHATE 10 MG/ML
10 INJECTION, SOLUTION INTRA-ARTICULAR; INTRALESIONAL; INTRAMUSCULAR; INTRAVENOUS; SOFT TISSUE ONCE
Status: COMPLETED | OUTPATIENT
Start: 2025-04-30 | End: 2025-04-30

## 2025-04-30 RX ORDER — LEVOTHYROXINE SODIUM 112 UG/1
112 TABLET ORAL DAILY
Status: DISCONTINUED | OUTPATIENT
Start: 2025-05-01 | End: 2025-05-01 | Stop reason: HOSPADM

## 2025-04-30 RX ORDER — HEPARIN SODIUM 1000 [USP'U]/ML
INJECTION, SOLUTION INTRAVENOUS; SUBCUTANEOUS
Status: DISCONTINUED | OUTPATIENT
Start: 2025-04-30 | End: 2025-04-30 | Stop reason: SDUPTHER

## 2025-04-30 RX ADMIN — FENTANYL CITRATE 50 MCG: 50 INJECTION, SOLUTION INTRAMUSCULAR; INTRAVENOUS at 12:15

## 2025-04-30 RX ADMIN — DEXAMETHASONE SODIUM PHOSPHATE 10 MG: 10 INJECTION INTRAMUSCULAR; INTRAVENOUS at 11:47

## 2025-04-30 RX ADMIN — KETOROLAC TROMETHAMINE 30 MG: 30 INJECTION, SOLUTION INTRAMUSCULAR at 15:01

## 2025-04-30 RX ADMIN — HYDROMORPHONE HYDROCHLORIDE 0.5 MG: 1 INJECTION, SOLUTION INTRAMUSCULAR; INTRAVENOUS; SUBCUTANEOUS at 17:49

## 2025-04-30 RX ADMIN — FENTANYL CITRATE 50 MCG: 50 INJECTION, SOLUTION INTRAMUSCULAR; INTRAVENOUS at 12:37

## 2025-04-30 RX ADMIN — NITROGLYCERIN 100 MCG: 20 INJECTION INTRAVENOUS at 12:56

## 2025-04-30 RX ADMIN — HEPARIN SODIUM 2000 UNITS: 1000 INJECTION INTRAVENOUS; SUBCUTANEOUS at 14:18

## 2025-04-30 RX ADMIN — IOPAMIDOL 80 ML: 755 INJECTION, SOLUTION INTRAVENOUS at 14:15

## 2025-04-30 RX ADMIN — IOPAMIDOL: 755 INJECTION, SOLUTION INTRAVENOUS at 14:10

## 2025-04-30 RX ADMIN — LIDOCAINE HYDROCHLORIDE,EPINEPHRINE BITARTRATE 4 ML: 20; .01 INJECTION, SOLUTION INFILTRATION; PERINEURAL at 12:39

## 2025-04-30 RX ADMIN — ONDANSETRON 12 MG: 2 INJECTION, SOLUTION INTRAMUSCULAR; INTRAVENOUS at 11:47

## 2025-04-30 RX ADMIN — Medication 5000 UNITS: at 12:54

## 2025-04-30 RX ADMIN — PROPOFOL 50 MG: 10 INJECTION, EMULSION INTRAVENOUS at 12:30

## 2025-04-30 RX ADMIN — FENTANYL CITRATE 50 MCG: 50 INJECTION, SOLUTION INTRAMUSCULAR; INTRAVENOUS at 13:58

## 2025-04-30 RX ADMIN — PIPERACILLIN AND TAZOBACTAM 3375 MG: 3; .375 INJECTION, POWDER, LYOPHILIZED, FOR SOLUTION INTRAVENOUS; PARENTERAL at 12:24

## 2025-04-30 RX ADMIN — HYDROMORPHONE HYDROCHLORIDE 0.5 MG: 1 INJECTION, SOLUTION INTRAMUSCULAR; INTRAVENOUS; SUBCUTANEOUS at 15:01

## 2025-04-30 RX ADMIN — SODIUM CHLORIDE: 0.9 INJECTION, SOLUTION INTRAVENOUS at 18:04

## 2025-04-30 RX ADMIN — APIXABAN 5 MG: 5 TABLET, FILM COATED ORAL at 14:51

## 2025-04-30 RX ADMIN — OXYCODONE HYDROCHLORIDE 10 MG: 10 TABLET ORAL at 21:43

## 2025-04-30 RX ADMIN — PHENYLEPHRINE HYDROCHLORIDE 100 MCG: 10 INJECTION, SOLUTION INTRAVENOUS at 13:00

## 2025-04-30 RX ADMIN — PROPOFOL 50 MG: 10 INJECTION, EMULSION INTRAVENOUS at 12:15

## 2025-04-30 RX ADMIN — NITROGLYCERIN 100 MCG: 20 INJECTION INTRAVENOUS at 14:12

## 2025-04-30 RX ADMIN — NITROGLYCERIN 100 MCG: 20 INJECTION INTRAVENOUS at 13:44

## 2025-04-30 RX ADMIN — PROPOFOL 60 MCG/KG/MIN: 10 INJECTION, EMULSION INTRAVENOUS at 12:19

## 2025-04-30 RX ADMIN — LABETALOL HYDROCHLORIDE 5 MG: 5 INJECTION INTRAVENOUS at 13:55

## 2025-04-30 RX ADMIN — MIDAZOLAM 2 MG: 1 INJECTION INTRAMUSCULAR; INTRAVENOUS at 12:08

## 2025-04-30 RX ADMIN — SUCRALFATE 1 G: 1 TABLET ORAL at 19:57

## 2025-04-30 RX ADMIN — NITROGLYCERIN 100 MCG: 20 INJECTION INTRAVENOUS at 14:03

## 2025-04-30 RX ADMIN — ZOLPIDEM TARTRATE 10 MG: 5 TABLET ORAL at 23:49

## 2025-04-30 RX ADMIN — HEPARIN SODIUM 1000 UNITS: 1000 INJECTION INTRAVENOUS; SUBCUTANEOUS at 13:58

## 2025-04-30 RX ADMIN — SODIUM CHLORIDE: 0.9 INJECTION, SOLUTION INTRAVENOUS at 10:08

## 2025-04-30 RX ADMIN — ONDANSETRON 4 MG: 2 INJECTION, SOLUTION INTRAMUSCULAR; INTRAVENOUS at 14:47

## 2025-04-30 RX ADMIN — HYDROMORPHONE HYDROCHLORIDE 0.5 MG: 1 INJECTION, SOLUTION INTRAMUSCULAR; INTRAVENOUS; SUBCUTANEOUS at 19:57

## 2025-04-30 RX ADMIN — DICYCLOMINE HYDROCHLORIDE 10 MG: 10 CAPSULE ORAL at 19:57

## 2025-04-30 RX ADMIN — FENTANYL CITRATE 50 MCG: 50 INJECTION, SOLUTION INTRAMUSCULAR; INTRAVENOUS at 13:34

## 2025-04-30 RX ADMIN — PANTOPRAZOLE SODIUM 40 MG: 40 INJECTION, POWDER, FOR SOLUTION INTRAVENOUS at 17:50

## 2025-04-30 RX ADMIN — LIDOCAINE HYDROCHLORIDE 9 ML: 20 INJECTION, SOLUTION INFILTRATION; PERINEURAL at 12:39

## 2025-04-30 RX ADMIN — DIPHENHYDRAMINE HYDROCHLORIDE 50 MG: 50 INJECTION INTRAMUSCULAR; INTRAVENOUS at 11:47

## 2025-04-30 ASSESSMENT — PAIN DESCRIPTION - DESCRIPTORS
DESCRIPTORS: DISCOMFORT;PRESSURE
DESCRIPTORS: ACHING;DISCOMFORT;TENDER

## 2025-04-30 ASSESSMENT — LIFESTYLE VARIABLES: SMOKING_STATUS: 1

## 2025-04-30 ASSESSMENT — PAIN SCALES - GENERAL
PAINLEVEL_OUTOF10: 8
PAINLEVEL_OUTOF10: 8
PAINLEVEL_OUTOF10: 9
PAINLEVEL_OUTOF10: 8
PAINLEVEL_OUTOF10: 8
PAINLEVEL_OUTOF10: 7

## 2025-04-30 ASSESSMENT — PAIN - FUNCTIONAL ASSESSMENT
PAIN_FUNCTIONAL_ASSESSMENT: ACTIVITIES ARE NOT PREVENTED
PAIN_FUNCTIONAL_ASSESSMENT: PREVENTS OR INTERFERES WITH MANY ACTIVE NOT PASSIVE ACTIVITIES

## 2025-04-30 ASSESSMENT — PAIN DESCRIPTION - ORIENTATION
ORIENTATION: RIGHT;LEFT
ORIENTATION: RIGHT;LEFT

## 2025-04-30 ASSESSMENT — PAIN DESCRIPTION - ONSET
ONSET: PROGRESSIVE
ONSET: ON-GOING

## 2025-04-30 ASSESSMENT — PAIN DESCRIPTION - PAIN TYPE
TYPE: ACUTE PAIN
TYPE: ACUTE PAIN

## 2025-04-30 ASSESSMENT — PAIN DESCRIPTION - LOCATION
LOCATION: BACK;ABDOMEN
LOCATION: STERNUM;RIB CAGE
LOCATION: BACK;ABDOMEN

## 2025-04-30 ASSESSMENT — PAIN DESCRIPTION - FREQUENCY
FREQUENCY: INTERMITTENT
FREQUENCY: CONTINUOUS

## 2025-04-30 NOTE — PROGRESS NOTES
Asia Flower and myself spoke with the patient and her parents, notified of  implanted beads, vendor call placed, no concern for harm.

## 2025-04-30 NOTE — H&P
HEPATOBILIARY AND PANCREATIC SURGERY  HISTORY AND PHYSICAL  4/30/2025    Chief Complaint: Neuroendocrine tumor      HPI:  Brooklyn Olmstead is a 40 y.o. 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 The patient complains of some mild abdominal discomfort nausea, no vomiting. She is afebrile and hemodynamically stable on room air.      Past Medical History:   Diagnosis Date    Abdominal pain     Anxiety     Cancer (HCC)     neuroendocrime tumor    Chronic intermittent Diarrhea     Chronic kidney disease     due to her cancer: required HD in 2022 or 2023 for three days then acute kidney failure resolved    Hypocalcemia     Hypothyroidism     Irritable bowel syndrome     Liver disease     Migraines     Seizures (HCC)     last episode January 2021    Status post alcohol detoxification     5/22/2018-5/29/2018       Past Surgical History:   Procedure Laterality Date    CHOLECYSTECTOMY, LAPAROSCOPIC  07/06/2016    COLONOSCOPY N/A 06/22/2018    COLONOSCOPY WITH BIOPSY performed by Francisca Bashir MD at [facility] ENDOSCOPY    CT BIOPSY RENAL  01/25/2021    CT BIOPSY RENAL 1/25/2021 Scooter Ray II, MD [facility] CT    CT NEEDLE BIOPSY LIVER PERCUTANEOUS  09/01/2020    CT NEEDLE BIOPSY LIVER PERCUTANEOUS 9/1/2020 [facility] CT    HC DIALYSIS CATHETER N/A 01/28/2021    TESIO CATHETER INSERTION AND REMOVAL OF TEMPORARY performed by Andrea Sher MD at [facility] OR    IR EMBOLIZATION TUMOR/ORGAN  8/20/2024    IR EMBOLIZATION TUMOR / ORGAN 8/20/2024 EarlJeffery fox MD [facility] PROCEDURES    IR EMBOLIZATION TUMOR/ORGAN  9/10/2024    IR EMBOLIZATION TUMOR / ORGAN 9/10/2024 Jeffery Elliott MD [facility] PROCEDURES    IR EMBOLIZATION TUMOR/ORGAN  1/9/2025    IR EMBOLIZATION TUMOR/ORGAN 1/9/2025 Jeffery Elliott MD [facility]  tomorrow    Aron Nicole DO  General Surgery Resident, PGY-1    Electronically signed by Aron Nicole DO on 4/30/25 at 5:02 PM EDT    Attending Physician Statement:    Chief Complaint: Postoperative nausea and emesis    I have examined the patient and performed the key aspects of physical exam, reviewed the record (including all pertinent and new radiology images and laboratory findings), and discussed the case with the surgical team.  I agree with the assessment and plan with the following additions, corrections, and changes. 14pt review of symptoms completed and negative except as mentioned.    Patient states that she is feeling better.  Her last bout of emesis was last evening.  She tolerated a diet this morning  She is planning on being discharged today on Protonix and Carafate.    Lambert Castro MD  05/01/25  12:38 PM

## 2025-04-30 NOTE — PROGRESS NOTES
0905: Patient arrived via ambulation with parents to Radiology department. Allergies, home medications, H&P and fasting instructions reviewed with patient. Vital signs taken. Port accessed using sterile technique by NEPTALI Kaur - blood sample sent to lab for ordered tests.  Procedural instructions given, questions answered, understanding expressed and consent signed. Patient given fluoroscopy education, no questions at this time.    0915: Dr. Elliott at bedside to discuss procedure and obtain consent.

## 2025-04-30 NOTE — PROGRESS NOTES
4 Eyes Skin Assessment     NAME:  Brooklyn Olmstead  YOB: 1985  MEDICAL RECORD NUMBER:  [id number]    The patient is being assessed for  Admission    I agree that at least one RN has performed a thorough Head to Toe Skin Assessment on the patient. ALL assessment sites listed below have been assessed.      Areas assessed by both nurses:    Head, Face, Ears, Shoulders, Back, Chest, Arms, Elbows, Hands, Sacrum. Buttock, Coccyx, Ischium, Legs. Feet and Heels, and Under Medical Devices         Does the Patient have a Wound? No noted wound(s)       Prem Prevention initiated by RN: No  Wound Care Orders initiated by RN: No    Pressure Injury (Stage 3,4, Unstageable, DTI, NWPT, and Complex wounds) if present, place Wound referral order by RN under : No    New Ostomies, if present place, Ostomy referral order under : No     Nurse 1 eSignature: Electronically signed by Jason Ordonez RN on 4/30/25 at 5:44 PM EDT    **SHARE this note so that the co-signing nurse can place an eSignature**    Nurse 2 eSignature: Electronically signed by Lorrie Campos RN on 4/30/25 at 5:48 PM EDT

## 2025-04-30 NOTE — ANESTHESIA PRE PROCEDURE
Department of Anesthesiology  Preprocedure Note       Name:  Brooklyn Olmstead   Age:  40 y.o.  :  1985                                          MRN:  [id number]         Date:  2025      Surgeon: * No surgeons listed *    Procedure: * No procedures listed *    Medications prior to admission:   Prior to Admission medications    Medication Sig Start Date End Date Taking? Authorizing Provider   butalbital-acetaminophen-caffeine (FIORICET, ESGIC) -40 MG per tablet TAKE 1 TABLET BY MOUTH TWICE DAILY FOR HEADACHE 25  Yes Abdelrahman Montana DO   zolpidem (AMBIEN) 10 MG tablet Take 1 tablet by mouth nightly as needed for Sleep for up to 30 days. for sleep Max Daily Amount: 10 mg 25 Yes Abdelrahman Montana DO   capecitabine (XELODA) 500 MG chemo tablet Take 2 tablets by mouth 2 times daily for 14 days followed by 14 days off each 28-day cycle 3/28/25  Yes Geoff Hu MD   temozolomide (TEMODAR) 100 MG chemo capsule Take 3 capsules (300 mg total) by mouth daily on Days 10 through 14 of each 28-day cycle 3/28/25  Yes Geoff Hu MD   levothyroxine (SYNTHROID) 112 MCG tablet Take 1 tablet by mouth Daily 3/13/25  Yes Abdelrahman Montana DO   pantoprazole (PROTONIX) 40 MG tablet Take 1 tablet by mouth in the morning and at bedtime 25 Yes Francisca Bashir MD   calcium carb-cholecalciferol (CALTRATE 600+D3) 600-20 MG-MCG TABS Take 1 tablet by mouth daily 25  Yes Geoff Hu MD   potassium chloride (KLOR-CON M) 20 MEQ extended release tablet Take 1 tablet by mouth 2 times daily 25  Yes Geoff Hu MD   magnesium oxide (MAG-OX) 400 MG tablet Take 1 tablet by mouth 2 times daily 25  Yes Geoff Hu MD   dicyclomine (BENTYL) 10 MG capsule Take 1 capsule by mouth 4 times daily (before meals and nightly) 24  Yes Aki Lowry DO   ondansetron (ZOFRAN) 4 MG tablet Take 1 tablet by mouth 3 times daily as needed for Nausea or Vomiting 24  Yes Abdelrahman Montana, DO

## 2025-04-30 NOTE — PROGRESS NOTES
1435 Pt returned from procedure, awake and alert, agitated, VSS. Patient educated on keeping leg straight and strict bedrest 3 hours. Dressing checked; clean, dry, and intact.   1445 Agitation improving. Patient nauseous.   1500 Pain c/o pain in sternum and bilateral rib cage areas. NEPTALI Kaur updated Dr. Elliott, see new orders.  1515 Agitation resolved. Patient reports pain is improving.  1535 Dr. Elliott at bedside. Updated on sinus arrhythmia HR 40s. HR currently 50-60s. No new orders.  1542 Report called to floor by NEPTALI Kaur.    Site, dressing, and pedal pulses assessed every 15 minutes with vital signs.

## 2025-05-01 VITALS
WEIGHT: 126 LBS | HEART RATE: 50 BPM | BODY MASS INDEX: 24.74 KG/M2 | OXYGEN SATURATION: 98 % | RESPIRATION RATE: 20 BRPM | TEMPERATURE: 97.2 F | SYSTOLIC BLOOD PRESSURE: 110 MMHG | DIASTOLIC BLOOD PRESSURE: 77 MMHG | HEIGHT: 60 IN

## 2025-05-01 LAB
ALBUMIN SERPL-MCNC: 4.3 G/DL (ref 3.5–5.2)
ALP SERPL-CCNC: 143 U/L (ref 35–104)
ALT SERPL-CCNC: 69 U/L (ref 0–35)
ANION GAP SERPL CALCULATED.3IONS-SCNC: 13 MMOL/L (ref 7–16)
AST SERPL-CCNC: 101 U/L (ref 0–35)
BILIRUB SERPL-MCNC: 0.4 MG/DL (ref 0–1.2)
BUN SERPL-MCNC: 8 MG/DL (ref 6–20)
CALCIUM SERPL-MCNC: 8.1 MG/DL (ref 8.6–10)
CHLORIDE SERPL-SCNC: 98 MMOL/L (ref 98–107)
CO2 SERPL-SCNC: 22 MMOL/L (ref 22–29)
CREAT SERPL-MCNC: 0.5 MG/DL (ref 0.5–1)
GFR, ESTIMATED: >90 ML/MIN/1.73M2
GLUCOSE SERPL-MCNC: 177 MG/DL (ref 74–99)
POTASSIUM SERPL-SCNC: 3.8 MMOL/L (ref 3.5–5.1)
PROT SERPL-MCNC: 7.1 G/DL (ref 6.4–8.3)
SODIUM SERPL-SCNC: 133 MMOL/L (ref 136–145)

## 2025-05-01 PROCEDURE — 80053 COMPREHEN METABOLIC PANEL: CPT

## 2025-05-01 PROCEDURE — 6370000000 HC RX 637 (ALT 250 FOR IP): Performed by: STUDENT IN AN ORGANIZED HEALTH CARE EDUCATION/TRAINING PROGRAM

## 2025-05-01 PROCEDURE — G0378 HOSPITAL OBSERVATION PER HR: HCPCS

## 2025-05-01 PROCEDURE — 96372 THER/PROPH/DIAG INJ SC/IM: CPT

## 2025-05-01 PROCEDURE — 2500000003 HC RX 250 WO HCPCS

## 2025-05-01 PROCEDURE — 6370000000 HC RX 637 (ALT 250 FOR IP): Performed by: RADIOLOGY

## 2025-05-01 PROCEDURE — 2580000003 HC RX 258: Performed by: RADIOLOGY

## 2025-05-01 PROCEDURE — 96365 THER/PROPH/DIAG IV INF INIT: CPT

## 2025-05-01 PROCEDURE — 96376 TX/PRO/DX INJ SAME DRUG ADON: CPT

## 2025-05-01 PROCEDURE — 6360000002 HC RX W HCPCS

## 2025-05-01 PROCEDURE — 2580000003 HC RX 258

## 2025-05-01 PROCEDURE — 6370000000 HC RX 637 (ALT 250 FOR IP)

## 2025-05-01 PROCEDURE — 6360000002 HC RX W HCPCS: Performed by: STUDENT IN AN ORGANIZED HEALTH CARE EDUCATION/TRAINING PROGRAM

## 2025-05-01 PROCEDURE — 6360000002 HC RX W HCPCS: Performed by: RADIOLOGY

## 2025-05-01 PROCEDURE — 96375 TX/PRO/DX INJ NEW DRUG ADDON: CPT

## 2025-05-01 RX ORDER — PROCHLORPERAZINE EDISYLATE 5 MG/ML
10 INJECTION INTRAMUSCULAR; INTRAVENOUS EVERY 6 HOURS PRN
Status: DISCONTINUED | OUTPATIENT
Start: 2025-05-01 | End: 2025-05-01 | Stop reason: HOSPADM

## 2025-05-01 RX ORDER — CEFUROXIME AXETIL 500 MG/1
500 TABLET ORAL EVERY 12 HOURS SCHEDULED
Status: DISCONTINUED | OUTPATIENT
Start: 2025-05-01 | End: 2025-05-01 | Stop reason: HOSPADM

## 2025-05-01 RX ORDER — POTASSIUM CHLORIDE 1500 MG/1
40 TABLET, EXTENDED RELEASE ORAL ONCE
Status: COMPLETED | OUTPATIENT
Start: 2025-05-01 | End: 2025-05-01

## 2025-05-01 RX ORDER — CALCIUM CARBONATE 500 MG/1
500 TABLET, CHEWABLE ORAL 3 TIMES DAILY PRN
Status: DISCONTINUED | OUTPATIENT
Start: 2025-05-01 | End: 2025-05-01 | Stop reason: HOSPADM

## 2025-05-01 RX ORDER — PROMETHAZINE HYDROCHLORIDE 25 MG/ML
6.25 INJECTION, SOLUTION INTRAMUSCULAR; INTRAVENOUS EVERY 6 HOURS PRN
Status: DISCONTINUED | OUTPATIENT
Start: 2025-05-01 | End: 2025-05-01 | Stop reason: HOSPADM

## 2025-05-01 RX ORDER — CEFUROXIME AXETIL 500 MG/1
500 TABLET ORAL EVERY 12 HOURS SCHEDULED
OUTPATIENT
Start: 2025-05-01

## 2025-05-01 RX ORDER — ACETAMINOPHEN 325 MG/1
650 TABLET ORAL EVERY 4 HOURS PRN
Status: DISCONTINUED | OUTPATIENT
Start: 2025-05-01 | End: 2025-05-01 | Stop reason: HOSPADM

## 2025-05-01 RX ADMIN — ENOXAPARIN SODIUM 40 MG: 100 INJECTION SUBCUTANEOUS at 08:01

## 2025-05-01 RX ADMIN — DICYCLOMINE HYDROCHLORIDE 10 MG: 10 CAPSULE ORAL at 11:45

## 2025-05-01 RX ADMIN — PIPERACILLIN AND TAZOBACTAM 4500 MG: 4; .5 INJECTION, POWDER, FOR SOLUTION INTRAVENOUS at 09:44

## 2025-05-01 RX ADMIN — PANTOPRAZOLE SODIUM 40 MG: 40 INJECTION, POWDER, FOR SOLUTION INTRAVENOUS at 07:54

## 2025-05-01 RX ADMIN — HYDROMORPHONE HYDROCHLORIDE 0.5 MG: 1 INJECTION, SOLUTION INTRAMUSCULAR; INTRAVENOUS; SUBCUTANEOUS at 07:54

## 2025-05-01 RX ADMIN — SODIUM CHLORIDE, PRESERVATIVE FREE 10 ML: 5 INJECTION INTRAVENOUS at 07:56

## 2025-05-01 RX ADMIN — DICYCLOMINE HYDROCHLORIDE 10 MG: 10 CAPSULE ORAL at 05:04

## 2025-05-01 RX ADMIN — HYDROMORPHONE HYDROCHLORIDE 0.5 MG: 1 INJECTION, SOLUTION INTRAMUSCULAR; INTRAVENOUS; SUBCUTANEOUS at 05:04

## 2025-05-01 RX ADMIN — SUCRALFATE 1 G: 1 TABLET ORAL at 07:55

## 2025-05-01 RX ADMIN — HYDROMORPHONE HYDROCHLORIDE 0.5 MG: 1 INJECTION, SOLUTION INTRAMUSCULAR; INTRAVENOUS; SUBCUTANEOUS at 10:10

## 2025-05-01 RX ADMIN — OXYCODONE HYDROCHLORIDE 10 MG: 10 TABLET ORAL at 02:43

## 2025-05-01 RX ADMIN — LEVOTHYROXINE SODIUM 112 MCG: 0.11 TABLET ORAL at 06:05

## 2025-05-01 RX ADMIN — POTASSIUM CHLORIDE 40 MEQ: 1500 TABLET, EXTENDED RELEASE ORAL at 09:47

## 2025-05-01 RX ADMIN — BRIMONIDINE TARTRATE 1 DROP: 2 SOLUTION/ DROPS OPHTHALMIC at 07:56

## 2025-05-01 RX ADMIN — PROCHLORPERAZINE EDISYLATE 10 MG: 5 INJECTION INTRAMUSCULAR; INTRAVENOUS at 10:10

## 2025-05-01 RX ADMIN — CEFUROXIME AXETIL 500 MG: 500 TABLET ORAL at 11:46

## 2025-05-01 RX ADMIN — HYDROMORPHONE HYDROCHLORIDE 0.5 MG: 1 INJECTION, SOLUTION INTRAMUSCULAR; INTRAVENOUS; SUBCUTANEOUS at 00:03

## 2025-05-01 RX ADMIN — ONDANSETRON 4 MG: 2 INJECTION, SOLUTION INTRAMUSCULAR; INTRAVENOUS at 05:12

## 2025-05-01 RX ADMIN — CALCIUM CARBONATE 500 MG: 500 TABLET, CHEWABLE ORAL at 02:48

## 2025-05-01 ASSESSMENT — PAIN SCALES - GENERAL
PAINLEVEL_OUTOF10: 5
PAINLEVEL_OUTOF10: 4
PAINLEVEL_OUTOF10: 8
PAINLEVEL_OUTOF10: 7

## 2025-05-01 ASSESSMENT — PAIN DESCRIPTION - LOCATION: LOCATION: ABDOMEN;BACK

## 2025-05-01 NOTE — ACP (ADVANCE CARE PLANNING)
Advance Care Planning   The patient has the following advanced directives on file:  Advance Directives       Power of  Living Will ACP-Advance Directive ACP-Power of     Not on File Filed on 10/04/22 Filed Not on File            The patient has appointed the following active healthcare agents:    Secondary Decision Maker: Antoinette Olmstead - [phone]      JONH Elder  5/1/2025        Cpt Code (43291, 34494 [place] [address]): 26730 Cpt Code: 01239

## 2025-05-01 NOTE — PLAN OF CARE
Problem: Pain  Goal: Verbalizes/displays adequate comfort level or baseline comfort level  4/30/2025 2325 by Jarek Friend RN  Outcome: Progressing  4/30/2025 1813 by Jason Ordonez RN  Outcome: Progressing     Problem: Safety - Adult  Goal: Free from fall injury  4/30/2025 2325 by Jarek Friend RN  Outcome: Progressing  4/30/2025 1813 by Jason Ordonez RN  Outcome: Progressing

## 2025-05-01 NOTE — PROGRESS NOTES
Interventional radiology    I came to the patient's bedside this evening for discussion of our plan going forward.  We discussed changing in diet including decreasing carbohydrates and sugar daily.  While her pet scan has shown significant improvement, there continues to be several focal areas of residual activity more so near the dome of the liver and left lobe, some of which are new.  We treated those areas slightly more densely than we would have usually.  We had discussed trying to spread out the treatments beyond 3 months to at least 4 to 6 months so that she can \"live a normal life\" without the recurrent concerns for tumor progression.    We did discuss that these treatments however while it does affect her cancer, it can also affect her normal liver function studies.  But I do expect that these numbers will go up over the short-term.,  It would be more helpful to kill more of the cancer off.    Our desire as a team is not to leave any cancer lingering.  We will watch her AFP levels while they are small, they did go up over the last 3 months.    I have placed her on Eliquis as a blood thinner because we did notice during the procedure today that several times clots were identified on the catheters and guidewires and this is a new finding.  Commonly in cancer patients, they develop a hypercoagulable state and this is also concerning that there may be lingering or other cancer disease otherwise not presently evident.  I would like to place you on Eliquis for a minimum of 3-4 months to get through this..  If she can institute the dietary changes and give her her liver arrest with healthy foods that will preserve her liver function and give us a better chance to fight her cancer.    We may provide her with Y90 as her next treatment and that will be 4-6 months from now based on her AFP levels, how Fash her liver function studies return after today's procedure and how her next imaging studies including PET scans

## 2025-05-01 NOTE — PLAN OF CARE
Problem: Pain  Goal: Verbalizes/displays adequate comfort level or baseline comfort level  5/1/2025 1043 by Jason Ordonez, RN  Outcome: Progressing     Problem: Safety - Adult  Goal: Free from fall injury  5/1/2025 1043 by Jason Ordonez, RN  Outcome: Progressing

## 2025-05-01 NOTE — CARE COORDINATION
Social Work/Case Management Transition of Care Planning (Eve Ervin -[phone]):  Patient presented to the hospital for planned Y90 treatment on 4/30.  Met with patient at bedside.  She resides in a 2 story home with her bedroom and bathroom on the second floor.  There are 4 CLAUDIO.  She lives with her parents.  Patient reports she is independent with all aspects of care.  She has a rollator and SPC.  No oxygen needs in the home.  PCP is Dr. Montana.  Pharmacy is [facility] in [place].  No HHC or JELLY history reported.  Discharge plan is to home with no needs.  Family will transport.  JONH Elder  5/1/2025

## 2025-05-01 NOTE — PROGRESS NOTES
Spiritual Health History and Assessment/Progress Note  [facility]    (P) Initial Encounter, Spiritual/Emotional Needs,  ,  ,      Name: Brooklyn Olmstead MRN: [id number]    Age: 40 y.o.     Sex: female   Language: English   Congregational: Hindu   Neuroendocrine tumor (HCC)     Date: 5/1/2025                           Spiritual Assessment began in [facility]        Referral/Consult From: (P) Rounding   Encounter Overview/Reason: (P) Initial Encounter, Spiritual/Emotional Needs  Service Provided For: (P) Patient and family together    Rhiannon, Belief, Meaning:   Patient identifies as spiritual  Family/Friends identify as spiritual      Importance and Influence:  Patient has spiritual/personal beliefs that influence decisions regarding their health  Family/Friends have spiritual/personal beliefs that influence decisions regarding the patient's health    Community:  Patient is connected with a spiritual community  Family/Friends are connected with a spiritual community:    Assessment and Plan of Care:     Patient Interventions include: Affirmed coping skills/support systems  Family/Friends Interventions include: Affirmed coping skills/support systems    Patient Plan of Care: No spiritual needs identified for follow-up  Family/Friends Plan of Care: No spiritual needs identified for follow-up    Electronically signed by Chaplain LISA on 5/1/2025 at 6:47 PM

## 2025-05-01 NOTE — PROGRESS NOTES
HEPATOBILIARY AND PANCREATIC SURGERY  DAILY PROGRESS NOTE  5/1/2025  Cc: neuroendocrine tumor      SUBJECTIVE:  Two episodes of emesis overnight. Controlled this morning and was able to fall asleep. Has chronic nausea. Having mild abdominal pain.    OBJECTIVE:  /89   Pulse 60   Temp 97.7 °F (36.5 °C) (Oral)   Resp 17   Ht 1.524 m (5')   Wt 57.2 kg (126 lb)   SpO2 97%   BMI 24.61 kg/m²     GENERAL: No acute distress.  HEAD: NCAT.   EYES: Anicteric. Round symmetric pupils.  CV: RR.  LUNGS/CHEST: No increased work of breathing on RA.  ABDOMEN: Soft, non distended, mild epigastric tenderness.    LABS:  I have reviewed the patient's labs:   -CMP  Recent Labs     04/30/25  0930 05/01/25  0450    133*   K 3.6 3.8    98   CO2 24 22   BUN 8 8   CREATININE 0.5 0.5   GLUCOSE 111* 177*   CALCIUM 8.7 8.1*   BILITOT 0.2 0.4   ALKPHOS 138* 143*   AST 18 101*   ALT 11 69*       ASSESSMENT:  40 y.o. 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 Matthew Nicole DO  General Surgery Resident, PGY-1    Electronically signed by Aron Nicole DO on 5/1/25 at 7:18 AM EDT

## 2025-05-01 NOTE — ANESTHESIA POSTPROCEDURE EVALUATION
Department of Anesthesiology  Postprocedure Note    Patient: Brooklyn Olmstead  MRN: [id number]  YOB: 1985  Date of evaluation: 5/1/2025    Procedure Summary       Date: 04/30/25 Room / Location: [facility] Special Procedures; [facility] Radiology    Anesthesia Start: 1204 Anesthesia Stop: 1431    Procedures:       IR WHIT ART CATH ABD PELV LOWER EXT INIT 3RD+ ORDER      IR WHIT ART CATH ABD/PELV/LOWER EXT ADDL ORDER      IR EMBOLIZATION TUMOR/ORGAN      IR CHEMO ADM PUSH INTRA ARTERIAL      IR ANGIOGRAM CELIAC      IR ANGIOGRAM SELECTIVE EACH ADDITIONAL VESSEL      IR ULTRASOUND GUIDANCE VASCULAR ACCESS Diagnosis:       Neuroendocrine tumor (HCC)      Neuroendocrine cancer (HCC)      Hepatocellular carcinoma (HCC)      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)      Other secondary neuroendocrine tumors (HCC)      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)      Neuroendocrine tumor (HCC)    Scheduled Providers: Radiologist, Priya General Responsible Provider: Demarco Barnes DO    Anesthesia Type: MAC ASA Status: 3            Anesthesia Type: MAC    Pili Phase I: Pili Score: 10    Pili Phase II: Pili Score: 10    Anesthesia Post Evaluation    Patient location during evaluation: bedside  Patient participation: complete - patient cannot participate  Level of consciousness: awake and alert  Airway patency: patent  Nausea & Vomiting: no nausea and no vomiting  Cardiovascular status: blood pressure returned to baseline  Respiratory status: acceptable  Hydration status: euvolemic  Multimodal analgesia pain management approach    No notable events documented.

## 2025-05-02 NOTE — DISCHARGE SUMMARY
Physician Discharge Summary     Patient ID:  Brooklyn Olmstead  [id number]  40 y.o.  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 *      Admission Condition: good    Discharged Condition: stable      Hospital Course:  Brooklyn Olmstead is a 40 y.o. 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 She progressed well, pain was controlled on PO medications. She was tolerating a regular diet with no nausea or vomiting, and was in a suitable condition for discharge to home in stable condition.      Consults:   None    Significant Diagnostic Studies:   IR WHIT ART CATH ABD PELV LOWER EXT INIT 3RD+ ORDER  Result Date: 5/1/2025  HISTORY: ORDERING SYSTEM PROVIDED HISTORY: Neuroendocrine tumor (HCC) TECHNOLOGIST PROVIDED HISTORY: What reading provider will be dictating this exam?->CRC; ORDERING SYSTEM PROVIDED HISTORY: Neuroendocrine tumor (HCC) TECHNOLOGIST PROVIDED HISTORY: Transarterial chemotherapy embolization w 23hr post observation What reading provider will be dictating this exam?->CRC PROCEDURE: 1. Ultrasound guidance for arterial access 2. Catheter placement and diagnostic angiogram of celiac artery, 1st order vessel selection 3. Catheter placement and diagnostic angiogram of left hepatic artery, 3rd order vessel selection.  Cone beam CT was performed with 3D reconstruction at a separate workstation.  the left lobe of the liver in segment 2 a and B as seen on the PET scan.  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  Branches extending off the left hepatic artery extending to segment 4 and 5 the large branch extending to the apex of the liver is adjacent to the right heart border an inferior vena cava.  This corresponds to an area of increased activity seen on the PET scan and zone of tumor blush.  Areas situ blush in this region were successfully embolized to near stasis due to the aggressive growth and hypoplastic seen in this region. CATHETERIZED VESSEL: Right hepatic artery FINDINGS: There are multiple focal areas of tumor blush noted the largest areas extend near segment 6 and near the dome of the liver in segment 8. there are divided branches near the apex of the liver and these demonstrate significant corkscrewing.  This finding is nonspecific but has been associated with cirrhotic change CATHETERIZED VESSEL: Middle hepatic artery FINDINGS: Multiple scattered focal areas of tumor blush are identified.  The largest extends into segment 7 where there is dense tumor blush present. Multiple focal areas are observed and 2 branches were selected in this region for transarterial chemoembolization.  Slowed flow was identified in each vessel. The final angiogram reveals pruning of the distal capillary branches extending to the region of dense tumor blush.  There continues to be hypervascularity extending to inferior margin of segment 6 and there is flow extends into the left hepatic artery branches. All catheters and wires were removed. Operators changed sterile gloves. The groin was prepped. Closure device deployed without incident with immediate hemostasis. Sterile dressing applied. COMPLICATIONS: None EBL: Minimal CONDITION: Stable, unchanged     1. Successful trans arterial

## 2025-05-06 DIAGNOSIS — G43.909 MIGRAINE WITHOUT STATUS MIGRAINOSUS, NOT INTRACTABLE, UNSPECIFIED MIGRAINE TYPE: ICD-10-CM

## 2025-05-06 DIAGNOSIS — F51.01 PRIMARY INSOMNIA: ICD-10-CM

## 2025-05-06 RX ORDER — BUTALBITAL, ACETAMINOPHEN AND CAFFEINE 50; 325; 40 MG/1; MG/1; MG/1
TABLET ORAL
Qty: 60 TABLET | Refills: 0 | Status: SHIPPED | OUTPATIENT
Start: 2025-05-06

## 2025-05-06 RX ORDER — ZOLPIDEM TARTRATE 10 MG/1
10 TABLET ORAL NIGHTLY PRN
Qty: 30 TABLET | Refills: 0 | Status: SHIPPED | OUTPATIENT
Start: 2025-05-06 | End: 2025-06-05

## 2025-05-06 NOTE — TELEPHONE ENCOUNTER
Name of Medication(s) Requested:  Requested Prescriptions      No prescriptions requested or ordered in this encounter       Medication is on current medication list Yes    Dosage and directions were verified? Yes    Quantity verified: 30 day supply     Pharmacy Verified?  Yes    Last Appointment:  9/18/2024    Future appts:  Future Appointments   Date Time Provider Department Center   5/8/2025  1:00 PM [facility] FAST TRACK 2 [facility]   5/8/2025  2:00 PM Geoff Hu MD MED ONC [facility]   5/8/2025  2:30 PM [facility] FAST TRACK 2 [facility]   7/30/2025  7:00 AM [facility] ANGIO RM 12 [facility]        (If no appt send self scheduling link. .REFILLAPPT)  Scheduling request sent?     [] Yes  [] No    Does patient need updated?  [] Yes  [] No

## 2025-05-07 ENCOUNTER — TELEPHONE (OUTPATIENT)
Dept: HEMATOLOGY | Age: 40
End: 2025-05-07

## 2025-05-07 DIAGNOSIS — C22.0 HEPATOCELLULAR CARCINOMA (HCC): ICD-10-CM

## 2025-05-07 DIAGNOSIS — C7B.8 METASTATIC MALIGNANT NEUROENDOCRINE TUMOR TO LIVER (HCC): Primary | ICD-10-CM

## 2025-05-07 DIAGNOSIS — D3A.8 NEUROENDOCRINE TUMOR (HCC): ICD-10-CM

## 2025-05-07 NOTE — TELEPHONE ENCOUNTER
I called and left a VM to call our office back to make her follow up appt with Dr. Castro.    Electronically signed by Ada Ugalde RN on 5/7/2025 at 10:15 AM

## 2025-05-08 ENCOUNTER — OFFICE VISIT (OUTPATIENT)
Dept: ONCOLOGY | Age: 40
End: 2025-05-08
Payer: COMMERCIAL

## 2025-05-08 ENCOUNTER — HOSPITAL ENCOUNTER (OUTPATIENT)
Dept: INFUSION THERAPY | Age: 40
Discharge: HOME | End: 2025-05-08
Payer: COMMERCIAL

## 2025-05-08 VITALS
SYSTOLIC BLOOD PRESSURE: 133 MMHG | HEIGHT: 60 IN | BODY MASS INDEX: 25.5 KG/M2 | DIASTOLIC BLOOD PRESSURE: 48 MMHG | TEMPERATURE: 97.9 F | WEIGHT: 129.9 LBS | OXYGEN SATURATION: 98 % | HEART RATE: 62 BPM

## 2025-05-08 DIAGNOSIS — C7B.8 METASTATIC MALIGNANT NEUROENDOCRINE TUMOR TO LIVER (HCC): Primary | ICD-10-CM

## 2025-05-08 DIAGNOSIS — C7A.8 NEUROENDOCRINE CANCER (HCC): Primary | ICD-10-CM

## 2025-05-08 DIAGNOSIS — C7A.8 NEUROENDOCRINE CANCER (HCC): ICD-10-CM

## 2025-05-08 LAB
ALBUMIN SERPL-MCNC: 3.9 G/DL (ref 3.5–5.2)
ALP SERPL-CCNC: 142 U/L (ref 35–104)
ALT SERPL-CCNC: 39 U/L (ref 0–35)
ANION GAP SERPL CALCULATED.3IONS-SCNC: 11 MMOL/L (ref 7–16)
AST SERPL-CCNC: 27 U/L (ref 0–35)
BASOPHILS # BLD: 0.08 K/UL (ref 0–0.2)
BASOPHILS NFR BLD: 1 % (ref 0–2)
BILIRUB SERPL-MCNC: 0.3 MG/DL (ref 0–1.2)
BUN SERPL-MCNC: 8 MG/DL (ref 6–20)
CALCIUM SERPL-MCNC: 8.3 MG/DL (ref 8.6–10)
CHLORIDE SERPL-SCNC: 97 MMOL/L (ref 98–107)
CO2 SERPL-SCNC: 29 MMOL/L (ref 22–29)
CREAT SERPL-MCNC: 0.6 MG/DL (ref 0.5–1)
EOSINOPHIL # BLD: 0.34 K/UL (ref 0.05–0.5)
EOSINOPHILS RELATIVE PERCENT: 4 % (ref 0–6)
ERYTHROCYTE [DISTWIDTH] IN BLOOD BY AUTOMATED COUNT: 16.1 % (ref 11.5–15)
GFR, ESTIMATED: >90 ML/MIN/1.73M2
GLUCOSE SERPL-MCNC: 104 MG/DL (ref 74–99)
HCT VFR BLD AUTO: 32.1 % (ref 34–48)
HGB BLD-MCNC: 10.9 G/DL (ref 11.5–15.5)
IMM GRANULOCYTES # BLD AUTO: 0.1 K/UL (ref 0–0.58)
IMM GRANULOCYTES NFR BLD: 1 % (ref 0–5)
LYMPHOCYTES NFR BLD: 0.91 K/UL (ref 1.5–4)
LYMPHOCYTES RELATIVE PERCENT: 10 % (ref 20–42)
MCH RBC QN AUTO: 31.4 PG (ref 26–35)
MCHC RBC AUTO-ENTMCNC: 34 G/DL (ref 32–34.5)
MCV RBC AUTO: 92.5 FL (ref 80–99.9)
MONOCYTES NFR BLD: 0.9 K/UL (ref 0.1–0.95)
MONOCYTES NFR BLD: 10 % (ref 2–12)
NEUTROPHILS NFR BLD: 74 % (ref 43–80)
NEUTS SEG NFR BLD: 6.69 K/UL (ref 1.8–7.3)
PLATELET # BLD AUTO: 201 K/UL (ref 130–450)
PMV BLD AUTO: 9.1 FL (ref 7–12)
POTASSIUM SERPL-SCNC: 3.3 MMOL/L (ref 3.5–5.1)
PROT SERPL-MCNC: 6.6 G/DL (ref 6.4–8.3)
RBC # BLD AUTO: 3.47 M/UL (ref 3.5–5.5)
SODIUM SERPL-SCNC: 137 MMOL/L (ref 136–145)
WBC OTHER # BLD: 9 K/UL (ref 4.5–11.5)

## 2025-05-08 PROCEDURE — G8427 DOCREV CUR MEDS BY ELIG CLIN: HCPCS | Performed by: STUDENT IN AN ORGANIZED HEALTH CARE EDUCATION/TRAINING PROGRAM

## 2025-05-08 PROCEDURE — 85025 COMPLETE CBC W/AUTO DIFF WBC: CPT

## 2025-05-08 PROCEDURE — 6360000002 HC RX W HCPCS: Performed by: STUDENT IN AN ORGANIZED HEALTH CARE EDUCATION/TRAINING PROGRAM

## 2025-05-08 PROCEDURE — 4004F PT TOBACCO SCREEN RCVD TLK: CPT | Performed by: STUDENT IN AN ORGANIZED HEALTH CARE EDUCATION/TRAINING PROGRAM

## 2025-05-08 PROCEDURE — 1111F DSCHRG MED/CURRENT MED MERGE: CPT | Performed by: STUDENT IN AN ORGANIZED HEALTH CARE EDUCATION/TRAINING PROGRAM

## 2025-05-08 PROCEDURE — 99212 OFFICE O/P EST SF 10 MIN: CPT

## 2025-05-08 PROCEDURE — 36591 DRAW BLOOD OFF VENOUS DEVICE: CPT

## 2025-05-08 PROCEDURE — 2500000003 HC RX 250 WO HCPCS: Performed by: STUDENT IN AN ORGANIZED HEALTH CARE EDUCATION/TRAINING PROGRAM

## 2025-05-08 PROCEDURE — 96372 THER/PROPH/DIAG INJ SC/IM: CPT

## 2025-05-08 PROCEDURE — 99214 OFFICE O/P EST MOD 30 MIN: CPT | Performed by: STUDENT IN AN ORGANIZED HEALTH CARE EDUCATION/TRAINING PROGRAM

## 2025-05-08 PROCEDURE — 80053 COMPREHEN METABOLIC PANEL: CPT

## 2025-05-08 PROCEDURE — 99214 OFFICE O/P EST MOD 30 MIN: CPT

## 2025-05-08 PROCEDURE — G8419 CALC BMI OUT NRM PARAM NOF/U: HCPCS | Performed by: STUDENT IN AN ORGANIZED HEALTH CARE EDUCATION/TRAINING PROGRAM

## 2025-05-08 RX ORDER — LANREOTIDE ACETATE 120 MG/.5ML
120 INJECTION SUBCUTANEOUS ONCE
OUTPATIENT
Start: 2025-05-29 | End: 2025-05-29

## 2025-05-08 RX ORDER — LANREOTIDE ACETATE 120 MG/.5ML
120 INJECTION SUBCUTANEOUS ONCE
Status: COMPLETED | OUTPATIENT
Start: 2025-05-08 | End: 2025-05-08

## 2025-05-08 RX ORDER — SODIUM CHLORIDE 0.9 % (FLUSH) 0.9 %
5-40 SYRINGE (ML) INJECTION PRN
Status: DISCONTINUED | OUTPATIENT
Start: 2025-05-08 | End: 2025-05-09 | Stop reason: HOSPADM

## 2025-05-08 RX ORDER — HEPARIN 100 UNIT/ML
500 SYRINGE INTRAVENOUS PRN
Status: DISCONTINUED | OUTPATIENT
Start: 2025-05-08 | End: 2025-05-09 | Stop reason: HOSPADM

## 2025-05-08 RX ADMIN — SODIUM CHLORIDE, PRESERVATIVE FREE 10 ML: 5 INJECTION INTRAVENOUS at 13:12

## 2025-05-08 RX ADMIN — LANREOTIDE ACETATE 120 MG: 120 INJECTION SUBCUTANEOUS at 14:25

## 2025-05-08 RX ADMIN — HEPARIN 500 UNITS: 100 SYRINGE at 13:12

## 2025-05-08 NOTE — PROGRESS NOTES
Patient did stop at check out window, ok'd to leave without AVS.  Patient has MYCHART.      
fairly well  Has not gastritis  Monitor hepatic function, appears that improved since last week  Will err on the side of treating aggressively, so continue TMZ and Xeloda  May be planning Y90 in the summer  Started on Eliquis by IR  Follow-up with HBP surgery as directed  OK for lanreotide today        No orders of the defined types were placed in this encounter.        Dispo:  No follow-ups on file.           Approximately spent 31 minutes on chart review as well as time spent on patient encounter, discussion of the laboratory, imaging, clinical findings, and documentation. I have discussed clinical implications and recommendations on the patient's primary issues. More than 50% of time was spent counseling patient. The patient verbalized understanding.      Geoff Hu MD  Medical Oncology  [facility]  05/08/2025

## 2025-05-15 ENCOUNTER — OFFICE VISIT (OUTPATIENT)
Age: 40
End: 2025-05-15
Payer: COMMERCIAL

## 2025-05-15 VITALS
HEART RATE: 84 BPM | DIASTOLIC BLOOD PRESSURE: 68 MMHG | WEIGHT: 130.5 LBS | RESPIRATION RATE: 15 BRPM | SYSTOLIC BLOOD PRESSURE: 108 MMHG | HEIGHT: 60 IN | BODY MASS INDEX: 25.62 KG/M2 | TEMPERATURE: 97.6 F | OXYGEN SATURATION: 98 %

## 2025-05-15 DIAGNOSIS — C7A.8 NEUROENDOCRINE CANCER (HCC): Primary | ICD-10-CM

## 2025-05-15 PROCEDURE — G8428 CUR MEDS NOT DOCUMENT: HCPCS | Performed by: TRANSPLANT SURGERY

## 2025-05-15 PROCEDURE — 99212 OFFICE O/P EST SF 10 MIN: CPT | Performed by: TRANSPLANT SURGERY

## 2025-05-15 PROCEDURE — 99213 OFFICE O/P EST LOW 20 MIN: CPT | Performed by: TRANSPLANT SURGERY

## 2025-05-15 PROCEDURE — 4004F PT TOBACCO SCREEN RCVD TLK: CPT | Performed by: TRANSPLANT SURGERY

## 2025-05-15 PROCEDURE — 1111F DSCHRG MED/CURRENT MED MERGE: CPT | Performed by: TRANSPLANT SURGERY

## 2025-05-15 PROCEDURE — G8419 CALC BMI OUT NRM PARAM NOF/U: HCPCS | Performed by: TRANSPLANT SURGERY

## 2025-05-15 NOTE — PROGRESS NOTES
Hepatobiliary and Pancreatic Surgery Progress Note    CC: Follow-up TACE    Subjective: Patient states that she is having epigastric abdominal plain as well as lower abdominal pain.  She has had Y90 and Lutathera in the past for her metastatic neuroendocrine tumor  She is currently on Cap/Tem and was having 4 bowel movements a day.  Since TMZ she she has had intermittent bouts of constipation.  She stopped taking Protonix.  However she is taking Carafate.    OBJECTIVE      Physical    /68   Pulse 84   Temp 97.6 °F (36.4 °C) (Temporal)   Resp 15   Ht 1.524 m (5')   Wt 59.2 kg (130 lb 8 oz)   SpO2 98%   BMI 25.49 kg/m²       General appearance: appears in no acute distress  Lungs:respiratory effort normal without accessory numbers  Heart: no pedal edema  Abdomen: soft, nondistended, nontympanic, no guarding, no peritoneal signs, normoactive bowel sounds, left upper quadrant pain  Extremities: ROM normal    ASSESSMENT: Metastatic neuroendocrine tumor to the liver, small bowel primary, history of Lutathera and Y90, now with additional TACE procedures, constipation    PLAN:    -I reviewed their images prior to our office visit and we also reviewed them together today.  - With the aggressive approach she has decreased tumor burden significantly within her liver since her December and her April gallium PET.  - continue TMZ  -On her last visit we reviewed her imaging from her previous PET scan compared to her most recent.  She has had marked improvement.  -Continue surveillance imaging  -Continue TACE      20 Minutes of which greater than 50% was spent counseling or coordinating her care.    Thank you Dr. Montana for the consultation and allowing me to take part in Ms. Olmstead's care.    Electronically signed by Lambert Castro MD on 5/15/2025 at 2:45 PM

## 2025-05-16 RX ORDER — FAMOTIDINE 40 MG/1
40 TABLET, FILM COATED ORAL NIGHTLY PRN
Qty: 30 TABLET | Refills: 2 | Status: SHIPPED | OUTPATIENT
Start: 2025-05-16

## 2025-05-16 RX ORDER — PANTOPRAZOLE SODIUM 40 MG/1
40 TABLET, DELAYED RELEASE ORAL 2 TIMES DAILY
Qty: 120 TABLET | Refills: 2 | Status: SHIPPED | OUTPATIENT
Start: 2025-05-16 | End: 2025-11-12

## 2025-05-16 NOTE — TELEPHONE ENCOUNTER
Pt called and stated that she discussed refilling her Protonix and Pepcid during her appt with Dr. Castro. She would like those sent to [facility] in [place].

## 2025-05-30 DIAGNOSIS — G43.909 MIGRAINE WITHOUT STATUS MIGRAINOSUS, NOT INTRACTABLE, UNSPECIFIED MIGRAINE TYPE: ICD-10-CM

## 2025-05-30 RX ORDER — ZOLPIDEM TARTRATE 10 MG/1
10 TABLET ORAL NIGHTLY PRN
Qty: 30 TABLET | Refills: 3 | Status: SHIPPED | OUTPATIENT
Start: 2025-05-30 | End: 2025-08-28

## 2025-05-30 RX ORDER — BUTALBITAL, ACETAMINOPHEN AND CAFFEINE 50; 325; 40 MG/1; MG/1; MG/1
TABLET ORAL
Qty: 60 TABLET | Refills: 0 | Status: SHIPPED | OUTPATIENT
Start: 2025-05-30

## 2025-05-30 RX ORDER — ZOLPIDEM TARTRATE 10 MG/1
10 TABLET ORAL NIGHTLY PRN
COMMUNITY
End: 2025-05-30 | Stop reason: SDUPTHER

## 2025-05-30 NOTE — TELEPHONE ENCOUNTER
Name of Medication(s) Requested:  Requested Prescriptions      No prescriptions requested or ordered in this encounter       Medication is on current medication list Yes    Dosage and directions were verified? Yes    Quantity verified: 30 day supply     Pharmacy Verified?  Yes    Last Appointment:  9/18/2024    Future appts:  Future Appointments   Date Time Provider Department Center   6/12/2025  1:00 PM [facility] FAST TRACK 3 [facility]   6/12/2025  2:00 PM Geoff Hu MD MED ONC [facility]   6/12/2025  2:30 PM [facility] FAST TRACK 2 [facility] On[facility]   7/30/2025  7:00 AM [facility] ANGIO RM 12 [facility]        (If no appt send self scheduling link. .REFILLAPPT)  Scheduling request sent?     [] Yes  [] No    Does patient need updated?  [] Yes  [] No

## 2025-06-12 ENCOUNTER — HOSPITAL ENCOUNTER (OUTPATIENT)
Dept: INFUSION THERAPY | Age: 40
Discharge: HOME | End: 2025-06-12
Payer: COMMERCIAL

## 2025-06-12 ENCOUNTER — OFFICE VISIT (OUTPATIENT)
Age: 40
End: 2025-06-12
Payer: COMMERCIAL

## 2025-06-12 VITALS
HEIGHT: 60 IN | BODY MASS INDEX: 24.99 KG/M2 | SYSTOLIC BLOOD PRESSURE: 98 MMHG | OXYGEN SATURATION: 98 % | DIASTOLIC BLOOD PRESSURE: 50 MMHG | HEART RATE: 78 BPM | WEIGHT: 127.3 LBS | TEMPERATURE: 98.2 F

## 2025-06-12 DIAGNOSIS — D3A.8 NEUROENDOCRINE TUMOR (HCC): Primary | ICD-10-CM

## 2025-06-12 DIAGNOSIS — C7A.8 NEUROENDOCRINE CANCER (HCC): ICD-10-CM

## 2025-06-12 DIAGNOSIS — C7B.8 METASTATIC MALIGNANT NEUROENDOCRINE TUMOR TO LIVER (HCC): Primary | ICD-10-CM

## 2025-06-12 LAB
ALBUMIN SERPL-MCNC: 4.1 G/DL (ref 3.5–5.2)
ALP SERPL-CCNC: 388 U/L (ref 35–104)
ALT SERPL-CCNC: 52 U/L (ref 0–35)
ANION GAP SERPL CALCULATED.3IONS-SCNC: 12 MMOL/L (ref 7–16)
AST SERPL-CCNC: 59 U/L (ref 0–35)
BASOPHILS # BLD: 0.06 K/UL (ref 0–0.2)
BASOPHILS NFR BLD: 1 % (ref 0–2)
BILIRUB SERPL-MCNC: <0.2 MG/DL (ref 0–1.2)
BUN SERPL-MCNC: 7 MG/DL (ref 6–20)
CALCIUM SERPL-MCNC: 8.4 MG/DL (ref 8.6–10)
CHLORIDE SERPL-SCNC: 103 MMOL/L (ref 98–107)
CO2 SERPL-SCNC: 25 MMOL/L (ref 22–29)
CREAT SERPL-MCNC: 0.5 MG/DL (ref 0.5–1)
EOSINOPHIL # BLD: 0.08 K/UL (ref 0.05–0.5)
EOSINOPHILS RELATIVE PERCENT: 1 % (ref 0–6)
ERYTHROCYTE [DISTWIDTH] IN BLOOD BY AUTOMATED COUNT: 15.9 % (ref 11.5–15)
GFR, ESTIMATED: >90 ML/MIN/1.73M2
GLUCOSE SERPL-MCNC: 117 MG/DL (ref 74–99)
HCT VFR BLD AUTO: 32.4 % (ref 34–48)
HGB BLD-MCNC: 10.5 G/DL (ref 11.5–15.5)
IMM GRANULOCYTES # BLD AUTO: 0.07 K/UL (ref 0–0.58)
IMM GRANULOCYTES NFR BLD: 1 % (ref 0–5)
LYMPHOCYTES NFR BLD: 0.82 K/UL (ref 1.5–4)
LYMPHOCYTES RELATIVE PERCENT: 9 % (ref 20–42)
MCH RBC QN AUTO: 30 PG (ref 26–35)
MCHC RBC AUTO-ENTMCNC: 32.4 G/DL (ref 32–34.5)
MCV RBC AUTO: 92.6 FL (ref 80–99.9)
MONOCYTES NFR BLD: 0.86 K/UL (ref 0.1–0.95)
MONOCYTES NFR BLD: 10 % (ref 2–12)
NEUTROPHILS NFR BLD: 79 % (ref 43–80)
NEUTS SEG NFR BLD: 7.09 K/UL (ref 1.8–7.3)
PLATELET # BLD AUTO: 311 K/UL (ref 130–450)
PMV BLD AUTO: 8.7 FL (ref 7–12)
POTASSIUM SERPL-SCNC: 3.8 MMOL/L (ref 3.5–5.1)
PROT SERPL-MCNC: 6.9 G/DL (ref 6.4–8.3)
RBC # BLD AUTO: 3.5 M/UL (ref 3.5–5.5)
SODIUM SERPL-SCNC: 140 MMOL/L (ref 136–145)
WBC OTHER # BLD: 9 K/UL (ref 4.5–11.5)

## 2025-06-12 PROCEDURE — 85025 COMPLETE CBC W/AUTO DIFF WBC: CPT

## 2025-06-12 PROCEDURE — 96372 THER/PROPH/DIAG INJ SC/IM: CPT

## 2025-06-12 PROCEDURE — 6360000002 HC RX W HCPCS: Performed by: STUDENT IN AN ORGANIZED HEALTH CARE EDUCATION/TRAINING PROGRAM

## 2025-06-12 PROCEDURE — 80053 COMPREHEN METABOLIC PANEL: CPT

## 2025-06-12 PROCEDURE — 99214 OFFICE O/P EST MOD 30 MIN: CPT | Performed by: STUDENT IN AN ORGANIZED HEALTH CARE EDUCATION/TRAINING PROGRAM

## 2025-06-12 PROCEDURE — G8427 DOCREV CUR MEDS BY ELIG CLIN: HCPCS | Performed by: STUDENT IN AN ORGANIZED HEALTH CARE EDUCATION/TRAINING PROGRAM

## 2025-06-12 PROCEDURE — 36591 DRAW BLOOD OFF VENOUS DEVICE: CPT

## 2025-06-12 PROCEDURE — 96402 CHEMO HORMON ANTINEOPL SQ/IM: CPT

## 2025-06-12 PROCEDURE — 2500000003 HC RX 250 WO HCPCS: Performed by: STUDENT IN AN ORGANIZED HEALTH CARE EDUCATION/TRAINING PROGRAM

## 2025-06-12 PROCEDURE — G8420 CALC BMI NORM PARAMETERS: HCPCS | Performed by: STUDENT IN AN ORGANIZED HEALTH CARE EDUCATION/TRAINING PROGRAM

## 2025-06-12 PROCEDURE — 4004F PT TOBACCO SCREEN RCVD TLK: CPT | Performed by: STUDENT IN AN ORGANIZED HEALTH CARE EDUCATION/TRAINING PROGRAM

## 2025-06-12 RX ORDER — HEPARIN 100 UNIT/ML
500 SYRINGE INTRAVENOUS PRN
Status: DISCONTINUED | OUTPATIENT
Start: 2025-06-12 | End: 2025-06-13 | Stop reason: HOSPADM

## 2025-06-12 RX ORDER — SODIUM CHLORIDE 0.9 % (FLUSH) 0.9 %
5-40 SYRINGE (ML) INJECTION PRN
Status: DISCONTINUED | OUTPATIENT
Start: 2025-06-12 | End: 2025-06-13 | Stop reason: HOSPADM

## 2025-06-12 RX ORDER — LANREOTIDE ACETATE 120 MG/.5ML
120 INJECTION SUBCUTANEOUS ONCE
Status: COMPLETED | OUTPATIENT
Start: 2025-06-12 | End: 2025-06-12

## 2025-06-12 RX ORDER — LANREOTIDE ACETATE 120 MG/.5ML
120 INJECTION SUBCUTANEOUS ONCE
OUTPATIENT
Start: 2025-07-03 | End: 2025-07-03

## 2025-06-12 RX ADMIN — LANREOTIDE ACETATE 120 MG: 120 INJECTION SUBCUTANEOUS at 14:15

## 2025-06-12 RX ADMIN — SODIUM CHLORIDE, PRESERVATIVE FREE 10 ML: 5 INJECTION INTRAVENOUS at 13:06

## 2025-06-12 RX ADMIN — HEPARIN 500 UNITS: 100 SYRINGE at 13:06

## 2025-06-13 NOTE — PROGRESS NOTES
Patient provided with discharge instructions, patient has MYCHART.  All questions answered.  Patient understands follow up plan of care.     
laboratory, imaging, clinical findings, and documentation. I have discussed clinical implications and recommendations on the patient's primary issues. More than 50% of time was spent counseling patient. The patient verbalized understanding.      Geoff Hu MD  Medical Oncology  [facility]  06/12/2025

## 2025-06-20 DIAGNOSIS — C7B.8 METASTATIC MALIGNANT NEUROENDOCRINE TUMOR TO LIVER (HCC): ICD-10-CM

## 2025-06-20 DIAGNOSIS — D3A.8 NEUROENDOCRINE TUMOR (HCC): ICD-10-CM

## 2025-06-20 RX ORDER — TEMOZOLOMIDE 100 MG/1
300 CAPSULE ORAL DAILY
Qty: 15 CAPSULE | Refills: 2 | Status: ACTIVE | OUTPATIENT
Start: 2025-06-20

## 2025-06-20 RX ORDER — CAPECITABINE 500 MG/1
1000 TABLET, FILM COATED ORAL 2 TIMES DAILY
Qty: 56 TABLET | Refills: 2 | Status: ACTIVE | OUTPATIENT
Start: 2025-06-20

## 2025-07-07 DIAGNOSIS — G43.909 MIGRAINE WITHOUT STATUS MIGRAINOSUS, NOT INTRACTABLE, UNSPECIFIED MIGRAINE TYPE: ICD-10-CM

## 2025-07-07 RX ORDER — BUTALBITAL, ACETAMINOPHEN AND CAFFEINE 50; 325; 40 MG/1; MG/1; MG/1
TABLET ORAL
Qty: 60 TABLET | Refills: 0 | Status: SHIPPED | OUTPATIENT
Start: 2025-07-07

## 2025-07-10 ENCOUNTER — OFFICE VISIT (OUTPATIENT)
Age: 40
End: 2025-07-10
Payer: COMMERCIAL

## 2025-07-10 ENCOUNTER — HOSPITAL ENCOUNTER (OUTPATIENT)
Dept: INFUSION THERAPY | Age: 40
Discharge: HOME | End: 2025-07-10
Payer: COMMERCIAL

## 2025-07-10 VITALS
DIASTOLIC BLOOD PRESSURE: 66 MMHG | BODY MASS INDEX: 25.91 KG/M2 | HEART RATE: 60 BPM | HEIGHT: 60 IN | WEIGHT: 132 LBS | TEMPERATURE: 97.4 F | OXYGEN SATURATION: 99 % | SYSTOLIC BLOOD PRESSURE: 95 MMHG

## 2025-07-10 DIAGNOSIS — D3A.8 NEUROENDOCRINE TUMOR (HCC): Primary | ICD-10-CM

## 2025-07-10 DIAGNOSIS — C7B.8 METASTATIC MALIGNANT NEUROENDOCRINE TUMOR TO LIVER (HCC): Primary | ICD-10-CM

## 2025-07-10 DIAGNOSIS — C7A.8 OTHER MALIGNANT NEUROENDOCRINE TUMORS (HCC): ICD-10-CM

## 2025-07-10 LAB
ALBUMIN SERPL-MCNC: 4 G/DL (ref 3.5–5.2)
ALP SERPL-CCNC: 267 U/L (ref 35–104)
ALT SERPL-CCNC: 37 U/L (ref 0–35)
ANION GAP SERPL CALCULATED.3IONS-SCNC: 11 MMOL/L (ref 7–16)
AST SERPL-CCNC: 33 U/L (ref 0–35)
BASOPHILS # BLD: 0.1 K/UL (ref 0–0.2)
BASOPHILS NFR BLD: 2 % (ref 0–2)
BILIRUB SERPL-MCNC: 0.3 MG/DL (ref 0–1.2)
BUN SERPL-MCNC: 9 MG/DL (ref 6–20)
CALCIUM SERPL-MCNC: 8.4 MG/DL (ref 8.6–10)
CHLORIDE SERPL-SCNC: 104 MMOL/L (ref 98–107)
CO2 SERPL-SCNC: 24 MMOL/L (ref 22–29)
CREAT SERPL-MCNC: 0.8 MG/DL (ref 0.5–1)
EOSINOPHIL # BLD: 0.1 K/UL (ref 0.05–0.5)
EOSINOPHILS RELATIVE PERCENT: 2 % (ref 0–6)
ERYTHROCYTE [DISTWIDTH] IN BLOOD BY AUTOMATED COUNT: 16.2 % (ref 11.5–15)
GFR, ESTIMATED: >90 ML/MIN/1.73M2
GLUCOSE SERPL-MCNC: 106 MG/DL (ref 74–99)
HCT VFR BLD AUTO: 30.9 % (ref 34–48)
HGB BLD-MCNC: 10.1 G/DL (ref 11.5–15.5)
LYMPHOCYTES NFR BLD: 1.21 K/UL (ref 1.5–4)
LYMPHOCYTES RELATIVE PERCENT: 21 % (ref 20–42)
MCH RBC QN AUTO: 29.4 PG (ref 26–35)
MCHC RBC AUTO-ENTMCNC: 32.7 G/DL (ref 32–34.5)
MCV RBC AUTO: 90.1 FL (ref 80–99.9)
MONOCYTES NFR BLD: 0.2 K/UL (ref 0.1–0.95)
MONOCYTES NFR BLD: 4 % (ref 2–12)
NEUTROPHILS NFR BLD: 72 % (ref 43–80)
NEUTS SEG NFR BLD: 4.09 K/UL (ref 1.8–7.3)
PLATELET # BLD AUTO: 216 K/UL (ref 130–450)
PMV BLD AUTO: 9.6 FL (ref 7–12)
POTASSIUM SERPL-SCNC: 4.1 MMOL/L (ref 3.5–5.1)
PROT SERPL-MCNC: 6.6 G/DL (ref 6.4–8.3)
RBC # BLD AUTO: 3.43 M/UL (ref 3.5–5.5)
RBC # BLD: ABNORMAL 10*6/UL
SODIUM SERPL-SCNC: 139 MMOL/L (ref 136–145)
WBC OTHER # BLD: 5.7 K/UL (ref 4.5–11.5)

## 2025-07-10 PROCEDURE — 99215 OFFICE O/P EST HI 40 MIN: CPT | Performed by: STUDENT IN AN ORGANIZED HEALTH CARE EDUCATION/TRAINING PROGRAM

## 2025-07-10 PROCEDURE — G8419 CALC BMI OUT NRM PARAM NOF/U: HCPCS | Performed by: STUDENT IN AN ORGANIZED HEALTH CARE EDUCATION/TRAINING PROGRAM

## 2025-07-10 PROCEDURE — 6360000002 HC RX W HCPCS: Performed by: STUDENT IN AN ORGANIZED HEALTH CARE EDUCATION/TRAINING PROGRAM

## 2025-07-10 PROCEDURE — G8427 DOCREV CUR MEDS BY ELIG CLIN: HCPCS | Performed by: STUDENT IN AN ORGANIZED HEALTH CARE EDUCATION/TRAINING PROGRAM

## 2025-07-10 PROCEDURE — 4004F PT TOBACCO SCREEN RCVD TLK: CPT | Performed by: STUDENT IN AN ORGANIZED HEALTH CARE EDUCATION/TRAINING PROGRAM

## 2025-07-10 PROCEDURE — 96372 THER/PROPH/DIAG INJ SC/IM: CPT

## 2025-07-10 PROCEDURE — 96402 CHEMO HORMON ANTINEOPL SQ/IM: CPT

## 2025-07-10 PROCEDURE — 36591 DRAW BLOOD OFF VENOUS DEVICE: CPT

## 2025-07-10 PROCEDURE — 2500000003 HC RX 250 WO HCPCS: Performed by: STUDENT IN AN ORGANIZED HEALTH CARE EDUCATION/TRAINING PROGRAM

## 2025-07-10 PROCEDURE — 80053 COMPREHEN METABOLIC PANEL: CPT

## 2025-07-10 PROCEDURE — 85025 COMPLETE CBC W/AUTO DIFF WBC: CPT

## 2025-07-10 RX ORDER — SODIUM CHLORIDE 0.9 % (FLUSH) 0.9 %
5-40 SYRINGE (ML) INJECTION PRN
Status: DISCONTINUED | OUTPATIENT
Start: 2025-07-10 | End: 2025-07-11 | Stop reason: HOSPADM

## 2025-07-10 RX ORDER — LANREOTIDE ACETATE 120 MG/.5ML
120 INJECTION SUBCUTANEOUS ONCE
OUTPATIENT
Start: 2025-08-07 | End: 2025-08-07

## 2025-07-10 RX ORDER — LANREOTIDE ACETATE 120 MG/.5ML
120 INJECTION SUBCUTANEOUS ONCE
Status: COMPLETED | OUTPATIENT
Start: 2025-07-10 | End: 2025-07-10

## 2025-07-10 RX ORDER — HEPARIN 100 UNIT/ML
500 SYRINGE INTRAVENOUS PRN
Status: DISCONTINUED | OUTPATIENT
Start: 2025-07-10 | End: 2025-07-11 | Stop reason: HOSPADM

## 2025-07-10 RX ADMIN — SODIUM CHLORIDE, PRESERVATIVE FREE 10 ML: 5 INJECTION INTRAVENOUS at 13:17

## 2025-07-10 RX ADMIN — LANREOTIDE ACETATE 120 MG: 120 INJECTION SUBCUTANEOUS at 14:51

## 2025-07-10 RX ADMIN — HEPARIN 500 UNITS: 100 SYRINGE at 13:17

## 2025-07-10 NOTE — PROGRESS NOTES
Patient did stop at check out window, ok'd to leave without AVS.  Patient has MYCHART.    
Number of Times Moved in the Last Year: 1     Homeless in the Last Year: No     Family History   Problem Relation Age of Onset    High Blood Pressure Mother     High Cholesterol Mother     Other Mother         migraine headaches    Heart Disease Father     Asthma Father     High Blood Pressure Father     Cancer Father         Sarcoidosis    Diabetes Other         GRANDMOTHER    Lung Cancer Other         GRANDFATHER    Alzheimer's Disease Other         GRANDMOTHER    Breast Cancer Maternal Grandmother     Cancer Maternal Grandmother         skin    Cancer Paternal Grandfather         lung           Current Outpatient Medications on File Prior to Visit   Medication Sig Dispense Refill    butalbital-acetaminophen-caffeine (FIORICET, ESGIC) -40 MG per tablet TAKE 1 TABLET BY MOUTH TWICE DAILY FOR HEADACHE 60 tablet 0    capecitabine (XELODA) 500 MG chemo tablet Take 2 tablets by mouth 2 times daily for 14 days followed by 14 days off each 28-day cycle 56 tablet 2    temozolomide (TEMODAR) 100 MG chemo capsule Take 3 capsules (300 mg total) by mouth daily on Days 10 through 14 of each 28-day cycle 15 capsule 2    zolpidem (AMBIEN) 10 MG tablet Take 1 tablet by mouth nightly as needed for Sleep for up to 90 days. Max Daily Amount: 10 mg 30 tablet 3    pantoprazole (PROTONIX) 40 MG tablet Take 1 tablet by mouth in the morning and at bedtime 120 tablet 2    famotidine (PEPCID) 40 MG tablet Take 1 tablet by mouth nightly as needed (acid reflux) 30 tablet 2    apixaban (ELIQUIS) 5 MG TABS tablet Take 1 tablet by mouth 2 times daily 180 tablet 0    levothyroxine (SYNTHROID) 112 MCG tablet Take 1 tablet by mouth Daily 30 tablet 5    calcium carb-cholecalciferol (CALTRATE 600+D3) 600-20 MG-MCG TABS Take 1 tablet by mouth daily 30 tablet 3    potassium chloride (KLOR-CON M) 20 MEQ extended release tablet Take 1 tablet by mouth 2 times daily 60 tablet 3    magnesium oxide (MAG-OX) 400 MG tablet Take 1 tablet by mouth 2 times

## 2025-07-28 RX ORDER — SODIUM CHLORIDE 0.9 % (FLUSH) 0.9 %
5-40 SYRINGE (ML) INJECTION PRN
Status: CANCELLED | OUTPATIENT
Start: 2025-07-31

## 2025-07-31 ENCOUNTER — ANESTHESIA EVENT (OUTPATIENT)
Dept: INTERVENTIONAL RADIOLOGY/VASCULAR | Age: 40
End: 2025-07-31
Payer: COMMERCIAL

## 2025-07-31 ENCOUNTER — HOSPITAL ENCOUNTER (OUTPATIENT)
Dept: INTERVENTIONAL RADIOLOGY/VASCULAR | Age: 40
Setting detail: OBSERVATION
Discharge: HOME | End: 2025-08-02
Attending: TRANSPLANT SURGERY | Admitting: TRANSPLANT SURGERY
Payer: COMMERCIAL

## 2025-07-31 ENCOUNTER — ANESTHESIA (OUTPATIENT)
Dept: INTERVENTIONAL RADIOLOGY/VASCULAR | Age: 40
End: 2025-07-31
Payer: COMMERCIAL

## 2025-07-31 DIAGNOSIS — C78.80: ICD-10-CM

## 2025-07-31 DIAGNOSIS — D3A.8 NEUROENDOCRINE TUMOR (HCC): ICD-10-CM

## 2025-07-31 DIAGNOSIS — C7B.8 METASTATIC MALIGNANT NEUROENDOCRINE TUMOR TO LIVER (HCC): ICD-10-CM

## 2025-07-31 DIAGNOSIS — C22.0 HEPATOCELLULAR CARCINOMA (HCC): Primary | ICD-10-CM

## 2025-07-31 DIAGNOSIS — C7A.8 NEUROENDOCRINE CANCER (HCC): ICD-10-CM

## 2025-07-31 LAB
HCG SERPL QL: NEGATIVE
INR PPP: 1.1
PROTHROMBIN TIME: 12.1 SEC (ref 9.3–12.4)

## 2025-07-31 PROCEDURE — 75726 ARTERY X-RAYS ABDOMEN: CPT

## 2025-07-31 PROCEDURE — 76937 US GUIDE VASCULAR ACCESS: CPT

## 2025-07-31 PROCEDURE — 6370000000 HC RX 637 (ALT 250 FOR IP): Performed by: STUDENT IN AN ORGANIZED HEALTH CARE EDUCATION/TRAINING PROGRAM

## 2025-07-31 PROCEDURE — 6360000002 HC RX W HCPCS: Performed by: RADIOLOGY

## 2025-07-31 PROCEDURE — 6360000002 HC RX W HCPCS

## 2025-07-31 PROCEDURE — 6370000000 HC RX 637 (ALT 250 FOR IP)

## 2025-07-31 PROCEDURE — 6360000004 HC RX CONTRAST MEDICATION: Performed by: RADIOLOGY

## 2025-07-31 PROCEDURE — 76377 3D RENDER W/INTRP POSTPROCES: CPT

## 2025-07-31 PROCEDURE — 84703 CHORIONIC GONADOTROPIN ASSAY: CPT

## 2025-07-31 PROCEDURE — 6360000002 HC RX W HCPCS: Performed by: ANESTHESIOLOGY

## 2025-07-31 PROCEDURE — 96375 TX/PRO/DX INJ NEW DRUG ADDON: CPT

## 2025-07-31 PROCEDURE — 36247 INS CATH ABD/L-EXT ART 3RD: CPT

## 2025-07-31 PROCEDURE — 2580000003 HC RX 258: Performed by: RADIOLOGY

## 2025-07-31 PROCEDURE — 2500000003 HC RX 250 WO HCPCS: Performed by: ANESTHESIOLOGY

## 2025-07-31 PROCEDURE — 85610 PROTHROMBIN TIME: CPT

## 2025-07-31 PROCEDURE — 36245 INS CATH ABD/L-EXT ART 1ST: CPT

## 2025-07-31 PROCEDURE — 96361 HYDRATE IV INFUSION ADD-ON: CPT

## 2025-07-31 PROCEDURE — 75774 ARTERY X-RAY EACH VESSEL: CPT

## 2025-07-31 PROCEDURE — 37243 VASC EMBOLIZE/OCCLUDE ORGAN: CPT

## 2025-07-31 PROCEDURE — G0378 HOSPITAL OBSERVATION PER HR: HCPCS

## 2025-07-31 PROCEDURE — C1769 GUIDE WIRE: HCPCS

## 2025-07-31 PROCEDURE — 2580000003 HC RX 258

## 2025-07-31 PROCEDURE — 96420 CHEMO IA PUSH TECNIQUE: CPT

## 2025-07-31 PROCEDURE — 36248 INS CATH ABD/L-EXT ART ADDL: CPT

## 2025-07-31 PROCEDURE — 96376 TX/PRO/DX INJ SAME DRUG ADON: CPT

## 2025-07-31 PROCEDURE — G0379 DIRECT REFER HOSPITAL OBSERV: HCPCS

## 2025-07-31 RX ORDER — ONDANSETRON 2 MG/ML
4 INJECTION INTRAMUSCULAR; INTRAVENOUS EVERY 8 HOURS PRN
Status: DISCONTINUED | OUTPATIENT
Start: 2025-07-31 | End: 2025-08-01

## 2025-07-31 RX ORDER — OXYCODONE HYDROCHLORIDE 5 MG/1
5 TABLET ORAL EVERY 4 HOURS PRN
Refills: 0 | Status: DISCONTINUED | OUTPATIENT
Start: 2025-07-31 | End: 2025-08-02 | Stop reason: HOSPADM

## 2025-07-31 RX ORDER — DIPHENHYDRAMINE HYDROCHLORIDE 50 MG/ML
INJECTION, SOLUTION INTRAMUSCULAR; INTRAVENOUS
Status: DISPENSED
Start: 2025-07-31 | End: 2025-07-31

## 2025-07-31 RX ORDER — FENTANYL CITRATE 50 UG/ML
INJECTION, SOLUTION INTRAMUSCULAR; INTRAVENOUS
Status: DISCONTINUED | OUTPATIENT
Start: 2025-07-31 | End: 2025-07-31 | Stop reason: SDUPTHER

## 2025-07-31 RX ORDER — HYDROMORPHONE HYDROCHLORIDE 1 MG/ML
0.25 INJECTION, SOLUTION INTRAMUSCULAR; INTRAVENOUS; SUBCUTANEOUS EVERY 5 MIN PRN
Status: DISCONTINUED | OUTPATIENT
Start: 2025-07-31 | End: 2025-07-31

## 2025-07-31 RX ORDER — SODIUM CHLORIDE 9 MG/ML
INJECTION, SOLUTION INTRAVENOUS PRN
Status: DISCONTINUED | OUTPATIENT
Start: 2025-07-31 | End: 2025-08-02 | Stop reason: HOSPADM

## 2025-07-31 RX ORDER — SCOPOLAMINE 1 MG/3D
1 PATCH, EXTENDED RELEASE TRANSDERMAL
Status: DISCONTINUED | OUTPATIENT
Start: 2025-07-31 | End: 2025-08-02 | Stop reason: HOSPADM

## 2025-07-31 RX ORDER — SUCRALFATE 1 G/1
1 TABLET ORAL EVERY 6 HOURS SCHEDULED
Status: DISCONTINUED | OUTPATIENT
Start: 2025-07-31 | End: 2025-08-02 | Stop reason: HOSPADM

## 2025-07-31 RX ORDER — ONDANSETRON 2 MG/ML
12 INJECTION INTRAMUSCULAR; INTRAVENOUS ONCE
OUTPATIENT
Start: 2025-07-31 | End: 2025-07-31

## 2025-07-31 RX ORDER — ONDANSETRON 2 MG/ML
INJECTION INTRAMUSCULAR; INTRAVENOUS
Status: COMPLETED
Start: 2025-07-31 | End: 2025-07-31

## 2025-07-31 RX ORDER — MEPERIDINE HYDROCHLORIDE 25 MG/ML
12.5 INJECTION INTRAMUSCULAR; INTRAVENOUS; SUBCUTANEOUS EVERY 5 MIN PRN
Status: DISCONTINUED | OUTPATIENT
Start: 2025-07-31 | End: 2025-07-31

## 2025-07-31 RX ORDER — HYDROXYZINE HYDROCHLORIDE 25 MG/1
25 TABLET, FILM COATED ORAL EVERY 8 HOURS PRN
Status: DISCONTINUED | OUTPATIENT
Start: 2025-07-31 | End: 2025-08-02 | Stop reason: HOSPADM

## 2025-07-31 RX ORDER — HEPARIN SODIUM 1000 [USP'U]/ML
INJECTION, SOLUTION INTRAVENOUS; SUBCUTANEOUS
Status: DISCONTINUED | OUTPATIENT
Start: 2025-07-31 | End: 2025-07-31 | Stop reason: SDUPTHER

## 2025-07-31 RX ORDER — LABETALOL HYDROCHLORIDE 5 MG/ML
5 INJECTION, SOLUTION INTRAVENOUS
Status: DISCONTINUED | OUTPATIENT
Start: 2025-07-31 | End: 2025-08-02 | Stop reason: HOSPADM

## 2025-07-31 RX ORDER — OXYCODONE HYDROCHLORIDE 10 MG/1
10 TABLET ORAL EVERY 4 HOURS PRN
Refills: 0 | Status: DISCONTINUED | OUTPATIENT
Start: 2025-07-31 | End: 2025-08-02 | Stop reason: HOSPADM

## 2025-07-31 RX ORDER — GLYCOPYRROLATE 1 MG/5 ML
SYRINGE (ML) INTRAVENOUS
Status: DISCONTINUED | OUTPATIENT
Start: 2025-07-31 | End: 2025-07-31 | Stop reason: SDUPTHER

## 2025-07-31 RX ORDER — HEPARIN SODIUM 10000 [USP'U]/ML
INJECTION, SOLUTION INTRAVENOUS; SUBCUTANEOUS PRN
Status: COMPLETED | OUTPATIENT
Start: 2025-07-31 | End: 2025-07-31

## 2025-07-31 RX ORDER — DEXAMETHASONE SODIUM PHOSPHATE 10 MG/ML
10 INJECTION, SOLUTION INTRA-ARTICULAR; INTRALESIONAL; INTRAMUSCULAR; INTRAVENOUS; SOFT TISSUE ONCE
OUTPATIENT
Start: 2025-07-31 | End: 2025-07-31

## 2025-07-31 RX ORDER — HYDROMORPHONE HYDROCHLORIDE 2 MG/ML
INJECTION, SOLUTION INTRAMUSCULAR; INTRAVENOUS; SUBCUTANEOUS
Status: DISCONTINUED | OUTPATIENT
Start: 2025-07-31 | End: 2025-07-31 | Stop reason: SDUPTHER

## 2025-07-31 RX ORDER — HYDROMORPHONE HYDROCHLORIDE 1 MG/ML
0.5 INJECTION, SOLUTION INTRAMUSCULAR; INTRAVENOUS; SUBCUTANEOUS EVERY 5 MIN PRN
Status: DISCONTINUED | OUTPATIENT
Start: 2025-07-31 | End: 2025-07-31

## 2025-07-31 RX ORDER — DEXAMETHASONE SODIUM PHOSPHATE 10 MG/ML
INJECTION, SOLUTION INTRA-ARTICULAR; INTRALESIONAL; INTRAMUSCULAR; INTRAVENOUS; SOFT TISSUE
Status: COMPLETED
Start: 2025-07-31 | End: 2025-07-31

## 2025-07-31 RX ORDER — PANTOPRAZOLE SODIUM 40 MG/1
40 TABLET, DELAYED RELEASE ORAL
Status: DISCONTINUED | OUTPATIENT
Start: 2025-07-31 | End: 2025-08-02 | Stop reason: HOSPADM

## 2025-07-31 RX ORDER — SODIUM CHLORIDE 0.9 % (FLUSH) 0.9 %
5-40 SYRINGE (ML) INJECTION PRN
Status: DISCONTINUED | OUTPATIENT
Start: 2025-07-31 | End: 2025-08-02 | Stop reason: HOSPADM

## 2025-07-31 RX ORDER — ACETAMINOPHEN 325 MG/1
650 TABLET ORAL
Status: DISCONTINUED | OUTPATIENT
Start: 2025-07-31 | End: 2025-07-31

## 2025-07-31 RX ORDER — IPRATROPIUM BROMIDE AND ALBUTEROL SULFATE 2.5; .5 MG/3ML; MG/3ML
1 SOLUTION RESPIRATORY (INHALATION)
Status: DISCONTINUED | OUTPATIENT
Start: 2025-07-31 | End: 2025-08-02 | Stop reason: HOSPADM

## 2025-07-31 RX ORDER — NITROGLYCERIN 20 MG/100ML
INJECTION INTRAVENOUS PRN
Status: COMPLETED | OUTPATIENT
Start: 2025-07-31 | End: 2025-07-31

## 2025-07-31 RX ORDER — SODIUM CHLORIDE 0.9 % (FLUSH) 0.9 %
5-40 SYRINGE (ML) INJECTION PRN
Status: ACTIVE | OUTPATIENT
Start: 2025-07-31 | End: 2025-08-01

## 2025-07-31 RX ORDER — ONDANSETRON 2 MG/ML
4 INJECTION INTRAMUSCULAR; INTRAVENOUS
Status: COMPLETED | OUTPATIENT
Start: 2025-07-31 | End: 2025-07-31

## 2025-07-31 RX ORDER — IOPAMIDOL 755 MG/ML
INJECTION, SOLUTION INTRAVASCULAR PRN
Status: COMPLETED | OUTPATIENT
Start: 2025-07-31 | End: 2025-07-31

## 2025-07-31 RX ORDER — PROMETHAZINE HYDROCHLORIDE 25 MG/1
25 SUPPOSITORY RECTAL EVERY 6 HOURS PRN
Status: DISCONTINUED | OUTPATIENT
Start: 2025-07-31 | End: 2025-08-01

## 2025-07-31 RX ORDER — DROPERIDOL 2.5 MG/ML
0.62 INJECTION, SOLUTION INTRAMUSCULAR; INTRAVENOUS
Status: DISCONTINUED | OUTPATIENT
Start: 2025-07-31 | End: 2025-07-31

## 2025-07-31 RX ORDER — SODIUM CHLORIDE 9 MG/ML
INJECTION, SOLUTION INTRAVENOUS CONTINUOUS
Status: DISCONTINUED | OUTPATIENT
Start: 2025-07-31 | End: 2025-08-01

## 2025-07-31 RX ORDER — HYDRALAZINE HYDROCHLORIDE 20 MG/ML
5 INJECTION INTRAMUSCULAR; INTRAVENOUS
Status: DISCONTINUED | OUTPATIENT
Start: 2025-07-31 | End: 2025-08-02 | Stop reason: HOSPADM

## 2025-07-31 RX ORDER — DIPHENHYDRAMINE HYDROCHLORIDE 50 MG/ML
25 INJECTION, SOLUTION INTRAMUSCULAR; INTRAVENOUS ONCE
Status: DISCONTINUED | OUTPATIENT
Start: 2025-07-31 | End: 2025-08-02 | Stop reason: HOSPADM

## 2025-07-31 RX ORDER — PROPOFOL 10 MG/ML
INJECTION, EMULSION INTRAVENOUS
Status: DISCONTINUED | OUTPATIENT
Start: 2025-07-31 | End: 2025-07-31 | Stop reason: SDUPTHER

## 2025-07-31 RX ORDER — ONDANSETRON 2 MG/ML
4 INJECTION INTRAMUSCULAR; INTRAVENOUS EVERY 6 HOURS PRN
Status: DISCONTINUED | OUTPATIENT
Start: 2025-07-31 | End: 2025-07-31 | Stop reason: SDUPTHER

## 2025-07-31 RX ORDER — LEVOTHYROXINE SODIUM 112 UG/1
112 TABLET ORAL DAILY
Status: DISCONTINUED | OUTPATIENT
Start: 2025-08-01 | End: 2025-08-02 | Stop reason: HOSPADM

## 2025-07-31 RX ORDER — MIDAZOLAM HYDROCHLORIDE 2 MG/2ML
2 INJECTION, SOLUTION INTRAMUSCULAR; INTRAVENOUS
Status: DISCONTINUED | OUTPATIENT
Start: 2025-07-31 | End: 2025-08-02 | Stop reason: HOSPADM

## 2025-07-31 RX ORDER — ZOLPIDEM TARTRATE 5 MG/1
10 TABLET ORAL NIGHTLY PRN
Status: DISCONTINUED | OUTPATIENT
Start: 2025-07-31 | End: 2025-08-02 | Stop reason: HOSPADM

## 2025-07-31 RX ORDER — LIDOCAINE HYDROCHLORIDE AND EPINEPHRINE BITARTRATE 20; .01 MG/ML; MG/ML
INJECTION, SOLUTION SUBCUTANEOUS PRN
Status: COMPLETED | OUTPATIENT
Start: 2025-07-31 | End: 2025-07-31

## 2025-07-31 RX ORDER — MIDAZOLAM HYDROCHLORIDE 1 MG/ML
INJECTION, SOLUTION INTRAMUSCULAR; INTRAVENOUS
Status: DISCONTINUED | OUTPATIENT
Start: 2025-07-31 | End: 2025-07-31 | Stop reason: SDUPTHER

## 2025-07-31 RX ORDER — SODIUM CHLORIDE 0.9 % (FLUSH) 0.9 %
5-40 SYRINGE (ML) INJECTION EVERY 12 HOURS SCHEDULED
Status: DISCONTINUED | OUTPATIENT
Start: 2025-07-31 | End: 2025-08-02 | Stop reason: HOSPADM

## 2025-07-31 RX ORDER — SODIUM CHLORIDE 9 MG/ML
INJECTION, SOLUTION INTRAVENOUS
Status: DISCONTINUED | OUTPATIENT
Start: 2025-07-31 | End: 2025-07-31 | Stop reason: SDUPTHER

## 2025-07-31 RX ORDER — DIPHENHYDRAMINE HYDROCHLORIDE 50 MG/ML
12.5 INJECTION, SOLUTION INTRAMUSCULAR; INTRAVENOUS
Status: DISCONTINUED | OUTPATIENT
Start: 2025-07-31 | End: 2025-08-02 | Stop reason: HOSPADM

## 2025-07-31 RX ORDER — PIPERACILLIN SODIUM, TAZOBACTAM SODIUM 3; .375 G/15ML; G/15ML
INJECTION, POWDER, LYOPHILIZED, FOR SOLUTION INTRAVENOUS
Status: COMPLETED
Start: 2025-07-31 | End: 2025-07-31

## 2025-07-31 RX ORDER — LIDOCAINE HYDROCHLORIDE 20 MG/ML
INJECTION, SOLUTION INFILTRATION; PERINEURAL PRN
Status: COMPLETED | OUTPATIENT
Start: 2025-07-31 | End: 2025-07-31

## 2025-07-31 RX ADMIN — PROPOFOL 20 MG: 10 INJECTION, EMULSION INTRAVENOUS at 10:06

## 2025-07-31 RX ADMIN — HEPARIN SODIUM 2000 UNITS: 1000 INJECTION INTRAVENOUS; SUBCUTANEOUS at 10:47

## 2025-07-31 RX ADMIN — ONDANSETRON 4 MG: 2 INJECTION, SOLUTION INTRAMUSCULAR; INTRAVENOUS at 15:43

## 2025-07-31 RX ADMIN — PHENYLEPHRINE HYDROCHLORIDE 100 MCG: 10 INJECTION INTRAVENOUS at 09:15

## 2025-07-31 RX ADMIN — SODIUM CHLORIDE: 9 INJECTION, SOLUTION INTRAVENOUS at 09:48

## 2025-07-31 RX ADMIN — FENTANYL CITRATE 25 MCG: 50 INJECTION, SOLUTION INTRAMUSCULAR; INTRAVENOUS at 10:02

## 2025-07-31 RX ADMIN — PHENYLEPHRINE HYDROCHLORIDE 100 MCG: 10 INJECTION INTRAVENOUS at 09:18

## 2025-07-31 RX ADMIN — PIPERACILLIN AND TAZOBACTAM 3375 MG: 3; .375 INJECTION, POWDER, LYOPHILIZED, FOR SOLUTION INTRAVENOUS at 08:40

## 2025-07-31 RX ADMIN — PROPOFOL 30 MG: 10 INJECTION, EMULSION INTRAVENOUS at 09:15

## 2025-07-31 RX ADMIN — LIDOCAINE HYDROCHLORIDE,EPINEPHRINE BITARTRATE 3 ML: 20; .01 INJECTION, SOLUTION INFILTRATION; PERINEURAL at 09:41

## 2025-07-31 RX ADMIN — Medication 5000 UNITS: at 11:28

## 2025-07-31 RX ADMIN — DEXAMETHASONE SODIUM PHOSPHATE 10 MG: 10 INJECTION INTRAMUSCULAR; INTRAVENOUS at 08:40

## 2025-07-31 RX ADMIN — NITROGLYCERIN 200 MCG: 20 INJECTION INTRAVENOUS at 10:46

## 2025-07-31 RX ADMIN — SUCRALFATE 1 G: 1 TABLET ORAL at 16:18

## 2025-07-31 RX ADMIN — PROPOFOL 40 MG: 10 INJECTION, EMULSION INTRAVENOUS at 10:31

## 2025-07-31 RX ADMIN — IOPAMIDOL 43 ML: 755 INJECTION, SOLUTION INTRAVENOUS at 11:59

## 2025-07-31 RX ADMIN — ONDANSETRON 12 MG: 2 INJECTION INTRAMUSCULAR; INTRAVENOUS at 08:40

## 2025-07-31 RX ADMIN — FENTANYL CITRATE 50 MCG: 50 INJECTION, SOLUTION INTRAMUSCULAR; INTRAVENOUS at 09:10

## 2025-07-31 RX ADMIN — HYDROMORPHONE HYDROCHLORIDE 0.5 MG: 2 INJECTION, SOLUTION INTRAMUSCULAR; INTRAVENOUS; SUBCUTANEOUS at 11:27

## 2025-07-31 RX ADMIN — HYDROMORPHONE HYDROCHLORIDE 0.5 MG: 1 INJECTION, SOLUTION INTRAMUSCULAR; INTRAVENOUS; SUBCUTANEOUS at 19:56

## 2025-07-31 RX ADMIN — FENTANYL CITRATE 25 MCG: 50 INJECTION, SOLUTION INTRAMUSCULAR; INTRAVENOUS at 09:40

## 2025-07-31 RX ADMIN — MIDAZOLAM 2 MG: 1 INJECTION INTRAMUSCULAR; INTRAVENOUS at 09:03

## 2025-07-31 RX ADMIN — OXYCODONE HYDROCHLORIDE 10 MG: 10 TABLET ORAL at 18:46

## 2025-07-31 RX ADMIN — HYDROXYZINE HYDROCHLORIDE 25 MG: 25 TABLET ORAL at 20:01

## 2025-07-31 RX ADMIN — FENTANYL CITRATE 50 MCG: 50 INJECTION, SOLUTION INTRAMUSCULAR; INTRAVENOUS at 10:09

## 2025-07-31 RX ADMIN — PROPOFOL 50 MG: 10 INJECTION, EMULSION INTRAVENOUS at 10:14

## 2025-07-31 RX ADMIN — HYDROMORPHONE HYDROCHLORIDE 0.5 MG: 2 INJECTION, SOLUTION INTRAMUSCULAR; INTRAVENOUS; SUBCUTANEOUS at 11:10

## 2025-07-31 RX ADMIN — FENTANYL CITRATE 50 MCG: 50 INJECTION, SOLUTION INTRAMUSCULAR; INTRAVENOUS at 10:12

## 2025-07-31 RX ADMIN — IOPAMIDOL: 755 INJECTION, SOLUTION INTRAVENOUS at 11:00

## 2025-07-31 RX ADMIN — FENTANYL CITRATE 50 MCG: 50 INJECTION, SOLUTION INTRAMUSCULAR; INTRAVENOUS at 09:15

## 2025-07-31 RX ADMIN — PROPOFOL 100 MCG/KG/MIN: 10 INJECTION, EMULSION INTRAVENOUS at 09:10

## 2025-07-31 RX ADMIN — SODIUM CHLORIDE: 0.9 INJECTION, SOLUTION INTRAVENOUS at 16:23

## 2025-07-31 RX ADMIN — HYDROMORPHONE HYDROCHLORIDE 0.5 MG: 1 INJECTION, SOLUTION INTRAMUSCULAR; INTRAVENOUS; SUBCUTANEOUS at 15:44

## 2025-07-31 RX ADMIN — ZOLPIDEM TARTRATE 10 MG: 5 TABLET ORAL at 20:55

## 2025-07-31 RX ADMIN — OXYCODONE HYDROCHLORIDE 10 MG: 10 TABLET ORAL at 23:05

## 2025-07-31 RX ADMIN — LIDOCAINE HYDROCHLORIDE 8 ML: 20 INJECTION, SOLUTION INFILTRATION; PERINEURAL at 09:41

## 2025-07-31 RX ADMIN — ONDANSETRON 4 MG: 2 INJECTION, SOLUTION INTRAMUSCULAR; INTRAVENOUS at 21:44

## 2025-07-31 RX ADMIN — FENTANYL CITRATE 50 MCG: 50 INJECTION, SOLUTION INTRAMUSCULAR; INTRAVENOUS at 09:13

## 2025-07-31 RX ADMIN — PHENYLEPHRINE HYDROCHLORIDE 100 MCG: 10 INJECTION INTRAVENOUS at 09:36

## 2025-07-31 RX ADMIN — PROPOFOL 30 MG: 10 INJECTION, EMULSION INTRAVENOUS at 10:02

## 2025-07-31 RX ADMIN — HYDROMORPHONE HYDROCHLORIDE 0.5 MG: 1 INJECTION, SOLUTION INTRAMUSCULAR; INTRAVENOUS; SUBCUTANEOUS at 12:59

## 2025-07-31 RX ADMIN — PANTOPRAZOLE SODIUM 40 MG: 40 TABLET, DELAYED RELEASE ORAL at 16:39

## 2025-07-31 RX ADMIN — ONDANSETRON 12 MG: 2 INJECTION, SOLUTION INTRAMUSCULAR; INTRAVENOUS at 08:40

## 2025-07-31 RX ADMIN — SODIUM CHLORIDE, PRESERVATIVE FREE 10 ML: 5 INJECTION INTRAVENOUS at 21:45

## 2025-07-31 RX ADMIN — Medication 0.2 MG: at 09:37

## 2025-07-31 RX ADMIN — MIDAZOLAM 2 MG: 1 INJECTION INTRAMUSCULAR; INTRAVENOUS at 10:31

## 2025-07-31 RX ADMIN — SODIUM CHLORIDE: 9 INJECTION, SOLUTION INTRAVENOUS at 09:00

## 2025-07-31 ASSESSMENT — PAIN DESCRIPTION - LOCATION
LOCATION: ABDOMEN;RIB CAGE;BACK
LOCATION: BACK

## 2025-07-31 ASSESSMENT — PAIN SCALES - GENERAL
PAINLEVEL_OUTOF10: 5
PAINLEVEL_OUTOF10: 10
PAINLEVEL_OUTOF10: 10
PAINLEVEL_OUTOF10: 7
PAINLEVEL_OUTOF10: 4

## 2025-07-31 ASSESSMENT — PAIN DESCRIPTION - DESCRIPTORS
DESCRIPTORS: BURNING
DESCRIPTORS: ACHING;BURNING

## 2025-07-31 ASSESSMENT — LIFESTYLE VARIABLES: SMOKING_STATUS: 1

## 2025-07-31 NOTE — PROGRESS NOTES
4 Eyes Skin Assessment     NAME:  Brooklyn Olmstead  YOB: 1985  MEDICAL RECORD NUMBER:  [id number]    The patient is being assessed for  Admission    I agree that at least one RN has performed a thorough Head to Toe Skin Assessment on the patient. ALL assessment sites listed below have been assessed.      Areas assessed by both nurses:    Head, Face, Ears, Shoulders, Back, Chest, Arms, Elbows, Hands, Sacrum. Buttock, Coccyx, Ischium, Legs. Feet and Heels, and Under Medical Devices , port lt chest        Does the Patient have a Wound? No noted wound(s)       Prem Prevention initiated by RN: No  Wound Care Orders initiated by RN: No    For hospital-acquired stage 1 & 2 and ALL Stage 3,4, Unstageable, DTI, NWPT, and Complex wounds: place order “IP Wound Care/Ostomy Nurse Eval and Treat” by RN under : No    New Ostomies, if present place, Ostomy referral order under : No     Nurse 1 eSignature: Electronically signed by Jaymie Roca RN on 7/31/25 at 2:12 PM EDT    **SHARE this note so that the co-signing nurse can place an eSignature**    Nurse 2 eSignature: Electronically signed by Vanna Penny RN on 7/31/25 at 4:27 PM EDT

## 2025-07-31 NOTE — BRIEF OP NOTE
Brief Postoperative Note      Patient: Brooklyn Olmstead  YOB: 1985  MRN: [id number]    Date of Procedure: 7/31/2025    Neuroendocrine neoplasm, of the liver .    Post-Op Diagnosis: Same       * No procedures listed *    * No surgeons listed *    Assistant:  * No surgical staff found *    Anesthesia: * No anesthesia type entered *    Estimated Blood Loss (mL): Minimal    Complications: None    Specimens:   * No specimens in log *    Implants:  Implant Name Type Inv. Item Serial No.  Lot No. LRB No. Used Action   [id number]     [id number] N/A 1 Implanted         Drains:   Urinary Catheter 07/31/25 Ma (Active)       [REMOVED] Urinary Catheter 12/07/22 Ma (Removed)       [REMOVED] Urinary Catheter 08/06/24 (Removed)       [REMOVED] Urinary Catheter 09/10/24 2 Way (Removed)       [REMOVED] Urinary Catheter 01/09/25 2 Way (Removed)   Catheter Indications Perioperative use for selected surgical procedures 01/09/25 2119   Site Assessment No urethral drainage 01/09/25 1714   Urine Color Yellow 01/09/25 2119   Urine Appearance Clear 01/09/25 2119   Urine Odor Other (Comment) 01/09/25 1714   Collection Container Standard 01/09/25 2119   Securement Method Securing device (Describe) 01/09/25 2119   Catheter Care  Soap and water 01/09/25 1714   Catheter Best Practices  Drainage tube clipped to bed;Catheter secured to thigh;Tamper seal intact;Bag below bladder;Bag not on floor;Lack of dependent loop in tubing;Drainage bag less than half full 01/09/25 2119   Status Draining;Patent 01/09/25 2119   Output (mL) 450 mL 01/10/25 0552       [REMOVED] Urinary Catheter 04/30/25 Ma (Removed)   $ Urethral catheter insertion Inserted for procedure 04/30/25 1000   Site Assessment No urethral drainage 04/30/25 1000   Urine Color Yellow 05/01/25 0507   Urine Appearance Clear 04/30/25 1000   Collection Container Standard 04/30/25 1000   Securement Method Securing device (Describe) 04/30/25 1000   Catheter Care

## 2025-07-31 NOTE — PROGRESS NOTES
Met with parents in the hallway.  Renewed the friendship - from a previous visit.    Assured them of our prayer.

## 2025-07-31 NOTE — PROCEDURES
0789Patient arrived  with  parents  to Radiology department for TACE procedure.  Allergies, home medications, H&P and fasting instructions reviewed with patient. Vital signs taken. IV placed, blood obtained, IV flushed and prn adapter attached. Blood sample sent to lab for ordered tests.      0846Procedural instructions given by Dr Elliott, questions answered, understanding expressed and consent signed. Patient given fluoroscopy education, no questions at this time

## 2025-07-31 NOTE — ANESTHESIA PRE PROCEDURE
Department of Anesthesiology  Preprocedure Note       Name:  Brooklyn Olmstead   Age:  40 y.o.  :  1985                                          MRN:  [id number]         Date:  2025      Surgeon: * No surgeons listed *    Procedure: * No procedures listed *    Medications prior to admission:   Prior to Admission medications    Medication Sig Start Date End Date Taking? Authorizing Provider   butalbital-acetaminophen-caffeine (FIORICET, ESGIC) -40 MG per tablet TAKE 1 TABLET BY MOUTH TWICE DAILY FOR HEADACHE 25  Yes Abdelrahman Montana DO   capecitabine (XELODA) 500 MG chemo tablet Take 2 tablets by mouth 2 times daily for 14 days followed by 14 days off each 28-day cycle 25  Yes Geoff Hu MD   temozolomide (TEMODAR) 100 MG chemo capsule Take 3 capsules (300 mg total) by mouth daily on Days 10 through 14 of each 28-day cycle 25  Yes Geoff Hu MD   pantoprazole (PROTONIX) 40 MG tablet Take 1 tablet by mouth in the morning and at bedtime 25 Yes Lambert Castro III, MD   famotidine (PEPCID) 40 MG tablet Take 1 tablet by mouth nightly as needed (acid reflux) 25  Yes Lambert Castro III, MD   apixaban (ELIQUIS) 5 MG TABS tablet Take 1 tablet by mouth 2 times daily 25  Yes Jeffery Elliott MD   levothyroxine (SYNTHROID) 112 MCG tablet Take 1 tablet by mouth Daily 3/13/25  Yes Abdelrahman Montana DO   calcium carb-cholecalciferol (CALTRATE 600+D3) 600-20 MG-MCG TABS Take 1 tablet by mouth daily 25  Yes Geoff Hu MD   potassium chloride (KLOR-CON M) 20 MEQ extended release tablet Take 1 tablet by mouth 2 times daily 25  Yes Geoff Hu MD   magnesium oxide (MAG-OX) 400 MG tablet Take 1 tablet by mouth 2 times daily 25  Yes Geoff Hu MD   ondansetron (ZOFRAN-ODT) 4 MG disintegrating tablet Take 1 tablet by mouth 3 times daily as needed for Nausea or Vomiting 25  Yes Geoff Hu MD   sulfamethoxazole-trimethoprim (BACTRIM DS;SEPTRA

## 2025-07-31 NOTE — H&P
Hepatobiliary and Pancreatic Surgery    History and Physical      Patient's Name/Date of Birth: Brooklyn Olmstead /1985 (40 y.o.)    Date: July 31, 2025     CC:  s/p TACE     HPI:  Brooklyn Olmstead is a 40F diagnosed with neuroendocrine tumor that has metastasized to the liver and small bowel. She has had a Y90, lutathera, and TACE procedures for her tumor in the past. Today she presents for a TACE procedure. She is postprocedure and is experiencing mid abdominal pain that is radiating to her back. She states this has been the most pain she has exhibited from this procedure.  She is endorsing nausea and vomiting and had several bouts of emesis resembling stomach bile after the procedure. Patient endorsed need for bowel movement while in room and went to restroom prior to me leaving.     Past Medical History:   Diagnosis Date    Abdominal pain     Anxiety     Cancer (HCC)     neuroendocrime tumor    Chronic intermittent Diarrhea     Chronic kidney disease     due to her cancer: required HD in 2022 or 2023 for three days then acute kidney failure resolved    Hypocalcemia     Hypothyroidism     Irritable bowel syndrome     Liver disease     Migraines     Seizures (HCC)     last episode January 2021    Status post alcohol detoxification     5/22/2018-5/29/2018       Past Surgical History:   Procedure Laterality Date    CHOLECYSTECTOMY, LAPAROSCOPIC  07/06/2016    COLONOSCOPY N/A 06/22/2018    COLONOSCOPY WITH BIOPSY performed by Francisca Bashir MD at [facility] ENDOSCOPY    CT BIOPSY RENAL  01/25/2021    CT BIOPSY RENAL 1/25/2021 Scooter Ray II, MD [facility] [place] [facility] [place]    CT NEEDLE BIOPSY LIVER PERCUTANEOUS  09/01/2020    CT NEEDLE BIOPSY LIVER PERCUTANEOUS 9/1/2020 [facility] CT    HC DIALYSIS CATHETER N/A 01/28/2021    TESIO CATHETER INSERTION AND REMOVAL OF TEMPORARY performed by Andrea Sher MD at [facility] [place] [facility] [place]    IR EMBOLIZATION TUMOR/ORGAN  8/20/2024    IR EMBOLIZATION TUMOR / ORGAN 8/20/2024 Earl, Jeffery Alicia MD [facility] [place]  file    Number of children: Not on file    Years of education: Not on file    Highest education level: Not on file   Occupational History    Occupation: cleaning     Employer: [employer]   Tobacco Use    Smoking status: Every Day     Current packs/day: 0.25     Average packs/day: 0.3 packs/day for 3.0 years (0.8 ttl pk-yrs)     Types: Cigarettes    Smokeless tobacco: Never   Vaping Use    Vaping status: Never Used   Substance and Sexual Activity    Alcohol use: No     Comment: 5 years sober    Drug use: Not Currently     Types: Marijuana (Weed)    Sexual activity: Defer     Birth control/protection: I.U.D.   Other Topics Concern    Not on file   Social History Narrative    Not on file     Social Drivers of Health     Financial Resource Strain: Not on file   Food Insecurity: No Food Insecurity (7/31/2025)    Hunger Vital Sign     Worried About Running Out of Food in the Last Year: Never true     Ran Out of Food in the Last Year: Never true   Transportation Needs: No Transportation Needs (7/31/2025)    PRAPARE - Transportation     Lack of Transportation (Medical): No     Lack of Transportation (Non-Medical): No   Physical Activity: Not on file   Stress: Not on file   Social Connections: Not on file   Intimate Partner Violence: Not on file   Housing Stability: Low Risk  (7/31/2025)    Housing Stability Vital Sign     Unable to Pay for Housing in the Last Year: No     Number of Times Moved in the Last Year: 1     Homeless in the Last Year: No       ROS:   Review of Systems  As per above     Physical Exam:  Vitals:    07/31/25 1512   BP: (!) 115/57   Pulse: 62   Resp: 16   Temp: 97 °F (36.1 °C)   SpO2: 100%       PSYCH: mood and affect normal  EYES: Sclera white, pupils equal round   ENMT:  Hearing normal, ears externally intact  RESP: Respiratory effort was normal with no retractions or use of accessory muscles.  CV: No pedal edema. Warm throughout.   GI/ Abdomen: The abdomen was soft and non distended. Some TTP to

## 2025-07-31 NOTE — PRE SEDATION
Sedation Pre-Procedure Note    Patient Name: Brooklyn Olmstead   YOB: 1985  Room/Bed: 8412/8412-A  Medical Record Number: [id number]  Date: 7/31/2025   Time: 3:41 PM       Indication:  Neuroendocrine neoplasm of the liver    Consent: I have discussed with the patient and/or the patient representative the indication, alternatives, and the possible risks and/or complications of the planned procedure and the anesthesia methods. The patient and/or patient representative appear to understand and agree to proceed.    Vital Signs:   Vitals:    07/31/25 1512   BP: (!) 115/57   Pulse: 62   Resp: 16   Temp: 97 °F (36.1 °C)   SpO2: 100%       Past Medical History:   has a past medical history of Abdominal pain, Anxiety, Cancer (HCC), Chronic intermittent Diarrhea, Chronic kidney disease, Hypocalcemia, Hypothyroidism, Irritable bowel syndrome, Liver disease, Migraines, Seizures (HCC), and Status post alcohol detoxification.    Past Surgical History:   has a past surgical history that includes Parathyroid gland surgery; Cholecystectomy, laparoscopic (07/06/2016); Thyroidectomy; Colonoscopy (N/A, 06/22/2018); CT NEEDLE BIOPSY LIVER PERCUTANEOUS (09/01/2020); Small intestine surgery (N/A, 10/21/2020); CT BIOPSY RENAL (01/25/2021); hc dialysis catheter (N/A, 01/28/2021); sigmoidoscopy (N/A, 05/14/2021); Port Surgery (Left, 02/18/2022); Upper gastrointestinal endoscopy; IR EMBOLIZATION TUMOR/ORGAN ISCH/INFARC (8/20/2024); IR EMBOLIZATION TUMOR/ORGAN ISCH/INFARC (9/10/2024); IR EMBOLIZATION TUMOR/ORGAN ISCH/INFARC (1/9/2025); Upper gastrointestinal endoscopy (N/A, 2/5/2025); and IR EMBOLIZATION TUMOR/ORGAN ISCH/INFARC (4/30/2025).    Medications:   Scheduled Meds:    sodium chloride flush  5-40 mL IntraVENous 2 times per day    diphenhydrAMINE        piperacillin-tazobactam  3,375 mg IntraVENous Once    diphenhydrAMINE  25 mg IntraVENous Once     Continuous Infusions:    sodium chloride       PRN Meds: sodium chloride  flush, naloxone 0.4 mg in 10 mL sodium chloride syringe, sodium chloride flush, sodium chloride, acetaminophen, meperidine, HYDROmorphone, HYDROmorphone, droPERidol, midazolam, diphenhydrAMINE, labetalol **OR** hydrALAZINE, ipratropium 0.5 mg-albuterol 2.5 mg, diphenhydrAMINE, ondansetron, HYDROmorphone  Home Meds:   Prior to Admission medications    Medication Sig Start Date End Date Taking? Authorizing Provider   butalbital-acetaminophen-caffeine (FIORICET, ESGIC) -40 MG per tablet TAKE 1 TABLET BY MOUTH TWICE DAILY FOR HEADACHE 7/7/25  Yes Abdelrahman Montana DO   capecitabine (XELODA) 500 MG chemo tablet Take 2 tablets by mouth 2 times daily for 14 days followed by 14 days off each 28-day cycle 6/20/25  Yes Geoff Hu MD   temozolomide (TEMODAR) 100 MG chemo capsule Take 3 capsules (300 mg total) by mouth daily on Days 10 through 14 of each 28-day cycle 6/20/25  Yes Geoff Hu MD   pantoprazole (PROTONIX) 40 MG tablet Take 1 tablet by mouth in the morning and at bedtime 5/16/25 11/12/25 Yes Lambret Castro III, MD   famotidine (PEPCID) 40 MG tablet Take 1 tablet by mouth nightly as needed (acid reflux) 5/16/25  Yes Lambert Castro III, MD   apixaban (ELIQUIS) 5 MG TABS tablet Take 1 tablet by mouth 2 times daily 5/6/25  Yes Jeffery Elliott MD   levothyroxine (SYNTHROID) 112 MCG tablet Take 1 tablet by mouth Daily 3/13/25  Yes Abdelrahman Montana DO   calcium carb-cholecalciferol (CALTRATE 600+D3) 600-20 MG-MCG TABS Take 1 tablet by mouth daily 1/30/25  Yes Geoff Hu MD   potassium chloride (KLOR-CON M) 20 MEQ extended release tablet Take 1 tablet by mouth 2 times daily 1/30/25  Yes Geoff Hu MD   magnesium oxide (MAG-OX) 400 MG tablet Take 1 tablet by mouth 2 times daily 1/30/25  Yes Geoff Hu MD   ondansetron (ZOFRAN-ODT) 4 MG disintegrating tablet Take 1 tablet by mouth 3 times daily as needed for Nausea or Vomiting 1/30/25  Yes Geoff Hu MD   sulfamethoxazole-trimethoprim

## 2025-08-01 ENCOUNTER — APPOINTMENT (OUTPATIENT)
Dept: CT IMAGING | Age: 40
End: 2025-08-01
Attending: TRANSPLANT SURGERY
Payer: COMMERCIAL

## 2025-08-01 LAB
ALBUMIN SERPL-MCNC: 3.9 G/DL (ref 3.5–5.2)
ALP SERPL-CCNC: 211 U/L (ref 35–104)
ALT SERPL-CCNC: 565 U/L (ref 0–35)
ANION GAP SERPL CALCULATED.3IONS-SCNC: 13 MMOL/L (ref 7–16)
AST SERPL-CCNC: 764 U/L (ref 0–35)
BASOPHILS # BLD: 0 K/UL (ref 0–0.2)
BASOPHILS NFR BLD: 0 % (ref 0–2)
BILIRUB DIRECT SERPL-MCNC: 0.3 MG/DL (ref 0–0.2)
BILIRUB INDIRECT SERPL-MCNC: 0.2 MG/DL (ref 0–1)
BILIRUB SERPL-MCNC: 0.5 MG/DL (ref 0–1.2)
BUN SERPL-MCNC: 6 MG/DL (ref 6–20)
CALCIUM SERPL-MCNC: 7.5 MG/DL (ref 8.6–10)
CHLORIDE SERPL-SCNC: 101 MMOL/L (ref 98–107)
CO2 SERPL-SCNC: 22 MMOL/L (ref 22–29)
CREAT SERPL-MCNC: 0.4 MG/DL (ref 0.5–1)
EOSINOPHIL # BLD: 0 K/UL (ref 0.05–0.5)
EOSINOPHILS RELATIVE PERCENT: 0 % (ref 0–6)
ERYTHROCYTE [DISTWIDTH] IN BLOOD BY AUTOMATED COUNT: 16.2 % (ref 11.5–15)
GFR, ESTIMATED: >90 ML/MIN/1.73M2
GLUCOSE SERPL-MCNC: 113 MG/DL (ref 74–99)
HCT VFR BLD AUTO: 34.4 % (ref 34–48)
HGB BLD-MCNC: 11.5 G/DL (ref 11.5–15.5)
INR PPP: 1.2
LYMPHOCYTES NFR BLD: 0.63 K/UL (ref 1.5–4)
LYMPHOCYTES RELATIVE PERCENT: 3 % (ref 20–42)
MCH RBC QN AUTO: 29.6 PG (ref 26–35)
MCHC RBC AUTO-ENTMCNC: 33.4 G/DL (ref 32–34.5)
MCV RBC AUTO: 88.7 FL (ref 80–99.9)
MONOCYTES NFR BLD: 1.46 K/UL (ref 0.1–0.95)
MONOCYTES NFR BLD: 6 % (ref 2–12)
NEUTROPHILS NFR BLD: 91 % (ref 43–80)
NEUTS SEG NFR BLD: 21.91 K/UL (ref 1.8–7.3)
PLATELET # BLD AUTO: 176 K/UL (ref 130–450)
PMV BLD AUTO: 9.4 FL (ref 7–12)
POTASSIUM SERPL-SCNC: 3.1 MMOL/L (ref 3.5–5.1)
PROT SERPL-MCNC: 6.6 G/DL (ref 6.4–8.3)
PROTHROMBIN TIME: 13.3 SEC (ref 9.3–12.4)
RBC # BLD AUTO: 3.88 M/UL (ref 3.5–5.5)
RBC # BLD: ABNORMAL 10*6/UL
SODIUM SERPL-SCNC: 136 MMOL/L (ref 136–145)
WBC OTHER # BLD: 24 K/UL (ref 4.5–11.5)

## 2025-08-01 PROCEDURE — APPSS45 APP SPLIT SHARED TIME 31-45 MINUTES: Performed by: CLINICAL NURSE SPECIALIST

## 2025-08-01 PROCEDURE — 2500000003 HC RX 250 WO HCPCS: Performed by: ANESTHESIOLOGY

## 2025-08-01 PROCEDURE — 6370000000 HC RX 637 (ALT 250 FOR IP)

## 2025-08-01 PROCEDURE — G0378 HOSPITAL OBSERVATION PER HR: HCPCS

## 2025-08-01 PROCEDURE — 6360000004 HC RX CONTRAST MEDICATION: Performed by: RADIOLOGY

## 2025-08-01 PROCEDURE — 6370000000 HC RX 637 (ALT 250 FOR IP): Performed by: STUDENT IN AN ORGANIZED HEALTH CARE EDUCATION/TRAINING PROGRAM

## 2025-08-01 PROCEDURE — 96365 THER/PROPH/DIAG IV INF INIT: CPT

## 2025-08-01 PROCEDURE — 6360000002 HC RX W HCPCS: Performed by: RADIOLOGY

## 2025-08-01 PROCEDURE — 6370000000 HC RX 637 (ALT 250 FOR IP): Performed by: PHYSICIAN ASSISTANT

## 2025-08-01 PROCEDURE — 85610 PROTHROMBIN TIME: CPT

## 2025-08-01 PROCEDURE — 96376 TX/PRO/DX INJ SAME DRUG ADON: CPT

## 2025-08-01 PROCEDURE — 2580000003 HC RX 258: Performed by: STUDENT IN AN ORGANIZED HEALTH CARE EDUCATION/TRAINING PROGRAM

## 2025-08-01 PROCEDURE — 74175 CTA ABDOMEN W/CONTRAST: CPT

## 2025-08-01 PROCEDURE — 6360000002 HC RX W HCPCS: Performed by: PHYSICIAN ASSISTANT

## 2025-08-01 PROCEDURE — 85025 COMPLETE CBC W/AUTO DIFF WBC: CPT

## 2025-08-01 PROCEDURE — 6360000002 HC RX W HCPCS

## 2025-08-01 PROCEDURE — 2580000003 HC RX 258: Performed by: RADIOLOGY

## 2025-08-01 PROCEDURE — 96366 THER/PROPH/DIAG IV INF ADDON: CPT

## 2025-08-01 PROCEDURE — 82248 BILIRUBIN DIRECT: CPT

## 2025-08-01 PROCEDURE — 96375 TX/PRO/DX INJ NEW DRUG ADDON: CPT

## 2025-08-01 PROCEDURE — 80053 COMPREHEN METABOLIC PANEL: CPT

## 2025-08-01 PROCEDURE — 96361 HYDRATE IV INFUSION ADD-ON: CPT

## 2025-08-01 PROCEDURE — 6370000000 HC RX 637 (ALT 250 FOR IP): Performed by: RADIOLOGY

## 2025-08-01 RX ORDER — IOPAMIDOL 755 MG/ML
18 INJECTION, SOLUTION INTRAVASCULAR
Status: COMPLETED | OUTPATIENT
Start: 2025-08-01 | End: 2025-08-01

## 2025-08-01 RX ORDER — GABAPENTIN 100 MG/1
100 CAPSULE ORAL 3 TIMES DAILY
Qty: 30 CAPSULE | Refills: 0 | Status: SHIPPED | OUTPATIENT
Start: 2025-08-01 | End: 2025-08-02

## 2025-08-01 RX ORDER — METOCLOPRAMIDE HYDROCHLORIDE 5 MG/ML
5 INJECTION INTRAMUSCULAR; INTRAVENOUS EVERY 6 HOURS
Status: DISCONTINUED | OUTPATIENT
Start: 2025-08-01 | End: 2025-08-02 | Stop reason: HOSPADM

## 2025-08-01 RX ORDER — SENNOSIDES 8.6 MG/1
2 TABLET ORAL NIGHTLY PRN
Status: DISCONTINUED | OUTPATIENT
Start: 2025-08-01 | End: 2025-08-02 | Stop reason: HOSPADM

## 2025-08-01 RX ORDER — METHOCARBAMOL 500 MG/1
500 TABLET, FILM COATED ORAL 4 TIMES DAILY
Status: DISCONTINUED | OUTPATIENT
Start: 2025-08-01 | End: 2025-08-02 | Stop reason: HOSPADM

## 2025-08-01 RX ORDER — GABAPENTIN 100 MG/1
100 CAPSULE ORAL 3 TIMES DAILY
Status: DISCONTINUED | OUTPATIENT
Start: 2025-08-01 | End: 2025-08-02 | Stop reason: HOSPADM

## 2025-08-01 RX ORDER — OXYCODONE AND ACETAMINOPHEN 5; 325 MG/1; MG/1
1 TABLET ORAL EVERY 4 HOURS PRN
Qty: 42 TABLET | Refills: 0 | Status: SHIPPED | OUTPATIENT
Start: 2025-08-01 | End: 2025-08-02 | Stop reason: HOSPADM

## 2025-08-01 RX ORDER — LIDOCAINE 4 G/G
1 PATCH TOPICAL DAILY
Status: DISCONTINUED | OUTPATIENT
Start: 2025-08-01 | End: 2025-08-02 | Stop reason: HOSPADM

## 2025-08-01 RX ORDER — SODIUM CHLORIDE, SODIUM LACTATE, POTASSIUM CHLORIDE, CALCIUM CHLORIDE 600; 310; 30; 20 MG/100ML; MG/100ML; MG/100ML; MG/100ML
INJECTION, SOLUTION INTRAVENOUS CONTINUOUS
Status: DISCONTINUED | OUTPATIENT
Start: 2025-08-01 | End: 2025-08-02

## 2025-08-01 RX ORDER — METOCLOPRAMIDE 5 MG/1
5 TABLET ORAL
Status: DISCONTINUED | OUTPATIENT
Start: 2025-08-01 | End: 2025-08-02 | Stop reason: HOSPADM

## 2025-08-01 RX ORDER — POTASSIUM CHLORIDE 1500 MG/1
40 TABLET, EXTENDED RELEASE ORAL ONCE
Status: DISCONTINUED | OUTPATIENT
Start: 2025-08-01 | End: 2025-08-02 | Stop reason: HOSPADM

## 2025-08-01 RX ORDER — IOPAMIDOL 755 MG/ML
75 INJECTION, SOLUTION INTRAVASCULAR
Status: COMPLETED | OUTPATIENT
Start: 2025-08-01 | End: 2025-08-01

## 2025-08-01 RX ORDER — METRONIDAZOLE 500 MG/1
500 TABLET ORAL 2 TIMES DAILY
Qty: 14 TABLET | Refills: 0 | Status: SHIPPED | OUTPATIENT
Start: 2025-08-01 | End: 2025-08-08

## 2025-08-01 RX ORDER — ONDANSETRON 2 MG/ML
4 INJECTION INTRAMUSCULAR; INTRAVENOUS EVERY 6 HOURS PRN
Status: DISCONTINUED | OUTPATIENT
Start: 2025-08-01 | End: 2025-08-02 | Stop reason: HOSPADM

## 2025-08-01 RX ORDER — LACTULOSE 10 G/15ML
20 SOLUTION ORAL 3 TIMES DAILY
Status: DISCONTINUED | OUTPATIENT
Start: 2025-08-01 | End: 2025-08-02 | Stop reason: HOSPADM

## 2025-08-01 RX ORDER — METHOCARBAMOL 500 MG/1
500 TABLET, FILM COATED ORAL 4 TIMES DAILY
Qty: 40 TABLET | Refills: 0 | Status: SHIPPED | OUTPATIENT
Start: 2025-08-01 | End: 2025-08-02

## 2025-08-01 RX ORDER — PROCHLORPERAZINE EDISYLATE 5 MG/ML
10 INJECTION INTRAMUSCULAR; INTRAVENOUS EVERY 6 HOURS PRN
Status: DISCONTINUED | OUTPATIENT
Start: 2025-08-01 | End: 2025-08-01

## 2025-08-01 RX ORDER — CIPROFLOXACIN 500 MG/1
500 TABLET, FILM COATED ORAL 2 TIMES DAILY
Qty: 14 TABLET | Refills: 0 | Status: SHIPPED | OUTPATIENT
Start: 2025-08-01 | End: 2025-08-08

## 2025-08-01 RX ADMIN — ZOLPIDEM TARTRATE 10 MG: 5 TABLET ORAL at 22:12

## 2025-08-01 RX ADMIN — OXYCODONE HYDROCHLORIDE 10 MG: 10 TABLET ORAL at 07:47

## 2025-08-01 RX ADMIN — METOCLOPRAMIDE 5 MG: 5 INJECTION, SOLUTION INTRAMUSCULAR; INTRAVENOUS at 20:55

## 2025-08-01 RX ADMIN — SODIUM CHLORIDE: 0.9 INJECTION, SOLUTION INTRAVENOUS at 00:06

## 2025-08-01 RX ADMIN — LACTULOSE 20 G: 20 SOLUTION ORAL at 20:54

## 2025-08-01 RX ADMIN — HYDROMORPHONE HYDROCHLORIDE 0.5 MG: 1 INJECTION, SOLUTION INTRAMUSCULAR; INTRAVENOUS; SUBCUTANEOUS at 04:30

## 2025-08-01 RX ADMIN — SODIUM CHLORIDE, SODIUM LACTATE, POTASSIUM CHLORIDE, AND CALCIUM CHLORIDE: .6; .31; .03; .02 INJECTION, SOLUTION INTRAVENOUS at 21:09

## 2025-08-01 RX ADMIN — ONDANSETRON 4 MG: 2 INJECTION, SOLUTION INTRAMUSCULAR; INTRAVENOUS at 14:39

## 2025-08-01 RX ADMIN — HYDROMORPHONE HYDROCHLORIDE 0.5 MG: 1 INJECTION, SOLUTION INTRAMUSCULAR; INTRAVENOUS; SUBCUTANEOUS at 20:56

## 2025-08-01 RX ADMIN — PANTOPRAZOLE SODIUM 40 MG: 40 TABLET, DELAYED RELEASE ORAL at 15:36

## 2025-08-01 RX ADMIN — IOPAMIDOL 18 ML: 755 INJECTION, SOLUTION INTRAVENOUS at 19:57

## 2025-08-01 RX ADMIN — HYDROMORPHONE HYDROCHLORIDE 0.5 MG: 1 INJECTION, SOLUTION INTRAMUSCULAR; INTRAVENOUS; SUBCUTANEOUS at 08:41

## 2025-08-01 RX ADMIN — METHOCARBAMOL 500 MG: 500 TABLET ORAL at 20:54

## 2025-08-01 RX ADMIN — OXYCODONE HYDROCHLORIDE 10 MG: 10 TABLET ORAL at 12:08

## 2025-08-01 RX ADMIN — IOPAMIDOL 75 ML: 755 INJECTION, SOLUTION INTRAVENOUS at 19:58

## 2025-08-01 RX ADMIN — SODIUM CHLORIDE, PRESERVATIVE FREE 10 ML: 5 INJECTION INTRAVENOUS at 08:41

## 2025-08-01 RX ADMIN — HYDROMORPHONE HYDROCHLORIDE 0.5 MG: 1 INJECTION, SOLUTION INTRAMUSCULAR; INTRAVENOUS; SUBCUTANEOUS at 17:11

## 2025-08-01 RX ADMIN — METOCLOPRAMIDE 5 MG: 5 TABLET ORAL at 20:55

## 2025-08-01 RX ADMIN — PANTOPRAZOLE SODIUM 40 MG: 40 TABLET, DELAYED RELEASE ORAL at 05:49

## 2025-08-01 RX ADMIN — GABAPENTIN 100 MG: 100 CAPSULE ORAL at 15:36

## 2025-08-01 RX ADMIN — OXYCODONE HYDROCHLORIDE 10 MG: 10 TABLET ORAL at 03:12

## 2025-08-01 RX ADMIN — GABAPENTIN 100 MG: 100 CAPSULE ORAL at 20:54

## 2025-08-01 RX ADMIN — HYDROMORPHONE HYDROCHLORIDE 0.5 MG: 1 INJECTION, SOLUTION INTRAMUSCULAR; INTRAVENOUS; SUBCUTANEOUS at 13:13

## 2025-08-01 RX ADMIN — SODIUM CHLORIDE, PRESERVATIVE FREE 10 ML: 5 INJECTION INTRAVENOUS at 21:01

## 2025-08-01 RX ADMIN — PROMETHAZINE HYDROCHLORIDE 25 MG: 25 SUPPOSITORY RECTAL at 10:42

## 2025-08-01 RX ADMIN — SUCRALFATE 1 G: 1 TABLET ORAL at 12:08

## 2025-08-01 RX ADMIN — HYDROMORPHONE HYDROCHLORIDE 0.5 MG: 1 INJECTION, SOLUTION INTRAMUSCULAR; INTRAVENOUS; SUBCUTANEOUS at 00:02

## 2025-08-01 RX ADMIN — LEVOTHYROXINE SODIUM 112 MCG: 0.11 TABLET ORAL at 05:48

## 2025-08-01 RX ADMIN — ONDANSETRON 4 MG: 2 INJECTION, SOLUTION INTRAMUSCULAR; INTRAVENOUS at 22:12

## 2025-08-01 RX ADMIN — PROCHLORPERAZINE EDISYLATE 10 MG: 5 INJECTION INTRAMUSCULAR; INTRAVENOUS at 15:33

## 2025-08-01 RX ADMIN — ONDANSETRON 4 MG: 2 INJECTION, SOLUTION INTRAMUSCULAR; INTRAVENOUS at 05:48

## 2025-08-01 RX ADMIN — PIPERACILLIN AND TAZOBACTAM 3375 MG: 3; .375 INJECTION, POWDER, FOR SOLUTION INTRAVENOUS at 21:00

## 2025-08-01 RX ADMIN — SUCRALFATE 1 G: 1 TABLET ORAL at 17:14

## 2025-08-01 RX ADMIN — OXYCODONE HYDROCHLORIDE 10 MG: 10 TABLET ORAL at 22:12

## 2025-08-01 RX ADMIN — METHOCARBAMOL 500 MG: 500 TABLET ORAL at 15:36

## 2025-08-01 RX ADMIN — METOCLOPRAMIDE 5 MG: 5 TABLET ORAL at 17:14

## 2025-08-01 ASSESSMENT — PAIN SCALES - GENERAL
PAINLEVEL_OUTOF10: 10
PAINLEVEL_OUTOF10: 9
PAINLEVEL_OUTOF10: 10
PAINLEVEL_OUTOF10: 10

## 2025-08-01 ASSESSMENT — PAIN DESCRIPTION - ORIENTATION
ORIENTATION: RIGHT;LEFT;LOWER;MID
ORIENTATION: RIGHT;LEFT;LOWER
ORIENTATION: RIGHT;LEFT
ORIENTATION: LEFT;RIGHT
ORIENTATION: RIGHT
ORIENTATION: LOWER;MID
ORIENTATION: LOWER;MID

## 2025-08-01 ASSESSMENT — PAIN DESCRIPTION - DESCRIPTORS
DESCRIPTORS: ACHING;DULL
DESCRIPTORS: ACHING;DISCOMFORT;DULL
DESCRIPTORS: SHARP;ACHING;DISCOMFORT
DESCRIPTORS: ACHING;DISCOMFORT;DULL
DESCRIPTORS: ACHING;DISCOMFORT;SORE

## 2025-08-01 ASSESSMENT — PAIN DESCRIPTION - LOCATION
LOCATION: ABDOMEN

## 2025-08-01 ASSESSMENT — PAIN SCALES - WONG BAKER: WONGBAKER_NUMERICALRESPONSE: 2;8

## 2025-08-01 NOTE — CARE COORDINATION
8/1/25 Transition of Care note: Patient admitted as observation for TACE procedure 7/31 ,She is having mid abd pain and N&V post procedure. Remains on clear liquids. Ivf,iv dilaudid ,lido patch ordered for pain, phenergan supp given.& scopolamine patch ordered.   She lives at home with her parents IADL's does have a walker and uses prn only.There is 7 outside steps and 15 steps to bedroom/bathroom which she manages without difficulty'She denies any discharge needs at this time.  Electronically signed by Eve Schilling RN on 8/1/2025 at 11:38 AM   Case Management Assessment  Initial Evaluation    Date/Time of Evaluation: 8/1/2025 11:38 AM  Assessment Completed by: Eve Schilling RN    If patient is discharged prior to next notation, then this note serves as note for discharge by case management.    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 Yes (dr. Jeremiah Shook/Aditi)    Chart Reviewed: Yes      History Provided by: Patient  Patient Orientation: Alert and Oriented    Patient Cognition: Alert    Hospitalization in the last 30 days (Readmission):  No    If yes, Readmission Assessment in CM Navigator will be completed.    Advance Directives:      Code Status: Full Code   Patient's Primary Decision Maker is: Legal Next of Kin    Primary Decision Maker: Antoinette Olmstead - Parent - [phone]    Secondary Decision Maker: Geoff Olmstead - Parent - [phone]    Discharge Planning:    Patient lives with: Parent Type of Home: House  Primary Care Giver: Self  Patient Support Systems include: Parent (lives at home with mother and father)   Current Financial resources:    Current community resources:    Current services

## 2025-08-01 NOTE — PROGRESS NOTES
Hepatobiliary and Pancreatic Surgery Progress Note    CC:  s/p TACE     Subjective: Patient states she feels badly, reports nausea with vomiting every time she moves in the bed. Did not eat breakfast. States she is urinating however has continuous urge to urinate. States emesis initially was dark coffee ground, no all bile. Reports mid abdominal pain radiating to her mid thoracic back 10/10. States Zofran not helping nausea, RN to give Phenergan as ordered.     OBJECTIVE      Physical    /72   Pulse 64   Temp 97.8 °F (36.6 °C) (Temporal)   Resp 19   Ht 1.524 m (5')   Wt 59 kg (130 lb)   SpO2 100%   BMI 25.39 kg/m²       General appearance: appears in no acute distress  Lungs:respiratory effort normal without accessory numbers  Heart: no pedal edema. Right groin pressure dressing dry, no ecchymosis.  Abdomen: soft, nondistended, tender entire mid and ruq of abdomen to palpation, nontympanic, no guarding, no peritoneal signs, normoactive bowel sounds  Extremities: ROM normal    ASSESSMENT/PLAN:  40F with a neuroendocrine tumor metastasized to the liver and small bowel, hx Y90, lutathera, and TACE procedures in the past. S/P TACE 7/31/2025  - zofran on board for nausea  - dilaudid on board for pain  - on liquid diet  - gave protonix  - gave carafate  - CBC/CMP   - resumed home meds (atarax, synthroid)  - d/c brock this evening   - phenergan suppository   - PPI and Carafate  - Defer pain meds to Palliative Care  - Discussed with Dr. Castro, hold discharge today, intractable nausea and vomiting.    Thank you for the consultation and allowing me to take part in Ms. Olmstead's care.    Greater than or equal to 35 minutes was spent providing face-to-face patient care, including:  and coordinating care, reviewing the chart, labs, and diagnostics, as well as medical decision making. Greater than 50% of this time was spent instructing and counseling the patient face to face regarding findings and

## 2025-08-01 NOTE — PLAN OF CARE
Problem: Pain  Goal: Verbalizes/displays adequate comfort level or baseline comfort level  8/1/2025 0014 by Anisa Sabillon, RN  Outcome: Progressing  7/31/2025 1511 by Jaymie Bradofrd, RN  Outcome: Progressing

## 2025-08-01 NOTE — ACP (ADVANCE CARE PLANNING)
Advance Care Planning   Healthcare Decision Maker:    Primary Decision Maker: Antoinette Olmstead - Parent - [phone]    Secondary Decision Maker: Geoff Olmstead - Parent - [phone]    Click here to complete Healthcare Decision Makers including selection of the Healthcare Decision Maker Relationship (ie \"Primary\").  Today we documented Decision Maker(s) consistent with Legal Next of Kin hierarchy.

## 2025-08-01 NOTE — PROGRESS NOTES
Messaged resident regarding patient stating she is not ready for discharge and that her pain is not controlled.

## 2025-08-01 NOTE — CONSULTS
Palliative Care Department  [phone]  Palliative Care Initial Consult  Provider Maggie Parker PA-C     Brooklyn Olmstead  [id number]  Hospital Day: 2  Date of Initial Consult: 08/01/2025  Referring Provider: Ray Colby MD   Palliative Medicine was consulted for assistance with: symptom management    HPI:   Brooklyn Olmstead is a 40 y.o. with a medical history of recurrent tumor with metastasis to liver and small bowel status post Y90 Lutathera and TACE procedure who was admitted on 7/31/2025 from home with a CHIEF COMPLAINT of scheduled TACE procedure.  Admitted for observation after following procedure and having abdominal pain and nausea and vomiting.  Palliative medicine consulted for symptom management    ASSESSMENT/PLAN:     Pertinent Hospital Diagnoses     Metastatic neuroendocrine malignancy to small bowel and liver  Admitted for TACE procedure  Postop pain and nausea/vomit      Palliative Care Encounter / Counseling Regarding Goals of Care  Please see detailed goals of care discussion as below  At this time, Brooklyn Olmstead, Does have capacity for medical decision-making.  Capacity is time limited and situation/question specific  Outcome of goals of care meeting:   Optimize pain control and antiemetic therapy  Code status Full Code  Advanced Directives: HCPOA in epic  Surrogate/Legal NOK:  Antoinette Olmstead, parent, [phone]  Geoff Olmstead, parent, [phone]      Abdominal pain  # Status post TACE procedure  # Stabbing pain that radiates to her back  # Bloating and gas-like sensation  # No BM yet  # Patient reports about 2 hours of relief with IV Dilaudid  # Nausea associated with oral oxycodone  # Recommend the following:  - Increase frequency of IV Dilaudid to every 3 hours as needed  - Oral oxycodone with food as tolerated  - Discontinue Phenergan and Compazine, schedule Reglan to promote gut motility and help with nausea and vomiting  -Increase frequency of Zofran 4 mg to every 6 hours as

## 2025-08-01 NOTE — ANESTHESIA POSTPROCEDURE EVALUATION
Department of Anesthesiology  Postprocedure Note    Patient: Brooklyn Olmstead  MRN: [id number]  YOB: 1985  Date of evaluation: 8/1/2025    Procedure Summary       Date: 07/31/25 Room / Location: [facility] Special Procedures; [facility] Radiology    Anesthesia Start: 0900 Anesthesia Stop: 1214    Procedures:       IR CHEMO ADM PUSH INTRA ARTERIAL      IR ART CATH PLACEMENT ABD/PEL/LOW EXT 1ST ORDER      IR WHIT ART CATH ABD PELV LOWER EXT INIT 3RD+ ORDER      IR WHIT ART CATH ABD/PELV/LOWER EXT ADDL ORDER      IR EMBOLIZATION TUMOR/ORGAN      IR ANGIOGRAM SUPERIOR MESENTERIC      IR ANGIOGRAM SELECTIVE EACH ADDITIONAL VESSEL      CT-3D RENDRING ON 3D WORKSTATN      IR ULTRASOUND GUIDANCE VASCULAR ACCESS Diagnosis:       Neuroendocrine tumor (HCC)      Hepatocellular carcinoma (HCC)      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)      Secondary neuroendocrine tumor (HCC)      Secondary neuroendocrine tumor (HCC)      Secondary neuroendocrine tumor (HCC)      Secondary neuroendocrine tumor (HCC)      Secondary neuroendocrine tumor (HCC)      Secondary neuroendocrine tumor (HCC)      Secondary neuroendocrine tumor (HCC)      Secondary neuroendocrine tumor (HCC)      Secondary neuroendocrine tumor (HCC)      (TACE)      (TACE)      (TACE)      (TACE)      (TACE)      (TACE)      (TACE)      (TACE)      (TACE)      (TACE)      (TACE)    Scheduled Providers: Radiologist, Priya General; Crystal Arias APRN - CRNA Responsible Provider: So Ernst MD    Anesthesia Type: MAC ASA Status: 3            Anesthesia Type: MAC    Pili Phase I: Pili Score: 9    Pili Phase II: Pili Score: 10    Anesthesia Post Evaluation    Patient location during evaluation: PACU  Patient participation: complete - patient participated  Level of consciousness: awake and alert  Airway patency: patent  Nausea & Vomiting: no nausea and no vomiting  Cardiovascular status: blood pressure

## 2025-08-01 NOTE — PLAN OF CARE
Problem: Pain  Goal: Verbalizes/displays adequate comfort level or baseline comfort level  8/1/2025 1335 by Yodit Ludwig RN  Outcome: Progressing  8/1/2025 0014 by Anisa Sabillon RN  Outcome: Progressing     Problem: Safety - Adult  Goal: Free from fall injury  8/1/2025 1335 by Yodit Ludwig RN  Outcome: Progressing  8/1/2025 0014 by Anisa Sabillon RN  Outcome: Progressing     Problem: ABCDS Injury Assessment  Goal: Absence of physical injury  8/1/2025 1335 by Yodit Ludwig RN  Outcome: Progressing  8/1/2025 0014 by Anisa Sabillon RN  Outcome: Progressing

## 2025-08-02 VITALS
TEMPERATURE: 98.5 F | HEART RATE: 62 BPM | BODY MASS INDEX: 25.52 KG/M2 | RESPIRATION RATE: 20 BRPM | DIASTOLIC BLOOD PRESSURE: 74 MMHG | WEIGHT: 130 LBS | HEIGHT: 60 IN | OXYGEN SATURATION: 97 % | SYSTOLIC BLOOD PRESSURE: 119 MMHG

## 2025-08-02 LAB
ALBUMIN SERPL-MCNC: 3.9 G/DL (ref 3.5–5.2)
ALP SERPL-CCNC: 214 U/L (ref 35–104)
ALT SERPL-CCNC: 573 U/L (ref 0–35)
ANION GAP SERPL CALCULATED.3IONS-SCNC: 11 MMOL/L (ref 7–16)
AST SERPL-CCNC: 569 U/L (ref 0–35)
BASOPHILS # BLD: 0 K/UL (ref 0–0.2)
BASOPHILS NFR BLD: 0 % (ref 0–2)
BILIRUB DIRECT SERPL-MCNC: 0.2 MG/DL (ref 0–0.2)
BILIRUB INDIRECT SERPL-MCNC: 0.3 MG/DL (ref 0–1)
BILIRUB SERPL-MCNC: 0.5 MG/DL (ref 0–1.2)
BUN SERPL-MCNC: 4 MG/DL (ref 6–20)
CALCIUM SERPL-MCNC: 7.8 MG/DL (ref 8.6–10)
CHLORIDE SERPL-SCNC: 97 MMOL/L (ref 98–107)
CO2 SERPL-SCNC: 27 MMOL/L (ref 22–29)
CREAT SERPL-MCNC: 0.5 MG/DL (ref 0.5–1)
EOSINOPHIL # BLD: 0 K/UL (ref 0.05–0.5)
EOSINOPHILS RELATIVE PERCENT: 0 % (ref 0–6)
ERYTHROCYTE [DISTWIDTH] IN BLOOD BY AUTOMATED COUNT: 16.4 % (ref 11.5–15)
GFR, ESTIMATED: >90 ML/MIN/1.73M2
GLUCOSE SERPL-MCNC: 131 MG/DL (ref 74–99)
HCT VFR BLD AUTO: 34.9 % (ref 34–48)
HGB BLD-MCNC: 11.8 G/DL (ref 11.5–15.5)
LYMPHOCYTES NFR BLD: 0.33 K/UL (ref 1.5–4)
LYMPHOCYTES RELATIVE PERCENT: 2 % (ref 20–42)
MCH RBC QN AUTO: 29.6 PG (ref 26–35)
MCHC RBC AUTO-ENTMCNC: 33.8 G/DL (ref 32–34.5)
MCV RBC AUTO: 87.7 FL (ref 80–99.9)
MONOCYTES NFR BLD: 1.15 K/UL (ref 0.1–0.95)
MONOCYTES NFR BLD: 6 % (ref 2–12)
NEUTROPHILS NFR BLD: 92 % (ref 43–80)
NEUTS SEG NFR BLD: 17.32 K/UL (ref 1.8–7.3)
PLATELET # BLD AUTO: 159 K/UL (ref 130–450)
PMV BLD AUTO: 9.8 FL (ref 7–12)
POTASSIUM SERPL-SCNC: 3.1 MMOL/L (ref 3.5–5.1)
PROT SERPL-MCNC: 6.7 G/DL (ref 6.4–8.3)
RBC # BLD AUTO: 3.98 M/UL (ref 3.5–5.5)
RBC # BLD: ABNORMAL 10*6/UL
SODIUM SERPL-SCNC: 135 MMOL/L (ref 136–145)
WBC OTHER # BLD: 18.8 K/UL (ref 4.5–11.5)

## 2025-08-02 PROCEDURE — 96366 THER/PROPH/DIAG IV INF ADDON: CPT

## 2025-08-02 PROCEDURE — 6360000002 HC RX W HCPCS: Performed by: RADIOLOGY

## 2025-08-02 PROCEDURE — 2580000003 HC RX 258: Performed by: RADIOLOGY

## 2025-08-02 PROCEDURE — 85025 COMPLETE CBC W/AUTO DIFF WBC: CPT

## 2025-08-02 PROCEDURE — 2580000003 HC RX 258: Performed by: STUDENT IN AN ORGANIZED HEALTH CARE EDUCATION/TRAINING PROGRAM

## 2025-08-02 PROCEDURE — 80053 COMPREHEN METABOLIC PANEL: CPT

## 2025-08-02 PROCEDURE — 6360000002 HC RX W HCPCS: Performed by: PHYSICIAN ASSISTANT

## 2025-08-02 PROCEDURE — 96376 TX/PRO/DX INJ SAME DRUG ADON: CPT

## 2025-08-02 PROCEDURE — 6370000000 HC RX 637 (ALT 250 FOR IP): Performed by: STUDENT IN AN ORGANIZED HEALTH CARE EDUCATION/TRAINING PROGRAM

## 2025-08-02 PROCEDURE — G0378 HOSPITAL OBSERVATION PER HR: HCPCS

## 2025-08-02 PROCEDURE — 2500000003 HC RX 250 WO HCPCS: Performed by: ANESTHESIOLOGY

## 2025-08-02 PROCEDURE — 6360000002 HC RX W HCPCS: Performed by: STUDENT IN AN ORGANIZED HEALTH CARE EDUCATION/TRAINING PROGRAM

## 2025-08-02 PROCEDURE — 96361 HYDRATE IV INFUSION ADD-ON: CPT

## 2025-08-02 PROCEDURE — 99232 SBSQ HOSP IP/OBS MODERATE 35: CPT | Performed by: STUDENT IN AN ORGANIZED HEALTH CARE EDUCATION/TRAINING PROGRAM

## 2025-08-02 PROCEDURE — 6370000000 HC RX 637 (ALT 250 FOR IP): Performed by: RADIOLOGY

## 2025-08-02 PROCEDURE — 6370000000 HC RX 637 (ALT 250 FOR IP): Performed by: PHYSICIAN ASSISTANT

## 2025-08-02 PROCEDURE — 82248 BILIRUBIN DIRECT: CPT

## 2025-08-02 RX ORDER — METHOCARBAMOL 500 MG/1
500 TABLET, FILM COATED ORAL 4 TIMES DAILY
Qty: 40 TABLET | Refills: 0 | Status: SHIPPED | OUTPATIENT
Start: 2025-08-02 | End: 2025-08-12

## 2025-08-02 RX ORDER — POTASSIUM CHLORIDE 1500 MG/1
40 TABLET, EXTENDED RELEASE ORAL PRN
Status: DISCONTINUED | OUTPATIENT
Start: 2025-08-02 | End: 2025-08-02

## 2025-08-02 RX ORDER — GABAPENTIN 100 MG/1
100 CAPSULE ORAL 3 TIMES DAILY
Qty: 30 CAPSULE | Refills: 0 | Status: SHIPPED | OUTPATIENT
Start: 2025-08-02 | End: 2025-08-12

## 2025-08-02 RX ORDER — HEPARIN 100 UNIT/ML
500 SYRINGE INTRAVENOUS PRN
Status: DISCONTINUED | OUTPATIENT
Start: 2025-08-02 | End: 2025-08-02 | Stop reason: HOSPADM

## 2025-08-02 RX ORDER — POTASSIUM CHLORIDE 7.45 MG/ML
10 INJECTION INTRAVENOUS PRN
Status: DISCONTINUED | OUTPATIENT
Start: 2025-08-02 | End: 2025-08-02

## 2025-08-02 RX ORDER — OXYCODONE HYDROCHLORIDE 10 MG/1
10 TABLET ORAL EVERY 4 HOURS PRN
Qty: 42 TABLET | Refills: 0 | Status: SHIPPED | OUTPATIENT
Start: 2025-08-02 | End: 2025-08-09

## 2025-08-02 RX ADMIN — METHOCARBAMOL 500 MG: 500 TABLET ORAL at 12:51

## 2025-08-02 RX ADMIN — GABAPENTIN 100 MG: 100 CAPSULE ORAL at 14:14

## 2025-08-02 RX ADMIN — HYDROMORPHONE HYDROCHLORIDE 0.5 MG: 1 INJECTION, SOLUTION INTRAMUSCULAR; INTRAVENOUS; SUBCUTANEOUS at 14:14

## 2025-08-02 RX ADMIN — METOCLOPRAMIDE 5 MG: 5 INJECTION, SOLUTION INTRAMUSCULAR; INTRAVENOUS at 14:13

## 2025-08-02 RX ADMIN — METHOCARBAMOL 500 MG: 500 TABLET ORAL at 09:12

## 2025-08-02 RX ADMIN — OXYCODONE HYDROCHLORIDE 10 MG: 10 TABLET ORAL at 05:58

## 2025-08-02 RX ADMIN — METOCLOPRAMIDE 5 MG: 5 TABLET ORAL at 11:29

## 2025-08-02 RX ADMIN — LACTULOSE 20 G: 20 SOLUTION ORAL at 14:14

## 2025-08-02 RX ADMIN — SUCRALFATE 1 G: 1 TABLET ORAL at 00:06

## 2025-08-02 RX ADMIN — METOCLOPRAMIDE 5 MG: 5 INJECTION, SOLUTION INTRAMUSCULAR; INTRAVENOUS at 09:12

## 2025-08-02 RX ADMIN — SODIUM CHLORIDE, PRESERVATIVE FREE 10 ML: 5 INJECTION INTRAVENOUS at 09:09

## 2025-08-02 RX ADMIN — GABAPENTIN 100 MG: 100 CAPSULE ORAL at 09:12

## 2025-08-02 RX ADMIN — HYDROMORPHONE HYDROCHLORIDE 0.5 MG: 1 INJECTION, SOLUTION INTRAMUSCULAR; INTRAVENOUS; SUBCUTANEOUS at 09:09

## 2025-08-02 RX ADMIN — PANTOPRAZOLE SODIUM 40 MG: 40 TABLET, DELAYED RELEASE ORAL at 05:57

## 2025-08-02 RX ADMIN — SUCRALFATE 1 G: 1 TABLET ORAL at 05:57

## 2025-08-02 RX ADMIN — OXYCODONE HYDROCHLORIDE 10 MG: 10 TABLET ORAL at 12:51

## 2025-08-02 RX ADMIN — LACTULOSE 20 G: 20 SOLUTION ORAL at 09:12

## 2025-08-02 RX ADMIN — HYDROMORPHONE HYDROCHLORIDE 0.5 MG: 1 INJECTION, SOLUTION INTRAMUSCULAR; INTRAVENOUS; SUBCUTANEOUS at 04:11

## 2025-08-02 RX ADMIN — HYDROMORPHONE HYDROCHLORIDE 0.5 MG: 1 INJECTION, SOLUTION INTRAMUSCULAR; INTRAVENOUS; SUBCUTANEOUS at 00:06

## 2025-08-02 RX ADMIN — HEPARIN 500 UNITS: 100 SYRINGE at 15:57

## 2025-08-02 RX ADMIN — LEVOTHYROXINE SODIUM 112 MCG: 0.11 TABLET ORAL at 06:39

## 2025-08-02 RX ADMIN — METOCLOPRAMIDE 5 MG: 5 TABLET ORAL at 05:57

## 2025-08-02 RX ADMIN — SODIUM CHLORIDE, SODIUM LACTATE, POTASSIUM CHLORIDE, AND CALCIUM CHLORIDE: .6; .31; .03; .02 INJECTION, SOLUTION INTRAVENOUS at 04:59

## 2025-08-02 RX ADMIN — PIPERACILLIN AND TAZOBACTAM 3375 MG: 3; .375 INJECTION, POWDER, FOR SOLUTION INTRAVENOUS at 09:18

## 2025-08-02 RX ADMIN — SUCRALFATE 1 G: 1 TABLET ORAL at 12:51

## 2025-08-02 ASSESSMENT — PAIN DESCRIPTION - LOCATION
LOCATION: ABDOMEN

## 2025-08-02 ASSESSMENT — PAIN DESCRIPTION - DESCRIPTORS
DESCRIPTORS: ACHING;DISCOMFORT
DESCRIPTORS: SHARP;ACHING;DISCOMFORT;SORE
DESCRIPTORS: ACHING;STABBING;THROBBING;PRESSURE
DESCRIPTORS: SORE;SHARP;DISCOMFORT
DESCRIPTORS: SHARP;ACHING;DISCOMFORT
DESCRIPTORS: SHARP;ACHING;DISCOMFORT

## 2025-08-02 ASSESSMENT — PAIN SCALES - GENERAL
PAINLEVEL_OUTOF10: 8
PAINLEVEL_OUTOF10: 10
PAINLEVEL_OUTOF10: 9
PAINLEVEL_OUTOF10: 8

## 2025-08-02 ASSESSMENT — PAIN DESCRIPTION - ORIENTATION
ORIENTATION: RIGHT

## 2025-08-02 ASSESSMENT — PAIN SCALES - WONG BAKER: WONGBAKER_NUMERICALRESPONSE: 8;2

## 2025-08-02 NOTE — CARE COORDINATION
Care Coordination:   discharge order noted.  Chart reviewed for needs.  In observation post TACE. Followed cy oncology, palliative medicine.  She will return home to parents home. No needs identified.

## 2025-08-02 NOTE — PROGRESS NOTES
CLINICAL PHARMACY NOTE: MEDS TO BEDS    Total # of Prescriptions Filled: 4   The following medications were delivered to the patient:  Ciprofloxacin 500 mg  Metronidazole 500 mg  Methocarbamol 500 mg  Gabapentin 100 mg     Additional Documentation:  Delivered to patient

## 2025-08-02 NOTE — PROGRESS NOTES
Hepatobiliary and Pancreatic Surgery Progress Note    CC:  s/p TACE     Subjective: Resting in bed no acute changes overnight N/V improving. Still in a lot of pain but improved from yesterday. Urinating appropriately     OBJECTIVE      Physical    /71   Pulse 64   Temp 98.2 °F (36.8 °C) (Temporal)   Resp 16   Ht 1.524 m (5')   Wt 59 kg (130 lb)   SpO2 100%   BMI 25.39 kg/m²       General appearance: appears in no acute distress  Lungs:respiratory effort normal without accessory numbers  Heart: no pedal edema. Right groin pressure dressing dry, no ecchymosis.  Abdomen: soft, nondistended, tender entire mid and ruq of abdomen to palpation, nontympanic, no guarding, no peritoneal signs, normoactive bowel sounds  Extremities: ROM normal    ASSESSMENT/PLAN:  40F with a neuroendocrine tumor metastasized to the liver and small bowel, hx Y90, lutathera, and TACE procedures in the past. S/P TACE 7/31/2025    - pain and nausea control PRN  - on regular diet tolerating  - continue protonix and carafate  - CBC/CMP   -  brock removed last night   - phenergan suppository   - Defer pain meds to Palliative Care  - dispo pending       Case discussed with Dr. Matthew Flores,   General surgery resident PGY-1   8/2/2025 6:03 AM     Attending Physician Statement:    Chief Complaint: Status post Y90    I have examined the patient and performed the key aspects of physical exam, reviewed the record (including all pertinent and new radiology images and laboratory findings), and discussed the case with the surgical team.  I agree with the assessment and plan with the following additions, corrections, and changes. 14pt review of symptoms completed and negative except as mentioned.    Feels about the same today.  Has not had a bowel movement yet.  Bowel regimen.  Discharge this afternoon.    Winter Kaiser MD  08/02/25  10:56 AM

## 2025-08-02 NOTE — PLAN OF CARE
Problem: Pain  Goal: Verbalizes/displays adequate comfort level or baseline comfort level  8/2/2025 1042 by Racquel Harris RN  Outcome: Progressing  8/2/2025 0400 by Alize Denny RN  Outcome: Progressing     Problem: Safety - Adult  Goal: Free from fall injury  8/2/2025 1042 by Racquel Harris RN  Outcome: Progressing  8/2/2025 0400 by Alize Denny RN  Outcome: Progressing     Problem: ABCDS Injury Assessment  Goal: Absence of physical injury  8/2/2025 1042 by Racquel Harris RN  Outcome: Progressing  8/2/2025 0400 by Alize Denny RN  Outcome: Progressing

## 2025-08-04 ENCOUNTER — TELEPHONE (OUTPATIENT)
Dept: PRIMARY CARE CLINIC | Age: 40
End: 2025-08-04

## 2025-08-05 NOTE — DISCHARGE SUMMARY
Hepatobiliary and Pancreatic Surgery Discharge Summary    Brooklyn Olmstead  [id number]    Admit date: 7/31/2025    Discharge date and time: ***      Admitting Physician: Lambert Castro III, MD     Admission Diagnoses: Other secondary neuroendocrine tumors (HCC) [C7B.8]  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) [C7B.8]  Neuroendocrine tumor (HCC) [D3A.8]     Discharge Diagnoses: Principal Problem:    Neuroendocrine tumor (HCC)  Resolved Problems:    * No resolved hospital problems. *        Admission Condition: good     Discharged Condition: stable    Surgeries: TACE 7/31/25    Hospital Course: Brooklyn Olmstead is a 40 y.o. female with history of small bowel neuroendocrine tumor metastatic to the liver status post small bowel resection on Cap/Tem and status post Y90 in September 2024 and January 2025, second Y90 treatment on 4/30/25. She was admitted for TACE 7/31/25.  The patient's course was otherwise uneventful. She progressed well, pain was controlled on PO medications. She was tolerating a regular diet, nausea or vomiting resolved this afternoon, and was in a suitable condition for discharge to home in stable condition.     Discharge Exam: /72   Pulse 64   Temp 97.8 °F (36.6 °C) (Temporal)   Resp 19   Ht 1.524 m (5')   Wt 59 kg (130 lb)   SpO2 100%   BMI 25.39 kg/m²     PHYSICAL EXAM  General: No apparent distress, comfortable  HEENT: trachea midline, PERRL  Chest: Breath sounds were clear and equal with no rales, wheezes, or rhonchi. Respiratory effort was normal  Cardiovascular: Heart sounds were normal with a regular rate and rhythm.  There were no murmurs or gallops. Rt groin without ecchymosis.   Abdomen:  the abdomen was soft and non distended.         Disposition: home    Meds at discharge:      Medication List        START taking these medications      ciprofloxacin 500 MG tablet  Commonly known as: CIPRO  Take 1 tablet by mouth 2 times daily for 7 days Do not take 
used to increased tumor to nontarget ratio and to overcome solid tumor stress and interstitial fluid pressure at the site of the tumor.  The device was also used to reduce reflux of the medication to nontarget regions.  The catheter pressure generating one-way valve was intermittently occlusive ([facility])     1. Successful drug eluting bead transarterial chemoembolization (NED-TACE) to each segment of the liver with a significant volume of treatment to the lateral aspect of segment 2 and 3 as well as areas of tumor recurrence near the dome of the liver and an area of tumor recurrence along the inferior aspect of segment 6. DISCHARGE SUMMARY: REASON FOR HOSPITALIZATION: Chemoembolization for liver malignancy OUTCOME: No acute complication. DISPOSITION: To home. FOLLOW UP: With IR in 2-3 weeks for follow up.     IR ANGIOGRAM SELECTIVE EACH ADDITIONAL VESSEL  Result Date: 7/31/2025  HISTORY: ORDERING SYSTEM PROVIDED HISTORY: Neuroendocrine tumor (HCC) TECHNOLOGIST PROVIDED HISTORY: Reason for exam:->TACE What reading provider will be dictating this exam?->CRC; ORDERING SYSTEM PROVIDED HISTORY: TACE TECHNOLOGIST PROVIDED HISTORY: Reason for exam:->TACE What reading provider will be dictating this exam?->CRC PROCEDURE: 1. Ultrasound guidance for arterial access 2. Catheter placement and diagnostic angiogram of celiac artery, 1st order selection 3. Catheter placement and diagnostic angiogram of left hepatic, 3rd order selection to segments 2 and 3 with selection of 2 distal branches and 3D reconstruction at a separate workstation and chemoembolization 4. Catheter placement and diagnostic angiogram of left hepatic artery, 3rd order order selection to segments 4 and 3 with selection of 2 distal branches and 3D reconstruction at a separate workstation and chemoembolization 5. Catheter placement and diagnostic angiogram of right hepatic artery, 3rd order selection with 3D reconstruction at a separate workstation and

## 2025-08-07 ENCOUNTER — HOSPITAL ENCOUNTER (OUTPATIENT)
Dept: INFUSION THERAPY | Age: 40
Discharge: HOME | End: 2025-08-07

## 2025-08-07 ENCOUNTER — TELEPHONE (OUTPATIENT)
Age: 40
End: 2025-08-07

## 2025-08-07 DIAGNOSIS — G43.909 MIGRAINE WITHOUT STATUS MIGRAINOSUS, NOT INTRACTABLE, UNSPECIFIED MIGRAINE TYPE: ICD-10-CM

## 2025-08-07 DIAGNOSIS — C7B.8 METASTATIC MALIGNANT NEUROENDOCRINE TUMOR TO LIVER (HCC): Primary | ICD-10-CM

## 2025-08-07 RX ORDER — BUTALBITAL, ACETAMINOPHEN AND CAFFEINE 50; 325; 40 MG/1; MG/1; MG/1
TABLET ORAL
Qty: 60 TABLET | Refills: 0 | Status: SHIPPED | OUTPATIENT
Start: 2025-08-07

## 2025-08-07 RX ORDER — SODIUM CHLORIDE 0.9 % (FLUSH) 0.9 %
5-40 SYRINGE (ML) INJECTION PRN
Status: DISCONTINUED | OUTPATIENT
Start: 2025-08-07 | End: 2025-08-08 | Stop reason: HOSPADM

## 2025-08-07 RX ORDER — HEPARIN 100 UNIT/ML
500 SYRINGE INTRAVENOUS PRN
Status: DISCONTINUED | OUTPATIENT
Start: 2025-08-07 | End: 2025-08-08 | Stop reason: HOSPADM

## 2025-08-14 ENCOUNTER — OFFICE VISIT (OUTPATIENT)
Age: 40
End: 2025-08-14
Payer: COMMERCIAL

## 2025-08-14 ENCOUNTER — HOSPITAL ENCOUNTER (OUTPATIENT)
Dept: INFUSION THERAPY | Age: 40
Discharge: HOME | End: 2025-08-14
Payer: COMMERCIAL

## 2025-08-14 ENCOUNTER — OFFICE VISIT (OUTPATIENT)
Age: 40
End: 2025-08-14

## 2025-08-14 VITALS
OXYGEN SATURATION: 100 % | HEART RATE: 70 BPM | TEMPERATURE: 98.2 F | BODY MASS INDEX: 25.31 KG/M2 | HEIGHT: 60 IN | SYSTOLIC BLOOD PRESSURE: 104 MMHG | DIASTOLIC BLOOD PRESSURE: 53 MMHG | WEIGHT: 128.9 LBS

## 2025-08-14 DIAGNOSIS — C7B.8 METASTATIC MALIGNANT NEUROENDOCRINE TUMOR TO LIVER (HCC): Primary | ICD-10-CM

## 2025-08-14 DIAGNOSIS — C7B.8 METASTATIC MALIGNANT NEUROENDOCRINE TUMOR TO LIVER (HCC): ICD-10-CM

## 2025-08-14 DIAGNOSIS — D3A.8 NEUROENDOCRINE TUMOR (HCC): Primary | ICD-10-CM

## 2025-08-14 LAB
ALBUMIN SERPL-MCNC: 3.4 G/DL (ref 3.5–5.2)
ALP SERPL-CCNC: 369 U/L (ref 35–104)
ALT SERPL-CCNC: 52 U/L (ref 0–35)
ANION GAP SERPL CALCULATED.3IONS-SCNC: 11 MMOL/L (ref 7–16)
AST SERPL-CCNC: 30 U/L (ref 0–35)
BASOPHILS # BLD: 0.08 K/UL (ref 0–0.2)
BASOPHILS NFR BLD: 1 % (ref 0–2)
BILIRUB SERPL-MCNC: <0.2 MG/DL (ref 0–1.2)
BUN SERPL-MCNC: 7 MG/DL (ref 6–20)
CALCIUM SERPL-MCNC: 8.3 MG/DL (ref 8.6–10)
CHLORIDE SERPL-SCNC: 102 MMOL/L (ref 98–107)
CO2 SERPL-SCNC: 26 MMOL/L (ref 22–29)
CREAT SERPL-MCNC: 0.5 MG/DL (ref 0.5–1)
EOSINOPHIL # BLD: 0.19 K/UL (ref 0.05–0.5)
EOSINOPHILS RELATIVE PERCENT: 2 % (ref 0–6)
ERYTHROCYTE [DISTWIDTH] IN BLOOD BY AUTOMATED COUNT: 17.9 % (ref 11.5–15)
GFR, ESTIMATED: >90 ML/MIN/1.73M2
GLUCOSE SERPL-MCNC: 104 MG/DL (ref 74–99)
HCT VFR BLD AUTO: 30.2 % (ref 34–48)
HGB BLD-MCNC: 9.8 G/DL (ref 11.5–15.5)
IMM GRANULOCYTES # BLD AUTO: 0.16 K/UL (ref 0–0.58)
IMM GRANULOCYTES NFR BLD: 2 % (ref 0–5)
LYMPHOCYTES NFR BLD: 1.1 K/UL (ref 1.5–4)
LYMPHOCYTES RELATIVE PERCENT: 14 % (ref 20–42)
MCH RBC QN AUTO: 29.6 PG (ref 26–35)
MCHC RBC AUTO-ENTMCNC: 32.5 G/DL (ref 32–34.5)
MCV RBC AUTO: 91.2 FL (ref 80–99.9)
MONOCYTES NFR BLD: 1.08 K/UL (ref 0.1–0.95)
MONOCYTES NFR BLD: 14 % (ref 2–12)
NEUTROPHILS NFR BLD: 67 % (ref 43–80)
NEUTS SEG NFR BLD: 5.39 K/UL (ref 1.8–7.3)
PLATELET # BLD AUTO: 334 K/UL (ref 130–450)
PMV BLD AUTO: 9.3 FL (ref 7–12)
POTASSIUM SERPL-SCNC: 3.6 MMOL/L (ref 3.5–5.1)
PROT SERPL-MCNC: 6.3 G/DL (ref 6.4–8.3)
RBC # BLD AUTO: 3.31 M/UL (ref 3.5–5.5)
SODIUM SERPL-SCNC: 140 MMOL/L (ref 136–145)
WBC OTHER # BLD: 8 K/UL (ref 4.5–11.5)

## 2025-08-14 PROCEDURE — 6360000002 HC RX W HCPCS: Performed by: STUDENT IN AN ORGANIZED HEALTH CARE EDUCATION/TRAINING PROGRAM

## 2025-08-14 PROCEDURE — 96372 THER/PROPH/DIAG INJ SC/IM: CPT

## 2025-08-14 PROCEDURE — 85025 COMPLETE CBC W/AUTO DIFF WBC: CPT

## 2025-08-14 PROCEDURE — G8419 CALC BMI OUT NRM PARAM NOF/U: HCPCS | Performed by: STUDENT IN AN ORGANIZED HEALTH CARE EDUCATION/TRAINING PROGRAM

## 2025-08-14 PROCEDURE — G8427 DOCREV CUR MEDS BY ELIG CLIN: HCPCS | Performed by: STUDENT IN AN ORGANIZED HEALTH CARE EDUCATION/TRAINING PROGRAM

## 2025-08-14 PROCEDURE — 4004F PT TOBACCO SCREEN RCVD TLK: CPT | Performed by: STUDENT IN AN ORGANIZED HEALTH CARE EDUCATION/TRAINING PROGRAM

## 2025-08-14 PROCEDURE — 99215 OFFICE O/P EST HI 40 MIN: CPT | Performed by: STUDENT IN AN ORGANIZED HEALTH CARE EDUCATION/TRAINING PROGRAM

## 2025-08-14 PROCEDURE — 36591 DRAW BLOOD OFF VENOUS DEVICE: CPT

## 2025-08-14 PROCEDURE — 80053 COMPREHEN METABOLIC PANEL: CPT

## 2025-08-14 PROCEDURE — 2500000003 HC RX 250 WO HCPCS: Performed by: STUDENT IN AN ORGANIZED HEALTH CARE EDUCATION/TRAINING PROGRAM

## 2025-08-14 RX ORDER — HEPARIN 100 UNIT/ML
500 SYRINGE INTRAVENOUS PRN
Status: DISCONTINUED | OUTPATIENT
Start: 2025-08-14 | End: 2025-08-15 | Stop reason: HOSPADM

## 2025-08-14 RX ORDER — LANREOTIDE ACETATE 120 MG/.5ML
120 INJECTION SUBCUTANEOUS ONCE
Status: COMPLETED | OUTPATIENT
Start: 2025-08-14 | End: 2025-08-14

## 2025-08-14 RX ORDER — LANREOTIDE ACETATE 120 MG/.5ML
120 INJECTION SUBCUTANEOUS ONCE
OUTPATIENT
Start: 2025-09-04 | End: 2025-09-04

## 2025-08-14 RX ORDER — SODIUM CHLORIDE 0.9 % (FLUSH) 0.9 %
5-40 SYRINGE (ML) INJECTION PRN
Status: DISCONTINUED | OUTPATIENT
Start: 2025-08-14 | End: 2025-08-15 | Stop reason: HOSPADM

## 2025-08-14 RX ADMIN — SODIUM CHLORIDE, PRESERVATIVE FREE 10 ML: 5 INJECTION INTRAVENOUS at 13:17

## 2025-08-14 RX ADMIN — LANREOTIDE ACETATE 120 MG: 120 INJECTION SUBCUTANEOUS at 14:23

## 2025-08-14 RX ADMIN — HEPARIN 500 UNITS: 100 SYRINGE at 13:17

## 2025-08-15 DIAGNOSIS — G43.909 MIGRAINE WITHOUT STATUS MIGRAINOSUS, NOT INTRACTABLE, UNSPECIFIED MIGRAINE TYPE: ICD-10-CM

## 2025-08-15 RX ORDER — ZOLPIDEM TARTRATE 10 MG/1
10 TABLET ORAL NIGHTLY PRN
Qty: 30 TABLET | Refills: 3 | Status: SHIPPED | OUTPATIENT
Start: 2025-08-15 | End: 2025-11-13

## 2025-08-20 RX ORDER — SUCRALFATE 1 G/1
1 TABLET ORAL 4 TIMES DAILY
Qty: 120 TABLET | Refills: 3 | Status: SHIPPED | OUTPATIENT
Start: 2025-08-20 | End: 2025-08-22

## 2025-08-22 RX ORDER — SUCRALFATE 1 G/1
1 TABLET ORAL 4 TIMES DAILY
Qty: 120 TABLET | Refills: 3 | Status: SHIPPED | OUTPATIENT
Start: 2025-08-22

## 2025-08-27 ENCOUNTER — TELEPHONE (OUTPATIENT)
Age: 40
End: 2025-08-27

## 2025-08-27 ENCOUNTER — APPOINTMENT (OUTPATIENT)
Dept: CT IMAGING | Age: 40
End: 2025-08-27
Payer: COMMERCIAL

## 2025-08-27 ENCOUNTER — HOSPITAL ENCOUNTER (OUTPATIENT)
Age: 40
Setting detail: OBSERVATION
Discharge: HOME | End: 2025-08-27
Attending: STUDENT IN AN ORGANIZED HEALTH CARE EDUCATION/TRAINING PROGRAM | Admitting: INTERNAL MEDICINE
Payer: COMMERCIAL

## 2025-08-27 VITALS
OXYGEN SATURATION: 100 % | TEMPERATURE: 98.1 F | SYSTOLIC BLOOD PRESSURE: 113 MMHG | WEIGHT: 130 LBS | BODY MASS INDEX: 25.52 KG/M2 | HEART RATE: 89 BPM | RESPIRATION RATE: 18 BRPM | DIASTOLIC BLOOD PRESSURE: 70 MMHG | HEIGHT: 60 IN

## 2025-08-27 DIAGNOSIS — R11.2 NAUSEA AND VOMITING, UNSPECIFIED VOMITING TYPE: ICD-10-CM

## 2025-08-27 DIAGNOSIS — R10.9 ABDOMINAL PAIN, UNSPECIFIED ABDOMINAL LOCATION: Primary | ICD-10-CM

## 2025-08-27 LAB
ALBUMIN SERPL-MCNC: 4 G/DL (ref 3.5–5.2)
ALP SERPL-CCNC: 478 U/L (ref 35–104)
ALT SERPL-CCNC: 44 U/L (ref 0–35)
ANION GAP SERPL CALCULATED.3IONS-SCNC: 16 MMOL/L (ref 7–16)
AST SERPL-CCNC: 50 U/L (ref 0–35)
BASOPHILS # BLD: 0.05 K/UL (ref 0–0.2)
BASOPHILS NFR BLD: 1 % (ref 0–2)
BILIRUB SERPL-MCNC: 0.5 MG/DL (ref 0–1.2)
BILIRUB UR QL STRIP: NEGATIVE
BUN SERPL-MCNC: 7 MG/DL (ref 6–20)
CALCIUM SERPL-MCNC: 9.2 MG/DL (ref 8.6–10)
CHLORIDE SERPL-SCNC: 98 MMOL/L (ref 98–107)
CLARITY UR: CLEAR
CO2 SERPL-SCNC: 22 MMOL/L (ref 22–29)
COLOR UR: YELLOW
CREAT SERPL-MCNC: 0.5 MG/DL (ref 0.5–1)
EOSINOPHIL # BLD: 0.07 K/UL (ref 0.05–0.5)
EOSINOPHILS RELATIVE PERCENT: 1 % (ref 0–6)
ERYTHROCYTE [DISTWIDTH] IN BLOOD BY AUTOMATED COUNT: 16.7 % (ref 11.5–15)
GFR, ESTIMATED: >90 ML/MIN/1.73M2
GLUCOSE SERPL-MCNC: 101 MG/DL (ref 74–99)
GLUCOSE UR STRIP-MCNC: NEGATIVE MG/DL
HCG SERPL QL: NEGATIVE
HCT VFR BLD AUTO: 36.5 % (ref 34–48)
HGB BLD-MCNC: 11.8 G/DL (ref 11.5–15.5)
HGB UR QL STRIP.AUTO: NEGATIVE
IMM GRANULOCYTES # BLD AUTO: 0.14 K/UL (ref 0–0.58)
IMM GRANULOCYTES NFR BLD: 1 % (ref 0–5)
KETONES UR STRIP-MCNC: NEGATIVE MG/DL
LACTATE BLDV-SCNC: 1 MMOL/L (ref 0.5–2.2)
LEUKOCYTE ESTERASE UR QL STRIP: NEGATIVE
LIPASE SERPL-CCNC: 26 U/L (ref 13–60)
LYMPHOCYTES NFR BLD: 1.11 K/UL (ref 1.5–4)
LYMPHOCYTES RELATIVE PERCENT: 11 % (ref 20–42)
MCH RBC QN AUTO: 28.8 PG (ref 26–35)
MCHC RBC AUTO-ENTMCNC: 32.3 G/DL (ref 32–34.5)
MCV RBC AUTO: 89 FL (ref 80–99.9)
MONOCYTES NFR BLD: 1.32 K/UL (ref 0.1–0.95)
MONOCYTES NFR BLD: 13 % (ref 2–12)
NEUTROPHILS NFR BLD: 73 % (ref 43–80)
NEUTS SEG NFR BLD: 7.22 K/UL (ref 1.8–7.3)
NITRITE UR QL STRIP: NEGATIVE
PH UR STRIP: 6 [PH] (ref 5–8)
PLATELET # BLD AUTO: 306 K/UL (ref 130–450)
PMV BLD AUTO: 8.7 FL (ref 7–12)
POTASSIUM SERPL-SCNC: 3.9 MMOL/L (ref 3.5–5.1)
PROT SERPL-MCNC: 7.7 G/DL (ref 6.4–8.3)
PROT UR STRIP-MCNC: NEGATIVE MG/DL
RBC # BLD AUTO: 4.1 M/UL (ref 3.5–5.5)
RBC #/AREA URNS HPF: ABNORMAL /HPF
SODIUM SERPL-SCNC: 136 MMOL/L (ref 136–145)
SP GR UR STRIP: <1.005 (ref 1–1.03)
TROPONIN I SERPL HS-MCNC: <6 NG/L (ref 0–14)
UROBILINOGEN UR STRIP-ACNC: 0.2 EU/DL (ref 0–1)
WBC #/AREA URNS HPF: ABNORMAL /HPF
WBC OTHER # BLD: 9.9 K/UL (ref 4.5–11.5)

## 2025-08-27 PROCEDURE — 6360000002 HC RX W HCPCS

## 2025-08-27 PROCEDURE — 96376 TX/PRO/DX INJ SAME DRUG ADON: CPT

## 2025-08-27 PROCEDURE — 87086 URINE CULTURE/COLONY COUNT: CPT

## 2025-08-27 PROCEDURE — 85025 COMPLETE CBC W/AUTO DIFF WBC: CPT

## 2025-08-27 PROCEDURE — 83690 ASSAY OF LIPASE: CPT

## 2025-08-27 PROCEDURE — 84484 ASSAY OF TROPONIN QUANT: CPT

## 2025-08-27 PROCEDURE — 80053 COMPREHEN METABOLIC PANEL: CPT

## 2025-08-27 PROCEDURE — 6360000004 HC RX CONTRAST MEDICATION: Performed by: RADIOLOGY

## 2025-08-27 PROCEDURE — 99285 EMERGENCY DEPT VISIT HI MDM: CPT

## 2025-08-27 PROCEDURE — 6360000002 HC RX W HCPCS: Performed by: STUDENT IN AN ORGANIZED HEALTH CARE EDUCATION/TRAINING PROGRAM

## 2025-08-27 PROCEDURE — 93005 ELECTROCARDIOGRAM TRACING: CPT | Performed by: STUDENT IN AN ORGANIZED HEALTH CARE EDUCATION/TRAINING PROGRAM

## 2025-08-27 PROCEDURE — 84703 CHORIONIC GONADOTROPIN ASSAY: CPT

## 2025-08-27 PROCEDURE — 96375 TX/PRO/DX INJ NEW DRUG ADDON: CPT

## 2025-08-27 PROCEDURE — 83605 ASSAY OF LACTIC ACID: CPT

## 2025-08-27 PROCEDURE — 96361 HYDRATE IV INFUSION ADD-ON: CPT

## 2025-08-27 PROCEDURE — 81001 URINALYSIS AUTO W/SCOPE: CPT

## 2025-08-27 PROCEDURE — 96374 THER/PROPH/DIAG INJ IV PUSH: CPT

## 2025-08-27 PROCEDURE — 2580000003 HC RX 258: Performed by: STUDENT IN AN ORGANIZED HEALTH CARE EDUCATION/TRAINING PROGRAM

## 2025-08-27 PROCEDURE — G0378 HOSPITAL OBSERVATION PER HR: HCPCS

## 2025-08-27 PROCEDURE — 74177 CT ABD & PELVIS W/CONTRAST: CPT

## 2025-08-27 RX ORDER — POTASSIUM CHLORIDE 1500 MG/1
40 TABLET, EXTENDED RELEASE ORAL PRN
Status: DISCONTINUED | OUTPATIENT
Start: 2025-08-27 | End: 2025-08-27 | Stop reason: HOSPADM

## 2025-08-27 RX ORDER — ONDANSETRON 2 MG/ML
4 INJECTION INTRAMUSCULAR; INTRAVENOUS ONCE
Status: COMPLETED | OUTPATIENT
Start: 2025-08-27 | End: 2025-08-27

## 2025-08-27 RX ORDER — SODIUM CHLORIDE 9 MG/ML
INJECTION, SOLUTION INTRAVENOUS CONTINUOUS
Status: DISCONTINUED | OUTPATIENT
Start: 2025-08-27 | End: 2025-08-27 | Stop reason: HOSPADM

## 2025-08-27 RX ORDER — ONDANSETRON 2 MG/ML
4 INJECTION INTRAMUSCULAR; INTRAVENOUS EVERY 6 HOURS PRN
Status: DISCONTINUED | OUTPATIENT
Start: 2025-08-27 | End: 2025-08-27 | Stop reason: HOSPADM

## 2025-08-27 RX ORDER — PROCHLORPERAZINE EDISYLATE 5 MG/ML
10 INJECTION INTRAMUSCULAR; INTRAVENOUS ONCE
Status: COMPLETED | OUTPATIENT
Start: 2025-08-27 | End: 2025-08-27

## 2025-08-27 RX ORDER — OXYCODONE AND ACETAMINOPHEN 5; 325 MG/1; MG/1
1 TABLET ORAL EVERY 6 HOURS PRN
Qty: 4 TABLET | Refills: 0 | Status: SHIPPED | OUTPATIENT
Start: 2025-08-27 | End: 2025-08-28

## 2025-08-27 RX ORDER — SODIUM CHLORIDE 0.9 % (FLUSH) 0.9 %
5-40 SYRINGE (ML) INJECTION EVERY 12 HOURS SCHEDULED
Status: DISCONTINUED | OUTPATIENT
Start: 2025-08-27 | End: 2025-08-27 | Stop reason: HOSPADM

## 2025-08-27 RX ORDER — SODIUM CHLORIDE 9 MG/ML
INJECTION, SOLUTION INTRAVENOUS PRN
Status: DISCONTINUED | OUTPATIENT
Start: 2025-08-27 | End: 2025-08-27 | Stop reason: HOSPADM

## 2025-08-27 RX ORDER — MAGNESIUM SULFATE IN WATER 40 MG/ML
2000 INJECTION, SOLUTION INTRAVENOUS PRN
Status: DISCONTINUED | OUTPATIENT
Start: 2025-08-27 | End: 2025-08-27 | Stop reason: HOSPADM

## 2025-08-27 RX ORDER — POTASSIUM CHLORIDE 7.45 MG/ML
10 INJECTION INTRAVENOUS PRN
Status: DISCONTINUED | OUTPATIENT
Start: 2025-08-27 | End: 2025-08-27 | Stop reason: HOSPADM

## 2025-08-27 RX ORDER — HEPARIN 100 UNIT/ML
300 SYRINGE INTRAVENOUS PRN
Status: DISCONTINUED | OUTPATIENT
Start: 2025-08-27 | End: 2025-08-27 | Stop reason: HOSPADM

## 2025-08-27 RX ORDER — POLYETHYLENE GLYCOL 3350 17 G/17G
17 POWDER, FOR SOLUTION ORAL DAILY PRN
Status: DISCONTINUED | OUTPATIENT
Start: 2025-08-27 | End: 2025-08-27 | Stop reason: HOSPADM

## 2025-08-27 RX ORDER — ENOXAPARIN SODIUM 100 MG/ML
40 INJECTION SUBCUTANEOUS DAILY
Status: DISCONTINUED | OUTPATIENT
Start: 2025-08-27 | End: 2025-08-27

## 2025-08-27 RX ORDER — ONDANSETRON 4 MG/1
4 TABLET, ORALLY DISINTEGRATING ORAL 3 TIMES DAILY PRN
Qty: 12 TABLET | Refills: 0 | Status: SHIPPED | OUTPATIENT
Start: 2025-08-27 | End: 2025-08-31

## 2025-08-27 RX ORDER — ACETAMINOPHEN 325 MG/1
650 TABLET ORAL EVERY 6 HOURS PRN
Status: DISCONTINUED | OUTPATIENT
Start: 2025-08-27 | End: 2025-08-27 | Stop reason: HOSPADM

## 2025-08-27 RX ORDER — ACETAMINOPHEN 650 MG/1
650 SUPPOSITORY RECTAL EVERY 6 HOURS PRN
Status: DISCONTINUED | OUTPATIENT
Start: 2025-08-27 | End: 2025-08-27 | Stop reason: HOSPADM

## 2025-08-27 RX ORDER — ONDANSETRON 4 MG/1
4 TABLET, ORALLY DISINTEGRATING ORAL EVERY 8 HOURS PRN
Status: DISCONTINUED | OUTPATIENT
Start: 2025-08-27 | End: 2025-08-27 | Stop reason: HOSPADM

## 2025-08-27 RX ORDER — FENTANYL CITRATE 50 UG/ML
50 INJECTION, SOLUTION INTRAMUSCULAR; INTRAVENOUS ONCE
Status: COMPLETED | OUTPATIENT
Start: 2025-08-27 | End: 2025-08-27

## 2025-08-27 RX ORDER — SODIUM CHLORIDE 0.9 % (FLUSH) 0.9 %
5-40 SYRINGE (ML) INJECTION PRN
Status: DISCONTINUED | OUTPATIENT
Start: 2025-08-27 | End: 2025-08-27 | Stop reason: HOSPADM

## 2025-08-27 RX ORDER — IOPAMIDOL 755 MG/ML
75 INJECTION, SOLUTION INTRAVASCULAR
Status: COMPLETED | OUTPATIENT
Start: 2025-08-27 | End: 2025-08-27

## 2025-08-27 RX ORDER — 0.9 % SODIUM CHLORIDE 0.9 %
1000 INTRAVENOUS SOLUTION INTRAVENOUS ONCE
Status: COMPLETED | OUTPATIENT
Start: 2025-08-27 | End: 2025-08-27

## 2025-08-27 RX ADMIN — IOPAMIDOL 75 ML: 755 INJECTION, SOLUTION INTRAVENOUS at 11:49

## 2025-08-27 RX ADMIN — SODIUM CHLORIDE 1000 ML: 0.9 INJECTION, SOLUTION INTRAVENOUS at 10:25

## 2025-08-27 RX ADMIN — HYDROMORPHONE HYDROCHLORIDE 0.5 MG: 1 INJECTION, SOLUTION INTRAMUSCULAR; INTRAVENOUS; SUBCUTANEOUS at 12:04

## 2025-08-27 RX ADMIN — HYDROMORPHONE HYDROCHLORIDE 0.5 MG: 1 INJECTION, SOLUTION INTRAMUSCULAR; INTRAVENOUS; SUBCUTANEOUS at 10:24

## 2025-08-27 RX ADMIN — ONDANSETRON 4 MG: 2 INJECTION, SOLUTION INTRAMUSCULAR; INTRAVENOUS at 10:23

## 2025-08-27 RX ADMIN — PROCHLORPERAZINE EDISYLATE 10 MG: 5 INJECTION INTRAMUSCULAR; INTRAVENOUS at 13:37

## 2025-08-27 RX ADMIN — HEPARIN 300 UNITS: 100 SYRINGE at 17:03

## 2025-08-27 RX ADMIN — FENTANYL CITRATE 50 MCG: 50 INJECTION INTRAMUSCULAR; INTRAVENOUS at 13:36

## 2025-08-27 ASSESSMENT — PAIN DESCRIPTION - DESCRIPTORS
DESCRIPTORS: SPASM;ACHING
DESCRIPTORS: ACHING;SPASM;SHARP
DESCRIPTORS: ACHING;SPASM;STABBING

## 2025-08-27 ASSESSMENT — PAIN DESCRIPTION - LOCATION
LOCATION: ABDOMEN
LOCATION: ABDOMEN;BACK

## 2025-08-27 ASSESSMENT — PAIN DESCRIPTION - ORIENTATION
ORIENTATION: LEFT
ORIENTATION: MID

## 2025-08-27 ASSESSMENT — PAIN SCALES - GENERAL
PAINLEVEL_OUTOF10: 10

## 2025-08-27 ASSESSMENT — PAIN - FUNCTIONAL ASSESSMENT
PAIN_FUNCTIONAL_ASSESSMENT: 0-10

## 2025-08-28 LAB
EKG ATRIAL RATE: 71 BPM
EKG P AXIS: 72 DEGREES
EKG P-R INTERVAL: 150 MS
EKG Q-T INTERVAL: 398 MS
EKG QRS DURATION: 70 MS
EKG QTC CALCULATION (BAZETT): 432 MS
EKG R AXIS: 57 DEGREES
EKG T AXIS: 58 DEGREES
EKG VENTRICULAR RATE: 71 BPM
MICROORGANISM SPEC CULT: ABNORMAL
SERVICE CMNT-IMP: ABNORMAL
SPECIMEN DESCRIPTION: ABNORMAL

## 2025-08-28 PROCEDURE — 93010 ELECTROCARDIOGRAM REPORT: CPT | Performed by: INTERNAL MEDICINE

## 2025-08-28 ASSESSMENT — ENCOUNTER SYMPTOMS
SHORTNESS OF BREATH: 0
COUGH: 0
NAUSEA: 1
ABDOMINAL DISTENTION: 0
CHEST TIGHTNESS: 0
DIARRHEA: 0
ABDOMINAL PAIN: 1
VOMITING: 1
PHOTOPHOBIA: 0

## 2025-09-02 DIAGNOSIS — G43.909 MIGRAINE WITHOUT STATUS MIGRAINOSUS, NOT INTRACTABLE, UNSPECIFIED MIGRAINE TYPE: ICD-10-CM

## 2025-09-02 RX ORDER — BUTALBITAL, ACETAMINOPHEN AND CAFFEINE 50; 325; 40 MG/1; MG/1; MG/1
TABLET ORAL
Qty: 60 TABLET | Refills: 0 | Status: SHIPPED | OUTPATIENT
Start: 2025-09-02

## (undated) DEVICE — NEEDLE LAP VERES 14 GAX120 MM M0305] COOPER SURGICAL]

## (undated) DEVICE — [HIGH FLOW INSUFFLATOR,  DO NOT USE IF PACKAGE IS DAMAGED,  KEEP DRY,  KEEP AWAY FROM SUNLIGHT,  PROTECT FROM HEAT AND RADIOACTIVE SOURCES.]: Brand: PNEUMOSURE

## (undated) DEVICE — SUTURE BOOTIES, YELLOW, STERILE, 5 PAIR/PAD; 5 PADS/BOX: Brand: KEY SURGICAL SUTURE BOOTIES

## (undated) DEVICE — DRESSING TRNSPAR W4XL55IN ACRYL SUP FLM W ADH WTRPRF OPSITE

## (undated) DEVICE — SET SURG INSTR MINI VASC

## (undated) DEVICE — DOUBLE BASIN SET: Brand: MEDLINE INDUSTRIES, INC.

## (undated) DEVICE — GOWN,SIRUS,FABRNF,XL,20/CS: Brand: MEDLINE

## (undated) DEVICE — FORCEPS BX OVL CUP FEN DISPOSABLE CAP L 160CM RAD JAW 4

## (undated) DEVICE — 18 GA N.G. KIT, 10 PACK: Brand: SITE-RITE

## (undated) DEVICE — DRAPE SHEET: Brand: UNBRANDED

## (undated) DEVICE — SPONGE GZ W4XL4IN RAYON POLY FILL CVR W/ NONWOVEN FAB

## (undated) DEVICE — SET INSTRUMENT LAP I

## (undated) DEVICE — Z DUP USE 2257490 ADHESIVE SKIN CLSRE 036ML TPCL 2CTL CNCRLTE HIGH VSCSTY DRMB

## (undated) DEVICE — 3M™ MEDIPORE™ + PAD 3564: Brand: 3M™ MEDIPORE™

## (undated) DEVICE — APPLICATOR MEDICATED 26 CC SOLUTION HI LT ORNG CHLORAPREP

## (undated) DEVICE — COVER,LIGHT HANDLE,FLX,2/PK: Brand: MEDLINE INDUSTRIES, INC.

## (undated) DEVICE — SET INST DAVINCI ACCESSORIES

## (undated) DEVICE — SEALER TISS L20CM DIA13MM ADV BPLR L CRV JAW OPN APPRCH

## (undated) DEVICE — PLUMEPORT LAPAROSCOPIC SMOKE FILTRATION DEVICE: Brand: PLUMEPORT ACTIV

## (undated) DEVICE — ARM DRAPE

## (undated) DEVICE — SET INST DAVINCI LAP

## (undated) DEVICE — READY WET SKIN SCRUB TRAY-LF: Brand: MEDLINE INDUSTRIES, INC.

## (undated) DEVICE — TOWEL,OR,DSP,ST,BLUE,STD,6/PK,12PK/CS: Brand: MEDLINE

## (undated) DEVICE — LABEL MED 4 IN SURG PANEL W/ PEN STRL

## (undated) DEVICE — ACCESS PLATFORM FOR MINIMALLY INVASIVE SURGERY.: Brand: GELPORT® LAPAROSCOPIC  SYSTEM

## (undated) DEVICE — PATIENT RETURN ELECTRODE, SINGLE-USE, CONTACT QUALITY MONITORING, ADULT, WITH 9FT CORD, FOR PATIENTS WEIGING OVER 33LBS. (15KG): Brand: MEGADYNE

## (undated) DEVICE — SPONGE GZ W4XL4IN RAYON POLY CVR W/NONWOVEN FAB STRL 2/PK

## (undated) DEVICE — CLOTH SURG PREP PREOPERATIVE CHLORHEXIDINE GLUC 2% READYPREP

## (undated) DEVICE — SHEET, T, LAPAROTOMY, STERILE: Brand: MEDLINE

## (undated) DEVICE — CADIERE FORCEPS: Brand: ENDOWRIST

## (undated) DEVICE — GLOVE ORANGE PI 7   MSG9070

## (undated) DEVICE — 3M™ IOBAN™ 2 ANTIMICROBIAL INCISE DRAPE 6650EZ: Brand: IOBAN™ 2

## (undated) DEVICE — BASIC SINGLE BASIN 1-LF: Brand: MEDLINE INDUSTRIES, INC.

## (undated) DEVICE — DECANTER BAG 9": Brand: MEDLINE INDUSTRIES, INC.

## (undated) DEVICE — MAGNETIC INSTR DRAPE 20X16: Brand: MEDLINE INDUSTRIES, INC.

## (undated) DEVICE — ADHESIVE SKIN CLOSURE TOP 36 CC HI VISC DERMBND MINI

## (undated) DEVICE — TIP COVER ACCESSORY

## (undated) DEVICE — BLOCK BITE 60FR RUBBER ADLT DENTAL

## (undated) DEVICE — COLUMN DRAPE

## (undated) DEVICE — GRADUATE TRIANG MEASURE 1000ML BLK PRNT

## (undated) DEVICE — NEEDLE HYPO 25GA L1.5IN BLU POLYPR HUB S STL REG BVL STR

## (undated) DEVICE — STAPLER CANNULA, LONG

## (undated) DEVICE — 1.5L THIN WALL CAN: Brand: CRD

## (undated) DEVICE — SYRINGE MED 10ML LUERLOCK TIP W/O SFTY DISP

## (undated) DEVICE — GLOVE SURG SZ 75 L12IN FNGR THK94MIL TRNSLUC YEL LTX

## (undated) DEVICE — GLOVE ORANGE PI 7 1/2   MSG9075

## (undated) DEVICE — C-ARM: Brand: UNBRANDED

## (undated) DEVICE — GLOVE SURG SZ 65 THK91MIL LTX FREE SYN POLYISOPRENE

## (undated) DEVICE — FLUORESCENCE IMAGING PROCEDURE KIT: Brand: FIREFLY

## (undated) DEVICE — GARMENT,MEDLINE,DVT,INT,CALF,MED, GEN2: Brand: MEDLINE

## (undated) DEVICE — STAPLER INT L75MM CUT LN L73MM STPL LN L77MM BLU B FRM 8

## (undated) DEVICE — SURGICAL PROCEDURE PACK VASC MAJ CUST

## (undated) DEVICE — INTENDED FOR TISSUE SEPARATION, AND OTHER PROCEDURES THAT REQUIRE A SHARP SURGICAL BLADE TO PUNCTURE OR CUT.: Brand: BARD-PARKER ® STAINLESS STEEL BLADES

## (undated) DEVICE — RELOAD STPL L75MM OPN H3.8MM CLS 1.5MM WIRE DIA0.2MM REG

## (undated) DEVICE — TOTAL TRAY, 16FR 10ML SIL FOLEY, URN: Brand: MEDLINE

## (undated) DEVICE — VESSEL SEALER EXTEND: Brand: ENDOWRIST

## (undated) DEVICE — NEEDLE HYPO 21GA L1.5IN GRN POLYPR HUB S STL REG BVL STR

## (undated) DEVICE — ELECTRODE PT RET AD L9FT HI MOIST COND ADH HYDRGEL CORDED

## (undated) DEVICE — Device

## (undated) DEVICE — MAJOR VASCULAR: Brand: MEDLINE INDUSTRIES, INC.

## (undated) DEVICE — SYRINGE MED 10ML TRNSLUC BRL PLUNG BLK MRK POLYPR CTRL

## (undated) DEVICE — FORCEPS BX L240CM JAW DIA2.4MM ORNG L CAP W/ NDL DISP RAD

## (undated) DEVICE — SURGICAL PROCEDURE PACK ROBOTIC

## (undated) DEVICE — UNIVERSAL DRAPE: Brand: MEDLINE INDUSTRIES, INC.

## (undated) DEVICE — Z DISCONTINUED BY MEDLINE USE 2661081 SHEARS SEAL L20CM DIA5MM ULTRASONIC CRV TIP HARM HD 1000I

## (undated) DEVICE — PACK,AURORA,LAVH: Brand: MEDLINE

## (undated) DEVICE — SCOPE DAVINCI XI 30 DEG W/CORD

## (undated) DEVICE — STANDARD HYPODERMIC NEEDLE,ALUMINUM HUB: Brand: MONOJECT

## (undated) DEVICE — CANNULA SEAL

## (undated) DEVICE — SYRINGE MED 10ML POLYPR LUERSLIP TIP FLAT TOP W/O SFTY DISP

## (undated) DEVICE — SCISSORS SURG DIA8MM MPLR CRV ENDOWRIST

## (undated) DEVICE — TRAY VCF/TESIO TRAY  REUSABLE

## (undated) DEVICE — SOLUTION IV 100ML 0.9% SOD CHL PLAS CONT USP VIAFLX 1 PER